# Patient Record
Sex: FEMALE | Race: WHITE | NOT HISPANIC OR LATINO | Employment: OTHER | URBAN - NONMETROPOLITAN AREA
[De-identification: names, ages, dates, MRNs, and addresses within clinical notes are randomized per-mention and may not be internally consistent; named-entity substitution may affect disease eponyms.]

---

## 2017-01-10 ENCOUNTER — OFFICE VISIT (OUTPATIENT)
Dept: INTERNAL MEDICINE | Facility: CLINIC | Age: 82
End: 2017-01-10

## 2017-01-10 VITALS
WEIGHT: 177.2 LBS | HEIGHT: 68 IN | SYSTOLIC BLOOD PRESSURE: 100 MMHG | OXYGEN SATURATION: 95 % | TEMPERATURE: 98.4 F | BODY MASS INDEX: 26.86 KG/M2 | DIASTOLIC BLOOD PRESSURE: 60 MMHG

## 2017-01-10 DIAGNOSIS — J41.8 MIXED SIMPLE AND MUCOPURULENT CHRONIC BRONCHITIS (HCC): Primary | ICD-10-CM

## 2017-01-10 PROCEDURE — 96372 THER/PROPH/DIAG INJ SC/IM: CPT | Performed by: INTERNAL MEDICINE

## 2017-01-10 PROCEDURE — 99213 OFFICE O/P EST LOW 20 MIN: CPT | Performed by: INTERNAL MEDICINE

## 2017-01-10 RX ORDER — METHYLPREDNISOLONE ACETATE 40 MG/ML
40 INJECTION, SUSPENSION INTRA-ARTICULAR; INTRALESIONAL; INTRAMUSCULAR; SOFT TISSUE ONCE
Status: COMPLETED | OUTPATIENT
Start: 2017-01-10 | End: 2017-01-10

## 2017-01-10 RX ORDER — AZITHROMYCIN 250 MG/1
TABLET, FILM COATED ORAL
Qty: 6 TABLET | Refills: 1 | Status: SHIPPED | OUTPATIENT
Start: 2017-01-10 | End: 2017-01-24

## 2017-01-10 RX ORDER — PREDNISONE 10 MG/1
TABLET ORAL
Qty: 15 TABLET | Refills: 0 | Status: SHIPPED | OUTPATIENT
Start: 2017-01-10 | End: 2017-01-24

## 2017-01-10 RX ADMIN — METHYLPREDNISOLONE ACETATE 40 MG: 40 INJECTION, SUSPENSION INTRA-ARTICULAR; INTRALESIONAL; INTRAMUSCULAR; SOFT TISSUE at 16:21

## 2017-01-10 NOTE — MR AVS SNAPSHOT
Val Arteaga   1/10/2017 3:30 PM   Office Visit    Dept Phone:  [phone]   Encounter #:  [id number]    Provider:  Roula Albert MD   Department:  [facility] INTERNAL MEDICINE                Your Full Care Plan              Today's Medication Changes          These changes are accurate as of: 1/10/17  4:31 PM.  If you have any questions, ask your nurse or doctor.               New Medication(s)Ordered:     predniSONE 10 MG tablet   Commonly known as:  DELTASONE   1 PO bid for 5 days, 1 PO daily for 5 days.   Started by:  Roula Albert MD            Where to Get Your Medications      These medications were sent to [place], [place] - [address] [place] [address] - [phone]  - [phone] [address] [address]     Phone:  [phone]     azithromycin 250 MG tablet    predniSONE 10 MG tablet                  Your Updated Medication List          This list is accurate as of: 1/10/17  4:31 PM.  Always use your most recent med list.                albuterol 108 (90 BASE) MCG/ACT inhaler   Commonly known as:  PROVENTIL HFA;VENTOLIN HFA   Inhale 2 puffs Every 4 (Four) Hours As Needed for wheezing.       aspirin 81 MG EC tablet       azithromycin 250 MG tablet   Commonly known as:  ZITHROMAX   Take 2 tablets the first day, then 1 tablet daily for 4 days.       calcium carbonate 600 MG tablet   Commonly known as:  OS-AJ       CENTRUM SILVER ADULT 50+ tablet       clopidogrel 75 MG tablet   Commonly known as:  PLAVIX       coenzyme Q10 100 MG capsule       fluorometholone 0.1 % ophthalmic suspension   Commonly known as:  FLAREX   Administer 1 drop to both eyes 4 (Four) Times a Day.       levothyroxine 50 MCG tablet   Commonly known as:  SYNTHROID   Take 1 tablet by mouth daily.       magnesium oxide 400 MG tablet   Commonly known as:  MAG-OX       nitroglycerin 0.2 MG/HR patch   Commonly known as:  NITRO-DUR   Place 1 patch on the  skin Daily.       predniSONE 10 MG tablet   Commonly known as:  DELTASONE   1 PO bid for 5 days, 1 PO daily for 5 days.       ranolazine 500 MG 12 hr tablet   Commonly known as:  RANEXA       sertraline 50 MG tablet   Commonly known as:  ZOLOFT       simvastatin 40 MG tablet   Commonly known as:  ZOCOR       telmisartan 80 MG tablet   Commonly known as:  MICARDIS   Take 1 tablet by mouth Daily.       Umeclidinium-Vilanterol 62.5-25 MCG/INH aerosol powder    Commonly known as:  ANORO ELLIPTA   Inhale 1 puff daily.               You Were Diagnosed With        Codes Comments    Mixed simple and mucopurulent chronic bronchitis    -  Primary ICD-10-CM: J41.8  ICD-9-CM: 491.1       Medications to be Given to You by a Medical Professional     Due       Frequency    (none) methylPREDNISolone acetate (DEPO-medrol) injection 40 mg  Once      Instructions     None    Patient Instructions History      Upcoming Appointments     Visit Type Date Time Department    OFFICE VISIT 1/10/2017  3:30 PM [facility] INTERNAL MED MAD    OFFICE VISIT 5/22/2017 10:00 AM [facility] INTERNAL MED MAD    OFFICE VISIT 5/30/2017 10:00 AM [facility] PULMONARY MAD    OFFICE VISIT 12/13/2017  1:00 PM [facility] OPHTHALMOLOGY MAD      MyChart Signup     Our records indicate that you have declined [facility] [employer] signup. If you would like to sign up for Pop[employer]ax[employer], please email [email or web address] or call [phone] to obtain an activation code.             Other Info from Your Visit           Your Appointments     May 22, 2017 10:00 AM CDT   Office Visit with Roula Albert MD   [facility] INTERNAL MEDICINE (--)    [address]  [address] [place] [address] [phone]           Arrive 15 minutes prior to appointment.            May 30, 2017 10:00 AM CDT   Office Visit with Bryan Jay MD   [facility] PULMONARY MEDICINE (--)    [address]  [address] [place] [id number]  "  [phone]           Arrive 15 minutes prior to appointment.            Dec 13, 2017  1:00 PM CST   Office Visit with Marco A Benson MD   [facility] OPHTHALMOLOGY (--)    [address] [place] [address] [address]  [address] [place] [id number] [phone]           Arrive 15 minutes prior to appointment.              Allergies     Ace Inhibitors        Reason for Visit     Shortness of Breath     Cough           Vital Signs     Blood Pressure Temperature Height Weight Oxygen Saturation Body Mass Index    100/60 98.4 °F (36.9 °C) 68\" (172.7 cm) 177 lb 3.2 oz (80.4 kg) 95% 26.94 kg/m2    Smoking Status                   Current Every Day Smoker           Problems and Diagnoses Noted     Chronic airway obstruction      Medications Administered     methylPREDNISolone acetate (DEPO-medrol) injection 40 mg                      "

## 2017-01-10 NOTE — PROGRESS NOTES
Subjective   Val Arteaga is a 84 y.o. female   Patient presents complaining of chest congestion, chest tightness,increased cough and wheezing for a week.Cough is productive of yellowish sputum.  She is using all her inhalers as prescribed.  Also complains of fatigue and tiredness.    She is still smoking couple of cigarettes a day.  Past Medical History   Diagnosis Date   • Acquired hypothyroidism    • Allergic rhinitis    • COPD (chronic obstructive pulmonary disease)    • Corneal epithelial dystrophy    • Coronary arteriosclerosis    • Depression    • Generalized atherosclerosis    • GERD (gastroesophageal reflux disease)    • Hemorrhoids    • History of bone density study 08/03/2012     Lumbar spine Osteopenia. Femoral Osteopenia   • History of mammogram 08/20/2004     Birads Category 2 Benign   • History of Papanicolaou smear of cervix 08/12/2004     NEGATIVE   • Hypercholesterolemia    • Hyperlipidemia    • Hypertension    • Nonexudative age-related macular degeneration    • Osteoarthritis of multiple joints    • Pseudophakia    • Punctate keratitis      - history Thygeson's keratopathy      • Tobacco dependence syndrome      Past Surgical History   Procedure Laterality Date   • Coronary angioplasty  07/21/1993     Angioplasty, coronary (2)    RCA    • Appendectomy     • Cardiac catheterization  06/16/2014     Kathe. patency in the circumflex artery site of previous angioplasty. Kathe. patency in RCA.Nonobstructive 20-30% stenosis in the LAD and diagonal branch. Preserved LV systolic function with Ef of 55%   • Endoscopy and colonoscopy  10/01/1998     Hemorhoids Rectal Outlet Bleeding   • Colonoscopy  01/01/2013     patient declined    • Dilatation and curettage       3   • Injection of medication  11/23/2015     Depo Medrol (Methylprednisone) (5)    • Esophagoscopy / egd  06/28/2004     Nonerosive gastritis of the body of the stomach. A biopsy was obtained & placed in h. Pylori test agar. Multiple biopsies were  obtained from the body of the stomach   • Breast surgery  03/19/1954     Excision, breast lesion (1)  Excision of fibroma. Tumor,very small right breast, from portion near sternum   • Cervical spine surgery  09/16/1974     Excision of cervical disc, C5-6, C6-7, anterior cervical fusion, C5-6 with iliac bone graft.cervical spondylosis,C5-6, C6-7   • Injection of medication  02/04/2016     Kenalog (3)         Social History   Substance Use Topics   • Smoking status: Current Every Day Smoker     Types: Cigarettes   • Smokeless tobacco: Not on file      Comment: 1-9 cigs/day   • Alcohol use Not on file     Family History   Problem Relation Age of Onset   • Coronary artery disease Other      Allergies   Allergen Reactions   • Ace Inhibitors      Current Outpatient Prescriptions on File Prior to Visit   Medication Sig Dispense Refill   • albuterol (PROVENTIL HFA;VENTOLIN HFA) 108 (90 BASE) MCG/ACT inhaler Inhale 2 puffs Every 4 (Four) Hours As Needed for wheezing. 6.7 g 11   • aspirin 81 MG EC tablet Take 81 mg by mouth Daily.     • calcium carbonate (OS-AJ) 600 MG tablet Take 600 mg by mouth Daily.     • clopidogrel (PLAVIX) 75 MG tablet Take 75 mg by mouth Daily.     • coenzyme Q10 100 MG capsule Take 100 mg by mouth Daily.     • fluorometholone (FLAREX) 0.1 % ophthalmic suspension Administer 1 drop to both eyes 4 (Four) Times a Day. 5 mL 6   • levothyroxine (SYNTHROID) 50 MCG tablet Take 1 tablet by mouth daily. 30 tablet 2   • magnesium oxide (MAG-OX) 400 MG tablet Take 400 mg by mouth 2 (Two) Times a Day.     • Multiple Vitamins-Minerals (CENTRUM SILVER ADULT 50+) tablet Take 1 tablet by mouth Daily.     • nitroglycerin (NITRO-DUR) 0.2 MG/HR patch Place 1 patch on the skin Daily. 30 patch 0   • ranolazine (RANEXA) 500 MG 12 hr tablet Take 500 mg by mouth 2 (Two) Times a Day.     • sertraline (ZOLOFT) 50 MG tablet Take 50 mg by mouth Daily.     • simvastatin (ZOCOR) 40 MG tablet Take 40 mg by mouth Every Night.     •  telmisartan (MICARDIS) 80 MG tablet Take 1 tablet by mouth Daily. 30 tablet 0   • Umeclidinium-Vilanterol (ANORO ELLIPTA) 62.5-25 MCG/INH aerosol powder  Inhale 1 puff daily. 1 each 4   • azithromycin (ZITHROMAX) 250 MG tablet Take 2 tablets the first day, then 1 tablet daily for 4 days. 6 tablet 1     No current facility-administered medications on file prior to visit.      Review of Systems   Constitutional: Positive for chills and fatigue. Negative for fever.   HENT: Negative for congestion, postnasal drip, rhinorrhea and sore throat.    Respiratory: Positive for cough, chest tightness, shortness of breath and wheezing.    Cardiovascular: Negative for chest pain, palpitations and leg swelling.   Gastrointestinal: Negative for abdominal pain, nausea and vomiting.   Endocrine: Negative for cold intolerance and heat intolerance.   Genitourinary: Negative for dysuria and flank pain.   Skin: Negative for color change, pallor and rash.   Neurological: Negative for dizziness, light-headedness and numbness.       Objective   Physical Exam   Constitutional: She is oriented to person, place, and time. She appears well-developed and well-nourished.   HENT:   Head: Normocephalic.   Nose: Mucosal edema present. No rhinorrhea. Right sinus exhibits no frontal sinus tenderness. Left sinus exhibits no frontal sinus tenderness.   Neck: No JVD present. No thyromegaly present.   Cardiovascular: Normal rate and regular rhythm.    No murmur heard.  Pulmonary/Chest: Effort normal. No respiratory distress. She has wheezes. She exhibits no tenderness.   Diminished breath sounds   Abdominal: Soft. Bowel sounds are normal.   Lymphadenopathy:     She has no cervical adenopathy.   Neurological: She is alert and oriented to person, place, and time.   Skin: Skin is warm and dry.   No cyanosis   Nursing note and vitals reviewed.          Patient Active Problem List   Diagnosis   • Essential hypertension   • Coronary artery disease involving  native coronary artery of native heart without angina pectoris   • Chronic obstructive pulmonary disease   • Acquired hypothyroidism   • Depressive disorder   • Thygeson's superficial punctate keratitis   • Pseudophakia               Assessment    Diagnosis Plan   1. Mixed simple and mucopurulent chronic bronchitis  methylPREDNISolone acetate (DEPO-medrol) injection 40 mg         Plan      Z-pack.  Depo-Medrol.  Prednisone taper.  Continue Anoro and Albuterol inhaler.  Advised to quit smoking.      New Medications Ordered This Visit   Medications   • azithromycin (ZITHROMAX) 250 MG tablet     Sig: Take 2 tablets the first day, then 1 tablet daily for 4 days.     Dispense:  6 tablet     Refill:  1   • predniSONE (DELTASONE) 10 MG tablet     Si PO bid for 5 days, 1 PO daily for 5 days.     Dispense:  15 tablet     Refill:  0   • methylPREDNISolone acetate (DEPO-medrol) injection 40 mg       Follow up if not improved or for routine care.

## 2017-01-24 ENCOUNTER — OFFICE VISIT (OUTPATIENT)
Dept: INTERNAL MEDICINE | Facility: CLINIC | Age: 82
End: 2017-01-24

## 2017-01-24 VITALS
SYSTOLIC BLOOD PRESSURE: 120 MMHG | HEIGHT: 68 IN | DIASTOLIC BLOOD PRESSURE: 60 MMHG | BODY MASS INDEX: 25.84 KG/M2 | WEIGHT: 170.5 LBS

## 2017-01-24 DIAGNOSIS — J41.8 MIXED SIMPLE AND MUCOPURULENT CHRONIC BRONCHITIS (HCC): Primary | ICD-10-CM

## 2017-01-24 DIAGNOSIS — F17.200 TOBACCO DEPENDENCE: ICD-10-CM

## 2017-01-24 DIAGNOSIS — I10 ESSENTIAL HYPERTENSION: ICD-10-CM

## 2017-01-24 DIAGNOSIS — I25.10 CORONARY ARTERY DISEASE INVOLVING NATIVE CORONARY ARTERY OF NATIVE HEART WITHOUT ANGINA PECTORIS: ICD-10-CM

## 2017-01-24 PROCEDURE — 99213 OFFICE O/P EST LOW 20 MIN: CPT | Performed by: INTERNAL MEDICINE

## 2017-01-24 RX ORDER — NICOTINE 21 MG/24HR
1 PATCH, TRANSDERMAL 24 HOURS TRANSDERMAL EVERY 24 HOURS
Qty: 30 PATCH | Refills: 0 | Status: SHIPPED | OUTPATIENT
Start: 2017-01-24 | End: 2017-02-06

## 2017-01-24 RX ORDER — BUDESONIDE AND FORMOTEROL FUMARATE DIHYDRATE 160; 4.5 UG/1; UG/1
2 AEROSOL RESPIRATORY (INHALATION)
Qty: 1 INHALER | Refills: 5 | Status: SHIPPED | OUTPATIENT
Start: 2017-01-24 | End: 2018-01-22 | Stop reason: SDUPTHER

## 2017-01-24 RX ORDER — CARVEDILOL 6.25 MG/1
6.25 TABLET ORAL 2 TIMES DAILY WITH MEALS
COMMUNITY
End: 2018-03-30 | Stop reason: SDUPTHER

## 2017-01-24 RX ORDER — CARVEDILOL 12.5 MG/1
12.5 TABLET ORAL DAILY
COMMUNITY
End: 2017-01-24 | Stop reason: DRUGHIGH

## 2017-01-24 NOTE — MR AVS SNAPSHOT
Val Arteaga   1/24/2017 10:30 AM   Office Visit    Dept Phone:  [phone]   Encounter #:  [id number]    Provider:  Roula Albert MD   Department:  [facility] INTERNAL MEDICINE                Your Full Care Plan              Today's Medication Changes          These changes are accurate as of: 1/24/17 12:08 PM.  If you have any questions, ask your nurse or doctor.               New Medication(s)Ordered:     budesonide-formoterol 160-4.5 MCG/ACT inhaler   Commonly known as:  SYMBICORT   Inhale 2 puffs 2 (Two) Times a Day.   Started by:  Roula Albert MD       nicotine 14 MG/24HR patch   Commonly known as:  NICODERM CQ   Place 1 patch on the skin Daily.   Started by:  Roula Albert MD         Stop taking medication(s)listed here:     azithromycin 250 MG tablet   Commonly known as:  ZITHROMAX   Stopped by:  Roula Albert MD           predniSONE 10 MG tablet   Commonly known as:  DELTASONE   Stopped by:  Roula Albert MD           Umeclidinium-Vilanterol 62.5-25 MCG/INH aerosol powder    Commonly known as:  ANORO ELLIPTA   Stopped by:  Roula Albert MD                Where to Get Your Medications      These medications were sent to [address] [phone] [place] [id number] [place] [address]     Phone:  [phone]     budesonide-formoterol 160-4.5 MCG/ACT inhaler    nicotine 14 MG/24HR patch                  Your Updated Medication List          This list is accurate as of: 1/24/17 12:08 PM.  Always use your most recent med list.                albuterol 108 (90 BASE) MCG/ACT inhaler   Commonly known as:  PROVENTIL HFA;VENTOLIN HFA   Inhale 2 puffs Every 4 (Four) Hours As Needed for wheezing.       aspirin 81 MG EC tablet       budesonide-formoterol 160-4.5 MCG/ACT inhaler   Commonly known as:  SYMBICORT   Inhale 2 puffs 2 (Two) Times a Day.       calcium carbonate 600 MG tablet      Commonly known as:  OS-AJ       carvedilol 12.5 MG tablet   Commonly known as:  COREG       CENTRUM SILVER ADULT 50+ tablet       clopidogrel 75 MG tablet   Commonly known as:  PLAVIX       coenzyme Q10 100 MG capsule       fluorometholone 0.1 % ophthalmic suspension   Commonly known as:  FLAREX   Administer 1 drop to both eyes 4 (Four) Times a Day.       levothyroxine 50 MCG tablet   Commonly known as:  SYNTHROID   Take 1 tablet by mouth daily.       magnesium oxide 400 MG tablet   Commonly known as:  MAG-OX       nicotine 14 MG/24HR patch   Commonly known as:  NICODERM CQ   Place 1 patch on the skin Daily.       nitroglycerin 0.2 MG/HR patch   Commonly known as:  NITRO-DUR   Place 1 patch on the skin Daily.       PRIMIDONE PO       ranolazine 500 MG 12 hr tablet   Commonly known as:  RANEXA       sertraline 50 MG tablet   Commonly known as:  ZOLOFT       simvastatin 40 MG tablet   Commonly known as:  ZOCOR       telmisartan 80 MG tablet   Commonly known as:  MICARDIS   Take 1 tablet by mouth Daily.               Instructions     None    Patient Instructions History      Upcoming Appointments     Visit Type Date Time Department    OFFICE VISIT 1/24/2017 10:30 AM [facility] INTERNAL MED MAD    OFFICE VISIT 2/24/2017 10:00 AM [facility] INTER[facility] MED [facility]AD    OFFICE VISIT 5/22/2017 10:00 AM [facility] INTER[facility] MED MAD    OFFICE VISIT 5/30/2017 10:00 AM [facility] PULMONARY MAD    OFFICE VISIT 12/13/2017  1:00 PM [facility] OPHTHALMOLOGY MAD      MyChart Signup     Our records indicate that you have declined [facility] signup. If you would like to sign up for [facility], please email [email or web address] or call [phone] to obtain an activation code.             Other Info from Your Visit           Your Appointments     Feb 24, 2017 10:00 AM CST   Office Visit with Roula Albert MD   [facility] INTERNAL MEDICINE (--)    [address]  [place] [address] [place] [address] [phone]            "Arrive 15 minutes prior to appointment.            May 22, 2017 10:00 AM CDT   Office Visit with Roula Albert MD   [facility] INTERNAL MEDICINE (--)    [address]  [address] [place] [id number] [phone]           Arrive 15 minutes prior to appointment.            May 30, 2017 10:00 AM CDT   Office Visit with Bryan Jay MD   [facility] PULMONARY MEDICINE (--)    [address]  [address] [place] [address] [phone]           Arrive 15 minutes prior to appointment.            Dec 13, 2017  1:00 PM CST   Office Visit with Marco A Benson MD   [facility] OPHTHALMOLOGY (--)    [address] [place] [address] [address]  [address] [place] [address] [phone]           Arrive 15 minutes prior to appointment.              Allergies     Ace Inhibitors        Reason for Visit     COPD [facility] follow up      Vital Signs     Blood Pressure Height Weight Body Mass Index Smoking Status       120/60 68\" (172.7 cm) 170 lb 8 oz (77.3 kg) 25.92 kg/m2 Current Every Day Smoker         "

## 2017-01-24 NOTE — PROGRESS NOTES
Subjective   Val Arteaga is a 84 y.o. female     Patient presents for recheck after recent hospitalization for COPD exacerbation. Patient was treated with IV Antibiotics and IV steroids with improvement in her symptoms. Chest X-Ray was done and did not show any evidence of infiltrates.  Patient was discharged home on 2 liters of Oxygen and has been using it at night.    She is doing better.Denies cough, purulent sputum or wheezing. She is dyspneic on exertion. Patient is on tapering dose of Medrol.  Patient is using Nebulizers 4 times a day.  O2 sat on RA in the office is 93% at rest.      Dose of Coreg was doubled, but since then her BP has been on a low side, and patient has been lightheaded at times. She denies any chest pain, palpitations or edema in lower extremities.    Past Medical History   Diagnosis Date   • Acquired hypothyroidism    • Allergic rhinitis    • COPD (chronic obstructive pulmonary disease)    • Corneal epithelial dystrophy    • Coronary arteriosclerosis    • Depression    • Generalized atherosclerosis    • GERD (gastroesophageal reflux disease)    • Hemorrhoids    • History of bone density study 08/03/2012     Lumbar spine Osteopenia. Femoral Osteopenia   • History of mammogram 08/20/2004     Birads Category 2 Benign   • History of Papanicolaou smear of cervix 08/12/2004     NEGATIVE   • Hypercholesterolemia    • Hyperlipidemia    • Hypertension    • Nonexudative age-related macular degeneration    • Osteoarthritis of multiple joints    • Pseudophakia    • Punctate keratitis      - history Thygeson's keratopathy      • Tobacco dependence syndrome      Past Surgical History   Procedure Laterality Date   • Coronary angioplasty  07/21/1993     Angioplasty, coronary (2)    RCA    • Appendectomy     • Cardiac catheterization  06/16/2014     Kathe. patency in the circumflex artery site of previous angioplasty. Kathe. patency in RCA.Nonobstructive 20-30% stenosis in the LAD and diagonal branch.  Preserved LV systolic function with Ef of 55%   • Endoscopy and colonoscopy  10/01/1998     Hemorhoids Rectal Outlet Bleeding   • Colonoscopy  01/01/2013     patient declined    • Dilatation and curettage       3   • Injection of medication  11/23/2015     Depo Medrol (Methylprednisone) (5)    • Esophagoscopy / egd  06/28/2004     Nonerosive gastritis of the body of the stomach. A biopsy was obtained & placed in h. Pylori test agar. Multiple biopsies were obtained from the body of the stomach   • Breast surgery  03/19/1954     Excision, breast lesion (1)  Excision of fibroma. Tumor,very small right breast, from portion near sternum   • Cervical spine surgery  09/16/1974     Excision of cervical disc, C5-6, C6-7, anterior cervical fusion, C5-6 with iliac bone graft.cervical spondylosis,C5-6, C6-7   • Injection of medication  02/04/2016     Kenalog (3)         Social History   Substance Use Topics   • Smoking status: Current Every Day Smoker     Types: Cigarettes   • Smokeless tobacco: Never Used      Comment: 1-9 cigs/day   • Alcohol use No     Family History   Problem Relation Age of Onset   • Coronary artery disease Other      Allergies   Allergen Reactions   • Ace Inhibitors      Current Outpatient Prescriptions on File Prior to Visit   Medication Sig Dispense Refill   • albuterol (PROVENTIL HFA;VENTOLIN HFA) 108 (90 BASE) MCG/ACT inhaler Inhale 2 puffs Every 4 (Four) Hours As Needed for wheezing. 6.7 g 11   • aspirin 81 MG EC tablet Take 81 mg by mouth Daily.     • calcium carbonate (OS-AJ) 600 MG tablet Take 600 mg by mouth Daily.     • clopidogrel (PLAVIX) 75 MG tablet Take 75 mg by mouth Daily.     • coenzyme Q10 100 MG capsule Take 100 mg by mouth Daily.     • fluorometholone (FLAREX) 0.1 % ophthalmic suspension Administer 1 drop to both eyes 4 (Four) Times a Day. 5 mL 6   • levothyroxine (SYNTHROID) 50 MCG tablet Take 1 tablet by mouth daily. 30 tablet 2   • magnesium oxide (MAG-OX) 400 MG tablet Take 400  mg by mouth 2 (Two) Times a Day.     • Multiple Vitamins-Minerals (CENTRUM SILVER ADULT 50+) tablet Take 1 tablet by mouth Daily.     • nitroglycerin (NITRO-DUR) 0.2 MG/HR patch Place 1 patch on the skin Daily. 30 patch 0   • ranolazine (RANEXA) 500 MG 12 hr tablet Take 500 mg by mouth 2 (Two) Times a Day.     • sertraline (ZOLOFT) 50 MG tablet Take 50 mg by mouth Daily.     • telmisartan (MICARDIS) 80 MG tablet Take 1 tablet by mouth Daily. 30 tablet 0   • simvastatin (ZOCOR) 40 MG tablet Take 40 mg by mouth Every Night.     • [DISCONTINUED] azithromycin (ZITHROMAX) 250 MG tablet Take 2 tablets the first day, then 1 tablet daily for 4 days. 6 tablet 1   • [DISCONTINUED] predniSONE (DELTASONE) 10 MG tablet 1 PO bid for 5 days, 1 PO daily for 5 days. 15 tablet 0   • [DISCONTINUED] Umeclidinium-Vilanterol (ANORO ELLIPTA) 62.5-25 MCG/INH aerosol powder  Inhale 1 puff daily. 1 each 4     No current facility-administered medications on file prior to visit.      Review of Systems   Constitutional: Positive for fatigue. Negative for chills and fever.   HENT: Negative for congestion, sinus pressure and sore throat.    Respiratory: Positive for shortness of breath. Negative for chest tightness and wheezing.    Cardiovascular: Negative for chest pain, palpitations and leg swelling.   Gastrointestinal: Negative for abdominal pain, constipation and diarrhea.   Musculoskeletal: Negative for back pain, joint swelling and myalgias.   Neurological: Positive for light-headedness.       Objective   Physical Exam   Constitutional: She appears well-developed and well-nourished.   HENT:   Head: Normocephalic and atraumatic.   Eyes: Conjunctivae and EOM are normal.   Neck: Neck supple. No JVD present. No thyromegaly present.   Cardiovascular: Normal rate and regular rhythm.    No murmur heard.  Pulmonary/Chest: Effort normal.   Diminished breath sounds   Abdominal: Soft. Bowel sounds are normal. She exhibits no mass.   Musculoskeletal:  Normal range of motion. She exhibits no edema.   Neurological: She is alert. She displays normal reflexes.   Skin: Skin is warm and dry. No pallor.   No cyanosis   Psychiatric: She has a normal mood and affect. Her behavior is normal.   Nursing note and vitals reviewed.      Results for orders placed or performed during the hospital encounter of 01/13/17   Comprehensive Metabolic Panel   Result Value Ref Range    Sodium 134 (L) 137 - 145 mmol/L    Potassium 4.4 3.5 - 5.1 mmol/L    Chloride 96 95 - 110 mmol/L    CO2 28 22 - 31 mmol/L    Anion Gap 10.0 5.0 - 15.0 mmol/L    Glucose 116 (H) 60 - 100 mg/dl    BUN 22 (H) 7 - 21 mg/dl    Creatinine 0.9 0.5 - 1.0 mg/dl    GFR MDRD Non African American 60 39 - 90 mL/min/1.73 sq.M    GFR MDRD African American 72 39 - 90 mL/min/1.73 sq.M    Calcium 8.8 8.4 - 10.2 mg/dl    Total Protein 7.0 6.3 - 8.6 gm/dl    Albumin 4.0 3.4 - 4.8 gm/dl    Total Bilirubin 0.5 0.2 - 1.3 mg/dl    Alkaline Phosphatase 79 38 - 126 U/L    AST (SGOT) 36 14 - 36 U/L    ALT (SGPT) 41 9 - 52 U/L   Magnesium   Result Value Ref Range    Magnesium 2.20 1.60 - 2.30 mg/dl   Lipase   Result Value Ref Range    Lipase 96 23 - 300 U/L   CK   Result Value Ref Range    Creatine Kinase 52 30 - 135 U/L   Protime-INR   Result Value Ref Range    Protime 12.4 11.1 - 15.3 seconds    INR 0.9 0.8 - 1.2   aPTT   Result Value Ref Range    PTT 26.9 20.0 - 40.3 seconds   proBNP   Result Value Ref Range    NT-proBNP 476 0 - 1800 pg/ml   Troponin   Result Value Ref Range    Troponin I <0.012 0.000 - 0.034 ng/ml   CK-MB   Result Value Ref Range    CKMB 1.3 0.0 - 5.0 ng/ml   TSH   Result Value Ref Range    TSH 2.46 0.46 - 4.68 uIU/ml   CK   Result Value Ref Range    Creatine Kinase 48 30 - 135 U/L   CK-MB   Result Value Ref Range    CKMB 1.2 0.0 - 5.0 ng/ml   Troponin   Result Value Ref Range    Troponin I <0.012 0.000 - 0.034 ng/ml   Blood Gas, Arterial   Result Value Ref Range    pH, Arterial 7.44 7.35 - 7.45 pH Units    pCO2,  Arterial 30.2 (L) 35.0 - 45.0 mm Hg    pO2, Arterial 85.9 80.0 - 105.0 mm Hg    HCO3, Arterial 20.1 (L) 22.0 - 26.0 mmol/L    CO2 Content 21.1 (L) 23.0 - 27.0 mmol/L    Base Excess -3.0 (L) -2.4 - 2.4 mmol/L    O2 Saturation, Arterial 95.7 94.0 - 100.0 %    O2 Content 16.1 15.0 - 23.0 volumes %    A-A. Gradiant 77.3 (H) 20.0 - 65.0 mm Hg   CK   Result Value Ref Range    Creatine Kinase 52 30 - 135 U/L   Basic Metabolic Panel   Result Value Ref Range    Sodium 135 (L) 137 - 145 mmol/L    Potassium 3.8 3.5 - 5.1 mmol/L    Chloride 99 95 - 110 mmol/L    CO2 22 22 - 31 mmol/L    Anion Gap 14.0 5.0 - 15.0 mmol/L    Glucose 192 (H) 60 - 100 mg/dl    BUN 22 (H) 7 - 21 mg/dl    Creatinine 0.9 0.5 - 1.0 mg/dl    GFR MDRD Non African American 60 39 - 90 mL/min/1.73 sq.M    GFR MDRD African American 72 39 - 90 mL/min/1.73 sq.M    Calcium 8.3 (L) 8.4 - 10.2 mg/dl   CK-MB   Result Value Ref Range    CKMB 1.2 0.0 - 5.0 ng/ml   Troponin   Result Value Ref Range    Troponin I <0.012 0.000 - 0.034 ng/ml   Prealbumin   Result Value Ref Range    Prealbumin 31.6 17.6 - 36.0 mg/dl   Comprehensive Metabolic Panel   Result Value Ref Range    Sodium 139 137 - 145 mmol/L    Potassium 4.2 3.5 - 5.1 mmol/L    Chloride 101 95 - 110 mmol/L    CO2 24 22 - 31 mmol/L    Anion Gap 14.0 5.0 - 15.0 mmol/L    Glucose 138 (H) 60 - 100 mg/dl    BUN 24 (H) 7 - 21 mg/dl    Creatinine 0.9 0.5 - 1.0 mg/dl    GFR MDRD Non African American 60 39 - 90 mL/min/1.73 sq.M    GFR MDRD African American 72 39 - 90 mL/min/1.73 sq.M    Calcium 8.9 8.4 - 10.2 mg/dl    Total Protein 6.4 6.3 - 8.6 gm/dl    Albumin 3.6 3.4 - 4.8 gm/dl    Total Bilirubin 0.6 0.2 - 1.3 mg/dl    Alkaline Phosphatase 62 38 - 126 U/L    AST (SGOT) 29 14 - 36 U/L    ALT (SGPT) 28 9 - 52 U/L   Urinalysis   Result Value Ref Range    Color, UA YELLOW STRAW,YELLOW,DK YELLOW,SEAN    Appearance CLOUDY (H) CLEAR    Specific Gravity, UA 1.013 1.003 - 1.030    pH, UA 6.0 5.0 - 9.0 pH Units     Leukocytes, UA NEGATIVE NEGATIVE    Nitrite, UA NEGATIVE NEGATIVE    Protein, UA NEGATIVE NEGATIVE    Glucose, Urine NEGATIVE NEGATIVE mg/dl    Ketones, UA NEGATIVE NEGATIVE    Urobilinogen, UA 0.2 0.2 EU/dl    Blood, UA NEGATIVE NEGATIVE   Comprehensive Metabolic Panel   Result Value Ref Range    Sodium 141 137 - 145 mmol/L    Potassium 3.8 3.5 - 5.1 mmol/L    Chloride 103 95 - 110 mmol/L    CO2 26 22 - 31 mmol/L    Anion Gap 12.0 5.0 - 15.0 mmol/L    Glucose 127 (H) 60 - 100 mg/dl    BUN 24 (H) 7 - 21 mg/dl    Creatinine 0.9 0.5 - 1.0 mg/dl    GFR MDRD Non African American 60 39 - 90 mL/min/1.73 sq.M    GFR MDRD African American 72 39 - 90 mL/min/1.73 sq.M    Calcium 8.8 8.4 - 10.2 mg/dl    Total Protein 6.3 6.3 - 8.6 gm/dl    Albumin 3.6 3.4 - 4.8 gm/dl    Total Bilirubin 0.5 0.2 - 1.3 mg/dl    Alkaline Phosphatase 60 38 - 126 U/L    AST (SGOT) 37 (H) 14 - 36 U/L    ALT (SGPT) 24 9 - 52 U/L   CBC & Differential   Result Value Ref Range    WBC 9.9 (H) 3.2 - 9.8 x1000/uL    RBC 3.85 3.77 - 5.16 paola/mm3    Hemoglobin 12.8 12.0 - 15.5 gm/dl    Hematocrit 37.4 35.0 - 45.0 %    MCV 97.1 80.0 - 98.0 fl    MCH 33.2 26.0 - 34.0 pg    MCHC 34.2 31.4 - 36.0 gm/dl    RDW 13.5 11.5 - 14.5 %    Platelets 298 150 - 450 x1000/mm3    MPV 9.8 8.0 - 12.0 fl    Neutrophil Rel % 83.9 (H) 37.0 - 80.0 %    Lymphocyte Rel % 12.5 10.0 - 50.0 %    Monocyte Rel % 3.1 0.0 - 12.0 %    Eosinophil Rel % 0.1 0.0 - 7.0 %    Basophil Rel % 0.1 0.0 - 2.0 %    Immature Granulocyte Rel % 0.30 0.00 - 0.50 %    Neutrophils Absolute 8.26 2.00 - 8.60 x1000/uL    Lymphocytes Absolute 1.23 0.60 - 4.20 x1000/uL    Monocytes Absolute 0.31 0.00 - 0.90 x1000/uL    Eosinophils Absolute 0.01 0.00 - 0.70 x1000/uL    Basophils Absolute 0.01 0.00 - 0.20 x1000/uL    Immature Granulocytes Absolute 0.030 (H) 0.005 - 0.022 x1000/uL    nRBC 0.0 0.0 - 0.2 %    nRBC 0.000 x1000/uL   CBC & Differential   Result Value Ref Range    WBC 8.0 3.2 - 9.8 x1000/uL    RBC 3.52  (L) 3.77 - 5.16 paola/mm3    Hemoglobin 11.8 (L) 12.0 - 15.5 gm/dl    Hematocrit 34.1 (L) 35.0 - 45.0 %    MCV 96.9 80.0 - 98.0 fl    MCH 33.5 26.0 - 34.0 pg    MCHC 34.6 31.4 - 36.0 gm/dl    RDW 13.6 11.5 - 14.5 %    Platelets 244 150 - 450 x1000/mm3    MPV 9.8 8.0 - 12.0 fl    Neutrophil Rel % 90.4 (H) 37.0 - 80.0 %    Lymphocyte Rel % 8.6 (L) 10.0 - 50.0 %    Monocyte Rel % 0.8 0.0 - 12.0 %    Eosinophil Rel % 0.0 0.0 - 7.0 %    Basophil Rel % 0.1 0.0 - 2.0 %    Immature Granulocyte Rel % 0.10 0.00 - 0.50 %    Neutrophils Absolute 7.21 2.00 - 8.60 x1000/uL    Lymphocytes Absolute 0.69 0.60 - 4.20 x1000/uL    Monocytes Absolute 0.06 0.00 - 0.90 x1000/uL    Eosinophils Absolute 0.00 0.00 - 0.70 x1000/uL    Basophils Absolute 0.01 0.00 - 0.20 x1000/uL    Immature Granulocytes Absolute 0.010 0.005 - 0.022 x1000/uL   CBC & Differential   Result Value Ref Range    WBC 16.2 (H) 3.2 - 9.8 x1000/uL    RBC 3.65 (L) 3.77 - 5.16 paola/mm3    Hemoglobin 12.1 12.0 - 15.5 gm/dl    Hematocrit 35.1 35.0 - 45.0 %    MCV 96.2 80.0 - 98.0 fl    MCH 33.2 26.0 - 34.0 pg    MCHC 34.5 31.4 - 36.0 gm/dl    RDW 14.3 11.5 - 14.5 %    Platelets 294 150 - 450 x1000/mm3    MPV 9.1 8.0 - 12.0 fl    Neutrophil Rel % 87.2 (H) 37.0 - 80.0 %    Lymphocyte Rel % 8.7 (L) 10.0 - 50.0 %    Monocyte Rel % 3.5 0.0 - 12.0 %    Eosinophil Rel % 0.0 0.0 - 7.0 %    Basophil Rel % 0.1 0.0 - 2.0 %    Immature Granulocyte Rel % 0.50 0.00 - 0.50 %    Neutrophils Absolute 14.12 (H) 2.00 - 8.60 x1000/uL    Lymphocytes Absolute 1.40 0.60 - 4.20 x1000/uL    Monocytes Absolute 0.56 0.00 - 0.90 x1000/uL    Eosinophils Absolute 0.00 0.00 - 0.70 x1000/uL    Basophils Absolute 0.01 0.00 - 0.20 x1000/uL    Immature Granulocytes Absolute 0.080 (H) 0.005 - 0.022 x1000/uL    nRBC 0.0 0.0 - 0.2 %    nRBC 0.000 x1000/uL   CBC & Differential   Result Value Ref Range    WBC 16.9 (H) 3.2 - 9.8 x1000/uL    RBC 3.75 (L) 3.77 - 5.16 paola/mm3    Hemoglobin 12.4 12.0 - 15.5 gm/dl     Hematocrit 36.9 35.0 - 45.0 %    MCV 98.4 (H) 80.0 - 98.0 fl    MCH 33.1 26.0 - 34.0 pg    MCHC 33.6 31.4 - 36.0 gm/dl    RDW 14.3 11.5 - 14.5 %    Platelets 276 150 - 450 x1000/mm3    MPV 10.1 8.0 - 12.0 fl    Neutrophil Rel % 85.6 (H) 37.0 - 80.0 %    Lymphocyte Rel % 9.1 (L) 10.0 - 50.0 %    Monocyte Rel % 4.6 0.0 - 12.0 %    Eosinophil Rel % 0.0 0.0 - 7.0 %    Basophil Rel % 0.1 0.0 - 2.0 %    Immature Granulocyte Rel % 0.60 (H) 0.00 - 0.50 %    Neutrophils Absolute 14.45 (H) 2.00 - 8.60 x1000/uL    Lymphocytes Absolute 1.54 0.60 - 4.20 x1000/uL    Monocytes Absolute 0.78 0.00 - 0.90 x1000/uL    Eosinophils Absolute 0.00 0.00 - 0.70 x1000/uL    Basophils Absolute 0.01 0.00 - 0.20 x1000/uL    Immature Granulocytes Absolute 0.100 (H) 0.005 - 0.022 x1000/uL   CBC & Differential   Result Value Ref Range    WBC 11.4 (H) 3.2 - 9.8 x1000/uL    RBC 3.56 (L) 3.77 - 5.16 paola/mm3    Hemoglobin 12.0 12.0 - 15.5 gm/dl    Hematocrit 34.9 (L) 35.0 - 45.0 %    MCV 98.0 80.0 - 98.0 fl    MCH 33.7 26.0 - 34.0 pg    MCHC 34.4 31.4 - 36.0 gm/dl    RDW 14.6 (H) 11.5 - 14.5 %    Platelets 252 150 - 450 x1000/mm3    MPV 9.4 8.0 - 12.0 fl    Neutrophil Rel % 85.3 (H) 37.0 - 80.0 %    Lymphocyte Rel % 11.1 10.0 - 50.0 %    Monocyte Rel % 3.0 0.0 - 12.0 %    Eosinophil Rel % 0.0 0.0 - 7.0 %    Basophil Rel % 0.1 0.0 - 2.0 %    Immature Granulocyte Rel % 0.50 0.00 - 0.50 %    Neutrophils Absolute 9.73 (H) 2.00 - 8.60 x1000/uL    Lymphocytes Absolute 1.27 0.60 - 4.20 x1000/uL    Monocytes Absolute 0.34 0.00 - 0.90 x1000/uL    Eosinophils Absolute 0.00 0.00 - 0.70 x1000/uL    Basophils Absolute 0.01 0.00 - 0.20 x1000/uL    Immature Granulocytes Absolute 0.060 (H) 0.005 - 0.022 x1000/uL    nRBC 0.0 0.0 - 0.2 %    nRBC 0.000 x1000/uL   CBC & Differential   Result Value Ref Range    WBC 9.7 3.2 - 9.8 x1000/uL    RBC 3.68 (L) 3.77 - 5.16 paola/mm3    Hemoglobin 12.2 12.0 - 15.5 gm/dl    Hematocrit 35.8 35.0 - 45.0 %    MCV 97.3 80.0 - 98.0 fl     MCH 33.2 26.0 - 34.0 pg    MCHC 34.1 31.4 - 36.0 gm/dl    RDW 14.4 11.5 - 14.5 %    Platelets 252 150 - 450 x1000/mm3    MPV 9.4 8.0 - 12.0 fl    Neutrophil Rel % 82.5 (H) 37.0 - 80.0 %    Lymphocyte Rel % 14.1 10.0 - 50.0 %    Monocyte Rel % 2.8 0.0 - 12.0 %    Eosinophil Rel % 0.0 0.0 - 7.0 %    Basophil Rel % 0.1 0.0 - 2.0 %    Immature Granulocyte Rel % 0.50 0.00 - 0.50 %    Neutrophils Absolute 7.97 2.00 - 8.60 x1000/uL    Lymphocytes Absolute 1.36 0.60 - 4.20 x1000/uL    Monocytes Absolute 0.27 0.00 - 0.90 x1000/uL    Eosinophils Absolute 0.00 0.00 - 0.70 x1000/uL    Basophils Absolute 0.01 0.00 - 0.20 x1000/uL    Immature Granulocytes Absolute 0.050 (H) 0.005 - 0.022 x1000/uL    nRBC 0.0 0.0 - 0.2 %    nRBC 0.000 x1000/uL   CBC & Differential   Result Value Ref Range    WBC 13.4 (H) 3.2 - 9.8 x1000/uL    RBC 3.92 3.77 - 5.16 paola/mm3    Hemoglobin 13.0 12.0 - 15.5 gm/dl    Hematocrit 37.8 35.0 - 45.0 %    MCV 96.4 80.0 - 98.0 fl    MCH 33.2 26.0 - 34.0 pg    MCHC 34.4 31.4 - 36.0 gm/dl    RDW 13.7 11.5 - 14.5 %    Platelets 254 150 - 450 x1000/mm3    MPV 9.8 8.0 - 12.0 fl    Neutrophil Rel % 85.0 (H) 37.0 - 80.0 %    Lymphocyte Rel % 11.5 10.0 - 50.0 %    Monocyte Rel % 2.7 0.0 - 12.0 %    Eosinophil Rel % 0.1 0.0 - 7.0 %    Basophil Rel % 0.1 0.0 - 2.0 %    Immature Granulocyte Rel % 0.60 (H) 0.00 - 0.50 %    Neutrophils Absolute 11.41 (H) 2.00 - 8.60 x1000/uL    Lymphocytes Absolute 1.54 0.60 - 4.20 x1000/uL    Monocytes Absolute 0.36 0.00 - 0.90 x1000/uL    Eosinophils Absolute 0.01 0.00 - 0.70 x1000/uL    Basophils Absolute 0.01 0.00 - 0.20 x1000/uL    Immature Granulocytes Absolute 0.080 (H) 0.005 - 0.022 x1000/uL         Patient Active Problem List   Diagnosis   • Essential hypertension   • Coronary artery disease involving native coronary artery of native heart without angina pectoris   • Chronic obstructive pulmonary disease   • Acquired hypothyroidism   • Depressive disorder   • Thygeson's  superficial punctate keratitis   • Pseudophakia               Assessment    Diagnosis Plan   1. Mixed simple and mucopurulent chronic bronchitis     2. Tobacco dependence     3. Essential hypertension     4. Coronary artery disease involving native coronary artery of native heart without angina pectoris           Plan      1. Symbicort 160 2 puffs bid.      Decrease Duoneb to bid and use it PRN for wheezing and dyspnea.      She will continue with Oxygen at 2 liters at night.      Continue PT/OT with Home Health and consider Pulmonary Rehab.  2. Patient is advised to quit smoking.      Nicoderm is prescribed to help her quit smoking.  3. She will decrease Coreg down to her usual dose of 6.25 mg bid.  4. Patient is clinically stable and will continue Nitroderm/Ranexa/Plavix and Zocor.      Follow up in 1 month

## 2017-02-06 ENCOUNTER — OFFICE VISIT (OUTPATIENT)
Dept: INTERNAL MEDICINE | Facility: CLINIC | Age: 82
End: 2017-02-06

## 2017-02-06 ENCOUNTER — APPOINTMENT (OUTPATIENT)
Dept: LAB | Facility: HOSPITAL | Age: 82
End: 2017-02-06

## 2017-02-06 VITALS
DIASTOLIC BLOOD PRESSURE: 60 MMHG | WEIGHT: 174.6 LBS | BODY MASS INDEX: 26.46 KG/M2 | SYSTOLIC BLOOD PRESSURE: 120 MMHG | HEIGHT: 68 IN

## 2017-02-06 DIAGNOSIS — J96.11 CHRONIC RESPIRATORY FAILURE WITH HYPOXIA (HCC): ICD-10-CM

## 2017-02-06 DIAGNOSIS — I25.10 CORONARY ARTERY DISEASE INVOLVING NATIVE CORONARY ARTERY OF NATIVE HEART WITHOUT ANGINA PECTORIS: ICD-10-CM

## 2017-02-06 DIAGNOSIS — I95.9 HYPOTENSION, UNSPECIFIED: Primary | ICD-10-CM

## 2017-02-06 DIAGNOSIS — I10 ESSENTIAL HYPERTENSION: ICD-10-CM

## 2017-02-06 LAB
ALBUMIN SERPL-MCNC: 3.7 G/DL (ref 3.4–4.8)
ALBUMIN/GLOB SERPL: 1.3 G/DL (ref 1.1–1.8)
ALP SERPL-CCNC: 80 U/L (ref 38–126)
ALT SERPL W P-5'-P-CCNC: 24 U/L (ref 9–52)
ANION GAP SERPL CALCULATED.3IONS-SCNC: 8 MMOL/L (ref 5–15)
AST SERPL-CCNC: 34 U/L (ref 14–36)
BILIRUB SERPL-MCNC: 0.7 MG/DL (ref 0.2–1.3)
BUN BLD-MCNC: 19 MG/DL (ref 7–21)
BUN/CREAT SERPL: 18.6 (ref 7–25)
CALCIUM SPEC-SCNC: 8.9 MG/DL (ref 8.4–10.2)
CHLORIDE SERPL-SCNC: 97 MMOL/L (ref 95–110)
CO2 SERPL-SCNC: 28 MMOL/L (ref 22–31)
CREAT BLD-MCNC: 1.02 MG/DL (ref 0.5–1)
DEPRECATED RDW RBC AUTO: 45.4 FL (ref 36.4–46.3)
ERYTHROCYTE [DISTWIDTH] IN BLOOD BY AUTOMATED COUNT: 12.9 % (ref 11.5–14.5)
GFR SERPL CREATININE-BSD FRML MDRD: 52 ML/MIN/1.73 (ref 60–90)
GLOBULIN UR ELPH-MCNC: 2.8 GM/DL (ref 2.3–3.5)
GLUCOSE BLD-MCNC: 95 MG/DL (ref 60–100)
HCT VFR BLD AUTO: 32.5 % (ref 35–45)
HGB BLD-MCNC: 11.3 G/DL (ref 12–15.5)
MCH RBC QN AUTO: 33.8 PG (ref 26.5–34)
MCHC RBC AUTO-ENTMCNC: 34.8 G/DL (ref 31.4–36)
MCV RBC AUTO: 97.3 FL (ref 80–98)
PLATELET # BLD AUTO: 335 10*3/MM3 (ref 150–450)
PMV BLD AUTO: 9.5 FL (ref 8–12)
POTASSIUM BLD-SCNC: 5.3 MMOL/L (ref 3.5–5.1)
PROT SERPL-MCNC: 6.5 G/DL (ref 6.3–8.6)
RBC # BLD AUTO: 3.34 10*6/MM3 (ref 3.77–5.16)
SODIUM BLD-SCNC: 133 MMOL/L (ref 137–145)
WBC NRBC COR # BLD: 8.17 10*3/MM3 (ref 3.2–9.8)

## 2017-02-06 PROCEDURE — 93005 ELECTROCARDIOGRAM TRACING: CPT | Performed by: INTERNAL MEDICINE

## 2017-02-06 PROCEDURE — 85027 COMPLETE CBC AUTOMATED: CPT | Performed by: INTERNAL MEDICINE

## 2017-02-06 PROCEDURE — 36415 COLL VENOUS BLD VENIPUNCTURE: CPT | Performed by: INTERNAL MEDICINE

## 2017-02-06 PROCEDURE — 80053 COMPREHEN METABOLIC PANEL: CPT | Performed by: INTERNAL MEDICINE

## 2017-02-06 PROCEDURE — 93010 ELECTROCARDIOGRAM REPORT: CPT | Performed by: INTERNAL MEDICINE

## 2017-02-06 PROCEDURE — 99213 OFFICE O/P EST LOW 20 MIN: CPT | Performed by: INTERNAL MEDICINE

## 2017-02-06 RX ORDER — MONTELUKAST SODIUM 10 MG/1
10 TABLET ORAL NIGHTLY
COMMUNITY
End: 2018-03-30 | Stop reason: SDUPTHER

## 2017-02-06 NOTE — PROGRESS NOTES
Subjective   Val Arteaga is a 84 y.o. female       Patient is seen as her BP has been running on the low side lately.  She had BP checked at home by Home Health with systolic running below 100, and patient was taken off Amlodipine and Micardis.  Her BP is still low at times and she has been feeling lightheaded.  Patient denies any chest pain or palpitations. She has some swelling in her legs and has been Lasix on PRN basis.    Her cough improved but still has some wheezing. She is short of breath on exertion.  Patient is using her Nebulizers couple times a day. Patient is also on Oxygen at 2 liters.      Past Medical History   Diagnosis Date   • Acquired hypothyroidism    • Allergic rhinitis    • COPD (chronic obstructive pulmonary disease)    • Corneal epithelial dystrophy    • Coronary arteriosclerosis    • Depression    • Generalized atherosclerosis    • GERD (gastroesophageal reflux disease)    • Hemorrhoids    • History of bone density study 08/03/2012     Lumbar spine Osteopenia. Femoral Osteopenia   • History of mammogram 08/20/2004     Birads Category 2 Benign   • History of Papanicolaou smear of cervix 08/12/2004     NEGATIVE   • Hypercholesterolemia    • Hyperlipidemia    • Hypertension    • Nonexudative age-related macular degeneration    • Osteoarthritis of multiple joints    • Pseudophakia    • Punctate keratitis      - history Thygeson's keratopathy      • Tobacco dependence syndrome      Past Surgical History   Procedure Laterality Date   • Coronary angioplasty  07/21/1993     Angioplasty, coronary (2)    RCA    • Appendectomy     • Cardiac catheterization  06/16/2014     Kathe. patency in the circumflex artery site of previous angioplasty. Kathe. patency in RCA.Nonobstructive 20-30% stenosis in the LAD and diagonal branch. Preserved LV systolic function with Ef of 55%   • Endoscopy and colonoscopy  10/01/1998     Hemorhoids Rectal Outlet Bleeding   • Colonoscopy  01/01/2013     patient declined    •  Dilatation and curettage       3   • Injection of medication  11/23/2015     Depo Medrol (Methylprednisone) (5)    • Esophagoscopy / egd  06/28/2004     Nonerosive gastritis of the body of the stomach. A biopsy was obtained & placed in h. Pylori test agar. Multiple biopsies were obtained from the body of the stomach   • Breast surgery  03/19/1954     Excision, breast lesion (1)  Excision of fibroma. Tumor,very small right breast, from portion near sternum   • Cervical spine surgery  09/16/1974     Excision of cervical disc, C5-6, C6-7, anterior cervical fusion, C5-6 with iliac bone graft.cervical spondylosis,C5-6, C6-7   • Injection of medication  02/04/2016     Kenalog (3)         Social History   Substance Use Topics   • Smoking status: Current Every Day Smoker     Types: Cigarettes   • Smokeless tobacco: Never Used      Comment: 1-9 cigs/day   • Alcohol use No     Family History   Problem Relation Age of Onset   • Coronary artery disease Other      Allergies   Allergen Reactions   • Ace Inhibitors      Current Outpatient Prescriptions on File Prior to Visit   Medication Sig Dispense Refill   • budesonide-formoterol (SYMBICORT) 160-4.5 MCG/ACT inhaler Inhale 2 puffs 2 (Two) Times a Day. 1 inhaler 5   • clopidogrel (PLAVIX) 75 MG tablet Take 75 mg by mouth Daily.     • coenzyme Q10 100 MG capsule Take 100 mg by mouth Daily.     • fluorometholone (FLAREX) 0.1 % ophthalmic suspension Administer 1 drop to both eyes 4 (Four) Times a Day. 5 mL 6   • levothyroxine (SYNTHROID) 50 MCG tablet Take 1 tablet by mouth daily. 30 tablet 2   • nitroglycerin (NITRO-DUR) 0.2 MG/HR patch Place 1 patch on the skin Daily. 30 patch 0   • PRIMIDONE PO Take 25 mg by mouth. Pt takes 1/2 tablet daily as needed     • ranolazine (RANEXA) 500 MG 12 hr tablet Take 500 mg by mouth 2 (Two) Times a Day.     • sertraline (ZOLOFT) 50 MG tablet Take 50 mg by mouth Daily.     • simvastatin (ZOCOR) 40 MG tablet Take 40 mg by mouth Every Night.     •  aspirin 81 MG EC tablet Take 81 mg by mouth Daily.     • carvedilol (COREG) 6.25 MG tablet Take 3.125 mg by mouth 2 (Two) Times a Day With Meals.     • [DISCONTINUED] albuterol (PROVENTIL HFA;VENTOLIN HFA) 108 (90 BASE) MCG/ACT inhaler Inhale 2 puffs Every 4 (Four) Hours As Needed for wheezing. 6.7 g 11   • [DISCONTINUED] calcium carbonate (OS-AJ) 600 MG tablet Take 600 mg by mouth Daily.     • [DISCONTINUED] magnesium oxide (MAG-OX) 400 MG tablet Take 400 mg by mouth 2 (Two) Times a Day.     • [DISCONTINUED] Multiple Vitamins-Minerals (CENTRUM SILVER ADULT 50+) tablet Take 1 tablet by mouth Daily.     • [DISCONTINUED] nicotine (NICODERM CQ) 14 MG/24HR patch Place 1 patch on the skin Daily. 30 patch 0   • [DISCONTINUED] telmisartan (MICARDIS) 80 MG tablet Take 1 tablet by mouth Daily. 30 tablet 0     No current facility-administered medications on file prior to visit.      Review of Systems   Constitutional: Negative for chills, fatigue and fever.   HENT: Negative for congestion, mouth sores and nosebleeds.    Eyes: Negative for photophobia and visual disturbance.   Respiratory: Positive for cough and shortness of breath. Negative for wheezing.    Cardiovascular: Negative for chest pain and palpitations.   Gastrointestinal: Negative for constipation and diarrhea.   Endocrine: Negative for cold intolerance, heat intolerance, polydipsia and polyuria.   Neurological: Positive for light-headedness. Negative for syncope.   Hematological: Negative for adenopathy. Does not bruise/bleed easily.   Psychiatric/Behavioral: Negative for sleep disturbance. The patient is not nervous/anxious.        Objective   Physical Exam   Constitutional: She is oriented to person, place, and time. She appears well-developed and well-nourished.   HENT:   Head: Normocephalic and atraumatic.   Nose: Nose normal.   Mouth/Throat: Oropharynx is clear and moist.   Eyes: Conjunctivae are normal.   Neck: No JVD present. No thyromegaly present.    Cardiovascular: Normal rate and regular rhythm.    No murmur heard.  Pulmonary/Chest: Effort normal. She has no wheezes. She has no rales.   Diminished breath sounds   Abdominal: Soft. She exhibits no mass.   Musculoskeletal: Normal range of motion.   Lymphadenopathy:     She has no cervical adenopathy.   Neurological: She is alert and oriented to person, place, and time.   Skin: Skin is warm and dry.   Psychiatric: She has a normal mood and affect. Her behavior is normal.           Patient Active Problem List   Diagnosis   • Essential hypertension   • Coronary artery disease involving native coronary artery of native heart without angina pectoris   • Chronic obstructive pulmonary disease   • Acquired hypothyroidism   • Depressive disorder   • Thygeson's superficial punctate keratitis   • Pseudophakia               Assessment    Diagnosis Plan   1. Hypotension, unspecified  ECG 12 Lead    CBC (No Diff)    Comprehensive Metabolic Panel   2. Essential hypertension     3. Coronary artery disease involving native coronary artery of native heart without angina pectoris     4. Chronic respiratory failure with hypoxia           Plan      ECG obtained. NSR. HR 60. Left axis deviation. Anteroseptal infarct, age undetermined. No change from baseline ECG.  Labs ordered.    Will decrease Coreg to 3.125 mg bid.  Advised to increase intake of fluids.  Advised to start wearing support hose.  Will continue all pulmonary medications and Oxygen.    Home Health will monitor her BP at home.      She will return in 2 weeks.

## 2017-02-24 ENCOUNTER — OFFICE VISIT (OUTPATIENT)
Dept: INTERNAL MEDICINE | Facility: CLINIC | Age: 82
End: 2017-02-24

## 2017-02-24 VITALS
WEIGHT: 174.8 LBS | HEIGHT: 68 IN | DIASTOLIC BLOOD PRESSURE: 70 MMHG | SYSTOLIC BLOOD PRESSURE: 130 MMHG | BODY MASS INDEX: 26.49 KG/M2

## 2017-02-24 DIAGNOSIS — J41.8 MIXED SIMPLE AND MUCOPURULENT CHRONIC BRONCHITIS (HCC): ICD-10-CM

## 2017-02-24 DIAGNOSIS — I10 ESSENTIAL HYPERTENSION: Primary | ICD-10-CM

## 2017-02-24 DIAGNOSIS — J96.11 CHRONIC HYPOXEMIC RESPIRATORY FAILURE (HCC): ICD-10-CM

## 2017-02-24 DIAGNOSIS — I50.32 CHRONIC DIASTOLIC CONGESTIVE HEART FAILURE (HCC): ICD-10-CM

## 2017-02-24 PROCEDURE — 99213 OFFICE O/P EST LOW 20 MIN: CPT | Performed by: INTERNAL MEDICINE

## 2017-02-24 RX ORDER — FUROSEMIDE 40 MG/1
40 TABLET ORAL DAILY PRN
Qty: 30 TABLET | Refills: 0 | Status: SHIPPED | OUTPATIENT
Start: 2017-02-24 | End: 2017-04-26 | Stop reason: SDUPTHER

## 2017-02-24 RX ORDER — FUROSEMIDE 40 MG/1
40 TABLET ORAL DAILY PRN
COMMUNITY
End: 2017-02-24 | Stop reason: SDUPTHER

## 2017-02-24 NOTE — PROGRESS NOTES
Subjective   Val Arteaga is a 84 y.o. female       Patient presents for recheck visit on HTN, CAD and COPD.    Dose of Coreg was decreased, and since then she has not had any low BP.She denies any chest pain or palpitations.  She remains on Ranexa and Nitrodur for CAD.  Her legs swell at times, and she is taking Lasix on PRN basis.    Her breathing improved. Denies worsening cough, purulent sputum or wheezing.  She is on Symbicort and uses nebs bid. On 2 liters of Oxygen at night.  She stopped smoking couple of weeks ago.    Past Medical History   Diagnosis Date   • Acquired hypothyroidism    • Allergic rhinitis    • COPD (chronic obstructive pulmonary disease)    • Corneal epithelial dystrophy    • Coronary arteriosclerosis    • Depression    • Generalized atherosclerosis    • GERD (gastroesophageal reflux disease)    • Hemorrhoids    • History of bone density study 08/03/2012     Lumbar spine Osteopenia. Femoral Osteopenia   • History of mammogram 08/20/2004     Birads Category 2 Benign   • History of Papanicolaou smear of cervix 08/12/2004     NEGATIVE   • Hypercholesterolemia    • Hyperlipidemia    • Hypertension    • Nonexudative age-related macular degeneration    • Osteoarthritis of multiple joints    • Pseudophakia    • Punctate keratitis      - history Thygeson's keratopathy      • Tobacco dependence syndrome      Past Surgical History   Procedure Laterality Date   • Coronary angioplasty  07/21/1993     Angioplasty, coronary (2)    RCA    • Appendectomy     • Cardiac catheterization  06/16/2014     Kathe. patency in the circumflex artery site of previous angioplasty. Kathe. patency in RCA.Nonobstructive 20-30% stenosis in the LAD and diagonal branch. Preserved LV systolic function with Ef of 55%   • Endoscopy and colonoscopy  10/01/1998     Hemorhoids Rectal Outlet Bleeding   • Colonoscopy  01/01/2013     patient declined    • Dilatation and curettage       3   • Injection of medication  11/23/2015     Depo  Medrol (Methylprednisone) (5)    • Esophagoscopy / egd  06/28/2004     Nonerosive gastritis of the body of the stomach. A biopsy was obtained & placed in h. Pylori test agar. Multiple biopsies were obtained from the body of the stomach   • Breast surgery  03/19/1954     Excision, breast lesion (1)  Excision of fibroma. Tumor,very small right breast, from portion near sternum   • Cervical spine surgery  09/16/1974     Excision of cervical disc, C5-6, C6-7, anterior cervical fusion, C5-6 with iliac bone graft.cervical spondylosis,C5-6, C6-7   • Injection of medication  02/04/2016     Kenalog (3)         Social History   Substance Use Topics   • Smoking status: Current Every Day Smoker     Types: Cigarettes   • Smokeless tobacco: Never Used      Comment: 1-9 cigs/day   • Alcohol use No     Family History   Problem Relation Age of Onset   • Coronary artery disease Other      Allergies   Allergen Reactions   • Ace Inhibitors      Current Outpatient Prescriptions on File Prior to Visit   Medication Sig Dispense Refill   • aspirin 81 MG EC tablet Take 81 mg by mouth Daily.     • budesonide-formoterol (SYMBICORT) 160-4.5 MCG/ACT inhaler Inhale 2 puffs 2 (Two) Times a Day. 1 inhaler 5   • carvedilol (COREG) 6.25 MG tablet Take 3.125 mg by mouth 2 (Two) Times a Day With Meals.     • clopidogrel (PLAVIX) 75 MG tablet Take 75 mg by mouth Daily.     • coenzyme Q10 100 MG capsule Take 100 mg by mouth Daily.     • fluorometholone (FLAREX) 0.1 % ophthalmic suspension Administer 1 drop to both eyes 4 (Four) Times a Day. 5 mL 6   • levothyroxine (SYNTHROID) 50 MCG tablet Take 1 tablet by mouth daily. 30 tablet 2   • montelukast (SINGULAIR) 10 MG tablet Take 10 mg by mouth Every Night.     • nitroglycerin (NITRO-DUR) 0.2 MG/HR patch Place 1 patch on the skin Daily. 30 patch 0   • PRIMIDONE PO Take 25 mg by mouth. Pt takes 1/2 tablet daily as needed     • ranolazine (RANEXA) 500 MG 12 hr tablet Take 500 mg by mouth 2 (Two) Times a Day.      • sertraline (ZOLOFT) 50 MG tablet Take 50 mg by mouth Daily.     • simvastatin (ZOCOR) 40 MG tablet Take 40 mg by mouth Every Night.       No current facility-administered medications on file prior to visit.      Review of Systems   Constitutional: Negative for chills and fever.   Eyes: Negative for photophobia and visual disturbance.   Respiratory: Positive for cough and shortness of breath. Negative for chest tightness.    Cardiovascular: Positive for leg swelling. Negative for chest pain and palpitations.   Gastrointestinal: Negative for abdominal pain, constipation and diarrhea.   Endocrine: Negative for cold intolerance, heat intolerance, polydipsia and polyuria.   Neurological: Negative for dizziness, light-headedness and headaches.       Objective   Physical Exam   Constitutional: She is oriented to person, place, and time.   HENT:   Head: Normocephalic and atraumatic.   Nose: Nose normal.   Mouth/Throat: Oropharynx is clear and moist.   Eyes: EOM are normal. Pupils are equal, round, and reactive to light.   Neck: Neck supple. No JVD present. No thyromegaly present.   Cardiovascular: Normal rate and regular rhythm.    No murmur heard.  Trace edema in lower extremities   Pulmonary/Chest: Effort normal and breath sounds normal. She has no wheezes.   Decreased breath sounds   Abdominal: Soft. Bowel sounds are normal.   Musculoskeletal: Normal range of motion.   Neurological: She is alert and oriented to person, place, and time. She has normal reflexes. No cranial nerve deficit.   Skin: Skin is warm and dry. No rash noted.   Psychiatric: She has a normal mood and affect. Her behavior is normal.     Office Visit on 02/06/2017   Component Date Value Ref Range Status   • WBC 02/06/2017 8.17  3.20 - 9.80 10*3/mm3 Final   • RBC 02/06/2017 3.34* 3.77 - 5.16 10*6/mm3 Final   • Hemoglobin 02/06/2017 11.3* 12.0 - 15.5 g/dL Final   • Hematocrit 02/06/2017 32.5* 35.0 - 45.0 % Final   • MCV 02/06/2017 97.3  80.0 - 98.0  fL Final   • MCH 02/06/2017 33.8  26.5 - 34.0 pg Final   • MCHC 02/06/2017 34.8  31.4 - 36.0 g/dL Final   • RDW 02/06/2017 12.9  11.5 - 14.5 % Final   • RDW-SD 02/06/2017 45.4  36.4 - 46.3 fl Final   • MPV 02/06/2017 9.5  8.0 - 12.0 fL Final   • Platelets 02/06/2017 335  150 - 450 10*3/mm3 Final   • Glucose 02/06/2017 95  60 - 100 mg/dL Final   • BUN 02/06/2017 19  7 - 21 mg/dL Final   • Creatinine 02/06/2017 1.02* 0.50 - 1.00 mg/dL Final   • Sodium 02/06/2017 133* 137 - 145 mmol/L Final   • Potassium 02/06/2017 5.3* 3.5 - 5.1 mmol/L Final   • Chloride 02/06/2017 97  95 - 110 mmol/L Final   • CO2 02/06/2017 28.0  22.0 - 31.0 mmol/L Final   • Calcium 02/06/2017 8.9  8.4 - 10.2 mg/dL Final   • Total Protein 02/06/2017 6.5  6.3 - 8.6 g/dL Final   • Albumin 02/06/2017 3.70  3.40 - 4.80 g/dL Final   • ALT (SGPT) 02/06/2017 24  9 - 52 U/L Final   • AST (SGOT) 02/06/2017 34  14 - 36 U/L Final   • Alkaline Phosphatase 02/06/2017 80  38 - 126 U/L Final   • Total Bilirubin 02/06/2017 0.7  0.2 - 1.3 mg/dL Final   • eGFR Non African Amer 02/06/2017 52* >60 mL/min/1.73 Final   • Globulin 02/06/2017 2.8  2.3 - 3.5 gm/dL Final   • A/G Ratio 02/06/2017 1.3  1.1 - 1.8 g/dL Final   • BUN/Creatinine Ratio 02/06/2017 18.6  7.0 - 25.0 Final   • Anion Gap 02/06/2017 8.0  5.0 - 15.0 mmol/L Final   Admission on 01/13/2017, Discharged on 01/18/2017   No results displayed because visit has over 200 results.              Patient Active Problem List   Diagnosis   • Essential hypertension   • Coronary artery disease involving native coronary artery of native heart without angina pectoris   • Chronic obstructive pulmonary disease   • Acquired hypothyroidism   • Depressive disorder   • Thygeson's superficial punctate keratitis   • Pseudophakia               Assessment    Diagnosis Plan   1. Essential hypertension     2. Chronic diastolic congestive heart failure     3. Chronic hypoxemic respiratory failure     4. Chronic bronchitis, unspecified  chronic bronchitis type           Plan     Patient will continue current regimen.   Coreg 3.125 mg bid for HTN.   Ranexa 500 mg bid, Nitroderm 0.2 mg/hr and Plavix 75 mg daily for CAD.     She will continue Nebs bid and Symbicort inhaler 160 2 puffs bid.  2 liters of Oxygen at night.    Counseled on physical activity.      Follow up in 3 month.

## 2017-03-09 RX ORDER — SIMVASTATIN 40 MG
40 TABLET ORAL NIGHTLY
Qty: 30 TABLET | Refills: 3 | Status: SHIPPED | OUTPATIENT
Start: 2017-03-09 | End: 2017-07-21 | Stop reason: SDUPTHER

## 2017-04-26 RX ORDER — FUROSEMIDE 40 MG/1
TABLET ORAL
Qty: 30 TABLET | Refills: 0 | Status: SHIPPED | OUTPATIENT
Start: 2017-04-26 | End: 2017-05-19 | Stop reason: SDUPTHER

## 2017-05-19 ENCOUNTER — OFFICE VISIT (OUTPATIENT)
Dept: INTERNAL MEDICINE | Facility: CLINIC | Age: 82
End: 2017-05-19

## 2017-05-19 VITALS
OXYGEN SATURATION: 94 % | DIASTOLIC BLOOD PRESSURE: 70 MMHG | HEIGHT: 68 IN | BODY MASS INDEX: 26.11 KG/M2 | WEIGHT: 172.3 LBS | SYSTOLIC BLOOD PRESSURE: 120 MMHG

## 2017-05-19 DIAGNOSIS — I10 ESSENTIAL HYPERTENSION: ICD-10-CM

## 2017-05-19 DIAGNOSIS — E03.9 ACQUIRED HYPOTHYROIDISM: ICD-10-CM

## 2017-05-19 DIAGNOSIS — J44.0 COPD WITH ACUTE BRONCHITIS (HCC): Primary | ICD-10-CM

## 2017-05-19 DIAGNOSIS — J20.9 COPD WITH ACUTE BRONCHITIS (HCC): Primary | ICD-10-CM

## 2017-05-19 DIAGNOSIS — I50.31 ACUTE DIASTOLIC HEART FAILURE (HCC): ICD-10-CM

## 2017-05-19 PROCEDURE — 99213 OFFICE O/P EST LOW 20 MIN: CPT | Performed by: INTERNAL MEDICINE

## 2017-05-19 PROCEDURE — 96372 THER/PROPH/DIAG INJ SC/IM: CPT | Performed by: INTERNAL MEDICINE

## 2017-05-19 RX ORDER — LEVOTHYROXINE SODIUM 0.05 MG/1
TABLET ORAL
Qty: 90 TABLET | Refills: 1 | Status: SHIPPED | OUTPATIENT
Start: 2017-05-19 | End: 2017-10-02 | Stop reason: SDUPTHER

## 2017-05-19 RX ORDER — IPRATROPIUM BROMIDE AND ALBUTEROL SULFATE 2.5; .5 MG/3ML; MG/3ML
3 SOLUTION RESPIRATORY (INHALATION)
Qty: 360 ML | Refills: 1 | Status: SHIPPED | OUTPATIENT
Start: 2017-05-19 | End: 2017-08-04 | Stop reason: SDUPTHER

## 2017-05-19 RX ORDER — AZITHROMYCIN 250 MG/1
TABLET, FILM COATED ORAL
Qty: 6 TABLET | Refills: 0 | Status: SHIPPED | OUTPATIENT
Start: 2017-05-19 | End: 2017-05-26

## 2017-05-19 RX ORDER — METHYLPREDNISOLONE ACETATE 40 MG/ML
40 INJECTION, SUSPENSION INTRA-ARTICULAR; INTRALESIONAL; INTRAMUSCULAR; SOFT TISSUE ONCE
Status: COMPLETED | OUTPATIENT
Start: 2017-05-19 | End: 2017-05-19

## 2017-05-19 RX ORDER — IPRATROPIUM BROMIDE AND ALBUTEROL SULFATE 2.5; .5 MG/3ML; MG/3ML
SOLUTION RESPIRATORY (INHALATION)
Refills: 0 | COMMUNITY
Start: 2017-03-15 | End: 2017-05-19 | Stop reason: SDUPTHER

## 2017-05-19 RX ORDER — FUROSEMIDE 40 MG/1
TABLET ORAL
Qty: 90 TABLET | Refills: 1 | Status: SHIPPED | OUTPATIENT
Start: 2017-05-19 | End: 2017-10-02 | Stop reason: SDUPTHER

## 2017-05-19 RX ADMIN — METHYLPREDNISOLONE ACETATE 40 MG: 40 INJECTION, SUSPENSION INTRA-ARTICULAR; INTRALESIONAL; INTRAMUSCULAR; SOFT TISSUE at 11:12

## 2017-05-26 ENCOUNTER — APPOINTMENT (OUTPATIENT)
Dept: LAB | Facility: HOSPITAL | Age: 82
End: 2017-05-26

## 2017-05-26 ENCOUNTER — TELEPHONE (OUTPATIENT)
Dept: INTERNAL MEDICINE | Facility: CLINIC | Age: 82
End: 2017-05-26

## 2017-05-26 ENCOUNTER — OFFICE VISIT (OUTPATIENT)
Dept: INTERNAL MEDICINE | Facility: CLINIC | Age: 82
End: 2017-05-26

## 2017-05-26 VITALS
WEIGHT: 170.5 LBS | BODY MASS INDEX: 25.84 KG/M2 | HEIGHT: 68 IN | DIASTOLIC BLOOD PRESSURE: 70 MMHG | SYSTOLIC BLOOD PRESSURE: 120 MMHG

## 2017-05-26 DIAGNOSIS — I10 ESSENTIAL HYPERTENSION: ICD-10-CM

## 2017-05-26 DIAGNOSIS — I50.33 ACUTE ON CHRONIC DIASTOLIC HEART FAILURE (HCC): ICD-10-CM

## 2017-05-26 DIAGNOSIS — J41.8 MIXED SIMPLE AND MUCOPURULENT CHRONIC BRONCHITIS (HCC): Primary | ICD-10-CM

## 2017-05-26 LAB
ANION GAP SERPL CALCULATED.3IONS-SCNC: 12 MMOL/L (ref 5–15)
BUN BLD-MCNC: 23 MG/DL (ref 7–21)
BUN/CREAT SERPL: 24 (ref 7–25)
CALCIUM SPEC-SCNC: 9 MG/DL (ref 8.4–10.2)
CHLORIDE SERPL-SCNC: 98 MMOL/L (ref 95–110)
CO2 SERPL-SCNC: 29 MMOL/L (ref 22–31)
CREAT BLD-MCNC: 0.96 MG/DL (ref 0.5–1)
GFR SERPL CREATININE-BSD FRML MDRD: 55 ML/MIN/1.73 (ref 39–90)
GLUCOSE BLD-MCNC: 83 MG/DL (ref 60–100)
POTASSIUM BLD-SCNC: 4.1 MMOL/L (ref 3.5–5.1)
SODIUM BLD-SCNC: 139 MMOL/L (ref 137–145)

## 2017-05-26 PROCEDURE — 36415 COLL VENOUS BLD VENIPUNCTURE: CPT | Performed by: INTERNAL MEDICINE

## 2017-05-26 PROCEDURE — 80048 BASIC METABOLIC PNL TOTAL CA: CPT | Performed by: INTERNAL MEDICINE

## 2017-05-26 PROCEDURE — 99213 OFFICE O/P EST LOW 20 MIN: CPT | Performed by: INTERNAL MEDICINE

## 2017-06-16 ENCOUNTER — OFFICE VISIT (OUTPATIENT)
Dept: PULMONOLOGY | Facility: CLINIC | Age: 82
End: 2017-06-16

## 2017-06-16 VITALS
OXYGEN SATURATION: 98 % | SYSTOLIC BLOOD PRESSURE: 120 MMHG | BODY MASS INDEX: 25.91 KG/M2 | WEIGHT: 171 LBS | HEIGHT: 68 IN | DIASTOLIC BLOOD PRESSURE: 64 MMHG | HEART RATE: 80 BPM

## 2017-06-16 DIAGNOSIS — J41.8 MIXED SIMPLE AND MUCOPURULENT CHRONIC BRONCHITIS (HCC): Primary | ICD-10-CM

## 2017-06-16 PROCEDURE — 99213 OFFICE O/P EST LOW 20 MIN: CPT | Performed by: INTERNAL MEDICINE

## 2017-06-16 RX ORDER — PREDNISONE 10 MG/1
TABLET ORAL
Qty: 21 TABLET | Refills: 0 | Status: SHIPPED | OUTPATIENT
Start: 2017-06-16 | End: 2017-07-17

## 2017-06-16 NOTE — PROGRESS NOTES
"This elderly lady has COPD.  She has quit smoking but remained short of breath despite medications.  She is not producing any purulent sputum    ROS    Constitutional-no night sweats weight loss headaches  GI no abdominal pain nausea or diarrhea  Neuro no seizure or neurologic deficits  Musculoskeletal no deformity or joint pain   no dysuria or hematuria  Skin no rash or other lesions  All other systems reviewed and were negative except for the above.      Physical Exam  /64  Pulse 80  Ht 68\" (172.7 cm)  Wt 171 lb (77.6 kg)  SpO2 98%  BMI 26 kg/m2  Vital signs as above  Pupils equally round and reactive to light and accommodation, neck no JVD or adenopathy.  Cardiovascular regular rhythm and rate no murmur or gallop.  Abdomen soft no organomegaly tenderness.  Extremities no clubbing cyanosis or edema.  No cervical adenopathy.  No skin rash.  Neurologic good strength bilaterally without deficits  Lungs reveal diminished breath sounds with a respiratory rate 20    Impression COPD with mild exacerbation plan continue present medications see me back in a month  "

## 2017-06-20 DIAGNOSIS — H16.103 SUPERFICIAL KERATITIS, BILATERAL: Primary | ICD-10-CM

## 2017-07-17 ENCOUNTER — OFFICE VISIT (OUTPATIENT)
Dept: PULMONOLOGY | Facility: CLINIC | Age: 82
End: 2017-07-17

## 2017-07-17 VITALS
BODY MASS INDEX: 26.22 KG/M2 | DIASTOLIC BLOOD PRESSURE: 72 MMHG | HEIGHT: 68 IN | OXYGEN SATURATION: 95 % | HEART RATE: 70 BPM | SYSTOLIC BLOOD PRESSURE: 118 MMHG | WEIGHT: 173 LBS

## 2017-07-17 DIAGNOSIS — R09.02 HYPOXEMIA: ICD-10-CM

## 2017-07-17 DIAGNOSIS — J41.8 MIXED SIMPLE AND MUCOPURULENT CHRONIC BRONCHITIS (HCC): Primary | ICD-10-CM

## 2017-07-17 PROCEDURE — 99212 OFFICE O/P EST SF 10 MIN: CPT | Performed by: INTERNAL MEDICINE

## 2017-07-17 NOTE — PROGRESS NOTES
"This lady has COPD.  Her breathing has improved since her recent exacerbation.  She uses oxygen occasionally and nebulizer treatments    Systems    Physical Exam  /72 (BP Location: Left arm, Patient Position: Sitting, Cuff Size: Adult)  Pulse 70  Ht 68\" (172.7 cm)  Wt 173 lb (78.5 kg)  SpO2 95%  BMI 26.3 kg/m2  Vital signs as above  Pupils equally round and reactive to light and accommodation, neck no JVD or adenopathy.  Cardiovascular regular rhythm and rate no murmur or gallop.  Abdomen soft no organomegaly tenderness.  Extremities no clubbing cyanosis or edema.  No cervical adenopathy.  No skin rash.  Neurologic good strength bilaterally without deficits  Lungs reveal diminished breath sounds and no wheeze    Impression COPD improved    Plan continue present medications, return in 3 months  "

## 2017-07-21 RX ORDER — SIMVASTATIN 40 MG
TABLET ORAL
Qty: 30 TABLET | Refills: 0 | Status: SHIPPED | OUTPATIENT
Start: 2017-07-21 | End: 2017-08-04 | Stop reason: SDUPTHER

## 2017-08-04 RX ORDER — SIMVASTATIN 40 MG
TABLET ORAL
Qty: 30 TABLET | Refills: 0 | Status: SHIPPED | OUTPATIENT
Start: 2017-08-04 | End: 2017-09-18 | Stop reason: SDUPTHER

## 2017-08-04 RX ORDER — IPRATROPIUM BROMIDE AND ALBUTEROL SULFATE 2.5; .5 MG/3ML; MG/3ML
SOLUTION RESPIRATORY (INHALATION)
Qty: 360 ML | Refills: 0 | Status: SHIPPED | OUTPATIENT
Start: 2017-08-04 | End: 2017-08-25 | Stop reason: SDUPTHER

## 2017-08-25 ENCOUNTER — OFFICE VISIT (OUTPATIENT)
Dept: INTERNAL MEDICINE | Facility: CLINIC | Age: 82
End: 2017-08-25

## 2017-08-25 VITALS
WEIGHT: 175.1 LBS | HEIGHT: 68 IN | SYSTOLIC BLOOD PRESSURE: 130 MMHG | BODY MASS INDEX: 26.54 KG/M2 | DIASTOLIC BLOOD PRESSURE: 72 MMHG

## 2017-08-25 DIAGNOSIS — I10 ESSENTIAL HYPERTENSION: ICD-10-CM

## 2017-08-25 DIAGNOSIS — J44.1 COPD WITH EXACERBATION (HCC): Primary | ICD-10-CM

## 2017-08-25 DIAGNOSIS — I50.32 CHRONIC DIASTOLIC CONGESTIVE HEART FAILURE (HCC): ICD-10-CM

## 2017-08-25 PROCEDURE — 99213 OFFICE O/P EST LOW 20 MIN: CPT | Performed by: INTERNAL MEDICINE

## 2017-08-25 RX ORDER — IPRATROPIUM BROMIDE AND ALBUTEROL SULFATE 2.5; .5 MG/3ML; MG/3ML
3 SOLUTION RESPIRATORY (INHALATION) 4 TIMES DAILY PRN
Qty: 120 VIAL | Refills: 1 | Status: SHIPPED | OUTPATIENT
Start: 2017-08-25 | End: 2017-11-15 | Stop reason: SDUPTHER

## 2017-08-25 RX ORDER — PREDNISONE 10 MG/1
TABLET ORAL
Qty: 15 TABLET | Refills: 0 | Status: SHIPPED | OUTPATIENT
Start: 2017-08-25 | End: 2017-09-05

## 2017-08-25 RX ORDER — AZITHROMYCIN 250 MG/1
TABLET, FILM COATED ORAL
Qty: 6 TABLET | Refills: 0 | Status: SHIPPED | OUTPATIENT
Start: 2017-08-25 | End: 2017-09-05

## 2017-08-26 NOTE — PROGRESS NOTES
Subjective   Val Arteaga is a 84 y.o. female     Patient is in complaining of increased cough, chest congestion and wheeze over the last couple of days.Cough is productive of yellowish sputum.  Has been using Oxygen and Nebs.  BP is controlled on Coreg on Lasix. Denies chest pain or palpitations. Denies edema in lower extremities.  On Nitrodur, Ranexa and Plavix for CAD and history of cardiac stenting.      Patient is   Past Medical History:   Diagnosis Date   • Acquired hypothyroidism    • Allergic rhinitis    • COPD (chronic obstructive pulmonary disease)    • Corneal epithelial dystrophy    • Coronary arteriosclerosis    • Depression    • Generalized atherosclerosis    • GERD (gastroesophageal reflux disease)    • Hemorrhoids    • History of bone density study 08/03/2012    Lumbar spine Osteopenia. Femoral Osteopenia   • History of mammogram 08/20/2004    Birads Category 2 Benign   • History of Papanicolaou smear of cervix 08/12/2004    NEGATIVE   • Hypercholesterolemia    • Hyperlipidemia    • Hypertension    • Nonexudative age-related macular degeneration    • Osteoarthritis of multiple joints    • Pseudophakia    • Punctate keratitis     - history Thygeson's keratopathy      • Tobacco dependence syndrome      Past Surgical History:   Procedure Laterality Date   • APPENDECTOMY     • BREAST SURGERY  03/19/1954    Excision, breast lesion (1)  Excision of fibroma. 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 patency in the circumflex artery site of previous angioplasty. Kathe. patency in RCA.Nonobstructive 20-30% stenosis in the LAD and diagonal branch. Preserved LV systolic function with Ef of 55%   • CERVICAL SPINE SURGERY  09/16/1974    Excision of cervical disc, C5-6, C6-7, anterior cervical fusion, C5-6 with iliac bone graft.cervical spondylosis,C5-6, C6-7   • COLONOSCOPY  01/01/2013    patient declined    • CORONARY ANGIOPLASTY  07/21/1993     Angioplasty, coronary (2)    RCA    • DILATATION AND CURETTAGE      3   • ENDOSCOPY AND COLONOSCOPY  10/01/1998    Hemorhoids Rectal Outlet Bleeding   • ESOPHAGOSCOPY / EGD  06/28/2004    Nonerosive gastritis of the body of the stomach. A biopsy was obtained & placed in h. Pylori test agar. Multiple biopsies were obtained from the body of the stomach   • INJECTION OF MEDICATION  11/23/2015    Depo Medrol (Methylprednisone) (5)    • INJECTION OF MEDICATION  02/04/2016    Kenalog (3)         Social History   Substance Use Topics   • Smoking status: Former Smoker     Packs/day: 0.50     Years: 50.00     Types: Cigarettes   • Smokeless tobacco: Never Used   • Alcohol use No     Family History   Problem Relation Age of Onset   • Coronary artery disease Other      Allergies   Allergen Reactions   • Ace Inhibitors      Current Outpatient Prescriptions on File Prior to Visit   Medication Sig Dispense Refill   • aspirin 81 MG EC tablet Take 81 mg by mouth Daily.     • budesonide-formoterol (SYMBICORT) 160-4.5 MCG/ACT inhaler Inhale 2 puffs 2 (Two) Times a Day. 1 inhaler 5   • carvedilol (COREG) 6.25 MG tablet Take 3.125 mg by mouth 2 (Two) Times a Day With Meals.     • clopidogrel (PLAVIX) 75 MG tablet Take 75 mg by mouth Daily.     • coenzyme Q10 100 MG capsule Take 100 mg by mouth Daily.     • fluorometholone (FLAREX) 0.1 % ophthalmic suspension Administer 1 drop to both eyes 4 (Four) Times a Day. 5 mL 3   • furosemide (LASIX) 40 MG tablet TAKE 1 TABLET BY MOUTH EVERY DAY AS NEEDED FOR SWELLING 90 tablet 1   • levothyroxine (SYNTHROID, LEVOTHROID) 50 MCG tablet TAKE 1 TABLET BY MOUTH EVERY DAY 90 tablet 1   • montelukast (SINGULAIR) 10 MG tablet Take 10 mg by mouth Every Night.     • nitroglycerin (NITRO-DUR) 0.2 MG/HR patch Place 1 patch on the skin Daily. 30 patch 0   • ranolazine (RANEXA) 500 MG 12 hr tablet Take 500 mg by mouth 2 (Two) Times a Day.     • sertraline (ZOLOFT) 50 MG tablet Take 1 tablet by mouth Daily. 30  tablet 3   • simvastatin (ZOCOR) 40 MG tablet TAKE 1 TABLET BY MOUTH EVERY NIGHT AT BEDTIME 30 tablet 0     No current facility-administered medications on file prior to visit.      Review of Systems   Constitutional: Positive for fatigue.   HENT: Negative for facial swelling, hearing loss and mouth sores.    Eyes: Negative for photophobia.   Respiratory: Positive for chest tightness, shortness of breath and wheezing.    Cardiovascular: Negative for chest pain, palpitations and leg swelling.   Gastrointestinal: Negative for abdominal pain, constipation and diarrhea.   Endocrine: Negative for cold intolerance, heat intolerance, polydipsia and polyuria.   Genitourinary: Negative for flank pain and frequency.   Musculoskeletal: Negative for arthralgias and back pain.   Skin: Negative for rash.   Neurological: Negative for dizziness, light-headedness and headaches.       Objective   Physical Exam   Constitutional: She is oriented to person, place, and time. She appears well-developed and well-nourished.   HENT:   Head: Normocephalic and atraumatic.   Nose: Nose normal.   Mouth/Throat: Oropharynx is clear and moist.   Eyes: Conjunctivae are normal.   Neck: Neck supple. No thyromegaly present.   Cardiovascular: Normal rate and regular rhythm.    No murmur heard.  Pulmonary/Chest: Effort normal. She has wheezes. She exhibits no tenderness.   Abdominal: Soft. Bowel sounds are normal. She exhibits no mass.   Neurological: She is alert and oriented to person, place, and time. She has normal reflexes.   Skin: Skin is warm and dry. No rash noted.   Psychiatric: She has a normal mood and affect. Her behavior is normal.   Nursing note and vitals reviewed.          Patient Active Problem List   Diagnosis   • Essential hypertension   • Coronary artery disease involving native coronary artery of native heart without angina pectoris   • Chronic obstructive pulmonary disease   • Acquired hypothyroidism   • Depressive disorder   •  Leaon's superficial punctate keratitis   • Pseudophakia               Assessment/Plan    Diagnosis Plan   1. COPD with exacerbation     2. Chronic diastolic congestive heart failure     3. Essential hypertension              Plan   1. Z-pack.      Prednisone taper.      Continue Symbicort.      Duonebs qid.      Oxygen PRN.  2. Patient without any evidence of volume overload.      She will continue current dose of Lasix 40 mg qod.      Advised on fluid restriction and low sodium diet.  3. Coreg is continued.      Nitrodur and Ranexa for CAD.      She was taken off Micardis as her BP was running low.      Follow up in 2 weeks.                      This document has been electronically signed by Roula Albert MD on August 27, 2017 5:18 PM

## 2017-09-05 ENCOUNTER — OFFICE VISIT (OUTPATIENT)
Dept: INTERNAL MEDICINE | Facility: CLINIC | Age: 82
End: 2017-09-05

## 2017-09-05 VITALS
WEIGHT: 174 LBS | BODY MASS INDEX: 26.37 KG/M2 | SYSTOLIC BLOOD PRESSURE: 140 MMHG | OXYGEN SATURATION: 95 % | HEIGHT: 68 IN | TEMPERATURE: 98 F | DIASTOLIC BLOOD PRESSURE: 90 MMHG

## 2017-09-05 DIAGNOSIS — J44.1 COPD WITH EXACERBATION (HCC): Primary | ICD-10-CM

## 2017-09-05 DIAGNOSIS — J20.9 ACUTE BRONCHITIS, UNSPECIFIED ORGANISM: ICD-10-CM

## 2017-09-05 PROCEDURE — 96372 THER/PROPH/DIAG INJ SC/IM: CPT | Performed by: INTERNAL MEDICINE

## 2017-09-05 PROCEDURE — 99213 OFFICE O/P EST LOW 20 MIN: CPT | Performed by: INTERNAL MEDICINE

## 2017-09-05 RX ORDER — PREDNISONE 10 MG/1
10 TABLET ORAL SEE ADMIN INSTRUCTIONS
Qty: 41 TABLET | Refills: 0 | Status: SHIPPED | OUTPATIENT
Start: 2017-09-05 | End: 2017-10-17

## 2017-09-05 RX ORDER — METHYLPREDNISOLONE ACETATE 40 MG/ML
40 INJECTION, SUSPENSION INTRA-ARTICULAR; INTRALESIONAL; INTRAMUSCULAR; SOFT TISSUE ONCE
Status: COMPLETED | OUTPATIENT
Start: 2017-09-05 | End: 2017-09-05

## 2017-09-05 RX ORDER — LEVOFLOXACIN 500 MG/1
500 TABLET, FILM COATED ORAL DAILY
Qty: 7 TABLET | Refills: 0 | Status: SHIPPED | OUTPATIENT
Start: 2017-09-05 | End: 2017-11-15

## 2017-09-05 RX ADMIN — METHYLPREDNISOLONE ACETATE 40 MG: 40 INJECTION, SUSPENSION INTRA-ARTICULAR; INTRALESIONAL; INTRAMUSCULAR; SOFT TISSUE at 15:36

## 2017-09-05 NOTE — PROGRESS NOTES
Subjective   Val Arteaga is a 84 y.o. female   Patient is in complaining of persistent cough, chest congestion and wheezing. She is having some shortness of breath and has been using her Oxygen. Denies any fever or chills but has been feeling fatigued.  She was treated with Z-pack and Prednisone with little improvement.  Denies any chest pain or palpitations. Edema is controlled on Lasix.  Past Medical History:   Diagnosis Date   • Acquired hypothyroidism    • Allergic rhinitis    • COPD (chronic obstructive pulmonary disease)    • Corneal epithelial dystrophy    • Coronary arteriosclerosis    • Depression    • Generalized atherosclerosis    • GERD (gastroesophageal reflux disease)    • Hemorrhoids    • History of bone density study 08/03/2012    Lumbar spine Osteopenia. Femoral Osteopenia   • History of mammogram 08/20/2004    Birads Category 2 Benign   • History of Papanicolaou smear of cervix 08/12/2004    NEGATIVE   • Hypercholesterolemia    • Hyperlipidemia    • Hypertension    • Nonexudative age-related macular degeneration    • Osteoarthritis of multiple joints    • Pseudophakia    • Punctate keratitis     - history Thygeson's keratopathy      • Tobacco dependence syndrome      Past Surgical History:   Procedure Laterality Date   • APPENDECTOMY     • BREAST SURGERY  03/19/1954    Excision, breast lesion (1)  Excision of fibroma. 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 patency in the circumflex artery site of previous angioplasty. Kathe. patency in RCA.Nonobstructive 20-30% stenosis in the LAD and diagonal branch. Preserved LV systolic function with Ef of 55%   • CERVICAL SPINE SURGERY  09/16/1974    Excision of cervical disc, C5-6, C6-7, anterior cervical fusion, C5-6 with iliac bone graft.cervical spondylosis,C5-6, C6-7   • COLONOSCOPY  01/01/2013    patient declined    • CORONARY ANGIOPLASTY  07/21/1993    Angioplasty, coronary (2)    RCA    •  DILATATION AND CURETTAGE      3   • ENDOSCOPY AND COLONOSCOPY  10/01/1998    Hemorhoids Rectal Outlet Bleeding   • ESOPHAGOSCOPY / EGD  06/28/2004    Nonerosive gastritis of the body of the stomach. A biopsy was obtained & placed in h. Pylori test agar. Multiple biopsies were obtained from the body of the stomach   • INJECTION OF MEDICATION  11/23/2015    Depo Medrol (Methylprednisone) (5)    • INJECTION OF MEDICATION  02/04/2016    Kenalog (3)         Social History   Substance Use Topics   • Smoking status: Former Smoker     Packs/day: 0.50     Years: 50.00     Types: Cigarettes   • Smokeless tobacco: Never Used   • Alcohol use No     Family History   Problem Relation Age of Onset   • Coronary artery disease Other      Allergies   Allergen Reactions   • Ace Inhibitors      Current Outpatient Prescriptions on File Prior to Visit   Medication Sig Dispense Refill   • aspirin 81 MG EC tablet Take 81 mg by mouth Daily.     • budesonide-formoterol (SYMBICORT) 160-4.5 MCG/ACT inhaler Inhale 2 puffs 2 (Two) Times a Day. 1 inhaler 5   • carvedilol (COREG) 6.25 MG tablet Take 3.125 mg by mouth 2 (Two) Times a Day With Meals.     • clopidogrel (PLAVIX) 75 MG tablet Take 75 mg by mouth Daily.     • coenzyme Q10 100 MG capsule Take 100 mg by mouth Daily.     • fluorometholone (FLAREX) 0.1 % ophthalmic suspension Administer 1 drop to both eyes 4 (Four) Times a Day. 5 mL 3   • furosemide (LASIX) 40 MG tablet TAKE 1 TABLET BY MOUTH EVERY DAY AS NEEDED FOR SWELLING 90 tablet 1   • ipratropium-albuterol (DUO-NEB) 0.5-2.5 mg/mL nebulizer Take 3 mL by nebulization 4 (Four) Times a Day As Needed for Wheezing. 120 vial 1   • levothyroxine (SYNTHROID, LEVOTHROID) 50 MCG tablet TAKE 1 TABLET BY MOUTH EVERY DAY 90 tablet 1   • montelukast (SINGULAIR) 10 MG tablet Take 10 mg by mouth Every Night.     • nitroglycerin (NITRO-DUR) 0.2 MG/HR patch Place 1 patch on the skin Daily. 30 patch 0   • ranolazine (RANEXA) 500 MG 12 hr tablet Take 500  mg by mouth 2 (Two) Times a Day.     • sertraline (ZOLOFT) 50 MG tablet Take 1 tablet by mouth Daily. 30 tablet 3   • simvastatin (ZOCOR) 40 MG tablet TAKE 1 TABLET BY MOUTH EVERY NIGHT AT BEDTIME 30 tablet 0   • [DISCONTINUED] azithromycin (ZITHROMAX Z-NINA) 250 MG tablet Take 2 tablets the first day, then 1 tablet daily for 4 days. 6 tablet 0   • [DISCONTINUED] predniSONE (DELTASONE) 10 MG tablet 1 PO bid for 5 days, 1 PO daily for 5 days 15 tablet 0     No current facility-administered medications on file prior to visit.      Review of Systems   Constitutional: Positive for fatigue. Negative for chills and fever.   Respiratory: Positive for cough, shortness of breath and wheezing.    Cardiovascular: Negative for chest pain, palpitations and leg swelling.   Gastrointestinal: Negative for abdominal pain, nausea and vomiting.   Skin: Negative for color change, pallor and rash.   Neurological: Negative for dizziness, light-headedness and headaches.       Objective   Physical Exam   Constitutional: She is oriented to person, place, and time. She appears well-developed and well-nourished.   HENT:   Nose: Nose normal.   Mouth/Throat: Oropharynx is clear and moist.   Eyes: Conjunctivae are normal.   Neck: No JVD present. No thyromegaly present.   Cardiovascular: Normal rate and regular rhythm.    Trace edema in lower extremities   Pulmonary/Chest: Effort normal. She has wheezes. She exhibits no tenderness.   Abdominal: Soft. Bowel sounds are normal.   Neurological: She is alert and oriented to person, place, and time. She has normal reflexes.   Skin: Skin is warm and dry. No rash noted.   Psychiatric: She has a normal mood and affect. Her behavior is normal. Thought content normal.   Nursing note and vitals reviewed.          Patient Active Problem List   Diagnosis   • Essential hypertension   • Coronary artery disease involving native coronary artery of native heart without angina pectoris   • Chronic obstructive  pulmonary disease   • Acquired hypothyroidism   • Depressive disorder   • Thygeson's superficial punctate keratitis   • Pseudophakia               Assessment   Diagnosis Plan   1. COPD with exacerbation  XR Chest PA & Lateral    methylPREDNISolone acetate (DEPO-medrol) injection 40 mg   2. Acute bronchitis, unspecified organism                Plan    Chest X-Ray obtained. No evidence of acute abnormalities.  Depo medrol 40 mg IM.  Levaquin 500 mg daily for 1 week.  Prednosone taper.  Duonebs qid.  Continue all cardiac medications.    Follow up in 1 week.          This document has been electronically signed by Roula Albert MD on September 5, 2017 4:20 PM

## 2017-09-12 ENCOUNTER — OFFICE VISIT (OUTPATIENT)
Dept: INTERNAL MEDICINE | Facility: CLINIC | Age: 82
End: 2017-09-12

## 2017-09-12 VITALS
SYSTOLIC BLOOD PRESSURE: 150 MMHG | HEIGHT: 68 IN | BODY MASS INDEX: 26.02 KG/M2 | DIASTOLIC BLOOD PRESSURE: 80 MMHG | WEIGHT: 171.7 LBS

## 2017-09-12 DIAGNOSIS — J96.11 CHRONIC HYPOXEMIC RESPIRATORY FAILURE (HCC): ICD-10-CM

## 2017-09-12 DIAGNOSIS — J44.9 CHRONIC OBSTRUCTIVE PULMONARY DISEASE, UNSPECIFIED COPD TYPE (HCC): Primary | ICD-10-CM

## 2017-09-12 DIAGNOSIS — Z23 IMMUNIZATION DUE: ICD-10-CM

## 2017-09-12 PROCEDURE — G0008 ADMIN INFLUENZA VIRUS VAC: HCPCS | Performed by: INTERNAL MEDICINE

## 2017-09-12 PROCEDURE — 99213 OFFICE O/P EST LOW 20 MIN: CPT | Performed by: INTERNAL MEDICINE

## 2017-09-12 PROCEDURE — 90662 IIV NO PRSV INCREASED AG IM: CPT | Performed by: INTERNAL MEDICINE

## 2017-09-12 NOTE — PROGRESS NOTES
Subjective   Val Arteaga is a 84 y.o. female     Patient is in for recheck. She is doing better since last visit. Her cough improved and she is less short of breath.  She is using her Nebulizers and Oxygen at night.  .  Patient denies any chest pain or orthopnea.      Past Medical History:   Diagnosis Date   • Acquired hypothyroidism    • Allergic rhinitis    • COPD (chronic obstructive pulmonary disease)    • Corneal epithelial dystrophy    • Coronary arteriosclerosis    • Depression    • Generalized atherosclerosis    • GERD (gastroesophageal reflux disease)    • Hemorrhoids    • History of bone density study 08/03/2012    Lumbar spine Osteopenia. Femoral Osteopenia   • History of mammogram 08/20/2004    Birads Category 2 Benign   • History of Papanicolaou smear of cervix 08/12/2004    NEGATIVE   • Hypercholesterolemia    • Hyperlipidemia    • Hypertension    • Nonexudative age-related macular degeneration    • Osteoarthritis of multiple joints    • Pseudophakia    • Punctate keratitis     - history Thygeson's keratopathy      • Tobacco dependence syndrome      Past Surgical History:   Procedure Laterality Date   • APPENDECTOMY     • BREAST SURGERY  03/19/1954    Excision, breast lesion (1)  Excision of fibroma. 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 patency in the circumflex artery site of previous angioplasty. Kathe. patency in RCA.Nonobstructive 20-30% stenosis in the LAD and diagonal branch. Preserved LV systolic function with Ef of 55%   • CERVICAL SPINE SURGERY  09/16/1974    Excision of cervical disc, C5-6, C6-7, anterior cervical fusion, C5-6 with iliac bone graft.cervical spondylosis,C5-6, C6-7   • COLONOSCOPY  01/01/2013    patient declined    • CORONARY ANGIOPLASTY  07/21/1993    Angioplasty, coronary (2)    RCA    • DILATATION AND CURETTAGE      3   • ENDOSCOPY AND COLONOSCOPY  10/01/1998    Hemorhoids Rectal Outlet Bleeding   •  ESOPHAGOSCOPY / EGD  06/28/2004    Nonerosive gastritis of the body of the stomach. A biopsy was obtained & placed in h. Pylori test agar. Multiple biopsies were obtained from the body of the stomach   • INJECTION OF MEDICATION  11/23/2015    Depo Medrol (Methylprednisone) (5)    • INJECTION OF MEDICATION  02/04/2016    Kenalog (3)         Social History   Substance Use Topics   • Smoking status: Former Smoker     Packs/day: 0.50     Years: 50.00     Types: Cigarettes   • Smokeless tobacco: Never Used   • Alcohol use No     Family History   Problem Relation Age of Onset   • Coronary artery disease Other      Allergies   Allergen Reactions   • Ace Inhibitors      Current Outpatient Prescriptions on File Prior to Visit   Medication Sig Dispense Refill   • aspirin 81 MG EC tablet Take 81 mg by mouth Daily.     • budesonide-formoterol (SYMBICORT) 160-4.5 MCG/ACT inhaler Inhale 2 puffs 2 (Two) Times a Day. 1 inhaler 5   • carvedilol (COREG) 6.25 MG tablet Take 3.125 mg by mouth 2 (Two) Times a Day With Meals.     • clopidogrel (PLAVIX) 75 MG tablet Take 75 mg by mouth Daily.     • coenzyme Q10 100 MG capsule Take 100 mg by mouth Daily.     • fluorometholone (FLAREX) 0.1 % ophthalmic suspension Administer 1 drop to both eyes 4 (Four) Times a Day. 5 mL 3   • furosemide (LASIX) 40 MG tablet TAKE 1 TABLET BY MOUTH EVERY DAY AS NEEDED FOR SWELLING 90 tablet 1   • ipratropium-albuterol (DUO-NEB) 0.5-2.5 mg/mL nebulizer Take 3 mL by nebulization 4 (Four) Times a Day As Needed for Wheezing. 120 vial 1   • levoFLOXacin (LEVAQUIN) 500 MG tablet Take 1 tablet by mouth Daily. 7 tablet 0   • levothyroxine (SYNTHROID, LEVOTHROID) 50 MCG tablet TAKE 1 TABLET BY MOUTH EVERY DAY 90 tablet 1   • montelukast (SINGULAIR) 10 MG tablet Take 10 mg by mouth Every Night.     • nitroglycerin (NITRO-DUR) 0.2 MG/HR patch Place 1 patch on the skin Daily. 30 patch 0   • predniSONE (DELTASONE) 10 MG tablet Take 1 tablet by mouth See Admin  Instructions. 3 bid for 3 days, 2 bid for 2 days, 3 qd for 3 days, 2 qd for 2d, 1 qd for 2 d 41 tablet 0   • ranolazine (RANEXA) 500 MG 12 hr tablet Take 500 mg by mouth 2 (Two) Times a Day.     • sertraline (ZOLOFT) 50 MG tablet Take 1 tablet by mouth Daily. 30 tablet 3   • simvastatin (ZOCOR) 40 MG tablet TAKE 1 TABLET BY MOUTH EVERY NIGHT AT BEDTIME 30 tablet 0     No current facility-administered medications on file prior to visit.      Review of Systems   Constitutional: Negative for activity change, chills and fever.   Respiratory: Positive for shortness of breath. Negative for chest tightness and wheezing.    Cardiovascular: Negative for chest pain, palpitations and leg swelling.   Gastrointestinal: Negative for abdominal pain, constipation and diarrhea.   Endocrine: Negative for cold intolerance, heat intolerance, polydipsia and polyuria.   Musculoskeletal: Negative for back pain.   Psychiatric/Behavioral: Negative for sleep disturbance. The patient is not nervous/anxious.        Objective   Physical Exam   Constitutional: She is oriented to person, place, and time.   HENT:   Head: Normocephalic and atraumatic.   Nose: Nose normal.   Neck: Neck supple. No JVD present. No thyromegaly present.   Cardiovascular: Normal rate and regular rhythm.    Pulmonary/Chest: Effort normal.   Decreased breath sounds   Abdominal: Soft. Bowel sounds are normal. She exhibits no mass.   Neurological: She is alert and oriented to person, place, and time. She has normal reflexes.   Skin: Skin is warm.   Psychiatric: She has a normal mood and affect. Her behavior is normal.           Patient Active Problem List   Diagnosis   • Essential hypertension   • Coronary artery disease involving native coronary artery of native heart without angina pectoris   • Chronic obstructive pulmonary disease   • Acquired hypothyroidism   • Depressive disorder   • Thygeson's superficial punctate keratitis   • Pseudophakia               Assessment    Diagnosis Plan   1. Chronic obstructive pulmonary disease, unspecified COPD type     2. Chronic hypoxemic respiratory failure  Ambulatory Referral to Pulmonary Rehab   3. Immunization due  Flu Vaccine High Dose PF 65YR+ (FLUZONE [id number])       Plan        Patient will continue current therapy.  Symbicort inhaler bid  Duonebs bid and PRN.  Will refer to Pulmonary Rehab.        Follow up in 3 months.          This document has been electronically signed by Roula Albert MD on September 12, 2017 7:13 PM

## 2017-09-14 DIAGNOSIS — J44.9 CHRONIC OBSTRUCTIVE PULMONARY DISEASE, UNSPECIFIED COPD TYPE (HCC): Primary | ICD-10-CM

## 2017-09-18 RX ORDER — SIMVASTATIN 40 MG
TABLET ORAL
Qty: 30 TABLET | Refills: 3 | Status: SHIPPED | OUTPATIENT
Start: 2017-09-18 | End: 2017-12-15 | Stop reason: SDUPTHER

## 2017-09-25 ENCOUNTER — HOSPITAL ENCOUNTER (OUTPATIENT)
Dept: PULMONOLOGY | Facility: HOSPITAL | Age: 82
Discharge: HOME | End: 2017-09-25
Attending: INTERNAL MEDICINE | Admitting: INTERNAL MEDICINE

## 2017-09-25 PROCEDURE — 94060 EVALUATION OF WHEEZING: CPT | Performed by: INTERNAL MEDICINE

## 2017-09-25 PROCEDURE — 94060 EVALUATION OF WHEEZING: CPT

## 2017-10-02 RX ORDER — LEVOTHYROXINE SODIUM 0.05 MG/1
TABLET ORAL
Qty: 90 TABLET | Refills: 1 | Status: SHIPPED | OUTPATIENT
Start: 2017-10-02 | End: 2018-03-30 | Stop reason: SDUPTHER

## 2017-10-02 RX ORDER — FUROSEMIDE 40 MG/1
TABLET ORAL
Qty: 90 TABLET | Refills: 1 | Status: SHIPPED | OUTPATIENT
Start: 2017-10-02 | End: 2018-01-09 | Stop reason: HOSPADM

## 2017-10-03 ENCOUNTER — HOSPITAL ENCOUNTER (OUTPATIENT)
Dept: PULMONOLOGY | Facility: HOSPITAL | Age: 82
Setting detail: THERAPIES SERIES
Discharge: HOME | End: 2017-10-03

## 2017-10-03 VITALS — OXYGEN SATURATION: 93 % | SYSTOLIC BLOOD PRESSURE: 150 MMHG | DIASTOLIC BLOOD PRESSURE: 70 MMHG | HEART RATE: 86 BPM

## 2017-10-03 DIAGNOSIS — J44.9 CHRONIC OBSTRUCTIVE PULMONARY DISEASE, UNSPECIFIED COPD TYPE (HCC): Primary | ICD-10-CM

## 2017-10-03 PROCEDURE — G0424 PULMONARY REHAB W EXER: HCPCS

## 2017-10-03 NOTE — PROGRESS NOTES
"Pulmonary Rehab Initial Assessment      Name: Val Arteaga  :1932 Allergies:Ace inhibitors   MRN: [id number] 84 y.o. Physician: Roula Albert MD   Primary Diagnosis: COPD Event Date: 17 Specialist: Pulmonary rehab   Secondary Diagnosis: HTN  Note Author: Leeanna Daniel RRT     Cardiovascular History: HTN, CAD     EXERCISE AT HOME  No            Ambulatory Status:AMB  Ambulatory Fall Risk Assessed on Initial Visit:Yes  Score: 40 6 Minute Walk Pre- Pulmonary Rehab:  Distance:756ft      RPE:13        RPD: 3              MPH:1.4  Max. HR: 93        SPO2:90      Resting BP: 150/70     Peak BP: 145/74  Recovery BP:140/78  Comments: Patient took very short pauses during walk      NUTRITION  Lipids:  No      Weight Management:                 Weight: 172lb  Height: 68\"                                   BMI: 26    Alcohol Use:No Diabetes:No           SOCIAL HISTORY  Social History     Social History   • Marital status:      Spouse name: N/A   • Number of children: N/A   • Years of education: N/A     Social History Main Topics   • Smoking status: Former Smoker     Packs/day: 0.50     Years: 50.00     Types: Cigarettes   • Smokeless tobacco: Never Used   • Alcohol use No   • Drug use: Yes     Special: Flunitrazepam   • Sexual activity: Not on file     Other Topics Concern   • Not on file     Social History Narrative    Learning Barriers: No  Family Support:Yes  Living Arrangement: Home alone Tobacco Adjunct:No  Do you live with a smoker: No     PSYCHOSOCIAL  Clinical Depression:No    Stress: Moderate     Assess presence or absence of depression using a valid screening tool: Yes  PHQ9  Score:  4        Are you being hurt, hit, or freightened by anyone at home or in your life? No    Are you being neglected by a caregiver? No Family attends [place]: No      COMORBIDITIES  Sleep Apnea: no If yes, Choose:     Cancer: No    Stroke: No   Pneumonia: No     Osteoporosis: No    GI Problems: No Frequent " colds/allergies: No       PAIN:  Are you having pain?No  If yes where is pain? NA If yes, pain scale:  0      PULMONARY:  Do you use a nebulizer?: Yes  If yes, Duo Neb BID    Do you use oxygen at home?: Yes  If yes, amount 2.5l   With rest: 2.5l  With activity: NA Do you have a daily cough?: No      Do you every notice yourself wheezing?:Yes  If yes, when: when getting sick Breath sounds:  Clear and equal bilaterally   OTHER:  Do you have physical limitations?: SOB when walking      Do you need assistance with ADLs?:No Do you climb stairs at home?:no      Have you ever attended a pulmonary rehab?: No MOLLY Dyspnea Scale: 0 - 10:  Resting 0  Exercise 3     Patient Goals: Increase strength and stamina     DISCHARGE PLANNING:  Do you have any home exercise equipment?: No    What are you plans for continuing exercise after completion of pulmonary rehab? unknown     EDUCATION:  A/P of the Pulmonary System and diseases  Breathing Techniques  Equipment safety       PRE-PROGRAM ASSESSMENT:  PFT Date:9/25/17    FEV1/FVC: 47% FEV1: 38%    FVC:57% DLCO: NA     Time of arrival: 0940    Time of departure: 1040           10/3/2017  9:17 AM  Leeanna Daniel RRT

## 2017-10-05 ENCOUNTER — HOSPITAL ENCOUNTER (OUTPATIENT)
Dept: PULMONOLOGY | Facility: HOSPITAL | Age: 82
Setting detail: THERAPIES SERIES
Discharge: HOME | End: 2017-10-05

## 2017-10-05 VITALS — OXYGEN SATURATION: 93 % | HEART RATE: 86 BPM | SYSTOLIC BLOOD PRESSURE: 151 MMHG | DIASTOLIC BLOOD PRESSURE: 73 MMHG

## 2017-10-05 DIAGNOSIS — J44.9 CHRONIC OBSTRUCTIVE PULMONARY DISEASE, UNSPECIFIED COPD TYPE (HCC): Primary | ICD-10-CM

## 2017-10-05 PROCEDURE — G0424 PULMONARY REHAB W EXER: HCPCS

## 2017-10-10 ENCOUNTER — HOSPITAL ENCOUNTER (OUTPATIENT)
Dept: PULMONOLOGY | Facility: HOSPITAL | Age: 82
Setting detail: THERAPIES SERIES
Discharge: HOME | End: 2017-10-10

## 2017-10-10 VITALS — DIASTOLIC BLOOD PRESSURE: 85 MMHG | HEART RATE: 82 BPM | OXYGEN SATURATION: 93 % | SYSTOLIC BLOOD PRESSURE: 182 MMHG

## 2017-10-10 DIAGNOSIS — J44.9 CHRONIC OBSTRUCTIVE PULMONARY DISEASE, UNSPECIFIED COPD TYPE (HCC): Primary | ICD-10-CM

## 2017-10-10 PROCEDURE — G0424 PULMONARY REHAB W EXER: HCPCS

## 2017-10-12 ENCOUNTER — HOSPITAL ENCOUNTER (OUTPATIENT)
Dept: PULMONOLOGY | Facility: HOSPITAL | Age: 82
Setting detail: THERAPIES SERIES
Discharge: HOME | End: 2017-10-12

## 2017-10-12 VITALS — DIASTOLIC BLOOD PRESSURE: 70 MMHG | SYSTOLIC BLOOD PRESSURE: 150 MMHG | OXYGEN SATURATION: 94 % | HEART RATE: 71 BPM

## 2017-10-12 DIAGNOSIS — J44.9 CHRONIC OBSTRUCTIVE PULMONARY DISEASE, UNSPECIFIED COPD TYPE (HCC): Primary | ICD-10-CM

## 2017-10-12 PROCEDURE — G0424 PULMONARY REHAB W EXER: HCPCS

## 2017-10-17 ENCOUNTER — OFFICE VISIT (OUTPATIENT)
Dept: PULMONOLOGY | Facility: CLINIC | Age: 82
End: 2017-10-17

## 2017-10-17 ENCOUNTER — HOSPITAL ENCOUNTER (OUTPATIENT)
Dept: PULMONOLOGY | Facility: HOSPITAL | Age: 82
Setting detail: THERAPIES SERIES
Discharge: HOME | End: 2017-10-17

## 2017-10-17 VITALS
OXYGEN SATURATION: 97 % | HEART RATE: 64 BPM | WEIGHT: 172 LBS | DIASTOLIC BLOOD PRESSURE: 80 MMHG | SYSTOLIC BLOOD PRESSURE: 127 MMHG | BODY MASS INDEX: 26.07 KG/M2 | HEIGHT: 68 IN

## 2017-10-17 VITALS — HEART RATE: 82 BPM | DIASTOLIC BLOOD PRESSURE: 62 MMHG | OXYGEN SATURATION: 96 % | SYSTOLIC BLOOD PRESSURE: 125 MMHG

## 2017-10-17 DIAGNOSIS — J43.1 PANLOBULAR EMPHYSEMA (HCC): Primary | ICD-10-CM

## 2017-10-17 DIAGNOSIS — J44.9 CHRONIC OBSTRUCTIVE PULMONARY DISEASE, UNSPECIFIED COPD TYPE (HCC): Primary | ICD-10-CM

## 2017-10-17 PROCEDURE — 99213 OFFICE O/P EST LOW 20 MIN: CPT | Performed by: INTERNAL MEDICINE

## 2017-10-17 PROCEDURE — G0424 PULMONARY REHAB W EXER: HCPCS

## 2017-10-17 RX ORDER — DOXYCYCLINE 100 MG/1
CAPSULE ORAL
Qty: 20 CAPSULE | Refills: 1 | Status: SHIPPED | OUTPATIENT
Start: 2017-10-17 | End: 2017-11-15

## 2017-10-17 NOTE — PROGRESS NOTES
"84-year-old lady has COPD and emphysema from prior cigarette smoking.  Her breathing has improved.  She has occasional exacerbations.  She continues to take breathing medications    ROS    Constitutional-no night sweats weight loss headaches  GI no abdominal pain nausea or diarrhea  Neuro no seizure or neurologic deficits  Musculoskeletal no deformity or joint pain   no dysuria or hematuria  Skin no rash or other lesions  All other systems reviewed and were negative except for the above.      Physical Exam  /80 (BP Location: Left arm, Patient Position: Sitting, Cuff Size: Adult)  Pulse 64  Ht 68\" (172.7 cm)  Wt 172 lb (78 kg)  SpO2 97%  BMI 26.15 kg/m2  Vital signs as above  Pupils equally round and reactive to light and accommodation, neck no JVD or adenopathy.  Cardiovascular regular rhythm and rate no murmur or gallop.  Abdomen soft no organomegaly tenderness.  Extremities no clubbing cyanosis or edema.  No cervical adenopathy.  No skin rash.  Neurologic good strength bilaterally without deficits  Elderly female in no distress lungs reveal diminished breath sounds    Impression COPD with recurring exacerbations    Plan continue present medications, doxycycline for infection, return in 4 months          This document has been electronically signed by Bryan Jay MD on October 17, 2017 10:27 AM      "

## 2017-10-19 ENCOUNTER — HOSPITAL ENCOUNTER (OUTPATIENT)
Dept: PULMONOLOGY | Facility: HOSPITAL | Age: 82
Setting detail: THERAPIES SERIES
Discharge: HOME | End: 2017-10-19

## 2017-10-19 VITALS — HEART RATE: 70 BPM | OXYGEN SATURATION: 98 % | SYSTOLIC BLOOD PRESSURE: 139 MMHG | DIASTOLIC BLOOD PRESSURE: 72 MMHG

## 2017-10-19 DIAGNOSIS — J44.9 CHRONIC OBSTRUCTIVE PULMONARY DISEASE, UNSPECIFIED COPD TYPE (HCC): Primary | ICD-10-CM

## 2017-10-19 PROCEDURE — G0424 PULMONARY REHAB W EXER: HCPCS

## 2017-10-24 ENCOUNTER — HOSPITAL ENCOUNTER (OUTPATIENT)
Dept: PULMONOLOGY | Facility: HOSPITAL | Age: 82
Setting detail: THERAPIES SERIES
Discharge: HOME | End: 2017-10-24

## 2017-10-24 VITALS — HEART RATE: 79 BPM | SYSTOLIC BLOOD PRESSURE: 147 MMHG | OXYGEN SATURATION: 94 % | DIASTOLIC BLOOD PRESSURE: 76 MMHG

## 2017-10-24 DIAGNOSIS — J44.9 CHRONIC OBSTRUCTIVE PULMONARY DISEASE, UNSPECIFIED COPD TYPE (HCC): Primary | ICD-10-CM

## 2017-10-24 PROCEDURE — G0424 PULMONARY REHAB W EXER: HCPCS

## 2017-10-26 ENCOUNTER — HOSPITAL ENCOUNTER (OUTPATIENT)
Dept: PULMONOLOGY | Facility: HOSPITAL | Age: 82
Setting detail: THERAPIES SERIES
Discharge: HOME | End: 2017-10-26

## 2017-10-26 VITALS — HEART RATE: 74 BPM | SYSTOLIC BLOOD PRESSURE: 140 MMHG | DIASTOLIC BLOOD PRESSURE: 69 MMHG | OXYGEN SATURATION: 92 %

## 2017-10-26 DIAGNOSIS — J44.9 CHRONIC OBSTRUCTIVE PULMONARY DISEASE, UNSPECIFIED COPD TYPE (HCC): Primary | ICD-10-CM

## 2017-10-26 PROCEDURE — G0424 PULMONARY REHAB W EXER: HCPCS

## 2017-10-31 ENCOUNTER — HOSPITAL ENCOUNTER (OUTPATIENT)
Dept: PULMONOLOGY | Facility: HOSPITAL | Age: 82
Setting detail: THERAPIES SERIES
Discharge: HOME | End: 2017-10-31

## 2017-10-31 VITALS — SYSTOLIC BLOOD PRESSURE: 133 MMHG | HEART RATE: 82 BPM | OXYGEN SATURATION: 94 % | DIASTOLIC BLOOD PRESSURE: 65 MMHG

## 2017-10-31 DIAGNOSIS — J44.9 CHRONIC OBSTRUCTIVE PULMONARY DISEASE, UNSPECIFIED COPD TYPE (HCC): Primary | ICD-10-CM

## 2017-10-31 PROCEDURE — G0424 PULMONARY REHAB W EXER: HCPCS

## 2017-11-02 ENCOUNTER — HOSPITAL ENCOUNTER (OUTPATIENT)
Dept: PULMONOLOGY | Facility: HOSPITAL | Age: 82
Setting detail: THERAPIES SERIES
Discharge: HOME | End: 2017-11-02

## 2017-11-02 VITALS — DIASTOLIC BLOOD PRESSURE: 76 MMHG | SYSTOLIC BLOOD PRESSURE: 163 MMHG | HEART RATE: 74 BPM | OXYGEN SATURATION: 93 %

## 2017-11-02 DIAGNOSIS — J44.9 CHRONIC OBSTRUCTIVE PULMONARY DISEASE, UNSPECIFIED COPD TYPE (HCC): Primary | ICD-10-CM

## 2017-11-02 PROCEDURE — G0424 PULMONARY REHAB W EXER: HCPCS

## 2017-11-07 ENCOUNTER — HOSPITAL ENCOUNTER (OUTPATIENT)
Dept: PULMONOLOGY | Facility: HOSPITAL | Age: 82
Setting detail: THERAPIES SERIES
Discharge: HOME | End: 2017-11-07

## 2017-11-07 VITALS — HEART RATE: 79 BPM | DIASTOLIC BLOOD PRESSURE: 87 MMHG | OXYGEN SATURATION: 97 % | SYSTOLIC BLOOD PRESSURE: 151 MMHG

## 2017-11-07 DIAGNOSIS — J44.9 CHRONIC OBSTRUCTIVE PULMONARY DISEASE, UNSPECIFIED COPD TYPE (HCC): Primary | ICD-10-CM

## 2017-11-07 PROCEDURE — G0424 PULMONARY REHAB W EXER: HCPCS

## 2017-11-09 ENCOUNTER — HOSPITAL ENCOUNTER (OUTPATIENT)
Dept: PULMONOLOGY | Facility: HOSPITAL | Age: 82
Setting detail: THERAPIES SERIES
Discharge: HOME | End: 2017-11-09

## 2017-11-09 VITALS — OXYGEN SATURATION: 95 % | SYSTOLIC BLOOD PRESSURE: 154 MMHG | HEART RATE: 72 BPM | DIASTOLIC BLOOD PRESSURE: 73 MMHG

## 2017-11-09 DIAGNOSIS — J44.9 CHRONIC OBSTRUCTIVE PULMONARY DISEASE, UNSPECIFIED COPD TYPE (HCC): Primary | ICD-10-CM

## 2017-11-09 PROCEDURE — G0424 PULMONARY REHAB W EXER: HCPCS

## 2017-11-14 ENCOUNTER — HOSPITAL ENCOUNTER (OUTPATIENT)
Dept: PULMONOLOGY | Facility: HOSPITAL | Age: 82
Setting detail: THERAPIES SERIES
Discharge: HOME | End: 2017-11-14

## 2017-11-14 VITALS — DIASTOLIC BLOOD PRESSURE: 84 MMHG | OXYGEN SATURATION: 96 % | HEART RATE: 79 BPM | SYSTOLIC BLOOD PRESSURE: 165 MMHG

## 2017-11-14 DIAGNOSIS — J44.9 CHRONIC OBSTRUCTIVE PULMONARY DISEASE, UNSPECIFIED COPD TYPE (HCC): Primary | ICD-10-CM

## 2017-11-14 PROCEDURE — G0424 PULMONARY REHAB W EXER: HCPCS

## 2017-11-15 ENCOUNTER — OFFICE VISIT (OUTPATIENT)
Dept: INTERNAL MEDICINE | Facility: CLINIC | Age: 82
End: 2017-11-15

## 2017-11-15 VITALS
SYSTOLIC BLOOD PRESSURE: 100 MMHG | HEIGHT: 67 IN | WEIGHT: 173.5 LBS | DIASTOLIC BLOOD PRESSURE: 60 MMHG | BODY MASS INDEX: 27.23 KG/M2 | OXYGEN SATURATION: 93 %

## 2017-11-15 DIAGNOSIS — I10 ESSENTIAL HYPERTENSION: ICD-10-CM

## 2017-11-15 DIAGNOSIS — J96.11 CHRONIC RESPIRATORY FAILURE WITH HYPOXIA (HCC): ICD-10-CM

## 2017-11-15 DIAGNOSIS — J44.1 COPD EXACERBATION (HCC): Primary | ICD-10-CM

## 2017-11-15 DIAGNOSIS — I25.10 CORONARY ARTERY DISEASE INVOLVING NATIVE CORONARY ARTERY OF NATIVE HEART WITHOUT ANGINA PECTORIS: ICD-10-CM

## 2017-11-15 PROCEDURE — 96372 THER/PROPH/DIAG INJ SC/IM: CPT | Performed by: INTERNAL MEDICINE

## 2017-11-15 PROCEDURE — 99213 OFFICE O/P EST LOW 20 MIN: CPT | Performed by: INTERNAL MEDICINE

## 2017-11-15 RX ORDER — METHYLPREDNISOLONE ACETATE 40 MG/ML
40 INJECTION, SUSPENSION INTRA-ARTICULAR; INTRALESIONAL; INTRAMUSCULAR; SOFT TISSUE ONCE
Status: COMPLETED | OUTPATIENT
Start: 2017-11-15 | End: 2017-11-15

## 2017-11-15 RX ORDER — IPRATROPIUM BROMIDE AND ALBUTEROL SULFATE 2.5; .5 MG/3ML; MG/3ML
3 SOLUTION RESPIRATORY (INHALATION) 4 TIMES DAILY PRN
Qty: 120 VIAL | Refills: 1 | Status: ON HOLD | OUTPATIENT
Start: 2017-11-15 | End: 2018-01-08 | Stop reason: SDUPTHER

## 2017-11-15 RX ORDER — AZITHROMYCIN 250 MG/1
TABLET, FILM COATED ORAL
Qty: 6 TABLET | Refills: 0 | Status: SHIPPED | OUTPATIENT
Start: 2017-11-15 | End: 2017-12-27 | Stop reason: SDUPTHER

## 2017-11-15 RX ADMIN — METHYLPREDNISOLONE ACETATE 40 MG: 40 INJECTION, SUSPENSION INTRA-ARTICULAR; INTRALESIONAL; INTRAMUSCULAR; SOFT TISSUE at 14:40

## 2017-11-15 NOTE — PROGRESS NOTES
Subjective   Val Arteaga is a 84 y.o. female       Patient is in compaiining of chest congestion and increased cough for about a week. Cough is productive of greenish.  Has bee more dyspneic and is feeling fatigued.  On Oxygen at night.  Has been using her Nebulizers couple times a day.      BP is controlled. Denies any chest pain or orthopnea.  Swelling in her legs improved on Lasix.  She is compliant with her medications and with low sodium diet.    Past Medical History:   Diagnosis Date   • Acquired hypothyroidism    • Allergic rhinitis    • COPD (chronic obstructive pulmonary disease)    • Corneal epithelial dystrophy    • Coronary arteriosclerosis    • Depression    • Generalized atherosclerosis    • GERD (gastroesophageal reflux disease)    • Hemorrhoids    • History of bone density study 08/03/2012    Lumbar spine Osteopenia. Femoral Osteopenia   • History of mammogram 08/20/2004    Birads Category 2 Benign   • History of Papanicolaou smear of cervix 08/12/2004    NEGATIVE   • Hypercholesterolemia    • Hyperlipidemia    • Hypertension    • Nonexudative age-related macular degeneration    • Osteoarthritis of multiple joints    • Pseudophakia    • Punctate keratitis     - history Thygeson's keratopathy      • Tobacco dependence syndrome      Past Surgical History:   Procedure Laterality Date   • APPENDECTOMY     • BREAST SURGERY  03/19/1954    Excision, breast lesion (1)  Excision of fibroma. 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 patency in the circumflex artery site of previous angioplasty. Kathe. patency in RCA.Nonobstructive 20-30% stenosis in the LAD and diagonal branch. Preserved LV systolic function with Ef of 55%   • CERVICAL SPINE SURGERY  09/16/1974    Excision of cervical disc, C5-6, C6-7, anterior cervical fusion, C5-6 with iliac bone graft.cervical spondylosis,C5-6, C6-7   • COLONOSCOPY  01/01/2013    patient declined    •  CORONARY ANGIOPLASTY  07/21/1993    Angioplasty, coronary (2)    RCA    • DILATATION AND CURETTAGE      3   • ENDOSCOPY AND COLONOSCOPY  10/01/1998    Hemorhoids Rectal Outlet Bleeding   • ESOPHAGOSCOPY / EGD  06/28/2004    Nonerosive gastritis of the body of the stomach. A biopsy was obtained & placed in h. Pylori test agar. Multiple biopsies were obtained from the body of the stomach   • INJECTION OF MEDICATION  11/23/2015    Depo Medrol (Methylprednisone) (5)    • INJECTION OF MEDICATION  02/04/2016    Kenalog (3)         Social History   Substance Use Topics   • Smoking status: Former Smoker     Packs/day: 0.50     Years: 50.00     Types: Cigarettes     Start date: 1950     Quit date: 1/12/2017   • Smokeless tobacco: Never Used   • Alcohol use No     Family History   Problem Relation Age of Onset   • Coronary artery disease Other      Allergies   Allergen Reactions   • Ace Inhibitors    • Lisinopril      Current Outpatient Prescriptions on File Prior to Visit   Medication Sig Dispense Refill   • aspirin 81 MG EC tablet Take 81 mg by mouth Daily.     • budesonide-formoterol (SYMBICORT) 160-4.5 MCG/ACT inhaler Inhale 2 puffs 2 (Two) Times a Day. 1 inhaler 5   • carvedilol (COREG) 6.25 MG tablet Take 3.125 mg by mouth 2 (Two) Times a Day With Meals.     • clopidogrel (PLAVIX) 75 MG tablet Take 75 mg by mouth Daily.     • coenzyme Q10 100 MG capsule Take 100 mg by mouth Daily.     • doxycycline (MONODOX) 100 MG capsule 1 dose by mouth twice a day 20 capsule 1   • fluorometholone (FLAREX) 0.1 % ophthalmic suspension Administer 1 drop to both eyes 4 (Four) Times a Day. 5 mL 3   • furosemide (LASIX) 40 MG tablet TAKE 1 TABLET BY MOUTH EVERY DAY AS NEEDED FOR SWELLING 90 tablet 1   • ipratropium-albuterol (DUO-NEB) 0.5-2.5 mg/mL nebulizer Take 3 mL by nebulization 4 (Four) Times a Day As Needed for Wheezing. 120 vial 1   • levothyroxine (SYNTHROID, LEVOTHROID) 50 MCG tablet TAKE 1 TABLET BY MOUTH EVERY DAY 90 tablet 1    • montelukast (SINGULAIR) 10 MG tablet Take 10 mg by mouth Every Night.     • nitroglycerin (NITRO-DUR) 0.2 MG/HR patch Place 1 patch on the skin Daily. 30 patch 0   • ranolazine (RANEXA) 500 MG 12 hr tablet Take 500 mg by mouth 2 (Two) Times a Day.     • sertraline (ZOLOFT) 50 MG tablet TAKE 1 TABLET BY MOUTH EVERY DAY 90 tablet 1   • simvastatin (ZOCOR) 40 MG tablet TAKE 1 TABLET BY MOUTH EVERY NIGHT AT BEDTIME 30 tablet 3   • [DISCONTINUED] levoFLOXacin (LEVAQUIN) 500 MG tablet Take 1 tablet by mouth Daily. 7 tablet 0     No current facility-administered medications on file prior to visit.      Review of Systems   Constitutional: Positive for fatigue.   HENT: Positive for congestion. Negative for sore throat.    Respiratory: Positive for cough, chest tightness, shortness of breath and wheezing.    Cardiovascular: Negative for chest pain, palpitations and leg swelling.   Gastrointestinal: Negative for abdominal pain and constipation.   Endocrine: Negative for cold intolerance, heat intolerance, polydipsia and polyuria.   Genitourinary: Negative for dysuria, frequency and hematuria.   Musculoskeletal: Negative for back pain and joint swelling.   Skin: Negative for color change and rash.   Neurological: Negative for dizziness, light-headedness and headaches.       Objective   Physical Exam   Constitutional: She appears well-developed and well-nourished.   HENT:   Nose: Mucosal edema present.   Mouth/Throat: Oropharynx is clear and moist.   Eyes: Conjunctivae are normal. Left eye exhibits no discharge. No scleral icterus.   Neck: Neck supple. No JVD present. No thyromegaly present.   Cardiovascular: Normal rate and regular rhythm.    No murmur heard.  Pulmonary/Chest: Effort normal. No respiratory distress. She has wheezes.   Abdominal: Soft. She exhibits no mass.   Skin: Skin is warm and dry.   Psychiatric: She has a normal mood and affect. Her behavior is normal.   Nursing note and vitals  reviewed.          Patient Active Problem List   Diagnosis   • Essential hypertension   • Coronary artery disease involving native coronary artery of native heart without angina pectoris   • Chronic obstructive pulmonary disease   • Acquired hypothyroidism   • Depressive disorder   • Thygeson's superficial punctate keratitis   • Pseudophakia         Assessment       Diagnosis Plan   1. COPD exacerbation     2. Chronic respiratory failure with hypoxia     3. Essential hypertension     4. Coronary artery disease involving native coronary artery of native heart without angina pectoris           Plan    Z-pack.  Depo Medrol 40 mg IM.  Symbicort inhaler 2 puffs bid.  Duonebs qid PRN.    Continue all cardiac medications.      Follow up if not improved or for routine care in 3 months.          This document has been electronically signed by Roula Albert MD on November 15, 2017 2:11 PM

## 2017-11-16 ENCOUNTER — APPOINTMENT (OUTPATIENT)
Dept: PULMONOLOGY | Facility: HOSPITAL | Age: 82
End: 2017-11-16

## 2017-11-28 ENCOUNTER — HOSPITAL ENCOUNTER (OUTPATIENT)
Dept: PULMONOLOGY | Facility: HOSPITAL | Age: 82
Setting detail: THERAPIES SERIES
Discharge: HOME | End: 2017-11-28

## 2017-11-28 VITALS — OXYGEN SATURATION: 95 % | DIASTOLIC BLOOD PRESSURE: 69 MMHG | SYSTOLIC BLOOD PRESSURE: 146 MMHG | HEART RATE: 67 BPM

## 2017-11-28 DIAGNOSIS — J44.9 CHRONIC OBSTRUCTIVE PULMONARY DISEASE, UNSPECIFIED COPD TYPE (HCC): Primary | ICD-10-CM

## 2017-11-28 PROCEDURE — G0424 PULMONARY REHAB W EXER: HCPCS

## 2017-11-30 ENCOUNTER — HOSPITAL ENCOUNTER (OUTPATIENT)
Dept: PULMONOLOGY | Facility: HOSPITAL | Age: 82
Setting detail: THERAPIES SERIES
Discharge: HOME | End: 2017-11-30

## 2017-11-30 VITALS — SYSTOLIC BLOOD PRESSURE: 159 MMHG | OXYGEN SATURATION: 95 % | HEART RATE: 69 BPM | DIASTOLIC BLOOD PRESSURE: 60 MMHG

## 2017-11-30 DIAGNOSIS — J44.9 CHRONIC OBSTRUCTIVE PULMONARY DISEASE, UNSPECIFIED COPD TYPE (HCC): Primary | ICD-10-CM

## 2017-11-30 PROCEDURE — G0424 PULMONARY REHAB W EXER: HCPCS

## 2017-12-05 ENCOUNTER — HOSPITAL ENCOUNTER (OUTPATIENT)
Dept: PULMONOLOGY | Facility: HOSPITAL | Age: 82
Setting detail: THERAPIES SERIES
Discharge: HOME | End: 2017-12-05

## 2017-12-05 VITALS — DIASTOLIC BLOOD PRESSURE: 61 MMHG | HEART RATE: 85 BPM | SYSTOLIC BLOOD PRESSURE: 137 MMHG | OXYGEN SATURATION: 94 %

## 2017-12-05 DIAGNOSIS — J44.9 CHRONIC OBSTRUCTIVE PULMONARY DISEASE, UNSPECIFIED COPD TYPE (HCC): Primary | ICD-10-CM

## 2017-12-05 PROCEDURE — G0424 PULMONARY REHAB W EXER: HCPCS

## 2017-12-07 ENCOUNTER — HOSPITAL ENCOUNTER (OUTPATIENT)
Dept: PULMONOLOGY | Facility: HOSPITAL | Age: 82
Setting detail: THERAPIES SERIES
Discharge: HOME | End: 2017-12-07

## 2017-12-07 VITALS — DIASTOLIC BLOOD PRESSURE: 70 MMHG | SYSTOLIC BLOOD PRESSURE: 142 MMHG | HEART RATE: 80 BPM | OXYGEN SATURATION: 92 %

## 2017-12-07 DIAGNOSIS — J44.9 CHRONIC OBSTRUCTIVE PULMONARY DISEASE, UNSPECIFIED COPD TYPE (HCC): Primary | ICD-10-CM

## 2017-12-07 PROCEDURE — G0424 PULMONARY REHAB W EXER: HCPCS

## 2017-12-12 ENCOUNTER — HOSPITAL ENCOUNTER (OUTPATIENT)
Dept: PULMONOLOGY | Facility: HOSPITAL | Age: 82
Setting detail: THERAPIES SERIES
Discharge: HOME | End: 2017-12-12

## 2017-12-12 VITALS — OXYGEN SATURATION: 95 % | SYSTOLIC BLOOD PRESSURE: 129 MMHG | DIASTOLIC BLOOD PRESSURE: 86 MMHG | HEART RATE: 87 BPM

## 2017-12-12 DIAGNOSIS — J44.9 CHRONIC OBSTRUCTIVE PULMONARY DISEASE, UNSPECIFIED COPD TYPE (HCC): Primary | ICD-10-CM

## 2017-12-12 PROCEDURE — G0424 PULMONARY REHAB W EXER: HCPCS

## 2017-12-14 ENCOUNTER — HOSPITAL ENCOUNTER (OUTPATIENT)
Dept: PULMONOLOGY | Facility: HOSPITAL | Age: 82
Setting detail: THERAPIES SERIES
Discharge: HOME | End: 2017-12-14

## 2017-12-14 VITALS — SYSTOLIC BLOOD PRESSURE: 120 MMHG | DIASTOLIC BLOOD PRESSURE: 60 MMHG | OXYGEN SATURATION: 94 % | HEART RATE: 83 BPM

## 2017-12-14 DIAGNOSIS — J44.9 CHRONIC OBSTRUCTIVE PULMONARY DISEASE, UNSPECIFIED COPD TYPE (HCC): Primary | ICD-10-CM

## 2017-12-14 PROCEDURE — G0424 PULMONARY REHAB W EXER: HCPCS

## 2017-12-19 RX ORDER — SIMVASTATIN 40 MG
TABLET ORAL
Qty: 30 TABLET | Refills: 3 | Status: SHIPPED | OUTPATIENT
Start: 2017-12-19 | End: 2018-02-05 | Stop reason: HOSPADM

## 2017-12-21 ENCOUNTER — APPOINTMENT (OUTPATIENT)
Dept: PULMONOLOGY | Facility: HOSPITAL | Age: 82
End: 2017-12-21

## 2017-12-26 ENCOUNTER — HOSPITAL ENCOUNTER (OUTPATIENT)
Dept: PULMONOLOGY | Facility: HOSPITAL | Age: 82
Setting detail: THERAPIES SERIES
Discharge: HOME | End: 2017-12-26

## 2017-12-26 VITALS — DIASTOLIC BLOOD PRESSURE: 89 MMHG | HEART RATE: 76 BPM | OXYGEN SATURATION: 98 % | SYSTOLIC BLOOD PRESSURE: 159 MMHG

## 2017-12-26 DIAGNOSIS — J44.9 CHRONIC OBSTRUCTIVE PULMONARY DISEASE, UNSPECIFIED COPD TYPE (HCC): Primary | ICD-10-CM

## 2017-12-26 PROCEDURE — G0424 PULMONARY REHAB W EXER: HCPCS

## 2017-12-27 RX ORDER — AZITHROMYCIN 250 MG/1
TABLET, FILM COATED ORAL
Qty: 6 TABLET | Refills: 0 | Status: SHIPPED | OUTPATIENT
Start: 2017-12-27 | End: 2018-01-02

## 2017-12-28 ENCOUNTER — HOSPITAL ENCOUNTER (OUTPATIENT)
Dept: PULMONOLOGY | Facility: HOSPITAL | Age: 82
Setting detail: THERAPIES SERIES
Discharge: HOME | End: 2017-12-28

## 2017-12-28 VITALS — HEART RATE: 83 BPM | OXYGEN SATURATION: 95 % | DIASTOLIC BLOOD PRESSURE: 79 MMHG | SYSTOLIC BLOOD PRESSURE: 168 MMHG

## 2017-12-28 DIAGNOSIS — I25.10 CORONARY ARTERY DISEASE INVOLVING NATIVE CORONARY ARTERY OF NATIVE HEART WITHOUT ANGINA PECTORIS: Primary | ICD-10-CM

## 2017-12-28 PROCEDURE — G0424 PULMONARY REHAB W EXER: HCPCS

## 2017-12-29 RX ORDER — METHYLPREDNISOLONE 4 MG/1
TABLET ORAL
Qty: 1 EACH | Refills: 0 | Status: SHIPPED | OUTPATIENT
Start: 2017-12-29 | End: 2018-01-09 | Stop reason: HOSPADM

## 2018-01-02 ENCOUNTER — OFFICE VISIT (OUTPATIENT)
Dept: INTERNAL MEDICINE | Facility: CLINIC | Age: 83
End: 2018-01-02

## 2018-01-02 ENCOUNTER — TELEPHONE (OUTPATIENT)
Dept: INTERNAL MEDICINE | Facility: CLINIC | Age: 83
End: 2018-01-02

## 2018-01-02 VITALS
WEIGHT: 170.9 LBS | SYSTOLIC BLOOD PRESSURE: 120 MMHG | TEMPERATURE: 99.2 F | BODY MASS INDEX: 25.9 KG/M2 | DIASTOLIC BLOOD PRESSURE: 70 MMHG | HEIGHT: 68 IN

## 2018-01-02 DIAGNOSIS — J20.9 ACUTE BRONCHITIS, UNSPECIFIED ORGANISM: Primary | ICD-10-CM

## 2018-01-02 DIAGNOSIS — J44.1 CHRONIC OBSTRUCTIVE PULMONARY DISEASE WITH ACUTE EXACERBATION (HCC): ICD-10-CM

## 2018-01-02 PROCEDURE — 96372 THER/PROPH/DIAG INJ SC/IM: CPT | Performed by: INTERNAL MEDICINE

## 2018-01-02 PROCEDURE — 99213 OFFICE O/P EST LOW 20 MIN: CPT | Performed by: INTERNAL MEDICINE

## 2018-01-02 RX ORDER — LEVOFLOXACIN 500 MG/1
500 TABLET, FILM COATED ORAL DAILY
Qty: 7 TABLET | Refills: 0 | Status: SHIPPED | OUTPATIENT
Start: 2018-01-02 | End: 2018-01-09 | Stop reason: HOSPADM

## 2018-01-02 RX ORDER — METHYLPREDNISOLONE ACETATE 40 MG/ML
40 INJECTION, SUSPENSION INTRA-ARTICULAR; INTRALESIONAL; INTRAMUSCULAR; SOFT TISSUE ONCE
Status: DISCONTINUED | OUTPATIENT
Start: 2018-01-02 | End: 2018-01-09 | Stop reason: HOSPADM

## 2018-01-02 RX ADMIN — METHYLPREDNISOLONE ACETATE 40 MG: 40 INJECTION, SUSPENSION INTRA-ARTICULAR; INTRALESIONAL; INTRAMUSCULAR; SOFT TISSUE at 13:07

## 2018-01-02 NOTE — PROGRESS NOTES
Subjective   Val Arteaga is a 85 y.o. female       Patient is in complaining of wheezing, shortness of breath and fatigue over the last week.  Denies sore throat or nasal congestion. Has nonproductive cough.  She was treated with PO Zitromax and Medrol but continues to be symptomatic.  [facility] where [facility] X-Ray was done and did not show any acute findings.    Past Medical History:   Diagnosis Date   • Acquired hypothyroidism    • Allergic rhinitis    • COPD (chronic obstructive pulmonary disease)    • Corneal epithelial dystrophy    • Coronary arteriosclerosis    • Depression    • Generalized atherosclerosis    • GERD (gastroesophageal reflux disease)    • Hemorrhoids    • History of bone density study 08/03/2012    Lumbar spine Osteopenia. Femoral Osteopenia   • History of mammogram 08/20/2004    Birads Category 2 Benign   • History of Papanicolaou smear of cervix 08/12/2004    NEGATIVE   • Hypercholesterolemia    • Hyperlipidemia    • Hypertension    • Nonexudative age-related macular degeneration    • Osteoarthritis of multiple joints    • Pseudophakia    • Punctate keratitis     - history Thygeson's keratopathy      • Tobacco dependence syndrome      Past Surgical History:   Procedure Laterality Date   • APPENDECTOMY     • BREAST SURGERY  03/19/1954    Excision, breast lesion (1)  Excision of fibroma. 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 patency in the circumflex artery site of previous angioplasty. Kathe. patency in RCA.Nonobstructive 20-30% stenosis in the LAD and diagonal branch. Preserved LV systolic function with Ef of 55%   • CERVICAL SPINE SURGERY  09/16/1974    Excision of cervical disc, C5-6, C6-7, anterior cervical fusion, C5-6 with iliac bone graft.cervical spondylosis,C5-6, C6-7   • COLONOSCOPY  01/01/2013    patient declined    • CORONARY ANGIOPLASTY  07/21/1993    Angioplasty, coronary (2)     RCA    • DILATATION AND CURETTAGE      3   • ENDOSCOPY AND COLONOSCOPY  10/01/1998    Hemorhoids Rectal Outlet Bleeding   • ESOPHAGOSCOPY / EGD  06/28/2004    Nonerosive gastritis of the body of the stomach. A biopsy was obtained & placed in h. Pylori test agar. Multiple biopsies were obtained from the body of the stomach   • INJECTION OF MEDICATION  11/23/2015    Depo Medrol (Methylprednisone) (5)    • INJECTION OF MEDICATION  02/04/2016    Kenalog (3)         Social History   Substance Use Topics   • Smoking status: Former Smoker     Packs/day: 0.50     Years: 50.00     Types: Cigarettes     Start date: 1950     Quit date: 1/12/2017   • Smokeless tobacco: Never Used   • Alcohol use No     Family History   Problem Relation Age of Onset   • Coronary artery disease Other      Allergies   Allergen Reactions   • Ace Inhibitors    • Lisinopril      Current Outpatient Prescriptions on File Prior to Visit   Medication Sig Dispense Refill   • aspirin 81 MG EC tablet Take 81 mg by mouth Daily.     • benzonatate (TESSALON) 100 MG capsule Take 1 capsule by mouth 3 (Three) Times a Day As Needed for Cough. 30 capsule 0   • budesonide-formoterol (SYMBICORT) 160-4.5 MCG/ACT inhaler Inhale 2 puffs 2 (Two) Times a Day. 1 inhaler 5   • carvedilol (COREG) 6.25 MG tablet Take 3.125 mg by mouth 2 (Two) Times a Day With Meals.     • clopidogrel (PLAVIX) 75 MG tablet Take 75 mg by mouth Daily.     • coenzyme Q10 100 MG capsule Take 100 mg by mouth Daily.     • fluorometholone (FLAREX) 0.1 % ophthalmic suspension Administer 1 drop to both eyes 4 (Four) Times a Day. 5 mL 3   • furosemide (LASIX) 40 MG tablet TAKE 1 TABLET BY MOUTH EVERY DAY AS NEEDED FOR SWELLING 90 tablet 1   • guaiFENesin (MUCINEX) 600 MG 12 hr tablet Take 1 tablet by mouth Every 12 (Twelve) Hours. 30 tablet 0   • ipratropium-albuterol (DUO-NEB) 0.5-2.5 mg/mL nebulizer Take 3 mL by nebulization 4 (Four) Times a Day As Needed for Wheezing. 120 vial 1   • levothyroxine  (SYNTHROID, LEVOTHROID) 50 MCG tablet TAKE 1 TABLET BY MOUTH EVERY DAY 90 tablet 1   • MethylPREDNISolone (MEDROL, NINA,) 4 MG tablet Take as directed on package instructions. 1 each 0   • montelukast (SINGULAIR) 10 MG tablet Take 10 mg by mouth Every Night.     • nitroglycerin (NITRO-DUR) 0.2 MG/HR patch Place 1 patch on the skin Daily. 30 patch 0   • ranolazine (RANEXA) 500 MG 12 hr tablet Take 500 mg by mouth 2 (Two) Times a Day.     • sertraline (ZOLOFT) 50 MG tablet TAKE 1 TABLET BY MOUTH EVERY DAY 90 tablet 1   • simvastatin (ZOCOR) 40 MG tablet TAKE 1 TABLET BY MOUTH EVERY NIGHT AT BEDTIME 30 tablet 3   • [DISCONTINUED] azithromycin (ZITHROMAX Z-NINA) 250 MG tablet Take 2 tablets the first day, then 1 tablet daily for 4 days. 6 tablet 0     No current facility-administered medications on file prior to visit.      Review of Systems   Constitutional: Positive for fatigue. Negative for chills and fever.   HENT: Negative for congestion, postnasal drip, rhinorrhea, sinus pressure and sore throat.    Respiratory: Positive for cough, chest tightness, shortness of breath and wheezing.    Cardiovascular: Negative for chest pain, palpitations and leg swelling.   Endocrine: Negative for cold intolerance, heat intolerance, polydipsia and polyuria.   Genitourinary: Negative for difficulty urinating, flank pain and frequency.   Musculoskeletal: Negative for arthralgias and back pain.   Neurological: Negative for dizziness, light-headedness and headaches.       Objective   Physical Exam   Constitutional: She is oriented to person, place, and time. She appears well-developed and well-nourished.   HENT:   Head: Normocephalic and atraumatic.   Nose: Nose normal.   Mouth/Throat: Oropharynx is clear and moist.   Eyes: Conjunctivae are normal. Pupils are equal, round, and reactive to light.   Neck: Neck supple. No JVD present. No thyromegaly present.   Cardiovascular: Normal rate and regular rhythm.    No murmur  heard.  Pulmonary/Chest: Effort normal. She has wheezes. She exhibits no tenderness.   Abdominal: Soft. Bowel sounds are normal. She exhibits no mass.   Musculoskeletal: Normal range of motion.   Neurological: She is alert and oriented to person, place, and time.   Skin: Skin is warm and dry. No rash noted.   Psychiatric: She has a normal mood and affect. Her behavior is normal.   Nursing note and vitals reviewed.          Patient Active Problem List   Diagnosis   • Essential hypertension   • Coronary artery disease involving native coronary artery of native heart without angina pectoris   • Chronic obstructive pulmonary disease   • Acquired hypothyroidism   • Depressive disorder   • Thygeson's superficial punctate keratitis   • Pseudophakia               Assessment/Plan   Val was seen today for shortness of breath and wheezing.    Diagnoses and all orders for this visit:    Acute bronchitis, unspecified organism  -     methylPREDNISolone acetate (DEPO-medrol) injection 40 mg; Inject 1 mL into the shoulder, thigh, or buttocks 1 (One) Time.    Chronic obstructive pulmonary disease with acute exacerbation    Other orders  -     levoFLOXacin (LEVAQUIN) 500 MG tablet; Take 1 tablet by mouth Daily.       Plan     Levaquin 500 mg daily for 1 week.  Depo Medrol 40 mg IM.  Mucinex 600 mg bid.  Duonebs qid.  Advised to increase intake of fluids.      Follow up if not improved or for routine care.        This document has been electronically signed by Roula Albert MD on January 2, 2018 12:45 PM

## 2018-01-03 ENCOUNTER — APPOINTMENT (OUTPATIENT)
Dept: GENERAL RADIOLOGY | Facility: HOSPITAL | Age: 83
End: 2018-01-03

## 2018-01-03 ENCOUNTER — HOSPITAL ENCOUNTER (INPATIENT)
Facility: HOSPITAL | Age: 83
LOS: 2 days | Discharge: HOME HEALTH | End: 2018-01-09
Attending: FAMILY MEDICINE | Admitting: INTERNAL MEDICINE

## 2018-01-03 DIAGNOSIS — R06.00 DYSPNEA, UNSPECIFIED TYPE: ICD-10-CM

## 2018-01-03 DIAGNOSIS — I25.10 CORONARY ARTERY DISEASE INVOLVING NATIVE CORONARY ARTERY OF NATIVE HEART WITHOUT ANGINA PECTORIS: ICD-10-CM

## 2018-01-03 DIAGNOSIS — R77.8 ELEVATED TROPONIN: ICD-10-CM

## 2018-01-03 DIAGNOSIS — R07.2 PRECORDIAL PAIN: Primary | ICD-10-CM

## 2018-01-03 DIAGNOSIS — Z74.09 IMPAIRED PHYSICAL MOBILITY: ICD-10-CM

## 2018-01-03 DIAGNOSIS — I21.4 NSTEMI (NON-ST ELEVATED MYOCARDIAL INFARCTION) (HCC): ICD-10-CM

## 2018-01-03 DIAGNOSIS — R06.02 SHORTNESS OF BREATH: ICD-10-CM

## 2018-01-03 DIAGNOSIS — Z74.09 IMPAIRED MOBILITY AND ADLS: ICD-10-CM

## 2018-01-03 DIAGNOSIS — I25.10 ATHEROSCLEROSIS OF NATIVE CORONARY ARTERY OF NATIVE HEART, ANGINA PRESENCE UNSPECIFIED: ICD-10-CM

## 2018-01-03 DIAGNOSIS — I49.5 SSS (SICK SINUS SYNDROME) (HCC): ICD-10-CM

## 2018-01-03 DIAGNOSIS — J43.9 PULMONARY EMPHYSEMA, UNSPECIFIED EMPHYSEMA TYPE (HCC): ICD-10-CM

## 2018-01-03 DIAGNOSIS — R07.9 CHEST PAIN, UNSPECIFIED TYPE: ICD-10-CM

## 2018-01-03 DIAGNOSIS — Z78.9 IMPAIRED MOBILITY AND ADLS: ICD-10-CM

## 2018-01-03 LAB
ALBUMIN SERPL-MCNC: 4 G/DL (ref 3.4–4.8)
ALBUMIN/GLOB SERPL: 1.3 G/DL (ref 1.1–1.8)
ALP SERPL-CCNC: 56 U/L (ref 38–126)
ALT SERPL W P-5'-P-CCNC: 37 U/L (ref 9–52)
ANION GAP SERPL CALCULATED.3IONS-SCNC: 14 MMOL/L (ref 5–15)
AST SERPL-CCNC: 37 U/L (ref 14–36)
BASOPHILS # BLD AUTO: 0.01 10*3/MM3 (ref 0–0.2)
BASOPHILS NFR BLD AUTO: 0.2 % (ref 0–2)
BILIRUB SERPL-MCNC: 0.4 MG/DL (ref 0.2–1.3)
BUN BLD-MCNC: 33 MG/DL (ref 7–21)
BUN/CREAT SERPL: 27.3 (ref 7–25)
CALCIUM SPEC-SCNC: 8.5 MG/DL (ref 8.4–10.2)
CHLORIDE SERPL-SCNC: 90 MMOL/L (ref 95–110)
CK MB SERPL-CCNC: 1.47 NG/ML (ref 0–5)
CK MB SERPL-CCNC: 1.6 NG/ML (ref 0–5)
CK SERPL-CCNC: 53 U/L (ref 30–135)
CK SERPL-CCNC: 58 U/L (ref 30–135)
CO2 SERPL-SCNC: 24 MMOL/L (ref 22–31)
CREAT BLD-MCNC: 1.21 MG/DL (ref 0.5–1)
DEPRECATED RDW RBC AUTO: 45.7 FL (ref 36.4–46.3)
EOSINOPHIL # BLD AUTO: 0 10*3/MM3 (ref 0–0.7)
EOSINOPHIL NFR BLD AUTO: 0 % (ref 0–7)
ERYTHROCYTE [DISTWIDTH] IN BLOOD BY AUTOMATED COUNT: 13.1 % (ref 11.5–14.5)
GFR SERPL CREATININE-BSD FRML MDRD: 42 ML/MIN/1.73 (ref 39–90)
GLOBULIN UR ELPH-MCNC: 3.1 GM/DL (ref 2.3–3.5)
GLUCOSE BLD-MCNC: 97 MG/DL (ref 60–100)
GLUCOSE BLDC GLUCOMTR-MCNC: 206 MG/DL (ref 70–130)
HBA1C MFR BLD: 5.6 % (ref 4–5.6)
HCT VFR BLD AUTO: 36.4 % (ref 35–45)
HGB BLD-MCNC: 12.3 G/DL (ref 12–15.5)
HOLD SPECIMEN: NORMAL
HOLD SPECIMEN: NORMAL
IMM GRANULOCYTES # BLD: 0.01 10*3/MM3 (ref 0–0.02)
IMM GRANULOCYTES NFR BLD: 0.2 % (ref 0–0.5)
INR PPP: 1.01 (ref 0.8–1.2)
LYMPHOCYTES # BLD AUTO: 0.81 10*3/MM3 (ref 0.6–4.2)
LYMPHOCYTES NFR BLD AUTO: 12.8 % (ref 10–50)
MAGNESIUM SERPL-MCNC: 2 MG/DL (ref 1.6–2.3)
MCH RBC QN AUTO: 32.4 PG (ref 26.5–34)
MCHC RBC AUTO-ENTMCNC: 33.8 G/DL (ref 31.4–36)
MCV RBC AUTO: 95.8 FL (ref 80–98)
MONOCYTES # BLD AUTO: 0.31 10*3/MM3 (ref 0–0.9)
MONOCYTES NFR BLD AUTO: 4.9 % (ref 0–12)
NEUTROPHILS # BLD AUTO: 5.17 10*3/MM3 (ref 2–8.6)
NEUTROPHILS NFR BLD AUTO: 81.9 % (ref 37–80)
NT-PROBNP SERPL-MCNC: 1930 PG/ML (ref 0–1800)
PLATELET # BLD AUTO: 193 10*3/MM3 (ref 150–450)
PMV BLD AUTO: 10.4 FL (ref 8–12)
POTASSIUM BLD-SCNC: 3.9 MMOL/L (ref 3.5–5.1)
PROT SERPL-MCNC: 7.1 G/DL (ref 6.3–8.6)
PROTHROMBIN TIME: 13.2 SECONDS (ref 11.1–15.3)
RBC # BLD AUTO: 3.8 10*6/MM3 (ref 3.77–5.16)
SODIUM BLD-SCNC: 128 MMOL/L (ref 137–145)
TROPONIN I SERPL-MCNC: 0.05 NG/ML
TROPONIN I SERPL-MCNC: 0.06 NG/ML
TSH SERPL DL<=0.05 MIU/L-ACNC: 3.95 MIU/ML (ref 0.46–4.68)
WBC NRBC COR # BLD: 6.31 10*3/MM3 (ref 3.2–9.8)
WHOLE BLOOD HOLD SPECIMEN: NORMAL
WHOLE BLOOD HOLD SPECIMEN: NORMAL

## 2018-01-03 PROCEDURE — 80053 COMPREHEN METABOLIC PANEL: CPT | Performed by: FAMILY MEDICINE

## 2018-01-03 PROCEDURE — 82553 CREATINE MB FRACTION: CPT | Performed by: INTERNAL MEDICINE

## 2018-01-03 PROCEDURE — 82553 CREATINE MB FRACTION: CPT | Performed by: FAMILY MEDICINE

## 2018-01-03 PROCEDURE — 93010 ELECTROCARDIOGRAM REPORT: CPT | Performed by: INTERNAL MEDICINE

## 2018-01-03 PROCEDURE — 82962 GLUCOSE BLOOD TEST: CPT

## 2018-01-03 PROCEDURE — 71046 X-RAY EXAM CHEST 2 VIEWS: CPT

## 2018-01-03 PROCEDURE — 83036 HEMOGLOBIN GLYCOSYLATED A1C: CPT | Performed by: INTERNAL MEDICINE

## 2018-01-03 PROCEDURE — 82550 ASSAY OF CK (CPK): CPT | Performed by: INTERNAL MEDICINE

## 2018-01-03 PROCEDURE — 83735 ASSAY OF MAGNESIUM: CPT | Performed by: FAMILY MEDICINE

## 2018-01-03 PROCEDURE — G0378 HOSPITAL OBSERVATION PER HR: HCPCS

## 2018-01-03 PROCEDURE — 83880 ASSAY OF NATRIURETIC PEPTIDE: CPT | Performed by: FAMILY MEDICINE

## 2018-01-03 PROCEDURE — 25010000002 AZITHROMYCIN PER 500 MG: Performed by: INTERNAL MEDICINE

## 2018-01-03 PROCEDURE — 85610 PROTHROMBIN TIME: CPT | Performed by: FAMILY MEDICINE

## 2018-01-03 PROCEDURE — 85025 COMPLETE CBC W/AUTO DIFF WBC: CPT | Performed by: FAMILY MEDICINE

## 2018-01-03 PROCEDURE — 25010000002 CEFTRIAXONE PER 250 MG: Performed by: INTERNAL MEDICINE

## 2018-01-03 PROCEDURE — 84443 ASSAY THYROID STIM HORMONE: CPT | Performed by: INTERNAL MEDICINE

## 2018-01-03 PROCEDURE — 94640 AIRWAY INHALATION TREATMENT: CPT

## 2018-01-03 PROCEDURE — 93005 ELECTROCARDIOGRAM TRACING: CPT | Performed by: FAMILY MEDICINE

## 2018-01-03 PROCEDURE — 94760 N-INVAS EAR/PLS OXIMETRY 1: CPT

## 2018-01-03 PROCEDURE — 82550 ASSAY OF CK (CPK): CPT | Performed by: FAMILY MEDICINE

## 2018-01-03 PROCEDURE — 84484 ASSAY OF TROPONIN QUANT: CPT | Performed by: FAMILY MEDICINE

## 2018-01-03 PROCEDURE — 94799 UNLISTED PULMONARY SVC/PX: CPT

## 2018-01-03 PROCEDURE — 99285 EMERGENCY DEPT VISIT HI MDM: CPT

## 2018-01-03 RX ORDER — SODIUM CHLORIDE 0.9 % (FLUSH) 0.9 %
1-10 SYRINGE (ML) INJECTION AS NEEDED
Status: DISCONTINUED | OUTPATIENT
Start: 2018-01-03 | End: 2018-01-09 | Stop reason: HOSPADM

## 2018-01-03 RX ORDER — RANOLAZINE 500 MG/1
500 TABLET, EXTENDED RELEASE ORAL EVERY 12 HOURS SCHEDULED
Status: DISCONTINUED | OUTPATIENT
Start: 2018-01-03 | End: 2018-01-04

## 2018-01-03 RX ORDER — MORPHINE SULFATE 1 MG/ML
1 INJECTION, SOLUTION EPIDURAL; INTRATHECAL; INTRAVENOUS EVERY 4 HOURS PRN
Status: DISCONTINUED | OUTPATIENT
Start: 2018-01-03 | End: 2018-01-09 | Stop reason: HOSPADM

## 2018-01-03 RX ORDER — ACETAMINOPHEN 325 MG/1
650 TABLET ORAL EVERY 4 HOURS PRN
Status: DISCONTINUED | OUTPATIENT
Start: 2018-01-03 | End: 2018-01-09 | Stop reason: HOSPADM

## 2018-01-03 RX ORDER — LEVOTHYROXINE SODIUM 0.05 MG/1
50 TABLET ORAL
Status: DISCONTINUED | OUTPATIENT
Start: 2018-01-04 | End: 2018-01-09 | Stop reason: HOSPADM

## 2018-01-03 RX ORDER — ASPIRIN 81 MG/1
81 TABLET ORAL DAILY
Status: DISCONTINUED | OUTPATIENT
Start: 2018-01-03 | End: 2018-01-09 | Stop reason: HOSPADM

## 2018-01-03 RX ORDER — CARVEDILOL 6.25 MG/1
6.25 TABLET ORAL 2 TIMES DAILY WITH MEALS
Status: DISCONTINUED | OUTPATIENT
Start: 2018-01-03 | End: 2018-01-09 | Stop reason: HOSPADM

## 2018-01-03 RX ORDER — ATORVASTATIN CALCIUM 20 MG/1
20 TABLET, FILM COATED ORAL NIGHTLY
Status: DISCONTINUED | OUTPATIENT
Start: 2018-01-03 | End: 2018-01-09 | Stop reason: HOSPADM

## 2018-01-03 RX ORDER — IPRATROPIUM BROMIDE AND ALBUTEROL SULFATE 2.5; .5 MG/3ML; MG/3ML
3 SOLUTION RESPIRATORY (INHALATION) 4 TIMES DAILY PRN
Status: DISCONTINUED | OUTPATIENT
Start: 2018-01-03 | End: 2018-01-09 | Stop reason: HOSPADM

## 2018-01-03 RX ORDER — FUROSEMIDE 40 MG/1
40 TABLET ORAL DAILY
Status: DISCONTINUED | OUTPATIENT
Start: 2018-01-03 | End: 2018-01-08

## 2018-01-03 RX ORDER — CLOPIDOGREL BISULFATE 75 MG/1
75 TABLET ORAL NIGHTLY
Status: DISCONTINUED | OUTPATIENT
Start: 2018-01-03 | End: 2018-01-09 | Stop reason: HOSPADM

## 2018-01-03 RX ORDER — SODIUM CHLORIDE 0.9 % (FLUSH) 0.9 %
10 SYRINGE (ML) INJECTION AS NEEDED
Status: DISCONTINUED | OUTPATIENT
Start: 2018-01-03 | End: 2018-01-09 | Stop reason: HOSPADM

## 2018-01-03 RX ORDER — ASPIRIN 325 MG
325 TABLET ORAL ONCE
Status: COMPLETED | OUTPATIENT
Start: 2018-01-03 | End: 2018-01-03

## 2018-01-03 RX ORDER — ALBUTEROL SULFATE 2.5 MG/3ML
SOLUTION RESPIRATORY (INHALATION)
Status: COMPLETED
Start: 2018-01-03 | End: 2018-01-03

## 2018-01-03 RX ORDER — HYDRALAZINE HYDROCHLORIDE 20 MG/ML
10 INJECTION INTRAMUSCULAR; INTRAVENOUS EVERY 6 HOURS PRN
Status: DISCONTINUED | OUTPATIENT
Start: 2018-01-03 | End: 2018-01-09 | Stop reason: HOSPADM

## 2018-01-03 RX ORDER — NITROGLYCERIN 40 MG/1
1 PATCH TRANSDERMAL DAILY
Status: DISCONTINUED | OUTPATIENT
Start: 2018-01-04 | End: 2018-01-09 | Stop reason: HOSPADM

## 2018-01-03 RX ORDER — ONDANSETRON 2 MG/ML
4 INJECTION INTRAMUSCULAR; INTRAVENOUS EVERY 6 HOURS PRN
Status: DISCONTINUED | OUTPATIENT
Start: 2018-01-03 | End: 2018-01-09 | Stop reason: HOSPADM

## 2018-01-03 RX ORDER — BUDESONIDE AND FORMOTEROL FUMARATE DIHYDRATE 160; 4.5 UG/1; UG/1
2 AEROSOL RESPIRATORY (INHALATION)
Status: DISCONTINUED | OUTPATIENT
Start: 2018-01-03 | End: 2018-01-09 | Stop reason: HOSPADM

## 2018-01-03 RX ADMIN — RANOLAZINE 500 MG: 500 TABLET, FILM COATED, EXTENDED RELEASE ORAL at 21:18

## 2018-01-03 RX ADMIN — CEFTRIAXONE SODIUM 1 G: 1 INJECTION, POWDER, FOR SOLUTION INTRAMUSCULAR; INTRAVENOUS at 21:18

## 2018-01-03 RX ADMIN — BUDESONIDE AND FORMOTEROL FUMARATE DIHYDRATE 2 PUFF: 160; 4.5 AEROSOL RESPIRATORY (INHALATION) at 22:49

## 2018-01-03 RX ADMIN — ATORVASTATIN CALCIUM 20 MG: 20 TABLET, FILM COATED ORAL at 21:18

## 2018-01-03 RX ADMIN — CLOPIDOGREL BISULFATE 75 MG: 75 TABLET ORAL at 21:18

## 2018-01-03 RX ADMIN — AZITHROMYCIN 500 MG: 500 INJECTION, POWDER, LYOPHILIZED, FOR SOLUTION INTRAVENOUS at 21:23

## 2018-01-03 RX ADMIN — ASPIRIN 325 MG: 325 TABLET, COATED ORAL at 15:44

## 2018-01-03 RX ADMIN — ALBUTEROL SULFATE: 2.5 SOLUTION RESPIRATORY (INHALATION) at 14:12

## 2018-01-03 RX ADMIN — CARVEDILOL 6.25 MG: 6.25 TABLET, FILM COATED ORAL at 21:17

## 2018-01-03 RX ADMIN — FUROSEMIDE 40 MG: 40 TABLET ORAL at 21:17

## 2018-01-03 RX ADMIN — Medication 10 ML: at 21:23

## 2018-01-03 RX ADMIN — IPRATROPIUM BROMIDE: 0.5 SOLUTION RESPIRATORY (INHALATION) at 14:12

## 2018-01-04 ENCOUNTER — APPOINTMENT (OUTPATIENT)
Dept: PULMONOLOGY | Facility: HOSPITAL | Age: 83
End: 2018-01-04

## 2018-01-04 ENCOUNTER — EPISODE CHANGES (OUTPATIENT)
Dept: CASE MANAGEMENT | Facility: OTHER | Age: 83
End: 2018-01-04

## 2018-01-04 ENCOUNTER — APPOINTMENT (OUTPATIENT)
Dept: GENERAL RADIOLOGY | Facility: HOSPITAL | Age: 83
End: 2018-01-04

## 2018-01-04 ENCOUNTER — APPOINTMENT (OUTPATIENT)
Dept: CARDIOLOGY | Facility: HOSPITAL | Age: 83
End: 2018-01-04
Attending: INTERNAL MEDICINE

## 2018-01-04 LAB
ALBUMIN SERPL-MCNC: 3.8 G/DL (ref 3.4–4.8)
ALBUMIN/GLOB SERPL: 1.2 G/DL (ref 1.1–1.8)
ALP SERPL-CCNC: 53 U/L (ref 38–126)
ALT SERPL W P-5'-P-CCNC: 37 U/L (ref 9–52)
ANION GAP SERPL CALCULATED.3IONS-SCNC: 13 MMOL/L (ref 5–15)
ARTICHOKE IGE QN: 67 MG/DL (ref 1–129)
AST SERPL-CCNC: 34 U/L (ref 14–36)
BASOPHILS # BLD AUTO: 0.01 10*3/MM3 (ref 0–0.2)
BASOPHILS NFR BLD AUTO: 0.2 % (ref 0–2)
BILIRUB SERPL-MCNC: 0.3 MG/DL (ref 0.2–1.3)
BUN BLD-MCNC: 44 MG/DL (ref 7–21)
BUN/CREAT SERPL: 32.4 (ref 7–25)
CALCIUM SPEC-SCNC: 8.1 MG/DL (ref 8.4–10.2)
CHLORIDE SERPL-SCNC: 95 MMOL/L (ref 95–110)
CHOLEST SERPL-MCNC: 171 MG/DL (ref 0–199)
CO2 SERPL-SCNC: 25 MMOL/L (ref 22–31)
CREAT BLD-MCNC: 1.36 MG/DL (ref 0.5–1)
DEPRECATED RDW RBC AUTO: 46.5 FL (ref 36.4–46.3)
EOSINOPHIL # BLD AUTO: 0 10*3/MM3 (ref 0–0.7)
EOSINOPHIL NFR BLD AUTO: 0 % (ref 0–7)
ERYTHROCYTE [DISTWIDTH] IN BLOOD BY AUTOMATED COUNT: 13.1 % (ref 11.5–14.5)
GFR SERPL CREATININE-BSD FRML MDRD: 37 ML/MIN/1.73 (ref 39–90)
GLOBULIN UR ELPH-MCNC: 3.2 GM/DL (ref 2.3–3.5)
GLUCOSE BLD-MCNC: 119 MG/DL (ref 60–100)
HCT VFR BLD AUTO: 36.8 % (ref 35–45)
HDLC SERPL-MCNC: 51 MG/DL (ref 60–200)
HGB BLD-MCNC: 12.3 G/DL (ref 12–15.5)
IMM GRANULOCYTES # BLD: 0.01 10*3/MM3 (ref 0–0.02)
IMM GRANULOCYTES NFR BLD: 0.2 % (ref 0–0.5)
LDLC/HDLC SERPL: 1.57 {RATIO} (ref 0–3.22)
LYMPHOCYTES # BLD AUTO: 0.96 10*3/MM3 (ref 0.6–4.2)
LYMPHOCYTES NFR BLD AUTO: 19 % (ref 10–50)
MAXIMAL PREDICTED HEART RATE: 135 BPM
MCH RBC QN AUTO: 32 PG (ref 26.5–34)
MCHC RBC AUTO-ENTMCNC: 33.4 G/DL (ref 31.4–36)
MCV RBC AUTO: 95.8 FL (ref 80–98)
MONOCYTES # BLD AUTO: 0.34 10*3/MM3 (ref 0–0.9)
MONOCYTES NFR BLD AUTO: 6.7 % (ref 0–12)
NEUTROPHILS # BLD AUTO: 3.74 10*3/MM3 (ref 2–8.6)
NEUTROPHILS NFR BLD AUTO: 73.9 % (ref 37–80)
PLATELET # BLD AUTO: 203 10*3/MM3 (ref 150–450)
PMV BLD AUTO: 9.9 FL (ref 8–12)
POTASSIUM BLD-SCNC: 4 MMOL/L (ref 3.5–5.1)
PROT SERPL-MCNC: 7 G/DL (ref 6.3–8.6)
RBC # BLD AUTO: 3.84 10*6/MM3 (ref 3.77–5.16)
SODIUM BLD-SCNC: 133 MMOL/L (ref 137–145)
STRESS TARGET HR: 115 BPM
TRIGL SERPL-MCNC: 199 MG/DL (ref 20–199)
TROPONIN I SERPL-MCNC: 0.04 NG/ML
TROPONIN I SERPL-MCNC: 0.05 NG/ML
WBC NRBC COR # BLD: 5.06 10*3/MM3 (ref 3.2–9.8)

## 2018-01-04 PROCEDURE — 84484 ASSAY OF TROPONIN QUANT: CPT | Performed by: INTERNAL MEDICINE

## 2018-01-04 PROCEDURE — 93005 ELECTROCARDIOGRAM TRACING: CPT | Performed by: INTERNAL MEDICINE

## 2018-01-04 PROCEDURE — 85025 COMPLETE CBC W/AUTO DIFF WBC: CPT | Performed by: INTERNAL MEDICINE

## 2018-01-04 PROCEDURE — 25010000002 CEFTRIAXONE PER 250 MG: Performed by: INTERNAL MEDICINE

## 2018-01-04 PROCEDURE — 71046 X-RAY EXAM CHEST 2 VIEWS: CPT

## 2018-01-04 PROCEDURE — G0378 HOSPITAL OBSERVATION PER HR: HCPCS

## 2018-01-04 PROCEDURE — 80053 COMPREHEN METABOLIC PANEL: CPT | Performed by: INTERNAL MEDICINE

## 2018-01-04 PROCEDURE — 93306 TTE W/DOPPLER COMPLETE: CPT

## 2018-01-04 PROCEDURE — 94760 N-INVAS EAR/PLS OXIMETRY 1: CPT

## 2018-01-04 PROCEDURE — 94799 UNLISTED PULMONARY SVC/PX: CPT

## 2018-01-04 PROCEDURE — 80061 LIPID PANEL: CPT | Performed by: INTERNAL MEDICINE

## 2018-01-04 PROCEDURE — 25010000002 AZITHROMYCIN PER 500 MG: Performed by: INTERNAL MEDICINE

## 2018-01-04 PROCEDURE — 93010 ELECTROCARDIOGRAM REPORT: CPT | Performed by: INTERNAL MEDICINE

## 2018-01-04 PROCEDURE — 94640 AIRWAY INHALATION TREATMENT: CPT

## 2018-01-04 PROCEDURE — 25010000002 ONDANSETRON PER 1 MG: Performed by: INTERNAL MEDICINE

## 2018-01-04 RX ORDER — RANOLAZINE 500 MG/1
1000 TABLET, EXTENDED RELEASE ORAL EVERY 12 HOURS SCHEDULED
Status: DISCONTINUED | OUTPATIENT
Start: 2018-01-04 | End: 2018-01-09 | Stop reason: HOSPADM

## 2018-01-04 RX ADMIN — BUDESONIDE AND FORMOTEROL FUMARATE DIHYDRATE 2 PUFF: 160; 4.5 AEROSOL RESPIRATORY (INHALATION) at 10:52

## 2018-01-04 RX ADMIN — RANOLAZINE 1000 MG: 500 TABLET, FILM COATED, EXTENDED RELEASE ORAL at 22:49

## 2018-01-04 RX ADMIN — LEVOTHYROXINE SODIUM 50 MCG: 50 TABLET ORAL at 06:10

## 2018-01-04 RX ADMIN — IPRATROPIUM BROMIDE AND ALBUTEROL SULFATE 3 ML: 2.5; .5 SOLUTION RESPIRATORY (INHALATION) at 10:43

## 2018-01-04 RX ADMIN — CLOPIDOGREL BISULFATE 75 MG: 75 TABLET ORAL at 20:15

## 2018-01-04 RX ADMIN — RANOLAZINE 500 MG: 500 TABLET, FILM COATED, EXTENDED RELEASE ORAL at 09:51

## 2018-01-04 RX ADMIN — CARVEDILOL 6.25 MG: 6.25 TABLET, FILM COATED ORAL at 09:51

## 2018-01-04 RX ADMIN — ASPIRIN 81 MG: 81 TABLET, COATED ORAL at 09:51

## 2018-01-04 RX ADMIN — ONDANSETRON 4 MG: 2 INJECTION INTRAMUSCULAR; INTRAVENOUS at 21:17

## 2018-01-04 RX ADMIN — NITROGLYCERIN 1 PATCH: 0.2 PATCH TRANSDERMAL at 09:51

## 2018-01-04 RX ADMIN — BUDESONIDE AND FORMOTEROL FUMARATE DIHYDRATE 2 PUFF: 160; 4.5 AEROSOL RESPIRATORY (INHALATION) at 20:31

## 2018-01-04 RX ADMIN — CEFTRIAXONE SODIUM 1 G: 1 INJECTION, POWDER, FOR SOLUTION INTRAMUSCULAR; INTRAVENOUS at 20:15

## 2018-01-04 RX ADMIN — FUROSEMIDE 40 MG: 40 TABLET ORAL at 09:51

## 2018-01-04 RX ADMIN — SERTRALINE HYDROCHLORIDE 50 MG: 50 TABLET ORAL at 09:51

## 2018-01-04 RX ADMIN — AZITHROMYCIN 500 MG: 500 INJECTION, POWDER, LYOPHILIZED, FOR SOLUTION INTRAVENOUS at 20:16

## 2018-01-04 RX ADMIN — CARVEDILOL 6.25 MG: 6.25 TABLET, FILM COATED ORAL at 17:59

## 2018-01-04 RX ADMIN — ATORVASTATIN CALCIUM 20 MG: 20 TABLET, FILM COATED ORAL at 20:15

## 2018-01-04 NOTE — PLAN OF CARE
Problem: Patient Care Overview (Adult)  Goal: Plan of Care Review  Outcome: Ongoing (interventions implemented as appropriate)   01/03/18 2053   Coping/Psychosocial Response Interventions   Plan Of Care Reviewed With patient   Patient Care Overview   Progress improving       Problem: Acute Coronary Syndrome (ACS) (Adult)  Goal: Signs and Symptoms of Listed Potential Problems Will be Absent or Manageable (Acute Coronary Syndrome)  Outcome: Ongoing (interventions implemented as appropriate)

## 2018-01-04 NOTE — PLAN OF CARE
Problem: Patient Care Overview (Adult)  Goal: Adult Individualization and Mutuality  Outcome: Ongoing (interventions implemented as appropriate)    Goal: Discharge Needs Assessment  Outcome: Ongoing (interventions implemented as appropriate)      Problem: Acute Coronary Syndrome (ACS) (Adult)  Goal: Signs and Symptoms of Listed Potential Problems Will be Absent or Manageable (Acute Coronary Syndrome)  Outcome: Ongoing (interventions implemented as appropriate)      Problem: Fall Risk (Adult)  Goal: Identify Related Risk Factors and Signs and Symptoms  Outcome: Ongoing (interventions implemented as appropriate)    Goal: Absence of Falls  Outcome: Ongoing (interventions implemented as appropriate)

## 2018-01-04 NOTE — H&P
[facility] Medicine Admission      Date of Admission: 1/3/2018      Primary Care Physician: Roula Albert MD      Chief Complaint:  Chest tightness, shortness of air, dyspnea upon exertion    HPI:  This 85-year-old  female reported to the emergency department with complaints of shortness of air.  Patient states that she felt like she could not breathe and over the last few months this is gotten much worse.  Patient also endorses chest tightness, dyspnea upon exertion, and orthopnea.  Denies fever and chills, denies dizziness or syncope.  States that when she gets up too fast she is sometimes lightheaded, but this is not a new finding.  Did have some diarrhea this morning, but no nausea or vomiting.  No appetite change, no unintended weight loss, no confusion.  No dysuria or hematuria, no hematochezia, melena, hematemesis, or any other urinary symptoms.  Patient normally sees Dr. Rogel and does have a history of pacemaker placement, coronary artery disease, and a possible history of congestive heart failure per her report.  Stress test done in 2016 demonstrated ejection fraction of 70% however.    Concurrent Medical History:  has a past medical history of Acquired hypothyroidism; Allergic rhinitis; COPD (chronic obstructive pulmonary disease); Corneal epithelial dystrophy; Coronary arteriosclerosis; Depression; Generalized atherosclerosis; GERD (gastroesophageal reflux disease); Hemorrhoids; History of bone density study (08/03/2012); History of mammogram (08/20/2004); History of Papanicolaou smear of cervix (08/12/2004); Hypercholesterolemia; Hyperlipidemia; Hypertension; Nonexudative age-related macular degeneration; Osteoarthritis of multiple joints; Pseudophakia; Punctate keratitis; and Tobacco dependence syndrome.    Past Surgical History:  has a past surgical history that includes Coronary angioplasty (07/21/1993); Appendectomy; Cardiac catheterization  (06/16/2014); endoscopy and colonoscopy (10/01/1998); Colonoscopy (01/01/2013); Dilation and curettage of uterus; Injection of Medication (11/23/2015); Esophagoscopy / EGD (06/28/2004); Breast surgery (03/19/1954); Cervical spine surgery (09/16/1974); and Injection of Medication (02/04/2016).    Family History: family history includes Coronary artery disease in her other.     Social History:  reports that she quit smoking about a year ago. Her smoking use included Cigarettes. She started smoking about 68 years ago. She has a 25.00 pack-year smoking history. She has never used smokeless tobacco. She reports that she does not drink alcohol or use illicit drugs.    Allergies:   Allergies   Allergen Reactions   • Ace Inhibitors    • Lisinopril        Medications:     Prior to Admission medications    Medication Sig Start Date End Date Taking? Authorizing Provider   aspirin 81 MG EC tablet Take 81 mg by mouth Daily.   Yes Historical Provider, MD   benzonatate (TESSALON) 100 MG capsule Take 1 capsule by mouth 3 (Three) Times a Day As Needed for Cough. 1/1/18  Yes CRESENCIO Lee   carvedilol (COREG) 6.25 MG tablet Take 6.25 mg by mouth 2 (Two) Times a Day With Meals.   Yes Historical Provider, MD   clopidogrel (PLAVIX) 75 MG tablet Take 75 mg by mouth Daily.   Yes Historical Provider, MD   coenzyme Q10 100 MG capsule Take 200 mg by mouth Daily.   Yes Historical Provider, MD   fluorometholone (FLAREX) 0.1 % ophthalmic suspension Administer 1 drop to both eyes 4 (Four) Times a Day.  Patient taking differently: Administer 1 drop to both eyes 2 (Two) Times a Day. 6/20/17  Yes Serafin Cotton MD   furosemide (LASIX) 40 MG tablet TAKE 1 TABLET BY MOUTH EVERY DAY AS NEEDED FOR SWELLING 10/2/17  Yes Roula Albert MD   guaiFENesin (MUCINEX) 600 MG 12 hr tablet Take 1 tablet by mouth Every 12 (Twelve) Hours. 1/1/18  Yes CRESENCIO Lee   ipratropium-albuterol (DUO-NEB) 0.5-2.5 mg/mL nebulizer Take 3 mL by  "nebulization 4 (Four) Times a Day As Needed for Wheezing. 11/15/17  Yes Roula Albert MD   levoFLOXacin (LEVAQUIN) 500 MG tablet Take 1 tablet by mouth Daily. 1/2/18  Yes Roula Albert MD   levothyroxine (SYNTHROID, LEVOTHROID) 50 MCG tablet TAKE 1 TABLET BY MOUTH EVERY DAY 10/2/17  Yes Roula Albert MD   MethylPREDNISolone (MEDROL, NINA,) 4 MG tablet Take as directed on package instructions. 12/29/17  Yes Roula Albert MD   nitroglycerin (NITRO-DUR) 0.2 MG/HR patch Place 1 patch on the skin Daily. 11/10/16  Yes Roula Albert MD   ranolazine (RANEXA) 500 MG 12 hr tablet Take 500 mg by mouth 2 (Two) Times a Day.   Yes Historical Provider, MD   sertraline (ZOLOFT) 50 MG tablet TAKE 1 TABLET BY MOUTH EVERY DAY 10/2/17  Yes Roula Albert MD   simvastatin (ZOCOR) 40 MG tablet TAKE 1 TABLET BY MOUTH EVERY NIGHT AT BEDTIME 12/19/17  Yes Roula Albert MD   budesonide-formoterol (SYMBICORT) 160-4.5 MCG/ACT inhaler Inhale 2 puffs 2 (Two) Times a Day. 1/24/17   Roula Albert MD   montelukast (SINGULAIR) 10 MG tablet Take 10 mg by mouth Every Night.    Historical Provider, MD         Review of Systems:  Review of Systems   Constitutional: Negative for appetite change, chills, diaphoresis and fever.   Respiratory: Positive for chest tightness.    Cardiovascular: Positive for chest pain. Negative for palpitations and leg swelling.        \"pressure\" and \"tightness\"   Gastrointestinal: Positive for diarrhea. Negative for abdominal pain, blood in stool, constipation, nausea and vomiting.   Genitourinary: Negative for decreased urine volume, difficulty urinating, dysuria and hematuria.   Neurological: Negative for dizziness, syncope, facial asymmetry, speech difficulty, weakness, numbness and headaches.   Psychiatric/Behavioral: Negative for confusion.      Otherwise complete ROS is negative except as mentioned above.    Physical Exam:   Temp:  [98.9 °F (37.2 °C)] 98.9 °F (37.2 °C)  Heart Rate:  [72-78] 78  Resp:  " [18-20] 18  BP: (120-132)/(73-78) 132/78  Physical Exam   Constitutional: She is oriented to person, place, and time. She appears well-developed and well-nourished. No distress.   HENT:   Head: Normocephalic and atraumatic.   Eyes: Conjunctivae and EOM are normal. Pupils are equal, round, and reactive to light.   Cardiovascular: Normal rate and regular rhythm.    Pulmonary/Chest:   Pursed lip breathing  Mild accessory muscle use when speaking  Decreased lung sounds at bases  Intermittent anterior wheeze   Abdominal: Soft. Bowel sounds are normal. She exhibits no distension. There is no tenderness.   Musculoskeletal: She exhibits no edema.   Neurological: She is alert and oriented to person, place, and time.   Skin: Skin is warm and dry. She is not diaphoretic.   Psychiatric: She has a normal mood and affect. Her behavior is normal.     Results Reviewed:  I have personally reviewed current lab, radiology, and data and agree with results.  Lab Results (last 24 hours)     Procedure Component Value Units Date/Time    CBC & Differential [[id number]] Collected:  01/03/18 1545    Specimen:  Blood Updated:  01/03/18 1555    Narrative:       The following orders were created for panel order CBC & Differential.  Procedure                               Abnormality         Status                     ---------                               -----------         ------                     CBC Auto Differential[[id number]]        Abnormal            Final result                 Please view results for these tests on the individual orders.    CBC Auto Differential [[id number]]  (Abnormal) Collected:  01/03/18 1545    Specimen:  Blood Updated:  01/03/18 1555     WBC 6.31 10*3/mm3      RBC 3.80 10*6/mm3      Hemoglobin 12.3 g/dL      Hematocrit 36.4 %      MCV 95.8 fL      MCH 32.4 pg      MCHC 33.8 g/dL      RDW 13.1 %      RDW-SD 45.7 fl      MPV 10.4 fL      Platelets 193 10*3/mm3      Neutrophil % 81.9 (H) %      Lymphocyte % 12.8 %       Monocyte % 4.9 %      Eosinophil % 0.0 %      Basophil % 0.2 %      Immature Grans % 0.2 %      Neutrophils, Absolute 5.17 10*3/mm3      Lymphocytes, Absolute 0.81 10*3/mm3      Monocytes, Absolute 0.31 10*3/mm3      Eosinophils, Absolute 0.00 10*3/mm3      Basophils, Absolute 0.01 10*3/mm3      Immature Grans, Absolute 0.01 10*3/mm3     Comprehensive Metabolic Panel [[id number]]  (Abnormal) Collected:  01/03/18 1545    Specimen:  Blood Updated:  01/03/18 1607     Glucose 97 mg/dL      BUN 33 (H) mg/dL      Creatinine 1.21 (H) mg/dL      Sodium 128 (L) mmol/L      Potassium 3.9 mmol/L      Chloride 90 (L) mmol/L      CO2 24.0 mmol/L      Calcium 8.5 mg/dL      Total Protein 7.1 g/dL      Albumin 4.00 g/dL      ALT (SGPT) 37 U/L      AST (SGOT) 37 (H) U/L      Alkaline Phosphatase 56 U/L      Total Bilirubin 0.4 mg/dL      eGFR Non African Amer 42 mL/min/1.73      Globulin 3.1 gm/dL      A/G Ratio 1.3 g/dL      BUN/Creatinine Ratio 27.3 (H)     Anion Gap 14.0 mmol/L     Narrative:       The MDRD GFR formula is only valid for adults with stable renal function between ages 18 and 70.    Troponin [[id number]]  (Abnormal) Collected:  01/03/18 1545    Specimen:  Blood Updated:  01/03/18 1620     Troponin I 0.055 (H) ng/mL     BNP [[id number]]  (Abnormal) Collected:  01/03/18 1545    Specimen:  Blood Updated:  01/03/18 1620     proBNP 1930.0 (H) pg/mL     Protime-INR [[id number]]  (Normal) Collected:  01/03/18 1546    Specimen:  Blood Updated:  01/03/18 1620     Protime 13.2 Seconds      INR 1.01    Narrative:       Therapeutic range for most indications is 2.0-3.0 INR,  or 2.5-3.5 for mechanical heart valves.    [place] Draw [[id number]] Collected:  01/03/18 1545    Specimen:  Blood Updated:  01/03/18 1646    Narrative:       The following orders were created for panel order [place] Draw.  Procedure                               Abnormality         Status                     ---------                                -----------         ------                     Light Blue Top[[id number]]                                   Final result               Green Top (Gel)[[id number]]                                  Final result               Lavender Top[[id number]]                                     Final result               Gold Top - SST[[id number]]                                   Final result                 Please view results for these tests on the individual orders.    Light Blue Top [[id number]] Collected:  01/03/18 1546    Specimen:  Blood Updated:  01/03/18 1646     Extra Tube hold for add-on      Auto resulted       Green Top (Gel) [[id number]] Collected:  01/03/18 1545    Specimen:  Blood Updated:  01/03/18 1646     Extra Tube Hold for add-ons.      Auto resulted.       Lavender Top [[id number]] Collected:  01/03/18 1545    Specimen:  Blood Updated:  01/03/18 1646     Extra Tube hold for add-on      Auto resulted       Gold Top - SST [[id number]] Collected:  01/03/18 1546    Specimen:  Blood Updated:  01/03/18 1646     Extra Tube Hold for add-ons.      Auto resulted.       CK [[id number]]  (Normal) Collected:  01/03/18 1722    Specimen:  Blood Updated:  01/03/18 1743     Creatine Kinase 58 U/L     Magnesium [[id number]]  (Normal) Collected:  01/03/18 1722    Specimen:  Blood Updated:  01/03/18 1743     Magnesium 2.0 mg/dL     Troponin [[id number]]  (Abnormal) Collected:  01/03/18 1722    Specimen:  Blood Updated:  01/03/18 1756     Troponin I 0.053 (H) ng/mL     CK-MB [[id number]]  (Normal) Collected:  01/03/18 1722    Specimen:  Blood Updated:  01/03/18 1756     CKMB 1.47 ng/mL         Imaging Results (last 24 hours)     Procedure Component Value Units Date/Time    XR Chest 2 View [[id number]] Collected:  01/03/18 1527     Updated:  01/03/18 1558    Narrative:         [facility] [place]    Patient Name: ADALBERTO NOLASCO    ORDERING: REMY TYLER     ATTENDING: REMY TYLER     REFERRING: REMY URENA  NAYELI    -----------------------    PROCEDURE: Two-view chest    COMPARISON: 1/1/2018    HISTORY: Chest pain protocol    FINDINGS: Frontal and lateral views of the chest are obtained.     Devices: Left-sided cardiac pacer stable in position.    Lungs/Pleura: Ill-defined markings in the left lung base,  probably due to subsegmental atelectasis. Lungs otherwise appear  clear.    Cardiomediastinal structures: Cardiac silhouette is normal in  size. Thoracic aorta contains atherosclerotic calcification.         Impression:       CONCLUSION:    Subsegmental atelectasis left lung base. Lungs otherwise appear  clear.    Electronically signed by:  González Forrest MD  1/3/2018 3:57 PM CST  Workstation: [id number]            Assessment:    Hospital Problem List     Precordial pain      Indeterminate troponin  Dyspnea  Dyspnea upon exertion   Atelectasis versus pneumonia, possible community acquired pneumonia   Acute kidney injury, mild         Plan:  Trend cardiac enzymes, echocardiogram, cardiology consult with patient's cardiologist, Dr. Rogel.  Repeat chest x-ray in the a.m., given patient's indeterminate finding of atelectasis versus pneumonia and increasing dyspnea, will start the patient on ceftriaxone and azithromycin.  Patient states she has possible heart failure, however there is no evidence of this in the chart.  Will continue Lasix at home dose of 40 mg daily, monitor renal function carefully given mild acute kidney injury.            This document has been electronically signed by DEBBIE Kang on January 3, 2018 6:30 PM

## 2018-01-04 NOTE — PROGRESS NOTES
[facility] Medicine Services  INPATIENT PROGRESS NOTE    Length of Stay: 0  Date of Admission: 1/3/2018  Primary Care Physician: Roula Albert MD    Subjective   Chief Complaint: chest pain, dyspnea   HPI:  This 85-year-old  female reported to the emergency department with complaints of shortness of air.  Patient states that she felt like she could not breathe and over the last few months this is gotten much worse.  Patient also endorses chest tightness, dyspnea upon exertion, and orthopnea.  Denies fever and chills, denies dizziness or syncope.  States that when she gets up too fast she is sometimes lightheaded, but this is not a new finding.    Patient normally sees Dr. Rogel and does have a history of pacemaker placement, coronary artery disease, and a possible history of congestive heart failure per her report.  She reports no complaints overnight.  She remains winded with exertion and plan is for lexiscan stress testing per cardiology.    Review of Systems   Constitutional: Negative for chills and fever.   Respiratory: Positive for cough, chest tightness and shortness of breath. Negative for wheezing.    Cardiovascular: Negative for chest pain, palpitations and leg swelling.   Gastrointestinal: Negative for abdominal pain, constipation, diarrhea, nausea and vomiting.   Musculoskeletal: Negative for back pain.   Skin: Negative for pallor.   Psychiatric/Behavioral: Negative for confusion.        All pertinent negatives and positives are as above. All other systems have been reviewed and are negative unless otherwise stated.     Objective    Temp:  [97.5 °F (36.4 °C)-98 °F (36.7 °C)] 97.5 °F (36.4 °C)  Heart Rate:  [67-86] 77  Resp:  [16-20] 20  BP: (114-139)/(55-78) 119/58    Physical Exam   Constitutional: She is oriented to person, place, and time. She appears well-developed and well-nourished.   HENT:   Head: Normocephalic and atraumatic.   Eyes:  Conjunctivae are normal.   Neck: Neck supple.   Cardiovascular: Normal rate, normal heart sounds and intact distal pulses.    Pulmonary/Chest: No accessory muscle usage. She has decreased breath sounds in the right lower field and the left lower field. She has wheezes.   Abdominal: Soft. Bowel sounds are normal.   Musculoskeletal: Normal range of motion. She exhibits no edema.   Neurological: She is alert and oriented to person, place, and time.   Skin: Skin is warm and dry.   Psychiatric: She has a normal mood and affect. Her behavior is normal.   Vitals reviewed.          Results Review:  I have reviewed the labs, radiology results, and diagnostic studies.    Laboratory Data:     Results from last 7 days  Lab Units 01/04/18  0538 01/03/18  1545   SODIUM mmol/L 133* 128*   POTASSIUM mmol/L 4.0 3.9   CHLORIDE mmol/L 95 90*   CO2 mmol/L 25.0 24.0   BUN mg/dL 44* 33*   CREATININE mg/dL 1.36* 1.21*   GLUCOSE mg/dL 119* 97   CALCIUM mg/dL 8.1* 8.5   BILIRUBIN mg/dL 0.3 0.4   ALK PHOS U/L 53 56   ALT (SGPT) U/L 37 37   AST (SGOT) U/L 34 37*   ANION GAP mmol/L 13.0 14.0     Estimated Creatinine Clearance: 31.4 mL/min (by C-G formula based on Cr of 1.36).    Results from last 7 days  Lab Units 01/03/18  1722   MAGNESIUM mg/dL 2.0           Results from last 7 days  Lab Units 01/04/18  0538 01/03/18  1545   WBC 10*3/mm3 5.06 6.31   HEMOGLOBIN g/dL 12.3 12.3   HEMATOCRIT % 36.8 36.4   PLATELETS 10*3/mm3 203 193       Results from last 7 days  Lab Units 01/03/18  1546   INR  1.01       Culture Data:   No results found for: BLOODCX  No results found for: URINECX  No results found for: RESPCX  No results found for: WOUNDCX  No results found for: STOOLCX  No components found for: BODYFLD    Radiology Data:   Imaging Results (last 24 hours)     Procedure Component Value Units Date/Time    XR Chest PA & Lateral [[id number]] Collected:  01/04/18 0729     Updated:  01/04/18 0743    Narrative:           PROCEDURE: Chest PA and  lateral    REASON FOR EXAM: Follow up dyspnea, possible pneumonia, R07.2  Precordial pain R06.02 Shortness of breath R74.8 Abnormal levels  of other serum enzymes    FINDINGS: Comparison study dated January 3, 2015.  Stable  pacemaker and leads. . Cardiac and pulmonary vasculature are  normal . Lungs are clear. Pleural spaces are normal . No acute  osseous abnormality.      Impression:       No acute cardiopulmonary abnormality.    Electronically signed by:  Renny Stovall MD  1/4/2018 7:42 AM CST  Workstation: [id number]          I have reviewed the patient current medications.     Assessment/Plan     1. Chest pain r/o ACS: troponin mildly elevated.  Cardiology following with plan for lexiscan stress testing.  2. Dyspnea on exertion: No abnormalities on chest x ray. Echo pending.  Rocephin, Azithromycin, o2, nebs for suspected respiratory infection. No clinical signs of CHF.   3. BENITO: monitor BMP  4. Sick sinus syndrome s/p pacemaker: pacemaker interrogation.  5. Hx COPD: pt wears home o2 at night only.  O2, nebs   6. HLD: continue statin        This document has been electronically signed by CRESENCIO Winters on January 4, 2018 4:44 PM

## 2018-01-04 NOTE — CONSULTS
Cardiology Consultation Note.        Patient Name: Val Arteaga  Age/Sex: 85 y.o. female  : 1932  MRN: [id number]    Date of consultation: 1/3/2018  Consulting Physician: Alpesh Rogel MD  Primary care Physician: Roula Albert MD  Requesting Physician:   Jazmin Batista MD    Reason for consultation:  Chest chest heaviness positive troponin history of atherosclerotic coronary artery disease with previous PTCA 6 sinus syndrome status post pacemaker implantation  Subjective:       Chief Complaint: Chest heaviness shortness of breath       History of Present Illness:  Val Arteaga is a 85 y.o. female     Body mass index is 27 kg/(m^2). with a past medical history significant for atherosclerotic coronary artery disease status post PTCA and stenting of the mid circumflex artery with deployment of a 3.5 mm x 18 mm Vision stent, with previous PTCA and stenting of the right coronary artery with the last coronary angiogram  done in , which had revealed sustained patency in the circumflex  artery, site of previous angioplasty and stenting with evidence of  luminal irregularity and sustained patency in the right coronary artery,  site of previous angioplasty and stenting, with 20% to 30% stenosis in the left anterior descending artery and the diagonal branch, with  preserved left ventricular systolic function with an ejection fraction  of 50% to 55%, with a [employer] pacemaker generator Advantio DR, serial number [id number], model number K063 implanted in 2013 for  sick sinus syndrome and sinus arrest, history of chronic obstructive  lung disease, tobacco abuse, positive tilt table testing, hypothyroidism, gastroesophageal reflux disease, H. pylori gastritis, intolerance to Lipitor.    Patient was hospitalized in 2016 and subsequently had undergone a Lexiscan Cardiolite stress tests.  Patient Lexiscan Cardiolite stress study did not show off any evidence of any stress-induced  ischemia and patient was recommended medical management.  Patient had recently taken a trip to [facility] to visit her grandson.  Patient has been doing a lot of traveling.  Patient has not been smoking at the present time.  Patient has had increasing episodes of shortness of breath.  Patient denies any fever or chill patient denies any productive cough.  Patient symptoms of shortness of breath is associated with chest heaviness and pressure.  Due to the patient's symptom complex with worsening shortness of breath patient presented to the emergency room.  Patient denies excessive intake of salt.  Due to the patient's symptoms of shortness of breath with indeterminate troponin patient was subsequently hospitalized.  Patient did not have an elevated white blood cell count.    Patient denies any lower extremity edema.  Patient 10 point review of system except for stated in the history of present illness is negative.      Past Medical History:   1.   Chest pain, shortness of breath indeterminate troponin.  2.   Atherosclerotic coronary artery disease.  3.   Last coronary angiogram done in 2014 which had revealed       a.   Sustained patency in the circumflex artery site of previous angioplasty and stenting with a patent 3.5 mm x 18 mm stent.       b.   Sustained patency in the right coronary artery.       c.   Nonobstructive disease of 30% in the left anterior descending artery and the diagonal branch with evidence of good CASANDRA-3  flow.  4.   Previous PTCA and stenting of the circumflex artery with deployment of a 3.5 mm x 18 mm stent done in 2008.  5.   Previous PTCA and stenting of the right coronary artery.  6.   Sick sinus syndrome, sinus arrest with a head-up tilt table testing.  7.   Status post [place] [employer] dual chamber pacemaker generator replacement done in January 2013 with the pacemaker generator Advantio, model number K063, serial [id number].  8.   Arterial hypertension.  9.   Hypertensive heart  disease.  10.  Mild left ventricular systolic dysfunction with an ejection fraction of 40% to 45%.  11.  Mild mitral, mild tricuspid, and mild aortic insufficiency.  12.  Hyperlipidemia.  13.  Hypothyroidism.  14.  H.pylori gastritis.  15.  Intolerant to statin.  16.  Chronic obstructive lung disease with tobacco abuse.  17.  Arthritis.  18.  Thygeson's keratopathy.  19.  Hospitalization in December 2015 in [place], [place] with  symptoms of palpitation.      PAST SURGICAL HISTORY:  1.   Cataract surgery.  2.   Esophagogastroduodenoscopy.  3.   Bone fusion in the neck.  4.   Appendectomy.  5.   Dilatation and curettage.  6.   Gastric biopsy.      SOCIAL HISTORY:  Significant for 1/2 pack per day tobacco abuse. Deniesy alcohol intake. Patient has been active and continues to travel.      FAMILY HISTORY:  Noncontributory.      CARDIAC RISK FACTORS:  1.   Postmenopausal.  2.   Tobacco abuse.  3.   Arterial hypertension.  4.   Hyperlipidemia.    Allergies:  Allergies   Allergen Reactions   • Ace Inhibitors    • Lisinopril        Medication::  Facility-Administered Medications Prior to Admission   Medication Dose Route Frequency Provider Last Rate Last Dose   • [COMPLETED] methylPREDNISolone acetate (DEPO-medrol) injection 40 mg  40 mg Intramuscular Once Roula Albert MD   40 mg at 01/02/18 1307     Prescriptions Prior to Admission   Medication Sig Dispense Refill Last Dose   • aspirin 81 MG EC tablet Take 81 mg by mouth Daily.   1/2/2018 at Unknown time   • benzonatate (TESSALON) 100 MG capsule Take 1 capsule by mouth 3 (Three) Times a Day As Needed for Cough. 30 capsule 0 1/2/2018 at Unknown time   • carvedilol (COREG) 6.25 MG tablet Take 6.25 mg by mouth 2 (Two) Times a Day With Meals.   1/3/2018 at Unknown time   • clopidogrel (PLAVIX) 75 MG tablet Take 75 mg by mouth Daily.   1/2/2018 at 2100   • coenzyme Q10 100 MG capsule Take 200 mg by mouth Daily.   1/2/2018 at Unknown time   • fluorometholone (FLAREX) 0.1 %  ophthalmic suspension Administer 1 drop to both eyes 4 (Four) Times a Day. (Patient taking differently: Administer 1 drop to both eyes 2 (Two) Times a Day.) 5 mL 3 1/2/2018 at Unknown time   • furosemide (LASIX) 40 MG tablet TAKE 1 TABLET BY MOUTH EVERY DAY AS NEEDED FOR SWELLING 90 tablet 1 1/2/2018 at Unknown time   • guaiFENesin (MUCINEX) 600 MG 12 hr tablet Take 1 tablet by mouth Every 12 (Twelve) Hours. 30 tablet 0 1/2/2018 at Unknown time   • ipratropium-albuterol (DUO-NEB) 0.5-2.5 mg/mL nebulizer Take 3 mL by nebulization 4 (Four) Times a Day As Needed for Wheezing. 120 vial 1 1/2/2018 at Unknown time   • levoFLOXacin (LEVAQUIN) 500 MG tablet Take 1 tablet by mouth Daily. 7 tablet 0 1/2/2018 at Unknown time   • levothyroxine (SYNTHROID, LEVOTHROID) 50 MCG tablet TAKE 1 TABLET BY MOUTH EVERY DAY 90 tablet 1 1/2/2018 at Unknown time   • MethylPREDNISolone (MEDROL, NINA,) 4 MG tablet Take as directed on package instructions. 1 each 0 Past Week at Unknown time   • nitroglycerin (NITRO-DUR) 0.2 MG/HR patch Place 1 patch on the skin Daily. 30 patch 0 1/2/2018 at Unknown time   • ranolazine (RANEXA) 500 MG 12 hr tablet Take 500 mg by mouth 2 (Two) Times a Day.   1/2/2018 at Unknown time   • sertraline (ZOLOFT) 50 MG tablet TAKE 1 TABLET BY MOUTH EVERY DAY 90 tablet 1 1/2/2018 at Unknown time   • simvastatin (ZOCOR) 40 MG tablet TAKE 1 TABLET BY MOUTH EVERY NIGHT AT BEDTIME 30 tablet 3 1/2/2018 at Unknown time   • budesonide-formoterol (SYMBICORT) 160-4.5 MCG/ACT inhaler Inhale 2 puffs 2 (Two) Times a Day. 1 inhaler 5 More than a month at Unknown time   • montelukast (SINGULAIR) 10 MG tablet Take 10 mg by mouth Every Night.   Taking           Review of Systems:       Constitutional:  Denies recent weight loss, weight gain, fever or chills, no change in exercise tolerance     HENT:  Denies any hearing loss, epistaxis, hoarseness, or difficulty speaking.     Eyes: Wears eyeglasses or contact lenses     Respiratory:   Denies dyspnea with exertion,no cough, wheezing, or hemoptysis.     Cardiovascular: Positive for shortness of breath and chest heaviness Negative for palpitations,  orthopnea, PND, peripheral edema, syncope, or claudication.     Gastrointestinal:  Denies change in bowel habits, dyspepsia, ulcer disease, hematochezia, or melena.  No nausea, no vomiting, no hematemesis, no diarrhea or constipation, no melena      Endocrine: Negative for cold intolerance, heat intolerance, polydipsia, polyphagia and polyuria. Denies any history of weight change, heat/cold intolerance, polydipsia, polyuria     Genitourinary: Negative for hematuria.      Musculoskeletal: Denies any history of arthritic symptoms or back problems .  No joint pain, joint stiffness, joint swelling, muscle pain, muscle weakness and neck pain    Skin:  Denies any change in hair or nails, rashes, or skin lesions.     Allergic/Immunologic: Negative.  Negative for environmental allergies, food allergies and immunocompromised state.     Neurological:  Denies any history of recurrent headaches, strokes, TIA, or seizure disorder.     Hematological: Denies excessive bleeding, easy bruising, fatigue, lymphadenopathy and petechiae or any bleeding disorders, or lymphadenopathy.     Psychiatric/Behavioral: Denies any history of depression, substance abuse, or change in cognitive function. Denies any psychomotor reaction or tangential thought.  No depression, homicidal ideations and suicidal ideations    Endocrine: No frequent urination and nocturia, temperature intolerance, weight gain, unintended and weight loss, unintended            Objective:     Objective:  Vitals:    01/03/18 1923   BP: 134/63   Pulse: 86   Resp: 18   Temp: 98 °F (36.7 °C)   SpO2: 91%     .    Body mass index is 27 kg/(m^2).           Physical Exam:   General Appearance:    Alert, oriented, cooperative, in no acute distress   Head:    Normocephalic, atraumatic, without obvious abnormality   Eyes:            TOI  Lids and lashes normal, conjunctivae and sclerae normal, no icterus, no pallor   Ears:    Ears appear intact with no abnormalities noted   Throat:   Mucous membranes pink and moist   Neck:   Supple, trachea midline, no carotid bruit, no organomegaly or JVD   Lungs:     Clear to auscultation and percussion, respirations regular, even and Unlabored. No wheezes, rales, rhonchi    Heart:    Regular rhythm and normal rate, normal S1 and S2, no            murmur, no gallop, no rub, no click   Abdomen:     Soft, non-tender, non-distended, no guarding, no rebound tenderness, Normal bowel sounds in all four quadrants, no masses, liver and spleen nonpalpable,    Genitalia:    Deferred   Extremities:   Moves all extremities well, no edema, no cyanosis, no              Redness, no clubbing   Pulses:   Pulses palpable and equal bilaterally   Skin:   Moist and warm. No bleeding, bruising or rash   Neurologic/Psychiatric:   Alert and oriented to person, place, and time.  Motor, power and tone in upper and lower extremities are grossly intact.  No focal neurological deficits. Normal cognitive function. No psychomotor reaction or tangential thought. No depression, homicidal ideations and suicidal ideations           Lab Review:       Results from last 7 days  Lab Units 01/03/18  1545   SODIUM mmol/L 128*   POTASSIUM mmol/L 3.9   CHLORIDE mmol/L 90*   CO2 mmol/L 24.0   BUN mg/dL 33*   CREATININE mg/dL 1.21*   CALCIUM mg/dL 8.5   BILIRUBIN mg/dL 0.4   ALK PHOS U/L 56   ALT (SGPT) U/L 37   AST (SGOT) U/L 37*   GLUCOSE mg/dL 97       Results from last 7 days  Lab Units 01/03/18  1722 01/03/18  1546 01/03/18  1545   CK TOTAL U/L 58 53  --    TROPONIN I ng/mL 0.053*  --  0.055*           Results from last 7 days  Lab Units 01/03/18  1545   WBC 10*3/mm3 6.31   HEMOGLOBIN g/dL 12.3   HEMATOCRIT % 36.4   PLATELETS 10*3/mm3 193       Results from last 7 days  Lab Units 01/03/18  1546   INR  1.01       Results from last 7  days  Lab Units 01/03/18  1722   MAGNESIUM mg/dL 2.0           Results from last 7 days  Lab Units 01/03/18  1546   TSH mIU/mL 3.950       EKG:   ECG/EMG Results (last 24 hours)     Procedure Component Value Units Date/Time    SCANNED EKG [[id number]] Resulted:  01/03/18      Updated:  01/03/18 1603    ECG 12 Lead [[id number]] Collected:  01/03/18 1546     Updated:  01/03/18 1759          Imaging:  Imaging Results (last 24 hours)     Procedure Component Value Units Date/Time    XR Chest 2 View [[id number]] Collected:  01/03/18 1527     Updated:  01/03/18 1558    Narrative:         [facility]    Patient Name: ADALBERTO NOLASCO    ORDERING: REMY TYLER     ATTENDING: REMY TYLER     REFERRING: REMY TYLER    -----------------------    PROCEDURE: Two-view chest    COMPARISON: 1/1/2018    HISTORY: Chest pain protocol    FINDINGS: Frontal and lateral views of the chest are obtained.     Devices: Left-sided cardiac pacer stable in position.    Lungs/Pleura: Ill-defined markings in the left lung base,  probably due to subsegmental atelectasis. Lungs otherwise appear  clear.    Cardiomediastinal structures: Cardiac silhouette is normal in  size. Thoracic aorta contains atherosclerotic calcification.         Impression:       CONCLUSION:    Subsegmental atelectasis left lung base. Lungs otherwise appear  clear.    Electronically signed by:  González Forrest MD  1/3/2018 3:57 PM CST  Workstation: [id number]          I personally viewed and interpreted the patient's EKG/Telemetry data.    Assessment:   1.  Chest pain with shortness of breath with indeterminate troponin with history of documented atherosclerotic coronary artery disease.          Plan:   1.   Chest pain, shortness of breath with history of documented atherosclerotic coronary artery disease with the last coronary angiogram done in 2014 which had revealed sustained patency in the left anterior descending artery, sustained patency  in the right  coronary artery site of previous angioplasty and stenting and  sustained patency in the circumflex artery with nonobstructive disease in the left anterior descending artery. Patient at the present time has been recommended to undergo a Lexiscan Cardiolite stress test to rule out any evidence of any stress induced ischemia. Risks, benefits, treatment options for the Lexiscan Cardiolite stress test were discussed with the patient and an informed consent was obtained from the patient for the Lexiscan Cardiolite stress test. Plan was to proceed with a Lexiscan Cardiolite stress test in the morning.     2.   Sick sinus syndrome with sinus arrest status post [place] [employer] pacemaker generator implantation model K063, serial number [id number]. Patient's pacemaker was reprogrammed to decrease the lower rate to  60 beats per minute to promote intrinsic conduction. Patient's pacemaker generator interrogation did not reveal any evidence of any atrial fibrillation in the past.  Patient would undergo a complete pacemaker interrogation..  3.   Arterial hypertension with hypertensive heart disease. Patient's blood pressure is currently 134/63 Patient's blood pressure at home has been normal. Patient will be continued on the present dose of the Micardis and the Coreg.  4.   Symptoms of shortness of breath with mild left ventricular systolic dysfunction with an ejection fraction of 45% with mitral tricuspid and aortic insufficiency. Clinically, patient is not in congestive heart failure. Patient has been recommended to continue with the present dose of the Lasix.  Patient would undergo a repeat transthoracic echocardiogram to evaluate the left ventricle systolic function.  5.   Chronic obstructive lung disease with tobacco abuse.  Patient is currently not smoking since January 2017.  6.   History of hypothyroidism. Patient is currently on thyroid  replacement and patient's last thyroid profile check had revealed a TSH  of 3.57.  7.   History of gastroesophageal reflux disease is noted. Patient is currently not on any proton pump inhibitor. Patient will be started on Nexium 40 mg once a day.  8.   Hyperlipidemia. Patient is currently tolerating the simvastatin 40 mg daily. Patient has been counseled on low fat, low cholesterol diet and to undergo a lipid profile check.  9.  Bronchitis.  Patient has been started on Rocephin.  Patient did not have an elevated white blood cell count.      Time: time spent in face-to-face evaluation of greater than 55 minutes and interacting and formulating examining and discussing the plan with the patient with 50% of greater time spent in face-to-face interaction.    Alpesh Rogel MD  01/03/18  9:57 PM    Dictated utilizing Dragon dictation.

## 2018-01-05 ENCOUNTER — APPOINTMENT (OUTPATIENT)
Dept: NUCLEAR MEDICINE | Facility: HOSPITAL | Age: 83
End: 2018-01-05

## 2018-01-05 ENCOUNTER — APPOINTMENT (OUTPATIENT)
Dept: CARDIOLOGY | Facility: HOSPITAL | Age: 83
End: 2018-01-05

## 2018-01-05 PROCEDURE — G0378 HOSPITAL OBSERVATION PER HR: HCPCS

## 2018-01-05 PROCEDURE — 25010000002 DOBUTAMINE 250 MG/20ML SOLUTION 20 ML VIAL: Performed by: INTERNAL MEDICINE

## 2018-01-05 PROCEDURE — A9500 TC99M SESTAMIBI: HCPCS | Performed by: INTERNAL MEDICINE

## 2018-01-05 PROCEDURE — 0 TECHNETIUM SESTAMIBI: Performed by: INTERNAL MEDICINE

## 2018-01-05 PROCEDURE — 94799 UNLISTED PULMONARY SVC/PX: CPT

## 2018-01-05 PROCEDURE — 25010000002 AZITHROMYCIN PER 500 MG: Performed by: INTERNAL MEDICINE

## 2018-01-05 PROCEDURE — 93017 CV STRESS TEST TRACING ONLY: CPT

## 2018-01-05 PROCEDURE — 78452 HT MUSCLE IMAGE SPECT MULT: CPT

## 2018-01-05 PROCEDURE — 25010000002 CEFTRIAXONE PER 250 MG: Performed by: INTERNAL MEDICINE

## 2018-01-05 PROCEDURE — 94760 N-INVAS EAR/PLS OXIMETRY 1: CPT

## 2018-01-05 RX ADMIN — AZITHROMYCIN 500 MG: 500 INJECTION, POWDER, LYOPHILIZED, FOR SOLUTION INTRAVENOUS at 21:06

## 2018-01-05 RX ADMIN — BUDESONIDE AND FORMOTEROL FUMARATE DIHYDRATE 2 PUFF: 160; 4.5 AEROSOL RESPIRATORY (INHALATION) at 21:19

## 2018-01-05 RX ADMIN — FUROSEMIDE 40 MG: 40 TABLET ORAL at 12:03

## 2018-01-05 RX ADMIN — SERTRALINE HYDROCHLORIDE 50 MG: 50 TABLET ORAL at 12:04

## 2018-01-05 RX ADMIN — ATORVASTATIN CALCIUM 20 MG: 20 TABLET, FILM COATED ORAL at 21:04

## 2018-01-05 RX ADMIN — TECHNETIUM TC 99M SESTAMIBI 1 DOSE: 1 INJECTION INTRAVENOUS at 09:04

## 2018-01-05 RX ADMIN — RANOLAZINE 1000 MG: 500 TABLET, FILM COATED, EXTENDED RELEASE ORAL at 21:03

## 2018-01-05 RX ADMIN — CEFTRIAXONE SODIUM 1 G: 1 INJECTION, POWDER, FOR SOLUTION INTRAMUSCULAR; INTRAVENOUS at 20:08

## 2018-01-05 RX ADMIN — NITROGLYCERIN 1 PATCH: 0.2 PATCH TRANSDERMAL at 12:03

## 2018-01-05 RX ADMIN — CARVEDILOL 6.25 MG: 6.25 TABLET, FILM COATED ORAL at 17:36

## 2018-01-05 RX ADMIN — CLOPIDOGREL BISULFATE 75 MG: 75 TABLET ORAL at 21:04

## 2018-01-05 RX ADMIN — ASPIRIN 81 MG: 81 TABLET, COATED ORAL at 12:02

## 2018-01-05 RX ADMIN — RANOLAZINE 1000 MG: 500 TABLET, FILM COATED, EXTENDED RELEASE ORAL at 12:02

## 2018-01-05 RX ADMIN — TECHNETIUM TC 99M SESTAMIBI 1 DOSE: 1 INJECTION INTRAVENOUS at 11:09

## 2018-01-05 RX ADMIN — IPRATROPIUM BROMIDE AND ALBUTEROL SULFATE 3 ML: 2.5; .5 SOLUTION RESPIRATORY (INHALATION) at 01:07

## 2018-01-05 RX ADMIN — IPRATROPIUM BROMIDE AND ALBUTEROL SULFATE 3 ML: 2.5; .5 SOLUTION RESPIRATORY (INHALATION) at 21:19

## 2018-01-05 RX ADMIN — IPRATROPIUM BROMIDE AND ALBUTEROL SULFATE 3 ML: 2.5; .5 SOLUTION RESPIRATORY (INHALATION) at 14:14

## 2018-01-05 RX ADMIN — DOBUTAMINE 10 MCG/KG/MIN: 12.5 INJECTION, SOLUTION, CONCENTRATE INTRAVENOUS at 11:15

## 2018-01-05 RX ADMIN — CARVEDILOL 6.25 MG: 6.25 TABLET, FILM COATED ORAL at 12:03

## 2018-01-05 NOTE — PROGRESS NOTES
[facility] Medicine Services  INPATIENT PROGRESS NOTE    Length of Stay: 0  Date of Admission: 1/3/2018  Primary Care Physician: Roula Albert MD    Subjective   Chief Complaint: chest pain, dyspnea   HPI:  85-year-old  female past history of hypothyroidism, COPD, coronary artery disease, gaseous after reflux disease, hyperlipidemia, hypertension who presented on 1/3/2018 with complaints chest pain and dyspnea.  The patient was noted to have elevated BNP levels as well as elevated troponin levels.  The patient is currently being treated for COPD as well as undergoing workup for suspected CHF and possible coronary artery disease.  The patient underwent Lexiscan stress testing today and results are currently pending.  The patient has no complaints other than fatigue and cough during today's visit.    Review of Systems   Constitutional: Negative for chills and fever.   Respiratory: Positive for cough, chest tightness and shortness of breath. Negative for wheezing.    Cardiovascular: Negative for chest pain, palpitations and leg swelling.   Gastrointestinal: Negative for abdominal pain, constipation, diarrhea, nausea and vomiting.   Musculoskeletal: Negative for back pain.   Skin: Negative for pallor.   Psychiatric/Behavioral: Negative for confusion.        All pertinent negatives and positives are as above. All other systems have been reviewed and are negative unless otherwise stated.     Objective    Temp:  [97.6 °F (36.4 °C)-98.8 °F (37.1 °C)] 97.6 °F (36.4 °C)  Heart Rate:  [62-82] 67  Resp:  [16-18] 16  BP: ()/(50-75) 133/60    Physical Exam   Constitutional: She is oriented to person, place, and time. She appears well-developed and well-nourished.   HENT:   Head: Normocephalic and atraumatic.   Eyes: Conjunctivae are normal.   Neck: Neck supple.   Cardiovascular: Normal rate, normal heart sounds and intact distal pulses.    Pulmonary/Chest: No accessory  muscle usage. She has decreased breath sounds in the right lower field and the left lower field. She has wheezes.   Abdominal: Soft. Bowel sounds are normal.   Musculoskeletal: Normal range of motion. She exhibits no edema.   Neurological: She is alert and oriented to person, place, and time.   Skin: Skin is warm and dry.   Psychiatric: She has a normal mood and affect. Her behavior is normal.   Vitals reviewed.          Results Review:  I have reviewed the labs, radiology results, and diagnostic studies.    Laboratory Data:     Results from last 7 days  Lab Units 01/04/18  0538 01/03/18  1545   SODIUM mmol/L 133* 128*   POTASSIUM mmol/L 4.0 3.9   CHLORIDE mmol/L 95 90*   CO2 mmol/L 25.0 24.0   BUN mg/dL 44* 33*   CREATININE mg/dL 1.36* 1.21*   GLUCOSE mg/dL 119* 97   CALCIUM mg/dL 8.1* 8.5   BILIRUBIN mg/dL 0.3 0.4   ALK PHOS U/L 53 56   ALT (SGPT) U/L 37 37   AST (SGOT) U/L 34 37*   ANION GAP mmol/L 13.0 14.0     Estimated Creatinine Clearance: 31.4 mL/min (by C-G formula based on Cr of 1.36).    Results from last 7 days  Lab Units 01/03/18  1722   MAGNESIUM mg/dL 2.0           Results from last 7 days  Lab Units 01/04/18  0538 01/03/18  1545   WBC 10*3/mm3 5.06 6.31   HEMOGLOBIN g/dL 12.3 12.3   HEMATOCRIT % 36.8 36.4   PLATELETS 10*3/mm3 203 193       Results from last 7 days  Lab Units 01/03/18  1546   INR  1.01       Culture Data:   No results found for: BLOODCX  No results found for: URINECX  No results found for: RESPCX  No results found for: WOUNDCX  No results found for: STOOLCX  No components found for: BODYFLD    Radiology Data:   Imaging Results (last 24 hours)     ** No results found for the last 24 hours. **          I have reviewed the patient current medications.     Assessment/Plan     1. Chest pain r/o ACS: troponin mildly elevated.  Cardiology following with plan for lexiscan stress testing.  2. Dyspnea on exertion: No abnormalities on chest x ray. Echo pending.  Rocephin, Azithromycin, o2, nebs  for suspected respiratory infection. No clinical signs of CHF.   3. BENITO: monitor BMP  4. Sick sinus syndrome s/p pacemaker: pacemaker interrogation.  5. Hx COPD: pt wears home o2 at night only.  O2, nebs   6. HLD: continue statin        This document has been electronically signed by CRESENCIO Winters on January 5, 2018 5:08 PM

## 2018-01-05 NOTE — PROGRESS NOTES
Cardiology Progress Note:     LOS: 0 days   Patient Care Team:  Roula Albert MD as PCP - General  Roula Albert MD as PCP - Claims Attributed      Subjective:     Chart reviewed , patient seen and examined.  Patient has continued to have symptoms of shortness of breath.  Patient transthoracic echocardiogram revealed left ventricular systolic dysfunction with an ejection fraction of 35-40% with mild mitral regurgitation and mild-to-moderate tricuspid regurgitation.  Patient resting echocardiogram done reveals left bundle branch configuration.  Patient last coronary angiogram done in 2014 had revealed sustained patency in the circumflex and the right coronary artery site of previous angioplasty and stenting.  Patient does have mild prerenal azotemia.  Patient had mild elevation in the troponin.  Patient at the present time has been recommended to undergo a Lexiscan Cardiolite stress tests and would increase the Ranexa to 1000 mg twice a day.          Objective:     Objective:  Vitals:    01/04/18 1542   BP:    Pulse: 69   Resp:    Temp:    SpO2:        Intake/Output Summary (Last 24 hours) at 01/04/18 1856  Last data filed at 01/04/18 1800   Gross per 24 hour   Intake              910 ml   Output              500 ml   Net              410 ml             Physical Exam:   General Appearance:    Alert, oriented, cooperative, in no acute distress   Head:    Normocephalic, atraumatic, without obvious abnormality   Eyes:           TOI  Lids and lashes normal, conjunctivae and sclerae normal, no icterus, no pallor   Ears:    Ears appear intact with no abnormalities noted   Throat:   Mucous membranes pink and moist   Neck:   Supple, trachea midline, no carotid bruit, no organomegaly or JVD   Lungs:     Clear to auscultation and percussion, respirations regular, even and Unlabored. No wheezes, rales, rhonchi    Heart:    Regular rhythm and normal rate, normal S1 and S2, no            murmur, no gallop, no rub, no click    Abdomen:     Soft, non-tender, non-distended, no guarding, no rebound tenderness, Normal bowel sounds in all four quadrant, no masses, liver and spleen nonpalpable,    Genitalia:    Deferred   Extremities:   Moves all extremities well, no edema, no cyanosis, no              Redness, no clubbing   Pulses:   Pulses palpable and equal bilaterally   Skin:   Moist and warm. No bleeding, bruising or rash   Neurologic/Psychiatric:   Alert and oriented to person, place, and time.  Motor, power and tone in upper and lower extremity is grossly intact.  No focal neurological deficits. Normal cognitive function. No psychomotor reaction or tangential thought. No depression, homicidal ideations and suicidal ideations            Results Review:    Lab Results (last 24 hours)     Procedure Component Value Units Date/Time    CK [[id number]]  (Normal) Collected:  01/03/18 1546    Specimen:  Blood Updated:  01/03/18 1944     Creatine Kinase 53 U/L     CK-MB [[id number]]  (Normal) Collected:  01/03/18 1546    Specimen:  Blood Updated:  01/03/18 1952     CKMB 1.60 ng/mL     POC Glucose Once [[id number]]  (Abnormal) Collected:  01/03/18 2009    Specimen:  Blood Updated:  01/03/18 2025     Glucose 206 (H) mg/dL       RN NotifiedMeter: [id number]: [id number] MARLEEN CHAN       TSH [[id number]]  (Normal) Collected:  01/03/18 1546    Specimen:  Blood Updated:  01/03/18 2037     TSH 3.950 mIU/mL     Hemoglobin A1c [[id number]]  (Normal) Collected:  01/03/18 1545    Specimen:  Blood Updated:  01/03/18 2104     Hemoglobin A1C 5.6 %     Troponin [[id number]]  (Abnormal) Collected:  01/04/18 0126    Specimen:  Blood Updated:  01/04/18 0231     Troponin I 0.041 (H) ng/mL     CBC & Differential [[id number]] Collected:  01/04/18 0538    Specimen:  Blood Updated:  01/04/18 0627    Narrative:       The following orders were created for panel order CBC & Differential.  Procedure                               Abnormality         Status                      ---------                               -----------         ------                     CBC Auto Differential[[id number]]        Abnormal            Final result                 Please view results for these tests on the individual orders.    CBC Auto Differential [[id number]]  (Abnormal) Collected:  01/04/18 0538    Specimen:  Blood Updated:  01/04/18 0627     WBC 5.06 10*3/mm3      RBC 3.84 10*6/mm3      Hemoglobin 12.3 g/dL      Hematocrit 36.8 %      MCV 95.8 fL      MCH 32.0 pg      MCHC 33.4 g/dL      RDW 13.1 %      RDW-SD 46.5 (H) fl      MPV 9.9 fL      Platelets 203 10*3/mm3      Neutrophil % 73.9 %      Lymphocyte % 19.0 %      Monocyte % 6.7 %      Eosinophil % 0.0 %      Basophil % 0.2 %      Immature Grans % 0.2 %      Neutrophils, Absolute 3.74 10*3/mm3      Lymphocytes, Absolute 0.96 10*3/mm3      Monocytes, Absolute 0.34 10*3/mm3      Eosinophils, Absolute 0.00 10*3/mm3      Basophils, Absolute 0.01 10*3/mm3      Immature Grans, Absolute 0.01 10*3/mm3     Comprehensive Metabolic Panel [[id number]]  (Abnormal) Collected:  01/04/18 0538    Specimen:  Blood Updated:  01/04/18 0641     Glucose 119 (H) mg/dL      BUN 44 (H) mg/dL      Creatinine 1.36 (H) mg/dL      Sodium 133 (L) mmol/L      Potassium 4.0 mmol/L      Chloride 95 mmol/L      CO2 25.0 mmol/L      Calcium 8.1 (L) mg/dL      Total Protein 7.0 g/dL      Albumin 3.80 g/dL      ALT (SGPT) 37 U/L      AST (SGOT) 34 U/L      Alkaline Phosphatase 53 U/L      Total Bilirubin 0.3 mg/dL      eGFR Non African Amer 37 (L) mL/min/1.73      Globulin 3.2 gm/dL      A/G Ratio 1.2 g/dL      BUN/Creatinine Ratio 32.4 (H)     Anion Gap 13.0 mmol/L     Narrative:       The MDRD GFR formula is only valid for adults with stable renal function between ages 18 and 70.    Lipid Panel [[id number]]  (Abnormal) Collected:  01/04/18 0538    Specimen:  Blood Updated:  01/04/18 0652     Total Cholesterol 171 mg/dL      Triglycerides 199 mg/dL      HDL Cholesterol 51  (L) mg/dL      LDL Cholesterol  67 mg/dL      LDL/HDL Ratio 1.57    Troponin [[id number]]  (Abnormal) Collected:  01/04/18 0538    Specimen:  Blood Updated:  01/04/18 0659     Troponin I 0.048 (H) ng/mL            Medication Review:   Current Facility-Administered Medications   Medication Dose Route Frequency Provider Last Rate Last Dose   • acetaminophen (TYLENOL) tablet 650 mg  650 mg Oral Q4H PRN Jazmin Batista MD       • aspirin EC tablet 81 mg  81 mg Oral Daily Jazmin Batista MD   81 mg at 01/04/18 0951   • atorvastatin (LIPITOR) tablet 20 mg  20 mg Oral Nightly Jazmin Batista MD   20 mg at 01/03/18 2118   • AZITHROMYCIN 500 MG/250 ML 0.9% NS IVPB (vial-mate)  500 mg Intravenous Q24H Jazmin Batista MD   Stopped at 01/03/18 2316   • budesonide-formoterol (SYMBICORT) 160-4.5 MCG/ACT inhaler 2 puff  2 puff Inhalation BID - RT Jazmin Batista MD   2 puff at 01/04/18 1052   • carvedilol (COREG) tablet 6.25 mg  6.25 mg Oral BID With Meals Jazmin Batista MD   6.25 mg at 01/04/18 1759   • cefTRIAXone (ROCEPHIN) in SWFI 1 gram/10ml IV PUSH syringe  1 g Intravenous Q24H Jazmin Batista MD   1 g at 01/03/18 2118   • clopidogrel (PLAVIX) tablet 75 mg  75 mg Oral Nightly Jazmin Batista MD   75 mg at 01/03/18 2118   • furosemide (LASIX) tablet 40 mg  40 mg Oral Daily Jazmin Batista MD   40 mg at 01/04/18 0951   • hydrALAZINE (APRESOLINE) injection 10 mg  10 mg Intravenous Q6H PRN Jazmin Batista MD       • ipratropium-albuterol (DUO-NEB) nebulizer solution 3 mL  3 mL Nebulization 4x Daily PRN Jazmin Batista MD   3 mL at 01/04/18 1043   • levothyroxine (SYNTHROID, LEVOTHROID) tablet 50 mcg  50 mcg Oral Q AM Jazmin Batista MD   50 mcg at 01/04/18 0610   • Morphine injection 1 mg  1 mg Intravenous Q4H PRN Jazmin Batista MD       • nitroglycerin (NITRODUR) 0.2 MG/HR patch 1 patch  1 patch Transdermal Daily Jazmin Batista MD   1 patch at 01/04/18 0951   • ondansetron (ZOFRAN) injection 4 mg  4 mg  Intravenous Q6H PRN Jazmin Batista MD       • ranolazine (RANEXA) 12 hr tablet 500 mg  500 mg Oral Q12H Jazmin Batista MD   500 mg at 01/04/18 0951   • sertraline (ZOLOFT) tablet 50 mg  50 mg Oral Daily Jazmin Batista MD   50 mg at 01/04/18 0951   • sodium chloride 0.9 % flush 1-10 mL  1-10 mL Intravenous PRN Jazmin Batista MD   10 mL at 01/03/18 2123   • sodium chloride 0.9 % flush 10 mL  10 mL Intravenous PRN Ruddy Johnson MD           Assessment and Plan:    Active Problems:    Precordial pain  1.  Shortness of breath atypical symptoms of chest pain.  Patient has indeterminate troponin suggestive for non-Q myocardial infarction.  Patient at the present time has been recommended to undergo a Lexiscan Cardiolite stress test.  Risk-benefit treatment option for the Lexiscan Cardiolite stress test were discussed with the patient and an informed consent was obtained from the patient for the Lexiscan Cardiolite stress test.  Plan was to proceed with a Lexiscan Cardiolite stress test in the morning.  Patient has also been recommended to increase the Ranexa to 1000 mg twice a day.  Patient has mild prerenal azotemia.  Patient last coronary angiogram done in 2014 had revealed sustained patency in the circumflex and the right coronary artery site of previous angioplasty and stenting.  Patient has not complained of having symptoms of severe substernal chest pain suggestive of angina.  2.  Sick sinus syndrome.  Patient has a [employer] dual-chamber pacemaker implantation.  Patient would undergo a pacemaker interrogation.  3.  Left ventricular systolic dysfunction with an ejection fraction of 35-40% with mild to moderate tricuspid regurgitation and mild mitral regurgitation.  Patient would be continued on the carvedilol and the hydralazine and nitrates and gentle diuresis  4.  Prerenal azotemia.  Patient is currently on Lasix.  Would follow the renal function.  5.  Chronic obstructive lung disease.   Patient is currently on antibiotics and inhalers.  6.  Hyperlipidemia.  Patient has been counseled on low-fat diet and to continue the present dose of the Lipitor.            Alpesh Rogel MD  01/04/18  6:56 PM      Time: Time spent on face-to-face interaction 25 minutes    Dictated utilizing Dragon dictation.

## 2018-01-05 NOTE — PLAN OF CARE
Problem: Patient Care Overview (Adult)  Goal: Plan of Care Review  Outcome: Ongoing (interventions implemented as appropriate)    Goal: Adult Individualization and Mutuality  Outcome: Ongoing (interventions implemented as appropriate)    Goal: Discharge Needs Assessment  Outcome: Ongoing (interventions implemented as appropriate)      Problem: Acute Coronary Syndrome (ACS) (Adult)  Goal: Signs and Symptoms of Listed Potential Problems Will be Absent or Manageable (Acute Coronary Syndrome)  Outcome: Ongoing (interventions implemented as appropriate)      Problem: Fall Risk (Adult)  Goal: Identify Related Risk Factors and Signs and Symptoms  Outcome: Outcome(s) achieved Date Met: 01/05/18    Goal: Absence of Falls  Outcome: Ongoing (interventions implemented as appropriate)

## 2018-01-06 LAB
ANION GAP SERPL CALCULATED.3IONS-SCNC: 9 MMOL/L (ref 5–15)
BUN BLD-MCNC: 36 MG/DL (ref 7–21)
BUN/CREAT SERPL: 34.6 (ref 7–25)
CALCIUM SPEC-SCNC: 8.2 MG/DL (ref 8.4–10.2)
CHLORIDE SERPL-SCNC: 95 MMOL/L (ref 95–110)
CO2 SERPL-SCNC: 29 MMOL/L (ref 22–31)
CREAT BLD-MCNC: 1.04 MG/DL (ref 0.5–1)
DEPRECATED RDW RBC AUTO: 46.6 FL (ref 36.4–46.3)
ERYTHROCYTE [DISTWIDTH] IN BLOOD BY AUTOMATED COUNT: 13.2 % (ref 11.5–14.5)
GFR SERPL CREATININE-BSD FRML MDRD: 50 ML/MIN/1.73 (ref 39–90)
GLUCOSE BLD-MCNC: 89 MG/DL (ref 60–100)
HCT VFR BLD AUTO: 33.6 % (ref 35–45)
HGB BLD-MCNC: 11.2 G/DL (ref 12–15.5)
MCH RBC QN AUTO: 32.5 PG (ref 26.5–34)
MCHC RBC AUTO-ENTMCNC: 33.3 G/DL (ref 31.4–36)
MCV RBC AUTO: 97.4 FL (ref 80–98)
PLATELET # BLD AUTO: 191 10*3/MM3 (ref 150–450)
PMV BLD AUTO: 9.7 FL (ref 8–12)
POTASSIUM BLD-SCNC: 3.9 MMOL/L (ref 3.5–5.1)
RBC # BLD AUTO: 3.45 10*6/MM3 (ref 3.77–5.16)
SODIUM BLD-SCNC: 133 MMOL/L (ref 137–145)
WBC NRBC COR # BLD: 7.35 10*3/MM3 (ref 3.2–9.8)

## 2018-01-06 PROCEDURE — 25010000002 CEFTRIAXONE PER 250 MG: Performed by: INTERNAL MEDICINE

## 2018-01-06 PROCEDURE — 25010000002 METHYLPREDNISOLONE PER 40 MG: Performed by: NURSE PRACTITIONER

## 2018-01-06 PROCEDURE — 80048 BASIC METABOLIC PNL TOTAL CA: CPT | Performed by: NURSE PRACTITIONER

## 2018-01-06 PROCEDURE — G0378 HOSPITAL OBSERVATION PER HR: HCPCS

## 2018-01-06 PROCEDURE — 85027 COMPLETE CBC AUTOMATED: CPT | Performed by: NURSE PRACTITIONER

## 2018-01-06 PROCEDURE — 94799 UNLISTED PULMONARY SVC/PX: CPT

## 2018-01-06 RX ORDER — METHYLPREDNISOLONE SODIUM SUCCINATE 40 MG/ML
40 INJECTION, POWDER, LYOPHILIZED, FOR SOLUTION INTRAMUSCULAR; INTRAVENOUS EVERY 12 HOURS
Status: DISCONTINUED | OUTPATIENT
Start: 2018-01-06 | End: 2018-01-09 | Stop reason: HOSPADM

## 2018-01-06 RX ADMIN — RANOLAZINE 1000 MG: 500 TABLET, FILM COATED, EXTENDED RELEASE ORAL at 20:47

## 2018-01-06 RX ADMIN — LEVOTHYROXINE SODIUM 50 MCG: 50 TABLET ORAL at 05:56

## 2018-01-06 RX ADMIN — IPRATROPIUM BROMIDE AND ALBUTEROL SULFATE 3 ML: 2.5; .5 SOLUTION RESPIRATORY (INHALATION) at 15:30

## 2018-01-06 RX ADMIN — CEFTRIAXONE SODIUM 1 G: 1 INJECTION, POWDER, FOR SOLUTION INTRAMUSCULAR; INTRAVENOUS at 20:41

## 2018-01-06 RX ADMIN — FUROSEMIDE 40 MG: 40 TABLET ORAL at 09:36

## 2018-01-06 RX ADMIN — CARVEDILOL 6.25 MG: 6.25 TABLET, FILM COATED ORAL at 09:36

## 2018-01-06 RX ADMIN — BUDESONIDE AND FORMOTEROL FUMARATE DIHYDRATE 2 PUFF: 160; 4.5 AEROSOL RESPIRATORY (INHALATION) at 11:12

## 2018-01-06 RX ADMIN — NITROGLYCERIN 1 PATCH: 0.2 PATCH TRANSDERMAL at 09:34

## 2018-01-06 RX ADMIN — BUDESONIDE AND FORMOTEROL FUMARATE DIHYDRATE 2 PUFF: 160; 4.5 AEROSOL RESPIRATORY (INHALATION) at 20:08

## 2018-01-06 RX ADMIN — CLOPIDOGREL BISULFATE 75 MG: 75 TABLET ORAL at 20:47

## 2018-01-06 RX ADMIN — IPRATROPIUM BROMIDE AND ALBUTEROL SULFATE 3 ML: 2.5; .5 SOLUTION RESPIRATORY (INHALATION) at 20:07

## 2018-01-06 RX ADMIN — ASPIRIN 81 MG: 81 TABLET, COATED ORAL at 09:37

## 2018-01-06 RX ADMIN — METHYLPREDNISOLONE SODIUM SUCCINATE 40 MG: 40 INJECTION, POWDER, FOR SOLUTION INTRAMUSCULAR; INTRAVENOUS at 16:57

## 2018-01-06 RX ADMIN — ATORVASTATIN CALCIUM 20 MG: 20 TABLET, FILM COATED ORAL at 20:47

## 2018-01-06 RX ADMIN — RANOLAZINE 1000 MG: 500 TABLET, FILM COATED, EXTENDED RELEASE ORAL at 09:36

## 2018-01-06 RX ADMIN — SERTRALINE HYDROCHLORIDE 50 MG: 50 TABLET ORAL at 09:36

## 2018-01-06 RX ADMIN — CARVEDILOL 6.25 MG: 6.25 TABLET, FILM COATED ORAL at 17:00

## 2018-01-06 NOTE — PROGRESS NOTES
Patient underwent dobutamine Cardiolite stress tests.  Patient had complain of having symptoms of wheezing and was unable to undergo the Lexiscan Cardiolite stress test.  Patient dobutamine monitoring period was without any symptoms of chest pain or any electrocardiographic changes suggestive of ischemia..  Patient nuclear SPECT image revealed small in size mildly severe area of ischemia located in the anterior wall and the septum which was intermediate risk study.  Of note patient course status prior to the nuclear stress test was to not resuscitate.  At the present time patient would be treated medically as the patient perfusion defect was small size and patient ejection fraction was 52%.  Patient would be continued on the Ranexa and would add Nitropatch 0.2 mg to the anterior chest wall from 7 AM to 7 PM.  If the patient fails medical management then would consider invasive intervention.

## 2018-01-06 NOTE — PROGRESS NOTES
[facility] Medicine Services  INPATIENT PROGRESS NOTE    Length of Stay: 0  Date of Admission: 1/3/2018  Primary Care Physician: Roula Albert MD    Subjective   Chief Complaint: chest pain, dyspnea   HPI:  85-year-old  female past history of hypothyroidism, COPD, coronary artery disease, gaseous after reflux disease, hyperlipidemia, hypertension who presented on 1/3/2018 with complaints chest pain and dyspnea.  The patient was noted to have elevated BNP levels as well as elevated troponin levels.  The patient is currently being treated for COPD as well as undergoing workup for suspected CHF and possible coronary artery disease.  The patient underwent Lexiscan stress testing today and results are currently pending.  The patient has no complaints other than fatigue and cough during today's visit.    Review of Systems   Constitutional: Negative for chills and fever.   Respiratory: Positive for cough, chest tightness and shortness of breath. Negative for wheezing.    Cardiovascular: Negative for chest pain, palpitations and leg swelling.   Gastrointestinal: Negative for abdominal pain, constipation, diarrhea, nausea and vomiting.   Musculoskeletal: Negative for back pain.   Skin: Negative for pallor.   Psychiatric/Behavioral: Negative for confusion.        All pertinent negatives and positives are as above. All other systems have been reviewed and are negative unless otherwise stated.     Objective    Temp:  [97 °F (36.1 °C)-98.6 °F (37 °C)] 97.5 °F (36.4 °C)  Heart Rate:  [51-85] 57  Resp:  [16-20] 20  BP: (116-157)/(53-70) 134/63    Physical Exam   Constitutional: She is oriented to person, place, and time. She appears well-developed and well-nourished.   HENT:   Head: Normocephalic and atraumatic.   Eyes: Conjunctivae are normal.   Neck: Neck supple.   Cardiovascular: Normal rate, normal heart sounds and intact distal pulses.    Pulmonary/Chest: No accessory  muscle usage. She has decreased breath sounds in the right lower field and the left lower field. She has wheezes.   Abdominal: Soft. Bowel sounds are normal.   Musculoskeletal: Normal range of motion. She exhibits no edema.   Neurological: She is alert and oriented to person, place, and time.   Skin: Skin is warm and dry.   Psychiatric: She has a normal mood and affect. Her behavior is normal.   Vitals reviewed.          Results Review:  I have reviewed the labs, radiology results, and diagnostic studies.    Laboratory Data:     Results from last 7 days  Lab Units 01/06/18  1018 01/04/18  0538 01/03/18  1545   SODIUM mmol/L 133* 133* 128*   POTASSIUM mmol/L 3.9 4.0 3.9   CHLORIDE mmol/L 95 95 90*   CO2 mmol/L 29.0 25.0 24.0   BUN mg/dL 36* 44* 33*   CREATININE mg/dL 1.04* 1.36* 1.21*   GLUCOSE mg/dL 89 119* 97   CALCIUM mg/dL 8.2* 8.1* 8.5   BILIRUBIN mg/dL  --  0.3 0.4   ALK PHOS U/L  --  53 56   ALT (SGPT) U/L  --  37 37   AST (SGOT) U/L  --  34 37*   ANION GAP mmol/L 9.0 13.0 14.0     Estimated Creatinine Clearance: 41.5 mL/min (by C-G formula based on Cr of 1.04).    Results from last 7 days  Lab Units 01/03/18  1722   MAGNESIUM mg/dL 2.0           Results from last 7 days  Lab Units 01/06/18  1018 01/04/18  0538 01/03/18  1545   WBC 10*3/mm3 7.35 5.06 6.31   HEMOGLOBIN g/dL 11.2* 12.3 12.3   HEMATOCRIT % 33.6* 36.8 36.4   PLATELETS 10*3/mm3 191 203 193       Results from last 7 days  Lab Units 01/03/18  1546   INR  1.01       Culture Data:   No results found for: BLOODCX  No results found for: URINECX  No results found for: RESPCX  No results found for: WOUNDCX  No results found for: STOOLCX  No components found for: BODYFLD    Radiology Data:   Imaging Results (last 24 hours)     ** No results found for the last 24 hours. **          I have reviewed the patient current medications.     Assessment/Plan     1. Atypical chest pain with troponin elevation.  Cardiology following.  Ischemia noted in the anterior  wall and the septum on stress testing.  Cardiology recommends medication management including Nitro patch and Ranexa.  Invasive intervention will be considered if patient fails medical management.   2. COPD exacerbation: o2, nebs, steroids.  3. BENITO: monitor BMP  4. Sick sinus syndrome s/p pacemaker: pacemaker interrogation.   5. HLD: continue statin  6. CAD: ASA, Plavix, statin, Nitro patch, Coreg, Ranexa  7. Hypothyroidism: synthroid         This document has been electronically signed by CRESENCIO Winters on January 6, 2018 3:55 PM

## 2018-01-06 NOTE — PLAN OF CARE
Problem: Patient Care Overview (Adult)  Goal: Plan of Care Review  Outcome: Ongoing (interventions implemented as appropriate)    Goal: Adult Individualization and Mutuality  Outcome: Ongoing (interventions implemented as appropriate)    Goal: Discharge Needs Assessment  Outcome: Ongoing (interventions implemented as appropriate)      Problem: Acute Coronary Syndrome (ACS) (Adult)  Goal: Signs and Symptoms of Listed Potential Problems Will be Absent or Manageable (Acute Coronary Syndrome)  Outcome: Ongoing (interventions implemented as appropriate)      Problem: Fall Risk (Adult)  Goal: Absence of Falls  Outcome: Ongoing (interventions implemented as appropriate)

## 2018-01-07 ENCOUNTER — APPOINTMENT (OUTPATIENT)
Dept: CT IMAGING | Facility: HOSPITAL | Age: 83
End: 2018-01-07

## 2018-01-07 LAB
ANION GAP SERPL CALCULATED.3IONS-SCNC: 12 MMOL/L (ref 5–15)
BUN BLD-MCNC: 24 MG/DL (ref 7–21)
BUN/CREAT SERPL: 30 (ref 7–25)
CALCIUM SPEC-SCNC: 8.2 MG/DL (ref 8.4–10.2)
CHLORIDE SERPL-SCNC: 94 MMOL/L (ref 95–110)
CO2 SERPL-SCNC: 28 MMOL/L (ref 22–31)
CREAT BLD-MCNC: 0.8 MG/DL (ref 0.5–1)
D-DIMER, QUANTITATIVE (MAD,POW, STR): 1222 NG/ML (FEU) (ref 0–470)
DEPRECATED RDW RBC AUTO: 44.8 FL (ref 36.4–46.3)
ERYTHROCYTE [DISTWIDTH] IN BLOOD BY AUTOMATED COUNT: 12.8 % (ref 11.5–14.5)
GFR SERPL CREATININE-BSD FRML MDRD: 68 ML/MIN/1.73 (ref 60–90)
GLUCOSE BLD-MCNC: 124 MG/DL (ref 60–100)
HCT VFR BLD AUTO: 34.7 % (ref 35–45)
HGB BLD-MCNC: 11.8 G/DL (ref 12–15.5)
MCH RBC QN AUTO: 32.4 PG (ref 26.5–34)
MCHC RBC AUTO-ENTMCNC: 34 G/DL (ref 31.4–36)
MCV RBC AUTO: 95.3 FL (ref 80–98)
PLATELET # BLD AUTO: 191 10*3/MM3 (ref 150–450)
PMV BLD AUTO: 9.4 FL (ref 8–12)
POTASSIUM BLD-SCNC: 4 MMOL/L (ref 3.5–5.1)
RBC # BLD AUTO: 3.64 10*6/MM3 (ref 3.77–5.16)
SODIUM BLD-SCNC: 134 MMOL/L (ref 137–145)
WBC NRBC COR # BLD: 3.03 10*3/MM3 (ref 3.2–9.8)

## 2018-01-07 PROCEDURE — 85027 COMPLETE CBC AUTOMATED: CPT | Performed by: NURSE PRACTITIONER

## 2018-01-07 PROCEDURE — 0 IOPAMIDOL PER 1 ML: Performed by: INTERNAL MEDICINE

## 2018-01-07 PROCEDURE — 71275 CT ANGIOGRAPHY CHEST: CPT

## 2018-01-07 PROCEDURE — 25010000002 METHYLPREDNISOLONE PER 40 MG: Performed by: NURSE PRACTITIONER

## 2018-01-07 PROCEDURE — 80048 BASIC METABOLIC PNL TOTAL CA: CPT | Performed by: NURSE PRACTITIONER

## 2018-01-07 PROCEDURE — 94799 UNLISTED PULMONARY SVC/PX: CPT

## 2018-01-07 PROCEDURE — 25010000002 CEFTRIAXONE PER 250 MG: Performed by: INTERNAL MEDICINE

## 2018-01-07 PROCEDURE — 94760 N-INVAS EAR/PLS OXIMETRY 1: CPT

## 2018-01-07 PROCEDURE — 85379 FIBRIN DEGRADATION QUANT: CPT | Performed by: NURSE PRACTITIONER

## 2018-01-07 RX ADMIN — CARVEDILOL 6.25 MG: 6.25 TABLET, FILM COATED ORAL at 08:47

## 2018-01-07 RX ADMIN — CARVEDILOL 6.25 MG: 6.25 TABLET, FILM COATED ORAL at 17:31

## 2018-01-07 RX ADMIN — CLOPIDOGREL BISULFATE 75 MG: 75 TABLET ORAL at 20:25

## 2018-01-07 RX ADMIN — NITROGLYCERIN 1 PATCH: 0.2 PATCH TRANSDERMAL at 08:47

## 2018-01-07 RX ADMIN — RANOLAZINE 1000 MG: 500 TABLET, FILM COATED, EXTENDED RELEASE ORAL at 08:47

## 2018-01-07 RX ADMIN — IPRATROPIUM BROMIDE AND ALBUTEROL SULFATE 3 ML: 2.5; .5 SOLUTION RESPIRATORY (INHALATION) at 20:34

## 2018-01-07 RX ADMIN — METHYLPREDNISOLONE SODIUM SUCCINATE 40 MG: 40 INJECTION, POWDER, FOR SOLUTION INTRAMUSCULAR; INTRAVENOUS at 17:31

## 2018-01-07 RX ADMIN — IOPAMIDOL 66 ML: 755 INJECTION, SOLUTION INTRAVENOUS at 16:45

## 2018-01-07 RX ADMIN — IPRATROPIUM BROMIDE AND ALBUTEROL SULFATE 3 ML: 2.5; .5 SOLUTION RESPIRATORY (INHALATION) at 08:50

## 2018-01-07 RX ADMIN — BUDESONIDE AND FORMOTEROL FUMARATE DIHYDRATE 2 PUFF: 160; 4.5 AEROSOL RESPIRATORY (INHALATION) at 20:34

## 2018-01-07 RX ADMIN — SERTRALINE HYDROCHLORIDE 50 MG: 50 TABLET ORAL at 08:47

## 2018-01-07 RX ADMIN — LEVOTHYROXINE SODIUM 50 MCG: 50 TABLET ORAL at 06:09

## 2018-01-07 RX ADMIN — ATORVASTATIN CALCIUM 20 MG: 20 TABLET, FILM COATED ORAL at 20:25

## 2018-01-07 RX ADMIN — METHYLPREDNISOLONE SODIUM SUCCINATE 40 MG: 40 INJECTION, POWDER, FOR SOLUTION INTRAMUSCULAR; INTRAVENOUS at 06:09

## 2018-01-07 RX ADMIN — RANOLAZINE 1000 MG: 500 TABLET, FILM COATED, EXTENDED RELEASE ORAL at 20:25

## 2018-01-07 RX ADMIN — BUDESONIDE AND FORMOTEROL FUMARATE DIHYDRATE 2 PUFF: 160; 4.5 AEROSOL RESPIRATORY (INHALATION) at 08:43

## 2018-01-07 RX ADMIN — FUROSEMIDE 40 MG: 40 TABLET ORAL at 08:47

## 2018-01-07 RX ADMIN — ASPIRIN 81 MG: 81 TABLET, COATED ORAL at 08:48

## 2018-01-07 RX ADMIN — Medication 10 ML: at 20:25

## 2018-01-07 RX ADMIN — CEFTRIAXONE SODIUM 1 G: 1 INJECTION, POWDER, FOR SOLUTION INTRAMUSCULAR; INTRAVENOUS at 20:25

## 2018-01-07 NOTE — PLAN OF CARE
Problem: Patient Care Overview (Adult)  Goal: Plan of Care Review  Outcome: Ongoing (interventions implemented as appropriate)   01/06/18 4039   Coping/Psychosocial Response Interventions   Plan Of Care Reviewed With patient   Patient Care Overview   Progress improving   Outcome Evaluation   Outcome Summary/Follow up Plan Patient felt good most of day. Wheezing noted around 1500 improved with breathing tx. iv steroids admin. no cp.       Problem: Acute Coronary Syndrome (ACS) (Adult)  Goal: Signs and Symptoms of Listed Potential Problems Will be Absent or Manageable (Acute Coronary Syndrome)  Outcome: Ongoing (interventions implemented as appropriate)      Problem: Fall Risk (Adult)  Goal: Absence of Falls  Outcome: Ongoing (interventions implemented as appropriate)      Problem: Respiratory Insufficiency (Adult)  Goal: Identify Related Risk Factors and Signs and Symptoms  Outcome: Ongoing (interventions implemented as appropriate)    Goal: Acid/Base Balance  Outcome: Ongoing (interventions implemented as appropriate)    Goal: Effective Ventilation  Outcome: Ongoing (interventions implemented as appropriate)

## 2018-01-07 NOTE — PROGRESS NOTES
[facility] Medicine Services  INPATIENT PROGRESS NOTE    Length of Stay: 0  Date of Admission: 1/3/2018  Primary Care Physician: Roula Albert MD    Subjective   Chief Complaint: chest pain, dyspnea   HPI:  85-year-old  female past history of hypothyroidism, COPD, coronary artery disease, gaseous after reflux disease, hyperlipidemia, hypertension who presented on 1/3/2018 with complaints chest pain and dyspnea.  The patient was noted to have elevated BNP levels as well as elevated troponin levels.  The patient is currently being treated for COPD as well as undergoing workup for suspected CHF and possible coronary artery disease.  The patient underwent Lexiscan stress testing and ischemia noted with decision to pursue medical management.  During today's visit, patient is still extremely fatigued and winded.      Review of Systems   Constitutional: Negative for chills and fever.   Respiratory: Positive for cough, chest tightness and shortness of breath. Negative for wheezing.    Cardiovascular: Negative for chest pain, palpitations and leg swelling.   Gastrointestinal: Negative for abdominal pain, constipation, diarrhea, nausea and vomiting.   Musculoskeletal: Negative for back pain.   Skin: Negative for pallor.   Psychiatric/Behavioral: Negative for confusion.        All pertinent negatives and positives are as above. All other systems have been reviewed and are negative unless otherwise stated.     Objective    Temp:  [97.1 °F (36.2 °C)-98.1 °F (36.7 °C)] 97.3 °F (36.3 °C)  Heart Rate:  [57-85] 60  Resp:  [16-20] 20  BP: (128-178)/(63-81) 128/63    Physical Exam   Constitutional: She is oriented to person, place, and time. She appears well-developed and well-nourished.   HENT:   Head: Normocephalic and atraumatic.   Eyes: Conjunctivae are normal.   Neck: Neck supple.   Cardiovascular: Normal rate, normal heart sounds and intact distal pulses.    Pulmonary/Chest:  Accessory muscle usage present. She has decreased breath sounds in the right lower field and the left lower field. She has no wheezes.   Abdominal: Soft. Bowel sounds are normal.   Musculoskeletal: Normal range of motion. She exhibits no edema.   Neurological: She is alert and oriented to person, place, and time.   Skin: Skin is warm and dry.   Psychiatric: She has a normal mood and affect. Her behavior is normal.   Vitals reviewed.          Results Review:  I have reviewed the labs, radiology results, and diagnostic studies.    Laboratory Data:     Results from last 7 days  Lab Units 01/07/18  0655 01/06/18  1018 01/04/18  0538 01/03/18  1545   SODIUM mmol/L 134* 133* 133* 128*   POTASSIUM mmol/L 4.0 3.9 4.0 3.9   CHLORIDE mmol/L 94* 95 95 90*   CO2 mmol/L 28.0 29.0 25.0 24.0   BUN mg/dL 24* 36* 44* 33*   CREATININE mg/dL 0.80 1.04* 1.36* 1.21*   GLUCOSE mg/dL 124* 89 119* 97   CALCIUM mg/dL 8.2* 8.2* 8.1* 8.5   BILIRUBIN mg/dL  --   --  0.3 0.4   ALK PHOS U/L  --   --  53 56   ALT (SGPT) U/L  --   --  37 37   AST (SGOT) U/L  --   --  34 37*   ANION GAP mmol/L 12.0 9.0 13.0 14.0     Estimated Creatinine Clearance: 53.5 mL/min (by C-G formula based on Cr of 0.8).    Results from last 7 days  Lab Units 01/03/18  1722   MAGNESIUM mg/dL 2.0           Results from last 7 days  Lab Units 01/07/18  0655 01/06/18  1018 01/04/18  0538 01/03/18  1545   WBC 10*3/mm3 3.03* 7.35 5.06 6.31   HEMOGLOBIN g/dL 11.8* 11.2* 12.3 12.3   HEMATOCRIT % 34.7* 33.6* 36.8 36.4   PLATELETS 10*3/mm3 191 191 203 193       Results from last 7 days  Lab Units 01/03/18  1546   INR  1.01       Culture Data:   No results found for: BLOODCX  No results found for: URINECX  No results found for: RESPCX  No results found for: WOUNDCX  No results found for: STOOLCX  No components found for: BODYFLD    Radiology Data:   Imaging Results (last 24 hours)     ** No results found for the last 24 hours. **          I have reviewed the patient current  medications.     Assessment/Plan     1. Atypical chest pain with troponin elevation.  Cardiology following.  Ischemia noted in the anterior wall and the septum on stress testing.  Cardiology recommends medication management including Nitro patch and Ranexa.  Invasive intervention will be considered if patient fails medical management.   2. Multifactorial dyspnea r/t COPD and CHF with reduced EF 35%: Lasix, Coreg.  Will consider ACE/ARB if renal function remains stable.  D dimer elevation noted and patient awaiting CTA to rule out pulmonary embolus.  3. BENITO: monitor BMP  4. Sick sinus syndrome s/p pacemaker: pacemaker interrogation.   5. HLD: continue statin  6. CAD: ASA, Plavix, statin, Nitro patch, Coreg, Ranexa  7. Hypothyroidism: synthroid           This document has been electronically signed by CRESENCIO Winters on January 7, 2018 2:57 PM

## 2018-01-08 LAB
ANION GAP SERPL CALCULATED.3IONS-SCNC: 11 MMOL/L (ref 5–15)
BUN BLD-MCNC: 28 MG/DL (ref 7–21)
BUN/CREAT SERPL: 34.1 (ref 7–25)
CALCIUM SPEC-SCNC: 8.4 MG/DL (ref 8.4–10.2)
CHLORIDE SERPL-SCNC: 93 MMOL/L (ref 95–110)
CO2 SERPL-SCNC: 31 MMOL/L (ref 22–31)
CREAT BLD-MCNC: 0.82 MG/DL (ref 0.5–1)
DEPRECATED RDW RBC AUTO: 44.3 FL (ref 36.4–46.3)
ERYTHROCYTE [DISTWIDTH] IN BLOOD BY AUTOMATED COUNT: 12.8 % (ref 11.5–14.5)
GFR SERPL CREATININE-BSD FRML MDRD: 66 ML/MIN/1.73 (ref 60–90)
GLUCOSE BLD-MCNC: 145 MG/DL (ref 60–100)
HCT VFR BLD AUTO: 34.2 % (ref 35–45)
HGB BLD-MCNC: 11.4 G/DL (ref 12–15.5)
MCH RBC QN AUTO: 31.8 PG (ref 26.5–34)
MCHC RBC AUTO-ENTMCNC: 33.3 G/DL (ref 31.4–36)
MCV RBC AUTO: 95.3 FL (ref 80–98)
PLATELET # BLD AUTO: 227 10*3/MM3 (ref 150–450)
PMV BLD AUTO: 9.7 FL (ref 8–12)
POTASSIUM BLD-SCNC: 3.8 MMOL/L (ref 3.5–5.1)
RBC # BLD AUTO: 3.59 10*6/MM3 (ref 3.77–5.16)
SODIUM BLD-SCNC: 135 MMOL/L (ref 137–145)
WBC NRBC COR # BLD: 9.38 10*3/MM3 (ref 3.2–9.8)

## 2018-01-08 PROCEDURE — 97535 SELF CARE MNGMENT TRAINING: CPT

## 2018-01-08 PROCEDURE — 25010000002 METHYLPREDNISOLONE PER 40 MG: Performed by: NURSE PRACTITIONER

## 2018-01-08 PROCEDURE — G8979 MOBILITY GOAL STATUS: HCPCS | Performed by: PHYSICAL THERAPIST

## 2018-01-08 PROCEDURE — G8987 SELF CARE CURRENT STATUS: HCPCS

## 2018-01-08 PROCEDURE — 94799 UNLISTED PULMONARY SVC/PX: CPT

## 2018-01-08 PROCEDURE — G8988 SELF CARE GOAL STATUS: HCPCS

## 2018-01-08 PROCEDURE — 97166 OT EVAL MOD COMPLEX 45 MIN: CPT

## 2018-01-08 PROCEDURE — 80048 BASIC METABOLIC PNL TOTAL CA: CPT | Performed by: NURSE PRACTITIONER

## 2018-01-08 PROCEDURE — 97530 THERAPEUTIC ACTIVITIES: CPT

## 2018-01-08 PROCEDURE — 94760 N-INVAS EAR/PLS OXIMETRY 1: CPT

## 2018-01-08 PROCEDURE — 97162 PT EVAL MOD COMPLEX 30 MIN: CPT | Performed by: PHYSICAL THERAPIST

## 2018-01-08 PROCEDURE — G8978 MOBILITY CURRENT STATUS: HCPCS | Performed by: PHYSICAL THERAPIST

## 2018-01-08 PROCEDURE — 85027 COMPLETE CBC AUTOMATED: CPT | Performed by: NURSE PRACTITIONER

## 2018-01-08 PROCEDURE — 97110 THERAPEUTIC EXERCISES: CPT

## 2018-01-08 RX ORDER — LOSARTAN POTASSIUM 25 MG/1
25 TABLET ORAL
Status: DISCONTINUED | OUTPATIENT
Start: 2018-01-08 | End: 2018-01-09 | Stop reason: HOSPADM

## 2018-01-08 RX ORDER — FUROSEMIDE 40 MG/1
40 TABLET ORAL
Status: DISCONTINUED | OUTPATIENT
Start: 2018-01-09 | End: 2018-01-09 | Stop reason: HOSPADM

## 2018-01-08 RX ORDER — IPRATROPIUM BROMIDE AND ALBUTEROL SULFATE 2.5; .5 MG/3ML; MG/3ML
SOLUTION RESPIRATORY (INHALATION)
Qty: 360 ML | Refills: 1 | Status: SHIPPED | OUTPATIENT
Start: 2018-01-08 | End: 2018-03-30 | Stop reason: SDUPTHER

## 2018-01-08 RX ADMIN — RANOLAZINE 1000 MG: 500 TABLET, FILM COATED, EXTENDED RELEASE ORAL at 20:07

## 2018-01-08 RX ADMIN — CARVEDILOL 6.25 MG: 6.25 TABLET, FILM COATED ORAL at 10:09

## 2018-01-08 RX ADMIN — LOSARTAN POTASSIUM 25 MG: 25 TABLET ORAL at 17:15

## 2018-01-08 RX ADMIN — METHYLPREDNISOLONE SODIUM SUCCINATE 40 MG: 40 INJECTION, POWDER, FOR SOLUTION INTRAMUSCULAR; INTRAVENOUS at 17:14

## 2018-01-08 RX ADMIN — NITROGLYCERIN 1 PATCH: 0.2 PATCH TRANSDERMAL at 10:05

## 2018-01-08 RX ADMIN — IPRATROPIUM BROMIDE AND ALBUTEROL SULFATE 3 ML: 2.5; .5 SOLUTION RESPIRATORY (INHALATION) at 07:52

## 2018-01-08 RX ADMIN — CARVEDILOL 6.25 MG: 6.25 TABLET, FILM COATED ORAL at 17:14

## 2018-01-08 RX ADMIN — CLOPIDOGREL BISULFATE 75 MG: 75 TABLET ORAL at 20:08

## 2018-01-08 RX ADMIN — BUDESONIDE AND FORMOTEROL FUMARATE DIHYDRATE 2 PUFF: 160; 4.5 AEROSOL RESPIRATORY (INHALATION) at 20:23

## 2018-01-08 RX ADMIN — FUROSEMIDE 40 MG: 40 TABLET ORAL at 10:05

## 2018-01-08 RX ADMIN — ATORVASTATIN CALCIUM 20 MG: 20 TABLET, FILM COATED ORAL at 20:08

## 2018-01-08 RX ADMIN — RANOLAZINE 1000 MG: 500 TABLET, FILM COATED, EXTENDED RELEASE ORAL at 10:04

## 2018-01-08 RX ADMIN — BUDESONIDE AND FORMOTEROL FUMARATE DIHYDRATE 2 PUFF: 160; 4.5 AEROSOL RESPIRATORY (INHALATION) at 07:59

## 2018-01-08 RX ADMIN — LEVOTHYROXINE SODIUM 50 MCG: 50 TABLET ORAL at 06:00

## 2018-01-08 RX ADMIN — IPRATROPIUM BROMIDE AND ALBUTEROL SULFATE 3 ML: 2.5; .5 SOLUTION RESPIRATORY (INHALATION) at 20:17

## 2018-01-08 RX ADMIN — ASPIRIN 81 MG: 81 TABLET, COATED ORAL at 10:04

## 2018-01-08 RX ADMIN — METHYLPREDNISOLONE SODIUM SUCCINATE 40 MG: 40 INJECTION, POWDER, FOR SOLUTION INTRAMUSCULAR; INTRAVENOUS at 06:00

## 2018-01-08 RX ADMIN — SERTRALINE HYDROCHLORIDE 50 MG: 50 TABLET ORAL at 10:05

## 2018-01-08 NOTE — CONSULTS
"Adult Nutrition  Assessment    Patient Name:  Val Arteaga  YOB: 1932  MRN: [id number]  Admit Date:  1/3/2018    Assessment Date:  1/8/2018    Comments:  Pt reports fair appetite.  Voiced no few preferences.  Indicates she has lost a few lb since admit but amount not given.  Intake 50% - 2x, 25% - 2x, 0% - 1x.  Blood glucose is usually elevated.  Meds include solu-medrol.  Will increase Ensure 1x/day to 3x/day.          Reason for Assessment       01/08/18 1410    Reason for Assessment    Reason For Assessment/Visit nurse/nurse practitioner consult                  Anthropometrics       01/08/18 1412    Anthropometrics    Height 168 cm (66.14\")    Weight 75.4 kg (166 lb 3.6 oz)    Ideal Body Weight (IBW)    Ideal Body Weight (IBW), Female 60.3    % Ideal Body Weight 125.3    Body Mass Index (BMI)    BMI (kg/m2) 26.77    BMI Grade 25 - 29.9 - overweight            Labs/Tests/Procedures/Meds       01/08/18 1413    Labs/Tests/Procedures/Meds    Labs/Tests Review Glucose;Hgb Hct;BUN;Na+    Medication Review Diuretic;Steroid;Reviewed, pertinent              Estimated/Assessed Needs       01/08/18 1414    Calculation Measurements    Weight Used For Calculations 75.4 kg (166 lb 3.6 oz)    Height Used for Calculations 1.68 m (5' 6.14\")    Estimated/Assessed Energy Needs    Energy Need Method [place]-St Jeor    Age 85    RMR ([place]-St. Jeor Equation) 1218    Activity Factors ([place] St Jeor)  Out of bed, ambulatory  1.3    Total estimated needs ([place] St. Jeor) 1583    Estimated/Assessed Protein Needs    Weight Used for Protein Calculation 75.4 kg (166 lb 3.6 oz)    Protein (gm/kg) 1.2    1.2 Gm Protein (gm) 90.48    Estimated/Assessed Fluid Needs    Fluid Need Method --   1885            Nutrition Prescription Ordered       01/08/18 1415    Nutrition Prescription PO    Current PO Diet Regular    Common Modifiers Cardiac            Evaluation of Received Nutrient/Fluid Intake       01/08/18 1415    " PO Evaluation    Number of Meals 5    % PO Intake 50% - 2x, 25% - 2x, 0% - 1x            Electronically signed by:  Katie Rivera RD  01/08/18 2:17 PM

## 2018-01-08 NOTE — PROGRESS NOTES
Discharge Planning Assessment  [facility]     Patient Name: Val Arteaga  MRN: [id number]  Today's Date: 1/8/2018    Admit Date: 1/3/2018          Discharge Needs Assessment       01/08/18 1057    Living Environment    Lives With alone    Living Arrangements house   Contact information on face sheet confirmed with patient.     Transportation Available car;family or friend will provide   If unable to drive, patient has support/assistance available.     Living Environment    Provides Primary Care For no one    Quality Of Family Relationships involved;supportive    Able to Return to Prior Living Arrangements yes    Discharge Needs Assessment    Concerns To Be Addressed other (see comments)   NISHANT discussed home health services: transition visit. Patient is agreeable pending there is a  need for services.     Anticipated Changes Related to Illness none    Equipment Currently Used at Home oxygen;other (see comments);nebulizer;walker, rolling   Patient is on 2.5 liters of oxygen @ home. She wears oxygen PRN during the day and continuously at night.  Patient voiced that she has portable tanks available for transport. DME from [facility].     Equipment Needed After Discharge none    Current Discharge Risk chronically ill   COPD            Discharge Plan       01/08/18 1100    Case Management/Social Work Plan    Plan home with home health transition visit v[facility] skilled services (questionable CHF dx)    Additional Comments LACE complete. Note left for MD with request for Home Health services: transition visit [facility] skilled services. Continue with medical management....Lauren Krueger W        Discharge Placement     No information found                Demographic Summary       01/08/18 1055    Referral Information    Admission Type inpatient    Arrived From home or self-care    Referral Source high risk screening   LACE score 11    Record Reviewed clinical discipline documentation;history and physical;medical record     Primary Care Physician Information    Name Dr Colungaia Roosevelt            Functional Status       01/08/18 1055    Functional Status Prior    Ambulation 0-->independent    Transferring 0-->independent    Toileting 0-->independent    Bathing 0-->independent    Dressing 0-->independent    Eating 0-->independent    Communication 0-->understands/communicates without difficulty    Swallowing 0-->swallows foods/liquids without difficulty    IADL    Medications independent   Pharmacy, [facility], and coverage verified with patient.     Meal Preparation independent    Housekeeping assistive person   Patient has a  that assist every 2 weeks.    Laundry independent    Shopping independent    Oral Care independent    Activity Tolerance    Current Activity Limitations none    Usual Activity Tolerance good    Cognitive/Perceptual/Developmental    Current Mental Status/Cognitive Functioning no deficits noted    Recent Changes in Mental Status/Cognitive Functioning no changes    Developmental Stage (Eriksson's Stages of Development) Stage 8 (65 years-death/Late Adulthood) Integrity vs. Despair            Psychosocial     None            Abuse/Neglect     None            Legal     None            Substance Abuse     None            Patient Forms     None          SHANNON Peterson

## 2018-01-08 NOTE — PLAN OF CARE
Problem: Patient Care Overview (Adult)  Goal: Plan of Care Review  Outcome: Ongoing (interventions implemented as appropriate)   01/08/18 0833   Coping/Psychosocial Response Interventions   Plan Of Care Reviewed With patient   Outcome Evaluation   Outcome Summary/Follow up Plan MANUEL chopra completed this date. Pt pleasant and engaged well but very fatigued and SOB with increased activity. Pt don and doff socks EOB with SBA and averaged SBA to CGA for sit to stand and mobility around room. pt required several rest breaks. Pt could benefit from further skilled OT to increase endurance, education, strength, and independence with ADL as pt lives alone. Pt will need further skilled therapy or pulmonary rehab at d/c recommend for now 24/7.        Problem: Inpatient Occupational Therapy  Goal: Patient Education Goal LTG- OT  Outcome: Ongoing (interventions implemented as appropriate)   01/08/18 0833   Patient Education OT LTG   Patient Education OT LTG, Date Established 01/08/18   Patient Education OT LTG, Time to Achieve by discharge   Patient Education OT LTG, Education Type HEP;home safety;energy conservation   Patient Education OT LTG, Education Understanding verbalizes understanding;demonstrates adequately;independent     Goal: ADL Goal LTG- OT  Outcome: Ongoing (interventions implemented as appropriate)   01/08/18 0833   ADL OT LTG   ADL OT LTG, Date Established 01/08/18   ADL OT LTG, Time to Achieve by discharge   ADL OT LTG, Activity Type ADL skills   ADL OT LTG, [place] Level modified independent     Goal: Activity Tolerance Goal LTG- OT  Outcome: Ongoing (interventions implemented as appropriate)   01/08/18 0833   Activity Tolerance OT LTG   Activity Tolerance Goal OT LTG, Date Established 01/08/18   Activity Tolerance Goal OT LTG, Time to Achieve by discharge   Activity Tolerance Goal OT LTG, Activity Level 20 min activity;O2 sat >/equal to 90%;with 1 rest break     Goal: Endurance Goal LTG- OT  Outcome:  Ongoing (interventions implemented as appropriate)   01/08/18 0833   Endurance OT LTG   Endurance Goal OT LTG, Date Established 01/08/18   Endurance Goal OT LTG, Time to Achieve by discharge   Endurance Goal OT LTG, Activity Level endurance 2 good  (30 minute activity)

## 2018-01-08 NOTE — PROGRESS NOTES
[facility] Medicine Services  INPATIENT PROGRESS NOTE    Length of Stay: 1  Date of Admission: 1/3/2018  Primary Care Physician: Roula Albert MD    Subjective   Chief Complaint: chest pain, dyspnea   HPI:  85-year-old  female past history of hypothyroidism, COPD, coronary artery disease, gaseous after reflux disease, hyperlipidemia, hypertension who presented on 1/3/2018 with complaints chest pain and dyspnea.  The patient was noted to have elevated BNP levels as well as elevated troponin levels.  The patient is currently being treated for COPD as well as undergoing workup for suspected CHF and possible coronary artery disease.  The patient underwent Lexiscan stress testing and ischemia noted with decision to pursue medical management.  During today's visit, patient is still extremely fatigued and winded.      Review of Systems   Constitutional: Negative for chills and fever.   Respiratory: Positive for shortness of breath. Negative for cough, chest tightness and wheezing.         Dyspnea with exertion   Cardiovascular: Negative for chest pain, palpitations and leg swelling.   Gastrointestinal: Negative for abdominal pain, constipation, diarrhea, nausea and vomiting.   Musculoskeletal: Negative for back pain.   Skin: Negative for pallor.   Psychiatric/Behavioral: Negative for confusion.        All pertinent negatives and positives are as above. All other systems have been reviewed and are negative unless otherwise stated.     Objective    Temp:  [97.9 °F (36.6 °C)-98.7 °F (37.1 °C)] 98.2 °F (36.8 °C)  Heart Rate:  [69-89] 85  Resp:  [18-22] 22  BP: (150-167)/(77-84) 167/77    Physical Exam   Constitutional: She is oriented to person, place, and time. She appears well-developed and well-nourished.   HENT:   Head: Normocephalic and atraumatic.   Eyes: Conjunctivae are normal.   Neck: Neck supple.   Cardiovascular: Normal rate, normal heart sounds and intact distal  pulses.    Pulmonary/Chest: No accessory muscle usage. She has no decreased breath sounds. She has wheezes.   Abdominal: Soft. Bowel sounds are normal.   Musculoskeletal: Normal range of motion. She exhibits no edema.   Neurological: She is alert and oriented to person, place, and time.   Skin: Skin is warm and dry.   Psychiatric: She has a normal mood and affect. Her behavior is normal.   Vitals reviewed.          Results Review:  I have reviewed the labs, radiology results, and diagnostic studies.    Laboratory Data:     Results from last 7 days  Lab Units 01/08/18  0541 01/07/18  0655 01/06/18  1018 01/04/18  0538 01/03/18  1545   SODIUM mmol/L 135* 134* 133* 133* 128*   POTASSIUM mmol/L 3.8 4.0 3.9 4.0 3.9   CHLORIDE mmol/L 93* 94* 95 95 90*   CO2 mmol/L 31.0 28.0 29.0 25.0 24.0   BUN mg/dL 28* 24* 36* 44* 33*   CREATININE mg/dL 0.82 0.80 1.04* 1.36* 1.21*   GLUCOSE mg/dL 145* 124* 89 119* 97   CALCIUM mg/dL 8.4 8.2* 8.2* 8.1* 8.5   BILIRUBIN mg/dL  --   --   --  0.3 0.4   ALK PHOS U/L  --   --   --  53 56   ALT (SGPT) U/L  --   --   --  37 37   AST (SGOT) U/L  --   --   --  34 37*   ANION GAP mmol/L 11.0 12.0 9.0 13.0 14.0     Estimated Creatinine Clearance: 52.2 mL/min (by C-G formula based on Cr of 0.82).    Results from last 7 days  Lab Units 01/03/18  1722   MAGNESIUM mg/dL 2.0           Results from last 7 days  Lab Units 01/08/18  0541 01/07/18  0655 01/06/18  1018 01/04/18  0538 01/03/18  1545   WBC 10*3/mm3 9.38 3.03* 7.35 5.06 6.31   HEMOGLOBIN g/dL 11.4* 11.8* 11.2* 12.3 12.3   HEMATOCRIT % 34.2* 34.7* 33.6* 36.8 36.4   PLATELETS 10*3/mm3 227 191 191 203 193       Results from last 7 days  Lab Units 01/03/18  1546   INR  1.01       Culture Data:   No results found for: BLOODCX  No results found for: URINECX  No results found for: RESPCX  No results found for: WOUNDCX  No results found for: STOOLCX  No components found for: BODYFLD    Radiology Data:   Imaging Results (last 24 hours)     Procedure  Component Value Units Date/Time    CT Angiogram Chest With Contrast [[id number]] Collected:  01/07/18 1555     Updated:  01/07/18 1633    Narrative:       [facility]    Patient Name: ADALBERTO NOLASCO    ORDERING: ANALI JORGE    ATTENDING: ADELA ALEJANDRE     REFERRING: ANALI JORGE    -----------------------    EXAM DESCRIPTION: CT ANGIOGRAM CHEST W CONTRAST    CLINICAL HISTORY:  rule out pulmonary embolus, R07.2 Precordial  pain R06.02 Shortness of breath R74.8 Abnormal levels of other  serum enzymes I49.5 Sick sinus syndrome I21.4 Non-ST elevation  (NSTEMI) myocardial infarction R06.00 Dyspnea, unspecified R07.9  Chest pain, unspecified I25.10 Atherosclerotic heart disease of  native coronary artery without angina pectoris       COMPARISON: 2/10/2016    TECHNIQUE:   Axial images were obtained and multiplanar reconstructions were  made.    This exam was performed according to our departmental dose  optimization program, which includes automated exposure control,  adjustment of the mA and/or KV according to patient size and/or  use of iterative reconstruction technique.  Dose Length Product 305.30  CONTRAST:   66 cc intravenous Isovue 370    FINDINGS:  Diagnostic quality: Adequate .  Pulmonary embolism: No evidence of pulmonary embolism.  Pulmonary arteries:  Normal in caliber.  Lung parenchyma: There is a background of pulmonary emphysema  redemonstrated. There is some dependent basilar atelectasis  greater on the right. No dense parenchymal consolidation,  effusion or pneumothorax. Intimal scarring or atelectasis within  the lingula. No mass or suspicious nodule.  Pleural effusion: None.  Central airways: Patent.  Adenopathy: No suspicious mediastinal, hilar, or axillary lymph  nodes based on size or morphologic criteria.  Heart and great vessels: No cardiac enlargement or pericardial  effusion. Coronary vascular calcification. There is again  findings suggesting right heart insufficiency  with prominent  amount of contrast extending into the azygos system and the IVC  and small amount within the hepatic veins.  Upper abdomen: Small hiatal hernia. No acute findings.    Bones: Degenerative changes throughout the thoracic spine. No  acute compression fracture..    Additional findings: None.      Impression:       No CT evidence of pulmonary embolism.    Pulmonary emphysema with basilar dependent atelectasis. No  consolidation or acute pleural finding.    Electronically signed by:  Marco A Fraire MD  1/7/2018 4:32 PM CST  Workstation: [id number]          I have reviewed the patient current medications.     Assessment/Plan     1. Atypical chest pain with troponin elevation.  Cardiology following.  Ischemia noted in the anterior wall and the septum on stress testing.  Cardiology recommends medication management including Nitro patch and Ranexa.  Invasive intervention will be considered if patient fails medical management.  No complaints of chest pain overnight.   2. Multifactorial dyspnea r/t COPD and CHF with reduced EF 35%: Lasix, Coreg.  Losartan added.  Daily weights and fluid restriction.  CTA negative for pulmonary embolus.   3. BENITO: resolved.  4. Sick sinus syndrome s/p pacemaker:  5. HLD: continue statin  6. CAD: ASA, Plavix, statin, Nitro patch, Coreg, Ranexa  7. Hypothyroidism: synthroid   8. Deconditioning: Pt/Ot    Plan for discharge in 1-2 days.  Assess need for home o2 and order home health PT/OT and respiratory assessments.         This document has been electronically signed by CRESENCIO Winters on January 8, 2018 2:21 PM

## 2018-01-08 NOTE — PLAN OF CARE
Problem: Patient Care Overview (Adult)  Goal: Plan of Care Review  Outcome: Ongoing (interventions implemented as appropriate)    Goal: Adult Individualization and Mutuality  Outcome: Ongoing (interventions implemented as appropriate)    Goal: Discharge Needs Assessment  Outcome: Ongoing (interventions implemented as appropriate)      Problem: Acute Coronary Syndrome (ACS) (Adult)  Goal: Signs and Symptoms of Listed Potential Problems Will be Absent or Manageable (Acute Coronary Syndrome)  Outcome: Ongoing (interventions implemented as appropriate)      Problem: Fall Risk (Adult)  Goal: Absence of Falls  Outcome: Ongoing (interventions implemented as appropriate)      Problem: Respiratory Insufficiency (Adult)  Goal: Identify Related Risk Factors and Signs and Symptoms  Outcome: Outcome(s) achieved Date Met: 01/08/18    Goal: Acid/Base Balance  Outcome: Ongoing (interventions implemented as appropriate)    Goal: Effective Ventilation  Outcome: Ongoing (interventions implemented as appropriate)

## 2018-01-08 NOTE — PLAN OF CARE
Problem: Patient Care Overview (Adult)  Goal: Plan of Care Review  Outcome: Ongoing (interventions implemented as appropriate)   01/08/18 0938   Coping/Psychosocial Response Interventions   Plan Of Care Reviewed With patient   Outcome Evaluation   Outcome Summary/Follow up Plan PT eval completed, patient able to come to sit EOB with HOB up and supervision, able t come to stand with suprevision, able to ambulate with supervision 200 feet and no device, could benefit from skilled PT to regain and return to hihgest level of function in transfers, gait and endurance     Goal: Discharge Needs Assessment  Outcome: Ongoing (interventions implemented as appropriate)   01/08/18 0938   Discharge Needs Assessment   Concerns To Be Addressed no discharge needs identified   Current Discharge Risk lives alone;physical impairment   Discharge Disposition still a patient   Current Health   Outpatient/Agency/Support Group Needs homecare agency (specify level of care)   Living Environment   Transportation Available car;family or friend will provide   Self-Care   Equipment Currently Used at Home oxygen  (PRN)       Problem: Inpatient Physical Therapy  Goal: Bed Mobility Goal STG- PT  Outcome: Ongoing (interventions implemented as appropriate)   01/08/18 0938   Bed Mobility PT STG   Bed Mobility PT STG, Date Established 01/08/18   Bed Mobility PT STG, Time to Achieve 2 days   Bed Mobility PT STG, Activity Type supine to sit/sit to supine   Bed Mobility PT STG, [place] Level independent   Bed Mobility PT STG, Additional Goal HOB down and no rails     Goal: Transfer Training Goal 1 STG- PT  Outcome: Ongoing (interventions implemented as appropriate)   01/08/18 0938   Transfer Training PT STG   Transfer Training PT STG, Date Established 01/08/18   Transfer Training PT STG, Time to Achieve 2 days   Transfer Training PT STG, Activity Type bed to chair /chair to bed;sit to stand/stand to sit   Transfer Training PT STG, [place] Level  independent     Goal: Gait Training Goal LTG- PT  Outcome: Ongoing (interventions implemented as appropriate)   01/08/18 0938   Gait Training PT LTG   Gait Training Goal PT LTG, Date Established 01/08/18   Gait Training Goal PT LTG, Time to Achieve by discharge   Gait Training Goal PT LTG, [place] Level independent   Gait Training Goal PT LTG, Assist Device (none)   Gait Training Goal PT LTG, Distance to Achieve 400 feet     Goal: Stair Training Goal LTG- PT  Outcome: Ongoing (interventions implemented as appropriate)   01/08/18 0938   Stair Training PT LTG   Stair Training Goal PT LTG, Date Established 01/08/18   Stair Training Goal PT LTG, Time to Achieve 2 days   Stair Training Goal PT LTG, Number of Steps 2   Stair Training Goal PT LTG, [place] Level independent   Stair Training Goal PT LTG, Assist Device 1 handrail

## 2018-01-08 NOTE — THERAPY TREATMENT NOTE
Inpatient Rehabilitation - Occupational Therapy Treatment Note  [facility]     Patient Name: Val Arteaga  : 1932  MRN: [id number]  Today's Date: 2018  Onset of Illness/Injury or Date of Surgery Date: 18  Date of Referral to OT: 18  Referring Physician: Kristina DUPONT      Admit Date: 1/3/2018    Visit Dx:     ICD-10-CM ICD-9-CM   1. Precordial pain R07.2 786.51   2. Shortness of breath R06.02 786.05   3. Elevated troponin R74.8 790.6   4. SSS (sick sinus syndrome) I49.5 427.81   5. NSTEMI (non-ST elevated myocardial infarction) I21.4 410.70   6. Dyspnea, unspecified type R06.00 786.09   7. Chest pain, unspecified type R07.9 786.50   8. Atherosclerosis of native coronary artery of native heart, angina presence unspecified I25.10 414.01   9. Impaired mobility and ADLs Z74.09 799.89   10. Impaired physical mobility Z74.09 781.99     Patient Active Problem List   Diagnosis   • Essential hypertension   • Coronary artery disease involving native coronary artery of native heart without angina pectoris   • Chronic obstructive pulmonary disease   • Acquired hypothyroidism   • Depressive disorder   • Thygeson's superficial punctate keratitis   • Pseudophakia   • Precordial pain             Adult Rehabilitation Note       18 1605          Rehab Assessment/Intervention    Discipline occupational therapy assistant  -LM      Document Type therapy note (daily note)  -LM      Subjective Information agree to therapy  -LM      Patient Effort, Rehab Treatment good  -LM      Recorded by [LM] Massiel Santana, NELSON/L      Vital Signs    Pretreatment Heart Rate (beats/min) 81  -LM      Intratreatment Heart Rate (beats/min) 89  -LM      Posttreatment Heart Rate (beats/min) 89  -LM      Pre SpO2 (%) 91  -LM      Intra SpO2 (%) 91  -LM      Post SpO2 (%) 91  -LM      Recorded by [LM] YULI MaloneA/L      Transfer Assessment/Treatment    Transfers, Sit-Stand [place] supervision  required  -LM      Transfers, Stand-Sit [place] supervision required  -LM      Recorded by [LM] JENNIFFER Malone      Upper Body Bathing Assessment/Training    UB Bathing Assess/Train, [place] Level --   mmock with sba  -LM      Recorded by [LM] OMAR Malone/L      Lower Body Bathing Assessment/Training    LB Bathing Assess/Train, [place] Level supervision required   mock with cca-sba  -LM      Recorded by [LM] JENNIFFER Malone      Upper Body Dressing Assessment/Training    UB Dressing Assess/Train, Assist Device --   mock with hansen  -LM      Recorded by [LM] JENNIFFER Malone      Lower Body Dressing Assessment/Training    LB Dressing Assess/Train, [place] --   perf socks with hansen and mocks pants with sba-cca  -LM      Recorded by [LM] JENNIFFER Malone      Balance Skills Training    Sitting-Level of Assistance Independent  -LM      Sitting # of Minutes --   15  -LM      Standing-Level of Assistance Close supervision;Contact guard  -LM      Standing Balance # of Minutes --   5 min with sba-cca with wt shifting act to increase balance/  -LM      Recorded by [LM] JENNIFFER Malone      Positioning and Restraints    Pre-Treatment Position in bed  -LM      Post Treatment Position bed   eob with family present  -LM      Recorded by [LM] JENNIFFER Malone        User Key  (r) = Recorded By, (t) = Taken By, (c) = Cosigned By    Initials Name Effective Dates    LM JENNIFFER Malone 10/17/16 -                 OT Goals       01/08/18 1640 01/08/18 0833       Patient Education OT LTG    Patient Education OT LTG, Date Established  01/08/18  -     Patient Education OT LTG, Time to Achieve  by discharge  -     Patient Education OT LTG, Education Type  HEP;home safety;energy conservation  -     Patient Education OT LTG, Education Understanding  verbalizes understanding;demonstrates adequately;independent  -     Patient Education OT  LTG, Date Goal Reviewed 01/08/18  -LM      Patient Education OT LTG Outcome goal ongoing  -LM      ADL OT LTG    ADL OT LTG, Date Established  01/08/18  -     ADL OT LTG, Time to Achieve  by discharge  -     ADL OT LTG, Activity Type  ADL skills  -     ADL OT LTG, [place] Level  modified independent  -     ADL OT LTG, Date Goal Reviewed 01/08/18  -LM      ADL OT LTG, Outcome goal ongoing  -LM      Activity Tolerance OT LTG    Activity Tolerance Goal OT LTG, Date Established  01/08/18  -     Activity Tolerance Goal OT LTG, Time to Achieve  by discharge  -     Activity Tolerance Goal OT LTG, Activity Level  20 min activity;O2 sat >/equal to 90%;with 1 rest break  -     Activity Tolerance Goal OT LTG, Date Goal Reviewed 01/08/18  -LM      Activity Tolerance Goal OT LTG, Outcome goal ongoing  -LM      Endurance OT LTG    Endurance Goal OT LTG, Date Established  01/08/18  -     Endurance Goal OT LTG, Time to Achieve  by discharge  -     Endurance Goal OT LTG, Activity Level  endurance 2 good   30 minute activity  -     Endurance Goal OT LTG, Date Goal Reviewed 01/08/18  -LM      Endurance Goal OT LTG, Outcome goal ongoing  -LM        User Key  (r) = Recorded By, (t) = Taken By, (c) = Cosigned By    Initials Name Provider Type     Marisol Burdick OTR/L Occupational Therapist     OMAR Malone/L Occupational Therapy Assistant          Occupational Therapy Education     Title: PT OT SLP Therapies (Active)     Topic: Occupational Therapy (Done)     Point: ADL training (Done)    Description: Instruct learner(s) on proper safety adaptation and remediation techniques during self care or transfers.   Instruct in proper use of assistive devices.    Learning Progress Summary    Learner Readiness Method Response Comment Documented by Status   Patient Acceptance E VU ed on home safety fall prevention and ae dme and long discussion on tub transfer bench and benefits  01/08/18 1636 Done     Acceptance E VU Educated on OT and POC. Educated on safety throughout with t/f, mobility, and ADL. Educated on deep breathing to keep O2 levels above 90. Educated to call for assist.  01/08/18 1318 Done               Point: Home exercise program (Done)    Description: Instruct learner(s) on appropriate technique for monitoring, assisting and/or progressing therapeutic exercises/activities.    Learning Progress Summary    Learner Readiness Method Response Comment Documented by Status   Patient Acceptance E  ed on home safety fall prevention and ae dme and long discussion on tub transfer bench and benefits  01/08/18 1639 Done               Point: Precautions (Done)    Description: Instruct learner(s) on prescribed precautions during self-care and functional transfers.    Learning Progress Summary    Learner Readiness Method Response Comment Documented by Status   Patient Acceptance E  ed on home safety fall prevention and ae dme and long discussion on tub transfer bench and benefits  01/08/18 1639 Done    Acceptance E VU Educated on OT and POC. Educated on safety throughout with t/f, mobility, and ADL. Educated on deep breathing to keep O2 levels above 90. Educated to call for assist.  01/08/18 1318 Done               Point: Body mechanics (Done)    Description: Instruct learner(s) on proper positioning and spine alignment during self-care, functional mobility activities and/or exercises.    Learning Progress Summary    Learner Readiness Method Response Comment Documented by Status   Patient Acceptance E  ed on home safety fall prevention and ae dme and long discussion on tub transfer bench and benefits  01/08/18 1639 Done    Acceptance E VU Educated on OT and POC. Educated on safety throughout with t/f, mobility, and ADL. Educated on deep breathing to keep O2 levels above 90. Educated to call for assist.  01/08/18 1318 Done                      User Key     Initials Effective Dates Name Provider  Type Discipline     10/17/16 -  Marisol Burdick OTR/L Occupational Therapist OT    LM 10/17/16 -  OMAR Malone/L Occupational Therapy Assistant OT                  OT Recommendation and Plan  Anticipated Discharge Disposition: home with 24/7 care, home with home health (pulmonary rehab)  Planned Therapy Interventions: activity intolerance, adaptive equipment training, ADL retraining, balance training, bed mobility training, energy conservation, fine motor coordination training, home exercise program, motor coordination training, strengthening, ROM (Range of Motion), transfer training  Therapy Frequency: other (see comments) (3-14x a week)  Plan of Care Review  Plan Of Care Reviewed With: patient  Progress: improving  Outcome Summary/Follow up Plan: improving toward all goals pt perf well with ot slightly fatigued pt addressed standing act and tasia wt shjifting for increased adls and  ed on home safety fall prevention and recommended pt purchase a tub trasnfer bench         Outcome Measures       01/08/18 1600 01/08/18 0938 01/08/18 0833    How much help from another person do you currently need...    Turning from your back to your side while in flat bed without using bedrails?  4  -CB     Moving from lying on back to sitting on the side of a flat bed without bedrails?  4  -CB     Moving to and from a bed to a chair (including a wheelchair)?  3  -CB     Standing up from a chair using your arms (e.g., wheelchair, bedside chair)?  3  -CB     Climbing 3-5 steps with a railing?  3  -CB     To walk in hospital room?  3  -CB     AM-PAC 6 Clicks Score  20  -CB     How much help from another is currently needed...    Putting on and taking off regular lower body clothing? 3  -LM  3  -BH    Bathing (including washing, rinsing, and drying) 3  -LM  3  -BH    Toileting (which includes using toilet bed pan or urinal) 3  -LM  3  -BH    Putting on and taking off regular upper body clothing 3  -LM  3  -BH    Taking care  of personal grooming (such as brushing teeth) 4  -LM  4  -BH    Eating meals 4  -LM  4  -BH    Score 20  -LM  20  -BH    Functional Assessment    Outcome Measure Options  AM-PAC 6 Clicks Basic Mobility (PT)  - AM-PAC 6 Clicks Daily Activity (OT)  -      User Key  (r) = Recorded By, (t) = Taken By, (c) = Cosigned By    Initials Name Provider Type     Leatha Tirado, PT Physical Therapist     Marisol Burdick, OTR/L Occupational Therapist     Massiel Santana NELSON/L Occupational Therapy Assistant           Time Calculation:         Time Calculation- OT       01/08/18 1631 01/08/18 1321       Time Calculation- OT    OT Start Time 1605  -LM 0833  -     OT Stop Time 1630  -LM 0913  -     OT Time Calculation (min) 25 min  - 40 min  -     Total Timed Code Minutes- OT 25 minute(s)  -LM 23 minute(s)  -     OT Received On 01/08/18  -LM 01/08/18  -     OT Goal Re-Cert Due Date  01/21/18  -       User Key  (r) = Recorded By, (t) = Taken By, (c) = Cosigned By    Initials Name Provider Type     Marisol Burdick, OTR/L Occupational Therapist    OMAR Ayers/L Occupational Therapy Assistant           Therapy Charges for Today     Code Description Service Date Service Provider Modifiers Qty    [id number]  OT THERAPEUTIC ACT EA 15 MIN 1/8/2018 OMAR Malone/L GO 1    [id number]  OT SELF CARE/MGMT/TRAIN EA 15 MIN 1/8/2018 OMAR Malone/L GO 1          OT G-codes  OT Professional Judgement Used?: Yes  OT Functional Scales Options: AM-PAC 6 Clicks Daily Activity (OT)  Score: 20  Functional Limitation: Self care  Self Care Current Status (): At least 20 percent but less than 40 percent impaired, limited or restricted  Self Care Goal Status (): At least 1 percent but less than 20 percent impaired, limited or restricted    OMAR Malone/HINA  1/8/2018

## 2018-01-08 NOTE — THERAPY EVALUATION
Acute Care - Occupational Therapy Initial Evaluation  [facility]     Patient Name: Val Arteaga  : 1932  MRN: [id number]  Today's Date: 2018  Onset of Illness/Injury or Date of Surgery Date: 18  Date of Referral to OT: 18  Referring Physician: Kristina DUPONT    Admit Date: 1/3/2018       ICD-10-CM ICD-9-CM   1. Precordial pain R07.2 786.51   2. Shortness of breath R06.02 786.05   3. Elevated troponin R74.8 790.6   4. SSS (sick sinus syndrome) I49.5 427.81   5. NSTEMI (non-ST elevated myocardial infarction) I21.4 410.70   6. Dyspnea, unspecified type R06.00 786.09   7. Chest pain, unspecified type R07.9 786.50   8. Atherosclerosis of native coronary artery of native heart, angina presence unspecified I25.10 414.01   9. Impaired mobility and ADLs Z74.09 799.89   10. Impaired physical mobility Z74.09 781.99     Patient Active Problem List   Diagnosis   • Essential hypertension   • Coronary artery disease involving native coronary artery of native heart without angina pectoris   • Chronic obstructive pulmonary disease   • Acquired hypothyroidism   • Depressive disorder   • Thygeson's superficial punctate keratitis   • Pseudophakia   • Precordial pain     Past Medical History:   Diagnosis Date   • Acquired hypothyroidism    • Allergic rhinitis    • COPD (chronic obstructive pulmonary disease)    • Corneal epithelial dystrophy    • Coronary arteriosclerosis    • Depression    • Generalized atherosclerosis    • GERD (gastroesophageal reflux disease)    • Hemorrhoids    • History of bone density study 2012    Lumbar spine Osteopenia. Femoral Osteopenia   • History of mammogram 2004    Birads Category 2 Benign   • History of Papanicolaou smear of cervix 2004    NEGATIVE   • Hypercholesterolemia    • Hyperlipidemia    • Hypertension    • Nonexudative age-related macular degeneration    • Osteoarthritis of multiple joints    • Pseudophakia    • Punctate keratitis     -  "history Thygeson's keratopathy      • Tobacco dependence syndrome      Past Surgical History:   Procedure Laterality Date   • APPENDECTOMY     • BREAST SURGERY  03/19/1954    Excision, breast lesion (1)  Excision of fibroma. 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 patency in the circumflex artery site of previous angioplasty. Kathe. patency in RCA.Nonobstructive 20-30% stenosis in the LAD and diagonal branch. Preserved LV systolic function with Ef of 55%   • CERVICAL SPINE SURGERY  09/16/1974    Excision of cervical disc, C5-6, C6-7, anterior cervical fusion, C5-6 with iliac bone graft.cervical spondylosis,C5-6, C6-7   • COLONOSCOPY  01/01/2013    patient declined    • CORONARY ANGIOPLASTY  07/21/1993    Angioplasty, coronary (2)    RCA    • DILATATION AND CURETTAGE      3   • ENDOSCOPY AND COLONOSCOPY  10/01/1998    Hemorhoids Rectal Outlet Bleeding   • ESOPHAGOSCOPY / EGD  06/28/2004    Nonerosive gastritis of the body of the stomach. A biopsy was obtained & placed in h. Pylori test agar. Multiple biopsies were obtained from the body of the stomach   • INJECTION OF MEDICATION  11/23/2015    Depo Medrol (Methylprednisone) (5)    • INJECTION OF MEDICATION  02/04/2016    Kenalog (3)            OT ASSESSMENT FLOWSHEET (last 72 hours)      OT Evaluation       01/08/18 1057 01/08/18 1055 01/08/18 0938 01/08/18 0833       Rehab Evaluation    Document Type   evaluation  -CB evaluation  -     Subjective Information   no complaints;agree to therapy  -CB agree to therapy;complains of;fatigue   \"no pain just uncomfortable\"  -     Patient Effort, Rehab Treatment   good  -CB good  -     Symptoms Noted During/After Treatment   shortness of breath  -CB fatigue;shortness of breath  -     Symptoms Noted Comment    RN notified about O2 dropping and repositioning pt O2 line for better placement.   -     General Information    Patient Profile Review   yes  -CB " yes  -     Onset of Illness/Injury or Date of Surgery Date   01/03/18  -CB 01/03/18  -     Referring Physician   Kristina DUPONT  - Kristina DUPONT  -     General Observations   --   sitting EOB with head on bedside table  - Pt sitting EOB, on O2 3L; tele,   -     Pertinent History Of Current Problem   SOB, increased troponin tightness in chart  - SOB increased troponin tightness   -     Precautions/Limitations   fall precautions;oxygen therapy device and L/min  -CB fall precautions;oxygen therapy device and L/min  -     Prior Level of Function   independent:;gait;ADL's  - independent:;all household mobility;transfer;ADL's;driving   hired help with IADL  -     Equipment Currently Used at Home oxygen;other (see comments);nebulizer;walker, rolling   Patient is on 2.5 liters of oxygen @ home. She wears oxygen PRN during the day and continuously at night.  Patient voiced that she has portable tanks available for transport. DME from [facility].   -  oxygen   PRN  - oxygen;grab bar;raised toilet   shower corner seat; BSC and  does not use  -     Plans/Goals Discussed With   patient  -CB patient;agreed upon  -     Risks Reviewed   patient:;dizziness;increased discomfort  - patient:;LOB;dizziness;increased discomfort;change in vital signs  -     Benefits Reviewed   patient:  - patient:;improve function;increase independence;increase balance;increase strength;decrease pain  -     Living Environment    Lives With alone  -  alone  - alone  -     Living Arrangements house   Contact information on face sheet confirmed with patient.   -  house  - house  -     Home Accessibility   stairs to enter home  - grab bars present (bathtub);stairs to enter home;bed and bath on same level   walk in shower  -     Number of Stairs to Enter Home   2  - 2  -     Stair Railings at Home   outside, present on right side  - outside, present on left side  -     Transportation  Available car;family or friend will provide   If unable to drive, patient has support/assistance available.   -  car;family or friend will provide  - car;family or friend will provide  -     Clinical Impression    Date of Referral to OT    01/07/18  -     OT Diagnosis    impaired mobility and ADL  -     Prognosis    good  -     Functional Level At Time Of Evaluation    pt fatigued quickly with tasks and her O2 dropped quickly, pt instructed in deep breathing to assist with tasks. Pt SBA to don and doff socks and SBA To CGA for t/f  -     Impairments Found (describe specific impairments)    aerobic capacity/endurance;gait, locomotion, and balance;joint integrity and mobility;motor function;muscle performance;ROM;posture;ventilation and respiration/gas exchange   ADL, functional t/f and mobility and ax tolerance  -     Patient/Family Goals Statement    to go home  -     Criteria for Skilled Therapeutic Interventions Met    yes;treatment indicated  -     Rehab Potential    good, to achieve stated therapy goals  -     Therapy Frequency    other (see comments)   3-14x a week  -     Predicted Duration of Therapy Intervention (days/wks)    until d/c or all goals met  -     Anticipated Discharge Disposition    home with /7 care;home with home health   pulmonary rehab  -     Functional Level Prior    Ambulation  0-->independent  -AH  0-->independent  -     Transferring  0-->independent  -  0-->independent  -     Toileting  0-->independent  -AH  0-->independent  -     Bathing  0-->independent  -AH  0-->independent  -     Dressing  0-->independent  -AH  0-->independent  -     Eating  0-->independent  -AH  0-->independent  -     Communication  0-->understands/communicates without difficulty  -AH  0-->understands/communicates without difficulty  -     Swallowing  0-->swallows foods/liquids without difficulty  -AH  0-->swallows foods/liquids without difficulty  -     Vital Signs     "Pre Systolic BP Rehab   145  -  -BH     Pre Treatment Diastolic BP   65  -CB 60  -BH     Post Systolic BP Rehab   164  -  -BH     Post Treatment Diastolic BP   77  -CB 64  -BH     Pretreatment Heart Rate (beats/min)   87  -CB 90  -BH     Posttreatment Heart Rate (beats/min)   91  -CB 93  -BH     Pre SpO2 (%)   96  -CB 88   91  -BH     O2 Delivery Pre Treatment   supplemental O2  -CB supplemental O2  -BH     Intra SpO2 (%)    85   94 after deep breathing  -BH     O2 Delivery Intra Treatment    supplemental O2  -BH     Post SpO2 (%)   94  -CB 93  -BH     O2 Delivery Post Treatment   supplemental O2  -CB supplemental O2  -BH     Pre Patient Position   Sitting  -CB Sitting  -BH     Intra Patient Position   Standing  -CB Sitting  -BH     Post Patient Position   Sitting  -CB Sitting  -BH     Pain Assessment    Pain Assessment   No/denies pain  - 0-10  -     Pain Score    0   \"no pain just discomfort\"  -BH     Post Pain Score    0  -BH     Vision Assessment/Intervention    Visual Impairment   WFL with corrective lenses  - WFL with corrective lenses  -     Cognitive Assessment/Intervention    Current Cognitive/Communication Assessment   functional  - functional  -     Orientation Status   oriented x 4  - oriented x 4  -     Follows Commands/Answers Questions   100% of the time  - 100% of the time  -     Personal Safety   WNL/WFL  -CB WNL/WFL  -     Personal Safety Interventions   fall prevention program maintained;gait belt;nonskid shoes/slippers when out of bed  - fall prevention program maintained;gait belt;muscle strengthening facilitated;nonskid shoes/slippers when out of bed;supervised activity  -     ROM (Range of Motion)    General ROM   no range of motion deficits identified   in lower extremitites  - upper extremity range of motion deficits identified  -     General ROM Detail    BUE shoulder limited shoulder range approx 70 degrees, pt reports difficulty with shoulders and " dressing; pt elbow/wrist/digits WFL fair+ digit to thumb and digit to nose accuracy  -     MMT (Manual Muscle Testing)    General MMT Assessment   lower extremity strength deficits identified  - upper extremity strength deficits identified;lower extremity strength deficits identified   please see PT for   -     General MMT Assessment Detail   grossly 4/5 BLE hip and knee and ankle  - BUE  grossly 4-/5; BUE grossly 4-/5 in limited shoulder range  -     Mobility Assessment/Training    Extremity Weight-Bearing Status   left lower extremity;right lower extremity  -      Left Lower Extremity Weight-Bearing   weight-bearing as tolerated  -      Right Lower Extremity Weight-Bearing   weight-bearing as tolerated  -      Bed Mobility, Assessment/Treatment    Bed Mobility, Comment    defer pt was sitting EOB and wanted to remain EOB.   -     Transfer Assessment/Treatment    Transfers, Sit-Stand Swift   stand by assist  [facility] contact guard assist;stand by assist  -     Transfers, Stand-Sit Swift   stand by assist  - contact guard assist;stand by assist  [facility]     Transfers, Sit-Stand-Sit, Assist Device   --   none  - --   [facility]  -     Toilet Transfer, Swift    contact guard assist;verbal cues required;supervision required  -     Functional Mobility    Functional Mobility- Ind. Level    contact guard assist  -     Functional Mobility- Device    --   none  -     Functional Mobility-Distance (Feet)    --   approx 10 feet in room  -     Functional Mobility- Comment    assist with safety with O2 line, fatigued quickly  -     Upper Body Bathing Assessment/Training     Bathing Assess/Train, Comment    pt declined a bath for now stating she was tired and unsure but did say OT can check back later today or tomorrow for a bath  -     Lower Body Dressing Assessment/Training     Dressing Assess/Train, Clothing Type    doffing:;donning:;slipper socks  -     LB Dressing  Assess/Train, Position    edge of bed;sitting  -     LB Dressing Assess/Train, Mower    set up required;supervision required;verbal cues required  -     LB Dressing Assess/Train, Comment    O2 dropped vc for deep breathing  -     Toileting Assessment/Training    Toileting Assess/Train, Comment    pt vc for gathering robe to keep out of toilet. occasional assist to keep out of toilet for clothing mangagement  to sit and stand off toilet  -     Motor Skills/Interventions    Additional Documentation    Balance Skills Training (Group);Fine Motor Coordination Training (Group)  -     Balance Skills Training    Sitting-Level of Assistance    Distant supervision;Close supervision  -     Standing-Level of Assistance    Contact guard;Close supervision  -     Therapy Exercises    Bilateral Upper Extremity    AROM:;20 reps;sitting;hand pumps;shoulder extension/flexion;elbow flexion/extension;pronation/supination  -     Fine Motor Coordination Training    Opposition    Right:;Left:;intact  -     Sensory Assessment/Intervention    Light Touch   LLE;RLE  - LUE;RUE  -     LUE Light Touch    WNL  -     RUE Light Touch    WNL  -     LLE Light Touch   WNL  -      RLE Light Touch   WN  -      General Therapy Interventions    Planned Therapy Interventions    activity intolerance;adaptive equipment training;ADL retraining;balance training;bed mobility training;energy conservation;fine motor coordination training;home exercise program;motor coordination training;strengthening;ROM (Range of Motion);transfer training  [facility]     Positioning and Restraints    Pre-Treatment Position   in bed  - in bed  -     Post Treatment Position   bed  -CB bed  -     In Bed   sitting EOB;call light within reach;encouraged to call for assist;side rails up x2  - sitting EOB;call light within reach;encouraged to call for assist  -       User Key  (r) = Recorded By, (t) = Taken By, (c) = Cosigned By    Initials Name  Effective Dates    CB Leatha Tirado, PT 04/06/17 -      Marisol Burdick, OTR/L 10/17/16 -      CARLOS PetersonW 10/17/16 -            Occupational Therapy Education     Title: PT OT SLP Therapies (Active)     Topic: Occupational Therapy (Active)     Point: ADL training (Done)    Description: Instruct learner(s) on proper safety adaptation and remediation techniques during self care or transfers.   Instruct in proper use of assistive devices.    Learning Progress Summary    Learner Readiness Method Response Comment Documented by Status   Patient Acceptance E VU Educated on OT and POC. Educated on safety throughout with t/f, mobility, and ADL. Educated on deep breathing to keep O2 levels above 90. Educated to call for assist.  01/08/18 1318 Done               Point: Precautions (Done)    Description: Instruct learner(s) on prescribed precautions during self-care and functional transfers.    Learning Progress Summary    Learner Readiness Method Response Comment Documented by Status   Patient Acceptance E VU Educated on OT and POC. Educated on safety throughout with t/f, mobility, and ADL. Educated on deep breathing to keep O2 levels above 90. Educated to call for assist.  01/08/18 1318 Done               Point: Body mechanics (Done)    Description: Instruct learner(s) on proper positioning and spine alignment during self-care, functional mobility activities and/or exercises.    Learning Progress Summary    Learner Readiness Method Response Comment Documented by Status   Patient Acceptance E VU Educated on OT and POC. Educated on safety throughout with t/f, mobility, and ADL. Educated on deep breathing to keep O2 levels above 90. Educated to call for assist.  01/08/18 1318 Done                      User Key     Initials Effective Dates Name Provider Type Discipline     10/17/16 -  Marisol Burdick, OTR/L Occupational Therapist OT                  OT Recommendation and Plan  Anticipated Discharge  Disposition: home with 24/7 care, home with home health (pulmonary rehab)  Planned Therapy Interventions: activity intolerance, adaptive equipment training, ADL retraining, balance training, bed mobility training, energy conservation, fine motor coordination training, home exercise program, motor coordination training, strengthening, ROM (Range of Motion), transfer training  Therapy Frequency: other (see comments) (3-14x a week)  Plan of Care Review  Plan Of Care Reviewed With: patient  Outcome Summary/Follow up Plan: OT nav completed this date. Pt pleasant and engaged well but very fatigued and SOB with increased activity. Pt  don and doff socks EOB with SBA and averaged SBA to CGA for sit to stand and mobility around room. pt required several rest breaks. Pt could benefit from further skilled OT to increase endurance, education, strength, and independence with ADL as pt lives alone. Pt will need further skilled therapy or pulmonary rehab at d/c recommend for now 24/7.           OT Goals       01/08/18 0833          Patient Education OT LTG    Patient Education OT LTG, Date Established 01/08/18  -      Patient Education OT LTG, Time to Achieve by discharge  -      Patient Education OT LTG, Education Type HEP;home safety;energy conservation  -      Patient Education OT LTG, Education Understanding verbalizes understanding;demonstrates adequately;independent  -      ADL OT LTG    ADL OT LTG, Date Established 01/08/18  -      ADL OT LTG, Time to Achieve by discharge  -      ADL OT LTG, Activity Type ADL skills  -      ADL OT LTG, [place] Level modified independent  -      Activity Tolerance OT LTG    Activity Tolerance Goal OT LTG, Date Established 01/08/18  -      Activity Tolerance Goal OT LTG, Time to Achieve by discharge  -      Activity Tolerance Goal OT LTG, Activity Level 20 min activity;O2 sat >/equal to 90%;with 1 rest break  -      Endurance OT LTG    Endurance Goal OT LTG, Date  Established 01/08/18  -      Endurance Goal OT LTG, Time to Achieve by discharge  -      Endurance Goal OT LTG, Activity Level endurance 2 good   30 minute activity  -        User Key  (r) = Recorded By, (t) = Taken By, (c) = Cosigned By    Initials Name Provider Type     Marisol Burdick OTR/L Occupational Therapist                Outcome Measures       01/08/18 0938 01/08/18 0833       How much help from another person do you currently need...    Turning from your back to your side while in flat bed without using bedrails? 4  -CB      Moving from lying on back to sitting on the side of a flat bed without bedrails? 4  -CB      Moving to and from a bed to a chair (including a wheelchair)? 3  -CB      Standing up from a chair using your arms (e.g., wheelchair, bedside chair)? 3  -CB      Climbing 3-5 steps with a railing? 3  -CB      To walk in hospital room? 3  -CB      AM-PAC 6 Clicks Score 20  -      How much help from another is currently needed...    Putting on and taking off regular lower body clothing?  3  -BH     Bathing (including washing, rinsing, and drying)  3  -BH     Toileting (which includes using toilet bed pan or urinal)  3  -BH     Putting on and taking off regular upper body clothing  3  -BH     Taking care of personal grooming (such as brushing teeth)  4  -BH     Eating meals  4  -     Score  20  -     Functional Assessment    Outcome Measure Options AM-PAC 6 Clicks Basic Mobility (PT)  -CB AM-PAC 6 Clicks Daily Activity (OT)  -       User Key  (r) = Recorded By, (t) = Taken By, (c) = Cosigned By    Initials Name Provider Type     Leatha Tirado, PT Physical Therapist     Marisol Burdick OTR/L Occupational Therapist          Time Calculation:   OT Start Time: 0833  OT Stop Time: 0913  OT Time Calculation (min): 40 min    Therapy Charges for Today     Code Description Service Date Service Provider Modifiers Qty    30652144877  OT SELFCARE CURRENT 1/8/2018 Marisol Burdick OTR/L  GO, CJ 1    16675148838 HC OT SELFCARE PROJECTED 1/8/2018 Marisol GHANSHYAM Burdick OTR/L GO, CI 1    94182195943 HC OT SELF CARE/MGMT/TRAIN EA 15 MIN 1/8/2018 Marisol Burdick OTR/L GO 1    66391140766 HC OT THER PROC EA 15 MIN 1/8/2018 Marisol Burdick OTR/L GO 1    91380778178 HC OT EVAL MOD COMPLEXITY 1 1/8/2018 Marisol Burdick OTR/L GO 1          OT G-codes  OT Professional Judgement Used?: Yes  OT Functional Scales Options: AM-PAC 6 Clicks Daily Activity (OT)  Score: 20  Functional Limitation: Self care  Self Care Current Status (): At least 20 percent but less than 40 percent impaired, limited or restricted  Self Care Goal Status (): At least 1 percent but less than 20 percent impaired, limited or restricted    AYAD Vazquez/L  1/8/2018

## 2018-01-08 NOTE — THERAPY EVALUATION
Acute Care - Physical Therapy Initial Evaluation  [facility]     Patient Name: Val Arteaga  : 1932  MRN: [id number]  Today's Date: 2018   Onset of Illness/Injury or Date of Surgery Date: 18  Date of Referral to PT: 18  Referring Physician: Kristina DUPONT      Admit Date: 1/3/2018     Visit Dx:    ICD-10-CM ICD-9-CM   1. Precordial pain R07.2 786.51   2. Shortness of breath R06.02 786.05   3. Elevated troponin R74.8 790.6   4. SSS (sick sinus syndrome) I49.5 427.81   5. NSTEMI (non-ST elevated myocardial infarction) I21.4 410.70   6. Dyspnea, unspecified type R06.00 786.09   7. Chest pain, unspecified type R07.9 786.50   8. Atherosclerosis of native coronary artery of native heart, angina presence unspecified I25.10 414.01   9. Impaired mobility and ADLs Z74.09 799.89   10. Impaired physical mobility Z74.09 781.99     Patient Active Problem List   Diagnosis   • Essential hypertension   • Coronary artery disease involving native coronary artery of native heart without angina pectoris   • Chronic obstructive pulmonary disease   • Acquired hypothyroidism   • Depressive disorder   • Thygeson's superficial punctate keratitis   • Pseudophakia   • Precordial pain     Past Medical History:   Diagnosis Date   • Acquired hypothyroidism    • Allergic rhinitis    • COPD (chronic obstructive pulmonary disease)    • Corneal epithelial dystrophy    • Coronary arteriosclerosis    • Depression    • Generalized atherosclerosis    • GERD (gastroesophageal reflux disease)    • Hemorrhoids    • History of bone density study 2012    Lumbar spine Osteopenia. Femoral Osteopenia   • History of mammogram 2004    Birads Category 2 Benign   • History of Papanicolaou smear of cervix 2004    NEGATIVE   • Hypercholesterolemia    • Hyperlipidemia    • Hypertension    • Nonexudative age-related macular degeneration    • Osteoarthritis of multiple joints    • Pseudophakia    • Punctate keratitis      - history Thygeson's keratopathy      • Tobacco dependence syndrome      Past Surgical History:   Procedure Laterality Date   • APPENDECTOMY     • BREAST SURGERY  03/19/1954    Excision, breast lesion (1)  Excision of fibroma. 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 patency in the circumflex artery site of previous angioplasty. Kathe. patency in RCA.Nonobstructive 20-30% stenosis in the LAD and diagonal branch. Preserved LV systolic function with Ef of 55%   • CERVICAL SPINE SURGERY  09/16/1974    Excision of cervical disc, C5-6, C6-7, anterior cervical fusion, C5-6 with iliac bone graft.cervical spondylosis,C5-6, C6-7   • COLONOSCOPY  01/01/2013    patient declined    • CORONARY ANGIOPLASTY  07/21/1993    Angioplasty, coronary (2)    RCA    • DILATATION AND CURETTAGE      3   • ENDOSCOPY AND COLONOSCOPY  10/01/1998    Hemorhoids Rectal Outlet Bleeding   • ESOPHAGOSCOPY / EGD  06/28/2004    Nonerosive gastritis of the body of the stomach. A biopsy was obtained & placed in h. Pylori test agar. Multiple biopsies were obtained from the body of the stomach   • INJECTION OF MEDICATION  11/23/2015    Depo Medrol (Methylprednisone) (5)    • INJECTION OF MEDICATION  02/04/2016    Kenalog (3)            PT ASSESSMENT (last 72 hours)      PT Evaluation       01/08/18 1057 01/08/18 0938    Rehab Evaluation    Document Type  evaluation  -CB    Subjective Information  no complaints;agree to therapy  -CB    Patient Effort, Rehab Treatment  good  -CB    Symptoms Noted During/After Treatment  shortness of breath  -CB    General Information    Patient Profile Review  yes  -CB    Onset of Illness/Injury or Date of Surgery Date  01/03/18  -CB    Referring Physician  Kristina DUPONT  -CB    General Observations  --   sitting EOB with head on bedside table  -CB    Pertinent History Of Current Problem  SOB, increased troponin tightness in chart  -CB     Precautions/Limitations  fall precautions;oxygen therapy device and L/min  -CB    Prior Level of Function  independent:;gait;ADL's  -CB    Equipment Currently Used at Home oxygen;other (see comments);nebulizer;walker, rolling   Patient is on 2.5 liters of oxygen @ home. She wears oxygen PRN during the day and continuously at night.  Patient voiced that she has portable tanks available for transport. [facility]   - oxygen   PRN  -CB    Plans/Goals Discussed With  patient  -CB    Risks Reviewed  patient:;dizziness;increased discomfort  -CB    Benefits Reviewed  patient:  -CB    Living Environment    Lives With alone  - alone  -CB    Living Arrangements house   Contact information on face sheet confirmed with patient.   - house  -CB    Home Accessibility  stairs to enter home  -CB    Number of Stairs to Enter Home  2  -CB    Stair Railings at Home  outside, present on right side  -CB    Transportation Available car;family or friend will provide   If unable to drive, patient has support/assistance available.   - car;family or friend will provide  -CB    Clinical Impression    Date of Referral to PT  01/07/18  -CB    PT Diagnosis  impaired physical mobility due to chest pian , SSS, NSTEMI  -CB    Criteria for Skilled Therapeutic Interventions Met  treatment indicated;yes  -CB    Pathology/Pathophysiology Noted (Describe Specifically for Each System)  musculoskeletal;cardiovascular  -CB    Impairments Found (describe specific impairments)  aerobic capacity/endurance;gait, locomotion, and balance  -CB    Functional Limitations in Following Categories (Describe Specific Limitations)  self-care;home management  -CB    Rehab Potential  good, to achieve stated therapy goals  -CB    Predicted Duration of Therapy Intervention (days/wks)  until goals met or discharged  -CB    Vital Signs    Pre Systolic BP Rehab  145  -CB    Pre Treatment Diastolic BP  65  -CB    Post Systolic BP Rehab  164  -CB    Post Treatment  Diastolic BP  77  -CB    Pretreatment Heart Rate (beats/min)  87  -CB    Posttreatment Heart Rate (beats/min)  91  -CB    Pre SpO2 (%)  96  -CB    O2 Delivery Pre Treatment  supplemental O2  -CB    Post SpO2 (%)  94  -CB    O2 Delivery Post Treatment  supplemental O2  -CB    Pre Patient Position  Sitting  -CB    Intra Patient Position  Standing  -CB    Post Patient Position  Sitting  -CB    Pain Assessment    Pain Assessment  No/denies pain  -CB    Vision Assessment/Intervention    Visual Impairment  WFL with corrective lenses  -CB    Cognitive Assessment/Intervention    Current Cognitive/Communication Assessment  functional  -CB    Orientation Status  oriented x 4  -CB    Follows Commands/Answers Questions  100% of the time  -CB    Personal Safety  WNL/WFL  -CB    Personal Safety Interventions  fall prevention program maintained;gait belt;nonskid shoes/slippers when out of bed  -CB    ROM (Range of Motion)    General ROM  no range of motion deficits identified   in lower extremitites  -CB    MMT (Manual Muscle Testing)    General MMT Assessment  lower extremity strength deficits identified  -CB    General MMT Assessment Detail  grossly 4/5 BLE hip and knee and ankle  -CB    Mobility Assessment/Training    Extremity Weight-Bearing Status  left lower extremity;right lower extremity  -CB    Left Lower Extremity Weight-Bearing  weight-bearing as tolerated  -CB    Right Lower Extremity Weight-Bearing  weight-bearing as tolerated  -CB    Transfer Assessment/Treatment    Transfers, Sit-Stand [place]  stand by assist  -CB    Transfers, Stand-Sit [place]  stand by assist  -CB    Transfers, Sit-Stand-Sit, Assist Device  --   none  -CB    Gait Assessment/Treatment    Gait, [place] Level  stand by assist  -CB    Gait, Assistive Device  --   none  -CB    Gait, Distance (Feet)  200  -CB    Gait, Safety Issues  --   little unsteady  -CB    Sensory Assessment/Intervention    Light Touch  LLE;RLE  -CB    LLE Light  "Touch  WNL  -CB    RLE Light Touch  WNL  -CB    Positioning and Restraints    Pre-Treatment Position  in bed  -CB    Post Treatment Position  bed  -CB    In Bed  sitting EOB;call light within reach;encouraged to call for assist;side rails up x2  -CB      01/08/18 0833       Rehab Evaluation    Document Type evaluation  -     Subjective Information agree to therapy;complains of;fatigue   \"no pain just uncomfortable\"  -     Patient Effort, Rehab Treatment good  -     Symptoms Noted During/After Treatment fatigue;shortness of breath  -     Symptoms Noted Comment RN notified about O2 dropping and repositioning pt O2 line for better placement.   -     General Information    Patient Profile Review yes  -     Onset of Illness/Injury or Date of Surgery Date 01/03/18  -     Referring Physician Kristina DUPONT  -     General Observations Pt sitting EOB, on O2 3L; tele,   -     Pertinent History Of Current Problem SOB increased troponin tightness   -     Precautions/Limitations fall precautions;oxygen therapy device and L/min  -     Prior Level of Function independent:;all household mobility;transfer;ADL's;driving   hired help with IADL  -     Equipment Currently Used at Home oxygen;grab bar;raised toilet   shower corner seat; BSC and RW does not use  [facility]     Plans/Goals Discussed With patient;agreed upon  -     Risks Reviewed patient:;LOB;dizziness;increased discomfort;change in vital signs  -     Benefits Reviewed patient:;improve function;increase independence;increase balance;increase strength;decrease pain  -     Living Environment    Lives With alone  -     Living Arrangements house  -     Home Accessibility grab bars present (bathtub);stairs to enter home;bed and bath on same level   walk in shower  -     Number of Stairs to Enter Home 2  -     Stair Railings at Home outside, present on left side  -     Transportation Available car;family or friend will provide  -     Vital " "Signs    Pre Systolic BP Rehab 120  -     Pre Treatment Diastolic BP 60  -     Post Systolic BP Rehab 118  -     Post Treatment Diastolic BP 64  -BH     Pretreatment Heart Rate (beats/min) 90  -BH     Posttreatment Heart Rate (beats/min) 93  -     Pre SpO2 (%) 88   91  -BH     O2 Delivery Pre Treatment supplemental O2  -     Intra SpO2 (%) 85   94 after deep breathing  -     O2 Delivery Intra Treatment supplemental O2  -     Post SpO2 (%) 93  -BH     O2 Delivery Post Treatment supplemental O2  -     Pre Patient Position Sitting  -     Intra Patient Position Sitting  -     Post Patient Position Sitting  -     Pain Assessment    Pain Assessment 0-10  -     Pain Score 0   \"no pain just discomfort\"  -     Post Pain Score 0  -     Vision Assessment/Intervention    Visual Impairment WFL with corrective lenses  -     Cognitive Assessment/Intervention    Current Cognitive/Communication Assessment functional  -     Orientation Status oriented x 4  -     Follows Commands/Answers Questions 100% of the time  -     Personal Safety WNL/WFL  -     Personal Safety Interventions fall prevention program maintained;gait belt;muscle strengthening facilitated;nonskid shoes/slippers when out of bed;supervised activity  -     ROM (Range of Motion)    General ROM upper extremity range of motion deficits identified  -     General ROM Detail BUE shoulder limited shoulder range approx 70 degrees, pt reports difficulty with shoulders and dressing; pt elbow/wrist/digits WFL fair+ digit to thumb and digit to nose accuracy  -     MMT (Manual Muscle Testing)    General MMT Assessment upper extremity strength deficits identified;lower extremity strength deficits identified   please see PT for   -     General MMT Assessment Detail BUE  grossly 4-/5; BUE grossly 4-/5 in limited shoulder range  -     Bed Mobility, Assessment/Treatment    Bed Mobility, Comment defer pt was sitting EOB and wanted to " remain EOB.   -     Transfer Assessment/Treatment    Transfers, Sit-Stand [place] contact guard assist;stand by assist  -     Transfers, Stand-Sit [place] contact guard assist;stand by assist  -     Transfers, Sit-Stand-Sit, Assist Device --   none  -     Toilet Transfer, [place] contact guard assist;verbal cues required;supervision required  -     Motor Skills/Interventions    Additional Documentation Balance Skills Training (Group);Fine Motor Coordination Training (Group)  -     Balance Skills Training    Sitting-Level of Assistance Distant supervision;Close supervision  -     Standing-Level of Assistance Contact guard;Close supervision  -     Therapy Exercises    Bilateral Upper Extremity AROM:;20 reps;sitting;hand pumps;shoulder extension/flexion;elbow flexion/extension;pronation/supination  -     Fine Motor Coordination Training    Opposition Right:;Left:;intact  -     Sensory Assessment/Intervention    Light Touch LUE;RUE  -     LUE Light Touch WNL  -     RUE Light Touch WNL  -     Positioning and Restraints    Pre-Treatment Position in bed  -     Post Treatment Position bed  -     In Bed sitting EOB;call light within reach;encouraged to call for assist  -       User Key  (r) = Recorded By, (t) = Taken By, (c) = Cosigned By    Initials Name Provider Type    BERNARDO Tirado, PT Physical Therapist     Marisol Burdick, OTR/L Occupational Therapist     Deisy Krueger, CARLOSW           Physical Therapy Education     Title: PT OT SLP Therapies (Active)     Topic: Physical Therapy (Active)     Point: Mobility training (Active)    Learning Progress Summary    Learner Readiness Method Response Comment Documented by Status   Patient Acceptance D NR   01/08/18 7417 Active               Point: Precautions (Active)    Learning Progress Summary    Learner Readiness Method Response Comment Documented by Status   Patient Acceptance D NR   01/08/18 1317 Active                       User Key     Initials Effective Dates Name Provider Type Discipline    CB 04/06/17 -  Leatha Tirado, PT Physical Therapist PT                PT Recommendation and Plan  Anticipated Equipment Needs At Discharge:  (none)  Anticipated Discharge Disposition: home with home health  Demonstrates Need for Referral to Another Service: home health care  Planned Therapy Interventions: gait training, transfer training, strengthening, home exercise program, patient/family education, stair training  PT Frequency: other (see comments) (5-14)  Plan of Care Review  Plan Of Care Reviewed With: patient  Outcome Summary/Follow up Plan: PT eval completed, patient able to come to sit EOB with HOB up and supervision, able t come to stand with suprevision, able to ambulate with supervision 200 feet and no device, could benefit from skilled PT to regain and return to hihgest  level of function in transfers, gait and endurance          IP PT Goals       01/08/18 0938          Bed Mobility PT STG    Bed Mobility PT STG, Date Established 01/08/18  -CB      Bed Mobility PT STG, Time to Achieve 2 days  -CB      Bed Mobility PT STG, Activity Type supine to sit/sit to supine  -CB      Bed Mobility PT STG, [place] Level independent  -CB      Bed Mobility PT STG, Additional Goal HOB down and no rails  -CB      Transfer Training PT STG    Transfer Training PT STG, Date Established 01/08/18  -CB      Transfer Training PT STG, Time to Achieve 2 days  -CB      Transfer Training PT STG, Activity Type bed to chair /chair to bed;sit to stand/stand to sit  -CB      Transfer Training PT STG, [place] Level independent  -CB      Gait Training PT LTG    Gait Training Goal PT LTG, Date Established 01/08/18  -CB      Gait Training Goal PT LTG, Time to Achieve by discharge  -CB      Gait Training Goal PT LTG, [place] Level independent  -CB      Gait Training Goal PT LTG, Assist Device --   none  -CB      Gait Training Goal PT  LTG, Distance to Achieve 400 feet  -CB      Stair Training PT LTG    Stair Training Goal PT LTG, Date Established 01/08/18  -CB      Stair Training Goal PT LTG, Time to Achieve 2 days  -CB      Stair Training Goal PT LTG, Number of Steps 2  -CB      Stair Training Goal PT LTG, [place] Level independent  -CB      Stair Training Goal PT LTG, Assist Device 1 handrail  -CB        User Key  (r) = Recorded By, (t) = Taken By, (c) = Cosigned By    Initials Name Provider Type    BERNARDO Tirado, PT Physical Therapist                Outcome Measures       01/08/18 0938 01/08/18 0833       How much help from another person do you currently need...    Turning from your back to your side while in flat bed without using bedrails? 4  -CB      Moving from lying on back to sitting on the side of a flat bed without bedrails? 4  -CB      Moving to and from a bed to a chair (including a wheelchair)? 3  -CB      Standing up from a chair using your arms (e.g., wheelchair, bedside chair)? 3  -CB      Climbing 3-5 steps with a railing? 3  -CB      To walk in hospital room? 3  -CB      AM-PAC 6 Clicks Score 20  -CB      How much help from another is currently needed...    Putting on and taking off regular lower body clothing?  3  -BH     Bathing (including washing, rinsing, and drying)  3  -BH     Toileting (which includes using toilet bed pan or urinal)  3  -BH     Putting on and taking off regular upper body clothing  3  -BH     Taking care of personal grooming (such as brushing teeth)  4  -BH     Eating meals  4  -BH     Score  20  -BH     Functional Assessment    Outcome Measure Options AM-PAC 6 Clicks Basic Mobility (PT)  -CB AM-PAC 6 Clicks Daily Activity (OT)  -BH       User Key  (r) = Recorded By, (t) = Taken By, (c) = Cosigned By    Initials Name Provider Type    BERNARDO Tirado, PT Physical Therapist     Marisol Burdick OTR/L Occupational Therapist           Time Calculation:         PT Charges       01/08/18 2082           Time Calculation    Start Time 0938  -CB      Stop Time 1000  -CB      Time Calculation (min) 22 min  -CB      PT Received On 01/08/18  -CB      PT Goal Re-Cert Due Date 01/21/18  -CB        User Key  (r) = Recorded By, (t) = Taken By, (c) = Cosigned By    Initials Name Provider Type    CB Leatha Tirado, PT Physical Therapist          Therapy Charges for Today     Code Description Service Date Service Provider Modifiers Qty    31432525859 HC PT MOBILITY CURRENT 1/8/2018 Leatha Tirado, PT GP, CJ 1    93411491399 HC PT MOBILITY PROJECTED 1/8/2018 Leatha Tirado, PT GP, CI 1    85508540825 HC PT EVAL MOD COMPLEXITY 1 1/8/2018 Leatha Tirado, PT GP 1          PT G-Codes  PT Professional Judgement Used?: Yes  Outcome Measure Options: AM-PAC 6 Clicks Basic Mobility (PT)  Score: 20  Functional Limitation: Mobility: Walking and moving around  Mobility: Walking and Moving Around Current Status (): At least 20 percent but less than 40 percent impaired, limited or restricted  Mobility: Walking and Moving Around Goal Status (): At least 1 percent but less than 20 percent impaired, limited or restricted      Leatha Tirado, PT  1/8/2018

## 2018-01-08 NOTE — PLAN OF CARE
Problem: Patient Care Overview (Adult)  Goal: Plan of Care Review  Outcome: Ongoing (interventions implemented as appropriate)   01/08/18 1640   Coping/Psychosocial Response Interventions   Plan Of Care Reviewed With patient   Patient Care Overview   Progress improving   Outcome Evaluation   Outcome Summary/Follow up Plan improving toward all goals pt perf well with ot slightly fatigued pt addressed standing act and tasia wt shjifting for increased adls and ed on home safety fall prevention and recommended pt purchase a tub trasnfer bench        Problem: Inpatient Occupational Therapy  Goal: Patient Education Goal LTG- OT  Outcome: Ongoing (interventions implemented as appropriate)   01/08/18 0833 01/08/18 1640   Patient Education OT LTG   Patient Education OT LTG, Date Established 01/08/18 --    Patient Education OT LTG, Time to Achieve by discharge --    Patient Education OT LTG, Education Type HEP;home safety;energy conservation --    Patient Education OT LTG, Education Understanding verbalizes understanding;demonstrates adequately;independent --    Patient Education OT LTG, Date Goal Reviewed --  01/08/18   Patient Education OT LTG Outcome --  goal ongoing     Goal: ADL Goal LTG- OT  Outcome: Ongoing (interventions implemented as appropriate)   01/08/18 0833 01/08/18 1640   ADL OT LTG   ADL OT LTG, Date Established 01/08/18 --    ADL OT LTG, Time to Achieve by discharge --    ADL OT LTG, Activity Type ADL skills --    ADL OT LTG, [place] Level modified independent --    ADL OT LTG, Date Goal Reviewed --  01/08/18   ADL OT LTG, Outcome --  goal ongoing     Goal: Activity Tolerance Goal LTG- OT  Outcome: Ongoing (interventions implemented as appropriate)   01/08/18 0833 01/08/18 1640   Activity Tolerance OT LTG   Activity Tolerance Goal OT LTG, Date Established 01/08/18 --    Activity Tolerance Goal OT LTG, Time to Achieve by discharge --    Activity Tolerance Goal OT LTG, Activity Level 20 min activity;O2 sat  >/equal to 90%;with 1 rest break --    Activity Tolerance Goal OT LTG, Date Goal Reviewed --  01/08/18   Activity Tolerance Goal OT LTG, Outcome --  goal ongoing     Goal: Endurance Goal LTG- OT  Outcome: Ongoing (interventions implemented as appropriate)   01/08/18 0833 01/08/18 1640   Endurance OT LTG   Endurance Goal OT LTG, Date Established 01/08/18 --    Endurance Goal OT LTG, Time to Achieve by discharge --    Endurance Goal OT LTG, Activity Level endurance 2 good  (30 minute activity) --    Endurance Goal OT LTG, Date Goal Reviewed --  01/08/18   Endurance Goal OT LTG, Outcome --  goal ongoing

## 2018-01-08 NOTE — PLAN OF CARE
Problem: Patient Care Overview (Adult)  Goal: Plan of Care Review  Outcome: Ongoing (interventions implemented as appropriate)   01/07/18 1913   Coping/Psychosocial Response Interventions   Plan Of Care Reviewed With patient   Patient Care Overview   Progress no change   Outcome Evaluation   Outcome Summary/Follow up Plan cta negative for PE, some wheezing that improves with prn nebs. Patient positive about starting PT/OT. She is worried about losing independance.        Problem: Acute Coronary Syndrome (ACS) (Adult)  Goal: Signs and Symptoms of Listed Potential Problems Will be Absent or Manageable (Acute Coronary Syndrome)  Outcome: Ongoing (interventions implemented as appropriate)      Problem: Fall Risk (Adult)  Goal: Absence of Falls  Outcome: Ongoing (interventions implemented as appropriate)      Problem: Respiratory Insufficiency (Adult)  Goal: Identify Related Risk Factors and Signs and Symptoms  Outcome: Ongoing (interventions implemented as appropriate)    Goal: Acid/Base Balance  Outcome: Ongoing (interventions implemented as appropriate)    Goal: Effective Ventilation  Outcome: Ongoing (interventions implemented as appropriate)

## 2018-01-08 NOTE — PLAN OF CARE
Problem: Patient Care Overview (Adult)  Goal: Plan of Care Review  Outcome: Ongoing (interventions implemented as appropriate)  Encourage intake of meals and supplement.   01/08/18 1423   Coping/Psychosocial Response Interventions   Plan Of Care Reviewed With patient   Patient Care Overview   Progress no change   Outcome Evaluation   Outcome Summary/Follow up Plan initial assessment

## 2018-01-09 ENCOUNTER — APPOINTMENT (OUTPATIENT)
Dept: PULMONOLOGY | Facility: HOSPITAL | Age: 83
End: 2018-01-09

## 2018-01-09 VITALS
HEART RATE: 82 BPM | WEIGHT: 169.8 LBS | OXYGEN SATURATION: 93 % | HEIGHT: 66 IN | RESPIRATION RATE: 20 BRPM | TEMPERATURE: 98.4 F | DIASTOLIC BLOOD PRESSURE: 62 MMHG | SYSTOLIC BLOOD PRESSURE: 134 MMHG | BODY MASS INDEX: 27.29 KG/M2

## 2018-01-09 LAB
ANION GAP SERPL CALCULATED.3IONS-SCNC: 9 MMOL/L (ref 5–15)
BUN BLD-MCNC: 29 MG/DL (ref 7–21)
BUN/CREAT SERPL: 34.5 (ref 7–25)
CALCIUM SPEC-SCNC: 8.3 MG/DL (ref 8.4–10.2)
CHLORIDE SERPL-SCNC: 92 MMOL/L (ref 95–110)
CO2 SERPL-SCNC: 33 MMOL/L (ref 22–31)
CREAT BLD-MCNC: 0.84 MG/DL (ref 0.5–1)
DEPRECATED RDW RBC AUTO: 45.4 FL (ref 36.4–46.3)
ERYTHROCYTE [DISTWIDTH] IN BLOOD BY AUTOMATED COUNT: 13 % (ref 11.5–14.5)
GFR SERPL CREATININE-BSD FRML MDRD: 64 ML/MIN/1.73 (ref 60–90)
GLUCOSE BLD-MCNC: 132 MG/DL (ref 60–100)
HCT VFR BLD AUTO: 35.5 % (ref 35–45)
HGB BLD-MCNC: 12 G/DL (ref 12–15.5)
MCH RBC QN AUTO: 32.3 PG (ref 26.5–34)
MCHC RBC AUTO-ENTMCNC: 33.8 G/DL (ref 31.4–36)
MCV RBC AUTO: 95.7 FL (ref 80–98)
PLATELET # BLD AUTO: 245 10*3/MM3 (ref 150–450)
PMV BLD AUTO: 9.6 FL (ref 8–12)
POTASSIUM BLD-SCNC: 3.7 MMOL/L (ref 3.5–5.1)
RBC # BLD AUTO: 3.71 10*6/MM3 (ref 3.77–5.16)
SODIUM BLD-SCNC: 134 MMOL/L (ref 137–145)
WBC NRBC COR # BLD: 9.53 10*3/MM3 (ref 3.2–9.8)

## 2018-01-09 PROCEDURE — 80048 BASIC METABOLIC PNL TOTAL CA: CPT | Performed by: NURSE PRACTITIONER

## 2018-01-09 PROCEDURE — 94799 UNLISTED PULMONARY SVC/PX: CPT

## 2018-01-09 PROCEDURE — 97535 SELF CARE MNGMENT TRAINING: CPT

## 2018-01-09 PROCEDURE — 85027 COMPLETE CBC AUTOMATED: CPT | Performed by: NURSE PRACTITIONER

## 2018-01-09 PROCEDURE — 97116 GAIT TRAINING THERAPY: CPT

## 2018-01-09 PROCEDURE — 25010000002 METHYLPREDNISOLONE PER 40 MG: Performed by: NURSE PRACTITIONER

## 2018-01-09 PROCEDURE — 97110 THERAPEUTIC EXERCISES: CPT

## 2018-01-09 PROCEDURE — 94760 N-INVAS EAR/PLS OXIMETRY 1: CPT

## 2018-01-09 RX ORDER — RANOLAZINE 1000 MG/1
1000 TABLET, EXTENDED RELEASE ORAL EVERY 12 HOURS SCHEDULED
Qty: 60 TABLET | Refills: 0 | Status: SHIPPED | OUTPATIENT
Start: 2018-01-09 | End: 2018-04-30 | Stop reason: SDUPTHER

## 2018-01-09 RX ORDER — PREDNISONE 10 MG/1
TABLET ORAL
Qty: 20 TABLET | Refills: 0 | Status: SHIPPED | OUTPATIENT
Start: 2018-01-09 | End: 2018-01-22

## 2018-01-09 RX ORDER — LOSARTAN POTASSIUM 25 MG/1
25 TABLET ORAL DAILY
Qty: 30 TABLET | Refills: 0 | Status: SHIPPED | OUTPATIENT
Start: 2018-01-09 | End: 2018-02-05 | Stop reason: HOSPADM

## 2018-01-09 RX ORDER — FUROSEMIDE 40 MG/1
40 TABLET ORAL
Qty: 60 TABLET | Refills: 0 | Status: SHIPPED | OUTPATIENT
Start: 2018-01-09 | End: 2018-02-05 | Stop reason: HOSPADM

## 2018-01-09 RX ADMIN — LOSARTAN POTASSIUM 25 MG: 25 TABLET ORAL at 09:40

## 2018-01-09 RX ADMIN — RANOLAZINE 1000 MG: 500 TABLET, FILM COATED, EXTENDED RELEASE ORAL at 09:39

## 2018-01-09 RX ADMIN — METHYLPREDNISOLONE SODIUM SUCCINATE 40 MG: 40 INJECTION, POWDER, FOR SOLUTION INTRAMUSCULAR; INTRAVENOUS at 05:38

## 2018-01-09 RX ADMIN — SERTRALINE HYDROCHLORIDE 50 MG: 50 TABLET ORAL at 09:39

## 2018-01-09 RX ADMIN — ASPIRIN 81 MG: 81 TABLET, COATED ORAL at 09:37

## 2018-01-09 RX ADMIN — NITROGLYCERIN 1 PATCH: 0.2 PATCH TRANSDERMAL at 09:39

## 2018-01-09 RX ADMIN — FUROSEMIDE 40 MG: 40 TABLET ORAL at 09:39

## 2018-01-09 RX ADMIN — IPRATROPIUM BROMIDE AND ALBUTEROL SULFATE 3 ML: 2.5; .5 SOLUTION RESPIRATORY (INHALATION) at 09:08

## 2018-01-09 RX ADMIN — LEVOTHYROXINE SODIUM 50 MCG: 50 TABLET ORAL at 05:38

## 2018-01-09 RX ADMIN — BUDESONIDE AND FORMOTEROL FUMARATE DIHYDRATE 2 PUFF: 160; 4.5 AEROSOL RESPIRATORY (INHALATION) at 09:19

## 2018-01-09 RX ADMIN — CARVEDILOL 6.25 MG: 6.25 TABLET, FILM COATED ORAL at 09:38

## 2018-01-09 NOTE — THERAPY DISCHARGE NOTE
Acute Care - Physical Therapy Discharge Summary  [facility]       Patient Name: Val Arteaga  : 1932  MRN: [id number]    Today's Date: 2018  Onset of Illness/Injury or Date of Surgery Date: 18    Date of Referral to PT: 18  Referring Physician: Kristina DUPONT      Admit Date: 1/3/2018      PT Recommendation and Plan    Visit Dx:    ICD-10-CM ICD-9-CM   1. Precordial pain R07.2 786.51   2. Shortness of breath R06.02 786.05   3. Elevated troponin R74.8 790.6   4. SSS (sick sinus syndrome) I49.5 427.81   5. NSTEMI (non-ST elevated myocardial infarction) I21.4 410.70   6. Dyspnea, unspecified type R06.00 786.09   7. Chest pain, unspecified type R07.9 786.50   8. Atherosclerosis of native coronary artery of native heart, angina presence unspecified I25.10 414.01   9. Impaired mobility and ADLs Z74.09 799.89   10. Impaired physical mobility Z74.09 781.99   11. Coronary artery disease involving native coronary artery of native heart without angina pectoris I25.10 414.01   12. Pulmonary emphysema, unspecified emphysema type J43.9 492.8             Outcome Measures       18 0945 18 0830 18 1600    How much help from another person do you currently need...    Turning from your back to your side while in flat bed without using bedrails?  4  -JW     Moving from lying on back to sitting on the side of a flat bed without bedrails?  4  -JW     Moving to and from a bed to a chair (including a wheelchair)?  3  -JW     Standing up from a chair using your arms (e.g., wheelchair, bedside chair)?  3  -JW     Climbing 3-5 steps with a railing?  3  -JW     To walk in hospital room?  3  -JW     AM-PAC 6 Clicks Score  20  -JW     How much help from another is currently needed...    Putting on and taking off regular lower body clothing? 3  -KD  3  -LM    Bathing (including washing, rinsing, and drying) 3  -KD  3  -LM    Toileting (which includes using toilet bed pan or urinal) 3  -KD  3   -LM    Putting on and taking off regular upper body clothing 4  -KD  3  -LM    Taking care of personal grooming (such as brushing teeth) 4  -KD  4  -LM    Eating meals 4  -KD  4  -LM    Score 21  -KD  20  -LM    Functional Assessment    Outcome Measure Options  AM-PAC 6 Clicks Basic Mobility (PT)  -JW       01/08/18 0938 01/08/18 0833       How much help from another person do you currently need...    Turning from your back to your side while in flat bed without using bedrails? 4  -CB      Moving from lying on back to sitting on the side of a flat bed without bedrails? 4  -CB      Moving to and from a bed to a chair (including a wheelchair)? 3  -CB      Standing up from a chair using your arms (e.g., wheelchair, bedside chair)? 3  -CB      Climbing 3-5 steps with a railing? 3  -CB      To walk in hospital room? 3  -CB      AM-PAC 6 Clicks Score 20  -CB      How much help from another is currently needed...    Putting on and taking off regular lower body clothing?  3  -BH     Bathing (including washing, rinsing, and drying)  3  -BH     Toileting (which includes using toilet bed pan or urinal)  3  -BH     Putting on and taking off regular upper body clothing  3  -BH     Taking care of personal grooming (such as brushing teeth)  4  -BH     Eating meals  4  -BH     Score  20  -     Functional Assessment    Outcome Measure Options AM-PAC 6 Clicks Basic Mobility (PT)  -CB AM-PAC 6 Clicks Daily Activity (OT)  -       User Key  (r) = Recorded By, (t) = Taken By, (c) = Cosigned By    Initials Name Provider Type     Neelima Merritt, PTA Physical Therapy Assistant    CB Leatha Tirado, PT Physical Therapist     Marisol Burdick, MANUELR/L Occupational Therapist    KD OMAR Yang/L Occupational Therapy Assistant    OMAR Ayers/L Occupational Therapy Assistant                PT Charges       01/09/18 0830          Time Calculation    Start Time 0830  -      Stop Time 0855  -      Time  Calculation (min) 25 min  -      PT Received On 01/09/18  -      Time Calculation- PT    Total Timed Code Minutes- PT 25 minute(s)  -        User Key  (r) = Recorded By, (t) = Taken By, (c) = Cosigned By    Initials Name Provider Type    PURNIMA Merritt, PTA Physical Therapy Assistant                  IP PT Goals       01/09/18 1524 01/09/18 0830 01/08/18 0938    Bed Mobility PT STG    Bed Mobility PT STG, Date Established   01/08/18  -CB    Bed Mobility PT STG, Time to Achieve   2 days  -CB    Bed Mobility PT STG, Activity Type   supine to sit/sit to supine  -CB    Bed Mobility PT STG, [place] Level   independent  -CB    Bed Mobility PT STG, Additional Goal   HOB down and no rails  -CB    Bed Mobility PT STG, Date Goal Reviewed 01/09/18  -CZ 01/09/18  -     Bed Mobility PT STG, Outcome goal not met  -CZ goal ongoing  -     Bed Mobility PT STG, Reason Goal Not Met discharged from facility  -CZ      Transfer Training PT STG    Transfer Training PT STG, Date Established   01/08/18  -CB    Transfer Training PT STG, Time to Achieve   2 days  -CB    Transfer Training PT STG, Activity Type   bed to chair /chair to bed;sit to stand/stand to sit  -CB    Transfer Training PT STG, [place] Level   independent  -CB    Transfer Training PT STG, Date Goal Reviewed 01/09/18  -CZ 01/09/18  -     Transfer Training PT STG, Outcome goal not met  -CZ goal ongoing  -     Transfer Training PT STG, Reason Goal Not Met discharged from facility  -CZ      Gait Training PT LTG    Gait Training Goal PT LTG, Date Established   01/08/18  -CB    Gait Training Goal PT LTG, Time to Achieve   by discharge  -CB    Gait Training Goal PT LTG, [place] Level   independent  -CB    Gait Training Goal PT LTG, Assist Device   --   none  -CB    Gait Training Goal PT LTG, Distance to Achieve   400 feet  -CB    Gait Training Goal PT LTG, Date Goal Reviewed 01/09/18  -CZ 01/09/18  -     Gait Training Goal PT LTG, Outcome  goal not met  - goal ongoing  -     Gait Training Goal PT LTG, Reason Goal Not Met discharged from facility  -      Stair Training PT LTG    Stair Training Goal PT LTG, Date Established   01/08/18  -    Stair Training Goal PT LTG, Time to Achieve   2 days  -    Stair Training Goal PT LTG, Number of Steps   2  -CB    Stair Training Goal PT LTG, [place] Level   independent  -    Stair Training Goal PT LTG, Assist Device   1 handrail  -    Stair Training Goal PT LTG, Date Goal Reviewed 01/09/18  -CZ 01/09/18  -     Stair Training Goal PT LTG, Outcome goal not met  - goal ongoing  -     Stair Training Goal PT LTG, Reason Goal Not Met discharged from facility  -        User Key  (r) = Recorded By, (t) = Taken By, (c) = Cosigned By    Initials Name Provider Type    PURNIMA Merritt, PTA Physical Therapy Assistant    CB Leatha Tirado, PT Physical Therapist    CZ José Antonio Estrada, PT Physical Therapist              PT Discharge Summary  Anticipated Discharge Disposition: home with home health  Reason for Discharge: Per MD order, Discharge from facility  Outcomes Achieved: Unable to make functional progress toward goals at this time  Discharge Destination: Home with home health      José Antonio Estrada, PT   1/9/2018

## 2018-01-09 NOTE — PROGRESS NOTES
Cardiology Progress Note:     LOS: 1 day   Patient Care Team:  Roula Albert MD as PCP - General  Roula Albert MD as PCP - Claims Attributed      Subjective:     Chart reviewed , patient seen and examined.  Patient denies any symptoms of chest pain.  Patient does complain of having symptoms of shortness of breath.  Patient is currently on Lasix and Solu-Medrol.          Objective:     Objective:  Vitals:    01/08/18 1624   BP: 158/74   Pulse: 89   Resp: 20   Temp: 98.4 °F (36.9 °C)   SpO2: 92%       Intake/Output Summary (Last 24 hours) at 01/08/18 2018  Last data filed at 01/08/18 1900   Gross per 24 hour   Intake             1200 ml   Output              800 ml   Net              400 ml             Physical Exam:   General Appearance:    Alert, oriented, cooperative, in no acute distress   Head:    Normocephalic, atraumatic, without obvious abnormality   Eyes:           TOI  Lids and lashes normal, conjunctivae and sclerae normal, no icterus, no pallor   Ears:    Ears appear intact with no abnormalities noted   Throat:   Mucous membranes pink and moist   Neck:   Supple, trachea midline, no carotid bruit, no organomegaly or JVD   Lungs:     Clear to auscultation and percussion, respirations regular, even and Unlabored. No wheezes, rales, rhonchi    Heart:    Regular rhythm and normal rate, normal S1 and S2, no            murmur, no gallop, no rub, no click   Abdomen:     Soft, non-tender, non-distended, no guarding, no rebound tenderness, Normal bowel sounds in all four quadrant, no masses, liver and spleen nonpalpable,    Genitalia:    Deferred   Extremities:   Moves all extremities well, no edema, no cyanosis, no              Redness, no clubbing   Pulses:   Pulses palpable and equal bilaterally   Skin:   Moist and warm. No bleeding, bruising or rash   Neurologic/Psychiatric:   Alert and oriented to person, place, and time.  Motor, power and tone in upper and lower extremity is grossly intact.  No focal  neurological deficits. Normal cognitive function. No psychomotor reaction or tangential thought. No depression, homicidal ideations and suicidal ideations            Results Review:    Lab Results (last 24 hours)     Procedure Component Value Units Date/Time    Basic Metabolic Panel [[id number]]  (Abnormal) Collected:  01/08/18 0541    Specimen:  Blood Updated:  01/08/18 0609     Glucose 145 (H) mg/dL      BUN 28 (H) mg/dL      Creatinine 0.82 mg/dL      Sodium 135 (L) mmol/L      Potassium 3.8 mmol/L      Chloride 93 (L) mmol/L      CO2 31.0 mmol/L      Calcium 8.4 mg/dL      eGFR Non African Amer 66 mL/min/1.73      BUN/Creatinine Ratio 34.1 (H)     Anion Gap 11.0 mmol/L     Narrative:       The MDRD GFR formula is only valid for adults with stable renal function between ages 18 and 70.    CBC (No Diff) [[id number]]  (Abnormal) Collected:  01/08/18 0541    Specimen:  Blood Updated:  01/08/18 0647     WBC 9.38 10*3/mm3      RBC 3.59 (L) 10*6/mm3      Hemoglobin 11.4 (L) g/dL      Hematocrit 34.2 (L) %      MCV 95.3 fL      MCH 31.8 pg      MCHC 33.3 g/dL      RDW 12.8 %      RDW-SD 44.3 fl      MPV 9.7 fL      Platelets 227 10*3/mm3            Medication Review:   Current Facility-Administered Medications   Medication Dose Route Frequency Provider Last Rate Last Dose   • acetaminophen (TYLENOL) tablet 650 mg  650 mg Oral Q4H PRN Jazmin Batista MD       • aspirin EC tablet 81 mg  81 mg Oral Daily Jazmin Batista MD   81 mg at 01/08/18 1004   • atorvastatin (LIPITOR) tablet 20 mg  20 mg Oral Nightly Jazmin Batista MD   20 mg at 01/08/18 2008   • budesonide-formoterol (SYMBICORT) 160-4.5 MCG/ACT inhaler 2 puff  2 puff Inhalation BID - RT Jazmin Batista MD   2 puff at 01/08/18 0759   • carvedilol (COREG) tablet 6.25 mg  6.25 mg Oral BID With Meals Jazmin Batista MD   6.25 mg at 01/08/18 1714   • clopidogrel (PLAVIX) tablet 75 mg  75 mg Oral Nightly Jazmin Batista MD   75 mg at 01/08/18 2008   • furosemide  (LASIX) tablet 40 mg  40 mg Oral Daily Jazmin Batista MD   40 mg at 01/08/18 1005   • hydrALAZINE (APRESOLINE) injection 10 mg  10 mg Intravenous Q6H PRN Jazmin Batista MD       • ipratropium-albuterol (DUO-NEB) nebulizer solution 3 mL  3 mL Nebulization 4x Daily PRN Jazmin Batista MD   3 mL at 01/08/18 2017   • levothyroxine (SYNTHROID, LEVOTHROID) tablet 50 mcg  50 mcg Oral Q AM Jazmin Batista MD   50 mcg at 01/08/18 0600   • losartan (COZAAR) tablet 25 mg  25 mg Oral Q24H Kristina Rojas APRN   25 mg at 01/08/18 1715   • methylPREDNISolone sodium succinate (SOLU-Medrol) injection 40 mg  40 mg Intravenous Q12H Kristina Rojas APRN   40 mg at 01/08/18 1714   • Morphine injection 1 mg  1 mg Intravenous Q4H PRN Jazmin Batista MD       • nitroglycerin (NITRODUR) 0.2 MG/HR patch 1 patch  1 patch Transdermal Daily Jazmin Batista MD   1 patch at 01/08/18 1005   • ondansetron (ZOFRAN) injection 4 mg  4 mg Intravenous Q6H PRN Jazmin Batista MD   4 mg at 01/04/18 2117   • ranolazine (RANEXA) 12 hr tablet 1,000 mg  1,000 mg Oral Q12H Alpesh Rogel MD   1,000 mg at 01/08/18 2007   • regadenoson (LEXISCAN) injection 0.4 mg  0.4 mg Intravenous Once Alpesh Rogel MD       • sertraline (ZOLOFT) tablet 50 mg  50 mg Oral Daily Jazmin Batista MD   50 mg at 01/08/18 1005   • sodium chloride 0.9 % flush 1-10 mL  1-10 mL Intravenous PRN Jazmin Batista MD   10 mL at 01/07/18 2025   • sodium chloride 0.9 % flush 10 mL  10 mL Intravenous PRN Ruddy Johnson MD           Assessment and Plan:    Active Problems:    Precordial pain  1.  Left ventricular systolic dysfunction with an ejection fraction of 35-40% with mild to moderate tricuspid regurgitation.  Patient would be continued on the present dose of the Lasix along with the carvedilol and the losartan.  Clinically patient has bilateral wheezing.  Patient is also on Solu-Medrol and antibiotics  2.  History of atherosclerotic coronary artery disease with  small-sized, mildly severe area of ischemia located in the anterior wall and septal wall.  Patient has been recommended medical management at the present time with Ranexa and Nitro-Dur patch.  Patient ejection fraction on the SPECT image was normal.  If the patient has symptoms of chest pain or fails medical management and patient would be subjected to further invasive evaluation.  Patient also has history of renal insufficiency.  3.  Chronic obstructive lung disease exacerbation.  Patient is on antibiotics and steroids and inhalers.  4.  CODE STATUS is DO NOT RESUSCITATE            Alpesh Rogel MD  01/08/18  8:18 PM      Time: More than 50% of time spent in counseling and coordination of care:  Total face-to-face/floor time 30 min.  Time spent in counseling 10 min. Counseling included the following topics: cad

## 2018-01-09 NOTE — PLAN OF CARE
Problem: Patient Care Overview (Adult)  Goal: Plan of Care Review  Outcome: Ongoing (interventions implemented as appropriate)   01/09/18 0830   Coping/Psychosocial Response Interventions   Plan Of Care Reviewed With patient   Patient Care Overview   Progress improving   Outcome Evaluation   Outcome Summary/Follow up Plan pt t/fers sit/stand w/ SBA, ambulates ~80' x 2 w/ no AD and CGA/SBA, pt SOA w/ ambulation this tx and required mult short standing rest breaks for PLB, pt's O2 sats dropped to 87-88% on 2L nc, pt performed 2 x 10 sitting B LE ther ex, pt would benefit from skilled PT to increase functional Ind, if d/c home today pt would need HHPT       Problem: Inpatient Physical Therapy  Goal: Bed Mobility Goal STG- PT  Outcome: Ongoing (interventions implemented as appropriate)   01/08/18 0938 01/09/18 0830   Bed Mobility PT STG   Bed Mobility PT STG, Date Established 01/08/18 --    Bed Mobility PT STG, Time to Achieve 2 days --    Bed Mobility PT STG, Activity Type supine to sit/sit to supine --    Bed Mobility PT STG, [place] Level independent --    Bed Mobility PT STG, Additional Goal HOB down and no rails --    Bed Mobility PT STG, Date Goal Reviewed --  01/09/18   Bed Mobility PT STG, Outcome --  goal ongoing     Goal: Transfer Training Goal 1 STG- PT  Outcome: Ongoing (interventions implemented as appropriate)   01/08/18 0938 01/09/18 0830   Transfer Training PT STG   Transfer Training PT STG, Date Established 01/08/18 --    Transfer Training PT STG, Time to Achieve 2 days --    Transfer Training PT STG, Activity Type bed to chair /chair to bed;sit to stand/stand to sit --    Transfer Training PT STG, [place] Level independent --    Transfer Training PT STG, Date Goal Reviewed --  01/09/18   Transfer Training PT STG, Outcome --  goal ongoing     Goal: Gait Training Goal LTG- PT  Outcome: Ongoing (interventions implemented as appropriate)   01/08/18 0938 01/09/18 0830   Gait Training PT LTG   Gait  Training Goal PT LTG, Date Established 01/08/18 --    Gait Training Goal PT LTG, Time to Achieve by discharge --    Gait Training Goal PT LTG, [place] Level independent --    Gait Training Goal PT LTG, Assist Device (none) --    Gait Training Goal PT LTG, Distance to Achieve 400 feet --    Gait Training Goal PT LTG, Date Goal Reviewed --  01/09/18   Gait Training Goal PT LTG, Outcome --  goal ongoing     Goal: Stair Training Goal LTG- PT  Outcome: Ongoing (interventions implemented as appropriate)   01/09/18 0830   Stair Training PT LTG   Stair Training Goal PT LTG, Date Goal Reviewed 01/09/18   Stair Training Goal PT LTG, Outcome goal ongoing

## 2018-01-09 NOTE — THERAPY TREATMENT NOTE
Acute Care - Occupational Therapy Treatment Note  [facility]     Patient Name: Val Arteaga  : 1932  MRN: [id number]  Today's Date: 2018  Onset of Illness/Injury or Date of Surgery Date: 18  Date of Referral to OT: 18  Referring Physician: Kristina DUPONT      Admit Date: 1/3/2018    Visit Dx:     ICD-10-CM ICD-9-CM   1. Precordial pain R07.2 786.51   2. Shortness of breath R06.02 786.05   3. Elevated troponin R74.8 790.6   4. SSS (sick sinus syndrome) I49.5 427.81   5. NSTEMI (non-ST elevated myocardial infarction) I21.4 410.70   6. Dyspnea, unspecified type R06.00 786.09   7. Chest pain, unspecified type R07.9 786.50   8. Atherosclerosis of native coronary artery of native heart, angina presence unspecified I25.10 414.01   9. Impaired mobility and ADLs Z74.09 799.89   10. Impaired physical mobility Z74.09 781.99   11. Coronary artery disease involving native coronary artery of native heart without angina pectoris I25.10 414.01   12. Pulmonary emphysema, unspecified emphysema type J43.9 492.8     Patient Active Problem List   Diagnosis   • Essential hypertension   • Coronary artery disease involving native coronary artery of native heart without angina pectoris   • Chronic obstructive pulmonary disease   • Acquired hypothyroidism   • Depressive disorder   • Thygeson's superficial punctate keratitis   • Pseudophakia   • Precordial pain             Adult Rehabilitation Note       18 0945 18 0830 18 1605    Rehab Assessment/Intervention    Discipline occupational therapy assistant  -KD physical therapy assistant  -JW occupational therapy assistant  -LM    Document Type therapy note (daily note)  -KD therapy note (daily note)  -JW therapy note (daily note)  -LM    Subjective Information agree to therapy  -KD agree to therapy  -JW agree to therapy  -LM    Patient Effort, Rehab Treatment good  -KD good  -JW good  -LM    Symptoms Noted During/After Treatment  shortness  of breath  -JW     Precautions/Limitations fall precautions;oxygen therapy device and L/min  -KD fall precautions;oxygen therapy device and L/min  -JW     Recorded by [KD] OMAR Yang/HINA [JW] Neelima Merritt, JEANNETTE [LM] YULI MaloneA/L    Vital Signs    Pre Systolic BP Rehab 134  -  -JW     Pre Treatment Diastolic BP 62  -KD 91  -JW     Post Treatment Diastolic BP  148  -JW     Pretreatment Heart Rate (beats/min) 82  -KD 64  -JW 81  -LM    Intratreatment Heart Rate (beats/min)  90  -JW 89  -LM    Posttreatment Heart Rate (beats/min) 90  -KD  89  -LM    Pretreatment Resp Rate (breaths/min)  90  -JW     Pre SpO2 (%) 93  -KD 94   pt 87-88 initially, but O2 nc not on correctly  -JW 91  -LM    O2 Delivery Pre Treatment supplemental O2  -KD supplemental O2  -JW     Intra SpO2 (%)  88  -JW 91  -LM    O2 Delivery Intra Treatment  supplemental O2  -JW     Post SpO2 (%) 94  -KD 94  -JW 91  -LM    O2 Delivery Post Treatment supplemental O2  -KD supplemental O2  -JW     Pre Patient Position Sitting  -KD Sitting  -JW     Intra Patient Position Sitting  -KD      Post Patient Position Sitting  -KD Sitting  -JW     Recorded by [KD] OMAR Yang/HINA [JW] Neelima Merritt PTA [LM] YULI MaloneA/L    Pain Assessment    Pain Assessment No/denies pain  -KD No/denies pain  -JW     Pain Score 0  -KD      Post Pain Score 0  -KD      Recorded by [KD] OMAR Yang/HINA [JW] Neelima Merritt, JEANNETTE     Vision Assessment/Intervention    Visual Impairment WFL with corrective lenses  -KD      Recorded by [KD] YULI YangA/L      Cognitive Assessment/Intervention    Current Cognitive/Communication Assessment functional  -KD functional  -JW     Orientation Status oriented x 4  -KD oriented x 4  -JW     Follows Commands/Answers Questions 100% of the time  -% of the time  -JW     Personal Safety Interventions fall prevention program maintained  -KD fall prevention program  maintained;gait belt;nonskid shoes/slippers when out of bed  -JW     Recorded by [KD] OMAR Yang/HINA [JW] Neelima Merritt PTA     Bed Mobility, Assessment/Treatment    Bed Mobility, Comment  sitting EOB at beginning of tx  -JW     Recorded by  [JW] Neelima Merritt PTA     Transfer Assessment/Treatment    Transfers, Sit-Stand [place]  supervision required  -JW supervision required  -LM    Transfers, Stand-Sit [place]  supervision required  -JW supervision required  -LM    Transfers, Sit-Stand-Sit, Assist Device  --   none  -JW     Transfer, Comment Pt declined ADL 2' dc'ing this date and will do ADL at home.  -KD      Recorded by [KD] OMAR Yang/HINA [JW] Neelima Merritt PTA [LM] OMAR Malone/L    Gait Assessment/Treatment    Gait, [place] Level  contact guard assist;stand by assist  -JW     Gait, Assistive Device  --   none  -JW     Gait, Distance (Feet)  80   x 2  -JW     Gait, Comment  pt w/ mult. standing rest breaks as pt got SOA w/ ambulation  -JW     Recorded by  [JW] Neelima Merritt PTA     Upper Body Bathing Assessment/Training    UB Bathing Assess/Train, [place] Level   --   mmock with sba  -LM    Recorded by   [LM] OMAR Malone/L    Lower Body Bathing Assessment/Training    LB Bathing Assess/Train, [place] Level   supervision required   mock with cca-sba  -LM    Recorded by   [LM] JENNIFFER Malone    Upper Body Dressing Assessment/Training    UB Dressing Assess/Train, Assist Device   --   mock with hansen  -LM    Recorded by   [LM] OMAR Malone/L    Lower Body Dressing Assessment/Training    LB Dressing Assess/Train, [place]   --   perf socks with hansen and mocks pants with sba-cca  -LM    Recorded by   [LM] JENNIFFER Malone    Balance Skills Training    Sitting-Level of Assistance   Independent  -LM    Sitting # of Minutes   --   15  -LM    Standing-Level of Assistance   Close  supervision;Contact guard  -LM    Standing Balance # of Minutes   --   5 min with sba-cca with wt shifting act to increase balance/  -LM    Recorded by   [LM] OMAR Malone/L    Therapy Exercises    Bilateral Lower Extremities  AROM:;10 reps;sitting;ankle pumps/circles;hip flexion;LAQ   add pillow squeeze, 2 sets 10 all ther ex  -JW     Bilateral Upper Extremity AAROM:;20 reps;sitting;elbow flexion/extension;hand pumps;pronation/supination;shoulder abduction/adduction;shoulder extension/flexion;shoulder ER/IR  -KD      BUE Resistance manual resistance- minimal   pt was educated, demonstrated and literature given on HEP  -KD      Recorded by [KD] OMAR Yang/HINA [] Neelima Merritt PTA     Positioning and Restraints    Pre-Treatment Position in bed  -KD in bed  -JW in bed  -LM    Post Treatment Position bed  -KD chair  -JW bed   eob with family present  -LM    In Bed sitting;call light within reach;encouraged to call for assist;exit alarm on;with family/caregiver  -KD      In Chair  sitting;call light within reach;encouraged to call for assist  -JW     Recorded by [KD] OMAR Yang/HINA [JW] Neelima Merritt, JEANNETTE [] JENNIFFER Malone      User Key  (r) = Recorded By, (t) = Taken By, (c) = Cosigned By    Initials Name Effective Dates     Neelima Merritt PTA 08/11/15 -     KD OMAR Yang/HINA 10/17/16 -     LM OMAR Malone/HINA 10/17/16 -                 OT Goals       01/09/18 1240 01/08/18 1640 01/08/18 0833    Patient Education OT LTG    Patient Education OT LTG, Date Established   01/08/18  -    Patient Education OT LTG, Time to Achieve   by discharge  -    Patient Education OT LTG, Education Type   HEP;home safety;energy conservation  -    Patient Education OT LTG, Education Understanding   verbalizes understanding;demonstrates adequately;independent  -    Patient Education OT LTG, Date Goal Reviewed 01/09/18  -KD 01/08/18  -     Patient  Education OT LTG Outcome goal met  -KD goal ongoing  -LM     ADL OT LTG    ADL OT LTG, Date Established   01/08/18  -    ADL OT LTG, Time to Achieve   by discharge  -    ADL OT LTG, Activity Type   ADL skills  -    ADL OT LTG, [place] Level   modified independent  -    ADL OT LTG, Date Goal Reviewed 01/09/18  -KD 01/08/18  -LM     ADL OT LTG, Outcome goal not met  -KD goal ongoing  -LM     Activity Tolerance OT LTG    Activity Tolerance Goal OT LTG, Date Established   01/08/18  -    Activity Tolerance Goal OT LTG, Time to Achieve   by discharge  -    Activity Tolerance Goal OT LTG, Activity Level   20 min activity;O2 sat >/equal to 90%;with 1 rest break  -    Activity Tolerance Goal OT LTG, Date Goal Reviewed 01/09/18  -KD 01/08/18  -LM     Activity Tolerance Goal OT LTG, Outcome goal not met  -KD goal ongoing  -LM     Endurance OT LTG    Endurance Goal OT LTG, Date Established   01/08/18  -    Endurance Goal OT LTG, Time to Achieve   by discharge  -    Endurance Goal OT LTG, Activity Level   endurance 2 good   30 minute activity  -    Endurance Goal OT LTG, Date Goal Reviewed 01/09/18  -KD 01/08/18  -LM     Endurance Goal OT LTG, Outcome goal not met  -KD goal ongoing  -LM       User Key  (r) = Recorded By, (t) = Taken By, (c) = Cosigned By    Initials Name Provider Type     Marisol Burdick, OTR/L Occupational Therapist     Myesha Sepulveda NELSON/L Occupational Therapy Assistant     Massiel Santana NELSON/L Occupational Therapy Assistant          Occupational Therapy Education     Title: PT OT SLP Therapies (Done)     Topic: Occupational Therapy (Done)     Point: ADL training (Done)    Description: Instruct learner(s) on proper safety adaptation and remediation techniques during self care or transfers.   Instruct in proper use of assistive devices.    Learning Progress Summary    Learner Readiness Method Response Comment Documented by Status   Patient Acceptance EBRAYAN MT 01/09/18  1207 Done    Acceptance E VU ed on home safety fall prevention and ae dme and long discussion on tub transfer bench and benefits  01/08/18 1639 Done    Acceptance E VU Educated on OT and POC. Educated on safety throughout with t/f, mobility, and ADL. Educated on deep breathing to keep O2 levels above 90. Educated to call for assist.  01/08/18 1318 Done   Family Acceptance E,[facility] 01/09/18 1207 Done               Point: Home exercise program (Done)    Description: Instruct learner(s) on appropriate technique for monitoring, assisting and/or progressing therapeutic exercises/activities.    Learning Progress Summary    Learner Readiness Method Response Comment Documented by Status   Patient Acceptance E,TB VU  MT 01/09/18 1207 Done    Acceptance E VU ed on home safety fall prevention and ae dme and long discussion on tub transfer bench and benefits  01/08/18 1639 Done   Family Acceptance E,TB [facility] 01/09/18 1207 Done               Point: Precautions (Done)    Description: Instruct learner(s) on prescribed precautions during self-care and functional transfers.    Learning Progress Summary    Learner Readiness Method Response Comment Documented by Status   Patient Acceptance E,TB VU  MT 01/09/18 1207 Done    Acceptance E VU ed on home safety fall prevention and ae dme and long discussion on tub transfer bench and benefits  01/08/18 1639 Done    Acceptance E VU Educated on OT and POC. Educated on safety throughout with t/f, mobility, and ADL. Educated on deep breathing to keep O2 levels above 90. Educated to call for assist.  01/08/18 1318 Done   Family Acceptance E,[facility] 01/09/18 1207 Done               Point: Body mechanics (Done)    Description: Instruct learner(s) on proper positioning and spine alignment during self-care, functional mobility activities and/or exercises.    Learning Progress Summary    Learner Readiness Method Response Comment Documented by Status   Patient Acceptance E,TB [facility]  01/09/18 1207 Done    Acceptance E VU ed on home safety fall prevention and ae dme and long discussion on tub transfer bench and benefits  01/08/18 1639 Done    Acceptance E VU Educated on OT and POC. Educated on safety throughout with t/f, mobility, and ADL. Educated on deep breathing to keep O2 levels above 90. Educated to call for assist.  01/08/18 1318 Done   Family Acceptance E,TB VU  MT 01/09/18 1207 Done                      User Key     Initials Effective Dates Name Provider Type Discipline     10/17/16 -  Marisol Burdick, OTR/L Occupational Therapist OT    MT 10/17/16 -  Myra Jay, RN Registered Nurse Nurse     10/17/16 -  Massiel Santana, NELSON/L Occupational Therapy Assistant OT                  OT Recommendation and Plan  Anticipated Discharge Disposition: home with 24/7 care, home with home health (pulmonary rehab)  Planned Therapy Interventions: activity intolerance, adaptive equipment training, ADL retraining, balance training, bed mobility training, energy conservation, fine motor coordination training, home exercise program, motor coordination training, strengthening, ROM (Range of Motion), transfer training  Therapy Frequency: other (see comments) (3-14x a week)           Outcome Measures       01/09/18 0945 01/09/18 0830 01/08/18 1600    How much help from another person do you currently need...    Turning from your back to your side while in flat bed without using bedrails?  4  -JW     Moving from lying on back to sitting on the side of a flat bed without bedrails?  4  -JW     Moving to and from a bed to a chair (including a wheelchair)?  3  -JW     Standing up from a chair using your arms (e.g., wheelchair, bedside chair)?  3  -JW     Climbing 3-5 steps with a railing?  3  -JW     To walk in hospital room?  3  -JW     AM-PAC 6 Clicks Score  20  -JW     How much help from another is currently needed...    Putting on and taking off regular lower body clothing? 3  -KD  3  -LM     Bathing (including washing, rinsing, and drying) 3  -KD  3  -LM    Toileting (which includes using toilet bed pan or urinal) 3  -KD  3  -LM    Putting on and taking off regular upper body clothing 4  -KD  3  -LM    Taking care of personal grooming (such as brushing teeth) 4  -KD  4  -LM    Eating meals 4  -KD  4  -LM    Score 21  -KD  20  -LM    Functional Assessment    Outcome Measure Options  AM-PAC 6 Clicks Basic Mobility (PT)  -JW       01/08/18 0938 01/08/18 0833       How much help from another person do you currently need...    Turning from your back to your side while in flat bed without using bedrails? 4  -CB      Moving from lying on back to sitting on the side of a flat bed without bedrails? 4  -CB      Moving to and from a bed to a chair (including a wheelchair)? 3  -CB      Standing up from a chair using your arms (e.g., wheelchair, bedside chair)? 3  -CB      Climbing 3-5 steps with a railing? 3  -CB      To walk in hospital room? 3  -CB      AM-PAC 6 Clicks Score 20  -CB      How much help from another is currently needed...    Putting on and taking off regular lower body clothing?  3  -BH     Bathing (including washing, rinsing, and drying)  3  -BH     Toileting (which includes using toilet bed pan or urinal)  3  -BH     Putting on and taking off regular upper body clothing  3  -BH     Taking care of personal grooming (such as brushing teeth)  4  -BH     Eating meals  4  -BH     Score  20  -BH     Functional Assessment    Outcome Measure Options AM-PAC 6 Clicks Basic Mobility (PT)  -CB AM-PAC 6 Clicks Daily Activity (OT)  -       User Key  (r) = Recorded By, (t) = Taken By, (c) = Cosigned By    Initials Name Provider Type    PURNIMA Merritt, PTA Physical Therapy Assistant    BERNARDO Tirado, PT Physical Therapist    BH Marisol Burdick, OTR/L Occupational Therapist    ANNALISA Sepulveda, NELSON/L Occupational Therapy Assistant    EDUARDO Santana NELSON/L Occupational Therapy Assistant            Time Calculation:         Time Calculation- OT       01/09/18 1242          Time Calculation- OT    OT Start Time 0945  -KD      OT Stop Time 1008  -KD      OT Time Calculation (min) 23 min  -KD      Total Timed Code Minutes- OT 23 minute(s)  -KD      OT Received On 01/09/18  -KD        User Key  (r) = Recorded By, (t) = Taken By, (c) = Cosigned By    Initials Name Provider Type     OMAR Yang/L Occupational Therapy Assistant           Therapy Charges for Today     Code Description Service Date Service Provider Modifiers Qty    66388370074 HC OT THER PROC EA 15 MIN 1/9/2018 OMAR Yang/L GO 1    29506991569 HC OT SELF CARE/MGMT/TRAIN EA 15 MIN 1/9/2018 OMAR Yang/L GO 1          OT G-codes  OT Professional Judgement Used?: Yes  OT Functional Scales Options: AM-PAC 6 Clicks Daily Activity (OT)  Score: 20  Functional Limitation: Self care  Self Care Current Status (): At least 20 percent but less than 40 percent impaired, limited or restricted  Self Care Goal Status (): At least 1 percent but less than 20 percent impaired, limited or restricted    JENNIFFER Yang  1/9/2018

## 2018-01-09 NOTE — THERAPY DISCHARGE NOTE
Acute Care - Occupational Therapy Discharge Summary  [facility]     Patient Name: Val Arteaga  : 1932  MRN: [id number]    Today's Date: 2018  Onset of Illness/Injury or Date of Surgery Date: 18    Date of Referral to OT: 18  Referring Physician: Kristina DUPONT      Admit Date: 1/3/2018        OT Recommendation and Plan    Visit Dx:    ICD-10-CM ICD-9-CM   1. Precordial pain R07.2 786.51   2. Shortness of breath R06.02 786.05   3. Elevated troponin R74.8 790.6   4. SSS (sick sinus syndrome) I49.5 427.81   5. NSTEMI (non-ST elevated myocardial infarction) I21.4 410.70   6. Dyspnea, unspecified type R06.00 786.09   7. Chest pain, unspecified type R07.9 786.50   8. Atherosclerosis of native coronary artery of native heart, angina presence unspecified I25.10 414.01   9. Impaired mobility and ADLs Z74.09 799.89   10. Impaired physical mobility Z74.09 781.99   11. Coronary artery disease involving native coronary artery of native heart without angina pectoris I25.10 414.01   12. Pulmonary emphysema, unspecified emphysema type J43.9 492.8               Time Calculation- OT       18 1242          Time Calculation- OT    OT Start Time 0945  -KD      OT Stop Time 1008  -KD      OT Time Calculation (min) 23 min  -KD      Total Timed Code Minutes- OT 23 minute(s)  -KD      OT Received On 18  -KD        User Key  (r) = Recorded By, (t) = Taken By, (c) = Cosigned By    Initials Name Provider Type     OMAR Yang/L Occupational Therapy Assistant                  OT Goals       18 1510 18 1240 18 1640    Patient Education OT LTG    Patient Education OT LTG, Date Goal Reviewed  18  -KD 18  -    Patient Education OT LTG Outcome  goal met  -KD goal ongoing  -    Patient Education OT LTG, Reason Goal Not Met discharged from facility  -      ADL OT LTG    ADL OT LTG, Date Goal Reviewed  18  -KD 18  -LM    ADL OT LTG, Outcome  goal  not met  -KD goal ongoing  -LM    ADL OT LTG, Reason Goal Not Met discharged from facility  -      Activity Tolerance OT LTG    Activity Tolerance Goal OT LTG, Date Goal Reviewed  01/09/18  -KD 01/08/18  -LM    Activity Tolerance Goal OT LTG, Outcome  goal not met  -KD goal ongoing  -LM    Activity Tolerance Goal OT LTG, Reason Goal Not Met discharged from facility  -      Endurance OT LTG    Endurance Goal OT LTG, Date Goal Reviewed  01/09/18  -KD 01/08/18  -LM    Endurance Goal OT LTG, Outcome  goal not met  -KD goal ongoing  -LM    Endurance Goal OT LTG, Reason Goal Not Met discharged from facility  -        01/08/18 0833          Patient Education OT LTG    Patient Education OT LTG, Date Established 01/08/18  -      Patient Education OT LTG, Time to Achieve by discharge  -      Patient Education OT LTG, Education Type HEP;home safety;energy conservation  -      Patient Education OT LTG, Education Understanding verbalizes understanding;demonstrates adequately;independent  -      ADL OT LTG    ADL OT LTG, Date Established 01/08/18  -      ADL OT LTG, Time to Achieve by discharge  -      ADL OT LTG, Activity Type ADL skills  -      ADL OT LTG, [place] Level modified independent  -      Activity Tolerance OT LTG    Activity Tolerance Goal OT LTG, Date Established 01/08/18  -      Activity Tolerance Goal OT LTG, Time to Achieve by discharge  -      Activity Tolerance Goal OT LTG, Activity Level 20 min activity;O2 sat >/equal to 90%;with 1 rest break  -      Endurance OT LTG    Endurance Goal OT LTG, Date Established 01/08/18  -      Endurance Goal OT LTG, Time to Achieve by discharge  -      Endurance Goal OT LTG, Activity Level endurance 2 good   30 minute activity  -        User Key  (r) = Recorded By, (t) = Taken By, (c) = Cosigned By    Initials Name Provider Type     AYAD Vazquez/L Occupational Therapist    OMAR Helms/L Occupational Therapy Assistant     JENNIFFER Ayers Occupational Therapy Assistant                Outcome Measures       01/09/18 0945 01/09/18 0830 01/08/18 1600    How much help from another person do you currently need...    Turning from your back to your side while in flat bed without using bedrails?  4  -JW     Moving from lying on back to sitting on the side of a flat bed without bedrails?  4  -JW     Moving to and from a bed to a chair (including a wheelchair)?  3  -JW     Standing up from a chair using your arms (e.g., wheelchair, bedside chair)?  3  -JW     Climbing 3-5 steps with a railing?  3  -JW     To walk in hospital room?  3  -JW     AM-PAC 6 Clicks Score  20  -JW     How much help from another is currently needed...    Putting on and taking off regular lower body clothing? 3  -KD  3  -LM    Bathing (including washing, rinsing, and drying) 3  -KD  3  -LM    Toileting (which includes using toilet bed pan or urinal) 3  -KD  3  -LM    Putting on and taking off regular upper body clothing 4  -KD  3  -LM    Taking care of personal grooming (such as brushing teeth) 4  -KD  4  -LM    Eating meals 4  -KD  4  -LM    Score 21  -KD  20  -LM    Functional Assessment    Outcome Measure Options  AM-PAC 6 Clicks Basic Mobility (PT)  -JW       01/08/18 0938 01/08/18 0833       How much help from another person do you currently need...    Turning from your back to your side while in flat bed without using bedrails? 4  -CB      Moving from lying on back to sitting on the side of a flat bed without bedrails? 4  -CB      Moving to and from a bed to a chair (including a wheelchair)? 3  -CB      Standing up from a chair using your arms (e.g., wheelchair, bedside chair)? 3  -CB      Climbing 3-5 steps with a railing? 3  -CB      To walk in hospital room? 3  -CB      AM-PAC 6 Clicks Score 20  -CB      How much help from another is currently needed...    Putting on and taking off regular lower body clothing?  3  -BH     Bathing (including  washing, rinsing, and drying)  3  -BH     Toileting (which includes using toilet bed pan or urinal)  3  -BH     Putting on and taking off regular upper body clothing  3  -BH     Taking care of personal grooming (such as brushing teeth)  4  -BH     Eating meals  4  -     Score  20  -     Functional Assessment    Outcome Measure Options AM-PAC 6 Clicks Basic Mobility (PT)  -CB AM-PAC 6 Clicks Daily Activity (OT)  -       User Key  (r) = Recorded By, (t) = Taken By, (c) = Cosigned By    Initials Name Provider Type    JW Neelima Merritt, PTA Physical Therapy Assistant    CB Leatha Tirado, PT Physical Therapist     Marisol Burdick OTR/L Occupational Therapist    YULI HelmsA/L Occupational Therapy Assistant    EDUARDO Santana NELSON/L Occupational Therapy Assistant          Therapy Charges for Today     Code Description Service Date Service Provider Modifiers Qty    02134563854 HC OT SELFCARE CURRENT 1/8/2018 AYAD Vazquez/L GO, CJ 1    86685159732 HC OT SELFCARE PROJECTED 1/8/2018 Marisol Burdick OTR/L GO, CI 1    01228907890 HC OT SELF CARE/MGMT/TRAIN EA 15 MIN 1/8/2018 Marisol Burdick OTR/L GO 1    69532653437 HC OT THER PROC EA 15 MIN 1/8/2018 Marisol Burdick OTR/L GO 1    63981996980 HC OT EVAL MOD COMPLEXITY 1 1/8/2018 AYAD Vazquez/L GO 1          OT Discharge Summary  Anticipated Discharge Disposition: home with 24/7 care, home with home health  Reason for Discharge: Discharge from facility, Per MD order  Outcomes Achieved: Refer to plan of care for updates on goals achieved, Patient able to partially acheive established goals  Discharge Destination: Home with home health      AYAD Vazquez/HINA  1/9/2018

## 2018-01-09 NOTE — PLAN OF CARE
Problem: Inpatient Physical Therapy  Goal: Bed Mobility Goal STG- PT  Outcome: Unable to achieve outcome(s) by discharge Date Met: 01/09/18 01/08/18 0938 01/09/18 1524   Bed Mobility PT STG   Bed Mobility PT STG, Date Established 01/08/18 --    Bed Mobility PT STG, Time to Achieve 2 days --    Bed Mobility PT STG, Activity Type supine to sit/sit to supine --    Bed Mobility PT STG, [place] Level independent --    Bed Mobility PT STG, Additional Goal HOB down and no rails --    Bed Mobility PT STG, Date Goal Reviewed --  01/09/18   Bed Mobility PT STG, Outcome --  goal not met   Bed Mobility PT STG, Reason Goal Not Met --  discharged from facility     Goal: Transfer Training Goal 1 STG- PT  Outcome: Unable to achieve outcome(s) by discharge Date Met: 01/09/18 01/08/18 0938 01/09/18 1524   Transfer Training PT STG   Transfer Training PT STG, Date Established 01/08/18 --    Transfer Training PT STG, Time to Achieve 2 days --    Transfer Training PT STG, Activity Type bed to chair /chair to bed;sit to stand/stand to sit --    Transfer Training PT STG, [place] Level independent --    Transfer Training PT STG, Date Goal Reviewed --  01/09/18   Transfer Training PT STG, Outcome --  goal not met   Transfer Training PT STG, Reason Goal Not Met --  discharged from facility     Goal: Gait Training Goal LTG- PT  Outcome: Unable to achieve outcome(s) by discharge Date Met: 01/09/18 01/08/18 0938 01/09/18 1524   Gait Training PT LTG   Gait Training Goal PT LTG, Date Established 01/08/18 --    Gait Training Goal PT LTG, Time to Achieve by discharge --    Gait Training Goal PT LTG, [place] Level independent --    Gait Training Goal PT LTG, Assist Device (none) --    Gait Training Goal PT LTG, Distance to Achieve 400 feet --    Gait Training Goal PT LTG, Date Goal Reviewed --  01/09/18   Gait Training Goal PT LTG, Outcome --  goal not met   Gait Training Goal PT LTG, Reason Goal Not Met --  discharged from  facility     Goal: Stair Training Goal LTG- PT  Outcome: Unable to achieve outcome(s) by discharge Date Met: 01/09/18 01/08/18 0938 01/09/18 1524   Stair Training PT LTG   Stair Training Goal PT LTG, Date Established 01/08/18 --    Stair Training Goal PT LTG, Time to Achieve 2 days --    Stair Training Goal PT LTG, Number of Steps 2 --    Stair Training Goal PT LTG, [place] Level independent --    Stair Training Goal PT LTG, Assist Device 1 handrail --    Stair Training Goal PT LTG, Date Goal Reviewed --  01/09/18   Stair Training Goal PT LTG, Outcome --  goal not met   Stair Training Goal PT LTG, Reason Goal Not Met --  discharged from facility

## 2018-01-09 NOTE — THERAPY TREATMENT NOTE
Acute Care - Physical Therapy Treatment Note  [facility]     Patient Name: Val Arteaga  : 1932  MRN: [id number]  Today's Date: 2018  Onset of Illness/Injury or Date of Surgery Date: 18  Date of Referral to PT: 18  Referring Physician: Kristina DUPONT    Admit Date: 1/3/2018    Visit Dx:    ICD-10-CM ICD-9-CM   1. Precordial pain R07.2 786.51   2. Shortness of breath R06.02 786.05   3. Elevated troponin R74.8 790.6   4. SSS (sick sinus syndrome) I49.5 427.81   5. NSTEMI (non-ST elevated myocardial infarction) I21.4 410.70   6. Dyspnea, unspecified type R06.00 786.09   7. Chest pain, unspecified type R07.9 786.50   8. Atherosclerosis of native coronary artery of native heart, angina presence unspecified I25.10 414.01   9. Impaired mobility and ADLs Z74.09 799.89   10. Impaired physical mobility Z74.09 781.99     Patient Active Problem List   Diagnosis   • Essential hypertension   • Coronary artery disease involving native coronary artery of native heart without angina pectoris   • Chronic obstructive pulmonary disease   • Acquired hypothyroidism   • Depressive disorder   • Thygeson's superficial punctate keratitis   • Pseudophakia   • Precordial pain               Adult Rehabilitation Note       18 0830 18 1605       Rehab Assessment/Intervention    Discipline physical therapy assistant  -JW occupational therapy assistant  -LM     Document Type therapy note (daily note)  -JW therapy note (daily note)  -LM     Subjective Information agree to therapy  -JW agree to therapy  -LM     Patient Effort, Rehab Treatment good  -JW good  -LM     Symptoms Noted During/After Treatment shortness of breath  -JW      Precautions/Limitations fall precautions;oxygen therapy device and L/min  -JW      Recorded by [JW] Neelima Merritt PTA [LM] OMAR Malone/L     Vital Signs    Pre Systolic BP Rehab 138  -JW      Pre Treatment Diastolic BP 91  -JW      Post Treatment  Diastolic   -JW      Pretreatment Heart Rate (beats/min) 64  -JW 81  -LM     Intratreatment Heart Rate (beats/min) 90  -JW 89  -LM     Posttreatment Heart Rate (beats/min)  89  -LM     Pretreatment Resp Rate (breaths/min) 90  -JW      Pre SpO2 (%) 94   pt 87-88 initially, but O2 nc not on correctly  -JW 91  -LM     O2 Delivery Pre Treatment supplemental O2  -JW      Intra SpO2 (%) 88  -JW 91  -LM     O2 Delivery Intra Treatment supplemental O2  -JW      Post SpO2 (%) 94  -JW 91  -LM     O2 Delivery Post Treatment supplemental O2  -JW      Pre Patient Position Sitting  -JW      Post Patient Position Sitting  -JW      Recorded by [JW] Neelima Merritt PTA [LM] YULI MaloneA/L     Pain Assessment    Pain Assessment No/denies pain  -JW      Recorded by [JW] Neelima Merritt PTA      Cognitive Assessment/Intervention    Current Cognitive/Communication Assessment functional  -      Orientation Status oriented x 4  -JW      Follows Commands/Answers Questions 100% of the time  -JW      Personal Safety Interventions fall prevention program maintained;gait belt;nonskid shoes/slippers when out of bed  -JW      Recorded by [JW] Neelima Merritt PTA      Bed Mobility, Assessment/Treatment    Bed Mobility, Comment sitting EOB at beginning of tx  -JW      Recorded by [JW] Neelima Merritt PTA      Transfer Assessment/Treatment    Transfers, Sit-Stand [place] supervision required  - supervision required  -LM     Transfers, Stand-Sit [place] supervision required  - supervision required  -LM     Transfers, Sit-Stand-Sit, Assist Device --   none  -JW      Recorded by [JW] Neelima Merritt PTA [LM] Massiel Santana, NELSON/L     Gait Assessment/Treatment    Gait, [place] Level contact guard assist;stand by assist  -      Gait, Assistive Device --   none  -      Gait, Distance (Feet) 80   x 2  -JW      Gait, Comment pt w/ mult. standing rest breaks as pt got SOA w/  ambulation  -JW      Recorded by [JW] Neelima Merritt PTA      Upper Body Bathing Assessment/Training    UB Bathing Assess/Train, [place] Level  --   mmock with sba  -LM     Recorded by  [LM] JENNIFFER Malone     Lower Body Bathing Assessment/Training    LB Bathing Assess/Train, [place] Level  supervision required   mock with cca-sba  -LM     Recorded by  [LM] JENNIFFER Malone     Upper Body Dressing Assessment/Training    UB Dressing Assess/Train, Assist Device  --   mock with hansen  -LM     Recorded by  [LM] JENNIFFER Malone     Lower Body Dressing Assessment/Training    LB Dressing Assess/Train, [place]  --   perf socks with hansen and mocks pants with sba-cca  -LM     Recorded by  [LM] JENNIFFER Malone     Balance Skills Training    Sitting-Level of Assistance  Independent  -LM     Sitting # of Minutes  --   15  -LM     Standing-Level of Assistance  Close supervision;Contact guard  -LM     Standing Balance # of Minutes  --   5 min with sba-cca with wt shifting act to increase balance/  -LM     Recorded by  [LM] JENNIFFER Malone     Therapy Exercises    Bilateral Lower Extremities AROM:;10 reps;sitting;ankle pumps/circles;hip flexion;LAQ   add pillow squeeze, 2 sets 10 all ther ex  -JW      Recorded by [JW] Neelima Merritt PTA      Positioning and Restraints    Pre-Treatment Position in bed  -JW in bed  -LM     Post Treatment Position chair  -JW bed   eob with family present  -LM     In Chair sitting;call light within reach;encouraged to call for assist  -JW      Recorded by [JW] Neelima Merritt PTA [LM] JENNIFFER Malone       User Key  (r) = Recorded By, (t) = Taken By, (c) = Cosigned By    Initials Name Effective Dates    PURNIMA Merritt PTA 08/11/15 -     LM JENNIFFER Malone 10/17/16 -                 IP PT Goals       01/09/18 0830 01/08/18 0938       Bed Mobility PT STG    Bed Mobility PT STG, Date Established   01/08/18  -CB     Bed Mobility PT STG, Time to Achieve  2 days  -CB     Bed Mobility PT STG, Activity Type  supine to sit/sit to supine  -CB     Bed Mobility PT STG, [place] Level  independent  -CB     Bed Mobility PT STG, Additional Goal  HOB down and no rails  -CB     Bed Mobility PT STG, Date Goal Reviewed 01/09/18  -      Bed Mobility PT STG, Outcome goal ongoing  -      Transfer Training PT STG    Transfer Training PT STG, Date Established  01/08/18  -CB     Transfer Training PT STG, Time to Achieve  2 days  -CB     Transfer Training PT STG, Activity Type  bed to chair /chair to bed;sit to stand/stand to sit  -CB     Transfer Training PT STG, [place] Level  independent  -CB     Transfer Training PT STG, Date Goal Reviewed 01/09/18  -      Transfer Training PT STG, Outcome goal ongoing  [facility]      Gait Training PT LTG    Gait Training Goal PT LTG, Date Established  01/08/18  -CB     Gait Training Goal PT LTG, Time to Achieve  by discharge  -CB     Gait Training Goal PT LTG, [place] Level  independent  -CB     Gait Training Goal PT LTG, Assist Device  --   none  -CB     Gait Training Goal PT LTG, Distance to Achieve  400 feet  -CB     Gait Training Goal PT LTG, Date Goal Reviewed 01/09/18  -      Gait Training Goal PT LTG, Outcome goal ongoing  -      Stair Training PT LTG    Stair Training Goal PT LTG, Date Established  01/08/18  -CB     Stair Training Goal PT LTG, Time to Achieve  2 days  -CB     Stair Training Goal PT LTG, Number of Steps  2  -CB     Stair Training Goal PT LTG, [place] Level  independent  -CB     Stair Training Goal PT LTG, Assist Device  1 handrail  -CB     Stair Training Goal PT LTG, Date Goal Reviewed 01/09/18  -      Stair Training Goal PT LTG, Outcome goal ongoing  [facility]        User Key  (r) = Recorded By, (t) = Taken By, (c) = Cosigned By    Initials Name Provider Type    PURNIMA Merritt, PTA Physical Therapy Assistant    CB Leatha Tirado, PT  Physical Therapist          Physical Therapy Education     Title: PT OT SLP Therapies (Active)     Topic: Physical Therapy (Active)     Point: Mobility training (Active)    Learning Progress Summary    Learner Readiness Method Response Comment Documented by Status   Patient Acceptance D NR   01/08/18 1317 Active               Point: Precautions (Active)    Learning Progress Summary    Learner Readiness Method Response Comment Documented by Status   Patient Acceptance D NR   01/08/18 1317 Active                      User Key     Initials Effective Dates Name Provider Type Discipline     04/06/17 -  Leatha Tirado, PT Physical Therapist PT                    PT Recommendation and Plan  Anticipated Equipment Needs At Discharge:  (none)  Anticipated Discharge Disposition: home with home health  Demonstrates Need for Referral to Another Service: home health care  Planned Therapy Interventions: gait training, transfer training, strengthening, home exercise program, patient/family education, stair training  PT Frequency: other (see comments) (5-14)  Plan of Care Review  Plan Of Care Reviewed With: patient  Progress: improving  Outcome Summary/Follow up Plan: pt t/fers sit/stand w/ SBA, ambulates ~80' x 2 w/ no AD and CGA/SBA, pt SOA w/ ambulation this tx and required mult short standing rest breaks for PLB, pt's O2 sats dropped to 87-88% on 2L nc, pt performed 2 x 10 sitting B LE ther ex, pt would benefit from skilled PT to increase functional Ind, if d/c home today pt would need HHPT          Outcome Measures       01/09/18 0830 01/08/18 1600 01/08/18 0938    How much help from another person do you currently need...    Turning from your back to your side while in flat bed without using bedrails? 4  -JW  4  -CB    Moving from lying on back to sitting on the side of a flat bed without bedrails? 4  -JW  4  -CB    Moving to and from a bed to a chair (including a wheelchair)? 3  -JW  3  -CB    Standing up from a chair  using your arms (e.g., wheelchair, bedside chair)? 3  -JW  3  -CB    Climbing 3-5 steps with a railing? 3  -JW  3  -CB    To walk in hospital room? 3  -JW  3  -CB    AM-PAC 6 Clicks Score 20  -JW  20  -CB    How much help from another is currently needed...    Putting on and taking off regular lower body clothing?  3  -LM     Bathing (including washing, rinsing, and drying)  3  -LM     Toileting (which includes using toilet bed pan or urinal)  3  -LM     Putting on and taking off regular upper body clothing  3  -LM     Taking care of personal grooming (such as brushing teeth)  4  -LM     Eating meals  4  -LM     Score  20  -LM     Functional Assessment    Outcome Measure Options AM-PAC 6 Clicks Basic Mobility (PT)  -JW  AM-[facility] 6 Clicks Basic Mobility (PT)  -      01/08/18 0833          How much help from another is currently needed...    Putting on and taking off regular lower body clothing? 3  -BH      Bathing (including washing, rinsing, and drying) 3  -BH      Toileting (which includes using toilet bed pan or urinal) 3  -BH      Putting on and taking off regular upper body clothing 3  -BH      Taking care of personal grooming (such as brushing teeth) 4  -BH      Eating meals 4  -      Score 20  -      Functional Assessment    Outcome Measure Options [facility] 6 Clicks Daily Activity (OT)  -        User Key  (r) = Recorded By, (t) = Taken By, (c) = Cosigned By    Initials Name Provider Type     Neelima Merritt, PTA Physical Therapy Assistant    CB Leatha Tirado, PT Physical Therapist     Marisol Burdick, OTR/L Occupational Therapist    EDUARDO Santana, NELSON/L Occupational Therapy Assistant           Time Calculation:         PT Charges       01/09/18 0830          Time Calculation    Start Time 0830  -      Stop Time 0855  -      Time Calculation (min) 25 min  -      PT Received On 01/09/18  -      Time Calculation- PT    Total Timed Code Minutes- PT 25 minute(s)  -        User Key   (r) = Recorded By, (t) = Taken By, (c) = Cosigned By    Initials Name Provider Type    JW Neelima Merritt PTA Physical Therapy Assistant          Therapy Charges for Today     Code Description Service Date Service Provider Modifiers Qty    09744141117 HC GAIT TRAINING EA 15 MIN 1/9/2018 Neelima Merritt PTA GP 1    89189184233 HC PT THER PROC EA 15 MIN 1/9/2018 Neelima Merritt PTA GP 1          PT G-Codes  PT Professional Judgement Used?: Yes  Outcome Measure Options: AM-PAC 6 Clicks Basic Mobility (PT)  Score: 20  Functional Limitation: Mobility: Walking and moving around  Mobility: Walking and Moving Around Current Status (): At least 20 percent but less than 40 percent impaired, limited or restricted  Mobility: Walking and Moving Around Goal Status (): At least 1 percent but less than 20 percent impaired, limited or restricted    Neelima Merritt PTA  1/9/2018

## 2018-01-09 NOTE — PLAN OF CARE
Problem: Inpatient Occupational Therapy  Goal: Patient Education Goal LTG- OT  Outcome: Ongoing (interventions implemented as appropriate)   01/08/18 0833 01/09/18 1240   Patient Education OT LTG   Patient Education OT LTG, Date Established 01/08/18 --    Patient Education OT LTG, Time to Achieve by discharge --    Patient Education OT LTG, Education Type HEP;home safety;energy conservation --    Patient Education OT LTG, Education Understanding verbalizes understanding;demonstrates adequately;independent --    Patient Education OT LTG, Date Goal Reviewed --  01/09/18   Patient Education OT LTG    01/08/18 0833 01/09/18 1240   Patient Education OT LTG   Patient Education OT LTG, Date Established 01/08/18 --    Patient Education OT LTG, Time to Achieve by discharge --    Patient Education OT LTG, Education Type HEP;home safety;energy conservation --    Patient Education OT LTG, Education Understanding verbalizes understanding;demonstrates adequately;independent --    Patient Education OT LTG, Date Goal Reviewed --  01/09/18   Patient Education OT LTG Outcome --  goal met   Outcome --  goal not met     Goal: ADL Goal LTG- OT  Outcome: Ongoing (interventions implemented as appropriate)   01/08/18 0833 01/09/18 1240   ADL OT LTG   ADL OT LTG, Date Established 01/08/18 --    ADL OT LTG, Time to Achieve by discharge --    ADL OT LTG, Activity Type ADL skills --    ADL OT LTG, [place] Level modified independent --    ADL OT LTG, Date Goal Reviewed --  01/09/18   ADL OT LTG, Outcome --  goal not met     Goal: Activity Tolerance Goal LTG- OT  Outcome: Ongoing (interventions implemented as appropriate)   01/08/18 0833 01/09/18 1240   Activity Tolerance OT LTG   Activity Tolerance Goal OT LTG, Date Established 01/08/18 --    Activity Tolerance Goal OT LTG, Time to Achieve by discharge --    Activity Tolerance Goal OT LTG, Activity Level 20 min activity;O2 sat >/equal to 90%;with 1 rest break --    Activity Tolerance Goal  OT LTG, Date Goal Reviewed --  01/09/18   Activity Tolerance Goal OT LTG, Outcome --  goal not met     Goal: Endurance Goal LTG- OT  Outcome: Ongoing (interventions implemented as appropriate)   01/08/18 0833 01/09/18 1240   Endurance OT LTG   Endurance Goal OT LTG, Date Established 01/08/18 --    Endurance Goal OT LTG, Time to Achieve by discharge --    Endurance Goal OT LTG, Activity Level endurance 2 good  (30 minute activity) --    Endurance Goal OT LTG, Date Goal Reviewed --  01/09/18   Endurance Goal OT LTG, Outcome --  goal not met

## 2018-01-09 NOTE — PLAN OF CARE
Problem: Inpatient Occupational Therapy  Goal: Patient Education Goal LTG- OT  Outcome: Outcome(s) achieved Date Met: 01/09/18 01/08/18 0833 01/09/18 1240 01/09/18 1510   Patient Education OT LTG   Patient Education OT LTG, Date Established 01/08/18 --  --    Patient Education OT LTG, Time to Achieve by discharge --  --    Patient Education OT LTG, Education Type HEP;home safety;energy conservation --  --    Patient Education OT LTG, Education Understanding verbalizes understanding;demonstrates adequately;independent --  --    Patient Education OT LTG, Date Goal Reviewed --  01/09/18 --    Patient Education OT LTG Outcome --  goal met --    Patient Education OT LTG, Reason Goal Not Met --  --  discharged from facility     Goal: ADL Goal LTG- OT  Outcome: Unable to achieve outcome(s) by discharge Date Met: 01/09/18 01/08/18 0833 01/09/18 1240 01/09/18 1510   ADL OT LTG   ADL OT LTG, Date Established 01/08/18 --  --    ADL OT LTG, Time to Achieve by discharge --  --    ADL OT LTG, Activity Type ADL skills --  --    ADL OT LTG, [place] Level modified independent --  --    ADL OT LTG, Date Goal Reviewed --  01/09/18 --    ADL OT LTG, Outcome --  goal not met --    ADL OT LTG, Reason Goal Not Met --  --  discharged from facility     Goal: Activity Tolerance Goal LTG- OT  Outcome: Unable to achieve outcome(s) by discharge Date Met: 01/09/18 01/08/18 0833 01/09/18 1240 01/09/18 1510   Activity Tolerance OT LTG   Activity Tolerance Goal OT LTG, Date Established 01/08/18 --  --    Activity Tolerance Goal OT LTG, Time to Achieve by discharge --  --    Activity Tolerance Goal OT LTG, Activity Level 20 min activity;O2 sat >/equal to 90%;with 1 rest break --  --    Activity Tolerance Goal OT LTG, Date Goal Reviewed --  01/09/18 --    Activity Tolerance Goal OT LTG, Outcome --  goal not met --    Activity Tolerance Goal OT LTG, Reason Goal Not Met --  --  discharged from facility     Goal: Endurance Goal LTG-  OT  Outcome: Unable to achieve outcome(s) by discharge Date Met: 01/09/18 01/08/18 0833 01/09/18 1240 01/09/18 1510   Endurance OT LTG   Endurance Goal OT LTG, Date Established 01/08/18 --  --    Endurance Goal OT LTG, Time to Achieve by discharge --  --    Endurance Goal OT LTG, Activity Level endurance 2 good  (30 minute activity) --  --    Endurance Goal OT LTG, Date Goal Reviewed --  01/09/18 --    Endurance Goal OT LTG, Outcome --  goal not met --    Endurance Goal OT LTG, Reason Goal Not Met --  --  discharged from facility

## 2018-01-09 NOTE — PLAN OF CARE
Problem: Patient Care Overview (Adult)  Goal: Adult Individualization and Mutuality  Outcome: Ongoing (interventions implemented as appropriate)   01/09/18 0454   Individualization   Patient Specific Preferences prefers a small light be left on at night.   Patient Specific Goals To breathe better.   Patient Specific Interventions HOB elevated for comfort   Mutuality/Individual Preferences   What Anxieties, Fears or Concerns Do You Have About Your Health or Care? SOA when walking   What Questions Do You Have About Your Health or Care? no new questions     Goal: Discharge Needs Assessment  Outcome: Ongoing (interventions implemented as appropriate)   01/08/18 0938 01/08/18 1057 01/09/18 0454   Discharge Needs Assessment   Concerns To Be Addressed --  --  no discharge needs identified   Readmission Within The Last 30 Days --  --  no previous admission in last 30 days   Equipment Needed After Discharge --  none --    Current Discharge Risk --  chronically ill  (COPD) --    Discharge Disposition still a patient --  --    Current Health   Outpatient/Agency/Support Group Needs homecare agency (specify level of care) --  --    Anticipated Changes Related to Illness --  none --    Living Environment   Transportation Available --  car;family or friend will provide  (If unable to drive, patient has support/assistance available. ) --    Self-Care   Equipment Currently Used at Home --  oxygen;other (see comments);nebulizer;walker, rolling  (Patient is on 2.5 liters of oxygen @ home. She wears oxygen PRN during the day and continuously at night. Patient voiced that she has portable tanks available for transport. DME from [facility]. ) --        Problem: Acute Coronary Syndrome (ACS) (Adult)  Goal: Signs and Symptoms of Listed Potential Problems Will be Absent or Manageable (Acute Coronary Syndrome)  Outcome: Ongoing (interventions implemented as appropriate)   01/09/18 0454   Acute Coronary Syndrome (ACS)   Problems Assessed  (Acute Coronary Syndrome (ACS)) all   Problems Present (Acute Coronary Syndrome (ACS)) none       Problem: Fall Risk (Adult)  Goal: Absence of Falls  Outcome: Ongoing (interventions implemented as appropriate)   01/09/18 0454   Fall Risk (Adult)   Absence of Falls achieves outcome       Problem: Respiratory Insufficiency (Adult)  Goal: Acid/Base Balance  Outcome: Ongoing (interventions implemented as appropriate)   01/09/18 0454   Respiratory Insufficiency (Adult)   Acid/Base Balance making progress toward outcome     Goal: Effective Ventilation  Outcome: Ongoing (interventions implemented as appropriate)   01/09/18 0454   Respiratory Insufficiency (Adult)   Effective Ventilation making progress toward outcome       Problem: Cardiac: Heart Failure (Adult)  Goal: Signs and Symptoms of Listed Potential Problems Will be Absent or Manageable (Cardiac: Heart Failure)  Outcome: Ongoing (interventions implemented as appropriate)   01/09/18 0454   Cardiac: Heart Failure   Problems Assessed (Heart Failure) all   Problems Present (Heart Failure) none       Problem: COPD, Chronic Bronchitis/Emphysema (Adult)  Goal: Signs and Symptoms of Listed Potential Problems Will be Absent or Manageable (COPD, Chronic Bronchitis/Emphysema)  Outcome: Ongoing (interventions implemented as appropriate)   01/09/18 0454   COPD, Chronic Bronchitis/Emphysema   Problems Assessed (COPD, Chronic Bronchitis/Emphysema) all   Problems Present (COPD, Chronic Bronchitis/Emphysema) dyspnea

## 2018-01-09 NOTE — DISCHARGE SUMMARY
[facility] Medicine Services  DISCHARGE SUMMARY       Date of Admission: 1/3/2018  Date of Discharge:  1/9/2018  Primary Care Physician: Roula Albert MD    Presenting Problem/History of Present Illness:  Shortness of breath [R06.02]  Precordial pain [R07.2]  Elevated troponin [R74.8]  Precordial pain [R07.2]     Final Discharge Diagnoses:  Hospital Problem List     Precordial pain          Consults:   Consults     Date and Time Order Name Status Description    1/3/2018 1929 Inpatient Consult to Cardiology      1/3/2018 1757 Hospitalist (on-call MD unless specified)            Procedures Performed:                 Pertinent Test Results:   Lab Results (last 24 hours)     Procedure Component Value Units Date/Time    CBC (No Diff) [[id number]]  (Abnormal) Collected:  01/09/18 0655    Specimen:  Blood Updated:  01/09/18 0707     WBC 9.53 10*3/mm3      RBC 3.71 (L) 10*6/mm3      Hemoglobin 12.0 g/dL      Hematocrit 35.5 %      MCV 95.7 fL      MCH 32.3 pg      MCHC 33.8 g/dL      RDW 13.0 %      RDW-SD 45.4 fl      MPV 9.6 fL      Platelets 245 10*3/mm3     Basic Metabolic Panel [[id number]]  (Abnormal) Collected:  01/09/18 0655    Specimen:  Blood Updated:  01/09/18 0732     Glucose 132 (H) mg/dL      BUN 29 (H) mg/dL      Creatinine 0.84 mg/dL      Sodium 134 (L) mmol/L      Potassium 3.7 mmol/L      Chloride 92 (L) mmol/L      CO2 33.0 (H) mmol/L      Calcium 8.3 (L) mg/dL      eGFR Non African Amer 64 mL/min/1.73      BUN/Creatinine Ratio 34.5 (H)     Anion Gap 9.0 mmol/L     Narrative:       The MDRD GFR formula is only valid for adults with stable renal function between ages 18 and 70.        Imaging Results (all)     Procedure Component Value Units Date/Time    XR Chest 2 View [[id number]] Collected:  01/03/18 1527     Updated:  01/03/18 5428    Narrative:         [facility]    Patient Name: ADALBERTO NOLASCO    ORDERING: REMY TYLER      ATTENDING: REMY TYLER     REFERRING: REMY TYLER    -----------------------    PROCEDURE: Two-view chest    COMPARISON: 1/1/2018    HISTORY: Chest pain protocol    FINDINGS: Frontal and lateral views of the chest are obtained.     Devices: Left-sided cardiac pacer stable in position.    Lungs/Pleura: Ill-defined markings in the left lung base,  probably due to subsegmental atelectasis. Lungs otherwise appear  clear.    Cardiomediastinal structures: Cardiac silhouette is normal in  size. Thoracic aorta contains atherosclerotic calcification.         Impression:       CONCLUSION:    Subsegmental atelectasis left lung base. Lungs otherwise appear  clear.    Electronically signed by:  González Forrest MD  1/3/2018 3:57 PM CST  Workstation: [id number]    XR Chest PA & Lateral [[id number]] Collected:  01/04/18 0729     Updated:  01/04/18 0743    Narrative:           PROCEDURE: Chest PA and lateral    REASON FOR EXAM: Follow up dyspnea, possible pneumonia, R07.2  Precordial pain R06.02 Shortness of breath R74.8 Abnormal levels  of other serum enzymes    FINDINGS: Comparison study dated January 3, 2015.  Stable  pacemaker and leads. . Cardiac and pulmonary vasculature are  normal . Lungs are clear. Pleural spaces are normal . No acute  osseous abnormality.      Impression:       No acute cardiopulmonary abnormality.    Electronically signed by:  Renny Stovall MD  1/4/2018 7:42 AM CST  Workstation: [id number]    CT Angiogram Chest With Contrast [[id number]] Collected:  01/07/18 1555     Updated:  01/07/18 1633    Narrative:       [facility]    Patient Name: ADALBERTO NOLASCO    ORDERING: ANALI JORGE    ATTENDING: ADELA ALEJANDRE     REFERRING: ANALI JORGE    -----------------------    EXAM DESCRIPTION: CT ANGIOGRAM CHEST W CONTRAST    CLINICAL HISTORY:  rule out pulmonary embolus, R07.2 Precordial  pain R06.02 Shortness of breath R74.8 Abnormal levels of other  serum enzymes I49.5 Sick sinus  syndrome I21.4 Non-ST elevation  (NSTEMI) myocardial infarction R06.00 Dyspnea, unspecified R07.9  Chest pain, unspecified I25.10 Atherosclerotic heart disease of  native coronary artery without angina pectoris       COMPARISON: 2/10/2016    TECHNIQUE:   Axial images were obtained and multiplanar reconstructions were  made.    This exam was performed according to our departmental dose  optimization program, which includes automated exposure control,  adjustment of the mA and/or KV according to patient size and/or  use of iterative reconstruction technique.  Dose Length Product 305.30  CONTRAST:   66 cc intravenous Isovue 370    FINDINGS:  Diagnostic quality: Adequate .  Pulmonary embolism: No evidence of pulmonary embolism.  Pulmonary arteries:  Normal in caliber.  Lung parenchyma: There is a background of pulmonary emphysema  redemonstrated. There is some dependent basilar atelectasis  greater on the right. No dense parenchymal consolidation,  effusion or pneumothorax. Intimal scarring or atelectasis within  the lingula. No mass or suspicious nodule.  Pleural effusion: None.  Central airways: Patent.  Adenopathy: No suspicious mediastinal, hilar, or axillary lymph  nodes based on size or morphologic criteria.  Heart and great vessels: No cardiac enlargement or pericardial  effusion. Coronary vascular calcification. There is again  findings suggesting right heart insufficiency with prominent  amount of contrast extending into the azygos system and the IVC  and small amount within the hepatic veins.  Upper abdomen: Small hiatal hernia. No acute findings.    Bones: Degenerative changes throughout the thoracic spine. No  acute compression fracture..    Additional findings: None.      Impression:       No CT evidence of pulmonary embolism.    Pulmonary emphysema with basilar dependent atelectasis. No  consolidation or acute pleural finding.    Electronically signed by:  Marco A Fraire MD  1/7/2018 4:32 PM  CST  Workstation: [id number]            Chief Complaint on Day of Discharge: none    Hospital Course:  85-year-old  female with past history of hypothyroidism, severe COPD, CHF with reduced ejection fraction, coronary artery disease, gastroesophageal reflux disease, hyperlipidemia, hypertension who presented on 1/3/2018 with complaints of chest pain and dyspnea.  During her hospital stay, the patient was treated for her multifactorial dyspnea including treatment of her COPD exacerbation, CHF exacerbation, and evaluation of her chest pain.  The patient underwent IV diuresis, oxygen therapy, nebulizer treatments, and IV steroid therapy.  Patient also underwent nuclear stress testing during the hospital stay as well that revealed ischemia in the anterior wall and septum on stress testing.  Cardiologist, Dr. Rogel, opted for medication management including Nitropatch and Ranexa for management of the patient's chest pain.  The patient has had no further chest pain with adjustments to her medications.  The patient's dyspnea is greatly improved as well, however she will require continuous oxygen therapy at this time until her condition improves.  The patient already had home oxygen set up prior to admission, but she reports that she was only wearing her oxygen at night prior to this admission.  The patient will be discharged home today in stable condition with instructions to follow-up with her primary care provider within one week of discharge, follow up with Dr. Crum in one month, and to keep her scheduled February appointment with Dr. Bryan Jay.  The patient has been given prescriptions for her new medications including Ranexa and losartan.  The patient was also given prescription for tapering prednisone prescription in regards to treatment of her COPD.  Patient is been thoroughly counseled on management of her congestive heart failure including daily weights, 1500 cc daily fluid restriction, and  "reporting any greater than 2 pound weight gain in a 24-hour period to her primary care provider.  Arrangements have been made for the patient to be followed by home health for further physical, occupational therapy, and respiratory assessments.    Condition on Discharge:  Stable    Physical Exam on Discharge:  /64 (BP Location: Left arm, Patient Position: Sitting)  Pulse 94  Temp 97.1 °F (36.2 °C) (Temporal Artery )   Resp 20  Ht 168 cm (66.14\")  Wt 77 kg (169 lb 12.8 oz)  SpO2 92%  BMI 27.29 kg/m2  Physical Exam   Constitutional: She is oriented to person, place, and time. She appears well-developed and well-nourished.   HENT:   Head: Normocephalic and atraumatic.   Eyes: Conjunctivae are normal.   Neck: Neck supple.   Cardiovascular: Normal rate, normal heart sounds and intact distal pulses.    Pulmonary/Chest: Effort normal and breath sounds normal. She has no wheezes.   Abdominal: Soft. Bowel sounds are normal. She exhibits no distension. There is no tenderness.   Musculoskeletal: Normal range of motion. She exhibits no edema.   Neurological: She is alert and oriented to person, place, and time.   Skin: Skin is warm and dry.   Psychiatric: She has a normal mood and affect. Her behavior is normal.   Vitals reviewed.        Discharge Disposition:  Home-Health Care [facility]    Discharge Medications:   Chandler Val Dao   Home Medication Instructions ONEIL:[id number]    Printed on:01/09/18 1036   Medication Information                      aspirin 81 MG EC tablet  Take 81 mg by mouth Daily.             benzonatate (TESSALON) 100 MG capsule  Take 1 capsule by mouth 3 (Three) Times a Day As Needed for Cough.             budesonide-formoterol (SYMBICORT) 160-4.5 MCG/ACT inhaler  Inhale 2 puffs 2 (Two) Times a Day.             carvedilol (COREG) 6.25 MG tablet  Take 6.25 mg by mouth 2 (Two) Times a Day With Meals.             clopidogrel (PLAVIX) 75 MG tablet  Take 75 mg by mouth Daily.             coenzyme Q10 " 100 MG capsule  Take 200 mg by mouth Daily.             fluorometholone (FLAREX) 0.1 % ophthalmic suspension  Administer 1 drop to both eyes 4 (Four) Times a Day.             furosemide (LASIX) 40 MG tablet  Take 1 tablet by mouth 2 (Two) Times a Day.             guaiFENesin (MUCINEX) 600 MG 12 hr tablet  Take 1 tablet by mouth Every 12 (Twelve) Hours.             ipratropium-albuterol (DUO-NEB) 0.5-2.5 mg/mL nebulizer  USE 1 BY NEBULIZER 4 (FOUR) TIMES A DAY AS NEEDED FOR WHEEZING.             levothyroxine (SYNTHROID, LEVOTHROID) 50 MCG tablet  TAKE 1 TABLET BY MOUTH EVERY DAY             losartan (COZAAR) 25 MG tablet  Take 1 tablet by mouth Daily.             montelukast (SINGULAIR) 10 MG tablet  Take 10 mg by mouth Every Night.             nitroglycerin (NITRO-DUR) 0.2 MG/HR patch  Place 1 patch on the skin Daily.             predniSONE (DELTASONE) 10 MG tablet  40 mg daily x 2 days, then 30 mg x 2 days, then 20 mg x 2 days, then 10 mg x 2 days.             ranolazine (RANEXA) 1000 MG 12 hr tablet  Take 1 tablet by mouth Every 12 (Twelve) Hours.             sertraline (ZOLOFT) 50 MG tablet  TAKE 1 TABLET BY MOUTH EVERY DAY             simvastatin (ZOCOR) 40 MG tablet  TAKE 1 TABLET BY MOUTH EVERY NIGHT AT BEDTIME                 Discharge Diet:   Diet Instructions     Diet: Consistent Carbohydrate, Cardiac; Thin       Discharge Diet:   Consistent Carbohydrate  Cardiac      Fluid Consistency:  Thin   Fluid Restriction per day:  1500 mL Fluid                 Activity at Discharge:   Activity Instructions     Activity as Tolerated                     Discharge Care Plan/Instructions: Follow-up with primary care provider within one week of discharge.  Follow-up with Dr. Rogel in one month.  Keep previously scheduled appointment with pulmonologist Dr. Bryan Jay.    Follow-up Appointments:   Future Appointments  Date Time Provider Department Center   1/9/2018 1:00 PM [facility]  [facility] PULM [facility]    1/11/2018 1:00 PM [facility]ULM REH[facility] G[facility]  [facility] PULM [facility]   1/16/2018 1:00 PM [facility] PULM REH[facility] GYM  [facility] PULM [facility]   1/18/2018 1:00 PM [facility] PULM REH[facility] G[facility]  [facility] PULM [facility]   1/23/2018 1:00 PM  M[facility] PULM REHAB GYM  [facility] PULM [facility]AD   1/25/2018 1:00 PM  MAD PULM REHAB GYM  MAD PULM MAD   1/30/2018 1:00 PM  [facility] PULM REHAB GYM  MAD PULM [facility]AD   2/1/2018 1:00 PM  MAD PULM REHAB GYM  MAD PULM MAD   2/6/2018 1:00 PM  MAD PULM REHAB GYM  MAD PULM MAD   2/15/2018 10:30 AM MD [facility]IMA [facility] IM MAD None   2/16/2018 10:10 AM MD [facility] [place] PUL[facility] None       Test Results Pending at Discharge:           This document has been electronically signed by CRESENCIO Winters on January 9, 2018 10:39 AM

## 2018-01-11 ENCOUNTER — APPOINTMENT (OUTPATIENT)
Dept: PULMONOLOGY | Facility: HOSPITAL | Age: 83
End: 2018-01-11

## 2018-01-15 RX ORDER — POLYETHYLENE GLYCOL 3350 17 G/17G
17 POWDER, FOR SOLUTION ORAL DAILY
Qty: 100 PACKET | Refills: 1 | Status: SHIPPED | OUTPATIENT
Start: 2018-01-15 | End: 2018-06-19 | Stop reason: SDUPTHER

## 2018-01-16 ENCOUNTER — APPOINTMENT (OUTPATIENT)
Dept: PULMONOLOGY | Facility: HOSPITAL | Age: 83
End: 2018-01-16

## 2018-01-18 ENCOUNTER — APPOINTMENT (OUTPATIENT)
Dept: PULMONOLOGY | Facility: HOSPITAL | Age: 83
End: 2018-01-18

## 2018-01-19 LAB
BH CV STRESS BP STAGE 1: NORMAL
BH CV STRESS BP STAGE 2: NORMAL
BH CV STRESS BP STAGE 3: NORMAL
BH CV STRESS DOSE DOBUTAMINE STAGE 1: 10
BH CV STRESS DOSE DOBUTAMINE STAGE 2: 20
BH CV STRESS DOSE DOBUTAMINE STAGE 3: 30
BH CV STRESS DURATION MIN STAGE 1: 3
BH CV STRESS DURATION MIN STAGE 2: 3
BH CV STRESS DURATION MIN STAGE 3: 3
BH CV STRESS DURATION SEC STAGE 1: 0
BH CV STRESS DURATION SEC STAGE 2: 0
BH CV STRESS DURATION SEC STAGE 3: 0
BH CV STRESS HR STAGE 1: 72
BH CV STRESS HR STAGE 2: 100
BH CV STRESS HR STAGE 3: 113
BH CV STRESS PROTOCOL 1: NORMAL
BH CV STRESS RECOVERY BP: NORMAL MMHG
BH CV STRESS RECOVERY HR: 90 BPM
BH CV STRESS STAGE 1: 1
BH CV STRESS STAGE 2: 2
BH CV STRESS STAGE 3: 3
LV EF NUC BP: 52 %
MAXIMAL PREDICTED HEART RATE: 135 BPM
PERCENT MAX PREDICTED HR: 89.63 %
STRESS BASELINE BP: NORMAL MMHG
STRESS BASELINE HR: 76 BPM
STRESS PERCENT HR: 105 %
STRESS POST PEAK BP: NORMAL MMHG
STRESS POST PEAK HR: 121 BPM
STRESS TARGET HR: 115 BPM

## 2018-01-22 ENCOUNTER — HOSPITAL ENCOUNTER (INPATIENT)
Facility: HOSPITAL | Age: 83
LOS: 4 days | End: 2018-01-26
Attending: EMERGENCY MEDICINE | Admitting: FAMILY MEDICINE

## 2018-01-22 ENCOUNTER — OFFICE VISIT (OUTPATIENT)
Dept: INTERNAL MEDICINE | Facility: CLINIC | Age: 83
End: 2018-01-22

## 2018-01-22 ENCOUNTER — APPOINTMENT (OUTPATIENT)
Dept: GENERAL RADIOLOGY | Facility: HOSPITAL | Age: 83
End: 2018-01-22

## 2018-01-22 VITALS
SYSTOLIC BLOOD PRESSURE: 120 MMHG | DIASTOLIC BLOOD PRESSURE: 60 MMHG | HEIGHT: 66 IN | BODY MASS INDEX: 26.81 KG/M2 | WEIGHT: 166.8 LBS

## 2018-01-22 DIAGNOSIS — I50.22 CHRONIC SYSTOLIC CONGESTIVE HEART FAILURE (HCC): Primary | ICD-10-CM

## 2018-01-22 DIAGNOSIS — J43.9 PULMONARY EMPHYSEMA, UNSPECIFIED EMPHYSEMA TYPE (HCC): ICD-10-CM

## 2018-01-22 DIAGNOSIS — I25.10 CORONARY ARTERY DISEASE INVOLVING NATIVE CORONARY ARTERY OF NATIVE HEART WITHOUT ANGINA PECTORIS: ICD-10-CM

## 2018-01-22 DIAGNOSIS — S72.001A HIP FRACTURE, RIGHT, CLOSED, INITIAL ENCOUNTER (HCC): ICD-10-CM

## 2018-01-22 DIAGNOSIS — R53.81 PHYSICAL DECONDITIONING: ICD-10-CM

## 2018-01-22 DIAGNOSIS — Z74.09 IMPAIRED MOBILITY AND ADLS: ICD-10-CM

## 2018-01-22 DIAGNOSIS — E03.9 ACQUIRED HYPOTHYROIDISM: ICD-10-CM

## 2018-01-22 DIAGNOSIS — I10 ESSENTIAL HYPERTENSION: ICD-10-CM

## 2018-01-22 DIAGNOSIS — F32.A DEPRESSIVE DISORDER: ICD-10-CM

## 2018-01-22 DIAGNOSIS — Z78.9 IMPAIRED MOBILITY AND ADLS: ICD-10-CM

## 2018-01-22 DIAGNOSIS — S72.041A: Primary | ICD-10-CM

## 2018-01-22 DIAGNOSIS — Z74.09 IMPAIRED PHYSICAL MOBILITY: ICD-10-CM

## 2018-01-22 LAB
ABO GROUP BLD: NORMAL
ALBUMIN SERPL-MCNC: 3.5 G/DL (ref 3.4–4.8)
ALBUMIN/GLOB SERPL: 1.2 G/DL (ref 1.1–1.8)
ALP SERPL-CCNC: 75 U/L (ref 38–126)
ALT SERPL W P-5'-P-CCNC: 37 U/L (ref 9–52)
ANION GAP SERPL CALCULATED.3IONS-SCNC: 8 MMOL/L (ref 5–15)
AST SERPL-CCNC: 68 U/L (ref 14–36)
BASOPHILS # BLD AUTO: 0.01 10*3/MM3 (ref 0–0.2)
BASOPHILS NFR BLD AUTO: 0.1 % (ref 0–2)
BILIRUB SERPL-MCNC: 0.5 MG/DL (ref 0.2–1.3)
BLD GP AB SCN SERPL QL: NEGATIVE
BUN BLD-MCNC: 17 MG/DL (ref 7–21)
BUN/CREAT SERPL: 21.5 (ref 7–25)
CALCIUM SPEC-SCNC: 8.6 MG/DL (ref 8.4–10.2)
CHLORIDE SERPL-SCNC: 93 MMOL/L (ref 95–110)
CO2 SERPL-SCNC: 25 MMOL/L (ref 22–31)
CREAT BLD-MCNC: 0.79 MG/DL (ref 0.5–1)
DEPRECATED RDW RBC AUTO: 47.2 FL (ref 36.4–46.3)
EOSINOPHIL # BLD AUTO: 0.2 10*3/MM3 (ref 0–0.7)
EOSINOPHIL NFR BLD AUTO: 1.6 % (ref 0–7)
ERYTHROCYTE [DISTWIDTH] IN BLOOD BY AUTOMATED COUNT: 13.3 % (ref 11.5–14.5)
GFR SERPL CREATININE-BSD FRML MDRD: 69 ML/MIN/1.73 (ref 39–90)
GLOBULIN UR ELPH-MCNC: 3 GM/DL (ref 2.3–3.5)
GLUCOSE BLD-MCNC: 103 MG/DL (ref 60–100)
HCT VFR BLD AUTO: 32 % (ref 35–45)
HGB BLD-MCNC: 11 G/DL (ref 12–15.5)
IMM GRANULOCYTES # BLD: 0.05 10*3/MM3 (ref 0–0.02)
IMM GRANULOCYTES NFR BLD: 0.4 % (ref 0–0.5)
INR PPP: 0.93 (ref 0.8–1.2)
LYMPHOCYTES # BLD AUTO: 1.2 10*3/MM3 (ref 0.6–4.2)
LYMPHOCYTES NFR BLD AUTO: 9.9 % (ref 10–50)
Lab: NORMAL
MCH RBC QN AUTO: 33.4 PG (ref 26.5–34)
MCHC RBC AUTO-ENTMCNC: 34.4 G/DL (ref 31.4–36)
MCV RBC AUTO: 97.3 FL (ref 80–98)
MONOCYTES # BLD AUTO: 0.67 10*3/MM3 (ref 0–0.9)
MONOCYTES NFR BLD AUTO: 5.5 % (ref 0–12)
NEUTROPHILS # BLD AUTO: 10.05 10*3/MM3 (ref 2–8.6)
NEUTROPHILS NFR BLD AUTO: 82.5 % (ref 37–80)
PLATELET # BLD AUTO: 243 10*3/MM3 (ref 150–450)
PMV BLD AUTO: 8.8 FL (ref 8–12)
POTASSIUM BLD-SCNC: 3.9 MMOL/L (ref 3.5–5.1)
PROT SERPL-MCNC: 6.5 G/DL (ref 6.3–8.6)
PROTHROMBIN TIME: 12.4 SECONDS (ref 11.1–15.3)
RBC # BLD AUTO: 3.29 10*6/MM3 (ref 3.77–5.16)
RH BLD: POSITIVE
SODIUM BLD-SCNC: 126 MMOL/L (ref 137–145)
WBC NRBC COR # BLD: 12.18 10*3/MM3 (ref 3.2–9.8)

## 2018-01-22 PROCEDURE — 94799 UNLISTED PULMONARY SVC/PX: CPT

## 2018-01-22 PROCEDURE — 93010 ELECTROCARDIOGRAM REPORT: CPT | Performed by: INTERNAL MEDICINE

## 2018-01-22 PROCEDURE — 99213 OFFICE O/P EST LOW 20 MIN: CPT | Performed by: INTERNAL MEDICINE

## 2018-01-22 PROCEDURE — 80053 COMPREHEN METABOLIC PANEL: CPT | Performed by: PHYSICIAN ASSISTANT

## 2018-01-22 PROCEDURE — 99285 EMERGENCY DEPT VISIT HI MDM: CPT

## 2018-01-22 PROCEDURE — 25010000002 ONDANSETRON PER 1 MG: Performed by: PHYSICIAN ASSISTANT

## 2018-01-22 PROCEDURE — 86850 RBC ANTIBODY SCREEN: CPT | Performed by: PHYSICIAN ASSISTANT

## 2018-01-22 PROCEDURE — 25010000002 MORPHINE PER 10 MG: Performed by: EMERGENCY MEDICINE

## 2018-01-22 PROCEDURE — 85025 COMPLETE CBC W/AUTO DIFF WBC: CPT | Performed by: EMERGENCY MEDICINE

## 2018-01-22 PROCEDURE — 93005 ELECTROCARDIOGRAM TRACING: CPT | Performed by: PHYSICIAN ASSISTANT

## 2018-01-22 PROCEDURE — 85610 PROTHROMBIN TIME: CPT | Performed by: EMERGENCY MEDICINE

## 2018-01-22 PROCEDURE — G0378 HOSPITAL OBSERVATION PER HR: HCPCS

## 2018-01-22 PROCEDURE — 71045 X-RAY EXAM CHEST 1 VIEW: CPT

## 2018-01-22 PROCEDURE — 86901 BLOOD TYPING SEROLOGIC RH(D): CPT | Performed by: PHYSICIAN ASSISTANT

## 2018-01-22 PROCEDURE — 94760 N-INVAS EAR/PLS OXIMETRY 1: CPT

## 2018-01-22 PROCEDURE — 25010000002 MORPHINE PER 10 MG: Performed by: FAMILY MEDICINE

## 2018-01-22 PROCEDURE — 86923 COMPATIBILITY TEST ELECTRIC: CPT

## 2018-01-22 PROCEDURE — 86900 BLOOD TYPING SEROLOGIC ABO: CPT | Performed by: PHYSICIAN ASSISTANT

## 2018-01-22 PROCEDURE — 25010000002 ENOXAPARIN PER 10 MG: Performed by: PHYSICIAN ASSISTANT

## 2018-01-22 PROCEDURE — 94640 AIRWAY INHALATION TREATMENT: CPT

## 2018-01-22 PROCEDURE — 73502 X-RAY EXAM HIP UNI 2-3 VIEWS: CPT

## 2018-01-22 RX ORDER — FUROSEMIDE 40 MG/1
40 TABLET ORAL
Status: DISCONTINUED | OUTPATIENT
Start: 2018-01-22 | End: 2018-01-25

## 2018-01-22 RX ORDER — BUDESONIDE AND FORMOTEROL FUMARATE DIHYDRATE 160; 4.5 UG/1; UG/1
2 AEROSOL RESPIRATORY (INHALATION)
Status: DISCONTINUED | OUTPATIENT
Start: 2018-01-22 | End: 2018-01-26 | Stop reason: HOSPADM

## 2018-01-22 RX ORDER — NALOXONE HCL 0.4 MG/ML
0.4 VIAL (ML) INJECTION
Status: DISCONTINUED | OUTPATIENT
Start: 2018-01-22 | End: 2018-01-26 | Stop reason: HOSPADM

## 2018-01-22 RX ORDER — BENZONATATE 100 MG/1
100 CAPSULE ORAL 3 TIMES DAILY PRN
COMMUNITY
End: 2018-01-22

## 2018-01-22 RX ORDER — IPRATROPIUM BROMIDE AND ALBUTEROL SULFATE 2.5; .5 MG/3ML; MG/3ML
3 SOLUTION RESPIRATORY (INHALATION) EVERY 4 HOURS PRN
Status: DISCONTINUED | OUTPATIENT
Start: 2018-01-22 | End: 2018-01-25

## 2018-01-22 RX ORDER — ONDANSETRON 2 MG/ML
4 INJECTION INTRAMUSCULAR; INTRAVENOUS ONCE
Status: COMPLETED | OUTPATIENT
Start: 2018-01-22 | End: 2018-01-22

## 2018-01-22 RX ORDER — DOCUSATE SODIUM 100 MG/1
100 CAPSULE, LIQUID FILLED ORAL 2 TIMES DAILY
Status: DISCONTINUED | OUTPATIENT
Start: 2018-01-22 | End: 2018-01-26 | Stop reason: HOSPADM

## 2018-01-22 RX ORDER — ACETAMINOPHEN 325 MG/1
650 TABLET ORAL EVERY 4 HOURS PRN
Status: DISCONTINUED | OUTPATIENT
Start: 2018-01-22 | End: 2018-01-26 | Stop reason: HOSPADM

## 2018-01-22 RX ORDER — RANOLAZINE 500 MG/1
1000 TABLET, EXTENDED RELEASE ORAL EVERY 12 HOURS SCHEDULED
Status: DISCONTINUED | OUTPATIENT
Start: 2018-01-22 | End: 2018-01-26 | Stop reason: HOSPADM

## 2018-01-22 RX ORDER — ATORVASTATIN CALCIUM 20 MG/1
20 TABLET, FILM COATED ORAL DAILY
Status: DISCONTINUED | OUTPATIENT
Start: 2018-01-23 | End: 2018-01-26 | Stop reason: HOSPADM

## 2018-01-22 RX ORDER — CARVEDILOL 6.25 MG/1
6.25 TABLET ORAL 2 TIMES DAILY WITH MEALS
Status: DISCONTINUED | OUTPATIENT
Start: 2018-01-22 | End: 2018-01-26 | Stop reason: HOSPADM

## 2018-01-22 RX ORDER — NITROGLYCERIN 40 MG/1
1 PATCH TRANSDERMAL DAILY
Status: DISCONTINUED | OUTPATIENT
Start: 2018-01-23 | End: 2018-01-26 | Stop reason: HOSPADM

## 2018-01-22 RX ORDER — SODIUM CHLORIDE 0.9 % (FLUSH) 0.9 %
1-10 SYRINGE (ML) INJECTION AS NEEDED
Status: DISCONTINUED | OUTPATIENT
Start: 2018-01-22 | End: 2018-01-26 | Stop reason: HOSPADM

## 2018-01-22 RX ORDER — MONTELUKAST SODIUM 10 MG/1
10 TABLET ORAL NIGHTLY
Status: DISCONTINUED | OUTPATIENT
Start: 2018-01-22 | End: 2018-01-26 | Stop reason: HOSPADM

## 2018-01-22 RX ORDER — ONDANSETRON 2 MG/ML
4 INJECTION INTRAMUSCULAR; INTRAVENOUS EVERY 6 HOURS PRN
Status: DISCONTINUED | OUTPATIENT
Start: 2018-01-22 | End: 2018-01-26 | Stop reason: HOSPADM

## 2018-01-22 RX ORDER — MORPHINE SULFATE 2 MG/ML
1 INJECTION, SOLUTION INTRAMUSCULAR; INTRAVENOUS EVERY 4 HOURS PRN
Status: DISCONTINUED | OUTPATIENT
Start: 2018-01-22 | End: 2018-01-26 | Stop reason: HOSPADM

## 2018-01-22 RX ORDER — BUDESONIDE AND FORMOTEROL FUMARATE DIHYDRATE 160; 4.5 UG/1; UG/1
2 AEROSOL RESPIRATORY (INHALATION)
Qty: 1 INHALER | Refills: 11 | Status: SHIPPED | OUTPATIENT
Start: 2018-01-22 | End: 2018-03-30 | Stop reason: SDUPTHER

## 2018-01-22 RX ORDER — FAMOTIDINE 40 MG/1
40 TABLET, FILM COATED ORAL DAILY
Status: DISCONTINUED | OUTPATIENT
Start: 2018-01-23 | End: 2018-01-26 | Stop reason: HOSPADM

## 2018-01-22 RX ORDER — LEVOTHYROXINE SODIUM 0.05 MG/1
50 TABLET ORAL
Status: DISCONTINUED | OUTPATIENT
Start: 2018-01-23 | End: 2018-01-26 | Stop reason: HOSPADM

## 2018-01-22 RX ADMIN — BUDESONIDE AND FORMOTEROL FUMARATE DIHYDRATE 2 PUFF: 160; 4.5 AEROSOL RESPIRATORY (INHALATION) at 23:14

## 2018-01-22 RX ADMIN — ONDANSETRON 4 MG: 2 INJECTION INTRAMUSCULAR; INTRAVENOUS at 19:47

## 2018-01-22 RX ADMIN — MORPHINE SULFATE 1 MG: 2 INJECTION, SOLUTION INTRAMUSCULAR; INTRAVENOUS at 22:28

## 2018-01-22 RX ADMIN — MORPHINE SULFATE 4 MG: 4 INJECTION, SOLUTION INTRAMUSCULAR; INTRAVENOUS at 19:47

## 2018-01-22 RX ADMIN — FAMOTIDINE 40 MG: 40 TABLET ORAL at 23:40

## 2018-01-22 RX ADMIN — MONTELUKAST 10 MG: 10 TABLET, FILM COATED ORAL at 22:59

## 2018-01-22 RX ADMIN — DOCUSATE SODIUM 100 MG: 100 CAPSULE, LIQUID FILLED ORAL at 22:59

## 2018-01-22 RX ADMIN — IPRATROPIUM BROMIDE AND ALBUTEROL SULFATE 3 ML: 2.5; .5 SOLUTION RESPIRATORY (INHALATION) at 23:18

## 2018-01-22 RX ADMIN — ENOXAPARIN SODIUM 80 MG: 80 INJECTION SUBCUTANEOUS at 20:11

## 2018-01-23 ENCOUNTER — ANESTHESIA (OUTPATIENT)
Dept: PERIOP | Facility: HOSPITAL | Age: 83
End: 2018-01-23

## 2018-01-23 ENCOUNTER — APPOINTMENT (OUTPATIENT)
Dept: GENERAL RADIOLOGY | Facility: HOSPITAL | Age: 83
End: 2018-01-23

## 2018-01-23 ENCOUNTER — ANESTHESIA EVENT (OUTPATIENT)
Dept: PERIOP | Facility: HOSPITAL | Age: 83
End: 2018-01-23

## 2018-01-23 ENCOUNTER — APPOINTMENT (OUTPATIENT)
Dept: PULMONOLOGY | Facility: HOSPITAL | Age: 83
End: 2018-01-23

## 2018-01-23 ENCOUNTER — EPISODE CHANGES (OUTPATIENT)
Dept: CASE MANAGEMENT | Facility: OTHER | Age: 83
End: 2018-01-23

## 2018-01-23 PROBLEM — J43.9 PULMONARY EMPHYSEMA (HCC): Status: ACTIVE | Noted: 2018-01-22

## 2018-01-23 LAB
ANION GAP SERPL CALCULATED.3IONS-SCNC: 11 MMOL/L (ref 5–15)
BACTERIA UR QL AUTO: ABNORMAL /HPF
BASOPHILS # BLD AUTO: 0.03 10*3/MM3 (ref 0–0.2)
BASOPHILS NFR BLD AUTO: 0.3 % (ref 0–2)
BILIRUB UR QL STRIP: ABNORMAL
BUN BLD-MCNC: 17 MG/DL (ref 7–21)
BUN/CREAT SERPL: 18.7 (ref 7–25)
CALCIUM SPEC-SCNC: 8.4 MG/DL (ref 8.4–10.2)
CHLORIDE SERPL-SCNC: 94 MMOL/L (ref 95–110)
CLARITY UR: ABNORMAL
CO2 SERPL-SCNC: 27 MMOL/L (ref 22–31)
COLOR UR: ABNORMAL
CREAT BLD-MCNC: 0.91 MG/DL (ref 0.5–1)
DEPRECATED RDW RBC AUTO: 45.6 FL (ref 36.4–46.3)
EOSINOPHIL # BLD AUTO: 0.32 10*3/MM3 (ref 0–0.7)
EOSINOPHIL NFR BLD AUTO: 3.3 % (ref 0–7)
ERYTHROCYTE [DISTWIDTH] IN BLOOD BY AUTOMATED COUNT: 13.1 % (ref 11.5–14.5)
GFR SERPL CREATININE-BSD FRML MDRD: 59 ML/MIN/1.73 (ref 60–90)
GLUCOSE BLD-MCNC: 93 MG/DL (ref 60–100)
GLUCOSE UR STRIP-MCNC: NEGATIVE MG/DL
HCT VFR BLD AUTO: 29.8 % (ref 35–45)
HCT VFR BLD AUTO: 32 % (ref 35–45)
HGB BLD-MCNC: 10.2 G/DL (ref 12–15.5)
HGB BLD-MCNC: 10.8 G/DL (ref 12–15.5)
HGB UR QL STRIP.AUTO: ABNORMAL
HOLD SPECIMEN: NORMAL
HYALINE CASTS UR QL AUTO: ABNORMAL /LPF
IMM GRANULOCYTES # BLD: 0.03 10*3/MM3 (ref 0–0.02)
IMM GRANULOCYTES NFR BLD: 0.3 % (ref 0–0.5)
INR PPP: 0.96 (ref 0.8–1.2)
KETONES UR QL STRIP: ABNORMAL
LEUKOCYTE ESTERASE UR QL STRIP.AUTO: ABNORMAL
LYMPHOCYTES # BLD AUTO: 1.85 10*3/MM3 (ref 0.6–4.2)
LYMPHOCYTES NFR BLD AUTO: 19.3 % (ref 10–50)
MCH RBC QN AUTO: 32.3 PG (ref 26.5–34)
MCHC RBC AUTO-ENTMCNC: 33.8 G/DL (ref 31.4–36)
MCV RBC AUTO: 95.8 FL (ref 80–98)
MONOCYTES # BLD AUTO: 0.58 10*3/MM3 (ref 0–0.9)
MONOCYTES NFR BLD AUTO: 6 % (ref 0–12)
NEUTROPHILS # BLD AUTO: 6.8 10*3/MM3 (ref 2–8.6)
NEUTROPHILS NFR BLD AUTO: 70.8 % (ref 37–80)
NITRITE UR QL STRIP: NEGATIVE
PH UR STRIP.AUTO: <=5 [PH] (ref 5–9)
PLATELET # BLD AUTO: 228 10*3/MM3 (ref 150–450)
PMV BLD AUTO: 9.5 FL (ref 8–12)
POTASSIUM BLD-SCNC: 3.9 MMOL/L (ref 3.5–5.1)
PROT UR QL STRIP: NEGATIVE
PROTHROMBIN TIME: 12.7 SECONDS (ref 11.1–15.3)
RBC # BLD AUTO: 3.34 10*6/MM3 (ref 3.77–5.16)
RBC # UR: ABNORMAL /HPF
REF LAB TEST METHOD: ABNORMAL
SODIUM BLD-SCNC: 132 MMOL/L (ref 137–145)
SP GR UR STRIP: 1.02 (ref 1–1.03)
SQUAMOUS #/AREA URNS HPF: ABNORMAL /HPF
UROBILINOGEN UR QL STRIP: ABNORMAL
WBC NRBC COR # BLD: 9.61 10*3/MM3 (ref 3.2–9.8)
WBC UR QL AUTO: ABNORMAL /HPF
WHOLE BLOOD HOLD SPECIMEN: NORMAL

## 2018-01-23 PROCEDURE — C1776 JOINT DEVICE (IMPLANTABLE): HCPCS | Performed by: ORTHOPAEDIC SURGERY

## 2018-01-23 PROCEDURE — 85610 PROTHROMBIN TIME: CPT | Performed by: ORTHOPAEDIC SURGERY

## 2018-01-23 PROCEDURE — 87086 URINE CULTURE/COLONY COUNT: CPT | Performed by: FAMILY MEDICINE

## 2018-01-23 PROCEDURE — 88311 DECALCIFY TISSUE: CPT | Performed by: ORTHOPAEDIC SURGERY

## 2018-01-23 PROCEDURE — 99223 1ST HOSP IP/OBS HIGH 75: CPT | Performed by: ORTHOPAEDIC SURGERY

## 2018-01-23 PROCEDURE — 88305 TISSUE EXAM BY PATHOLOGIST: CPT | Performed by: PATHOLOGY

## 2018-01-23 PROCEDURE — 94799 UNLISTED PULMONARY SVC/PX: CPT

## 2018-01-23 PROCEDURE — 25010000002 PROPOFOL 10 MG/ML EMULSION: Performed by: NURSE ANESTHETIST, CERTIFIED REGISTERED

## 2018-01-23 PROCEDURE — 94760 N-INVAS EAR/PLS OXIMETRY 1: CPT

## 2018-01-23 PROCEDURE — 27236 TREAT THIGH FRACTURE: CPT | Performed by: ORTHOPAEDIC SURGERY

## 2018-01-23 PROCEDURE — 25010000002 MORPHINE PER 10 MG: Performed by: FAMILY MEDICINE

## 2018-01-23 PROCEDURE — 88311 DECALCIFY TISSUE: CPT | Performed by: PATHOLOGY

## 2018-01-23 PROCEDURE — 80048 BASIC METABOLIC PNL TOTAL CA: CPT | Performed by: FAMILY MEDICINE

## 2018-01-23 PROCEDURE — 25010000003 CEFAZOLIN PER 500 MG: Performed by: ORTHOPAEDIC SURGERY

## 2018-01-23 PROCEDURE — 85018 HEMOGLOBIN: CPT | Performed by: ORTHOPAEDIC SURGERY

## 2018-01-23 PROCEDURE — 85014 HEMATOCRIT: CPT | Performed by: ORTHOPAEDIC SURGERY

## 2018-01-23 PROCEDURE — 88305 TISSUE EXAM BY PATHOLOGIST: CPT | Performed by: ORTHOPAEDIC SURGERY

## 2018-01-23 PROCEDURE — 25010000002 DEXAMETHASONE PER 1 MG: Performed by: NURSE ANESTHETIST, CERTIFIED REGISTERED

## 2018-01-23 PROCEDURE — 85025 COMPLETE CBC W/AUTO DIFF WBC: CPT | Performed by: FAMILY MEDICINE

## 2018-01-23 PROCEDURE — 0SRR0JZ REPLACEMENT OF RIGHT HIP JOINT, FEMORAL SURFACE WITH SYNTHETIC SUBSTITUTE, OPEN APPROACH: ICD-10-PCS | Performed by: ORTHOPAEDIC SURGERY

## 2018-01-23 PROCEDURE — 25010000002 ONDANSETRON PER 1 MG: Performed by: NURSE ANESTHETIST, CERTIFIED REGISTERED

## 2018-01-23 PROCEDURE — 81001 URINALYSIS AUTO W/SCOPE: CPT | Performed by: FAMILY MEDICINE

## 2018-01-23 PROCEDURE — 25010000002 FENTANYL CITRATE (PF) 100 MCG/2ML SOLUTION: Performed by: NURSE ANESTHETIST, CERTIFIED REGISTERED

## 2018-01-23 PROCEDURE — 25010000002 PHENYLEPHRINE PER 1 ML: Performed by: NURSE ANESTHETIST, CERTIFIED REGISTERED

## 2018-01-23 PROCEDURE — 76000 FLUOROSCOPY <1 HR PHYS/QHP: CPT

## 2018-01-23 PROCEDURE — 25010000002 HYDROMORPHONE PER 4 MG: Performed by: NURSE ANESTHETIST, CERTIFIED REGISTERED

## 2018-01-23 PROCEDURE — 73564 X-RAY EXAM KNEE 4 OR MORE: CPT

## 2018-01-23 DEVICE — TOTL HIP STEM DEPUY UPCHRG: Type: IMPLANTABLE DEVICE | Status: FUNCTIONAL

## 2018-01-23 DEVICE — PRT HIP BIPOL PRIM DEPUY 9525109/9525107: Type: IMPLANTABLE DEVICE | Site: HIP | Status: FUNCTIONAL

## 2018-01-23 DEVICE — SELF CENTERING BI-POLAR HEAD 28MM ID 49MM OD
Type: IMPLANTABLE DEVICE | Site: HIP | Status: FUNCTIONAL
Brand: SELF CENTERING

## 2018-01-23 DEVICE — ARTICUL/EZE FEMORAL HEAD DIAMETER 28MM +1.5 12/14 TAPER
Type: IMPLANTABLE DEVICE | Site: HIP | Status: FUNCTIONAL
Brand: ARTICUL/EZE

## 2018-01-23 DEVICE — CORAIL HIP SYSTEM CEMENTLESS FEMORAL STEM 12/14 AMT 135 DEGREES KA SIZE 14 HA COATED STANDARD COLLAR
Type: IMPLANTABLE DEVICE | Site: HIP | Status: FUNCTIONAL
Brand: CORAIL

## 2018-01-23 RX ORDER — EPHEDRINE SULFATE 50 MG/ML
5 INJECTION, SOLUTION INTRAVENOUS ONCE AS NEEDED
Status: DISCONTINUED | OUTPATIENT
Start: 2018-01-23 | End: 2018-01-23 | Stop reason: HOSPADM

## 2018-01-23 RX ORDER — FENTANYL CITRATE 50 UG/ML
INJECTION, SOLUTION INTRAMUSCULAR; INTRAVENOUS AS NEEDED
Status: DISCONTINUED | OUTPATIENT
Start: 2018-01-23 | End: 2018-01-23 | Stop reason: SURG

## 2018-01-23 RX ORDER — DEXAMETHASONE SODIUM PHOSPHATE 4 MG/ML
INJECTION, SOLUTION INTRA-ARTICULAR; INTRALESIONAL; INTRAMUSCULAR; INTRAVENOUS; SOFT TISSUE AS NEEDED
Status: DISCONTINUED | OUTPATIENT
Start: 2018-01-23 | End: 2018-01-23 | Stop reason: SURG

## 2018-01-23 RX ORDER — DIPHENHYDRAMINE HYDROCHLORIDE 50 MG/ML
12.5 INJECTION INTRAMUSCULAR; INTRAVENOUS
Status: DISCONTINUED | OUTPATIENT
Start: 2018-01-23 | End: 2018-01-23 | Stop reason: HOSPADM

## 2018-01-23 RX ORDER — BACITRACIN 50000 [IU]/1
INJECTION, POWDER, FOR SOLUTION INTRAMUSCULAR AS NEEDED
Status: DISCONTINUED | OUTPATIENT
Start: 2018-01-23 | End: 2018-01-26 | Stop reason: HOSPADM

## 2018-01-23 RX ORDER — FLUMAZENIL 0.1 MG/ML
0.2 INJECTION INTRAVENOUS AS NEEDED
Status: DISCONTINUED | OUTPATIENT
Start: 2018-01-23 | End: 2018-01-23 | Stop reason: HOSPADM

## 2018-01-23 RX ORDER — SODIUM CHLORIDE, SODIUM GLUCONATE, SODIUM ACETATE, POTASSIUM CHLORIDE, AND MAGNESIUM CHLORIDE 526; 502; 368; 37; 30 MG/100ML; MG/100ML; MG/100ML; MG/100ML; MG/100ML
1000 INJECTION, SOLUTION INTRAVENOUS CONTINUOUS PRN
Status: DISCONTINUED | OUTPATIENT
Start: 2018-01-23 | End: 2018-01-23 | Stop reason: HOSPADM

## 2018-01-23 RX ORDER — EPHEDRINE SULFATE 50 MG/ML
INJECTION, SOLUTION INTRAVENOUS AS NEEDED
Status: DISCONTINUED | OUTPATIENT
Start: 2018-01-23 | End: 2018-01-23 | Stop reason: SURG

## 2018-01-23 RX ORDER — ALBUTEROL SULFATE 2.5 MG/3ML
2.5 SOLUTION RESPIRATORY (INHALATION) ONCE
Status: COMPLETED | OUTPATIENT
Start: 2018-01-23 | End: 2018-01-23

## 2018-01-23 RX ORDER — WARFARIN SODIUM 2 MG/1
2 TABLET ORAL
Status: COMPLETED | OUTPATIENT
Start: 2018-01-23 | End: 2018-01-23

## 2018-01-23 RX ORDER — LABETALOL HYDROCHLORIDE 5 MG/ML
5 INJECTION, SOLUTION INTRAVENOUS
Status: DISCONTINUED | OUTPATIENT
Start: 2018-01-23 | End: 2018-01-23 | Stop reason: HOSPADM

## 2018-01-23 RX ORDER — HYDROCODONE BITARTRATE AND ACETAMINOPHEN 5; 325 MG/1; MG/1
1 TABLET ORAL EVERY 6 HOURS PRN
Status: DISCONTINUED | OUTPATIENT
Start: 2018-01-23 | End: 2018-01-26 | Stop reason: HOSPADM

## 2018-01-23 RX ORDER — ONDANSETRON 2 MG/ML
4 INJECTION INTRAMUSCULAR; INTRAVENOUS ONCE AS NEEDED
Status: DISCONTINUED | OUTPATIENT
Start: 2018-01-23 | End: 2018-01-23 | Stop reason: HOSPADM

## 2018-01-23 RX ORDER — ONDANSETRON 2 MG/ML
INJECTION INTRAMUSCULAR; INTRAVENOUS AS NEEDED
Status: DISCONTINUED | OUTPATIENT
Start: 2018-01-23 | End: 2018-01-23 | Stop reason: SURG

## 2018-01-23 RX ORDER — ACETAMINOPHEN 650 MG/1
650 SUPPOSITORY RECTAL ONCE AS NEEDED
Status: DISCONTINUED | OUTPATIENT
Start: 2018-01-23 | End: 2018-01-23 | Stop reason: HOSPADM

## 2018-01-23 RX ORDER — BACTERIOSTATIC SODIUM CHLORIDE 0.9 %
VIAL (ML) INJECTION AS NEEDED
Status: DISCONTINUED | OUTPATIENT
Start: 2018-01-23 | End: 2018-01-26 | Stop reason: HOSPADM

## 2018-01-23 RX ORDER — EPHEDRINE SULFATE 50 MG/ML
INJECTION, SOLUTION INTRAVENOUS AS NEEDED
Status: DISCONTINUED | OUTPATIENT
Start: 2018-01-23 | End: 2018-01-23

## 2018-01-23 RX ORDER — ROCURONIUM BROMIDE 10 MG/ML
INJECTION, SOLUTION INTRAVENOUS AS NEEDED
Status: DISCONTINUED | OUTPATIENT
Start: 2018-01-23 | End: 2018-01-23 | Stop reason: SURG

## 2018-01-23 RX ORDER — ACETAMINOPHEN 325 MG/1
650 TABLET ORAL ONCE AS NEEDED
Status: DISCONTINUED | OUTPATIENT
Start: 2018-01-23 | End: 2018-01-23 | Stop reason: HOSPADM

## 2018-01-23 RX ORDER — PROPOFOL 10 MG/ML
VIAL (ML) INTRAVENOUS AS NEEDED
Status: DISCONTINUED | OUTPATIENT
Start: 2018-01-23 | End: 2018-01-23 | Stop reason: SURG

## 2018-01-23 RX ORDER — NALOXONE HCL 0.4 MG/ML
0.2 VIAL (ML) INJECTION AS NEEDED
Status: DISCONTINUED | OUTPATIENT
Start: 2018-01-23 | End: 2018-01-23 | Stop reason: HOSPADM

## 2018-01-23 RX ORDER — LIDOCAINE HYDROCHLORIDE 20 MG/ML
INJECTION, SOLUTION INFILTRATION; PERINEURAL AS NEEDED
Status: DISCONTINUED | OUTPATIENT
Start: 2018-01-23 | End: 2018-01-23 | Stop reason: SURG

## 2018-01-23 RX ADMIN — TRANEXAMIC ACID 782 MG: 100 INJECTION, SOLUTION INTRAVENOUS at 16:57

## 2018-01-23 RX ADMIN — RANOLAZINE 1000 MG: 500 TABLET, FILM COATED, EXTENDED RELEASE ORAL at 21:46

## 2018-01-23 RX ADMIN — CARVEDILOL 6.25 MG: 6.25 TABLET, FILM COATED ORAL at 21:46

## 2018-01-23 RX ADMIN — CARVEDILOL 6.25 MG: 6.25 TABLET, FILM COATED ORAL at 09:05

## 2018-01-23 RX ADMIN — FENTANYL CITRATE 50 MCG: 50 INJECTION, SOLUTION INTRAMUSCULAR; INTRAVENOUS at 18:00

## 2018-01-23 RX ADMIN — EPHEDRINE SULFATE 5 MG: 50 INJECTION INTRAMUSCULAR; INTRAVENOUS; SUBCUTANEOUS at 17:26

## 2018-01-23 RX ADMIN — EPHEDRINE SULFATE 15 MG: 50 INJECTION INTRAMUSCULAR; INTRAVENOUS; SUBCUTANEOUS at 17:40

## 2018-01-23 RX ADMIN — ROCURONIUM BROMIDE 10 MG: 10 INJECTION INTRAVENOUS at 18:10

## 2018-01-23 RX ADMIN — DEXAMETHASONE SODIUM PHOSPHATE 4 MG: 4 INJECTION, SOLUTION INTRAMUSCULAR; INTRAVENOUS at 17:35

## 2018-01-23 RX ADMIN — FENTANYL CITRATE 50 MCG: 50 INJECTION, SOLUTION INTRAMUSCULAR; INTRAVENOUS at 17:24

## 2018-01-23 RX ADMIN — PROPOFOL 100 MG: 10 INJECTION, EMULSION INTRAVENOUS at 17:24

## 2018-01-23 RX ADMIN — MORPHINE SULFATE 1 MG: 2 INJECTION, SOLUTION INTRAMUSCULAR; INTRAVENOUS at 10:24

## 2018-01-23 RX ADMIN — ONDANSETRON 4 MG: 2 INJECTION INTRAMUSCULAR; INTRAVENOUS at 17:57

## 2018-01-23 RX ADMIN — TRANEXAMIC ACID 782 MG: 100 INJECTION, SOLUTION INTRAVENOUS at 18:45

## 2018-01-23 RX ADMIN — PHENYLEPHRINE HYDROCHLORIDE 100 MCG: 10 INJECTION INTRAVENOUS at 17:42

## 2018-01-23 RX ADMIN — LIDOCAINE HYDROCHLORIDE 50 MG: 20 INJECTION, SOLUTION INFILTRATION; PERINEURAL at 17:24

## 2018-01-23 RX ADMIN — FUROSEMIDE 40 MG: 40 TABLET ORAL at 21:46

## 2018-01-23 RX ADMIN — EPHEDRINE SULFATE 10 MG: 50 INJECTION INTRAMUSCULAR; INTRAVENOUS; SUBCUTANEOUS at 18:37

## 2018-01-23 RX ADMIN — SODIUM CHLORIDE, SODIUM GLUCONATE, SODIUM ACETATE, POTASSIUM CHLORIDE, AND MAGNESIUM CHLORIDE 1000 ML: 526; 502; 368; 37; 30 INJECTION, SOLUTION INTRAVENOUS at 16:57

## 2018-01-23 RX ADMIN — SODIUM CHLORIDE, SODIUM GLUCONATE, SODIUM ACETATE, POTASSIUM CHLORIDE, AND MAGNESIUM CHLORIDE: 526; 502; 368; 37; 30 INJECTION, SOLUTION INTRAVENOUS at 18:58

## 2018-01-23 RX ADMIN — MONTELUKAST 10 MG: 10 TABLET, FILM COATED ORAL at 21:46

## 2018-01-23 RX ADMIN — PHENYLEPHRINE HYDROCHLORIDE 100 MCG: 10 INJECTION INTRAVENOUS at 17:26

## 2018-01-23 RX ADMIN — WARFARIN SODIUM 2 MG: 2 TABLET ORAL at 22:05

## 2018-01-23 RX ADMIN — ALBUTEROL SULFATE 2.5 MG: 2.5 SOLUTION RESPIRATORY (INHALATION) at 16:57

## 2018-01-23 RX ADMIN — PHENYLEPHRINE HYDROCHLORIDE 100 MCG: 10 INJECTION INTRAVENOUS at 18:41

## 2018-01-23 RX ADMIN — EPHEDRINE SULFATE 10 MG: 50 INJECTION INTRAMUSCULAR; INTRAVENOUS; SUBCUTANEOUS at 18:41

## 2018-01-23 RX ADMIN — HYDROMORPHONE HYDROCHLORIDE 0.5 MG: 1 INJECTION, SOLUTION INTRAMUSCULAR; INTRAVENOUS; SUBCUTANEOUS at 20:15

## 2018-01-23 RX ADMIN — MORPHINE SULFATE 1 MG: 2 INJECTION, SOLUTION INTRAMUSCULAR; INTRAVENOUS at 06:24

## 2018-01-23 RX ADMIN — ROCURONIUM BROMIDE 20 MG: 10 INJECTION INTRAVENOUS at 17:35

## 2018-01-23 RX ADMIN — HYDROCODONE BITARTRATE AND ACETAMINOPHEN 1 TABLET: 5; 325 TABLET ORAL at 21:46

## 2018-01-23 RX ADMIN — SODIUM CHLORIDE 2 G: 9 INJECTION INTRAMUSCULAR; INTRAVENOUS; SUBCUTANEOUS at 17:35

## 2018-01-23 RX ADMIN — BUDESONIDE AND FORMOTEROL FUMARATE DIHYDRATE 2 PUFF: 160; 4.5 AEROSOL RESPIRATORY (INHALATION) at 10:19

## 2018-01-23 RX ADMIN — NITROGLYCERIN 1 PATCH: 0.2 PATCH TRANSDERMAL at 09:05

## 2018-01-23 NOTE — CONSULTS
"Adult Nutrition  Assessment    Patient Name:  Val Arteaga  YOB: 1932  MRN: [id number]  Admit Date:  1/22/2018    Assessment Date:  1/23/2018    Comments:  Pt currently NPO awaiting surgery for a hip fx.  She reports a recent wt loss of 7-8# due to a recent hospitalization.  Pt reports eating well pta.  RD will add Ensure to optimize protein for healing when po is started.  Rd will monitor.           Reason for Assessment       01/23/18 1344    Reason for Assessment    Reason For Assessment/Visit identified at risk by screening criteria    Identified At Risk By Screening Criteria diagnosis;MST SCORE 2+    Ortho Fracture                Nutrition/Diet History       01/23/18 1344    Nutrition/Diet History    Typical Food/Fluid Intake Pt reports that she has lost a \"little wt\"  only 7-8#.  She was recently sick and in the hospital for a mild MI.  She has been eating well and her family brings food in.  She also drinks Ensure at home              Labs/Tests/Procedures/Meds       01/23/18 1346    Labs/Tests/Procedures/Meds    Labs/Tests Review Reviewed;Na+;Alb    Medication Review Reviewed, pertinent;Diuretic            Physical Findings       01/23/18 1346    Physical Findings/Assessment    Additional Documentation Physical Appearance (Group)            Estimated/Assessed Needs       01/23/18 1346    Calculation Measurements    Weight Used For Calculations 78 kg (172 lb)    Height Used for Calculations 1.727 m (5' 8\")    Estimated/Assessed Energy Needs    Energy Need Method Wake-St Jeor    Age 85    RMR (Wake-St. Jeor Equation) 1273.69    Activity Factors (Wake St Jeor)  Out of bed, ambulatory  1.3    Total estimated needs (Wake St. Jeor) ~1550--1600    Estimated Kcal Range  ~1550--1600    Estimated/Assessed Protein Needs    Weight Used for Protein Calculation 78 kg (172 lb)    Protein (gm/kg) 1.0    1.0 Gm Protein (gm) 78.02    Estimated Protein Range 62--78    Estimated/Assessed Fluid " Needs    Fluid Need Method RDA method    RDA Method (mL)  1550            Nutrition Prescription Ordered       01/23/18 1348    Nutrition Prescription PO    Current PO Diet NPO              Electronically signed by:  Norma Resendiz RD  01/23/18 1:56 PM

## 2018-01-23 NOTE — PLAN OF CARE
Problem: Patient Care Overview (Adult)  Goal: Plan of Care Review  Outcome: Ongoing (interventions implemented as appropriate)   01/23/18 0344   Coping/Psychosocial Response Interventions   Plan Of Care Reviewed With patient   Patient Care Overview   Progress progress toward functional goals as expected   Outcome Evaluation   Outcome Summary/Follow up Plan New admit, hypotensive, NPO at midnight     Goal: Adult Individualization and Mutuality  Outcome: Ongoing (interventions implemented as appropriate)      Problem: Fall Risk (Adult)  Goal: Identify Related Risk Factors and Signs and Symptoms  Outcome: Ongoing (interventions implemented as appropriate)    Goal: Absence of Falls  Outcome: Ongoing (interventions implemented as appropriate)    Goal: Identify Related Risk Factors and Signs and Symptoms  Outcome: Ongoing (interventions implemented as appropriate)    Goal: Absence of Falls  Outcome: Ongoing (interventions implemented as appropriate)      Problem: Fractured Hip (Adult)  Goal: Signs and Symptoms of Listed Potential Problems Will be Absent or Manageable (Fractured Hip)  Outcome: Ongoing (interventions implemented as appropriate)

## 2018-01-23 NOTE — PROGRESS NOTES
[facility] Medicine Services  INPATIENT PROGRESS NOTE    Length of Stay: 1  Date of Admission: 1/22/2018  Primary Care Physician: Roula Albert MD    Subjective   Chief Complaint: No complaints    HPI:  This is an 85-year-old lady that presented to [facility] emergency Department with complaints of right hip pain after a mechanical fall.  A subcapital fracture with minimal displacement was noted on x-ray.  Dr. Mcdowell with orthopedics has been consulted and the patient will undergo a polar endoprosthesis of the right hip.      Review of Systems   Constitutional: Positive for activity change and appetite change.   Musculoskeletal:        Right hip pain with movement   All other systems reviewed and are negative.     All pertinent negatives and positives are as above. All other systems have been reviewed and are negative unless otherwise stated.     Objective    Temp:  [96.5 °F (35.8 °C)-98.5 °F (36.9 °C)] 96.5 °F (35.8 °C)  Heart Rate:  [] 81  Resp:  [18] 18  BP: ()/(54-80) 133/54    Physical Exam   Constitutional: She is oriented to person, place, and time. She appears well-developed and well-nourished.   HENT:   Head: Normocephalic and atraumatic.   Eyes: EOM are normal. Pupils are equal, round, and reactive to light.   Neck: Normal range of motion. Neck supple.   Cardiovascular: Normal rate and regular rhythm.    Pulmonary/Chest: Effort normal and breath sounds normal.   Abdominal: Soft. Bowel sounds are normal.   Musculoskeletal: Normal range of motion.   Neurological: She is alert and oriented to person, place, and time.   Skin: Skin is warm and dry.   Psychiatric: She has a normal mood and affect.     Results Review:  I have reviewed the labs, radiology results, and diagnostic studies.    Laboratory Data:     Results from last 7 days  Lab Units 01/23/18  0600 01/22/18 1952   SODIUM mmol/L 132* 126*   POTASSIUM mmol/L 3.9 3.9   CHLORIDE mmol/L 94* 93*    CO2 mmol/L 27.0 25.0   BUN mg/dL 17 17   CREATININE mg/dL 0.91 0.79   GLUCOSE mg/dL 93 103*   CALCIUM mg/dL 8.4 8.6   BILIRUBIN mg/dL  --  0.5   ALK PHOS U/L  --  75   ALT (SGPT) U/L  --  37   AST (SGOT) U/L  --  68*   ANION GAP mmol/L 11.0 8.0     Estimated Creatinine Clearance: 49.7 mL/min (by C-G formula based on Cr of 0.91).            Results from last 7 days  Lab Units 01/23/18  0600 01/22/18 1952   WBC 10*3/mm3 9.61 12.18*   HEMOGLOBIN g/dL 10.8* 11.0*   HEMATOCRIT % 32.0* 32.0*   PLATELETS 10*3/mm3 228 243       Results from last 7 days  Lab Units 01/22/18 1952   INR  0.93       Culture Data:   No results found for: BLOODCX  Urine Culture   Date Value Ref Range Status   01/23/2018 Culture in progress  Preliminary     No results found for: RESPCX  No results found for: WOUNDCX  No results found for: STOOLCX  No components found for: BODYFLD    Radiology Data:   Imaging Results (last 24 hours)     Procedure Component Value Units Date/Time    XR Hip With or Without Pelvis 2 - 3 View Right [[id number]] Collected:  01/1934     Updated:  01/22/18 1946    Narrative:         EXAM:  Radiograph(s), Osseous         REGION:   Pelvis and right hip      VIEWS:   3             INDICATION:    Fall, right hip pain     COMPARISON:    none              FINDINGS:           Subcapital fracture with minimal displacement.  .        Impression:       CONCLUSION:           1. Subcapital fracture with minimal displacement.                     Electronically signed by:  JENNIFFER Torres MD  1/22/2018 7:45  PM CST Workstation: [id number]    XR Chest 1 View [[id number]] Collected:  01/1934     Updated:  01/22/18 1951    Narrative:       Preoperative study for right hip surgery.     Chest AP view.    COMPARISON: January 4, 2018.    One image was performed. The cardiac silhouette is within normal  limits. The lung fields are clear. No pleural effusion is  identified. No acute bony abnormality is seen. There is  osteopenia.  The pacemaker is unchanged.      Impression:       CONCLUSION: No acute cardiopulmonary pathology is identified.    Electronically signed by:  Dileep Zavaleta MD  1/22/2018 7:50  PM CST Workstation: [id number]    XR Knee 4+ View Right [[id number]] Collected:  01/23/18 0029     Updated:  01/23/18 0104    Narrative:       Exam: Right knee four views    INDICATION: Status post fall    FINDINGS: Four views. Limited exam due to positioning. There is  moderate narrowing medial joint compartment of the knee. No acute  fracture or dislocation. No lytic or destructive lesion.      Impression:       No obvious acute fracture.    Electronically signed by:  Dany De Dios MD  1/23/2018 1:03 AM  CST Workstation: [id number]          I have reviewed the patient current medications.     Assessment/Plan     Hospital Problem List     * (Principal)Displaced fracture of base of neck of right femur, initial encounter for closed fracture    Essential hypertension    Coronary artery disease involving native coronary artery of native heart without angina pectoris    Chronic obstructive pulmonary disease    Acquired hypothyroidism    Hip fracture, right, closed, initial encounter    Pulmonary emphysema    Overview Signed 1/23/2018  6:20 AM by Kelvin Mcdowell MD     Added automatically from request for surgery [id number]               Plan:      1.  Subcapital fracture with minimal displacement, right hip:  Bipolar endoprosthesis of the right hip to schedule with Dr. Mcdowell today.  ARU consulted.    2.  Congestive heart failure/grade I diastolic dysfunction/ EF 36-40%:  Continue Coreg, Lasix and Ranexa.  Pacemaker noted.  Continuous cardiac monitoring.    3.  Hyponatremia:  Most likely due to hypervolemia.  Fluid and sodium restrictions.  Will monitor daily BMP.      ARU was consulted for evaluation for rehabilitation.       Discharge Planning: I expect patient to be discharged to home in 1-2 days.      This document has been  electronically signed by CREESNCIO Mckinney on January 23, 2018 3:09 PM

## 2018-01-23 NOTE — ANESTHESIA PROCEDURE NOTES
Airway  Airway not difficult    General Information and Staff    Patient location during procedure: OR  CRNA: LORRAINE ESCALANTE    Indications and Patient Condition  Indications for airway management: airway protection    Preoxygenated: yes  Mask difficulty assessment: 2 - vent by mask + OA or adjuvant +/- NMBA    Final Airway Details  Final airway type: endotracheal airway      Successful airway: ETT  Cuffed: yes   Successful intubation technique: direct laryngoscopy  Facilitating devices/methods: intubating stylet  Endotracheal tube insertion site: oral  Blade: Gordo  Blade size: #3  ETT size: 7.0 mm  Cormack-Lehane Classification: grade I - full view of glottis  Placement verified by: chest auscultation, capnometry and palpation of cuff   Measured from: lips  ETT to lips (cm): 21  Number of attempts at approach: 2    Additional Comments  Atraumatic intubation with Burch, on second attempt by JUANY Ortiz

## 2018-01-23 NOTE — PLAN OF CARE
Problem: Patient Care Overview (Adult)  Goal: Plan of Care Review  Outcome: Ongoing (interventions implemented as appropriate)   01/23/18 5625   Coping/Psychosocial Response Interventions   Plan Of Care Reviewed With caregiver;patient;family   Patient Care Overview   Progress no change   Outcome Evaluation   Outcome Summary/Follow up Plan New Assessment. Pt is going for surgery today for a hip fx. Will monitor and add supplements when po begins       Problem: Fractured Hip (Adult)  Goal: Signs and Symptoms of Listed Potential Problems Will be Absent or Manageable (Fractured Hip)  Outcome: Ongoing (interventions implemented as appropriate)

## 2018-01-23 NOTE — ED NOTES
Pt O2 sats decreased to 87% on 2.5L nc, pt awake and talking, O2 increased to 4L /nc      Barbi Jalloh RN  01/22/18 2015

## 2018-01-23 NOTE — PLAN OF CARE
Problem: Patient Care Overview (Adult)  Goal: Plan of Care Review  Outcome: Ongoing (interventions implemented as appropriate)   01/23/18 1336   Coping/Psychosocial Response Interventions   Plan Of Care Reviewed With patient   Patient Care Overview   Progress progress toward functional goals as expected   Outcome Evaluation   Outcome Summary/Follow up Plan pt vss, complaining of pain, surgery planned for this afternoon     Goal: Adult Individualization and Mutuality  Outcome: Ongoing (interventions implemented as appropriate)    Goal: Discharge Needs Assessment  Outcome: Ongoing (interventions implemented as appropriate)      Problem: Fall Risk (Adult)  Goal: Identify Related Risk Factors and Signs and Symptoms  Outcome: Ongoing (interventions implemented as appropriate)    Goal: Absence of Falls  Outcome: Ongoing (interventions implemented as appropriate)    Goal: Identify Related Risk Factors and Signs and Symptoms  Outcome: Ongoing (interventions implemented as appropriate)    Goal: Absence of Falls  Outcome: Ongoing (interventions implemented as appropriate)      Problem: Fractured Hip (Adult)  Goal: Signs and Symptoms of Listed Potential Problems Will be Absent or Manageable (Fractured Hip)  Outcome: Ongoing (interventions implemented as appropriate)

## 2018-01-23 NOTE — CONSULTS
Orthopedics (on-call MD unless specified)  Consult performed by: ISHAAN BRADY  Consult ordered by: JOSH ALVARENGA  Reason for consult: right hip fracture        Patient Name:  Val Arteaga  Admit Date:  1/22/2018  Consult Date:  1/23/2018      Patient Care Team:  Roula Albert MD as PCP - General  Roula Albert MD as PCP - Claims Attributed    Chief complaint: right hip pain    Subjective .     History of present illness:  Patient is an 85 year old white female who lives at home.  She is on home O2.  She denies syncopal episode or loss of consciousness but thinks she caught her foot on the oxygen cord trying to get up and go to the bathroom.  Injury occurred yesterday and she had severe pain, was unable to stand and unable to bear weight.  She denies numbness or tingling.  No other bony complaint.    Review of Systems:  The following systems were reviewed and negative;  respiratory, cardiovascular, gastrointestinal and genitourinary  All other systems reviewed as negative    History  Past Medical History:   Diagnosis Date   • Acquired hypothyroidism    • Allergic rhinitis    • CHF (congestive heart failure)    • COPD (chronic obstructive pulmonary disease)    • Corneal epithelial dystrophy    • Coronary arteriosclerosis    • Depression    • Generalized atherosclerosis    • GERD (gastroesophageal reflux disease)    • Hemorrhoids    • History of bone density study 08/03/2012    Lumbar spine Osteopenia. Femoral Osteopenia   • History of mammogram 08/20/2004    Birads Category 2 Benign   • History of Papanicolaou smear of cervix 08/12/2004    NEGATIVE   • Hypercholesterolemia    • Hyperlipidemia    • Hypertension    • Nonexudative age-related macular degeneration    • Osteoarthritis of multiple joints    • Pseudophakia    • Punctate keratitis     - history Thygeson's keratopathy      • Tobacco dependence syndrome    ,   Past Surgical History:   Procedure Laterality Date   • APPENDECTOMY     • BREAST  SURGERY  03/19/1954    Excision, breast lesion (1)  Excision of fibroma. 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 patency in the circumflex artery site of previous angioplasty. Kathe. patency in RCA.Nonobstructive 20-30% stenosis in the LAD and diagonal branch. Preserved LV systolic function with Ef of 55%   • CERVICAL SPINE SURGERY  09/16/1974    Excision of cervical disc, C5-6, C6-7, anterior cervical fusion, C5-6 with iliac bone graft.cervical spondylosis,C5-6, C6-7   • COLONOSCOPY  01/01/2013    patient declined    • CORONARY ANGIOPLASTY  07/21/1993    Angioplasty, coronary (2)    RCA    • DILATATION AND CURETTAGE      3   • ENDOSCOPY AND COLONOSCOPY  10/01/1998    Hemorhoids Rectal Outlet Bleeding   • ESOPHAGOSCOPY / EGD  06/28/2004    Nonerosive gastritis of the body of the stomach. A biopsy was obtained & placed in h. Pylori test agar. Multiple biopsies were obtained from the body of the stomach   • INJECTION OF MEDICATION  11/23/2015    Depo Medrol (Methylprednisone) (5)    • INJECTION OF MEDICATION  02/04/2016    Kenalog (3)     • PACEMAKER REPLACEMENT  2006   ,   Family History   Problem Relation Age of Onset   • Coronary artery disease Other    ,   Social History   Substance Use Topics   • Smoking status: Former Smoker     Packs/day: 0.50     Years: 50.00     Types: Cigarettes     Start date: 1950     Quit date: 1/12/2017   • Smokeless tobacco: Never Used   • Alcohol use No   ,   Prescriptions Prior to Admission   Medication Sig Dispense Refill Last Dose   • aspirin 81 MG EC tablet Take 81 mg by mouth Daily.   1/22/2018 at Unknown time   • budesonide-formoterol (SYMBICORT) 160-4.5 MCG/ACT inhaler Inhale 2 puffs 2 (Two) Times a Day. 1 inhaler 11 1/22/2018 at 0900   • carvedilol (COREG) 6.25 MG tablet Take 6.25 mg by mouth 2 (Two) Times a Day With Meals.   1/22/2018 at 0900   • clopidogrel (PLAVIX) 75 MG tablet Take 75 mg by mouth Daily.    1/22/2018 at 0900   • coenzyme Q10 100 MG capsule Take 200 mg by mouth Every Night.   1/21/2018 at Unknown time   • furosemide (LASIX) 40 MG tablet Take 1 tablet by mouth 2 (Two) Times a Day. (Patient taking differently: Take 40 mg by mouth Daily.) 60 tablet 0 1/22/2018 at Unknown time   • ipratropium-albuterol (DUO-NEB) 0.5-2.5 mg/mL nebulizer USE 1 BY NEBULIZER 4 (FOUR) TIMES A DAY AS NEEDED FOR WHEEZING. 360 mL 1 1/22/2018 at Unknown time   • levothyroxine (SYNTHROID, LEVOTHROID) 50 MCG tablet TAKE 1 TABLET BY MOUTH EVERY DAY 90 tablet 1 1/22/2018 at 1200   • losartan (COZAAR) 25 MG tablet Take 1 tablet by mouth Daily. 30 tablet 0 1/22/2018 at 0900   • montelukast (SINGULAIR) 10 MG tablet Take 10 mg by mouth Every Night.   1/21/2018 at Unknown time   • nitroglycerin (NITRO-DUR) 0.2 MG/HR patch Place 1 patch on the skin Daily. 30 patch 0 1/22/2018 at 0900   • polyethylene glycol (MIRALAX) packet Take 17 g by mouth Daily. 100 packet 1 1/22/2018 at Unknown time   • ranolazine (RANEXA) 1000 MG 12 hr tablet Take 1 tablet by mouth Every 12 (Twelve) Hours. 60 tablet 0 1/22/2018 at 0900   • sertraline (ZOLOFT) 50 MG tablet TAKE 1 TABLET BY MOUTH EVERY DAY (Patient taking differently: TAKE 1 TABLET BY MOUTH EVERY NIGHT) 90 tablet 1 1/21/2018 at Unknown time   • simvastatin (ZOCOR) 40 MG tablet TAKE 1 TABLET BY MOUTH EVERY NIGHT AT BEDTIME 30 tablet 3 1/21/2018 at Unknown time   • fluorometholone (FLAREX) 0.1 % ophthalmic suspension Administer 1 drop to both eyes 4 (Four) Times a Day. (Patient taking differently: Administer 1 drop to both eyes 4 (Four) Times a Day As Needed.) 5 mL 3 Taking   • guaiFENesin (MUCINEX) 600 MG 12 hr tablet Take 1 tablet by mouth Every 12 (Twelve) Hours. (Patient taking differently: Take 600 mg by mouth Every 12 (Twelve) Hours As Needed for Cough or Congestion.) 30 tablet 0 Taking    and Allergies:  Ace inhibitors and Lisinopril    Objective     Vital Signs   Temp:  [97.4 °F (36.3 °C)-98.5 °F  (36.9 °C)] 98.5 °F (36.9 °C)  Heart Rate:  [] 85  Resp:  [18] 18  BP: ()/(55-80) 133/61          Physical Exam:    General:  Awake, Alert, and oriented and in no apparent distress    Eyes:  PERRLA, no lid lesions    Mouth:  Oral mucosa moist, no gum lesions    Neck:  Trachea midline, no thyroidomegaly    Heart:  RR, no murmur, criselda, or rub    Lungs:  Good respiratory effort, clear to ausculation bilaterally    Abdomen:  Soft, nontender, nondistended.  No hepatosplenomegaly.    Skin:  Good skin turgor, no skin lesions    Extremities:  Right Lower extremity:   Good distal pulses and sensation.  Tender with any attempted motion.  Leg is shortened and externally rotated.  Good muscle tone and strength.  Left Lower extremity:   Good distal pulses and sensation.  Nontender on exam.  Good muscle tone and strength.  Stable joint exam.    Results Review:    Imaging Results (last 24 hours)     Procedure Component Value Units Date/Time    XR Hip With or Without Pelvis 2 - 3 View Right [[id number]] Collected:  01/1934     Updated:  01/22/18 1946    Narrative:         EXAM:  Radiograph(s), Osseous         REGION:   Pelvis and right hip      VIEWS:   3             INDICATION:    Fall, right hip pain     COMPARISON:    none              FINDINGS:           Subcapital fracture with minimal displacement.  .        Impression:       CONCLUSION:           1. Subcapital fracture with minimal displacement.                     Electronically signed by:  JENNIFFER Torres MD  1/22/2018 7:45  PM CST Workstation: [id number]    XR Chest 1 View [[id number]] Collected:  01/1934     Updated:  01/22/18 1951    Narrative:       Preoperative study for right hip surgery.     Chest AP view.    COMPARISON: January 4, 2018.    One image was performed. The cardiac silhouette is within normal  limits. The lung fields are clear. No pleural effusion is  identified. No acute bony abnormality is seen. There is  osteopenia. The  pacemaker is unchanged.      Impression:       CONCLUSION: No acute cardiopulmonary pathology is identified.    Electronically signed by:  Dileep Zavaleta MD  1/22/2018 7:50  PM CST Workstation: [id number]    XR Knee 4+ View Right [[id number]] Collected:  01/23/18 0029     Updated:  01/23/18 0104    Narrative:       Exam: Right knee four views    INDICATION: Status post fall    FINDINGS: Four views. Limited exam due to positioning. There is  moderate narrowing medial joint compartment of the knee. No acute  fracture or dislocation. No lytic or destructive lesion.      Impression:       No obvious acute fracture.    Electronically signed by:  Dany De Dios MD  1/23/2018 1:03 AM  CST Workstation: Inside          Lab Results (last 24 hours)     Procedure Component Value Units Date/Time    CBC & Differential [[id number]] Collected:  01/22/18 1952    Specimen:  Blood Updated:  01/22/18 2008    Narrative:       The following orders were created for panel order CBC & Differential.  Procedure                               Abnormality         Status                     ---------                               -----------         ------                     CBC Auto Differential[[id number]]        Abnormal            Final result                 Please view results for these tests on the individual orders.    CBC Auto Differential [[id number]]  (Abnormal) Collected:  01/22/18 1952    Specimen:  Blood Updated:  01/22/18 2008     WBC 12.18 (H) 10*3/mm3      RBC 3.29 (L) 10*6/mm3      Hemoglobin 11.0 (L) g/dL      Hematocrit 32.0 (L) %      MCV 97.3 fL      MCH 33.4 pg      MCHC 34.4 g/dL      RDW 13.3 %      RDW-SD 47.2 (H) fl      MPV 8.8 fL      Platelets 243 10*3/mm3      Neutrophil % 82.5 (H) %      Lymphocyte % 9.9 (L) %      Monocyte % 5.5 %      Eosinophil % 1.6 %      Basophil % 0.1 %      Immature Grans % 0.4 %      Neutrophils, Absolute 10.05 (H) 10*3/mm3      Lymphocytes, Absolute 1.20 10*3/mm3       Monocytes, Absolute 0.67 10*3/mm3      Eosinophils, Absolute 0.20 10*3/mm3      Basophils, Absolute 0.01 10*3/mm3      Immature Grans, Absolute 0.05 (H) 10*3/mm3     Protime-INR [[id number]]  (Normal) Collected:  01/22/18 1952    Specimen:  Blood Updated:  01/22/18 2028     Protime 12.4 Seconds      INR 0.93    Narrative:       Therapeutic range for most indications is 2.0-3.0 INR,  or 2.5-3.5 for mechanical heart valves.    Comprehensive Metabolic Panel [[id number]]  (Abnormal) Collected:  01/22/18 1952    Specimen:  Blood Updated:  01/22/18 2110     Glucose 103 (H) mg/dL      BUN 17 mg/dL      Creatinine 0.79 mg/dL      Sodium 126 (L) mmol/L      Potassium 3.9 mmol/L      Chloride 93 (L) mmol/L      CO2 25.0 mmol/L      Calcium 8.6 mg/dL      Total Protein 6.5 g/dL      Albumin 3.50 g/dL      ALT (SGPT) 37 U/L      AST (SGOT) 68 (H) U/L      Alkaline Phosphatase 75 U/L      Total Bilirubin 0.5 mg/dL      eGFR Non African Amer 69 mL/min/1.73      Globulin 3.0 gm/dL      A/G Ratio 1.2 g/dL      BUN/Creatinine Ratio 21.5     Anion Gap 8.0 mmol/L     Narrative:       The MDRD GFR formula is only valid for adults with stable renal function between ages 18 and 70.    Urinalysis - Urine, Clean Catch [[id number]]  (Abnormal) Collected:  01/23/18 0132    Specimen:  Urine from Urine, Clean Catch Updated:  01/23/18 0246     Color, UA Dark Yellow     Appearance, UA Cloudy (A)     pH, UA <=5.0     Specific Gravity, UA 1.020     Glucose, UA Negative     Ketones, UA Trace (A)     Bilirubin, UA Moderate (2+) (A)     Blood, UA Moderate (2+) (A)     Protein, UA Negative     Leuk Esterase, UA Trace (A)     Nitrite, UA Negative     Urobilinogen, UA 0.2 E.U./dL    Urinalysis With Microscopic - Urine, Clean Catch [[id number]] Collected:  01/23/18 0132    Specimen:  Urine from Urine, Clean Catch Updated:  01/23/18 0300    Narrative:       The following orders were created for panel order Urinalysis With Microscopic - Urine, Clean  Catch.  Procedure                               Abnormality         Status                     ---------                               -----------         ------                     Urinalysis - Urine, Lisa...[[id number]]  Abnormal            Final result               Urinalysis, Microscopic ...[[id number]]  Abnormal            Final result                 Please view results for these tests on the individual orders.    Urinalysis, Microscopic Only - Urine, Clean Catch [[id number]]  (Abnormal) Collected:  01/23/18 0132    Specimen:  Urine from Urine, Clean Catch Updated:  01/23/18 0300     RBC, UA 0-2 (A) /HPF      WBC, UA 0-2 /HPF      Bacteria, UA None Seen /HPF      Squamous Epithelial Cells, UA 3-5 (A) /HPF      Hyaline Casts, UA None Seen /LPF      Methodology Manual Light Microscopy    Urine Culture - Urine, Urine, Clean Catch [[id number]]  (Normal) Collected:  01/23/18 0132    Specimen:  Urine from Urine, Clean Catch Updated:  01/23/18 0613     Urine Culture Culture in progress    CBC & Differential [[id number]] Collected:  01/23/18 0600    Specimen:  Blood Updated:  01/23/18 0710    Narrative:       The following orders were created for panel order CBC & Differential.  Procedure                               Abnormality         Status                     ---------                               -----------         ------                     CBC Auto Differential[[id number]]        Abnormal            Final result                 Please view results for these tests on the individual orders.    CBC Auto Differential [[id number]]  (Abnormal) Collected:  01/23/18 0600    Specimen:  Blood Updated:  01/23/18 0710     WBC 9.61 10*3/mm3      RBC 3.34 (L) 10*6/mm3      Hemoglobin 10.8 (L) g/dL      Hematocrit 32.0 (L) %      MCV 95.8 fL      MCH 32.3 pg      MCHC 33.8 g/dL      RDW 13.1 %      RDW-SD 45.6 fl      MPV 9.5 fL      Platelets 228 10*3/mm3      Neutrophil % 70.8 %      Lymphocyte % 19.3 %      Monocyte % 6.0 %       Eosinophil % 3.3 %      Basophil % 0.3 %      Immature Grans % 0.3 %      Neutrophils, Absolute 6.80 10*3/mm3      Lymphocytes, Absolute 1.85 10*3/mm3      Monocytes, Absolute 0.58 10*3/mm3      Eosinophils, Absolute 0.32 10*3/mm3      Basophils, Absolute 0.03 10*3/mm3      Immature Grans, Absolute 0.03 (H) 10*3/mm3     Basic Metabolic Panel [[id number]]  (Abnormal) Collected:  01/23/18 0600    Specimen:  Blood Updated:  01/23/18 0729     Glucose 93 mg/dL      BUN 17 mg/dL      Creatinine 0.91 mg/dL      Sodium 132 (L) mmol/L      Potassium 3.9 mmol/L      Chloride 94 (L) mmol/L      CO2 27.0 mmol/L      Calcium 8.4 mg/dL      eGFR Non African Amer 59 (L) mL/min/1.73      BUN/Creatinine Ratio 18.7     Anion Gap 11.0 mmol/L     Narrative:       The MDRD GFR formula is only valid for adults with stable renal function between ages 18 and 70.           I reviewed the patient's new clinical results.  I reviewed the patient's new imaging results and agree with the interpretation.      Assessment/Plan     Principal Problem:    Displaced fracture of base of neck of right femur, initial encounter for closed fracture  Active Problems:    Essential hypertension    Coronary artery disease involving native coronary artery of native heart without angina pectoris    Chronic obstructive pulmonary disease    Acquired hypothyroidism    Hip fracture, right, closed, initial encounter    Pulmonary emphysema      The patient voiced understanding of the risks, benefits, and alternative forms of treatment that were discussed and the patient consents to proceed with surgery.  All risks, benefits and alternatives were discussed.  Risks including to but not exclusive to anesthetic complications, including death, MI, CVA, infection, bleeding DVT, fracture, residual pain and need for future surgery.  This discussion was held with the patient by Kelvin Mcdowell MD and all questions were answered.    Long discussion with patient and her niece  in the presence of the patient's .  We did discuss that she was at high risk for surgery due to her hypertension, COPD and use of home O2, coronary artery disease, as well as her age.  However, her overall risk is lower with surgery than without.      Plan bipolar endoprosthesis right hip.  Anticipate ARU to be a good option with return to home as the goal.    I discussed the patients findings and my recommendations with patient, nursing staff and consulting provider    Kelvin Mcdowell MD  01/23/18  7:32 AM

## 2018-01-23 NOTE — ED PROVIDER NOTES
Subjective   HPI Comments: Patient presents to emergency department for trip and fall on carpet in house.  Denies syncope, head/neck trauma.  States she felt immediate right hip pain and is unable to bear any weight.  She had a recent hospital stay 1/3/18-1/9/18 for precordial pain.          Patient is a 85 y.o. female presenting with lower extremity pain.   History provided by:  Patient   used: No    Lower Extremity Issue   Location:  Hip  Time since incident:  3 hours  Injury: yes    Mechanism of injury: fall    Fall:     Fall occurred:  Walking    Impact surface:  Carpet    Point of impact: right side.  Hip location:  R hip  Pain details:     Quality:  Sharp    Severity:  Severe    Onset quality:  Sudden    Progression:  Worsening  Chronicity:  New  Prior injury to area:  No  Associated symptoms: no fever and no neck pain        Review of Systems   Constitutional: Negative for chills and fever.   Respiratory: Negative for shortness of breath and wheezing.    Cardiovascular: Negative for chest pain, palpitations and leg swelling.   Gastrointestinal: Negative for constipation, diarrhea, nausea and vomiting.   Genitourinary: Negative for dysuria, flank pain, frequency, hematuria, vaginal bleeding and vaginal discharge.   Musculoskeletal: Positive for arthralgias (right hip). Negative for neck pain and neck stiffness.   Skin: Negative for color change.   Allergic/Immunologic: Negative for immunocompromised state.   Neurological: Negative for dizziness, syncope, speech difficulty, weakness, light-headedness, numbness and headaches.   Hematological: Does not bruise/bleed easily.   Psychiatric/Behavioral: Negative for confusion.       Past Medical History:   Diagnosis Date   • Acquired hypothyroidism    • Allergic rhinitis    • COPD (chronic obstructive pulmonary disease)    • Corneal epithelial dystrophy    • Coronary arteriosclerosis    • Depression    • Generalized atherosclerosis    • GERD  (gastroesophageal reflux disease)    • Hemorrhoids    • History of bone density study 08/03/2012    Lumbar spine Osteopenia. Femoral Osteopenia   • History of mammogram 08/20/2004    Birads Category 2 Benign   • History of Papanicolaou smear of cervix 08/12/2004    NEGATIVE   • Hypercholesterolemia    • Hyperlipidemia    • Hypertension    • Nonexudative age-related macular degeneration    • Osteoarthritis of multiple joints    • Pseudophakia    • Punctate keratitis     - history Thygeson's keratopathy      • Tobacco dependence syndrome        Allergies   Allergen Reactions   • Ace Inhibitors    • Lisinopril Cough       Past Surgical History:   Procedure Laterality Date   • APPENDECTOMY     • BREAST SURGERY  03/19/1954    Excision, breast lesion (1)  Excision of fibroma. 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 patency in the circumflex artery site of previous angioplasty. Kathe. patency in RCA.Nonobstructive 20-30% stenosis in the LAD and diagonal branch. Preserved LV systolic function with Ef of 55%   • CERVICAL SPINE SURGERY  09/16/1974    Excision of cervical disc, C5-6, C6-7, anterior cervical fusion, C5-6 with iliac bone graft.cervical spondylosis,C5-6, C6-7   • COLONOSCOPY  01/01/2013    patient declined    • CORONARY ANGIOPLASTY  07/21/1993    Angioplasty, coronary (2)    RCA    • DILATATION AND CURETTAGE      3   • ENDOSCOPY AND COLONOSCOPY  10/01/1998    Hemorhoids Rectal Outlet Bleeding   • ESOPHAGOSCOPY / EGD  06/28/2004    Nonerosive gastritis of the body of the stomach. A biopsy was obtained & placed in h. Pylori test agar. Multiple biopsies were obtained from the body of the stomach   • INJECTION OF MEDICATION  11/23/2015    Depo Medrol (Methylprednisone) (5)    • INJECTION OF MEDICATION  02/04/2016    Kenalog (3)     • PACEMAKER REPLACEMENT  2006       Family History   Problem Relation Age of Onset   • Coronary artery disease Other   "      Social History     Social History   • Marital status:      Spouse name: N/A   • Number of children: N/A   • Years of education: N/A     Social History Main Topics   • Smoking status: Former Smoker     Packs/day: 0.50     Years: 50.00     Types: Cigarettes     Start date: 1950     Quit date: 1/12/2017   • Smokeless tobacco: Never Used   • Alcohol use No   • Drug use: No   • Sexual activity: Defer     Other Topics Concern   • None     Social History Narrative   • None           Objective     BP (!) 189/65  Pulse 96  Temp 98 °F (36.7 °C) (Temporal Artery )   Resp 18  Ht 172.7 cm (68\")  Wt 78.2 kg (172 lb 4.8 oz)  SpO2 93%  BMI 26.2 kg/m2    Physical Exam   Constitutional: She is oriented to person, place, and time. She appears well-developed and well-nourished.   HENT:   Head: Normocephalic and atraumatic.   Eyes: EOM are normal. Pupils are equal, round, and reactive to light.   Cardiovascular: Normal rate, regular rhythm, normal heart sounds and intact distal pulses.    Pulmonary/Chest: Effort normal and breath sounds normal. No respiratory distress. She has no wheezes.   Abdominal: Soft. Bowel sounds are normal. She exhibits no distension and no mass. There is no tenderness. There is no guarding.   Musculoskeletal:        Right hip: She exhibits decreased range of motion, decreased strength and tenderness. She exhibits no bony tenderness, no swelling, no crepitus and no deformity.   Extreme pain elicited with right hip IR/ER.     Neurological: She is alert and oriented to person, place, and time.   Skin: Skin is warm and dry.   Psychiatric: She has a normal mood and affect. Her behavior is normal. Thought content normal.   Nursing note and vitals reviewed.      ECG 12 Lead    Date/Time: 1/22/2018 7:47 PM  Performed by: ROSY JUAREZ  Authorized by: ROSY JUAREZ   Interpreted by physician  Comparison: compared with previous ECG from 1/4/2018  Similar to previous ECG  Rhythm: sinus " rhythm  Rate: normal  BPM: 97  Conduction: left bundle branch block  ST Segments: ST segments normal  Clinical impression: abnormal ECG               ED Course  ED Course   Comment By Time   Closed subcapital fracture of right hip.  Paged Dr Mcdowell.  Recommended lovenox and NPO after midnight. Satnam Helton PA-C 01/22 1955   Paged hospitalist. Satnam Helton PA-C 01/22 2001   Discussed with Dr Martinez who agreed to admit patient. Satnam Helton PA-C 01/22 2006      Xr Chest 2 View    Result Date: 1/3/2018  Narrative: [facility] [place] Patient Name: ADALBERTO NOLASCO ORDERING: REMY TYLER ATTENDING: REMY TYLER REFERRING: REMY TYLER ----------------------- PROCEDURE: Two-view chest COMPARISON: 1/1/2018 HISTORY: Chest pain protocol FINDINGS: Frontal and lateral views of the chest are obtained. Devices: Left-sided cardiac pacer stable in position. Lungs/Pleura: Ill-defined markings in the left lung base, probably due to subsegmental atelectasis. Lungs otherwise appear clear. Cardiomediastinal structures: Cardiac silhouette is normal in size. Thoracic aorta contains atherosclerotic calcification.     Impression: CONCLUSION:  Subsegmental atelectasis left lung base. Lungs otherwise appear clear. Electronically signed by:  González Forrest MD  1/3/2018 3:57 PM CST Workstation: [id number]    Xr Chest 2 View    Result Date: 1/1/2018  Narrative: PROCEDURE: Chest PA and lateral REASON FOR EXAM: wheezing FINDINGS: Comparison study dated September 5, 2017.  Stable pacemaker and leads. . Cardiac and pulmonary vasculature are normal . Lungs are clear. Pleural spaces are normal . No acute osseous abnormality.     Impression: No acute cardiopulmonary abnormality. Electronically signed by:  Renny Stovall MD  1/1/2018 10:42 AM CST Workstation: [id number]    Xr Chest 1 View    Result Date: 1/22/2018  Narrative: Preoperative study for right hip surgery.  Chest AP view. COMPARISON: January 4,  2018. One image was performed. The cardiac silhouette is within normal limits. The lung fields are clear. No pleural effusion is identified. No acute bony abnormality is seen. There is osteopenia. The pacemaker is unchanged.     Impression: CONCLUSION: No acute cardiopulmonary pathology is identified. Electronically signed by:  Dileep Zavaleta MD  1/22/2018 7:50 PM CST Workstation: [id number]    Ct Angiogram Chest With Contrast    Result Date: 1/7/2018  Narrative: [facility] Patient Name: ADALBERTO NOLASCO ORDERING: ANALI JORGE ATTENDING: ADELA ALEJANDRE REFERRING: ANALI JORGE ----------------------- EXAM DESCRIPTION: CT ANGIOGRAM CHEST W CONTRAST CLINICAL HISTORY:  rule out pulmonary embolus, R07.2 Precordial pain R06.02 Shortness of breath R74.8 Abnormal levels of other serum enzymes I49.5 Sick sinus syndrome I21.4 Non-ST elevation (NSTEMI) myocardial infarction R06.00 Dyspnea, unspecified R07.9 Chest pain, unspecified I25.10 Atherosclerotic heart disease of native coronary artery without angina pectoris   COMPARISON: 2/10/2016 TECHNIQUE: Axial images were obtained and multiplanar reconstructions were made.  This exam was performed according to our departmental dose optimization program, which includes automated exposure control, adjustment of the mA and/or KV according to patient size and/or use of iterative reconstruction technique. Dose Length Product 305.30 CONTRAST:   66 cc intravenous Isovue 370 FINDINGS: Diagnostic quality: Adequate . Pulmonary embolism: No evidence of pulmonary embolism. Pulmonary arteries:  Normal in caliber. Lung parenchyma: There is a background of pulmonary emphysema redemonstrated. There is some dependent basilar atelectasis greater on the right. No dense parenchymal consolidation, effusion or pneumothorax. Intimal scarring or atelectasis within the lingula. No mass or suspicious nodule. Pleural effusion: None. Central airways: Patent. Adenopathy: No  suspicious mediastinal, hilar, or axillary lymph nodes based on size or morphologic criteria. Heart and great vessels: No cardiac enlargement or pericardial effusion. Coronary vascular calcification. There is again findings suggesting right heart insufficiency with prominent amount of contrast extending into the azygos system and the IVC and small amount within the hepatic veins. Upper abdomen: Small hiatal hernia. No acute findings.  Bones: Degenerative changes throughout the thoracic spine. No acute compression fracture..  Additional findings: None.     Impression: No CT evidence of pulmonary embolism. Pulmonary emphysema with basilar dependent atelectasis. No consolidation or acute pleural finding. Electronically signed by:  Marco A Fraire MD  1/7/2018 4:32 PM CST Workstation: [id number]    Xr Chest Pa & Lateral    Result Date: 1/4/2018  Narrative: PROCEDURE: Chest PA and lateral REASON FOR EXAM: Follow up dyspnea, possible pneumonia, R07.2 Precordial pain R06.02 Shortness of breath R74.8 Abnormal levels of other serum enzymes FINDINGS: Comparison study dated January 3, 2015.  Stable pacemaker and leads. . Cardiac and pulmonary vasculature are normal . Lungs are clear. Pleural spaces are normal . No acute osseous abnormality.     Impression: No acute cardiopulmonary abnormality. Electronically signed by:  Renny Stovall MD  1/4/2018 7:42 AM CST Workstation: [id number]    Xr Hip With Or Without Pelvis 2 - 3 View Right    Result Date: 1/22/2018  Narrative: EXAM:  Radiograph(s), Osseous       REGION:   Pelvis and right hip  VIEWS:   3         INDICATION:    Fall, right hip pain   COMPARISON:    none          FINDINGS:       Subcapital fracture with minimal displacement. .        Impression: CONCLUSION:       1. Subcapital fracture with minimal displacement.             Electronically signed by:  JENNIFFER Torres MD  1/22/2018 7:45 PM CST Workstation: [id number]                [facility]    Final diagnoses:   Hip fracture, right,  closed, initial encounter            Satnam Helton PA-C  01/22/18 2013

## 2018-01-23 NOTE — H&P
[facility] Medicine Admission      Date of Admission: 1/22/2018      Primary Care Physician: Roula Albert MD      Chief Complaint   Patient presents with   • Hip Pain   • Fall       HPI:  Patient is an 85 years old female with PMH of COPD and CHF, had a mechanical fall at home and landed on the carpet on her R hip. Patient complains of R hip pain and knee pain .  Patient denies fever or chills, headache , dizziness, visual changes, change in appetite , chest pain or palpitations, difficulty in breathing or cough, abdominal pain , N/V/D , blood in the stool or urine or urinary symptoms, weakness numbness or tingling in the extremities.    Past Medical History:   Past Medical History:   Diagnosis Date   • Acquired hypothyroidism    • Allergic rhinitis    • COPD (chronic obstructive pulmonary disease)    • Corneal epithelial dystrophy    • Coronary arteriosclerosis    • Depression    • Generalized atherosclerosis    • GERD (gastroesophageal reflux disease)    • Hemorrhoids    • History of bone density study 08/03/2012    Lumbar spine Osteopenia. Femoral Osteopenia   • History of mammogram 08/20/2004    Birads Category 2 Benign   • History of Papanicolaou smear of cervix 08/12/2004    NEGATIVE   • Hypercholesterolemia    • Hyperlipidemia    • Hypertension    • Nonexudative age-related macular degeneration    • Osteoarthritis of multiple joints    • Pseudophakia    • Punctate keratitis     - history Thygeson's keratopathy      • Tobacco dependence syndrome        Past Surgical History:   Past Surgical History:   Procedure Laterality Date   • APPENDECTOMY     • BREAST SURGERY  03/19/1954    Excision, breast lesion (1)  Excision of fibroma. 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 patency in the circumflex artery site of previous angioplasty. Kathe. patency in RCA.Nonobstructive 20-30% stenosis in the LAD and  diagonal branch. Preserved LV systolic function with Ef of 55%   • CERVICAL SPINE SURGERY  09/16/1974    Excision of cervical disc, C5-6, C6-7, anterior cervical fusion, C5-6 with iliac bone graft.cervical spondylosis,C5-6, C6-7   • COLONOSCOPY  01/01/2013    patient declined    • CORONARY ANGIOPLASTY  07/21/1993    Angioplasty, coronary (2)    RCA    • DILATATION AND CURETTAGE      3   • ENDOSCOPY AND COLONOSCOPY  10/01/1998    Hemorhoids Rectal Outlet Bleeding   • ESOPHAGOSCOPY / EGD  06/28/2004    Nonerosive gastritis of the body of the stomach. A biopsy was obtained & placed in h. Pylori test agar. Multiple biopsies were obtained from the body of the stomach   • INJECTION OF MEDICATION  11/23/2015    Depo Medrol (Methylprednisone) (5)    • INJECTION OF MEDICATION  02/04/2016    Kenalog (3)     • PACEMAKER REPLACEMENT  2006       Family History:   Family History   Problem Relation Age of Onset   • Coronary artery disease Other        Social History:   Social History     Social History   • Marital status:      Spouse name: N/A   • Number of children: N/A   • Years of education: N/A     Social History Main Topics   • Smoking status: Former Smoker     Packs/day: 0.50     Years: 50.00     Types: Cigarettes     Start date: 1950     Quit date: 1/12/2017   • Smokeless tobacco: Never Used   • Alcohol use No   • Drug use: No   • Sexual activity: Defer     Other Topics Concern   • None     Social History Narrative   • None       Allergies:   Allergies   Allergen Reactions   • Ace Inhibitors    • Lisinopril Cough       Medications:   Prior to Admission medications    Medication Sig Start Date End Date Taking? Authorizing Provider   aspirin 81 MG EC tablet Take 81 mg by mouth Daily.    Historical Provider, MD   benzonatate (TESSALON) 100 MG capsule Take 100 mg by mouth 3 (Three) Times a Day As Needed for Cough.    Historical Provider, MD   budesonide-formoterol (SYMBICORT) 160-4.5 MCG/ACT inhaler Inhale 2 puffs 2  (Two) Times a Day. 1/22/18   Roula Albert MD   carvedilol (COREG) 6.25 MG tablet Take 6.25 mg by mouth 2 (Two) Times a Day With Meals.    Historical Provider, MD   clopidogrel (PLAVIX) 75 MG tablet Take 75 mg by mouth Daily.    Historical Provider, MD   coenzyme Q10 100 MG capsule Take 200 mg by mouth Daily.    Historical Provider, MD   fluorometholone (FLAREX) 0.1 % ophthalmic suspension Administer 1 drop to both eyes 4 (Four) Times a Day.  Patient taking differently: Administer 1 drop to both eyes 2 (Two) Times a Day. 6/20/17   Serafin Cotton MD   furosemide (LASIX) 40 MG tablet Take 1 tablet by mouth 2 (Two) Times a Day. 1/9/18   CRESENCIO Winters   guaiFENesin (MUCINEX) 600 MG 12 hr tablet Take 1 tablet by mouth Every 12 (Twelve) Hours. 1/1/18   CRESENCIO Lee   ipratropium-albuterol (DUO-NEB) 0.5-2.5 mg/mL nebulizer USE 1 BY NEBULIZER 4 (FOUR) TIMES A DAY AS NEEDED FOR WHEEZING. 1/8/18   Roula Albert MD   levothyroxine (SYNTHROID, LEVOTHROID) 50 MCG tablet TAKE 1 TABLET BY MOUTH EVERY DAY 10/2/17   Roula Albert MD   losartan (COZAAR) 25 MG tablet Take 1 tablet by mouth Daily. 1/9/18   CRESENCIO Winters   montelukast (SINGULAIR) 10 MG tablet Take 10 mg by mouth Every Night.    Historical Provider, MD   nitroglycerin (NITRO-DUR) 0.2 MG/HR patch Place 1 patch on the skin Daily. 11/10/16   Roula Albert MD   polyethylene glycol (MIRALAX) packet Take 17 g by mouth Daily. 1/15/18   Roula Albert MD   ranolazine (RANEXA) 1000 MG 12 hr tablet Take 1 tablet by mouth Every 12 (Twelve) Hours. 1/9/18   CRESENCIO Winters   sertraline (ZOLOFT) 50 MG tablet TAKE 1 TABLET BY MOUTH EVERY DAY 10/2/17   Roula Albert MD   simvastatin (ZOCOR) 40 MG tablet TAKE 1 TABLET BY MOUTH EVERY NIGHT AT BEDTIME 12/19/17   Roula Albert MD   benzonatate (TESSALON) 100 MG capsule Take 1 capsule by mouth 3 (Three) Times a Day As Needed for Cough. 1/1/18 1/22/18  CRESENCIO Lee    budesonide-formoterol (SYMBICORT) 160-4.5 MCG/ACT inhaler Inhale 2 puffs 2 (Two) Times a Day. 1/24/17 1/22/18  Roula Albert MD   predniSONE (DELTASONE) 10 MG tablet 40 mg daily x 2 days, then 30 mg x 2 days, then 20 mg x 2 days, then 10 mg x 2 days. 1/9/18 1/22/18  CRESENCIO Winters       Review of Systems:    -Otherwise complete ROS is negative except as mentioned above.    Physical Exam:    Physical Exam     Temp:  [98 °F (36.7 °C)] 98 °F (36.7 °C)  Heart Rate:  [93-96] 96  Resp:  [18] 18  BP: (120-189)/(60-80) 189/65    -General:Patient is oriented to person, place, and time. Patient appears well-developed and well-nourished. No distress.   -HEENT: Head: Normocephalic and atraumatic. Right Ear: External ear normal. Left Ear: External ear normal. Nose: Nose normal. Mouth/Throat: Oropharynx is clear and moist.   Eyes: Conjunctivae and EOM are normal. Pupils are equal, round, and reactive to light. Right eye exhibits no discharge. Left eye exhibits no discharge.   Neck: Normal range of motion. Neck supple. No JVD present. No tracheal deviation present. No thyromegaly present.   -CVS: Normal rate, regular rhythm, normal heart sounds and intact distal pulses.  Exam reveals no gallop and no friction rub.    No murmur heard.  -Pulmonary: Effort normal and breath sounds normal. No stridor. No respiratory distress. He has no wheezes. No rales or tenderness.   -Abdominal: Soft, Bowel sounds are normal. No distension and no mass. There is no tenderness. There is no rebound and no guarding. No hernia.   -Musc: No Cyanosis clubbing or edema.  -Lymph: No cervical adenopathy.   -Neuro: patient is alert and oriented to person, place, and time.Patient has normal reflexes. No cranial nerve deficit. Patient exhibits normal muscle tone , strength and sensation bilaterally. Coordination normal.   -Skin: Skin is warm. No rash noted. Patient is not diaphoretic. No erythema. No pallor.   -Psych: Patient  has a normal mood and  affect. behavior is normal. Judgment and thought content normal. Denies suicidal ideations or plans.    Nursing note and vitals reviewed.    Results Reviewed:  I have personally reviewed current lab, radiology, and data and agree with results.  Lab Results (last 24 hours)     Procedure Component Value Units Date/Time    Protime-INR [[id number]] Collected:  01/22/18 1952    Specimen:  Blood Updated:  01/22/18 2005    Comprehensive Metabolic Panel [[id number]] Collected:  01/22/18 1952    Specimen:  Blood Updated:  01/22/18 2005    CBC & Differential [[id number]] Collected:  01/22/18 1952    Specimen:  Blood Updated:  01/22/18 2008    Narrative:       The following orders were created for panel order CBC & Differential.  Procedure                               Abnormality         Status                     ---------                               -----------         ------                     CBC Auto Differential[[id number]]        Abnormal            Final result                 Please view results for these tests on the individual orders.    CBC Auto Differential [[id number]]  (Abnormal) Collected:  01/22/18 1952    Specimen:  Blood Updated:  01/22/18 2008     WBC 12.18 (H) 10*3/mm3      RBC 3.29 (L) 10*6/mm3      Hemoglobin 11.0 (L) g/dL      Hematocrit 32.0 (L) %      MCV 97.3 fL      MCH 33.4 pg      MCHC 34.4 g/dL      RDW 13.3 %      RDW-SD 47.2 (H) fl      MPV 8.8 fL      Platelets 243 10*3/mm3      Neutrophil % 82.5 (H) %      Lymphocyte % 9.9 (L) %      Monocyte % 5.5 %      Eosinophil % 1.6 %      Basophil % 0.1 %      Immature Grans % 0.4 %      Neutrophils, Absolute 10.05 (H) 10*3/mm3      Lymphocytes, Absolute 1.20 10*3/mm3      Monocytes, Absolute 0.67 10*3/mm3      Eosinophils, Absolute 0.20 10*3/mm3      Basophils, Absolute 0.01 10*3/mm3      Immature Grans, Absolute 0.05 (H) 10*3/mm3         Imaging Results (last 24 hours)     Procedure Component Value Units Date/Time    XR Hip With or Without Pelvis 2 -  3 View Right [[id number]] Collected:  01/1934     Updated:  01/22/18 1946    Narrative:         EXAM:  Radiograph(s), Osseous         REGION:   Pelvis and right hip      VIEWS:   3             INDICATION:    Fall, right hip pain     COMPARISON:    none              FINDINGS:           Subcapital fracture with minimal displacement.  .        Impression:       CONCLUSION:           1. Subcapital fracture with minimal displacement.                     Electronically signed by:  JENNIFFER Torres MD  1/22/2018 7:45  PM CST Workstation: [id number]    XR Chest 1 View [[id number]] Collected:  01/1934     Updated:  01/22/18 1951    Narrative:       Preoperative study for right hip surgery.     Chest AP view.    COMPARISON: January 4, 2018.    One image was performed. The cardiac silhouette is within normal  limits. The lung fields are clear. No pleural effusion is  identified. No acute bony abnormality is seen. There is  osteopenia. The pacemaker is unchanged.      Impression:       CONCLUSION: No acute cardiopulmonary pathology is identified.    Electronically signed by:  Dileep Zavaleta MD  1/22/2018 7:50  PM CST Workstation: [id number]          Assessment/Plan         Hospital Problem List     * (Principal)Displaced fracture of base of neck of right femur, initial encounter for closed fracture    Essential hypertension    Coronary artery disease involving native coronary artery of native heart without angina pectoris    Chronic obstructive pulmonary disease    Acquired hypothyroidism    Hip fracture, right, closed, initial encounter          Assessment / Plan  --Subcapital fracture with minimal displacement  Admit, pain control, NPO after midnight, hold ASA and plavix, Dr Mcdowell consulted , possible OR in AM     --Hx of CAD  asymptamic   contiune home meds. Ranexa, coreg, lipitor   Will hold ASA and plavix as above    --COPD  Not in acute exacerbation  contiune home symbicort, cingular, nebs  PRN    --leukcytosis  WBC 12.18  Likely reactional   Will obtain UA and continue to montior     --hyponatremia  Na 126    --CHF  Not in acute exacerbation  EF 35-%-40%  Follows up with Dr Rogel   Continue home meds: lasix and carvidelol     --hypothyrodisim  Continue synthroid     --GI/DVT prophylaxis    I discussed the patients findings and my recommendations with the patient, and the patient verbalized understanding of the plan, risks and benefits of the plan.    This document has been electronically signed by Sanju Martinez MD on January 22, 2018 8:20 PM

## 2018-01-23 NOTE — ANESTHESIA PREPROCEDURE EVALUATION
Anesthesia Evaluation     Patient summary reviewed and Nursing notes reviewed   NPO Solid Status: > 8 hours  NPO Liquid Status: > 6 hours     Airway   Mallampati: II  TM distance: <3 FB  Neck ROM: full  possible difficult intubation  Dental    (+) poor dentation    Comment: Upper partial(has had the partial since 1953). Overall poor repair.    Pulmonary - normal exam   (+) a smoker (Quit smoking one year ago.) Former, COPD severe, decreased breath sounds,     ROS comment: Uses inhaler on a regular basis.  PE comment: Albuterol treatment given pre-op.  Cardiovascular - normal exam    ECG reviewed  PT is on anticoagulation therapy  Patient on routine beta blocker and Beta blocker given within 24 hours of surgery  Rhythm: regular  Rate: normal    (+) pacemaker pacemaker interrogated >year ago, hypertension, CAD, cardiac stents more than 12 months ago CHF, hyperlipidemia  (-) carotid bruits    ROS comment: EF 36-40%. EKG:NSR with LBBB.    Neuro/Psych  (+) psychiatric history Depression,     GI/Hepatic/Renal/Endo    (+)  GERD, hypothyroidism,     ROS Comment: HGB 10.8 HCT 32.0    Musculoskeletal     Abdominal    Substance History - negative use     OB/GYN negative ob/gyn ROS         Other   (+) arthritis                                             Anesthesia Plan    ASA 4 - emergent     general     intravenous induction   Anesthetic plan and risks discussed with patient and child.

## 2018-01-24 PROBLEM — I50.22 CHRONIC SYSTOLIC CONGESTIVE HEART FAILURE (HCC): Status: ACTIVE | Noted: 2018-01-24

## 2018-01-24 LAB
ANION GAP SERPL CALCULATED.3IONS-SCNC: 12 MMOL/L (ref 5–15)
BACTERIA SPEC AEROBE CULT: NORMAL
BASOPHILS # BLD AUTO: 0.01 10*3/MM3 (ref 0–0.2)
BASOPHILS NFR BLD AUTO: 0.1 % (ref 0–2)
BUN BLD-MCNC: 20 MG/DL (ref 7–21)
BUN/CREAT SERPL: 18.9 (ref 7–25)
CALCIUM SPEC-SCNC: 7.5 MG/DL (ref 8.4–10.2)
CHLORIDE SERPL-SCNC: 93 MMOL/L (ref 95–110)
CO2 SERPL-SCNC: 24 MMOL/L (ref 22–31)
CREAT BLD-MCNC: 1.06 MG/DL (ref 0.5–1)
DEPRECATED RDW RBC AUTO: 44.9 FL (ref 36.4–46.3)
EOSINOPHIL # BLD AUTO: 0.01 10*3/MM3 (ref 0–0.7)
EOSINOPHIL NFR BLD AUTO: 0.1 % (ref 0–7)
ERYTHROCYTE [DISTWIDTH] IN BLOOD BY AUTOMATED COUNT: 13 % (ref 11.5–14.5)
GFR SERPL CREATININE-BSD FRML MDRD: 49 ML/MIN/1.73 (ref 60–90)
GLUCOSE BLD-MCNC: 128 MG/DL (ref 60–100)
HCT VFR BLD AUTO: 26.5 % (ref 35–45)
HGB BLD-MCNC: 9.3 G/DL (ref 12–15.5)
IMM GRANULOCYTES # BLD: 0.03 10*3/MM3 (ref 0–0.02)
IMM GRANULOCYTES NFR BLD: 0.3 % (ref 0–0.5)
INR PPP: 1.03 (ref 0.8–1.2)
LYMPHOCYTES # BLD AUTO: 0.77 10*3/MM3 (ref 0.6–4.2)
LYMPHOCYTES NFR BLD AUTO: 7.1 % (ref 10–50)
MCH RBC QN AUTO: 33.5 PG (ref 26.5–34)
MCHC RBC AUTO-ENTMCNC: 35.1 G/DL (ref 31.4–36)
MCV RBC AUTO: 95.3 FL (ref 80–98)
MONOCYTES # BLD AUTO: 0.57 10*3/MM3 (ref 0–0.9)
MONOCYTES NFR BLD AUTO: 5.3 % (ref 0–12)
NEUTROPHILS # BLD AUTO: 9.46 10*3/MM3 (ref 2–8.6)
NEUTROPHILS NFR BLD AUTO: 87.1 % (ref 37–80)
PLATELET # BLD AUTO: 215 10*3/MM3 (ref 150–450)
PMV BLD AUTO: 9.4 FL (ref 8–12)
POTASSIUM BLD-SCNC: 4.4 MMOL/L (ref 3.5–5.1)
PROTHROMBIN TIME: 13.4 SECONDS (ref 11.1–15.3)
RBC # BLD AUTO: 2.78 10*6/MM3 (ref 3.77–5.16)
SODIUM BLD-SCNC: 129 MMOL/L (ref 137–145)
WBC NRBC COR # BLD: 10.85 10*3/MM3 (ref 3.2–9.8)

## 2018-01-24 PROCEDURE — 97110 THERAPEUTIC EXERCISES: CPT

## 2018-01-24 PROCEDURE — 97535 SELF CARE MNGMENT TRAINING: CPT

## 2018-01-24 PROCEDURE — 85025 COMPLETE CBC W/AUTO DIFF WBC: CPT | Performed by: FAMILY MEDICINE

## 2018-01-24 PROCEDURE — G8978 MOBILITY CURRENT STATUS: HCPCS | Performed by: PHYSICAL THERAPIST

## 2018-01-24 PROCEDURE — 97530 THERAPEUTIC ACTIVITIES: CPT

## 2018-01-24 PROCEDURE — G8979 MOBILITY GOAL STATUS: HCPCS | Performed by: PHYSICAL THERAPIST

## 2018-01-24 PROCEDURE — G8987 SELF CARE CURRENT STATUS: HCPCS

## 2018-01-24 PROCEDURE — 94799 UNLISTED PULMONARY SVC/PX: CPT

## 2018-01-24 PROCEDURE — 80048 BASIC METABOLIC PNL TOTAL CA: CPT | Performed by: FAMILY MEDICINE

## 2018-01-24 PROCEDURE — 97530 THERAPEUTIC ACTIVITIES: CPT | Performed by: PHYSICAL THERAPIST

## 2018-01-24 PROCEDURE — 97162 PT EVAL MOD COMPLEX 30 MIN: CPT | Performed by: PHYSICAL THERAPIST

## 2018-01-24 PROCEDURE — G8988 SELF CARE GOAL STATUS: HCPCS

## 2018-01-24 PROCEDURE — 97167 OT EVAL HIGH COMPLEX 60 MIN: CPT

## 2018-01-24 PROCEDURE — 99024 POSTOP FOLLOW-UP VISIT: CPT | Performed by: ORTHOPAEDIC SURGERY

## 2018-01-24 PROCEDURE — 85610 PROTHROMBIN TIME: CPT | Performed by: ORTHOPAEDIC SURGERY

## 2018-01-24 RX ORDER — WARFARIN SODIUM 2.5 MG/1
2.5 TABLET ORAL
Status: DISCONTINUED | OUTPATIENT
Start: 2018-01-24 | End: 2018-01-26 | Stop reason: HOSPADM

## 2018-01-24 RX ADMIN — BUDESONIDE AND FORMOTEROL FUMARATE DIHYDRATE 2 PUFF: 160; 4.5 AEROSOL RESPIRATORY (INHALATION) at 19:26

## 2018-01-24 RX ADMIN — FAMOTIDINE 40 MG: 40 TABLET ORAL at 10:11

## 2018-01-24 RX ADMIN — HYDROCODONE BITARTRATE AND ACETAMINOPHEN 1 TABLET: 5; 325 TABLET ORAL at 06:12

## 2018-01-24 RX ADMIN — WARFARIN SODIUM 2.5 MG: 2.5 TABLET ORAL at 17:49

## 2018-01-24 RX ADMIN — ATORVASTATIN CALCIUM 20 MG: 20 TABLET, FILM COATED ORAL at 10:11

## 2018-01-24 RX ADMIN — LEVOTHYROXINE SODIUM 50 MCG: 50 TABLET ORAL at 06:12

## 2018-01-24 RX ADMIN — FUROSEMIDE 40 MG: 40 TABLET ORAL at 17:49

## 2018-01-24 RX ADMIN — IPRATROPIUM BROMIDE AND ALBUTEROL SULFATE 3 ML: 2.5; .5 SOLUTION RESPIRATORY (INHALATION) at 19:26

## 2018-01-24 RX ADMIN — HYDROCODONE BITARTRATE AND ACETAMINOPHEN 1 TABLET: 5; 325 TABLET ORAL at 15:25

## 2018-01-24 RX ADMIN — SERTRALINE HYDROCHLORIDE 50 MG: 50 TABLET ORAL at 10:12

## 2018-01-24 RX ADMIN — RANOLAZINE 1000 MG: 500 TABLET, FILM COATED, EXTENDED RELEASE ORAL at 10:11

## 2018-01-24 RX ADMIN — NITROGLYCERIN 1 PATCH: 0.2 PATCH TRANSDERMAL at 10:12

## 2018-01-24 RX ADMIN — MONTELUKAST 10 MG: 10 TABLET, FILM COATED ORAL at 20:40

## 2018-01-24 RX ADMIN — CARVEDILOL 6.25 MG: 6.25 TABLET, FILM COATED ORAL at 17:49

## 2018-01-24 RX ADMIN — CARVEDILOL 6.25 MG: 6.25 TABLET, FILM COATED ORAL at 10:11

## 2018-01-24 RX ADMIN — DOCUSATE SODIUM 100 MG: 100 CAPSULE, LIQUID FILLED ORAL at 10:11

## 2018-01-24 RX ADMIN — FUROSEMIDE 40 MG: 40 TABLET ORAL at 10:11

## 2018-01-24 RX ADMIN — DOCUSATE SODIUM 100 MG: 100 CAPSULE, LIQUID FILLED ORAL at 20:40

## 2018-01-24 RX ADMIN — IPRATROPIUM BROMIDE AND ALBUTEROL SULFATE 3 ML: 2.5; .5 SOLUTION RESPIRATORY (INHALATION) at 23:17

## 2018-01-24 RX ADMIN — RANOLAZINE 1000 MG: 500 TABLET, FILM COATED, EXTENDED RELEASE ORAL at 20:40

## 2018-01-24 NOTE — PLAN OF CARE
Problem: Patient Care Overview (Adult)  Goal: Plan of Care Review  Outcome: Ongoing (interventions implemented as appropriate)   01/23/18 2027   Coping/Psychosocial Response Interventions   Plan Of Care Reviewed With patient   Patient Care Overview   Progress no change   Outcome Evaluation   Outcome Summary/Follow up Plan vss meets pacu dc criteria        Problem: Perioperative Period (Adult)  Goal: Signs and Symptoms of Listed Potential Problems Will be Absent or Manageable (Perioperative Period)  Outcome: Ongoing (interventions implemented as appropriate)

## 2018-01-24 NOTE — PLAN OF CARE
Problem: Patient Care Overview (Adult)  Goal: Plan of Care Review  Outcome: Ongoing (interventions implemented as appropriate)   01/24/18 0315   Coping/Psychosocial Response Interventions   Plan Of Care Reviewed With patient   Patient Care Overview   Progress progress toward functional goals as expected   Outcome Evaluation   Outcome Summary/Follow up Plan Hypotensive, pain controlled, on 4 L nasal cannula, matute continued     Goal: Adult Individualization and Mutuality  Outcome: Ongoing (interventions implemented as appropriate)      Problem: Fall Risk (Adult)  Goal: Identify Related Risk Factors and Signs and Symptoms  Outcome: Ongoing (interventions implemented as appropriate)    Goal: Absence of Falls  Outcome: Ongoing (interventions implemented as appropriate)    Goal: Identify Related Risk Factors and Signs and Symptoms  Outcome: Ongoing (interventions implemented as appropriate)    Goal: Absence of Falls  Outcome: Ongoing (interventions implemented as appropriate)      Problem: Fractured Hip (Adult)  Goal: Signs and Symptoms of Listed Potential Problems Will be Absent or Manageable (Fractured Hip)  Outcome: Ongoing (interventions implemented as appropriate)      Problem: Perioperative Period (Adult)  Goal: Signs and Symptoms of Listed Potential Problems Will be Absent or Manageable (Perioperative Period)  Outcome: Outcome(s) achieved Date Met: 01/24/18

## 2018-01-24 NOTE — PLAN OF CARE
Problem: Patient Care Overview (Adult)  Goal: Plan of Care Review  Outcome: Ongoing (interventions implemented as appropriate)   01/24/18 0755   Coping/Psychosocial Response Interventions   Plan Of Care Reviewed With patient;family   Outcome Evaluation   Outcome Summary/Follow up Plan OT eval this date. Pt pleasant and motivated to engage in therapy and rehab to get home. Pt required min-mod assist for sup to sit, got dizzy sitting EOB got a little better, min-mod A to stand dizziness increased attempted to get blood pressure, side stepped a few steps towards HOB with min-mod assist and then assisted to supine due to symptoms, BP at dropped and pt dizzy and pain in upper chest area. After pt supine pt symptoms where gone. Pt educated on AE for ADL but could use furrther education and practice. Pt could benefit from skilled OT to increase independence with ADL to reach maximum level of potential. Recommend d/c to further rehab inpatient rehab or SNF before d/c home as pt lives alone.        Problem: Inpatient Occupational Therapy  Goal: Bed Mobility Goal LTG- OT  Outcome: Ongoing (interventions implemented as appropriate)   01/24/18 0755   Bed Mobility OT LTG   Bed Mobility OT LTG, Date Established 01/24/18   Bed Mobility OT LTG, Time to Achieve by discharge   Bed Mobility OT LTG, Activity Type all bed mobility   Bed Mobility OT LTG, [place] Level supervision required  (to engage in ADL)     Goal: Transfer Training Goal 1 LTG- OT  Outcome: Ongoing (interventions implemented as appropriate)   01/24/18 0755   Transfer Training OT LTG   Transfer Training OT LTG, Date Established 01/24/18   Transfer Training OT LTG, Time to Achieve by discharge   Transfer Training OT LTG, Activity Type toilet   Transfer Training OT LTG, [place] Level contact guard assist  (AE/AD as needed)     Goal: ADL Goal LTG- OT  Outcome: Ongoing (interventions implemented as appropriate)   01/24/18 0755   ADL OT LTG   ADL OT LTG, Date  Established 01/24/18   ADL OT LTG, Activity Type ADL skills   ADL OT LTG, Additional Goal set up grooming/self feeding, min A UB bath/dressing/ mod A LB bath/dressing/toileting - AE/AD as needed     Goal: Functional Mobility Goal LTG- OT  Outcome: Ongoing (interventions implemented as appropriate)   01/24/18 0755   Functional Mobility OT LTG   Functional Mobility Goal OT LTG, Date Established 01/24/18   Functional Mobility Goal OT LTG, Time to Achieve by discharge   Functional Mobility Goal OT LTG, [place] Level contact guard   Functional Mobility Goal OT LTG, Assist Device (AE/AD as needed)   Functional Mobility Goal OT LTG, Distance to Achieve to the bathroom  (to the toilet)

## 2018-01-24 NOTE — PROGRESS NOTES
"Anticoagulation by Pharmacy - Warfarin day 2 of 42    Val Arteaga is a 85 y.o.female  [Ht: 172.7 cm (68\"); Wt: 78.2 kg (172 lb 4.8 oz)] on Warfarin 2.5 mg PO  for indication of DVT prophylaxis for fracture of right femur.    Goal INR: 1.7-2.5  Today's INR:   Lab Results   Component Value Date    INR 1.03 01/24/2018         Lab Results   Component Value Date    INR 1.03 01/24/2018    INR 0.96 01/23/2018    INR 0.93 01/22/2018    PROTIME 13.4 01/24/2018    PROTIME 12.7 01/23/2018    PROTIME 12.4 01/22/2018     Lab Results   Component Value Date    HGB 9.3 (L) 01/24/2018    HGB 10.2 (L) 01/23/2018    HGB 10.8 (L) 01/23/2018     Lab Results   Component Value Date    HCT 26.5 (L) 01/24/2018    HCT 29.8 (L) 01/23/2018    HCT 32.0 (L) 01/23/2018     Lab Results   Component Value Date     01/24/2018     01/23/2018     01/22/2018     Assessment/Plan:  Reviewed above labs. INR is 1.03.  INR is below goal, but expected because warfarin was started last night.Reviewed medication list. Concurrent medications include levothyroxine and sertraline. Pt did receive dose of warfarin 2 mg last night.  Will increase current dose to 2.5 mg daily.  Rx will continue to follow and adjust dose accordingly.  Today is day 2 of therapy.  .    Marleni Hyman [facility]  01/24/18 12:27 PM     "

## 2018-01-24 NOTE — NURSING NOTE
ARU consult received. Chart reviewed. Patient fell at home and sustained right hip fracture 1/22/18. Patient underwent surgical repair 1/23/18. Awaiting PT/OT evaluation notes. Will continue to follow patient for ARU.

## 2018-01-24 NOTE — THERAPY EVALUATION
Acute Care - Physical Therapy Initial Evaluation  [facility]     Patient Name: Val Arteaga  : 1932  MRN: [id number]  Today's Date: 2018   Onset of Illness/Injury or Date of Surgery Date: 18  Date of Referral to PT: 18  Referring Physician: Dr. Mcdowell      Admit Date: 2018     Visit Dx:    ICD-10-CM ICD-9-CM   1. Displaced fracture of base of neck of right femur, initial encounter for closed fracture S72.041A 820.03   2. Essential hypertension I10 401.9   3. Coronary artery disease involving native coronary artery of native heart without angina pectoris I25.10 414.01   4. Pulmonary emphysema, unspecified emphysema type J43.9 492.8   5. Acquired hypothyroidism E03.9 244.9   6. Depressive disorder F32.9 311   7. Hip fracture, right, closed, initial encounter S72.001A 820.8   8. Impaired mobility and ADLs Z74.09 799.89   9. Impaired physical mobility Z74.09 781.99     Patient Active Problem List   Diagnosis   • Essential hypertension   • Coronary artery disease involving native coronary artery of native heart without angina pectoris   • Chronic obstructive pulmonary disease   • Acquired hypothyroidism   • Depressive disorder   • Thygeson's superficial punctate keratitis   • Pseudophakia   • Precordial pain   • Displaced fracture of base of neck of right femur, initial encounter for closed fracture   • Hip fracture, right, closed, initial encounter   • Pulmonary emphysema     Past Medical History:   Diagnosis Date   • Acquired hypothyroidism    • Allergic rhinitis    • CHF (congestive heart failure)    • COPD (chronic obstructive pulmonary disease)    • Corneal epithelial dystrophy    • Coronary arteriosclerosis    • Depression    • Generalized atherosclerosis    • GERD (gastroesophageal reflux disease)    • Hemorrhoids    • History of bone density study 2012    Lumbar spine Osteopenia. Femoral Osteopenia   • History of mammogram 2004    Birads Category 2 Benign   •  History of Papanicolaou smear of cervix 08/12/2004    NEGATIVE   • Hypercholesterolemia    • Hyperlipidemia    • Hypertension    • Nonexudative age-related macular degeneration    • Osteoarthritis of multiple joints    • Pseudophakia    • Punctate keratitis     - history Thygeson's keratopathy      • Tobacco dependence syndrome      Past Surgical History:   Procedure Laterality Date   • APPENDECTOMY     • BREAST SURGERY  03/19/1954    Excision, breast lesion (1)  Excision of fibroma. 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 patency in the circumflex artery site of previous angioplasty. Kathe. patency in RCA.Nonobstructive 20-30% stenosis in the LAD and diagonal branch. Preserved LV systolic function with Ef of 55%   • CERVICAL SPINE SURGERY  09/16/1974    Excision of cervical disc, C5-6, C6-7, anterior cervical fusion, C5-6 with iliac bone graft.cervical spondylosis,C5-6, C6-7   • COLONOSCOPY  01/01/2013    patient declined    • CORONARY ANGIOPLASTY  07/21/1993    Angioplasty, coronary (2)    RCA    • DILATATION AND CURETTAGE      3   • ENDOSCOPY AND COLONOSCOPY  10/01/1998    Hemorhoids Rectal Outlet Bleeding   • ESOPHAGOSCOPY / EGD  06/28/2004    Nonerosive gastritis of the body of the stomach. A biopsy was obtained & placed in h. Pylori test agar. Multiple biopsies were obtained from the body of the stomach   • INJECTION OF MEDICATION  11/23/2015    Depo Medrol (Methylprednisone) (5)    • INJECTION OF MEDICATION  02/04/2016    Kenalog (3)     • PACEMAKER REPLACEMENT  2006          PT ASSESSMENT (last 72 hours)      PT Evaluation       01/24/18 1008 01/24/18 1403    Rehab Evaluation    Document Type evaluation  -CB (r) HL (t) CB (c) evaluation  -    Subjective Information agree to therapy;complains of;pain   pt had trigger point in right thigh, pain decreased w pressu  -CB (r) HL (t) CB (c) agree to therapy  -BH    Patient Effort, Rehab Treatment good   -CB (r) HL (t) CB (c) good  -    Symptoms Noted During/After Treatment dizziness;fatigue;significant change in vital signs   O2 dropped to 85% after standing, RN notified  -CB (r) HL (t) CB (c) fatigue;dizziness;increased pain;significant change in vital signs  -    Symptoms Noted Comment  RN notified of pt pain with movement of level 8 in chest upper area and shoulders and BP decrease and increase with change in positoining. RN notified OT that tele called and pt HR went up to 150 but then came back down.   -    General Information    Patient Profile Review yes  -CB (r) HL (t) CB (c) yes  -    Onset of Illness/Injury or Date of Surgery Date 01/22/18  -CB (r) HL (t) CB (c) 01/22/18  -    Referring Physician Dr. Mcdowell  -CB (r) HL (t) CB (c) Dr. Mcdowell  -    General Observations pt in bed with 3L O2, telemetry, cath, SCD, drain   -CB (r) HL (t) CB (c) Pt sitting up in bed on O2 3L, wound vac, tele, bed alarm, catheter, pillow between legs  -    Pertinent History Of Current Problem pt admitted s/p fall and had right hip bipolar anterior approach  -CB (r) HL (t) CB (c) Pt reported she fell at home on way to bathroom. Pt came to ER on 1/22/18 with a displaced fracture of base of neck of righ femur, pt had right hip biopolar anterior approach on 1/23/18.   -    Precautions/Limitations fall precautions;oxygen therapy device and L/min  -CB (r) HL (t) CB (c) fall precautions;oxygen therapy device and L/min;anterior hip precautions- right;other (see comments)   vital signs; WBAT  -    Prior Level of Function independent:;gait;ADL's   pt stated that she recently hired someone to help clean.  -CB (r) HL (t) CB (c) independent:;all household mobility;transfer;ADL's;home management   family assist with IADL, hired help cleaning;   -    Equipment Currently Used at Home cane, straight;oxygen;rollator;grab bar;shower chair   used O2 at night and PRN  -CB (r) HL (t) CB (c) cane, straight;rollator;oxygen;grab bar    2.5 O2 at home/night; seat in shower in corner;  shower he  -    Plans/Goals Discussed With patient and family  -CB (r) HL (t) CB (c) patient and family;agreed upon  -    Risks Reviewed patient and family:;increased discomfort  -CB (r) HL (t) CB (c) patient and family:;LOB;nausea/vomiting;dizziness;increased discomfort;change in vital signs  -    Benefits Reviewed patient and family:;decrease pain;increase strength;increase independence  -CB (r) HL (t) CB (c) patient and family:;improve function;increase independence;increase strength;increase balance  -    Living Environment    Lives With alone  -CB (r) HL (t) CB (c) alone   help at night  -    Living Arrangements mobile home  -CB (r) HL (t) CB (c) mobile home   double wide  -    Home Accessibility stairs to enter home;grab bars present (bathtub);grab bars present (toilet)   walk in shower. Nephew stated he was going to build a ramp  -CB (r) HL (t) CB (c) stairs to enter home;grab bars present (toilet);grab bars present (bathtub);bed and bath on same level   walk in shower  -    Number of Stairs to Enter Home 2  -CB (r) HL (t) CB (c) 3   1 step onto porch couple into living room  -    Stair Railings at Home outside, present on right side  -CB (r) HL (t) CB (c) --   unsure of handrails  -    Transportation Available car;family or friend will provide  -CB (r) HL (t) CB (c) car;family or friend will provide  -    Living Environment Comment  prior to recent hosptil visit indepenent with ADL and IADL except hired help cleaning and family would bring meals since recent d/c from hospital 2 weeks ago had sitters with her and assisted with ADL and IADL as needed, pt mostly did ADL on her own.   -    Clinical Impression    Date of Referral to PT 01/23/18  -CB (r) HL (t) CB (c)     PT Diagnosis impaired physical mobility d/t right hip fracture  -CB (r) HL (t) CB (c)     Prognosis good  -CB (r) HL (t) CB (c)     Criteria for Skilled Therapeutic  Interventions Met yes;treatment indicated  -CB (r) HL (t) CB (c)     Pathology/Pathophysiology Noted (Describe Specifically for Each System) musculoskeletal  -CB (r) HL (t) CB (c)     Impairments Found (describe specific impairments) aerobic capacity/endurance;gait, locomotion, and balance;muscle performance;ROM  -CB (r) HL (t) CB (c)     Functional Limitations in Following Categories (Describe Specific Limitations) self-care  -CB (r) HL (t) CB (c)     Rehab Potential good, to achieve stated therapy goals  -CB (r) HL (t) CB (c)     Predicted Duration of Therapy Intervention (days/wks) until d/c or goals met  -CB (r) HL (t) CB (c)     Vital Signs    Pre Systolic BP Rehab 130  -CB (r) HL (t) CB (c) 97  -BH    Pre Treatment Diastolic BP 70  -CB (r) HL (t) CB (c) 52  -BH    Intra Systolic BP Rehab 118   sitting EOB  -CB (r) HL (t) CB (c) 116   92/54 after standing  -BH    Intra Treatment Diastolic BP 56  -CB (r) HL (t) CB (c) 69  -BH    Post Systolic BP Rehab 128   After standing 2x  -CB (r) HL (t) CB (c) 150  -BH    Post Treatment Diastolic BP 78  -CB (r) HL (t) CB (c) 65  -BH    Pretreatment Heart Rate (beats/min) 88  -CB (r) HL (t) CB (c) 85  -BH    Intratreatment Heart Rate (beats/min) 91  -CB (r) HL (t) CB (c) 101   RN reports tele called and satated   -BH    Posttreatment Heart Rate (beats/min) 94  -CB (r) HL (t) CB (c) 84  -BH    Pre SpO2 (%) 95  -CB (r) HL (t) CB (c) 96  -BH    O2 Delivery Pre Treatment supplemental O2   3L  -CB (r) HL (t) CB (c) supplemental O2  -BH    Intra SpO2 (%) 90  -CB (r) HL (t) CB (c) 97  -BH    O2 Delivery Intra Treatment supplemental O2   2L  -CB (r) HL (t) CB (c) supplemental O2  -BH    Post SpO2 (%) 85  -CB (r) HL (t) CB (c) 98  -BH    O2 Delivery Post Treatment supplemental O2   2L. RN notified  -CB (r) HL (t) CB (c) supplemental O2  -BH    Pre Patient Position Supine  -CB (r) HL (t) CB (c) Supine  -BH    Intra Patient Position Standing  -CB (r) HL (t) CB (c) Sitting  -BH     Post Patient Position Sitting  -CB (r) HL (t) CB (c) Supine  -    Pain Assessment    Pain Assessment 0-10  -CB (r) HL (t) CB (c) 0-10  -    Pain Score 8  -CB (r) HL (t) CB (c) 0   pt reported level 8 pain with mobility at times, supine none  -    Post Pain Score 0  -CB (r) HL (t) CB (c) 0  -    Pain Location Leg  -CB (r) HL (t) CB (c)     Pain Orientation Right  -CB (r) HL (t) CB (c)     Pain Descriptors Burning   pt had trigger point, pain relieved with pressure.  -CB (r) HL (t) CB (c)     Pain Intervention(s) Ambulation/increased activity   pressure applied to trigger point  -CB (r) HL (t) CB (c)     Vision Assessment/Intervention    Visual Impairment WFL with corrective lenses  -CB (r) HL (t) CB (c) WFL with corrective lenses  -    Cognitive Assessment/Intervention    Current Cognitive/Communication Assessment functional  -CB (r) HL (t) CB (c) functional  -    Orientation Status oriented to;person;place;time  -CB (r) HL (t) CB (c) oriented x 4  -    Follows Commands/Answers Questions 100% of the time  -CB (r) HL (t) CB (c) 100% of the time;75% of the time;able to follow single-step instructions;needs cueing  -    Personal Safety good awareness, safety precautions  -CB (r) HL (t) CB (c) mild impairment  -    Personal Safety Interventions fall prevention program maintained;gait belt;nonskid shoes/slippers when out of bed  -CB (r) HL (t) CB (c) fall prevention program maintained;gait belt;muscle strengthening facilitated;nonskid shoes/slippers when out of bed;supervised activity  -    ROM (Range of Motion)    General ROM lower extremity range of motion deficits identified  -CB (r) HL (t) CB (c) no range of motion deficits identified  -    General ROM Detail limited knee flexion and hip flexion on right    limited by pain  - BUE WFL, fair+ digit to nose/digit to thumb opposition  -    MMT (Manual Muscle Testing)    General MMT Assessment lower extremity strength deficits identified  -CB (r)  HL (t) CB (c) upper extremity strength deficits identified;lower extremity strength deficits identified  -    General MMT Assessment Detail  BUE  grossly 3+ to 4-/5; BUE grossly 4-/5  -    Right Hip    Hip Flexion Gross Movement (2/5) poor  -CB (r) HL (t) CB (c)     Hip ABduction Gross Movement (2+/5) poor plus  -CB     Hip ADduction Gross Movement (2+/5) poor plus  -CB     Lower Extremity    Lower Ext Manual Muscle Testing right hip strength deficit;right knee strength deficit  -CB (r) HL (t) CB (c)     Right Knee    Knee Flexion Gross Movement (2/5) poor  -CB (r) HL (t) CB (c)     Bed Mobility, Assessment/Treatment    Bed Mobility, Assistive Device head of bed elevated;bed rails  -CB (r) HL (t) CB (c)     Bed Mob, Supine to Sit, [place] moderate assist (50% patient effort);2 person assist required  -CB (r) HL (t) CB (c) moderate assist (50% patient effort);minimum assist (75% patient effort)  -    Bed Mob, Sit to Supine, [place] moderate assist (50% patient effort)  -CB (r) HL (t) CB (c) dependent (less than 25% patient effort);2 person assist required   due to pt medical symptoms  -    Bed Mobility, Safety Issues  decreased use of legs for bridging/pushing  -    Bed Mobility, Impairments  strength decreased;impaired balance;coordination impaired;pain  -    Bed Mobility, Comment pt needed some help lifting her legs back into bed.  -CB (r) HL (t) CB (c) Pt attempted well getting to EOB from supine with min assist to walk legs towards the EOB then min-mod assist to get trunk up, then min-mod assist to get scooted out towards the EOB with feet on the floor protecting hip throughout. Pt educated on proper way to safely get out of bed. Pt was assisted by OT and CNA to get back into bed 2* dizzy and light headed and not feeling welling otherwise pt would have been able to assist, once supine and feeling better pt assisted with better alignment in the bed average max assist.   -    Transfer  Assessment/Treatment    Transfers, Bed-Chair [place]  unable to perform  -    Transfers, Sit-Stand [place] minimum assist (75% patient effort);moderate assist (50% patient effort);2 person assist required  -CB (r) HL (t) CB (c) minimum assist (75% patient effort);moderate assist (50% patient effort)  -    Transfers, Stand-Sit [place] moderate assist (50% patient effort)  -CB (r) HL (t) CB (c) moderate assist (50% patient effort)  -    Transfers, Sit-Stand-Sit, Assist Device rolling walker  -CB (r) HL (t) CB (c) rolling walker  -    Transfer, Comment pt required some cueing to remind her to reach back when sitting down and to push up from the bed.  -CB (r) HL (t) CB (c) pt required vc and assist for hand placement on bed to push up, pt was dizzy standing worse than sitting and OT was attempting to get pt to explain her symptoms and look straight forward stood for approx 1-2 minutes with symptoms not getting better, assisted pt with min-mod assist to take a few side steps to then sit on bed and be assisted to get supine, once supine pt reported feeling better CNA in room assisting and RN notified.   -    Gait Assessment/Treatment    Gait, [place] Level minimum assist (75% patient effort);moderate assist (50% patient effort);2 person assist required  -CB (r) HL (t) CB (c)     Gait, Assistive Device rolling walker  -CB (r) HL (t) CB (c)     Gait, Distance (Feet) 2  -CB (r) HL (t) CB (c)     Gait, Comment pt took a couple steps to the head of the bed. pt c/o dizziness and her O2 dropped to 85% after standing.  -CB (r) HL (t) CB (c)     Motor Skills/Interventions    Additional Documentation  Fine Motor Coordination Training (Group);Balance Skills Training (Group)  -    Balance Skills Training    Sitting-Level of Assistance  Contact guard;Close supervision   varied, vital signs closely monitored  -    Standing-Level of Assistance  Minimum assistance;Moderate assistance   varied vital signs  monitored throughout  -    Fine Motor Coordination Training    Opposition  Right:;Left:;intact  -    Sensory Assessment/Intervention    Light Touch LUE;RUE;LLE;RLE  -CB (r) HL (t) CB (c) LUE;RUE  -BH    LUE Light Touch WNL  -CB (r) HL (t) CB (c) WNL  -BH    RUE Light Touch WNL  -CB (r) HL (t) CB (c) WNL  -BH    LLE Light Touch WNL  -CB (r) HL (t) CB (c)     RLE Light Touch WNL  -CB (r) HL (t) CB (c)     Positioning and Restraints    Pre-Treatment Position in bed  -CB (r) HL (t) CB (c) in bed  -BH    Post Treatment Position bed  -CB (r) HL (t) CB (c) bed  -BH    In Bed supine;exit alarm on;encouraged to call for assist;call light within reach;with family/caregiver  -CB (r) HL (t) CB (c) notified nsg;supine;fowlers;call light within reach;encouraged to call for assist;exit alarm on;with family/caregiver  -      01/23/18 1010 01/22/18 2247    General Information    Equipment Currently Used at Home cane, straight;rollator;oxygen   Patient is on 2.5L of oxygen at home. B/P Cuff  -EW (r) AA (t) EW (c)     Living Environment    Lives With alone  -EW (r) AA (t) EW (c) alone  -KM    Living Arrangements mobile home  -EW (r) AA (t) EW (c) mobile home  -KM    Home Accessibility  stairs to enter home  -KM    Number of Stairs to Enter Home 3  -EW (r) AA (t) EW (c) 3  -KM    Stair Railings at Home none  -EW (r) AA (t) EW (c) none  -KM    Type of Financial/Environmental Concern none  -EW (r) AA (t) EW (c) none  -KM    Transportation Available car;family or friend will provide  -EW (r) AA (t) EW (c) car  -KM    Living Environment Comment  Patient lives alone  -KM      01/22/18 2240       General Information    Equipment Currently Used at Home oxygen;walker, rolling;nebulizer  -KM       User Key  (r) = Recorded By, (t) = Taken By, (c) = Cosigned By    Initials Name Provider Type    CB Leatha Tirado, PT Physical Therapist    BH Marisol Burdick, OTR/L Occupational Therapist    KM Kimberly Bird, RN Registered Nurse    EW  Serina Puente, [facility]     DAVID Galeano      Harper Ang, PT Student PT Student          Physical Therapy Education     Title: PT OT SLP Therapies (Active)     Topic: Physical Therapy (Active)     Point: Mobility training (Done)    Learning Progress Summary    Learner Readiness Method Response Comment Documented by Status   Patient Acceptance E [facility] 01/24/18 1129 Done   Family Acceptance E [facility] 01/24/18 1129 Done               Point: Precautions (Done)    Learning Progress Summary    Learner Readiness Method Response Comment Documented by Status   Patient Acceptance E [facility] 01/24/18 1129 Done   Family Acceptance E [facility] 01/24/18 1129 Done                      User Key     Initials Effective Dates Name Provider Type Discipline     12/05/17 -  Harper Ang, PT Student PT Student PT                PT Recommendation and Plan  Anticipated Equipment Needs At Discharge:  (nephew stated he would build a ramp for her.)  Anticipated Discharge Disposition: inpatient rehabilitation facility  Planned Therapy Interventions: balance training, bed mobility training, gait training, home exercise program, patient/family education, strengthening, stair training  PT Frequency: per priority policy, other (see comments) (5-14 visits)             IP PT Goals       01/24/18 1008          Bed Mobility PT LTG    Bed Mobility PT LTG, Time to Achieve by discharge  -CB (r) HL (t) CB (c)      Bed Mobility PT LTG, Activity Type all bed mobility  -CB (r) HL (t) CB (c)      Bed Mobility PT LTG, [place] Level minimum assist (75% patient effort)  -CB (r) HL (t) CB (c)      Bed Mobility PT LTG, Additional Goal HOB down, no handrails  -CB (r) HL (t) CB (c)      Gait Training PT LTG    Gait Training Goal PT LTG, Time to Achieve by discharge  -CB (r) HL (t) CB (c)      Gait Training Goal PT LTG, [place] Level contact guard assist  -CB (r) HL (t) CB (c)      Gait Training Goal PT LTG, Assist Device walker,  rolling  -CB (r) HL (t) CB (c)      Gait Training Goal PT LTG, Distance to Achieve 50 feet  -CB (r) HL (t) CB (c)      Strength Goal PT LTG    Strength Goal PT LTG, Time to Achieve by discharge  -CB (r) HL (t) CB (c)      Strength Goal PT LTG, Measure to Achieve (P)  pt will perform 20 reps hip flexion, hip ABD/ADD, and knee flexion/extension on right LE. AAROM or AROM  -HL      Strength Goal PT LTG, Functional Goal (P)  Getting legs up into bed   -        User Key  (r) = Recorded By, (t) = Taken By, (c) = Cosigned By    Initials Name Provider Type    CB Leatha Tirado, PT Physical Therapist    HL Harper Ang, PT Student PT Student                Outcome Measures       01/24/18 1008 01/24/18 0755       How much help from another person do you currently need...    Turning from your back to your side while in flat bed without using bedrails? 3  -CB (r) HL (t) CB (c)      Moving from lying on back to sitting on the side of a flat bed without bedrails? 2  -CB      Moving to and from a bed to a chair (including a wheelchair)? 2  -CB (r) HL (t) CB (c)      Standing up from a chair using your arms (e.g., wheelchair, bedside chair)? 2  -CB (r) HL (t) CB (c)      Climbing 3-5 steps with a railing? 2  -CB (r) HL (t) CB (c)      To walk in hospital room? 2  -CB (r) HL (t) CB (c)      AM-PAC 6 Clicks Score 13  -CB      How much help from another is currently needed...    Putting on and taking off regular lower body clothing?  1  -BH     Bathing (including washing, rinsing, and drying)  2  -BH     Toileting (which includes using toilet bed pan or urinal)  1  -BH     Putting on and taking off regular upper body clothing  3  -BH     Taking care of personal grooming (such as brushing teeth)  3  -BH     Eating meals  3  -BH     Score  13  -BH     Functional Assessment    Outcome Measure Options AM-PAC 6 Clicks Basic Mobility (PT)  -CB (r) HL (t) CB (c) AM-PAC 6 Clicks Daily Activity (OT)  -       User Key  (r) = Recorded By, (t)  = Taken By, (c) = Cosigned By    Initials Name Provider Type    CB Leatha Tirado, PT Physical Therapist     Marisol Burdick, OTR/L Occupational Therapist    HL Harper Ang, PT Student PT Student           Time Calculation:         PT Charges       01/24/18 1146          Time Calculation    Start Time 1008  -CB      Stop Time 1054  -CB      Time Calculation (min) 46 min  -CB      PT Received On 01/24/18  -CB      PT Goal Re-Cert Due Date 02/06/18  -CB      Time Calculation- PT    Total Timed Code Minutes- PT 31 minute(s)  -CB        User Key  (r) = Recorded By, (t) = Taken By, (c) = Cosigned By    Initials Name Provider Type    CB Leatha Tirado, PT Physical Therapist          Therapy Charges for Today     Code Description Service Date Service Provider Modifiers Qty    75999638515 HC PT MOBILITY CURRENT 1/24/2018 Leatha Tirado, PT GP, CK 1    34574817705 HC PT MOBILITY PROJECTED 1/24/2018 Leatha Tirado, PT GP, CI 1    77150266778 HC PT EVAL MOD COMPLEXITY 1 1/24/2018 Leatha Tirado, PT GP 1    25977167853 HC PT THERAPEUTIC ACT EA 15 MIN 1/24/2018 Leatha Tirado, PT GP 2          PT G-Codes  PT Professional Judgement Used?: Yes  Outcome Measure Options: AM-PAC 6 Clicks Basic Mobility (PT)  Score: 13  Functional Limitation: Mobility: Walking and moving around  Mobility: Walking and Moving Around Current Status (): At least 40 percent but less than 60 percent impaired, limited or restricted  Mobility: Walking and Moving Around Goal Status (): At least 1 percent but less than 20 percent impaired, limited or restricted      Leatha Tirado, PT  1/24/2018

## 2018-01-24 NOTE — CONSULTS
Patient presents as positive and engaged with process of recovery.  States her goal is to return to her home.  Seems to have good family support.  Requested prayer support which was provided.

## 2018-01-24 NOTE — NURSING NOTE
Patient visited for ARU consult. PT/OT evaluations have been completed. Patient reports she lives home alone, but has good family support and family members who come in daily to check on her. Patient was independent at home prior. Patient has hired help for cleaning. Patient reports having 2 steps to enter her home. Nephew present states he will build a ramp for her. Patient on O2 at 2L at time of visit. Patient reports she wears O2 at home. Patient is currently requiring assistance with ADLs, mobility, and gait due to right hip fracture. Patient is requiring the skills of PT/OT for strengthening and safety prior to returning home alone. Per PT note, patient's O2 sat dropped and patient reported dizziness with standing today. Patient also had symptoms of dizziness/lightheadedness while working with OT. Patient is pleasant and seems motivated to gain her strength back so she can return home at discharge. Patient is agreeable to participate with therapy 3 hours per day on ARU. Patient seems like a good candidate for ARU once medically stable, patient is 1 day post op. Medicare days have been checked by case management. Will follow up on patient tomorrow regarding medical stability and therapy participation/toleration. Notified  of visit.

## 2018-01-24 NOTE — THERAPY TREATMENT NOTE
Acute Care - Physical Therapy Treatment Note  [facility]     Patient Name: Val Arteaga  : 1932  MRN: [id number]  Today's Date: 2018  Onset of Illness/Injury or Date of Surgery Date: 18  Date of Referral to PT: 18  Referring Physician: Dr. Mcdowell    Admit Date: 2018    Visit Dx:    ICD-10-CM ICD-9-CM   1. Displaced fracture of base of neck of right femur, initial encounter for closed fracture S72.041A 820.03   2. Essential hypertension I10 401.9   3. Coronary artery disease involving native coronary artery of native heart without angina pectoris I25.10 414.01   4. Pulmonary emphysema, unspecified emphysema type J43.9 492.8   5. Acquired hypothyroidism E03.9 244.9   6. Depressive disorder F32.9 311   7. Hip fracture, right, closed, initial encounter S72.001A 820.8   8. Impaired mobility and ADLs Z74.09 799.89   9. Impaired physical mobility Z74.09 781.99     Patient Active Problem List   Diagnosis   • Essential hypertension   • Coronary artery disease involving native coronary artery of native heart without angina pectoris   • Chronic obstructive pulmonary disease   • Acquired hypothyroidism   • Depressive disorder   • Thygeson's superficial punctate keratitis   • Pseudophakia   • Precordial pain   • Displaced fracture of base of neck of right femur, initial encounter for closed fracture   • Hip fracture, right, closed, initial encounter   • Pulmonary emphysema               Adult Rehabilitation Note       18 1424 18 1329       Rehab Assessment/Intervention    Discipline physical therapy assistant  -RONAN occupational therapist  -     Document Type therapy note (daily note)  -RONAN therapy note (daily note)  -     Subjective Information agree to therapy  -RONAN agree to therapy  -     Patient Effort, Rehab Treatment good  -RONAN good  -     Symptoms Noted During/After Treatment fatigue;shortness of breath;increased pain;significant change in vital signs   pt w/ drop in BP  w/ position changes.   -RONAN fatigue;shortness of breath;increased pain;significant change in vital signs   O2 dropped to 85 RN notified, came up with deep breathing  -     Precautions/Limitations fall precautions;oxygen therapy device and L/min;anterior hip precautions- right  -RONAN fall precautions;oxygen therapy device and L/min;anterior hip precautions- right  -BH     Patient Response to Treatment  pt is motivated and wanted to sit EOB to complete BUE exercises. Pt fatigued with tasks and reports some dizziness sitting but was uncertain, when check vital signs where stable but when O2 dropped to 86 but victoriano with deep breathing with O2 on. RN was notified. Pt c/o of chest area tightness after movement activity and RN notified and pt request for pain meds.   -     Recorded by [RONAN] Melquiades King PTA [] Marisol Burdick, OTR/L     Vital Signs    Pre Systolic BP Rehab 114   supine  -RONAN 110  -BH     Pre Treatment Diastolic BP 69  -RONAN 60  -BH     Intra Systolic BP Rehab --   EOB: 103/52 standin/48 supine: 102/69. nsg notified.   -RONAN      Post Systolic BP Rehab 127   supine.   -RONAN 120  -BH     Post Treatment Diastolic BP 77  -RONAN 58  -BH     Pretreatment Heart Rate (beats/min) 83  -RONAN 81  -BH     Intratreatment Heart Rate (beats/min) 87  -RONAN      Posttreatment Heart Rate (beats/min) 92  -RONAN 91  -BH     Pre SpO2 (%) 95  -RONAN 93  -BH     O2 Delivery Pre Treatment supplemental O2  -RONAN supplemental O2  -BH     Intra SpO2 (%) 93  -RONAN 86   victoriano to 91 with deep breathing  -BH     O2 Delivery Intra Treatment supplemental O2  -RONAN supplemental O2  -BH     Post SpO2 (%) 92  -RONAN 94  -BH     O2 Delivery Post Treatment supplemental O2  -RONAN supplemental O2  -BH     Pre Patient Position Supine  -RONAN Supine  -BH     Intra Patient Position  Sitting  -BH     Post Patient Position Supine  -RONAN Supine  -BH     Recorded by [RONAN] Melquiades King PTA [] Marisol Burdick OTR/L     Pain Assessment    Pain Assessment 0-10  -RONAN      Pain  Score 0  - 0  -     Post Pain Score 8  - 4  -     Pain Location Leg  -      Pain Orientation Right  -      Pain Intervention(s) Repositioned   pt defers pain meds.   - Repositioned;Ambulation/increased activity;Rest   RN notified  -     Recorded by [RONAN] Melquiades King PTA [] Marisol Burdick, OTR/L     Vision Assessment/Intervention    Visual Impairment WFL with corrective lenses  -      Recorded by [RONAN] Melquiades King PTA      Cognitive Assessment/Intervention    Current Cognitive/Communication Assessment functional  - functional  -     Orientation Status oriented x 4  - oriented x 4  -     Follows Commands/Answers Questions 100% of the time  - 100% of the time;75% of the time;able to follow single-step instructions  -     Personal Safety  mild impairment  -     Personal Safety Interventions gait belt;muscle strengthening facilitated;nonskid shoes/slippers when out of bed;supervised activity  - fall prevention program maintained;gait belt;muscle strengthening facilitated;nonskid shoes/slippers when out of bed;supervised activity  -     Recorded by [RONAN] Melquiades King PTA [] Marisol Burdick, OTR/L     Bed Mobility, Assessment/Treatment    Bed Mobility, Assistive Device bed rails;head of bed elevated  - head of bed elevated;bed rails  -     Bed Mobility, Scoot/Bridge, [place] supervision required   w/ extended time. before and after ther ex.   -      Bed Mob, Supine to Sit, [place] minimum assist (75% patient effort)  - moderate assist (50% patient effort)  -     Bed Mob, Sit to Supine, [place] minimum assist (75% patient effort);moderate assist (50% patient effort)  - moderate assist (50% patient effort);maximum assist (25% patient effort);2 person assist required  -     Bed Mobility, Comment  HOB down for sit to sup, pt fatigued and in creased pain. Pt requried assist with legs and hip precuations to get into bed safely. pt was able to push  through legs and use arms to assit with scooting in the bed and to sit EOB and scoot towards the HOB with min assist. pt fatigued and rqeuried assist/support for the RLE. Pt sat EOB with close supervision for approx 20 minutes.   -BH     Recorded by [RONAN] Melquiades King PTA [BH] AYAD Vazquez/L     Transfer Assessment/Treatment    Transfers, Sit-Stand [place] minimum assist (75% patient effort);moderate assist (50% patient effort)  -RONAN      Transfers, Stand-Sit [place] minimum assist (75% patient effort)  -RONAN      Transfers, Sit-Stand-Sit, Assist Device rolling walker  -RONAN      Transfer, Comment brop in BP upon standing.   -RONAN defer  -BH     Recorded by [RONAN] Melquiades King PTA [] AYAD Vazquez/L     Gait Assessment/Treatment    Gait, [place] Level not appropriate to assess  -RONAN      Recorded by [RONAN] Melquiades King PTA      Stairs Assessment/Treatment    Stairs, [place] Level not tested  -RONAN      Recorded by [RONAN] Melquiades King PTA      Upper Body Bathing Assessment/Training    UB Bathing Assess/Train, Comment  pt declined sponge bath today but agreed to attempt tomorrow.   -BH     Recorded by  [BH] AYAD Vazquez/L     Balance Skills Training    Sitting-Level of Assistance  Close supervision  -BH     Recorded by  [] AYAD Vazquez/L     Therapy Exercises    Bilateral Lower Extremities AROM:;AAROM:;10 reps;20 reps;supine;ankle pumps/circles;quad sets;glut sets;hip abduction/adduction;heel slides;SAQ   2 sets all.20 reps L LE, 10 reps R LE. AAROM HS ab/ad R LE.   -RONAN      Bilateral Upper Extremity  AROM:;15 reps;sitting;elbow flexion/extension;hand pumps;pronation/supination;shoulder extension/flexion   wrist flexion/extension  -     BUE Resistance  --   no resistance, fatigued with movements needed rest breaks.   -BH     Recorded by [RONAN] Melquiades King PTA [] AYAD Vazquez/L     Positioning and Restraints    Pre-Treatment Position in bed  -RONAN in bed   -     Post Treatment Position bed  - bed  -BH     In Bed supine;call light within reach;encouraged to call for assist;with family/caregiver   all needs met.   -RONAN notified nsg;supine;fowlers;call light within reach;encouraged to call for assist;exit alarm on;with family/caregiver  -     Recorded by [RONAN] Melquiades King, PTA [] Marisol Burdick, OTR/L       User Key  (r) = Recorded By, (t) = Taken By, (c) = Cosigned By    Initials Name Effective Dates     Marisol Burdick, OTR/L 10/17/16 -     RONAN Melquiades King, PTA 10/17/16 -                 IP PT Goals       01/24/18 1424 01/24/18 1008       Bed Mobility PT LTG    Bed Mobility PT LTG, Time to Achieve  by discharge  -CB (r) HL (t) CB (c)     Bed Mobility PT LTG, Activity Type  all bed mobility  -CB (r) HL (t) CB (c)     Bed Mobility PT LTG, [place] Level  minimum assist (75% patient effort)  -CB (r) HL (t) CB (c)     Bed Mobility PT LTG, Additional Goal  HOB down, no handrails  -CB (r) HL (t) CB (c)     Bed Mobility PT LTG, Date Goal Reviewed 01/24/18  -      Bed Mobility PT LTG, Outcome goal ongoing  [facility]      Gait Training PT LTG    Gait Training Goal PT LTG, Time to Achieve  by discharge  -CB (r) HL (t) CB (c)     Gait Training Goal PT LTG, [place] Level  contact guard assist  -CB (r) HL (t) CB (c)     Gait Training Goal PT LTG, Assist Device  walker, rolling  -CB (r) HL (t) CB (c)     Gait Training Goal PT LTG, Distance to Achieve  50 feet  -CB (r) HL (t) CB (c)     Gait Training Goal PT LTG, Date Goal Reviewed 01/24/18  -      Gait Training Goal PT LTG, Outcome goal ongoing  [facility]      Strength Goal PT LTG    Strength Goal PT LTG, Time to Achieve  by discharge  -CB (r) HL (t) CB (c)     Strength Goal PT LTG, Measure to Achieve  (P)  pt will perform 20 reps hip flexion, hip ABD/ADD, and knee flexion/extension on right LE. AAROM or AROM  -HL     Strength Goal PT LTG, Functional Goal  (P)  Getting legs up into bed   -HL     Strength Goal PT LTG,  Date Goal Reviewed 01/24/18  -      Strength Goal PT LTG, Outcome goal ongoing  -        User Key  (r) = Recorded By, (t) = Taken By, (c) = Cosigned By    Initials Name Provider Type    CB Leatha Tirado, PT Physical Therapist    RONAN King PTA Physical Therapy Assistant    HERBIE Ang, PT Student PT Student          Physical Therapy Education     Title: PT OT SLP Therapies (Active)     Topic: Physical Therapy (Active)     Point: Mobility training (Active)    Learning Progress Summary    Learner Readiness Method Response Comment Documented by Status   Patient Acceptance E NR pt edu on drop in BP with position changes and risks involved. RONAN 01/24/18 1539 Active    Acceptance E VU   01/24/18 1129 Done   Family Acceptance E [facility] 01/24/18 1129 Done               Point: Precautions (Done)    Learning Progress Summary    Learner Readiness Method Response Comment Documented by Status   Patient Acceptance E VU  HL 01/24/18 1129 Done   Family Acceptance E VU   01/24/18 1129 Done                      User Key     Initials Effective Dates Name Provider Type Discipline     10/17/16 -  Melquiades King PTA Physical Therapy Assistant PT     12/05/17 -  Harper Ang, PT Student PT Student PT                    PT Recommendation and Plan  Anticipated Equipment Needs At Discharge:  (nephew stated he would build a ramp for her.)  Anticipated Discharge Disposition: inpatient rehabilitation facility  Planned Therapy Interventions: balance training, bed mobility training, gait training, home exercise program, patient/family education, strengthening, stair training  PT Frequency: per priority policy, other (see comments) (5-14 visits)  Plan of Care Review  Plan Of Care Reviewed With: patient  Progress: progress toward functional goals as expected  Outcome Summary/Follow up Plan: pt w/ drop in BP w/ position changes as low as 76/46 upon standing. pt required min A for sup-sit and min-mod A for sit-sup. pt stood min  A w/ RW. pt participated in 2 sets of ther ex. no new goals met at this time. pt is cooperative and would continue to benefit from PT services. Recommend DC to ARU when BP stable.           Outcome Measures       01/24/18 1424 01/24/18 1008 01/24/18 0755    How much help from another person do you currently need...    Turning from your back to your side while in flat bed without using bedrails? 3  -RONAN 3  -CB (r) HL (t) CB (c)     Moving from lying on back to sitting on the side of a flat bed without bedrails? 3  -RONAN 2  -CB     Moving to and from a bed to a chair (including a wheelchair)? 2  -RONAN 2  -CB (r) HL (t) CB (c)     Standing up from a chair using your arms (e.g., wheelchair, bedside chair)? 2  -RONAN 2  -CB (r) HL (t) CB (c)     Climbing 3-5 steps with a railing? 2  -RONAN 2  -CB (r) HL (t) CB (c)     To walk in hospital room? 2  -RONAN 2  -CB (r) HL (t) CB (c)     AM-PAC 6 Clicks Score 14  -RONAN 13  -CB     How much help from another is currently needed...    Putting on and taking off regular lower body clothing?   1  -BH    Bathing (including washing, rinsing, and drying)   2  -BH    Toileting (which includes using toilet bed pan or urinal)   1  -BH    Putting on and taking off regular upper body clothing   3  -BH    Taking care of personal grooming (such as brushing teeth)   3  -BH    Eating meals   3  -    Score   13  -    Functional Assessment    Outcome Measure Options AM-PAC 6 Clicks Basic Mobility (PT)  -RONAN AM-PAC 6 Clicks Basic Mobility (PT)  -CB (r) HL (t) CB (c) AM-PAC 6 Clicks Daily Activity (OT)  -      User Key  (r) = Recorded By, (t) = Taken By, (c) = Cosigned By    Initials Name Provider Type    CB Leatha Tirado, PT Physical Therapist     Marisol Burdick, OTR/L Occupational Therapist    RONAN Melquiades King, PTA Physical Therapy Assistant    HL Harper Ang, PT Student PT Student           Time Calculation:         PT Charges       01/24/18 1543 01/24/18 1146       Time Calculation    Start Time  1424  -RONAN 1008  -CB     Stop Time 1520  -RONNA 1054  -CB     Time Calculation (min) 56 min  -RONAN 46 min  -CB     PT Received On  01/24/18  -CB     PT Goal Re-Cert Due Date  02/06/18  -CB     Time Calculation- PT    Total Timed Code Minutes- PT 56 minute(s)  -RONAN 31 minute(s)  -CB       User Key  (r) = Recorded By, (t) = Taken By, (c) = Cosigned By    Initials Name Provider Type    CB Leatha Tirado, PT Physical Therapist    RONAN King PTA Physical Therapy Assistant          Therapy Charges for Today     Code Description Service Date Service Provider Modifiers Qty    50852027295 HC PT THER PROC EA 15 MIN 1/24/2018 Melquiades King PTA GP 2    82916472431 HC PT THERAPEUTIC ACT EA 15 MIN 1/24/2018 Melquiades King PTA GP 2          PT G-Codes  PT Professional Judgement Used?: Yes  Outcome Measure Options: AM-PAC 6 Clicks Basic Mobility (PT)  Score: 13  Functional Limitation: Mobility: Walking and moving around  Mobility: Walking and Moving Around Current Status (): At least 40 percent but less than 60 percent impaired, limited or restricted  Mobility: Walking and Moving Around Goal Status (): At least 1 percent but less than 20 percent impaired, limited or restricted    Melquiades King PTA  1/24/2018

## 2018-01-24 NOTE — ANESTHESIA POSTPROCEDURE EVALUATION
Patient: Val Arteaga    Procedure Summary     Date Anesthesia Start Anesthesia Stop Room / Location    01/23/18 1704 1925  [facility] OR 12 / [facility]       Procedure Diagnosis Surgeon Provider    HIP BIPOLAR ANTERIOR APPROACH - right (Right Hip) Acquired hypothyroidism; Depressive disorder; Essential hypertension; Coronary artery disease involving native coronary artery of native heart without angina pectoris; Pulmonary emphysema, unspecified emphysema type; Displaced fracture of base of neck of right femur, initial encounter for closed fracture  (Acquired hypothyroidism [E03.9]; Depressive disorder [F32.9]; Essential hypertension [I10]; Coronary artery disease involving native coronary artery of native heart without angina pectoris [I25.10]; Pulmonary emphysema, unspecified emphysema type [J43.9]; Displaced fracture of base of neck of right femur, initial encounter for closed fracture [S72.041A]) MD Kofi Mariscal MD          Anesthesia Type: general  Last vitals  BP   162/67 (01/23/18 1622)   Temp   97.7 °F (36.5 °C) (01/23/18 1622)   Pulse   86 (01/23/18 1622)   Resp   20 (01/23/18 1622)     SpO2   94 % (01/23/18 1622)     Post Anesthesia Care and Evaluation    Patient location during evaluation: PACU  Patient participation: complete - patient participated  Level of consciousness: awake and alert  Pain score: 0  Pain management: adequate  Airway patency: patent  Anesthetic complications: No anesthetic complications  PONV Status: none  Cardiovascular status: acceptable  Respiratory status: acceptable  Hydration status: acceptable

## 2018-01-24 NOTE — PLAN OF CARE
Problem: Patient Care Overview (Adult)  Goal: Plan of Care Review  Outcome: Ongoing (interventions implemented as appropriate)   01/24/18 1424   Coping/Psychosocial Response Interventions   Plan Of Care Reviewed With patient   Patient Care Overview   Progress progress toward functional goals as expected   Outcome Evaluation   Outcome Summary/Follow up Plan pt w/ drop in BP w/ position changes as low as 76/46 upon standing. pt required min A for sup-sit and min-mod A for sit-sup. pt stood min A w/ RW. pt participated in 2 sets of ther ex. no new goals met at this time. pt is cooperative and would continue to benefit from PT services. Recommend DC to ARU when BP stable.      Goal: Discharge Needs Assessment  Outcome: Ongoing (interventions implemented as appropriate)   01/23/18 1010 01/24/18 1008   Current Health   Outpatient/Agency/Support Group Needs --  inpatient rehabilitation facility (specify)   Discharge Needs Assessment   Concerns To Be Addressed no discharge needs identified;denies needs/concerns at this time --    Readmission Within The Last 30 Days current reason for admission unrelated to previous admission --    Equipment Needed After Discharge --  none;other (see comments)  (Nephew stated he was going to build a ramp for pt.)   Discharge Facility/Level Of Care Needs --  acute rehab   Current Discharge Risk chronically ill --    Discharge Disposition --  still a patient   Living Environment   Transportation Available --  car;family or friend will provide   Self-Care   Equipment Currently Used at Home --  cane, straight;oxygen;rollator;grab bar;shower chair  (used O2 at night and PRN)       Problem: Inpatient Physical Therapy  Goal: Bed Mobility Goal LTG- PT  Outcome: Ongoing (interventions implemented as appropriate)   01/24/18 1008 01/24/18 1424   Bed Mobility PT LTG   Bed Mobility PT LTG, Time to Achieve by discharge --    Bed Mobility PT LTG, Activity Type all bed mobility --    Bed Mobility PT LTG,  [place] Level minimum assist (75% patient effort) --    Bed Mobility PT LTG, Additional Goal HOB down, no handrails --    Bed Mobility PT LTG, Date Goal Reviewed --  01/24/18   Bed Mobility PT LTG, Outcome --  goal ongoing     Goal: Gait Training Goal LTG- PT  Outcome: Ongoing (interventions implemented as appropriate)   01/24/18 1008 01/24/18 1424   Gait Training PT LTG   Gait Training Goal PT LTG, Time to Achieve by discharge --    Gait Training Goal PT LTG, [place] Level contact guard assist --    Gait Training Goal PT LTG, Assist Device walker, rolling --    Gait Training Goal PT LTG, Distance to Achieve 50 feet --    Gait Training Goal PT LTG, Date Goal Reviewed --  01/24/18   Gait Training Goal PT LTG, Outcome --  goal ongoing     Goal: Strength Goal LTG- PT  Outcome: Ongoing (interventions implemented as appropriate)   01/24/18 1008 01/24/18 1424   Strength Goal PT LTG   Strength Goal PT LTG, Time to Achieve by discharge --    Strength Goal PT LTG, Measure to Achieve pt will perform 20 reps hip flexion, hip ABD/ADD, and knee flexion/extension on right LE. AAROM or AROM --    Strength Goal PT LTG, Functional Goal Getting legs up into bed  --    Strength Goal PT LTG, Date Goal Reviewed --  01/24/18   Strength Goal PT LTG, Outcome --  goal ongoing

## 2018-01-24 NOTE — OP NOTE
HIP BIPOLAR ANTERIOR APPROACH  Procedure Note    Name:    Val Arteaga  YOB: 1932  Date of surgery:   1/22/2018 - 1/23/2018    Pre-op Diagnosis:   Acquired hypothyroidism [E03.9]  Depressive disorder [F32.9]  Essential hypertension [I10]  Coronary artery disease involving native coronary artery of native heart without angina pectoris [I25.10]  Pulmonary emphysema, unspecified emphysema type [J43.9]  Displaced fracture of base of neck of right femur, initial encounter for closed fracture [S72.041A]    Post-op Diagnosis:    Post-Op Diagnosis Codes:     * Acquired hypothyroidism [E03.9]     * Depressive disorder [F32.9]     * Essential hypertension [I10]     * Coronary artery disease involving native coronary artery of native heart without angina pectoris [I25.10]     * Pulmonary emphysema, unspecified emphysema type [J43.9]     * Displaced fracture of base of neck of right femur, initial encounter for closed fracture [S72.041A]    Procedure:  Procedure(s):  HIP BIPOLAR ANTERIOR APPROACH - right    Surgeon:  Surgeon(s):  Kelvin Mcdowell MD    ASSISTANT:  Holli Moreno CSA    Anesthesia: General    Staff:   Circulator: Lore Marquis RN  Scrub Person: Ramsey Fernandez  Assistant: Garima Tinajero MA; Holli Moreno CSA    Estimated Blood Loss: 300cc    Specimens:                  ID Type Source Tests Collected by Time Destination   A : RIGHT FEMORAL HEAD Tissue Hip, Right TISSUE EXAM Kelvin Mcdowell MD 1/23/2018 1849          Drains:   Drain/Device Site 01/23/18 1841 Right hip collapsible closed device (Active)       Urethral Catheter 01/22/18 2000 (Active)   Daily Indications Required activity restriction from trauma, surgery, (e.g. unstable spine, fracture, hemodynamics) 1/23/2018  9:05 AM   Securement secured to upper leg with adhesive device 1/23/2018  4:22 PM   Tolerance no signs/symptoms of discomfort 1/22/2018 10:00 PM   Urine Output (mL) 200 1/23/2018  1:46 PM            Complications: None    IMPLANTS:     Implant Name Type Inv. Item Serial No.  Lot No. LRB No. Used   STEM FEM CORAIL CMTLS W/COL AMT SZ14 - [id number] - [id number] Implant STEM FEM CORAIL CMTLS W/COL AMT SZ14 [id number] DEPUY [id number] Right 1   HD FEM BIPOL S/CTR [id number] - [id number] - [id number] Implant HD FEM BIPOL S/CTR [id number] [id number] Right 1   HD FEM ULTAMA/ART/MOD COCR 12/14 28MM PLS1.5 - [id number] - [id number] Implant HD FEM ULTAMA/ART/MOD COCR 12/14 28MM PLS1.5 [id number] DEPUY [id number] Right 1         PROCEDURE:  The patient was taken to the operating room and placed in the supine position. A sequential compression device was carefully placed on the non-operative leg. Preoperative antibiotics were administered. Surgical time out was performed. After adequate induction of anesthesia, the feet were padded and placed in the [place] table boots. The patient was then transferred onto the [place] table and positioned appropriately. The Right hip was then prepped and draped in the usual sterile fashion.   An incision was then made starting 2 cm lateral to the ASIS heading distal and lateral at approximately 30 degrees. The subcutaneous fat was then divided down to the fascia overlying the tensor fascia dickson (TFL) muscle. This was sharply divided staying a few cm lateral to the interval between the sartorius and the TFL muscle. This interval was then bluntly dissected. The circumflex vessels were then identified, cauterized, and divided. There was excellent hemostasis. Cobra retractors were then placed around the femoral neck capsule. The anterior capsule was then resected. The retractors were then placed intracapsular and the the femoral neck was identified. The arthritic change was noted to be moderate in the femoral head and along the rim of the acetabulum. The femoral neck cut was made and the femoral head was then engaged with the  "corkscrew and removed without difficulty. The head was found to be misshapen and devoid of cartilage over most of the joint surface. There were significant osteophytes noted around the periphery of the head.   The acetabulum was then exposed with \"number 7\" retractors. The acetabulum was then addressed with C-arm imaging and the acetabular reamers. We reamed out the medial osteophyte and then up to a solid 'rim' fit. A trial cup was then impacted into position with good fixation. The trial was removed and the hip copiously irrigated. The final acetabular implant was then placed and impacted into position with C-arm guidance.  A single acetabular screw was then placed with excellent purchase to supplement cup stability. The final liner was then placed and then the wound was irrigated once again.    Attention was turned to the femur. The femoral elevator hook was placed. The leg was then moved to the external rotation, extension, and adduction position. Retractors were placed around the proximal femur and then the posterior capsule and conjoined tendon was the Released. The box osteotome was then used to creat the starting hole. The femur was then prepared using the chili-pepper broach and then we progressively broached up the final broach which fit nicely with excellent rotational and axial stability. The trial neck and head were then placed and the hip was then reduced and c-arm images were taken which confirmed appropriate fit and position of the implants. There were no complicating factors noted, and flouro images were taken which confirmed proper restoration of leg length and offset. The trial components were removed, the hip was copiously irrigated and the final implants were then seated. The hip was then reduced and found to be stable. C-arm images again confirmed appropriate anatomy restoration without complicating factors noted.    The hip was then copiously irrigated.  There was excellent hemostasis. A small " hemovac drain was then placed within the joint and then the wound was irrigated once again. . We closed the hip in multiple layers in standard fashion. Sterile dressing were applied. At the end of the case, the sponge and needle counts were reported as being correct. There were no known complications. The patient was then transported to the recovery room.          01/23/18 at 6:57 PM by Kelvin Mcdowell MD

## 2018-01-24 NOTE — THERAPY EVALUATION
Acute Care - Occupational Therapy Initial Evaluation  [facility]     Patient Name: Val Arteaga  : 1932  MRN: [id number]  Today's Date: 2018  Onset of Illness/Injury or Date of Surgery Date: 18  Date of Referral to OT: 18  Referring Physician: Dr. Mcdowell    Admit Date: 2018       ICD-10-CM ICD-9-CM   1. Displaced fracture of base of neck of right femur, initial encounter for closed fracture S72.041A 820.03   2. Essential hypertension I10 401.9   3. Coronary artery disease involving native coronary artery of native heart without angina pectoris I25.10 414.01   4. Pulmonary emphysema, unspecified emphysema type J43.9 492.8   5. Acquired hypothyroidism E03.9 244.9   6. Depressive disorder F32.9 311   7. Hip fracture, right, closed, initial encounter S72.001A 820.8   8. Impaired mobility and ADLs Z74.09 799.89     Patient Active Problem List   Diagnosis   • Essential hypertension   • Coronary artery disease involving native coronary artery of native heart without angina pectoris   • Chronic obstructive pulmonary disease   • Acquired hypothyroidism   • Depressive disorder   • Thygeson's superficial punctate keratitis   • Pseudophakia   • Precordial pain   • Displaced fracture of base of neck of right femur, initial encounter for closed fracture   • Hip fracture, right, closed, initial encounter   • Pulmonary emphysema     Past Medical History:   Diagnosis Date   • Acquired hypothyroidism    • Allergic rhinitis    • CHF (congestive heart failure)    • COPD (chronic obstructive pulmonary disease)    • Corneal epithelial dystrophy    • Coronary arteriosclerosis    • Depression    • Generalized atherosclerosis    • GERD (gastroesophageal reflux disease)    • Hemorrhoids    • History of bone density study 2012    Lumbar spine Osteopenia. Femoral Osteopenia   • History of mammogram 2004    Birads Category 2 Benign   • History of Papanicolaou smear of cervix 2004     NEGATIVE   • Hypercholesterolemia    • Hyperlipidemia    • Hypertension    • Nonexudative age-related macular degeneration    • Osteoarthritis of multiple joints    • Pseudophakia    • Punctate keratitis     - history Thygeson's keratopathy      • Tobacco dependence syndrome      Past Surgical History:   Procedure Laterality Date   • APPENDECTOMY     • BREAST SURGERY  03/19/1954    Excision, breast lesion (1)  Excision of fibroma. 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 patency in the circumflex artery site of previous angioplasty. Kathe. patency in RCA.Nonobstructive 20-30% stenosis in the LAD and diagonal branch. Preserved LV systolic function with Ef of 55%   • CERVICAL SPINE SURGERY  09/16/1974    Excision of cervical disc, C5-6, C6-7, anterior cervical fusion, C5-6 with iliac bone graft.cervical spondylosis,C5-6, C6-7   • COLONOSCOPY  01/01/2013    patient declined    • CORONARY ANGIOPLASTY  07/21/1993    Angioplasty, coronary (2)    RCA    • DILATATION AND CURETTAGE      3   • ENDOSCOPY AND COLONOSCOPY  10/01/1998    Hemorhoids Rectal Outlet Bleeding   • ESOPHAGOSCOPY / EGD  06/28/2004    Nonerosive gastritis of the body of the stomach. A biopsy was obtained & placed in h. Pylori test agar. Multiple biopsies were obtained from the body of the stomach   • INJECTION OF MEDICATION  11/23/2015    Depo Medrol (Methylprednisone) (5)    • INJECTION OF MEDICATION  02/04/2016    Kenalog (3)     • PACEMAKER REPLACEMENT  2006          OT ASSESSMENT FLOWSHEET (last 72 hours)      OT Evaluation       01/24/18 0755 01/23/18 1010 01/23/18 1009 01/22/18 2247 01/22/18 2240    Rehab Evaluation    Document Type evaluation  -        Subjective Information agree to therapy  -        Patient Effort, Rehab Treatment good  -        Symptoms Noted During/After Treatment fatigue;dizziness;increased pain;significant change in vital signs  -        Symptoms Noted  Comment RN notified of pt pain with movement of level 8 in chest upper area and shoulders and BP decrease and increase with change in positoining. RN notified OT that tele called and pt HR went up to 150 but then came back down.   -        General Information    Patient Profile Review yes  -        Onset of Illness/Injury or Date of Surgery Date 01/22/18  -        Referring Physician Dr. Mcdowell  -        General Observations Pt sitting up in bed on O2 3L, wound vac, tele, bed alarm, catheter, pillow between legs  -        Pertinent History Of Current Problem Pt reported she fell at home on way to bathroom. Pt came to ER on 1/22/18 with a displaced fracture of base of neck of righ femur, pt had right hip biopolar anterior approach on 1/23/18.   -        Precautions/Limitations fall precautions;oxygen therapy device and L/min;anterior hip precautions- right;other (see comments)   vital signs; WBAT  -        Prior Level of Function independent:;all household mobility;transfer;ADL's;home management   family assist with IADL, hired help cleaning;   -        Equipment Currently Used at Home cane, straight;rollator;oxygen;grab bar   2.5 O2 at home/night; seat in shower in corner;  shower he  - cane, straight;rollator;oxygen   Patient is on 2.5L of oxygen at home. B/P Cuff  -EW (r) AA (t) EW (c)   oxygen;walker, rolling;nebulizer  [facility]    Plans/Goals Discussed With patient and family;agreed upon  -        Risks Reviewed patient and family:;LOB;nausea/vomiting;dizziness;increased discomfort;change in vital signs  [facility]        Benefits Reviewed patient and family:;improve function;increase independence;increase strength;increase balance  -        Living Environment    Lives With alone   help at night  [facility] alone  -EW (r) AA (t) EW (c)  alone  -KM     Living Arrangements mobile home   double wide  - mobile home  -EW (r) AA (t) EW (c)  mobile home  -KM     Home Accessibility stairs to enter home;grab bars  present (toilet);grab bars present (bathtub);bed and bath on same level   walk in shower  -   stairs to enter home  -KM     Number of Stairs to Enter Home 3   1 step onto porch couple into living room  [facility] 3  -EW (r) AA (t) EW (c)  3  -KM     Stair Railings at Home --   unsure of handrails  - none  -EW (r) AA (t) EW (c)  none  -KM     Type of Financial/Environmental Concern  none  -EW (r) AA (t) EW (c)  none  -KM     Transportation Available car;family or friend will provide  - car;family or friend will provide  -EW (r) AA (t) EW (c)  car  -KM     Living Environment Comment prior to recent hosptil visit indepenent with ADL and IADL except hired help cleaning and family would bring meals since recent d/c from hospital 2 weeks ago had sitters with her and assisted with ADL and IADL as needed, pt mostly did ADL on her own.   -   Patient lives alone  -     Clinical Impression    Date of Referral to OT 01/23/18  -        OT Diagnosis impaired mobility and ADL  -        Prognosis fair  -        Functional Level At Time Of Evaluation Pt got dizzy with change of positioning did better with sititng verse standing with being dizzy and lightheaded. Pt attempted all tasks well.   -        Impairments Found (describe specific impairments) aerobic capacity/endurance;gait, locomotion, and balance;integumentary integrity;joint integrity and mobility;motor function;muscle performance;posture;ventilation and respiration/gas exchange   ADL, functional t/f and mobility, ax tolerance  -        Patient/Family Goals Statement pt wants to go to rehab then home  -        Criteria for Skilled Therapeutic Interventions Met yes;treatment indicated  -        Rehab Potential good, to achieve stated therapy goals  -        Therapy Frequency other (see comments)   3-14x a week  -        Predicted Duration of Therapy Intervention (days/wks) until d/c or all goals met  -        Anticipated Equipment Needs At Discharge  bedside commode;bathing equipment;dressing equipment;shower chair;front wheeled walker  -BH        Anticipated Discharge Disposition skilled nursing facility;inpatient rehabilitation facility  -        Functional Level Prior    Ambulation 1-->assistive equipment  -BH  1-->assistive equipment  -EW (r) AA (t) EW (c)  2-->assistive person  -KM    Transferring 1-->assistive equipment  -BH  1-->assistive equipment  -EW (r) AA (t) EW (c)  2-->assistive person  -KM    Toileting 1-->assistive equipment  -BH  1-->assistive equipment  -EW (r) AA (t) EW (c)  2-->assistive person  -KM    Bathing 3-->assistive equipment and person  -BH  0-->independent  -EW (r) AA (t) EW (c)  2-->assistive person  -KM    Dressing 0-->independent  -BH  0-->independent  -EW (r) AA (t) EW (c)  2-->assistive person  -KM    Eating 0-->independent  -BH  0-->independent  -EW (r) AA (t) EW (c)  0-->independent  -KM    Communication 0-->understands/communicates without difficulty  -BH  0-->understands/communicates without difficulty  -EW (r) AA (t) EW (c)  0-->understands/communicates without difficulty  -KM    Swallowing 2-->difficulty swallowing liquids  -BH    0-->swallows foods/liquids without difficulty  -KM    Prior Functional Level Comment     Family member lived with patient and helped her with her needs  -KM    Vital Signs    Pre Systolic BP Rehab 97  -BH        Pre Treatment Diastolic BP 52  -BH        Intra Systolic BP Rehab 116   92/54 after standing  -BH        Intra Treatment Diastolic BP 69  -BH        Post Systolic BP Rehab 150  -BH        Post Treatment Diastolic BP 65  -BH        Pretreatment Heart Rate (beats/min) 85  -BH        Intratreatment Heart Rate (beats/min) 101   RN reports tele called and satated   -BH        Posttreatment Heart Rate (beats/min) 84  -BH        Pre SpO2 (%) 96  -BH        O2 Delivery Pre Treatment supplemental O2  -BH        Intra SpO2 (%) 97  -BH        O2 Delivery Intra Treatment supplemental O2  -BH         Post SpO2 (%) 98  -        O2 Delivery Post Treatment supplemental O2  -        Pre Patient Position Supine  -        Intra Patient Position Sitting  -        Post Patient Position Supine  -        Pain Assessment    Pain Assessment 0-10  -        Pain Score 0   pt reported level 8 pain with mobility at times, supine none  -        Post Pain Score 0  -        Vision Assessment/Intervention    Visual Impairment WFL with corrective lenses  -        Cognitive Assessment/Intervention    Current Cognitive/Communication Assessment functional  -        Orientation Status oriented x 4  -        Follows Commands/Answers Questions 100% of the time;75% of the time;able to follow single-step instructions;needs cueing  -        Personal Safety mild impairment  -        Personal Safety Interventions fall prevention program maintained;gait belt;muscle strengthening facilitated;nonskid shoes/slippers when out of bed;supervised activity  -        ROM (Range of Motion)    General ROM no range of motion deficits identified  -        General ROM Detail BUE WFL, fair+ digit to nose/digit to thumb opposition  -        MMT (Manual Muscle Testing)    General MMT Assessment upper extremity strength deficits identified;lower extremity strength deficits identified  -        General MMT Assessment Detail BUE  grossly 3+ to 4-/5; BUE grossly 4-/5  -        Bed Mobility, Assessment/Treatment    Bed Mob, Supine to Sit, [place] moderate assist (50% patient effort);minimum assist (75% patient effort)  -        Bed Mob, Sit to Supine, [place] dependent (less than 25% patient effort);2 person assist required   due to pt medical symptoms  -        Bed Mobility, Safety Issues decreased use of legs for bridging/pushing  -        Bed Mobility, Impairments strength decreased;impaired balance;coordination impaired;pain  -        Bed Mobility, Comment Pt attempted well getting to EOB from  supine with min assist to walk legs towards the EOB then min-mod assist to get trunk up, then min-mod assist to get scooted out towards the EOB with feet on the floor protecting hip throughout. Pt educated on proper way to safely get out of bed. Pt was assisted by OT and CNA to get back into bed 2* dizzy and light headed and not feeling welling otherwise pt would have been able to assist, once supine and feeling better pt assisted with better alignment in the bed average max assist.   -        Transfer Assessment/Treatment    Transfers, Bed-Chair [place] unable to perform  -        Transfers, Sit-Stand [place] minimum assist (75% patient effort);moderate assist (50% patient effort)  -        Transfers, Stand-Sit [place] moderate assist (50% patient effort)  -        Transfers, Sit-Stand-Sit, Assist Device rolling walker  -        Transfer, Comment pt required vc and assist for hand placement on bed to push up, pt was dizzy standing worse than sitting and OT was attempting to get pt to explain her symptoms and look straight forward stood for approx 1-2 minutes with symptoms not getting better, assisted pt with min-mod assist to take a few side steps to then sit on bed and be assisted to get supine, once supine pt reported feeling better CNA in room assisting and RN notified.   -        Functional Mobility    Functional Mobility- Comment please see t/f comment  -        Lower Body Dressing Assessment/Training    LB Dressing Assess/Train, Clothing Type doffing:;donning:;slipper socks  -        LB Dressing Assess/Train, Assist Device reacher;sock-aid  -        LB Dressing Assess/Train, Position sitting;edge of bed  -        LB Dressing Assess/Train, [place] minimum assist (75% patient effort)  -        LB Dressing Assess/Train, Impairments decreased flexibility;ROM decreased;impaired balance;coordination impaired;motor control impaired;pain  -        LB Dressing Assess/Train,  Comment Pt was educated on how to use a reacher, dressing stick and sock aid, pt once dizziness got better EOB with demo and min assist used reacher and sock aid on non operative side with min difficulty.   -        Motor Skills/Interventions    Additional Documentation Fine Motor Coordination Training (Group);Balance Skills Training (Group)  -        Balance Skills Training    Sitting-Level of Assistance Contact guard;Close supervision   varied, vital signs closely monitored  [facility]        Standing-Level of Assistance Minimum assistance;Moderate assistance   varied vital signs monitored throughout  -        Fine Motor Coordination Training    Opposition Right:;Left:;intact  -        Sensory Assessment/Intervention    Light Touch LUE;RUE  -        LUE Light Touch WNL  -        RUE Light Touch WNL  [facility]        General Therapy Interventions    Planned Therapy Interventions activity intolerance;adaptive equipment training;ADL retraining;balance training;bed mobility training;energy conservation;fine motor coordination training;home exercise program;motor coordination training;ROM (Range of Motion);strengthening;transfer training  [facility]        Positioning and Restraints    Pre-Treatment Position in bed  -        Post Treatment Position bed  -        In Bed notified nsg;supine;fowlers;call light within reach;encouraged to call for assist;exit alarm on;with family/caregiver  -          User Key  (r) = Recorded By, (t) = Taken By, (c) = Cosigned By    Initials Name Effective Dates     AYAD Vazquez/L 10/17/16 -     BERNADETTE Bird RN 10/17/16 -     EW SHANNON Trinh 10/17/16 -     DAVID Galeano 08/25/17 -            Occupational Therapy Education     Title: PT OT SLP Therapies (Active)     Topic: Occupational Therapy (Active)     Point: ADL training (Done)    Description: Instruct learner(s) on proper safety adaptation and remediation techniques during self care or transfers.   Instruct in  proper use of assistive devices.    Learning Progress Summary    Learner Readiness Method Response Comment Documented by Status   Patient Acceptance E VU Educated pt on OT and POC. answered pt and pt's family questions and concerns. Educated to call for assist not to get up on her own. Educated on AE for ADL. Educated on safety and hip preucations thorughout t/f, mobility, ADL.  01/24/18 0957 Done   Family Acceptance E VU Educated pt on OT and POC. answered pt and pt's family questions and concerns. Educated to call for assist not to get up on her own. Educated on AE for ADL. Educated on safety and hip preucations thorughout t/f, mobility, ADL.  01/24/18 0957 Done               Point: Precautions (Done)    Description: Instruct learner(s) on prescribed precautions during self-care and functional transfers.    Learning Progress Summary    Learner Readiness Method Response Comment Documented by Status   Patient Acceptance E VU Educated pt on OT and POC. answered pt and pt's family questions and concerns. Educated to call for assist not to get up on her own. Educated on AE for ADL. Educated on safety and hip preucations thorughout t/f, mobility, ADL.  01/24/18 0957 Done   Family Acceptance E VU Educated pt on OT and POC. answered pt and pt's family questions and concerns. Educated to call for assist not to get up on her own. Educated on AE for ADL. Educated on safety and hip preucations thorughout t/f, mobility, ADL.  01/24/18 0957 Done               Point: Body mechanics (Done)    Description: Instruct learner(s) on proper positioning and spine alignment during self-care, functional mobility activities and/or exercises.    Learning Progress Summary    Learner Readiness Method Response Comment Documented by Status   Patient Acceptance E VU Educated pt on OT and POC. answered pt and pt's family questions and concerns. Educated to call for assist not to get up on her own. Educated on AE for ADL. Educated on  safety and hip preucations thorughout t/f, mobility, ADL.  01/24/18 0957 Done   Family Acceptance E VU Educated pt on OT and POC. answered pt and pt's family questions and concerns. Educated to call for assist not to get up on her own. Educated on AE for ADL. Educated on safety and hip preucations thorughout t/f, mobility, ADL.  01/24/18 0957 Done                      User Key     Initials Effective Dates Name Provider Type Discipline     10/17/16 -  Marisol Burdick, OTR/L Occupational Therapist OT                  OT Recommendation and Plan  Anticipated Equipment Needs At Discharge: bedside commode, bathing equipment, dressing equipment, shower chair, front wheeled walker  Anticipated Discharge Disposition: skilled nursing facility, inpatient rehabilitation facility  Planned Therapy Interventions: activity intolerance, adaptive equipment training, ADL retraining, balance training, bed mobility training, energy conservation, fine motor coordination training, home exercise program, motor coordination training, ROM (Range of Motion), strengthening, transfer training  Therapy Frequency: other (see comments) (3-14x a week)  Plan of Care Review  Plan Of Care Reviewed With: patient, family  Outcome Summary/Follow up Plan: OT nav this date. Pt pleasant and motivated to engage in therapy and rehab to get home. Pt required min-mod assist for sup to sit, got dizzy sitting EOB got a little better, min-mod A to stand dizziness increased attempted to get blood pressure, side stepped a few steps towards HOB with min-mod assist and then assisted to supine due to symptoms, BP at dropped and pt dizzy and pain in upper chest area. After pt supine pt symptoms where gone. Pt educated on AE for ADL but could use furrther education and practice. Pt could benefit from skilled OT to increase independence with ADL to reach maximum level of potential. Recommend d/c to further rehab inpatient rehab or SNF before d/c home as pt lives  alone.           OT Goals       01/24/18 0755          Bed Mobility OT LTG    Bed Mobility OT LTG, Date Established 01/24/18  -      Bed Mobility OT LTG, Time to Achieve by discharge  -      Bed Mobility OT LTG, Activity Type all bed mobility  -      Bed Mobility OT LTG, [place] Level supervision required   to engage in ADL  -      Transfer Training OT LTG    Transfer Training OT LTG, Date Established 01/24/18  -      Transfer Training OT LTG, Time to Achieve by discharge  -      Transfer Training OT LTG, Activity Type toilet  -      Transfer Training OT LTG, [place] Level contact guard assist   AE/AD as needed  -      ADL OT LTG    ADL OT LTG, Date Established 01/24/18  -      ADL OT LTG, Activity Type ADL skills  -      ADL OT LTG, Additional Goal set up grooming/self feeding, min A UB bath/dressing/ mod A LB bath/dressing/toileting - AE/AD as needed  -      Functional Mobility OT LTG    Functional Mobility Goal OT LTG, Date Established 01/24/18  -      Functional Mobility Goal OT LTG, Time to Achieve by discharge  -      Functional Mobility Goal OT LTG, [place] Level contact guard  -      Functional Mobility Goal OT LTG, Assist Device --   AE/AD as needed  -      Functional Mobility Goal OT LTG, Distance to Achieve to the bathroom   to the toilet  -        User Key  (r) = Recorded By, (t) = Taken By, (c) = Cosigned By    Initials Name Provider Type     Marisol Burdick, OTR/L Occupational Therapist                Outcome Measures       01/24/18 0755          How much help from another is currently needed...    Putting on and taking off regular lower body clothing? 1  -      Bathing (including washing, rinsing, and drying) 2  -      Toileting (which includes using toilet bed pan or urinal) 1  -      Putting on and taking off regular upper body clothing 3  -      Taking care of personal grooming (such as brushing teeth) 3  -      Eating meals 3  -BH       Score 13  -      Functional Assessment    Outcome Measure Options AM-PAC 6 Clicks Daily Activity (OT)  -        User Key  (r) = Recorded By, (t) = Taken By, (c) = Cosigned By    Initials Name Provider Type     Marisol Burdick OTR/L Occupational Therapist          Time Calculation:   OT Start Time: 0755  OT Stop Time: 0910  OT Time Calculation (min): 75 min    Therapy Charges for Today     Code Description Service Date Service Provider Modifiers Qty    75430702811 HC OT SELFCARE CURRENT 1/24/2018 Marisol Burdick OTR/L GO, CL 1    39287558805 HC OT SELFCARE PROJECTED 1/24/2018 Marisol Burdick OTR/L GO, CK 1    37011189280 HC OT SELF CARE/MGMT/TRAIN EA 15 MIN 1/24/2018 Marisol Burdick OTR/L GO 1    [id number]  OT THERAPEUTIC ACT EA 15 MIN 1/24/2018 Marisol Burdick OTR/L GO 2    [id number]  OT EVAL HIGH COMPLEXITY 2 1/24/2018 Marisol Burdick OTR/L GO 1          OT G-codes  OT Professional Judgement Used?: Yes  OT Functional Scales Options: AM-PAC 6 Clicks Daily Activity (OT)  Score: 13  Functional Limitation: Self care  Self Care Current Status (): At least 60 percent but less than 80 percent impaired, limited or restricted  Self Care Goal Status (): At least 40 percent but less than 60 percent impaired, limited or restricted    AYAD Vazquez/L  1/24/2018

## 2018-01-24 NOTE — PLAN OF CARE
Problem: Patient Care Overview (Adult)  Goal: Plan of Care Review  Outcome: Ongoing (interventions implemented as appropriate)   01/24/18 1329   Coping/Psychosocial Response Interventions   Plan Of Care Reviewed With patient   Patient Care Overview   Progress progress towards functional goals is fair   Outcome Evaluation   Outcome Summary/Follow up Plan OT treatment this date. Pt averaged need for mod assist with bed mobility assist of two for sit to sup. Pt O2 dropped during sitting with education to deep breath and it victoriano, she had pain reported in chest area after movementa nd RN notfified. Pt motivated to engage and participate and could benefit from further skilled OT. Recommend d/c to rehab for further therapy before d/c home. Pt declined to engage in sponge bath this date but agreed to do one tomorrow.        Problem: Inpatient Occupational Therapy  Goal: Bed Mobility Goal LTG- OT  Outcome: Ongoing (interventions implemented as appropriate)   01/24/18 0755 01/24/18 1329   Bed Mobility OT LTG   Bed Mobility OT LTG, Date Established 01/24/18 --    Bed Mobility OT LTG, Time to Achieve by discharge --    Bed Mobility OT LTG, Activity Type all bed mobility --    Bed Mobility OT LTG, [place] Level supervision required  (to engage in ADL) --    Bed Mobility OT LTG, Date Goal Reviewed --  01/24/18   Bed Mobility OT LTG, Outcome --  goal ongoing     Goal: Transfer Training Goal 1 LTG- OT  Outcome: Ongoing (interventions implemented as appropriate)   01/24/18 0755 01/24/18 1329   Transfer Training OT LTG   Transfer Training OT LTG, Date Established 01/24/18 --    Transfer Training OT LTG, Time to Achieve by discharge --    Transfer Training OT LTG, Activity Type toilet --    Transfer Training OT LTG, [place] Level contact guard assist  (AE/AD as needed) --    Transfer Training OT LTG, Date Goal Reviewed --  01/24/18     Goal: ADL Goal LTG- OT  Outcome: Ongoing (interventions implemented as appropriate)    01/24/18 0755 01/24/18 1329   ADL OT LTG   ADL OT LTG, Date Established 01/24/18 --    ADL OT LTG, Activity Type ADL skills --    ADL OT LTG, Additional Goal set up grooming/self feeding, min A UB bath/dressing/ mod A LB bath/dressing/toileting - AE/AD as needed --    ADL OT LTG, Date Goal Reviewed --  01/24/18     Goal: Functional Mobility Goal LTG- OT  Outcome: Ongoing (interventions implemented as appropriate)   01/24/18 0755 01/24/18 1329   Functional Mobility OT LTG   Functional Mobility Goal OT LTG, Date Established 01/24/18 --    Functional Mobility Goal OT LTG, Time to Achieve by discharge --    Functional Mobility Goal OT LTG, [place] Level contact guard --    Functional Mobility Goal OT LTG, Assist Device (AE/AD as needed) --    Functional Mobility Goal OT LTG, Distance to Achieve to the bathroom  (to the toilet) --    Functional Mobility Goal OT LTG, Date Goal Reviewed --  01/24/18

## 2018-01-24 NOTE — PROGRESS NOTES
Orthopedic Total Hip Progress Note      Patient: Val Arteaga  Date of Admission: 1/22/2018  YOB: 1932  Medical Record Number: [id number]    LOS: 2    Status Post: Procedure(s) (LRB):  HIP BIPOLAR ANTERIOR APPROACH - right (Right)    Systemic or Specific Complaints: Pain Control  Complications: None  Pain Relief: some relief    Physical Exam:  85 y.o.  female  alert and oriented  Vitals:    01/24/18 0016 01/24/18 0018 01/24/18 0037 01/24/18 0331   BP: 95/57   104/62   BP Location: Left arm   Right arm   Patient Position: Lying   Lying   Pulse: 90 89 89 87   Resp: 16   18   Temp: 97.7 °F (36.5 °C)   97.9 °F (36.6 °C)   TempSrc:    Oral   SpO2: 92% 92%  96%   Weight:       Height:         Currently requiring 3l Oxygen to maintain sats    Abd: Soft, NT, with BS +  Ext: NV intact. ROM appropriate. Calf is soft and nontender. Negative Homans Sn  Skin: Dressing clean dry and intact w/out signs or  symptoms of infection.    Activity: Mobilizing Per P.T.   Weight Bearing: As Tolerated        Xr Chest 2 View    Result Date: 1/3/2018  Narrative: [facility] Patient Name: VAL ARTEAGA ORDERING: REMY TYLER ATTENDING: REMY TYLER REFERRING: REMY TYLER ----------------------- PROCEDURE: Two-view chest COMPARISON: 1/1/2018 HISTORY: Chest pain protocol FINDINGS: Frontal and lateral views of the chest are obtained. Devices: Left-sided cardiac pacer stable in position. Lungs/Pleura: Ill-defined markings in the left lung base, probably due to subsegmental atelectasis. Lungs otherwise appear clear. Cardiomediastinal structures: Cardiac silhouette is normal in size. Thoracic aorta contains atherosclerotic calcification.     Impression: CONCLUSION:  Subsegmental atelectasis left lung base. Lungs otherwise appear clear. Electronically signed by:  González Forrest MD  1/3/2018 3:57 PM CST Workstation: [id number]    Xr Chest 2 View    Result Date: 1/1/2018  Narrative: PROCEDURE:  Chest PA and lateral REASON FOR EXAM: wheezing FINDINGS: Comparison study dated September 5, 2017.  Stable pacemaker and leads. . Cardiac and pulmonary vasculature are normal . Lungs are clear. Pleural spaces are normal . No acute osseous abnormality.     Impression: No acute cardiopulmonary abnormality. Electronically signed by:  Renny Stovall MD  1/1/2018 10:42 AM CST Workstation: [id number]    Xr Knee 4+ View Right    Result Date: 1/23/2018  Narrative: Exam: Right knee four views INDICATION: Status post fall FINDINGS: Four views. Limited exam due to positioning. There is moderate narrowing medial joint compartment of the knee. No acute fracture or dislocation. No lytic or destructive lesion.     Impression: No obvious acute fracture. Electronically signed by:  Dany De Dios MD  1/23/2018 1:03 AM CST Workstation: [id number]    Xr Chest 1 View    Result Date: 1/22/2018  Narrative: Preoperative study for right hip surgery.  Chest AP view. COMPARISON: January 4, 2018. One image was performed. The cardiac silhouette is within normal limits. The lung fields are clear. No pleural effusion is identified. No acute bony abnormality is seen. There is osteopenia. The pacemaker is unchanged.     Impression: CONCLUSION: No acute cardiopulmonary pathology is identified. Electronically signed by:  Dileep Zavaleta MD  1/22/2018 7:50 PM CST Workstation: "[id number]    Ct Angiogram Chest With Contrast    Result Date: 1/7/2018  Narrative: [facility] Patient Name: ADALBERTO NOLASCO ORDERING: ANALI JORGE ATTENDING: ADELA ALEJANDRE REFERRING: ANALI JORGE ----------------------- EXAM DESCRIPTION: CT ANGIOGRAM CHEST W CONTRAST CLINICAL HISTORY:  rule out pulmonary embolus, R07.2 Precordial pain R06.02 Shortness of breath R74.8 Abnormal levels of other serum enzymes I49.5 Sick sinus syndrome I21.4 Non-ST elevation (NSTEMI) myocardial infarction R06.00 Dyspnea, unspecified R07.9 Chest pain, unspecified I25.10  Atherosclerotic heart disease of native coronary artery without angina pectoris   COMPARISON: 2/10/2016 TECHNIQUE: Axial images were obtained and multiplanar reconstructions were made.  This exam was performed according to our departmental dose optimization program, which includes automated exposure control, adjustment of the mA and/or KV according to patient size and/or use of iterative reconstruction technique. Dose Length Product 305.30 CONTRAST:   66 cc intravenous Isovue 370 FINDINGS: Diagnostic quality: Adequate . Pulmonary embolism: No evidence of pulmonary embolism. Pulmonary arteries:  Normal in caliber. Lung parenchyma: There is a background of pulmonary emphysema redemonstrated. There is some dependent basilar atelectasis greater on the right. No dense parenchymal consolidation, effusion or pneumothorax. Intimal scarring or atelectasis within the lingula. No mass or suspicious nodule. Pleural effusion: None. Central airways: Patent. Adenopathy: No suspicious mediastinal, hilar, or axillary lymph nodes based on size or morphologic criteria. Heart and great vessels: No cardiac enlargement or pericardial effusion. Coronary vascular calcification. There is again findings suggesting right heart insufficiency with prominent amount of contrast extending into the azygos system and the IVC and small amount within the hepatic veins. Upper abdomen: Small hiatal hernia. No acute findings.  Bones: Degenerative changes throughout the thoracic spine. No acute compression fracture..  Additional findings: None.     Impression: No CT evidence of pulmonary embolism. Pulmonary emphysema with basilar dependent atelectasis. No consolidation or acute pleural finding. Electronically signed by:  Marco A Fraire MD  1/7/2018 4:32 PM CST Workstation: [id number]    Xr Chest Pa & Lateral    Result Date: 1/4/2018  Narrative: PROCEDURE: Chest PA and lateral REASON FOR EXAM: Follow up dyspnea, possible pneumonia, R07.2 Precordial pain  R06.02 Shortness of breath R74.8 Abnormal levels of other serum enzymes FINDINGS: Comparison study dated January 3, 2015.  Stable pacemaker and leads. . Cardiac and pulmonary vasculature are normal . Lungs are clear. Pleural spaces are normal . No acute osseous abnormality.     Impression: No acute cardiopulmonary abnormality. Electronically signed by:  Renny Stovall MD  1/4/2018 7:42 AM CST Workstation: [id number]    Xr Hip With Or Without Pelvis 2 - 3 View Right    Result Date: 1/22/2018  Narrative: EXAM:  Radiograph(s), Osseous       REGION:   Pelvis and right hip  VIEWS:   3         INDICATION:    Fall, right hip pain   COMPARISON:    none          FINDINGS:       Subcapital fracture with minimal displacement. .        Impression: CONCLUSION:       1. Subcapital fracture with minimal displacement.             Electronically signed by:  JENNIFFER Torres MD  1/22/2018 7:45 PM CST Workstation: [id number]      Medications    atorvastatin 20 mg Oral Daily   budesonide-formoterol 2 puff Inhalation BID - RT   carvedilol 6.25 mg Oral BID With Meals   docusate sodium 100 mg Oral BID   famotidine 40 mg Oral Daily   furosemide 40 mg Oral BID   levothyroxine 50 mcg Oral Q AM   montelukast 10 mg Oral Nightly   nitroglycerin 1 patch Transdermal Daily   ranolazine 1,000 mg Oral Q12H   sertraline 50 mg Oral Daily     •  acetaminophen  •  bacitracin  •  HYDROcodone-acetaminophen  •  ipratropium-albuterol  •  Morphine **AND** naloxone  •  ondansetron  •  Pharmacy to dose warfarin  •  sodium chloride  •  sodium chloride    Assessment:  Doing well following total hip replacement POD 1 Day Post-Op    Patient Active Problem List   Diagnosis   • Essential hypertension   • Coronary artery disease involving native coronary artery of native heart without angina pectoris   • Chronic obstructive pulmonary disease   • Acquired hypothyroidism   • Depressive disorder   • Thygeson's superficial punctate keratitis   • Pseudophakia   • Precordial pain    • Displaced fracture of base of neck of right femur, initial encounter for closed fracture   • Hip fracture, right, closed, initial encounter   • Pulmonary emphysema         Plan:   Anterior hip precautions  Continue efforts to mobilize  Continue Pain Control Measures  Continue incisional Care  DVT prophylaxis    Discharge Plan:discharge assessment over the next 1-2 days.  ARU likely with oxygen requirement and history of COPD as well as coronary artery disease.    Kelvin Mcdowell MD    Date: 1/24/2018   Time: 7:25 AM

## 2018-01-24 NOTE — THERAPY TREATMENT NOTE
Acute Care - Occupational Therapy Treatment Note  [facility]     Patient Name: Val Arteaga  : 1932  MRN: [id number]  Today's Date: 2018  Onset of Illness/Injury or Date of Surgery Date: 18  Date of Referral to OT: 18  Referring Physician: Dr. Mcdowell      Admit Date: 2018    Visit Dx:     ICD-10-CM ICD-9-CM   1. Displaced fracture of base of neck of right femur, initial encounter for closed fracture S72.041A 820.03   2. Essential hypertension I10 401.9   3. Coronary artery disease involving native coronary artery of native heart without angina pectoris I25.10 414.01   4. Pulmonary emphysema, unspecified emphysema type J43.9 492.8   5. Acquired hypothyroidism E03.9 244.9   6. Depressive disorder F32.9 311   7. Hip fracture, right, closed, initial encounter S72.001A 820.8   8. Impaired mobility and ADLs Z74.09 799.89   9. Impaired physical mobility Z74.09 781.99     Patient Active Problem List   Diagnosis   • Essential hypertension   • Coronary artery disease involving native coronary artery of native heart without angina pectoris   • Chronic obstructive pulmonary disease   • Acquired hypothyroidism   • Depressive disorder   • Thygeson's superficial punctate keratitis   • Pseudophakia   • Precordial pain   • Displaced fracture of base of neck of right femur, initial encounter for closed fracture   • Hip fracture, right, closed, initial encounter   • Pulmonary emphysema             Adult Rehabilitation Note       18 1329          Rehab Assessment/Intervention    Discipline occupational therapist  -      Document Type therapy note (daily note)  -      Subjective Information agree to therapy  -      Patient Effort, Rehab Treatment good  -      Symptoms Noted During/After Treatment fatigue;shortness of breath;increased pain;significant change in vital signs   O2 dropped to 85 RN notified, came up with deep breathing  -      Precautions/Limitations fall  precautions;oxygen therapy device and L/min;anterior hip precautions- right  -      Patient Response to Treatment pt is motivated and wanted to sit EOB to complete BUE exercises. Pt fatigued with tasks and reports some dizziness sitting but was uncertain, when check vital signs where stable but when O2 dropped to 86 but victoriano with deep breathing with O2 on. RN was notified. Pt c/o of chest area tightness after movement activity and RN notified and pt request for pain meds.   -      Recorded by [] AYAD Vazquez/L      Vital Signs    Pre Systolic BP Rehab 110  -      Pre Treatment Diastolic BP 60  -      Post Systolic BP Rehab 120  -BH      Post Treatment Diastolic BP 58  -BH      Pretreatment Heart Rate (beats/min) 81  -BH      Posttreatment Heart Rate (beats/min) 91  -BH      Pre SpO2 (%) 93  -BH      O2 Delivery Pre Treatment supplemental O2  -      Intra SpO2 (%) 86   victoriano to 91 with deep breathing  -      O2 Delivery Intra Treatment supplemental O2  -      Post SpO2 (%) 94  -      O2 Delivery Post Treatment supplemental O2  -      Pre Patient Position Supine  -      Intra Patient Position Sitting  -      Post Patient Position Supine  -      Recorded by [] AYAD Vazquez/L      Pain Assessment    Pain Score 0  -      Post Pain Score 4  -      Pain Intervention(s) Repositioned;Ambulation/increased activity;Rest   RN notified  -      Recorded by [] AYAD Vazquez/L      Cognitive Assessment/Intervention    Current Cognitive/Communication Assessment functional  -      Orientation Status oriented x 4  -      Follows Commands/Answers Questions 100% of the time;75% of the time;able to follow single-step instructions  -      Personal Safety mild impairment  -      Personal Safety Interventions fall prevention program maintained;gait belt;muscle strengthening facilitated;nonskid shoes/slippers when out of bed;supervised activity  -      Recorded by [] Marisol MORRISSEY  Heavenly, OTR/L      Bed Mobility, Assessment/Treatment    Bed Mobility, Assistive Device head of bed elevated;bed rails  -      Bed Mob, Supine to Sit, [place] moderate assist (50% patient effort)  -      Bed Mob, Sit to Supine, [place] moderate assist (50% patient effort);maximum assist (25% patient effort);2 person assist required  -      Bed Mobility, Comment HOB down for sit to sup, pt fatigued and in creased pain. Pt requried assist with legs and hip precuations to get into bed safely. pt was able to push through legs and use arms to assit with scooting in the bed and to sit EOB and scoot towards the HOB with min assist. pt fatigued and rqeuried assist/support for the RLE. Pt sat EOB with close supervision for approx 20 minutes.   -      Recorded by [] SUJATHA Vazquez      Transfer Assessment/Treatment    Transfer, Comment defer  -      Recorded by [] SUJATHA Vazquez      Upper Body Bathing Assessment/Training     Bathing Assess/Train, Comment pt declined sponge bath today but agreed to attempt tomorrow.   -      Recorded by [] SUJATHA Vazquez      Balance Skills Training    Sitting-Level of Assistance Close supervision  -      Recorded by [] SUJATHA Vazquez      Therapy Exercises    Bilateral Upper Extremity AROM:;15 reps;sitting;elbow flexion/extension;hand pumps;pronation/supination;shoulder extension/flexion   wrist flexion/extension  -      BUE Resistance --   no resistance, fatigued with movements needed rest breaks.   -      Recorded by [] SUJATHA Vazquez      Positioning and Restraints    Pre-Treatment Position in bed  -      Post Treatment Position bed  -      In Bed notified nsg;supine;fowlers;call light within reach;encouraged to call for assist;exit alarm on;with family/caregiver  -      Recorded by [] SUJATHA Vazquez        User Key  (r) = Recorded By, (t) = Taken By, (c) = Cosigned By    Initials Name Effective  Dates     AYAD Vazquez/L 10/17/16 -                 OT Goals       01/24/18 1329 01/24/18 0755       Bed Mobility OT LTG    Bed Mobility OT LTG, Date Established  01/24/18  -     Bed Mobility OT LTG, Time to Achieve  by discharge  -     Bed Mobility OT LTG, Activity Type  all bed mobility  -     Bed Mobility OT LTG, [place] Level  supervision required   to engage in ADL  -     Bed Mobility OT LTG, Date Goal Reviewed 01/24/18  -      Bed Mobility OT LTG, Outcome goal ongoing  -      Transfer Training OT LTG    Transfer Training OT LTG, Date Established  01/24/18  -     Transfer Training OT LTG, Time to Achieve  by discharge  -     Transfer Training OT LTG, Activity Type  toilet  -     Transfer Training OT LTG, [place] Level  contact guard assist   AE/AD as needed  -     Transfer Training OT LTG, Date Goal Reviewed 01/24/18  -      ADL OT LTG    ADL OT LTG, Date Established  01/24/18  -     ADL OT LTG, Activity Type  ADL skills  -     ADL OT LTG, Additional Goal  set up grooming/self feeding, min A UB bath/dressing/ mod A LB bath/dressing/toileting - AE/AD as needed  -     ADL OT LTG, Date Goal Reviewed 01/24/18  -      Functional Mobility OT LTG    Functional Mobility Goal OT LTG, Date Established  01/24/18  -     Functional Mobility Goal OT LTG, Time to Achieve  by discharge  -     Functional Mobility Goal OT LTG, [place] Level  contact guard  -     Functional Mobility Goal OT LTG, Assist Device  --   AE/AD as needed  -     Functional Mobility Goal OT LTG, Distance to Achieve  to the bathroom   to the toilet  -     Functional Mobility Goal OT LTG, Date Goal Reviewed 01/24/18  -        User Key  (r) = Recorded By, (t) = Taken By, (c) = Cosigned By    Initials Name Provider Type     Marisol Burdick OTR/L Occupational Therapist          Occupational Therapy Education     Title: PT OT SLP Therapies (Active)     Topic: Occupational Therapy (Active)      Point: ADL training (Done)    Description: Instruct learner(s) on proper safety adaptation and remediation techniques during self care or transfers.   Instruct in proper use of assistive devices.    Learning Progress Summary    Learner Readiness Method Response Comment Documented by Status   Patient Acceptance E VU Educated pt on OT and POC. answered pt and pt's family questions and concerns. Educated to call for assist not to get up on her own. Educated on AE for ADL. Educated on safety and hip preucations thorughout t/f, mobility, ADL.  01/24/18 0957 Done   Family Acceptance E VU Educated pt on OT and POC. answered pt and pt's family questions and concerns. Educated to call for assist not to get up on her own. Educated on AE for ADL. Educated on safety and hip preucations thorughout t/f, mobility, ADL.  01/24/18 0957 Done               Point: Precautions (Done)    Description: Instruct learner(s) on prescribed precautions during self-care and functional transfers.    Learning Progress Summary    Learner Readiness Method Response Comment Documented by Status   Patient Acceptance E VU,NR Educated about OT, Educated on safety with bed mobility, and hip precuations. Educated pt to call for assist.  01/24/18 1448 Done    Acceptance E VU Educated pt on OT and POC. answered pt and pt's family questions and concerns. Educated to call for assist not to get up on her own. Educated on AE for ADL. Educated on safety and hip preucations thorughout t/f, mobility, ADL.  01/24/18 0957 Done   Family Acceptance E VU Educated pt on OT and POC. answered pt and pt's family questions and concerns. Educated to call for assist not to get up on her own. Educated on AE for ADL. Educated on safety and hip preucations thorughout t/f, mobility, ADL.  01/24/18 0957 Done               Point: Body mechanics (Done)    Description: Instruct learner(s) on proper positioning and spine alignment during self-care, functional mobility  activities and/or exercises.    Learning Progress Summary    Learner Readiness Method Response Comment Documented by Status   Patient Acceptance E VU,NR Educated about OT, Educated on safety with bed mobility, and hip precuations. Educated pt to call for assist.  01/24/18 1448 Done    Acceptance E VU Educated pt on OT and POC. answered pt and pt's family questions and concerns. Educated to call for assist not to get up on her own. Educated on AE for ADL. Educated on safety and hip preucations thorughout t/f, mobility, ADL.  01/24/18 0957 Done   Family Acceptance E VU Educated pt on OT and POC. answered pt and pt's family questions and concerns. Educated to call for assist not to get up on her own. Educated on AE for ADL. Educated on safety and hip preucations thorughout t/f, mobility, ADL.  01/24/18 0957 Done                      User Key     Initials Effective Dates Name Provider Type Discipline     10/17/16 -  Marisol Burdick, OTR/L Occupational Therapist OT                  OT Recommendation and Plan  Anticipated Equipment Needs At Discharge: bedside commode, bathing equipment, dressing equipment, shower chair, front wheeled walker  Anticipated Discharge Disposition: skilled nursing facility, inpatient rehabilitation facility  Planned Therapy Interventions: activity intolerance, adaptive equipment training, ADL retraining, balance training, bed mobility training, energy conservation, fine motor coordination training, home exercise program, motor coordination training, ROM (Range of Motion), strengthening, transfer training  Therapy Frequency: other (see comments) (3-14x a week)  Plan of Care Review  Plan Of Care Reviewed With: patient  Progress: progress towards functional goals is fair  Outcome Summary/Follow up Plan: OT treatment this date. Pt averaged need for mod assist with bed mobility assist of two for sit to sup. Pt O2 dropped during sitting with education to deep breath and it victoriano, she had pain  reported in chest area after movementa nd RN notfified. Pt motivated to engage and participate and could benefit from further skilled OT. Recommend d/c to rehab for further therapy before d/c home.         Outcome Measures       01/24/18 1008 01/24/18 0755       How much help from another person do you currently need...    Turning from your back to your side while in flat bed without using bedrails? 3  -CB (r) HL (t) CB (c)      Moving from lying on back to sitting on the side of a flat bed without bedrails? 2  -CB      Moving to and from a bed to a chair (including a wheelchair)? 2  -CB (r) HL (t) CB (c)      Standing up from a chair using your arms (e.g., wheelchair, bedside chair)? 2  -CB (r) HL (t) CB (c)      Climbing 3-5 steps with a railing? 2  -CB (r) HL (t) CB (c)      To walk in hospital room? 2  -CB (r) HL (t) CB (c)      AM-PAC 6 Clicks Score 13  -CB      How much help from another is currently needed...    Putting on and taking off regular lower body clothing?  1  -BH     Bathing (including washing, rinsing, and drying)  2  -BH     Toileting (which includes using toilet bed pan or urinal)  1  -BH     Putting on and taking off regular upper body clothing  3  -BH     Taking care of personal grooming (such as brushing teeth)  3  -BH     Eating meals  3  -     Score  13  -     Functional Assessment    Outcome Measure Options AM-PAC 6 Clicks Basic Mobility (PT)  -CB (r) HL (t) CB (c) AM-PAC 6 Clicks Daily Activity (OT)  -       User Key  (r) = Recorded By, (t) = Taken By, (c) = Cosigned By    Initials Name Provider Type    CB Leatha Tirado, PT Physical Therapist     Marisol Burdick, OTR/L Occupational Therapist     Harper Ang, PT Student PT Student           Time Calculation:         Time Calculation- OT       01/24/18 1452 01/24/18 1004       Time Calculation- OT    OT Start Time 1329  - 0755  -     OT Stop Time 1413  - 0910  -     OT Time Calculation (min) 44 min  - 75 min  -      Total Timed Code Minutes- OT 44 minute(s)  - 45 minute(s)  -     OT Received On 01/24/18  - 01/24/18  -     OT Goal Re-Cert Due Date  02/06/18  -       User Key  (r) = Recorded By, (t) = Taken By, (c) = Cosigned By    Initials Name Provider Type     Marisol Burdick OTR/L Occupational Therapist           Therapy Charges for Today     Code Description Service Date Service Provider Modifiers Qty    76039325903 HC OT SELFCARE CURRENT 1/24/2018 Marisol Burdick OTR/L GO, CL 1    00205704770 HC OT SELFCARE PROJECTED 1/24/2018 Marisol Burdick OTR/L GO, CK 1    66674000216 HC OT SELF CARE/MGMT/TRAIN EA 15 MIN 1/24/2018 Marisol Burdick OTR/L GO 1    75442660910 HC OT THERAPEUTIC ACT EA 15 MIN 1/24/2018 Marisol Burdick OTR/L GO 2    25323137892 HC OT EVAL HIGH COMPLEXITY 2 1/24/2018 Marisol Burdick OTR/L GO 1    58658160949 HC OT THERAPEUTIC ACT EA 15 MIN 1/24/2018 Marisol Burdick OTR/L GO 1    38663579856 HC OT THER PROC EA 15 MIN 1/24/2018 Marisol Burdick OTR/L GO 2          OT G-codes  OT Professional Judgement Used?: Yes  OT Functional Scales Options: AM-PAC 6 Clicks Daily Activity (OT)  Score: 13  Functional Limitation: Self care  Self Care Current Status (): At least 60 percent but less than 80 percent impaired, limited or restricted  Self Care Goal Status (): At least 40 percent but less than 60 percent impaired, limited or restricted    Marisol Burdick OTR/L  1/24/2018

## 2018-01-24 NOTE — PLAN OF CARE
Problem: Patient Care Overview (Adult)  Goal: Plan of Care Review  Outcome: Ongoing (interventions implemented as appropriate)   01/24/18 1008   Coping/Psychosocial Response Interventions   Plan Of Care Reviewed With patient   Outcome Evaluation   Outcome Summary/Follow up Plan pt is a 86 y/o female s/p fall at home and right bipolar anterior hip approach. She required mod assist with bed mobility and min/mod x2 assist with sit to stand and gait with RW. Pt stated that she felt very dizzy after standing up twice. Her O2 dropped to 85% after standing and RN notified. Pt would benefit from acute rehab once d/c from hospital. PT will continue to see this pt to work on gaining functional mobility and endurance for ADLs.     Goal: Discharge Needs Assessment  Outcome: Ongoing (interventions implemented as appropriate)   01/24/18 1008   Current Health   Outpatient/Agency/Support Group Needs inpatient rehabilitation facility (specify)   Discharge Needs Assessment   Equipment Needed After Discharge none;other (see comments)  (Nephew stated he was going to build a ramp for pt.)   Discharge Facility/Level Of Care Needs acute rehab   Discharge Disposition still a patient   Living Environment   Transportation Available car;family or friend will provide   Self-Care   Equipment Currently Used at Home cane, straight;oxygen;rollator;grab bar;shower chair  (used O2 at night and PRN)       Problem: Inpatient Physical Therapy  Goal: Bed Mobility Goal LTG- PT  Outcome: Ongoing (interventions implemented as appropriate)   01/24/18 1008   Bed Mobility PT LTG   Bed Mobility PT LTG, Time to Achieve by discharge   Bed Mobility PT LTG, Activity Type all bed mobility   Bed Mobility PT LTG, [place] Level minimum assist (75% patient effort)   Bed Mobility PT LTG, Additional Goal HOB down, no handrails     Goal: Gait Training Goal LTG- PT  Outcome: Ongoing (interventions implemented as appropriate)   01/24/18 1008   Gait Training PT LTG    Gait Training Goal PT LTG, Time to Achieve by discharge   Gait Training Goal PT LTG, [place] Level contact guard assist   Gait Training Goal PT LTG, Assist Device walker, rolling   Gait Training Goal PT LTG, Distance to Achieve 50 feet     Goal: Strength Goal LTG- PT  Outcome: Ongoing (interventions implemented as appropriate)   01/24/18 1008   Strength Goal PT LTG   Strength Goal PT LTG, Time to Achieve by discharge   Strength Goal PT LTG, Measure to Achieve pt will perform 20 reps hip flexion, hip ABD/ADD, and knee flexion/extension on right LE. AAROM or AROM   Strength Goal PT LTG, Functional Goal Pt will be able to get legs up into bed

## 2018-01-24 NOTE — ANESTHESIA POSTPROCEDURE EVALUATION
Patient: Val Arteaga    Procedure Summary     Date Anesthesia Start Anesthesia Stop Room / Location    01/23/18 1704 1925  [facility] OR 12 / [facility]       Procedure Diagnosis Surgeon Provider    HIP BIPOLAR ANTERIOR APPROACH - right (Right Hip) Acquired hypothyroidism; Depressive disorder; Essential hypertension; Coronary artery disease involving native coronary artery of native heart without angina pectoris; Pulmonary emphysema, unspecified emphysema type; Displaced fracture of base of neck of right femur, initial encounter for closed fracture  (Acquired hypothyroidism [E03.9]; Depressive disorder [F32.9]; Essential hypertension [I10]; Coronary artery disease involving native coronary artery of native heart without angina pectoris [I25.10]; Pulmonary emphysema, unspecified emphysema type [J43.9]; Displaced fracture of base of neck of right femur, initial encounter for closed fracture [S72.041A]) MD Kofi Mariscal MD          Anesthesia Type: general  Last vitals  BP   162/67 (01/23/18 1622)   Temp   97.7 °F (36.5 °C) (01/23/18 1622)   Pulse   86 (01/23/18 1622)   Resp   20 (01/23/18 1622)     SpO2   94 % (01/23/18 1622)     Post Anesthesia Care and Evaluation    Patient location during evaluation: bedside  Patient participation: complete - patient cannot participate  Level of consciousness: awake  Pain score: 0  Pain management: adequate  Airway patency: patent  Anesthetic complications: No anesthetic complications  PONV Status: none  Cardiovascular status: acceptable  Respiratory status: acceptable  Hydration status: acceptable

## 2018-01-24 NOTE — PROGRESS NOTES
[facility] Medicine Services  INPATIENT PROGRESS NOTE    Length of Stay: 2  Date of Admission: 1/22/2018  Primary Care Physician: Roula Albert MD    Subjective   Chief Complaint: No complaints    HPI:      1/24/2018:  Patient states she feels much better today as far as pain.  She underwent a bipolar endoprosthesis of the right hip last night by Dr. Mcdowell.    This is an 85-year-old lady that presented to [facility] emergency Department with complaints of right hip pain after a mechanical fall.  A subcapital fracture with minimal displacement was noted on x-ray.  Dr. Mcdowell with orthopedics has been consulted and the patient will undergo a polar endoprosthesis of the right hip.      Review of Systems   Constitutional: Positive for activity change and appetite change.   Musculoskeletal:        Right hip pain with movement   All other systems reviewed and are negative.     All pertinent negatives and positives are as above. All other systems have been reviewed and are negative unless otherwise stated.     Objective    Temp:  [96.9 °F (36.1 °C)-97.9 °F (36.6 °C)] 97.3 °F (36.3 °C)  Heart Rate:  [82-99] 82  Resp:  [16-22] 18  BP: ()/(56-70) 113/56    Physical Exam   Constitutional: She is oriented to person, place, and time. She appears well-developed and well-nourished.   HENT:   Head: Normocephalic and atraumatic.   Eyes: EOM are normal. Pupils are equal, round, and reactive to light.   Neck: Normal range of motion. Neck supple.   Cardiovascular: Normal rate and regular rhythm.    Pulmonary/Chest: Effort normal and breath sounds normal.   Abdominal: Soft. Bowel sounds are normal.   Musculoskeletal: Normal range of motion.   Neurological: She is alert and oriented to person, place, and time.   Skin: Skin is warm and dry.   Psychiatric: She has a normal mood and affect.     Results Review:  I have reviewed the labs, radiology results, and diagnostic  studies.    Laboratory Data:     Results from last 7 days  Lab Units 01/24/18  0635 01/23/18  0600 01/22/18 1952   SODIUM mmol/L 129* 132* 126*   POTASSIUM mmol/L 4.4 3.9 3.9   CHLORIDE mmol/L 93* 94* 93*   CO2 mmol/L 24.0 27.0 25.0   BUN mg/dL 20 17 17   CREATININE mg/dL 1.06* 0.91 0.79   GLUCOSE mg/dL 128* 93 103*   CALCIUM mg/dL 7.5* 8.4 8.6   BILIRUBIN mg/dL  --   --  0.5   ALK PHOS U/L  --   --  75   ALT (SGPT) U/L  --   --  37   AST (SGOT) U/L  --   --  68*   ANION GAP mmol/L 12.0 11.0 8.0     Estimated Creatinine Clearance: 42.6 mL/min (by C-G formula based on Cr of 1.06).            Results from last 7 days  Lab Units 01/24/18  0635 01/23/18  1939 01/23/18  0600 01/22/18 1952   WBC 10*3/mm3 10.85*  --  9.61 12.18*   HEMOGLOBIN g/dL 9.3* 10.2* 10.8* 11.0*   HEMATOCRIT % 26.5* 29.8* 32.0* 32.0*   PLATELETS 10*3/mm3 215  --  228 243       Results from last 7 days  Lab Units 01/24/18  0635 01/23/18  1943 01/22/18  1952   INR  1.03 0.96 0.93       Culture Data:   No results found for: BLOODCX  Urine Culture   Date Value Ref Range Status   01/23/2018 No growth at 24 hours  Final     No results found for: RESPCX  No results found for: WOUNDCX  No results found for: STOOLCX  No components found for: BODYFLD    Radiology Data:   Imaging Results (last 24 hours)     Procedure Component Value Units Date/Time    FL C Arm During Surgery [[id number]] Resulted:  01/24/18 0818     Updated:  01/24/18 0818          I have reviewed the patient current medications.     Assessment/Plan     Hospital Problem List     * (Principal)Displaced fracture of base of neck of right femur, initial encounter for closed fracture    Essential hypertension    Coronary artery disease involving native coronary artery of native heart without angina pectoris    Chronic obstructive pulmonary disease    Acquired hypothyroidism    Hip fracture, right, closed, initial encounter    Pulmonary emphysema    Overview Signed 1/23/2018  6:20 AM by Kelvin  Valdemar Mcdowell MD     Added automatically from request for surgery [id number]               Plan:      1.  Subcapital fracture with minimal displacement, right hip:  Bipolar endoprosthesis of the right hip 1/23/2018.  [facility]U consulted.    2.  Congestive heart failure/grade I diastolic dysfunction/ EF 36-40%:  Continue Coreg, Lasix and Ranexa.  Pacemaker noted.  Continuous cardiac monitoring.    3.  Hyponatremia, improved:  Most likely due to hypervolemia.  Fluid and sodium restrictions.  Will monitor daily BMP.      ARU was consulted for evaluation for rehabilitation.       Discharge Planning: I expect patient to be discharged to home in 1-2 days.      This document has been electronically signed by CRESENCIO Mckinney on January 24, 2018 2:57 PM

## 2018-01-25 ENCOUNTER — APPOINTMENT (OUTPATIENT)
Dept: PULMONOLOGY | Facility: HOSPITAL | Age: 83
End: 2018-01-25

## 2018-01-25 LAB
ANION GAP SERPL CALCULATED.3IONS-SCNC: 9 MMOL/L (ref 5–15)
BASOPHILS # BLD AUTO: 0.01 10*3/MM3 (ref 0–0.2)
BASOPHILS NFR BLD AUTO: 0.1 % (ref 0–2)
BUN BLD-MCNC: 27 MG/DL (ref 7–21)
BUN/CREAT SERPL: 16.6 (ref 7–25)
CALCIUM SPEC-SCNC: 7.8 MG/DL (ref 8.4–10.2)
CHLORIDE SERPL-SCNC: 88 MMOL/L (ref 95–110)
CO2 SERPL-SCNC: 29 MMOL/L (ref 22–31)
CREAT BLD-MCNC: 1.63 MG/DL (ref 0.5–1)
DEPRECATED RDW RBC AUTO: 45.5 FL (ref 36.4–46.3)
EOSINOPHIL # BLD AUTO: 0.19 10*3/MM3 (ref 0–0.7)
EOSINOPHIL NFR BLD AUTO: 1.7 % (ref 0–7)
ERYTHROCYTE [DISTWIDTH] IN BLOOD BY AUTOMATED COUNT: 13.2 % (ref 11.5–14.5)
GFR SERPL CREATININE-BSD FRML MDRD: 30 ML/MIN/1.73 (ref 60–90)
GLUCOSE BLD-MCNC: 107 MG/DL (ref 60–100)
HCT VFR BLD AUTO: 23.5 % (ref 35–45)
HGB BLD-MCNC: 8 G/DL (ref 12–15.5)
IMM GRANULOCYTES # BLD: 0.05 10*3/MM3 (ref 0–0.02)
IMM GRANULOCYTES NFR BLD: 0.4 % (ref 0–0.5)
INR PPP: 1.19 (ref 0.8–1.2)
LAB AP CASE REPORT: NORMAL
LYMPHOCYTES # BLD AUTO: 1.33 10*3/MM3 (ref 0.6–4.2)
LYMPHOCYTES NFR BLD AUTO: 11.7 % (ref 10–50)
Lab: NORMAL
MCH RBC QN AUTO: 32.4 PG (ref 26.5–34)
MCHC RBC AUTO-ENTMCNC: 34 G/DL (ref 31.4–36)
MCV RBC AUTO: 95.1 FL (ref 80–98)
MONOCYTES # BLD AUTO: 0.89 10*3/MM3 (ref 0–0.9)
MONOCYTES NFR BLD AUTO: 7.8 % (ref 0–12)
NEUTROPHILS # BLD AUTO: 8.87 10*3/MM3 (ref 2–8.6)
NEUTROPHILS NFR BLD AUTO: 78.3 % (ref 37–80)
PATH REPORT.FINAL DX SPEC: NORMAL
PATH REPORT.GROSS SPEC: NORMAL
PLATELET # BLD AUTO: 203 10*3/MM3 (ref 150–450)
PMV BLD AUTO: 9.6 FL (ref 8–12)
POTASSIUM BLD-SCNC: 4.1 MMOL/L (ref 3.5–5.1)
PROTHROMBIN TIME: 15.1 SECONDS (ref 11.1–15.3)
RBC # BLD AUTO: 2.47 10*6/MM3 (ref 3.77–5.16)
SODIUM BLD-SCNC: 126 MMOL/L (ref 137–145)
WBC NRBC COR # BLD: 11.34 10*3/MM3 (ref 3.2–9.8)

## 2018-01-25 PROCEDURE — 94760 N-INVAS EAR/PLS OXIMETRY 1: CPT

## 2018-01-25 PROCEDURE — 86900 BLOOD TYPING SEROLOGIC ABO: CPT

## 2018-01-25 PROCEDURE — 85025 COMPLETE CBC W/AUTO DIFF WBC: CPT | Performed by: FAMILY MEDICINE

## 2018-01-25 PROCEDURE — 94799 UNLISTED PULMONARY SVC/PX: CPT

## 2018-01-25 PROCEDURE — 36430 TRANSFUSION BLD/BLD COMPNT: CPT

## 2018-01-25 PROCEDURE — 85610 PROTHROMBIN TIME: CPT | Performed by: ORTHOPAEDIC SURGERY

## 2018-01-25 PROCEDURE — 99024 POSTOP FOLLOW-UP VISIT: CPT | Performed by: ORTHOPAEDIC SURGERY

## 2018-01-25 PROCEDURE — P9016 RBC LEUKOCYTES REDUCED: HCPCS

## 2018-01-25 PROCEDURE — 80048 BASIC METABOLIC PNL TOTAL CA: CPT | Performed by: FAMILY MEDICINE

## 2018-01-25 PROCEDURE — 86901 BLOOD TYPING SEROLOGIC RH(D): CPT

## 2018-01-25 RX ORDER — SODIUM CHLORIDE 9 MG/ML
50 INJECTION, SOLUTION INTRAVENOUS CONTINUOUS
Status: DISCONTINUED | OUTPATIENT
Start: 2018-01-25 | End: 2018-01-26 | Stop reason: HOSPADM

## 2018-01-25 RX ORDER — FUROSEMIDE 40 MG/1
40 TABLET ORAL DAILY
Status: DISCONTINUED | OUTPATIENT
Start: 2018-01-26 | End: 2018-01-26 | Stop reason: HOSPADM

## 2018-01-25 RX ORDER — SODIUM CHLORIDE 9 MG/ML
50 INJECTION, SOLUTION INTRAVENOUS CONTINUOUS
Status: DISCONTINUED | OUTPATIENT
Start: 2018-01-25 | End: 2018-01-25

## 2018-01-25 RX ORDER — IPRATROPIUM BROMIDE AND ALBUTEROL SULFATE 2.5; .5 MG/3ML; MG/3ML
3 SOLUTION RESPIRATORY (INHALATION)
Status: DISCONTINUED | OUTPATIENT
Start: 2018-01-25 | End: 2018-01-26 | Stop reason: HOSPADM

## 2018-01-25 RX ADMIN — MONTELUKAST 10 MG: 10 TABLET, FILM COATED ORAL at 20:50

## 2018-01-25 RX ADMIN — SERTRALINE HYDROCHLORIDE 50 MG: 50 TABLET ORAL at 08:46

## 2018-01-25 RX ADMIN — IPRATROPIUM BROMIDE AND ALBUTEROL SULFATE 3 ML: 2.5; .5 SOLUTION RESPIRATORY (INHALATION) at 08:19

## 2018-01-25 RX ADMIN — RANOLAZINE 1000 MG: 500 TABLET, FILM COATED, EXTENDED RELEASE ORAL at 08:46

## 2018-01-25 RX ADMIN — IPRATROPIUM BROMIDE AND ALBUTEROL SULFATE 3 ML: 2.5; .5 SOLUTION RESPIRATORY (INHALATION) at 14:51

## 2018-01-25 RX ADMIN — ATORVASTATIN CALCIUM 20 MG: 20 TABLET, FILM COATED ORAL at 08:46

## 2018-01-25 RX ADMIN — FAMOTIDINE 40 MG: 40 TABLET ORAL at 08:46

## 2018-01-25 RX ADMIN — RANOLAZINE 1000 MG: 500 TABLET, FILM COATED, EXTENDED RELEASE ORAL at 20:50

## 2018-01-25 RX ADMIN — IPRATROPIUM BROMIDE AND ALBUTEROL SULFATE 3 ML: 2.5; .5 SOLUTION RESPIRATORY (INHALATION) at 19:59

## 2018-01-25 RX ADMIN — CARVEDILOL 6.25 MG: 6.25 TABLET, FILM COATED ORAL at 18:19

## 2018-01-25 RX ADMIN — BUDESONIDE AND FORMOTEROL FUMARATE DIHYDRATE 2 PUFF: 160; 4.5 AEROSOL RESPIRATORY (INHALATION) at 08:21

## 2018-01-25 RX ADMIN — DOCUSATE SODIUM 100 MG: 100 CAPSULE, LIQUID FILLED ORAL at 08:46

## 2018-01-25 RX ADMIN — IPRATROPIUM BROMIDE AND ALBUTEROL SULFATE 3 ML: 2.5; .5 SOLUTION RESPIRATORY (INHALATION) at 03:05

## 2018-01-25 RX ADMIN — WARFARIN SODIUM 2.5 MG: 2.5 TABLET ORAL at 18:19

## 2018-01-25 RX ADMIN — DOCUSATE SODIUM 100 MG: 100 CAPSULE, LIQUID FILLED ORAL at 20:50

## 2018-01-25 RX ADMIN — LEVOTHYROXINE SODIUM 50 MCG: 50 TABLET ORAL at 05:45

## 2018-01-25 NOTE — PLAN OF CARE
Problem: Patient Care Overview (Adult)  Goal: Plan of Care Review  Outcome: Ongoing (interventions implemented as appropriate)   01/25/18 1528   Coping/Psychosocial Response Interventions   Plan Of Care Reviewed With caregiver;patient;family   Patient Care Overview   Progress improving   Outcome Evaluation   Outcome Summary/Follow up Plan PO diet started but decreased appetite with pt becoming hypotensive this morning. Will continue to send supplements within the fluid restrictions.        Problem: Fractured Hip (Adult)  Goal: Signs and Symptoms of Listed Potential Problems Will be Absent or Manageable (Fractured Hip)  Outcome: Ongoing (interventions implemented as appropriate)

## 2018-01-25 NOTE — PROGRESS NOTES
[facility] Medicine Services  INPATIENT PROGRESS NOTE    Length of Stay: 3  Date of Admission: 1/22/2018  Primary Care Physician: Roula Albert MD    Subjective   Chief Complaint: No complaints    HPI:      1/25/2017:   The patient is more tired today.  Hemoglobin is down to 8 today.  Blood pressure has been low.  He is well controlled we'll give the patient to units of packed red blood cells.    1/24/2018:  Patient states she feels much better today as far as pain.  She underwent a bipolar endoprosthesis of the right hip last night by Dr. Mcdowell.    This is an 85-year-old lady that presented to [facility] emergency Department with complaints of right hip pain after a mechanical fall.  A subcapital fracture with minimal displacement was noted on x-ray.  Dr. Mcdowell with orthopedics has been consulted and the patient will undergo a polar endoprosthesis of the right hip.      Review of Systems   Constitutional: Positive for activity change and appetite change.   Musculoskeletal:        Right hip pain with movement   All other systems reviewed and are negative.     All pertinent negatives and positives are as above. All other systems have been reviewed and are negative unless otherwise stated.     Objective    Temp:  [96.6 °F (35.9 °C)-97.7 °F (36.5 °C)] 96.6 °F (35.9 °C)  Heart Rate:  [76-91] 76  Resp:  [16-20] 16  BP: (103-124)/(45-59) 103/45    Physical Exam   Constitutional: She is oriented to person, place, and time. She appears well-developed and well-nourished.   HENT:   Head: Normocephalic and atraumatic.   Eyes: EOM are normal. Pupils are equal, round, and reactive to light.   Neck: Normal range of motion. Neck supple.   Cardiovascular: Normal rate and regular rhythm.    Pulmonary/Chest: Effort normal and breath sounds normal.   Abdominal: Soft. Bowel sounds are normal.   Musculoskeletal: Normal range of motion.   Neurological: She is alert and oriented to person,  place, and time.   Skin: Skin is warm and dry.   Psychiatric: She has a normal mood and affect.     Results Review:  I have reviewed the labs, radiology results, and diagnostic studies.    Laboratory Data:     Results from last 7 days  Lab Units 01/25/18 0601 01/24/18 0635 01/23/18 0600 01/22/18 1952   SODIUM mmol/L 126* 129* 132* 126*   POTASSIUM mmol/L 4.1 4.4 3.9 3.9   CHLORIDE mmol/L 88* 93* 94* 93*   CO2 mmol/L 29.0 24.0 27.0 25.0   BUN mg/dL 27* 20 17 17   CREATININE mg/dL 1.63* 1.06* 0.91 0.79   GLUCOSE mg/dL 107* 128* 93 103*   CALCIUM mg/dL 7.8* 7.5* 8.4 8.6   BILIRUBIN mg/dL  --   --   --  0.5   ALK PHOS U/L  --   --   --  75   ALT (SGPT) U/L  --   --   --  37   AST (SGOT) U/L  --   --   --  68*   ANION GAP mmol/L 9.0 12.0 11.0 8.0     Estimated Creatinine Clearance: 27.7 mL/min (by C-G formula based on Cr of 1.63).            Results from last 7 days  Lab Units 01/25/18 0601 01/24/18 0635 01/23/18 1939 01/23/18 0600 01/22/18 1952   WBC 10*3/mm3 11.34* 10.85*  --  9.61 12.18*   HEMOGLOBIN g/dL 8.0* 9.3* 10.2* 10.8* 11.0*   HEMATOCRIT % 23.5* 26.5* 29.8* 32.0* 32.0*   PLATELETS 10*3/mm3 203 215  --  228 243       Results from last 7 days  Lab Units 01/25/18  0601 01/24/18 0635 01/23/18 1943 01/22/18 1952   INR  1.19 1.03 0.96 0.93       Culture Data:   No results found for: BLOODCX  Urine Culture   Date Value Ref Range Status   01/23/2018 No growth at 24 hours  Final     No results found for: RESPCX  No results found for: WOUNDCX  No results found for: STOOLCX  No components found for: BODYFLD    Radiology Data:   Imaging Results (last 24 hours)     ** No results found for the last 24 hours. **          I have reviewed the patient current medications.     Assessment/Plan     Hospital Problem List     * (Principal)Displaced fracture of base of neck of right femur, initial encounter for closed fracture    Essential hypertension    Coronary artery disease involving native coronary artery of native  heart without angina pectoris    Chronic obstructive pulmonary disease    Acquired hypothyroidism    Hip fracture, right, closed, initial encounter    Pulmonary emphysema    Overview Signed 1/23/2018  6:20 AM by Kelvin Mcdowell MD     Added automatically from request for surgery [id number]               Plan:      1.  Subcapital fracture with minimal displacement, right hip:  Bipolar endoprosthesis of the right hip 1/23/2018.  ARU consulted.    2.  Congestive heart failure/grade I diastolic dysfunction/ EF 36-40%:  Continue Coreg, Lasix and Ranexa.  Pacemaker noted.  Continuous cardiac monitoring.    3.  Hyponatremia:  Most likely due to hypervolemia.  Fluid and sodium restrictions.  Will monitor daily BMP.    4.  Anemia: We will give the patient 2 units of packed red blood cells.  Risk and benefits are given and patient wishes to proceed.  5.  Hypotension:  Gentle hydration today.    ARU was consulted for evaluation for rehabilitation.       Discharge Planning: I expect patient to be discharged to home in 1-2 days.      This document has been electronically signed by CRESENCIO Mckinney on January 25, 2018 10:25 AM

## 2018-01-25 NOTE — SIGNIFICANT NOTE
01/25/18 0940   Rehab Treatment   Discipline physical therapy assistant   Treatment Not Performed patient/family declined treatment  (Pt lethargic - BP lower and RN held BP meds. Pt going to be getting blood - RN advised to hold until after blood. )   Recommendation   PT - Next Appointment 01/26/18

## 2018-01-25 NOTE — SIGNIFICANT NOTE
01/25/18 0843   Rehab Treatment   Discipline occupational therapy assistant   Treatment Not Performed unable to treat, medical status change  (Pt is lethargic. BP is low this am. Pt is going to be getting blood. RN advised to hold tx.)

## 2018-01-25 NOTE — PROGRESS NOTES
Orthopedic Total Hip Progress Note      Patient: Val Arteaga  Date of Admission: 1/22/2018  YOB: 1932  Medical Record Number: [id number]    LOS: 3    Status Post: Procedure(s) (LRB):  HIP BIPOLAR ANTERIOR APPROACH - right (Right)    Systemic or Specific Complaints: No Complaints  Complications: None  Pain Relief: some relief    Physical Exam:  85 y.o.  female  alert and oriented  Vitals:    01/25/18 0012 01/25/18 0035 01/25/18 0305 01/25/18 0439   BP: 118/54   105/55   BP Location: Left arm   Right arm   Patient Position: Lying   Lying   Pulse: 87 87 90 91   Resp: 18  16 16   Temp: 97.3 °F (36.3 °C)   97 °F (36.1 °C)   TempSrc: Oral   Oral   SpO2: 96%  99% 95%   Weight:       Height:           Abd: Soft, NT, with BS +  Ext: NV intact. ROM appropriate. Calf is soft and nontender. Negative Homans Sn  Skin: Dressing clean dry and intact w/out signs or  symptoms of infection.    Activity: Mobilizing Per P.T.   Weight Bearing: As Tolerated    Data Review  Admission on 01/22/2018   Component Date Value Ref Range Status   • Protime 01/22/2018 12.4  11.1 - 15.3 Seconds Final   • INR 01/22/2018 0.93  0.80 - 1.20 Final   • WBC 01/22/2018 12.18* 3.20 - 9.80 10*3/mm3 Final   • RBC 01/22/2018 3.29* 3.77 - 5.16 10*6/mm3 Final   • Hemoglobin 01/22/2018 11.0* 12.0 - 15.5 g/dL Final   • Hematocrit 01/22/2018 32.0* 35.0 - 45.0 % Final   • MCV 01/22/2018 97.3  80.0 - 98.0 fL Final   • MCH 01/22/2018 33.4  26.5 - 34.0 pg Final   • MCHC 01/22/2018 34.4  31.4 - 36.0 g/dL Final   • RDW 01/22/2018 13.3  11.5 - 14.5 % Final   • RDW-SD 01/22/2018 47.2* 36.4 - 46.3 fl Final   • MPV 01/22/2018 8.8  8.0 - 12.0 fL Final   • Platelets 01/22/2018 243  150 - 450 10*3/mm3 Final   • Neutrophil % 01/22/2018 82.5* 37.0 - 80.0 % Final   • Lymphocyte % 01/22/2018 9.9* 10.0 - 50.0 % Final   • Monocyte % 01/22/2018 5.5  0.0 - 12.0 % Final   • Eosinophil % 01/22/2018 1.6  0.0 - 7.0 % Final   • Basophil % 01/22/2018 0.1  0.0 - 2.0 % Final    • Immature Grans % 01/22/2018 0.4  0.0 - 0.5 % Final   • Neutrophils, Absolute 01/22/2018 10.05* 2.00 - 8.60 10*3/mm3 Final   • Lymphocytes, Absolute 01/22/2018 1.20  0.60 - 4.20 10*3/mm3 Final   • Monocytes, Absolute 01/22/2018 0.67  0.00 - 0.90 10*3/mm3 Final   • Eosinophils, Absolute 01/22/2018 0.20  0.00 - 0.70 10*3/mm3 Final   • Basophils, Absolute 01/22/2018 0.01  0.00 - 0.20 10*3/mm3 Final   • Immature Grans, Absolute 01/22/2018 0.05* 0.00 - 0.02 10*3/mm3 Final   • ABO Type 01/22/2018 O   Final   • RH type 01/22/2018 Positive   Final   • Antibody Screen 01/22/2018 Negative   Final   • Glucose 01/22/2018 103* 60 - 100 mg/dL Final   • BUN 01/22/2018 17  7 - 21 mg/dL Final   • Creatinine 01/22/2018 0.79  0.50 - 1.00 mg/dL Final   • Sodium 01/22/2018 126* 137 - 145 mmol/L Final   • Potassium 01/22/2018 3.9  3.5 - 5.1 mmol/L Final   • Chloride 01/22/2018 93* 95 - 110 mmol/L Final   • CO2 01/22/2018 25.0  22.0 - 31.0 mmol/L Final   • Calcium 01/22/2018 8.6  8.4 - 10.2 mg/dL Final   • Total Protein 01/22/2018 6.5  6.3 - 8.6 g/dL Final   • Albumin 01/22/2018 3.50  3.40 - 4.80 g/dL Final   • ALT (SGPT) 01/22/2018 37  9 - 52 U/L Final   • AST (SGOT) 01/22/2018 68* 14 - 36 U/L Final   • Alkaline Phosphatase 01/22/2018 75  38 - 126 U/L Final   • Total Bilirubin 01/22/2018 0.5  0.2 - 1.3 mg/dL Final   • eGFR Non African Amer 01/22/2018 69  39 - 90 mL/min/1.73 Final   • Globulin 01/22/2018 3.0  2.3 - 3.5 gm/dL Final   • A/G Ratio 01/22/2018 1.2  1.1 - 1.8 g/dL Final   • BUN/Creatinine Ratio 01/22/2018 21.5  7.0 - 25.0 Final   • Anion Gap 01/22/2018 8.0  5.0 - 15.0 mmol/L Final   • Previous History 01/22/2018 No record on File   Final   • Urine Culture 01/23/2018 No growth at 24 hours   Final   • Color, UA 01/23/2018 Dark Yellow  Yellow, Straw, Dark Yellow, [place] Final   • Appearance, UA 01/23/2018 Cloudy* Clear Final   • pH, UA 01/23/2018 <=5.0  5.0 - 9.0 Final   • Specific Gravity, UA 01/23/2018 1.020  1.003 - 1.030  Final   • Glucose, UA 01/23/2018 Negative  Negative Final   • Ketones, UA 01/23/2018 Trace* Negative Final   • Bilirubin, UA 01/23/2018 Moderate (2+)* Negative Final   • Blood, UA 01/23/2018 Moderate (2+)* Negative Final   • Protein, UA 01/23/2018 Negative  Negative Final   • Leuk Esterase, UA 01/23/2018 Trace* Negative Final   • Nitrite, UA 01/23/2018 Negative  Negative Final   • Urobilinogen, UA 01/23/2018 0.2 E.U./dL  0.2 - 1.0 E.U./dL Final   • RBC, UA 01/23/2018 0-2* None Seen /HPF Final   • WBC, UA 01/23/2018 0-2  None Seen, 0-2, 3-5 /HPF Final   • Bacteria, UA 01/23/2018 None Seen  None Seen /HPF Final   • Squamous Epithelial Cells, UA 01/23/2018 3-5* None Seen, 0-2 /HPF Final   • Hyaline Casts, UA 01/23/2018 None Seen  None Seen /LPF Final   • Methodology 01/23/2018 Manual Light Microscopy   Final   • Glucose 01/23/2018 93  60 - 100 mg/dL Final   • BUN 01/23/2018 17  7 - 21 mg/dL Final   • Creatinine 01/23/2018 0.91  0.50 - 1.00 mg/dL Final   • Sodium 01/23/2018 132* 137 - 145 mmol/L Final   • Potassium 01/23/2018 3.9  3.5 - 5.1 mmol/L Final   • Chloride 01/23/2018 94* 95 - 110 mmol/L Final   • CO2 01/23/2018 27.0  22.0 - 31.0 mmol/L Final   • Calcium 01/23/2018 8.4  8.4 - 10.2 mg/dL Final   • eGFR Non African Amer 01/23/2018 59* >60 mL/min/1.73 Final   • BUN/Creatinine Ratio 01/23/2018 18.7  7.0 - 25.0 Final   • Anion Gap 01/23/2018 11.0  5.0 - 15.0 mmol/L Final   • WBC 01/23/2018 9.61  3.20 - 9.80 10*3/mm3 Final   • RBC 01/23/2018 3.34* 3.77 - 5.16 10*6/mm3 Final   • Hemoglobin 01/23/2018 10.8* 12.0 - 15.5 g/dL Final   • Hematocrit 01/23/2018 32.0* 35.0 - 45.0 % Final   • MCV 01/23/2018 95.8  80.0 - 98.0 fL Final   • MCH 01/23/2018 32.3  26.5 - 34.0 pg Final   • MCHC 01/23/2018 33.8  31.4 - 36.0 g/dL Final   • RDW 01/23/2018 13.1  11.5 - 14.5 % Final   • RDW-SD 01/23/2018 45.6  36.4 - 46.3 fl Final   • MPV 01/23/2018 9.5  8.0 - 12.0 fL Final   • Platelets 01/23/2018 228  150 - 450 10*3/mm3 Final   •  Neutrophil % 01/23/2018 70.8  37.0 - 80.0 % Final   • Lymphocyte % 01/23/2018 19.3  10.0 - 50.0 % Final   • Monocyte % 01/23/2018 6.0  0.0 - 12.0 % Final   • Eosinophil % 01/23/2018 3.3  0.0 - 7.0 % Final   • Basophil % 01/23/2018 0.3  0.0 - 2.0 % Final   • Immature Grans % 01/23/2018 0.3  0.0 - 0.5 % Final   • Neutrophils, Absolute 01/23/2018 6.80  2.00 - 8.60 10*3/mm3 Final   • Lymphocytes, Absolute 01/23/2018 1.85  0.60 - 4.20 10*3/mm3 Final   • Monocytes, Absolute 01/23/2018 0.58  0.00 - 0.90 10*3/mm3 Final   • Eosinophils, Absolute 01/23/2018 0.32  0.00 - 0.70 10*3/mm3 Final   • Basophils, Absolute 01/23/2018 0.03  0.00 - 0.20 10*3/mm3 Final   • Immature Grans, Absolute 01/23/2018 0.03* 0.00 - 0.02 10*3/mm3 Final   • Hemoglobin 01/23/2018 10.2* 12.0 - 15.5 g/dL Final   • Hematocrit 01/23/2018 29.8* 35.0 - 45.0 % Final   • Extra Tube 01/23/2018 hold for add-on   Final    Auto resulted   • Extra Tube 01/23/2018 Hold for add-ons.   Final    Auto resulted.   • Protime 01/23/2018 12.7  11.1 - 15.3 Seconds Final   • INR 01/23/2018 0.96  0.80 - 1.20 Final   • Glucose 01/24/2018 128* 60 - 100 mg/dL Final   • BUN 01/24/2018 20  7 - 21 mg/dL Final   • Creatinine 01/24/2018 1.06* 0.50 - 1.00 mg/dL Final   • Sodium 01/24/2018 129* 137 - 145 mmol/L Final   • Potassium 01/24/2018 4.4  3.5 - 5.1 mmol/L Final   • Chloride 01/24/2018 93* 95 - 110 mmol/L Final   • CO2 01/24/2018 24.0  22.0 - 31.0 mmol/L Final   • Calcium 01/24/2018 7.5* 8.4 - 10.2 mg/dL Final   • eGFR Non African Amer 01/24/2018 49* >60 mL/min/1.73 Final   • BUN/Creatinine Ratio 01/24/2018 18.9  7.0 - 25.0 Final   • Anion Gap 01/24/2018 12.0  5.0 - 15.0 mmol/L Final   • Protime 01/24/2018 13.4  11.1 - 15.3 Seconds Final   • INR 01/24/2018 1.03  0.80 - 1.20 Final   • WBC 01/24/2018 10.85* 3.20 - 9.80 10*3/mm3 Final   • RBC 01/24/2018 2.78* 3.77 - 5.16 10*6/mm3 Final   • Hemoglobin 01/24/2018 9.3* 12.0 - 15.5 g/dL Final   • Hematocrit 01/24/2018 26.5* 35.0 - 45.0  % Final   • MCV 01/24/2018 95.3  80.0 - 98.0 fL Final   • MCH 01/24/2018 33.5  26.5 - 34.0 pg Final   • MCHC 01/24/2018 35.1  31.4 - 36.0 g/dL Final   • RDW 01/24/2018 13.0  11.5 - 14.5 % Final   • RDW-SD 01/24/2018 44.9  36.4 - 46.3 fl Final   • MPV 01/24/2018 9.4  8.0 - 12.0 fL Final   • Platelets 01/24/2018 215  150 - 450 10*3/mm3 Final   • Neutrophil % 01/24/2018 87.1* 37.0 - 80.0 % Final   • Lymphocyte % 01/24/2018 7.1* 10.0 - 50.0 % Final   • Monocyte % 01/24/2018 5.3  0.0 - 12.0 % Final   • Eosinophil % 01/24/2018 0.1  0.0 - 7.0 % Final   • Basophil % 01/24/2018 0.1  0.0 - 2.0 % Final   • Immature Grans % 01/24/2018 0.3  0.0 - 0.5 % Final   • Neutrophils, Absolute 01/24/2018 9.46* 2.00 - 8.60 10*3/mm3 Final   • Lymphocytes, Absolute 01/24/2018 0.77  0.60 - 4.20 10*3/mm3 Final   • Monocytes, Absolute 01/24/2018 0.57  0.00 - 0.90 10*3/mm3 Final   • Eosinophils, Absolute 01/24/2018 0.01  0.00 - 0.70 10*3/mm3 Final   • Basophils, Absolute 01/24/2018 0.01  0.00 - 0.20 10*3/mm3 Final   • Immature Grans, Absolute 01/24/2018 0.03* 0.00 - 0.02 10*3/mm3 Final   • Glucose 01/25/2018 107* 60 - 100 mg/dL Final   • BUN 01/25/2018 27* 7 - 21 mg/dL Final   • Creatinine 01/25/2018 1.63* 0.50 - 1.00 mg/dL Final   • Sodium 01/25/2018 126* 137 - 145 mmol/L Final   • Potassium 01/25/2018 4.1  3.5 - 5.1 mmol/L Final   • Chloride 01/25/2018 88* 95 - 110 mmol/L Final   • CO2 01/25/2018 29.0  22.0 - 31.0 mmol/L Final   • Calcium 01/25/2018 7.8* 8.4 - 10.2 mg/dL Final   • eGFR Non African Amer 01/25/2018 30* >60 mL/min/1.73 Final   • BUN/Creatinine Ratio 01/25/2018 16.6  7.0 - 25.0 Final   • Anion Gap 01/25/2018 9.0  5.0 - 15.0 mmol/L Final   • Protime 01/25/2018 15.1  11.1 - 15.3 Seconds Final   • INR 01/25/2018 1.19  0.80 - 1.20 Final   • WBC 01/25/2018 11.34* 3.20 - 9.80 10*3/mm3 Final   • RBC 01/25/2018 2.47* 3.77 - 5.16 10*6/mm3 Final   • Hemoglobin 01/25/2018 8.0* 12.0 - 15.5 g/dL Final   • Hematocrit 01/25/2018 23.5* 35.0 -  45.0 % Final   • MCV 01/25/2018 95.1  80.0 - 98.0 fL Final   • MCH 01/25/2018 32.4  26.5 - 34.0 pg Final   • MCHC 01/25/2018 34.0  31.4 - 36.0 g/dL Final   • RDW 01/25/2018 13.2  11.5 - 14.5 % Final   • RDW-SD 01/25/2018 45.5  36.4 - 46.3 fl Final   • MPV 01/25/2018 9.6  8.0 - 12.0 fL Final   • Platelets 01/25/2018 203  150 - 450 10*3/mm3 Final   • Neutrophil % 01/25/2018 78.3  37.0 - 80.0 % Final   • Lymphocyte % 01/25/2018 11.7  10.0 - 50.0 % Final   • Monocyte % 01/25/2018 7.8  0.0 - 12.0 % Final   • Eosinophil % 01/25/2018 1.7  0.0 - 7.0 % Final   • Basophil % 01/25/2018 0.1  0.0 - 2.0 % Final   • Immature Grans % 01/25/2018 0.4  0.0 - 0.5 % Final   • Neutrophils, Absolute 01/25/2018 8.87* 2.00 - 8.60 10*3/mm3 Final   • Lymphocytes, Absolute 01/25/2018 1.33  0.60 - 4.20 10*3/mm3 Final   • Monocytes, Absolute 01/25/2018 0.89  0.00 - 0.90 10*3/mm3 Final   • Eosinophils, Absolute 01/25/2018 0.19  0.00 - 0.70 10*3/mm3 Final   • Basophils, Absolute 01/25/2018 0.01  0.00 - 0.20 10*3/mm3 Final   • Immature Grans, Absolute 01/25/2018 0.05* 0.00 - 0.02 10*3/mm3 Final       Xr Chest 2 View    Result Date: 1/3/2018  Narrative: [facility] Patient Name: ADALBERTO NOLASCO ORDERING: REMY TYLER ATTENDING: REMY TYLER REFERRING: REMY TYLER ----------------------- PROCEDURE: Two-view chest COMPARISON: 1/1/2018 HISTORY: Chest pain protocol FINDINGS: Frontal and lateral views of the chest are obtained. Devices: Left-sided cardiac pacer stable in position. Lungs/Pleura: Ill-defined markings in the left lung base, probably due to subsegmental atelectasis. Lungs otherwise appear clear. Cardiomediastinal structures: Cardiac silhouette is normal in size. Thoracic aorta contains atherosclerotic calcification.     Impression: CONCLUSION:  Subsegmental atelectasis left lung base. Lungs otherwise appear clear. Electronically signed by:  González Forrest MD  1/3/2018 3:57 PM CST Workstation:  [id number]    Xr Chest 2 View    Result Date: 1/1/2018  Narrative: PROCEDURE: Chest PA and lateral REASON FOR EXAM: wheezing FINDINGS: Comparison study dated September 5, 2017.  Stable pacemaker and leads. . Cardiac and pulmonary vasculature are normal . Lungs are clear. Pleural spaces are normal . No acute osseous abnormality.     Impression: No acute cardiopulmonary abnormality. Electronically signed by:  Renny Stovall MD  1/1/2018 10:42 AM CST Workstation: [id number]    Xr Knee 4+ View Right    Result Date: 1/23/2018  Narrative: Exam: Right knee four views INDICATION: Status post fall FINDINGS: Four views. Limited exam due to positioning. There is moderate narrowing medial joint compartment of the knee. No acute fracture or dislocation. No lytic or destructive lesion.     Impression: No obvious acute fracture. Electronically signed by:  Dany De Dios MD  1/23/2018 1:03 AM CST Workstation: [id number]    Xr Chest 1 View    Result Date: 1/22/2018  Narrative: Preoperative study for right hip surgery.  Chest AP view. COMPARISON: January 4, 2018. One image was performed. The cardiac silhouette is within normal limits. The lung fields are clear. No pleural effusion is identified. No acute bony abnormality is seen. There is osteopenia. The pacemaker is unchanged.     Impression: CONCLUSION: No acute cardiopulmonary pathology is identified. Electronically signed by:  Dileep Zavaleta MD  1/22/2018 7:50 PM CST Workstation: [id number]    Ct Angiogram Chest With Contrast    Result Date: 1/7/2018  Narrative: [facility] Patient Name: ADALBERTO NOLASCO ORDERING: ANALI JORGE ATTENDING: ADELA ALEJANDRE REFERRING: ANALI JORGE ----------------------- EXAM DESCRIPTION: CT ANGIOGRAM CHEST W CONTRAST CLINICAL HISTORY:  rule out pulmonary embolus, R07.2 Precordial pain R06.02 Shortness of breath R74.8 Abnormal levels of other serum enzymes I49.5 Sick sinus syndrome I21.4 Non-ST elevation (NSTEMI) myocardial  infarction R06.00 Dyspnea, unspecified R07.9 Chest pain, unspecified I25.10 Atherosclerotic heart disease of native coronary artery without angina pectoris   COMPARISON: 2/10/2016 TECHNIQUE: Axial images were obtained and multiplanar reconstructions were made.  This exam was performed according to our departmental dose optimization program, which includes automated exposure control, adjustment of the mA and/or KV according to patient size and/or use of iterative reconstruction technique. Dose Length Product 305.30 CONTRAST:   66 cc intravenous Isovue 370 FINDINGS: Diagnostic quality: Adequate . Pulmonary embolism: No evidence of pulmonary embolism. Pulmonary arteries:  Normal in caliber. Lung parenchyma: There is a background of pulmonary emphysema redemonstrated. There is some dependent basilar atelectasis greater on the right. No dense parenchymal consolidation, effusion or pneumothorax. Intimal scarring or atelectasis within the lingula. No mass or suspicious nodule. Pleural effusion: None. Central airways: Patent. Adenopathy: No suspicious mediastinal, hilar, or axillary lymph nodes based on size or morphologic criteria. Heart and great vessels: No cardiac enlargement or pericardial effusion. Coronary vascular calcification. There is again findings suggesting right heart insufficiency with prominent amount of contrast extending into the azygos system and the IVC and small amount within the hepatic veins. Upper abdomen: Small hiatal hernia. No acute findings.  Bones: Degenerative changes throughout the thoracic spine. No acute compression fracture..  Additional findings: None.     Impression: No CT evidence of pulmonary embolism. Pulmonary emphysema with basilar dependent atelectasis. No consolidation or acute pleural finding. Electronically signed by:  Marco A Fraire MD  1/7/2018 4:32 PM CST Workstation: [id number]    Xr Chest Pa & Lateral    Result Date: 1/4/2018  Narrative: PROCEDURE: Chest PA and lateral REASON  FOR EXAM: Follow up dyspnea, possible pneumonia, R07.2 Precordial pain R06.02 Shortness of breath R74.8 Abnormal levels of other serum enzymes FINDINGS: Comparison study dated January 3, 2015.  Stable pacemaker and leads. . Cardiac and pulmonary vasculature are normal . Lungs are clear. Pleural spaces are normal . No acute osseous abnormality.     Impression: No acute cardiopulmonary abnormality. Electronically signed by:  Renny Stovall MD  1/4/2018 7:42 AM CST Workstation: [id number]    Xr Hip With Or Without Pelvis 2 - 3 View Right    Result Date: 1/22/2018  Narrative: EXAM:  Radiograph(s), Osseous       REGION:   Pelvis and right hip  VIEWS:   3         INDICATION:    Fall, right hip pain   COMPARISON:    none          FINDINGS:       Subcapital fracture with minimal displacement. .        Impression: CONCLUSION:       1. Subcapital fracture with minimal displacement.             Electronically signed by:  JENNIFFER Torres MD  1/22/2018 7:45 PM CST Workstation: [id number]      Medications    atorvastatin 20 mg Oral Daily   budesonide-formoterol 2 puff Inhalation BID - RT   carvedilol 6.25 mg Oral BID With Meals   docusate sodium 100 mg Oral BID   famotidine 40 mg Oral Daily   furosemide 40 mg Oral BID   levothyroxine 50 mcg Oral Q AM   montelukast 10 mg Oral Nightly   nitroglycerin 1 patch Transdermal Daily   ranolazine 1,000 mg Oral Q12H   sertraline 50 mg Oral Daily   warfarin 2.5 mg Oral Daily     •  acetaminophen  •  bacitracin  •  HYDROcodone-acetaminophen  •  ipratropium-albuterol  •  Morphine **AND** naloxone  •  ondansetron  •  Pharmacy to dose warfarin  •  sodium chloride  •  sodium chloride    Assessment:  Doing well following total hip replacement POD 2 Days Post-Op    Patient Active Problem List   Diagnosis   • Essential hypertension   • Coronary artery disease involving native coronary artery of native heart without angina pectoris   • Chronic obstructive pulmonary disease   • Acquired hypothyroidism   •  Depressive disorder   • Thygeson's superficial punctate keratitis   • Pseudophakia   • Precordial pain   • Displaced fracture of base of neck of right femur, initial encounter for closed fracture   • Hip fracture, right, closed, initial encounter   • Pulmonary emphysema   • Chronic systolic congestive heart failure       Results from last 7 days  Lab Units 01/25/18  0601 01/24/18  0635 01/23/18  1939   HEMOGLOBIN g/dL 8.0* 9.3* 10.2*       Plan:   Anterior hip precautions  Continue efforts to mobilize  Continue Pain Control Measures  Continue incisional Care  DVT prophylaxis    Discharge Plan:Rehab anticipated in the next couple days.  Orthostatic hypotension yesterday.  HGB 8.0 with history of COPD requiring O2.  Recommend 2 units PRBC to improve pressures and oxygen carrying capacity.  Hopefully ARU tomorrow.    Kelvin Mcdowell MD    Date: 1/25/2018   Time: 7:34 AM

## 2018-01-25 NOTE — PROGRESS NOTES
Subjective   Val Arteaga is a 85 y.o. female who presents after hospitalization for chest pain, CHF and COPD exacerbation.  Patient was treated with IV steroids. IV Antibiotics, nebulizers and IV Lasix.  She also underwent nuclear stress test which showed ischemia in anterior wall and septum.Her cardiologist recommended medical therapy with Nitropatch and Ranexa.  Patient is doing better. She denies any recurrent episodes of chest pain.   Her breathing has improved and she denies cough or purulent sputum.      Past Medical History:   Diagnosis Date   • Acquired hypothyroidism    • Allergic rhinitis    • CHF (congestive heart failure)    • COPD (chronic obstructive pulmonary disease)    • Corneal epithelial dystrophy    • Coronary arteriosclerosis    • Depression    • Generalized atherosclerosis    • GERD (gastroesophageal reflux disease)    • Hemorrhoids    • History of bone density study 08/03/2012    Lumbar spine Osteopenia. Femoral Osteopenia   • History of mammogram 08/20/2004    Birads Category 2 Benign   • History of Papanicolaou smear of cervix 08/12/2004    NEGATIVE   • Hypercholesterolemia    • Hyperlipidemia    • Hypertension    • Nonexudative age-related macular degeneration    • Osteoarthritis of multiple joints    • Pseudophakia    • Punctate keratitis     - history Thygeson's keratopathy      • Tobacco dependence syndrome      Past Surgical History:   Procedure Laterality Date   • APPENDECTOMY     • BREAST SURGERY  03/19/1954    Excision, breast lesion (1)  Excision of fibroma. 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 patency in the circumflex artery site of previous angioplasty. Kathe. patency in RCA.Nonobstructive 20-30% stenosis in the LAD and diagonal branch. Preserved LV systolic function with Ef of 55%   • CERVICAL SPINE SURGERY  09/16/1974    Excision of cervical disc, C5-6, C6-7, anterior cervical fusion, C5-6 with iliac bone  graft.cervical spondylosis,C5-6, C6-7   • COLONOSCOPY  01/01/2013    patient declined    • CORONARY ANGIOPLASTY  07/21/1993    Angioplasty, coronary (2)    RCA    • DILATATION AND CURETTAGE      3   • ENDOSCOPY AND COLONOSCOPY  10/01/1998    Hemorhoids Rectal Outlet Bleeding   • ESOPHAGOSCOPY / EGD  06/28/2004    Nonerosive gastritis of the body of the stomach. A biopsy was obtained & placed in h. Pylori test agar. Multiple biopsies were obtained from the body of the stomach   • INJECTION OF MEDICATION  11/23/2015    Depo Medrol (Methylprednisone) (5)    • INJECTION OF MEDICATION  02/04/2016    Kenalog (3)     • PACEMAKER REPLACEMENT  2006       Social History   Substance Use Topics   • Smoking status: Former Smoker     Packs/day: 0.50     Years: 50.00     Types: Cigarettes     Start date: 1950     Quit date: 1/12/2017   • Smokeless tobacco: Never Used   • Alcohol use No     Family History   Problem Relation Age of Onset   • Coronary artery disease Other      Allergies   Allergen Reactions   • Ace Inhibitors    • Lisinopril Cough     No current facility-administered medications on file prior to visit.      Current Outpatient Prescriptions on File Prior to Visit   Medication Sig Dispense Refill   • aspirin 81 MG EC tablet Take 81 mg by mouth Daily.     • carvedilol (COREG) 6.25 MG tablet Take 6.25 mg by mouth 2 (Two) Times a Day With Meals.     • clopidogrel (PLAVIX) 75 MG tablet Take 75 mg by mouth Daily.     • coenzyme Q10 100 MG capsule Take 200 mg by mouth Every Night.     • fluorometholone (FLAREX) 0.1 % ophthalmic suspension Administer 1 drop to both eyes 4 (Four) Times a Day. (Patient taking differently: Administer 1 drop to both eyes 4 (Four) Times a Day As Needed.) 5 mL 3   • furosemide (LASIX) 40 MG tablet Take 1 tablet by mouth 2 (Two) Times a Day. (Patient taking differently: Take 40 mg by mouth Daily.) 60 tablet 0   • guaiFENesin (MUCINEX) 600 MG 12 hr tablet Take 1 tablet by mouth Every 12 (Twelve)  Hours. (Patient taking differently: Take 600 mg by mouth Every 12 (Twelve) Hours As Needed for Cough or Congestion.) 30 tablet 0   • ipratropium-albuterol (DUO-NEB) 0.5-2.5 mg/mL nebulizer USE 1 BY NEBULIZER 4 (FOUR) TIMES A DAY AS NEEDED FOR WHEEZING. 360 mL 1   • levothyroxine (SYNTHROID, LEVOTHROID) 50 MCG tablet TAKE 1 TABLET BY MOUTH EVERY DAY 90 tablet 1   • losartan (COZAAR) 25 MG tablet Take 1 tablet by mouth Daily. 30 tablet 0   • montelukast (SINGULAIR) 10 MG tablet Take 10 mg by mouth Every Night.     • nitroglycerin (NITRO-DUR) 0.2 MG/HR patch Place 1 patch on the skin Daily. 30 patch 0   • polyethylene glycol (MIRALAX) packet Take 17 g by mouth Daily. 100 packet 1   • ranolazine (RANEXA) 1000 MG 12 hr tablet Take 1 tablet by mouth Every 12 (Twelve) Hours. 60 tablet 0   • sertraline (ZOLOFT) 50 MG tablet TAKE 1 TABLET BY MOUTH EVERY DAY (Patient taking differently: TAKE 1 TABLET BY MOUTH EVERY NIGHT) 90 tablet 1   • simvastatin (ZOCOR) 40 MG tablet TAKE 1 TABLET BY MOUTH EVERY NIGHT AT BEDTIME 30 tablet 3     Review of Systems   Constitutional: Positive for fatigue.   Respiratory: Positive for shortness of breath. Negative for cough, chest tightness and wheezing.    Cardiovascular: Negative for chest pain, palpitations and leg swelling.   Gastrointestinal: Negative for abdominal pain, constipation and diarrhea.   Endocrine: Negative for polydipsia, polyphagia and polyuria.   Genitourinary: Negative for flank pain and frequency.   Skin: Negative for pallor and rash.   Neurological: Negative for dizziness, light-headedness and headaches.   Psychiatric/Behavioral: Negative for sleep disturbance. The patient is not nervous/anxious.        Objective   Physical Exam   Constitutional: She is oriented to person, place, and time.   HENT:   Nose: Nose normal.   Mouth/Throat: Oropharynx is clear and moist.   Neck: Normal range of motion. Neck supple. No JVD present. No thyromegaly present.   Cardiovascular: Normal  rate and regular rhythm.    No murmur heard.  Pulmonary/Chest: Effort normal.   Decreased breath sounds at bases  Occasional wheezing   Abdominal: Soft. Bowel sounds are normal. She exhibits no mass.   Lymphadenopathy:     She has no cervical adenopathy.   Neurological: She is alert and oriented to person, place, and time.   Psychiatric: She has a normal mood and affect. Her behavior is normal.           Patient Active Problem List   Diagnosis   • Essential hypertension   • Coronary artery disease involving native coronary artery of native heart without angina pectoris   • Chronic obstructive pulmonary disease   • Acquired hypothyroidism   • Depressive disorder   • Thygeson's superficial punctate keratitis   • Pseudophakia   • Precordial pain   • Displaced fracture of base of neck of right femur, initial encounter for closed fracture   • Hip fracture, right, closed, initial encounter   • Pulmonary emphysema   • Chronic systolic congestive heart failure     Results for orders placed or performed during the hospital encounter of 01/03/18   Troponin   Result Value Ref Range    Troponin I 0.055 (H) <=0.034 ng/mL   Troponin   Result Value Ref Range    Troponin I 0.053 (H) <=0.034 ng/mL   Comprehensive Metabolic Panel   Result Value Ref Range    Glucose 97 60 - 100 mg/dL    BUN 33 (H) 7 - 21 mg/dL    Creatinine 1.21 (H) 0.50 - 1.00 mg/dL    Sodium 128 (L) 137 - 145 mmol/L    Potassium 3.9 3.5 - 5.1 mmol/L    Chloride 90 (L) 95 - 110 mmol/L    CO2 24.0 22.0 - 31.0 mmol/L    Calcium 8.5 8.4 - 10.2 mg/dL    Total Protein 7.1 6.3 - 8.6 g/dL    Albumin 4.00 3.40 - 4.80 g/dL    ALT (SGPT) 37 9 - 52 U/L    AST (SGOT) 37 (H) 14 - 36 U/L    Alkaline Phosphatase 56 38 - 126 U/L    Total Bilirubin 0.4 0.2 - 1.3 mg/dL    eGFR Non African Amer 42 39 - 90 mL/min/1.73    Globulin 3.1 2.3 - 3.5 gm/dL    A/G Ratio 1.3 1.1 - 1.8 g/dL    BUN/Creatinine Ratio 27.3 (H) 7.0 - 25.0    Anion Gap 14.0 5.0 - 15.0 mmol/L   BNP   Result Value Ref  Range    proBNP 1930.0 (H) 0.0 - 1800.0 pg/mL   Protime-INR   Result Value Ref Range    Protime 13.2 11.1 - 15.3 Seconds    INR 1.01 0.80 - 1.20   CBC Auto Differential   Result Value Ref Range    WBC 6.31 3.20 - 9.80 10*3/mm3    RBC 3.80 3.77 - 5.16 10*6/mm3    Hemoglobin 12.3 12.0 - 15.5 g/dL    Hematocrit 36.4 35.0 - 45.0 %    MCV 95.8 80.0 - 98.0 fL    MCH 32.4 26.5 - 34.0 pg    MCHC 33.8 31.4 - 36.0 g/dL    RDW 13.1 11.5 - 14.5 %    RDW-SD 45.7 36.4 - 46.3 fl    MPV 10.4 8.0 - 12.0 fL    Platelets 193 150 - 450 10*3/mm3    Neutrophil % 81.9 (H) 37.0 - 80.0 %    Lymphocyte % 12.8 10.0 - 50.0 %    Monocyte % 4.9 0.0 - 12.0 %    Eosinophil % 0.0 0.0 - 7.0 %    Basophil % 0.2 0.0 - 2.0 %    Immature Grans % 0.2 0.0 - 0.5 %    Neutrophils, Absolute 5.17 2.00 - 8.60 10*3/mm3    Lymphocytes, Absolute 0.81 0.60 - 4.20 10*3/mm3    Monocytes, Absolute 0.31 0.00 - 0.90 10*3/mm3    Eosinophils, Absolute 0.00 0.00 - 0.70 10*3/mm3    Basophils, Absolute 0.01 0.00 - 0.20 10*3/mm3    Immature Grans, Absolute 0.01 0.00 - 0.02 10*3/mm3   CK   Result Value Ref Range    Creatine Kinase 58 30 - 135 U/L   CK-MB   Result Value Ref Range    CKMB 1.47 0.00 - 5.00 ng/mL   Magnesium   Result Value Ref Range    Magnesium 2.0 1.6 - 2.3 mg/dL   Troponin   Result Value Ref Range    Troponin I 0.041 (H) <=0.034 ng/mL   CK   Result Value Ref Range    Creatine Kinase 53 30 - 135 U/L   CK-MB   Result Value Ref Range    CKMB 1.60 0.00 - 5.00 ng/mL   TSH   Result Value Ref Range    TSH 3.950 0.460 - 4.680 mIU/mL   Hemoglobin A1c   Result Value Ref Range    Hemoglobin A1C 5.6 4 - 5.6 %   Comprehensive Metabolic Panel   Result Value Ref Range    Glucose 119 (H) 60 - 100 mg/dL    BUN 44 (H) 7 - 21 mg/dL    Creatinine 1.36 (H) 0.50 - 1.00 mg/dL    Sodium 133 (L) 137 - 145 mmol/L    Potassium 4.0 3.5 - 5.1 mmol/L    Chloride 95 95 - 110 mmol/L    CO2 25.0 22.0 - 31.0 mmol/L    Calcium 8.1 (L) 8.4 - 10.2 mg/dL    Total Protein 7.0 6.3 - 8.6 g/dL     Albumin 3.80 3.40 - 4.80 g/dL    ALT (SGPT) 37 9 - 52 U/L    AST (SGOT) 34 14 - 36 U/L    Alkaline Phosphatase 53 38 - 126 U/L    Total Bilirubin 0.3 0.2 - 1.3 mg/dL    eGFR Non  Amer 37 (L) 39 - 90 mL/min/1.73    Globulin 3.2 2.3 - 3.5 gm/dL    A/G Ratio 1.2 1.1 - 1.8 g/dL    BUN/Creatinine Ratio 32.4 (H) 7.0 - 25.0    Anion Gap 13.0 5.0 - 15.0 mmol/L   Troponin   Result Value Ref Range    Troponin I 0.048 (H) <=0.034 ng/mL   Lipid Panel   Result Value Ref Range    Total Cholesterol 171 0 - 199 mg/dL    Triglycerides 199 20 - 199 mg/dL    HDL Cholesterol 51 (L) 60 - 200 mg/dL    LDL Cholesterol  67 1 - 129 mg/dL    LDL/HDL Ratio 1.57 0.00 - 3.22   CBC Auto Differential   Result Value Ref Range    WBC 5.06 3.20 - 9.80 10*3/mm3    RBC 3.84 3.77 - 5.16 10*6/mm3    Hemoglobin 12.3 12.0 - 15.5 g/dL    Hematocrit 36.8 35.0 - 45.0 %    MCV 95.8 80.0 - 98.0 fL    MCH 32.0 26.5 - 34.0 pg    MCHC 33.4 31.4 - 36.0 g/dL    RDW 13.1 11.5 - 14.5 %    RDW-SD 46.5 (H) 36.4 - 46.3 fl    MPV 9.9 8.0 - 12.0 fL    Platelets 203 150 - 450 10*3/mm3    Neutrophil % 73.9 37.0 - 80.0 %    Lymphocyte % 19.0 10.0 - 50.0 %    Monocyte % 6.7 0.0 - 12.0 %    Eosinophil % 0.0 0.0 - 7.0 %    Basophil % 0.2 0.0 - 2.0 %    Immature Grans % 0.2 0.0 - 0.5 %    Neutrophils, Absolute 3.74 2.00 - 8.60 10*3/mm3    Lymphocytes, Absolute 0.96 0.60 - 4.20 10*3/mm3    Monocytes, Absolute 0.34 0.00 - 0.90 10*3/mm3    Eosinophils, Absolute 0.00 0.00 - 0.70 10*3/mm3    Basophils, Absolute 0.01 0.00 - 0.20 10*3/mm3    Immature Grans, Absolute 0.01 0.00 - 0.02 10*3/mm3   CBC (No Diff)   Result Value Ref Range    WBC 7.35 3.20 - 9.80 10*3/mm3    RBC 3.45 (L) 3.77 - 5.16 10*6/mm3    Hemoglobin 11.2 (L) 12.0 - 15.5 g/dL    Hematocrit 33.6 (L) 35.0 - 45.0 %    MCV 97.4 80.0 - 98.0 fL    MCH 32.5 26.5 - 34.0 pg    MCHC 33.3 31.4 - 36.0 g/dL    RDW 13.2 11.5 - 14.5 %    RDW-SD 46.6 (H) 36.4 - 46.3 fl    MPV 9.7 8.0 - 12.0 fL    Platelets 191 150 - 450 10*3/mm3    Basic Metabolic Panel   Result Value Ref Range    Glucose 89 60 - 100 mg/dL    BUN 36 (H) 7 - 21 mg/dL    Creatinine 1.04 (H) 0.50 - 1.00 mg/dL    Sodium 133 (L) 137 - 145 mmol/L    Potassium 3.9 3.5 - 5.1 mmol/L    Chloride 95 95 - 110 mmol/L    CO2 29.0 22.0 - 31.0 mmol/L    Calcium 8.2 (L) 8.4 - 10.2 mg/dL    eGFR Non African Amer 50 39 - 90 mL/min/1.73    BUN/Creatinine Ratio 34.6 (H) 7.0 - 25.0    Anion Gap 9.0 5.0 - 15.0 mmol/L   CBC (No Diff)   Result Value Ref Range    WBC 3.03 (L) 3.20 - 9.80 10*3/mm3    RBC 3.64 (L) 3.77 - 5.16 10*6/mm3    Hemoglobin 11.8 (L) 12.0 - 15.5 g/dL    Hematocrit 34.7 (L) 35.0 - 45.0 %    MCV 95.3 80.0 - 98.0 fL    MCH 32.4 26.5 - 34.0 pg    MCHC 34.0 31.4 - 36.0 g/dL    RDW 12.8 11.5 - 14.5 %    RDW-SD 44.8 36.4 - 46.3 fl    MPV 9.4 8.0 - 12.0 fL    Platelets 191 150 - 450 10*3/mm3   Basic Metabolic Panel   Result Value Ref Range    Glucose 124 (H) 60 - 100 mg/dL    BUN 24 (H) 7 - 21 mg/dL    Creatinine 0.80 0.50 - 1.00 mg/dL    Sodium 134 (L) 137 - 145 mmol/L    Potassium 4.0 3.5 - 5.1 mmol/L    Chloride 94 (L) 95 - 110 mmol/L    CO2 28.0 22.0 - 31.0 mmol/L    Calcium 8.2 (L) 8.4 - 10.2 mg/dL    eGFR Non African Amer 68 >60 mL/min/1.73    BUN/Creatinine Ratio 30.0 (H) 7.0 - 25.0    Anion Gap 12.0 5.0 - 15.0 mmol/L   D-dimer, Quantitative   Result Value Ref Range    D-Dimer, Quantitative 1222 (H) 0 - 470 ng/mL (FEU)   CBC (No Diff)   Result Value Ref Range    WBC 9.38 3.20 - 9.80 10*3/mm3    RBC 3.59 (L) 3.77 - 5.16 10*6/mm3    Hemoglobin 11.4 (L) 12.0 - 15.5 g/dL    Hematocrit 34.2 (L) 35.0 - 45.0 %    MCV 95.3 80.0 - 98.0 fL    MCH 31.8 26.5 - 34.0 pg    MCHC 33.3 31.4 - 36.0 g/dL    RDW 12.8 11.5 - 14.5 %    RDW-SD 44.3 36.4 - 46.3 fl    MPV 9.7 8.0 - 12.0 fL    Platelets 227 150 - 450 10*3/mm3   Basic Metabolic Panel   Result Value Ref Range    Glucose 145 (H) 60 - 100 mg/dL    BUN 28 (H) 7 - 21 mg/dL    Creatinine 0.82 0.50 - 1.00 mg/dL    Sodium 135 (L) 137 - 145 mmol/L     Potassium 3.8 3.5 - 5.1 mmol/L    Chloride 93 (L) 95 - 110 mmol/L    CO2 31.0 22.0 - 31.0 mmol/L    Calcium 8.4 8.4 - 10.2 mg/dL    eGFR Non African Amer 66 >60 mL/min/1.73    BUN/Creatinine Ratio 34.1 (H) 7.0 - 25.0    Anion Gap 11.0 5.0 - 15.0 mmol/L   CBC (No Diff)   Result Value Ref Range    WBC 9.53 3.20 - 9.80 10*3/mm3    RBC 3.71 (L) 3.77 - 5.16 10*6/mm3    Hemoglobin 12.0 12.0 - 15.5 g/dL    Hematocrit 35.5 35.0 - 45.0 %    MCV 95.7 80.0 - 98.0 fL    MCH 32.3 26.5 - 34.0 pg    MCHC 33.8 31.4 - 36.0 g/dL    RDW 13.0 11.5 - 14.5 %    RDW-SD 45.4 36.4 - 46.3 fl    MPV 9.6 8.0 - 12.0 fL    Platelets 245 150 - 450 10*3/mm3   Basic Metabolic Panel   Result Value Ref Range    Glucose 132 (H) 60 - 100 mg/dL    BUN 29 (H) 7 - 21 mg/dL    Creatinine 0.84 0.50 - 1.00 mg/dL    Sodium 134 (L) 137 - 145 mmol/L    Potassium 3.7 3.5 - 5.1 mmol/L    Chloride 92 (L) 95 - 110 mmol/L    CO2 33.0 (H) 22.0 - 31.0 mmol/L    Calcium 8.3 (L) 8.4 - 10.2 mg/dL    eGFR Non African Amer 64 >60 mL/min/1.73    BUN/Creatinine Ratio 34.5 (H) 7.0 - 25.0    Anion Gap 9.0 5.0 - 15.0 mmol/L   Stress Test With Myocardial Perfusion One Day   Result Value Ref Range    BH CV STRESS PROTOCOL 1 Pharmacologic     Stage 1 1     HR Stage 1 72     BP Stage 1 112/58     Duration Min Stage 1 3     Duration Sec Stage 1 0     Stress Dose Dobutamine Stage 1 10     Stage 2 2     Duration Min Stage 2 3     Duration Sec Stage 2 0     Stress Dose Dobutamine Stage 2 20     Target HR (85%) 115 bpm    Max. Pred. HR (100%) 135 bpm    HR Stage 2 100     BP Stage 2 134/68     Stage 3 3     HR Stage 3 113     BP Stage 3 126/59     Duration Min Stage 3 3     Duration Sec Stage 3 0     Stress Dose Dobutamine Stage 3 30     Baseline HR 76 bpm    Baseline /58 mmHg    Peak  bpm    Percent Max Pred HR 89.63 %    Percent Target  %    Peak /68 mmHg    Recovery HR 90 bpm    Recovery /68 mmHg    Nuc Stress EF 52 %   POC Glucose Once   Result Value  Ref Range    Glucose 206 (H) 70 - 130 mg/dL   Adult Transthoracic Echo Complete W/ Cont if Necessary Per Protocol   Result Value Ref Range    Target HR (85%) 115 bpm    Max. Pred. HR (100%) 135 bpm   Light Blue Top   Result Value Ref Range    Extra Tube hold for add-on    Green Top (Gel)   Result Value Ref Range    Extra Tube Hold for add-ons.    Lavender Top   Result Value Ref Range    Extra Tube hold for add-on    Gold Top - SST   Result Value Ref Range    Extra Tube Hold for add-ons.                Assessment/Plan   Val was seen today for chest pain.    Diagnoses and all orders for this visit:    Chronic systolic congestive heart failure    Coronary artery disease involving native coronary artery of native heart without angina pectoris    Pulmonary emphysema, unspecified emphysema type    Acquired hypothyroidism    Physical deconditioning    Other orders  -     budesonide-formoterol (SYMBICORT) 160-4.5 MCG/ACT inhaler; Inhale 2 puffs 2 (Two) Times a Day.             Plan     1. Patient is clinically stable without evidence of volume overdose.      She will decrease Lasix to 40 mg daily.      Losartan and Coreg are continued.      Low sodium diet and fluid restriction.  2. Ranexa, Nitroderm, ASA and Lipitor are continued.  3. Symbicort 160 2 puffs bid.      Duonebs qid PRN.      Oxygen at 2 liters.  4. Patient is clinically and chemically euthyroid and will continue current dose of Levothyroxine.  5. Home health PT/OT.    Follow up in 1 month.                This document has been electronically signed by Roula Albert MD on January 24, 2018 8:58 PM

## 2018-01-25 NOTE — PLAN OF CARE
Problem: Patient Care Overview (Adult)  Goal: Plan of Care Review  Outcome: Ongoing (interventions implemented as appropriate)   01/25/18 0244   Coping/Psychosocial Response Interventions   Plan Of Care Reviewed With patient   Patient Care Overview   Progress no change   Outcome Evaluation   Outcome Summary/Follow up Plan Patient VSS, BP better this shift, working on pain control, no changes     Goal: Adult Individualization and Mutuality  Outcome: Ongoing (interventions implemented as appropriate)    Goal: Discharge Needs Assessment  Outcome: Ongoing (interventions implemented as appropriate)      Problem: Fall Risk (Adult)  Goal: Identify Related Risk Factors and Signs and Symptoms  Outcome: Ongoing (interventions implemented as appropriate)    Goal: Absence of Falls  Outcome: Ongoing (interventions implemented as appropriate)    Goal: Identify Related Risk Factors and Signs and Symptoms  Outcome: Ongoing (interventions implemented as appropriate)    Goal: Absence of Falls  Outcome: Ongoing (interventions implemented as appropriate)      Problem: Fractured Hip (Adult)  Goal: Signs and Symptoms of Listed Potential Problems Will be Absent or Manageable (Fractured Hip)  Outcome: Ongoing (interventions implemented as appropriate)

## 2018-01-25 NOTE — CONSULTS
Adult Nutrition  Assessment    Patient Name:  Val Arteaga  YOB: 1932  MRN: [id number]  Admit Date:  1/22/2018    Assessment Date:  1/25/2018    Comments:  Pt s/p surgery for hip fx.  Decreased appetite and intake with pt not feeling well.  Pt became hypotensive this am.  Intake averaging ~33%.  Hyponatremia with Na+ 126, pt on fluid restrictions.  Will send. Ensure within fluid restrictions and monitor.          Reason for Assessment       01/25/18 1522    Reason for Assessment    Reason For Assessment/Visit follow up protocol                Nutrition/Diet History       01/25/18 1522    Nutrition/Diet History    Typical Food/Fluid Intake Pt resting.  Per family she actually ate at breakfast for her since she typically doesn't eat much breakfast.  After breakfast she became hypotensive and has felt bad since.  She is to get some blood              Labs/Tests/Procedures/Meds       01/25/18 1523    Labs/Tests/Procedures/Meds    Labs/Tests Review Reviewed;Na+;Glucose;BUN;Creat    Medication Review Reviewed, pertinent;Diuretic                Nutrition Prescription Ordered       01/25/18 1524    Nutrition Prescription PO    Current PO Diet Regular    Fluid Consistency Thin    Supplement Ensure Enlive    Supplement Frequency 3 times a day    Common Modifiers Low Sodium;Fluid Restriction    Fluid Restriction mL per Tray 250 mL    Fluid Restriction mL per Day Other (comment)   2000cc    Low Sodium Details 2,000 mg Sodium            Evaluation of Received Nutrient/Fluid Intake       01/25/18 1525    PO Evaluation    Number of Days PO Intake Evaluated 1 day    Number of Meals 3    % PO Intake 50/25/25            Electronically signed by:  Norma Resendiz RD  01/25/18 3:29 PM

## 2018-01-25 NOTE — PROGRESS NOTES
"Anticoagulation by Pharmacy - Warfarin Day 3 of 42    Val Arteaga is a 85 y.o.female  [Ht: 172.7 cm (68\"); Wt: 78.2 kg (172 lb 4.8 oz)] on Warfarin 2.5 mg PO  for indication of VTE prophylaxis s/p ortho procedure.    Goal INR: 1.7-2.5  Today's INR:   Lab Results   Component Value Date    INR 1.19 01/25/2018         Lab Results   Component Value Date    INR 1.19 01/25/2018    INR 1.03 01/24/2018    INR 0.96 01/23/2018    PROTIME 15.1 01/25/2018    PROTIME 13.4 01/24/2018    PROTIME 12.7 01/23/2018     Lab Results   Component Value Date    HGB 8.0 (L) 01/25/2018    HGB 9.3 (L) 01/24/2018    HGB 10.2 (L) 01/23/2018     Lab Results   Component Value Date    HCT 23.5 (L) 01/25/2018    HCT 26.5 (L) 01/24/2018    HCT 29.8 (L) 01/23/2018     Assessment/Plan:  INR 1.19 and trending up. Continue 2.5mg daily.    Sandoval Ocampo, formerly [facility]  01/25/18 4:47 PM     "

## 2018-01-26 ENCOUNTER — HOSPITAL ENCOUNTER (INPATIENT)
Facility: HOSPITAL | Age: 83
LOS: 10 days | Discharge: NURSING, REHAB OR LONG-TERM CARE | End: 2018-02-05
Attending: FAMILY MEDICINE | Admitting: FAMILY MEDICINE

## 2018-01-26 VITALS
TEMPERATURE: 99.1 F | HEART RATE: 89 BPM | RESPIRATION RATE: 18 BRPM | HEIGHT: 68 IN | SYSTOLIC BLOOD PRESSURE: 130 MMHG | WEIGHT: 172.3 LBS | BODY MASS INDEX: 26.11 KG/M2 | DIASTOLIC BLOOD PRESSURE: 60 MMHG | OXYGEN SATURATION: 92 %

## 2018-01-26 DIAGNOSIS — Z78.9 IMPAIRED MOBILITY AND ACTIVITIES OF DAILY LIVING: ICD-10-CM

## 2018-01-26 DIAGNOSIS — R48.9 SYMBOLIC DYSFUNCTION: ICD-10-CM

## 2018-01-26 DIAGNOSIS — Z74.09 IMPAIRED FUNCTIONAL MOBILITY, BALANCE, GAIT, AND ENDURANCE: ICD-10-CM

## 2018-01-26 DIAGNOSIS — Z74.09 IMPAIRED MOBILITY AND ACTIVITIES OF DAILY LIVING: ICD-10-CM

## 2018-01-26 DIAGNOSIS — S72.041D CLOSED DISPLACED BASICERVICAL FRACTURE OF RIGHT FEMUR WITH ROUTINE HEALING, SUBSEQUENT ENCOUNTER: Primary | ICD-10-CM

## 2018-01-26 PROBLEM — S72.001D: Status: ACTIVE | Noted: 2018-01-26

## 2018-01-26 PROBLEM — D62 ACUTE BLOOD LOSS ANEMIA: Status: ACTIVE | Noted: 2018-01-26

## 2018-01-26 PROBLEM — R33.9 URINARY RETENTION: Status: ACTIVE | Noted: 2018-01-26

## 2018-01-26 PROBLEM — E87.1 HYPONATREMIA: Status: ACTIVE | Noted: 2018-01-26

## 2018-01-26 PROBLEM — N17.9 AKI (ACUTE KIDNEY INJURY) (HCC): Status: ACTIVE | Noted: 2018-01-26

## 2018-01-26 LAB
ANION GAP SERPL CALCULATED.3IONS-SCNC: 7 MMOL/L (ref 5–15)
BASOPHILS # BLD AUTO: 0.01 10*3/MM3 (ref 0–0.2)
BASOPHILS NFR BLD AUTO: 0.1 % (ref 0–2)
BUN BLD-MCNC: 26 MG/DL (ref 7–21)
BUN/CREAT SERPL: 25.7 (ref 7–25)
CALCIUM SPEC-SCNC: 8.3 MG/DL (ref 8.4–10.2)
CHLORIDE SERPL-SCNC: 91 MMOL/L (ref 95–110)
CO2 SERPL-SCNC: 29 MMOL/L (ref 22–31)
CREAT BLD-MCNC: 1.01 MG/DL (ref 0.5–1)
DEPRECATED RDW RBC AUTO: 51.9 FL (ref 36.4–46.3)
EOSINOPHIL # BLD AUTO: 0.18 10*3/MM3 (ref 0–0.7)
EOSINOPHIL NFR BLD AUTO: 1.8 % (ref 0–7)
ERYTHROCYTE [DISTWIDTH] IN BLOOD BY AUTOMATED COUNT: 15.6 % (ref 11.5–14.5)
GFR SERPL CREATININE-BSD FRML MDRD: 52 ML/MIN/1.73 (ref 39–90)
GLUCOSE BLD-MCNC: 100 MG/DL (ref 60–100)
HCT VFR BLD AUTO: 27.2 % (ref 35–45)
HGB BLD-MCNC: 9.6 G/DL (ref 12–15.5)
HOLD SPECIMEN: NORMAL
IMM GRANULOCYTES # BLD: 0.05 10*3/MM3 (ref 0–0.02)
IMM GRANULOCYTES NFR BLD: 0.5 % (ref 0–0.5)
INR PPP: 1.25 (ref 0.8–1.2)
LYMPHOCYTES # BLD AUTO: 0.97 10*3/MM3 (ref 0.6–4.2)
LYMPHOCYTES NFR BLD AUTO: 9.8 % (ref 10–50)
MCH RBC QN AUTO: 32.1 PG (ref 26.5–34)
MCHC RBC AUTO-ENTMCNC: 35.3 G/DL (ref 31.4–36)
MCV RBC AUTO: 91 FL (ref 80–98)
MONOCYTES # BLD AUTO: 0.8 10*3/MM3 (ref 0–0.9)
MONOCYTES NFR BLD AUTO: 8 % (ref 0–12)
NEUTROPHILS # BLD AUTO: 7.93 10*3/MM3 (ref 2–8.6)
NEUTROPHILS NFR BLD AUTO: 79.8 % (ref 37–80)
PLATELET # BLD AUTO: 171 10*3/MM3 (ref 150–450)
PMV BLD AUTO: 9.3 FL (ref 8–12)
POTASSIUM BLD-SCNC: 4.5 MMOL/L (ref 3.5–5.1)
PROTHROMBIN TIME: 15.7 SECONDS (ref 11.1–15.3)
RBC # BLD AUTO: 2.99 10*6/MM3 (ref 3.77–5.16)
SODIUM BLD-SCNC: 127 MMOL/L (ref 137–145)
WBC NRBC COR # BLD: 9.94 10*3/MM3 (ref 3.2–9.8)

## 2018-01-26 PROCEDURE — 97530 THERAPEUTIC ACTIVITIES: CPT

## 2018-01-26 PROCEDURE — 85025 COMPLETE CBC W/AUTO DIFF WBC: CPT | Performed by: FAMILY MEDICINE

## 2018-01-26 PROCEDURE — 97535 SELF CARE MNGMENT TRAINING: CPT

## 2018-01-26 PROCEDURE — 97166 OT EVAL MOD COMPLEX 45 MIN: CPT

## 2018-01-26 PROCEDURE — 94640 AIRWAY INHALATION TREATMENT: CPT

## 2018-01-26 PROCEDURE — 94799 UNLISTED PULMONARY SVC/PX: CPT

## 2018-01-26 PROCEDURE — 99222 1ST HOSP IP/OBS MODERATE 55: CPT | Performed by: FAMILY MEDICINE

## 2018-01-26 PROCEDURE — G8988 SELF CARE GOAL STATUS: HCPCS

## 2018-01-26 PROCEDURE — 97116 GAIT TRAINING THERAPY: CPT

## 2018-01-26 PROCEDURE — 80048 BASIC METABOLIC PNL TOTAL CA: CPT | Performed by: FAMILY MEDICINE

## 2018-01-26 PROCEDURE — 85610 PROTHROMBIN TIME: CPT | Performed by: ORTHOPAEDIC SURGERY

## 2018-01-26 PROCEDURE — G8987 SELF CARE CURRENT STATUS: HCPCS

## 2018-01-26 PROCEDURE — 99024 POSTOP FOLLOW-UP VISIT: CPT | Performed by: ORTHOPAEDIC SURGERY

## 2018-01-26 PROCEDURE — 94760 N-INVAS EAR/PLS OXIMETRY 1: CPT

## 2018-01-26 RX ORDER — ACETAMINOPHEN 325 MG/1
650 TABLET ORAL EVERY 4 HOURS PRN
Status: CANCELLED | OUTPATIENT
Start: 2018-01-26

## 2018-01-26 RX ORDER — ATORVASTATIN CALCIUM 20 MG/1
20 TABLET, FILM COATED ORAL DAILY
Status: DISCONTINUED | OUTPATIENT
Start: 2018-01-27 | End: 2018-02-05 | Stop reason: HOSPADM

## 2018-01-26 RX ORDER — MONTELUKAST SODIUM 10 MG/1
10 TABLET ORAL NIGHTLY
Status: DISCONTINUED | OUTPATIENT
Start: 2018-01-26 | End: 2018-02-05 | Stop reason: HOSPADM

## 2018-01-26 RX ORDER — WARFARIN SODIUM 2.5 MG/1
2.5 TABLET ORAL
Status: DISCONTINUED | OUTPATIENT
Start: 2018-01-26 | End: 2018-01-27 | Stop reason: DRUGHIGH

## 2018-01-26 RX ORDER — WARFARIN SODIUM 2.5 MG/1
2.5 TABLET ORAL
Status: DISCONTINUED | OUTPATIENT
Start: 2018-01-26 | End: 2018-01-26 | Stop reason: SDUPTHER

## 2018-01-26 RX ORDER — RANOLAZINE 500 MG/1
1000 TABLET, EXTENDED RELEASE ORAL EVERY 12 HOURS SCHEDULED
Status: CANCELLED | OUTPATIENT
Start: 2018-01-26

## 2018-01-26 RX ORDER — CARVEDILOL 6.25 MG/1
6.25 TABLET ORAL 2 TIMES DAILY WITH MEALS
Status: DISCONTINUED | OUTPATIENT
Start: 2018-01-26 | End: 2018-02-05 | Stop reason: HOSPADM

## 2018-01-26 RX ORDER — BUDESONIDE AND FORMOTEROL FUMARATE DIHYDRATE 160; 4.5 UG/1; UG/1
2 AEROSOL RESPIRATORY (INHALATION)
Status: CANCELLED | OUTPATIENT
Start: 2018-01-26

## 2018-01-26 RX ORDER — FUROSEMIDE 20 MG/1
20 TABLET ORAL DAILY
Status: CANCELLED | OUTPATIENT
Start: 2018-01-27

## 2018-01-26 RX ORDER — FAMOTIDINE 40 MG/1
40 TABLET, FILM COATED ORAL DAILY
Status: CANCELLED | OUTPATIENT
Start: 2018-01-27

## 2018-01-26 RX ORDER — IPRATROPIUM BROMIDE AND ALBUTEROL SULFATE 2.5; .5 MG/3ML; MG/3ML
3 SOLUTION RESPIRATORY (INHALATION)
Status: CANCELLED | OUTPATIENT
Start: 2018-01-26

## 2018-01-26 RX ORDER — LEVOTHYROXINE SODIUM 0.05 MG/1
50 TABLET ORAL
Status: CANCELLED | OUTPATIENT
Start: 2018-01-27

## 2018-01-26 RX ORDER — CARVEDILOL 6.25 MG/1
6.25 TABLET ORAL 2 TIMES DAILY WITH MEALS
Status: CANCELLED | OUTPATIENT
Start: 2018-01-26

## 2018-01-26 RX ORDER — LEVOTHYROXINE SODIUM 0.05 MG/1
50 TABLET ORAL
Status: DISCONTINUED | OUTPATIENT
Start: 2018-01-27 | End: 2018-02-05 | Stop reason: HOSPADM

## 2018-01-26 RX ORDER — WARFARIN SODIUM 2.5 MG/1
2.5 TABLET ORAL
Status: CANCELLED | OUTPATIENT
Start: 2018-01-26

## 2018-01-26 RX ORDER — CALCIUM CARBONATE 200(500)MG
2 TABLET,CHEWABLE ORAL 2 TIMES DAILY PRN
Status: CANCELLED | OUTPATIENT
Start: 2018-01-26

## 2018-01-26 RX ORDER — HYDROCODONE BITARTRATE AND ACETAMINOPHEN 5; 325 MG/1; MG/1
1 TABLET ORAL EVERY 6 HOURS PRN
Status: CANCELLED | OUTPATIENT
Start: 2018-01-26 | End: 2018-02-02

## 2018-01-26 RX ORDER — BUDESONIDE AND FORMOTEROL FUMARATE DIHYDRATE 160; 4.5 UG/1; UG/1
2 AEROSOL RESPIRATORY (INHALATION)
Status: DISCONTINUED | OUTPATIENT
Start: 2018-01-26 | End: 2018-02-05 | Stop reason: HOSPADM

## 2018-01-26 RX ORDER — ATORVASTATIN CALCIUM 20 MG/1
20 TABLET, FILM COATED ORAL DAILY
Status: CANCELLED | OUTPATIENT
Start: 2018-01-27

## 2018-01-26 RX ORDER — IPRATROPIUM BROMIDE AND ALBUTEROL SULFATE 2.5; .5 MG/3ML; MG/3ML
3 SOLUTION RESPIRATORY (INHALATION) EVERY 6 HOURS PRN
Status: CANCELLED | OUTPATIENT
Start: 2018-01-26

## 2018-01-26 RX ORDER — IPRATROPIUM BROMIDE AND ALBUTEROL SULFATE 2.5; .5 MG/3ML; MG/3ML
3 SOLUTION RESPIRATORY (INHALATION) EVERY 6 HOURS PRN
Status: DISCONTINUED | OUTPATIENT
Start: 2018-01-26 | End: 2018-01-26 | Stop reason: HOSPADM

## 2018-01-26 RX ORDER — ONDANSETRON 2 MG/ML
4 INJECTION INTRAMUSCULAR; INTRAVENOUS EVERY 6 HOURS PRN
Status: CANCELLED | OUTPATIENT
Start: 2018-01-26

## 2018-01-26 RX ORDER — CALCIUM CARBONATE 200(500)MG
2 TABLET,CHEWABLE ORAL 2 TIMES DAILY PRN
Status: DISCONTINUED | OUTPATIENT
Start: 2018-01-26 | End: 2018-02-05 | Stop reason: HOSPADM

## 2018-01-26 RX ORDER — RANOLAZINE 500 MG/1
1000 TABLET, EXTENDED RELEASE ORAL EVERY 12 HOURS SCHEDULED
Status: DISCONTINUED | OUTPATIENT
Start: 2018-01-26 | End: 2018-02-05 | Stop reason: HOSPADM

## 2018-01-26 RX ORDER — HYDROCODONE BITARTRATE AND ACETAMINOPHEN 5; 325 MG/1; MG/1
1 TABLET ORAL EVERY 6 HOURS PRN
Status: DISCONTINUED | OUTPATIENT
Start: 2018-01-26 | End: 2018-02-05 | Stop reason: HOSPADM

## 2018-01-26 RX ORDER — BISACODYL 10 MG
10 SUPPOSITORY, RECTAL RECTAL DAILY PRN
Status: DISCONTINUED | OUTPATIENT
Start: 2018-01-26 | End: 2018-02-05 | Stop reason: HOSPADM

## 2018-01-26 RX ORDER — BISACODYL 10 MG
10 SUPPOSITORY, RECTAL RECTAL DAILY PRN
Status: CANCELLED | OUTPATIENT
Start: 2018-01-26

## 2018-01-26 RX ORDER — MONTELUKAST SODIUM 10 MG/1
10 TABLET ORAL NIGHTLY
Status: CANCELLED | OUTPATIENT
Start: 2018-01-26

## 2018-01-26 RX ORDER — SODIUM CHLORIDE 0.9 % (FLUSH) 0.9 %
1-10 SYRINGE (ML) INJECTION AS NEEDED
Status: CANCELLED | OUTPATIENT
Start: 2018-01-26

## 2018-01-26 RX ORDER — ACETAMINOPHEN 325 MG/1
650 TABLET ORAL EVERY 4 HOURS PRN
Status: DISCONTINUED | OUTPATIENT
Start: 2018-01-26 | End: 2018-02-05 | Stop reason: HOSPADM

## 2018-01-26 RX ORDER — IPRATROPIUM BROMIDE AND ALBUTEROL SULFATE 2.5; .5 MG/3ML; MG/3ML
3 SOLUTION RESPIRATORY (INHALATION) EVERY 6 HOURS PRN
Status: DISCONTINUED | OUTPATIENT
Start: 2018-01-26 | End: 2018-01-30

## 2018-01-26 RX ORDER — DOCUSATE SODIUM 100 MG/1
100 CAPSULE, LIQUID FILLED ORAL 2 TIMES DAILY
Status: CANCELLED | OUTPATIENT
Start: 2018-01-26

## 2018-01-26 RX ORDER — NITROGLYCERIN 40 MG/1
1 PATCH TRANSDERMAL DAILY
Status: DISCONTINUED | OUTPATIENT
Start: 2018-01-27 | End: 2018-02-05 | Stop reason: HOSPADM

## 2018-01-26 RX ORDER — NITROGLYCERIN 40 MG/1
1 PATCH TRANSDERMAL DAILY
Status: CANCELLED | OUTPATIENT
Start: 2018-01-27

## 2018-01-26 RX ORDER — MORPHINE SULFATE 2 MG/ML
1 INJECTION, SOLUTION INTRAMUSCULAR; INTRAVENOUS EVERY 4 HOURS PRN
Status: CANCELLED | OUTPATIENT
Start: 2018-01-26 | End: 2018-02-01

## 2018-01-26 RX ORDER — FUROSEMIDE 20 MG/1
20 TABLET ORAL DAILY
Status: DISCONTINUED | OUTPATIENT
Start: 2018-01-27 | End: 2018-01-29

## 2018-01-26 RX ORDER — NALOXONE HCL 0.4 MG/ML
0.4 VIAL (ML) INJECTION
Status: CANCELLED | OUTPATIENT
Start: 2018-01-26

## 2018-01-26 RX ORDER — FAMOTIDINE 40 MG/1
40 TABLET, FILM COATED ORAL DAILY
Status: DISCONTINUED | OUTPATIENT
Start: 2018-01-27 | End: 2018-02-05 | Stop reason: HOSPADM

## 2018-01-26 RX ADMIN — FAMOTIDINE 40 MG: 40 TABLET ORAL at 08:35

## 2018-01-26 RX ADMIN — FUROSEMIDE 40 MG: 40 TABLET ORAL at 08:35

## 2018-01-26 RX ADMIN — HYDROCODONE BITARTRATE AND ACETAMINOPHEN 1 TABLET: 5; 325 TABLET ORAL at 22:29

## 2018-01-26 RX ADMIN — BUDESONIDE AND FORMOTEROL FUMARATE DIHYDRATE 2 PUFF: 160; 4.5 AEROSOL RESPIRATORY (INHALATION) at 19:02

## 2018-01-26 RX ADMIN — SERTRALINE HYDROCHLORIDE 50 MG: 50 TABLET ORAL at 08:35

## 2018-01-26 RX ADMIN — LEVOTHYROXINE SODIUM 50 MCG: 50 TABLET ORAL at 06:13

## 2018-01-26 RX ADMIN — IPRATROPIUM BROMIDE AND ALBUTEROL SULFATE 3 ML: 2.5; .5 SOLUTION RESPIRATORY (INHALATION) at 06:56

## 2018-01-26 RX ADMIN — BUDESONIDE AND FORMOTEROL FUMARATE DIHYDRATE 2 PUFF: 160; 4.5 AEROSOL RESPIRATORY (INHALATION) at 06:56

## 2018-01-26 RX ADMIN — ATORVASTATIN CALCIUM 20 MG: 20 TABLET, FILM COATED ORAL at 08:35

## 2018-01-26 RX ADMIN — RANOLAZINE 1000 MG: 500 TABLET, FILM COATED, EXTENDED RELEASE ORAL at 08:35

## 2018-01-26 RX ADMIN — CARVEDILOL 6.25 MG: 6.25 TABLET, FILM COATED ORAL at 17:05

## 2018-01-26 RX ADMIN — IPRATROPIUM BROMIDE AND ALBUTEROL SULFATE 3 ML: 2.5; .5 SOLUTION RESPIRATORY (INHALATION) at 18:55

## 2018-01-26 RX ADMIN — MONTELUKAST SODIUM 10 MG: 10 TABLET, FILM COATED ORAL at 20:03

## 2018-01-26 RX ADMIN — WARFARIN SODIUM 2.5 MG: 2.5 TABLET ORAL at 17:05

## 2018-01-26 RX ADMIN — DOCUSATE SODIUM 100 MG: 100 CAPSULE, LIQUID FILLED ORAL at 08:35

## 2018-01-26 RX ADMIN — RANOLAZINE 1000 MG: 500 TABLET, FILM COATED, EXTENDED RELEASE ORAL at 20:03

## 2018-01-26 NOTE — NURSING NOTE
Revisited patient for ARU consult this AM. Patient resting in bed at time of visit. Patient received 2 units PRBCs yesterday. Discussed ARU with patient again and patient is still agreeable to ARU for continued therapy. Patient has been discussed with MD over rehab floor and . Plan to admit patient to ARU today.

## 2018-01-26 NOTE — THERAPY TREATMENT NOTE
Acute Care - Physical Therapy Treatment Note  [facility]     Patient Name: Val Arteaga  : 1932  MRN: [id number]  Today's Date: 2018  Onset of Illness/Injury or Date of Surgery Date: 18  Date of Referral to PT: 18  Referring Physician: Dr. Mcdowell    Admit Date: 2018    Visit Dx:    ICD-10-CM ICD-9-CM   1. Displaced fracture of base of neck of right femur, initial encounter for closed fracture S72.041A 820.03   2. Essential hypertension I10 401.9   3. Coronary artery disease involving native coronary artery of native heart without angina pectoris I25.10 414.01   4. Pulmonary emphysema, unspecified emphysema type J43.9 492.8   5. Acquired hypothyroidism E03.9 244.9   6. Depressive disorder F32.9 311   7. Hip fracture, right, closed, initial encounter S72.001A 820.8   8. Impaired mobility and ADLs Z74.09 799.89   9. Impaired physical mobility Z74.09 781.99     Patient Active Problem List   Diagnosis   • Essential hypertension   • Coronary artery disease involving native coronary artery of native heart without angina pectoris   • Chronic obstructive pulmonary disease   • Acquired hypothyroidism   • Depressive disorder   • Thygeson's superficial punctate keratitis   • Pseudophakia   • Precordial pain   • Displaced fracture of base of neck of right femur, initial encounter for closed fracture   • Hip fracture, right, closed, initial encounter   • Pulmonary emphysema   • Chronic systolic congestive heart failure   • Hyponatremia   • BENITO (acute kidney injury)   • Acute blood loss anemia               Adult Rehabilitation Note       18 1058 18 0955 18 1424    Rehab Assessment/Intervention    Discipline physical therapy assistant  -RONAN occupational therapy assistant  -CS physical therapy assistant  -RONAN    Document Type therapy note (daily note)  -RONAN therapy note (daily note)  -CS therapy note (daily note)  -RONAN    Subjective Information agree to therapy  -RONAN agree to  therapy  -CS agree to therapy  -RONAN    Patient Effort, Rehab Treatment good  -RONAN good  -CS good  -RONAN    Symptoms Noted During/After Treatment increased pain;shortness of breath  -RONAN increased pain;shortness of breath  -CS fatigue;shortness of breath;increased pain;significant change in vital signs   pt w/ drop in BP w/ position changes.   -RONAN    Precautions/Limitations fall precautions;oxygen therapy device and L/min;anterior hip precautions- right  -RONAN  fall precautions;oxygen therapy device and L/min;anterior hip precautions- right  -RONAN    Recorded by [RONAN] Melquiades King PTA [CS] YULI LauA/L [RONAN] Melquiades King PTA    Vital Signs    Pre Systolic BP Rehab 129  -RONAN 110  -   supine  -RONAN    Pre Treatment Diastolic BP 56  -RONAN 49  -CS 69  -RONAN    Intra Systolic BP Rehab --   EOB: 126/58 after t/f: 107/51 after gait:127/79  -RONAN  --   EOB: 103/52 standin/48 supine: 102/69. nsg notified.   -RONAN    Post Systolic BP Rehab 127  -RONAN 119  -   supine.   -RONAN    Post Treatment Diastolic BP 79  -RONAN 65  -CS 77  -RONAN    Pretreatment Heart Rate (beats/min) 89  -RONAN 81  -CS 83  -RONAN    Intratreatment Heart Rate (beats/min) 91  -RONAN  87  -RONAN    Posttreatment Heart Rate (beats/min) 95  -RONAN 87  -CS 92  -RONAN    Pre SpO2 (%) 92  -RONAN 92  -CS 95  -RONAN    O2 Delivery Pre Treatment supplemental O2  -RONAN supplemental O2  -CS supplemental O2  -RONAN    Intra SpO2 (%) 95  -RONAN  93  -RONAN    O2 Delivery Intra Treatment supplemental O2  -RONAN  supplemental O2  -RONAN    Post SpO2 (%) 85   post gait. improved to 96%  -RONAN 95  -CS 92  -RONAN    O2 Delivery Post Treatment supplemental O2  -RONAN supplemental O2  -CS supplemental O2  -RONAN    Pre Patient Position Supine  -RONAN Supine  -CS Supine  -RONAN    Intra Patient Position  Sitting  -CS     Post Patient Position Sitting  -RONAN Supine  -CS Supine  -RONAN    Recorded by [RONAN] Melquiades King PTA [CS] YULI LauA/L [RONAN] Melquiades King PTA    Pain Assessment    Pain Assessment No/denies pain   -RONAN 0-10  -CS 0-10  -RONAN    Pain Score  0  -CS 0  -RONAN    Post Pain Score  0  -CS 8  -RONAN    Pain Location   Leg  -RONAN    Pain Orientation   Right  -RONAN    Pain Intervention(s)   Repositioned   pt defers pain meds.   -RONAN    Recorded by [RONAN] Melquiades King PTA [CS] OMAR Lau/HINA [RONAN] Melquiades King PTA    Vision Assessment/Intervention    Visual Impairment WFL with corrective lenses  -RONAN  WFL with corrective lenses  -RONAN    Recorded by [RONAN] Melquiades King PTA  [RONAN] Melquiades King PTA    Cognitive Assessment/Intervention    Current Cognitive/Communication Assessment functional  -RONAN functional  -CS functional  -RONAN    Orientation Status oriented x 4  -RONAN oriented x 4  -CS oriented x 4  -RONAN    Follows Commands/Answers Questions 100% of the time  -RONAN 100% of the time  -% of the time  -RONAN    Personal Safety Interventions gait belt;muscle strengthening facilitated;nonskid shoes/slippers when out of bed;supervised activity  -RONAN gait belt;nonskid shoes/slippers when out of bed  -CS gait belt;muscle strengthening facilitated;nonskid shoes/slippers when out of bed;supervised activity  -RONAN    Recorded by [RONAN] Melquiades King PTA [CS] OMAR Lau/HINA [RONAN] Melquiades King PTA    Bed Mobility, Assessment/Treatment    Bed Mobility, Assistive Device bed rails;head of bed elevated  -RONAN bed rails;head of bed elevated  - bed rails;head of bed elevated  -RONAN    Bed Mobility, Scoot/Bridge, [place]   supervision required   w/ extended time. before and after ther ex.   -    Bed Mob, Supine to Sit, [place] minimum assist (75% patient effort)  - moderate assist (50% patient effort);2 person assist required  - minimum assist (75% patient effort)  -    Bed Mob, Sit to Supine, [place]  minimum assist (75% patient effort)  - minimum assist (75% patient effort);moderate assist (50% patient effort)  -RONAN    Recorded by [RONAN] Melquiades King PTA [CS] OMAR Lau/HINA [RONAN] Melquiades MORRISSEY  JEANNETTE King    Transfer Assessment/Treatment    Transfers, Bed-Chair [place] minimum assist (75% patient effort)  -RONAN      Transfers, Bed-Chair-Bed, Assist Device rolling walker  -RONAN      Transfers, Sit-Stand [place] minimum assist (75% patient effort)  -RONAN minimum assist (75% patient effort)  -CS minimum assist (75% patient effort);moderate assist (50% patient effort)  -RONAN    Transfers, Stand-Sit [place] minimum assist (75% patient effort)  -RONAN minimum assist (75% patient effort)  -CS minimum assist (75% patient effort)  -RONAN    Transfers, Sit-Stand-Sit, Assist Device rolling walker  -RONAN rolling walker  -CS rolling walker  -RONAN    Toilet Transfer, [place]  minimum assist (75% patient effort)  -CS     Toilet Transfer, Assistive Device  bedside commode without drop arms;rolling walker  -CS     Transfer, Comment retrieved pt walker more appropriate for her height  -RONAN  brop in BP upon standing.   -RONAN    Recorded by [RONAN] Melquiades King PTA [CS] OMAR Lau/HINA [RONAN] Melquiades King PTA    Gait Assessment/Treatment    Gait, [place] Level contact guard assist;minimum assist (75% patient effort)  -  not appropriate to assess  -RONAN    Gait, Assistive Device rolling walker  -RONAN      Gait, Distance (Feet) 40   +3 ft  -RONAN      Gait, Gait Pattern Analysis swing-to gait  -RONAN      Gait, Gait Deviations antalgic;kari decreased;step length decreased;decreased heel strike  -RONAN      Gait, Safety Issues supplemental O2  -RONAN      Gait, Impairments pain  -RONAN      Gait, Comment vitals monitored throughout tx and BP remained stable  -RONAN      Recorded by [RONAN] Melquiades King PTA  [RONAN] Melquiades King PTA    Stairs Assessment/Treatment    Stairs, [place] Level   not tested  -RONAN    Recorded by   [RONAN] Melquiades King PTA    Upper Body Bathing Assessment/Training    UB Bathing Assess/Train Assistive Device  bath mitt  -     UB Bathing Assess/Train, Position  sitting;edge of bed  -CS     UB  Bathing Assess/Train, [place] Level  minimum assist (75% patient effort)  -CS     UB Bathing Assess/Train, Impairments  ROM decreased;strength decreased;decreased flexibility  -CS     Recorded by  [CS] JENNIFFER Lau     Lower Body Bathing Assessment/Training    LB Bathing Assess/Train Assistive Device  bath mitt  -CS     LB Bathing Assess/Train, Position  sitting;standing  -CS     LB Bathing Assess/Train, [place] Level  minimum assist (75% patient effort)  -CS     LB Bathing Assess/Train, Impairments  ROM decreased;strength decreased;decreased flexibility;impaired balance  -CS     Recorded by  [CS] JENNIFFER Lau     Upper Body Dressing Assessment/Training    UB Dressing Assess/Train, Clothing Type  doffing:;donning:;hospital gown  -CS     UB Dressing Assess/Train, Position  sitting  -CS     UB Dressing Assess/Train, [place]  minimum assist (75% patient effort)  -CS     Recorded by  [CS] JENNIFFER Lau     Toileting Assessment/Training    Toileting Assess/Train, Assistive Device  bedside commode  -CS     Toileting Assess/Train, Position  sitting  -CS     Toileting Assess/Train, Indepen Level  set up required  -CS     Recorded by  [CS] JENNIFFER Lau     Grooming Assessment/Training    Grooming Assess/Train, Position  sitting;edge of bed  -CS     Grooming Assess/Train, Indepen Level  set up required  -CS     Grooming Assess/Train, Comment  wash face, oral care, comb hair, apply deodorant  -CS     Recorded by  [CS] JENNIFFER Lau     Therapy Exercises    Bilateral Lower Extremities --   no ther ex due to lunch arriving.  -RONAN  AROM:;AAROM:;10 reps;20 reps;supine;ankle pumps/circles;quad sets;glut sets;hip abduction/adduction;heel slides;SAQ   2 sets all.20 reps L LE, 10 reps R LE. AAROM HS ab/ad R LE.   -RONAN    Recorded by [RONAN] Melquiades King PTA  [RONAN] Melquiades King PTA    Positioning and Restraints    Pre-Treatment Position in bed  -RONAN in bed  -CS  in bed  -RONAN    Post Treatment Position chair  -RONAN bed  -CS bed  -RONAN    In Bed  supine;call light within reach;exit alarm on;with family/caregiver  -CS supine;call light within reach;encouraged to call for assist;with family/caregiver   all needs met.   -RONAN    In Chair reclined;call light within reach;encouraged to call for assist;with family/caregiver   all needs met. eating lunch  -RONAN      Recorded by [RONAN] Melquiades King PTA [] OMAR Lau/HINA [RONAN] Melquiades King PTA      01/24/18 1329          Rehab Assessment/Intervention    Discipline occupational therapist  -      Document Type therapy note (daily note)  -      Subjective Information agree to therapy  -      Patient Effort, Rehab Treatment good  -      Symptoms Noted During/After Treatment fatigue;shortness of breath;increased pain;significant change in vital signs   O2 dropped to 85 RN notified, came up with deep breathing  -      Precautions/Limitations fall precautions;oxygen therapy device and L/min;anterior hip precautions- right  -      Patient Response to Treatment pt is motivated and wanted to sit EOB to complete BUE exercises. Pt fatigued with tasks and reports some dizziness sitting but was uncertain, when check vital signs where stable but when O2 dropped to 86 but victoriano with deep breathing with O2 on. RN was notified. Pt c/o of chest area tightness after movement activity and RN notified and pt request for pain meds.   -      Recorded by [] Marisol Burdick OTR/L      Vital Signs    Pre Systolic BP Rehab 110  -BH      Pre Treatment Diastolic BP 60  -BH      Post Systolic BP Rehab 120  -BH      Post Treatment Diastolic BP 58  -BH      Pretreatment Heart Rate (beats/min) 81  -BH      Posttreatment Heart Rate (beats/min) 91  -BH      Pre SpO2 (%) 93  -BH      O2 Delivery Pre Treatment supplemental O2  -      Intra SpO2 (%) 86   victoriano to 91 with deep breathing  -      O2 Delivery Intra Treatment supplemental O2  -BH       Post SpO2 (%) 94  -      O2 Delivery Post Treatment supplemental O2  -      Pre Patient Position Supine  -      Intra Patient Position Sitting  -      Post Patient Position Supine  -      Recorded by [] SUJATHA Vazquez      Pain Assessment    Pain Score 0  -      Post Pain Score 4  -      Pain Intervention(s) Repositioned;Ambulation/increased activity;Rest   RN notified  -      Recorded by [] AYAD Vazquez/L      Cognitive Assessment/Intervention    Current Cognitive/Communication Assessment functional  -      Orientation Status oriented x 4  -      Follows Commands/Answers Questions 100% of the time;75% of the time;able to follow single-step instructions  -      Personal Safety mild impairment  -      Personal Safety Interventions fall prevention program maintained;gait belt;muscle strengthening facilitated;nonskid shoes/slippers when out of bed;supervised activity  -      Recorded by [] SUJATHA Vazquez      Bed Mobility, Assessment/Treatment    Bed Mobility, Assistive Device head of bed elevated;bed rails  -      Bed Mob, Supine to Sit, [place] moderate assist (50% patient effort)  -      Bed Mob, Sit to Supine, [place] moderate assist (50% patient effort);maximum assist (25% patient effort);2 person assist required  -      Bed Mobility, Comment HOB down for sit to sup, pt fatigued and in creased pain. Pt requried assist with legs and hip precuations to get into bed safely. pt was able to push through legs and use arms to assit with scooting in the bed and to sit EOB and scoot towards the HOB with min assist. pt fatigued and rqeuried assist/support for the RLE. Pt sat EOB with close supervision for approx 20 minutes.   -      Recorded by [] AYAD Vazquez/L      Transfer Assessment/Treatment    Transfer, Comment defer  -      Recorded by [] AYAD Vazquez/L      Upper Body Bathing Assessment/Training    UB Bathing Assess/Train,  Comment pt declined sponge bath today but agreed to attempt tomorrow.   -      Recorded by [] AYAD Vazquez/L      Balance Skills Training    Sitting-Level of Assistance Close supervision  -      Recorded by [] AYAD Vazquez/L      Therapy Exercises    Bilateral Upper Extremity AROM:;15 reps;sitting;elbow flexion/extension;hand pumps;pronation/supination;shoulder extension/flexion   wrist flexion/extension  -      BUE Resistance --   no resistance, fatigued with movements needed rest breaks.   -      Recorded by [] AYAD Vazquez/L      Positioning and Restraints    Pre-Treatment Position in bed  -      Post Treatment Position bed  -BH      In Bed notified nsg;supine;fowlers;call light within reach;encouraged to call for assist;exit alarm on;with family/caregiver  -      Recorded by [] AYAD Vazquez/L        User Key  (r) = Recorded By, (t) = Taken By, (c) = Cosigned By    Initials Name Effective Dates     AYAD Vazquez/L 10/17/16 -     RONAN King, PTA 10/17/16 -     CS Karen Michel, NELSON/L 10/17/16 -                 IP PT Goals       01/26/18 1058 01/24/18 1424 01/24/18 1008    Bed Mobility PT LTG    Bed Mobility PT LTG, Time to Achieve   by discharge  -CB (r) HL (t) CB (c)    Bed Mobility PT LTG, Activity Type   all bed mobility  -CB (r) HL (t) CB (c)    Bed Mobility PT LTG, [place] Level   minimum assist (75% patient effort)  -CB (r) HL (t) CB (c)    Bed Mobility PT LTG, Additional Goal   HOB down, no handrails  -CB (r) HL (t) CB (c)    Bed Mobility PT LTG, Date Goal Reviewed 01/26/18  -RONAN 01/24/18  -     Bed Mobility PT LTG, Outcome goal ongoing  - goal ongoing  -     Gait Training PT LTG    Gait Training Goal PT LTG, Time to Achieve   by discharge  -CB (r) HL (t) CB (c)    Gait Training Goal PT LTG, [place] Level   contact guard assist  -CB (r) HL (t) CB (c)    Gait Training Goal PT LTG, Assist Device   walker, rolling  -CB (r) HL  (t) CB (c)    Gait Training Goal PT LTG, Distance to Achieve   50 feet  -CB (r) HL (t) CB (c)    Gait Training Goal PT LTG, Date Goal Reviewed 01/26/18  -RONAN 01/24/18  -     Gait Training Goal PT LTG, Outcome goal ongoing  -RONAN goal ongoing  -RONAN     Strength Goal PT LTG    Strength Goal PT LTG, Time to Achieve   by discharge  -CB (r) HL (t) CB (c)    Strength Goal PT LTG, Measure to Achieve   (P)  pt will perform 20 reps hip flexion, hip ABD/ADD, and knee flexion/extension on right LE. AAROM or AROM  -HL    Strength Goal PT LTG, Functional Goal   (P)  Getting legs up into bed   -HL    Strength Goal PT LTG, Date Goal Reviewed 01/26/18  -RONAN 01/24/18  -     Strength Goal PT LTG, Outcome goal ongoing  - goal ongoing  -       User Key  (r) = Recorded By, (t) = Taken By, (c) = Cosigned By    Initials Name Provider Type    CB Leatha Tirado, PT Physical Therapist    RONAN King PTA Physical Therapy Assistant    HERIBE Ang, PT Student PT Student          Physical Therapy Education     Title: PT OT SLP Therapies (Active)     Topic: Physical Therapy (Active)     Point: Mobility training (Active)    Learning Progress Summary    Learner Readiness Method Response Comment Documented by Status   Patient Acceptance E NR pt edu on drop in BP with position changes and risks involved.  01/24/18 1539 Active    Acceptance E VU  HL 01/24/18 1129 Done   Family Acceptance E VU  HL 01/24/18 1129 Done               Point: Precautions (Done)    Learning Progress Summary    Learner Readiness Method Response Comment Documented by Status   Patient Acceptance E VU  HL 01/24/18 1129 Done   Family Acceptance E VU  HL 01/24/18 1129 Done                      User Key     Initials Effective Dates Name Provider Type Discipline    RONAN 10/17/16 -  Melquiades King PTA Physical Therapy Assistant PT     12/05/17 -  Harper Ang, PT Student PT Student PT                    PT Recommendation and Plan  Anticipated Equipment Needs At  Discharge:  (nephew stated he would build a ramp for her.)  Anticipated Discharge Disposition: inpatient rehabilitation facility  Planned Therapy Interventions: balance training, bed mobility training, gait training, home exercise program, patient/family education, strengthening, stair training  PT Frequency: per priority policy, other (see comments) (5-14 visits)  Plan of Care Review  Plan Of Care Reviewed With: patient  Progress: progress toward functional goals as expected  Outcome Summary/Follow up Plan: pt responded well to therapy. pt resuired min A for sup-sit and min A for sit-stands and t/fs. pt w/ increased gait to 40 ft CGA w/ RW. pts BP remained stable throughout tx. O2 dropped to 85 % w/ gait but quickly recovered. no new goals met at this time. pt would continue to benefit from PT services. Recommend ARU upon DC.           Outcome Measures       01/26/18 1100 01/26/18 1058 01/24/18 1424    How much help from another person do you currently need...    Turning from your back to your side while in flat bed without using bedrails?  3  -RONAN 3  -RONAN    Moving from lying on back to sitting on the side of a flat bed without bedrails?  3  -RONAN 3  -RONAN    Moving to and from a bed to a chair (including a wheelchair)?  3  -RONAN 2  -RONAN    Standing up from a chair using your arms (e.g., wheelchair, bedside chair)?  3  -RONAN 2  -RONAN    Climbing 3-5 steps with a railing?  2  -RONAN 2  -RONAN    To walk in hospital room?  3  -RONAN 2  -RONAN    AM-PAC 6 Clicks Score  17  -RONAN 14  -RONAN    How much help from another is currently needed...    Putting on and taking off regular lower body clothing? 1  -CS      Bathing (including washing, rinsing, and drying) 2  -CS      Toileting (which includes using toilet bed pan or urinal) 1  -CS      Putting on and taking off regular upper body clothing 3  -CS      Taking care of personal grooming (such as brushing teeth) 3  -CS      Eating meals 3  -CS      Score 13  -CS      Functional Assessment     Outcome Measure Options AM-[facility] 6 Clicks Daily Activity (OT)  -CS AM-[facility] 6 Clicks Basic Mobility (PT)  -[facility] 6 Clicks Basic Mobility (PT)  -RONAN      01/24/18 1008 01/24/18 0755       How much help from another person do you currently need...    Turning from your back to your side while in flat bed without using bedrails? 3  -CB (r) HL (t) CB (c)      Moving from lying on back to sitting on the side of a flat bed without bedrails? 2  -CB      Moving to and from a bed to a chair (including a wheelchair)? 2  -CB (r) HL (t) CB (c)      Standing up from a chair using your arms (e.g., wheelchair, bedside chair)? 2  -CB (r) HL (t) CB (c)      Climbing 3-5 steps with a railing? 2  -CB (r) HL (t) CB (c)      To walk in hospital room? 2  -CB (r) HL (t) CB (c)      AM-PAC 6 Clicks Score 13  -CB      How much help from another is currently needed...    Putting on and taking off regular lower body clothing?  1  -BH     Bathing (including washing, rinsing, and drying)  2  -BH     Toileting (which includes using toilet bed pan or urinal)  1  -BH     Putting on and taking off regular upper body clothing  3  -BH     Taking care of personal grooming (such as brushing teeth)  3  -BH     Eating meals  3  -     Score  13  -     Functional Assessment    Outcome Measure Options AM-[facility] 6 Clicks Basic Mobility (PT)  -CB (r) HL (t) CB (c) -[facility] 6 Clicks Daily Activity (OT)  -       User Key  (r) = Recorded By, (t) = Taken By, (c) = Cosigned By    Initials Name Provider Type    CB Leatha Tirado, PT Physical Therapist    BH Marisol Burdick, OTR/L Occupational Therapist    RONAN King, JEANNETTE Physical Therapy Assistant    RIA Michel, NELSON/L Occupational Therapy Assistant    HL Harper Ang, PT Student PT Student           Time Calculation:         PT Charges       01/26/18 1254          Time Calculation    Start Time 1058  -RONAN      Stop Time 1136  -RONAN      Time Calculation (min) 38 min  -RONAN      Time Calculation- PT     Total Timed Code Minutes- PT 38 minute(s)  -RONAN        User Key  (r) = Recorded By, (t) = Taken By, (c) = Cosigned By    Initials Name Provider Type    RONAN King PTA Physical Therapy Assistant          Therapy Charges for Today     Code Description Service Date Service Provider Modifiers Qty    87692236798 HC GAIT TRAINING EA 15 MIN 1/26/2018 Melquiades King PTA GP 1    44968397127 HC PT THERAPEUTIC ACT EA 15 MIN 1/26/2018 Melquiades King PTA GP 2          PT G-Codes  PT Professional Judgement Used?: Yes  Outcome Measure Options: AM-PAC 6 Clicks Daily Activity (OT)  Score: 13  Functional Limitation: Mobility: Walking and moving around  Mobility: Walking and Moving Around Current Status (): At least 40 percent but less than 60 percent impaired, limited or restricted  Mobility: Walking and Moving Around Goal Status (): At least 1 percent but less than 20 percent impaired, limited or restricted    Melquiades King PTA  1/26/2018

## 2018-01-26 NOTE — PLAN OF CARE
Problem: Patient Care Overview (Adult)  Goal: Plan of Care Review  Outcome: Ongoing (interventions implemented as appropriate)   01/26/18 0351   Coping/Psychosocial Response Interventions   Plan Of Care Reviewed With patient;reynaldo   Patient Care Overview   Progress no change   Outcome Evaluation   Outcome Summary/Follow up Plan Patient BP better this shift after 2 units of PRBSs and fluids, up to chair, still bladder scanning to check urine and one in and out catheter      Goal: Adult Individualization and Mutuality  Outcome: Ongoing (interventions implemented as appropriate)    Goal: Discharge Needs Assessment  Outcome: Ongoing (interventions implemented as appropriate)      Problem: Fall Risk (Adult)  Goal: Identify Related Risk Factors and Signs and Symptoms  Outcome: Ongoing (interventions implemented as appropriate)    Goal: Absence of Falls  Outcome: Ongoing (interventions implemented as appropriate)    Goal: Identify Related Risk Factors and Signs and Symptoms  Outcome: Ongoing (interventions implemented as appropriate)    Goal: Absence of Falls  Outcome: Ongoing (interventions implemented as appropriate)      Problem: Fractured Hip (Adult)  Goal: Signs and Symptoms of Listed Potential Problems Will be Absent or Manageable (Fractured Hip)  Outcome: Ongoing (interventions implemented as appropriate)

## 2018-01-26 NOTE — PLAN OF CARE
Problem: Patient Care Overview (Adult)  Goal: Plan of Care Review  Outcome: Ongoing (interventions implemented as appropriate)   01/26/18 1058   Coping/Psychosocial Response Interventions   Plan Of Care Reviewed With patient   Patient Care Overview   Progress progress toward functional goals as expected   Outcome Evaluation   Outcome Summary/Follow up Plan pt responded well to therapy. pt required min A for sup-sit and min A for sit-stands and t/fs. pt w/ increased gait to 40 ft CGA w/ RW. pts BP remained stable throughout tx. O2 dropped to 85 % w/ gait but quickly recovered. no new goals met at this time. pt would continue to benefit from PT services. Recommend ARU upon DC.      Goal: Discharge Needs Assessment  Outcome: Ongoing (interventions implemented as appropriate)   01/23/18 1010 01/24/18 1008   Current Health   Outpatient/Agency/Support Group Needs --  inpatient rehabilitation facility (specify)   Discharge Needs Assessment   Concerns To Be Addressed no discharge needs identified;denies needs/concerns at this time --    Readmission Within The Last 30 Days current reason for admission unrelated to previous admission --    Equipment Needed After Discharge --  none;other (see comments)  (Nephew stated he was going to build a ramp for pt.)   Discharge Facility/Level Of Care Needs --  acute rehab   Current Discharge Risk chronically ill --    Discharge Disposition --  still a patient   Living Environment   Transportation Available --  car;family or friend will provide   Self-Care   Equipment Currently Used at Home --  cane, straight;oxygen;rollator;grab bar;shower chair  (used O2 at night and PRN)       Problem: Inpatient Physical Therapy  Goal: Bed Mobility Goal LTG- PT  Outcome: Ongoing (interventions implemented as appropriate)   01/24/18 1008 01/26/18 1058   Bed Mobility PT LTG   Bed Mobility PT LTG, Time to Achieve by discharge --    Bed Mobility PT LTG, Activity Type all bed mobility --    Bed Mobility PT  LTG, [place] Level minimum assist (75% patient effort) --    Bed Mobility PT LTG, Additional Goal HOB down, no handrails --    Bed Mobility PT LTG, Date Goal Reviewed --  01/26/18   Bed Mobility PT LTG, Outcome --  goal ongoing     Goal: Gait Training Goal LTG- PT  Outcome: Ongoing (interventions implemented as appropriate)   01/24/18 1008 01/26/18 1058   Gait Training PT LTG   Gait Training Goal PT LTG, Time to Achieve by discharge --    Gait Training Goal PT LTG, [place] Level contact guard assist --    Gait Training Goal PT LTG, Assist Device walker, rolling --    Gait Training Goal PT LTG, Distance to Achieve 50 feet --    Gait Training Goal PT LTG, Date Goal Reviewed --  01/26/18   Gait Training Goal PT LTG, Outcome --  goal ongoing     Goal: Strength Goal LTG- PT  Outcome: Ongoing (interventions implemented as appropriate)   01/24/18 1008 01/26/18 1058   Strength Goal PT LTG   Strength Goal PT LTG, Time to Achieve by discharge --    Strength Goal PT LTG, Measure to Achieve pt will perform 20 reps hip flexion, hip ABD/ADD, and knee flexion/extension on right LE. AAROM or AROM --    Strength Goal PT LTG, Functional Goal Getting legs up into bed  --    Strength Goal PT LTG, Date Goal Reviewed --  01/26/18   Strength Goal PT LTG, Outcome --  goal ongoing

## 2018-01-26 NOTE — DISCHARGE SUMMARY
Date of Admission: 2018  Date of Discharge:  2018    Discharge Diagnosis:    Refer to my assessment and plan below.    Presenting Problem/History of Present Illness  Fall with right hip pain and right knee pain.      Hospital Course    This 85-year-old female with multiple medical problems admitted with fall associated with right humeral neck fracture, closed.  Patient was seen in consultation by orthopedic and taken to surgery.  Patient was started on physical and occupational therapy postoperatively.  Patient also did have chronic obstructive pulmonary disease for which nebulizer treatment was started.  Patient also has history of CHF for which she has been on Lasix.  Her BUN and creatinine were going up so I cut down Lasix to 20 mg daily on discharge.  Also she was given IV fluid secondary to low blood pressure but I discontinued secondary to increase risk of CHF.  Patient will need extensive rehabilitation due to functional decline, generalized weakness and right hip surgery.  Patient was accepted on rehabilitation floor so we are discharging patient to go to rehabilitation floor where patient will be followed by a physician there.  Orthopedic could continue to see patient.  Repeat labs including CBC, basic metabolic panel and pro time INR ordered.    Procedures Performed:  Right hip surgery      Consults:    Consults     Date and Time Order Name Status Description    2018 Orthopedics (on-call MD unless specified) Completed     2018 Hospitalist (on-call MD unless specified)            Pertinent Test Results:    Refer to chart.  Refer to my assessment and plan below.    Condition on Discharge:   Stable and improved  Vital Signs past 24hrs  BP range: BP: ()/(46-60) 130/60  Pulse range: Heart Rate:  [72-89] 89  Resp rate range: Resp:  [16-18] 18  Temp range: Temp (24hrs), Av.1 °F (36.7 °C), Min:96.3 °F (35.7 °C), Max:99.1 °F (37.3 °C)    O2 Device: nasal cannulaFlow (L/min):   [2.5] 2.5  Oxygen range:SpO2:  [92 %-97 %] 92 %   78.2 kg (172 lb 4.8 oz); Body mass index is 26.2 kg/(m^2).    Intake/Output Summary (Last 24 hours) at 01/26/18 1211  Last data filed at 01/26/18 0556   Gross per 24 hour   Intake              956 ml   Output             1150 ml   Net             -194 ml       Physical Exam  Length of Stay: 4  Date of Admission: 1/22/2018  Primary Care Physician: Roula Albert MD     Subjective         HPI: Patient is still sore in right knee and right hip.  Still some shortness of breath but better.  No chest pain.  Appetite fair.  No nausea or vomiting.  No other new complaints.  Chart reviewed.  Multiple family members are present at bedside.           Review of Systems   Constitutional: Positive for activity change and appetite change.   Musculoskeletal:        Right hip pain with movement   All other systems reviewed and are negative.     All pertinent negatives and positives are as above. All other systems have been reviewed and are negative unless otherwise stated.      Objective    Temp:  [96.3 °F (35.7 °C)-99.1 °F (37.3 °C)] 99.1 °F (37.3 °C)  Heart Rate:  [72-89] 89  Resp:  [16-18] 18  BP: ()/(46-60) 130/60     Physical Exam   Constitutional: She is oriented to person, place, and time. She appears well-developed and well-nourished.   HENT:   Head: Normocephalic and atraumatic.   Eyes: Conjunctivae and EOM are normal. No scleral icterus.   Neck: Normal range of motion. Neck supple. No JVD present.   Cardiovascular: Normal rate, regular rhythm, normal heart sounds and intact distal pulses.    Pulmonary/Chest: Effort normal. She has decreased breath sounds. She has no wheezes. She has rales.   Abdominal: Soft. She exhibits no distension. There is no tenderness.   Musculoskeletal: Normal range of motion. She exhibits no edema.   Bruising of her right knee noted.   Neurological: She is alert and oriented to person, place, and time. No cranial nerve deficit.   Skin: Skin  is warm and dry. No rash noted.   Psychiatric: She has a normal mood and affect.      Results Review:  I have reviewed the labs, radiology results, and diagnostic studies.     Laboratory Data:      Results from last 7 days  Lab Units 01/26/18  0703 01/25/18  0601 01/24/18 0635 01/22/18 1952   SODIUM mmol/L 127* 126* 129*  < > 126*   POTASSIUM mmol/L 4.5 4.1 4.4  < > 3.9   CHLORIDE mmol/L 91* 88* 93*  < > 93*   CO2 mmol/L 29.0 29.0 24.0  < > 25.0   BUN mg/dL 26* 27* 20  < > 17   CREATININE mg/dL 1.01* 1.63* 1.06*  < > 0.79   GLUCOSE mg/dL 100 107* 128*  < > 103*   CALCIUM mg/dL 8.3* 7.8* 7.5*  < > 8.6   BILIRUBIN mg/dL  --   --   --   --  0.5   ALK PHOS U/L  --   --   --   --  75   ALT (SGPT) U/L  --   --   --   --  37   AST (SGOT) U/L  --   --   --   --  68*   ANION GAP mmol/L 7.0 9.0 12.0  < > 8.0   < > = values in this interval not displayed.  Estimated Creatinine Clearance: 44.7 mL/min (by C-G formula based on Cr of 1.01).             Results from last 7 days  Lab Units 01/26/18  0703 01/25/18  0601 01/24/18 0635 01/23/18 1939 01/23/18 0600 01/22/18 1952   WBC 10*3/mm3 9.94* 11.34* 10.85*  --  9.61 12.18*   HEMOGLOBIN g/dL 9.6* 8.0* 9.3* 10.2* 10.8* 11.0*   HEMATOCRIT % 27.2* 23.5* 26.5* 29.8* 32.0* 32.0*   PLATELETS 10*3/mm3 171 203 215  --  228 243         Results from last 7 days  Lab Units 01/26/18  0703 01/25/18  0601 01/24/18 0635 01/23/18 1943 01/22/18 1952   INR   1.25* 1.19 1.03 0.96 0.93         Culture Data:   No results found for: BLOODCX  No results found for: URINECX  No results found for: RESPCX  No results found for: WOUNDCX  No results found for: STOOLCX  No components found for: BODYFLD     Radiology Data:       Imaging Results (last 24 hours)      ** No results found for the last 24 hours. **             I have reviewed the patient current medications.      Assessment/Plan          Hospital Problem List      * (Principal)Displaced fracture of base of neck of right femur, initial  encounter for closed fracture     Essential hypertension     Coronary artery disease involving native coronary artery of native heart without angina pectoris     Chronic obstructive pulmonary disease     Acquired hypothyroidism     Hip fracture, right, closed, initial encounter     Pulmonary emphysema     Overview Signed 1/23/2018  6:20 AM by Kelvin Mcdowell MD       Added automatically from request for surgery [id number]         Chronic systolic congestive heart failure             Plan:       1.  Subcapital fracture with minimal displacement, right hip:  Bipolar endoprosthesis of the right hip 1/23/2018.  ARU consulted.    2.  Congestive heart failure/grade I diastolic dysfunction/ EF 36-40%:  Continue Coreg, Lasix and Ranexa.   Decrease dose of Lasix to 20 mg daily secondary to her BUN and creatinine is going up   Pacemaker noted.  Continuous cardiac monitoring.    3.  Hyponatremia:  Most likely due to hypervolemia.  Fluid and sodium restrictions.  Will monitor daily BMP.    4.  Anemia:   patient was given 2 units of packed red blood cells.  Risk and benefits are given and patient wishes to proceed.  5.  Hypotension:  Gentle hydration today.  Much better today.  Discontinue IV fluid     ARU was consulted for evaluation for rehabilitation.         Discharge Planning: I expect patient to be discharged to rehabilitation today                           Discharge Disposition  Rehab Facility or Unit ([facility])    Discharge Medications     Your medication list      ASK your doctor about these medications       Instructions Last Dose Given Next Dose Due    aspirin 81 MG EC tablet              budesonide-formoterol 160-4.5 MCG/ACT inhaler   Commonly known as:  SYMBICORT        Inhale 2 puffs 2 (Two) Times a Day.         carvedilol 6.25 MG tablet   Commonly known as:  COREG              clopidogrel 75 MG tablet   Commonly known as:  PLAVIX              coenzyme Q10 100 MG capsule               fluorometholone 0.1 % ophthalmic suspension   Commonly known as:  FLAREX        Administer 1 drop to both eyes 4 (Four) Times a Day.         furosemide 40 MG tablet   Commonly known as:  LASIX        Take 1 tablet by mouth 2 (Two) Times a Day.         guaiFENesin 600 MG 12 hr tablet   Commonly known as:  MUCINEX        Take 1 tablet by mouth Every 12 (Twelve) Hours.         ipratropium-albuterol 0.5-2.5 mg/mL nebulizer   Commonly known as:  DUO-NEB        USE 1 BY NEBULIZER 4 (FOUR) TIMES A DAY AS NEEDED FOR WHEEZING.         levothyroxine 50 MCG tablet   Commonly known as:  SYNTHROID, LEVOTHROID        TAKE 1 TABLET BY MOUTH EVERY DAY         losartan 25 MG tablet   Commonly known as:  COZAAR        Take 1 tablet by mouth Daily.         montelukast 10 MG tablet   Commonly known as:  SINGULAIR              nitroglycerin 0.2 MG/HR patch   Commonly known as:  NITRO-DUR        Place 1 patch on the skin Daily.         polyethylene glycol packet   Commonly known as:  MIRALAX        Take 17 g by mouth Daily.         ranolazine 1000 MG 12 hr tablet   Commonly known as:  RANEXA        Take 1 tablet by mouth Every 12 (Twelve) Hours.         sertraline 50 MG tablet   Commonly known as:  ZOLOFT        TAKE 1 TABLET BY MOUTH EVERY DAY         simvastatin 40 MG tablet   Commonly known as:  ZOCOR        TAKE 1 TABLET BY MOUTH EVERY NIGHT AT BEDTIME               Discharge Diet:  Diet Order   Procedures   • Diet Regular; Low Sodium, Daily Fluid Restriction; Other (See comments); 2,000 mg Na       Activity at Discharge:    As tolerated.  Physical and occupational therapy ordered.    Follow-up Appointments  Follow-up Information     Follow up with [facility] .    Specialty:  Physical Medicine and Rehabilitation    Contact information:    [address] [place] [id number] [phone]        Follow up with Roula Albert MD .    Specialty:  Internal Medicine    Contact information:     [address] [place] [address]  [phone]              Discharge Instructions  Refer to orders.      Test Results Pending at Discharge   none   Discussed with case management.  Discussed with patient and multiple family members at bedside.  Carolynn Hamm MD  01/26/18  12:11 PM    Time: I spent > 30mins in the discharge planning of this patient.    EMR Dragon/Transcription disclaimer:   Much of this encounter note is an electronic transcription/translation of spoken language to printed text. The electronic translation of spoken language may permit erroneous, or at times, nonsensical words or phrases to be inadvertently transcribed; Although I have reviewed the note for such errors, some may still exist.

## 2018-01-26 NOTE — PLAN OF CARE
Problem: Inpatient Physical Therapy  Goal: Bed Mobility Goal LTG- PT  Outcome: Unable to achieve outcome(s) by discharge Date Met: 01/26/18 01/24/18 1008 01/26/18 1539   Bed Mobility PT LTG   Bed Mobility PT LTG, Time to Achieve by discharge --    Bed Mobility PT LTG, Activity Type all bed mobility --    Bed Mobility PT LTG, [place] Level minimum assist (75% patient effort) --    Bed Mobility PT LTG, Additional Goal HOB down, no handrails --    Bed Mobility PT LTG, Date Goal Reviewed --  01/26/18   Bed Mobility PT LTG, Outcome --  goal not met   Bed Mobility PT LTG, Reason Goal Not Met --  discharged from facility     Goal: Gait Training Goal LTG- PT  Outcome: Unable to achieve outcome(s) by discharge Date Met: 01/26/18 01/24/18 1008 01/26/18 1539   Gait Training PT LTG   Gait Training Goal PT LTG, Time to Achieve by discharge --    Gait Training Goal PT LTG, [place] Level contact guard assist --    Gait Training Goal PT LTG, Assist Device walker, rolling --    Gait Training Goal PT LTG, Distance to Achieve 50 feet --    Gait Training Goal PT LTG, Date Goal Reviewed --  01/26/18   Gait Training Goal PT LTG, Outcome --  goal not met   Gait Training Goal PT LTG, Reason Goal Not Met --  discharged from facility     Goal: Strength Goal LTG- PT  Outcome: Unable to achieve outcome(s) by discharge Date Met: 01/26/18 01/24/18 1008 01/26/18 1539   Strength Goal PT LTG   Strength Goal PT LTG, Time to Achieve by discharge --    Strength Goal PT LTG, Measure to Achieve pt will perform 20 reps hip flexion, hip ABD/ADD, and knee flexion/extension on right LE. AAROM or AROM --    Strength Goal PT LTG, Functional Goal Getting legs up into bed  --    Strength Goal PT LTG, Date Goal Reviewed --  01/26/18   Strength Goal PT LTG, Outcome --  goal not met   Strength Goal PT LTG, Reason Goal Not Met --  discharged from facility

## 2018-01-26 NOTE — THERAPY DISCHARGE NOTE
Acute Care - Physical Therapy Discharge Summary  [facility]       Patient Name: Val Arteaga  : 1932  MRN: [id number]    Today's Date: 2018  Onset of Illness/Injury or Date of Surgery Date: 18    Date of Referral to PT: 18  Referring Physician: Dr. Mcdowell      Admit Date: 2018      PT Recommendation and Plan    Visit Dx:    ICD-10-CM ICD-9-CM   1. Displaced fracture of base of neck of right femur, initial encounter for closed fracture S72.041A 820.03   2. Essential hypertension I10 401.9   3. Coronary artery disease involving native coronary artery of native heart without angina pectoris I25.10 414.01   4. Pulmonary emphysema, unspecified emphysema type J43.9 492.8   5. Acquired hypothyroidism E03.9 244.9   6. Depressive disorder F32.9 311   7. Hip fracture, right, closed, initial encounter S72.001A 820.8   8. Impaired mobility and ADLs Z74.09 799.89   9. Impaired physical mobility Z74.09 781.99             Outcome Measures       18 1100 18 1058 18 1424    How much help from another person do you currently need...    Turning from your back to your side while in flat bed without using bedrails?  3  -RONAN 3  -RONAN    Moving from lying on back to sitting on the side of a flat bed without bedrails?  3  -RONAN 3  -RONAN    Moving to and from a bed to a chair (including a wheelchair)?  3  -RONAN 2  -RONAN    Standing up from a chair using your arms (e.g., wheelchair, bedside chair)?  3  -RONAN 2  -RONAN    Climbing 3-5 steps with a railing?  2  -RONAN 2  -RONAN    To walk in hospital room?  3  -RONAN 2  -RONAN    AM-PAC 6 Clicks Score  17  -RONAN 14  -RONAN    How much help from another is currently needed...    Putting on and taking off regular lower body clothing? 1  -CS      Bathing (including washing, rinsing, and drying) 2  -CS      Toileting (which includes using toilet bed pan or urinal) 1  -CS      Putting on and taking off regular upper body clothing 3  -CS      Taking care of personal grooming  (such as brushing teeth) 3  -CS      Eating meals 3  -CS      Score 13  -      Functional Assessment    Outcome Measure Options AM-PAC 6 Clicks Daily Activity (OT)  -CS AM-[facility] 6 Clicks Basic Mobility (PT)  -RONAN AM-PAC 6 Clicks Basic Mobility (PT)  -RONAN      01/24/18 1008 01/24/18 0755       How much help from another person do you currently need...    Turning from your back to your side while in flat bed without using bedrails? 3  -CB (r) HL (t) CB (c)      Moving from lying on back to sitting on the side of a flat bed without bedrails? 2  -CB      Moving to and from a bed to a chair (including a wheelchair)? 2  -CB (r) HL (t) CB (c)      Standing up from a chair using your arms (e.g., wheelchair, bedside chair)? 2  -CB (r) HL (t) CB (c)      Climbing 3-5 steps with a railing? 2  -CB (r) HL (t) CB (c)      To walk in hospital room? 2  -CB (r) HL (t) CB (c)      AM-PAC 6 Clicks Score 13  -CB      How much help from another is currently needed...    Putting on and taking off regular lower body clothing?  1  -BH     Bathing (including washing, rinsing, and drying)  2  -BH     Toileting (which includes using toilet bed pan or urinal)  1  -BH     Putting on and taking off regular upper body clothing  3  -BH     Taking care of personal grooming (such as brushing teeth)  3  -BH     Eating meals  3  -     Score  13  -     Functional Assessment    Outcome Measure Options AM-[facility] 6 Clicks Basic Mobility (PT)  -CB (r) HL (t) CB (c) AM-[facility] 6 Clicks Daily Activity (OT)  -       User Key  (r) = Recorded By, (t) = Taken By, (c) = Cosigned By    Initials Name Provider Type    CB Leatha Tirado, PT Physical Therapist    BH Marisol Burdick, OTR/L Occupational Therapist    RONAN King, JEANNETTE Physical Therapy Assistant    RIA Michel, NELSON/L Occupational Therapy Assistant    HL Harper Ang, PT Student PT Student                PT Charges       01/26/18 1254          Time Calculation    Start Time 1058  -RONAN      Stop  Time 1136  -      Time Calculation (min) 38 min  -      Time Calculation- PT    Total Timed Code Minutes- PT 38 minute(s)  -        User Key  (r) = Recorded By, (t) = Taken By, (c) = Cosigned By    Initials Name Provider Type    RONAN King, PTA Physical Therapy Assistant                  IP PT Goals       01/26/18 1539 01/26/18 1058 01/24/18 1424    Bed Mobility PT LTG    Bed Mobility PT LTG, Date Goal Reviewed 01/26/18  -CB 01/26/18  -RONAN 01/24/18  -    Bed Mobility PT LTG, Outcome goal not met  -CB goal ongoing  -RONAN goal ongoing  -RONAN    Bed Mobility PT LTG, Reason Goal Not Met discharged from facility  -CB      Gait Training PT LTG    Gait Training Goal PT LTG, Date Goal Reviewed 01/26/18  -CB 01/26/18  -RONAN 01/24/18  -    Gait Training Goal PT LTG, Outcome goal not met  -CB goal ongoing  - goal ongoing  -    Gait Training Goal PT LTG, Reason Goal Not Met discharged from facility  -CB      Strength Goal PT LTG    Strength Goal PT LTG, Date Goal Reviewed 01/26/18  -CB 01/26/18  -RONAN 01/24/18  -    Strength Goal PT LTG, Outcome goal not met  -CB goal ongoing  -RONAN goal ongoing  -    Strength Goal PT LTG, Reason Goal Not Met discharged from facility  -CB        01/24/18 1008          Bed Mobility PT LTG    Bed Mobility PT LTG, Time to Achieve by discharge  -CB (r) HL (t) CB (c)      Bed Mobility PT LTG, Activity Type all bed mobility  -CB (r) HL (t) CB (c)      Bed Mobility PT LTG, [place] Level minimum assist (75% patient effort)  -CB (r) HL (t) CB (c)      Bed Mobility PT LTG, Additional Goal HOB down, no handrails  -CB (r) HL (t) CB (c)      Gait Training PT LTG    Gait Training Goal PT LTG, Time to Achieve by discharge  -CB (r) HL (t) CB (c)      Gait Training Goal PT LTG, [place] Level contact guard assist  -CB (r) HL (t) CB (c)      Gait Training Goal PT LTG, Assist Device walker, rolling  -CB (r) HL (t) CB (c)      Gait Training Goal PT LTG, Distance to Achieve 50 feet  -CB (r)  HL (t) CB (c)      Strength Goal PT LTG    Strength Goal PT LTG, Time to Achieve by discharge  -CB (r) HL (t) CB (c)      Strength Goal PT LTG, Measure to Achieve (P)  pt will perform 20 reps hip flexion, hip ABD/ADD, and knee flexion/extension on right LE. AAROM or AROM  -HL      Strength Goal PT LTG, Functional Goal (P)  Getting legs up into bed   -HL        User Key  (r) = Recorded By, (t) = Taken By, (c) = Cosigned By    Initials Name Provider Type    CB Leatha Tirado, PT Physical Therapist    RONAN King, PTA Physical Therapy Assistant    HL Harper Ang, PT Student PT Student              PT Discharge Summary  Anticipated Discharge Disposition: inpatient rehabilitation facility  Reason for Discharge: Discharge from facility, Per MD order  Outcomes Achieved: Patient able to partially acheive established goals  Discharge Destination: Inpatient rehabilitation facility      Leatha Tirado, PT   1/26/2018

## 2018-01-26 NOTE — PLAN OF CARE
Problem: Patient Care Overview (Adult)  Goal: Plan of Care Review  Outcome: Ongoing (interventions implemented as appropriate)   01/26/18 1144   Coping/Psychosocial Response Interventions   Plan Of Care Reviewed With patient   Patient Care Overview   Progress improving   Outcome Evaluation   Outcome Summary/Follow up Plan Pt tolerated tx wel this date. Pt completed an ADL. Pt was min A with toilet t/f. No goals met this tx. Continue POC.       Problem: Inpatient Occupational Therapy  Goal: Bed Mobility Goal LTG- OT  Outcome: Ongoing (interventions implemented as appropriate)   01/24/18 0755 01/24/18 1329 01/26/18 1144   Bed Mobility OT LTG   Bed Mobility OT LTG, Date Established 01/24/18 --  --    Bed Mobility OT LTG, Time to Achieve by discharge --  --    Bed Mobility OT LTG, Activity Type all bed mobility --  --    Bed Mobility OT LTG, [place] Level supervision required  (to engage in ADL) --  --    Bed Mobility OT LTG, Date Goal Reviewed --  --  01/26/18   Bed Mobility OT LTG, Outcome --  goal ongoing --      Goal: Transfer Training Goal 1 LTG- OT  Outcome: Ongoing (interventions implemented as appropriate)   01/24/18 0755 01/26/18 1144   Transfer Training OT LTG   Transfer Training OT LTG, Date Established 01/24/18 --    Transfer Training OT LTG, Time to Achieve by discharge --    Transfer Training OT LTG, Activity Type toilet --    Transfer Training OT LTG, [place] Level contact guard assist  (AE/AD as needed) --    Transfer Training OT LTG, Date Goal Reviewed --  01/26/18   Transfer Training OT LTG, Outcome --  goal not met     Goal: ADL Goal LTG- OT  Outcome: Ongoing (interventions implemented as appropriate)   01/24/18 0755 01/26/18 1144   ADL OT LTG   ADL OT LTG, Date Established 01/24/18 --    ADL OT LTG, Activity Type ADL skills --    ADL OT LTG, Additional Goal set up grooming/self feeding, min A UB bath/dressing/ mod A LB bath/dressing/toileting - AE/AD as needed --    ADL OT LTG, Date Goal  Reviewed --  01/26/18   ADL OT LTG, Outcome --  goal not met     Goal: Functional Mobility Goal LTG- OT  Outcome: Ongoing (interventions implemented as appropriate)   01/24/18 0755 01/26/18 1144   Functional Mobility OT LTG   Functional Mobility Goal OT LTG, Date Established 01/24/18 --    Functional Mobility Goal OT LTG, Time to Achieve by discharge --    Functional Mobility Goal OT LTG, [place] Level contact guard --    Functional Mobility Goal OT LTG, Assist Device (AE/AD as needed) --    Functional Mobility Goal OT LTG, Distance to Achieve to the bathroom  (to the toilet) --    Functional Mobility Goal OT LTG, Date Goal Reviewed --  01/26/18   Functional Mobility Goal OT LTG, Outcome --  goal not met

## 2018-01-26 NOTE — PROGRESS NOTES
"Anticoagulation by Pharmacy - Warfarin    Val Arteaga is a 85 y.o.female  [Ht: 172.7 cm (68\"); Wt: 78.2 kg (172 lb 4.8 oz)] on Warfarin 2.5 mg PO  for indication of VTE prophylaxis s/p ortho procedure.    Goal INR: 1.7-2.5  Today's INR:   Lab Results   Component Value Date    INR 1.25 (H) 01/26/2018         Lab Results   Component Value Date    INR 1.25 (H) 01/26/2018    INR 1.19 01/25/2018    INR 1.03 01/24/2018    PROTIME 15.7 (H) 01/26/2018    PROTIME 15.1 01/25/2018    PROTIME 13.4 01/24/2018     Lab Results   Component Value Date    HGB 9.6 (L) 01/26/2018    HGB 8.0 (L) 01/25/2018    HGB 9.3 (L) 01/24/2018     Lab Results   Component Value Date    HCT 27.2 (L) 01/26/2018    HCT 23.5 (L) 01/25/2018    HCT 26.5 (L) 01/24/2018     Lab Results   Component Value Date     01/26/2018     01/25/2018     01/24/2018     Assessment/Plan:  INR 1.25 and continues to trend towards goal. Continue 2.5mg daily.    Sandoval Ocampo [facility]  01/26/18 9:52 AM     "

## 2018-01-26 NOTE — PLAN OF CARE
Problem: Patient Care Overview (Adult)  Goal: Plan of Care Review  Outcome: Ongoing (interventions implemented as appropriate)   01/26/18 1036   Coping/Psychosocial Response Interventions   Plan Of Care Reviewed With patient   Patient Care Overview   Progress no change   Outcome Evaluation   Outcome Summary/Follow up Plan no acute changes     Goal: Adult Individualization and Mutuality  Outcome: Ongoing (interventions implemented as appropriate)    Goal: Discharge Needs Assessment  Outcome: Ongoing (interventions implemented as appropriate)      Problem: Fall Risk (Adult)  Goal: Identify Related Risk Factors and Signs and Symptoms  Outcome: Outcome(s) achieved Date Met: 01/26/18    Goal: Absence of Falls  Outcome: Ongoing (interventions implemented as appropriate)    Goal: Identify Related Risk Factors and Signs and Symptoms  Outcome: Outcome(s) achieved Date Met: 01/26/18    Goal: Absence of Falls  Outcome: Ongoing (interventions implemented as appropriate)      Problem: Fractured Hip (Adult)  Goal: Signs and Symptoms of Listed Potential Problems Will be Absent or Manageable (Fractured Hip)  Outcome: Ongoing (interventions implemented as appropriate)

## 2018-01-26 NOTE — H&P
[address] [address]  T - [id number]     H/P NOTE         SUBJECTIVE:   Patient Care Team:  Roula Albert MD as PCP - General  Roula Albert MD as PCP - Claims Attributed    Chief Complaint:     Chief Complaint   Patient presents with   • Hip Pain   • Fall       Subjective     Patient is 85 y.o. female presents with pain in the right hip area after fall at home. Found to have subcapital fracture of right hip. Underwent ORIF with endoprosthesis by Dr Mcdowell on 1/23/18. Post op did require In and Out catheterization because of urinary retention.  Also BENITO with elevation of creatinine  Up to 1.6.  Received 2 units PRBC yesterday.  IV fluids yesterday, hyponatremia at admission and since as well. Renal function is improved today.    Has hx of heart failure with preserved ejection fracture.  Continued to receive lasix 40 mg BID.  Will need close supervision of physician for fluid status , electrolyte management, and medication management post op.     ROS/HISTORY/ CURRENT MEDICATIONS/OBJECTIVE/VS/PE:       History:     Past Medical History:   Diagnosis Date   • Acquired hypothyroidism    • Allergic rhinitis    • CHF (congestive heart failure)    • COPD (chronic obstructive pulmonary disease)    • Corneal epithelial dystrophy    • Coronary arteriosclerosis    • Depression    • Generalized atherosclerosis    • GERD (gastroesophageal reflux disease)    • Hemorrhoids    • History of bone density study 08/03/2012    Lumbar spine Osteopenia. Femoral Osteopenia   • History of mammogram 08/20/2004    Birads Category 2 Benign   • History of Papanicolaou smear of cervix 08/12/2004    NEGATIVE   • Hypercholesterolemia    • Hyperlipidemia    • Hypertension    • Nonexudative age-related macular degeneration    • Osteoarthritis of multiple joints    • Pseudophakia    • Punctate keratitis     - history Thygeson's keratopathy      • Tobacco dependence syndrome      Past Surgical  History:   Procedure Laterality Date   • APPENDECTOMY     • BREAST SURGERY  03/19/1954    Excision, breast lesion (1)  Excision of fibroma. 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 patency in the circumflex artery site of previous angioplasty. Kathe. patency in RCA.Nonobstructive 20-30% stenosis in the LAD and diagonal branch. Preserved LV systolic function with Ef of 55%   • CERVICAL SPINE SURGERY  09/16/1974    Excision of cervical disc, C5-6, C6-7, anterior cervical fusion, C5-6 with iliac bone graft.cervical spondylosis,C5-6, C6-7   • COLONOSCOPY  01/01/2013    patient declined    • CORONARY ANGIOPLASTY  07/21/1993    Angioplasty, coronary (2)    RCA    • DILATATION AND CURETTAGE      3   • ENDOSCOPY AND COLONOSCOPY  10/01/1998    Hemorhoids Rectal Outlet Bleeding   • ESOPHAGOSCOPY / EGD  06/28/2004    Nonerosive gastritis of the body of the stomach. A biopsy was obtained & placed in h. Pylori test agar. Multiple biopsies were obtained from the body of the stomach   • INJECTION OF MEDICATION  11/23/2015    Depo Medrol (Methylprednisone) (5)    • INJECTION OF MEDICATION  02/04/2016    Kenalog (3)     • PACEMAKER REPLACEMENT  2006     Family History   Problem Relation Age of Onset   • Coronary artery disease Other      Social History   Substance Use Topics   • Smoking status: Former Smoker     Packs/day: 0.50     Years: 50.00     Types: Cigarettes     Start date: 1950     Quit date: 1/12/2017   • Smokeless tobacco: Never Used   • Alcohol use No     Prescriptions Prior to Admission   Medication Sig Dispense Refill Last Dose   • aspirin 81 MG EC tablet Take 81 mg by mouth Daily.   Past Week at Unknown time   • budesonide-formoterol (SYMBICORT) 160-4.5 MCG/ACT inhaler Inhale 2 puffs 2 (Two) Times a Day. 1 inhaler 11 1/26/2018 at 0900   • carvedilol (COREG) 6.25 MG tablet Take 6.25 mg by mouth 2 (Two) Times a Day With Meals.   Past Week at 0900   •  clopidogrel (PLAVIX) 75 MG tablet Take 75 mg by mouth Daily.   Past Week at 0900   • coenzyme Q10 100 MG capsule Take 200 mg by mouth Every Night.   Past Week at Unknown time   • fluorometholone (FLAREX) 0.1 % ophthalmic suspension Administer 1 drop to both eyes 4 (Four) Times a Day. (Patient taking differently: Administer 1 drop to both eyes 4 (Four) Times a Day As Needed.) 5 mL 3 Past Week at Unknown time   • furosemide (LASIX) 40 MG tablet Take 1 tablet by mouth 2 (Two) Times a Day. (Patient taking differently: Take 40 mg by mouth Daily.) 60 tablet 0 1/26/2018 at Unknown time   • guaiFENesin (MUCINEX) 600 MG 12 hr tablet Take 1 tablet by mouth Every 12 (Twelve) Hours. (Patient taking differently: Take 600 mg by mouth Every 12 (Twelve) Hours As Needed for Cough or Congestion.) 30 tablet 0 Past Week at Unknown time   • ipratropium-albuterol (DUO-NEB) 0.5-2.5 mg/mL nebulizer USE 1 BY NEBULIZER 4 (FOUR) TIMES A DAY AS NEEDED FOR WHEEZING. 360 mL 1 1/26/2018 at Unknown time   • levothyroxine (SYNTHROID, LEVOTHROID) 50 MCG tablet TAKE 1 TABLET BY MOUTH EVERY DAY 90 tablet 1 1/22/2018 at 1200   • losartan (COZAAR) 25 MG tablet Take 1 tablet by mouth Daily. 30 tablet 0 Past Week at 0900   • montelukast (SINGULAIR) 10 MG tablet Take 10 mg by mouth Every Night.   1/26/2018 at Unknown time   • nitroglycerin (NITRO-DUR) 0.2 MG/HR patch Place 1 patch on the skin Daily. 30 patch 0 1/25/2018 at 0900   • polyethylene glycol (MIRALAX) packet Take 17 g by mouth Daily. 100 packet 1 Past Week at Unknown time   • ranolazine (RANEXA) 1000 MG 12 hr tablet Take 1 tablet by mouth Every 12 (Twelve) Hours. 60 tablet 0 1/26/2018 at 0900   • sertraline (ZOLOFT) 50 MG tablet TAKE 1 TABLET BY MOUTH EVERY DAY (Patient taking differently: TAKE 1 TABLET BY MOUTH EVERY NIGHT) 90 tablet 1 1/25/2018 at Unknown time   • simvastatin (ZOCOR) 40 MG tablet TAKE 1 TABLET BY MOUTH EVERY NIGHT AT BEDTIME 30 tablet 3 Past Week at Unknown time     Allergies:   Ace inhibitors and Lisinopril    Current Medications:     Current Facility-Administered Medications:   •  acetaminophen (TYLENOL) tablet 650 mg, 650 mg, Oral, Q4H PRN, Sanju Martinez MD  •  atorvastatin (LIPITOR) tablet 20 mg, 20 mg, Oral, Daily, Sanju Martinez MD, 20 mg at 01/26/18 0835  •  bacitracin injection, , , PRN, Kelvin Mcdowell MD, 50,000 Units at 01/23/18 1750  •  budesonide-formoterol (SYMBICORT) 160-4.5 MCG/ACT inhaler 2 puff, 2 puff, Inhalation, BID - RT, Sanju Martinez MD, 2 puff at 01/26/18 0656  •  carvedilol (COREG) tablet 6.25 mg, 6.25 mg, Oral, BID With Meals, Sanju Martinez MD, 6.25 mg at 01/25/18 1819  •  docusate sodium (COLACE) capsule 100 mg, 100 mg, Oral, BID, Sanju Martinez MD, 100 mg at 01/26/18 0835  •  famotidine (PEPCID) tablet 40 mg, 40 mg, Oral, Daily, Sanju Martinez MD, 40 mg at 01/26/18 0835  •  furosemide (LASIX) tablet 40 mg, 40 mg, Oral, Daily, Jocelin G Levill, APRN, 40 mg at 01/26/18 0835  •  HYDROcodone-acetaminophen (NORCO) 5-325 MG per tablet 1 tablet, 1 tablet, Oral, Q6H PRN, Kelvin Mcdowell MD, 1 tablet at 01/24/18 1525  •  ipratropium-albuterol (DUO-NEB) nebulizer solution 3 mL, 3 mL, Nebulization, 4x Daily - RT, Jocelin G Levill, APRN, 3 mL at 01/26/18 0656  •  ipratropium-albuterol (DUO-NEB) nebulizer solution 3 mL, 3 mL, Nebulization, Q6H PRN, Sanju Martinez MD  •  levothyroxine (SYNTHROID, LEVOTHROID) tablet 50 mcg, 50 mcg, Oral, Q AM, Sanju Martinez MD, 50 mcg at 01/26/18 0613  •  montelukast (SINGULAIR) tablet 10 mg, 10 mg, Oral, Nightly, Sanju Martinez MD, 10 mg at 01/25/18 2050  •  morphine injection 1 mg, 1 mg, Intravenous, Q4H PRN, 1 mg at 01/23/18 1024 **AND** naloxone (NARCAN) injection 0.4 mg, 0.4 mg, Intravenous, Q5 Min PRN, Sanju Martinez MD  •  nitroglycerin (NITRODUR) 0.2 MG/HR patch 1 patch, 1 patch, Transdermal, Daily, Sanju Martinez MD, 1 patch at 01/24/18 1012  •  ondansetron (ZOFRAN) injection 4 mg, 4 mg, Intravenous, Q6H PRN, Sanju Martinez MD  •   Pharmacy to dose warfarin, , Does not apply, Continuous PRN, Kelvin Mcdowell MD  •  phenol (CHLORASEPTIC) 1.4 % liquid 2 spray, 2 spray, Mouth/Throat, Q2H PRN, Jocelin G Levill, APRN  •  ranolazine (RANEXA) 12 hr tablet 1,000 mg, 1,000 mg, Oral, Q12H, Sanju Martinez MD, 1,000 mg at 01/26/18 0835  •  sertraline (ZOLOFT) tablet 50 mg, 50 mg, Oral, Daily, Sanju Martinez MD, 50 mg at 01/26/18 0835  •  sodium chloride (BACTERIOSTATIC) injection, , , PRN, Kelvin Mcdowell MD, 10 mL at 01/23/18 1751  •  sodium chloride 0.9 % flush 1-10 mL, 1-10 mL, Intravenous, PRN, Sanju Martinez MD  •  sodium chloride 0.9 % infusion, 50 mL/hr, Intravenous, Continuous, Jocelin Velascoill, APRN, Last Rate: 50 mL/hr at 01/25/18 2154, 50 mL/hr at 01/25/18 2154  •  warfarin (COUMADIN) tablet 2.5 mg, 2.5 mg, Oral, Daily, Kelvin Mcdowell MD, 2.5 mg at 01/25/18 1819      REVIEW OF SYSTEMS:  Review of Systems   Constitutional: Negative.    HENT: Negative.    Eyes: Negative.    Respiratory: Positive for wheezing. Negative for apnea, cough, choking, chest tightness and stridor.         COPD and has shortness of breath with activity  On supplemental oxygen at home   Cardiovascular: Negative.    Gastrointestinal: Positive for constipation.        No bowel movement since admission   Endocrine: Negative.    Genitourinary: Positive for decreased urine volume and difficulty urinating.        Has had in and out cath x2 since surgery   Musculoskeletal: Positive for arthralgias, gait problem and joint swelling.        Pain in the right hip since fall.  Now pain with activity only   Skin: Negative.    Allergic/Immunologic: Negative.    Neurological: Negative for dizziness, tremors, seizures, syncope, facial asymmetry, speech difficulty, weakness, light-headedness, numbness and headaches.   Hematological: Negative.    Psychiatric/Behavioral: Negative.        Objective     Physical Exam:   Temp:  [96.3 °F (35.7 °C)-99.1 °F (37.3 °C)] 99.1 °F (37.3  °C)  Heart Rate:  [72-89] 89  Resp:  [16-18] 18  BP: ()/(50-60) 130/60    Physical Exam:    Physical Exam   Constitutional: She is oriented to person, place, and time. She appears well-developed and well-nourished. No distress.   HENT:   Head: Normocephalic and atraumatic.   Eyes: Conjunctivae and EOM are normal. Pupils are equal, round, and reactive to light. Right eye exhibits no discharge. Left eye exhibits no discharge.   Neck: Normal range of motion. Neck supple. No JVD present. No tracheal deviation present. No thyromegaly present.   Cardiovascular: Normal rate, regular rhythm and normal heart sounds.  Exam reveals no gallop and no friction rub.    No murmur heard.  Pulmonary/Chest: Effort normal and breath sounds normal. No stridor. No respiratory distress. She has no wheezes. She has no rales. She exhibits no tenderness.   Abdominal: Soft. Bowel sounds are normal. She exhibits no distension and no mass. There is no tenderness. There is no guarding. No hernia.   Musculoskeletal: She exhibits deformity. She exhibits no edema or tenderness.   Incision right thigh healing  Drain site distally no drainage   Lymphadenopathy:     She has no cervical adenopathy.   Neurological: She is alert and oriented to person, place, and time. She displays normal reflexes. No cranial nerve deficit. She exhibits normal muscle tone. Coordination normal.   Skin: Skin is warm and dry. No rash noted. She is not diaphoretic.   Psychiatric: She has a normal mood and affect. Her behavior is normal. Judgment and thought content normal.   Nursing note and vitals reviewed.           Results Review:      Lab Results (last 24 hours)     Procedure Component Value Units Date/Time    CBC & Differential [[id number]] Collected:  01/26/18 0703    Specimen:  Blood Updated:  01/26/18 0712    Narrative:       The following orders were created for panel order CBC & Differential.  Procedure                               Abnormality         Status                      ---------                               -----------         ------                     CBC Auto Differential[[id number]]        Abnormal            Final result                 Please view results for these tests on the individual orders.    CBC Auto Differential [[id number]]  (Abnormal) Collected:  01/26/18 0703    Specimen:  Blood Updated:  01/26/18 0712     WBC 9.94 (H) 10*3/mm3      RBC 2.99 (L) 10*6/mm3      Hemoglobin 9.6 (L) g/dL      Hematocrit 27.2 (L) %      MCV 91.0 fL      MCH 32.1 pg      MCHC 35.3 g/dL      RDW 15.6 (H) %      RDW-SD 51.9 (H) fl      MPV 9.3 fL      Platelets 171 10*3/mm3      Neutrophil % 79.8 %      Lymphocyte % 9.8 (L) %      Monocyte % 8.0 %      Eosinophil % 1.8 %      Basophil % 0.1 %      Immature Grans % 0.5 %      Neutrophils, Absolute 7.93 10*3/mm3      Lymphocytes, Absolute 0.97 10*3/mm3      Monocytes, Absolute 0.80 10*3/mm3      Eosinophils, Absolute 0.18 10*3/mm3      Basophils, Absolute 0.01 10*3/mm3      Immature Grans, Absolute 0.05 (H) 10*3/mm3     Basic Metabolic Panel [[id number]]  (Abnormal) Collected:  01/26/18 0703    Specimen:  Blood Updated:  01/26/18 0724     Glucose 100 mg/dL      BUN 26 (H) mg/dL      Creatinine 1.01 (H) mg/dL      Sodium 127 (L) mmol/L      Potassium 4.5 mmol/L      Chloride 91 (L) mmol/L      CO2 29.0 mmol/L      Calcium 8.3 (L) mg/dL      eGFR Non African Amer 52 mL/min/1.73      BUN/Creatinine Ratio 25.7 (H)     Anion Gap 7.0 mmol/L     Narrative:       The MDRD GFR formula is only valid for adults with stable renal function between ages 18 and 70.    Protime-INR [[id number]]  (Abnormal) Collected:  01/26/18 0703    Specimen:  Blood Updated:  01/26/18 0741     Protime 15.7 (H) Seconds      INR 1.25 (H)    Narrative:       Therapeutic range for most indications is 2.0-3.0 INR,  or 2.5-3.5 for mechanical heart valves.    Extra Tubes [[id number]] Collected:  01/26/18 0703    Specimen:  Blood from Blood, Venous Line Updated:   01/26/18 0816    Narrative:       The following orders were created for panel order Extra Tubes.  Procedure                               Abnormality         Status                     ---------                               -----------         ------                     [facility][[id number]]                                   Final result                 Please view results for these tests on the individual orders.    [facility] [[id number]] Collected:  01/26/18 0703    Specimen:  Blood Updated:  01/26/18 0816     Extra Tube Hold for add-ons.      Auto resulted.                             Imaging Results (last 24 hours)     ** No results found for the last 24 hours. **      xray images pre-op and post op reviewed.    Old records and prior admissions reviewed as well.      I reviewed the patient's  clinical results.  I reviewed the patient's  imaging results and agree with the interpretation.   I reviewed the therapy notes   ASSESSMENT/PLAN:   Assessment/Plan   Principal Problem:    Displaced fracture of base of neck of right femur, initial encounter for closed fracture  Active Problems:    Essential hypertension    Coronary artery disease involving native coronary artery of native heart without angina pectoris    Chronic obstructive pulmonary disease    Chronic systolic congestive heart failure    Acquired hypothyroidism    Depressive disorder    Hip fracture, right, closed, initial encounter    Pulmonary emphysema    Hyponatremia    BENITO (acute kidney injury)    Acute blood loss anemia      She will be admitted to the acute rehabilitation unit for intensive rehabilitation.  She'll need close medical supervision to monitor fluid status as well as electrolytes and renal function.  Fortunately she has urinated prior to coming up but will need to monitor for recurrence of urinary retention.  Also of note she has not had a bowel movement since she was admitted to the hospital 4 days ago.  She will be  anticoagulated for DVT prophylaxis as well.  Recently was admitted for chest pain with a negative workup and that will be monitored as well during her stay      It is expected that she'll participate 3 hours a day make measurable improvement and be able to return home at discharge  This was discussed with patient and family prior to admission    I discussed the patients findings and my recommendations with patient and family.      Shawn Maldonado MD  01/26/18  12:41 PM

## 2018-01-26 NOTE — THERAPY TREATMENT NOTE
Acute Care - Occupational Therapy Treatment Note  [facility]     Patient Name: Val Arteaga  : 1932  MRN: [id number]  Today's Date: 2018  Onset of Illness/Injury or Date of Surgery Date: 18  Date of Referral to OT: 18  Referring Physician: Dr. Mcdowell      Admit Date: 2018    Visit Dx:     ICD-10-CM ICD-9-CM   1. Displaced fracture of base of neck of right femur, initial encounter for closed fracture S72.041A 820.03   2. Essential hypertension I10 401.9   3. Coronary artery disease involving native coronary artery of native heart without angina pectoris I25.10 414.01   4. Pulmonary emphysema, unspecified emphysema type J43.9 492.8   5. Acquired hypothyroidism E03.9 244.9   6. Depressive disorder F32.9 311   7. Hip fracture, right, closed, initial encounter S72.001A 820.8   8. Impaired mobility and ADLs Z74.09 799.89   9. Impaired physical mobility Z74.09 781.99     Patient Active Problem List   Diagnosis   • Essential hypertension   • Coronary artery disease involving native coronary artery of native heart without angina pectoris   • Chronic obstructive pulmonary disease   • Acquired hypothyroidism   • Depressive disorder   • Thygeson's superficial punctate keratitis   • Pseudophakia   • Precordial pain   • Displaced fracture of base of neck of right femur, initial encounter for closed fracture   • Hip fracture, right, closed, initial encounter   • Pulmonary emphysema   • Chronic systolic congestive heart failure             Adult Rehabilitation Note       18 0955 18 1424 18 1329    Rehab Assessment/Intervention    Discipline occupational therapy assistant  -CS physical therapy assistant  -RONAN occupational therapist  -    Document Type therapy note (daily note)  -CS therapy note (daily note)  -RONAN therapy note (daily note)  -    Subjective Information agree to therapy  -CS agree to therapy  -RONAN agree to therapy  -    Patient Effort, Rehab Treatment good  -CS  good  -RONAN good  -BH    Symptoms Noted During/After Treatment increased pain;shortness of breath  -CS fatigue;shortness of breath;increased pain;significant change in vital signs   pt w/ drop in BP w/ position changes.   -RONAN fatigue;shortness of breath;increased pain;significant change in vital signs   O2 dropped to 85 RN notified, came up with deep breathing  -BH    Precautions/Limitations  fall precautions;oxygen therapy device and L/min;anterior hip precautions- right  -RONAN fall precautions;oxygen therapy device and L/min;anterior hip precautions- right  -BH    Patient Response to Treatment   pt is motivated and wanted to sit EOB to complete BUE exercises. Pt fatigued with tasks and reports some dizziness sitting but was uncertain, when check vital signs where stable but when O2 dropped to 86 but victoriano with deep breathing with O2 on. RN was notified. Pt c/o of chest area tightness after movement activity and RN notified and pt request for pain meds.   -BH    Recorded by [CS] OMAR Lau/HINA [RONAN] Melquiades King PTA [BH] Marisol Burdick OTR/L    Vital Signs    Pre Systolic BP Rehab 110  -   supine  -RONAN 110  -BH    Pre Treatment Diastolic BP 49  -CS 69  -RONAN 60  -BH    Intra Systolic BP Rehab  --   EOB: 103/52 standin/48 supine: 102/69. nsg notified.   -RONAN     Post Systolic BP Rehab 119  -   supine.   -RONAN 120  -BH    Post Treatment Diastolic BP 65  -CS 77  -RONAN 58  -BH    Pretreatment Heart Rate (beats/min) 81  -CS 83  -RONAN 81  -BH    Intratreatment Heart Rate (beats/min)  87  -RONAN     Posttreatment Heart Rate (beats/min) 87  -CS 92  -RONAN 91  -BH    Pre SpO2 (%) 92  -CS 95  -RONAN 93  -BH    O2 Delivery Pre Treatment supplemental O2  -CS supplemental O2  -RNOAN supplemental O2  -BH    Intra SpO2 (%)  93  -RONAN 86   victoriano to 91 with deep breathing  -BH    O2 Delivery Intra Treatment  supplemental O2  -RONAN supplemental O2  -BH    Post SpO2 (%) 95  -CS 92  -RONAN 94  -BH    O2 Delivery Post Treatment  supplemental O2  -CS supplemental O2  -RONAN supplemental O2  -    Pre Patient Position Supine  -CS Supine  -RONAN Supine  -BH    Intra Patient Position Sitting  -CS  Sitting  -BH    Post Patient Position Supine  -CS Supine  -RONAN Supine  -BH    Recorded by [CS] OMAR Lau/HINA [RONAN] Melquiades King PTA [] Marisol Burdick, OTR/L    Pain Assessment    Pain Assessment 0-10  -CS 0-10  -RONAN     Pain Score 0  -CS 0  -RONAN 0  -BH    Post Pain Score 0  -CS 8  -RONAN 4  -BH    Pain Location  Leg  -RONAN     Pain Orientation  Right  -RONAN     Pain Intervention(s)  Repositioned   pt defers pain meds.   -RONAN Repositioned;Ambulation/increased activity;Rest   RN notified  -    Recorded by [CS] OMAR Lau/HINA [RONAN] Melquiades King PTA [] Marisol Burdick, OTR/L    Vision Assessment/Intervention    Visual Impairment  WFL with corrective lenses  -     Recorded by  [RONAN] Melquiades King PTA     Cognitive Assessment/Intervention    Current Cognitive/Communication Assessment functional  - functional  - functional  -    Orientation Status oriented x 4  - oriented x 4  -RONAN oriented x 4  -    Follows Commands/Answers Questions 100% of the time  -% of the time  -RONAN 100% of the time;75% of the time;able to follow single-step instructions  -    Personal Safety   mild impairment  -    Personal Safety Interventions gait belt;nonskid shoes/slippers when out of bed  - gait belt;muscle strengthening facilitated;nonskid shoes/slippers when out of bed;supervised activity  - fall prevention program maintained;gait belt;muscle strengthening facilitated;nonskid shoes/slippers when out of bed;supervised activity  -    Recorded by [CS] OMAR Lau/HINA [RONAN] Melquiades King PTA [] Marisol Burdick, OTR/L    Bed Mobility, Assessment/Treatment    Bed Mobility, Assistive Device bed rails;head of bed elevated  - bed rails;head of bed elevated  - head of bed elevated;bed rails  -    Bed Mobility, Scoot/Bridge,  Cando  supervision required   w/ extended time. before and after ther ex.   -     Bed Mob, Supine to Sit, [place] moderate assist (50% patient effort);2 person assist required  - minimum assist (75% patient effort)  - moderate assist (50% patient effort)  -    Bed Mob, Sit to Supine, [place] minimum assist (75% patient effort)  - minimum assist (75% patient effort);moderate assist (50% patient effort)  - moderate assist (50% patient effort);maximum assist (25% patient effort);2 person assist required  -    Bed Mobility, Comment   HOB down for sit to sup, pt fatigued and in creased pain. Pt requried assist with legs and hip precuations to get into bed safely. pt was able to push through legs and use arms to assit with scooting in the bed and to sit EOB and scoot towards the HOB with min assist. pt fatigued and rqeuried assist/support for the RLE. Pt sat EOB with close supervision for approx 20 minutes.   -    Recorded by [] OMAR Lau/HINA [RONAN] Melquiades King PTA [] Marisol Burdick, OTR/L    Transfer Assessment/Treatment    Transfers, Sit-Stand [place] minimum assist (75% patient effort)  - minimum assist (75% patient effort);moderate assist (50% patient effort)  -     Transfers, Stand-Sit [place] minimum assist (75% patient effort)  - minimum assist (75% patient effort)  -RONAN     Transfers, Sit-Stand-Sit, Assist Device rolling walker  - rolling walker  -     Toilet Transfer, [place] minimum assist (75% patient effort)  -      Toilet Transfer, Assistive Device bedside commode without drop arms;rolling walker  -      Transfer, Comment  brop in BP upon standing.   -RONAN defer  -    Recorded by [CS] OMAR Lau/HINA [RONAN] Melquiades King PTA [] Marisol Burdick, OTR/L    Gait Assessment/Treatment    Gait, [place] Level  not appropriate to assess  -RONAN     Recorded by  [RONAN] Melquiades King PTA     Stairs Assessment/Treatment     Stairs, [place] Level  not tested  -RONAN     Recorded by  [RONAN] Melquiades King, PTA     Upper Body Bathing Assessment/Training    UB Bathing Assess/Train Assistive Device bath mitt  -CS      UB Bathing Assess/Train, Position sitting;edge of bed  -CS      UB Bathing Assess/Train, [place] Level minimum assist (75% patient effort)  -CS      UB Bathing Assess/Train, Impairments ROM decreased;strength decreased;decreased flexibility  -CS      UB Bathing Assess/Train, Comment   pt declined sponge bath today but agreed to attempt tomorrow.   -BH    Recorded by [CS] JENNIFFER Lau  [BH] Marisol Burdick, OTR/L    Lower Body Bathing Assessment/Training    LB Bathing Assess/Train Assistive Device bath mitt  -CS      LB Bathing Assess/Train, Position sitting;standing  -CS      LB Bathing Assess/Train, [place] Level minimum assist (75% patient effort)  -CS      LB Bathing Assess/Train, Impairments ROM decreased;strength decreased;decreased flexibility;impaired balance  -CS      Recorded by [CS] OMAR Lau/L      Upper Body Dressing Assessment/Training    UB Dressing Assess/Train, Clothing Type doffing:;donning:;hospital gown  -CS      UB Dressing Assess/Train, Position sitting  -CS      UB Dressing Assess/Train, [place] minimum assist (75% patient effort)  -CS      Recorded by [CS] JENNIFFER Lau      Toileting Assessment/Training    Toileting Assess/Train, Assistive Device bedside commode  -CS      Toileting Assess/Train, Position sitting  -CS      Toileting Assess/Train, Indepen Level set up required  -CS      Recorded by [CS] JENNIFFER Lau      Grooming Assessment/Training    Grooming Assess/Train, Position sitting;edge of bed  -CS      Grooming Assess/Train, Indepen Level set up required  -CS      Grooming Assess/Train, Comment wash face, oral care, comb hair, apply deodorant  -CS      Recorded by [CS] JENNIFFER Lau      Balance Skills Training     Sitting-Level of Assistance   Close supervision  -    Recorded by   [] AYAD Vazquez/L    Therapy Exercises    Bilateral Lower Extremities  AROM:;AAROM:;10 reps;20 reps;supine;ankle pumps/circles;quad sets;glut sets;hip abduction/adduction;heel slides;SAQ   2 sets all.20 reps L LE, 10 reps R LE. AAROM HS ab/ad R LE.   -     Bilateral Upper Extremity   AROM:;15 reps;sitting;elbow flexion/extension;hand pumps;pronation/supination;shoulder extension/flexion   wrist flexion/extension  -    BUE Resistance   --   no resistance, fatigued with movements needed rest breaks.   -    Recorded by  [] Melquiades King PTA [] AYAD Vazquez/L    Positioning and Restraints    Pre-Treatment Position in bed  - in bed  - in bed  -    Post Treatment Position bed  - bed  - bed  -    In Bed supine;call light within reach;exit alarm on;with family/caregiver  - supine;call light within reach;encouraged to call for assist;with family/caregiver   all needs met.   - notified nsg;supine;fowlers;call light within reach;encouraged to call for assist;exit alarm on;with family/caregiver  -    Recorded by [] JENNIFFER Lau [] Melquiades King PTA [] AYAD Vazquez/L      User Key  (r) = Recorded By, (t) = Taken By, (c) = Cosigned By    Initials Name Effective Dates     SUJATHA Vazquez 10/17/16 -     RONAN King PTA 10/17/16 -      JENNIFFER Lau 10/17/16 -                 OT Goals       01/26/18 1144 01/24/18 1329 01/24/18 0755    Bed Mobility OT LTG    Bed Mobility OT LTG, Date Established   01/24/18  -    Bed Mobility OT LTG, Time to Achieve   by discharge  -    Bed Mobility OT LTG, Activity Type   all bed mobility  -    Bed Mobility OT LTG, [place] Level   supervision required   to engage in ADL  -    Bed Mobility OT LTG, Date Goal Reviewed 01/26/18  - 01/24/18  -     Bed Mobility OT LTG, Outcome  goal ongoing  -     Transfer Training  OT LTG    Transfer Training OT LTG, Date Established   01/24/18  -    Transfer Training OT LTG, Time to Achieve   by discharge  -    Transfer Training OT LTG, Activity Type   toilet  -    Transfer Training OT LTG, [place] Level   contact guard assist   AE/AD as needed  -    Transfer Training OT LTG, Date Goal Reviewed 01/26/18  -CS 01/24/18  -     Transfer Training OT LTG, Outcome goal not met  -CS      ADL OT LTG    ADL OT LTG, Date Established   01/24/18  -    ADL OT LTG, Activity Type   ADL skills  -    ADL OT LTG, Additional Goal   set up grooming/self feeding, min A UB bath/dressing/ mod A LB bath/dressing/toileting - AE/AD as needed  -    ADL OT LTG, Date Goal Reviewed 01/26/18  -CS 01/24/18  -     ADL OT LTG, Outcome goal not met  -      Functional Mobility OT LTG    Functional Mobility Goal OT LTG, Date Established   01/24/18  -    Functional Mobility Goal OT LTG, Time to Achieve   by discharge  -    Functional Mobility Goal OT LTG, [place] Level   contact guard  -    Functional Mobility Goal OT LTG, Assist Device   --   AE/AD as needed  -    Functional Mobility Goal OT LTG, Distance to Achieve   to the bathroom   to the toilet  -    Functional Mobility Goal OT LTG, Date Goal Reviewed 01/26/18  -CS 01/24/18  -     Functional Mobility Goal OT LTG, Outcome goal not met  -        User Key  (r) = Recorded By, (t) = Taken By, (c) = Cosigned By    Initials Name Provider Type     AYAD Vazquez/L Occupational Therapist    OMAR Fallon/HINA Occupational Therapy Assistant          Occupational Therapy Education     Title: PT OT SLP Therapies (Active)     Topic: Occupational Therapy (Active)     Point: ADL training (Active)    Description: Instruct learner(s) on proper safety adaptation and remediation techniques during self care or transfers.   Instruct in proper use of assistive devices.    Learning Progress Summary    Learner Readiness Method Response  Comment Documented by Status   Patient Acceptance E,TB,D NR Pt was educated on safety awareness with ADLs and transfers.  01/26/18 1142 Active    Acceptance E VU Educated pt on OT and POC. answered pt and pt's family questions and concerns. Educated to call for assist not to get up on her own. Educated on AE for ADL. Educated on safety and hip preucations thorughout t/f, mobility, ADL.  01/24/18 0957 Done   Family Acceptance E VU Educated pt on OT and POC. answered pt and pt's family questions and concerns. Educated to call for assist not to get up on her own. Educated on AE for ADL. Educated on safety and hip preucations thorughout t/f, mobility, ADL.  01/24/18 0957 Done               Point: Home exercise program (Active)    Description: Instruct learner(s) on appropriate technique for monitoring, assisting and/or progressing therapeutic exercises/activities.    Learning Progress Summary    Learner Readiness Method Response Comment Documented by Status   Patient Acceptance E,TB,D NR Pt was educated on safety awareness with ADLs and transfers.  01/26/18 1142 Active               Point: Precautions (Active)    Description: Instruct learner(s) on prescribed precautions during self-care and functional transfers.    Learning Progress Summary    Learner Readiness Method Response Comment Documented by Status   Patient Acceptance E,TB,D NR Pt was educated on safety awareness with ADLs and transfers.  01/26/18 1142 Active    Acceptance E VU,NR Educated about OT, Educated on safety with bed mobility, and hip precuations. Educated pt to call for assist.  01/24/18 1448 Done    Acceptance E VU Educated pt on OT and POC. answered pt and pt's family questions and concerns. Educated to call for assist not to get up on her own. Educated on AE for ADL. Educated on safety and hip preucations thorughout t/f, mobility, ADL.  01/24/18 0957 Done   Family Acceptance E VU Educated pt on OT and POC. answered pt and pt's family  questions and concerns. Educated to call for assist not to get up on her own. Educated on AE for ADL. Educated on safety and hip preucations thorughout t/f, mobility, ADL.  01/24/18 0957 Done               Point: Body mechanics (Active)    Description: Instruct learner(s) on proper positioning and spine alignment during self-care, functional mobility activities and/or exercises.    Learning Progress Summary    Learner Readiness Method Response Comment Documented by Status   Patient Acceptance E,TB,D NR Pt was educated on safety awareness with ADLs and transfers.  01/26/18 1142 Active    Acceptance E VU,NR Educated about OT, Educated on safety with bed mobility, and hip precuations. Educated pt to call for assist.  01/24/18 1448 Done    Acceptance E VU Educated pt on OT and POC. answered pt and pt's family questions and concerns. Educated to call for assist not to get up on her own. Educated on AE for ADL. Educated on safety and hip preucations thorughout t/f, mobility, ADL.  01/24/18 0957 Done   Family Acceptance E VU Educated pt on OT and POC. answered pt and pt's family questions and concerns. Educated to call for assist not to get up on her own. Educated on AE for ADL. Educated on safety and hip preucations thorughout t/f, mobility, ADL.  01/24/18 0957 Done                      User Key     Initials Effective Dates Name Provider Type Discipline     10/17/16 -  AYAD Vazquez/L Occupational Therapist OT     10/17/16 -  OMAR Lau/HINA Occupational Therapy Assistant OT                  OT Recommendation and Plan  Anticipated Equipment Needs At Discharge: bedside commode, bathing equipment, dressing equipment, shower chair, front wheeled walker  Anticipated Discharge Disposition: skilled nursing facility, inpatient rehabilitation facility  Planned Therapy Interventions: activity intolerance, adaptive equipment training, ADL retraining, balance training, bed mobility training, energy  conservation, fine motor coordination training, home exercise program, motor coordination training, ROM (Range of Motion), strengthening, transfer training  Therapy Frequency: other (see comments) (3-14x a week)  Plan of Care Review  Plan Of Care Reviewed With: patient  Progress: improving  Outcome Summary/Follow up Plan: Pt tolerated tx wel this date. Pt completed an ADL. Pt was min A with toilet t/f. No goals met this tx. Continue POC.        Outcome Measures       01/26/18 1100 01/24/18 1424 01/24/18 1008    How much help from another person do you currently need...    Turning from your back to your side while in flat bed without using bedrails?  3  -RONAN 3  -CB (r) HL (t) CB (c)    Moving from lying on back to sitting on the side of a flat bed without bedrails?  3  -RONAN 2  -CB    Moving to and from a bed to a chair (including a wheelchair)?  2  -RONAN 2  -CB (r) HL (t) CB (c)    Standing up from a chair using your arms (e.g., wheelchair, bedside chair)?  2  -RONAN 2  -CB (r) HL (t) CB (c)    Climbing 3-5 steps with a railing?  2  -RONAN 2  -CB (r) HL (t) CB (c)    To walk in hospital room?  2  -RONAN 2  -CB (r) HL (t) CB (c)    AM-PAC 6 Clicks Score  14  -RONAN 13  -CB    How much help from another is currently needed...    Putting on and taking off regular lower body clothing? 1  -CS      Bathing (including washing, rinsing, and drying) 2  -CS      Toileting (which includes using toilet bed pan or urinal) 1  -CS      Putting on and taking off regular upper body clothing 3  -CS      Taking care of personal grooming (such as brushing teeth) 3  -CS      Eating meals 3  -CS      Score 13  -CS      Functional Assessment    Outcome Measure Options AM-PAC 6 Clicks Daily Activity (OT)  -CS AM-PAC 6 Clicks Basic Mobility (PT)  -RONAN AM-PAC 6 Clicks Basic Mobility (PT)  -CB (r) HL (t) CB (c)      01/24/18 1665          How much help from another is currently needed...    Putting on and taking off regular lower body clothing? 1  -BH       Bathing (including washing, rinsing, and drying) 2  -BH      Toileting (which includes using toilet bed pan or urinal) 1  -BH      Putting on and taking off regular upper body clothing 3  -BH      Taking care of personal grooming (such as brushing teeth) 3  -      Eating meals 3  -      Score 13  -      Functional Assessment    Outcome Measure Options AM-PAC 6 Clicks Daily Activity (OT)  -        User Key  (r) = Recorded By, (t) = Taken By, (c) = Cosigned By    Initials Name Provider Type    CB Leatha Tirado, PT Physical Therapist     Marisol Burdick, OTR/L Occupational Therapist    RONAN King, PTA Physical Therapy Assistant    CS Karen Michel, NELSON/L Occupational Therapy Assistant    HL Harper Ang, PT Student PT Student           Time Calculation:         Time Calculation- OT       01/26/18 1147          Time Calculation- OT    OT Start Time 0955  -CS      OT Stop Time 1050  -CS      OT Time Calculation (min) 55 min  -CS      Total Timed Code Minutes- OT 55 minute(s)  -CS      OT Received On 01/26/18  -        User Key  (r) = Recorded By, (t) = Taken By, (c) = Cosigned By    Initials Name Provider Type     Karen Michel NELSON/HINA Occupational Therapy Assistant           Therapy Charges for Today     Code Description Service Date Service Provider Modifiers Qty    [id number]  OT SELF CARE/MGMT/TRAIN EA 15 MIN 1/26/2018 OMAR Lau/L GO 4          OT G-codes  OT Professional Judgement Used?: Yes  OT Functional Scales Options: AM-PAC 6 Clicks Daily Activity (OT)  Score: 13  Functional Limitation: Self care  Self Care Current Status (): At least 60 percent but less than 80 percent impaired, limited or restricted  Self Care Goal Status (): At least 40 percent but less than 60 percent impaired, limited or restricted    OMAR Lau/HINA  1/26/2018

## 2018-01-27 LAB
ABO + RH BLD: NORMAL
ABO + RH BLD: NORMAL
ALBUMIN SERPL-MCNC: 2.9 G/DL (ref 3.4–4.8)
ALBUMIN/GLOB SERPL: 1 G/DL (ref 1.1–1.8)
ALP SERPL-CCNC: 60 U/L (ref 38–126)
ALT SERPL W P-5'-P-CCNC: 31 U/L (ref 9–52)
ANION GAP SERPL CALCULATED.3IONS-SCNC: 6 MMOL/L (ref 5–15)
AST SERPL-CCNC: 40 U/L (ref 14–36)
BASOPHILS # BLD AUTO: 0.02 10*3/MM3 (ref 0–0.2)
BASOPHILS NFR BLD AUTO: 0.3 % (ref 0–2)
BH BB BLOOD EXPIRATION DATE: NORMAL
BH BB BLOOD EXPIRATION DATE: NORMAL
BH BB BLOOD TYPE BARCODE: 5100
BH BB BLOOD TYPE BARCODE: 5100
BH BB DISPENSE STATUS: NORMAL
BH BB DISPENSE STATUS: NORMAL
BH BB PRODUCT CODE: NORMAL
BH BB PRODUCT CODE: NORMAL
BH BB UNIT NUMBER: NORMAL
BH BB UNIT NUMBER: NORMAL
BILIRUB SERPL-MCNC: 0.4 MG/DL (ref 0.2–1.3)
BUN BLD-MCNC: 25 MG/DL (ref 7–21)
BUN/CREAT SERPL: 26.6 (ref 7–25)
CALCIUM SPEC-SCNC: 8.2 MG/DL (ref 8.4–10.2)
CHLORIDE SERPL-SCNC: 91 MMOL/L (ref 95–110)
CO2 SERPL-SCNC: 32 MMOL/L (ref 22–31)
CREAT BLD-MCNC: 0.94 MG/DL (ref 0.5–1)
DEPRECATED RDW RBC AUTO: 52.2 FL (ref 36.4–46.3)
EOSINOPHIL # BLD AUTO: 0.32 10*3/MM3 (ref 0–0.7)
EOSINOPHIL NFR BLD AUTO: 4 % (ref 0–7)
ERYTHROCYTE [DISTWIDTH] IN BLOOD BY AUTOMATED COUNT: 15.5 % (ref 11.5–14.5)
GFR SERPL CREATININE-BSD FRML MDRD: 57 ML/MIN/1.73 (ref 39–90)
GLOBULIN UR ELPH-MCNC: 2.8 GM/DL (ref 2.3–3.5)
GLUCOSE BLD-MCNC: 88 MG/DL (ref 60–100)
HCT VFR BLD AUTO: 25.3 % (ref 35–45)
HGB BLD-MCNC: 8.7 G/DL (ref 12–15.5)
IMM GRANULOCYTES # BLD: 0.03 10*3/MM3 (ref 0–0.02)
IMM GRANULOCYTES NFR BLD: 0.4 % (ref 0–0.5)
INR PPP: 1.37 (ref 0.8–1.2)
IRON 24H UR-MRATE: <10 MCG/DL (ref 37–170)
IRON SATN MFR SERPL: ABNORMAL % (ref 15–50)
LYMPHOCYTES # BLD AUTO: 1.13 10*3/MM3 (ref 0.6–4.2)
LYMPHOCYTES NFR BLD AUTO: 14.2 % (ref 10–50)
MAGNESIUM SERPL-MCNC: 2.2 MG/DL (ref 1.6–2.3)
MCH RBC QN AUTO: 31.9 PG (ref 26.5–34)
MCHC RBC AUTO-ENTMCNC: 34.4 G/DL (ref 31.4–36)
MCV RBC AUTO: 92.7 FL (ref 80–98)
MONOCYTES # BLD AUTO: 0.63 10*3/MM3 (ref 0–0.9)
MONOCYTES NFR BLD AUTO: 7.9 % (ref 0–12)
NEUTROPHILS # BLD AUTO: 5.84 10*3/MM3 (ref 2–8.6)
NEUTROPHILS NFR BLD AUTO: 73.2 % (ref 37–80)
PLATELET # BLD AUTO: 214 10*3/MM3 (ref 150–450)
PMV BLD AUTO: 9.5 FL (ref 8–12)
POTASSIUM BLD-SCNC: 4.1 MMOL/L (ref 3.5–5.1)
PROT SERPL-MCNC: 5.7 G/DL (ref 6.3–8.6)
PROTHROMBIN TIME: 16.9 SECONDS (ref 11.1–15.3)
RBC # BLD AUTO: 2.73 10*6/MM3 (ref 3.77–5.16)
SODIUM BLD-SCNC: 129 MMOL/L (ref 137–145)
TIBC SERPL-MCNC: 187 MCG/DL (ref 265–497)
TSH SERPL DL<=0.05 MIU/L-ACNC: 0.78 MIU/ML (ref 0.46–4.68)
UNIT  ABO: NORMAL
UNIT  ABO: NORMAL
UNIT  RH: NORMAL
UNIT  RH: NORMAL
WBC NRBC COR # BLD: 7.97 10*3/MM3 (ref 3.2–9.8)

## 2018-01-27 PROCEDURE — 85610 PROTHROMBIN TIME: CPT | Performed by: FAMILY MEDICINE

## 2018-01-27 PROCEDURE — 94799 UNLISTED PULMONARY SVC/PX: CPT

## 2018-01-27 PROCEDURE — 97530 THERAPEUTIC ACTIVITIES: CPT

## 2018-01-27 PROCEDURE — 97116 GAIT TRAINING THERAPY: CPT

## 2018-01-27 PROCEDURE — 84443 ASSAY THYROID STIM HORMONE: CPT | Performed by: FAMILY MEDICINE

## 2018-01-27 PROCEDURE — 83540 ASSAY OF IRON: CPT | Performed by: FAMILY MEDICINE

## 2018-01-27 PROCEDURE — 83550 IRON BINDING TEST: CPT | Performed by: FAMILY MEDICINE

## 2018-01-27 PROCEDURE — 97162 PT EVAL MOD COMPLEX 30 MIN: CPT

## 2018-01-27 PROCEDURE — 83735 ASSAY OF MAGNESIUM: CPT | Performed by: FAMILY MEDICINE

## 2018-01-27 PROCEDURE — 97110 THERAPEUTIC EXERCISES: CPT

## 2018-01-27 PROCEDURE — 80053 COMPREHEN METABOLIC PANEL: CPT | Performed by: FAMILY MEDICINE

## 2018-01-27 PROCEDURE — 97535 SELF CARE MNGMENT TRAINING: CPT

## 2018-01-27 PROCEDURE — 92523 SPEECH SOUND LANG COMPREHEN: CPT | Performed by: SPEECH-LANGUAGE PATHOLOGIST

## 2018-01-27 PROCEDURE — 85025 COMPLETE CBC W/AUTO DIFF WBC: CPT | Performed by: FAMILY MEDICINE

## 2018-01-27 PROCEDURE — 83036 HEMOGLOBIN GLYCOSYLATED A1C: CPT | Performed by: FAMILY MEDICINE

## 2018-01-27 PROCEDURE — 51702 INSERT TEMP BLADDER CATH: CPT

## 2018-01-27 PROCEDURE — 94760 N-INVAS EAR/PLS OXIMETRY 1: CPT

## 2018-01-27 RX ORDER — WARFARIN SODIUM 3 MG/1
3 TABLET ORAL
Status: DISCONTINUED | OUTPATIENT
Start: 2018-01-27 | End: 2018-02-05 | Stop reason: HOSPADM

## 2018-01-27 RX ADMIN — SERTRALINE HYDROCHLORIDE 50 MG: 50 TABLET ORAL at 08:09

## 2018-01-27 RX ADMIN — NITROGLYCERIN 1 PATCH: 0.2 PATCH TRANSDERMAL at 08:09

## 2018-01-27 RX ADMIN — HYDROCODONE BITARTRATE AND ACETAMINOPHEN 1 TABLET: 5; 325 TABLET ORAL at 20:19

## 2018-01-27 RX ADMIN — IPRATROPIUM BROMIDE AND ALBUTEROL SULFATE 3 ML: 2.5; .5 SOLUTION RESPIRATORY (INHALATION) at 00:49

## 2018-01-27 RX ADMIN — FUROSEMIDE 20 MG: 20 TABLET ORAL at 08:09

## 2018-01-27 RX ADMIN — FAMOTIDINE 40 MG: 40 TABLET ORAL at 08:09

## 2018-01-27 RX ADMIN — WARFARIN SODIUM 2.5 MG: 2.5 TABLET ORAL at 17:04

## 2018-01-27 RX ADMIN — RANOLAZINE 1000 MG: 500 TABLET, FILM COATED, EXTENDED RELEASE ORAL at 08:09

## 2018-01-27 RX ADMIN — MONTELUKAST SODIUM 10 MG: 10 TABLET, FILM COATED ORAL at 20:19

## 2018-01-27 RX ADMIN — IPRATROPIUM BROMIDE AND ALBUTEROL SULFATE 3 ML: 2.5; .5 SOLUTION RESPIRATORY (INHALATION) at 06:35

## 2018-01-27 RX ADMIN — WARFARIN SODIUM 3 MG: 3 TABLET ORAL at 18:44

## 2018-01-27 RX ADMIN — RANOLAZINE 1000 MG: 500 TABLET, FILM COATED, EXTENDED RELEASE ORAL at 20:18

## 2018-01-27 RX ADMIN — CARVEDILOL 6.25 MG: 6.25 TABLET, FILM COATED ORAL at 17:04

## 2018-01-27 RX ADMIN — IPRATROPIUM BROMIDE AND ALBUTEROL SULFATE 3 ML: 2.5; .5 SOLUTION RESPIRATORY (INHALATION) at 20:55

## 2018-01-27 RX ADMIN — HYDROCODONE BITARTRATE AND ACETAMINOPHEN 1 TABLET: 5; 325 TABLET ORAL at 17:04

## 2018-01-27 RX ADMIN — HYDROCODONE BITARTRATE AND ACETAMINOPHEN 1 TABLET: 5; 325 TABLET ORAL at 06:18

## 2018-01-27 RX ADMIN — IPRATROPIUM BROMIDE AND ALBUTEROL SULFATE 3 ML: 2.5; .5 SOLUTION RESPIRATORY (INHALATION) at 12:13

## 2018-01-27 RX ADMIN — LEVOTHYROXINE SODIUM 50 MCG: 50 TABLET ORAL at 06:11

## 2018-01-27 RX ADMIN — BUDESONIDE AND FORMOTEROL FUMARATE DIHYDRATE 2 PUFF: 160; 4.5 AEROSOL RESPIRATORY (INHALATION) at 20:56

## 2018-01-27 RX ADMIN — ATORVASTATIN CALCIUM 20 MG: 20 TABLET, FILM COATED ORAL at 08:09

## 2018-01-27 RX ADMIN — BUDESONIDE AND FORMOTEROL FUMARATE DIHYDRATE 2 PUFF: 160; 4.5 AEROSOL RESPIRATORY (INHALATION) at 00:49

## 2018-01-27 RX ADMIN — CARVEDILOL 6.25 MG: 6.25 TABLET, FILM COATED ORAL at 08:09

## 2018-01-27 NOTE — THERAPY DISCHARGE NOTE
Acute Care - Occupational Therapy Initial Eval/Discharge  [facility]     Patient Name: Val Arteaga  : 1932  MRN: [id number]  Today's Date: 2018  Onset of Illness/Injury or Date of Surgery Date: 18  Date of Referral to OT: 18  Referring Physician: Dr. Mcdowell      Admit Date: 2018       ICD-10-CM ICD-9-CM   1. Displaced fracture of base of neck of right femur, initial encounter for closed fracture S72.041A 820.03   2. Essential hypertension I10 401.9   3. Coronary artery disease involving native coronary artery of native heart without angina pectoris I25.10 414.01   4. Pulmonary emphysema, unspecified emphysema type J43.9 492.8   5. Acquired hypothyroidism E03.9 244.9   6. Depressive disorder F32.9 311   7. Hip fracture, right, closed, initial encounter S72.001A 820.8   8. Impaired mobility and ADLs Z74.09 799.89   9. Impaired physical mobility Z74.09 781.99     Patient Active Problem List   Diagnosis   • Essential hypertension   • Coronary artery disease involving native coronary artery of native heart without angina pectoris   • Chronic obstructive pulmonary disease   • Acquired hypothyroidism   • Depressive disorder   • Thygeson's superficial punctate keratitis   • Pseudophakia   • Precordial pain   • Displaced fracture of base of neck of right femur, initial encounter for closed fracture   • Hip fracture, right, closed, initial encounter   • Pulmonary emphysema   • Chronic systolic congestive heart failure   • Hyponatremia   • BENITO (acute kidney injury)   • Acute blood loss anemia   • Urinary retention   • Fracture, hip, right, closed, with routine healing, subsequent encounter     Past Medical History:   Diagnosis Date   • Acquired hypothyroidism    • Allergic rhinitis    • CHF (congestive heart failure)    • COPD (chronic obstructive pulmonary disease)    • Corneal epithelial dystrophy    • Coronary arteriosclerosis    • Depression    • Generalized atherosclerosis    • GERD  (gastroesophageal reflux disease)    • Hemorrhoids    • History of bone density study 08/03/2012    Lumbar spine Osteopenia. Femoral Osteopenia   • History of mammogram 08/20/2004    Birads Category 2 Benign   • History of Papanicolaou smear of cervix 08/12/2004    NEGATIVE   • Hypercholesterolemia    • Hyperlipidemia    • Hypertension    • Nonexudative age-related macular degeneration    • Osteoarthritis of multiple joints    • Pseudophakia    • Punctate keratitis     - history Thygeson's keratopathy      • Tobacco dependence syndrome      Past Surgical History:   Procedure Laterality Date   • APPENDECTOMY     • BREAST SURGERY  03/19/1954    Excision, breast lesion (1)  Excision of fibroma. 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 patency in the circumflex artery site of previous angioplasty. Kathe. patency in RCA.Nonobstructive 20-30% stenosis in the LAD and diagonal branch. Preserved LV systolic function with Ef of 55%   • CERVICAL SPINE SURGERY  09/16/1974    Excision of cervical disc, C5-6, C6-7, anterior cervical fusion, C5-6 with iliac bone graft.cervical spondylosis,C5-6, C6-7   • COLONOSCOPY  01/01/2013    patient declined    • CORONARY ANGIOPLASTY  07/21/1993    Angioplasty, coronary (2)    RCA    • DILATATION AND CURETTAGE      3   • ENDOSCOPY AND COLONOSCOPY  10/01/1998    Hemorhoids Rectal Outlet Bleeding   • ESOPHAGOSCOPY / EGD  06/28/2004    Nonerosive gastritis of the body of the stomach. A biopsy was obtained & placed in h. Pylori test agar. Multiple biopsies were obtained from the body of the stomach   • HIP BIPOLAR REPLACEMENT Right 1/23/2018    Procedure: HIP BIPOLAR ANTERIOR APPROACH - right;  Surgeon: Kelvin Mcdowell MD;  Location: [facility];  Service:    • INJECTION OF MEDICATION  11/23/2015    Depo Medrol (Methylprednisone) (5)    • INJECTION OF MEDICATION  02/04/2016    Kenalog (3)     • PACEMAKER REPLACEMENT  2006           OT ASSESSMENT FLOWSHEET (last 72 hours)      OT Evaluation       01/27/18 0607 01/26/18 1931 01/26/18 1505 01/26/18 1420 01/26/18 1417    Rehab Evaluation    Document Type   evaluation   see MAR  -MR      Subjective Information   agree to therapy;complains of;fatigue  -MR      Patient Effort, Rehab Treatment   good  -MR      Symptoms Noted During/After Treatment   increased pain;shortness of breath;dizziness  -MR      Symptoms Noted Comment   Pt reported dizziness while sitting EOB, recovered in a few minutes  -MR      General Information    Patient Profile Review   yes  -MR      Onset of Illness/Injury or Date of Surgery Date   01/22/18  -MR      Referring Physician   Dr. Maldonado  -MR      General Observations   pt fowlers, 2.5 L NC, son present  -MR      Pertinent History Of Current Problem   Pt had a fall on 01/22/2018 resulting in right hip fracture, she had a right hip bipolar anterior approach  -MR      Precautions/Limitations   fall precautions;oxygen therapy device and L/min   RLE WBAT  -MR      Prior Level of Function   independent:;all household mobility;community mobility;transfer;ADL's;home management;cooking;driving;shopping;using stairs   pt has a   -MR      Equipment Currently Used at Home   rollator;oxygen;cane, straight;shower chair   rollator 100% since beginning of year  -MR      Plans/Goals Discussed With   patient and family;agreed upon  -MR      Risks Reviewed   patient and family:;LOB;nausea/vomiting;dizziness  -MR      Benefits Reviewed   patient and family:;improve function;increase independence;increase strength;increase balance;decrease pain;decrease risk of DVT;improve skin integrity;increase knowledge  -MR      Living Environment    Lives With   alone  -  alone  -    Living Arrangements   mobile home  -  mobile home  -    Home Accessibility   stairs to enter home;bed and bath on same level;grab bars present (bathtub);grab bars present (toilet);stairs (1 railing  present)   walk-in shower  -MR  stairs to enter home;grab bars present (toilet);grab bars present (bathtub)  -    Number of Stairs to Enter Home   2  -MR  2  -MK    Stair Railings at Home   outside, present on left side  -MR  outside, present on right side  -MK    Type of Financial/Environmental Concern     none  -MK    Transportation Available   car  -MR  car  -MK    Living Environment Comment   Pt had a fall on 01/22/2018 d/t LOB, pt had mild MI early in month  -MR      Clinical Impression    Date of Referral to OT   01/26/18  -MR      OT Diagnosis   impaired mobility and ADL's  -MR      Prognosis   good  -MR      Functional Level At Time Of Evaluation   impaired mobility and ADL's  -MR      Impairments Found (describe specific impairments)   aerobic capacity/endurance;ergonomics and body mechanics;gait, locomotion, and balance;integumentary integrity;joint integrity and mobility;motor function;muscle performance;posture;ROM  -MR      Patient/Family Goals Statement   Return home  -MR      Criteria for Skilled Therapeutic Interventions Met   yes;treatment indicated  -MR      Rehab Potential   good, to achieve stated therapy goals  -MR      Therapy Frequency   --   3-14 x/wk  -MR      Predicted Duration of Therapy Intervention (days/wks)   until d/cor all goals met  -MR      Anticipated Discharge Disposition inpatient rehabilitation facility  -  home with home health;home with 24/7 care  -MR      Functional Level Prior    Ambulation   1-->assistive equipment  -MR      Transferring   1-->assistive equipment  -MR      Toileting   1-->assistive equipment  -MR      Bathing   1-->assistive equipment  -MR      Dressing   0-->independent  -MR      Eating   0-->independent  -MR      Communication   0-->understands/communicates without difficulty  -MR      Swallowing   0-->swallows foods/liquids without difficulty  -MR      Vital Signs    Pre Systolic BP Rehab   116  -MR      Pre Treatment Diastolic BP   56  -MR       Post Systolic BP Rehab   134  -MR      Post Treatment Diastolic BP   61  -MR      Pretreatment Heart Rate (beats/min)   92  -MR      Posttreatment Heart Rate (beats/min)   100  -MR      Pre SpO2 (%)   95  -MR      O2 Delivery Pre Treatment   supplemental O2  -MR      Post SpO2 (%)   96  -MR      O2 Delivery Post Treatment   supplemental O2  -MR      Pre Patient Position   Supine  -MR      Intra Patient Position   Sitting  -MR      Post Patient Position   Supine  -MR      Pain Assessment    Pain Assessment   0-10  -MR      Pain Score   0  -MR      Post Pain Score   7  -MR      Pain Type   Surgical pain  -MR      Pain Location   Hip  -MR      Pain Orientation   Right  -MR      Pain Intervention(s)   Medication (See MAR);Repositioned;Ambulation/increased activity;Distraction  -MR      Vision Assessment/Intervention    Visual Impairment   WFL with corrective lenses  -MR      Cognitive Assessment/Intervention    Current Cognitive/Communication Assessment   functional  -MR      Orientation Status   oriented x 4  -MR      Follows Commands/Answers Questions   100% of the time;able to follow multi-step instructions  -MR      Personal Safety   WNL/WFL  -MR      Personal Safety Interventions   fall prevention program maintained;gait belt;muscle strengthening facilitated;nonskid shoes/slippers when out of bed;supervised activity  -MR      ROM (Range of Motion)    General ROM   upper extremity range of motion deficits identified  -MR      General ROM Detail   limited shoulder flexion ~ 90*, remainder of BUE WFL  -MR      MMT (Manual Muscle Testing)    General MMT Assessment   upper extremity strength deficits identified  -MR      General MMT Assessment Detail   BUE grossly 3+/5, fair  strength  -MR      Bed Mobility, Assessment/Treatment    Bed Mobility, Assistive Device   bed rails;head of bed elevated  -MR      Bed Mobility, Scoot/Bridge, [place]   maximum assist (25% patient effort);2 person assist required   after  functional mobility, pt fatigued  -MR      Bed Mob, Supine to Sit, [place]   minimum assist (75% patient effort);moderate assist (50% patient effort)  -MR      Bed Mob, Sit to Supine, [place]   moderate assist (50% patient effort)  -MR      Bed Mobility, Safety Issues   decreased use of legs for bridging/pushing  -MR      Bed Mobility, Impairments   strength decreased;impaired balance;coordination impaired;pain  -MR      Bed Mobility, Comment   Pt required min/mod A for managing BLE, pt required VC for technique and hand placement.   -MR      Transfer Assessment/Treatment    Transfers, Sit-Stand [place]   minimum assist (75% patient effort)  -MR      Transfers, Stand-Sit [place]   minimum assist (75% patient effort)  -MR      Transfers, Sit-Stand-Sit, Assist Device   rolling walker  -MR      Toilet Transfer, [place]   moderate assist (50% patient effort)  -MR      Toilet Transfer, Assistive Device   elevated toilet seat  -MR      Transfer, Maintain Weight Bearing Status   able to maintain weight bearing status  -MR      Transfer, Safety Issues   step length decreased;steps too close front assistive device  -MR      Transfer, Comment   Provided pt with more appropriate walker for her height  -MR      Functional Mobility    Functional Mobility- Ind. Level   contact guard assist;verbal cues required  -MR      Functional Mobility- Device   rolling walker  -MR      Functional Mobility-Distance (Feet)   --   bed <> bathroom  -MR      Functional Mobility-Maintain WBing   able to maintain weight bearing status  -MR      Functional Mobility- Safety Issues   other (see comments);step length decreased  -MR      Functional Mobility- Comment   Pt required VC for safe use with r/w  -MR      Toileting Assessment/Training    Toileting Assess/Train, Assistive Device   raised toilet seat  -MR      Toileting Assess/Train, Position   sitting;standing  -MR      Toileting Assess/Train, Indepen Level   moderate  assist (50% patient effort);minimum assist (75% patient effort)  -MR      Toileting Assess/Train, Impairments   ROM decreased;strength decreased;impaired balance;coordination impaired;motor control impaired;postural control impaired;pain  -MR      Toileting Assess/Train, Comment   Pt required mod A to t/f from sitting to standing. Provided pt with BSC over toilet for next toileting task.   -MR      Grooming Assessment/Training    Grooming Assess/Train, Position   standing;sink side  -MR      Grooming Assess/Train, Indepen Level   contact guard assist  -MR      Grooming Assess/Train, Impairments   strength decreased;impaired balance;coordination impaired;motor control impaired;postural control impaired;pain  -MR      Grooming Assess/Train, Comment   Pt stood for 2 min sink side to wash hands  -MR      Motor Skills/Interventions    Additional Documentation   Balance Skills Training (Group);Fine Motor Coordination Training (Group);Gross Motor Coordination Training (Group)  -MR      Balance Skills Training    Sitting-Level of Assistance   Distant supervision  -MR      Sitting-Balance Support   Feet supported;Right upper extremity supported;Left upper extremity supported  -MR      Standing-Level of Assistance   Contact guard;Minimum assistance  -MR      Static Standing Balance Support   assistive device  -MR      Gait Balance-Level of Assistance   Contact guard;Minimum assistance  -MR      Gait Balance Support   assistive device  -MR      Gross Motor Coordination Training    Gross Motor Skill, Impairments Detail   B digit to nose intact  -MR      Fine Motor Coordination Training    Opposition   Right:;Left:;intact  -MR      Sensory Assessment/Intervention    Light Touch  LUE;RUE;LLE;RLE  -PC LUE;RUE  -MR LUE;RUE;LLE;RLE  -MK     LUE Light Touch  WNL  -PC WNL  -MR WNL  -MK     RUE Light Touch  WNL  -PC WNL  -MR WNL  -MK     LLE Light Touch  WNL  -PC  WNL  -     RLE Light Touch  WNL  -PC  WNL  -     Proprioception    LUE;RUE  -MR      LUE Proprioception   WNL  -MR      RUE Proprioception   WNL  -MR      General Therapy Interventions    Planned Therapy Interventions   activity intolerance;adaptive equipment training;ADL retraining;IADL retraining;balance training;bed mobility training;energy conservation;home exercise program;joint mobilization;motor coordination training;ROM (Range of Motion);strengthening;stretching;transfer training  -MR      Positioning and Restraints    Pre-Treatment Position   in bed  -MR      Post Treatment Position   bed  -MR      In Bed   notified nsg;fowlers;call light within reach;encouraged to call for assist;exit alarm on;side rails up x2  -MR        01/26/18 1410 01/26/18 1058 01/26/18 1031 01/26/18 0955 01/24/18 1424    Rehab Evaluation    Document Type  therapy note (daily note)  -RONAN  therapy note (daily note)  -CS therapy note (daily note)  -RONAN    Subjective Information  agree to therapy  -RONAN  agree to therapy  -CS agree to therapy  -RONAN    Patient Effort, Rehab Treatment  good  -RONAN  good  -CS good  -RONAN    Symptoms Noted During/After Treatment  increased pain;shortness of breath  -RONAN  increased pain;shortness of breath  -CS fatigue;shortness of breath;increased pain;significant change in vital signs   pt w/ drop in BP w/ position changes.   -RONAN    General Information    Precautions/Limitations  fall precautions;oxygen therapy device and L/min;anterior hip precautions- right  -ORNAN   fall precautions;oxygen therapy device and L/min;anterior hip precautions- right  -RONAN    Equipment Currently Used at Home cane, straight;oxygen;grab bar;rollator;shower chair  -        Functional Level Prior    Ambulation 1-->assistive equipment  -        Transferring 1-->assistive equipment  -        Toileting 1-->assistive equipment  -        Bathing 3-->assistive equipment and person  -        Dressing 0-->independent  -        Eating 0-->independent  -        Communication 0-->understands/communicates  without difficulty  -MK        Swallowing 2-->difficulty swallowing liquids  -MK        Prior Functional Level Comment Family member lives with Pt and aids with her needs  -MK        Vital Signs    Pre Systolic BP Rehab  129  -RONNA  110  -   supine  -RONAN    Pre Treatment Diastolic BP  56  -RONAN  49  -CS 69  -RONAN    Intra Systolic BP Rehab  --   EOB: 126/58 after t/f: 107/51 after gait:127/79  -RONAN   --   EOB: 103/52 standin/48 supine: 102/69. nsg notified.   -RONAN    Post Systolic BP Rehab  127  -RONAN  119  -   supine.   -RONAN    Post Treatment Diastolic BP  79  -RONAN  65  -CS 77  -RONAN    Pretreatment Heart Rate (beats/min)  89  -RONAN  81  -CS 83  -RONAN    Intratreatment Heart Rate (beats/min)  91  -RONAN   87  -RONAN    Posttreatment Heart Rate (beats/min)  95  -RONAN  87  -CS 92  -RONAN    Pre SpO2 (%)  92  -RONAN  92  -CS 95  -RONAN    O2 Delivery Pre Treatment  supplemental O2  -RONAN  supplemental O2  -CS supplemental O2  -RONAN    Intra SpO2 (%)  95  -RONAN   93  -RONAN    O2 Delivery Intra Treatment  supplemental O2  -RONAN   supplemental O2  -RONAN    Post SpO2 (%)  85   post gait. improved to 96%  -RONAN  95  -CS 92  -RONAN    O2 Delivery Post Treatment  supplemental O2  -RONAN  supplemental O2  -CS supplemental O2  -RONAN    Pre Patient Position  Supine  -RONAN  Supine  -CS Supine  -RONAN    Intra Patient Position    Sitting  -CS     Post Patient Position  Sitting  -RONAN  Supine  -CS Supine  -RONAN    Pain Assessment    Pain Assessment  No/denies pain  -RONAN  0-10  -CS 0-10  -RONAN    Pain Score    0  -CS 0  -RONAN    Post Pain Score    0  -CS 8  -RONAN    Pain Location     Leg  -RONAN    Pain Orientation     Right  -RONAN    Pain Intervention(s)     Repositioned   pt defers pain meds.   -RONAN    Vision Assessment/Intervention    Visual Impairment  WFL with corrective lenses  -RONAN   WFL with corrective lenses  -RONAN    Cognitive Assessment/Intervention    Current Cognitive/Communication Assessment  functional  -RONAN  functional  -CS functional  -RONAN    Orientation Status  oriented x 4  -RONAN   oriented x 4  -CS oriented x 4  -RONAN    Follows Commands/Answers Questions  100% of the time  -RONAN  100% of the time  -% of the time  -RONAN    Personal Safety Interventions  gait belt;muscle strengthening facilitated;nonskid shoes/slippers when out of bed;supervised activity  -RONAN  gait belt;nonskid shoes/slippers when out of bed  -CS gait belt;muscle strengthening facilitated;nonskid shoes/slippers when out of bed;supervised activity  -    Bed Mobility, Assessment/Treatment    Bed Mobility, Assistive Device  bed rails;head of bed elevated  -RONAN  bed rails;head of bed elevated  -CS bed rails;head of bed elevated  -RONAN    Bed Mobility, Scoot/Bridge, [place]     supervision required   w/ extended time. before and after ther ex.   -    Bed Mob, Supine to Sit, [place]  minimum assist (75% patient effort)  -  moderate assist (50% patient effort);2 person assist required  - minimum assist (75% patient effort)  -    Bed Mob, Sit to Supine, [place]    minimum assist (75% patient effort)  - minimum assist (75% patient effort);moderate assist (50% patient effort)  -    Transfer Assessment/Treatment    Transfers, Bed-Chair [place]  minimum assist (75% patient effort)  -       Transfers, Bed-Chair-Bed, Assist Device  rolling walker  -       Transfers, Sit-Stand [place]  minimum assist (75% patient effort)  -  minimum assist (75% patient effort)  - minimum assist (75% patient effort);moderate assist (50% patient effort)  -    Transfers, Stand-Sit [place]  minimum assist (75% patient effort)  -  minimum assist (75% patient effort)  - minimum assist (75% patient effort)  -    Transfers, Sit-Stand-Sit, Assist Device  rolling walker  -RONAN  rolling walker  - rolling walker  -    Toilet Transfer, [place]    minimum assist (75% patient effort)  -     Toilet Transfer, Assistive Device    bedside commode without drop arms;rolling walker  -     Transfer, Comment   retrieved pt walker more appropriate for her height  -   brop in BP upon standing.   -    Stairs Assessment/Treatment    Stairs, [place] Level     not tested  -    Upper Body Bathing Assessment/Training    UB Bathing Assess/Train Assistive Device    bath mitt  -CS     UB Bathing Assess/Train, Position    sitting;edge of bed  -CS     UB Bathing Assess/Train, [place] Level    minimum assist (75% patient effort)  -CS     UB Bathing Assess/Train, Impairments    ROM decreased;strength decreased;decreased flexibility  -CS     Lower Body Bathing Assessment/Training    LB Bathing Assess/Train Assistive Device    bath mitt  -CS     LB Bathing Assess/Train, Position    sitting;standing  -CS     LB Bathing Assess/Train, [place] Level    minimum assist (75% patient effort)  -CS     LB Bathing Assess/Train, Impairments    ROM decreased;strength decreased;decreased flexibility;impaired balance  -     Upper Body Dressing Assessment/Training    UB Dressing Assess/Train, Clothing Type    doffing:;donning:;hospital gown  -     UB Dressing Assess/Train, Position    sitting  -CS     UB Dressing Assess/Train, [place]    minimum assist (75% patient effort)  -CS     Toileting Assessment/Training    Toileting Assess/Train, Assistive Device    bedside commode  -CS     Toileting Assess/Train, Position    sitting  -CS     Toileting Assess/Train, Indepen Level    set up required  -CS     Grooming Assessment/Training    Grooming Assess/Train, Position    sitting;edge of bed  -CS     Grooming Assess/Train, Indepen Level    set up required  -CS     Grooming Assess/Train, Comment    wash face, oral care, comb hair, apply deodorant  -     Therapy Exercises    Bilateral Lower Extremities  --   no ther ex due to lunch arriving.  -   AROM:;AAROM:;10 reps;20 reps;supine;ankle pumps/circles;quad sets;glut sets;hip abduction/adduction;heel slides;SAQ   2 sets all.20 reps L LE, 10 reps R LE. AAROM HS ab/ad R LE.   -     Sensory Assessment/Intervention    Light Touch   LUE;RUE;LLE;RLE  -CM      LUE Light Touch   WNL  -CM      RUE Light Touch   WNL  -CM      LLE Light Touch   WNL  -CM      RLE Light Touch   WNL  -CM      Positioning and Restraints    Pre-Treatment Position  in bed  -RONAN  in bed  -CS in bed  -RONAN    Post Treatment Position  chair  -RONAN  bed  -CS bed  -RONAN    In Bed    supine;call light within reach;exit alarm on;with family/caregiver  -CS supine;call light within reach;encouraged to call for assist;with family/caregiver   all needs met.   -RONAN    In Chair  reclined;call light within reach;encouraged to call for assist;with family/caregiver   all needs met. eating lunch  -         01/24/18 1329 01/24/18 1008 01/24/18 0755          Rehab Evaluation    Document Type therapy note (daily note)  - evaluation  -CB (r) HL (t) CB (c) evaluation  -      Subjective Information agree to therapy  - agree to therapy;complains of;pain   pt had trigger point in right thigh, pain decreased w pressu  -CB (r) HL (t) CB (c) agree to therapy  -      Patient Effort, Rehab Treatment good  - good  -CB (r) HL (t) CB (c) good  -      Symptoms Noted During/After Treatment fatigue;shortness of breath;increased pain;significant change in vital signs   O2 dropped to 85 RN notified, came up with deep breathing  - dizziness;fatigue;significant change in vital signs   O2 dropped to 85% after standing, RN notified  -CB (r) HL (t) CB (c) fatigue;dizziness;increased pain;significant change in vital signs  -      Symptoms Noted Comment   RN notified of pt pain with movement of level 8 in chest upper area and shoulders and BP decrease and increase with change in positoining. RN notified OT that tele called and pt HR went up to 150 but then came back down.   -      General Information    Patient Profile Review  yes  -CB (r) HL (t) CB (c) yes  -      Onset of Illness/Injury or Date of Surgery Date  01/22/18  -CB (r) HL (t) CB (c) 01/22/18   -      Referring Physician  Dr. Mcdowell  -CB (r) HL (t) CB (c) Dr. Mcdowell  -      General Observations  pt in bed with 3L O2, telemetry, cath, SCD, drain   -CB (r) HL (t) CB (c) Pt sitting up in bed on O2 3L, wound vac, tele, bed alarm, catheter, pillow between legs  -      Pertinent History Of Current Problem  pt admitted s/p fall and had right hip bipolar anterior approach  -CB (r) HL (t) CB (c) Pt reported she fell at home on way to bathroom. Pt came to ER on 1/22/18 with a displaced fracture of base of neck of righ femur, pt had right hip biopolar anterior approach on 1/23/18.   -      Precautions/Limitations fall precautions;oxygen therapy device and L/min;anterior hip precautions- right  - fall precautions;oxygen therapy device and L/min  -CB (r) HL (t) CB (c) fall precautions;oxygen therapy device and L/min;anterior hip precautions- right;other (see comments)   vital signs; WBAT  -      Prior Level of Function  independent:;gait;ADL's   pt stated that she recently hired someone to help clean.  -CB (r) HL (t) CB (c) independent:;all household mobility;transfer;ADL's;home management   family assist with IADL, hired help cleaning;   -      Equipment Currently Used at Home  cane, straight;oxygen;rollator;grab bar;shower chair   used O2 at night and PRN  -CB (r) HL (t) CB (c) cane, straight;rollator;oxygen;grab bar   2.5 O2 at home/night; seat in shower in corner;  shower he  -      Plans/Goals Discussed With  patient and family  -CB (r) HL (t) CB (c) patient and family;agreed upon  -      Risks Reviewed  patient and family:;increased discomfort  -CB (r) HL (t) CB (c) patient and family:;LOB;nausea/vomiting;dizziness;increased discomfort;change in vital signs  -      Benefits Reviewed  patient and family:;decrease pain;increase strength;increase independence  -CB (r) HL (t) CB (c) patient and family:;improve function;increase independence;increase strength;increase balance  -      Living  Environment    Lives With  alone  -CB (r) HL (t) CB (c) alone   help at night  -      Living Arrangements  mobile home  -CB (r) HL (t) CB (c) mobile home   double wide  -      Home Accessibility  stairs to enter home;grab bars present (bathtub);grab bars present (toilet)   walk in shower. Nephew stated he was going to build a ramp  -CB (r) HL (t) CB (c) stairs to enter home;grab bars present (toilet);grab bars present (bathtub);bed and bath on same level   walk in shower  -      Number of Stairs to Enter Home  2  -CB (r) HL (t) CB (c) 3   1 step onto porch couple into living room  -      Stair Railings at Home  outside, present on right side  -CB (r) HL (t) CB (c) --   unsure of handrails  -      Transportation Available  car;family or friend will provide  -CB (r) HL (t) CB (c) car;family or friend will provide  -      Living Environment Comment   prior to recent hosptil visit indepenent with ADL and IADL except hired help cleaning and family would bring meals since recent d/c from hospital 2 weeks ago had sitters with her and assisted with ADL and IADL as needed, pt mostly did ADL on her own.   -      Clinical Impression    Date of Referral to OT   01/23/18  -      OT Diagnosis   impaired mobility and ADL  -      Prognosis   fair  -      Functional Level At Time Of Evaluation   Pt got dizzy with change of positioning did better with sititng verse standing with being dizzy and lightheaded. Pt attempted all tasks well.   -      Impairments Found (describe specific impairments)   aerobic capacity/endurance;gait, locomotion, and balance;integumentary integrity;joint integrity and mobility;motor function;muscle performance;posture;ventilation and respiration/gas exchange   ADL, functional t/f and mobility, ax tolerance  -      Patient/Family Goals Statement   pt wants to go to rehab then home  -      Criteria for Skilled Therapeutic Interventions Met   yes;treatment indicated  -      Rehab  Potential   good, to achieve stated therapy goals  -      Therapy Frequency   other (see comments)   3-14x a week  -      Predicted Duration of Therapy Intervention (days/wks)   until d/c or all goals met  -      Anticipated Equipment Needs At Discharge   bedside commode;bathing equipment;dressing equipment;shower chair;front wheeled walker  -      Anticipated Discharge Disposition   skilled nursing facility;inpatient rehabilitation facility  -      Functional Level Prior    Ambulation   1-->assistive equipment  -      Transferring   1-->assistive equipment  -      Toileting   1-->assistive equipment  -      Bathing   3-->assistive equipment and person  -      Dressing   0-->independent  -BH      Eating   0-->independent  -BH      Communication   0-->understands/communicates without difficulty  -      Swallowing   2-->difficulty swallowing liquids  -      Vital Signs    Pre Systolic BP Rehab 110  -  -CB (r) HL (t) CB (c) 97  -BH      Pre Treatment Diastolic BP 60  -BH 70  -CB (r) HL (t) CB (c) 52  -BH      Intra Systolic BP Rehab  118   sitting EOB  -CB (r) HL (t) CB (c) 116   92/54 after standing  -BH      Intra Treatment Diastolic BP  56  -CB (r) HL (t) CB (c) 69  -BH      Post Systolic BP Rehab 120  -   After standing 2x  -CB (r) HL (t) CB (c) 150  -BH      Post Treatment Diastolic BP 58  -BH 78  -CB (r) HL (t) CB (c) 65  -BH      Pretreatment Heart Rate (beats/min) 81  -BH 88  -CB (r) HL (t) CB (c) 85  -BH      Intratreatment Heart Rate (beats/min)  91  -CB (r) HL (t) CB (c) 101   RN reports tele called and satated   -BH      Posttreatment Heart Rate (beats/min) 91  -BH 94  -CB (r) HL (t) CB (c) 84  -BH      Pre SpO2 (%) 93  -BH 95  -CB (r) HL (t) CB (c) 96  -BH      O2 Delivery Pre Treatment supplemental O2  -BH supplemental O2   3L  -CB (r) HL (t) CB (c) supplemental O2  -BH      Intra SpO2 (%) 86   victoriano to 91 with deep breathing  -BH 90  -CB (r) HL (t) CB (c) 97  -BH       O2 Delivery Intra Treatment supplemental O2  -BH supplemental O2   2L  -CB (r) HL (t) CB (c) supplemental O2  -BH      Post SpO2 (%) 94  -BH 85  -CB (r) HL (t) CB (c) 98  -BH      O2 Delivery Post Treatment supplemental O2  -BH supplemental O2   2L. RN notified  -CB (r) HL (t) CB (c) supplemental O2  -BH      Pre Patient Position Supine  -BH Supine  -CB (r) HL (t) CB (c) Supine  -BH      Intra Patient Position Sitting  -BH Standing  -CB (r) HL (t) CB (c) Sitting  -BH      Post Patient Position Supine  -BH Sitting  -CB (r) HL (t) CB (c) Supine  -BH      Pain Assessment    Pain Assessment  0-10  -CB (r) HL (t) CB (c) 0-10  -BH      Pain Score 0  -BH 8  -CB (r) HL (t) CB (c) 0   pt reported level 8 pain with mobility at times, supine none  -BH      Post Pain Score 4  -BH 0  -CB (r) HL (t) CB (c) 0  -BH      Pain Location  Leg  -CB (r) HL (t) CB (c)       Pain Orientation  Right  -CB (r) HL (t) CB (c)       Pain Descriptors  Burning   pt had trigger point, pain relieved with pressure.  -CB (r) HL (t) CB (c)       Pain Intervention(s) Repositioned;Ambulation/increased activity;Rest   RN notified  - Ambulation/increased activity   pressure applied to trigger point  -CB (r) HL (t) CB (c)       Vision Assessment/Intervention    Visual Impairment  WFL with corrective lenses  -CB (r) HL (t) CB (c) WFL with corrective lenses  -      Cognitive Assessment/Intervention    Current Cognitive/Communication Assessment functional  - functional  -CB (r) HL (t) CB (c) functional  -      Orientation Status oriented x 4  - oriented to;person;place;time  -CB (r) HL (t) CB (c) oriented x 4  -      Follows Commands/Answers Questions 100% of the time;75% of the time;able to follow single-step instructions  - 100% of the time  -CB (r) HL (t) CB (c) 100% of the time;75% of the time;able to follow single-step instructions;needs cueing  -      Personal Safety mild impairment  - good awareness, safety precautions  -CB (r) HL  (t) CB (c) mild impairment  -      Personal Safety Interventions fall prevention program maintained;gait belt;muscle strengthening facilitated;nonskid shoes/slippers when out of bed;supervised activity  - fall prevention program maintained;gait belt;nonskid shoes/slippers when out of bed  -CB (r) HL (t) CB (c) fall prevention program maintained;gait belt;muscle strengthening facilitated;nonskid shoes/slippers when out of bed;supervised activity  -      ROM (Range of Motion)    General ROM  lower extremity range of motion deficits identified  -CB (r) HL (t) CB (c) no range of motion deficits identified  -      General ROM Detail  limited knee flexion and hip flexion on right    limited by pain  - BUE WFL, fair+ digit to nose/digit to thumb opposition  -      MMT (Manual Muscle Testing)    General MMT Assessment  lower extremity strength deficits identified  -CB (r) HL (t) CB (c) upper extremity strength deficits identified;lower extremity strength deficits identified  -      General MMT Assessment Detail   BUE  grossly 3+ to 4-/5; BUE grossly 4-/5  -      Right Hip    Hip Flexion Gross Movement  (2/5) poor  -CB (r) HL (t) CB (c)       Hip ABduction Gross Movement  (2+/5) poor plus  -       Hip ADduction Gross Movement  (2+/5) poor plus  -       Bed Mobility, Assessment/Treatment    Bed Mobility, Assistive Device head of bed elevated;bed rails  - head of bed elevated;bed rails  -CB (r) HL (t) CB (c)       Bed Mob, Supine to Sit, [place] moderate assist (50% patient effort)  - moderate assist (50% patient effort);2 person assist required  -CB (r) HL (t) CB (c) moderate assist (50% patient effort);minimum assist (75% patient effort)  -      Bed Mob, Sit to Supine, [place] moderate assist (50% patient effort);maximum assist (25% patient effort);2 person assist required  - moderate assist (50% patient effort)  -CB (r) HL (t) CB (c) dependent (less than 25% patient effort);2 person  assist required   due to pt medical symptoms  -      Bed Mobility, Safety Issues   decreased use of legs for bridging/pushing  -      Bed Mobility, Impairments   strength decreased;impaired balance;coordination impaired;pain  -      Bed Mobility, Comment HOB down for sit to sup, pt fatigued and in creased pain. Pt requried assist with legs and hip precuations to get into bed safely. pt was able to push through legs and use arms to assit with scooting in the bed and to sit EOB and scoot towards the HOB with min assist. pt fatigued and rqeuried assist/support for the RLE. Pt sat EOB with close supervision for approx 20 minutes.   - pt needed some help lifting her legs back into bed.  -CB (r) HL (t) CB (c) Pt attempted well getting to EOB from supine with min assist to walk legs towards the EOB then min-mod assist to get trunk up, then min-mod assist to get scooted out towards the EOB with feet on the floor protecting hip throughout. Pt educated on proper way to safely get out of bed. Pt was assisted by OT and CNA to get back into bed 2* dizzy and light headed and not feeling welling otherwise pt would have been able to assist, once supine and feeling better pt assisted with better alignment in the bed average max assist.   -      Transfer Assessment/Treatment    Transfers, Bed-Chair [place]   unable to perform  -      Transfers, Sit-Stand [place]  minimum assist (75% patient effort);moderate assist (50% patient effort);2 person assist required  -CB (r) HL (t) CB (c) minimum assist (75% patient effort);moderate assist (50% patient effort)  -      Transfers, Stand-Sit [place]  moderate assist (50% patient effort)  -CB (r) HL (t) CB (c) moderate assist (50% patient effort)  -      Transfers, Sit-Stand-Sit, Assist Device  rolling walker  -CB (r) HL (t) CB (c) rolling walker  -      Transfer, Comment defer  - pt required some cueing to remind her to reach back when sitting down and to  push up from the bed.  -CB (r) HL (t) CB (c) pt required vc and assist for hand placement on bed to push up, pt was dizzy standing worse than sitting and OT was attempting to get pt to explain her symptoms and look straight forward stood for approx 1-2 minutes with symptoms not getting better, assisted pt with min-mod assist to take a few side steps to then sit on bed and be assisted to get supine, once supine pt reported feeling better CNA in room assisting and RN notified.   -      Functional Mobility    Functional Mobility- Comment   please see t/f comment  -      Upper Body Bathing Assessment/Training    UB Bathing Assess/Train, Comment pt declined sponge bath today but agreed to attempt tomorrow.   -        Lower Body Dressing Assessment/Training    LB Dressing Assess/Train, Clothing Type   doffing:;donning:;slipper socks  -      LB Dressing Assess/Train, Assist Device   reacher;sock-aid  -      LB Dressing Assess/Train, Position   sitting;edge of bed  -      LB Dressing Assess/Train, [place]   minimum assist (75% patient effort)  -      LB Dressing Assess/Train, Impairments   decreased flexibility;ROM decreased;impaired balance;coordination impaired;motor control impaired;pain  -      LB Dressing Assess/Train, Comment   Pt was educated on how to use a reacher, dressing stick and sock aid, pt once dizziness got better EOB with demo and min assist used reacher and sock aid on non operative side with min difficulty.   -      Motor Skills/Interventions    Additional Documentation   Fine Motor Coordination Training (Group);Balance Skills Training (Group)  -      Balance Skills Training    Sitting-Level of Assistance Close supervision  -  Contact guard;Close supervision   varied, vital signs closely monitored  -      Standing-Level of Assistance   Minimum assistance;Moderate assistance   varied vital signs monitored throughout  -      Therapy Exercises    Bilateral Upper Extremity  AROM:;15 reps;sitting;elbow flexion/extension;hand pumps;pronation/supination;shoulder extension/flexion   wrist flexion/extension  -        BUE Resistance --   no resistance, fatigued with movements needed rest breaks.   -        Fine Motor Coordination Training    Opposition   Right:;Left:;intact  -      Sensory Assessment/Intervention    Light Touch  LUE;RUE;LLE;RLE  -CB (r) HL (t) CB (c) LUE;RUE  -BH      LUE Light Touch  WNL  -CB (r) HL (t) CB (c) WNL  -BH      RUE Light Touch  WNL  -CB (r) HL (t) CB (c) WNL  -BH      LLE Light Touch  WNL  -CB (r) HL (t) CB (c)       RLE Light Touch  WNL  -CB (r) HL (t) CB (c)       General Therapy Interventions    Planned Therapy Interventions   activity intolerance;adaptive equipment training;ADL retraining;balance training;bed mobility training;energy conservation;fine motor coordination training;home exercise program;motor coordination training;ROM (Range of Motion);strengthening;transfer training  [facility]      Positioning and Restraints    Pre-Treatment Position in bed  - in bed  -CB (r) HL (t) CB (c) in bed  -      Post Treatment Position bed  - bed  -CB (r) HL (t) CB (c) bed  -BH      In Bed notified nsg;supine;fowlers;call light within reach;encouraged to call for assist;exit alarm on;with family/caregiver  - supine;exit alarm on;encouraged to call for assist;call light within reach;with family/caregiver  -CB (r) HL (t) CB (c) notified nsg;supine;fowlers;call light within reach;encouraged to call for assist;exit alarm on;with family/caregiver  -      Lower Extremity    Lower Ext Manual Muscle Testing  right hip strength deficit;right knee strength deficit  -CB (r) HL (t) CB (c)       Right Knee    Knee Flexion Gross Movement  (2/5) poor  -CB (r) HL (t) CB (c)         User Key  (r) = Recorded By, (t) = Taken By, (c) = Cosigned By    Initials Name Effective Dates    CB Leatha Tirado, PT 04/06/17 -     BH Marisol Burdick, OTR/L 10/17/16 -     BRENDA Cain,  RN 10/17/16 -     PC Princess YOLANDA Adair, TAINA 10/17/16 -     MK David Bender, RN 10/17/16 -     RONAN Melquiades King, PTA 10/17/16 -     CS Karen Michel, NELSON/L 10/17/16 -     NN Rocío Desai, OTR/L 11/08/17 -     MR Itzel Fletcher, OT 11/08/17 -     HL Harper Ang, PT Student 12/05/17 -           Occupational Therapy Education     Title: PT OT SLP Therapies (Resolved)     Topic: Occupational Therapy (Resolved)     Point: ADL training (Resolved)    Description: Instruct learner(s) on proper safety adaptation and remediation techniques during self care or transfers.   Instruct in proper use of assistive devices.    Learning Progress Summary    Learner Readiness Method Response Comment Documented by Status   Patient Acceptance E,TB,D NR Pt was educated on safety awareness with ADLs and transfers.  01/26/18 1142 Active    Acceptance E VU Educated pt on OT and POC. answered pt and pt's family questions and concerns. Educated to call for assist not to get up on her own. Educated on AE for ADL. Educated on safety and hip preucations thorughout t/f, mobility, ADL.  01/24/18 0957 Done   Family Acceptance E VU Educated pt on OT and POC. answered pt and pt's family questions and concerns. Educated to call for assist not to get up on her own. Educated on AE for ADL. Educated on safety and hip preucations thorughout t/f, mobility, ADL.  01/24/18 0957 Done               Point: Home exercise program (Resolved)    Description: Instruct learner(s) on appropriate technique for monitoring, assisting and/or progressing therapeutic exercises/activities.    Learning Progress Summary    Learner Readiness Method Response Comment Documented by Status   Patient Acceptance E,TB,D NR Pt was educated on safety awareness with ADLs and transfers.  01/26/18 1142 Active               Point: Precautions (Resolved)    Description: Instruct learner(s) on prescribed precautions during self-care and functional transfers.    Learning  Progress Summary    Learner Readiness Method Response Comment Documented by Status   Patient Acceptance E,TB,D NR Pt was educated on safety awareness with ADLs and transfers.  01/26/18 1142 Active    Acceptance E VU,NR Educated about OT, Educated on safety with bed mobility, and hip precuations. Educated pt to call for assist.  01/24/18 1448 Done    Acceptance E VU Educated pt on OT and POC. answered pt and pt's family questions and concerns. Educated to call for assist not to get up on her own. Educated on AE for ADL. Educated on safety and hip preucations thorughout t/f, mobility, ADL.  01/24/18 0957 Done   Family Acceptance E VU Educated pt on OT and POC. answered pt and pt's family questions and concerns. Educated to call for assist not to get up on her own. Educated on AE for ADL. Educated on safety and hip preucations thorughout t/f, mobility, ADL.  01/24/18 0957 Done               Point: Body mechanics (Resolved)    Description: Instruct learner(s) on proper positioning and spine alignment during self-care, functional mobility activities and/or exercises.    Learning Progress Summary    Learner Readiness Method Response Comment Documented by Status   Patient Acceptance E,TB,D NR Pt was educated on safety awareness with ADLs and transfers.  01/26/18 1142 Active    Acceptance E VU,NR Educated about OT, Educated on safety with bed mobility, and hip precuations. Educated pt to call for assist.  01/24/18 1448 Done    Acceptance E VU Educated pt on OT and POC. answered pt and pt's family questions and concerns. Educated to call for assist not to get up on her own. Educated on AE for ADL. Educated on safety and hip preucations thorughout t/f, mobility, ADL.  01/24/18 0957 Done   Family Acceptance E VU Educated pt on OT and POC. answered pt and pt's family questions and concerns. Educated to call for assist not to get up on her own. Educated on AE for ADL. Educated on safety and hip preucations  jose e t/f, mobility, ADL.  01/24/18 0957 Done                      User Key     Initials Effective Dates Name Provider Type Discipline     10/17/16 -  Marisol Burdick, AYAD/L Occupational Therapist OT     10/17/16 -  OMAR Lau/HINA Occupational Therapy Assistant OT                OT Recommendation and Plan  Anticipated Equipment Needs At Discharge: bedside commode, bathing equipment, dressing equipment, shower chair, front wheeled walker  Anticipated Discharge Disposition: inpatient rehabilitation facility  Planned Therapy Interventions: activity intolerance, adaptive equipment training, ADL retraining, balance training, bed mobility training, energy conservation, fine motor coordination training, home exercise program, motor coordination training, ROM (Range of Motion), strengthening, transfer training  Therapy Frequency: other (see comments) (3-14x a week)              OT Goals       01/27/18 0606 01/26/18 1630 01/26/18 1144    Bed Mobility OT STG    Bed Mobility OT STG, Date Established  01/26/18  -MR     Bed Mobility OT STG, Time to Achieve  by discharge  -MR     Bed Mobility OT STG, Activity Type  all bed mobility  -MR     Bed Mobility OT STG, [place] Level  conditional independence  -MR     Bed Mobility OT STG, Additional Goal  with VSS  -MR     Bed Mobility OT LTG    Bed Mobility OT LTG, Date Goal Reviewed 01/27/18  -  01/26/18  -    Bed Mobility OT LTG, Outcome goal not met  -NN      Bed Mobility OT LTG, Reason Goal Not Met discharged from facility  -NN      Transfer Training OT LTG    Transfer Training OT LTG, Date Established  01/26/18  -MR     Transfer Training OT LTG, Time to Achieve  by discharge  -MR     Transfer Training OT LTG, Activity Type  all transfers  -MR     Transfer Training OT LTG, [place] Level  supervision required  -MR     Transfer Training OT LTG, Assist Device  walker, rolling  -MR     Transfer Training OT LTG, Additional Goal  with VSS  -MR     Transfer  Training OT LTG, Date Goal Reviewed 01/27/18  -NN  01/26/18  -CS    Transfer Training OT LTG, Outcome goal not met  -NN  goal not met  -CS    Transfer Training OT LTG, Reason Goal Not Met discharged from facility  -      Patient Education OT LTG    Patient Education OT LTG, Date Established  01/26/18  -     Patient Education OT LTG, Time to Achieve  by discharge  -MR     Patient Education OT LTG, Education Type  written program;HEP;precautions per surgeon;positioning;posture/body mechanics;home safety;adaptive equipment mgmt;skin care/inspection;energy conservation;work simplification;adaptive breathing  -MR     Patient Education OT LTG, Education Understanding  independent;demonstrates adequately;verbalizes understanding  -MR     ADL OT LTG    ADL OT LTG, Date Established  01/26/18  -     ADL OT LTG, Time to Achieve  by discharge  -     ADL OT LTG, Activity Type  ADL skills  -     ADL OT LTG, Additional Goal  UB bathing SBA with AD, LB bathing with min A with AD, UB dressing with cond I, LB dressing with Robb with AD, grooming with set-up, toileting with CGA   -     ADL OT LTG, Date Goal Reviewed 01/27/18  -NN  01/26/18  -CS    ADL OT LTG, Outcome goal not met  -NN  goal not met  -CS    ADL OT LTG, Reason Goal Not Met discharged from facility  -      Functional Mobility OT LTG    Functional Mobility Goal OT LTG, Date Goal Reviewed 01/27/18  -NN  01/26/18  -CS    Functional Mobility Goal OT LTG, Outcome goal not met  -NN  goal not met  -    Functional Mobility Goal OT LTG, Reason Goal Not Met discharged from facility  -        01/24/18 1329 01/24/18 0755       Bed Mobility OT LTG    Bed Mobility OT LTG, Date Established  01/24/18  -     Bed Mobility OT LTG, Time to Achieve  by discharge  -     Bed Mobility OT LTG, Activity Type  all bed mobility  -     Bed Mobility OT LTG, [place] Level  supervision required   to engage in ADL  -     Bed Mobility OT LTG, Date Goal Reviewed 01/24/18   -      Bed Mobility OT LTG, Outcome goal ongoing  -      Transfer Training OT LTG    Transfer Training OT LTG, Date Established  01/24/18  -     Transfer Training OT LTG, Time to Achieve  by discharge  -     Transfer Training OT LTG, Activity Type  toilet  -     Transfer Training OT LTG, [place] Level  contact guard assist   AE/AD as needed  -     Transfer Training OT LTG, Date Goal Reviewed 01/24/18  -      ADL OT LTG    ADL OT LTG, Date Established  01/24/18  -     ADL OT LTG, Activity Type  ADL skills  -     ADL OT LTG, Additional Goal  set up grooming/self feeding, min A UB bath/dressing/ mod A LB bath/dressing/toileting - AE/AD as needed  -     ADL OT LTG, Date Goal Reviewed 01/24/18  -      Functional Mobility OT LTG    Functional Mobility Goal OT LTG, Date Established  01/24/18  -     Functional Mobility Goal OT LTG, Time to Achieve  by discharge  -     Functional Mobility Goal OT LTG, [place] Level  contact guard  -     Functional Mobility Goal OT LTG, Assist Device  --   AE/AD as needed  -     Functional Mobility Goal OT LTG, Distance to Achieve  to the bathroom   to the toilet  -     Functional Mobility Goal OT LTG, Date Goal Reviewed 01/24/18  -        User Key  (r) = Recorded By, (t) = Taken By, (c) = Cosigned By    Initials Name Provider Type     Marisol Burdick, OTR/L Occupational Therapist    OMAR Fallon/L Occupational Therapy Assistant    RAFAEL Desai, OTR/L Occupational Therapist    MR Itzel Fletcher, OT Occupational Therapist                Outcome Measures       01/26/18 1505 01/26/18 1100 01/26/18 1058    How much help from another person do you currently need...    Turning from your back to your side while in flat bed without using bedrails?   3  -RONAN    Moving from lying on back to sitting on the side of a flat bed without bedrails?   3  -RONAN    Moving to and from a bed to a chair (including a wheelchair)?   3  -RONAN    Standing  up from a chair using your arms (e.g., wheelchair, bedside chair)?   3  -RONAN    Climbing 3-5 steps with a railing?   2  -RONAN    To walk in hospital room?   3  -RONAN    AM-PAC 6 Clicks Score   17  -RONAN    How much help from another is currently needed...    Putting on and taking off regular lower body clothing? 1  -MR 1  -CS     Bathing (including washing, rinsing, and drying) 2  -MR 2  -CS     Toileting (which includes using toilet bed pan or urinal) 2  -MR 1  -CS     Putting on and taking off regular upper body clothing 3  -MR 3  -CS     Taking care of personal grooming (such as brushing teeth) 3  -MR 3  -CS     Eating meals 4  -MR 3  -CS     Score 15  -MR 13  -CS     Functional Assessment    Outcome Measure Options AM-PAC 6 Clicks Daily Activity (OT)  -MR AM-PAC 6 Clicks Daily Activity (OT)  -CS AM-PAC 6 Clicks Basic Mobility (PT)  -RONAN      01/24/18 1424 01/24/18 1008 01/24/18 0755    How much help from another person do you currently need...    Turning from your back to your side while in flat bed without using bedrails? 3  -RONAN 3  -CB (r) HL (t) CB (c)     Moving from lying on back to sitting on the side of a flat bed without bedrails? 3  -RONAN 2  -CB     Moving to and from a bed to a chair (including a wheelchair)? 2  -RONAN 2  -CB (r) HL (t) CB (c)     Standing up from a chair using your arms (e.g., wheelchair, bedside chair)? 2  -RONAN 2  -CB (r) HL (t) CB (c)     Climbing 3-5 steps with a railing? 2  -RONAN 2  -CB (r) HL (t) CB (c)     To walk in hospital room? 2  -RONAN 2  -CB (r) HL (t) CB (c)     AM-PAC 6 Clicks Score 14  -RONAN 13  -CB     How much help from another is currently needed...    Putting on and taking off regular lower body clothing?   1  -BH    Bathing (including washing, rinsing, and drying)   2  -BH    Toileting (which includes using toilet bed pan or urinal)   1  -BH    Putting on and taking off regular upper body clothing   3  -BH    Taking care of personal grooming (such as brushing teeth)   3  -BH    Eating  meals   3  -    Score   13  -    Functional Assessment    Outcome Measure Options AM-PAC 6 Clicks Basic Mobility (PT)  -RONAN AM-PAC 6 Clicks Basic Mobility (PT)  -CB (r) HL (t) CB (c) AM-PAC 6 Clicks Daily Activity (OT)  -      User Key  (r) = Recorded By, (t) = Taken By, (c) = Cosigned By    Initials Name Provider Type    CB Leatha Tirado, PT Physical Therapist     Marisol Burdick, OTR/L Occupational Therapist    RONAN King, PTA Physical Therapy Assistant    CS Karen Michel, NELSON/L Occupational Therapy Assistant    MR Itzel Fletcher, OT Occupational Therapist    HL Harper Ang, PT Student PT Student          Time Calculation:           OT G-codes  OT Professional Judgement Used?: Yes  OT Functional Scales Options: AM-PAC 6 Clicks Daily Activity (OT)  Score: 13  Functional Limitation: Self care  Self Care Current Status (): At least 60 percent but less than 80 percent impaired, limited or restricted  Self Care Goal Status (): At least 40 percent but less than 60 percent impaired, limited or restricted    OT Discharge Summary  Anticipated Discharge Disposition: inpatient rehabilitation facility  Reason for Discharge: Discharge from facility  Outcomes Achieved: Refer to plan of care for updates on goals achieved  Discharge Destination: Inpatient rehabilitation facility    Rocío Desai OTR/L  1/27/2018

## 2018-01-27 NOTE — PLAN OF CARE
Problem: Inpatient Occupational Therapy  Goal: Bed Mobility Goal LTG- OT  Outcome: Unable to achieve outcome(s) by discharge Date Met: 01/27/18 01/24/18 0755 01/27/18 0606   Bed Mobility OT LTG   Bed Mobility OT LTG, Date Established 01/24/18 --    Bed Mobility OT LTG, Time to Achieve by discharge --    Bed Mobility OT LTG, Activity Type all bed mobility --    Bed Mobility OT LTG, [place] Level supervision required  (to engage in ADL) --    Bed Mobility OT LTG, Date Goal Reviewed --  01/27/18   Bed Mobility OT LTG, Outcome --  goal not met   Bed Mobility OT LTG, Reason Goal Not Met --  discharged from facility     Goal: Transfer Training Goal 1 LTG- OT  Outcome: Unable to achieve outcome(s) by discharge Date Met: 01/27/18 01/24/18 0755 01/27/18 0606   Transfer Training OT LTG   Transfer Training OT LTG, Date Established 01/24/18 --    Transfer Training OT LTG, Time to Achieve by discharge --    Transfer Training OT LTG, Activity Type toilet --    Transfer Training OT LTG, [place] Level contact guard assist  (AE/AD as needed) --    Transfer Training OT LTG, Date Goal Reviewed --  01/27/18   Transfer Training OT LTG, Outcome --  goal not met   Transfer Training OT LTG, Reason Goal Not Met --  discharged from facility     Goal: ADL Goal LTG- OT  Outcome: Unable to achieve outcome(s) by discharge Date Met: 01/27/18 01/24/18 0755 01/27/18 0606   ADL OT LTG   ADL OT LTG, Date Established 01/24/18 --    ADL OT LTG, Activity Type ADL skills --    ADL OT LTG, Additional Goal set up grooming/self feeding, min A UB bath/dressing/ mod A LB bath/dressing/toileting - AE/AD as needed --    ADL OT LTG, Date Goal Reviewed --  01/27/18   ADL OT LTG, Outcome --  goal not met   ADL OT LTG, Reason Goal Not Met --  discharged from facility     Goal: Functional Mobility Goal LTG- OT  Outcome: Unable to achieve outcome(s) by discharge Date Met: 01/27/18 01/24/18 0755 01/27/18 0606   Functional Mobility OT LTG   Functional  Mobility Goal OT LTG, Date Established 01/24/18 --    Functional Mobility Goal OT LTG, Time to Achieve by discharge --    Functional Mobility Goal OT LTG, [place] Level contact guard --    Functional Mobility Goal OT LTG, Assist Device (AE/AD as needed) --    Functional Mobility Goal OT LTG, Distance to Achieve to the bathroom  (to the toilet) --    Functional Mobility Goal OT LTG, Date Goal Reviewed --  01/27/18   Functional Mobility Goal OT LTG, Outcome --  goal not met   Functional Mobility Goal OT LTG, Reason Goal Not Met --  discharged from facility

## 2018-01-28 LAB
HBA1C MFR BLD: 5.8 % (ref 4–5.6)
INR PPP: 1.49 (ref 0.8–1.2)
PROTHROMBIN TIME: 18 SECONDS (ref 11.1–15.3)

## 2018-01-28 PROCEDURE — 51798 US URINE CAPACITY MEASURE: CPT

## 2018-01-28 PROCEDURE — 94799 UNLISTED PULMONARY SVC/PX: CPT

## 2018-01-28 PROCEDURE — 85610 PROTHROMBIN TIME: CPT | Performed by: FAMILY MEDICINE

## 2018-01-28 PROCEDURE — 94760 N-INVAS EAR/PLS OXIMETRY 1: CPT

## 2018-01-28 PROCEDURE — 51702 INSERT TEMP BLADDER CATH: CPT

## 2018-01-28 RX ADMIN — RANOLAZINE 1000 MG: 500 TABLET, FILM COATED, EXTENDED RELEASE ORAL at 08:06

## 2018-01-28 RX ADMIN — CARVEDILOL 6.25 MG: 6.25 TABLET, FILM COATED ORAL at 08:06

## 2018-01-28 RX ADMIN — HYDROCODONE BITARTRATE AND ACETAMINOPHEN 1 TABLET: 5; 325 TABLET ORAL at 20:24

## 2018-01-28 RX ADMIN — ATORVASTATIN CALCIUM 20 MG: 20 TABLET, FILM COATED ORAL at 08:06

## 2018-01-28 RX ADMIN — IPRATROPIUM BROMIDE AND ALBUTEROL SULFATE 3 ML: 2.5; .5 SOLUTION RESPIRATORY (INHALATION) at 04:15

## 2018-01-28 RX ADMIN — IPRATROPIUM BROMIDE AND ALBUTEROL SULFATE 3 ML: 2.5; .5 SOLUTION RESPIRATORY (INHALATION) at 10:23

## 2018-01-28 RX ADMIN — LEVOTHYROXINE SODIUM 50 MCG: 50 TABLET ORAL at 06:23

## 2018-01-28 RX ADMIN — BUDESONIDE AND FORMOTEROL FUMARATE DIHYDRATE 2 PUFF: 160; 4.5 AEROSOL RESPIRATORY (INHALATION) at 10:31

## 2018-01-28 RX ADMIN — SERTRALINE HYDROCHLORIDE 50 MG: 50 TABLET ORAL at 08:06

## 2018-01-28 RX ADMIN — BUDESONIDE AND FORMOTEROL FUMARATE DIHYDRATE 2 PUFF: 160; 4.5 AEROSOL RESPIRATORY (INHALATION) at 21:04

## 2018-01-28 RX ADMIN — NITROGLYCERIN 1 PATCH: 0.2 PATCH TRANSDERMAL at 08:06

## 2018-01-28 RX ADMIN — FUROSEMIDE 20 MG: 20 TABLET ORAL at 08:06

## 2018-01-28 RX ADMIN — WARFARIN SODIUM 3 MG: 3 TABLET ORAL at 17:03

## 2018-01-28 RX ADMIN — FAMOTIDINE 40 MG: 40 TABLET ORAL at 08:06

## 2018-01-28 RX ADMIN — RANOLAZINE 1000 MG: 500 TABLET, FILM COATED, EXTENDED RELEASE ORAL at 20:20

## 2018-01-28 RX ADMIN — IPRATROPIUM BROMIDE AND ALBUTEROL SULFATE 3 ML: 2.5; .5 SOLUTION RESPIRATORY (INHALATION) at 21:04

## 2018-01-28 RX ADMIN — CARVEDILOL 6.25 MG: 6.25 TABLET, FILM COATED ORAL at 17:03

## 2018-01-28 RX ADMIN — MONTELUKAST SODIUM 10 MG: 10 TABLET, FILM COATED ORAL at 20:22

## 2018-01-29 LAB
ANION GAP SERPL CALCULATED.3IONS-SCNC: 10 MMOL/L (ref 5–15)
BASOPHILS # BLD AUTO: 0.02 10*3/MM3 (ref 0–0.2)
BASOPHILS NFR BLD AUTO: 0.3 % (ref 0–2)
BUN BLD-MCNC: 21 MG/DL (ref 7–21)
BUN/CREAT SERPL: 25.3 (ref 7–25)
CALCIUM SPEC-SCNC: 8 MG/DL (ref 8.4–10.2)
CHLORIDE SERPL-SCNC: 89 MMOL/L (ref 95–110)
CO2 SERPL-SCNC: 32 MMOL/L (ref 22–31)
CREAT BLD-MCNC: 0.83 MG/DL (ref 0.5–1)
DEPRECATED RDW RBC AUTO: 50.1 FL (ref 36.4–46.3)
EOSINOPHIL # BLD AUTO: 0.45 10*3/MM3 (ref 0–0.7)
EOSINOPHIL NFR BLD AUTO: 6.3 % (ref 0–7)
ERYTHROCYTE [DISTWIDTH] IN BLOOD BY AUTOMATED COUNT: 14.6 % (ref 11.5–14.5)
GFR SERPL CREATININE-BSD FRML MDRD: 65 ML/MIN/1.73 (ref 60–90)
GLUCOSE BLD-MCNC: 83 MG/DL (ref 60–100)
HCT VFR BLD AUTO: 26.7 % (ref 35–45)
HGB BLD-MCNC: 8.9 G/DL (ref 12–15.5)
IMM GRANULOCYTES # BLD: 0.06 10*3/MM3 (ref 0–0.02)
IMM GRANULOCYTES NFR BLD: 0.8 % (ref 0–0.5)
INR PPP: 1.65 (ref 0.8–1.2)
LYMPHOCYTES # BLD AUTO: 1.4 10*3/MM3 (ref 0.6–4.2)
LYMPHOCYTES NFR BLD AUTO: 19.5 % (ref 10–50)
MCH RBC QN AUTO: 31.7 PG (ref 26.5–34)
MCHC RBC AUTO-ENTMCNC: 33.3 G/DL (ref 31.4–36)
MCV RBC AUTO: 95 FL (ref 80–98)
MONOCYTES # BLD AUTO: 0.73 10*3/MM3 (ref 0–0.9)
MONOCYTES NFR BLD AUTO: 10.2 % (ref 0–12)
NEUTROPHILS # BLD AUTO: 4.51 10*3/MM3 (ref 2–8.6)
NEUTROPHILS NFR BLD AUTO: 62.9 % (ref 37–80)
PLATELET # BLD AUTO: 256 10*3/MM3 (ref 150–450)
PMV BLD AUTO: 9.4 FL (ref 8–12)
POTASSIUM BLD-SCNC: 4.6 MMOL/L (ref 3.5–5.1)
PROTHROMBIN TIME: 19.6 SECONDS (ref 11.1–15.3)
RBC # BLD AUTO: 2.81 10*6/MM3 (ref 3.77–5.16)
SODIUM BLD-SCNC: 131 MMOL/L (ref 137–145)
WBC NRBC COR # BLD: 7.17 10*3/MM3 (ref 3.2–9.8)

## 2018-01-29 PROCEDURE — 97116 GAIT TRAINING THERAPY: CPT

## 2018-01-29 PROCEDURE — 94799 UNLISTED PULMONARY SVC/PX: CPT

## 2018-01-29 PROCEDURE — 51702 INSERT TEMP BLADDER CATH: CPT

## 2018-01-29 PROCEDURE — 80048 BASIC METABOLIC PNL TOTAL CA: CPT | Performed by: NURSE PRACTITIONER

## 2018-01-29 PROCEDURE — 97110 THERAPEUTIC EXERCISES: CPT

## 2018-01-29 PROCEDURE — 51798 US URINE CAPACITY MEASURE: CPT

## 2018-01-29 PROCEDURE — 85610 PROTHROMBIN TIME: CPT | Performed by: FAMILY MEDICINE

## 2018-01-29 PROCEDURE — 99232 SBSQ HOSP IP/OBS MODERATE 35: CPT | Performed by: FAMILY MEDICINE

## 2018-01-29 PROCEDURE — 97535 SELF CARE MNGMENT TRAINING: CPT

## 2018-01-29 PROCEDURE — 97530 THERAPEUTIC ACTIVITIES: CPT

## 2018-01-29 PROCEDURE — 94760 N-INVAS EAR/PLS OXIMETRY 1: CPT

## 2018-01-29 PROCEDURE — 85025 COMPLETE CBC W/AUTO DIFF WBC: CPT | Performed by: NURSE PRACTITIONER

## 2018-01-29 PROCEDURE — 92507 TX SP LANG VOICE COMM INDIV: CPT | Performed by: SPEECH-LANGUAGE PATHOLOGIST

## 2018-01-29 RX ORDER — FERROUS SULFATE TAB EC 324 MG (65 MG FE EQUIVALENT) 324 (65 FE) MG
324 TABLET DELAYED RESPONSE ORAL 2 TIMES DAILY WITH MEALS
Status: DISCONTINUED | OUTPATIENT
Start: 2018-01-29 | End: 2018-02-05 | Stop reason: HOSPADM

## 2018-01-29 RX ORDER — FUROSEMIDE 40 MG/1
40 TABLET ORAL DAILY
Status: DISCONTINUED | OUTPATIENT
Start: 2018-01-30 | End: 2018-02-01

## 2018-01-29 RX ADMIN — IPRATROPIUM BROMIDE AND ALBUTEROL SULFATE 3 ML: 2.5; .5 SOLUTION RESPIRATORY (INHALATION) at 18:24

## 2018-01-29 RX ADMIN — FERROUS SULFATE TAB EC 324 MG (65 MG FE EQUIVALENT) 324 MG: 324 (65 FE) TABLET DELAYED RESPONSE at 10:11

## 2018-01-29 RX ADMIN — BUDESONIDE AND FORMOTEROL FUMARATE DIHYDRATE 2 PUFF: 160; 4.5 AEROSOL RESPIRATORY (INHALATION) at 18:31

## 2018-01-29 RX ADMIN — FAMOTIDINE 40 MG: 40 TABLET ORAL at 08:00

## 2018-01-29 RX ADMIN — ACETAMINOPHEN 650 MG: 325 TABLET ORAL at 10:11

## 2018-01-29 RX ADMIN — Medication 1 TABLET: at 10:11

## 2018-01-29 RX ADMIN — WARFARIN SODIUM 3 MG: 3 TABLET ORAL at 17:11

## 2018-01-29 RX ADMIN — FERROUS SULFATE TAB EC 324 MG (65 MG FE EQUIVALENT) 324 MG: 324 (65 FE) TABLET DELAYED RESPONSE at 17:11

## 2018-01-29 RX ADMIN — SERTRALINE HYDROCHLORIDE 50 MG: 50 TABLET ORAL at 08:01

## 2018-01-29 RX ADMIN — ATORVASTATIN CALCIUM 20 MG: 20 TABLET, FILM COATED ORAL at 08:01

## 2018-01-29 RX ADMIN — RANOLAZINE 1000 MG: 500 TABLET, FILM COATED, EXTENDED RELEASE ORAL at 08:00

## 2018-01-29 RX ADMIN — BUDESONIDE AND FORMOTEROL FUMARATE DIHYDRATE 2 PUFF: 160; 4.5 AEROSOL RESPIRATORY (INHALATION) at 10:30

## 2018-01-29 RX ADMIN — NITROGLYCERIN 1 PATCH: 0.2 PATCH TRANSDERMAL at 08:01

## 2018-01-29 RX ADMIN — CARVEDILOL 6.25 MG: 6.25 TABLET, FILM COATED ORAL at 17:11

## 2018-01-29 RX ADMIN — IPRATROPIUM BROMIDE AND ALBUTEROL SULFATE 3 ML: 2.5; .5 SOLUTION RESPIRATORY (INHALATION) at 10:20

## 2018-01-29 RX ADMIN — CARVEDILOL 6.25 MG: 6.25 TABLET, FILM COATED ORAL at 08:00

## 2018-01-29 RX ADMIN — HYDROCODONE BITARTRATE AND ACETAMINOPHEN 1 TABLET: 5; 325 TABLET ORAL at 06:11

## 2018-01-29 RX ADMIN — FUROSEMIDE 20 MG: 20 TABLET ORAL at 08:01

## 2018-01-29 RX ADMIN — LEVOTHYROXINE SODIUM 50 MCG: 50 TABLET ORAL at 06:11

## 2018-01-29 RX ADMIN — RANOLAZINE 1000 MG: 500 TABLET, FILM COATED, EXTENDED RELEASE ORAL at 20:00

## 2018-01-29 RX ADMIN — MONTELUKAST SODIUM 10 MG: 10 TABLET, FILM COATED ORAL at 20:00

## 2018-01-30 ENCOUNTER — APPOINTMENT (OUTPATIENT)
Dept: PULMONOLOGY | Facility: HOSPITAL | Age: 83
End: 2018-01-30

## 2018-01-30 PROBLEM — S72.041A CLOSED DISPLACED BASICERVICAL FRACTURE OF RIGHT FEMUR (HCC): Status: ACTIVE | Noted: 2018-01-26

## 2018-01-30 LAB
INR PPP: 1.71 (ref 0.8–1.2)
PROTHROMBIN TIME: 20.1 SECONDS (ref 11.1–15.3)

## 2018-01-30 PROCEDURE — 94760 N-INVAS EAR/PLS OXIMETRY 1: CPT

## 2018-01-30 PROCEDURE — 97110 THERAPEUTIC EXERCISES: CPT

## 2018-01-30 PROCEDURE — 51701 INSERT BLADDER CATHETER: CPT

## 2018-01-30 PROCEDURE — 94799 UNLISTED PULMONARY SVC/PX: CPT

## 2018-01-30 PROCEDURE — 51102 DRAIN BL W/CATH INSERTION: CPT

## 2018-01-30 PROCEDURE — 97535 SELF CARE MNGMENT TRAINING: CPT

## 2018-01-30 PROCEDURE — 97116 GAIT TRAINING THERAPY: CPT

## 2018-01-30 PROCEDURE — 51798 US URINE CAPACITY MEASURE: CPT

## 2018-01-30 PROCEDURE — 85610 PROTHROMBIN TIME: CPT | Performed by: FAMILY MEDICINE

## 2018-01-30 PROCEDURE — 0T9B30Z DRAINAGE OF BLADDER WITH DRAINAGE DEVICE, PERCUTANEOUS APPROACH: ICD-10-PCS | Performed by: FAMILY MEDICINE

## 2018-01-30 PROCEDURE — 97530 THERAPEUTIC ACTIVITIES: CPT

## 2018-01-30 PROCEDURE — 99232 SBSQ HOSP IP/OBS MODERATE 35: CPT | Performed by: FAMILY MEDICINE

## 2018-01-30 PROCEDURE — 92507 TX SP LANG VOICE COMM INDIV: CPT | Performed by: SPEECH-LANGUAGE PATHOLOGIST

## 2018-01-30 RX ORDER — IPRATROPIUM BROMIDE AND ALBUTEROL SULFATE 2.5; .5 MG/3ML; MG/3ML
3 SOLUTION RESPIRATORY (INHALATION)
Status: DISCONTINUED | OUTPATIENT
Start: 2018-01-30 | End: 2018-02-05 | Stop reason: HOSPADM

## 2018-01-30 RX ADMIN — IPRATROPIUM BROMIDE AND ALBUTEROL SULFATE 3 ML: 2.5; .5 SOLUTION RESPIRATORY (INHALATION) at 20:04

## 2018-01-30 RX ADMIN — MONTELUKAST SODIUM 10 MG: 10 TABLET, FILM COATED ORAL at 20:25

## 2018-01-30 RX ADMIN — NITROGLYCERIN 1 PATCH: 0.2 PATCH TRANSDERMAL at 08:20

## 2018-01-30 RX ADMIN — ATORVASTATIN CALCIUM 20 MG: 20 TABLET, FILM COATED ORAL at 08:20

## 2018-01-30 RX ADMIN — CARVEDILOL 6.25 MG: 6.25 TABLET, FILM COATED ORAL at 17:09

## 2018-01-30 RX ADMIN — HYDROCODONE BITARTRATE AND ACETAMINOPHEN 1 TABLET: 5; 325 TABLET ORAL at 15:26

## 2018-01-30 RX ADMIN — FERROUS SULFATE TAB EC 324 MG (65 MG FE EQUIVALENT) 324 MG: 324 (65 FE) TABLET DELAYED RESPONSE at 08:20

## 2018-01-30 RX ADMIN — FUROSEMIDE 40 MG: 40 TABLET ORAL at 08:20

## 2018-01-30 RX ADMIN — BUDESONIDE AND FORMOTEROL FUMARATE DIHYDRATE 2 PUFF: 160; 4.5 AEROSOL RESPIRATORY (INHALATION) at 06:15

## 2018-01-30 RX ADMIN — HYDROCODONE BITARTRATE AND ACETAMINOPHEN 1 TABLET: 5; 325 TABLET ORAL at 07:08

## 2018-01-30 RX ADMIN — WARFARIN SODIUM 3 MG: 3 TABLET ORAL at 17:09

## 2018-01-30 RX ADMIN — IPRATROPIUM BROMIDE AND ALBUTEROL SULFATE 3 ML: 2.5; .5 SOLUTION RESPIRATORY (INHALATION) at 06:09

## 2018-01-30 RX ADMIN — BUDESONIDE AND FORMOTEROL FUMARATE DIHYDRATE 2 PUFF: 160; 4.5 AEROSOL RESPIRATORY (INHALATION) at 20:12

## 2018-01-30 RX ADMIN — FERROUS SULFATE TAB EC 324 MG (65 MG FE EQUIVALENT) 324 MG: 324 (65 FE) TABLET DELAYED RESPONSE at 17:09

## 2018-01-30 RX ADMIN — IPRATROPIUM BROMIDE AND ALBUTEROL SULFATE 3 ML: 2.5; .5 SOLUTION RESPIRATORY (INHALATION) at 15:36

## 2018-01-30 RX ADMIN — LEVOTHYROXINE SODIUM 50 MCG: 50 TABLET ORAL at 06:07

## 2018-01-30 RX ADMIN — FAMOTIDINE 40 MG: 40 TABLET ORAL at 08:20

## 2018-01-30 RX ADMIN — SERTRALINE HYDROCHLORIDE 50 MG: 50 TABLET ORAL at 08:20

## 2018-01-30 RX ADMIN — RANOLAZINE 1000 MG: 500 TABLET, FILM COATED, EXTENDED RELEASE ORAL at 08:20

## 2018-01-30 RX ADMIN — CARVEDILOL 6.25 MG: 6.25 TABLET, FILM COATED ORAL at 08:19

## 2018-01-30 RX ADMIN — RANOLAZINE 1000 MG: 500 TABLET, FILM COATED, EXTENDED RELEASE ORAL at 20:25

## 2018-01-30 RX ADMIN — Medication 1 TABLET: at 08:19

## 2018-01-31 LAB
ALBUMIN SERPL-MCNC: 2.9 G/DL (ref 3.4–4.8)
ANION GAP SERPL CALCULATED.3IONS-SCNC: 8 MMOL/L (ref 5–15)
BASOPHILS # BLD AUTO: 0.04 10*3/MM3 (ref 0–0.2)
BASOPHILS NFR BLD AUTO: 0.5 % (ref 0–2)
BUN BLD-MCNC: 26 MG/DL (ref 7–21)
BUN/CREAT SERPL: 28.6 (ref 7–25)
CALCIUM SPEC-SCNC: 8.5 MG/DL (ref 8.4–10.2)
CHLORIDE SERPL-SCNC: 92 MMOL/L (ref 95–110)
CO2 SERPL-SCNC: 32 MMOL/L (ref 22–31)
CREAT BLD-MCNC: 0.91 MG/DL (ref 0.5–1)
DEPRECATED RDW RBC AUTO: 49.4 FL (ref 36.4–46.3)
EOSINOPHIL # BLD AUTO: 0.48 10*3/MM3 (ref 0–0.7)
EOSINOPHIL NFR BLD AUTO: 6.3 % (ref 0–7)
ERYTHROCYTE [DISTWIDTH] IN BLOOD BY AUTOMATED COUNT: 14.4 % (ref 11.5–14.5)
GFR SERPL CREATININE-BSD FRML MDRD: 59 ML/MIN/1.73 (ref 60–90)
GLUCOSE BLD-MCNC: 85 MG/DL (ref 60–100)
HCT VFR BLD AUTO: 26.9 % (ref 35–45)
HGB BLD-MCNC: 9 G/DL (ref 12–15.5)
IMM GRANULOCYTES # BLD: 0.15 10*3/MM3 (ref 0–0.02)
IMM GRANULOCYTES NFR BLD: 2 % (ref 0–0.5)
INR PPP: 1.81 (ref 0.8–1.2)
LYMPHOCYTES # BLD AUTO: 1.45 10*3/MM3 (ref 0.6–4.2)
LYMPHOCYTES NFR BLD AUTO: 19.1 % (ref 10–50)
MAGNESIUM SERPL-MCNC: 2.1 MG/DL (ref 1.6–2.3)
MCH RBC QN AUTO: 31.8 PG (ref 26.5–34)
MCHC RBC AUTO-ENTMCNC: 33.5 G/DL (ref 31.4–36)
MCV RBC AUTO: 95.1 FL (ref 80–98)
MONOCYTES # BLD AUTO: 0.88 10*3/MM3 (ref 0–0.9)
MONOCYTES NFR BLD AUTO: 11.6 % (ref 0–12)
NEUTROPHILS # BLD AUTO: 4.61 10*3/MM3 (ref 2–8.6)
NEUTROPHILS NFR BLD AUTO: 60.5 % (ref 37–80)
PHOSPHATE SERPL-MCNC: 3.7 MG/DL (ref 2.4–4.4)
PLATELET # BLD AUTO: 307 10*3/MM3 (ref 150–450)
PMV BLD AUTO: 9.1 FL (ref 8–12)
POTASSIUM BLD-SCNC: 4.3 MMOL/L (ref 3.5–5.1)
PROTHROMBIN TIME: 21.1 SECONDS (ref 11.1–15.3)
RBC # BLD AUTO: 2.83 10*6/MM3 (ref 3.77–5.16)
SODIUM BLD-SCNC: 132 MMOL/L (ref 137–145)
WBC NRBC COR # BLD: 7.61 10*3/MM3 (ref 3.2–9.8)

## 2018-01-31 PROCEDURE — 97530 THERAPEUTIC ACTIVITIES: CPT

## 2018-01-31 PROCEDURE — 94799 UNLISTED PULMONARY SVC/PX: CPT

## 2018-01-31 PROCEDURE — 80069 RENAL FUNCTION PANEL: CPT | Performed by: FAMILY MEDICINE

## 2018-01-31 PROCEDURE — 94760 N-INVAS EAR/PLS OXIMETRY 1: CPT

## 2018-01-31 PROCEDURE — 97110 THERAPEUTIC EXERCISES: CPT

## 2018-01-31 PROCEDURE — 85610 PROTHROMBIN TIME: CPT | Performed by: FAMILY MEDICINE

## 2018-01-31 PROCEDURE — 97535 SELF CARE MNGMENT TRAINING: CPT

## 2018-01-31 PROCEDURE — 85025 COMPLETE CBC W/AUTO DIFF WBC: CPT | Performed by: FAMILY MEDICINE

## 2018-01-31 PROCEDURE — 92507 TX SP LANG VOICE COMM INDIV: CPT | Performed by: SPEECH-LANGUAGE PATHOLOGIST

## 2018-01-31 PROCEDURE — 99232 SBSQ HOSP IP/OBS MODERATE 35: CPT | Performed by: FAMILY MEDICINE

## 2018-01-31 PROCEDURE — 97116 GAIT TRAINING THERAPY: CPT

## 2018-01-31 PROCEDURE — 83735 ASSAY OF MAGNESIUM: CPT | Performed by: FAMILY MEDICINE

## 2018-01-31 RX ADMIN — IPRATROPIUM BROMIDE AND ALBUTEROL SULFATE 3 ML: 2.5; .5 SOLUTION RESPIRATORY (INHALATION) at 16:11

## 2018-01-31 RX ADMIN — ATORVASTATIN CALCIUM 20 MG: 20 TABLET, FILM COATED ORAL at 09:37

## 2018-01-31 RX ADMIN — Medication 1 TABLET: at 09:37

## 2018-01-31 RX ADMIN — NITROGLYCERIN 1 PATCH: 0.2 PATCH TRANSDERMAL at 09:36

## 2018-01-31 RX ADMIN — RANOLAZINE 1000 MG: 500 TABLET, FILM COATED, EXTENDED RELEASE ORAL at 20:28

## 2018-01-31 RX ADMIN — FUROSEMIDE 40 MG: 40 TABLET ORAL at 09:38

## 2018-01-31 RX ADMIN — FERROUS SULFATE TAB EC 324 MG (65 MG FE EQUIVALENT) 324 MG: 324 (65 FE) TABLET DELAYED RESPONSE at 17:07

## 2018-01-31 RX ADMIN — MONTELUKAST SODIUM 10 MG: 10 TABLET, FILM COATED ORAL at 20:28

## 2018-01-31 RX ADMIN — IPRATROPIUM BROMIDE AND ALBUTEROL SULFATE 3 ML: 2.5; .5 SOLUTION RESPIRATORY (INHALATION) at 10:42

## 2018-01-31 RX ADMIN — FERROUS SULFATE TAB EC 324 MG (65 MG FE EQUIVALENT) 324 MG: 324 (65 FE) TABLET DELAYED RESPONSE at 09:37

## 2018-01-31 RX ADMIN — IPRATROPIUM BROMIDE AND ALBUTEROL SULFATE 3 ML: 2.5; .5 SOLUTION RESPIRATORY (INHALATION) at 05:05

## 2018-01-31 RX ADMIN — SERTRALINE HYDROCHLORIDE 50 MG: 50 TABLET ORAL at 09:37

## 2018-01-31 RX ADMIN — LEVOTHYROXINE SODIUM 50 MCG: 50 TABLET ORAL at 06:19

## 2018-01-31 RX ADMIN — FAMOTIDINE 40 MG: 40 TABLET ORAL at 09:37

## 2018-01-31 RX ADMIN — CARVEDILOL 6.25 MG: 6.25 TABLET, FILM COATED ORAL at 09:37

## 2018-01-31 RX ADMIN — WARFARIN SODIUM 3 MG: 3 TABLET ORAL at 17:07

## 2018-01-31 RX ADMIN — CARVEDILOL 6.25 MG: 6.25 TABLET, FILM COATED ORAL at 17:07

## 2018-01-31 RX ADMIN — IPRATROPIUM BROMIDE AND ALBUTEROL SULFATE 3 ML: 2.5; .5 SOLUTION RESPIRATORY (INHALATION) at 20:31

## 2018-01-31 RX ADMIN — BUDESONIDE AND FORMOTEROL FUMARATE DIHYDRATE 2 PUFF: 160; 4.5 AEROSOL RESPIRATORY (INHALATION) at 20:31

## 2018-01-31 RX ADMIN — RANOLAZINE 1000 MG: 500 TABLET, FILM COATED, EXTENDED RELEASE ORAL at 09:37

## 2018-01-31 RX ADMIN — HYDROCODONE BITARTRATE AND ACETAMINOPHEN 1 TABLET: 5; 325 TABLET ORAL at 14:34

## 2018-01-31 RX ADMIN — BUDESONIDE AND FORMOTEROL FUMARATE DIHYDRATE 2 PUFF: 160; 4.5 AEROSOL RESPIRATORY (INHALATION) at 05:13

## 2018-02-01 ENCOUNTER — APPOINTMENT (OUTPATIENT)
Dept: PULMONOLOGY | Facility: HOSPITAL | Age: 83
End: 2018-02-01

## 2018-02-01 LAB
INR PPP: 2.03 (ref 0.8–1.2)
PROTHROMBIN TIME: 23.1 SECONDS (ref 11.1–15.3)

## 2018-02-01 PROCEDURE — 97110 THERAPEUTIC EXERCISES: CPT

## 2018-02-01 PROCEDURE — 85610 PROTHROMBIN TIME: CPT | Performed by: FAMILY MEDICINE

## 2018-02-01 PROCEDURE — 99232 SBSQ HOSP IP/OBS MODERATE 35: CPT | Performed by: FAMILY MEDICINE

## 2018-02-01 PROCEDURE — 94760 N-INVAS EAR/PLS OXIMETRY 1: CPT

## 2018-02-01 PROCEDURE — 97116 GAIT TRAINING THERAPY: CPT

## 2018-02-01 PROCEDURE — 97530 THERAPEUTIC ACTIVITIES: CPT

## 2018-02-01 PROCEDURE — 97535 SELF CARE MNGMENT TRAINING: CPT

## 2018-02-01 PROCEDURE — 92507 TX SP LANG VOICE COMM INDIV: CPT | Performed by: SPEECH-LANGUAGE PATHOLOGIST

## 2018-02-01 PROCEDURE — 94799 UNLISTED PULMONARY SVC/PX: CPT

## 2018-02-01 RX ORDER — FUROSEMIDE 20 MG/1
20 TABLET ORAL DAILY
Status: DISCONTINUED | OUTPATIENT
Start: 2018-02-02 | End: 2018-02-05 | Stop reason: HOSPADM

## 2018-02-01 RX ORDER — ESTRADIOL 0.1 MG/G
1 CREAM VAGINAL NIGHTLY
Status: DISCONTINUED | OUTPATIENT
Start: 2018-02-01 | End: 2018-02-05 | Stop reason: HOSPADM

## 2018-02-01 RX ORDER — AMOXICILLIN 250 MG
2 CAPSULE ORAL 2 TIMES DAILY
Status: DISCONTINUED | OUTPATIENT
Start: 2018-02-01 | End: 2018-02-05 | Stop reason: HOSPADM

## 2018-02-01 RX ADMIN — MONTELUKAST SODIUM 10 MG: 10 TABLET, FILM COATED ORAL at 20:13

## 2018-02-01 RX ADMIN — SENNOSIDES AND DOCUSATE SODIUM 2 TABLET: 8.6; 5 TABLET ORAL at 20:12

## 2018-02-01 RX ADMIN — RANOLAZINE 1000 MG: 500 TABLET, FILM COATED, EXTENDED RELEASE ORAL at 20:12

## 2018-02-01 RX ADMIN — HYDROCODONE BITARTRATE AND ACETAMINOPHEN 1 TABLET: 5; 325 TABLET ORAL at 21:39

## 2018-02-01 RX ADMIN — ESTRADIOL 1 G: 0.1 CREAM VAGINAL at 17:05

## 2018-02-01 RX ADMIN — IPRATROPIUM BROMIDE AND ALBUTEROL SULFATE 3 ML: 2.5; .5 SOLUTION RESPIRATORY (INHALATION) at 04:58

## 2018-02-01 RX ADMIN — LEVOTHYROXINE SODIUM 50 MCG: 50 TABLET ORAL at 06:14

## 2018-02-01 RX ADMIN — CARVEDILOL 6.25 MG: 6.25 TABLET, FILM COATED ORAL at 08:07

## 2018-02-01 RX ADMIN — FERROUS SULFATE TAB EC 324 MG (65 MG FE EQUIVALENT) 324 MG: 324 (65 FE) TABLET DELAYED RESPONSE at 17:05

## 2018-02-01 RX ADMIN — HYDROCODONE BITARTRATE AND ACETAMINOPHEN 1 TABLET: 5; 325 TABLET ORAL at 08:07

## 2018-02-01 RX ADMIN — FAMOTIDINE 40 MG: 40 TABLET ORAL at 08:07

## 2018-02-01 RX ADMIN — IPRATROPIUM BROMIDE AND ALBUTEROL SULFATE 3 ML: 2.5; .5 SOLUTION RESPIRATORY (INHALATION) at 20:40

## 2018-02-01 RX ADMIN — SENNOSIDES AND DOCUSATE SODIUM 2 TABLET: 8.6; 5 TABLET ORAL at 09:00

## 2018-02-01 RX ADMIN — RANOLAZINE 1000 MG: 500 TABLET, FILM COATED, EXTENDED RELEASE ORAL at 08:07

## 2018-02-01 RX ADMIN — Medication 1 TABLET: at 08:07

## 2018-02-01 RX ADMIN — BUDESONIDE AND FORMOTEROL FUMARATE DIHYDRATE 2 PUFF: 160; 4.5 AEROSOL RESPIRATORY (INHALATION) at 04:58

## 2018-02-01 RX ADMIN — BUDESONIDE AND FORMOTEROL FUMARATE DIHYDRATE 2 PUFF: 160; 4.5 AEROSOL RESPIRATORY (INHALATION) at 20:47

## 2018-02-01 RX ADMIN — CARVEDILOL 6.25 MG: 6.25 TABLET, FILM COATED ORAL at 17:05

## 2018-02-01 RX ADMIN — ATORVASTATIN CALCIUM 20 MG: 20 TABLET, FILM COATED ORAL at 08:07

## 2018-02-01 RX ADMIN — IPRATROPIUM BROMIDE AND ALBUTEROL SULFATE 3 ML: 2.5; .5 SOLUTION RESPIRATORY (INHALATION) at 11:33

## 2018-02-01 RX ADMIN — FERROUS SULFATE TAB EC 324 MG (65 MG FE EQUIVALENT) 324 MG: 324 (65 FE) TABLET DELAYED RESPONSE at 08:07

## 2018-02-01 RX ADMIN — NITROGLYCERIN 1 PATCH: 0.2 PATCH TRANSDERMAL at 08:07

## 2018-02-01 RX ADMIN — WARFARIN SODIUM 3 MG: 3 TABLET ORAL at 17:05

## 2018-02-01 RX ADMIN — SERTRALINE HYDROCHLORIDE 50 MG: 50 TABLET ORAL at 08:07

## 2018-02-01 RX ADMIN — FUROSEMIDE 40 MG: 40 TABLET ORAL at 08:07

## 2018-02-01 RX ADMIN — HYDROCODONE BITARTRATE AND ACETAMINOPHEN 1 TABLET: 5; 325 TABLET ORAL at 15:46

## 2018-02-01 NOTE — NURSING NOTE
Spoke with donya yu daughter [place] law [place] to clarify.They would like pt to go to [facility] for more therapy.Family has concerns with caring for herself because she will be home alone.Family is understandable and agreeable to sending the referral out today.

## 2018-02-01 NOTE — THERAPY TREATMENT NOTE
Inpatient Rehabilitation - Occupational Therapy Treatment Note  [facility]     Patient Name: Val Arteaga  : 1932  MRN: [id number]  Today's Date: 2018  Onset of Illness/Injury or Date of Surgery Date: 18  Date of Referral to OT: 18  Referring Physician: Dr. Maldonado      Admit Date: 2018    Visit Dx:     ICD-10-CM ICD-9-CM   1. Symbolic dysfunction R48.9 784.60   2. Impaired mobility and activities of daily living Z74.09 799.89   3. Impaired functional mobility, balance, gait, and endurance Z74.09 V49.89     Patient Active Problem List   Diagnosis   • Essential hypertension   • Coronary artery disease involving native coronary artery of native heart without angina pectoris   • Chronic obstructive pulmonary disease   • Acquired hypothyroidism   • Depressive disorder   • Thygeson's superficial punctate keratitis   • Pseudophakia   • Precordial pain   • Displaced fracture of base of neck of right femur, initial encounter for closed fracture   • Hip fracture, right, closed, initial encounter   • Pulmonary emphysema   • Chronic systolic congestive heart failure   • Hyponatremia   • BENITO (acute kidney injury)   • Acute blood loss anemia   • Urinary retention   • Closed displaced basicervical fracture of right femur             Adult Rehabilitation Note       18 0912 18 0805 18 1415    Rehab Assessment/Intervention    Discipline occupational therapist  - physical therapy assistant  -RW physical therapy assistant  -RW    Document Type therapy note (daily note)  - therapy note (daily note)  -RW therapy note (daily note)  -RW    Subjective Information agree to therapy  - agree to therapy  -RW agree to therapy  -RW    Patient Effort, Rehab Treatment good  - good  -RW good  -RW    Symptoms Noted During/After Treatment fatigue  -BH      Precautions/Limitations fall precautions;anterior hip precautions- right   WBAT  -BH      Recorded by [] Marisol Burdick OTR/HINA  [RW] Xiang Jose PTA [RW] Xiang Jose PTA    Vital Signs    Pre Systolic BP Rehab 112  -BH      Pre Treatment Diastolic BP 56  -BH      Post Systolic BP Rehab 107  -BH      Post Treatment Diastolic BP 54  -BH      Pretreatment Heart Rate (beats/min) 81  -BH      Intratreatment Heart Rate (beats/min) 83  -BH 79  -RW     Posttreatment Heart Rate (beats/min) 77  -BH      Pre SpO2 (%) 96  -BH      O2 Delivery Pre Treatment supplemental O2  -BH      Intra SpO2 (%) 98  -BH 96  -RW     O2 Delivery Intra Treatment supplemental O2  -BH supplemental O2  -RW     Post SpO2 (%) 95  -BH      O2 Delivery Post Treatment supplemental O2  -BH      Pre Patient Position Sitting  -BH      Intra Patient Position Sitting  -BH      Post Patient Position Sitting  -BH      Recorded by [] Marisol Burdick OTR/L [RW] Xiang Jose PTA     Pain Assessment    Pain Assessment  No/denies pain  -RW     Pain Score 0  -BH  6  -RW    Post Pain Score 0  -BH  6  -RW    Pain Location   Hip  -RW    Pain Orientation   Right  -RW    Recorded by [] Marisol Burdick OTR/L [RW] Xiang Jose PTA [RW] Xiang Jose PTA    Cognitive Assessment/Intervention    Current Cognitive/Communication Assessment functional  -BH functional  -RW functional  -RW    Orientation Status oriented x 4  -BH oriented x 4  -RW oriented x 4  -RW    Follows Commands/Answers Questions 100% of the time  -% of the time  -% of the time  -RW    Personal Safety WNL/WFL  -      Personal Safety Interventions gait belt;nonskid shoes/slippers when out of bed;supervised activity;muscle strengthening facilitated  -BH gait belt;nonskid shoes/slippers when out of bed  -RW gait belt;nonskid shoes/slippers when out of bed  -RW    Recorded by [] Marisol Burdick OTR/L [RW] Xiang Jose PTA [RW] Xiang Jose PTA    Mobility Assessment/Training    Right Lower Extremity Weight-Bearing  weight-bearing as tolerated  -RW weight-bearing as tolerated  -RW    Recorded by   [RW] Xiang Jose PTA [RW] Xiang Jose PTA    Bed Mobility, Assessment/Treatment    Bed Mobility, Assistive Device   bed rails;head of bed elevated  -RW    Bed Mob, Supine to Sit, [place]   contact guard assist  -RW    Bed Mobility, Comment defer pt up on w/c in room and wanted to stay.   -BH      Recorded by [BH] Marisol Burdick, OTR/L  [RW] Xiang Jose PTA    Transfer Assessment/Treatment    Transfers, Bed-Chair [place]  stand by assist;contact guard assist  -RW     Transfers, Bed-Chair-Bed, Assist Device  rolling walker  -RW     Transfers, Sit-Stand [place] supervision required;contact guard assist  - supervision required  -RW supervision required  -RW    Transfers, Stand-Sit [place] supervision required;contact guard assist  - supervision required  -RW supervision required  -RW    Transfers, Sit-Stand-Sit, Assist Device rolling walker  -BH rolling walker  -RW rolling walker  -RW    Transfer, Comment completed several sit to stand for ADL with vc for safety and formation.   -BH      Recorded by [] Marisol Burdick, OTR/L [RW] Xiang Jose PTA [RW] Xiang Jose PTA    Gait Assessment/Treatment    Gait, [place] Level  contact guard assist;stand by assist  -RW contact guard assist;stand by assist  -RW    Gait, Assistive Device  rolling walker  -RW rolling walker  -RW    Gait, Distance (Feet)  20  -RW 48  -RW    Recorded by  [RW] Xiang Jose PTA [RW] Xiang Jose PTA    Stairs Assessment/Treatment    Number of Stairs  4 and then curb  -RW     Stairs, Handrail Location  left side (ascending)  -RW     Stairs, [place] Level  contact guard assist;verbal cues required  -RW     Stairs, Assistive Device  walker   on curb  -RW     Recorded by  [RW] Xiang Jose PTA     Functional Mobility    Functional Mobility- Ind. Level  contact guard assist;supervision required  -RW contact guard assist;supervision required  -RW    Functional Mobility- Device  rolling walker   "-RW rolling walker  -RW    Recorded by  [RW] Xiang Jose PTA [RW] Xiang Jose PTA    Wheelchair Training/Management    Wheelchair, Distance Propelled   100 mod ind  -RW    Recorded by   [RW] Xiang Jose PTA    Upper Body Bathing Assessment/Training    UB Bathing Assess/Train Assistive Device long-handled sponge   wash cloth and water bath  -      UB Bathing Assess/Train, Position supported sitting   w/c  -      UB Bathing Assess/Train, [place] Level set up required  -      UB Bathing Assess/Train, Comment pt provided long handle sponge declined to use stated she reached it \"good enough\" and was done, SBA  distant with set up to complete  -      Recorded by [] AYAD Vazquez/L      Lower Body Bathing Assessment/Training    LB Bathing Assess/Train Assistive Device long-handled sponge  -      LB Bathing Assess/Train, Position supported sitting;standing   w/c  -      LB Bathing Assess/Train, [place] Level contact guard assist;verbal cues required;supervision required;stand by assist;set up required  -      LB Bathing Assess/Train, Comment Pt close SBA to CGA for standing for LB pericre and upper leg washing, used LH sponge to wash legs unable to dry without assist, otherwise pt set up assist.   -      Recorded by [] AYAD Vazquez/L      Upper Body Dressing Assessment/Training    UB Dressing Assess/Train, Clothing Type doffing:;donning:;button up  -      UB Dressing Assess/Train, Position sitting  -      UB Dressing Assess/Train, [place] set up required;supervision required;verbal cues required;minimum assist (75% patient effort)  -      UB Dressing Assess/Train, Comment pt required min-mod assist to doff jacket to get over both shoulders in the back then she could take the rest of the way off, she was able to doff a button up shirt. Pt able to don a button up shirt set up assist.   -      Recorded by [] AYAD Vazquez/L      Lower Body Dressing " Assessment/Training    LB Dressing Assess/Train, Clothing Type doffing:;donning:;pants   underwear, yessi hose  -      LB Dressing Assess/Train, Position sitting;standing  -      LB Dressing Assess/Train, [place] minimum assist (75% patient effort);verbal cues required;supervision required;set up required;contact guard assist;maximum assist (25% patient effort)  -      LB Dressing Assess/Train, Comment max to dependent for don and doff yessi hose, total assist with cath into pants/underwear. pt required min assist to don shoes and total assist to tie shoes. Pt used reacher and long handle shoe horn to engage in LB dressing tasks. Pt CGA to SBA to stand to pull up and down pants;underwear. Pt took extended time and effort.   -      Recorded by [] AYAD Vazquez/L      Toileting Assessment/Training    Toileting Assess/Train, Comment OT educated on AE use and protecting hip during sponge bath and dressing tasks. Pt struggled with using them accurately.   -      Recorded by [] AYAD Vazquez/L      Grooming Assessment/Training    Grooming Assess/Train, Comment Pt set up assist to apply deodorant.   -      Recorded by [] AYAD Vazquez/L      Balance Skills Training    Sitting-Level of Assistance Distant supervision  -      Standing-Level of Assistance Contact guard;Close supervision  -      Recorded by [] AYAD Vazquez/L      Therapy Exercises    Bilateral Lower Extremities  AROM:;20 reps;ankle pumps/circles;hip abduction/adduction;sitting;knee flexion;LAQ;glut sets  - AROM:;20 reps;ankle pumps/circles;hip abduction/adduction;glut sets;sitting;knee flexion;LAQ  -RW    BLE Resistance  theraband  - theraband  -    Bilateral Upper Extremity AROM:;20 reps;sitting;elbow flexion/extension;hand pumps;shoulder extension/flexion   wrist flexion/extension  -      Recorded by [] AYAD Vazquez/L [RW] Xiang Jose, PTA [RW] Xiang Jose, PTA    Gross Motor  Coordination Training    Gross Motor Skill, Impairments Detail Pt engaged in 10 minutes of BUE arm bike fair+ toleration completed 10 minutes no rest breaks.   -BH      Recorded by [] AYAD Vazquez/L      Positioning and Restraints    Pre-Treatment Position other (comment)   in w/c  -BH sitting in chair/recliner  -RW in bed  -RW    Post Treatment Position wheelchair  -BH wheelchair  -RW wheelchair  -RW    In Wheelchair sitting;call light within reach;encouraged to call for assist   SLP going in room  -BH      Recorded by [BH] Marisol Burdick OTR/L [RW] Xiang Jose, PTA [RW] Xiang Jose, PTA      01/31/18 1302 01/31/18 1108 01/31/18 0935    Rehab Assessment/Intervention    Discipline occupational therapy assistant  -RC speech language pathologist  -CK occupational therapy assistant  -RC    Document Type therapy note (daily note)  -RC therapy note (daily note)  -CK therapy note (daily note)  -RC    Subjective Information agree to therapy  -RC agree to therapy  -CK agree to therapy  -RC    Patient Effort, Rehab Treatment good  -RC good  -CK good  -RC    Symptoms Noted During/After Treatment fatigue  -RC      Precautions/Limitations fall precautions  -RC      Recorded by [RC] OMAR Shah/HINA [CK] Karen Ch MS CCC-SLP [RC] OMAR Shah/L    Vital Signs    Post Systolic BP Rehab   134  -RC    Post Treatment Diastolic BP   63  -RC    Pretreatment Heart Rate (beats/min)   72  -RC    Intratreatment Heart Rate (beats/min)   74  -RC    Posttreatment Heart Rate (beats/min)   72  -RC    Intra SpO2 (%)   94  -RC    O2 Delivery Intra Treatment   supplemental O2  -RC    Recorded by   [RC] OMAR Shah/HINA    Pain Assessment    Pain Assessment No/denies pain  -RC No/denies pain  -CK     Recorded by [RC] OMAR Shah/HINA [CK] Karen Ch MS CCC-SLP     Cognitive Assessment/Intervention    Current Cognitive/Communication Assessment functional  -RC  functional  -RC     Orientation Status oriented x 4  -RC  oriented x 4  -RC    Recorded by [RC] OMAR Shah/HINA  [RC] YULI ShahA/HINA    Communication Treatment Objective and Progress    Cognitive Linguistic Treatment Objectives  Improve memory skills;Improve functional problem solving;Improve executive function skills  -CK     Recorded by  [CK] Karen Ch MS CCC-SLP     Improve memory skills    Improve memory skills through:  recalling unrelated word lists with an imposed delay;listen to a paragraph and answer questions;90%  -CK     Status: Improve memory skills  Progressing as expected  -CK     Memory Skills Progress  80%  -CK     Recorded by  [CK] Karen Ch MS CCC-SLP     Improve functional problem solving    Improve functional problem solving through:  complete functional math task;90%  -CK     Status: Improve functional problem solving  Other (comment)   Deferred this date  -CK     Functional Problem Solving Progress  continue to address  -CK     Recorded by  [CK] Karen Ch MS CCC-SLP     Improve executive function skills    Improve executive function skills:  exhibit cognitive flexibility;organization/planning activity;90%  -CK     Status: Improve executive function skills  Progressing as expected  -CK     Executive Function Skills Progress  100%;60%  -CK     Comments: Improve executive function skills  Improved time w/cog flex task  -CK     Recorded by  [CK] Karen Ch MS CCC-SLP     Bed Mobility, Assessment/Treatment    Bed Mob, Supine to Sit, [place] supervision required  -RC  supervision required  -RC    Bed Mob, Sit to Supine, [place] minimum assist (75% patient effort)  -RC  minimum assist (75% patient effort)  -RC    Recorded by [RC] OMAR Shah/HINA  [RC] OMAR Shah/L    Transfer Assessment/Treatment    Transfers, Bed-Chair [place]   contact guard assist  -RC    Transfers, Chair-Bed [place]   contact guard assist  -RC    Transfers,  Bed-Chair-Bed, Assist Device   rolling walker  -RC    Transfers, Sit-Stand [place] supervision required  -RC  supervision required;stand by assist  -RC    Transfers, Stand-Sit [place] supervision required  -RC  supervision required  -RC    Transfers, Sit-Stand-Sit, Assist Device rolling walker  -RC  rolling walker  -RC    Recorded by [RC] OMAR Shah/HINA  [RC] Lianne Hernandez NELSON/HINA    Functional Mobility    Functional Mobility- Ind. Level contact guard assist  -RC  contact guard assist  -RC    Functional Mobility- Device rolling walker  -RC  rolling walker  -RC    Functional Mobility-Distance (Feet) 10  -RC  10  -RC    Recorded by [RC] OMAR Shah/HINA  [RC] OMAR Shah/L    Wheelchair Training/Management    Wheelchair Propulsion Training   forward propulsion  -RC    Wheelchair, Distance Propelled   150  -RC    Recorded by   [RC] YULI ShahA/L    Upper Body Bathing Assessment/Training    UB Bathing Assess/Train Assistive Device --   sponge bath  -RC      UB Bathing Assess/Train, Position edge of bed;sitting  -RC      UB Bathing Assess/Train, [place] Level set up required  -RC      Recorded by [RC] YULI ShahA/L      Lower Body Bathing Assessment/Training    LB Bathing Assess/Train Assistive Device --   sponge  -RC      LB Bathing Assess/Train, Position edge of bed;sitting  -RC      LB Bathing Assess/Train, [place] Level stand by assist  -RC      Recorded by [RC] Lianne Hernandez NELSON/L      Upper Body Dressing Assessment/Training    UB Dressing Assess/Train, Clothing Type doffing:;donning:;button up  -RC      UB Dressing Assess/Train, Position sitting;edge of bed  -RC      UB Dressing Assess/Train, [place] set up required  -RC      Recorded by [RC] Lianne Hernandez NELSON/L      Lower Body Dressing Assessment/Training    LB Dressing Assess/Train, Clothing Type doffing:;donning:;pants  -RC      LB Dressing Assess/Train, Assist Device reacher  -RC       LB Dressing Assess/Train, Position sitting;standing  -RC      LB Dressing Assess/Train, [place] verbal cues required;minimum assist (75% patient effort)  -RC      LB Dressing Assess/Train, Comment --   @ w/ cath into pants  -RC      Recorded by [RC] JENNIFFER Shah      Grooming Assessment/Training    Grooming Assess/Train, Position standing;sink side  -RC  sitting  -RC    Grooming Assess/Train, [place] Level contact guard assist  -RC  set up required  -RC    Recorded by [RC] JENNIFFER Shah  [RC] JENNIFFER Shah    Balance Skills Training    Standing-Level of Assistance Contact guard  -RC      Static Standing Balance Support assistive device  -RC      Standing Balance # of Minutes 7  -RC      Recorded by [RC] JENNIFFER Shah      Therapy Exercises    Bilateral Upper Extremity   --   ue bike 10 min, pulley ex 8 min  -RC    Recorded by   [RC] JENNIFFER Shah    Positioning and Restraints    Pre-Treatment Position in bed  -RC in bed  -CK in bed  -RC    Post Treatment Position bed  -RC bed  -CK bed  -RC    In Bed call light within reach;encouraged to call for assist  -RC supine;call light within reach;encouraged to call for assist;side rails up x2  -CK encouraged to call for assist;call light within reach  -RC    Recorded by [RC] JENNIFFER Shah [CK] Karen Ch MS CCC-SLP [RC] JENNIFFER Shah      01/31/18 0815 01/30/18 1500 01/30/18 1400    Rehab Assessment/Intervention    Discipline physical therapy assistant  -RW physical therapy assistant  -RW occupational therapy assistant  -RC    Document Type therapy note (daily note)  -RW therapy note (daily note)  -RW therapy note (daily note)  -RC    Subjective Information  agree to therapy  -RW agree to therapy  -RC    Patient Effort, Rehab Treatment  good  -RW good  -RC    Symptoms Noted During/After Treatment   fatigue  -RC    Recorded by [RW] Xiang Jose PTA [RW] Xiang Jose PTA [RC] Lianne BOX  David, NELSON/L    Vital Signs    Pre Systolic BP Rehab 149  -RW      Pre Treatment Diastolic BP 71  -RW      Post Systolic BP Rehab 129  -RW      Post Treatment Diastolic BP 56  -RW      Pretreatment Heart Rate (beats/min) 85  -RW 76  -RW     Posttreatment Heart Rate (beats/min) 80  -RW      Pre SpO2 (%) 98  -RW 95  -RW     O2 Delivery Pre Treatment supplemental O2  -RW supplemental O2  -RW     Recorded by [RW] Xiang Jose PTA [RW] Xiang Jose PTA     Pain Assessment    Pain Assessment No/denies pain  -RW  --   did not give # c/o discomfort in catheter area  -RC    Pain Score  6  -RW     Post Pain Score  6  -RW     Pain Location  Generalized  -RW     Response to Interventions   --   REPORTED TO NURSING  -RC    Recorded by [RW] Xiang Jose PTA [RW] Xiang Jose PTA [RC] Lianne Hernandez, NELSON/L    Cognitive Assessment/Intervention    Current Cognitive/Communication Assessment functional  -RW functional  -RW functional  -RC    Orientation Status oriented x 4  -RW oriented x 4  -RW oriented x 4  -RC    Follows Commands/Answers Questions 100% of the time  -% of the time  -RW     Personal Safety Interventions gait belt;nonskid shoes/slippers when out of bed  -RW gait belt;nonskid shoes/slippers when out of bed  -RW     Recorded by [RW] Xiang Jose PTA [RW] Xiang Jose PTA [RC] Lianne Hernandez, NELSON/L    Mobility Assessment/Training    Right Lower Extremity Weight-Bearing weight-bearing as tolerated  -RW weight-bearing as tolerated  -RW     Recorded by [RW] Xiang Jose PTA [RW] Xiang Jose PTA     Bed Mobility, Assessment/Treatment    Bed Mobility, Assistive Device --  -RW bed rails;head of bed elevated  -RW     Bed Mob, Sit to Supine, [place] --  -RW supervision required  -RW     Recorded by [RW] Xiang Jose PTA [RW] Xiang Jose PTA     Transfer Assessment/Treatment    Transfers, Sit-Stand [place] supervision required  -RW supervision required  -RW     Transfers,  Stand-Sit [place] supervision required  -RW supervision required  -RW     Transfers, Sit-Stand-Sit, Assist Device rolling walker  -RW rolling walker  -RW     Recorded by [RW] Xiang Jose PTA [RW] Xiang Jose PTA     Gait Assessment/Treatment    Gait, [place] Level contact guard assist;stand by assist  -RW contact guard assist;stand by assist  -RW     Gait, Assistive Device rolling walker  -RW rolling walker  -RW     Gait, Distance (Feet) 70    x 2  -RW 12   around bed into bed  -RW     Recorded by [RW] Xiang Jose PTA [RW] Xiang Jose PTA     Functional Mobility    Functional Mobility- Ind. Level contact guard assist;supervision required  -RW contact guard assist;supervision required  -RW     Functional Mobility- Device rolling walker  -RW rolling walker  -RW     Functional Mobility-Distance (Feet)  12  -RW     Recorded by [RW] Xiang Jose PTA [RW] Xiang Jose PTA     Lower Body Dressing Assessment/Training    LB Dressing Assess/Train, Clothing Type   doffing:;donning:;socks  -RC    LB Dressing Assess/Train, Assist Device   long-handled shoe horn;reacher;sock-aid;dressing stick  -RC    LB Dressing Assess/Train, Position   sitting  -RC    LB Dressing Assess/Train, [place]   verbal cues required  -RC    LB Dressing Assess/Train, Comment   --   practiced using AE for lb dressing multi x  -RC    Recorded by   [RC] OMAR Shah/L    Therapy Exercises    Bilateral Lower Extremities AROM:;20 reps;ankle pumps/circles;hip abduction/adduction;glut sets;sitting;knee flexion;LAQ;hip extension   2 sets  -RW AROM:;20 reps;ankle pumps/circles;hip abduction/adduction;supine;AAROM:;glut sets;heel slides;quad sets  -RW     Recorded by [RW] Xiang Jose PTA [RW] Xiang Jose PTA     Positioning and Restraints    Pre-Treatment Position sitting in chair/recliner  -RW sitting in chair/recliner  -RW sitting in chair/recliner  -RC    Post Treatment Position wheelchair  -RW bed  -RW  wheelchair  -RC    In Bed  call light within reach;legs elevated  -RW     In Wheelchair   call light within reach;encouraged to call for assist  -RC    Recorded by [RW] Xiang Jose PTA [RW] Xiang Jose PTA [RC] YULI ShahA/HINA      01/30/18 1055 01/30/18 0940 01/30/18 0846    Rehab Assessment/Intervention    Discipline physical therapy assistant  -RW occupational therapy assistant  -RC speech language pathologist  -CK    Document Type therapy note (daily note)  -RW therapy note (daily note)  -RC therapy note (daily note)  -CK    Subjective Information agree to therapy  -RW agree to therapy  -RC agree to therapy  -CK    Patient Effort, Rehab Treatment good  -RW good  -RC good  -CK    Symptoms Noted During/After Treatment fatigue  -RW      Recorded by [RW] Xiang Jose PTA [RC] YULI ShahA/HINA [CK] Karen Ch, MS CCC-SLP    Pain Assessment    Pain Assessment --   no pain reported  -RW  No/denies pain  -CK    Post Pain Score  3  -RC     Pain Type  Acute pain  -RC     Pain Location  Generalized  -RC     Pain Intervention(s)  Medication (See MAR);Repositioned  -RC     Recorded by [RW] Xiang Jose PTA [RC] YULI ShahA/HINA [CK] Karen Ch, MS CCC-SLP    Cognitive Assessment/Intervention    Current Cognitive/Communication Assessment functional  -RW functional  -RC     Orientation Status oriented x 4  -RW oriented x 4  -RC     Follows Commands/Answers Questions 100% of the time  -RW      Personal Safety Interventions gait belt;nonskid shoes/slippers when out of bed  -RW      Recorded by [RW] Xiang Jose PTA [RC] Lianne Hernandez NELSON/L     Communication Treatment Objective and Progress    Cognitive Linguistic Treatment Objectives   Improve memory skills;Improve functional problem solving;Improve executive function skills  -CK    Recorded by   [CK] Karen Ch, MS CCC-SLP    Improve memory skills    Improve memory skills through:   recalling unrelated word lists  with an imposed delay;listen to a paragraph and answer questions;90%  -CK    Status: Improve memory skills   Progressing as expected  -CK    Memory Skills Progress   70%  -CK    Recorded by   [CK] Karen Ch MS CCC-SLP    Improve functional problem solving    Improve functional problem solving through:   complete functional math task;90%  -CK    Status: Improve functional problem solving   Progressing as expected  -CK    Functional Problem Solving Progress   80%  -CK    Recorded by   [CK] Karen Ch MS CCC-SLP    Improve executive function skills    Improve executive function skills:   exhibit cognitive flexibility;organization/planning activity;90%  -CK    Status: Improve executive function skills   Progressing as expected  -CK    Executive Function Skills Progress   100%  -CK    Recorded by   [CK] Karen Ch MS CCC-SLP    Mobility Assessment/Training    Right Lower Extremity Weight-Bearing weight-bearing as tolerated  -RW      Recorded by [RW] Xiang Jose PTA      Transfer Assessment/Treatment    Transfers, Sit-Stand [place] supervision required  -RW      Transfers, Stand-Sit [place] supervision required  -RW      Transfers, Sit-Stand-Sit, Assist Device rolling walker  -RW      Transfer, Comment sit to stand x 5  -RW      Recorded by [RW] Xiang Jose PTA      Gait Assessment/Treatment    Gait, [place] Level contact guard assist;stand by assist  -RW      Gait, Assistive Device rolling walker  -RW      Gait, Distance (Feet) 30  -RW      Recorded by [RW] Xiang Jose PTA      Functional Mobility    Functional Mobility- Ind. Level contact guard assist;supervision required  -RW contact guard assist  -RC     Functional Mobility- Device rolling walker  -RW rolling walker  -RC     Functional Mobility-Distance (Feet) 30  -RW 15  -RC     Recorded by [RW] Xiang Jose PTA [RC] OMAR Shah/L     Upper Body Bathing Assessment/Training    UB Bathing Assess/Train,  Position  edge of bed  -RC     UB Bathing Assess/Train, [place] Level  set up required  -RC     Recorded by  [RC] OMAR Shah/L     Lower Body Bathing Assessment/Training    LB Bathing Assess/Train, Position  sitting;edge of bed  -RC     LB Bathing Assess/Train, [place] Level  minimum assist (75% patient effort)  -RC     LB Bathing Assess/Train, Comment  --   partial lb bath,per pt request  -RC     Recorded by  [RC] OMAR Shah/L     Upper Body Dressing Assessment/Training    UB Dressing Assess/Train, Clothing Type  doffing:;donning:;button up  -RC     UB Dressing Assess/Train, Position  sitting  -RC     UB Dressing Assess/Train, [place]  set up required  -RC     Recorded by  [RC] OMAR Shah/L     Lower Body Dressing Assessment/Training    LB Dressing Assess/Train, Clothing Type  doffing:;donning:;shorts;pants  -RC     LB Dressing Assess/Train, Position  sitting;standing  -RC     LB Dressing Assess/Train, [place]  --   sba and @ w/ catheter  -RC     Recorded by  [RC] OMAR Shah/L     Grooming Assessment/Training    Grooming Assess/Train, Position  sitting  -RC     Grooming Assess/Train, Indepen Level  independent  -RC     Recorded by  [RC] OMAR Shah/L     Therapy Exercises    Bilateral Lower Extremities AROM:;20 reps;ankle pumps/circles;hip abduction/adduction;knee flexion   2 sets   -RW      BLE Resistance theraband  -RW      Bilateral Upper Extremity  AROM:;15 reps;sitting;elbow flexion/extension;shoulder abduction/adduction;shoulder extension/flexion;shoulder protraction/retraction;shoulder rolls/shrugs   3 sets  -RC     BUE Resistance  manual resistance- minimal  -RC     Recorded by [RW] Xiang Jose, PTA [RC] OMAR Shah/L     Positioning and Restraints    Pre-Treatment Position sitting in chair/recliner  -RW in bed  -RC in bed  -CK    Post Treatment Position wheelchair  -RW wheelchair  -RC bed  -CK    In Bed   supine;call  light within reach;encouraged to call for assist;side rails up x2  -CK    In Wheelchair call light within reach  -RW encouraged to call for assist  -RC     Recorded by [RW] Xiang Jose PTA [RC] JENNIFFER Shah [CK] Karen Ch, MS CCC-SLP      01/29/18 1532 01/29/18 1406 01/29/18 1315    Rehab Assessment/Intervention    Discipline speech language pathologist  -CK physical therapy assistant  -RW occupational therapy assistant  -LM    Document Type therapy note (daily note)  -CK therapy note (daily note)  -RW therapy note (daily note)  -LM    Subjective Information agree to therapy  -CK agree to therapy  -RW agree to therapy  -LM    Patient Effort, Rehab Treatment good  -CK good  -RW good  -LM    Recorded by [CK] Karen Ch, MS CCC-SLP [RW] Xiang Jose PTA [LM] YULI MaloneA/L    Pain Assessment    Pain Assessment No/denies pain  -CK No/denies pain  -RW No/denies pain  -LM    Pain Score   0  -LM    Post Pain Score   0  -LM    Recorded by [CK] Karen Ch, MS CCC-SLP [RW] Xiang Jose PTA [LM] YULI MaloneA/L    Cognitive Assessment/Intervention    Current Cognitive/Communication Assessment  functional  -RW functional  -LM    Orientation Status  oriented x 4  -RW oriented x 4  -LM    Follows Commands/Answers Questions  100% of the time  -% of the time  -LM    Personal Safety Interventions  gait belt;nonskid shoes/slippers when out of bed  -RW safety round/check completed  -LM    Recorded by  [RW] Xiang Jose PTA [LM] YULI MaloneA/L    Communication Treatment Objective and Progress    Cognitive Linguistic Treatment Objectives   Improve awareness of basic personal information  -LM    Recorded by   [LM] OMAR Malone/L    Improve memory skills    Improve memory skills through: recalling unrelated word lists with an imposed delay;listen to a paragraph and answer questions;90%  -CK      Status: Improve memory skills Progressing as  expected  -CK      Memory Skills Progress 70%  -CK      Recorded by [CK] Karen Ch MS CCC-SLP      Improve functional problem solving    Improve functional problem solving through: complete functional math task;90%  -CK      Status: Improve functional problem solving Progressing as expected  -CK      Functional Problem Solving Progress 70%  -CK      Recorded by [CK] Karen Ch MS CCC-SLP      Improve executive function skills    Improve executive function skills: exhibit cognitive flexibility;organization/planning activity;90%  -CK      Status: Improve executive function skills Other (comment)   Not directly addressed this date.  -CK      Executive Function Skills Progress continue to address  -CK      Recorded by [CK] Karen Ch MS CCC-SLP      Mobility Assessment/Training    Right Lower Extremity Weight-Bearing  weight-bearing as tolerated  -RW     Recorded by  [RW] Xiang Jose, PTA     Bed Mobility, Assessment/Treatment    Bed Mob, Supine to Sit, [place]   contact guard assist  -LM    Bed Mob, Sit to Supine, [place]   contact guard assist;minimum assist (75% patient effort)  -LM    Recorded by   [LM] OMAR Malone/L    Transfer Assessment/Treatment    Transfers, Sit-Stand [place]   contact guard assist  -LM    Transfers, Stand-Sit [place]   contact guard assist  -LM    Transfers, Sit-Stand-Sit, Assist Device   rolling walker  -LM    Toilet Transfer, [place]   supervision required;contact guard assist  -LM    Toilet Transfer, Assistive Device   elevated toilet seat  -LM    Recorded by   [LM] OMAR Malone/L    Toileting Assessment/Training    Toileting Assess/Train, Indepen Level   contact guard assist  -LM    Recorded by   [LM] OMAR Malone/L    Therapy Exercises    Bilateral Lower Extremities  AROM:;20 reps;ankle pumps/circles;hip abduction/adduction;glut sets;supine;heel slides;quad sets;SAQ   2 sets   -RW AROM:;20 reps  -LM     BLE Resistance   manual resistance- minimal  -LM    Recorded by  [RW] Xiang Jose, PTA [LM] Massiel Santana, NELSON/L    Positioning and Restraints    Pre-Treatment Position in bed  -CK in bed  -RW sitting in chair/recliner  -LM    Post Treatment Position bed  -CK bed  -RW bed  -LM    In Bed sitting;call light within reach;encouraged to call for assist;exit alarm on;side rails up x2  -CK call light within reach;legs elevated  -RW     Recorded by [CK] Karen Ch, MS CCC-SLP [RW] Xiang Jose, PTA [LM] Massiel Santana, NELSON/L      User Key  (r) = Recorded By, (t) = Taken By, (c) = Cosigned By    Initials Name Effective Dates     Marisol Burdick, OTR/L 10/17/16 -     CK Karen Ch, MS CCC-SLP 10/17/16 -     RW Xiang Jose, JEANNETTE 10/17/16 -     RC Lianne Hernandez, NELSON/L 10/17/16 -     LM Massiel Santana, NELSON/L 10/17/16 -                 OT Goals       02/01/18 0912 01/31/18 1445 01/30/18 1524    Bed Mobility OT STG    Bed Mobility OT STG, Date Goal Reviewed 02/01/18  - 01/31/18  - 01/30/18  -    Bed Mobility OT STG, Outcome goal ongoing  -      Transfer Training OT LTG    Transfer Training OT LTG, Date Goal Reviewed 02/01/18  - 01/31/18  - 01/30/18  -    Transfer Training OT LTG, Outcome goal ongoing  -      Patient Education OT LTG    Patient Education OT LTG, Date Goal Reviewed 02/01/18  - 01/31/18  - 01/30/18  -    Patient Education OT LTG Outcome goal ongoing  -      ADL OT LTG    ADL OT LTG, Date Goal Reviewed 02/01/18  - 01/31/18  - 01/30/18  -    ADL OT LTG, Outcome goal ongoing  -  goal ongoing  -      01/29/18 1428 01/27/18 1441 01/27/18 0606    Bed Mobility OT STG    Bed Mobility OT STG, Date Goal Reviewed 01/29/18  -LM 01/27/18  -RC     Bed Mobility OT STG, Outcome goal ongoing  -LM goal ongoing  -RC     Bed Mobility OT LTG    Bed Mobility OT LTG, Date Goal Reviewed   01/27/18  -NN    Bed Mobility OT LTG, Outcome   goal not met  -NN    Bed Mobility  OT LTG, Reason Goal Not Met   discharged from facility  -NN    Transfer Training OT LTG    Transfer Training OT LTG, Date Goal Reviewed 01/29/18  -LM 01/27/18  -RC 01/27/18  -NN    Transfer Training OT LTG, Outcome goal ongoing  -LM goal ongoing  -RC goal not met  -    Transfer Training OT LTG, Reason Goal Not Met   discharged from facility  -    Patient Education OT LTG    Patient Education OT LTG, Date Goal Reviewed 01/29/18  -LM 01/27/18  -RC     Patient Education OT LTG Outcome goal ongoing  -LM goal ongoing  -RC     ADL OT LTG    ADL OT LTG, Date Goal Reviewed 01/29/18  -LM 01/27/18  -RC 01/27/18  -NN    ADL OT LTG, Outcome goal ongoing  -LM goal ongoing  -RC goal not met  -    ADL OT LTG, Reason Goal Not Met   discharged from facility  -    Functional Mobility OT LTG    Functional Mobility Goal OT LTG, Date Goal Reviewed   01/27/18  -    Functional Mobility Goal OT LTG, Outcome   goal not met  -    Functional Mobility Goal OT LTG, Reason Goal Not Met   discharged from facility  -      01/26/18 1630 01/26/18 1144 01/24/18 1329    Bed Mobility OT STG    Bed Mobility OT STG, Date Established 01/26/18  -MR      Bed Mobility OT STG, Time to Achieve by discharge  -MR      Bed Mobility OT STG, Activity Type all bed mobility  -MR      Bed Mobility OT STG, [place] Level conditional independence  -MR      Bed Mobility OT STG, Additional Goal with VSS  -MR      Bed Mobility OT LTG    Bed Mobility OT LTG, Date Goal Reviewed  01/26/18  -CS 01/24/18  -    Bed Mobility OT LTG, Outcome   goal ongoing  -BH    Transfer Training OT LTG    Transfer Training OT LTG, Date Established 01/26/18  -MR      Transfer Training OT LTG, Time to Achieve by discharge  -MR      Transfer Training OT LTG, Activity Type all transfers  -MR      Transfer Training OT LTG, [place] Level supervision required  -MR      Transfer Training OT LTG, Assist Device walker, rolling  -MR      Transfer Training OT LTG, Additional Goal  with VSS  -      Transfer Training OT LTG, Date Goal Reviewed  01/26/18  -CS 01/24/18  -    Transfer Training OT LTG, Outcome  goal not met  -     Patient Education OT LTG    Patient Education OT LTG, Date Established 01/26/18  -      Patient Education OT LTG, Time to Achieve by discharge  -      Patient Education OT LTG, Education Type written program;HEP;precautions per surgeon;positioning;posture/body mechanics;home safety;adaptive equipment mgmt;skin care/inspection;energy conservation;work simplification;adaptive breathing  -      Patient Education OT LTG, Education Understanding independent;demonstrates adequately;verbalizes understanding  -      ADL OT LTG    ADL OT LTG, Date Established 01/26/18  -      ADL OT LTG, Time to Achieve by discharge  -      ADL OT LTG, Activity Type ADL skills  -      ADL OT LTG, Additional Goal UB bathing SBA with AD, LB bathing with min A with AD, UB dressing with cond I, LB dressing with [place] with AD, grooming with set-up, toileting with CGA   -      ADL OT LTG, Date Goal Reviewed  01/26/18  -CS 01/24/18  -    ADL OT LTG, Outcome  goal not met  -     Functional Mobility OT LTG    Functional Mobility Goal OT LTG, Date Goal Reviewed  01/26/18  - 01/24/18  -    Functional Mobility Goal OT LTG, Outcome  goal not met  -       01/24/18 0755          Bed Mobility OT LTG    Bed Mobility OT LTG, Date Established 01/24/18  -      Bed Mobility OT LTG, Time to Achieve by discharge  -      Bed Mobility OT LTG, Activity Type all bed mobility  -      Bed Mobility OT LTG, [place] Level supervision required   to engage in ADL  -      Transfer Training OT LTG    Transfer Training OT LTG, Date Established 01/24/18  -      Transfer Training OT LTG, Time to Achieve by discharge  -      Transfer Training OT LTG, Activity Type toilet  -      Transfer Training OT LTG, [place] Level contact guard assist   AE/AD as needed  -      ADL OT LTG     ADL OT LTG, Date Established 01/24/18  -      ADL OT LTG, Activity Type ADL skills  -      ADL OT LTG, Additional Goal set up grooming/self feeding, min A UB bath/dressing/ mod A LB bath/dressing/toileting - AE/AD as needed  -      Functional Mobility OT LTG    Functional Mobility Goal OT LTG, Date Established 01/24/18  -      Functional Mobility Goal OT LTG, Time to Achieve by discharge  -      Functional Mobility Goal OT LTG, [place] Level contact guard  -      Functional Mobility Goal OT LTG, Assist Device --   AE/AD as needed  -      Functional Mobility Goal OT LTG, Distance to Achieve to the bathroom   to the toilet  -        User Key  (r) = Recorded By, (t) = Taken By, (c) = Cosigned By    Initials Name Provider Type     Marisol Burdick, OTR/L Occupational Therapist    RC Lianne Hernandez, NELSON/L Occupational Therapy Assistant    EDUARDO Santana, NELSON/L Occupational Therapy Assistant    RIA Michel, NELSON/L Occupational Therapy Assistant    NN Rocío Desai, OTR/L Occupational Therapist    MR Itzel E Chance, OT Occupational Therapist          Occupational Therapy Education     Title: PT OT SLP Therapies (Active)     Topic: Occupational Therapy (Done)     Point: ADL training (Done)    Description: Instruct learner(s) on proper safety adaptation and remediation techniques during self care or transfers.   Instruct in proper use of assistive devices.    Learning Progress Summary    Learner Readiness Method Response Comment Documented by Status   Patient Acceptance E VU,NR Educated about OT and POC. Educated on safety throughout with t/f and ADL. Educated on AE for LB dressing/bathing. Educated to call for assist and not get up on her own.  02/01/18 1250 Done    Acceptance E NR   01/31/18 1445 Active    Acceptance E NR   01/30/18 1523 Active    Acceptance E VU   01/29/18 1435 Done    Acceptance E NR   01/27/18 1441 Active    Acceptance E VU Pt educated on role of OT  and POC. Pt educated on benefit of activity, safety with t/f, AD/AE to increase independence with ADL's, and not to get up alone to call for assistance.  01/26/18 1629 Done               Point: Home exercise program (Done)    Description: Instruct learner(s) on appropriate technique for monitoring, assisting and/or progressing therapeutic exercises/activities.    Learning Progress Summary    Learner Readiness Method Response Comment Documented by Status   Patient Acceptance E VU   01/29/18 1435 Done    Acceptance E NR   01/27/18 1441 Active    Acceptance E VU Pt educated on role of OT and POC. Pt educated on benefit of activity, safety with t/f, AD/AE to increase independence with ADL's, and not to get up alone to call for assistance.  01/26/18 1629 Done               Point: Precautions (Done)    Description: Instruct learner(s) on prescribed precautions during self-care and functional transfers.    Learning Progress Summary    Learner Readiness Method Response Comment Documented by Status   Patient Acceptance E VU,NR Educated about OT and POC. Educated on safety throughout with t/f and ADL. Educated on AE for LB dressing/bathing. Educated to call for assist and not get up on her own.  02/01/18 1250 Done    Acceptance E NR   01/31/18 1445 Active    Acceptance E VU   01/29/18 1435 Done    Acceptance E NR   01/27/18 1441 Active    Acceptance E VU Pt educated on role of OT and POC. Pt educated on benefit of activity, safety with t/f, AD/AE to increase independence with ADL's, and not to get up alone to call for assistance.  01/26/18 1629 Done               Point: Body mechanics (Done)    Description: Instruct learner(s) on proper positioning and spine alignment during self-care, functional mobility activities and/or exercises.    Learning Progress Summary    Learner Readiness Method Response Comment Documented by Status   Patient Acceptance E VU,NR Educated about OT and POC. Educated on safety  throughout with t/f and ADL. Educated on AE for LB dressing/bathing. Educated to call for assist and not get up on her own.  02/01/18 1250 Done    Acceptance E NR   01/31/18 1445 Active    Acceptance E VU   01/29/18 1435 Done    Acceptance E NR   01/27/18 1441 Active    Acceptance E VU Pt educated on role of OT and POC. Pt educated on benefit of activity, safety with t/f, AD/AE to increase independence with ADL's, and not to get up alone to call for assistance. MR 01/26/18 1629 Done                      User Key     Initials Effective Dates Name Provider Type Discipline     10/17/16 -  AYAD Vazquez/L Occupational Therapist OT     10/17/16 -  OMAR Shah/L Occupational Therapy Assistant OT     10/17/16 -  OMAR Malone/L Occupational Therapy Assistant OT    MR 11/08/17 -  Itzel Fletcher, OT Occupational Therapist OT                  OT Recommendation and Plan  Anticipated Discharge Disposition: home with home health, home with 24/7 care  Planned Therapy Interventions: activity intolerance, adaptive equipment training, ADL retraining, IADL retraining, balance training, bed mobility training, energy conservation, home exercise program, joint mobilization, motor coordination training, ROM (Range of Motion), strengthening, stretching, transfer training  Therapy Frequency:  (3-14 x/wk)  Plan of Care Review  Plan Of Care Reviewed With: patient  Progress: improving  Outcome Summary/Follow up Plan: OT treatment completed this date. Pt fatigued with sponge bath with washclothes and water average of min A requried. Pt struggled with LB dressing max assist with yessi hose, averaged mod assist with shoes dependent to tie them. Pt CGA to SBA to stand and complete ADL tasks. Pt tolerated BUE ex well this date. Pt could continue to benefit from skilled OT to reach maximum level of independence with ADL. Pt will need 24/7 assist at home with HH therapy or a SNF for further therapy.          Outcome Measures       02/01/18 0912 01/31/18 1302 01/30/18 1400    How much help from another is currently needed...    Putting on and taking off regular lower body clothing? 3  - 3  -RC 3  -RC    Bathing (including washing, rinsing, and drying) 3  - 3  -RC 3  -RC    Toileting (which includes using toilet bed pan or urinal) 2  - 2  -RC 2  -RC    Putting on and taking off regular upper body clothing 3  - 3  -RC 3  -RC    Taking care of personal grooming (such as brushing teeth) 3  - 3  -RC 3  -RC    Eating meals 4  - 4  -RC 4  -RC    Score 18  - 18  -RC 18  -RC    Functional Assessment    Outcome Measure Options AM-PAC 6 Clicks Daily Activity (OT)  -        01/30/18 1100          How much help from another person do you currently need...    Turning from your back to your side while in flat bed without using bedrails? 4  -RW      Moving from lying on back to sitting on the side of a flat bed without bedrails? 3  -RW      Moving to and from a bed to a chair (including a wheelchair)? 3  -RW      Standing up from a chair using your arms (e.g., wheelchair, bedside chair)? 3  -RW      Climbing 3-5 steps with a railing? 2  -RW      To walk in hospital room? 3  -RW      AM-PAC 6 Clicks Score 18  -RW        User Key  (r) = Recorded By, (t) = Taken By, (c) = Cosigned By    Initials Name Provider Type     Marisol Burdick OTR/L Occupational Therapist    YULISA Jose PTA Physical Therapy Assistant    OMAR Cazares/L Occupational Therapy Assistant           Time Calculation:         Time Calculation- OT       02/01/18 1253          Time Calculation- OT    OT Start Time 0912  -      OT Stop Time 1045  -      OT Time Calculation (min) 93 min  -      Total Timed Code Minutes- OT 93 minute(s)  -      OT Received On 02/01/18  -        User Key  (r) = Recorded By, (t) = Taken By, (c) = Cosigned By    Initials Name Provider Type     AYAD Vazquez/L Occupational Therapist            Therapy Charges for Today     Code Description Service Date Service Provider Modifiers Qty    [id number]  OT SELF CARE/MGMT/TRAIN EA 15 MIN 2/1/2018 Marisol Burdick OTR/L GO 4    96956209648 HC OT THER PROC EA 15 MIN 2/1/2018 Marisol Burdick OTR/L GO 2          OT G-codes  OT Professional Judgement Used?: Yes  OT Functional Scales Options: AM-PAC 6 Clicks Daily Activity (OT)  Score: 15  Functional Limitation: Self care  Self Care Current Status (): At least 60 percent but less than 80 percent impaired, limited or restricted  Self Care Goal Status (): At least 40 percent but less than 60 percent impaired, limited or restricted    Marisol Burdick OTR/HINA  2/1/2018

## 2018-02-01 NOTE — PROGRESS NOTES
Nutrition Services    Patient Name:  Val Arteaga  YOB: 1932  MRN: [id number]  Admit Date:  1/26/2018    Pt has a better appetite today. Eating well at noon. Will continue with ensure with bid(breakfast and supper)meals.    Electronically signed by:  Sosa Verdin RD  02/01/18 4:06 PM

## 2018-02-01 NOTE — DISCHARGE PLACEMENT REQUEST
"Val Arteaga (85 y.o. Female)     Date of Birth Social Security Number Address Home Phone MRN    12/02/1932  [id number] [address] [facility] [address] [phone] [id number]    Christianity Marital Status          [place]        Admission Date Admission Type Admitting Provider Attending Provider Department, Room/Bed    1/26/18 Elective Shawn Maldonado MD [place], Kenneth R, MD [facility] ACUTE REHAB, 530/1    Discharge Date Discharge Disposition Discharge Destination                      Attending Provider: Shawn Maldonado MD     Allergies:  Ace Inhibitors, Lisinopril    Isolation:  None   Infection:  None   Code Status:  Conditional    Ht:  172.7 cm (67.99\")   Wt:  77.7 kg (171 lb 3.2 oz)    Admission Cmt:  None   Principal Problem:  Closed displaced basicervical fracture of right femur [S72.041A] More...                 Active Insurance as of 1/26/2018     Primary Coverage     Payor Plan Insurance Group Employer/Plan Group    MEDICARE MEDICARE A & B      Payor Plan Address Payor Plan Phone Number Effective From Effective To    [address] [phone] 12/1/1997     [place] [address]       Subscriber Name Subscriber Birth Date Member ID       VAL ARTEAGA 12/2/1932 [id number]           Secondary Coverage     Payor Plan Insurance Group Employer/Plan Group    [facility] [id number]     Payor Plan Address Payor Plan Phone Number Effective From Effective To    [address] [address] [phone] 10/22/2015     [place] [address]       Subscriber Name Subscriber Birth Date Member ID       VLA ARTEAGA 12/2/1932 [id number]                 Emergency Contacts      (Rel.) Home Phone Work Phone Mobile Phone    Kelly Madison (Neice) (Relative) [phone]    Clair Odonnell (Neice) (Relative) [phone]    Mitchel Amado (Relative) -- -- [phone]            Emergency Contact Information     Name Relation " Home Work Mobile    Kelly Madison (Neice) Relative [phone]    Clair Odonnell (Neice) Relative [phone]    Mitchel Amado Relative   [phone]          Insurance Information                MEDICARE/MEDICARE A & B Phone: [phone]    Subscriber: Val Arteaga Subscriber#: [id number]    Group#:  Precert#:         [facility] Phone: [phone]    Subscriber: Val Arteaga Subscriber#: [id number]    Group#: [id number] Precert#:

## 2018-02-01 NOTE — PLAN OF CARE
Problem: Patient Care Overview (Adult)  Goal: Plan of Care Review  Outcome: Ongoing (interventions implemented as appropriate)   02/01/18 0912   Coping/Psychosocial Response Interventions   Plan Of Care Reviewed With patient   Outcome Evaluation   Outcome Summary/Follow up Plan OT treatment completed this date. Pt fatigued with sponge bath with washclothes and water average of min A requried. Pt struggled with LB dressing max assist with yessi hose, averaged mod assist with shoes dependent to tie them. Pt CGA to SBA to stand and complete ADL tasks. Pt tolerated BUE ex well this date. Pt could continue to benefit from skilled OT to reach maximum level of independence with ADL. Pt will need 24/7 assist at home with HH therapy or a SNF for further therapy.    Patient Care Overview   Progress improving       Problem: Inpatient Occupational Therapy  Goal: Bed Mobility Goal STG- OT  Outcome: Ongoing (interventions implemented as appropriate)   01/26/18 1630 02/01/18 0912   Bed Mobility OT STG   Bed Mobility OT STG, Date Established 01/26/18 --    Bed Mobility OT STG, Time to Achieve by discharge --    Bed Mobility OT STG, Activity Type all bed mobility --    Bed Mobility OT STG, [place] Level conditional independence --    Bed Mobility OT STG, Additional Goal with VSS --    Bed Mobility OT STG, Date Goal Reviewed --  02/01/18   Bed Mobility OT STG, Outcome --  goal ongoing     Goal: Transfer Training Goal 1 LTG- OT  Outcome: Ongoing (interventions implemented as appropriate)   01/26/18 1630 02/01/18 0912   Transfer Training OT LTG   Transfer Training OT LTG, Date Established 01/26/18 --    Transfer Training OT LTG, Time to Achieve by discharge --    Transfer Training OT LTG, Activity Type all transfers --    Transfer Training OT LTG, [place] Level supervision required --    Transfer Training OT LTG, Assist Device walker, rolling --    Transfer Training OT LTG, Additional Goal with VSS --    Transfer Training OT  LTG, Date Goal Reviewed --  02/01/18   Transfer Training OT LTG, Outcome --  goal ongoing     Goal: Patient Education Goal LTG- OT  Outcome: Ongoing (interventions implemented as appropriate)   01/26/18 1630 02/01/18 0912   Patient Education OT LTG   Patient Education OT LTG, Date Established 01/26/18 --    Patient Education OT LTG, Time to Achieve by discharge --    Patient Education OT LTG, Education Type written program;HEP;precautions per surgeon;positioning;posture/body mechanics;home safety;adaptive equipment mgmt;skin care/inspection;energy conservation;work simplification;adaptive breathing --    Patient Education OT LTG, Education Understanding independent;demonstrates adequately;verbalizes understanding --    Patient Education OT LTG, Date Goal Reviewed --  02/01/18   Patient Education OT LTG Outcome --  goal ongoing     Goal: ADL Goal LTG- OT  Outcome: Ongoing (interventions implemented as appropriate)   01/26/18 1630 02/01/18 0912   ADL OT LTG   ADL OT LTG, Date Established 01/26/18 --    ADL OT LTG, Time to Achieve by discharge --    ADL OT LTG, Activity Type ADL skills --    ADL OT LTG, Additional Goal UB bathing SBA with AD, LB bathing with min A with AD, UB dressing with cond I, LB dressing with Robb with AD, grooming with set-up, toileting with CGA  --    ADL OT LTG, Date Goal Reviewed --  02/01/18   ADL OT LTG, Outcome --  goal ongoing

## 2018-02-01 NOTE — PLAN OF CARE
Problem: Patient Care Overview (Adult)  Goal: Plan of Care Review  Outcome: Ongoing (interventions implemented as appropriate)   02/01/18 1605   Coping/Psychosocial Response Interventions   Plan Of Care Reviewed With patient   Outcome Evaluation   Outcome Summary/Follow up Plan pt feeling bad this pm and bp was very low untill pt returned to bed and legs elevated elevating bp back into higer range. overall doing well with mobility pt has decided that she wants to go to snf at [facility] to better prepare for home alone.       Problem: Inpatient Physical Therapy  Goal: Bed Mobility Goal LTG- PT  Outcome: Ongoing (interventions implemented as appropriate)   01/27/18 0929 02/01/18 1605   Bed Mobility PT LTG   Bed Mobility PT LTG, Date Established 01/27/18 --    Bed Mobility PT LTG, Time to Achieve by discharge --    Bed Mobility PT LTG, Activity Type roll left/roll right;supine to sit/sit to supine --    Bed Mobility PT LTG, [place] Level independent --    Bed Mobility PT LTG, Additional Goal HOB down --    Bed Mobility PT LTG, Date Goal Reviewed --  02/01/18   Bed Mobility PT LTG, Outcome --  goal ongoing     Goal: Transfer Training Goal 1 LTG- PT  Outcome: Ongoing (interventions implemented as appropriate)   01/27/18 0929 01/31/18 1527 02/01/18 1605   Transfer Training PT LTG   Transfer Training PT LTG, Date Established 01/27/18 --  --    Transfer Training PT LTG, Time to Achieve by discharge --  --    Transfer Training PT LTG, Activity Type bed to chair /chair to bed;sit to stand/stand to sit;toilet --  --    Transfer Training PT LTG, [place] Level supervision required --  --    Transfer Training PT LTG, Assist Device walker, rolling --  --    Transfer Training PT LTG, Date Goal Reviewed --  --  02/01/18   Transfer Training PT LTG, Outcome --  goal ongoing --      Goal: Gait Training Goal STG- PT  Outcome: Ongoing (interventions implemented as appropriate)   01/27/18 0929 02/01/18 1605   Gait Training PT STG    Gait Training Goal PT STG, Date Established 01/27/18 --    Gait Training Goal PT STG, Time to Achieve by discharge --    Gait Training Goal PT STG, [place] Level supervision required --    Gait Training Goal PT STG, Assist Device walker, rolling --    Gait Training Goal PT STG, Distance to Achieve 150 ft --    Gait Training Goal PT STG, Date Goal Reviewed --  02/01/18   Gait Training Goal PT STG, Outcome --  goal ongoing     Goal: Stair Training Goal LTG- PT  Outcome: Ongoing (interventions implemented as appropriate)   01/27/18 0929 02/01/18 1605   Stair Training PT LTG   Stair Training Goal PT LTG, Date Established 01/27/18 --    Stair Training Goal PT LTG, Time to Achieve by discharge --    Stair Training Goal PT LTG, Number of Steps 2 --    Stair Training Goal PT LTG, [place] Level contact guard assist --    Stair Training Goal PT LTG, Assist Device 1 handrail --    Stair Training Goal PT LTG, Date Goal Reviewed --  02/01/18   Stair Training Goal PT LTG, Outcome --  goal ongoing

## 2018-02-01 NOTE — THERAPY TREATMENT NOTE
Inpatient Rehabilitation - Physical Therapy Treatment Note  [facility]     Patient Name: Val Arteaga  : 1932  MRN: [id number]  Today's Date: 2018  Onset of Illness/Injury or Date of Surgery Date: 18  Date of Referral to PT: 18  Referring Physician: Dr. Maldonado    Admit Date: 2018    Visit Dx:    ICD-10-CM ICD-9-CM   1. Symbolic dysfunction R48.9 784.60   2. Impaired mobility and activities of daily living Z74.09 799.89   3. Impaired functional mobility, balance, gait, and endurance Z74.09 V49.89     Patient Active Problem List   Diagnosis   • Essential hypertension   • Coronary artery disease involving native coronary artery of native heart without angina pectoris   • Chronic obstructive pulmonary disease   • Acquired hypothyroidism   • Depressive disorder   • Thygeson's superficial punctate keratitis   • Pseudophakia   • Precordial pain   • Displaced fracture of base of neck of right femur, initial encounter for closed fracture   • Hip fracture, right, closed, initial encounter   • Pulmonary emphysema   • Chronic systolic congestive heart failure   • Hyponatremia   • BENITO (acute kidney injury)   • Acute blood loss anemia   • Urinary retention   • Closed displaced basicervical fracture of right femur               Adult Rehabilitation Note       18 1335 18 1050 18 0912    Rehab Assessment/Intervention    Discipline physical therapy assistant  -RW speech language pathologist  -EK occupational therapist  -    Document Type therapy note (daily note)  -RW therapy note (daily note)  -EK therapy note (daily note)  [facility]    Subjective Information complains of;dizziness;agree to therapy  -RW no complaints;agree to therapy  -EK agree to therapy  -    Patient Effort, Rehab Treatment good  -RW good  -EK good  -    Symptoms Noted During/After Treatment significant change in vital signs  -RW none  -EK fatigue  -    Precautions/Limitations   fall precautions;anterior  hip precautions- right   WBAT  -BH    Recorded by [RW] Xiang Jose PTA [EK] Kizzy Ch CCC-SLP [] Marisol Burdick OTR/L    Vital Signs    Pre Systolic BP Rehab 71   seated in wc with dynamap  -RW  112  -BH    Pre Treatment Diastolic BP 41  -RW  56  -BH    Intra Systolic BP Rehab 172   supine with dynamap  -RW      Intra Treatment Diastolic BP 74  -RW      Post Systolic BP Rehab 162  -RW  107  -BH    Post Treatment Diastolic BP 74   nurse taking phil  -RW  54  -BH    Pretreatment Heart Rate (beats/min)   81  -BH    Intratreatment Heart Rate (beats/min)   83  -BH    Posttreatment Heart Rate (beats/min)   77  -BH    Pre SpO2 (%)   96  -BH    O2 Delivery Pre Treatment   supplemental O2  -BH    Intra SpO2 (%)   98  -BH    O2 Delivery Intra Treatment   supplemental O2  -BH    Post SpO2 (%)   95  -BH    O2 Delivery Post Treatment   supplemental O2  -BH    Pre Patient Position   Sitting  -BH    Intra Patient Position   Sitting  -BH    Post Patient Position   Sitting  -BH    Recorded by [RW] Xiang Jose PTA  [] Marisol Burdick OTR/L    Pain Assessment    Pain Assessment No/denies pain  -RW No/denies pain  -EK     Pain Score  0  -EK 0  -BH    Post Pain Score  0  -EK 0  -BH    Recorded by [RW] Xiang Jose PTA [EK] Kizzy Ch, CCC-SLP [] Marisol Burdick OTR/L    Cognitive Assessment/Intervention    Current Cognitive/Communication Assessment functional  -RW impaired  -EK functional  -BH    Orientation Status oriented x 4  -RW oriented x 4  -EK oriented x 4  -BH    Follows Commands/Answers Questions 100% of the time  -% of the time  -% of the time  -BH    Personal Safety  decreased awareness, need for safety  -EK WNL/WFL  -BH    Personal Safety Interventions gait belt;nonskid shoes/slippers when out of bed  -RW  gait belt;nonskid shoes/slippers when out of bed;supervised activity;muscle strengthening facilitated  -BH    Recorded by [RW] Xiang Jose PTA [EK]  Kizzy Ch CCC-SLP [BH] Marisol Burdick, OTR/L    Improve awareness of basic personal information    Improve awareness of basic personal information through:  answering open-ended questions regarding personal/biographical information  -EK     Status: Improve awareness of basic personal information  Progressing as expected  -EK     Awareness of Basic Personal Information Progress  90%  -EK     Recorded by  [EK] Kizzy Ch [facility]     Improve memory skills    Improve memory skills through:  recalling unrelated word lists with an imposed delay;listen to a paragraph and answer questions;90%  -EK     Status: Improve memory skills  Progressing as expected  -EK     Memory Skills Progress  60%  -EK     Recorded by  [EK] Kizzy Ch CCC-SLP     Improve functional problem solving    Improve functional problem solving through:  complete functional math task;90%  -EK     Recorded by  [EK] Kizzy Ch CCC-MICK     Improve executive function skills    Improve executive function skills:  exhibit cognitive flexibility;organization/planning activity;90%  -EK     Status: Improve executive function skills  Progressing as expected  -EK     Executive Function Skills Progress  70%  -EK     Recorded by  [EK] Kizzy Ch [facility]-SLP     Mobility Assessment/Training    Right Lower Extremity Weight-Bearing weight-bearing as tolerated  -RW      Recorded by [RW] Xiang Jose PTA      Bed Mobility, Assessment/Treatment    Bed Mob, Sit to Supine, Rose supervision required;contact guard assist  -RW      Bed Mobility, Comment   defer pt up on w/c in room and wanted to stay.   -    Recorded by [RW] Xiang Jose PTA  [] Marisol Burdick, OTR/L    Transfer Assessment/Treatment    Transfers, Chair-Bed Rose contact guard assist  -RW      Transfers, Bed-Chair-Bed, Assist Device rolling walker  -RW      Transfers, Sit-Stand Rose supervision  "required;contact guard assist  -  supervision required;contact guard assist  -    Transfers, Stand-Sit [place] supervision required;contact guard assist  -RW  supervision required;contact guard assist  -    Transfers, Sit-Stand-Sit, Assist Device rolling walker  -  rolling walker  -    Transfer, Comment   completed several sit to stand for ADL with vc for safety and formation.   -    Recorded by [RW] Xiang Jose PTA  [] Marisol Burdick, OTR/L    Gait Assessment/Treatment    Gait, [place] Level contact guard assist;stand by assist  -      Gait, Assistive Device rolling walker  -      Gait, Distance (Feet) 12  -      Gait, Comment around bed  -RW      Recorded by [RW] Xiang Jose PTA      Functional Mobility    Functional Mobility- Ind. Level contact guard assist;supervision required  -      Functional Mobility- Device rolling walker  -      Recorded by [RW] Xiang Jose PTA      Upper Body Bathing Assessment/Training    UB Bathing Assess/Train Assistive Device   long-handled sponge   wash cloth and water bath  -    UB Bathing Assess/Train, Position   supported sitting   w/c  -    UB Bathing Assess/Train, [place] Level   set up required  -    UB Bathing Assess/Train, Comment   pt provided long handle sponge declined to use stated she reached it \"good enough\" and was done, SBA  distant with set up to complete  -    Recorded by   [] Marisol Burdick, OTR/L    Lower Body Bathing Assessment/Training    LB Bathing Assess/Train Assistive Device   long-handled sponge  -    LB Bathing Assess/Train, Position   supported sitting;standing   w/c  -    LB Bathing Assess/Train, [place] Level   contact guard assist;verbal cues required;supervision required;stand by assist;set up required  -    LB Bathing Assess/Train, Comment   Pt close SBA to CGA for standing for LB pericre and upper leg washing, used LH sponge to wash legs unable to dry without assist, otherwise " pt set up assist.   -    Recorded by   [] AYAD Vazquez/L    Upper Body Dressing Assessment/Training    UB Dressing Assess/Train, Clothing Type   doffing:;donning:;button up  -    UB Dressing Assess/Train, Position   sitting  -    UB Dressing Assess/Train, [place]   set up required;supervision required;verbal cues required;minimum assist (75% patient effort)  -    UB Dressing Assess/Train, Comment   pt required min-mod assist to doff jacket to get over both shoulders in the back then she could take the rest of the way off, she was able to doff a button up shirt. Pt able to don a button up shirt set up assist.   -    Recorded by   [] AYAD Vazquez/L    Lower Body Dressing Assessment/Training    LB Dressing Assess/Train, Clothing Type   doffing:;donning:;pants   underwear, yessi hose  -    LB Dressing Assess/Train, Position   sitting;standing  -    LB Dressing Assess/Train, [place]   minimum assist (75% patient effort);verbal cues required;supervision required;set up required;contact guard assist;maximum assist (25% patient effort)  -    LB Dressing Assess/Train, Comment   max to dependent for don and doff yessi hose, total assist with cath into pants/underwear. pt required min assist to don shoes and total assist to tie shoes. Pt used reacher and long handle shoe horn to engage in LB dressing tasks. Pt CGA to SBA to stand to pull up and down pants;underwear. Pt took extended time and effort.   -    Recorded by   [] AYAD Vazquez/L    Toileting Assessment/Training    Toileting Assess/Train, Comment   OT educated on AE use and protecting hip during sponge bath and dressing tasks. Pt struggled with using them accurately.   -    Recorded by   [] AYAD Vazquez/L    Grooming Assessment/Training    Grooming Assess/Train, Comment   Pt set up assist to apply deodorant.   -    Recorded by   [] AYAD Vazquez/L    Balance Skills Training    Sitting-Level of  Assistance   Distant supervision  -    Standing-Level of Assistance   Contact guard;Close supervision  -BH    Recorded by   [] AYAD Vazquez/L    Therapy Exercises    Bilateral Lower Extremities AROM:;20 reps;hip abduction/adduction;supine;glut sets;heel slides;SAQ;quad sets  -      Bilateral Upper Extremity   AROM:;20 reps;sitting;elbow flexion/extension;hand pumps;shoulder extension/flexion   wrist flexion/extension  -    Recorded by [RW] Xiang Jose PTA  [] AYAD Vazquez/L    Gross Motor Coordination Training    Gross Motor Skill, Impairments Detail   Pt engaged in 10 minutes of [place] arm bike fair+ toleration completed 10 minutes no rest breaks.   -    Recorded by   [] AYAD Vazquez/L    Positioning and Restraints    Pre-Treatment Position sitting in chair/recliner  -RW sitting in chair/recliner  -EK other (comment)   in w/c  -BH    Post Treatment Position bed  -RW chair  -EK wheelchair  -BH    In Bed call light within reach;legs elevated  -RW call light within reach;encouraged to call for assist;exit alarm on  -EK     In Wheelchair   sitting;call light within reach;encouraged to call for assist   SLP going in room  -    Recorded by [RW] Xiang Jose PTA [EK] Kizzy Ch CCC-SLP [] AYAD Vazquez/HINA      02/01/18 0805 01/31/18 1415 01/31/18 1302    Rehab Assessment/Intervention    Discipline physical therapy assistant  -RW physical therapy assistant  -RW occupational therapy assistant  -RC    Document Type therapy note (daily note)  -RW therapy note (daily note)  -RW therapy note (daily note)  -RC    Subjective Information agree to therapy  -RW agree to therapy  -RW agree to therapy  -RC    Patient Effort, Rehab Treatment good  -RW good  -RW good  -RC    Symptoms Noted During/After Treatment   fatigue  -RC    Precautions/Limitations   fall precautions  -RC    Recorded by [RW] Xiang Jose PTA [RW] Xiang Jose PTA [RC] OMAR Shah/HINA     Vital Signs    Intratreatment Heart Rate (beats/min) 79  -RW      Intra SpO2 (%) 96  -RW      O2 Delivery Intra Treatment supplemental O2  -RW      Recorded by [RW] Xiang Jose PTA      Pain Assessment    Pain Assessment No/denies pain  -RW  No/denies pain  -RC    Pain Score  6  -RW     Post Pain Score  6  -RW     Pain Location  Hip  -RW     Pain Orientation  Right  -RW     Recorded by [RW] Xiang Jose PTA [RW] Xiang Jose PTA [RC] Lianne Hernandez, NELSON/L    Cognitive Assessment/Intervention    Current Cognitive/Communication Assessment functional  -RW functional  -RW functional  -RC    Orientation Status oriented x 4  -RW oriented x 4  -RW oriented x 4  -RC    Follows Commands/Answers Questions 100% of the time  -% of the time  -RW     Personal Safety Interventions gait belt;nonskid shoes/slippers when out of bed  -RW gait belt;nonskid shoes/slippers when out of bed  -RW     Recorded by [RW] Xiang Jose PTA [RW] Xiang Jose PTA [RC] Lianne Hernandez, NELSON/L    Mobility Assessment/Training    Right Lower Extremity Weight-Bearing weight-bearing as tolerated  -RW weight-bearing as tolerated  -RW     Recorded by [RW] Xiang Jose PTA [RW] Xiang Jose PTA     Bed Mobility, Assessment/Treatment    Bed Mobility, Assistive Device  bed rails;head of bed elevated  -RW     Bed Mob, Supine to Sit, [place]  contact guard assist  -RW supervision required  -RC    Bed Mob, Sit to Supine, [place]   minimum assist (75% patient effort)  -RC    Recorded by  [RW] Xiang Jose PTA [RC] Lianne Hernandez, NELSON/L    Transfer Assessment/Treatment    Transfers, Bed-Chair [place] stand by assist;contact guard assist  -RW      Transfers, Bed-Chair-Bed, Assist Device rolling walker  -RW      Transfers, Sit-Stand [place] supervision required  -RW supervision required  -RW supervision required  -RC    Transfers, Stand-Sit [place] supervision required  -RW supervision required  -RW  supervision required  -RC    Transfers, Sit-Stand-Sit, Assist Device rolling walker  -RW rolling walker  -RW rolling walker  -RC    Recorded by [RW] Xiang Jose PTA [RW] Xiang Jose PTA [RC] YULI ShahA/L    Gait Assessment/Treatment    Gait, [place] Level contact guard assist;stand by assist  -RW contact guard assist;stand by assist  -RW     Gait, Assistive Device rolling walker  -RW rolling walker  -RW     Gait, Distance (Feet) 20  -RW 48  -RW     Recorded by [RW] Xiang Jose PTA [RW] Xiang Jose PTA     Stairs Assessment/Treatment    Number of Stairs 4 and then curb  -RW      Stairs, Handrail Location left side (ascending)  -RW      Stairs, [place] Level contact guard assist;verbal cues required  -RW      Stairs, Assistive Device walker   on curb  -RW      Recorded by [RW] Xiang Jose PTA      Functional Mobility    Functional Mobility- Ind. Level contact guard assist;supervision required  -RW contact guard assist;supervision required  -RW contact guard assist  -RC    Functional Mobility- Device rolling walker  -RW rolling walker  -RW rolling walker  -RC    Functional Mobility-Distance (Feet)   10  -RC    Recorded by [RW] Xiang Jose PTA [RW] Xiang Jose PTA [RC] YULI ShahA/L    Wheelchair Training/Management    Wheelchair, Distance Propelled  100 mod ind  -RW     Recorded by  [RW] Xiang Jose PTA     Upper Body Bathing Assessment/Training    UB Bathing Assess/Train Assistive Device   --   sponge bath  -RC    UB Bathing Assess/Train, Position   edge of bed;sitting  -RC    UB Bathing Assess/Train, [place] Level   set up required  -RC    Recorded by   [RC] Lianne Hernandez NELSON/L    Lower Body Bathing Assessment/Training    LB Bathing Assess/Train Assistive Device   --   sponge  -RC    LB Bathing Assess/Train, Position   edge of bed;sitting  -RC    LB Bathing Assess/Train, [place] Level   stand by assist  -RC    Recorded by   [RC] Lianne BOX  OMAR Hernandez/L    Upper Body Dressing Assessment/Training    UB Dressing Assess/Train, Clothing Type   doffing:;donning:;button up  -RC    UB Dressing Assess/Train, Position   sitting;edge of bed  -RC    UB Dressing Assess/Train, [place]   set up required  -RC    Recorded by   [RC] OMAR Shah/L    Lower Body Dressing Assessment/Training    LB Dressing Assess/Train, Clothing Type   doffing:;donning:;pants  -RC    LB Dressing Assess/Train, Assist Device   reacher  -RC    LB Dressing Assess/Train, Position   sitting;standing  -RC    LB Dressing Assess/Train, [place]   verbal cues required;minimum assist (75% patient effort)  -RC    LB Dressing Assess/Train, Comment   --   @ w/ cath into pants  -RC    Recorded by   [RC] OMAR Shah/L    Grooming Assessment/Training    Grooming Assess/Train, Position   standing;sink side  -RC    Grooming Assess/Train, [place]ep[place] Level   contact guard assist  -RC    Recorded by   [RC] OMAR Shah/L    Balance Skills Training    Standing-Level of Assistance   Contact guard  -RC    Static Standing Balance Support   assistive device  -RC    Standing Balance # of Minutes   7  -RC    Recorded by   [RC] OMAR Shah/L    Therapy Exercises    Bilateral Lower Extremities AROM:;20 reps;ankle pumps/circles;hip abduction/adduction;sitting;knee flexion;LAQ;glut sets  -RW AROM:;20 reps;ankle pumps/circles;hip abduction/adduction;glut sets;sitting;knee flexion;LAQ  -RW     BLE Resistance theraband  -RW theraband  -RW     Recorded by [RW] Xiang Jose PTA [RW] Xiang Jose PTA     Positioning and Restraints    Pre-Treatment Position sitting in chair/recliner  -RW in bed  -RW in bed  -RC    Post Treatment Position wheelchair  -RW wheelchair  -RW bed  -RC    In Bed   call light within reach;encouraged to call for assist  -RC    Recorded by [RW] Xiang Jose PTA [RW] Xiang Jose PTA [RC] OMAR Shah/HINA      01/31/18 1108 01/31/18 0935  01/31/18 0815    Rehab Assessment/Intervention    Discipline speech language pathologist  -CK occupational therapy assistant  -RC physical therapy assistant  -RW    Document Type therapy note (daily note)  -CK therapy note (daily note)  -RC therapy note (daily note)  -RW    Subjective Information agree to therapy  -CK agree to therapy  -RC     Patient Effort, Rehab Treatment good  -CK good  -RC     Recorded by [CK] Karen Ch MS CCC-SLP [RC] OMAR hSah/L [RW] Xiang Jose PTA    Vital Signs    Pre Systolic BP Rehab   149  -RW    Pre Treatment Diastolic BP   71  -RW    Post Systolic BP Rehab  134  -  -RW    Post Treatment Diastolic BP  63  -RC 56  -RW    Pretreatment Heart Rate (beats/min)  72  -RC 85  -RW    Intratreatment Heart Rate (beats/min)  74  -RC     Posttreatment Heart Rate (beats/min)  72  -RC 80  -RW    Pre SpO2 (%)   98  -RW    O2 Delivery Pre Treatment   supplemental O2  -RW    Intra SpO2 (%)  94  -RC     O2 Delivery Intra Treatment  supplemental O2  -RC     Recorded by  [RC] OMAR Shah/L [RW] Xiang Jose PTA    Pain Assessment    Pain Assessment No/denies pain  -CK  No/denies pain  -RW    Recorded by [CK] Karen Ch MS CCC-SLP  [RW] Xiang Jose PTA    Cognitive Assessment/Intervention    Current Cognitive/Communication Assessment  functional  -RC functional  -RW    Orientation Status  oriented x 4  -RC oriented x 4  -RW    Follows Commands/Answers Questions   100% of the time  -RW    Personal Safety Interventions   gait belt;nonskid shoes/slippers when out of bed  -RW    Recorded by  [RC] OMAR Shah/L [RW] Xiang Jose PTA    Communication Treatment Objective and Progress    Cognitive Linguistic Treatment Objectives Improve memory skills;Improve functional problem solving;Improve executive function skills  -CK      Recorded by [CK] Karen Ch MS CCC-SLP      Improve memory skills    Improve memory skills through: recalling  unrelated word lists with an imposed delay;listen to a paragraph and answer questions;90%  -CK      Status: Improve memory skills Progressing as expected  -CK      Memory Skills Progress 80%  -CK      Recorded by [CK] Karen Ch MS CCC-SLP      Improve functional problem solving    Improve functional problem solving through: complete functional math task;90%  -CK      Status: Improve functional problem solving Other (comment)   Deferred this date  -CK      Functional Problem Solving Progress continue to address  -CK      Recorded by [CK] Karen Ch MS CCC-SLP      Improve executive function skills    Improve executive function skills: exhibit cognitive flexibility;organization/planning activity;90%  -CK      Status: Improve executive function skills Progressing as expected  -CK      Executive Function Skills Progress 100%;60%  -CK      Comments: Improve executive function skills Improved time w/cog flex task  -CK      Recorded by [CK] Karen Ch MS CCC-SLP      Mobility Assessment/Training    Right Lower Extremity Weight-Bearing   weight-bearing as tolerated  -RW    Recorded by   [RW] Xiang Jose PTA    Bed Mobility, Assessment/Treatment    Bed Mobility, Assistive Device   --  -RW    Bed Mob, Supine to Sit, [place]  supervision required  -RC     Bed Mob, Sit to Supine, [place]  minimum assist (75% patient effort)  -RC --  -RW    Recorded by  [RC] OMAR Shah/L [RW] Xiang Jose PTA    Transfer Assessment/Treatment    Transfers, Bed-Chair [place]  contact guard assist  -RC     Transfers, Chair-Bed [place]  contact guard assist  -RC     Transfers, Bed-Chair-Bed, Assist Device  rolling walker  -RC     Transfers, Sit-Stand [place]  supervision required;stand by assist  -RC supervision required  -RW    Transfers, Stand-Sit [place]  supervision required  -RC supervision required  -RW    Transfers, Sit-Stand-Sit, Assist Device  rolling walker  -RC  rolling walker  -RW    Recorded by  [RC] OMAR Shah/L [RW] Xiang Jose PTA    Gait Assessment/Treatment    Gait, [place] Level   contact guard assist;stand by assist  -RW    Gait, Assistive Device   rolling walker  -RW    Gait, Distance (Feet)   70    x 2  -RW    Recorded by   [RW] Xiang Jose PTA    Functional Mobility    Functional Mobility- Ind. Level  contact guard assist  -RC contact guard assist;supervision required  -RW    Functional Mobility- Device  rolling walker  -RC rolling walker  -RW    Functional Mobility-Distance (Feet)  10  -RC     Recorded by  [RC] OMAR Shah/L [RW] Xiang Jose PTA    Wheelchair Training/Management    Wheelchair Propulsion Training  forward propulsion  -RC     Wheelchair, Distance Propelled  150  -RC     Recorded by  [RC] OMAR Shah/L     Grooming Assessment/Training    Grooming Assess/Train, Position  sitting  -RC     Grooming Assess/Train, [place]ep[place] Level  set up required  -RC     Recorded by  [RC] OMAR Shah/L     Therapy Exercises    Bilateral Lower Extremities   AROM:;20 reps;ankle pumps/circles;hip abduction/adduction;glut sets;sitting;knee flexion;LAQ;hip extension   2 sets  -RW    Bilateral Upper Extremity  --   ue bike 10 min, pulley ex 8 min  -RC     Recorded by  [RC] OMAR Shah/L [RW] Xiang Jose PTA    Positioning and Restraints    Pre-Treatment Position in bed  -CK in bed  -RC sitting in chair/recliner  -RW    Post Treatment Position bed  -CK bed  -RC wheelchair  -RW    In Bed supine;call light within reach;encouraged to call for assist;side rails up x2  -CK encouraged to call for assist;call light within reach  -RC     Recorded by [CK] Karen Ch MS CCC-SLP [RC] OMAR Shah/L [RW] Xiang Jose PTA      01/30/18 1500 01/30/18 1400 01/30/18 1055    Rehab Assessment/Intervention    Discipline physical therapy assistant  -RW occupational therapy assistant  -RC physical therapy  assistant  -RW    Document Type therapy note (daily note)  -RW therapy note (daily note)  -RC therapy note (daily note)  -RW    Subjective Information agree to therapy  -RW agree to therapy  -RC agree to therapy  -RW    Patient Effort, Rehab Treatment good  -RW good  -RC good  -RW    Symptoms Noted During/After Treatment  fatigue  -RC fatigue  -RW    Recorded by [RW] Xiang Jose PTA [RC] Lianne Hernandez, NELSON/L [RW] Xiang Jose PTA    Vital Signs    Pretreatment Heart Rate (beats/min) 76  -RW      Pre SpO2 (%) 95  -RW      O2 Delivery Pre Treatment supplemental O2  -RW      Recorded by [RW] Xiang Jose PTA      Pain Assessment    Pain Assessment  --   did not give # c/o discomfort in catheter area  -RC --   no pain reported  -RW    Pain Score 6  -RW      Post Pain Score 6  -RW      Pain Location Generalized  -RW      Response to Interventions  --   REPORTED TO NURSING  -RC     Recorded by [RW] Xiang Jose PTA [RC] Lianne Hernandez, NELSON/L [RW] Xiang Jose PTA    Cognitive Assessment/Intervention    Current Cognitive/Communication Assessment functional  -RW functional  -RC functional  -RW    Orientation Status oriented x 4  -RW oriented x 4  -RC oriented x 4  -RW    Follows Commands/Answers Questions 100% of the time  -RW  100% of the time  -RW    Personal Safety Interventions gait belt;nonskid shoes/slippers when out of bed  -RW  gait belt;nonskid shoes/slippers when out of bed  -RW    Recorded by [RW] Xiang Jose PTA [RC] Lianne Hernandez, NELSON/L [RW] Xiang Jose PTA    Mobility Assessment/Training    Right Lower Extremity Weight-Bearing weight-bearing as tolerated  -RW  weight-bearing as tolerated  -RW    Recorded by [RW] Xiang Jose PTA  [RW] Xiang Jose PTA    Bed Mobility, Assessment/Treatment    Bed Mobility, Assistive Device bed rails;head of bed elevated  -RW      Bed Mob, Sit to Supine, [place] supervision required  -RW      Recorded by [RW] Xiang Jose PTA       Transfer Assessment/Treatment    Transfers, Sit-Stand [place] supervision required  -RW  supervision required  -RW    Transfers, Stand-Sit [place] supervision required  -RW  supervision required  -RW    Transfers, Sit-Stand-Sit, Assist Device rolling walker  -RW  rolling walker  -RW    Transfer, Comment   sit to stand x 5  -RW    Recorded by [RW] Xiang Jose PTA  [RW] Xiang Jose PTA    Gait Assessment/Treatment    Gait, [place] Level contact guard assist;stand by assist  -RW  contact guard assist;stand by assist  -RW    Gait, Assistive Device rolling walker  -RW  rolling walker  -RW    Gait, Distance (Feet) 12   around bed into bed  -RW  30  -RW    Recorded by [RW] Xiang Jose PTA  [RW] Xiang Jose PTA    Functional Mobility    Functional Mobility- Ind. Level contact guard assist;supervision required  -RW  contact guard assist;supervision required  -RW    Functional Mobility- Device rolling walker  -RW  rolling walker  -RW    Functional Mobility-Distance (Feet) 12  -RW  30  -RW    Recorded by [RW] Xiang Jose PTA  [RW] Xiang Jose PTA    Lower Body Dressing Assessment/Training    LB Dressing Assess/Train, Clothing Type  doffing:;donning:;socks  -RC     LB Dressing Assess/Train, Assist Device  long-handled shoe horn;reacher;sock-aid;dressing stick  -RC     LB Dressing Assess/Train, Position  sitting  -RC     LB Dressing Assess/Train, [place]  verbal cues required  -RC     LB Dressing Assess/Train, Comment  --   practiced using AE for lb dressing multi x  -RC     Recorded by  [RC] OMAR Shah/L     Therapy Exercises    Bilateral Lower Extremities AROM:;20 reps;ankle pumps/circles;hip abduction/adduction;supine;AAROM:;glut sets;heel slides;quad sets  -RW  AROM:;20 reps;ankle pumps/circles;hip abduction/adduction;knee flexion   2 sets   -RW    BLE Resistance   theraband  -RW    Recorded by [RW] Xiang Jose PTA  [RW] Xiang Jose PTA    Positioning and  Restraints    Pre-Treatment Position sitting in chair/recliner  -RW sitting in chair/recliner  -RC sitting in chair/recliner  -RW    Post Treatment Position bed  -RW wheelchair  -RC wheelchair  -RW    In Bed call light within reach;legs elevated  -RW      In Wheelchair  call light within reach;encouraged to call for assist  -RC call light within reach  -RW    Recorded by [RW] Xiang Jose, PTA [RC] OMAR Shah/L [RW] Xiang Jose, PTA      01/30/18 0940 01/30/18 0846       Rehab Assessment/Intervention    Discipline occupational therapy assistant  -RC speech language pathologist  -CK     Document Type therapy note (daily note)  -RC therapy note (daily note)  -CK     Subjective Information agree to therapy  -RC agree to therapy  -CK     Patient Effort, Rehab Treatment good  -RC good  -CK     Recorded by [RC] OMAR Shah/HINA [CK] Karen Ch MS CCC-SLP     Pain Assessment    Pain Assessment  No/denies pain  -CK     Post Pain Score 3  -RC      Pain Type Acute pain  -RC      Pain Location Generalized  -RC      Pain Intervention(s) Medication (See MAR);Repositioned  -RC      Recorded by [RC] OMAR Shah/HINA [CK] Karen Ch MS CCC-SLP     Cognitive Assessment/Intervention    Current Cognitive/Communication Assessment functional  -RC      Orientation Status oriented x 4  -RC      Recorded by [RC] YULI ShahA/HINA      Communication Treatment Objective and Progress    Cognitive Linguistic Treatment Objectives  Improve memory skills;Improve functional problem solving;Improve executive function skills  -CK     Recorded by  [CK] Karen Ch MS CCC-SLP     Improve memory skills    Improve memory skills through:  recalling unrelated word lists with an imposed delay;listen to a paragraph and answer questions;90%  -CK     Status: Improve memory skills  Progressing as expected  -CK     Memory Skills Progress  70%  -CK     Recorded by  [CK] Karen Ch MS CCC-SLP      Improve functional problem solving    Improve functional problem solving through:  complete functional math task;90%  -CK     Status: Improve functional problem solving  Progressing as expected  -CK     Functional Problem Solving Progress  80%  -CK     Recorded by  [CK] Karen Ch MS CCC-SLP     Improve executive function skills    Improve executive function skills:  exhibit cognitive flexibility;organization/planning activity;90%  -CK     Status: Improve executive function skills  Progressing as expected  -CK     Executive Function Skills Progress  100%  -CK     Recorded by  [CK] Karen Ch MS CCC-SLP     Functional Mobility    Functional Mobility- Ind. Level contact guard assist  -RC      Functional Mobility- Device rolling walker  -RC      Functional Mobility-Distance (Feet) 15  -RC      Recorded by [RC] YULI ShahA/L      Upper Body Bathing Assessment/Training    UB Bathing Assess/Train, Position edge of bed  -RC      UB Bathing Assess/Train, [place] Level set up required  -RC      Recorded by [RC] YULI ShahA/L      Lower Body Bathing Assessment/Training    LB Bathing Assess/Train, Position sitting;edge of bed  -RC      LB Bathing Assess/Train, [place] Level minimum assist (75% patient effort)  -RC      LB Bathing Assess/Train, Comment --   partial lb bath,per pt request  -RC      Recorded by [RC] YULI ShahA/L      Upper Body Dressing Assessment/Training    UB Dressing Assess/Train, Clothing Type doffing:;donning:;button up  -RC      UB Dressing Assess/Train, Position sitting  -RC      UB Dressing Assess/Train, [place] set up required  -RC      Recorded by [RC] YULI ShahA/L      Lower Body Dressing Assessment/Training    LB Dressing Assess/Train, Clothing Type doffing:;donning:;shorts;pants  -RC      LB Dressing Assess/Train, Position sitting;standing  -RC      LB Dressing Assess/Train, [place] --   sba and @ w/ catheter  -RC       Recorded by [RC] OMAR Shah/L      Grooming Assessment/Training    Grooming Assess/Train, Position sitting  -RC      Grooming Assess/Train, [place] Level independent  -RC      Recorded by [RC] OMAR Shah/L      Therapy Exercises    Bilateral Upper Extremity AROM:;15 reps;sitting;elbow flexion/extension;shoulder abduction/adduction;shoulder extension/flexion;shoulder protraction/retraction;shoulder rolls/shrugs   3 sets  -RC      BUE Resistance manual resistance- minimal  -RC      Recorded by [RC] OMAR Shah/L      Positioning and Restraints    Pre-Treatment Position in bed  -RC in bed  -CK     Post Treatment Position wheelchair  -RC bed  -CK     In Bed  supine;call light within reach;encouraged to call for assist;side rails up x2  -CK     In Wheelchair encouraged to call for assist  -RC      Recorded by [RC] OMAR Shah/HINA [CK] Karen Ch, MS CCC-SLP       User Key  (r) = Recorded By, (t) = Taken By, (c) = Cosigned By    Initials Name Effective Dates    EK Kizzy Ch, CCC-SLP 04/06/17 -      Marisol Burdick, OTR/L 10/17/16 -     CK Karen Ch, MS CCC-SLP 10/17/16 -     RW Xiang Jose, PTA 10/17/16 -     RC Lianne Hernandez NELSON/HINA 10/17/16 -                 IP PT Goals       02/01/18 1605 01/31/18 1527 01/30/18 1607    Bed Mobility PT LTG    Bed Mobility PT LTG, Date Goal Reviewed 02/01/18  -RW 01/31/18  -RW 01/30/18  -RW    Bed Mobility PT LTG, Outcome goal ongoing  -RW goal ongoing  -RW goal ongoing  -RW    Transfer Training PT LTG    Transfer Training PT  LTG, Date Goal Reviewed 02/01/18  -RW 01/31/18  -RW 01/30/18  -RW    Transfer Training PT LTG, Outcome  goal ongoing  -RW goal ongoing  -RW    Gait Training PT STG    Gait Training Goal PT STG, Date Goal Reviewed 02/01/18  -RW 01/31/18  -RW 01/30/18  -RW    Gait Training Goal PT STG, Outcome goal ongoing  -RW goal ongoing  -RW goal ongoing  -RW    Stair Training PT LTG    Stair Training  Goal PT LTG, Date Goal Reviewed 02/01/18  -RW 01/31/18  -RW 01/30/18  -RW    Stair Training Goal PT LTG, Outcome goal ongoing  -RW goal ongoing  -RW goal ongoing  -RW      01/29/18 1520 01/27/18 1422 01/27/18 0929    Bed Mobility PT LTG    Bed Mobility PT LTG, Date Established   01/27/18  -MN    Bed Mobility PT LTG, Time to Achieve   by discharge  -MN    Bed Mobility PT LTG, Activity Type   roll left/roll right;supine to sit/sit to supine  -MN    Bed Mobility PT LTG, [place] Level   independent  -MN    Bed Mobility PT LTG, Additional Goal   HOB down  -MN    Bed Mobility PT LTG, Date Goal Reviewed 01/29/18  -RW 01/27/18  -CA     Bed Mobility PT LTG, Outcome goal ongoing  -RW goal ongoing  -CA     Transfer Training PT LTG    Transfer Training PT LTG, Date Established   01/27/18  -MN    Transfer Training PT LTG, Time to Achieve   by discharge  -MN    Transfer Training PT LTG, Activity Type   bed to chair /chair to bed;sit to stand/stand to sit;toilet  -MN    Transfer Training PT LTG, [place] Level   supervision required  -MN    Transfer Training PT LTG, Assist Device   walker, rolling  -MN    Transfer Training PT  LTG, Date Goal Reviewed 01/29/18  -RW 01/27/18  -CA     Transfer Training PT LTG, Outcome goal ongoing  -RW goal ongoing  -CA     Gait Training PT STG    Gait Training Goal PT STG, Date Established   01/27/18  -MN    Gait Training Goal PT STG, Time to Achieve   by discharge  -MN    Gait Training Goal PT STG, [place] Level   supervision required  -MN    Gait Training Goal PT STG, Assist Device   walker, rolling  -MN    Gait Training Goal PT STG, Distance to Achieve   150 ft  -MN    Gait Training Goal PT STG, Date Goal Reviewed 01/29/18  -RW 01/27/18  -CA     Gait Training Goal PT STG, Outcome goal ongoing  -RW goal ongoing  -CA     Stair Training PT LTG    Stair Training Goal PT LTG, Date Established   01/27/18  -MN    Stair Training Goal PT LTG, Time to Achieve   by discharge  -MN    Stair  Training Goal PT LTG, Number of Steps   2  -MN    Stair Training Goal PT LTG, [place] Level   contact guard assist  -MN    Stair Training Goal PT LTG, Assist Device   1 handrail  -MN    Stair Training Goal PT LTG, Date Goal Reviewed 01/29/18  -RW 01/27/18  -CA     Stair Training Goal PT LTG, Outcome goal ongoing  -RW goal ongoing  -CA       01/26/18 1539 01/26/18 1058 01/24/18 1424    Bed Mobility PT LTG    Bed Mobility PT LTG, Date Goal Reviewed 01/26/18  -CB 01/26/18  -RONAN 01/24/18  -RONAN    Bed Mobility PT LTG, Outcome goal not met  -CB goal ongoing  -RONAN goal ongoing  -RONAN    Bed Mobility PT LTG, Reason Goal Not Met discharged from facility  -CB      Gait Training PT LTG    Gait Training Goal PT LTG, Date Goal Reviewed 01/26/18  -CB 01/26/18  -RONAN 01/24/18  -RONAN    Gait Training Goal PT LTG, Outcome goal not met  -CB goal ongoing  -RONAN goal ongoing  -RONAN    Gait Training Goal PT LTG, Reason Goal Not Met discharged from facility  -CB      Strength Goal PT LTG    Strength Goal PT LTG, Date Goal Reviewed 01/26/18  -CB 01/26/18  -RONAN 01/24/18  -RONAN    Strength Goal PT LTG, Outcome goal not met  -CB goal ongoing  -RONAN goal ongoing  -RONAN    Strength Goal PT LTG, Reason Goal Not Met discharged from facility  -CB        01/24/18 1008          Bed Mobility PT LTG    Bed Mobility PT LTG, Time to Achieve by discharge  -CB (r) HL (t) CB (c)      Bed Mobility PT LTG, Activity Type all bed mobility  -CB (r) HL (t) CB (c)      Bed Mobility PT LTG, [place] Level minimum assist (75% patient effort)  -CB (r) HL (t) CB (c)      Bed Mobility PT LTG, Additional Goal HOB down, no handrails  -CB (r) HL (t) CB (c)      Gait Training PT LTG    Gait Training Goal PT LTG, Time to Achieve by discharge  -CB (r) HL (t) CB (c)      Gait Training Goal PT LTG, [place] Level contact guard assist  -CB (r) HL (t) CB (c)      Gait Training Goal PT LTG, Assist Device walker, rolling  -CB (r) HL (t) CB (c)      Gait Training Goal PT LTG, Distance  to Achieve 50 feet  -CB (r) HL (t) CB (c)      Strength Goal PT LTG    Strength Goal PT LTG, Time to Achieve by discharge  -CB (r) HL (t) CB (c)      Strength Goal PT LTG, Measure to Achieve (P)  pt will perform 20 reps hip flexion, hip ABD/ADD, and knee flexion/extension on right LE. AAROM or AROM  -HL      Strength Goal PT LTG, Functional Goal (P)  Getting legs up into bed   -HL        User Key  (r) = Recorded By, (t) = Taken By, (c) = Cosigned By    Initials Name Provider Type    CB Leatha Tirado, PT Physical Therapist    MN Hallie Negrete, PT Physical Therapist    CA Mendez Zaldivar, PTA Physical Therapy Assistant    RNOAN King, PTA Physical Therapy Assistant    RW Xiang Jose, PTA Physical Therapy Assistant    HL Harper Ang, PT Student PT Student          Physical Therapy Education     Title: PT OT SLP Therapies (Active)     Topic: Physical Therapy (Active)     Point: Mobility training (Done)    Learning Progress Summary    Learner Readiness Method Response Comment Documented by Status   Patient Acceptance E VU,NR role of PT. scheduling. safety with w/c. no OOb without assist MN 01/27/18 0929 Done               Point: Precautions (Done)    Learning Progress Summary    Learner Readiness Method Response Comment Documented by Status   Patient Acceptance E VU,NR role of PT. scheduling. safety with w/c. no OOb without assist MN 01/27/18 0929 Done                      User Key     Initials Effective Dates Name Provider Type Discipline    MN 10/17/16 -  Hallie Negrete, PT Physical Therapist PT                    PT Recommendation and Plan  Anticipated Equipment Needs At Discharge: front wheeled walker  Anticipated Discharge Disposition: home with 24/7 care, home with home health, skilled nursing facility  Planned Therapy Interventions: balance training, bed mobility training, gait training, home exercise program, patient/family education, stair training, strengthening, stretching, transfer training,  wheelchair management/propulsion training  PT Frequency: other (see comments) (5-14x/week)  Plan of Care Review  Plan Of Care Reviewed With: patient  Outcome Summary/Follow up Plan: pt feeling bad this pm and bp was very low untill pt returned to bed and legs elevated elevating bp back into  higer range. overall doing well with mobility pt has decided that she wants to go to snf at [facility] to better prepare for home alone.          Outcome Measures       02/01/18 0912 01/31/18 1302 01/30/18 1400    How much help from another is currently needed...    Putting on and taking off regular lower body clothing? 3  - 3  -RC 3  -RC    Bathing (including washing, rinsing, and drying) 3  - 3  -RC 3  -RC    Toileting (which includes using toilet bed pan or urinal) 2  - 2  -RC 2  -RC    Putting on and taking off regular upper body clothing 3  - 3  -RC 3  -RC    Taking care of personal grooming (such as brushing teeth) 3  - 3  -RC 3  -RC    Eating meals 4  - 4  -RC 4  -RC    Score 18  - 18  -RC 18  -RC    Functional Assessment    Outcome Measure Options AM-PAC 6 Clicks Daily Activity (OT)  -        01/30/18 1100          How much help from another person do you currently need...    Turning from your back to your side while in flat bed without using bedrails? 4  -RW      Moving from lying on back to sitting on the side of a flat bed without bedrails? 3  -RW      Moving to and from a bed to a chair (including a wheelchair)? 3  -RW      Standing up from a chair using your arms (e.g., wheelchair, bedside chair)? 3  -RW      Climbing 3-5 steps with a railing? 2  -RW      To walk in hospital room? 3  -RW      AM-PAC 6 Clicks Score 18  -RW        User Key  (r) = Recorded By, (t) = Taken By, (c) = Cosigned By    Initials Name Provider Type     Marisol Burdick, OTR/L Occupational Therapist    YULISA Jose, JEANNETTE Physical Therapy Assistant    YULI CazaresA/L Occupational Therapy Assistant           Time  Calculation:         PT Charges       02/01/18 1608 02/01/18 0951       Time Calculation    Start Time 1335  -RW 0805  -RW     Stop Time 1420  -RW 0854  -RW     Time Calculation (min) 45 min  -RW 49 min  -RW     Time Calculation- PT    Total Timed Code Minutes- PT 45 minute(s)  -RW 49 minute(s)  -RW       User Key  (r) = Recorded By, (t) = Taken By, (c) = Cosigned By    Initials Name Provider Type    YULISA Jose PTA Physical Therapy Assistant          Therapy Charges for Today     Code Description Service Date Service Provider Modifiers Qty    69417648699 HC GAIT TRAINING EA 15 MIN 1/31/2018 Xiang Jsoe, PTA GP 1    03610295303 HC PT THER PROC EA 15 MIN 1/31/2018 Xiang Jose, PTA GP 2    19469957649 HC PT THERAPEUTIC ACT EA 15 MIN 1/31/2018 Xiang Jose, PTA GP 1    97410367466 HC GAIT TRAINING EA 15 MIN 1/31/2018 Xiang Jose, PTA GP 1    45483011629 HC PT THER PROC EA 15 MIN 1/31/2018 Xiang Jose, PTA GP 1    16017377800 HC PT THERAPEUTIC ACT EA 15 MIN 1/31/2018 Xiang Jose, PTA GP 1    69428180784 HC GAIT TRAINING EA 15 MIN 2/1/2018 Xiang Jose, PTA GP 1    61406238959 HC PT THER PROC EA 15 MIN 2/1/2018 Xiang Jose, PTA GP 1    04652909285 HC PT THERAPEUTIC ACT EA 15 MIN 2/1/2018 Xiang Jose, PTA GP 1    75115311435 HC PT THER PROC EA 15 MIN 2/1/2018 Xiang Jose, PTA GP 2    10678378867 HC PT THERAPEUTIC ACT EA 15 MIN 2/1/2018 Xiang Jose, PTA GP 1          PT G-Codes  Outcome Measure Options: AM-PAC 6 Clicks Daily Activity (OT)    Xiang Jose PTA  2/1/2018

## 2018-02-01 NOTE — PROGRESS NOTES
"   LOS: 6 days   Patient Care Team:  Roula Albert MD as PCP - General  Roula Albert MD as PCP - Claims Attributed    Subjective     Subjective:  Symptoms:  Stable.  (No White complaints. Pain when urethral lesion is painted with betadine. ).    Diet:  No nausea or vomiting.    Pain:  She complains of pain that is mild.        History taken from: patient chart    Objective     Vital Signs  Temp:  [96.8 °F (36 °C)] 96.8 °F (36 °C)  Heart Rate:  [71-79] 74  Resp:  [18-20] 20  BP: (135-147)/(61-67) 136/61    Objective:  General Appearance:  In no acute distress.    Vital signs: (most recent): Blood pressure 136/61, pulse 74, temperature 96.8 °F (36 °C), temperature source Tympanic, resp. rate 20, height 172.7 cm (67.99\"), weight 77.7 kg (171 lb 3.2 oz), SpO2 94 %.  Vital signs are normal.  No fever.    Output: Producing urine (White urine clear and yellow).    HEENT: Normal HEENT exam.    Lungs:  Normal respiratory rate and normal effort.  She is not in respiratory distress.  Breath sounds clear to auscultation.    Heart: Normal rate.  Regular rhythm.  S1 normal and S2 normal.    Chest: Symmetric chest wall expansion.   Abdomen: Abdomen is non-distended.  Bowel sounds are normal.   There is no abdominal tenderness.   (No change in size of periurethral lesion.).   Extremities: There is no dependent edema.    Pulses: Distal pulses are intact.    Neurological: Patient is alert.  GCS score is 15.    Pupils:  Pupils are equal, round, and reactive to light.    Skin:  Warm.  No ecchymosis or cyanosis.             Results Review:    Lab Results (last 24 hours)     Procedure Component Value Units Date/Time    Protime-INR [[id number]]  (Abnormal) Collected:  02/01/18 0501    Specimen:  Blood Updated:  02/01/18 0647     Protime 23.1 (H) Seconds      INR 2.03 (H)    Narrative:       Therapeutic range for most indications is 2.0-3.0 INR,  or 2.5-3.5 for mechanical heart valves.         Imaging Results (last 24 hours)     ** No " results found for the last 24 hours. **           I reviewed the patient's new clinical results.  I reviewed the patient's new imaging results and agree with the interpretation.  I reviewed the patient's other test results and agree with the interpretation      Assessment/Plan     Principal Problem:    Closed displaced basicervical fracture of right femur  Active Problems:    Essential hypertension    Coronary artery disease involving native coronary artery of native heart without angina pectoris    Acquired hypothyroidism    Depressive disorder    Pulmonary emphysema    Chronic systolic congestive heart failure    Hyponatremia    BENITO (acute kidney injury)    Urinary retention      Assessment:  (Urinary retention due to distal urethral lesion with associated pain, White anchored 1/29/18.).     Plan:   (Will try Estrace cream now to decrease size of lesion. Continue White. ).       CRESENCIO Edwards  02/01/18  3:36 PM

## 2018-02-01 NOTE — PROGRESS NOTES
Nutrition Services    Patient Name:  Val Arteaga  YOB: 1932  MRN: [id number]  Admit Date:  1/26/2018    Pt is refusing magic cup. Would like ensure with breakfast and supper only. Appetite is not too good.drank ensure at noon today.    Electronically signed by:  Sosa Verdin RD  02/01/18 4:04 PM

## 2018-02-01 NOTE — PLAN OF CARE
Problem: Patient Care Overview (Adult)  Goal: Plan of Care Review  Outcome: Ongoing (interventions implemented as appropriate)   02/01/18 1428   Coping/Psychosocial Response Interventions   Plan Of Care Reviewed With spouse   Outcome Evaluation   Outcome Summary/Follow up Plan Pt making good progress at this time. Pt will need 24/7 care at home for safety.    Patient Care Overview   Progress improving       Problem: Inpatient SLP  Goal: Cognitive-linguistic-Patient will improve Cognitive-linguistic skills to allow optimal participation in care  Outcome: Ongoing (interventions implemented as appropriate)   01/27/18 1347 01/31/18 1222 02/01/18 1428   Cognitive Linguistic- Optimal Participation in Care   Cognitive Linguistic Optimal Participation in Care- SLP, Date Established 01/27/18 --  --    Cognitive Linguistic Optimal Participation in Care- SLP, Time to Achieve by discharge --  --    Cognitive Linguistic Optimal Participation in Care- SLP, Activity Level Patient will improve memory skills for increased safety in environment;Patient will improve functional problem solving skills for increased safety in environment;Patient will improve Executive functioning skills for increased safety in environment --  --    Cognitive Linguistic Optimal Participation in Care- SLP, Date Goal Reviewed --  --  02/01/18   Cognitive Linguistic Optimal Participation in Care- SLP, Outcome --  goal ongoing --

## 2018-02-01 NOTE — SIGNIFICANT NOTE
Pt during therapy had a blood pressure reading of 77/44 with pt stating she isnt feeling well.pt rebounded quickly lying in bed and was 164/72 manually and feeling much better

## 2018-02-01 NOTE — THERAPY TREATMENT NOTE
Inpatient Rehabilitation - Speech Language Pathology Treatment Note  [facility]     Patient Name: Val Arteaga  : 1932  MRN: [id number]  Today's Date: 2018  Referring Physician: AZUL Maldonado      Admit Date: 2018     Goals:  1.  Pt will complete cognitive flexibility tasks w/90% acc:  Pt was given a deductive puzzle which required moderate cues for 60% accuracy.   2.  Pt will complete organization/planning activities w/90% acc: Task completed with 70% accuracy and minimal cues.   3.  Pt will complete functional math tasks w/90% acc:  Not directly addressed this date.  4.  Pt will recall unrelated word lists with an imposed delay w/90% acc:  3/3 with min cues  5.  Pt will listen to a paragraph and answer questions w/90% acc:  deferred this date    Visit Dx:      ICD-10-CM ICD-9-CM   1. Symbolic dysfunction R48.9 784.60   2. Impaired mobility and activities of daily living Z74.09 799.89   3. Impaired functional mobility, balance, gait, and endurance Z74.09 V49.89     Patient Active Problem List   Diagnosis   • Essential hypertension   • Coronary artery disease involving native coronary artery of native heart without angina pectoris   • Chronic obstructive pulmonary disease   • Acquired hypothyroidism   • Depressive disorder   • Thygeson's superficial punctate keratitis   • Pseudophakia   • Precordial pain   • Displaced fracture of base of neck of right femur, initial encounter for closed fracture   • Hip fracture, right, closed, initial encounter   • Pulmonary emphysema   • Chronic systolic congestive heart failure   • Hyponatremia   • BENITO (acute kidney injury)   • Acute blood loss anemia   • Urinary retention   • Closed displaced basicervical fracture of right femur              Adult Rehabilitation Note       18 1050 18 0912 18 0805    Rehab Assessment/Intervention    Discipline speech language pathologist  -EK occupational therapist  - physical therapy assistant  -RW     Document Type therapy note (daily note)  -EK therapy note (daily note)  -BH therapy note (daily note)  -RW    Subjective Information no complaints;agree to therapy  -EK agree to therapy  -BH agree to therapy  -RW    Patient Effort, Rehab Treatment good  -EK good  -BH good  -RW    Symptoms Noted During/After Treatment none  -EK fatigue  -BH     Precautions/Limitations  fall precautions;anterior hip precautions- right   WBAT  -BH     Recorded by [EK] Kizzy Ch CCC-SLP [BH] Marisol Burdick OTR/L [RW] Xiang Jose PTA    Vital Signs    Pre Systolic BP Rehab  112  -BH     Pre Treatment Diastolic BP  56  -BH     Post Systolic BP Rehab  107  -BH     Post Treatment Diastolic BP  54  -BH     Pretreatment Heart Rate (beats/min)  81  -BH     Intratreatment Heart Rate (beats/min)  83  -BH 79  -RW    Posttreatment Heart Rate (beats/min)  77  -BH     Pre SpO2 (%)  96  -BH     O2 Delivery Pre Treatment  supplemental O2  -BH     Intra SpO2 (%)  98  -BH 96  -RW    O2 Delivery Intra Treatment  supplemental O2  -BH supplemental O2  -RW    Post SpO2 (%)  95  -BH     O2 Delivery Post Treatment  supplemental O2  -BH     Pre Patient Position  Sitting  -BH     Intra Patient Position  Sitting  -BH     Post Patient Position  Sitting  -BH     Recorded by  [BH] Marisol Burdick OTR/L [RW] Xiang Jose PTA    Pain Assessment    Pain Assessment No/denies pain  -EK  No/denies pain  -RW    Pain Score 0  -EK 0  -BH     Post Pain Score 0  -EK 0  -BH     Recorded by [EK] Kizzy Ch CCC-SLP [BH] Marisol Brudick OTR/L [RW] Xiang Jose PTA    Cognitive Assessment/Intervention    Current Cognitive/Communication Assessment impaired  -EK functional  -BH functional  -RW    Orientation Status oriented x 4  -EK oriented x 4  -BH oriented x 4  -RW    Follows Commands/Answers Questions 100% of the time  -% of the time  -% of the time  -RW    Personal Safety decreased awareness, need for safety  -EK  WNL/WFL  -     Personal Safety Interventions  gait belt;nonskid shoes/slippers when out of bed;supervised activity;muscle strengthening facilitated  - gait belt;nonskid shoes/slippers when out of bed  -RW    Recorded by [EK] Kizzy Ch CCC-SLP [] Marisol Burdick, OTR/L [RW] Xiang Jose, PTA    Improve awareness of basic personal information    Improve awareness of basic personal information through: answering open-ended questions regarding personal/biographical information  -EK      Status: Improve awareness of basic personal information Progressing as expected  -EK      Awareness of Basic Personal Information Progress 90%  -EK      Recorded by [EK] MARZENA Ramirez      Improve memory skills    Improve memory skills through: recalling unrelated word lists with an imposed delay;listen to a paragraph and answer questions;90%  -EK      Status: Improve memory skills Progressing as expected  -EK      Memory Skills Progress 60%  -EK      Recorded by [EK] MARZENA Ramirez      Improve functional problem solving    Improve functional problem solving through: complete functional math task;90%  -EK      Recorded by [EK] Kizzy Ch CCCMARC      Improve executive function skills    Improve executive function skills: exhibit cognitive flexibility;organization/planning activity;90%  -EK      Status: Improve executive function skills Progressing as expected  -EK      Executive Function Skills Progress 70%  -EK      Recorded by [EK] Kizzy Ch [facility]-SLP      Mobility Assessment/Training    Right Lower Extremity Weight-Bearing   weight-bearing as tolerated  -RW    Recorded by   [RW] Xiang Jose PTA    Bed Mobility, Assessment/Treatment    Bed Mobility, Comment  defer pt up on w/c in room and wanted to stay.   -     Recorded by  [BH] Marisol Burdick, OTR/L     Transfer Assessment/Treatment    Transfers, Bed-Chair [place]    "stand by assist;contact guard assist  -    Transfers, Bed-Chair-Bed, Assist Device   rolling walker  -RW    Transfers, Sit-Stand [place]  supervision required;contact guard assist  - supervision required  -RW    Transfers, Stand-Sit [place]  supervision required;contact guard assist  - supervision required  -RW    Transfers, Sit-Stand-Sit, Assist Device  rolling walker  - rolling walker  -RW    Transfer, Comment  completed several sit to stand for ADL with vc for safety and formation.   -     Recorded by  [] Marisol Burdick, OTR/L [RW] Xiang Jose, PTA    Gait Assessment/Treatment    Gait, [place] Level   contact guard assist;stand by assist  -    Gait, Assistive Device   rolling walker  -RW    Gait, Distance (Feet)   20  -RW    Recorded by   [RW] Xiang Jose PTA    Stairs Assessment/Treatment    Number of Stairs   4 and then curb  -RW    Stairs, Handrail Location   left side (ascending)  -RW    Stairs, [place] Level   contact guard assist;verbal cues required  -RW    Stairs, Assistive Device   walker   on curb  -RW    Recorded by   [RW] Xiang Jose PTA    Functional Mobility    Functional Mobility- Ind. Level   contact guard assist;supervision required  -    Functional Mobility- Device   rolling walker  -RW    Recorded by   [RW] Xiang Jose PTA    Upper Body Bathing Assessment/Training    UB Bathing Assess/Train Assistive Device  long-handled sponge   wash cloth and water bath  -     UB Bathing Assess/Train, Position  supported sitting   w/c  -     UB Bathing Assess/Train, [place] Level  set up required  -     UB Bathing Assess/Train, Comment  pt provided long handle sponge declined to use stated she reached it \"good enough\" and was done, SBA  distant with set up to complete  -     Recorded by  [] Marisol Burdick, OTR/L     Lower Body Bathing Assessment/Training    LB Bathing Assess/Train Assistive Device  long-handled sponge  -     LB Bathing " Assess/Train, Position  supported sitting;standing   w/c  -     LB Bathing Assess/Train, [place] Level  contact guard assist;verbal cues required;supervision required;stand by assist;set up required  -     LB Bathing Assess/Train, Comment  Pt close SBA to CGA for standing for LB pericre and upper leg washing, used LH sponge to wash legs unable to dry without assist, otherwise pt set up assist.   -     Recorded by  [] AYAD Vazquez/L     Upper Body Dressing Assessment/Training    UB Dressing Assess/Train, Clothing Type  doffing:;donning:;button up  -     UB Dressing Assess/Train, Position  sitting  -     UB Dressing Assess/Train, [place]  set up required;supervision required;verbal cues required;minimum assist (75% patient effort)  -     UB Dressing Assess/Train, Comment  pt required min-mod assist to doff jacket to get over both shoulders in the back then she could take the rest of the way off, she was able to doff a button up shirt. Pt able to don a button up shirt set up assist.   -     Recorded by  [] AYAD Vazquez/L     Lower Body Dressing Assessment/Training    LB Dressing Assess/Train, Clothing Type  doffing:;donning:;pants   underwear, yessi hose  -     LB Dressing Assess/Train, Position  sitting;standing  -     LB Dressing Assess/Train, [place]  minimum assist (75% patient effort);verbal cues required;supervision required;set up required;contact guard assist;maximum assist (25% patient effort)  -     LB Dressing Assess/Train, Comment  max to dependent for don and doff yessi hose, total assist with cath into pants/underwear. pt required min assist to don shoes and total assist to tie shoes. Pt used reacher and long handle shoe horn to engage in LB dressing tasks. Pt CGA to SBA to stand to pull up and down pants;underwear. Pt took extended time and effort.   -     Recorded by  [] AYAD Vazquez/L     Toileting Assessment/Training    Toileting  Assess/Train, Comment  OT educated on AE use and protecting hip during sponge bath and dressing tasks. Pt struggled with using them accurately.   -     Recorded by  [] AYAD Vazquez/L     Grooming Assessment/Training    Grooming Assess/Train, Comment  Pt set up assist to apply deodorant.   -     Recorded by  [BH] AYAD Vazquez/L     Balance Skills Training    Sitting-Level of Assistance  Distant supervision  -     Standing-Level of Assistance  Contact guard;Close supervision  -     Recorded by  [BH] AYAD Vazquez/L     Therapy Exercises    Bilateral Lower Extremities   AROM:;20 reps;ankle pumps/circles;hip abduction/adduction;sitting;knee flexion;LAQ;glut sets  -    BLE Resistance   theraband  -    Bilateral Upper Extremity  AROM:;20 reps;sitting;elbow flexion/extension;hand pumps;shoulder extension/flexion   wrist flexion/extension  -     Recorded by  [BH] AYAD Vazquez/L [RW] Xiang Jose, PTA    Gross Motor Coordination Training    Gross Motor Skill, Impairments Detail  Pt engaged in 10 minutes of BUE arm bike fair+ toleration completed 10 minutes no rest breaks.   -     Recorded by  [] AYAD Vazquez/L     Positioning and Restraints    Pre-Treatment Position sitting in chair/recliner  -EK other (comment)   in w/c  - sitting in chair/recliner  -RW    Post Treatment Position chair  -EK wheelchair  - wheelchair  -RW    In Bed call light within reach;encouraged to call for assist;exit alarm on  -EK      In Wheelchair  sitting;call light within reach;encouraged to call for assist   SLP going in room  -     Recorded by [EK] Kizzy Ch CCC-SLP [] AYAD Vazquez/L [RW] Xiang Jose, PTA      01/31/18 1415 01/31/18 1302 01/31/18 1108    Rehab Assessment/Intervention    Discipline physical therapy assistant  -RW occupational therapy assistant  -RC speech language pathologist  -CK    Document Type therapy note (daily note)  -RW therapy note  (daily note)  -RC therapy note (daily note)  -CK    Subjective Information agree to therapy  -RW agree to therapy  -RC agree to therapy  -CK    Patient Effort, Rehab Treatment good  -RW good  -RC good  -CK    Symptoms Noted During/After Treatment  fatigue  -RC     Precautions/Limitations  fall precautions  -RC     Recorded by [RW] Xiang Jose PTA [RC] YULI ShahA/L [CK] Karen Ch MS CCC-SLP    Pain Assessment    Pain Assessment  No/denies pain  -RC No/denies pain  -CK    Pain Score 6  -RW      Post Pain Score 6  -RW      Pain Location Hip  -RW      Pain Orientation Right  -RW      Recorded by [RW] Xiang Jose PTA [RC] YULI ShahA/HINA [CK] Karen Ch MS CCC-SLP    Cognitive Assessment/Intervention    Current Cognitive/Communication Assessment functional  -RW functional  -RC     Orientation Status oriented x 4  -RW oriented x 4  -RC     Follows Commands/Answers Questions 100% of the time  -RW      Personal Safety Interventions gait belt;nonskid shoes/slippers when out of bed  -RW      Recorded by [RW] Xiang Jose PTA [RC] Lianne Hernandez, NELSON/L     Communication Treatment Objective and Progress    Cognitive Linguistic Treatment Objectives   Improve memory skills;Improve functional problem solving;Improve executive function skills  -CK    Recorded by   [CK] Karen Ch MS CCC-SLP    Improve memory skills    Improve memory skills through:   recalling unrelated word lists with an imposed delay;listen to a paragraph and answer questions;90%  -CK    Status: Improve memory skills   Progressing as expected  -CK    Memory Skills Progress   80%  -CK    Recorded by   [CK] Karen Ch MS CCC-SLP    Improve functional problem solving    Improve functional problem solving through:   complete functional math task;90%  -CK    Status: Improve functional problem solving   Other (comment)   Deferred this date  -CK    Functional Problem Solving Progress   continue to address   -CK    Recorded by   [CK] Karen Ch MS CCC-SLP    Improve executive function skills    Improve executive function skills:   exhibit cognitive flexibility;organization/planning activity;90%  -CK    Status: Improve executive function skills   Progressing as expected  -CK    Executive Function Skills Progress   100%;60%  -CK    Comments: Improve executive function skills   Improved time w/cog flex task  -CK    Recorded by   [CK] Karen Ch MS CCC-SLP    Mobility Assessment/Training    Right Lower Extremity Weight-Bearing weight-bearing as tolerated  -RW      Recorded by [RW] Xiagn Jose PTA      Bed Mobility, Assessment/Treatment    Bed Mobility, Assistive Device bed rails;head of bed elevated  -RW      Bed Mob, Supine to Sit, [place] contact guard assist  -RW supervision required  -RC     Bed Mob, Sit to Supine, [place]  minimum assist (75% patient effort)  -RC     Recorded by [RW] Xiang Jose PTA [RC] Lianne Hernandez NELSON/L     Transfer Assessment/Treatment    Transfers, Sit-Stand [place] supervision required  -RW supervision required  -RC     Transfers, Stand-Sit [place] supervision required  -RW supervision required  -RC     Transfers, Sit-Stand-Sit, Assist Device rolling walker  -RW rolling walker  -RC     Recorded by [RW] Xiang Jose PTA [RC] Lianne Hernandez, NELSON/L     Gait Assessment/Treatment    Gait, [place] Level contact guard assist;stand by assist  -RW      Gait, Assistive Device rolling walker  -RW      Gait, Distance (Feet) 48  -RW      Recorded by [RW] Xiang Jose PTA      Functional Mobility    Functional Mobility- Ind. Level contact guard assist;supervision required  -RW contact guard assist  -RC     Functional Mobility- Device rolling walker  -RW rolling walker  -RC     Functional Mobility-Distance (Feet)  10  -RC     Recorded by [RW] Xiang Jose PTA [RC] Lianne Hernandez, NELSON/L     Wheelchair Training/Management    Wheelchair, Distance  Propelled 100 mod ind  -RW      Recorded by [RW] Xiang Jose, PTA      Upper Body Bathing Assessment/Training    UB Bathing Assess/Train Assistive Device  --   sponge bath  -RC     UB Bathing Assess/Train, Position  edge of bed;sitting  -RC     UB Bathing Assess/Train, [place] Level  set up required  -RC     Recorded by  [RC] YULI ShahA/L     Lower Body Bathing Assessment/Training    LB Bathing Assess/Train Assistive Device  --   sponge  -RC     LB Bathing Assess/Train, Position  edge of bed;sitting  -RC     LB Bathing Assess/Train, [place] Level  stand by assist  -RC     Recorded by  [RC] Lianne Hernandez NELSON/L     Upper Body Dressing Assessment/Training    UB Dressing Assess/Train, Clothing Type  doffing:;donning:;button up  -RC     UB Dressing Assess/Train, Position  sitting;edge of bed  -RC     UB Dressing Assess/Train, [place]  set up required  -RC     Recorded by  [RC] YULI ShahA/L     Lower Body Dressing Assessment/Training    LB Dressing Assess/Train, Clothing Type  doffing:;donning:;pants  -RC     LB Dressing Assess/Train, Assist Device  reacher  -RC     LB Dressing Assess/Train, Position  sitting;standing  -RC     LB Dressing Assess/Train, [place]  verbal cues required;minimum assist (75% patient effort)  -RC     LB Dressing Assess/Train, Comment  --   @ w/ cath into pants  -RC     Recorded by  [RC] YULI ShahA/L     Grooming Assessment/Training    Grooming Assess/Train, Position  standing;sink side  -RC     Grooming Assess/Train, [place] Level  contact guard assist  -RC     Recorded by  [RC] YULI ShahA/L     Balance Skills Training    Standing-Level of Assistance  Contact guard  -RC     Static Standing Balance Support  assistive device  -RC     Standing Balance # of Minutes  7  -RC     Recorded by  [RC] YULI ShahA/L     Therapy Exercises    Bilateral Lower Extremities AROM:;20 reps;ankle pumps/circles;hip  abduction/adduction;glut sets;sitting;knee flexion;LAQ  -RW      BLE Resistance theraband  -RW      Recorded by [RW] Xiang Jose PTA      Positioning and Restraints    Pre-Treatment Position in bed  -RW in bed  -RC in bed  -CK    Post Treatment Position wheelchair  -RW bed  -RC bed  -CK    In Bed  call light within reach;encouraged to call for assist  -RC supine;call light within reach;encouraged to call for assist;side rails up x2  -CK    Recorded by [RW] Xiang Jose PTA [RC] OMAR Shah/HINA [CK] Karen Ch, MS CCC-SLP      01/31/18 0935 01/31/18 0815 01/30/18 1500    Rehab Assessment/Intervention    Discipline occupational therapy assistant  -RC physical therapy assistant  -RW physical therapy assistant  -RW    Document Type therapy note (daily note)  -RC therapy note (daily note)  -RW therapy note (daily note)  -RW    Subjective Information agree to therapy  -RC  agree to therapy  -RW    Patient Effort, Rehab Treatment good  -RC  good  -RW    Recorded by [RC] OMAR Shah/L [RW] Xiang Jose PTA [RW] Xiang Jose PTA    Vital Signs    Pre Systolic BP Rehab  149  -RW     Pre Treatment Diastolic BP  71  -RW     Post Systolic BP Rehab 134  -  -RW     Post Treatment Diastolic BP 63  -RC 56  -RW     Pretreatment Heart Rate (beats/min) 72  -RC 85  -RW 76  -RW    Intratreatment Heart Rate (beats/min) 74  -RC      Posttreatment Heart Rate (beats/min) 72  -RC 80  -RW     Pre SpO2 (%)  98  -RW 95  -RW    O2 Delivery Pre Treatment  supplemental O2  -RW supplemental O2  -RW    Intra SpO2 (%) 94  -RC      O2 Delivery Intra Treatment supplemental O2  -RC      Recorded by [RC] OMAR Shah/L [RW] Xiang Jose PTA [RW] Xiang Jose PTA    Pain Assessment    Pain Assessment  No/denies pain  -RW     Pain Score   6  -RW    Post Pain Score   6  -RW    Pain Location   Generalized  -RW    Recorded by  [RW] Xiang Jose PTA [RW] Xiang Jose PTA    Cognitive  Assessment/Intervention    Current Cognitive/Communication Assessment functional  -RC functional  -RW functional  -RW    Orientation Status oriented x 4  -RC oriented x 4  -RW oriented x 4  -RW    Follows Commands/Answers Questions  100% of the time  -% of the time  -RW    Personal Safety Interventions  gait belt;nonskid shoes/slippers when out of bed  -RW gait belt;nonskid shoes/slippers when out of bed  -RW    Recorded by [RC] Lianne Hernandez, NELSON/L [RW] Xiang Jose PTA [RW] Xiang Jose PTA    Mobility Assessment/Training    Right Lower Extremity Weight-Bearing  weight-bearing as tolerated  -RW weight-bearing as tolerated  -RW    Recorded by  [RW] Xiang Jose PTA [RW] Xiang Jose PTA    Bed Mobility, Assessment/Treatment    Bed Mobility, Assistive Device  --  -RW bed rails;head of bed elevated  -RW    Bed Mob, Supine to Sit, [place] supervision required  -RC      Bed Mob, Sit to Supine, [place] minimum assist (75% patient effort)  -RC --  -RW supervision required  -RW    Recorded by [RC] YULI ShahA/L [RW] Xiang Jose, PTA [RW] Xiang Jose, PTA    Transfer Assessment/Treatment    Transfers, Bed-Chair [place] contact guard assist  -RC      Transfers, Chair-Bed [place] contact guard assist  -RC      Transfers, Bed-Chair-Bed, Assist Device rolling walker  -RC      Transfers, Sit-Stand [place] supervision required;stand by assist  -RC supervision required  -RW supervision required  -RW    Transfers, Stand-Sit [place] supervision required  -RC supervision required  -RW supervision required  -RW    Transfers, Sit-Stand-Sit, Assist Device rolling walker  -RC rolling walker  -RW rolling walker  -RW    Recorded by [RC] YULI ShahA/L [RW] Xiang Jose, PTA [RW] Xiang Jose, PTA    Gait Assessment/Treatment    Gait, [place] Level  contact guard assist;stand by assist  -RW contact guard assist;stand by assist  -RW    Gait, Assistive  Device  rolling walker  -RW rolling walker  -RW    Gait, Distance (Feet)  70    x 2  -RW 12   around bed into bed  -RW    Recorded by  [RW] Xiang Jose PTA [RW] Xiang Jose PTA    Functional Mobility    Functional Mobility- Ind. Level contact guard assist  -RC contact guard assist;supervision required  -RW contact guard assist;supervision required  -RW    Functional Mobility- Device rolling walker  -RC rolling walker  -RW rolling walker  -RW    Functional Mobility-Distance (Feet) 10  -RC  12  -RW    Recorded by [RC] OMAR Shah/L [RW] Xiang Jose PTA [RW] Xiang Jose PTA    Wheelchair Training/Management    Wheelchair Propulsion Training forward propulsion  -RC      Wheelchair, Distance Propelled 150  -RC      Recorded by [RC] OMAR Shah/L      Grooming Assessment/Training    Grooming Assess/Train, Position sitting  -RC      Grooming Assess/Train, [place]ep[place] Level set up required  -RC      Recorded by [RC] JENNIFFER Shah      Therapy Exercises    Bilateral Lower Extremities  AROM:;20 reps;ankle pumps/circles;hip abduction/adduction;glut sets;sitting;knee flexion;LAQ;hip extension   2 sets  -RW AROM:;20 reps;ankle pumps/circles;hip abduction/adduction;supine;AAROM:;glut sets;heel slides;quad sets  -RW    Bilateral Upper Extremity --   ue bike 10 min, pulley ex 8 min  -RC      Recorded by [RC] OMAR Shah/L [RW] Xiang Jose PTA [RW] Xiang Jose PTA    Positioning and Restraints    Pre-Treatment Position in bed  -RC sitting in chair/recliner  -RW sitting in chair/recliner  -RW    Post Treatment Position bed  -RC wheelchair  -RW bed  -RW    In Bed encouraged to call for assist;call light within reach  -RC  call light within reach;legs elevated  -RW    Recorded by [RC] OMAR Shha/L [RW] Xiang Jose PTA [RW] Xiang Jose PTA      01/30/18 1400 01/30/18 1055 01/30/18 0940    Rehab Assessment/Intervention    Discipline occupational therapy  assistant  -RC physical therapy assistant  -RW occupational therapy assistant  -RC    Document Type therapy note (daily note)  -RC therapy note (daily note)  -RW therapy note (daily note)  -RC    Subjective Information agree to therapy  -RC agree to therapy  -RW agree to therapy  -RC    Patient Effort, Rehab Treatment good  -RC good  -RW good  -RC    Symptoms Noted During/After Treatment fatigue  -RC fatigue  -RW     Recorded by [RC] YULI ShahA/L [RW] Xiang Jose PTA [RC] Lianne Hernandez, NELSON/HINA    Pain Assessment    Pain Assessment --   did not give # c/o discomfort in catheter area  -RC --   no pain reported  -RW     Post Pain Score   3  -RC    Pain Type   Acute pain  -RC    Pain Location   Generalized  -RC    Pain Intervention(s)   Medication (See MAR);Repositioned  -RC    Response to Interventions --   REPORTED TO NURSING  -RC      Recorded by [RC] YULI ShahA/L [RW] Xiang Jose PTA [RC] Lianne Hernandez NELSON/L    Cognitive Assessment/Intervention    Current Cognitive/Communication Assessment functional  -RC functional  -RW functional  -RC    Orientation Status oriented x 4  -RC oriented x 4  -RW oriented x 4  -RC    Follows Commands/Answers Questions  100% of the time  -RW     Personal Safety Interventions  gait belt;nonskid shoes/slippers when out of bed  -RW     Recorded by [RC] YULI ShahA/L [RW] Xiang Jose PTA [RC] Lianne Hernandez, NELSON/L    Mobility Assessment/Training    Right Lower Extremity Weight-Bearing  weight-bearing as tolerated  -RW     Recorded by  [RW] Xiang Jose PTA     Transfer Assessment/Treatment    Transfers, Sit-Stand [place]  supervision required  -RW     Transfers, Stand-Sit [place]  supervision required  -RW     Transfers, Sit-Stand-Sit, Assist Device  rolling walker  -RW     Transfer, Comment  sit to stand x 5  -RW     Recorded by  [RW] Xiang Jose PTA     Gait Assessment/Treatment    Gait, [place] Level  contact guard  assist;stand by assist  -RW     Gait, Assistive Device  rolling walker  -RW     Gait, Distance (Feet)  30  -RW     Recorded by  [RW] Xiang Jose PTA     Functional Mobility    Functional Mobility- Ind. Level  contact guard assist;supervision required  -RW contact guard assist  -RC    Functional Mobility- Device  rolling walker  -RW rolling walker  -RC    Functional Mobility-Distance (Feet)  30  -RW 15  -RC    Recorded by  [RW] Xiang Jose PTA [RC] JENNIFFER Shah    Upper Body Bathing Assessment/Training    UB Bathing Assess/Train, Position   edge of bed  -RC    UB Bathing Assess/Train, [place] Level   set up required  -RC    Recorded by   [RC] OMAR Shah/L    Lower Body Bathing Assessment/Training    LB Bathing Assess/Train, Position   sitting;edge of bed  -RC    LB Bathing Assess/Train, [place] Level   minimum assist (75% patient effort)  -RC    LB Bathing Assess/Train, Comment   --   partial lb bath,per pt request  -RC    Recorded by   [RC] JENNIFFER Shah    Upper Body Dressing Assessment/Training    UB Dressing Assess/Train, Clothing Type   doffing:;donning:;button up  -RC    UB Dressing Assess/Train, Position   sitting  -RC    UB Dressing Assess/Train, Grand   set up required  -RC    Recorded by   [RC] JENNIFFER Shah    Lower Body Dressing Assessment/Training    LB Dressing Assess/Train, Clothing Type doffing:;donning:;socks  -RC  doffing:;donning:;shorts;pants  -RC    LB Dressing Assess/Train, Assist Device long-handled shoe horn;reacher;sock-aid;dressing stick  -RC      LB Dressing Assess/Train, Position sitting  -RC  sitting;standing  -RC    LB Dressing Assess/Train, Grand verbal cues required  -RC  --   sba and @ w/ catheter  -RC    LB Dressing Assess/Train, Comment --   practiced using AE for lb dressing multi x  -RC      Recorded by [RC] JENNIFFER Shah  [RC] JENNIFFER Shah    Grooming Assessment/Training    Grooming  Assess/Train, Position   sitting  -RC    Grooming Assess/Train, Indepen Level   independent  -RC    Recorded by   [RC] YULI ShahA/L    Therapy Exercises    Bilateral Lower Extremities  AROM:;20 reps;ankle pumps/circles;hip abduction/adduction;knee flexion   2 sets   -RW     BLE Resistance  theraband  -RW     Bilateral Upper Extremity   AROM:;15 reps;sitting;elbow flexion/extension;shoulder abduction/adduction;shoulder extension/flexion;shoulder protraction/retraction;shoulder rolls/shrugs   3 sets  -RC    BUE Resistance   manual resistance- minimal  -RC    Recorded by  [RW] Xiang Jose PTA [RC] YULI ShahA/L    Positioning and Restraints    Pre-Treatment Position sitting in chair/recliner  -RC sitting in chair/recliner  -RW in bed  -RC    Post Treatment Position wheelchair  -RC wheelchair  -RW wheelchair  -RC    In Wheelchair call light within reach;encouraged to call for assist  -RC call light within reach  -RW encouraged to call for assist  -RC    Recorded by [RC] YULI ShahA/L [RW] Xiang Jose PTA [RC] YULI ShahA/HINA      01/30/18 0846 01/29/18 1532       Rehab Assessment/Intervention    Discipline speech language pathologist  -CK speech language pathologist  -CK     Document Type therapy note (daily note)  -CK therapy note (daily note)  -CK     Subjective Information agree to therapy  -CK agree to therapy  -CK     Patient Effort, Rehab Treatment good  -CK good  -CK     Recorded by [CK] Karne Ch MS CCC-SLP [CK] Karen Ch MS CCC-SLP     Pain Assessment    Pain Assessment No/denies pain  -CK No/denies pain  -CK     Recorded by [CK] Karen Ch MS CCC-SLP [CK] Karen Ch MS CCC-SLP     Communication Treatment Objective and Progress    Cognitive Linguistic Treatment Objectives Improve memory skills;Improve functional problem solving;Improve executive function skills  -CK      Recorded by [CK] Karen Ch MS CCC-SLP      Improve  memory skills    Improve memory skills through: recalling unrelated word lists with an imposed delay;listen to a paragraph and answer questions;90%  -CK recalling unrelated word lists with an imposed delay;listen to a paragraph and answer questions;90%  -CK     Status: Improve memory skills Progressing as expected  -CK Progressing as expected  -CK     Memory Skills Progress 70%  -CK 70%  -CK     Recorded by [CK] Karen Ch MS CCC-MICK [CK] Karen Ch MS CCC-MICK     Improve functional problem solving    Improve functional problem solving through: complete functional math task;90%  -CK complete functional math task;90%  -CK     Status: Improve functional problem solving Progressing as expected  -CK Progressing as expected  -CK     Functional Problem Solving Progress 80%  -CK 70%  -CK     Recorded by [CK] MS MARZENA Galarza [CK] MS MARZENA Galarza     Improve executive function skills    Improve executive function skills: exhibit cognitive flexibility;organization/planning activity;90%  -CK exhibit cognitive flexibility;organization/planning activity;90%  -CK     Status: Improve executive function skills Progressing as expected  -CK Other (comment)   Not directly addressed this date.  -CK     Executive Function Skills Progress 100%  -CK continue to address  -CK     Recorded by [CK] MS MARZENA Galarza [CK] MS MARZENA Galarza     Positioning and Restraints    Pre-Treatment Position in bed  -CK in bed  -CK     Post Treatment Position bed  -CK bed  -CK     In Bed supine;call light within reach;encouraged to call for assist;side rails up x2  -CK sitting;call light within reach;encouraged to call for assist;exit alarm on;side rails up x2  -CK     Recorded by [CK] Karen Ch MS CCC-MICK [CK] MS MARZENA Galarza       User Key  (r) = Recorded By, (t) = Taken By, (c) = Cosigned By    Initials Name Effective Dates    EVITA Ch,  CCC-SLP 04/06/17 -      Marisol Burdick, OTR/L 10/17/16 -     CK Karen Ch, MS CCC-SLP 10/17/16 -     RW Xiang Jose, PTA 10/17/16 -      Lianne Hernandez, NELSON/L 10/17/16 -               IP SLP Goals       02/01/18 1428 01/31/18 1222 01/30/18 1419    Cognitive Linguistic- Optimal Participation in Care    Cognitive Linguistic Optimal Participation in Care- SLP, Date Goal Reviewed 02/01/18  -EK 01/31/18  -CK 01/30/18  -CK    Cognitive Linguistic Optimal Participation in Care- SLP, Outcome  goal ongoing  -CK goal ongoing  -CK      01/29/18 1744 01/27/18 1347       Cognitive Linguistic- Optimal Participation in Care    Cognitive Linguistic Optimal Participation in Care- SLP, Date Established  01/27/18  -CK     Cognitive Linguistic Optimal Participation in Care- SLP, Time to Achieve  by discharge  -CK     Cognitive Linguistic Optimal Participation in Care- SLP, Activity Level  Patient will improve memory skills for increased safety in environment;Patient will improve functional problem solving skills for increased safety in environment;Patient will improve Executive functioning skills for increased safety in environment  -CK     Cognitive Linguistic Optimal Participation in Care- SLP, Date Goal Reviewed 01/29/18  -CK 01/27/18  -CK     Cognitive Linguistic Optimal Participation in Care- SLP, Outcome goal ongoing  -CK        User Key  (r) = Recorded By, (t) = Taken By, (c) = Cosigned By    Initials Name Provider Type    EVITA Ch, CCC-SLP Speech and Language Pathologist    LETTY Ch, MS CCC-SLP Speech and Language Pathologist          EDUCATION  The patient has been educated in the following areas:   Cognitive Impairment.    SLP Recommendation and Plan           Plan of Care Review  Plan Of Care Reviewed With: spouse  Progress: improving  Outcome Summary/Follow up Plan: Pt making good progress at this time. Pt will need 24/7 care at home for safety.           Time Calculation:          Time Calculation- SLP       02/01/18 1432          Time Calculation- SLP    SLP Start Time 1050  -EK      SLP Stop Time 1130  -EK      SLP Time Calculation (min) 40 min  -EK      SLP Received On 02/01/18  -EK        User Key  (r) = Recorded By, (t) = Taken By, (c) = Cosigned By    Initials Name Provider Type    EK Kizzy Ch CCC-SLP Speech and Language Pathologist          Therapy Charges for Today     Code Description Service Date Service Provider Modifiers Qty    [id number] [facility] TREATMENT SPEECH 3 2/1/2018 MARZENA Ramirez GN 1               MARZENA Ramirez  2/1/2018

## 2018-02-01 NOTE — PROGRESS NOTES
"Anticoagulation by Pharmacy - Warfarin day 10 of 42    Val Arteaga is a 85 y.o.female  [Ht: 172.7 cm (67.99\"); Wt: 77.7 kg (171 lb 3.2 oz)] on Warfarin 3 mg PO  for indication of VTE prophylaxis s/p ortho procedure.    Goal INR: 1.7-2.5  Today's INR:   Lab Results   Component Value Date    INR 2.03 (H) 02/01/2018         Lab Results   Component Value Date    INR 2.03 (H) 02/01/2018    INR 1.81 (H) 01/31/2018    INR 1.71 (H) 01/30/2018    PROTIME 23.1 (H) 02/01/2018    PROTIME 21.1 (H) 01/31/2018    PROTIME 20.1 (H) 01/30/2018     Lab Results   Component Value Date    HGB 9.0 (L) 01/31/2018    HGB 8.9 (L) 01/29/2018    HGB 8.7 (L) 01/27/2018     Lab Results   Component Value Date    HCT 26.9 (L) 01/31/2018    HCT 26.7 (L) 01/29/2018    HCT 25.3 (L) 01/27/2018     Lab Results   Component Value Date     01/31/2018     01/29/2018     01/27/2018     Assessment/Plan:  Reviewed above labs. INR is 2.03.  INR is at goal. No changes in medication list. Concurrent medications include levothyroxine and sertraline. Pt did receive dose of warfarin last night.  Will continue current dose of  3 mg. Rx will continue to follow and adjust dose accordingly.  Today is day 10 of therapy.  .    Marleni Hyman, [facility]  02/01/18 2:36 PM     "

## 2018-02-02 LAB
ALBUMIN SERPL-MCNC: 3 G/DL (ref 3.4–4.8)
ANION GAP SERPL CALCULATED.3IONS-SCNC: 5 MMOL/L (ref 5–15)
BASOPHILS # BLD AUTO: 0.02 10*3/MM3 (ref 0–0.2)
BASOPHILS NFR BLD AUTO: 0.2 % (ref 0–2)
BUN BLD-MCNC: 32 MG/DL (ref 7–21)
BUN/CREAT SERPL: 30.8 (ref 7–25)
CALCIUM SPEC-SCNC: 8.9 MG/DL (ref 8.4–10.2)
CHLORIDE SERPL-SCNC: 97 MMOL/L (ref 95–110)
CO2 SERPL-SCNC: 35 MMOL/L (ref 22–31)
CREAT BLD-MCNC: 1.04 MG/DL (ref 0.5–1)
DEPRECATED RDW RBC AUTO: 52.2 FL (ref 36.4–46.3)
EOSINOPHIL # BLD AUTO: 0.46 10*3/MM3 (ref 0–0.7)
EOSINOPHIL NFR BLD AUTO: 5.3 % (ref 0–7)
ERYTHROCYTE [DISTWIDTH] IN BLOOD BY AUTOMATED COUNT: 14.8 % (ref 11.5–14.5)
GFR SERPL CREATININE-BSD FRML MDRD: 50 ML/MIN/1.73 (ref 60–90)
GLUCOSE BLD-MCNC: 92 MG/DL (ref 60–100)
HCT VFR BLD AUTO: 26.7 % (ref 35–45)
HGB BLD-MCNC: 8.9 G/DL (ref 12–15.5)
IMM GRANULOCYTES # BLD: 0.15 10*3/MM3 (ref 0–0.02)
IMM GRANULOCYTES NFR BLD: 1.7 % (ref 0–0.5)
INR PPP: 2.03 (ref 0.8–1.2)
LYMPHOCYTES # BLD AUTO: 1.93 10*3/MM3 (ref 0.6–4.2)
LYMPHOCYTES NFR BLD AUTO: 22.4 % (ref 10–50)
MCH RBC QN AUTO: 32.2 PG (ref 26.5–34)
MCHC RBC AUTO-ENTMCNC: 33.3 G/DL (ref 31.4–36)
MCV RBC AUTO: 96.7 FL (ref 80–98)
MONOCYTES # BLD AUTO: 0.79 10*3/MM3 (ref 0–0.9)
MONOCYTES NFR BLD AUTO: 9.2 % (ref 0–12)
NEUTROPHILS # BLD AUTO: 5.26 10*3/MM3 (ref 2–8.6)
NEUTROPHILS NFR BLD AUTO: 61.2 % (ref 37–80)
PHOSPHATE SERPL-MCNC: 4.3 MG/DL (ref 2.4–4.4)
PLATELET # BLD AUTO: 390 10*3/MM3 (ref 150–450)
PMV BLD AUTO: 9.2 FL (ref 8–12)
POTASSIUM BLD-SCNC: 5.2 MMOL/L (ref 3.5–5.1)
PROTHROMBIN TIME: 23.1 SECONDS (ref 11.1–15.3)
RBC # BLD AUTO: 2.76 10*6/MM3 (ref 3.77–5.16)
SODIUM BLD-SCNC: 137 MMOL/L (ref 137–145)
WBC NRBC COR # BLD: 8.61 10*3/MM3 (ref 3.2–9.8)

## 2018-02-02 PROCEDURE — 94799 UNLISTED PULMONARY SVC/PX: CPT

## 2018-02-02 PROCEDURE — 92507 TX SP LANG VOICE COMM INDIV: CPT | Performed by: SPEECH-LANGUAGE PATHOLOGIST

## 2018-02-02 PROCEDURE — 85025 COMPLETE CBC W/AUTO DIFF WBC: CPT | Performed by: FAMILY MEDICINE

## 2018-02-02 PROCEDURE — 97530 THERAPEUTIC ACTIVITIES: CPT

## 2018-02-02 PROCEDURE — 97542 WHEELCHAIR MNGMENT TRAINING: CPT

## 2018-02-02 PROCEDURE — 94760 N-INVAS EAR/PLS OXIMETRY 1: CPT

## 2018-02-02 PROCEDURE — 97535 SELF CARE MNGMENT TRAINING: CPT

## 2018-02-02 PROCEDURE — 99232 SBSQ HOSP IP/OBS MODERATE 35: CPT | Performed by: FAMILY MEDICINE

## 2018-02-02 PROCEDURE — 97116 GAIT TRAINING THERAPY: CPT

## 2018-02-02 PROCEDURE — 80069 RENAL FUNCTION PANEL: CPT | Performed by: FAMILY MEDICINE

## 2018-02-02 PROCEDURE — 97110 THERAPEUTIC EXERCISES: CPT

## 2018-02-02 PROCEDURE — 85610 PROTHROMBIN TIME: CPT | Performed by: FAMILY MEDICINE

## 2018-02-02 RX ADMIN — CARVEDILOL 6.25 MG: 6.25 TABLET, FILM COATED ORAL at 08:21

## 2018-02-02 RX ADMIN — FERROUS SULFATE TAB EC 324 MG (65 MG FE EQUIVALENT) 324 MG: 324 (65 FE) TABLET DELAYED RESPONSE at 08:21

## 2018-02-02 RX ADMIN — HYDROCODONE BITARTRATE AND ACETAMINOPHEN 1 TABLET: 5; 325 TABLET ORAL at 10:45

## 2018-02-02 RX ADMIN — LEVOTHYROXINE SODIUM 50 MCG: 50 TABLET ORAL at 05:58

## 2018-02-02 RX ADMIN — IPRATROPIUM BROMIDE AND ALBUTEROL SULFATE 3 ML: 2.5; .5 SOLUTION RESPIRATORY (INHALATION) at 18:39

## 2018-02-02 RX ADMIN — SERTRALINE HYDROCHLORIDE 50 MG: 50 TABLET ORAL at 08:21

## 2018-02-02 RX ADMIN — WARFARIN SODIUM 3 MG: 3 TABLET ORAL at 17:19

## 2018-02-02 RX ADMIN — SENNOSIDES AND DOCUSATE SODIUM 2 TABLET: 8.6; 5 TABLET ORAL at 20:43

## 2018-02-02 RX ADMIN — BUDESONIDE AND FORMOTEROL FUMARATE DIHYDRATE 2 PUFF: 160; 4.5 AEROSOL RESPIRATORY (INHALATION) at 18:39

## 2018-02-02 RX ADMIN — Medication 1 TABLET: at 08:21

## 2018-02-02 RX ADMIN — IPRATROPIUM BROMIDE AND ALBUTEROL SULFATE 3 ML: 2.5; .5 SOLUTION RESPIRATORY (INHALATION) at 05:05

## 2018-02-02 RX ADMIN — FUROSEMIDE 20 MG: 20 TABLET ORAL at 08:21

## 2018-02-02 RX ADMIN — MONTELUKAST SODIUM 10 MG: 10 TABLET, FILM COATED ORAL at 20:06

## 2018-02-02 RX ADMIN — IPRATROPIUM BROMIDE AND ALBUTEROL SULFATE 3 ML: 2.5; .5 SOLUTION RESPIRATORY (INHALATION) at 08:36

## 2018-02-02 RX ADMIN — CARVEDILOL 6.25 MG: 6.25 TABLET, FILM COATED ORAL at 17:19

## 2018-02-02 RX ADMIN — FAMOTIDINE 40 MG: 40 TABLET ORAL at 08:21

## 2018-02-02 RX ADMIN — ACETAMINOPHEN 650 MG: 325 TABLET ORAL at 20:45

## 2018-02-02 RX ADMIN — ESTRADIOL 1 G: 0.1 CREAM VAGINAL at 20:07

## 2018-02-02 RX ADMIN — NITROGLYCERIN 1 PATCH: 0.2 PATCH TRANSDERMAL at 08:20

## 2018-02-02 RX ADMIN — RANOLAZINE 1000 MG: 500 TABLET, FILM COATED, EXTENDED RELEASE ORAL at 08:21

## 2018-02-02 RX ADMIN — FERROUS SULFATE TAB EC 324 MG (65 MG FE EQUIVALENT) 324 MG: 324 (65 FE) TABLET DELAYED RESPONSE at 17:19

## 2018-02-02 RX ADMIN — RANOLAZINE 1000 MG: 500 TABLET, FILM COATED, EXTENDED RELEASE ORAL at 20:06

## 2018-02-02 RX ADMIN — ATORVASTATIN CALCIUM 20 MG: 20 TABLET, FILM COATED ORAL at 08:21

## 2018-02-02 RX ADMIN — BUDESONIDE AND FORMOTEROL FUMARATE DIHYDRATE 2 PUFF: 160; 4.5 AEROSOL RESPIRATORY (INHALATION) at 05:05

## 2018-02-02 NOTE — PROGRESS NOTES
Patient Name: Val Arteaga  MRN: [id number]  Today's Date: 2/2/2018    Admit Date: 1/26/2018        Demographic Summary       02/02/18 1619    Referral Information    Admission Type --   Acute Rehab Unit    [facility]     Record Reviewed history and physical;medical record            Functional Status       02/02/18 1620    Functional Status Current    Ambulation 3-->assistive equipment and person    Transferring 3-->assistive equipment and person    Toileting 3-->assistive equipment and person    Bathing 2-->assistive person    Dressing 2-->assistive person    Eating 0-->independent    Communication 0-->understands/communicates without difficulty    Current Functional Level Comment above values obtained from most recent RN assessment     Change in Functional Status Since Onset of Current Illness/Injury other (see comments)   loss of independence; independent prior to injury     Cognitive/Perceptual/Developmental    Current Mental Status/Cognitive Functioning no deficits noted    Recent Changes in Mental Status/Cognitive Functioning no changes    Developmental Stage (Eriksson's Stages of Development) Stage 8 (65 years-death/Late Adulthood) Integrity vs. Despair    Cognitive/Perceptual/Developmental Comments bright affect and mood congruent.  Pt. expressive of emotions and engages openly wiht evidence of trust and rapport. Positive use of humor     Employment/Financial    Source Of Income social security;pension/long term            Psychosocial       02/02/18 1623    Values/Beliefs    (F) Traci: Importance of Culture, Spirituality, [employer] in Life yes    Emotional/Psychological    Affect no deficits noted    Mood congruent to situation    Verbal Skills no deficits noted    Current Interpersonal Conduct/Behavior cooperative;appropriate to situation;other (see comments)   emotions consistent with grief related to loss of independent functioning    Mental Health  Conditions/Symptoms none    Thought Process Alterations no deficits noted    Previous Mental Health Treatment none    Mental Health Treatment none    Emotional/Psychological Comments Pt identifies adaptive coping skills and strengths.   Anxiety congruent with situation related to fall and adjustments;  adapting to plan as she cannot return to home alone.  Insight and judgment good.     Coping/Stress    Major Change/Loss/Stressor loss of independence;other (see comments)   unable to return to her home; possible relocation out of state    Patient Personal Strengths expressive of emotions;humor;resilient    Sources Of Support adult child(wesley);other family members;other (see comments)   son lives in [place]; nieces in this area; brother age 90     Reaction To Health Status adjusting;grief;overwhelmed    Understanding Of Condition And Treatment adequate understanding of medical condition;adequate understanding of treatment    Coping/Stress Comments evidences adequate understanding of need for support.  Reluctant but willing    Suicide Risk    Suicidal Ideation no    Homicide Risk    Homicidal Ideation no            Abuse/Neglect       02/02/18 1628    Home Safety    Feels Safe Living In Home other (see comments)   aware of inability to stay at own home unsupervised    Abuse Screen (yes response referral indicated)    Feels Unsafe at Home or Work/School no       Discharge Needs Assessment       02/02/18 1629    Living Environment    Lives With alone    Living Arrangements mobile home    Living Environment Comment Pt unable to return to home alone;  considering rehab at [facility] with potential to transition to [place] with son until recovery complete to live independently.  Independent and return to home is patient's ultimate goal.     Living Environment    Provides Primary Care For no one    Quality Of Family Relationships supportive;other (see comments)   has local support from nieces/nephew/ brother     Able to Return to Prior Living  Arrangements other (see comments)   supervision and support required until able to ambulate independently    Discharge Needs Assessment    Discharge Planning Comments pt states agreement to Rehab at SNF for short duration;  would like to return to own home; is receptive to staying with son in [place] until independence obtained.       DAMIENW met with patient in her room on Acute Rehab Unit.  Pt. Presents in bed and evidences a bright affect with mood congruent. Pt. Engages openly with trust and rapport easily established. Undersigned introduced self and explained role, support and scope of assessment.  Pt. Was encouraged to share her experiences, thoughts and feelings and express any concerns or barriers to discharge.  Pt. Shared events of her fall that led to fracture of her hip. Pt. Endorsed feelings of frustration and sadness related to her adjustment and limitations in independent functioning. Pt. Shares that she is a ,  of 50 + years  10 years prior.  Prior to fall she lives alone and functioned independently, driving and maintaining home and personal care.  Pt. Has one adult son who lives in [place] and states that he encourages her to come to stay with him.  She has one adult grandson with small great grand children, living in another state. Pt. Has a niece that lives in this are and she is supportive, caring and assisting her with care and support.  Pt. States her present plan is to transfer to SNF for 1-2 weeks for more therapy with hope to return to her own home as soon as able to function independently. Pt. Is receptive to staying with her son for a period of time.  Pt identified strengths and shared history of adaptive coping in times of stress. Pt. Describes emotions consistent with role and life transition adjustments that are consistent with her situation.  Pt. Responded receptive and appreciative of SW encounter.      Talya Olvera LCSW

## 2018-02-02 NOTE — PLAN OF CARE
Problem: Patient Care Overview (Adult)  Goal: Plan of Care Review  Outcome: Ongoing (interventions implemented as appropriate)  Pt resting in bed at this time, need for pain med's one time this shift, VS stable, respirations are even and unlabored, possible d/c in a.m or Monday, Will continue to monitor and call light in reach.   02/02/18 0421   Coping/Psychosocial Response Interventions   Plan Of Care Reviewed With patient   Patient Care Overview   Progress improving        Problem: Fractured Hip (Adult)  Goal: Signs and Symptoms of Listed Potential Problems Will be Absent or Manageable (Fractured Hip)  Outcome: Ongoing (interventions implemented as appropriate)   02/02/18 0421   Fractured Hip   Problems Assessed (Fractured Hip) all   Problems Present (Fractured Hip) pain;functional deficit/self-care deficit       Problem: Fall Risk (Adult)  Goal: Absence of Falls  Outcome: Outcome(s) achieved Date Met: 02/02/18 02/02/18 0421   Fall Risk (Adult)   Absence of Falls achieves outcome       Problem: Functional Deficit (Adult,Obstetrics,Pediatric)  Goal: Signs and Symptoms of Listed Potential Problems Will be Absent or Manageable (Functional Deficit)  Outcome: Ongoing (interventions implemented as appropriate)   02/02/18 0421   Functional Deficit   Problems Assessed (Functional Deficit) all   Problems Present (Functional Deficit) pain;functional activity tolerance impairment;mobility impairment

## 2018-02-02 NOTE — PLAN OF CARE
Problem: Patient Care Overview (Adult)  Goal: Plan of Care Review  Outcome: Ongoing (interventions implemented as appropriate)   02/02/18 1300   Coping/Psychosocial Response Interventions   Plan Of Care Reviewed With patient   Outcome Evaluation   Outcome Summary/Follow up Plan Pt. with fatigues quickly with gt. today. BP stable throughout treatments, but episode of dizziness after gt attempt this p.m., but vss during performance. Pt. would cont. to benefit from d/c to SNF at this time prior to d/c home for further strengthening & rehab to progress toward independence.   Patient Care Overview   Progress progress toward functional goals is gradual     Goal: Discharge Needs Assessment  Outcome: Ongoing (interventions implemented as appropriate)   01/27/18 0820 02/02/18 1052   Discharge Needs Assessment   Concerns To Be Addressed --  no discharge needs identified   Living Environment   Transportation Available car --    Self-Care   Equipment Currently Used at Home oxygen;shower chair  (has rollator, SC, ) --        Problem: Inpatient Physical Therapy  Goal: Bed Mobility Goal LTG- PT  Outcome: Ongoing (interventions implemented as appropriate)   01/27/18 0929 02/02/18 1300   Bed Mobility PT LTG   Bed Mobility PT LTG, Date Established 01/27/18 --    Bed Mobility PT LTG, Time to Achieve by discharge --    Bed Mobility PT LTG, Activity Type roll left/roll right;supine to sit/sit to supine --    Bed Mobility PT LTG, [place] Level independent --    Bed Mobility PT LTG, Additional Goal HOB down --    Bed Mobility PT LTG, Date Goal Reviewed --  02/02/18   Bed Mobility PT LTG, Outcome --  goal ongoing     Goal: Transfer Training Goal 1 LTG- PT  Outcome: Ongoing (interventions implemented as appropriate)   01/27/18 0929 02/02/18 1300   Transfer Training PT LTG   Transfer Training PT LTG, Date Established 01/27/18 --    Transfer Training PT LTG, Time to Achieve by discharge --    Transfer Training PT LTG, Activity Type bed  to chair /chair to bed;sit to stand/stand to sit;toilet --    Transfer Training PT LTG, [place] Level supervision required --    Transfer Training PT LTG, Assist Device walker, rolling --    Transfer Training PT LTG, Date Goal Reviewed --  02/02/18   Transfer Training PT LTG, Outcome --  goal ongoing     Goal: Gait Training Goal STG- PT  Outcome: Ongoing (interventions implemented as appropriate)   01/27/18 0929 02/02/18 1300   Gait Training PT STG   Gait Training Goal PT STG, Date Established 01/27/18 --    Gait Training Goal PT STG, Time to Achieve by discharge --    Gait Training Goal PT STG, [place] Level supervision required --    Gait Training Goal PT STG, Assist Device walker, rolling --    Gait Training Goal PT STG, Distance to Achieve 150 ft --    Gait Training Goal PT STG, Date Goal Reviewed --  02/02/18   Gait Training Goal PT STG, Outcome --  goal ongoing     Goal: Stair Training Goal LTG- PT  Outcome: Ongoing (interventions implemented as appropriate)   01/27/18 0929 02/02/18 1300   Stair Training PT LTG   Stair Training Goal PT LTG, Date Established 01/27/18 --    Stair Training Goal PT LTG, Time to Achieve by discharge --    Stair Training Goal PT LTG, Number of Steps 2 --    Stair Training Goal PT LTG, [place] Level contact guard assist --    Stair Training Goal PT LTG, Assist Device 1 handrail --    Stair Training Goal PT LTG, Date Goal Reviewed --  02/02/18   Stair Training Goal PT LTG, Outcome --  goal ongoing

## 2018-02-02 NOTE — PLAN OF CARE
Problem: Patient Care Overview (Adult)  Goal: Plan of Care Review  Outcome: Ongoing (interventions implemented as appropriate)   02/02/18 1052   Coping/Psychosocial Response Interventions   Plan Of Care Reviewed With patient   Patient Care Overview   Progress progress toward functional goals as expected     Goal: Adult Individualization and Mutuality  Outcome: Ongoing (interventions implemented as appropriate)    Goal: Discharge Needs Assessment  Outcome: Ongoing (interventions implemented as appropriate)      Problem: Fractured Hip (Adult)  Goal: Signs and Symptoms of Listed Potential Problems Will be Absent or Manageable (Fractured Hip)  Outcome: Ongoing (interventions implemented as appropriate)      Problem: Functional Deficit (Adult,Obstetrics,Pediatric)  Goal: Signs and Symptoms of Listed Potential Problems Will be Absent or Manageable (Functional Deficit)  Outcome: Ongoing (interventions implemented as appropriate)

## 2018-02-02 NOTE — THERAPY TREATMENT NOTE
Acute Care - Speech Language Pathology Treatment Note  [facility]     Patient Name: Val Arteaga  : 1932  MRN: [id number]  Today's Date: 2018  Referring Physician: AZUL Maldonado      Admit Date: 2018  Goals:  1.  Pt will complete cognitive flexibility tasks w/90% acc:  Pt with 70% accuracy this date and minimal cues.   2.  Pt will complete organization/planning activities w/90% acc: Task completed with 70% accuracy on visual recall and visual sequencing.   3.  Pt will complete functional math tasks w/90% acc: deferred  4.  Pt will recall unrelated word lists with an imposed delay w/90% acc:  80% accuracy  5.  Pt will listen to a paragraph and answer questions w/90% acc:  80% accuracy with recall of details of pictured scene with sentence length paragraph.   Visit Dx:      ICD-10-CM ICD-9-CM   1. Symbolic dysfunction R48.9 784.60   2. Impaired mobility and activities of daily living Z74.09 799.89   3. Impaired functional mobility, balance, gait, and endurance Z74.09 V49.89     Patient Active Problem List   Diagnosis   • Essential hypertension   • Coronary artery disease involving native coronary artery of native heart without angina pectoris   • Chronic obstructive pulmonary disease   • Acquired hypothyroidism   • Depressive disorder   • Thygeson's superficial punctate keratitis   • Pseudophakia   • Precordial pain   • Displaced fracture of base of neck of right femur, initial encounter for closed fracture   • Hip fracture, right, closed, initial encounter   • Pulmonary emphysema   • Chronic systolic congestive heart failure   • Hyponatremia   • BENITO (acute kidney injury)   • Acute blood loss anemia   • Urinary retention   • Closed displaced basicervical fracture of right femur              Adult Rehabilitation Note       18 1300 18 1037 18 [id number]    Rehab Assessment/Intervention    Discipline physical therapy assistant  -KIERSTEN physical therapy assistant  -KIERSTEN speech language  pathologist  -EK    Document Type therapy note (daily note)  -JA therapy note (daily note)  -JA therapy note (daily note)  -EK    Subjective Information agree to therapy  -JA agree to therapy  -JA no complaints;agree to therapy  -EK    Patient Effort, Rehab Treatment good  -JA good  -JA good  -EK    Precautions/Limitations fall precautions;anterior hip precautions- right  -JA fall precautions;anterior hip precautions- right  -JA fall precautions  -EK    Recorded by [JA] Juan Alberto Fagan PTA [JA] Juan Alberto Fagan PTA [EK] Kizzy Ch CCC-SLP    Vital Signs    Pre Systolic BP Rehab 123  -  -JA     Pre Treatment Diastolic BP 78  -JA 57  -JA     Intra Systolic BP Rehab 141  -JA      Intra Treatment Diastolic BP 66  -JA      Post Systolic BP Rehab 130  -  -JA     Post Treatment Diastolic BP 60  -JA 63  -JA     Pretreatment Heart Rate (beats/min)  71  -JA     Pre SpO2 (%) 93  -JA 99  -JA     O2 Delivery Pre Treatment supplemental O2  -JA supplemental O2  -JA     Intra SpO2 (%) 92  -JA      O2 Delivery Intra Treatment supplemental O2  -JA      Pre Patient Position Sitting  -JA Sitting  -JA     Intra Patient Position Standing  -JA Standing  -JA     Post Patient Position Supine  -JA Sitting  -JA     Recorded by [JA] Juan Alberto Fagan PTA [JA] Juan Alberto Fagan PTA     Pain Assessment    Pain Assessment No/denies pain  -JA No/denies pain  -JA No/denies pain  -EK    Pain Score 0  -JA 0  -JA     Post Pain Score 0  -JA 0  -JA     Pain Intervention(s) Medication (See MAR);Ambulation/increased activity;Repositioned  -JA Medication (See MAR);Repositioned;Ambulation/increased activity  -JA     Recorded by [JA] Juan Alberto Fagan PTA [JA] Juan Alberto Fagan PTA [EK] Kizzy Ch, CCC-SLP    Cognitive Assessment/Intervention    Current Cognitive/Communication Assessment   functional  -EK    Orientation Status   oriented x 4  -EK    Recorded by   [EK] Kizzy Ch,  CCC-SLP    Improve awareness of basic personal information    Improve awareness of basic personal information through:   answering open-ended questions regarding personal/biographical information  -EK    Status: Improve awareness of basic personal information   Progressing as expected  -EK    Awareness of Basic Personal Information Progress   90%  -EK    Recorded by   [EK] Kizzy Ch [facility]-SLP    Improve memory skills    Improve memory skills through:   recalling unrelated word lists with an imposed delay;listen to a paragraph and answer questions;90%  -EK    Status: Improve memory skills   Progressing as expected  -EK    Recorded by   [EK] Kizzy Ch CCC-SLP    Improve functional problem solving    Improve functional problem solving through:   complete functional math task;90%  -EK    Status: Improve functional problem solving   Progressing as expected  -EK    Recorded by   [EK] Kizzy hC CCCMARC    Improve executive function skills    Improve executive function skills:   exhibit cognitive flexibility;organization/planning activity;90%  -EK    Status: Improve executive function skills   Progressing as expected  -EK    Recorded by   [EK] Kizzy Ch [facility]-SLP    Mobility Assessment/Training    Extremity Weight-Bearing Status right lower extremity  -JA right lower extremity  -JA     Right Lower Extremity Weight-Bearing weight-bearing as tolerated  -JA weight-bearing as tolerated  -JA     Recorded by [JA] Juan Alberto Fagan PTA [JA] Juan Alberto Fagan PTA     Bed Mobility, Assessment/Treatment    Bed Mobility, Assistive Device bed rails  -JA      Bed Mob, Sit to Supine, [place] minimum assist (75% patient effort)  -JA      Recorded by [JA] Juan Alberto Fagan PTA      Transfer Assessment/Treatment    Transfers, Chair-Bed [place] contact guard assist  -KIERSTEN      Transfers, Bed-Chair-Bed, Assist Device rolling walker  -JA      Transfers,  "Sit-Stand [place] contact guard assist  -JA contact guard assist  -JA     Transfers, Stand-Sit [place] contact guard assist  -JA contact guard assist  -JA     Transfers, Sit-Stand-Sit, Assist Device rolling walker  -JA rolling walker  -JA     Transfer, Maintain Weight Bearing Status able to maintain weight bearing status  -JA able to maintain weight bearing status  -JA     Transfer, Safety Issues step length decreased;sequencing ability decreased  -JA step length decreased  -JA     Recorded by [KIERSTEN] Juan Alberto Fagan PTA [KIERSTEN] Juan Alberto Fagan PTA     Gait Assessment/Treatment    Gait, [place] Level contact guard assist  -JA contact guard assist  -JA     Gait, Assistive Device rolling walker  -JA rolling walker  -JA     Gait, Distance (Feet) 60  -JA 60   x2  -JA     Gait, Gait Deviations antalgic  -JA antalgic  -JA     Gait, Safety Issues step length decreased;sequencing ability decreased;supplemental O2  -JA step length decreased;supplemental O2  -JA     Gait, Comment Gt. attempt ended due to pt. with c/o's dizziness and vision \"getting dark\" & pt. with increase SOA after performance. Pt. with seated rest break, with minimal recovery noted & nsg. made aware. Pt. transferred back to room & transferred to supine this p.m.   -JA      Recorded by [KIERSTEN] Juan Alberto Fagan PTA [KIERSTEN] Juan Alberto Fagan PTA     Wheelchair Training/Management    Wheelchair, Distance Propelled  134 conditional independent   -JA     Recorded by  [KIERSTEN] Juan Alberto Fagan PTA     Therapy Exercises    Right Lower Extremity AAROM:;20 reps;supine;heel slides;hip abduction/adduction  -JA      Left Lower Extremity AROM:;20 reps;supine;heel slides;hip abduction/adduction  -JA      Bilateral Lower Extremities AROM:;20 reps;supine;glut sets;quad sets  -JA AROM:;20 reps;sitting;ankle pumps/circles;hip flexion;LAQ;hip abduction/adduction;knee flexion  -KIERSTEN     BLE Resistance  theraband   lvl 1 ham pulls  -JA     Recorded by [KIERSTEN] Juan Alberto" JENNIFFER Fagan PTA [JA] Juan Alberto Fagan PTA     Positioning and Restraints    Pre-Treatment Position sitting in chair/recliner  -JA sitting in chair/recliner  -JA sitting in chair/recliner  -EK    Post Treatment Position bed  -JA wheelchair  -JA chair  -EK    In Bed supine;call light within reach;encouraged to call for assist  -JA  with PT  -EK    In Wheelchair  sitting;call light within reach;encouraged to call for assist  -JA     Recorded by [JA] Juan Alberto Fagan PTA [JA] Juan Alberto Fagan PTA [EK] Kizzy Ch CCC-SLP      02/01/18 1335 02/01/18 1050 02/01/18 0912    Rehab Assessment/Intervention    Discipline physical therapy assistant  -RW speech language pathologist  -EK occupational therapist  -    Document Type therapy note (daily note)  -RW therapy note (daily note)  -EK therapy note (daily note)  -    Subjective Information complains of;dizziness;agree to therapy  -RW no complaints;agree to therapy  -EK agree to therapy  -BH    Patient Effort, Rehab Treatment good  -RW good  -EK good  -BH    Symptoms Noted During/After Treatment significant change in vital signs  -RW none  -EK fatigue  -BH    Precautions/Limitations   fall precautions;anterior hip precautions- right   WBAT  -BH    Recorded by [RW] Xiang Jose PTA [EK] Kizzy Ch, CCC-SLP [BH] Marisol Burdick, OTR/L    Vital Signs    Pre Systolic BP Rehab 71   seated in wc with dynamap  -RW  112  -BH    Pre Treatment Diastolic BP 41  -RW  56  -BH    Intra Systolic BP Rehab 172   supine with dynamap  -RW      Intra Treatment Diastolic BP 74  -RW      Post Systolic BP Rehab 162  -RW  107  -BH    Post Treatment Diastolic BP 74   nurse taking phil  -RW  54  -BH    Pretreatment Heart Rate (beats/min)   81  -BH    Intratreatment Heart Rate (beats/min)   83  -BH    Posttreatment Heart Rate (beats/min)   77  -BH    Pre SpO2 (%)   96  -BH    O2 Delivery Pre Treatment   supplemental O2  -BH    Intra SpO2 (%)   98  -BH     O2 Delivery Intra Treatment   supplemental O2  -BH    Post SpO2 (%)   95  -BH    O2 Delivery Post Treatment   supplemental O2  -BH    Pre Patient Position   Sitting  -BH    Intra Patient Position   Sitting  -BH    Post Patient Position   Sitting  -BH    Recorded by [RW] Xiang Jose PTA  [BH] Marisol Burdick OTR/L    Pain Assessment    Pain Assessment No/denies pain  -RW No/denies pain  -EK     Pain Score  0  -EK 0  -BH    Post Pain Score  0  -EK 0  -BH    Recorded by [RW] Xiang Jose PTA [EK] Kizzy Ch CCC-SLP [] Marisol Burdick, OTR/L    Cognitive Assessment/Intervention    Current Cognitive/Communication Assessment functional  -RW impaired  -EK functional  -BH    Orientation Status oriented x 4  -RW oriented x 4  -EK oriented x 4  -BH    Follows Commands/Answers Questions 100% of the time  -% of the time  -% of the time  -BH    Personal Safety  decreased awareness, need for safety  -EK WNL/WFL  -BH    Personal Safety Interventions gait belt;nonskid shoes/slippers when out of bed  -RW  gait belt;nonskid shoes/slippers when out of bed;supervised activity;muscle strengthening facilitated  -BH    Recorded by [RW] Xiang Jose PTA [EK] Kizzy Ch CCC-MICK [] Marisol Burdick, OTR/L    Improve awareness of basic personal information    Improve awareness of basic personal information through:  answering open-ended questions regarding personal/biographical information  -EK     Status: Improve awareness of basic personal information  Progressing as expected  -EK     Awareness of Basic Personal Information Progress  90%  -EK     Recorded by  [EK] Kizzy Ch CCC-SLP     Improve memory skills    Improve memory skills through:  recalling unrelated word lists with an imposed delay;listen to a paragraph and answer questions;90%  -EK     Status: Improve memory skills  Progressing as expected  -EK     Memory Skills Progress  60%  -EK     Recorded by   [EK] Kizzy Ch CCC-SLP     Improve functional problem solving    Improve functional problem solving through:  complete functional math task;90%  -EK     Recorded by  [EK] Kizzy Ch CCC-SLP     Improve executive function skills    Improve executive function skills:  exhibit cognitive flexibility;organization/planning activity;90%  -EK     Status: Improve executive function skills  Progressing as expected  -EK     Executive Function Skills Progress  70%  -EK     Recorded by  [EK] Kizzy Ch CCC-SLP     Mobility Assessment/Training    Right Lower Extremity Weight-Bearing weight-bearing as tolerated  -RW      Recorded by [RW] Xiang Jose PTA      Bed Mobility, Assessment/Treatment    Bed Mob, Sit to Supine, [place] supervision required;contact guard assist  -RW      Bed Mobility, Comment   defer pt up on w/c in room and wanted to stay.   -BH    Recorded by [RW] Xiang Jose PTA  [BH] Marisol Burdick, OTR/L    Transfer Assessment/Treatment    Transfers, Chair-Bed [place] contact guard assist  -RW      Transfers, Bed-Chair-Bed, Assist Device rolling walker  -RW      Transfers, Sit-Stand [place] supervision required;contact guard assist  -RW  supervision required;contact guard assist  -BH    Transfers, Stand-Sit [place] supervision required;contact guard assist  -RW  supervision required;contact guard assist  -BH    Transfers, Sit-Stand-Sit, Assist Device rolling walker  -RW  rolling walker  -BH    Transfer, Comment   completed several sit to stand for ADL with vc for safety and formation.   -BH    Recorded by [RW] Xiang Jose PTA  [BH] Marisol Burdick, OTR/L    Gait Assessment/Treatment    Gait, [place] Level contact guard assist;stand by assist  -RW      Gait, Assistive Device rolling walker  -RW      Gait, Distance (Feet) 12  -RW      Gait, Comment around bed  -RW      Recorded by [RW] Xiang Jose PTA      Functional Mobility  "   Functional Mobility- Ind. Level contact guard assist;supervision required  -      Functional Mobility- Device rolling walker  -      Recorded by [] Xiang Jose, PTA      Upper Body Bathing Assessment/Training    UB Bathing Assess/Train Assistive Device   long-handled sponge   wash cloth and water bath  -    UB Bathing Assess/Train, Position   supported sitting   w/c  -    UB Bathing Assess/Train, [place] Level   set up required  -    UB Bathing Assess/Train, Comment   pt provided long handle sponge declined to use stated she reached it \"good enough\" and was done, SBA  distant with set up to complete  -    Recorded by   [] Marisol Burdick OTR/L    Lower Body Bathing Assessment/Training    LB Bathing Assess/Train Assistive Device   long-handled sponge  -    LB Bathing Assess/Train, Position   supported sitting;standing   w/c  -    LB Bathing Assess/Train, [place] Level   contact guard assist;verbal cues required;supervision required;stand by assist;set up required  -    LB Bathing Assess/Train, Comment   Pt close SBA to [facility] for standing for LB pericre and upper leg washing, used LH sponge to wash legs unable to dry without assist, otherwise pt set up assist.   -    Recorded by   [] AYAD Vazquez/L    Upper Body Dressing Assessment/Training    UB Dressing Assess/Train, Clothing Type   doffing:;donning:;button up  -    UB Dressing Assess/Train, Position   sitting  -    UB Dressing Assess/Train, [place]   set up required;supervision required;verbal cues required;minimum assist (75% patient effort)  -    UB Dressing Assess/Train, Comment   pt required min-mod assist to doff jacket to get over both shoulders in the back then she could take the rest of the way off, she was able to doff a button up shirt. Pt able to don a button up shirt set up assist.   -    Recorded by   [] AYAD Vazquez/L    Lower Body Dressing Assessment/Training    LB Dressing " Assess/Train, Clothing Type   doffing:;donning:;pants   underwear, yessi hose  -    LB Dressing Assess/Train, Position   sitting;standing  -    LB Dressing Assess/Train, [place]   minimum assist (75% patient effort);verbal cues required;supervision required;set up required;contact guard assist;maximum assist (25% patient effort)  -    LB Dressing Assess/Train, Comment   max to dependent for don and doff yessi hose, total assist with cath into pants/underwear. pt required min assist to don shoes and total assist to tie shoes. Pt used reacher and long handle shoe horn to engage in LB dressing tasks. Pt CGA to SBA to stand to pull up and down pants;underwear. Pt took extended time and effort.   -    Recorded by   [] AYAD Vazquez/L    Toileting Assessment/Training    Toileting Assess/Train, Comment   OT educated on AE use and protecting hip during sponge bath and dressing tasks. Pt struggled with using them accurately.   -    Recorded by   [] AYAD Vazquez/L    Grooming Assessment/Training    Grooming Assess/Train, Comment   Pt set up assist to apply deodorant.   -    Recorded by   [] AYAD Vazquez/L    Balance Skills Training    Sitting-Level of Assistance   Distant supervision  -    Standing-Level of Assistance   Contact guard;Close supervision  -    Recorded by   [] AYAD Vazquez/L    Therapy Exercises    Bilateral Lower Extremities AROM:;20 reps;hip abduction/adduction;supine;glut sets;heel slides;SAQ;quad sets  -      Bilateral Upper Extremity   AROM:;20 reps;sitting;elbow flexion/extension;hand pumps;shoulder extension/flexion   wrist flexion/extension  -    Recorded by [] Xiang Jose, PTA  [] AYAD Vazquez/L    Gross Motor Coordination Training    Gross Motor Skill, Impairments Detail   Pt engaged in 10 minutes of BUE arm bike fair+ toleration completed 10 minutes no rest breaks.   -    Recorded by   [] AYAD Vazquez/L    Positioning and  Restraints    Pre-Treatment Position sitting in chair/recliner  -RW sitting in chair/recliner  -EK other (comment)   in w/c  -BH    Post Treatment Position bed  -RW chair  -EK wheelchair  -BH    In Bed call light within reach;legs elevated  -RW call light within reach;encouraged to call for assist;exit alarm on  -EK     In Wheelchair   sitting;call light within reach;encouraged to call for assist   SLP going in room  -BH    Recorded by [RW] Xiang Jose PTA [EK] Kizzy Ch, CCC-SLP [] Marisol Burdick, OTR/L      02/01/18 0805 01/31/18 1415 01/31/18 1302    Rehab Assessment/Intervention    Discipline physical therapy assistant  -RW physical therapy assistant  -RW occupational therapy assistant  -RC    Document Type therapy note (daily note)  -RW therapy note (daily note)  -RW therapy note (daily note)  -RC    Subjective Information agree to therapy  -RW agree to therapy  -RW agree to therapy  -RC    Patient Effort, Rehab Treatment good  -RW good  -RW good  -RC    Symptoms Noted During/After Treatment   fatigue  -RC    Precautions/Limitations   fall precautions  -RC    Recorded by [RW] Xiang Jose PTA [RW] Xiang Jose PTA [RC] Lianne Hernandez, NELSON/L    Vital Signs    Intratreatment Heart Rate (beats/min) 79  -RW      Intra SpO2 (%) 96  -RW      O2 Delivery Intra Treatment supplemental O2  -RW      Recorded by [RW] Xiang Jose PTA      Pain Assessment    Pain Assessment No/denies pain  -RW  No/denies pain  -RC    Pain Score  6  -RW     Post Pain Score  6  -RW     Pain Location  Hip  -RW     Pain Orientation  Right  -RW     Recorded by [RW] Xiang Jose PTA [RW] Xiang Jose PTA [RC] Lianne Hernandez, NELSON/L    Cognitive Assessment/Intervention    Current Cognitive/Communication Assessment functional  -RW functional  -RW functional  -RC    Orientation Status oriented x 4  -RW oriented x 4  -RW oriented x 4  -RC    Follows Commands/Answers Questions 100% of the time  -% of the  time  -RW     Personal Safety Interventions gait belt;nonskid shoes/slippers when out of bed  -RW gait belt;nonskid shoes/slippers when out of bed  -RW     Recorded by [RW] Xiang Jose PTA [RW] Xiang Jose PTA [RC] Lianne Hernandez, NELSON/L    Mobility Assessment/Training    Right Lower Extremity Weight-Bearing weight-bearing as tolerated  -RW weight-bearing as tolerated  -RW     Recorded by [RW] Xiang Jose PTA [RW] Xiang Jose PTA     Bed Mobility, Assessment/Treatment    Bed Mobility, Assistive Device  bed rails;head of bed elevated  -RW     Bed Mob, Supine to Sit, [place]  contact guard assist  -RW supervision required  -RC    Bed Mob, Sit to Supine, [place]   minimum assist (75% patient effort)  -RC    Recorded by  [RW] Xiang Jose PTA [RC] Lianne Hernandez, NELSON/L    Transfer Assessment/Treatment    Transfers, Bed-Chair [place] stand by assist;contact guard assist  -RW      Transfers, Bed-Chair-Bed, Assist Device rolling walker  -RW      Transfers, Sit-Stand [place] supervision required  -RW supervision required  -RW supervision required  -RC    Transfers, Stand-Sit [place] supervision required  -RW supervision required  -RW supervision required  -RC    Transfers, Sit-Stand-Sit, Assist Device rolling walker  -RW rolling walker  -RW rolling walker  -RC    Recorded by [RW] Xiang Jose PTA [RW] Xiang Jose PTA [RC] Lianne Hernandez, NELSON/L    Gait Assessment/Treatment    Gait, [place] Level contact guard assist;stand by assist  -RW contact guard assist;stand by assist  -RW     Gait, Assistive Device rolling walker  -RW rolling walker  -RW     Gait, Distance (Feet) 20  -RW 48  -RW     Recorded by [RW] Xiang Jose PTA [RW] Xiang Jose PTA     Stairs Assessment/Treatment    Number of Stairs 4 and then curb  -RW      Stairs, Handrail Location left side (ascending)  -RW      Stairs, [place] Level contact guard assist;verbal cues required  -RW       Stairs, Assistive Device walker   on curb  -RW      Recorded by [RW] Xiang Jose PTA      Functional Mobility    Functional Mobility- Ind. Level contact guard assist;supervision required  -RW contact guard assist;supervision required  -RW contact guard assist  -RC    Functional Mobility- Device rolling walker  -RW rolling walker  -RW rolling walker  -RC    Functional Mobility-Distance (Feet)   10  -RC    Recorded by [RW] Xiang Jose PTA [RW] Xiang Jose PTA [RC] Lianne BOX David, NELSON/L    Wheelchair Training/Management    Wheelchair, Distance Propelled  100 mod ind  -RW     Recorded by  [RW] Xiang Jose PTA     Upper Body Bathing Assessment/Training    UB Bathing Assess/Train Assistive Device   --   sponge bath  -RC    UB Bathing Assess/Train, Position   edge of bed;sitting  -RC    UB Bathing Assess/Train, [place] Level   set up required  -RC    Recorded by   [RC] Lianne Hernandez, NELSON/L    Lower Body Bathing Assessment/Training    LB Bathing Assess/Train Assistive Device   --   sponge  -RC    LB Bathing Assess/Train, Position   edge of bed;sitting  -RC    LB Bathing Assess/Train, [place] Level   stand by assist  -RC    Recorded by   [RC] Lianne Hernandez, NELSON/L    Upper Body Dressing Assessment/Training    UB Dressing Assess/Train, Clothing Type   doffing:;donning:;button up  -RC    UB Dressing Assess/Train, Position   sitting;edge of bed  -RC    UB Dressing Assess/Train, [place]   set up required  -RC    Recorded by   [RC] Lianne Hernandez, NELSON/L    Lower Body Dressing Assessment/Training    LB Dressing Assess/Train, Clothing Type   doffing:;donning:;pants  -RC    LB Dressing Assess/Train, Assist Device   reacher  -RC    LB Dressing Assess/Train, Position   sitting;standing  -RC    LB Dressing Assess/Train, [place]   verbal cues required;minimum assist (75% patient effort)  -RC    LB Dressing Assess/Train, Comment   --   @ w/ cath into pants  -RC    Recorded by   [RC] Lianne BOX  OMAR Hernandez/L    Grooming Assessment/Training    Grooming Assess/Train, Position   standing;sink side  -RC    Grooming Assess/Train, [place] Level   contact guard assist  -RC    Recorded by   [RC] OMAR Shah/L    Balance Skills Training    Standing-Level of Assistance   Contact guard  -RC    Static Standing Balance Support   assistive device  -RC    Standing Balance # of Minutes   7  -RC    Recorded by   [RC] OMAR Shah/L    Therapy Exercises    Bilateral Lower Extremities AROM:;20 reps;ankle pumps/circles;hip abduction/adduction;sitting;knee flexion;LAQ;glut sets  -RW AROM:;20 reps;ankle pumps/circles;hip abduction/adduction;glut sets;sitting;knee flexion;LAQ  -RW     BLE Resistance theraband  -RW theraband  -RW     Recorded by [RW] Xiang Jose PTA [RW] Xiang Jose PTA     Positioning and Restraints    Pre-Treatment Position sitting in chair/recliner  -RW in bed  -RW in bed  -RC    Post Treatment Position wheelchair  -RW wheelchair  -RW bed  -RC    In Bed   call light within reach;encouraged to call for assist  -RC    Recorded by [RW] Xiang Jose PTA [RW] Xiang Jose PTA [RC] YULI ShahA/HINA      01/31/18 1108 01/31/18 0935 01/31/18 0815    Rehab Assessment/Intervention    Discipline speech language pathologist  -CK occupational therapy assistant  -RC physical therapy assistant  -RW    Document Type therapy note (daily note)  -CK therapy note (daily note)  -RC therapy note (daily note)  -RW    Subjective Information agree to therapy  -CK agree to therapy  -RC     Patient Effort, Rehab Treatment good  -CK good  -RC     Recorded by [CK] Karen Ch, MS CCC-SLP [RC] YULI ShahA/L [RW] Xiang Jose, PTA    Vital Signs    Pre Systolic BP Rehab   149  -RW    Pre Treatment Diastolic BP   71  -RW    Post Systolic BP Rehab  134  -  -RW    Post Treatment Diastolic BP  63  -RC 56  -RW    Pretreatment Heart Rate (beats/min)  72  -RC 85  -RW     Intratreatment Heart Rate (beats/min)  74  -RC     Posttreatment Heart Rate (beats/min)  72  -RC 80  -RW    Pre SpO2 (%)   98  -RW    O2 Delivery Pre Treatment   supplemental O2  -RW    Intra SpO2 (%)  94  -RC     O2 Delivery Intra Treatment  supplemental O2  -RC     Recorded by  [RC] YULI ShahA/L [RW] Xiang Jose PTA    Pain Assessment    Pain Assessment No/denies pain  -CK  No/denies pain  -RW    Recorded by [CK] Karen Ch MS CCC-SLP  [RW] Xiang Jose PTA    Cognitive Assessment/Intervention    Current Cognitive/Communication Assessment  functional  -RC functional  -RW    Orientation Status  oriented x 4  -RC oriented x 4  -RW    Follows Commands/Answers Questions   100% of the time  -RW    Personal Safety Interventions   gait belt;nonskid shoes/slippers when out of bed  -RW    Recorded by  [RC] YULI ShahA/L [RW] Xiang Jose PTA    Communication Treatment Objective and Progress    Cognitive Linguistic Treatment Objectives Improve memory skills;Improve functional problem solving;Improve executive function skills  -CK      Recorded by [CK] Karen Ch MS CCC-SLP      Improve memory skills    Improve memory skills through: recalling unrelated word lists with an imposed delay;listen to a paragraph and answer questions;90%  -CK      Status: Improve memory skills Progressing as expected  -CK      Memory Skills Progress 80%  -CK      Recorded by [CK] Karen Ch MS CCC-SLP      Improve functional problem solving    Improve functional problem solving through: complete functional math task;90%  -CK      Status: Improve functional problem solving Other (comment)   Deferred this date  -CK      Functional Problem Solving Progress continue to address  -CK      Recorded by [CK] Karen Ch MS CCC-SLP      Improve executive function skills    Improve executive function skills: exhibit cognitive flexibility;organization/planning activity;90%  -CK      Status: Improve  executive function skills Progressing as expected  -CK      Executive Function Skills Progress 100%;60%  -CK      Comments: Improve executive function skills Improved time w/cog flex task  -CK      Recorded by [CK] Karen Ch MS CCC-SLP      Mobility Assessment/Training    Right Lower Extremity Weight-Bearing   weight-bearing as tolerated  -RW    Recorded by   [RW] Xiang Jose PTA    Bed Mobility, Assessment/Treatment    Bed Mobility, Assistive Device   --  -RW    Bed Mob, Supine to Sit, [place]  supervision required  -RC     Bed Mob, Sit to Supine, [place]  minimum assist (75% patient effort)  -RC --  -RW    Recorded by  [RC] OMAR Shah/L [RW] Xiang Jose PTA    Transfer Assessment/Treatment    Transfers, Bed-Chair [place]  contact guard assist  -RC     Transfers, Chair-Bed [place]  contact guard assist  -RC     Transfers, Bed-Chair-Bed, Assist Device  rolling walker  -RC     Transfers, Sit-Stand [place]  supervision required;stand by assist  -RC supervision required  -RW    Transfers, Stand-Sit [place]  supervision required  -RC supervision required  -RW    Transfers, Sit-Stand-Sit, Assist Device  rolling walker  -RC rolling walker  -RW    Recorded by  [RC] OMAR Shah/L [RW] Xiang Jose PTA    Gait Assessment/Treatment    Gait, [place] Level   contact guard assist;stand by assist  -RW    Gait, Assistive Device   rolling walker  -RW    Gait, Distance (Feet)   70    x 2  -RW    Recorded by   [RW] Xiang Jose PTA    Functional Mobility    Functional Mobility- Ind. Level  contact guard assist  -RC contact guard assist;supervision required  -RW    Functional Mobility- Device  rolling walker  -RC rolling walker  -RW    Functional Mobility-Distance (Feet)  10  -RC     Recorded by  [RC] OMAR Shah/L [RW] Xiang Jose PTA    Wheelchair Training/Management    Wheelchair Propulsion Training  forward propulsion  -RC     Wheelchair,  Distance Propelled  150  -RC     Recorded by  [RC] OMAR Shah/L     Grooming Assessment/Training    Grooming Assess/Train, Position  sitting  -RC     Grooming Assess/Train, Indepen Level  set up required  -RC     Recorded by  [RC] JENNIFFER Shah     Therapy Exercises    Bilateral Lower Extremities   AROM:;20 reps;ankle pumps/circles;hip abduction/adduction;glut sets;sitting;knee flexion;LAQ;hip extension   2 sets  -RW    Bilateral Upper Extremity  --   ue bike 10 min, pulley ex 8 min  -RC     Recorded by  [RC] JENNIFFER Shah [RW] Xiang Jose PTA    Positioning and Restraints    Pre-Treatment Position in bed  -CK in bed  -RC sitting in chair/recliner  -RW    Post Treatment Position bed  -CK bed  -RC wheelchair  -RW    In Bed supine;call light within reach;encouraged to call for assist;side rails up x2  -CK encouraged to call for assist;call light within reach  -RC     Recorded by [CK] Karen Ch MS CCC-SLP [RC] JENNIFFER Shah [RW] Xiang Jose PTA      01/30/18 1500          Rehab Assessment/Intervention    Discipline physical therapy assistant  -RW      Document Type therapy note (daily note)  -RW      Subjective Information agree to therapy  -RW      Patient Effort, Rehab Treatment good  -RW      Recorded by [RW] Xiang Jose PTA      Vital Signs    Pretreatment Heart Rate (beats/min) 76  -RW      Pre SpO2 (%) 95  -RW      O2 Delivery Pre Treatment supplemental O2  -RW      Recorded by [RW] Xiang Jose PTA      Pain Assessment    Pain Score 6  -RW      Post Pain Score 6  -RW      Pain Location Generalized  -RW      Recorded by [RW] Xiang Jose PTA      Cognitive Assessment/Intervention    Current Cognitive/Communication Assessment functional  -RW      Orientation Status oriented x 4  -RW      Follows Commands/Answers Questions 100% of the time  -RW      Personal Safety Interventions gait belt;nonskid shoes/slippers when out of bed  -RW      Recorded by  [RW] Xiang Jose PTA      Mobility Assessment/Training    Right Lower Extremity Weight-Bearing weight-bearing as tolerated  -RW      Recorded by [RW] Xiang Jose PTA      Bed Mobility, Assessment/Treatment    Bed Mobility, Assistive Device bed rails;head of bed elevated  -RW      Bed Mob, Sit to Supine, [place] supervision required  -RW      Recorded by [RW] Xiang Jose PTA      Transfer Assessment/Treatment    Transfers, Sit-Stand [place] supervision required  -RW      Transfers, Stand-Sit [place] supervision required  -RW      Transfers, Sit-Stand-Sit, Assist Device rolling walker  -RW      Recorded by [RW] Xiang Jose PTA      Gait Assessment/Treatment    Gait, [place] Level contact guard assist;stand by assist  -RW      Gait, Assistive Device rolling walker  -RW      Gait, Distance (Feet) 12   around bed into bed  -RW      Recorded by [RW] Xiang Jose PTA      Functional Mobility    Functional Mobility- Ind. Level contact guard assist;supervision required  -RW      Functional Mobility- Device rolling walker  -RW      Functional Mobility-Distance (Feet) 12  -RW      Recorded by [RW] Xiang Jose PTA      Therapy Exercises    Bilateral Lower Extremities AROM:;20 reps;ankle pumps/circles;hip abduction/adduction;supine;AAROM:;glut sets;heel slides;quad sets  -RW      Recorded by [RW] Xiang Jose PTA      Positioning and Restraints    Pre-Treatment Position sitting in chair/recliner  -RW      Post Treatment Position bed  -RW      In Bed call light within reach;legs elevated  -RW      Recorded by [RW] Xiang Jose PTA        User Key  (r) = Recorded By, (t) = Taken By, (c) = Cosigned By    Initials Name Effective Dates    EVITA Ch, [facility]-SLP 04/06/17 -     TYRON Burdick, OTR/L 10/17/16 -     CK Karen Ch, MS CCC-SLP 10/17/16 -     KIERSTEN Fagan, PTA 10/17/16 -     YULISA Jose, PTA 10/17/16 -     GOVIND Hernandez, NELSON/L  10/17/16 -               IP SLP Goals       02/02/18 1448 02/01/18 1428 01/31/18 1222    Cognitive Linguistic- Optimal Participation in Care    Cognitive Linguistic Optimal Participation in Care- SLP, Date Goal Reviewed 02/02/18  -EK 02/01/18  -EK 01/31/18  -CK    Cognitive Linguistic Optimal Participation in Care- SLP, Outcome   goal ongoing  -CK      01/30/18 1419 01/29/18 1744 01/27/18 1347    Cognitive Linguistic- Optimal Participation in Care    Cognitive Linguistic Optimal Participation in Care- SLP, Date Established   01/27/18  -CK    Cognitive Linguistic Optimal Participation in Care- SLP, Time to Achieve   by discharge  -CK    Cognitive Linguistic Optimal Participation in Care- SLP, Activity Level   Patient will improve memory skills for increased safety in environment;Patient will improve functional problem solving skills for increased safety in environment;Patient will improve Executive functioning skills for increased safety in environment  -CK    Cognitive Linguistic Optimal Participation in Care- SLP, Date Goal Reviewed 01/30/18  -CK 01/29/18  -CK 01/27/18  -CK    Cognitive Linguistic Optimal Participation in Care- SLP, Outcome goal ongoing  -CK goal ongoing  -CK       User Key  (r) = Recorded By, (t) = Taken By, (c) = Cosigned By    Initials Name Provider Type    EVITA Ch, CCC-SLP Speech and Language Pathologist    LETTY Ch, MS CCC-SLP Speech and Language Pathologist          EDUCATION  The patient has been educated in the following areas:   Cognitive Impairment.    SLP Recommendation and Plan           Plan of Care Review  Plan Of Care Reviewed With: patient  Progress: improving  Outcome Summary/Follow up Plan: Pt making good progress at this time on cognitive linguistic communication. Pt aware of need for SNF at discharge for additional therapy.           Time Calculation:         Time Calculation- SLP       02/02/18 1453          Time Calculation- SLP    SLP Start  Time 0952  -EK      SLP Stop Time 1033  -EK      SLP Time Calculation (min) 41 min  -EK      SLP Received On 02/02/18  -EK        User Key  (r) = Recorded By, (t) = Taken By, (c) = Cosigned By    Initials Name Provider Type    EVITA Ch CCC-SLP Speech and Language Pathologist          Therapy Charges for Today     Code Description Service Date Service Provider Modifiers Qty    16701596813 HC ST TREATMENT SPEECH 3 2/1/2018 MARZENA Ramirez GN 1    [id number] HC ST TREATMENT SPEECH 3 2/2/2018 MARZENA Ramirez  1               MARZENA Ramirez  2/2/2018

## 2018-02-02 NOTE — PLAN OF CARE
Problem: Patient Care Overview (Adult)  Goal: Plan of Care Review  Outcome: Ongoing (interventions implemented as appropriate)   02/02/18 1448   Coping/Psychosocial Response Interventions   Plan Of Care Reviewed With patient   Outcome Evaluation   Outcome Summary/Follow up Plan Pt making good progress at this time on cognitive linguistic communication. Pt aware of need for SNF at discharge for additional therapy.    Patient Care Overview   Progress improving       Problem: Inpatient SLP  Goal: Cognitive-linguistic-Patient will improve Cognitive-linguistic skills to allow optimal participation in care  Outcome: Ongoing (interventions implemented as appropriate)   01/27/18 1347 01/31/18 1222 02/02/18 1448   Cognitive Linguistic- Optimal Participation in Care   Cognitive Linguistic Optimal Participation in Care- SLP, Date Established 01/27/18 --  --    Cognitive Linguistic Optimal Participation in Care- SLP, Time to Achieve by discharge --  --    Cognitive Linguistic Optimal Participation in Care- SLP, Activity Level Patient will improve memory skills for increased safety in environment;Patient will improve functional problem solving skills for increased safety in environment;Patient will improve Executive functioning skills for increased safety in environment --  --    Cognitive Linguistic Optimal Participation in Care- SLP, Date Goal Reviewed --  --  02/02/18   Cognitive Linguistic Optimal Participation in Care- SLP, Outcome --  goal ongoing --

## 2018-02-02 NOTE — PROGRESS NOTES
"   LOS: 7 days   Patient Care Team:  Roula Albert MD as PCP - General  Roula Albert MD as PCP - Claims Attributed    Subjective     Subjective:  Symptoms:  Stable.    Diet:  Adequate intake.  She reports  vomiting.  No nausea.    Activity level: Activity impairment: improving.    Pain:  She reports no pain.        History taken from: patient chart    Objective     Vital Signs  Temp:  [96.3 °F (35.7 °C)-98.8 °F (37.1 °C)] 98.4 °F (36.9 °C)  Heart Rate:  [67-76] 75  Resp:  [16-20] 16  BP: (132-139)/(62-64) 132/63    Objective:  General Appearance:  In no acute distress.    Vital signs: (most recent): Blood pressure 132/63, pulse 75, temperature 98.4 °F (36.9 °C), temperature source Oral, resp. rate 16, height 172.7 cm (67.99\"), weight 76 kg (167 lb 7 oz), SpO2 93 %.  Vital signs are normal.  No fever.    Output: Producing urine (White).    HEENT: Normal HEENT exam.    Lungs:  Normal respiratory rate and normal effort.  She is not in respiratory distress.  Breath sounds clear to auscultation.    Heart: Normal rate.  Regular rhythm.  S1 normal and S2 normal.  No murmur.   Chest: Symmetric chest wall expansion.   Abdomen: Abdomen is soft and non-distended.  Bowel sounds are normal.   There is no abdominal tenderness.     Extremities: There is no dependent edema.    Pulses: Distal pulses are intact.    Neurological: Patient is alert and oriented to person, place and time.  GCS score is 15.    Pupils:  Pupils are equal, round, and reactive to light.    Skin:  Warm, dry and pale.  No ecchymosis or cyanosis.             Results Review:    Lab Results (last 24 hours)     Procedure Component Value Units Date/Time    CBC & Differential [[id number]] Collected:  02/02/18 0457    Specimen:  Blood Updated:  02/02/18 0530    Narrative:       The following orders were created for panel order CBC & Differential.  Procedure                               Abnormality         Status                     ---------                       "         -----------         ------                     CBC Auto Differential[[id number]]        Abnormal            Final result                 Please view results for these tests on the individual orders.    CBC Auto Differential [[id number]]  (Abnormal) Collected:  02/02/18 0457    Specimen:  Blood Updated:  02/02/18 0550     WBC 8.61 10*3/mm3      RBC 2.76 (L) 10*6/mm3      Hemoglobin 8.9 (L) g/dL      Hematocrit 26.7 (L) %      MCV 96.7 fL      MCH 32.2 pg      MCHC 33.3 g/dL      RDW 14.8 (H) %      RDW-SD 52.2 (H) fl      MPV 9.2 fL      Platelets 390 10*3/mm3      Neutrophil % 61.2 %      Lymphocyte % 22.4 %      Monocyte % 9.2 %      Eosinophil % 5.3 %      Basophil % 0.2 %      Immature Grans % 1.7 (H) %      Neutrophils, Absolute 5.26 10*3/mm3      Lymphocytes, Absolute 1.93 10*3/mm3      Monocytes, Absolute 0.79 10*3/mm3      Eosinophils, Absolute 0.46 10*3/mm3      Basophils, Absolute 0.02 10*3/mm3      Immature Grans, Absolute 0.15 (H) 10*3/mm3     Protime-INR [[id number]]  (Abnormal) Collected:  02/02/18 0457    Specimen:  Blood Updated:  02/02/18 0552     Protime 23.1 (H) Seconds      INR 2.03 (H)    Narrative:       Therapeutic range for most indications is 2.0-3.0 INR,  or 2.5-3.5 for mechanical heart valves.    Renal Function Panel [[id number]]  (Abnormal) Collected:  02/02/18 0457    Specimen:  Blood Updated:  02/02/18 0608     Glucose 92 mg/dL      BUN 32 (H) mg/dL      Creatinine 1.04 (H) mg/dL      Sodium 137 mmol/L      Potassium 5.2 (H) mmol/L      Chloride 97 mmol/L      CO2 35.0 (H) mmol/L      Calcium 8.9 mg/dL      Albumin 3.00 (L) g/dL      Phosphorus 4.3 mg/dL      Anion Gap 5.0 mmol/L      BUN/Creatinine Ratio 30.8 (H)     eGFR Non African Amer 50 (L) mL/min/1.73     Narrative:       The MDRD GFR formula is only valid for adults with stable renal function between ages 18 and 70.         Imaging Results (last 24 hours)     ** No results found for the last 24 hours. **           I reviewed  the patient's new clinical results.  I reviewed the patient's new imaging results and agree with the interpretation.  I reviewed the patient's other test results and agree with the interpretation      Assessment/Plan     Principal Problem:    Closed displaced basicervical fracture of right femur  Active Problems:    Essential hypertension    Coronary artery disease involving native coronary artery of native heart without angina pectoris    Acquired hypothyroidism    Depressive disorder    Pulmonary emphysema    Chronic systolic congestive heart failure    Hyponatremia    BENITO (acute kidney injury)    Urinary retention      Assessment:  (Urinary retention due to distal urethral lesion with associated pain, White anchored 1/29/18. Estrace cream initiated last night.     Estimated Creatinine Clearance: 47.4 mL/min (by C-G formula based on Cr of 1.04).    Intake/Output Summary (Last 24 hours) at 02/02/18 1632  Last data filed at 02/02/18 1333          Gross per 24 hour  Intake:              840 ml  Output:              950 ml  Net   :             -110 ml).     Plan:   (Plan for White removal tomorrow morning.   Continue Estrace cream. ).       CRESENCIO Edwards  02/02/18  4:29 PM

## 2018-02-02 NOTE — THERAPY TREATMENT NOTE
Inpatient Rehabilitation - Physical Therapy Treatment Note  [facility]     Patient Name: Val Arteaga  : 1932  MRN: [id number]  Today's Date: 2018  Onset of Illness/Injury or Date of Surgery Date: 18  Date of Referral to PT: 18  Referring Physician: Dr. Maldonado    Admit Date: 2018    Visit Dx:    ICD-10-CM ICD-9-CM   1. Symbolic dysfunction R48.9 784.60   2. Impaired mobility and activities of daily living Z74.09 799.89   3. Impaired functional mobility, balance, gait, and endurance Z74.09 V49.89     Patient Active Problem List   Diagnosis   • Essential hypertension   • Coronary artery disease involving native coronary artery of native heart without angina pectoris   • Chronic obstructive pulmonary disease   • Acquired hypothyroidism   • Depressive disorder   • Thygeson's superficial punctate keratitis   • Pseudophakia   • Precordial pain   • Displaced fracture of base of neck of right femur, initial encounter for closed fracture   • Hip fracture, right, closed, initial encounter   • Pulmonary emphysema   • Chronic systolic congestive heart failure   • Hyponatremia   • BENITO (acute kidney injury)   • Acute blood loss anemia   • Urinary retention   • Closed displaced basicervical fracture of right femur               Adult Rehabilitation Note       18 1300 18 1037 18 [id number]    Rehab Assessment/Intervention    Discipline physical therapy assistant  -JA physical therapy assistant  -JA speech language pathologist  -EK    Document Type therapy note (daily note)  -JA therapy note (daily note)  -JA therapy note (daily note)  -EK    Subjective Information agree to therapy  -JA agree to therapy  -JA no complaints;agree to therapy  -EK    Patient Effort, Rehab Treatment good  -JA good  -JA good  -EK    Precautions/Limitations fall precautions;anterior hip precautions- right  -JA fall precautions;anterior hip precautions- right  -JA     Recorded by [JA] Juan Alberto Fagan,  PTA [JA] Juan Alberto Fagan PTA [EK] Kizzy Ch CCC-SLP    Vital Signs    Pre Systolic BP Rehab 123  -  -JA     Pre Treatment Diastolic BP 78  -JA 57  -JA     Intra Systolic BP Rehab 141  -JA      Intra Treatment Diastolic BP 66  -JA      Post Systolic BP Rehab 130  -  -JA     Post Treatment Diastolic BP 60  -JA 63  -JA     Pretreatment Heart Rate (beats/min)  71  -JA     Pre SpO2 (%) 93  -JA 99  -JA     O2 Delivery Pre Treatment supplemental O2  -JA supplemental O2  -JA     Intra SpO2 (%) 92  -JA      O2 Delivery Intra Treatment supplemental O2  -JA      Pre Patient Position Sitting  -JA Sitting  -JA     Intra Patient Position Standing  -JA Standing  -JA     Post Patient Position Supine  -JA Sitting  -JA     Recorded by [JA] Juan Alberto Fagan PTA [JA] Juan Alberto Fagan PTA     Pain Assessment    Pain Assessment No/denies pain  -JA No/denies pain  -JA No/denies pain  -EK    Pain Score 0  -JA 0  -JA     Post Pain Score 0  -JA 0  -JA     Pain Intervention(s) Medication (See MAR);Ambulation/increased activity;Repositioned  -JA Medication (See MAR);Repositioned;Ambulation/increased activity  -JA     Recorded by [JA] Juan Alberto Fagan PTA [JA] Juan Alberto Fagan PTA [EK] Kizzy Ch, [facility]SLP    Mobility Assessment/Training    Extremity Weight-Bearing Status right lower extremity  -JA right lower extremity  -JA     Right Lower Extremity Weight-Bearing weight-bearing as tolerated  -JA weight-bearing as tolerated  -JA     Recorded by [JA] Juan Alberto Fagan PTA [JA] Juan Alberto Fagan PTA     Bed Mobility, Assessment/Treatment    Bed Mobility, Assistive Device bed rails  -JA      Bed Mob, Sit to Supine, [place] minimum assist (75% patient effort)  -JA      Recorded by [JA] Juan Alberto Fagan PTA      Transfer Assessment/Treatment    Transfers, Chair-Bed [place] contact guard assist  -JA      Transfers, Bed-Chair-Bed, Assist Device rolling walker  -JA       "Transfers, Sit-Stand [place] contact guard assist  -JA contact guard assist  -JA     Transfers, Stand-Sit [place] contact guard assist  -JA contact guard assist  -JA     Transfers, Sit-Stand-Sit, Assist Device rolling walker  -JA rolling walker  -JA     Transfer, Maintain Weight Bearing Status able to maintain weight bearing status  -JA able to maintain weight bearing status  -JA     Transfer, Safety Issues step length decreased;sequencing ability decreased  -JA step length decreased  -JA     Recorded by [KIERSTEN] Juan Alberto Fagan PTA [JA] Juan Alberto Fagan PTA     Gait Assessment/Treatment    Gait, [place] Level contact guard assist  -JA contact guard assist  -JA     Gait, Assistive Device rolling walker  -JA rolling walker  -JA     Gait, Distance (Feet) 60  -JA 60   x2  -JA     Gait, Gait Deviations antalgic  -JA antalgic  -JA     Gait, Safety Issues step length decreased;sequencing ability decreased;supplemental O2  -JA step length decreased;supplemental O2  -JA     Gait, Comment Gt. attempt ended due to pt. with c/o's dizziness and vision \"getting dark\" & pt. with increase SOA after performance. Pt. with seated rest break, with minimal recovery noted & nsg. made aware. Pt. transferred back to room & transferred to supine this p.m.   -JA      Recorded by [KIERSTEN] Juan Alberto Fagan PTA [KIERSTEN] Juan Alberto Fagan PTA     Wheelchair Training/Management    Wheelchair, Distance Propelled  134 conditional independent   -JA     Recorded by  [KIERSTEN] Juan Alberto Fagan PTA     Therapy Exercises    Right Lower Extremity AAROM:;20 reps;supine;heel slides;hip abduction/adduction  -JA      Left Lower Extremity AROM:;20 reps;supine;heel slides;hip abduction/adduction  -JA      Bilateral Lower Extremities AROM:;20 reps;supine;glut sets;quad sets  -JA AROM:;20 reps;sitting;ankle pumps/circles;hip flexion;LAQ;hip abduction/adduction;knee flexion  -JA     BLE Resistance  theraband   lvl 1 ham pulls  -JA     Recorded by " [JA] Juan Alberto Fagan PTA [JA] Juan Alberto Fagan PTA     Positioning and Restraints    Pre-Treatment Position sitting in chair/recliner  -JA sitting in chair/recliner  -JA sitting in chair/recliner  -EK    Post Treatment Position bed  -JA wheelchair  -JA chair  -EK    In Bed supine;call light within reach;encouraged to call for assist  -JA  with PT  -EK    In Wheelchair  sitting;call light within reach;encouraged to call for assist  -JA     Recorded by [JA] Juan Alberto Fagan PTA [JA] Juan Alberto Fagan PTA [EK] Kizzy Ch, CCC-SLP      02/01/18 1335 02/01/18 1050 02/01/18 0912    Rehab Assessment/Intervention    Discipline physical therapy assistant  -RW speech language pathologist  -EK occupational therapist  -    Document Type therapy note (daily note)  -RW therapy note (daily note)  -EK therapy note (daily note)  -    Subjective Information complains of;dizziness;agree to therapy  -RW no complaints;agree to therapy  -EK agree to therapy  -    Patient Effort, Rehab Treatment good  -RW good  -EK good  -BH    Symptoms Noted During/After Treatment significant change in vital signs  -RW none  -EK fatigue  -BH    Precautions/Limitations   fall precautions;anterior hip precautions- right   WBAT  -BH    Recorded by [RW] Xiang Jose PTA [EK] Kizzy Ch, CCC-SLP [BH] Marisol Burdick, OTR/L    Vital Signs    Pre Systolic BP Rehab 71   seated in wc with dynamap  -RW  112  -BH    Pre Treatment Diastolic BP 41  -RW  56  -BH    Intra Systolic BP Rehab 172   supine with dynamap  -RW      Intra Treatment Diastolic BP 74  -RW      Post Systolic BP Rehab 162  -RW  107  -BH    Post Treatment Diastolic BP 74   nurse taking phil  -RW  54  -BH    Pretreatment Heart Rate (beats/min)   81  -BH    Intratreatment Heart Rate (beats/min)   83  -BH    Posttreatment Heart Rate (beats/min)   77  -BH    Pre SpO2 (%)   96  -BH    O2 Delivery Pre Treatment   supplemental O2  -BH    Intra SpO2  (%)   98  -BH    O2 Delivery Intra Treatment   supplemental O2  -BH    Post SpO2 (%)   95  -BH    O2 Delivery Post Treatment   supplemental O2  -BH    Pre Patient Position   Sitting  -BH    Intra Patient Position   Sitting  -BH    Post Patient Position   Sitting  -BH    Recorded by [RW] Xiang Jose PTA  [BH] Marisol Burdick, OTR/L    Pain Assessment    Pain Assessment No/denies pain  -RW No/denies pain  -EK     Pain Score  0  -EK 0  -BH    Post Pain Score  0  -EK 0  -BH    Recorded by [RW] Xiang Jose PTA [EK] Kizzy Ch CCC-SLP [] Marisol Burdick, OTR/L    Cognitive Assessment/Intervention    Current Cognitive/Communication Assessment functional  -RW impaired  -EK functional  -BH    Orientation Status oriented x 4  -RW oriented x 4  -EK oriented x 4  -BH    Follows Commands/Answers Questions 100% of the time  -% of the time  -% of the time  -BH    Personal Safety  decreased awareness, need for safety  -EK WNL/WFL  -BH    Personal Safety Interventions gait belt;nonskid shoes/slippers when out of bed  -RW  gait belt;nonskid shoes/slippers when out of bed;supervised activity;muscle strengthening facilitated  -BH    Recorded by [RW] Xiang Jose PTA [EK] Kizzy Ch CCC-MICK [] Marisol Burdick, OTR/L    Improve awareness of basic personal information    Improve awareness of basic personal information through:  answering open-ended questions regarding personal/biographical information  -EK     Status: Improve awareness of basic personal information  Progressing as expected  -EK     Awareness of Basic Personal Information Progress  90%  -EK     Recorded by  [EK] Kizzy Ch CCC-SLP     Improve memory skills    Improve memory skills through:  recalling unrelated word lists with an imposed delay;listen to a paragraph and answer questions;90%  -EK     Status: Improve memory skills  Progressing as expected  -EK     Memory Skills Progress  60%  -EK      Recorded by  [EK] Kizzy Ch CCC-SLP     Improve functional problem solving    Improve functional problem solving through:  complete functional math task;90%  -EK     Recorded by  [EK] Kizzy Ch CCC-SLP     Improve executive function skills    Improve executive function skills:  exhibit cognitive flexibility;organization/planning activity;90%  -EK     Status: Improve executive function skills  Progressing as expected  -EK     Executive Function Skills Progress  70%  -EK     Recorded by  [EK] Kizzy Ch CCC-SLP     Mobility Assessment/Training    Right Lower Extremity Weight-Bearing weight-bearing as tolerated  -RW      Recorded by [RW] Xiang Jose PTA      Bed Mobility, Assessment/Treatment    Bed Mob, Sit to Supine, [place] supervision required;contact guard assist  -RW      Bed Mobility, Comment   defer pt up on w/c in room and wanted to stay.   -BH    Recorded by [RW] Xiang Jose PTA  [BH] Marisol Burdick, OTR/L    Transfer Assessment/Treatment    Transfers, Chair-Bed [place] contact guard assist  -RW      Transfers, Bed-Chair-Bed, Assist Device rolling walker  -RW      Transfers, Sit-Stand [place] supervision required;contact guard assist  -RW  supervision required;contact guard assist  -BH    Transfers, Stand-Sit [place] supervision required;contact guard assist  -RW  supervision required;contact guard assist  -BH    Transfers, Sit-Stand-Sit, Assist Device rolling walker  -RW  rolling walker  -BH    Transfer, Comment   completed several sit to stand for ADL with vc for safety and formation.   -BH    Recorded by [RW] Xiang Jose PTA  [BH] Marisol Burdick, OTR/L    Gait Assessment/Treatment    Gait, [place] Level contact guard assist;stand by assist  -RW      Gait, Assistive Device rolling walker  -RW      Gait, Distance (Feet) 12  -RW      Gait, Comment around bed  -RW      Recorded by [RW] Xiang Jose PTA       "Functional Mobility    Functional Mobility- Ind. Level contact guard assist;supervision required  -RW      Functional Mobility- Device rolling walker  -RW      Recorded by [RW] Xiang Jose, PTA      Upper Body Bathing Assessment/Training    UB Bathing Assess/Train Assistive Device   long-handled sponge   wash cloth and water bath  -    UB Bathing Assess/Train, Position   supported sitting   w/c  -    UB Bathing Assess/Train, [place] Level   set up required  -    UB Bathing Assess/Train, Comment   pt provided long handle sponge declined to use stated she reached it \"good enough\" and was done, SBA  distant with set up to complete  -    Recorded by   [] Marisol Burdick OTR/L    Lower Body Bathing Assessment/Training    LB Bathing Assess/Train Assistive Device   long-handled sponge  -    LB Bathing Assess/Train, Position   supported sitting;standing   w/c  -    LB Bathing Assess/Train, [place] Level   contact guard assist;verbal cues required;supervision required;stand by assist;set up required  -    LB Bathing Assess/Train, Comment   Pt close SBA to [facility] for standing for LB pericre and upper leg washing, used LH sponge to wash legs unable to dry without assist, otherwise pt set up assist.   -    Recorded by   [] AYAD Vazquez/L    Upper Body Dressing Assessment/Training     Dressing Assess/Train, Clothing Type   doffing:;donning:;button up  -    UB Dressing Assess/Train, Position   sitting  -    UB Dressing Assess/Train, [place]   set up required;supervision required;verbal cues required;minimum assist (75% patient effort)  -    UB Dressing Assess/Train, Comment   pt required min-mod assist to doff jacket to get over both shoulders in the back then she could take the rest of the way off, she was able to doff a button up shirt. Pt able to don a button up shirt set up assist.   -    Recorded by   [] AYAD Vazquez/L    Lower Body Dressing Assessment/Training    " LB Dressing Assess/Train, Clothing Type   doffing:;donning:;pants   underwear, yessi hose  -    LB Dressing Assess/Train, Position   sitting;standing  -    LB Dressing Assess/Train, [place]   minimum assist (75% patient effort);verbal cues required;supervision required;set up required;contact guard assist;maximum assist (25% patient effort)  -    LB Dressing Assess/Train, Comment   max to dependent for don and doff yessi hose, total assist with cath into pants/underwear. pt required min assist to don shoes and total assist to tie shoes. Pt used reacher and long handle shoe horn to engage in LB dressing tasks. Pt CGA to SBA to stand to pull up and down pants;underwear. Pt took extended time and effort.   -    Recorded by   [] AYAD Vazquez/L    Toileting Assessment/Training    Toileting Assess/Train, Comment   OT educated on AE use and protecting hip during sponge bath and dressing tasks. Pt struggled with using them accurately.   -    Recorded by   [] AYAD Vazquez/L    Grooming Assessment/Training    Grooming Assess/Train, Comment   Pt set up assist to apply deodorant.   -    Recorded by   [] AYAD Vazquez/L    Balance Skills Training    Sitting-Level of Assistance   Distant supervision  -    Standing-Level of Assistance   Contact guard;Close supervision  -    Recorded by   [] AYAD Vazquez/L    Therapy Exercises    Bilateral Lower Extremities AROM:;20 reps;hip abduction/adduction;supine;glut sets;heel slides;SAQ;quad sets  -      Bilateral Upper Extremity   AROM:;20 reps;sitting;elbow flexion/extension;hand pumps;shoulder extension/flexion   wrist flexion/extension  -    Recorded by [] Xiang Jose, PTA  [] AYAD Vazquez/L    Gross Motor Coordination Training    Gross Motor Skill, Impairments Detail   Pt engaged in 10 minutes of BUE arm bike fair+ toleration completed 10 minutes no rest breaks.   -    Recorded by   [] AYAD Vazquez/HINA     Positioning and Restraints    Pre-Treatment Position sitting in chair/recliner  -RW sitting in chair/recliner  -EK other (comment)   in w/c  -BH    Post Treatment Position bed  -RW chair  -EK wheelchair  -BH    In Bed call light within reach;legs elevated  -RW call light within reach;encouraged to call for assist;exit alarm on  -EK     In Wheelchair   sitting;call light within reach;encouraged to call for assist   SLP going in room  -BH    Recorded by [RW] Xiang Jose PTA [EK] Kizzy Ch, CCC-SLP [] Marisol Burdick, OTR/L      02/01/18 0805 01/31/18 1415 01/31/18 1302    Rehab Assessment/Intervention    Discipline physical therapy assistant  -RW physical therapy assistant  -RW occupational therapy assistant  -RC    Document Type therapy note (daily note)  -RW therapy note (daily note)  -RW therapy note (daily note)  -RC    Subjective Information agree to therapy  -RW agree to therapy  -RW agree to therapy  -RC    Patient Effort, Rehab Treatment good  -RW good  -RW good  -RC    Symptoms Noted During/After Treatment   fatigue  -RC    Precautions/Limitations   fall precautions  -RC    Recorded by [RW] Xiang Jose PTA [RW] Xiang Jose PTA [RC] Lianne Hernandez, NELSON/L    Vital Signs    Intratreatment Heart Rate (beats/min) 79  -RW      Intra SpO2 (%) 96  -RW      O2 Delivery Intra Treatment supplemental O2  -RW      Recorded by [RW] Xiang Jose PTA      Pain Assessment    Pain Assessment No/denies pain  -RW  No/denies pain  -RC    Pain Score  6  -RW     Post Pain Score  6  -RW     Pain Location  Hip  -RW     Pain Orientation  Right  -RW     Recorded by [RW] Xiang Jose PTA [RW] Xiang Jose PTA [RC] Lianne Hernandez, NELSON/L    Cognitive Assessment/Intervention    Current Cognitive/Communication Assessment functional  -RW functional  -RW functional  -RC    Orientation Status oriented x 4  -RW oriented x 4  -RW oriented x 4  -RC    Follows Commands/Answers Questions 100% of the time   -% of the time  -RW     Personal Safety Interventions gait belt;nonskid shoes/slippers when out of bed  -RW gait belt;nonskid shoes/slippers when out of bed  -RW     Recorded by [RW] Xiang Jose PTA [RW] Xiang Jose PTA [RC] Lianne Hernandez, NELSON/L    Mobility Assessment/Training    Right Lower Extremity Weight-Bearing weight-bearing as tolerated  -RW weight-bearing as tolerated  -RW     Recorded by [RW] Xiang Jose PTA [RW] Xiang Jose PTA     Bed Mobility, Assessment/Treatment    Bed Mobility, Assistive Device  bed rails;head of bed elevated  -RW     Bed Mob, Supine to Sit, [place]  contact guard assist  -RW supervision required  -RC    Bed Mob, Sit to Supine, [place]   minimum assist (75% patient effort)  -RC    Recorded by  [RW] Xiang Jose PTA [RC] Lianne Hernandez, NELSON/L    Transfer Assessment/Treatment    Transfers, Bed-Chair [place] stand by assist;contact guard assist  -RW      Transfers, Bed-Chair-Bed, Assist Device rolling walker  -RW      Transfers, Sit-Stand [place] supervision required  -RW supervision required  -RW supervision required  -RC    Transfers, Stand-Sit [place] supervision required  -RW supervision required  -RW supervision required  -RC    Transfers, Sit-Stand-Sit, Assist Device rolling walker  -RW rolling walker  -RW rolling walker  -RC    Recorded by [RW] Xiang Jose PTA [RW] Xiang Jose PTA [RC] Lianne Hernandez, NELSON/L    Gait Assessment/Treatment    Gait, [place] Level contact guard assist;stand by assist  -RW contact guard assist;stand by assist  -RW     Gait, Assistive Device rolling walker  -RW rolling walker  -RW     Gait, Distance (Feet) 20  -RW 48  -RW     Recorded by [RW] Xiang Jose PTA [RW] Xiang Jose PTA     Stairs Assessment/Treatment    Number of Stairs 4 and then curb  -RW      Stairs, Handrail Location left side (ascending)  -RW      Stairs, [place] Level contact guard assist;verbal cues  required  -RW      Stairs, Assistive Device walker   on curb  -RW      Recorded by [RW] Xiang Jose PTA      Functional Mobility    Functional Mobility- Ind. Level contact guard assist;supervision required  -RW contact guard assist;supervision required  -RW contact guard assist  -RC    Functional Mobility- Device rolling walker  -RW rolling walker  -RW rolling walker  -RC    Functional Mobility-Distance (Feet)   10  -RC    Recorded by [RW] Xiang Jose PTA [RW] Xiang Jose PTA [RC] Lianne Hernandez, NELSON/L    Wheelchair Training/Management    Wheelchair, Distance Propelled  100 mod ind  -RW     Recorded by  [RW] Xiang Jose PTA     Upper Body Bathing Assessment/Training    UB Bathing Assess/Train Assistive Device   --   sponge bath  -RC    UB Bathing Assess/Train, Position   edge of bed;sitting  -RC    UB Bathing Assess/Train, [place] Level   set up required  -RC    Recorded by   [RC] Lianne Hernandez, NELSON/L    Lower Body Bathing Assessment/Training    LB Bathing Assess/Train Assistive Device   --   sponge  -RC    LB Bathing Assess/Train, Position   edge of bed;sitting  -RC    LB Bathing Assess/Train, [place] Level   stand by assist  -RC    Recorded by   [RC] Lianne Hernandez, NELSON/L    Upper Body Dressing Assessment/Training    UB Dressing Assess/Train, Clothing Type   doffing:;donning:;button up  -RC    UB Dressing Assess/Train, Position   sitting;edge of bed  -RC    UB Dressing Assess/Train, [place]   set up required  -RC    Recorded by   [RC] Lianne Hernandez, NELSON/L    Lower Body Dressing Assessment/Training    LB Dressing Assess/Train, Clothing Type   doffing:;donning:;pants  -RC    LB Dressing Assess/Train, Assist Device   reacher  -RC    LB Dressing Assess/Train, Position   sitting;standing  -RC    LB Dressing Assess/Train, [place]   verbal cues required;minimum assist (75% patient effort)  -RC    LB Dressing Assess/Train, Comment   --   @ w/ cath into pants  -RC    Recorded  by   [RC] OMAR Shah/L    Grooming Assessment/Training    Grooming Assess/Train, Position   standing;sink side  -RC    Grooming Assess/Train, [place] Level   contact guard assist  -RC    Recorded by   [RC] YULI ShahA/L    Balance Skills Training    Standing-Level of Assistance   Contact guard  -RC    Static Standing Balance Support   assistive device  -RC    Standing Balance # of Minutes   7  -RC    Recorded by   [RC] OMAR Shah/L    Therapy Exercises    Bilateral Lower Extremities AROM:;20 reps;ankle pumps/circles;hip abduction/adduction;sitting;knee flexion;LAQ;glut sets  -RW AROM:;20 reps;ankle pumps/circles;hip abduction/adduction;glut sets;sitting;knee flexion;LAQ  -RW     BLE Resistance theraband  -RW theraband  -RW     Recorded by [RW] Xiang Jose PTA [RW] Xiang Jose PTA     Positioning and Restraints    Pre-Treatment Position sitting in chair/recliner  -RW in bed  -RW in bed  -RC    Post Treatment Position wheelchair  -RW wheelchair  -RW bed  -RC    In Bed   call light within reach;encouraged to call for assist  -RC    Recorded by [RW] Xiang Jose PTA [RW] Xiang Jose PTA [RC] OMAR Shah/HINA      01/31/18 1108 01/31/18 0935 01/31/18 0815    Rehab Assessment/Intervention    Discipline speech language pathologist  -CK occupational therapy assistant  -RC physical therapy assistant  -RW    Document Type therapy note (daily note)  -CK therapy note (daily note)  -RC therapy note (daily note)  -RW    Subjective Information agree to therapy  -CK agree to therapy  -RC     Patient Effort, Rehab Treatment good  -CK good  -RC     Recorded by [CK] Karen Ch, MS CCC-SLP [RC] YULI ShahA/L [RW] Xiang Jose, PTA    Vital Signs    Pre Systolic BP Rehab   149  -RW    Pre Treatment Diastolic BP   71  -RW    Post Systolic BP Rehab  134  -  -RW    Post Treatment Diastolic BP  63  -RC 56  -RW    Pretreatment Heart Rate (beats/min)  72  -RC 85   -RW    Intratreatment Heart Rate (beats/min)  74  -RC     Posttreatment Heart Rate (beats/min)  72  -RC 80  -RW    Pre SpO2 (%)   98  -RW    O2 Delivery Pre Treatment   supplemental O2  -RW    Intra SpO2 (%)  94  -RC     O2 Delivery Intra Treatment  supplemental O2  -RC     Recorded by  [RC] YULI ShahA/L [RW] Xiang Jose PTA    Pain Assessment    Pain Assessment No/denies pain  -CK  No/denies pain  -RW    Recorded by [CK] Karen Ch MS CCC-SLP  [RW] Xiang Jose PTA    Cognitive Assessment/Intervention    Current Cognitive/Communication Assessment  functional  -RC functional  -RW    Orientation Status  oriented x 4  -RC oriented x 4  -RW    Follows Commands/Answers Questions   100% of the time  -RW    Personal Safety Interventions   gait belt;nonskid shoes/slippers when out of bed  -RW    Recorded by  [RC] YULI ShahA/L [RW] Xiang Jose PTA    Communication Treatment Objective and Progress    Cognitive Linguistic Treatment Objectives Improve memory skills;Improve functional problem solving;Improve executive function skills  -CK      Recorded by [CK] Karen Ch MS CCC-SLP      Improve memory skills    Improve memory skills through: recalling unrelated word lists with an imposed delay;listen to a paragraph and answer questions;90%  -CK      Status: Improve memory skills Progressing as expected  -CK      Memory Skills Progress 80%  -CK      Recorded by [CK] Karen Ch MS CCC-SLP      Improve functional problem solving    Improve functional problem solving through: complete functional math task;90%  -CK      Status: Improve functional problem solving Other (comment)   Deferred this date  -CK      Functional Problem Solving Progress continue to address  -CK      Recorded by [CK] Karen Ch MS CCC-SLP      Improve executive function skills    Improve executive function skills: exhibit cognitive flexibility;organization/planning activity;90%  -CK      Status:  Improve executive function skills Progressing as expected  -CK      Executive Function Skills Progress 100%;60%  -CK      Comments: Improve executive function skills Improved time w/cog flex task  -CK      Recorded by [CK] Karen Ch MS CCC-SLP      Mobility Assessment/Training    Right Lower Extremity Weight-Bearing   weight-bearing as tolerated  -RW    Recorded by   [RW] Xiang Jose PTA    Bed Mobility, Assessment/Treatment    Bed Mobility, Assistive Device   --  -RW    Bed Mob, Supine to Sit, [place]  supervision required  -RC     Bed Mob, Sit to Supine, [place]  minimum assist (75% patient effort)  -RC --  -RW    Recorded by  [RC] OMAR Shah/L [RW] Xiang Jose PTA    Transfer Assessment/Treatment    Transfers, Bed-Chair [place]  contact guard assist  -RC     Transfers, Chair-Bed [place]  contact guard assist  -RC     Transfers, Bed-Chair-Bed, Assist Device  rolling walker  -RC     Transfers, Sit-Stand [place]  supervision required;stand by assist  -RC supervision required  -RW    Transfers, Stand-Sit [place]  supervision required  -RC supervision required  -RW    Transfers, Sit-Stand-Sit, Assist Device  rolling walker  -RC rolling walker  -RW    Recorded by  [RC] OMAR Shah/L [RW] Xiang Jose PTA    Gait Assessment/Treatment    Gait, [place] Level   contact guard assist;stand by assist  -RW    Gait, Assistive Device   rolling walker  -RW    Gait, Distance (Feet)   70    x 2  -RW    Recorded by   [RW] Xiang Jose PTA    Functional Mobility    Functional Mobility- Ind. Level  contact guard assist  -RC contact guard assist;supervision required  -RW    Functional Mobility- Device  rolling walker  -RC rolling walker  -RW    Functional Mobility-Distance (Feet)  10  -RC     Recorded by  [RC] OMAR Shah/L [RW] Xiang Jose PTA    Wheelchair Training/Management    Wheelchair Propulsion Training  forward propulsion  -RC      Wheelchair, Distance Propelled  150  -RC     Recorded by  [RC] OMAR Shah/L     Grooming Assessment/Training    Grooming Assess/Train, Position  sitting  -RC     Grooming Assess/Train, Indepen Level  set up required  -RC     Recorded by  [RC] JENNIFFER Shah     Therapy Exercises    Bilateral Lower Extremities   AROM:;20 reps;ankle pumps/circles;hip abduction/adduction;glut sets;sitting;knee flexion;LAQ;hip extension   2 sets  -RW    Bilateral Upper Extremity  --   ue bike 10 min, pulley ex 8 min  -RC     Recorded by  [RC] JENNIFFER Shah [RW] Xiang Jose PTA    Positioning and Restraints    Pre-Treatment Position in bed  -CK in bed  -RC sitting in chair/recliner  -RW    Post Treatment Position bed  -CK bed  -RC wheelchair  -RW    In Bed supine;call light within reach;encouraged to call for assist;side rails up x2  -CK encouraged to call for assist;call light within reach  -RC     Recorded by [CK] Karen Ch MS CCC-SLP [RC] JENNIFFER Shah [RW] Xiang Jose PTA      01/30/18 1500          Rehab Assessment/Intervention    Discipline physical therapy assistant  -RW      Document Type therapy note (daily note)  -RW      Subjective Information agree to therapy  -RW      Patient Effort, Rehab Treatment good  -RW      Recorded by [RW] Xiang Jose PTA      Vital Signs    Pretreatment Heart Rate (beats/min) 76  -RW      Pre SpO2 (%) 95  -RW      O2 Delivery Pre Treatment supplemental O2  -RW      Recorded by [RW] Xiang Jose PTA      Pain Assessment    Pain Score 6  -RW      Post Pain Score 6  -RW      Pain Location Generalized  -RW      Recorded by [RW] Xiang Jose PTA      Cognitive Assessment/Intervention    Current Cognitive/Communication Assessment functional  -RW      Orientation Status oriented x 4  -RW      Follows Commands/Answers Questions 100% of the time  -RW      Personal Safety Interventions gait belt;nonskid shoes/slippers when out of bed  -RW       Recorded by [RW] Xiang Jose PTA      Mobility Assessment/Training    Right Lower Extremity Weight-Bearing weight-bearing as tolerated  -RW      Recorded by [RW] Xiang Jose PTA      Bed Mobility, Assessment/Treatment    Bed Mobility, Assistive Device bed rails;head of bed elevated  -RW      Bed Mob, Sit to Supine, [place] supervision required  -RW      Recorded by [RW] Xiang Jose PTA      Transfer Assessment/Treatment    Transfers, Sit-Stand [place] supervision required  -RW      Transfers, Stand-Sit [place] supervision required  -RW      Transfers, Sit-Stand-Sit, Assist Device rolling walker  -RW      Recorded by [RW] Xiang Jose PTA      Gait Assessment/Treatment    Gait, [place] Level contact guard assist;stand by assist  -RW      Gait, Assistive Device rolling walker  -RW      Gait, Distance (Feet) 12   around bed into bed  -RW      Recorded by [RW] Xiang Jose PTA      Functional Mobility    Functional Mobility- Ind. Level contact guard assist;supervision required  -RW      Functional Mobility- Device rolling walker  -RW      Functional Mobility-Distance (Feet) 12  -RW      Recorded by [RW] Xiang Jose PTA      Therapy Exercises    Bilateral Lower Extremities AROM:;20 reps;ankle pumps/circles;hip abduction/adduction;supine;AAROM:;glut sets;heel slides;quad sets  -RW      Recorded by [RW] Xiang Jose PTA      Positioning and Restraints    Pre-Treatment Position sitting in chair/recliner  -RW      Post Treatment Position bed  -RW      In Bed call light within reach;legs elevated  -RW      Recorded by [RW] Xiang Jose PTA        User Key  (r) = Recorded By, (t) = Taken By, (c) = Cosigned By    Initials Name Effective Dates    EVITA Ch, [facility]-SLP 04/06/17 -     TYRON Burdick, OTR/L 10/17/16 -     CK Karen Ch, MS CCC-SLP 10/17/16 -     KIERSTEN Fagan, PTA 10/17/16 -     YULISA Jose, PTA 10/17/16 -     GOVIND BOX  David, NELSON/L 10/17/16 -                 IP PT Goals       02/02/18 1300 02/01/18 1605 01/31/18 1527    Bed Mobility PT LTG    Bed Mobility PT LTG, Date Goal Reviewed 02/02/18  -JA 02/01/18  -RW 01/31/18  -RW    Bed Mobility PT LTG, Outcome goal ongoing  -JA goal ongoing  -RW goal ongoing  -RW    Transfer Training PT LTG    Transfer Training PT  LTG, Date Goal Reviewed 02/02/18  -JA 02/01/18  -RW 01/31/18  -RW    Transfer Training PT LTG, Outcome goal ongoing  -JA  goal ongoing  -RW    Gait Training PT STG    Gait Training Goal PT STG, Date Goal Reviewed 02/02/18  -JA 02/01/18  -RW 01/31/18  -RW    Gait Training Goal PT STG, Outcome goal ongoing  -JA goal ongoing  -RW goal ongoing  -RW    Stair Training PT LTG    Stair Training Goal PT LTG, Date Goal Reviewed 02/02/18  -JA 02/01/18  -RW 01/31/18  -RW    Stair Training Goal PT LTG, Outcome goal ongoing  -JA goal ongoing  -RW goal ongoing  -RW      01/30/18 1607 01/29/18 1520 01/27/18 1422    Bed Mobility PT LTG    Bed Mobility PT LTG, Date Goal Reviewed 01/30/18  -RW 01/29/18  -RW 01/27/18  -CA    Bed Mobility PT LTG, Outcome goal ongoing  -RW goal ongoing  -RW goal ongoing  -CA    Transfer Training PT LTG    Transfer Training PT  LTG, Date Goal Reviewed 01/30/18  -RW 01/29/18  -RW 01/27/18  -CA    Transfer Training PT LTG, Outcome goal ongoing  -RW goal ongoing  -RW goal ongoing  -CA    Gait Training PT STG    Gait Training Goal PT STG, Date Goal Reviewed 01/30/18  -RW 01/29/18  -RW 01/27/18  -CA    Gait Training Goal PT STG, Outcome goal ongoing  -RW goal ongoing  -RW goal ongoing  -CA    Stair Training PT LTG    Stair Training Goal PT LTG, Date Goal Reviewed 01/30/18  -RW 01/29/18  -RW 01/27/18  -CA    Stair Training Goal PT LTG, Outcome goal ongoing  -RW goal ongoing  -RW goal ongoing  -CA      01/27/18 0929 01/26/18 1539 01/26/18 1058    Bed Mobility PT LTG    Bed Mobility PT LTG, Date Established 01/27/18  -MN      Bed Mobility PT LTG, Time to Achieve by  discharge  -MN      Bed Mobility PT LTG, Activity Type roll left/roll right;supine to sit/sit to supine  -MN      Bed Mobility PT LTG, [place] Level independent  -MN      Bed Mobility PT LTG, Additional Goal HOB down  -MN      Bed Mobility PT LTG, Date Goal Reviewed  01/26/18  -CB 01/26/18  -    Bed Mobility PT LTG, Outcome  goal not met  -CB goal ongoing  -RONAN    Bed Mobility PT LTG, Reason Goal Not Met  discharged from facility  -CB     Transfer Training PT LTG    Transfer Training PT LTG, Date Established 01/27/18  -MN      Transfer Training PT LTG, Time to Achieve by discharge  -MN      Transfer Training PT LTG, Activity Type bed to chair /chair to bed;sit to stand/stand to sit;toilet  -MN      Transfer Training PT LTG, [place] Level supervision required  -MN      Transfer Training PT LTG, Assist Device walker, rolling  -MN      Gait Training PT STG    Gait Training Goal PT STG, Date Established 01/27/18  -MN      Gait Training Goal PT STG, Time to Achieve by discharge  -MN      Gait Training Goal PT STG, [place] Level supervision required  -MN      Gait Training Goal PT STG, Assist Device walker, rolling  -MN      Gait Training Goal PT STG, Distance to Achieve 150 ft  -MN      Gait Training PT LTG    Gait Training Goal PT LTG, Date Goal Reviewed  01/26/18  -CB 01/26/18  -    Gait Training Goal PT LTG, Outcome  goal not met  -CB goal ongoing  -RONAN    Gait Training Goal PT LTG, Reason Goal Not Met  discharged from facility  -CB     Stair Training PT LTG    Stair Training Goal PT LTG, Date Established 01/27/18  -MN      Stair Training Goal PT LTG, Time to Achieve by discharge  -MN      Stair Training Goal PT LTG, Number of Steps 2  -MN      Stair Training Goal PT LTG, [place] Level contact guard assist  -MN      Stair Training Goal PT LTG, Assist Device 1 handrail  -MN      Strength Goal PT LTG    Strength Goal PT LTG, Date Goal Reviewed  01/26/18  -CB 01/26/18  -    Strength Goal PT LTG,  Outcome  goal not met  -CB goal ongoing  -    Strength Goal PT LTG, Reason Goal Not Met  discharged from facility  -CB       01/24/18 1424 01/24/18 1008       Bed Mobility PT LTG    Bed Mobility PT LTG, Time to Achieve  by discharge  -CB (r) HL (t) CB (c)     Bed Mobility PT LTG, Activity Type  all bed mobility  -CB (r) HL (t) CB (c)     Bed Mobility PT LTG, [place] Level  minimum assist (75% patient effort)  -CB (r) HL (t) CB (c)     Bed Mobility PT LTG, Additional Goal  HOB down, no handrails  -CB (r) HL (t) CB (c)     Bed Mobility PT LTG, Date Goal Reviewed 01/24/18  -      Bed Mobility PT LTG, Outcome goal [facility]      Gait Training PT LTG    Gait Training Goal PT LTG, Time to Achieve  by discharge  -CB (r) HL (t) CB (c)     Gait Training Goal PT LTG, [place] Level  contact guard assist  -CB (r) HL (t) CB (c)     Gait Training Goal PT LTG, Assist Device  walker, rolling  -CB (r) HL (t) CB (c)     Gait Training Goal PT LTG, Distance to Achieve  50 feet  -CB (r) HL (t) CB (c)     Gait Training Goal PT LTG, Date Goal Reviewed 01/24/18  -      Gait Training Goal PT LTG, Outcome goal ongoing  [facility]      Strength Goal PT LTG    Strength Goal PT LTG, Time to Achieve  by discharge  -CB (r) HL (t) CB (c)     Strength Goal PT LTG, Measure to Achieve  (P)  pt will perform 20 reps hip flexion, hip ABD/ADD, and knee flexion/extension on right LE. AAROM or AROM  -HL     Strength Goal PT LTG, Functional Goal  (P)  Getting legs up into bed   -HL     Strength Goal PT LTG, Date Goal Reviewed 01/24/18  -      Strength Goal PT LTG, Outcome goal [facility]        User Key  (r) = Recorded By, (t) = Taken By, (c) = Cosigned By    Initials Name Provider Type    CB Leatha Tirado, PT Physical Therapist    MARIA DOLORES Negrete, PT Physical Therapist    KIERSTEN Fagan, PTA Physical Therapy Assistant    CLAUDIA Zaldivar, PTA Physical Therapy Assistant    RONAN King, PTA Physical Therapy Assistant    RW  Xiang Jose, JEANNETTE Physical Therapy Assistant     Harper Ang, PT Student PT Student          Physical Therapy Education     Title: PT OT SLP Therapies (Active)     Topic: Physical Therapy (Active)     Point: Mobility training (Done)    Learning Progress Summary    Learner Readiness Method Response Comment Documented by Status   Patient Acceptance E VU,NR role of PT. scheduling. safety with w/c. no OOb without assist MN 01/27/18 0929 Done               Point: Precautions (Done)    Learning Progress Summary    Learner Readiness Method Response Comment Documented by Status   Patient Acceptance E VU,NR role of PT. scheduling. safety with w/c. no OOb without assist MN 01/27/18 0929 Done                      User Key     Initials Effective Dates Name Provider Type Discipline    MN 10/17/16 -  Hallie Negrete, PT Physical Therapist PT                    PT Recommendation and Plan  Anticipated Equipment Needs At Discharge: front wheeled walker  Anticipated Discharge Disposition: home with 24/7 care, home with home health, skilled nursing facility  Planned Therapy Interventions: balance training, bed mobility training, gait training, home exercise program, patient/family education, stair training, strengthening, stretching, transfer training, wheelchair management/propulsion training  PT Frequency: other (see comments) (5-14x/week)  Plan of Care Review  Plan Of Care Reviewed With: patient  Progress: progress toward functional goals is gradual  Outcome Summary/Follow up Plan: Pt. with fatigues quickly with gt. today. BP stable throughout treatments, but episode of dizziness after gt attempt this p.m., but vss during performance. Pt. would cont. to benefit from d/c to SNF at this time prior to d/c home for further strengthening & rehab to progress toward independence.          Outcome Measures       02/02/18 1037 02/01/18 0912 01/31/18 1302    How much help from another person do you currently need...    Turning from your  back to your side while in flat bed without using bedrails? 4  -JA      Moving from lying on back to sitting on the side of a flat bed without bedrails? 3  -JA      Moving to and from a bed to a chair (including a wheelchair)? 3  -JA      Standing up from a chair using your arms (e.g., wheelchair, bedside chair)? 3  -JA      Climbing 3-5 steps with a railing? 2  -JA      To walk in hospital room? 3  -      AM-PAC 6 Clicks Score 18  -      How much help from another is currently needed...    Putting on and taking off regular lower body clothing?  3  - 3  -RC    Bathing (including washing, rinsing, and drying)  3  - 3  -RC    Toileting (which includes using toilet bed pan or urinal)  2  - 2  -RC    Putting on and taking off regular upper body clothing  3  - 3  -RC    Taking care of personal grooming (such as brushing teeth)  3  - 3  -RC    Eating meals  4  - 4  -    Score  18  - 18  -RC    Functional Assessment    Outcome Measure Options AM-PAC 6 Clicks Basic Mobility (PT)  -[facility] 6 Clicks Daily Activity (OT)  -       User Key  (r) = Recorded By, (t) = Taken By, (c) = Cosigned By    Initials Name Provider Type     Marisol uBrdick, MANUELR/L Occupational Therapist    KIERSTEN Fagan PTA Physical Therapy Assistant    GOVIND Hernandez NELSON/L Occupational Therapy Assistant           Time Calculation:         PT Charges       02/02/18 1402 02/02/18 1139       Time Calculation    Start Time 1300  - 1037  -     Stop Time 1340  - 1130  -     Time Calculation (min) 40 min  - 53 min  -     Time Calculation- PT    Total Timed Code Minutes- PT 40 minute(s)  -JA 53 minute(s)  -       User Key  (r) = Recorded By, (t) = Taken By, (c) = Cosigned By    Initials Name Provider Type    KIERSTEN Fagan PTA Physical Therapy Assistant          Therapy Charges for Today     Code Description Service Date Service Provider Modifiers Qty    83993106987 HC GAIT TRAINING EA 15 MIN 2/2/2018  Juan Alberto Fagan, PTA GP 1    95688493677 HC PT THER PROC EA 15 MIN 2/2/2018 Juan Alberto Fagan, PTA GP 2    25665701106 HC PT WHEELCHAIR MGMT EA 15 MIN 2/2/2018 Juan Alberto Fagan, PTA GP 1    [id number] HC GAIT TRAINING EA 15 MIN 2/2/2018 Juan Alberto Fagan, PTA GP 1    25564331275 HC PT THER PROC EA 15 MIN 2/2/2018 Juan Alberto Fagan, PTA GP 1    [id number] HC PT THERAPEUTIC ACT EA 15 MIN 2/2/2018 Juan Alberto Fagan, PTA GP 1          PT G-Codes  Outcome Measure Options: AM-PAC 6 Clicks Basic Mobility (PT)    Juan Alberto Fagan PTA  2/2/2018

## 2018-02-02 NOTE — THERAPY TREATMENT NOTE
Inpatient Rehabilitation - Occupational Therapy Treatment Note  [facility]     Patient Name: Val Arteaga  : 1932  MRN: [id number]  Today's Date: 2018  Onset of Illness/Injury or Date of Surgery Date: 18  Date of Referral to OT: 18  Referring Physician: Dr. Maldonado      Admit Date: 2018    Visit Dx:     ICD-10-CM ICD-9-CM   1. Symbolic dysfunction R48.9 784.60   2. Impaired mobility and activities of daily living Z74.09 799.89   3. Impaired functional mobility, balance, gait, and endurance Z74.09 V49.89     Patient Active Problem List   Diagnosis   • Essential hypertension   • Coronary artery disease involving native coronary artery of native heart without angina pectoris   • Chronic obstructive pulmonary disease   • Acquired hypothyroidism   • Depressive disorder   • Thygeson's superficial punctate keratitis   • Pseudophakia   • Precordial pain   • Displaced fracture of base of neck of right femur, initial encounter for closed fracture   • Hip fracture, right, closed, initial encounter   • Pulmonary emphysema   • Chronic systolic congestive heart failure   • Hyponatremia   • BENITO (acute kidney injury)   • Acute blood loss anemia   • Urinary retention   • Closed displaced basicervical fracture of right femur             Adult Rehabilitation Note       18 1300 18 1037 18 [id number]    Rehab Assessment/Intervention    Discipline physical therapy assistant  -JA physical therapy assistant  -JA speech language pathologist  -EK    Document Type therapy note (daily note)  -JA therapy note (daily note)  -JA therapy note (daily note)  -EK    Subjective Information agree to therapy  -JA agree to therapy  -JA no complaints;agree to therapy  -EK    Patient Effort, Rehab Treatment good  -JA good  -JA good  -EK    Precautions/Limitations fall precautions;anterior hip precautions- right  -JA fall precautions;anterior hip precautions- right  -JA fall precautions  -EK    Recorded by  [JA] Juan Alberto Fagan PTA [JA] Juan Alberto Fagan PTA [EK] Kizzy Ch CCC-SLP    Vital Signs    Pre Systolic BP Rehab 123  -  -JA     Pre Treatment Diastolic BP 78  -JA 57  -JA     Intra Systolic BP Rehab 141  -JA      Intra Treatment Diastolic BP 66  -JA      Post Systolic BP Rehab 130  -  -JA     Post Treatment Diastolic BP 60  -JA 63  -JA     Pretreatment Heart Rate (beats/min)  71  -JA     Pre SpO2 (%) 93  -JA 99  -JA     O2 Delivery Pre Treatment supplemental O2  -JA supplemental O2  -JA     Intra SpO2 (%) 92  -JA      O2 Delivery Intra Treatment supplemental O2  -JA      Pre Patient Position Sitting  -JA Sitting  -JA     Intra Patient Position Standing  -JA Standing  -JA     Post Patient Position Supine  -JA Sitting  -JA     Recorded by [JA] Juan Alberto Fagan PTA [JA] Juan Alberto Fagan PTA     Pain Assessment    Pain Assessment No/denies pain  -JA No/denies pain  -JA No/denies pain  -EK    Pain Score 0  -JA 0  -JA     Post Pain Score 0  -JA 0  -JA     Pain Intervention(s) Medication (See MAR);Ambulation/increased activity;Repositioned  -JA Medication (See MAR);Repositioned;Ambulation/increased activity  -JA     Recorded by [JA] Juan Alberto Fagan PTA [JA] Juan Alberto Fagan PTA [EK] MARZENA Ramirez    Cognitive Assessment/Intervention    Current Cognitive/Communication Assessment   functional  -EK    Orientation Status   oriented x 4  -EK    Recorded by   [EK] MARZENA Ramirez    Improve awareness of basic personal information    Improve awareness of basic personal information through:   answering open-ended questions regarding personal/biographical information  -EK    Status: Improve awareness of basic personal information   Progressing as expected  -EK    Awareness of Basic Personal Information Progress   90%  -EK    Recorded by   [EK] MARZENA Ramirez    Improve memory skills    Improve memory skills  through:   recalling unrelated word lists with an imposed delay;listen to a paragraph and answer questions;90%  -EK    Status: Improve memory skills   Progressing as expected  -EK    Recorded by   [EK] MARZENA Ramirez    Improve functional problem solving    Improve functional problem solving through:   complete functional math task;90%  -EK    Status: Improve functional problem solving   Progressing as expected  -EK    Recorded by   [EK] MARZENA Ramirez    Improve executive function skills    Improve executive function skills:   exhibit cognitive flexibility;organization/planning activity;90%  -EK    Status: Improve executive function skills   Progressing as expected  -EK    Recorded by   [EK] MARZENA Ramirez    Mobility Assessment/Training    Extremity Weight-Bearing Status right lower extremity  -JA right lower extremity  -JA     Right Lower Extremity Weight-Bearing weight-bearing as tolerated  -JA weight-bearing as tolerated  -JA     Recorded by [JA] Juan Alberto Fagan PTA [JA] Juan Alberto Fagan PTA     Bed Mobility, Assessment/Treatment    Bed Mobility, Assistive Device bed rails  -JA      Bed Mob, Sit to Supine, [place] minimum assist (75% patient effort)  -JA      Recorded by [JA] Juan Alberto Fagan PTA      Transfer Assessment/Treatment    Transfers, Chair-Bed [place] contact guard assist  -KIERSTEN      Transfers, Bed-Chair-Bed, Assist Device rolling walker  -KIERSTEN      Transfers, Sit-Stand [place] contact guard assist  -JA contact guard assist  -JA     Transfers, Stand-Sit [place] contact guard assist  -JA contact guard assist  -JA     Transfers, Sit-Stand-Sit, Assist Device rolling walker  -JA rolling walker  -JA     Transfer, Maintain Weight Bearing Status able to maintain weight bearing status  -JA able to maintain weight bearing status  -JA     Transfer, Safety Issues step length decreased;sequencing ability decreased  -KIERSTEN  "step length decreased  -JA     Recorded by [KIERSTEN] Juan Alberto Fagan PTA [KIERSTEN] Juan Alberto Fagan PTA     Gait Assessment/Treatment    Gait, [place] contact guard assist  -JA contact guard assist  -JA     Gait, Assistive Device rolling walker  -JA rolling walker  -JA     Gait, Distance (Feet) 60  -JA 60   x2  -JA     Gait, Gait Deviations antalgic  -KIERSTEN antalgic  -JA     Gait, Safety Issues step length decreased;sequencing ability decreased;supplemental O2  -JA step length decreased;supplemental O2  -JA     Gait, Comment Gt. attempt ended due to pt. with c/o's dizziness and vision \"getting dark\" & pt. with increase SOA after performance. Pt. with seated rest break, with minimal recovery noted & nsg. made aware. Pt. transferred back to room & transferred to supine this p.m.   -JA      Recorded by [KIERSTEN] Juan Alberto Fagan PTA [KIERSTEN] Juan Alberto Fagan PTA     Wheelchair Training/Management    Wheelchair, Distance Propelled  134 conditional independent   -JA     Recorded by  [KIERSTEN] Juan Alberto Fagan PTA     Therapy Exercises    Right Lower Extremity AAROM:;20 reps;supine;heel slides;hip abduction/adduction  -JA      Left Lower Extremity AROM:;20 reps;supine;heel slides;hip abduction/adduction  -JA      Bilateral Lower Extremities AROM:;20 reps;supine;glut sets;quad sets  -JA AROM:;20 reps;sitting;ankle pumps/circles;hip flexion;LAQ;hip abduction/adduction;knee flexion  -KIERSTEN     BLE Resistance  theraband   lvl 1 ham pulls  -JA     Recorded by [KIERSTEN] Juan Alberto Fagan PTA [JA] Juan Alberto Fagan PTA     Positioning and Restraints    Pre-Treatment Position sitting in chair/recliner  -JA sitting in chair/recliner  -JA sitting in chair/recliner  -EK    Post Treatment Position bed  -JA wheelchair  -JA chair  -EK    In Bed supine;call light within reach;encouraged to call for assist  -JA  with PT  -EK    In Wheelchair  sitting;call light within reach;encouraged to call for assist  -JA     Recorded by [KIERSTEN] Juan Alberto ABAD" JEANNETTE Fagan [JA] Juan Alberto Fagan PTA [EK] Kizzy Ch CCC-SLP      02/02/18 0845 02/01/18 1335 02/01/18 1050    Rehab Assessment/Intervention    Discipline occupational therapy assistant  -LM physical therapy assistant  -RW speech language pathologist  -EK    Document Type therapy note (daily note)  -LM therapy note (daily note)  -RW therapy note (daily note)  -EK    Subjective Information agree to therapy;fatigue  -LM complains of;dizziness;agree to therapy  -RW no complaints;agree to therapy  -EK    Patient Effort, Rehab Treatment good  -LM good  -RW good  -EK    Symptoms Noted During/After Treatment  significant change in vital signs  -RW none  -EK    Recorded by [LM] OMAR Malone/L [RW] Xiang Jose PTA [EK] Kizzy Ch, CCC-SLP    Vital Signs    Pre Systolic BP Rehab  71   seated in wc with dynamap  -RW     Pre Treatment Diastolic BP  41  -RW     Intra Systolic BP Rehab  172   supine with dynamap  -RW     Intra Treatment Diastolic BP  74  -RW     Post Systolic BP Rehab  162  -RW     Post Treatment Diastolic BP  74   nurse taking phil  -RW     Recorded by  [RW] Xiang Jose PTA     Pain Assessment    Pain Assessment No/denies pain  -LM No/denies pain  -RW No/denies pain  -EK    Pain Score   0  -EK    Post Pain Score   0  -EK    Recorded by [LM] OMAR Malone/L [RW] Xiang Jose PTA [EK] Kizzy Ch, CCC-SLP    Cognitive Assessment/Intervention    Current Cognitive/Communication Assessment  functional  -RW impaired  -EK    Orientation Status  oriented x 4  -RW oriented x 4  -EK    Follows Commands/Answers Questions  100% of the time  -% of the time  -EK    Personal Safety   decreased awareness, need for safety  -EK    Personal Safety Interventions  gait belt;nonskid shoes/slippers when out of bed  -RW     Recorded by  [RW] Xiang Jose PTA [EK] Kizzy Ch, CCC-SLP    Improve awareness of basic personal  information    Improve awareness of basic personal information through:   answering open-ended questions regarding personal/biographical information  -EK    Status: Improve awareness of basic personal information   Progressing as expected  -EK    Awareness of Basic Personal Information Progress   90%  -EK    Recorded by   [EK] Kizzy Ch [facility]-SLP    Improve memory skills    Improve memory skills through:   recalling unrelated word lists with an imposed delay;listen to a paragraph and answer questions;90%  -EK    Status: Improve memory skills   Progressing as expected  -EK    Memory Skills Progress   60%  -EK    Recorded by   [EK] Kizzy Ch CCC-SLP    Improve functional problem solving    Improve functional problem solving through:   complete functional math task;90%  -EK    Recorded by   [EK] Kizzy Ch CCC-SLP    Improve executive function skills    Improve executive function skills:   exhibit cognitive flexibility;organization/planning activity;90%  -EK    Status: Improve executive function skills   Progressing as expected  -EK    Executive Function Skills Progress   70%  -EK    Recorded by   [EK] Kizzy Ch [facility]-SLP    Mobility Assessment/Training    Right Lower Extremity Weight-Bearing  weight-bearing as tolerated  -RW     Recorded by  [RW] Xiang Jose PTA     Bed Mobility, Assessment/Treatment    Bed Mob, Supine to Sit, [place] contact guard assist  -LM      Bed Mob, Sit to Supine, [place]  supervision required;contact guard assist  -RW     Recorded by [LM] OMAR Malone/L [RW] Xiang Jose PTA     Transfer Assessment/Treatment    Transfers, Bed-Chair [place] stand by assist  -LM      Transfers, Chair-Bed [place]  contact guard assist  -RW     Transfers, Bed-Chair-Bed, Assist Device rolling walker  -LM rolling walker  -RW     Transfers, Sit-Stand [place]  supervision required;contact guard assist   -RW     Transfers, Stand-Sit [place]  supervision required;contact guard assist  -RW     Transfers, Sit-Stand-Sit, Assist Device  rolling walker  -RW     Toilet Transfer, [place] supervision required  -LM      Toilet Transfer, Assistive Device rolling walker;elevated toilet seat  -LM      Recorded by [LM] JENNIFFER Malone [RW] Xiang Jose, PTA     Gait Assessment/Treatment    Gait, [place] Level  contact guard assist;stand by assist  -RW     Gait, Assistive Device  rolling walker  -RW     Gait, Distance (Feet)  12  -RW     Gait, Comment  around bed  -RW     Recorded by  [RW] Xiang Jose PTA     Functional Mobility    Functional Mobility- Ind. Level contact guard assist  -LM contact guard assist;supervision required  -RW     Functional Mobility- Device rolling walker  -LM rolling walker  -RW     Functional Mobility-Distance (Feet) --   x 3 approx 10 ft each   -LM      Recorded by [LM] OMAR Malone/L [RW] Xiang Jose PTA     Wheelchair Training/Management    Wheelchair Propulsion Training forward propulsion  -LM      Wheelchair, Distance Propelled --   150  -LM      Recorded by [LM] OMAR Malone/L      Upper Body Dressing Assessment/Training    UB Dressing Assess/Train, Clothing Type doffing:;donning:  -LM      UB Dressing Assess/Train, Position sitting  -LM      UB Dressing Assess/Train, [place] minimum assist (75% patient effort)  -LM      Recorded by [LM] OMAR Malone/L      Balance Skills Training    Sitting-Level of Assistance Independent  -LM      Recorded by [LM] OMAR Malone/L      Therapy Exercises    Bilateral Lower Extremities  AROM:;20 reps;hip abduction/adduction;supine;glut sets;heel slides;SAQ;quad sets  -RW     Bilateral Upper Extremity AROM:;20 reps   level 1 tband all planes tolerated increase strength end  -LM      BUE Resistance manual resistance- minimal   UEE bike x 11 min   -LM      Recorded by [LM] Massiel MORRISSEY  OMAR Santana/HINA [RW] Xiang Jose PTA     Positioning and Restraints    Pre-Treatment Position sitting in chair/recliner  -LM sitting in chair/recliner  -RW sitting in chair/recliner  -EK    Post Treatment Position wheelchair  -LM bed  -RW chair  -EK    In Bed --   with SLP at exit  -LM call light within reach;legs elevated  -RW call light within reach;encouraged to call for assist;exit alarm on  -EK    Recorded by [LM] OMAR Malone/L [RW] Xiang Jose PTA [EK] Kizzy Ch CCC-SLP      02/01/18 0912 02/01/18 0805 01/31/18 1415    Rehab Assessment/Intervention    Discipline occupational therapist  - physical therapy assistant  -RW physical therapy assistant  -RW    Document Type therapy note (daily note)  - therapy note (daily note)  -RW therapy note (daily note)  -RW    Subjective Information agree to therapy  - agree to therapy  -RW agree to therapy  -RW    Patient Effort, Rehab Treatment good  -BH good  -RW good  -RW    Symptoms Noted During/After Treatment fatigue  -BH      Precautions/Limitations fall precautions;anterior hip precautions- right   WBAT  -BH      Recorded by [] Marisol Burdick OTR/HINA [RW] Xiang Jose PTA [RW] Xiang Jose PTA    Vital Signs    Pre Systolic BP Rehab 112  -BH      Pre Treatment Diastolic BP 56  -BH      Post Systolic BP Rehab 107  -BH      Post Treatment Diastolic BP 54  -BH      Pretreatment Heart Rate (beats/min) 81  -BH      Intratreatment Heart Rate (beats/min) 83  -BH 79  -RW     Posttreatment Heart Rate (beats/min) 77  -BH      Pre SpO2 (%) 96  -BH      O2 Delivery Pre Treatment supplemental O2  -BH      Intra SpO2 (%) 98  -BH 96  -RW     O2 Delivery Intra Treatment supplemental O2  -BH supplemental O2  -RW     Post SpO2 (%) 95  -BH      O2 Delivery Post Treatment supplemental O2  -BH      Pre Patient Position Sitting  -BH      Intra Patient Position Sitting  -BH      Post Patient Position Sitting  -BH      Recorded by [] Marisol  GHANSHYAM Burdick OTR/L [RW] Xiang Jose PTA     Pain Assessment    Pain Assessment  No/denies pain  -RW     Pain Score 0  -  6  -RW    Post Pain Score 0  -  6  -RW    Pain Location   Hip  -RW    Pain Orientation   Right  -RW    Recorded by [] Marisol Burdick OTR/L [RW] Xiang Jose PTA [RW] Xiang Jose PTA    Cognitive Assessment/Intervention    Current Cognitive/Communication Assessment functional  -BH functional  -RW functional  -RW    Orientation Status oriented x 4  -BH oriented x 4  -RW oriented x 4  -RW    Follows Commands/Answers Questions 100% of the time  -% of the time  -% of the time  -RW    Personal Safety WNL/WFL  -      Personal Safety Interventions gait belt;nonskid shoes/slippers when out of bed;supervised activity;muscle strengthening facilitated  - gait belt;nonskid shoes/slippers when out of bed  -RW gait belt;nonskid shoes/slippers when out of bed  -RW    Recorded by [] Marisol Burdick, OTR/L [RW] Xiang Jose PTA [RW] Xiang Jose PTA    Mobility Assessment/Training    Right Lower Extremity Weight-Bearing  weight-bearing as tolerated  -RW weight-bearing as tolerated  -RW    Recorded by  [] Xiang Jose PTA [RW] Xiang Jose PTA    Bed Mobility, Assessment/Treatment    Bed Mobility, Assistive Device   bed rails;head of bed elevated  -RW    Bed Mob, Supine to Sit, [place]   contact guard assist  -    Bed Mobility, Comment defer pt up on w/c in room and wanted to stay.   -      Recorded by [] Marisol Burdick, OTR/L  [RW] Xiang Jose PTA    Transfer Assessment/Treatment    Transfers, Bed-Chair [place]  stand by assist;contact guard assist  -     Transfers, Bed-Chair-Bed, Assist Device  rolling walker  -RW     Transfers, Sit-Stand [place] supervision required;contact guard assist  - supervision required  - supervision required  -RW    Transfers, Stand-Sit [place] supervision required;contact guard assist  - supervision  "required  -RW supervision required  -RW    Transfers, Sit-Stand-Sit, Assist Device rolling walker  -BH rolling walker  -RW rolling walker  -RW    Transfer, Comment completed several sit to stand for ADL with vc for safety and formation.   -BH      Recorded by [] Marisol Burdick, OTR/L [RW] Xiang Jose PTA [RW] Xiang Jose PTA    Gait Assessment/Treatment    Gait, [place] Level  contact guard assist;stand by assist  -RW contact guard assist;stand by assist  -RW    Gait, Assistive Device  rolling walker  -RW rolling walker  -RW    Gait, Distance (Feet)  20  -RW 48  -RW    Recorded by  [RW] Xiang Jose PTA [RW] Xiang Jose PTA    Stairs Assessment/Treatment    Number of Stairs  4 and then curb  -RW     Stairs, Handrail Location  left side (ascending)  -RW     Stairs, [place] Level  contact guard assist;verbal cues required  -RW     Stairs, Assistive Device  walker   on curb  -RW     Recorded by  [RW] Xiang Jose PTA     Functional Mobility    Functional Mobility- Ind. Level  contact guard assist;supervision required  -RW contact guard assist;supervision required  -RW    Functional Mobility- Device  rolling walker  -RW rolling walker  -RW    Recorded by  [RW] Xiang Jose PTA [RW] Xiang Jose PTA    Wheelchair Training/Management    Wheelchair, Distance Propelled   100 mod ind  -RW    Recorded by   [RW] Xiang Jose PTA    Upper Body Bathing Assessment/Training    UB Bathing Assess/Train Assistive Device long-handled sponge   wash cloth and water bath  -      UB Bathing Assess/Train, Position supported sitting   w/c  -      UB Bathing Assess/Train, [place] Level set up required  -      UB Bathing Assess/Train, Comment pt provided long handle sponge declined to use stated she reached it \"good enough\" and was done, SBA  distant with set up to complete  -      Recorded by [] Marisol Burdick, OTR/L      Lower Body Bathing Assessment/Training    LB Bathing Assess/Train " Assistive Device long-handled sponge  -      LB Bathing Assess/Train, Position supported sitting;standing   w/c  -      LB Bathing Assess/Train, [place] Level contact guard assist;verbal cues required;supervision required;stand by assist;set up required  -      LB Bathing Assess/Train, Comment Pt close SBA to CGA for standing for LB pericre and upper leg washing, used LH sponge to wash legs unable to dry without assist, otherwise pt set up assist.   -      Recorded by [] AYAD Vazquez/L      Upper Body Dressing Assessment/Training    UB Dressing Assess/Train, Clothing Type doffing:;donning:;button up  -      UB Dressing Assess/Train, Position sitting  -      UB Dressing Assess/Train, [place] set up required;supervision required;verbal cues required;minimum assist (75% patient effort)  -      UB Dressing Assess/Train, Comment pt required min-mod assist to doff jacket to get over both shoulders in the back then she could take the rest of the way off, she was able to doff a button up shirt. Pt able to don a button up shirt set up assist.   -      Recorded by [] Marisol Burdick OTR/L      Lower Body Dressing Assessment/Training    LB Dressing Assess/Train, Clothing Type doffing:;donning:;pants   underwear, yessi hose  -      LB Dressing Assess/Train, Position sitting;standing  -      LB Dressing Assess/Train, [place] minimum assist (75% patient effort);verbal cues required;supervision required;set up required;contact guard assist;maximum assist (25% patient effort)  -      LB Dressing Assess/Train, Comment max to dependent for don and doff yessi hose, total assist with cath into pants/underwear. pt required min assist to don shoes and total assist to tie shoes. Pt used reacher and long handle shoe horn to engage in LB dressing tasks. Pt CGA to SBA to stand to pull up and down pants;underwear. Pt took extended time and effort.   -      Recorded by [] Marisol Burdick OTR/HINA       Toileting Assessment/Training    Toileting Assess/Train, Comment OT educated on AE use and protecting hip during sponge bath and dressing tasks. Pt struggled with using them accurately.   -      Recorded by [] AYAD Vazquez/L      Grooming Assessment/Training    Grooming Assess/Train, Comment Pt set up assist to apply deodorant.   -      Recorded by [] AYAD Vazquez/L      Balance Skills Training    Sitting-Level of Assistance Distant supervision  -      Standing-Level of Assistance Contact guard;Close supervision  -      Recorded by [BH] AYAD Vazquez/L      Therapy Exercises    Bilateral Lower Extremities  AROM:;20 reps;ankle pumps/circles;hip abduction/adduction;sitting;knee flexion;LAQ;glut sets  -RW AROM:;20 reps;ankle pumps/circles;hip abduction/adduction;glut sets;sitting;knee flexion;LAQ  -RW    BLE Resistance  theraband  - theraband  -RW    Bilateral Upper Extremity AROM:;20 reps;sitting;elbow flexion/extension;hand pumps;shoulder extension/flexion   wrist flexion/extension  -      Recorded by [] AYAD Vazquez/L [RW] Xiang Jose PTA [RW] Xiang Jose, PTA    Gross Motor Coordination Training    Gross Motor Skill, Impairments Detail Pt engaged in 10 minutes of BUE arm bike fair+ toleration completed 10 minutes no rest breaks.   -      Recorded by [] AYAD Vazquez/L      Positioning and Restraints    Pre-Treatment Position other (comment)   in w/c  - sitting in chair/recliner  -RW in bed  -RW    Post Treatment Position wheelchair  - wheelchair  -RW wheelchair  -RW    In Wheelchair sitting;call light within reach;encouraged to call for assist   SLP going in room  -      Recorded by [] AYAD Vazquez/L [RW] Xiang Jose PTA [RW] Xiang Jose, JEANNETTE      01/31/18 1302 01/31/18 1108 01/31/18 0935    Rehab Assessment/Intervention    Discipline occupational therapy assistant  -RC speech language pathologist  -CK occupational therapy  assistant  -RC    Document Type therapy note (daily note)  -RC therapy note (daily note)  -CK therapy note (daily note)  -RC    Subjective Information agree to therapy  -RC agree to therapy  -CK agree to therapy  -RC    Patient Effort, Rehab Treatment good  -RC good  -CK good  -RC    Symptoms Noted During/After Treatment fatigue  -RC      Precautions/Limitations fall precautions  -RC      Recorded by [RC] OMAR Shah/HINA [CK] Karen Ch MS CCC-SLP [RC] YULI ShahA/L    Vital Signs    Post Systolic BP Rehab   134  -RC    Post Treatment Diastolic BP   63  -RC    Pretreatment Heart Rate (beats/min)   72  -RC    Intratreatment Heart Rate (beats/min)   74  -RC    Posttreatment Heart Rate (beats/min)   72  -RC    Intra SpO2 (%)   94  -RC    O2 Delivery Intra Treatment   supplemental O2  -RC    Recorded by   [RC] YULI ShahA/HINA    Pain Assessment    Pain Assessment No/denies pain  -RC No/denies pain  -CK     Recorded by [RC] OMAR Shah/HINA [CK] Karen Ch MS CCC-SLP     Cognitive Assessment/Intervention    Current Cognitive/Communication Assessment functional  -RC  functional  -RC    Orientation Status oriented x 4  -RC  oriented x 4  -RC    Recorded by [RC] OMAR Shah/HINA  [RC] YULI ShahA/HINA    Communication Treatment Objective and Progress    Cognitive Linguistic Treatment Objectives  Improve memory skills;Improve functional problem solving;Improve executive function skills  -CK     Recorded by  [CK] Karen Ch MS CCC-SLP     Improve memory skills    Improve memory skills through:  recalling unrelated word lists with an imposed delay;listen to a paragraph and answer questions;90%  -CK     Status: Improve memory skills  Progressing as expected  -CK     Memory Skills Progress  80%  -CK     Recorded by  [CK] Karen Ch MS CCC-SLP     Improve functional problem solving    Improve functional problem solving through:  complete functional  math task;90%  -CK     Status: Improve functional problem solving  Other (comment)   Deferred this date  -CK     Functional Problem Solving Progress  continue to address  -CK     Recorded by  [CK] Karen Ch MS CCC-SLP     Improve executive function skills    Improve executive function skills:  exhibit cognitive flexibility;organization/planning activity;90%  -CK     Status: Improve executive function skills  Progressing as expected  -CK     Executive Function Skills Progress  100%;60%  -CK     Comments: Improve executive function skills  Improved time w/cog flex task  -CK     Recorded by  [CK] Karen Ch MS CCC-SLP     Bed Mobility, Assessment/Treatment    Bed Mob, Supine to Sit, [place] supervision required  -RC  supervision required  -RC    Bed Mob, Sit to Supine, [place] minimum assist (75% patient effort)  -RC  minimum assist (75% patient effort)  -RC    Recorded by [RC] OMAR Shah/HINA  [RC] OMAR Shah/L    Transfer Assessment/Treatment    Transfers, Bed-Chair Rains   contact guard assist  -RC    Transfers, Chair-Bed Rains   contact guard assist  -RC    Transfers, Bed-Chair-Bed, Assist Device   rolling walker  -RC    Transfers, Sit-Stand [place] supervision required  -RC  supervision required;stand by assist  -RC    Transfers, Stand-Sit [place] supervision required  -RC  supervision required  -RC    Transfers, Sit-Stand-Sit, Assist Device rolling walker  -RC  rolling walker  -RC    Recorded by [RC] OMAR Shah/HINA  [RC] OMAR Shah/HINA    Functional Mobility    Functional Mobility- Ind. Level contact guard assist  -RC  contact guard assist  -RC    Functional Mobility- Device rolling walker  -RC  rolling walker  -RC    Functional Mobility-Distance (Feet) 10  -RC  10  -RC    Recorded by [RC] OMAR Shah/HNIA  [RC] OMAR Shah/L    Wheelchair Training/Management    Wheelchair Propulsion Training   forward  propulsion  -RC    Wheelchair, Distance Propelled   150  -RC    Recorded by   [RC] YULI ShahA/L    Upper Body Bathing Assessment/Training    UB Bathing Assess/Train Assistive Device --   sponge bath  -RC      UB Bathing Assess/Train, Position edge of bed;sitting  -RC      UB Bathing Assess/Train, Combs Level set up required  -RC      Recorded by [RC] YULI ShahA/L      Lower Body Bathing Assessment/Training    LB Bathing Assess/Train Assistive Device --   sponge  -RC      LB Bathing Assess/Train, Position edge of bed;sitting  -RC      LB Bathing Assess/Train, Combs Level stand by assist  -RC      Recorded by [RC] OMAR Shah/L      Upper Body Dressing Assessment/Training    UB Dressing Assess/Train, Clothing Type doffing:;donning:;button up  -RC      UB Dressing Assess/Train, Position sitting;edge of bed  -RC      UB Dressing Assess/Train, Combs set up required  -RC      Recorded by [RC] OMAR Shah/L      Lower Body Dressing Assessment/Training    LB Dressing Assess/Train, Clothing Type doffing:;donning:;pants  -RC      LB Dressing Assess/Train, Assist Device reacher  -RC      LB Dressing Assess/Train, Position sitting;standing  -RC      LB Dressing Assess/Train, Combs verbal cues required;minimum assist (75% patient effort)  -RC      LB Dressing Assess/Train, Comment --   @ w/ cath into pants  -RC      Recorded by [RC] YULI ShahA/L      Grooming Assessment/Training    Grooming Assess/Train, Position standing;sink side  -RC  sitting  -RC    Grooming Assess/Train, Indepen Level contact guard assist  -RC  set up required  -RC    Recorded by [RC] OMAR Shah/HINA  [RC] YULI ShahA/L    Balance Skills Training    Standing-Level of Assistance Contact guard  -RC      Static Standing Balance Support assistive device  -RC      Standing Balance # of Minutes 7  -RC      Recorded by [RC] OMAR Shah/HINA      Therapy  Exercises    Bilateral Upper Extremity   --   ue bike 10 min, pulley ex 8 min  -RC    Recorded by   [RC] OMAR Shah/L    Positioning and Restraints    Pre-Treatment Position in bed  -RC in bed  -CK in bed  -RC    Post Treatment Position bed  -RC bed  -CK bed  -RC    In Bed call light within reach;encouraged to call for assist  -RC supine;call light within reach;encouraged to call for assist;side rails up x2  -CK encouraged to call for assist;call light within reach  -RC    Recorded by [RC] OMAR Shah/HINA [CK] Karen Ch, MS CCC-SLP [RC] OMAR Shah/HINA      01/31/18 0815          Rehab Assessment/Intervention    Discipline physical therapy assistant  -RW      Document Type therapy note (daily note)  -RW      Recorded by [RW] Xiang Jose PTA      Vital Signs    Pre Systolic BP Rehab 149  -RW      Pre Treatment Diastolic BP 71  -RW      Post Systolic BP Rehab 129  -RW      Post Treatment Diastolic BP 56  -RW      Pretreatment Heart Rate (beats/min) 85  -RW      Posttreatment Heart Rate (beats/min) 80  -RW      Pre SpO2 (%) 98  -RW      O2 Delivery Pre Treatment supplemental O2  -RW      Recorded by [RW] Xiang Jose PTA      Pain Assessment    Pain Assessment No/denies pain  -RW      Recorded by [YULISA] Xiang Jose PTA      Cognitive Assessment/Intervention    Current Cognitive/Communication Assessment functional  -RW      Orientation Status oriented x 4  -RW      Follows Commands/Answers Questions 100% of the time  -RW      Personal Safety Interventions gait belt;nonskid shoes/slippers when out of bed  -RW      Recorded by [RW] Xiang Jose PTA      Mobility Assessment/Training    Right Lower Extremity Weight-Bearing weight-bearing as tolerated  -RW      Recorded by [RW] Xiang Jose PTA      Bed Mobility, Assessment/Treatment    Bed Mobility, Assistive Device --  -RW      Bed Mob, Sit to Supine, [place] --  -RW      Recorded by [RW] Xiang Jose PTA       Transfer Assessment/Treatment    Transfers, Sit-Stand [place] supervision required  -RW      Transfers, Stand-Sit [place] supervision required  -RW      Transfers, Sit-Stand-Sit, Assist Device rolling walker  -RW      Recorded by [RW] Xiang Jose PTA      Gait Assessment/Treatment    Gait, [place] Level contact guard assist;stand by assist  -RW      Gait, Assistive Device rolling walker  -RW      Gait, Distance (Feet) 70    x 2  -RW      Recorded by [RW] Xiang Jose PTA      Functional Mobility    Functional Mobility- Ind. Level contact guard assist;supervision required  -RW      Functional Mobility- Device rolling walker  -RW      Recorded by [RW] Xiang Jose PTA      Therapy Exercises    Bilateral Lower Extremities AROM:;20 reps;ankle pumps/circles;hip abduction/adduction;glut sets;sitting;knee flexion;LAQ;hip extension   2 sets  -RW      Recorded by [RW] Xiang Jose PTA      Positioning and Restraints    Pre-Treatment Position sitting in chair/recliner  -RW      Post Treatment Position wheelchair  -RW      Recorded by [RW] Xiang Jose PTA        User Key  (r) = Recorded By, (t) = Taken By, (c) = Cosigned By    Initials Name Effective Dates    EK Kizzy hC, [facility]-SLP 04/06/17 -      Marisol Burdick, OTR/L 10/17/16 -     CK Karen Ch, MS CCC-SLP 10/17/16 -     JA Juan Alberto Fagan, PTA 10/17/16 -     RW Xiang Jose, PTA 10/17/16 -     RC Lianne Hernandez, NELSON/L 10/17/16 -     LM Massiel Santana, NELSON/L 10/17/16 -                 OT Goals       02/02/18 1519 02/01/18 0912 01/31/18 1445    Bed Mobility OT STG    Bed Mobility OT STG, Date Goal Reviewed 02/02/18  -LM 02/01/18  -BH 01/31/18  -RC    Bed Mobility OT STG, Outcome goal ongoing  - goal ongoing  -     Transfer Training OT LTG    Transfer Training OT LTG, Date Goal Reviewed 02/02/18  -LM 02/01/18  -BH 01/31/18  -RC    Transfer Training OT LTG, Outcome goal ongoing  - goal ongoing  -      Patient Education OT LTG    Patient Education OT LTG, Date Goal Reviewed 02/02/18  -LM 02/01/18  -BH 01/31/18  -RC    Patient Education OT LTG Outcome goal ongoing  -LM goal ongoing  -BH     ADL OT LTG    ADL OT LTG, Date Goal Reviewed 02/02/18  -LM 02/01/18  -BH 01/31/18  -RC    ADL OT LTG, Outcome goal ongoing  -LM goal ongoing  -BH       01/30/18 1524 01/29/18 1428 01/27/18 1441    Bed Mobility OT STG    Bed Mobility OT STG, Date Goal Reviewed 01/30/18  -RC 01/29/18  -LM 01/27/18  -RC    Bed Mobility OT STG, Outcome  goal ongoing  -LM goal ongoing  -RC    Transfer Training OT LTG    Transfer Training OT LTG, Date Goal Reviewed 01/30/18  -RC 01/29/18  -LM 01/27/18  -RC    Transfer Training OT LTG, Outcome  goal ongoing  -LM goal ongoing  -RC    Patient Education OT LTG    Patient Education OT LTG, Date Goal Reviewed 01/30/18  -RC 01/29/18  -LM 01/27/18  -RC    Patient Education OT LTG Outcome  goal ongoing  -LM goal ongoing  -RC    ADL OT LTG    ADL OT LTG, Date Goal Reviewed 01/30/18  -RC 01/29/18  -LM 01/27/18  -RC    ADL OT LTG, Outcome goal ongoing  -RC goal ongoing  -LM goal ongoing  -RC      01/27/18 0606 01/26/18 1630 01/26/18 1144    Bed Mobility OT STG    Bed Mobility OT STG, Date Established  01/26/18  -MR     Bed Mobility OT STG, Time to Achieve  by discharge  -MR     Bed Mobility OT STG, Activity Type  all bed mobility  -MR     Bed Mobility OT STG, [place] Level  conditional independence  -MR     Bed Mobility OT STG, Additional Goal  with VSS  -MR     Bed Mobility OT LTG    Bed Mobility OT LTG, Date Goal Reviewed 01/27/18  -NN  01/26/18  -CS    Bed Mobility OT LTG, Outcome goal not met  -NN      Bed Mobility OT LTG, Reason Goal Not Met discharged from facility  -NN      Transfer Training OT LTG    Transfer Training OT LTG, Date Established  01/26/18  -MR     Transfer Training OT LTG, Time to Achieve  by discharge  -MR     Transfer Training OT LTG, Activity Type  all transfers  -MR     Transfer  Training OT LTG, [place] Level  supervision required  -MR     Transfer Training OT LTG, Assist Device  walker, rolling  -MR     Transfer Training OT LTG, Additional Goal  with VSS  -MR     Transfer Training OT LTG, Date Goal Reviewed 01/27/18  -NN  01/26/18  -    Transfer Training OT LTG, Outcome goal not met  -NN  goal not met  -    Transfer Training OT LTG, Reason Goal Not Met discharged from facility  -      Patient Education OT LTG    Patient Education OT LTG, Date Established  01/26/18  -MR     Patient Education OT LTG, Time to Achieve  by discharge  -MR     Patient Education OT LTG, Education Type  written program;HEP;precautions per surgeon;positioning;posture/body mechanics;home safety;adaptive equipment mgmt;skin care/inspection;energy conservation;work simplification;adaptive breathing  -MR     Patient Education OT LTG, Education Understanding  independent;demonstrates adequately;verbalizes understanding  -MR     ADL OT LTG    ADL OT LTG, Date Established  01/26/18  -MR     ADL OT LTG, Time to Achieve  by discharge  -MR     ADL OT LTG, Activity Type  ADL skills  -MR     ADL OT LTG, Additional Goal  UB bathing SBA with AD, LB bathing with min A with AD, UB dressing with cond I, LB dressing with [place] with AD, grooming with set-up, toileting with CGA   -MR     ADL OT LTG, Date Goal Reviewed 01/27/18  -NN  01/26/18  -CS    ADL OT LTG, Outcome goal not met  -NN  goal not met  -    ADL OT LTG, Reason Goal Not Met discharged from facility  -      Functional Mobility OT LTG    Functional Mobility Goal OT LTG, Date Goal Reviewed 01/27/18  -NN  01/26/18  -    Functional Mobility Goal OT LTG, Outcome goal not met  -  goal not met  -    Functional Mobility Goal OT LTG, Reason Goal Not Met discharged from facility  -        01/24/18 1329 01/24/18 0755       Bed Mobility OT LTG    Bed Mobility OT LTG, Date Established  01/24/18  -     Bed Mobility OT LTG, Time to Achieve  by discharge  -      Bed Mobility OT LTG, Activity Type  all bed mobility  -     Bed Mobility OT LTG, [place] Level  supervision required   to engage in ADL  -     Bed Mobility OT LTG, Date Goal Reviewed 01/24/18  -      Bed Mobility OT LTG, Outcome goal ongoing  -      Transfer Training OT LTG    Transfer Training OT LTG, Date Established  01/24/18  -     Transfer Training OT LTG, Time to Achieve  by discharge  -     Transfer Training OT LTG, Activity Type  toilet  -     Transfer Training OT LTG, [place] Level  contact guard assist   AE/AD as needed  -     Transfer Training OT LTG, Date Goal Reviewed 01/24/18  -      ADL OT LTG    ADL OT LTG, Date Established  01/24/18  -     ADL OT LTG, Activity Type  ADL skills  -     ADL OT LTG, Additional Goal  set up grooming/self feeding, min A UB bath/dressing/ mod A LB bath/dressing/toileting - AE/AD as needed  -     ADL OT LTG, Date Goal Reviewed 01/24/18  -      Functional Mobility OT LTG    Functional Mobility Goal OT LTG, Date Established  01/24/18  -     Functional Mobility Goal OT LTG, Time to Achieve  by discharge  -     Functional Mobility Goal OT LTG, [place] Level  contact guard  -     Functional Mobility Goal OT LTG, Assist Device  --   AE/AD as needed  -     Functional Mobility Goal OT LTG, Distance to Achieve  to the bathroom   to the toilet  -     Functional Mobility Goal OT LTG, Date Goal Reviewed 01/24/18  -        User Key  (r) = Recorded By, (t) = Taken By, (c) = Cosigned By    Initials Name Provider Type     Marisol Burdick, OTR/L Occupational Therapist    GOVIND Hernandez, NELSON/L Occupational Therapy Assistant    EDUARDO Santana, NELSON/L Occupational Therapy Assistant    RIA Michel, NELSON/L Occupational Therapy Assistant    NN Rocío Desai, OTR/L Occupational Therapist    MR Itzel Fletcher, OT Occupational Therapist          Occupational Therapy Education     Title: PT OT SLP Therapies (Active)      Topic: Occupational Therapy (Done)     Point: ADL training (Done)    Description: Instruct learner(s) on proper safety adaptation and remediation techniques during self care or transfers.   Instruct in proper use of assistive devices.    Learning Progress Summary    Learner Readiness Method Response Comment Documented by Status   Patient Acceptance E VU  LM 02/02/18 1524 Done    Acceptance E VU,NR Educated about OT and POC. Educated on safety throughout with t/f and ADL. Educated on AE for LB dressing/bathing. Educated to call for assist and not get up on her own.  02/01/18 1250 Done    Acceptance E NR   01/31/18 1445 Active    Acceptance E NR   01/30/18 1523 Active    Acceptance E VU   01/29/18 1435 Done    Acceptance E NR   01/27/18 1441 Active    Acceptance E VU Pt educated on role of OT and POC. Pt educated on benefit of activity, safety with t/f, AD/AE to increase independence with ADL's, and not to get up alone to call for assistance. MR 01/26/18 1629 Done               Point: Home exercise program (Done)    Description: Instruct learner(s) on appropriate technique for monitoring, assisting and/or progressing therapeutic exercises/activities.    Learning Progress Summary    Learner Readiness Method Response Comment Documented by Status   Patient Acceptance E VU  LM 02/02/18 1524 Done    Acceptance E VU   01/29/18 1435 Done    Acceptance E NR   01/27/18 1441 Active    Acceptance E VU Pt educated on role of OT and POC. Pt educated on benefit of activity, safety with t/f, AD/AE to increase independence with ADL's, and not to get up alone to call for assistance. MR 01/26/18 1629 Done               Point: Precautions (Done)    Description: Instruct learner(s) on prescribed precautions during self-care and functional transfers.    Learning Progress Summary    Learner Readiness Method Response Comment Documented by Status   Patient Acceptance E VU  LM 02/02/18 1524 Done    Acceptance E KYLEENR Educated  about OT and POC. Educated on safety throughout with t/f and ADL. Educated on AE for LB dressing/bathing. Educated to call for assist and not get up on her own.  02/01/18 1250 Done    Acceptance E NR   01/31/18 1445 Active    Acceptance E VU   01/29/18 1435 Done    Acceptance E NR   01/27/18 1441 Active    Acceptance E VU Pt educated on role of OT and POC. Pt educated on benefit of activity, safety with t/f, AD/AE to increase independence with ADL's, and not to get up alone to call for assistance. MR 01/26/18 1629 Done               Point: Body mechanics (Done)    Description: Instruct learner(s) on proper positioning and spine alignment during self-care, functional mobility activities and/or exercises.    Learning Progress Summary    Learner Readiness Method Response Comment Documented by Status   Patient Acceptance E VU   02/02/18 1524 Done    Acceptance E VU,NR Educated about OT and POC. Educated on safety throughout with t/f and ADL. Educated on AE for LB dressing/bathing. Educated to call for assist and not get up on her own.  02/01/18 1250 Done    Acceptance E NR   01/31/18 1445 Active    Acceptance E VU   01/29/18 1435 Done    Acceptance E NR   01/27/18 1441 Active    Acceptance E VU Pt educated on role of OT and POC. Pt educated on benefit of activity, safety with t/f, AD/AE to increase independence with ADL's, and not to get up alone to call for assistance. MR 01/26/18 1629 Done                      User Key     Initials Effective Dates Name Provider Type Discipline     10/17/16 -  AYAD Vazquez/L Occupational Therapist OT     10/17/16 -  OMAR Shah/L Occupational Therapy Assistant OT     10/17/16 -  OMAR Malone/L Occupational Therapy Assistant OT     11/08/17 -  Itzel Fletcher OT Occupational Therapist OT                  OT Recommendation and Plan  Anticipated Discharge Disposition: home with home health, home with 24/7 care  Planned Therapy  Interventions: activity intolerance, adaptive equipment training, ADL retraining, IADL retraining, balance training, bed mobility training, energy conservation, home exercise program, joint mobilization, motor coordination training, ROM (Range of Motion), strengthening, stretching, transfer training  Therapy Frequency:  (3-14 x/wk)  Plan of Care Review  Plan Of Care Reviewed With: patient  Progress: improving  Outcome Summary/Follow up Plan: pt aware of dc to snf for cont OT pT  when discharged  pt demo good perf with tf and adls requiring sba-- min a f        Outcome Measures       02/02/18 1037 02/01/18 0912 01/31/18 1302    How much help from another person do you currently need...    Turning from your back to your side while in flat bed without using bedrails? 4  -JA      Moving from lying on back to sitting on the side of a flat bed without bedrails? 3  -JA      Moving to and from a bed to a chair (including a wheelchair)? 3  -JA      Standing up from a chair using your arms (e.g., wheelchair, bedside chair)? 3  -JA      Climbing 3-5 steps with a railing? 2  -JA      To walk in hospital room? 3  -      AM-PAC 6 Clicks Score 18  -JA      How much help from another is currently needed...    Putting on and taking off regular lower body clothing?  3  - 3  -RC    Bathing (including washing, rinsing, and drying)  3  - 3  -RC    Toileting (which includes using toilet bed pan or urinal)  2  - 2  -    Putting on and taking off regular upper body clothing  3  - 3  -    Taking care of personal grooming (such as brushing teeth)  3  - 3  -RC    Eating meals  4  - 4  -RC    Score  18  - 18  -RC    Functional Assessment    Outcome Measure Options AM-PAC 6 Clicks Basic Mobility (PT)  - AM-PAC 6 Clicks Daily Activity (OT)  -       User Key  (r) = Recorded By, (t) = Taken By, (c) = Cosigned By    Initials Name Provider Type    TYRON Burdick, OTR/L Occupational Therapist    KIERSTEN Fagan, PTA  Physical Therapy Assistant    OMAR Cazares/L Occupational Therapy Assistant           Time Calculation:         Time Calculation- OT       02/02/18 1503          Time Calculation- OT    OT Start Time 0845  -LM      OT Stop Time 0952  -LM      OT Time Calculation (min) 67 min  -LM      Total Timed Code Minutes- OT 67 minute(s)  -LM      OT Received On 02/02/18  -LM        User Key  (r) = Recorded By, (t) = Taken By, (c) = Cosigned By    Initials Name Provider Type     JENNIFFER Malone Occupational Therapy Assistant           Therapy Charges for Today     Code Description Service Date Service Provider Modifiers Qty    96810794021 HC OT SELF CARE/MGMT/TRAIN EA 15 MIN 2/2/2018 JENNIFFER Malone GO 1    23863046529 HC OT THERAPEUTIC ACT EA 15 MIN 2/2/2018 OMAR Malone/L GO 1    58629984265 HC OT THER PROC EA 15 MIN 2/2/2018 OMAR Malone/L GO 2          OT G-codes  OT Professional Judgement Used?: Yes  OT Functional Scales Options: AM-PAC 6 Clicks Daily Activity (OT)  Score: 15  Functional Limitation: Self care  Self Care Current Status (): At least 60 percent but less than 80 percent impaired, limited or restricted  Self Care Goal Status (): At least 40 percent but less than 60 percent impaired, limited or restricted    JENNIFFER Malone  2/2/2018

## 2018-02-02 NOTE — PROGRESS NOTES
LOS: 7 days   Patient Care Team:  Roula Albert MD as PCP - General  Roula Albert MD as PCP - Claims Attributed    Chief Complaint:   Closed displaced basicervical fracture of right femur          Interval History:     SUBJECTIVE:  She is in good spirits today.  Her pain is well-controlled.  She slept fairly well last night.  History taken from: patient chart family RN    Objective     Vital Signs  Temp:  [96.3 °F (35.7 °C)-98.8 °F (37.1 °C)] 98.4 °F (36.9 °C)  Heart Rate:  [67-76] 75  Resp:  [16-20] 16  BP: (132-139)/(62-64) 132/63  Last 3 weights    01/31/18  0623 02/01/18  0512 02/02/18  0500   Weight: 77.3 kg (170 lb 6.4 oz) 77.7 kg (171 lb 3.2 oz) 76 kg (167 lb 7 oz)         Physical Exam:     General Appearance:    Alert, cooperative, in no acute distress   Head:    Normocephalic, without obvious abnormality, atraumatic   Eyes:            Lids and lashes normal, conjunctivae and sclerae normal, no   icterus, no pallor, corneas clear, PERRLA   Throat:   No oral lesions, no thrush, oral mucosa moist   Neck:   No adenopathy, supple, trachea midline, no thyromegaly, no   carotid bruit, no JVD       Lungs:     Clear to auscultation,respirations regular, even and                  Unlabored Clear lungs good bilateral air movement     Heart:    Regular rhythm and normal rate, normal S1 and S2, no            murmur, no gallop, no rub, no click No ectopy    Chest Wall:    No abnormalities observed   Abdomen:     Normal bowel sounds, no masses, no organomegaly, soft        non-tender, non-distended, no guarding, no rebound                Tenderness    Extremities:   Moves all extremities well, Trace edema right leg , right thigh little larger than the left consistent with her surgery, no cyanosis, no             Redness        Skin:   No bleeding, bruising or rash   Lymph nodes:   No palpable adenopathy   Neurologic:   Cranial nerves 2 - 12 grossly intact, sensation intact, DTR       present and equal  bilaterally          RESULTS REVIEW:     Lab Results (last 24 hours)     Procedure Component Value Units Date/Time    CBC & Differential [[id number]] Collected:  02/02/18 0457    Specimen:  Blood Updated:  02/02/18 0550    Narrative:       The following orders were created for panel order CBC & Differential.  Procedure                               Abnormality         Status                     ---------                               -----------         ------                     CBC Auto Differential[[id number]]        Abnormal            Final result                 Please view results for these tests on the individual orders.    CBC Auto Differential [[id number]]  (Abnormal) Collected:  02/02/18 0457    Specimen:  Blood Updated:  02/02/18 0550     WBC 8.61 10*3/mm3      RBC 2.76 (L) 10*6/mm3      Hemoglobin 8.9 (L) g/dL      Hematocrit 26.7 (L) %      MCV 96.7 fL      MCH 32.2 pg      MCHC 33.3 g/dL      RDW 14.8 (H) %      RDW-SD 52.2 (H) fl      MPV 9.2 fL      Platelets 390 10*3/mm3      Neutrophil % 61.2 %      Lymphocyte % 22.4 %      Monocyte % 9.2 %      Eosinophil % 5.3 %      Basophil % 0.2 %      Immature Grans % 1.7 (H) %      Neutrophils, Absolute 5.26 10*3/mm3      Lymphocytes, Absolute 1.93 10*3/mm3      Monocytes, Absolute 0.79 10*3/mm3      Eosinophils, Absolute 0.46 10*3/mm3      Basophils, Absolute 0.02 10*3/mm3      Immature Grans, Absolute 0.15 (H) 10*3/mm3     Protime-INR [[id number]]  (Abnormal) Collected:  02/02/18 0457    Specimen:  Blood Updated:  02/02/18 0552     Protime 23.1 (H) Seconds      INR 2.03 (H)    Narrative:       Therapeutic range for most indications is 2.0-3.0 INR,  or 2.5-3.5 for mechanical heart valves.    Renal Function Panel [[id number]]  (Abnormal) Collected:  02/02/18 0457    Specimen:  Blood Updated:  02/02/18 0608     Glucose 92 mg/dL      BUN 32 (H) mg/dL      Creatinine 1.04 (H) mg/dL      Sodium 137 mmol/L      Potassium 5.2 (H) mmol/L      Chloride 97 mmol/L       CO2 35.0 (H) mmol/L      Calcium 8.9 mg/dL      Albumin 3.00 (L) g/dL      Phosphorus 4.3 mg/dL      Anion Gap 5.0 mmol/L      BUN/Creatinine Ratio 30.8 (H)     eGFR Non African Amer 50 (L) mL/min/1.73     Narrative:       The MDRD GFR formula is only valid for adults with stable renal function between ages 18 and 70.        Imaging Results (last 72 hours)     ** No results found for the last 72 hours. **          Assessment/Plan     Principal Problem:    Closed displaced basicervical fracture of right femur  Active Problems:    Essential hypertension    Coronary artery disease involving native coronary artery of native heart without angina pectoris    Pulmonary emphysema    Chronic systolic congestive heart failure    Hyponatremia    BENITO (acute kidney injury)    Urinary retention    Acquired hypothyroidism    Depressive disorder          Noted that her weight is down 3 pounds potassium is up and will be rechecked tomorrow.  She is on 2000 cc fluid restrictions because of her heart failure.  She is now on Lasix 40 mg a day and that will be monitored, it may need to be decreased again.  She was on 40 mg twice a day at home and I have her on 20 mg a day until her weight started going up daily.  She is adequately anticoagulated    She has White catheter in for urinary retention.  Probably will be removed this weekend.  She is participating with therapy but it is felt that she will need help at home but is not available and she is to go to [facility] on Monday.  Hopefully she will be able to have her White removed by then  We'll recheck lab work to monitor electrolytes and weight for discharge    Shawn Maldonado MD  02/02/18  10:34 AM

## 2018-02-02 NOTE — PROGRESS NOTES
"Anticoagulation by Pharmacy - Warfarin Day 11 of 42    Val Arteaga is a 85 y.o.female  [Ht: 172.7 cm (67.99\"); Wt: 76 kg (167 lb 7 oz)] on Warfarin 3 mg PO  for indication of VTE prophylaxis s/p ortho procedure.    Goal INR: 1.7-2.5  Today's INR:   Lab Results   Component Value Date    INR 2.03 (H) 02/02/2018         Lab Results   Component Value Date    INR 2.03 (H) 02/02/2018    INR 2.03 (H) 02/01/2018    INR 1.81 (H) 01/31/2018    PROTIME 23.1 (H) 02/02/2018    PROTIME 23.1 (H) 02/01/2018    PROTIME 21.1 (H) 01/31/2018     Lab Results   Component Value Date    HGB 8.9 (L) 02/02/2018    HGB 9.0 (L) 01/31/2018    HGB 8.9 (L) 01/29/2018     Lab Results   Component Value Date    HCT 26.7 (L) 02/02/2018    HCT 26.9 (L) 01/31/2018    HCT 26.7 (L) 01/29/2018     Assessment/Plan:  Reviewed above labs. INR, 2.03 again today, is therapeutic.  Concurrent medications include, levothyroxine, sertraline.  Will continue 3 mg warfarin today due to therapeutic INR.  Rx will continue to follow and dose adjust accordingly.       Giovanni Santana, [facility]  02/02/18 3:06 PM     "

## 2018-02-03 LAB
ALBUMIN SERPL-MCNC: 2.7 G/DL (ref 3.4–4.8)
ANION GAP SERPL CALCULATED.3IONS-SCNC: 6 MMOL/L (ref 5–15)
BUN BLD-MCNC: 26 MG/DL (ref 7–21)
BUN/CREAT SERPL: 33.8 (ref 7–25)
CALCIUM SPEC-SCNC: 8.5 MG/DL (ref 8.4–10.2)
CHLORIDE SERPL-SCNC: 96 MMOL/L (ref 95–110)
CO2 SERPL-SCNC: 33 MMOL/L (ref 22–31)
CREAT BLD-MCNC: 0.77 MG/DL (ref 0.5–1)
DEPRECATED RDW RBC AUTO: 50.6 FL (ref 36.4–46.3)
ERYTHROCYTE [DISTWIDTH] IN BLOOD BY AUTOMATED COUNT: 14.6 % (ref 11.5–14.5)
GFR SERPL CREATININE-BSD FRML MDRD: 71 ML/MIN/1.73
GLUCOSE BLD-MCNC: 88 MG/DL (ref 60–100)
HCT VFR BLD AUTO: 28.4 % (ref 35–45)
HGB BLD-MCNC: 9.5 G/DL (ref 12–15.5)
INR PPP: 2.16 (ref 0.8–1.2)
MCH RBC QN AUTO: 32 PG (ref 26.5–34)
MCHC RBC AUTO-ENTMCNC: 33.5 G/DL (ref 31.4–36)
MCV RBC AUTO: 95.6 FL (ref 80–98)
PHOSPHATE SERPL-MCNC: 3.2 MG/DL (ref 2.4–4.4)
PLATELET # BLD AUTO: 416 10*3/MM3 (ref 150–450)
PMV BLD AUTO: 9 FL (ref 8–12)
POTASSIUM BLD-SCNC: 4.5 MMOL/L (ref 3.5–5.1)
PROTHROMBIN TIME: 24.3 SECONDS (ref 11.1–15.3)
RBC # BLD AUTO: 2.97 10*6/MM3 (ref 3.77–5.16)
SODIUM BLD-SCNC: 135 MMOL/L (ref 137–145)
WBC NRBC COR # BLD: 10.79 10*3/MM3 (ref 3.2–9.8)

## 2018-02-03 PROCEDURE — 85027 COMPLETE CBC AUTOMATED: CPT | Performed by: FAMILY MEDICINE

## 2018-02-03 PROCEDURE — 80069 RENAL FUNCTION PANEL: CPT | Performed by: FAMILY MEDICINE

## 2018-02-03 PROCEDURE — 94799 UNLISTED PULMONARY SVC/PX: CPT

## 2018-02-03 PROCEDURE — 94760 N-INVAS EAR/PLS OXIMETRY 1: CPT

## 2018-02-03 PROCEDURE — 85610 PROTHROMBIN TIME: CPT | Performed by: FAMILY MEDICINE

## 2018-02-03 RX ADMIN — IPRATROPIUM BROMIDE AND ALBUTEROL SULFATE 3 ML: 2.5; .5 SOLUTION RESPIRATORY (INHALATION) at 08:26

## 2018-02-03 RX ADMIN — NITROGLYCERIN 1 PATCH: 0.2 PATCH TRANSDERMAL at 08:15

## 2018-02-03 RX ADMIN — BUDESONIDE AND FORMOTEROL FUMARATE DIHYDRATE 2 PUFF: 160; 4.5 AEROSOL RESPIRATORY (INHALATION) at 05:05

## 2018-02-03 RX ADMIN — RANOLAZINE 1000 MG: 500 TABLET, FILM COATED, EXTENDED RELEASE ORAL at 22:06

## 2018-02-03 RX ADMIN — MONTELUKAST SODIUM 10 MG: 10 TABLET, FILM COATED ORAL at 22:05

## 2018-02-03 RX ADMIN — ESTRADIOL 1 G: 0.1 CREAM VAGINAL at 22:06

## 2018-02-03 RX ADMIN — SERTRALINE HYDROCHLORIDE 50 MG: 50 TABLET ORAL at 08:15

## 2018-02-03 RX ADMIN — WARFARIN SODIUM 3 MG: 3 TABLET ORAL at 17:04

## 2018-02-03 RX ADMIN — FERROUS SULFATE TAB EC 324 MG (65 MG FE EQUIVALENT) 324 MG: 324 (65 FE) TABLET DELAYED RESPONSE at 17:04

## 2018-02-03 RX ADMIN — IPRATROPIUM BROMIDE AND ALBUTEROL SULFATE 3 ML: 2.5; .5 SOLUTION RESPIRATORY (INHALATION) at 20:21

## 2018-02-03 RX ADMIN — ATORVASTATIN CALCIUM 20 MG: 20 TABLET, FILM COATED ORAL at 08:15

## 2018-02-03 RX ADMIN — RANOLAZINE 1000 MG: 500 TABLET, FILM COATED, EXTENDED RELEASE ORAL at 08:15

## 2018-02-03 RX ADMIN — CARVEDILOL 6.25 MG: 6.25 TABLET, FILM COATED ORAL at 08:15

## 2018-02-03 RX ADMIN — IPRATROPIUM BROMIDE AND ALBUTEROL SULFATE 3 ML: 2.5; .5 SOLUTION RESPIRATORY (INHALATION) at 05:05

## 2018-02-03 RX ADMIN — BUDESONIDE AND FORMOTEROL FUMARATE DIHYDRATE 2 PUFF: 160; 4.5 AEROSOL RESPIRATORY (INHALATION) at 20:26

## 2018-02-03 RX ADMIN — CARVEDILOL 6.25 MG: 6.25 TABLET, FILM COATED ORAL at 17:04

## 2018-02-03 RX ADMIN — FUROSEMIDE 20 MG: 20 TABLET ORAL at 08:15

## 2018-02-03 RX ADMIN — SENNOSIDES AND DOCUSATE SODIUM 2 TABLET: 8.6; 5 TABLET ORAL at 22:05

## 2018-02-03 RX ADMIN — LEVOTHYROXINE SODIUM 50 MCG: 50 TABLET ORAL at 05:45

## 2018-02-03 RX ADMIN — IPRATROPIUM BROMIDE AND ALBUTEROL SULFATE 3 ML: 2.5; .5 SOLUTION RESPIRATORY (INHALATION) at 15:33

## 2018-02-03 RX ADMIN — FAMOTIDINE 40 MG: 40 TABLET ORAL at 08:15

## 2018-02-03 RX ADMIN — Medication 1 TABLET: at 08:15

## 2018-02-03 RX ADMIN — FERROUS SULFATE TAB EC 324 MG (65 MG FE EQUIVALENT) 324 MG: 324 (65 FE) TABLET DELAYED RESPONSE at 08:15

## 2018-02-03 NOTE — PLAN OF CARE
Problem: Patient Care Overview (Adult)  Goal: Plan of Care Review  Outcome: Ongoing (interventions implemented as appropriate)   02/03/18 0315 02/03/18 1227   Coping/Psychosocial Response Interventions   Plan Of Care Reviewed With --  patient   Patient Care Overview   Progress improving --      Goal: Adult Individualization and Mutuality  Outcome: Ongoing (interventions implemented as appropriate)    Goal: Discharge Needs Assessment  Outcome: Ongoing (interventions implemented as appropriate)      Problem: Fractured Hip (Adult)  Goal: Signs and Symptoms of Listed Potential Problems Will be Absent or Manageable (Fractured Hip)  Outcome: Ongoing (interventions implemented as appropriate)      Problem: Functional Deficit (Adult,Obstetrics,Pediatric)  Goal: Signs and Symptoms of Listed Potential Problems Will be Absent or Manageable (Functional Deficit)  Outcome: Ongoing (interventions implemented as appropriate)

## 2018-02-03 NOTE — PROGRESS NOTES
"   LOS: 8 days   Patient Care Team:  Roula Albert MD as PCP - General  Roula Albert MD as PCP - Claims Attributed    Subjective     Subjective:  Symptoms:  Stable.  (White in place. Ready for voiding trial. ).    Diet:  No vomiting.        History taken from: patient chart RN    Objective     Vital Signs  Temp:  [97.6 °F (36.4 °C)] 97.6 °F (36.4 °C)  Heart Rate:  [72-75] 75  Resp:  [16-18] 16  BP: (130-144)/(60-66) 130/60    Objective:  General Appearance:  In no acute distress.    Vital signs: (most recent): Blood pressure 130/60, pulse 75, temperature 97.6 °F (36.4 °C), temperature source Tympanic, resp. rate 16, height 172.7 cm (67.99\"), weight 75.8 kg (167 lb), SpO2 98 %.  Vital signs are normal.  No fever.    Output: Producing urine.    HEENT: Normal HEENT exam.    Lungs:  Normal effort.    Chest: Symmetric chest wall expansion.   Abdomen: Abdomen is soft and non-distended.    Extremities: There is no dependent edema.    Neurological: Patient is alert and oriented to person, place and time.  GCS score is 15.    Pupils:  Pupils are equal, round, and reactive to light.    Skin:  Warm, dry and pale.              Results Review:    Lab Results (last 24 hours)     Procedure Component Value Units Date/Time    CBC (No Diff) [[id number]]  (Abnormal) Collected:  02/03/18 0550    Specimen:  Blood Updated:  02/03/18 0612     WBC 10.79 (H) 10*3/mm3      RBC 2.97 (L) 10*6/mm3      Hemoglobin 9.5 (L) g/dL      Hematocrit 28.4 (L) %      MCV 95.6 fL      MCH 32.0 pg      MCHC 33.5 g/dL      RDW 14.6 (H) %      RDW-SD 50.6 (H) fl      MPV 9.0 fL      Platelets 416 10*3/mm3     Renal Function Panel [[id number]]  (Abnormal) Collected:  02/03/18 0550    Specimen:  Blood Updated:  02/03/18 0628     Glucose 88 mg/dL      BUN 26 (H) mg/dL      Creatinine 0.77 mg/dL      Sodium 135 (L) mmol/L      Potassium 4.5 mmol/L      Chloride 96 mmol/L      CO2 33.0 (H) mmol/L      Calcium 8.5 mg/dL      Albumin 2.70 (L) g/dL      " Phosphorus 3.2 mg/dL      Anion Gap 6.0 mmol/L      BUN/Creatinine Ratio 33.8 (H)     eGFR Non African Amer 71 mL/min/1.73     Narrative:       The MDRD GFR formula is only valid for adults with stable renal function between ages 18 and 70.    Protime-INR [[id number]]  (Abnormal) Collected:  02/03/18 0550    Specimen:  Blood Updated:  02/03/18 0737     Protime 24.3 (H) Seconds      INR 2.16 (H)    Narrative:       Therapeutic range for most indications is 2.0-3.0 INR,  or 2.5-3.5 for mechanical heart valves.         Imaging Results (last 24 hours)     ** No results found for the last 24 hours. **           I reviewed the patient's new clinical results.  I reviewed the patient's new imaging results and agree with the interpretation.  I reviewed the patient's other test results and agree with the interpretation      Assessment/Plan     Principal Problem:    Closed displaced basicervical fracture of right femur  Active Problems:    Essential hypertension    Coronary artery disease involving native coronary artery of native heart without angina pectoris    Acquired hypothyroidism    Depressive disorder    Pulmonary emphysema    Chronic systolic congestive heart failure    Hyponatremia    BENITO (acute kidney injury)    Urinary retention      Assessment:  (Urinary retention due to distal urethral lesion with associated pain, White anchored 1/29/18, Estrace being administered.     Estimated Creatinine Clearance: 61.5 mL/min (by C-G formula based on Cr of 0.77).    Intake/Output Summary (Last 24 hours) at 02/03/18 1354  Last data filed at 02/03/18 0516          Gross per 24 hour  Intake:              120 ml  Output:              650 ml  Net   :             -530 ml).     Plan:   (Remove White, monitor urine output, PVR bladder scans. Continue Estrace cream. ).       Courtney Saab, CRESENCIO  02/03/18  1:53 PM

## 2018-02-03 NOTE — PLAN OF CARE
Problem: Patient Care Overview (Adult)  Goal: Plan of Care Review  Outcome: Ongoing (interventions implemented as appropriate)   02/03/18 8620   Coping/Psychosocial Response Interventions   Plan Of Care Reviewed With patient   Outcome Evaluation   Outcome Summary/Follow up Plan Patient working with towards goals. Needs reasurance with discharge on Monday 2/5 to nursing home for more therapy. Encouragement provided which seemed to help with her mood. Refused lortab for pain saying it made her feel funny so tylenol was take with good results.   Patient Care Overview   Progress improving       Problem: Fractured Hip (Adult)  Goal: Signs and Symptoms of Listed Potential Problems Will be Absent or Manageable (Fractured Hip)  Outcome: Ongoing (interventions implemented as appropriate)      Problem: Functional Deficit (Adult,Obstetrics,Pediatric)  Goal: Signs and Symptoms of Listed Potential Problems Will be Absent or Manageable (Functional Deficit)  Outcome: Ongoing (interventions implemented as appropriate)

## 2018-02-03 NOTE — PROGRESS NOTES
[facility] Medicine Services  INPATIENT PROGRESS NOTE    Length of Stay: 8  Date of Admission: 1/26/2018  Primary Care Physician: Roula Albert MD    Subjective   Chief Complaint: hip pain s/p fall   HPI:  85 year old  female admitted to the ARU for further rehab after suffering a closed displaced basicervical fracture of the right femur.  She has no complaints during today's visit other than her chronic back pain.     Review of Systems   Constitutional: Negative for fatigue.   Respiratory: Negative for shortness of breath and wheezing.    Cardiovascular: Negative for chest pain and palpitations.   Gastrointestinal: Negative for abdominal pain.   Musculoskeletal: Positive for back pain.   Neurological: Negative for dizziness and weakness.   Psychiatric/Behavioral: Negative for confusion.        All pertinent negatives and positives are as above. All other systems have been reviewed and are negative unless otherwise stated.     Objective    Temp:  [97.6 °F (36.4 °C)] 97.6 °F (36.4 °C)  Heart Rate:  [72-75] 75  Resp:  [16-18] 16  BP: (130-144)/(60-66) 130/60    Physical Exam   Constitutional: She is oriented to person, place, and time. She appears well-developed and well-nourished.   HENT:   Head: Normocephalic.   Eyes: Conjunctivae are normal.   Neck: Neck supple.   Cardiovascular: Normal rate, normal heart sounds and intact distal pulses.    Pulmonary/Chest: Effort normal and breath sounds normal.   Abdominal: Soft. Bowel sounds are normal.   Musculoskeletal: Normal range of motion. She exhibits no edema.   Neurological: She is alert and oriented to person, place, and time.   Skin: Skin is warm and dry.   Psychiatric: She has a normal mood and affect. Her behavior is normal.   Vitals reviewed.          Results Review:  I have reviewed the labs, radiology results, and diagnostic studies.    Laboratory Data:     Results from last 7 days  Lab Units 02/03/18  1404  02/02/18  0457 01/31/18  0516   SODIUM mmol/L 135* 137 132*   POTASSIUM mmol/L 4.5 5.2* 4.3   CHLORIDE mmol/L 96 97 92*   CO2 mmol/L 33.0* 35.0* 32.0*   BUN mg/dL 26* 32* 26*   CREATININE mg/dL 0.77 1.04* 0.91   GLUCOSE mg/dL 88 92 85   CALCIUM mg/dL 8.5 8.9 8.5   ANION GAP mmol/L 6.0 5.0 8.0     Estimated Creatinine Clearance: 61.5 mL/min (by C-G formula based on Cr of 0.77).    Results from last 7 days  Lab Units 02/03/18  0550 02/02/18  0457 01/31/18  0516   MAGNESIUM mg/dL  --   --  2.1   PHOSPHORUS mg/dL 3.2 4.3 3.7           Results from last 7 days  Lab Units 02/03/18  0550 02/02/18  0457 01/31/18  0516 01/29/18  0506   WBC 10*3/mm3 10.79* 8.61 7.61 7.17   HEMOGLOBIN g/dL 9.5* 8.9* 9.0* 8.9*   HEMATOCRIT % 28.4* 26.7* 26.9* 26.7*   PLATELETS 10*3/mm3 416 390 307 256       Results from last 7 days  Lab Units 02/03/18  0550 02/02/18  0457 02/01/18  0501 01/31/18  0516 01/30/18  0530   INR  2.16* 2.03* 2.03* 1.81* 1.71*       Culture Data:   No results found for: BLOODCX  No results found for: URINECX  No results found for: RESPCX  No results found for: WOUNDCX  No results found for: STOOLCX  No components found for: BODYFLD    Radiology Data:   Imaging Results (last 24 hours)     ** No results found for the last 24 hours. **          I have reviewed the patient current medications.     Assessment/Plan     Hospital Problem List     * (Principal)Closed displaced basicervical fracture of right femur    Overview Signed 1/30/2018 11:13 AM by Shawn Maldonado MD     Bipolar prosthesis anterior approach per Dr Mcdowell on 1/23/18         Essential hypertension    Coronary artery disease involving native coronary artery of native heart without angina pectoris    Acquired hypothyroidism    Depressive disorder    Pulmonary emphysema    Overview Signed 1/23/2018  6:20 AM by Kelvin Mcdowell MD     Added automatically from request for surgery [id number]         Chronic systolic congestive heart failure    Hyponatremia     BENITO (acute kidney injury)    Urinary retention          -White removed today per urology.  Attempt to void   -Monitor CBC, BMP.  Potassium normal.  -Labs and vital signs stable.  -Continue Pt/Ot  -INR therapeutic          This document has been electronically signed by CRESENCIO Winters on February 3, 2018 1:31 PM

## 2018-02-03 NOTE — PROGRESS NOTES
"Anticoagulation by Pharmacy - Warfarin    Val Arteaga is a 85 y.o.female  [Ht: 172.7 cm (67.99\"); Wt: 75.8 kg (167 lb)] on Warfarin 3 mg PO  for indication of VTE prophylaxis s/p ortho.    Goal INR: 1.7-2.5  Today's INR:   Lab Results   Component Value Date    INR 2.16 (H) 02/03/2018         Lab Results   Component Value Date    INR 2.16 (H) 02/03/2018    INR 2.03 (H) 02/02/2018    INR 2.03 (H) 02/01/2018    PROTIME 24.3 (H) 02/03/2018    PROTIME 23.1 (H) 02/02/2018    PROTIME 23.1 (H) 02/01/2018     Lab Results   Component Value Date    HGB 9.5 (L) 02/03/2018    HGB 8.9 (L) 02/02/2018    HGB 9.0 (L) 01/31/2018     Lab Results   Component Value Date    HCT 28.4 (L) 02/03/2018    HCT 26.7 (L) 02/02/2018    HCT 26.9 (L) 01/31/2018     Lab Results   Component Value Date     02/03/2018     02/02/2018     01/31/2018     Assessment/Plan:  INR 2.16 and therapeutic. Continue 3mg daily. Concurrent meds: levothyroxine, sertraline    Sandoval Ocampo RPH  02/03/18 1:08 PM     "

## 2018-02-04 LAB
INR PPP: 2.01 (ref 0.8–1.2)
PROTHROMBIN TIME: 22.9 SECONDS (ref 11.1–15.3)

## 2018-02-04 PROCEDURE — 94760 N-INVAS EAR/PLS OXIMETRY 1: CPT

## 2018-02-04 PROCEDURE — 85610 PROTHROMBIN TIME: CPT | Performed by: FAMILY MEDICINE

## 2018-02-04 PROCEDURE — 94799 UNLISTED PULMONARY SVC/PX: CPT

## 2018-02-04 RX ORDER — ONDANSETRON 4 MG/1
4 TABLET, ORALLY DISINTEGRATING ORAL EVERY 6 HOURS PRN
Status: DISCONTINUED | OUTPATIENT
Start: 2018-02-04 | End: 2018-02-05 | Stop reason: HOSPADM

## 2018-02-04 RX ADMIN — SERTRALINE HYDROCHLORIDE 50 MG: 50 TABLET ORAL at 08:12

## 2018-02-04 RX ADMIN — MONTELUKAST SODIUM 10 MG: 10 TABLET, FILM COATED ORAL at 20:14

## 2018-02-04 RX ADMIN — NITROGLYCERIN 1 PATCH: 0.2 PATCH TRANSDERMAL at 08:11

## 2018-02-04 RX ADMIN — FERROUS SULFATE TAB EC 324 MG (65 MG FE EQUIVALENT) 324 MG: 324 (65 FE) TABLET DELAYED RESPONSE at 08:12

## 2018-02-04 RX ADMIN — LEVOTHYROXINE SODIUM 50 MCG: 50 TABLET ORAL at 06:10

## 2018-02-04 RX ADMIN — BUDESONIDE AND FORMOTEROL FUMARATE DIHYDRATE 2 PUFF: 160; 4.5 AEROSOL RESPIRATORY (INHALATION) at 06:29

## 2018-02-04 RX ADMIN — ESTRADIOL 1 G: 0.1 CREAM VAGINAL at 20:13

## 2018-02-04 RX ADMIN — FAMOTIDINE 40 MG: 40 TABLET ORAL at 08:12

## 2018-02-04 RX ADMIN — Medication 1 TABLET: at 08:11

## 2018-02-04 RX ADMIN — RANOLAZINE 1000 MG: 500 TABLET, FILM COATED, EXTENDED RELEASE ORAL at 20:14

## 2018-02-04 RX ADMIN — RANOLAZINE 1000 MG: 500 TABLET, FILM COATED, EXTENDED RELEASE ORAL at 08:11

## 2018-02-04 RX ADMIN — WARFARIN SODIUM 3 MG: 3 TABLET ORAL at 17:07

## 2018-02-04 RX ADMIN — CARVEDILOL 6.25 MG: 6.25 TABLET, FILM COATED ORAL at 08:12

## 2018-02-04 RX ADMIN — ATORVASTATIN CALCIUM 20 MG: 20 TABLET, FILM COATED ORAL at 08:12

## 2018-02-04 RX ADMIN — FERROUS SULFATE TAB EC 324 MG (65 MG FE EQUIVALENT) 324 MG: 324 (65 FE) TABLET DELAYED RESPONSE at 17:07

## 2018-02-04 RX ADMIN — IPRATROPIUM BROMIDE AND ALBUTEROL SULFATE 3 ML: 2.5; .5 SOLUTION RESPIRATORY (INHALATION) at 06:28

## 2018-02-04 RX ADMIN — ACETAMINOPHEN 650 MG: 325 TABLET ORAL at 03:13

## 2018-02-04 RX ADMIN — SENNOSIDES AND DOCUSATE SODIUM 2 TABLET: 8.6; 5 TABLET ORAL at 20:13

## 2018-02-04 RX ADMIN — CARVEDILOL 6.25 MG: 6.25 TABLET, FILM COATED ORAL at 17:07

## 2018-02-04 RX ADMIN — FUROSEMIDE 20 MG: 20 TABLET ORAL at 08:11

## 2018-02-04 RX ADMIN — ONDANSETRON 4 MG: 4 TABLET, ORALLY DISINTEGRATING ORAL at 14:18

## 2018-02-04 RX ADMIN — ACETAMINOPHEN 650 MG: 325 TABLET ORAL at 21:55

## 2018-02-04 RX ADMIN — IPRATROPIUM BROMIDE AND ALBUTEROL SULFATE 3 ML: 2.5; .5 SOLUTION RESPIRATORY (INHALATION) at 11:40

## 2018-02-04 RX ADMIN — IPRATROPIUM BROMIDE AND ALBUTEROL SULFATE 3 ML: 2.5; .5 SOLUTION RESPIRATORY (INHALATION) at 16:45

## 2018-02-04 NOTE — PROGRESS NOTES
"Anticoagulation by Pharmacy - Warfarin Day 13 of 42    Val Arteaga is a 85 y.o.female  [Ht: 172.7 cm (67.99\"); Wt: 75.8 kg (167 lb)] on Warfarin 3 mg PO  for indication of VTE prophylaxis s/p ortho procedure.    Goal INR: 1.7-2.5  Today's INR:   Lab Results   Component Value Date    INR 2.01 (H) 02/04/2018         Lab Results   Component Value Date    INR 2.01 (H) 02/04/2018    INR 2.16 (H) 02/03/2018    INR 2.03 (H) 02/02/2018    PROTIME 22.9 (H) 02/04/2018    PROTIME 24.3 (H) 02/03/2018    PROTIME 23.1 (H) 02/02/2018     Lab Results   Component Value Date    HGB 9.5 (L) 02/03/2018    HGB 8.9 (L) 02/02/2018    HGB 9.0 (L) 01/31/2018     Lab Results   Component Value Date    HCT 28.4 (L) 02/03/2018    HCT 26.7 (L) 02/02/2018    HCT 26.9 (L) 01/31/2018     Lab Results   Component Value Date     02/03/2018     02/02/2018     01/31/2018     Assessment/Plan:  INR 2.01, down slightly from yesterday but remains in range of 1.7-2.5. Will continue 3mg daily.    Sandoval Ocampo, [facility]  02/04/18 11:53 AM     "

## 2018-02-04 NOTE — PROGRESS NOTES
[facility] Medicine Services  INPATIENT PROGRESS NOTE    Length of Stay: 9  Date of Admission: 1/26/2018  Primary Care Physician: Roula Albert MD    Subjective   Chief Complaint: hip pain s/p fall   HPI:  85 year old  female admitted to the ARU for further rehab after suffering a closed displaced basicervical fracture of the right femur.  She requests nausea medication during today's visit as she reports intermittent nausea.     Review of Systems   Constitutional: Negative for fatigue.   Respiratory: Negative for shortness of breath and wheezing.    Cardiovascular: Negative for chest pain and palpitations.   Gastrointestinal: Negative for abdominal pain.   Musculoskeletal: Positive for back pain.   Neurological: Negative for dizziness and weakness.   Psychiatric/Behavioral: Negative for confusion.        All pertinent negatives and positives are as above. All other systems have been reviewed and are negative unless otherwise stated.     Objective    Temp:  [97.2 °F (36.2 °C)-97.7 °F (36.5 °C)] 97.2 °F (36.2 °C)  Heart Rate:  [74-80] 80  Resp:  [18-20] 18  BP: (129-141)/(58-65) 141/65    Physical Exam   Constitutional: She is oriented to person, place, and time. She appears well-developed and well-nourished.   HENT:   Head: Normocephalic.   Eyes: Conjunctivae are normal.   Neck: Neck supple.   Cardiovascular: Normal rate, normal heart sounds and intact distal pulses.    Pulmonary/Chest: Effort normal and breath sounds normal.   Abdominal: Soft. Bowel sounds are normal.   Musculoskeletal: Normal range of motion. She exhibits no edema.   Neurological: She is alert and oriented to person, place, and time.   Skin: Skin is warm and dry.   Psychiatric: She has a normal mood and affect. Her behavior is normal.   Vitals reviewed.          Results Review:  I have reviewed the labs, radiology results, and diagnostic studies.    Laboratory Data:     Results from last 7  days  Lab Units 02/03/18  0550 02/02/18  0457 01/31/18  0516   SODIUM mmol/L 135* 137 132*   POTASSIUM mmol/L 4.5 5.2* 4.3   CHLORIDE mmol/L 96 97 92*   CO2 mmol/L 33.0* 35.0* 32.0*   BUN mg/dL 26* 32* 26*   CREATININE mg/dL 0.77 1.04* 0.91   GLUCOSE mg/dL 88 92 85   CALCIUM mg/dL 8.5 8.9 8.5   ANION GAP mmol/L 6.0 5.0 8.0     Estimated Creatinine Clearance: 61.5 mL/min (by C-G formula based on Cr of 0.77).    Results from last 7 days  Lab Units 02/03/18  0550 02/02/18  0457 01/31/18  0516   MAGNESIUM mg/dL  --   --  2.1   PHOSPHORUS mg/dL 3.2 4.3 3.7           Results from last 7 days  Lab Units 02/03/18  0550 02/02/18  0457 01/31/18  0516 01/29/18  0506   WBC 10*3/mm3 10.79* 8.61 7.61 7.17   HEMOGLOBIN g/dL 9.5* 8.9* 9.0* 8.9*   HEMATOCRIT % 28.4* 26.7* 26.9* 26.7*   PLATELETS 10*3/mm3 416 390 307 256       Results from last 7 days  Lab Units 02/04/18  0519 02/03/18  0550 02/02/18  0457 02/01/18  0501 01/31/18  0516   INR  2.01* 2.16* 2.03* 2.03* 1.81*       Culture Data:   No results found for: BLOODCX  No results found for: URINECX  No results found for: RESPCX  No results found for: WOUNDCX  No results found for: STOOLCX  No components found for: BODYFLD    Radiology Data:   Imaging Results (last 24 hours)     ** No results found for the last 24 hours. **          I have reviewed the patient current medications.     Assessment/Plan     Hospital Problem List     * (Principal)Closed displaced basicervical fracture of right femur    Overview Signed 1/30/2018 11:13 AM by Shawn Maldonado MD     Bipolar prosthesis anterior approach per Dr Mcdowell on 1/23/18         Essential hypertension    Coronary artery disease involving native coronary artery of native heart without angina pectoris    Acquired hypothyroidism    Depressive disorder    Pulmonary emphysema    Overview Signed 1/23/2018  6:20 AM by Kelvin Mcdowell MD     Added automatically from request for surgery [id number]         Chronic systolic congestive  heart failure    Hyponatremia    BENITO (acute kidney injury)    Urinary retention          -White removed yesterday per urology.  Patient unable to completely empty bladder related to urethral lesion.  Intermittent cath used.  Plan for BID intermittent cath at nursing home and outpatient one week follow up with urology.  -Monitor CBC, BMP.   -Labs and vital signs stable.  -Continue Pt/Ot  -INR therapeutic.  Pharmacy dosing          This document has been electronically signed by CRESENCIO Winters on February 4, 2018 12:36 PM

## 2018-02-04 NOTE — PROGRESS NOTES
"   LOS: 9 days   Patient Care Team:  Roula Albert MD as PCP - General  Roula Albert MD as PCP - Claims Attributed    Subjective     Subjective:  Symptoms:  Stable.  (Retaining urine, no dysuria, no fever. ).    Diet:  No nausea or vomiting.    Pain:  She reports no pain.        History taken from: patient chart family RN    Objective     Vital Signs  Temp:  [97.2 °F (36.2 °C)-97.7 °F (36.5 °C)] 97.2 °F (36.2 °C)  Heart Rate:  [74-80] 80  Resp:  [18-20] 18  BP: (129-141)/(58-65) 141/65    Objective:  General Appearance:  In no acute distress.    Vital signs: (most recent): Blood pressure 141/65, pulse 80, temperature 97.2 °F (36.2 °C), temperature source Tympanic, resp. rate 18, height 172.7 cm (67.99\"), weight 75.8 kg (167 lb), SpO2 97 %.  No fever.    Output: Producing urine (retention).    HEENT: Normal HEENT exam.    Lungs:  Normal respiratory rate and normal effort.  Breath sounds clear to auscultation.    Heart: Normal rate.  Regular rhythm.  S1 normal and S2 normal.  No murmur.   Chest: Symmetric chest wall expansion.   Abdomen: Abdomen is soft and non-distended.  Bowel sounds are normal.   There is no abdominal tenderness.     Extremities: Decreased range of motion.  There is no dependent edema.    Pulses: Distal pulses are intact.    Neurological: Patient is alert and oriented to person, place and time.  GCS score is 15.    Pupils:  Pupils are equal, round, and reactive to light.    Skin:  Warm, dry and pale.  No rash, ecchymosis or cyanosis.             Results Review:    Lab Results (last 24 hours)     Procedure Component Value Units Date/Time    Protime-INR [[id number]]  (Abnormal) Collected:  02/04/18 0519    Specimen:  Blood Updated:  02/04/18 0628     Protime 22.9 (H) Seconds      INR 2.01 (H)    Narrative:       Therapeutic range for most indications is 2.0-3.0 INR,  or 2.5-3.5 for mechanical heart valves.         Imaging Results (last 24 hours)     ** No results found for the last 24 hours. ** "           I reviewed the patient's new clinical results.  I reviewed the patient's new imaging results and agree with the interpretation.  I reviewed the patient's other test results and agree with the interpretation      Assessment/Plan     Principal Problem:    Closed displaced basicervical fracture of right femur  Active Problems:    Essential hypertension    Coronary artery disease involving native coronary artery of native heart without angina pectoris    Acquired hypothyroidism    Depressive disorder    Pulmonary emphysema    Chronic systolic congestive heart failure    Hyponatremia    BENITO (acute kidney injury)    Urinary retention      Assessment:  (Urinary retention due to distal urethral lesion no change in size, Estrace being administered.     White anchored 1/29/18, removed 2/3/18 with Ms. Arteaga having voided twice with retention of urine present and requiring intermittent catheterization. ).     Plan:   (Plan for intermittent cath BID at nursing home, follow up with Dr. Chandler next week for cystoscopy and excision of urethral lesion. ).       CRESENCIO Edwards  02/04/18  11:59 AM

## 2018-02-04 NOTE — PLAN OF CARE
Problem: Patient Care Overview (Adult)  Goal: Plan of Care Review  Outcome: Ongoing (interventions implemented as appropriate)   02/04/18 0439   Coping/Psychosocial Response Interventions   Plan Of Care Reviewed With patient   Outcome Evaluation   Outcome Summary/Follow up Plan Catheter was removed yesterday, has voided 2 time on her own. Complained of headache, provided tylenol with good results. Discharge date set for 2/5 to [facility] for continued rehad. Patient is still a little apprehensive   Patient Care Overview   Progress improving       Problem: Fractured Hip (Adult)  Goal: Signs and Symptoms of Listed Potential Problems Will be Absent or Manageable (Fractured Hip)  Outcome: Ongoing (interventions implemented as appropriate)      Problem: Fall Risk (Adult)  Goal: Absence of Falls  Outcome: Ongoing (interventions implemented as appropriate)      Problem: Functional Deficit (Adult,Obstetrics,Pediatric)  Goal: Signs and Symptoms of Listed Potential Problems Will be Absent or Manageable (Functional Deficit)  Outcome: Ongoing (interventions implemented as appropriate)

## 2018-02-04 NOTE — PLAN OF CARE
Problem: Patient Care Overview (Adult)  Goal: Plan of Care Review  Outcome: Ongoing (interventions implemented as appropriate)   02/04/18 0435 02/04/18 1237   Coping/Psychosocial Response Interventions   Plan Of Care Reviewed With --  patient   Patient Care Overview   Progress improving --      Goal: Adult Individualization and Mutuality  Outcome: Ongoing (interventions implemented as appropriate)    Goal: Discharge Needs Assessment  Outcome: Ongoing (interventions implemented as appropriate)      Problem: Fractured Hip (Adult)  Goal: Signs and Symptoms of Listed Potential Problems Will be Absent or Manageable (Fractured Hip)  Outcome: Ongoing (interventions implemented as appropriate)      Problem: Fall Risk (Adult)  Goal: Absence of Falls  Outcome: Ongoing (interventions implemented as appropriate)      Problem: Functional Deficit (Adult,Obstetrics,Pediatric)  Goal: Signs and Symptoms of Listed Potential Problems Will be Absent or Manageable (Functional Deficit)  Outcome: Ongoing (interventions implemented as appropriate)

## 2018-02-05 ENCOUNTER — ANTICOAGULATION VISIT (OUTPATIENT)
Dept: PHARMACY | Facility: HOSPITAL | Age: 83
End: 2018-02-05

## 2018-02-05 VITALS
HEART RATE: 83 BPM | HEIGHT: 68 IN | BODY MASS INDEX: 26.46 KG/M2 | WEIGHT: 174.6 LBS | DIASTOLIC BLOOD PRESSURE: 64 MMHG | RESPIRATION RATE: 20 BRPM | SYSTOLIC BLOOD PRESSURE: 144 MMHG | TEMPERATURE: 98.4 F | OXYGEN SATURATION: 95 %

## 2018-02-05 PROBLEM — N36.2 URETHRAL CARUNCLE: Status: ACTIVE | Noted: 2018-02-05

## 2018-02-05 LAB
INR PPP: 2.1 (ref 0.8–1.2)
PROTHROMBIN TIME: 23.7 SECONDS (ref 11.1–15.3)

## 2018-02-05 PROCEDURE — 94799 UNLISTED PULMONARY SVC/PX: CPT

## 2018-02-05 PROCEDURE — 97116 GAIT TRAINING THERAPY: CPT

## 2018-02-05 PROCEDURE — 97535 SELF CARE MNGMENT TRAINING: CPT

## 2018-02-05 PROCEDURE — 97110 THERAPEUTIC EXERCISES: CPT

## 2018-02-05 PROCEDURE — 99238 HOSP IP/OBS DSCHRG MGMT 30/<: CPT | Performed by: FAMILY MEDICINE

## 2018-02-05 PROCEDURE — 85610 PROTHROMBIN TIME: CPT | Performed by: FAMILY MEDICINE

## 2018-02-05 PROCEDURE — 92507 TX SP LANG VOICE COMM INDIV: CPT | Performed by: SPEECH-LANGUAGE PATHOLOGIST

## 2018-02-05 PROCEDURE — 94760 N-INVAS EAR/PLS OXIMETRY 1: CPT

## 2018-02-05 PROCEDURE — 97530 THERAPEUTIC ACTIVITIES: CPT

## 2018-02-05 RX ORDER — ACETAMINOPHEN 325 MG/1
650 TABLET ORAL EVERY 4 HOURS PRN
Qty: 1 TABLET | Refills: 1
Start: 2018-02-05

## 2018-02-05 RX ORDER — ATORVASTATIN CALCIUM 20 MG/1
20 TABLET, FILM COATED ORAL DAILY
Qty: 1 TABLET | Refills: 1
Start: 2018-02-06 | End: 2018-03-16 | Stop reason: SDUPTHER

## 2018-02-05 RX ORDER — ESTRADIOL 0.1 MG/G
CREAM VAGINAL
Qty: 45 G | Refills: 1
Start: 2018-02-05 | End: 2018-03-30 | Stop reason: SDUPTHER

## 2018-02-05 RX ORDER — WARFARIN SODIUM 3 MG/1
TABLET ORAL
Qty: 1 TABLET | Refills: 1
Start: 2018-02-05 | End: 2018-02-14 | Stop reason: HOSPADM

## 2018-02-05 RX ORDER — FAMOTIDINE 40 MG/1
40 TABLET, FILM COATED ORAL DAILY
Qty: 1 TABLET | Refills: 1
Start: 2018-02-06 | End: 2018-03-30 | Stop reason: SDUPTHER

## 2018-02-05 RX ORDER — HYDROCODONE BITARTRATE AND ACETAMINOPHEN 5; 325 MG/1; MG/1
1 TABLET ORAL EVERY 6 HOURS PRN
Qty: 50 TABLET | Refills: 0 | Status: ON HOLD | OUTPATIENT
Start: 2018-02-05 | End: 2018-02-14

## 2018-02-05 RX ORDER — FUROSEMIDE 20 MG/1
20 TABLET ORAL DAILY
Qty: 1 TABLET | Refills: 1
Start: 2018-02-06 | End: 2018-03-16

## 2018-02-05 RX ORDER — FERROUS SULFATE TAB EC 324 MG (65 MG FE EQUIVALENT) 324 (65 FE) MG
324 TABLET DELAYED RESPONSE ORAL
Qty: 1 TABLET | Refills: 1
Start: 2018-02-05 | End: 2019-06-27

## 2018-02-05 RX ADMIN — LEVOTHYROXINE SODIUM 50 MCG: 50 TABLET ORAL at 05:48

## 2018-02-05 RX ADMIN — ATORVASTATIN CALCIUM 20 MG: 20 TABLET, FILM COATED ORAL at 08:03

## 2018-02-05 RX ADMIN — RANOLAZINE 1000 MG: 500 TABLET, FILM COATED, EXTENDED RELEASE ORAL at 08:03

## 2018-02-05 RX ADMIN — Medication 1 TABLET: at 08:03

## 2018-02-05 RX ADMIN — IPRATROPIUM BROMIDE AND ALBUTEROL SULFATE 3 ML: 2.5; .5 SOLUTION RESPIRATORY (INHALATION) at 05:10

## 2018-02-05 RX ADMIN — IPRATROPIUM BROMIDE AND ALBUTEROL SULFATE 3 ML: 2.5; .5 SOLUTION RESPIRATORY (INHALATION) at 11:13

## 2018-02-05 RX ADMIN — CARVEDILOL 6.25 MG: 6.25 TABLET, FILM COATED ORAL at 08:03

## 2018-02-05 RX ADMIN — FUROSEMIDE 20 MG: 20 TABLET ORAL at 08:03

## 2018-02-05 RX ADMIN — BUDESONIDE AND FORMOTEROL FUMARATE DIHYDRATE 2 PUFF: 160; 4.5 AEROSOL RESPIRATORY (INHALATION) at 05:10

## 2018-02-05 RX ADMIN — FERROUS SULFATE TAB EC 324 MG (65 MG FE EQUIVALENT) 324 MG: 324 (65 FE) TABLET DELAYED RESPONSE at 08:02

## 2018-02-05 RX ADMIN — NITROGLYCERIN 1 PATCH: 0.2 PATCH TRANSDERMAL at 08:03

## 2018-02-05 RX ADMIN — SERTRALINE HYDROCHLORIDE 50 MG: 50 TABLET ORAL at 08:04

## 2018-02-05 RX ADMIN — FAMOTIDINE 40 MG: 40 TABLET ORAL at 08:03

## 2018-02-05 RX ADMIN — SENNOSIDES AND DOCUSATE SODIUM 2 TABLET: 8.6; 5 TABLET ORAL at 08:03

## 2018-02-05 NOTE — DISCHARGE INSTR - APPOINTMENTS
Feb 12, 2018  12:45pm  Follow-up visit with Kelvin Chandler MD  [facility]

## 2018-02-05 NOTE — PLAN OF CARE
Problem: Patient Care Overview (Adult)  Goal: Plan of Care Review  Outcome: Ongoing (interventions implemented as appropriate)   02/05/18 1125   Coping/Psychosocial Response Interventions   Plan Of Care Reviewed With patient   Outcome Evaluation   Outcome Summary/Follow up Plan Pt participated well in all therapy tasks this date. Pt is scheduled to d/c to snf this date. SLP discussed d/c recommendations for continued cognitive therapy to work towards returning home safely.   Patient Care Overview   Progress improving       Problem: Inpatient SLP  Goal: Cognitive-linguistic-Patient will improve Cognitive-linguistic skills to allow optimal participation in care  Outcome: Unable to achieve outcome(s) by discharge Date Met: 02/05/18 01/27/18 1347 02/05/18 1125   Cognitive Linguistic- Optimal Participation in Care   Cognitive Linguistic Optimal Participation in Care- SLP, Date Established 01/27/18 --    Cognitive Linguistic Optimal Participation in Care- SLP, Time to Achieve by discharge --    Cognitive Linguistic Optimal Participation in Care- SLP, Activity Level Patient will improve memory skills for increased safety in environment;Patient will improve functional problem solving skills for increased safety in environment;Patient will improve Executive functioning skills for increased safety in environment --    Cognitive Linguistic Optimal Participation in Care- SLP, Date Goal Reviewed --  02/05/18   Cognitive Linguistic Optimal Participation in Care- SLP, Outcome --  goal not met   Cognitive Linguistic Optimal Participation in Care- SLP, Reason Goal Not Met --  discharged from facility

## 2018-02-05 NOTE — DISCHARGE SUMMARY
[address] [address]  T - [phone]     Rehabilitation Discharge Summary       BRIEF OVERVIEW   PATIENT NAME: Val Arteaga                      PHYSICIAN: Shawn Maldonado MD  : [id number]  MRN: [id number]    ADMISSION/DISCHARGE INFO   Admitting Provider: Shawn Maldonado MD  Discharge Provider: Shawn Maldonado MD  ADMISSION DATE: 2018   DISCHARGE DATE:  2018    ADMISSION DIAGNOSES: Fracture, hip, right, closed, with routine healing, subsequent encounter [S72.001D]  DISCHARGE DIAGNOSES: Principal Problem:    Closed displaced basicervical fracture of right femur  Active Problems:    Essential hypertension    Coronary artery disease involving native coronary artery of native heart without angina pectoris    Pulmonary emphysema    Chronic systolic congestive heart failure    Hyponatremia    BENITO (acute kidney injury)    Urinary retention    Acquired hypothyroidism    Depressive disorder    Urethral caruncle      Primary Care Physician at Discharge: Roula Albert -[phone]   She will be followed by Dr. 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   • Pseudophakia Z96.1   • Precordial pain R07.2   • Displaced fracture of base of neck of right femur, initial encounter for closed fracture S72.041A   • Hip fracture, right, closed, initial encounter S72.001A   • Pulmonary emphysema J43.9   • Chronic systolic congestive heart failure I50.22   • Hyponatremia E87.1   • BENITO (acute kidney injury) N17.9   • Acute blood loss anemia D62   • Urinary  retention R33.9   • Closed displaced basicervical fracture of right femur S72.041A   • Urethral caruncle N36.2       Discharge Disposition  [facility]       Code Status at Discharge: Conditional code       CONSULTS   Consult Orders (all)     Start     Ordered    02/01/18 1031  Inpatient Consult to Case Management   Once     Provider:  (Not yet assigned)    02/01/18 1035    01/28/18 1156  Inpatient Consult to Urology  Once     Specialty:  Urology  Provider:  Kelvin Chandler MD    01/28/18 1155    01/26/18 1413  Inpatient Consult to Case Management   Once     Provider:  (Not yet assigned)    01/26/18 1413    01/26/18 1404  Inpatient Consult to Orthopedic Surgery  Once     Specialty:  Orthopedic Surgery  Provider:  Kelvin Mcdowell MD    01/26/18 1403    01/26/18 1353  Inpatient consult to Nutrition  Once     Provider:  (Not yet assigned)    01/26/18 1352          DETAILS OF HOSPITAL STAY    Functional Status:                ADMIT             Discharge   Eating                                          6                        5   Grooming                                    5                        6  Bathing                                         0                        4  Dressing upper body                    0                        5  Dressing lower body                     0                        4  Toileting                                        5                        5  Bladder Management                   4                        1  Bowel Management                     6                        6  Transfers:bed chair wheelchair    4                        4  Transfers: toilet                            3                        4  Transfers: tub/shower                  0                        1  Locomotion: walking                    1                        2  Locomotion: Wheelchair              2                        6  Locomotion:  stairs                       0                         2  Comprehension                           6                         6  Expression                                   6                         6  Social interaction                         5                        6  Problem solving                           5                        5  Memory                                        5                        5                                          Rehabilitation Course  she was admitted to the acute rehabilitation unit after hip fracture and surgery.  She participated well with therapy making good progress.  During her stay her medical problems were followed.  Her O2 sats were stable on oxygen supplementation which she was on prior to admission.  A followed her cardiac status and electrolytes.  IV furosemide was adjusted to 20 mg a day.  BUN and creatinine were fairly stable.  Hemoglobin was decreased as postop and was continued on iron and vitamins.    Urology did follow her during her stay on acute rehabilitation.  She was found to have a urethral caruncle and White catheter was placed because of urinary retention.  Prior to her discharge the  urinary catheter was removed and she has been voiding.  Treatment for the caruncle continue and she'll have follow-up with urology within the next week probable removal of the caruncle.    She was anticoagulated with Coumadin because of her hip fracture and risk for DVTs    She continued to make progress with therapy.  3 days prior to admission she and her family felt that it would be in her best interest not to go home by herself.  They requested short-term nursing home placement.  Patient was agreeable to this and this was her request as well.  There requested  [facility] skilled nursing facility  and arrangements were made.    When I had initially spoken with the patient before her admission her plan was to go to her home and family was going to stay with her.   During her stay on acute rehabilitation family decided that was not an option.      Heart Rate: 88  Resp: 18  BP: 144/64  Temp: 98.4 °F (36.9 °C)   Weight: 79.2 kg (174 lb 9.7 oz)    Pertinent Test Results:    Lab Results (last 72 hours)     Procedure Component Value Units Date/Time    CBC (No Diff) [[id number]]  (Abnormal) Collected:  02/03/18 0550    Specimen:  Blood Updated:  02/03/18 0612     WBC 10.79 (H) 10*3/mm3      RBC 2.97 (L) 10*6/mm3      Hemoglobin 9.5 (L) g/dL      Hematocrit 28.4 (L) %      MCV 95.6 fL      MCH 32.0 pg      MCHC 33.5 g/dL      RDW 14.6 (H) %      RDW-SD 50.6 (H) fl      MPV 9.0 fL      Platelets 416 10*3/mm3     Renal Function Panel [[id number]]  (Abnormal) Collected:  02/03/18 0550    Specimen:  Blood Updated:  02/03/18 0628     Glucose 88 mg/dL      BUN 26 (H) mg/dL      Creatinine 0.77 mg/dL      Sodium 135 (L) mmol/L      Potassium 4.5 mmol/L      Chloride 96 mmol/L      CO2 33.0 (H) mmol/L      Calcium 8.5 mg/dL      Albumin 2.70 (L) g/dL      Phosphorus 3.2 mg/dL      Anion Gap 6.0 mmol/L      BUN/Creatinine Ratio 33.8 (H)     eGFR Non African Amer 71 mL/min/1.73     Narrative:       The MDRD GFR formula is only valid for adults with stable renal function between ages 18 and 70.    Protime-INR [[id number]]  (Abnormal) Collected:  02/03/18 0550    Specimen:  Blood Updated:  02/03/18 0737     Protime 24.3 (H) Seconds      INR 2.16 (H)    Narrative:       Therapeutic range for most indications is 2.0-3.0 INR,  or 2.5-3.5 for mechanical heart valves.    Protime-INR [[id number]]  (Abnormal) Collected:  02/04/18 0519    Specimen:  Blood Updated:  02/04/18 0628     Protime 22.9 (H) Seconds      INR 2.01 (H)    Narrative:       Therapeutic range for most indications is 2.0-3.0 INR,  or 2.5-3.5 for mechanical heart valves.    Protime-INR [[id number]]  (Abnormal) Collected:  02/05/18 0456    Specimen:  Blood Updated:  02/05/18 0620     Protime 23.7 (H) Seconds      INR 2.10 (H)    Narrative:        Therapeutic range for most indications is 2.0-3.0 INR,  or 2.5-3.5 for mechanical heart valves.        Imaging Results (all)     None          Presenting Problem/History of Present Illness   Val Arteaga is a 85 y.o. female who fell at home and sustained a subcapital fracture of the right hip at the base of the femoral head.  She underwent open reduction internal fixation by Dr. Mitchel Mcdowell on January 23.  Bipolar prosthesis by anterior approach was placed.  Postoperatively she was monitored closely because of her history of heart failure and COPD.  She was on oxygen supplementation at the time of admission to the hospital.   During her stay she had urinary retention postoperatively.  She had in and out catheterization and subsequently did require a White catheter.  Urology did follow her during her stay in the acute hospitalization and in the rehabilitation stay   She did participate with therapy and we were asked to admit her to the acute rehabilitation unit for intensive therapy and close medical follow-up      Physical Exam at Discharge      She is resting comfortably she is on oxygen supplementation.  Pain is well-controlled  Lungs are clear and there is no rales rhonchi or wheezes  Heart rate is regular without murmurs gallops or rubs  Abdomen soft nontender good bowel sounds.  Rebound guarding or rigidity.  1+ edema right lower extremity trace on the left lower extremity.  Anterior approach right femur healing well with subcuticular stitches present and Steri-Strips              DISPOSITION   She will be going to [facility] skilled nursing facility for short-term stay     DISCHARGE MEDICATIONS        Your medication list      START taking these medications       Instructions Last Dose Given Next Dose Due    acetaminophen 325 MG tablet   Commonly known as:  TYLENOL        Take 2 tablets by mouth Every 4 (Four) Hours As Needed for Mild Pain .         atorvastatin 20 MG tablet   Commonly known as:   LIPITOR   Start taking on:  2/6/2018   Replaces:  simvastatin 40 MG tablet        Take 1 tablet by mouth Daily.         estradiol 0.1 MG/GM vaginal cream   Commonly known as:  ESTRACE        One applicator q hs         famotidine 40 MG tablet   Commonly known as:  PEPCID   Start taking on:  2/6/2018        Take 1 tablet by mouth Daily.         ferrous sulfate 324 (65 Fe) MG tablet delayed-release EC tablet        Take 1 tablet by mouth Daily With Breakfast.         folic acid-vit B6-vit B12 2.5-25-1 MG tablet tablet   Commonly known as:  FOLBEE   Start taking on:  2/6/2018        Take 1 tablet by mouth Daily.         HYDROcodone-acetaminophen 5-325 MG per tablet   Commonly known as:  NORCO        Take 1 tablet by mouth Every 6 (Six) Hours As Needed for Moderate Pain  for up to 7 days.         PHARMACY TO DOSE WARFARIN        Continuous As Needed (thru 3/6/18).         warfarin 3 MG tablet   Commonly known as:  COUMADIN        One daily thru 3/6/18           CHANGE how you take these medications       Instructions Last Dose Given Next Dose Due    fluorometholone 0.1 % ophthalmic suspension   Commonly known as:  FLAREX   What changed:    - when to take this  - reasons to take this        Administer 1 drop to both eyes 4 (Four) Times a Day.         furosemide 20 MG tablet   Commonly known as:  LASIX   Start taking on:  2/6/2018   What changed:    - medication strength  - how much to take  - when to take this        Take 1 tablet by mouth Daily.         sertraline 50 MG tablet   Commonly known as:  ZOLOFT   What changed:  See the new instructions.        TAKE 1 TABLET BY MOUTH EVERY DAY           CONTINUE taking these medications       Instructions Last Dose Given Next Dose Due    aspirin 81 MG EC tablet              budesonide-formoterol 160-4.5 MCG/ACT inhaler   Commonly known as:  SYMBICORT        Inhale 2 puffs 2 (Two) Times a Day.         carvedilol 6.25 MG tablet   Commonly known as:  COREG               ipratropium-albuterol 0.5-2.5 mg/mL nebulizer   Commonly known as:  DUO-NEB        USE 1 BY NEBULIZER 4 (FOUR) TIMES A DAY AS NEEDED FOR WHEEZING.         levothyroxine 50 MCG tablet   Commonly known as:  SYNTHROID, LEVOTHROID        TAKE 1 TABLET BY MOUTH EVERY DAY         montelukast 10 MG tablet   Commonly known as:  SINGULAIR              nitroglycerin 0.2 MG/HR patch   Commonly known as:  NITRO-DUR        Place 1 patch on the skin Daily.         polyethylene glycol packet   Commonly known as:  MIRALAX        Take 17 g by mouth Daily.         ranolazine 1000 MG 12 hr tablet   Commonly known as:  RANEXA        Take 1 tablet by mouth Every 12 (Twelve) Hours.           STOP taking these medications          clopidogrel 75 MG tablet   Commonly known as:  PLAVIX           coenzyme Q10 100 MG capsule           guaiFENesin 600 MG 12 hr tablet   Commonly known as:  MUCINEX           losartan 25 MG tablet   Commonly known as:  COZAAR           simvastatin 40 MG tablet   Commonly known as:  ZOCOR   Replaced by:  atorvastatin 20 MG tablet                Where to Get Your Medications      You can get these medications from any pharmacy     Bring a paper prescription for each of these medications    • HYDROcodone-acetaminophen 5-325 MG per tablet         Information about where to get these medications is not yet available     ! Ask your nurse or doctor about these medications    • acetaminophen 325 MG tablet   • atorvastatin 20 MG tablet   • estradiol 0.1 MG/GM vaginal cream   • famotidine 40 MG tablet   • ferrous sulfate 324 (65 Fe) MG tablet delayed-release EC tablet   • folic acid-vit B6-vit B12 2.5-25-1 MG tablet tablet   • furosemide 20 MG tablet   • PHARMACY TO DOSE WARFARIN   • warfarin 3 MG tablet             INSTRUCTIONS   Activity: Weightbearing as tolerated    Diet: Cardiac diet 2000 cc fluid restriction    Special Instructions:   No equipment needed at discharge she will be on oxygen as she was at  admission    FOLLOW UP   Additional Instructions for the Follow-ups that You Need to Schedule     Ambulatory Referral to Physical Therapy Evaluate and treat    As directed    Specialty modality needed?:  Evaluate and treat           Discharge Follow-up with Specified Provider: dr loving; 1 Week    As directed    To:  dr loving    Follow Up:  1 Week    Follow Up Details:  follow up urethral caruncle           Discharge Follow-up with Specified Provider: pharmacy    As directed    To:  pharmacy    Follow Up Details:  twice weekly INR until coumadin stopped March 6           Referral to Occupational Therapy    As directed              Follow-up Information     Follow up with [facility] .    Specialties:  Skilled Nursing Facility, Intermediate Care Facility    Contact information:    [address] [facility] [address] [phone]        Follow up with Roula Albert MD .    Specialty:  Internal Medicine    Contact information:    [address]   [place] [address] [phone]          Follow up with Kelvin Mcdowell MD Follow up in 3 week(s).    Specialty:  Orthopedic Surgery    Contact information:    [address]  [facility] [address] [phone]          Future Appointments  Date Time Provider Department Center   2/15/2018 10:30 AM MD RIMA [facility] None   2/16/2018 10:10 AM MD RIMA [place] [facility] None   2/22/2018 9:00 AM MD RIMA [facility] None                  Shawn Maldonado MD  02/05/18  9:38 AM

## 2018-02-05 NOTE — PLAN OF CARE
Problem: Patient Care Overview (Adult)  Goal: Plan of Care Review  Outcome: Unable to achieve outcome(s) by discharge Date Met: 02/05/18 02/05/18 1400   Coping/Psychosocial Response Interventions   Plan Of Care Reviewed With patient   Outcome Evaluation   Outcome Summary/Follow up Plan pt made good progress with OT although difficulty with memory at times and retaining infor on sfaety and ed with adls to increase perf. Pt cont MCCABE -min for most all tasks and tf for incresaed safety pt to dc to snf this date for cont OT PT   Patient Care Overview   Progress progress towards functional goals is fair       Problem: Inpatient Occupational Therapy  Goal: Bed Mobility Goal STG- OT  Outcome: Outcome(s) achieved Date Met: 02/05/18 01/26/18 1630 02/05/18 1400   Bed Mobility OT STG   Bed Mobility OT STG, Date Established 01/26/18 --    Bed Mobility OT STG, Time to Achieve by discharge --    Bed Mobility OT STG, Activity Type all bed mobility --    Bed Mobility OT STG, [place] Level conditional independence --    Bed Mobility OT STG, Additional Goal with VSS --    Bed Mobility OT STG, Date Goal Reviewed --  02/05/18   Bed Mobility OT STG, Outcome --  goal met     Goal: Transfer Training Goal 1 LTG- OT  Outcome: Unable to achieve outcome(s) by discharge Date Met: 02/05/18 01/26/18 1630 02/05/18 1400   Transfer Training OT LTG   Transfer Training OT LTG, Date Established 01/26/18 --    Transfer Training OT LTG, Time to Achieve by discharge --    Transfer Training OT LTG, Activity Type all transfers --    Transfer Training OT LTG, [place] Level supervision required --    Transfer Training OT LTG, Assist Device walker, rolling --    Transfer Training OT LTG, Additional Goal with VSS --    Transfer Training OT LTG, Date Goal Reviewed --  02/05/18   Transfer Training OT LTG, Outcome --  goal partially met     Goal: Patient Education Goal LTG- OT  Outcome: Unable to achieve outcome(s) by discharge Date Met: 02/05/18    01/26/18 1630 02/05/18 1400   Patient Education OT LTG   Patient Education OT LTG, Date Established 01/26/18 --    Patient Education OT LTG, Time to Achieve by discharge --    Patient Education OT LTG, Education Type written program;HEP;precautions per surgeon;positioning;posture/body mechanics;home safety;adaptive equipment mgmt;skin care/inspection;energy conservation;work simplification;adaptive breathing --    Patient Education OT LTG, Education Understanding independent;demonstrates adequately;verbalizes understanding --    Patient Education OT LTG, Date Goal Reviewed --  02/05/18   Patient Education OT LTG Outcome --  goal not met  (unable to fully meet 2* to memory)     Goal: ADL Goal LTG- OT  Outcome: Unable to achieve outcome(s) by discharge Date Met: 02/05/18 01/26/18 1630 02/05/18 1400   ADL OT LTG   ADL OT LTG, Date Established 01/26/18 --    ADL OT LTG, Time to Achieve by discharge --    ADL OT LTG, Activity Type ADL skills --    ADL OT LTG, Additional Goal UB bathing SBA with AD, LB bathing with min A with AD, UB dressing with cond I, LB dressing with Robb with AD, grooming with set-up, toileting with CGA  --    ADL OT LTG, Date Goal Reviewed --  02/05/18   ADL OT LTG, Outcome --  goal partially met

## 2018-02-05 NOTE — PLAN OF CARE
Problem: Patient Care Overview (Adult)  Goal: Plan of Care Review  Outcome: Ongoing (interventions implemented as appropriate)   02/05/18 0906   Coping/Psychosocial Response Interventions   Plan Of Care Reviewed With patient   Outcome Evaluation   Outcome Summary/Follow up Plan pt has improved functional abilities since last visit. sba with gt and transfers and cga for steps pt met 3/4 goals and is to dc to snf today.       Problem: Inpatient Physical Therapy  Goal: Bed Mobility Goal LTG- PT  Outcome: Ongoing (interventions implemented as appropriate)   01/27/18 0929 02/05/18 0906   Bed Mobility PT LTG   Bed Mobility PT LTG, Date Established 01/27/18 --    Bed Mobility PT LTG, Time to Achieve by discharge --    Bed Mobility PT LTG, Activity Type roll left/roll right;supine to sit/sit to supine --    Bed Mobility PT LTG, [place] Level independent --    Bed Mobility PT LTG, Additional Goal HOB down --    Bed Mobility PT LTG, Date Goal Reviewed --  02/05/18   Bed Mobility PT LTG, Outcome --  goal ongoing     Goal: Transfer Training Goal 1 LTG- PT  Outcome: Outcome(s) achieved Date Met: 02/05/18 01/27/18 0929 02/05/18 0906   Transfer Training PT LTG   Transfer Training PT LTG, Date Established 01/27/18 --    Transfer Training PT LTG, Time to Achieve by discharge --    Transfer Training PT LTG, Activity Type bed to chair /chair to bed;sit to stand/stand to sit;toilet --    Transfer Training PT LTG, [place] Level supervision required --    Transfer Training PT LTG, Assist Device walker, rolling --    Transfer Training PT LTG, Date Goal Reviewed --  02/05/18   Transfer Training PT LTG, Outcome --  goal met     Goal: Gait Training Goal STG- PT  Outcome: Outcome(s) achieved Date Met: 02/05/18 01/27/18 0929 02/05/18 0906   Gait Training PT STG   Gait Training Goal PT STG, Date Established 01/27/18 --    Gait Training Goal PT STG, Time to Achieve by discharge --    Gait Training Goal PT STG, [place] Level  supervision required --    Gait Training Goal PT STG, Assist Device walker, rolling --    Gait Training Goal PT STG, Distance to Achieve 150 ft --    Gait Training Goal PT STG, Date Goal Reviewed --  02/05/18   Gait Training Goal PT STG, Outcome --  goal met     Goal: Stair Training Goal LTG- PT  Outcome: Outcome(s) achieved Date Met: 02/05/18 01/27/18 0929 02/05/18 0906   Stair Training PT LTG   Stair Training Goal PT LTG, Date Established 01/27/18 --    Stair Training Goal PT LTG, Time to Achieve by discharge --    Stair Training Goal PT LTG, Number of Steps 2 --    Stair Training Goal PT LTG, [place] Level contact guard assist --    Stair Training Goal PT LTG, Assist Device 1 handrail --    Stair Training Goal PT LTG, Date Goal Reviewed --  02/05/18   Stair Training Goal PT LTG, Outcome --  goal met

## 2018-02-05 NOTE — PROGRESS NOTES
Completed warfarin discharge counseling to Ms. Arteaga.  Ms. Arteaga will be going to [facility].  She states the plan is for her to be there for 2 weeks.  If she is discharged before 3/5/18 we will need to call prescription to [facility].  Ms. Arteaga will need to go back on plavix when she completes her 6 weeks of warfarin.  She  Understands all instructions.  She states that her niece (Kelly Madison) takes care of her medicine, she may be the best person to contact once Ms. Arteaga leaves [facility].    Shawn Betancourt III, [facility]  02/05/18  9:44 AM

## 2018-02-05 NOTE — THERAPY DISCHARGE NOTE
Inpatient Rehabilitation - Physical Therapy Treatment Note  [facility]     Patient Name: Val Arteaga  : 1932  MRN: [id number]  Today's Date: 2018  Onset of Illness/Injury or Date of Surgery Date: 18  Date of Referral to PT: 18  Referring Physician: Dr. Maldonado    Admit Date: 2018    Visit Dx:    ICD-10-CM ICD-9-CM   1. Closed displaced basicervical fracture of right femur with routine healing, subsequent encounter S72.041D V54.13   2. Impaired mobility and activities of daily living Z74.09 799.89   3. Impaired functional mobility, balance, gait, and endurance Z74.09 V49.89   4. Symbolic dysfunction R48.9 784.60     Patient Active Problem List   Diagnosis   • Essential hypertension   • Coronary artery disease involving native coronary artery of native heart without angina pectoris   • Chronic obstructive pulmonary disease   • Acquired hypothyroidism   • Depressive disorder   • Thygeson's superficial punctate keratitis   • Pseudophakia   • Precordial pain   • Displaced fracture of base of neck of right femur, initial encounter for closed fracture   • Hip fracture, right, closed, initial encounter   • Pulmonary emphysema   • Chronic systolic congestive heart failure   • Hyponatremia   • BENITO (acute kidney injury)   • Acute blood loss anemia   • Urinary retention   • Closed displaced basicervical fracture of right femur   • Urethral caruncle               Adult Rehabilitation Note       18 0815 18 1300 18 1037    Rehab Assessment/Intervention    Discipline physical therapy assistant  -RW physical therapy assistant  -JA physical therapy assistant  -JA    Document Type therapy note (daily note)  -RW therapy note (daily note)  -JA therapy note (daily note)  -JA    Subjective Information agree to therapy  -RW agree to therapy  -JA agree to therapy  -JA    Patient Effort, Rehab Treatment good  -RW good  -JA good  -JA    Precautions/Limitations fall precautions;anterior  hip precautions- right  -RW fall precautions;anterior hip precautions- right  -JA fall precautions;anterior hip precautions- right  -JA    Recorded by [RW] Xiang Jose PTA [JA] Juan Alberto Fagan PTA [JA] Juan Alberto Fagan PTA    Vital Signs    Pre Systolic BP Rehab 144  -  -  -JA    Pre Treatment Diastolic BP 64  -RW 78  -JA 57  -JA    Intra Systolic BP Rehab  141  -JA     Intra Treatment Diastolic BP  66  -JA     Post Systolic BP Rehab 105  -  -  -JA    Post Treatment Diastolic BP 51  -RW 60  -JA 63  -JA    Pretreatment Heart Rate (beats/min)   71  -JA    Posttreatment Heart Rate (beats/min) 88  -RW      Pre SpO2 (%)  93  -JA 99  -JA    O2 Delivery Pre Treatment  supplemental O2  -JA supplemental O2  -JA    Intra SpO2 (%)  92  -JA     O2 Delivery Intra Treatment  supplemental O2  -JA     Post SpO2 (%) 94  -RW      O2 Delivery Post Treatment supplemental O2  -RW      Pre Patient Position  Sitting  -JA Sitting  -JA    Intra Patient Position  Standing  -JA Standing  -JA    Post Patient Position  Supine  -JA Sitting  -JA    Recorded by [RW] Xiang Jose PTA [JA] Juan Alberto Fagan PTA [JA] Juan Alberto Fagan PTA    Pain Assessment    Pain Assessment  No/denies pain  -JA No/denies pain  -JA    Pain Score --   feels better  -RW 0  -JA 0  -JA    Post Pain Score  0  -JA 0  -JA    Pain Intervention(s)  Medication (See MAR);Ambulation/increased activity;Repositioned  -JA Medication (See MAR);Repositioned;Ambulation/increased activity  -JA    Recorded by [RW] Xiang Jose PTA [JA] Juan Alberto Fagan PTA [JA] Juan Alberto Fagan PTA    Cognitive Assessment/Intervention    Current Cognitive/Communication Assessment functional  -RW      Orientation Status oriented x 4  -RW      Follows Commands/Answers Questions 100% of the time  -RW      Personal Safety Interventions gait belt;nonskid shoes/slippers when out of bed  -RW      Recorded by [RW] Xiang Jose PTA      Mobility  Assessment/Training    Extremity Weight-Bearing Status  right lower extremity  -JA right lower extremity  -JA    Right Lower Extremity Weight-Bearing weight-bearing as tolerated  -RW weight-bearing as tolerated  -JA weight-bearing as tolerated  -JA    Recorded by [RW] Xiang Jose, PTA [JA] Juan Alberto Fagan, JEANNETTE [JA] Juan Alberto Fagan, JEANNETTE    Bed Mobility, Assessment/Treatment    Bed Mobility, Assistive Device --  -RW bed rails  -JA     Bed Mob, Sit to Supine, [place] --  -RW minimum assist (75% patient effort)  -JA     Recorded by [RW] Xiang Jose, PTA [JA] Juan Alberto Fagan, JEANNETTE     Transfer Assessment/Treatment    Transfers, Bed-Chair [place] supervision required  -RW      Transfers, Chair-Bed [place] supervision required  -RW contact guard assist  -JA     Transfers, Bed-Chair-Bed, Assist Device rolling walker  -RW rolling walker  -JA     Transfers, Sit-Stand [place] stand by assist  -RW contact guard assist  -JA contact guard assist  -JA    Transfers, Stand-Sit [place] stand by assist  -RW contact guard assist  -JA contact guard assist  -JA    Transfers, Sit-Stand-Sit, Assist Device rolling walker  -RW rolling walker  -JA rolling walker  -JA    Transfer, Maintain Weight Bearing Status  able to maintain weight bearing status  -JA able to maintain weight bearing status  -JA    Transfer, Safety Issues --  -RW step length decreased;sequencing ability decreased  -JA step length decreased  -JA    Recorded by [RW] Xiang Jose, PTA [JA] Juan Alberto Fagan, PTA [JA] Juan Alberto Fagan, PTA    Gait Assessment/Treatment    Gait, [place] Level stand by assist  -RW contact guard assist  -JA contact guard assist  -JA    Gait, Assistive Device rolling walker  -RW rolling walker  -JA rolling walker  -JA    Gait, Distance (Feet) 150  -RW 60  -JA 60   x2  -JA    Gait, Gait Pattern Analysis swing-through gait  -RW      Gait, Gait Deviations  antalgic  -JA antalgic  -JA    Gait, Safety  "Issues  step length decreased;sequencing ability decreased;supplemental O2  -JA step length decreased;supplemental O2  -JA    Gait, Comment  Gt. attempt ended due to pt. with c/o's dizziness and vision \"getting dark\" & pt. with increase SOA after performance. Pt. with seated rest break, with minimal recovery noted & nsg. made aware. Pt. transferred back to room & transferred to supine this p.m.   -JA     Recorded by [RW] Xiang Jose PTA [JA] Juan Alberto Fagan PTA [JA] Juan Alberto Fagan PTA    Stairs Assessment/Treatment    Number of Stairs 4  -RW      Stairs, Handrail Location left side (ascending)  -RW      Stairs, [place] Level contact guard assist  -RW      Stairs, Technique Used step to step (ascending);step to step (descending)  -RW      Recorded by [RW] Xiang Jose PTA      Wheelchair Training/Management    Wheelchair, Distance Propelled   134 conditional independent   -JA    Recorded by   [JA] Juan Alberto Fagan PTA    Therapy Exercises    Right Lower Extremity --  -RW AAROM:;20 reps;supine;heel slides;hip abduction/adduction  -JA     Left Lower Extremity --  -RW AROM:;20 reps;supine;heel slides;hip abduction/adduction  -JA     Bilateral Lower Extremities AROM:;20 reps;sitting;ankle pumps/circles;glut sets;clam shell;knee flexion;LAQ  -RW AROM:;20 reps;supine;glut sets;quad sets  -JA AROM:;20 reps;sitting;ankle pumps/circles;hip flexion;LAQ;hip abduction/adduction;knee flexion  -    BLE Resistance theraband  -RW  theraband   lvl 1 ham pulls  -JA    Recorded by [RW] Xiang Jose PTA [JA] Juan Alberto Fagan PTA [JA] Juan Alberto Fagan PTA    Positioning and Restraints    Pre-Treatment Position in bed  -RW sitting in chair/recliner  -JA sitting in chair/recliner  -JA    Post Treatment Position wheelchair  -RW bed  -JA wheelchair  -JA    In Bed  supine;call light within reach;encouraged to call for assist  -JA     In Wheelchair with SLP  -RW  sitting;call light within reach;encouraged to " call for assist  -JA    Recorded by [RW] Xiang Jose, JEANNETTE [JA] Juan Alberto Fagan, JEANNETTE [JA] Juan Alberto Fagan, JEANNETTE      02/02/18 0952          Rehab Assessment/Intervention    Discipline speech language pathologist  -EK      Document Type therapy note (daily note)  -EK      Subjective Information no complaints;agree to therapy  -EK      Patient Effort, Rehab Treatment good  -EK      Precautions/Limitations fall precautions  -EK      Recorded by [EK] MARZENA Ramirez      Pain Assessment    Pain Assessment No/denies pain  -EK      Recorded by [EK] MARZENA Ramirez      Cognitive Assessment/Intervention    Current Cognitive/Communication Assessment functional  -EK      Orientation Status oriented x 4  -EK      Recorded by [EK] MARZENA Ramirez      Improve awareness of basic personal information    Improve awareness of basic personal information through: answering open-ended questions regarding personal/biographical information  -EK      Status: Improve awareness of basic personal information Progressing as expected  -EK      Awareness of Basic Personal Information Progress 90%  -EK      Recorded by [EK] MARZENA Ramirez      Improve memory skills    Improve memory skills through: recalling unrelated word lists with an imposed delay;listen to a paragraph and answer questions;90%  -EK      Status: Improve memory skills Progressing as expected  -EK      Recorded by [EK] MARZENA Ramirez      Improve functional problem solving    Improve functional problem solving through: complete functional math task;90%  -EK      Status: Improve functional problem solving Progressing as expected  -EK      Recorded by [EK] MARZENA Ramirez      Improve executive function skills    Improve executive function skills: exhibit cognitive flexibility;organization/planning activity;90%  -EK      Status: Improve executive  function skills Progressing as expected  -EK      Recorded by [EK] Kizzy Ch CCC-SLP      Positioning and Restraints    Pre-Treatment Position sitting in chair/recliner  -EK      Post Treatment Position chair  -EK      In Bed with PT  -EK      Recorded by [EK] Kizzy Ch CCC-SLP        User Key  (r) = Recorded By, (t) = Taken By, (c) = Cosigned By    Initials Name Effective Dates    EK Kizzy Ch, FRIDA-SLP 04/06/17 -     JA Juan Alberto Fagan, PTA 10/17/16 -     RW Xiang Jose, PTA 10/17/16 -                 IP PT Goals       02/05/18 0906 02/02/18 1300 02/01/18 1605    Bed Mobility PT LTG    Bed Mobility PT LTG, Date Goal Reviewed 02/05/18  -RW 02/02/18  -JA 02/01/18  -RW    Bed Mobility PT LTG, Outcome goal ongoing  -RW goal ongoing  -JA goal ongoing  -RW    Transfer Training PT LTG    Transfer Training PT  LTG, Date Goal Reviewed 02/05/18  -RW 02/02/18  -JA 02/01/18  -RW    Transfer Training PT LTG, Outcome goal met  -RW goal ongoing  -JA     Gait Training PT STG    Gait Training Goal PT STG, Date Goal Reviewed 02/05/18  -RW 02/02/18  -JA 02/01/18  -RW    Gait Training Goal PT STG, Outcome goal met  -RW goal ongoing  -JA goal ongoing  -RW    Stair Training PT LTG    Stair Training Goal PT LTG, Date Goal Reviewed 02/05/18  -RW 02/02/18  -JA 02/01/18  -RW    Stair Training Goal PT LTG, Outcome goal met  -RW goal ongoing  -JA goal ongoing  -RW      01/31/18 1527 01/30/18 1607 01/29/18 1520    Bed Mobility PT LTG    Bed Mobility PT LTG, Date Goal Reviewed 01/31/18  -RW 01/30/18  -RW 01/29/18  -RW    Bed Mobility PT LTG, Outcome goal ongoing  -RW goal ongoing  -RW goal ongoing  -RW    Transfer Training PT LTG    Transfer Training PT  LTG, Date Goal Reviewed 01/31/18  -RW 01/30/18  -RW 01/29/18  -RW    Transfer Training PT LTG, Outcome goal ongoing  -RW goal ongoing  -RW goal ongoing  -RW    Gait Training PT STG    Gait Training Goal PT STG, Date Goal Reviewed  01/31/18  -RW 01/30/18  -RW 01/29/18  -RW    Gait Training Goal PT STG, Outcome goal ongoing  -RW goal ongoing  -RW goal ongoing  -RW    Stair Training PT LTG    Stair Training Goal PT LTG, Date Goal Reviewed 01/31/18  -RW 01/30/18  -RW 01/29/18  -RW    Stair Training Goal PT LTG, Outcome goal ongoing  -RW goal ongoing  -RW goal ongoing  -RW      01/27/18 1422 01/27/18 0929 01/26/18 1539    Bed Mobility PT LTG    Bed Mobility PT LTG, Date Established  01/27/18  -MN     Bed Mobility PT LTG, Time to Achieve  by discharge  -MN     Bed Mobility PT LTG, Activity Type  roll left/roll right;supine to sit/sit to supine  -MN     Bed Mobility PT LTG, [place] Level  independent  -MN     Bed Mobility PT LTG, Additional Goal  HOB down  -MN     Bed Mobility PT LTG, Date Goal Reviewed 01/27/18  -CA  01/26/18  -CB    Bed Mobility PT LTG, Outcome goal ongoing  -CA  goal not met  -CB    Bed Mobility PT LTG, Reason Goal Not Met   discharged from facility  -CB    Transfer Training PT LTG    Transfer Training PT LTG, Date Established  01/27/18  -MN     Transfer Training PT LTG, Time to Achieve  by discharge  -MN     Transfer Training PT LTG, Activity Type  bed to chair /chair to bed;sit to stand/stand to sit;toilet  -MN     Transfer Training PT LTG, [place] Level  supervision required  -MN     Transfer Training PT LTG, Assist Device  walker, rolling  -MN     Transfer Training PT  LTG, Date Goal Reviewed 01/27/18  -CA      Transfer Training PT LTG, Outcome goal ongoing  -CA      Gait Training PT STG    Gait Training Goal PT STG, Date Established  01/27/18  -MN     Gait Training Goal PT STG, Time to Achieve  by discharge  -MN     Gait Training Goal PT STG, [place] Level  supervision required  -MN     Gait Training Goal PT STG, Assist Device  walker, rolling  -MN     Gait Training Goal PT STG, Distance to Achieve  150 ft  -MN     Gait Training Goal PT STG, Date Goal Reviewed 01/27/18  -CA      Gait Training Goal PT STG,  Outcome goal ongoing  -CA      Gait Training PT LTG    Gait Training Goal PT LTG, Date Goal Reviewed   01/26/18  -CB    Gait Training Goal PT LTG, Outcome   goal not met  -CB    Gait Training Goal PT LTG, Reason Goal Not Met   discharged from facility  -CB    Stair Training PT LTG    Stair Training Goal PT LTG, Date Established  01/27/18  -MN     Stair Training Goal PT LTG, Time to Achieve  by discharge  -MN     Stair Training Goal PT LTG, Number of Steps  2  -MN     Stair Training Goal PT LTG, [place] Level  contact guard assist  -MN     Stair Training Goal PT LTG, Assist Device  1 handrail  -MN     Stair Training Goal PT LTG, Date Goal Reviewed 01/27/18  -CA      Stair Training Goal PT LTG, Outcome goal ongoing  -CA      Strength Goal PT LTG    Strength Goal PT LTG, Date Goal Reviewed   01/26/18  -CB    Strength Goal PT LTG, Outcome   goal not met  -CB    Strength Goal PT LTG, Reason Goal Not Met   discharged from facility  -CB      01/26/18 1058 01/24/18 1424 01/24/18 1008    Bed Mobility PT LTG    Bed Mobility PT LTG, Time to Achieve   by discharge  -CB (r) HL (t) CB (c)    Bed Mobility PT LTG, Activity Type   all bed mobility  -CB (r) HL (t) CB (c)    Bed Mobility PT LTG, [place] Level   minimum assist (75% patient effort)  -CB (r) HL (t) CB (c)    Bed Mobility PT LTG, Additional Goal   HOB down, no handrails  -CB (r) HL (t) CB (c)    Bed Mobility PT LTG, Date Goal Reviewed 01/26/18  -RONAN 01/24/18  -RONAN     Bed Mobility PT LTG, Outcome goal ongoing  - goal ongoing  -RONAN     Gait Training PT LTG    Gait Training Goal PT LTG, Time to Achieve   by discharge  -CB (r) HL (t) CB (c)    Gait Training Goal PT LTG, [place] Level   contact guard assist  -CB (r) HL (t) CB (c)    Gait Training Goal PT LTG, Assist Device   walker, rolling  -CB (r) HL (t) CB (c)    Gait Training Goal PT LTG, Distance to Achieve   50 feet  -CB (r) HL (t) CB (c)    Gait Training Goal PT LTG, Date Goal Reviewed 01/26/18  -RONAN  01/24/18  -RONAN     Gait Training Goal PT LTG, Outcome goal ongoing  -RONAN goal ongoing  -RONAN     Strength Goal PT LTG    Strength Goal PT LTG, Time to Achieve   by discharge  -CB (r) HL (t) CB (c)    Strength Goal PT LTG, Measure to Achieve   (P)  pt will perform 20 reps hip flexion, hip ABD/ADD, and knee flexion/extension on right LE. AAROM or AROM  -HL    Strength Goal PT LTG, Functional Goal   (P)  Getting legs up into bed   -HL    Strength Goal PT LTG, Date Goal Reviewed 01/26/18  -RONAN 01/24/18  -RONAN     Strength Goal PT LTG, Outcome goal ongoing  -RONAN goal ongoing  -RONAN       User Key  (r) = Recorded By, (t) = Taken By, (c) = Cosigned By    Initials Name Provider Type    CB Leatha Tirado, PT Physical Therapist    MARIA DOLORES Negrete, PT Physical Therapist    KIERSTEN Fagan, PTA Physical Therapy Assistant    CLAUDIA Zaldivar, PTA Physical Therapy Assistant    RONAN King, PTA Physical Therapy Assistant    YULISA Jose, PTA Physical Therapy Assistant    HL Harper Ang, PT Student PT Student          Physical Therapy Education     Title: PT OT SLP Therapies (Active)     Topic: Physical Therapy (Active)     Point: Mobility training (Done)    Learning Progress Summary    Learner Readiness Method Response Comment Documented by Status   Patient Acceptance E VU,NR role of PT. scheduling. safety with w/c. no OOb without assist MN 01/27/18 0929 Done               Point: Precautions (Done)    Learning Progress Summary    Learner Readiness Method Response Comment Documented by Status   Patient Acceptance E VU,NR role of PT. scheduling. safety with w/c. no OOb without assist MN 01/27/18 0929 Done                      User Key     Initials Effective Dates Name Provider Type Discipline    MN 10/17/16 -  Hallie Negrete, PT Physical Therapist PT                    PT Recommendation and Plan  Anticipated Equipment Needs At Discharge: front wheeled walker  Anticipated Discharge Disposition: skilled nursing  facility  Planned Therapy Interventions: balance training, bed mobility training, gait training, home exercise program, patient/family education, stair training, strengthening, stretching, transfer training, wheelchair management/propulsion training  PT Frequency: other (see comments) (5-14x/week)  Plan of Care Review  Plan Of Care Reviewed With: patient  Outcome Summary/Follow up Plan: pt has improved functional abilities since last visit. sba with gt and transfers and cga for steps pt met 3/4 goals and is to dc to snf today.          Outcome Measures       02/05/18 0900 02/02/18 1037       How much help from another person do you currently need...    Turning from your back to your side while in flat bed without using bedrails? 4  -RW 4  -JA     Moving from lying on back to sitting on the side of a flat bed without bedrails? 3  -RW 3  -JA     Moving to and from a bed to a chair (including a wheelchair)? 3  -RW 3  -JA     Standing up from a chair using your arms (e.g., wheelchair, bedside chair)? 3  -RW 3  -JA     Climbing 3-5 steps with a railing? 3  -RW 2  -JA     To walk in hospital room? 3  -RW 3  -JA     AM-PAC 6 Clicks Score 19  -RW 18  -JA     Functional Assessment    Outcome Measure Options  AM-PAC 6 Clicks Basic Mobility (PT)  -       User Key  (r) = Recorded By, (t) = Taken By, (c) = Cosigned By    Initials Name Provider Type    KIERSTEN Fagan PTA Physical Therapy Assistant    YULISA Jose PTA Physical Therapy Assistant           Time Calculation:         PT Charges       02/05/18 0910          Time Calculation    Start Time 0815  -RW      Stop Time 0855  -RW      Time Calculation (min) 40 min  -RW      Time Calculation- PT    Total Timed Code Minutes- PT 40 minute(s)  -RW        User Key  (r) = Recorded By, (t) = Taken By, (c) = Cosigned By    Initials Name Provider Type    YULISA Jose PTA Physical Therapy Assistant          Therapy Charges for Today     Code Description Service  Date Service Provider Modifiers Qty    70850653466 HC GAIT TRAINING EA 15 MIN 2/5/2018 Xiang Jose, PTA GP 1    45094862243 HC PT THER PROC EA 15 MIN 2/5/2018 Xiang Jose, PTA GP 1    [id number] HC PT THERAPEUTIC ACT EA 15 MIN 2/5/2018 Xiang Jose, PTA GP 1          PT G-Codes  Outcome Measure Options: AM-PAC 6 Clicks Basic Mobility (PT)    Xiang Jose PTA  2/5/2018

## 2018-02-05 NOTE — THERAPY DISCHARGE NOTE
[facility] - Speech Language Pathology /Discharge  [facility]     Patient Name: Val Arteaga  : 1932  MRN: [id number]  Today's Date: 2018  Referring Physician: AZUL Maldonado      Admit Date: 2018     Goals:  1.  Pt will complete cognitive flexibility tasks w/90% acc:  Pt completed mental flex task of formulating sentences from random target letters w/80% acc.   2.  Pt will complete organization/planning activities w/90% acc: Pt completed pill organizational task in which she only sorted pills for one day vs the entire week. Pt was given weekly pill organizer and 5 medications (filled w/beads) to sort.  Pt sequenced 6 steps from written paragraph w/100% acc.  3.  Pt will complete functional math tasks w/90% acc: Pt was 100% acc w/filling out and balancing a check registry.  Pt was 80% acc w/functional math questions from a recipe.  4.  Pt will recall unrelated word lists with an imposed delay w/90% acc:  Pt completed spaced retrieval therapy this date.  Pt's initial time delay was 1 min.  Pt was able to recall 4/4 words up to a 2 min delay.  All delays included distractions (conversation and therapy tasks). Pt was only able to recall 1/4 at a 4 min delay.  5.  Pt will listen to a paragraph and answer questions w/90% acc: Pt was 65% acc w/3 sentence paragraph recall of non-fiction information (Radha Araya).    Karen Ch, MS CCC-SLP 2018 11:35 AM    Visit Dx:     ICD-10-CM ICD-9-CM   1. Closed displaced basicervical fracture of right femur with routine healing, subsequent encounter S72.041D V54.13   2. Impaired mobility and activities of daily living Z74.09 799.89   3. Impaired functional mobility, balance, gait, and endurance Z74.09 V49.89   4. Symbolic dysfunction R48.9 784.60     Patient Active Problem List   Diagnosis   • Essential hypertension   • Coronary artery disease involving native coronary artery of native heart without angina pectoris   • Chronic obstructive pulmonary disease    • Acquired hypothyroidism   • Depressive disorder   • Thygeson's superficial punctate keratitis   • Pseudophakia   • Precordial pain   • Displaced fracture of base of neck of right femur, initial encounter for closed fracture   • Hip fracture, right, closed, initial encounter   • Pulmonary emphysema   • Chronic systolic congestive heart failure   • Hyponatremia   • BENITO (acute kidney injury)   • Acute blood loss anemia   • Urinary retention   • Closed displaced basicervical fracture of right femur   • Urethral caruncle              Adult Rehabilitation Note       02/05/18 0857 02/05/18 0815 02/02/18 1300    Rehab Assessment/Intervention    Discipline speech language pathologist  -CK physical therapy assistant  -RW physical therapy assistant  -JA    Document Type therapy note (daily note);discharge summary  -CK therapy note (daily note)  -RW therapy note (daily note)  -JA    Subjective Information agree to therapy  -CK agree to therapy  -RW agree to therapy  -JA    Patient Effort, Rehab Treatment good  -CK good  -RW good  -JA    Precautions/Limitations  fall precautions;anterior hip precautions- right  -RW fall precautions;anterior hip precautions- right  -JA    Recorded by [CK] Karen Ch MS CCC-SLP [RW] Xiang Jose PTA [JA] Juan Alberto Fagan PTA    Vital Signs    Pre Systolic BP Rehab  144  -  -JA    Pre Treatment Diastolic BP  64  -RW 78  -JA    Intra Systolic BP Rehab   141  -JA    Intra Treatment Diastolic BP   66  -JA    Post Systolic BP Rehab  105  -  -JA    Post Treatment Diastolic BP  51  -RW 60  -JA    Posttreatment Heart Rate (beats/min)  88  -RW     Pre SpO2 (%)   93  -JA    O2 Delivery Pre Treatment   supplemental O2  -JA    Intra SpO2 (%)   92  -JA    O2 Delivery Intra Treatment   supplemental O2  -JA    Post SpO2 (%)  94  -RW     O2 Delivery Post Treatment  supplemental O2  -RW     Pre Patient Position   Sitting  -JA    Intra Patient Position   Standing  -JA    Post Patient  Position   Supine  -JA    Recorded by  [RW] Xiang Jose PTA [JA] Juan Alberto Fagan PTA    Pain Assessment    Pain Assessment No/denies pain  -CK  No/denies pain  -JA    Pain Score  --   feels better  -RW 0  -JA    Post Pain Score   0  -JA    Pain Intervention(s)   Medication (See MAR);Ambulation/increased activity;Repositioned  -JA    Recorded by [CK] Karen Ch MS CCC-SLP [RW] Xiang Jose PTA [JA] Juan Alberto Fagan PTA    Cognitive Assessment/Intervention    Current Cognitive/Communication Assessment  functional  -RW     Orientation Status  oriented x 4  -RW     Follows Commands/Answers Questions  100% of the time  -RW     Personal Safety Interventions  gait belt;nonskid shoes/slippers when out of bed  -RW     Recorded by  [RW] Xiang Jose PTA     Communication Treatment Objective and Progress    Cognitive Linguistic Treatment Objectives Improve memory skills;Improve functional problem solving;Improve executive function skills  -CK      Recorded by [CK] Karen Ch MS CCC-SLP      Improve memory skills    Improve memory skills through: recalling unrelated word lists with an imposed delay;listen to a paragraph and answer questions;90%  -CK      Status: Improve memory skills Progressing as expected  -CK      Memory Skills Progress 60%  -CK      Recorded by [CK] Karen Ch MS CCC-SLP      Improve functional problem solving    Improve functional problem solving through: complete functional math task;90%  -CK      Status: Improve functional problem solving Progressing as expected  -CK      Functional Problem Solving Progress 90%  -CK      Recorded by [CK] Karen Ch MS CCC-SLP      Improve executive function skills    Improve executive function skills: exhibit cognitive flexibility;organization/planning activity;90%  -CK      Status: Improve executive function skills Progressing as expected  -CK      Executive Function Skills Progress 70%  -CK      Recorded by [CK] Karen SANTAMARIA  "MS Dima CCC-SLP      Mobility Assessment/Training    Extremity Weight-Bearing Status   right lower extremity  -JA    Right Lower Extremity Weight-Bearing  weight-bearing as tolerated  -RW weight-bearing as tolerated  -JA    Recorded by  [RW] Xiang Jose PTA [JA] Juan Alberto Fagan PTA    Bed Mobility, Assessment/Treatment    Bed Mobility, Assistive Device  --  -RW bed rails  -JA    Bed Mob, Sit to Supine, Sharkey  --  -RW minimum assist (75% patient effort)  -JA    Recorded by  [RW] Xiang Jose PTA [JA] Juan Alberto Fagan PTA    Transfer Assessment/Treatment    Transfers, Bed-Chair Sharkey  supervision required  -RW     Transfers, Chair-Bed Sharkey  supervision required  -RW contact guard assist  -JA    Transfers, Bed-Chair-Bed, Assist Device  rolling walker  -RW rolling walker  -JA    Transfers, Sit-Stand Sharkey  stand by assist  -RW contact guard assist  -JA    Transfers, Stand-Sit Sharkey  stand by assist  -RW contact guard assist  -JA    Transfers, Sit-Stand-Sit, Assist Device  rolling walker  -RW rolling walker  -JA    Transfer, Maintain Weight Bearing Status   able to maintain weight bearing status  -JA    Transfer, Safety Issues  --  -RW step length decreased;sequencing ability decreased  -JA    Recorded by  [RW] Xiang Jose PTA [JA] Juan Alberto Fagan PTA    Gait Assessment/Treatment    Gait, Sharkey Level  stand by assist  -RW contact guard assist  -JA    Gait, Assistive Device  rolling walker  -RW rolling walker  -JA    Gait, Distance (Feet)  150  -RW 60  -JA    Gait, Gait Pattern Analysis  swing-through gait  -RW     Gait, Gait Deviations   antalgic  -JA    Gait, Safety Issues   step length decreased;sequencing ability decreased;supplemental O2  -JA    Gait, Comment   Gt. attempt ended due to pt. with c/o's dizziness and vision \"getting dark\" & pt. with increase SOA after performance. Pt. with seated rest break, with minimal recovery noted & nsg. made " aware. Pt. transferred back to room & transferred to supine this p.m.   -JA    Recorded by  [RW] Xiang Jose PTA [JA] Juan Alberto Fagan PTA    Stairs Assessment/Treatment    Number of Stairs  4  -RW     Stairs, Handrail Location  left side (ascending)  -RW     Stairs, Hooker Level  contact guard assist  -RW     Stairs, Technique Used  step to step (ascending);step to step (descending)  -RW     Recorded by  [RW] Xiang Jose PTA     Therapy Exercises    Right Lower Extremity  --  -RW AAROM:;20 reps;supine;heel slides;hip abduction/adduction  -JA    Left Lower Extremity  --  -RW AROM:;20 reps;supine;heel slides;hip abduction/adduction  -JA    Bilateral Lower Extremities  AROM:;20 reps;sitting;ankle pumps/circles;glut sets;clam shell;knee flexion;LAQ  -RW AROM:;20 reps;supine;glut sets;quad sets  -JA    BLE Resistance  theraband  -RW     Recorded by  [RW] Xiang Jose PTA [JA] Juan Alberto Fagan PTA    Positioning and Restraints    Pre-Treatment Position sitting in chair/recliner  -CK in bed  -RW sitting in chair/recliner  -JA    Post Treatment Position wheelchair  -CK wheelchair  -RW bed  -JA    In Bed   supine;call light within reach;encouraged to call for assist  -JA    In Wheelchair with nsg  -CK with SLP  -RW     Recorded by [CK] Karen Ch MS CCC-SLP [RW] Xiang Jose PTA [JA] Juan Alberto Fagan PTA      User Key  (r) = Recorded By, (t) = Taken By, (c) = Cosigned By    Initials Name Effective Dates    CK Karen Ch, MS CCC-SLP 10/17/16 -     KIERSTEN Fagan PTA 10/17/16 -     RW Xiang Jose PTA 10/17/16 -               IP SLP Goals       02/05/18 1125 02/02/18 1448 02/01/18 1428    Cognitive Linguistic- Optimal Participation in Care    Cognitive Linguistic Optimal Participation in Care- SLP, Date Goal Reviewed 02/05/18  -CK 02/02/18  -EK 02/01/18  -EK    Cognitive Linguistic Optimal Participation in Care- SLP, Outcome goal not met  -CK      Cognitive Linguistic  Optimal Participation in Care- SLP, Reason Goal Not Met discharged from facility  -CK        01/31/18 1222 01/30/18 1419 01/29/18 1744    Cognitive Linguistic- Optimal Participation in Care    Cognitive Linguistic Optimal Participation in Care- SLP, Date Goal Reviewed 01/31/18  -CK 01/30/18  -CK 01/29/18  -CK    Cognitive Linguistic Optimal Participation in Care- SLP, Outcome goal ongoing  -CK goal ongoing  -CK goal ongoing  -CK      01/27/18 1347          Cognitive Linguistic- Optimal Participation in Care    Cognitive Linguistic Optimal Participation in Care- SLP, Date Established 01/27/18  -CK      Cognitive Linguistic Optimal Participation in Care- SLP, Time to Achieve by discharge  -CK      Cognitive Linguistic Optimal Participation in Care- SLP, Activity Level Patient will improve memory skills for increased safety in environment;Patient will improve functional problem solving skills for increased safety in environment;Patient will improve Executive functioning skills for increased safety in environment  -CK      Cognitive Linguistic Optimal Participation in Care- SLP, Date Goal Reviewed 01/27/18  -CK        User Key  (r) = Recorded By, (t) = Taken By, (c) = Cosigned By    Initials Name Provider Type    EVITA Ch, CCC-SLP Speech and Language Pathologist    LETTY Ch, MS CCC-SLP Speech and Language Pathologist          EDUCATION  The patient has been educated in the following areas:   Cognitive Impairment.    SLP Recommendation and Plan  SLP Diagnosis: symbolic dysfunction  Prognosis: good  Rehab Potential: good, to achieve stated therapy goals  Criteria for Skilled Therapeutic Interventions Met: skilled criteria for cognitive linguistic intervention met  Anticipated Discharge Disposition: home with assist     Therapy Frequency: 3-5 times/wk  Predicted Duration of Therapy Intervention (days/wks): until discharge  Expected Duration of Therapy Session (min): 15-30 minutes, 30-45  minutes, 45-60 minutes, 60-75 minutes    Plan of Care Review  Plan Of Care Reviewed With: patient  Progress: improving  Outcome Summary/Follow up Plan: Pt participated well in all therapy tasks this date.  Pt is scheduled to d/c to snf this date.  SLP discussed d/c recommendations for continued cognitive therapy to work towards returning home safely.            Time Calculation:         Time Calculation- SLP       02/05/18 1129          Time Calculation- SLP    SLP Start Time 0857  -CK      SLP Stop Time 1018  -CK      SLP Time Calculation (min) 81 min  -CK      Total Timed Code Minutes- SLP 81 minute(s)  -CK      SLP Received On 02/05/18  -LETTY        User Key  (r) = Recorded By, (t) = Taken By, (c) = Cosigned By    Initials Name Provider Type     Karen Ch MS CCC-SLP Speech and Language Pathologist          Therapy Charges for Today     Code Description Service Date Service Provider Modifiers Qty    50417995693 HC ST TREATMENT SPEECH 5 2/5/2018 MS TED GalarzaSLP GN 1               SLP Discharge Summary  Anticipated Discharge Disposition: home with assist  Reason for Discharge: Discharge from facility  Outcomes Achieved: Refer to plan of care for updates on goals achieved  Discharge Destination: SNF    MS TED GalarzaSLP  2/5/2018

## 2018-02-05 NOTE — PROGRESS NOTES
"   LOS: 10 days   Patient Care Team:  Roula Albert MD as PCP - General  Roula Albert MD as PCP - Claims Attributed    Subjective     Subjective:  Symptoms:  Improved.  (Has not required intermittent catheterization since yesterday on 0800 hour and no symptoms of UTI. ).    Diet:  No nausea or vomiting.    Activity level: Activity impairment: improving.    Pain:  She reports pain is improving.        History taken from: patient chart    Objective     Vital Signs  Temp:  [98.2 °F (36.8 °C)-98.6 °F (37 °C)] 98.4 °F (36.9 °C)  Heart Rate:  [72-88] 88  Resp:  [16-18] 18  BP: (106-147)/(59-68) 144/64    Objective:  General Appearance:  In no acute distress.    Vital signs: (most recent): Blood pressure 144/64, pulse 88, temperature 98.4 °F (36.9 °C), temperature source Oral, resp. rate 18, height 172.7 cm (67.99\"), weight 79.2 kg (174 lb 9.7 oz), SpO2 97 %.  Vital signs are normal.  No fever.    Output: Producing urine.    HEENT: Normal HEENT exam.    Lungs:  Normal respiratory rate and normal effort.  Breath sounds clear to auscultation.    Heart: Normal rate.  Regular rhythm.  S1 normal and S2 normal.    Chest: Symmetric chest wall expansion.   Abdomen: Abdomen is soft.  Bowel sounds are normal.     Extremities: Decreased range of motion.    Pulses: Distal pulses are intact.    Neurological: Patient is alert and oriented to person, place and time.  GCS score is 15.    Pupils:  Pupils are equal, round, and reactive to light.    Skin:  Warm.  No ecchymosis or cyanosis.             Results Review:    Lab Results (last 24 hours)     Procedure Component Value Units Date/Time    Protime-INR [[id number]]  (Abnormal) Collected:  02/05/18 0456    Specimen:  Blood Updated:  02/05/18 0620     Protime 23.7 (H) Seconds      INR 2.10 (H)    Narrative:       Therapeutic range for most indications is 2.0-3.0 INR,  or 2.5-3.5 for mechanical heart valves.         Imaging Results (last 24 hours)     ** No results found for the last " 24 hours. **           I reviewed the patient's new clinical results.  I reviewed the patient's new imaging results and agree with the interpretation.  I reviewed the patient's other test results and agree with the interpretation      Assessment/Plan     Principal Problem:    Closed displaced basicervical fracture of right femur  Active Problems:    Essential hypertension    Coronary artery disease involving native coronary artery of native heart without angina pectoris    Acquired hypothyroidism    Depressive disorder    Pulmonary emphysema    Chronic systolic congestive heart failure    Hyponatremia    BENITO (acute kidney injury)    Urinary retention      Assessment:  (Urinary retention due to distal urethral lesion no change in size, Estrace being administered.      White anchored 1/29/18, removed 2/3/18 with Ms. Arteaga only required intermittent cath x 1.  ).     Plan:   (Continue Estrace cream.  Follow up with Dr. Chandler this week in office. ).       CRESENCIO Edwards  02/05/18  8:24 AM

## 2018-02-05 NOTE — PROGRESS NOTES
"Nutrition Services    Patient Name:  Val Arteaga  YOB: 1932  MRN: [id number]  Admit Date:  1/26/2018    Pt's family is packing her belongings for her to be discharged to [facility] for more therapy.pt said her appetite has not been good and she hopes she can eat well in the nursing home.\" you have all been very nice\" encouraged pt to eat some of all three meals offered. Pt is d/c.    Electronically signed by:  Sosa Verdin RD  02/05/18 3:03 PM   "

## 2018-02-05 NOTE — THERAPY TREATMENT NOTE
Inpatient Rehabilitation - Occupational Therapy Treatment Note  [facility]     Patient Name: Val Arteaga  : 1932  MRN: [id number]  Today's Date: 2018  Onset of Illness/Injury or Date of Surgery Date: 18  Date of Referral to OT: 18  Referring Physician: Dr. Maldonado      Admit Date: 2018    Visit Dx:     ICD-10-CM ICD-9-CM   1. Closed displaced basicervical fracture of right femur with routine healing, subsequent encounter S72.041D V54.13   2. Impaired mobility and activities of daily living Z74.09 799.89   3. Impaired functional mobility, balance, gait, and endurance Z74.09 V49.89   4. Symbolic dysfunction R48.9 784.60     Patient Active Problem List   Diagnosis   • Essential hypertension   • Coronary artery disease involving native coronary artery of native heart without angina pectoris   • Chronic obstructive pulmonary disease   • Acquired hypothyroidism   • Depressive disorder   • Thygeson's superficial punctate keratitis   • Pseudophakia   • Precordial pain   • Displaced fracture of base of neck of right femur, initial encounter for closed fracture   • Hip fracture, right, closed, initial encounter   • Pulmonary emphysema   • Chronic systolic congestive heart failure   • Hyponatremia   • BENITO (acute kidney injury)   • Acute blood loss anemia   • Urinary retention   • Closed displaced basicervical fracture of right femur   • Urethral caruncle             Adult Rehabilitation Note       18 1120 18 0857 18 0815    Rehab Assessment/Intervention    Discipline occupational therapy assistant  -LM speech language pathologist  -CK physical therapy assistant  -RW    Document Type therapy note (daily note)  -LM therapy note (daily note);discharge summary  -CK therapy note (daily note)  -RW    Subjective Information agree to therapy  -LM agree to therapy  -CK agree to therapy  -RW    Patient Effort, Rehab Treatment good  -LM good  -CK good  -RW     Precautions/Limitations   fall precautions;anterior hip precautions- right  -RW    Recorded by [LM] Massiel Santana NELSON/L [CK] Karen Ch MS CCC-SLP [RW] Xiang Jose PTA    Vital Signs    Pre Systolic BP Rehab   144  -RW    Pre Treatment Diastolic BP   64  -RW    Post Systolic BP Rehab   105  -RW    Post Treatment Diastolic BP   51  -RW    Posttreatment Heart Rate (beats/min)   88  -RW    Post SpO2 (%)   94  -RW    O2 Delivery Post Treatment   supplemental O2  -RW    Recorded by   [RW] Xiang Jose PTA    Pain Assessment    Pain Assessment No/denies pain  -LM No/denies pain  -CK     Pain Score   --   feels better  -RW    Recorded by [LM] YULI MaloneA/L [CK] Karen Ch MS CCC-SLP [RW] Xiang Jose PTA    Cognitive Assessment/Intervention    Current Cognitive/Communication Assessment   functional  -RW    Orientation Status   oriented x 4  -RW    Follows Commands/Answers Questions   100% of the time  -RW    Personal Safety Interventions   gait belt;nonskid shoes/slippers when out of bed  -RW    Recorded by   [RW] Xiang Jose PTA    Communication Treatment Objective and Progress    Cognitive Linguistic Treatment Objectives  Improve memory skills;Improve functional problem solving;Improve executive function skills  -CK     Recorded by  [CK] Karen Ch MS CCC-SLP     Improve memory skills    Improve memory skills through:  recalling unrelated word lists with an imposed delay;listen to a paragraph and answer questions;90%  -CK     Status: Improve memory skills  Progressing as expected  -CK     Memory Skills Progress  60%  -CK     Recorded by  [CK] Karen Ch MS CCC-SLP     Improve functional problem solving    Improve functional problem solving through:  complete functional math task;90%  -CK     Status: Improve functional problem solving  Progressing as expected  -CK     Functional Problem Solving Progress  90%  -CK     Recorded by  [CK] Karen Ch MS  CCC-SLP     Improve executive function skills    Improve executive function skills:  exhibit cognitive flexibility;organization/planning activity;90%  -CK     Status: Improve executive function skills  Progressing as expected  -CK     Executive Function Skills Progress  70%  -CK     Recorded by  [CK] Karen Ch, MS CCC-SLP     Mobility Assessment/Training    Right Lower Extremity Weight-Bearing   weight-bearing as tolerated  -RW    Recorded by   [RW] Xiang Jose PTA    Bed Mobility, Assessment/Treatment    Bed Mobility, Assistive Device   --  -RW    Bed Mob, Sit to Supine, [place]   --  -RW    Recorded by   [RW] Xiang Jsoe PTA    Transfer Assessment/Treatment    Transfers, Bed-Chair [place]   supervision required  -RW    Transfers, Chair-Bed [place]   supervision required  -RW    Transfers, Bed-Chair-Bed, Assist Device   rolling walker  -RW    Transfers, Sit-Stand [place] supervision required  -LM  stand by assist  -RW    Transfers, Stand-Sit [place] supervision required  -LM  stand by assist  -RW    Transfers, Sit-Stand-Sit, Assist Device rolling walker  -LM  rolling walker  -RW    Transfer, Safety Issues   --  -RW    Recorded by [LM] OMAR Malone/L  [RW] Xiang Jose PTA    Gait Assessment/Treatment    Gait, [place] Level   stand by assist  -RW    Gait, Assistive Device   rolling walker  -RW    Gait, Distance (Feet)   150  -RW    Gait, Gait Pattern Analysis   swing-through gait  -RW    Recorded by   [RW] Xiang Jose PTA    Stairs Assessment/Treatment    Number of Stairs   4  -RW    Stairs, Handrail Location   left side (ascending)  -RW    Stairs, [place] Level   contact guard assist  -RW    Stairs, Technique Used   step to step (ascending);step to step (descending)  -RW    Recorded by   [RW] Xiang oJse PTA    Upper Body Bathing Assessment/Training    UB Bathing Assess/Train, [place] Level set up required  -LM      Recorded by [LM]  OMAR Malone/L      Lower Body Bathing Assessment/Training    LB Bathing Assess/Train, [place] Level contact guard assist  -LM      Recorded by [LM] OMAR Malone/L      Upper Body Dressing Assessment/Training    UB Dressing Assess/Train, Clothing Type doffing:;donning:  -LM      UB Dressing Assess/Train, [place] set up required  -LM      UB Dressing Assess/Train, Comment --   pt required incresaed time to doff and don arms 2* reaching   -LM      Recorded by [LM] OMAR Malone/L      Lower Body Dressing Assessment/Training    LB Dressing Assess/Train, Clothing Type doffing:;donning:  -LM      LB Dressing Assess/Train, [place] minimum assist (75% patient effort)  -LM      Recorded by [LM] OMAR Malone/L      Grooming Assessment/Training    Grooming Assess/Train, [place] Level set up required  -LM      Recorded by [LM] OMAR Malone/L      Therapy Exercises    Right Lower Extremity   --  -RW    Left Lower Extremity   --  -RW    Bilateral Lower Extremities   AROM:;20 reps;sitting;ankle pumps/circles;glut sets;clam shell;knee flexion;LAQ  -RW    BLE Resistance   theraband  -RW    Recorded by   [RW] Xiang Jose PTA    Positioning and Restraints    Pre-Treatment Position sitting in chair/recliner  -LM sitting in chair/recliner  -CK in bed  -RW    Post Treatment Position wheelchair  -LM wheelchair  -CK wheelchair  -RW    In Wheelchair  with nsg  -CK with SLP  -RW    Recorded by [LM] EJNNIFFER Malone [CK] Karen Ch MS CCC-SLP [RW] Xiang Jose PTA      User Key  (r) = Recorded By, (t) = Taken By, (c) = Cosigned By    Initials Name Effective Dates    CK Karen Ch MS CCC-SLP 10/17/16 -     RW Xiang Jose PTA 10/17/16 -     LM JENNIFFER Malone 10/17/16 -                 OT Goals       02/05/18 1400 02/02/18 1519 02/01/18 0912    Bed Mobility OT STG    Bed Mobility OT STG, Date Goal Reviewed 02/05/18  -LM 02/02/18   -LM 02/01/18  -    Bed Mobility OT STG, Outcome goal met  -LM goal ongoing  -LM goal ongoing  -    Transfer Training OT LTG    Transfer Training OT LTG, Date Goal Reviewed 02/05/18  -LM 02/02/18  -LM 02/01/18  -    Transfer Training OT LTG, Outcome goal partially met  -LM goal ongoing  -LM goal ongoing  -    Patient Education OT LTG    Patient Education OT LTG, Date Goal Reviewed 02/05/18  -LM 02/02/18  -LM 02/01/18  -    Patient Education OT LTG Outcome goal not met   unable to fully meet 2* to memory  -LM goal ongoing  -LM goal ongoing  -    ADL OT LTG    ADL OT LTG, Date Goal Reviewed 02/05/18  -LM 02/02/18  -LM 02/01/18  -    ADL OT LTG, Outcome goal partially met  -LM goal ongoing  -LM goal ongoing  -BH      01/31/18 1445 01/30/18 1524 01/29/18 1428    Bed Mobility OT STG    Bed Mobility OT STG, Date Goal Reviewed 01/31/18  -RC 01/30/18  -RC 01/29/18  -LM    Bed Mobility OT STG, Outcome   goal ongoing  -LM    Transfer Training OT LTG    Transfer Training OT LTG, Date Goal Reviewed 01/31/18  -RC 01/30/18  -RC 01/29/18  -LM    Transfer Training OT LTG, Outcome   goal ongoing  -    Patient Education OT LTG    Patient Education OT LTG, Date Goal Reviewed 01/31/18  -RC 01/30/18  -RC 01/29/18  -LM    Patient Education OT LTG Outcome   goal ongoing  -LM    ADL OT LTG    ADL OT LTG, Date Goal Reviewed 01/31/18  -RC 01/30/18  -RC 01/29/18  -LM    ADL OT LTG, Outcome  goal ongoing  -RC goal ongoing  -LM      01/27/18 1441 01/27/18 0606 01/26/18 1630    Bed Mobility OT STG    Bed Mobility OT STG, Date Established   01/26/18  -MR    Bed Mobility OT STG, Time to Achieve   by discharge  -MR    Bed Mobility OT STG, Activity Type   all bed mobility  -MR    Bed Mobility OT STG, [place] Level   conditional independence  -MR    Bed Mobility OT STG, Additional Goal   with VSS  -MR    Bed Mobility OT STG, Date Goal Reviewed 01/27/18  -      Bed Mobility OT STG, Outcome goal ongoing  -RC      Bed Mobility OT  LTG    Bed Mobility OT LTG, Date Goal Reviewed  01/27/18  -     Bed Mobility OT LTG, Outcome  goal not met  -     Bed Mobility OT LTG, Reason Goal Not Met  discharged from facility  -     Transfer Training OT LTG    Transfer Training OT LTG, Date Established   01/26/18  -MR    Transfer Training OT LTG, Time to Achieve   by discharge  -MR    Transfer Training OT LTG, Activity Type   all transfers  -MR    Transfer Training OT LTG, [place] Level   supervision required  -MR    Transfer Training OT LTG, Assist Device   walker, rolling  -MR    Transfer Training OT LTG, Additional Goal   with VSS  -MR    Transfer Training OT LTG, Date Goal Reviewed 01/27/18  -RC 01/27/18  -     Transfer Training OT LTG, Outcome goal ongoing  -RC goal not met  -     Transfer Training OT LTG, Reason Goal Not Met  discharged from facility  -     Patient Education OT LTG    Patient Education OT LTG, Date Established   01/26/18  -    Patient Education OT LTG, Time to Achieve   by discharge  -MR    Patient Education OT LTG, Education Type   written program;HEP;precautions per surgeon;positioning;posture/body mechanics;home safety;adaptive equipment mgmt;skin care/inspection;energy conservation;work simplification;adaptive breathing  -MR    Patient Education OT LTG, Education Understanding   independent;demonstrates adequately;verbalizes understanding  -    Patient Education OT LTG, Date Goal Reviewed 01/27/18  -      Patient Education OT LTG Outcome goal ongoing  -RC      ADL OT LTG    ADL OT LTG, Date Established   01/26/18  -    ADL OT LTG, Time to Achieve   by discharge  -MR    ADL OT LTG, Activity Type   ADL skills  -MR    ADL OT LTG, Additional Goal   UB bathing SBA with AD, LB bathing with min A with AD, UB dressing with cond I, LB dressing with Robb with AD, grooming with set-up, toileting with CGA   -    ADL OT LTG, Date Goal Reviewed 01/27/18  -RC 01/27/18  -NN     ADL OT LTG, Outcome goal ongoing  -RC goal  not met  -     ADL OT LTG, Reason Goal Not Met  discharged from facility  -     Functional Mobility OT LTG    Functional Mobility Goal OT LTG, Date Goal Reviewed  01/27/18  -     Functional Mobility Goal OT LTG, Outcome  goal not met  -     Functional Mobility Goal OT LTG, Reason Goal Not Met  discharged from facility  -       01/26/18 1144 01/24/18 1329 01/24/18 0755    Bed Mobility OT LTG    Bed Mobility OT LTG, Date Established   01/24/18  -    Bed Mobility OT LTG, Time to Achieve   by discharge  -    Bed Mobility OT LTG, Activity Type   all bed mobility  -    Bed Mobility OT LTG, [place] Level   supervision required   to engage in ADL  -    Bed Mobility OT LTG, Date Goal Reviewed 01/26/18  -CS 01/24/18  -     Bed Mobility OT LTG, Outcome  goal ongoing  -     Transfer Training OT LTG    Transfer Training OT LTG, Date Established   01/24/18  -    Transfer Training OT LTG, Time to Achieve   by discharge  -    Transfer Training OT LTG, Activity Type   toilet  -    Transfer Training OT LTG, [place] Level   contact guard assist   AE/AD as needed  -    Transfer Training OT LTG, Date Goal Reviewed 01/26/18  -CS 01/24/18  -     Transfer Training OT LTG, Outcome goal not met  -      ADL OT LTG    ADL OT LTG, Date Established   01/24/18  -    ADL OT LTG, Activity Type   ADL skills  -    ADL OT LTG, Additional Goal   set up grooming/self feeding, min A UB bath/dressing/ mod A LB bath/dressing/toileting - AE/AD as needed  -    ADL OT LTG, Date Goal Reviewed 01/26/18  -CS 01/24/18  -     ADL OT LTG, Outcome goal not met  -      Functional Mobility OT LTG    Functional Mobility Goal OT LTG, Date Established   01/24/18  -    Functional Mobility Goal OT LTG, Time to Achieve   by discharge  -    Functional Mobility Goal OT LTG, [place] Level   contact guard  -    Functional Mobility Goal OT LTG, Assist Device   --   AE/AD as needed  -    Functional Mobility Goal  OT LTG, Distance to Achieve   to the bathroom   to the toilet  -    Functional Mobility Goal OT LTG, Date Goal Reviewed 01/26/18  -CS 01/24/18  -     Functional Mobility Goal OT LTG, Outcome goal not met  -        User Key  (r) = Recorded By, (t) = Taken By, (c) = Cosigned By    Initials Name Provider Type     Marisol Burdick, OTR/L Occupational Therapist     Lianne Hernandez, NELSON/L Occupational Therapy Assistant     Massiel Santana, NELSON/L Occupational Therapy Assistant     Karen Michel, NELSON/L Occupational Therapy Assistant    NN Rocío Desai, OTR/L Occupational Therapist    MR Itzel HENSON Chance, OT Occupational Therapist          Occupational Therapy Education     Title: PT OT SLP Therapies (Active)     Topic: Occupational Therapy (Done)     Point: ADL training (Done)    Description: Instruct learner(s) on proper safety adaptation and remediation techniques during self care or transfers.   Instruct in proper use of assistive devices.    Learning Progress Summary    Learner Readiness Method Response Comment Documented by Status   Patient Acceptance E VU,NR ed on sitting and perf adls secondary to safety precautions  02/05/18 1359 Done    Acceptance E VU   02/02/18 1524 Done    Acceptance E VU,NR Educated about OT and POC. Educated on safety throughout with t/f and ADL. Educated on AE for LB dressing/bathing. Educated to call for assist and not get up on her own.  02/01/18 1250 Done    Acceptance E NR   01/31/18 1445 Active    Acceptance E NR   01/30/18 1523 Active    Acceptance E VU   01/29/18 1435 Done    Acceptance E NR   01/27/18 1441 Active    Acceptance E VU Pt educated on role of OT and POC. Pt educated on benefit of activity, safety with t/f, AD/AE to increase independence with ADL's, and not to get up alone to call for assistance. MR 01/26/18 1629 Done   Family Acceptance E VU,NR ed on sitting and perf adls secondary to safety precautions  02/05/18 1359 Done                Point: Home exercise program (Done)    Description: Instruct learner(s) on appropriate technique for monitoring, assisting and/or progressing therapeutic exercises/activities.    Learning Progress Summary    Learner Readiness Method Response Comment Documented by Status   Patient Acceptance E VU,NR ed on sitting and perf adls secondary to safety precautions  02/05/18 1359 Done    Acceptance E VU   02/02/18 1524 Done    Acceptance E VU   01/29/18 1435 Done    Acceptance E NR   01/27/18 1441 Active    Acceptance E VU Pt educated on role of OT and POC. Pt educated on benefit of activity, safety with t/f, AD/AE to increase independence with ADL's, and not to get up alone to call for assistance. MR 01/26/18 1629 Done   Family Acceptance E VU,NR ed on sitting and perf adls secondary to safety precautions  02/05/18 1359 Done               Point: Precautions (Done)    Description: Instruct learner(s) on prescribed precautions during self-care and functional transfers.    Learning Progress Summary    Learner Readiness Method Response Comment Documented by Status   Patient Acceptance E VU,NR ed on sitting and perf adls secondary to safety precautions  02/05/18 1359 Done    Acceptance E VU   02/02/18 1524 Done    Acceptance E VU,NR Educated about OT and POC. Educated on safety throughout with t/f and ADL. Educated on AE for LB dressing/bathing. Educated to call for assist and not get up on her own.  02/01/18 1250 Done    Acceptance E NR   01/31/18 1445 Active    Acceptance E VU   01/29/18 1435 Done    Acceptance E NR   01/27/18 1441 Active    Acceptance E VU Pt educated on role of OT and POC. Pt educated on benefit of activity, safety with t/f, AD/AE to increase independence with ADL's, and not to get up alone to call for assistance. MR 01/26/18 1629 Done   Family Acceptance E VU,NR ed on sitting and perf adls secondary to safety precautions  02/05/18 1359 Done               Point: Body mechanics  (Done)    Description: Instruct learner(s) on proper positioning and spine alignment during self-care, functional mobility activities and/or exercises.    Learning Progress Summary    Learner Readiness Method Response Comment Documented by Status   Patient Acceptance E VU,NR ed on sitting and perf adls secondary to safety precautions  02/05/18 1359 Done    Acceptance E VU   02/02/18 1524 Done    Acceptance E VU,NR Educated about OT and POC. Educated on safety throughout with t/f and ADL. Educated on AE for LB dressing/bathing. Educated to call for assist and not get up on her own.  02/01/18 1250 Done    Acceptance E NR   01/31/18 1445 Active    Acceptance E VU   01/29/18 1435 Done    Acceptance E NR   01/27/18 1441 Active    Acceptance E VU Pt educated on role of OT and POC. Pt educated on benefit of activity, safety with t/f, AD/AE to increase independence with ADL's, and not to get up alone to call for assistance. MR 01/26/18 1629 Done   Family Acceptance E VU,NR ed on sitting and perf adls secondary to safety precautions  02/05/18 1359 Done                      User Key     Initials Effective Dates Name Provider Type Discipline     10/17/16 -  AYAD Vazquez/L Occupational Therapist OT     10/17/16 -  OMAR Shah/L Occupational Therapy Assistant OT     10/17/16 -  OMAR Malone/L Occupational Therapy Assistant OT     11/08/17 -  Itzel Fletcher OT Occupational Therapist OT                  OT Recommendation and Plan  Anticipated Discharge Disposition: home with assist, home with home health, home with /7 care  Planned Therapy Interventions: activity intolerance, adaptive equipment training, ADL retraining, IADL retraining, balance training, bed mobility training, energy conservation, home exercise program, joint mobilization, motor coordination training, ROM (Range of Motion), strengthening, stretching, transfer training  Therapy Frequency:  (3-14 x/wk)  Plan of  Care Review  Plan Of Care Reviewed With: patient  Progress: progress towards functional goals is fair  Outcome Summary/Follow up Plan: pt made good progress with OT although difficulty with memory at times and retaining infor on sfaety and ed with adls to increase perf.  Pt cont MCCABE -min for most all tasks and tf for incresaed safety   pt to dc to snf this date for cont OT PT        Outcome Measures       02/05/18 1400 02/05/18 0900       How much help from another person do you currently need...    Turning from your back to your side while in flat bed without using bedrails?  4  -RW     Moving from lying on back to sitting on the side of a flat bed without bedrails?  3  -RW     Moving to and from a bed to a chair (including a wheelchair)?  3  -RW     Standing up from a chair using your arms (e.g., wheelchair, bedside chair)?  3  -RW     Climbing 3-5 steps with a railing?  3  -RW     To walk in hospital room?  3  -RW     AM-PAC 6 Clicks Score  19  -RW     How much help from another is currently needed...    Putting on and taking off regular lower body clothing? 3  -LM      Bathing (including washing, rinsing, and drying) 3  -LM      Toileting (which includes using toilet bed pan or urinal) 3  -LM      Putting on and taking off regular upper body clothing 3  -LM      Taking care of personal grooming (such as brushing teeth) 3  -LM      Eating meals 4  -LM      Score 19  -LM        User Key  (r) = Recorded By, (t) = Taken By, (c) = Cosigned By    Initials Name Provider Type    YULISA Jose, JEANNETTE Physical Therapy Assistant    OMAR Ayers/L Occupational Therapy Assistant           Time Calculation:         Time Calculation- OT       02/05/18 1347          Time Calculation- OT    OT Start Time 1120  -LM      OT Stop Time 1200  -LM      OT Time Calculation (min) 40 min  -LM      Total Timed Code Minutes- OT 40 minute(s)  -LM      OT Received On 02/05/18  -LM        User Key  (r) = Recorded By, (t) =  Taken By, (c) = Cosigned By    Initials Name Provider Type     OMAR Malone/L Occupational Therapy Assistant           Therapy Charges for Today     Code Description Service Date Service Provider Modifiers Qty    [id number] HC OT SELF CARE/MGMT/TRAIN EA 15 MIN 2/5/2018 OMAR Malone/L GO 3          OT G-codes  OT Professional Judgement Used?: Yes  OT Functional Scales Options: AM-PAC 6 Clicks Daily Activity (OT)  Score: 15  Functional Limitation: Self care  Self Care Current Status (): At least 60 percent but less than 80 percent impaired, limited or restricted  Self Care Goal Status (): At least 40 percent but less than 60 percent impaired, limited or restricted    JENNIFFER Malone  2/5/2018

## 2018-02-05 NOTE — PLAN OF CARE
Problem: Patient Care Overview (Adult)  Goal: Plan of Care Review  Outcome: Ongoing (interventions implemented as appropriate)  Patient   02/05/18 0414   Coping/Psychosocial Response Interventions   Plan Of Care Reviewed With patient   Patient Care Overview   Progress improving   t rested well this shift, voided times 2 on her own with little or not post residual, Patient is very nervous about going to [place] for rehab. Will continue to monitor.    Problem: Fractured Hip (Adult)  Goal: Signs and Symptoms of Listed Potential Problems Will be Absent or Manageable (Fractured Hip)   02/05/18 0414   Fractured Hip   Problems Assessed (Fractured Hip) all   Problems Present (Fractured Hip) pain;functional deficit/self-care deficit       Problem: Fall Risk (Adult)  Goal: Absence of Falls  Outcome: Outcome(s) achieved Date Met: 02/05/18 02/05/18 0414   Fall Risk (Adult)   Absence of Falls achieves outcome       Problem: Functional Deficit (Adult,Obstetrics,Pediatric)  Goal: Signs and Symptoms of Listed Potential Problems Will be Absent or Manageable (Functional Deficit)  Outcome: Ongoing (interventions implemented as appropriate)   02/05/18 0414   Functional Deficit   Problems Assessed (Functional Deficit) all   Problems Present (Functional Deficit) pain;functional activity tolerance impairment;mobility impairment

## 2018-02-06 ENCOUNTER — APPOINTMENT (OUTPATIENT)
Dept: PULMONOLOGY | Facility: HOSPITAL | Age: 83
End: 2018-02-06

## 2018-02-06 ENCOUNTER — EPISODE CHANGES (OUTPATIENT)
Dept: CASE MANAGEMENT | Facility: OTHER | Age: 83
End: 2018-02-06

## 2018-02-12 ENCOUNTER — HOSPITAL ENCOUNTER (INPATIENT)
Facility: HOSPITAL | Age: 83
LOS: 2 days | Discharge: NURSING, REHAB OR LONG-TERM CARE | End: 2018-02-14
Attending: EMERGENCY MEDICINE | Admitting: HOSPITALIST

## 2018-02-12 ENCOUNTER — ANTICOAGULATION VISIT (OUTPATIENT)
Dept: PHARMACY | Facility: HOSPITAL | Age: 83
End: 2018-02-12

## 2018-02-12 ENCOUNTER — APPOINTMENT (OUTPATIENT)
Dept: GENERAL RADIOLOGY | Facility: HOSPITAL | Age: 83
End: 2018-02-12

## 2018-02-12 DIAGNOSIS — K92.2 GASTROINTESTINAL HEMORRHAGE, UNSPECIFIED GASTROINTESTINAL HEMORRHAGE TYPE: ICD-10-CM

## 2018-02-12 DIAGNOSIS — D64.9 SYMPTOMATIC ANEMIA: Primary | ICD-10-CM

## 2018-02-12 DIAGNOSIS — Z74.09 IMPAIRED FUNCTIONAL MOBILITY, BALANCE, GAIT, AND ENDURANCE: ICD-10-CM

## 2018-02-12 DIAGNOSIS — Z74.09 IMPAIRED MOBILITY AND ACTIVITIES OF DAILY LIVING: ICD-10-CM

## 2018-02-12 DIAGNOSIS — D64.9 ANEMIA, UNSPECIFIED TYPE: ICD-10-CM

## 2018-02-12 DIAGNOSIS — R06.02 SHORTNESS OF BREATH: ICD-10-CM

## 2018-02-12 DIAGNOSIS — Z78.9 IMPAIRED MOBILITY AND ACTIVITIES OF DAILY LIVING: ICD-10-CM

## 2018-02-12 LAB
ABO GROUP BLD: NORMAL
ALBUMIN SERPL-MCNC: 2.9 G/DL (ref 3.4–4.8)
ALBUMIN/GLOB SERPL: 1 G/DL (ref 1.1–1.8)
ALP SERPL-CCNC: 65 U/L (ref 38–126)
ALT SERPL W P-5'-P-CCNC: 37 U/L (ref 9–52)
ANION GAP SERPL CALCULATED.3IONS-SCNC: 11 MMOL/L (ref 5–15)
AST SERPL-CCNC: 24 U/L (ref 14–36)
BASOPHILS # BLD MANUAL: 0.27 10*3/MM3 (ref 0–0.2)
BASOPHILS NFR BLD AUTO: 2 % (ref 0–2)
BILIRUB SERPL-MCNC: 0.3 MG/DL (ref 0.2–1.3)
BLD GP AB SCN SERPL QL: NEGATIVE
BUN BLD-MCNC: 47 MG/DL (ref 7–21)
BUN/CREAT SERPL: 58.8 (ref 7–25)
CALCIUM SPEC-SCNC: 8.2 MG/DL (ref 8.4–10.2)
CHLORIDE SERPL-SCNC: 97 MMOL/L (ref 95–110)
CO2 SERPL-SCNC: 24 MMOL/L (ref 22–31)
CREAT BLD-MCNC: 0.8 MG/DL (ref 0.5–1)
DEPRECATED RDW RBC AUTO: 49 FL (ref 36.4–46.3)
ERYTHROCYTE [DISTWIDTH] IN BLOOD BY AUTOMATED COUNT: 14.6 % (ref 11.5–14.5)
GFR SERPL CREATININE-BSD FRML MDRD: 68 ML/MIN/1.73 (ref 39–90)
GLOBULIN UR ELPH-MCNC: 2.9 GM/DL (ref 2.3–3.5)
GLUCOSE BLD-MCNC: 99 MG/DL (ref 60–100)
HCT VFR BLD AUTO: 19.7 % (ref 35–45)
HGB BLD-MCNC: 6.8 G/DL (ref 12–15.5)
HOLD SPECIMEN: NORMAL
HOLD SPECIMEN: NORMAL
INR PPP: 2.53 (ref 0.8–1.2)
LYMPHOCYTES # BLD MANUAL: 3.62 10*3/MM3 (ref 0.6–4.2)
LYMPHOCYTES NFR BLD MANUAL: 27 % (ref 10–50)
LYMPHOCYTES NFR BLD MANUAL: 5 % (ref 0–12)
Lab: NORMAL
MCH RBC QN AUTO: 32.1 PG (ref 26.5–34)
MCHC RBC AUTO-ENTMCNC: 34.5 G/DL (ref 31.4–36)
MCV RBC AUTO: 92.9 FL (ref 80–98)
METAMYELOCYTES NFR BLD MANUAL: 1 % (ref 0–0)
MONOCYTES # BLD AUTO: 0.67 10*3/MM3 (ref 0–0.9)
NEUTROPHILS # BLD AUTO: 8.57 10*3/MM3 (ref 2–8.6)
NEUTROPHILS NFR BLD MANUAL: 64 % (ref 37–80)
NT-PROBNP SERPL-MCNC: 569 PG/ML (ref 0–1800)
PLATELET # BLD AUTO: 424 10*3/MM3 (ref 150–450)
PMV BLD AUTO: 8.8 FL (ref 8–12)
POLYCHROMASIA BLD QL SMEAR: ABNORMAL
POTASSIUM BLD-SCNC: 4.1 MMOL/L (ref 3.5–5.1)
PROT SERPL-MCNC: 5.8 G/DL (ref 6.3–8.6)
PROTHROMBIN TIME: 27.5 SECONDS (ref 11.1–15.3)
RBC # BLD AUTO: 2.12 10*6/MM3 (ref 3.77–5.16)
RH BLD: POSITIVE
SMALL PLATELETS BLD QL SMEAR: ADEQUATE
SODIUM BLD-SCNC: 132 MMOL/L (ref 137–145)
TROPONIN I SERPL-MCNC: <0.012 NG/ML
TROPONIN I SERPL-MCNC: <0.012 NG/ML
VARIANT LYMPHS NFR BLD MANUAL: 1 % (ref 0–5)
WBC MORPH BLD: NORMAL
WBC NRBC COR # BLD: 13.39 10*3/MM3 (ref 3.2–9.8)
WHOLE BLOOD HOLD SPECIMEN: NORMAL
WHOLE BLOOD HOLD SPECIMEN: NORMAL

## 2018-02-12 PROCEDURE — 99285 EMERGENCY DEPT VISIT HI MDM: CPT

## 2018-02-12 PROCEDURE — 36430 TRANSFUSION BLD/BLD COMPNT: CPT

## 2018-02-12 PROCEDURE — 84484 ASSAY OF TROPONIN QUANT: CPT | Performed by: EMERGENCY MEDICINE

## 2018-02-12 PROCEDURE — 83880 ASSAY OF NATRIURETIC PEPTIDE: CPT | Performed by: EMERGENCY MEDICINE

## 2018-02-12 PROCEDURE — 94760 N-INVAS EAR/PLS OXIMETRY 1: CPT

## 2018-02-12 PROCEDURE — 86850 RBC ANTIBODY SCREEN: CPT | Performed by: EMERGENCY MEDICINE

## 2018-02-12 PROCEDURE — P9016 RBC LEUKOCYTES REDUCED: HCPCS

## 2018-02-12 PROCEDURE — 86900 BLOOD TYPING SEROLOGIC ABO: CPT | Performed by: EMERGENCY MEDICINE

## 2018-02-12 PROCEDURE — 86923 COMPATIBILITY TEST ELECTRIC: CPT

## 2018-02-12 PROCEDURE — 85025 COMPLETE CBC W/AUTO DIFF WBC: CPT | Performed by: EMERGENCY MEDICINE

## 2018-02-12 PROCEDURE — 51798 US URINE CAPACITY MEASURE: CPT

## 2018-02-12 PROCEDURE — 94642 AEROSOL INHALATION TREATMENT: CPT

## 2018-02-12 PROCEDURE — 86900 BLOOD TYPING SEROLOGIC ABO: CPT

## 2018-02-12 PROCEDURE — 86901 BLOOD TYPING SEROLOGIC RH(D): CPT | Performed by: EMERGENCY MEDICINE

## 2018-02-12 PROCEDURE — 94640 AIRWAY INHALATION TREATMENT: CPT

## 2018-02-12 PROCEDURE — 93005 ELECTROCARDIOGRAM TRACING: CPT | Performed by: EMERGENCY MEDICINE

## 2018-02-12 PROCEDURE — 85007 BL SMEAR W/DIFF WBC COUNT: CPT | Performed by: EMERGENCY MEDICINE

## 2018-02-12 PROCEDURE — 80053 COMPREHEN METABOLIC PANEL: CPT | Performed by: EMERGENCY MEDICINE

## 2018-02-12 PROCEDURE — 93010 ELECTROCARDIOGRAM REPORT: CPT | Performed by: INTERNAL MEDICINE

## 2018-02-12 PROCEDURE — 71045 X-RAY EXAM CHEST 1 VIEW: CPT

## 2018-02-12 PROCEDURE — 85610 PROTHROMBIN TIME: CPT | Performed by: EMERGENCY MEDICINE

## 2018-02-12 RX ORDER — ACETAMINOPHEN 325 MG/1
650 TABLET ORAL EVERY 4 HOURS PRN
Status: DISCONTINUED | OUTPATIENT
Start: 2018-02-12 | End: 2018-02-14 | Stop reason: HOSPADM

## 2018-02-12 RX ORDER — RANOLAZINE 500 MG/1
1000 TABLET, EXTENDED RELEASE ORAL EVERY 12 HOURS SCHEDULED
Status: DISCONTINUED | OUTPATIENT
Start: 2018-02-12 | End: 2018-02-14 | Stop reason: HOSPADM

## 2018-02-12 RX ORDER — NITROGLYCERIN 40 MG/1
1 PATCH TRANSDERMAL DAILY
Status: DISCONTINUED | OUTPATIENT
Start: 2018-02-12 | End: 2018-02-14 | Stop reason: HOSPADM

## 2018-02-12 RX ORDER — MONTELUKAST SODIUM 10 MG/1
10 TABLET ORAL NIGHTLY
Status: DISCONTINUED | OUTPATIENT
Start: 2018-02-12 | End: 2018-02-14 | Stop reason: HOSPADM

## 2018-02-12 RX ORDER — CARVEDILOL 6.25 MG/1
6.25 TABLET ORAL 2 TIMES DAILY WITH MEALS
Status: DISCONTINUED | OUTPATIENT
Start: 2018-02-12 | End: 2018-02-14 | Stop reason: HOSPADM

## 2018-02-12 RX ORDER — ATORVASTATIN CALCIUM 20 MG/1
20 TABLET, FILM COATED ORAL DAILY
Status: DISCONTINUED | OUTPATIENT
Start: 2018-02-12 | End: 2018-02-14 | Stop reason: HOSPADM

## 2018-02-12 RX ORDER — FUROSEMIDE 20 MG/1
20 TABLET ORAL DAILY
Status: DISCONTINUED | OUTPATIENT
Start: 2018-02-13 | End: 2018-02-14 | Stop reason: HOSPADM

## 2018-02-12 RX ORDER — SODIUM CHLORIDE 0.9 % (FLUSH) 0.9 %
1-10 SYRINGE (ML) INJECTION AS NEEDED
Status: DISCONTINUED | OUTPATIENT
Start: 2018-02-12 | End: 2018-02-14 | Stop reason: HOSPADM

## 2018-02-12 RX ORDER — FAMOTIDINE 40 MG/1
40 TABLET, FILM COATED ORAL DAILY
Status: DISCONTINUED | OUTPATIENT
Start: 2018-02-12 | End: 2018-02-13

## 2018-02-12 RX ORDER — HYDROCODONE BITARTRATE AND ACETAMINOPHEN 5; 325 MG/1; MG/1
1 TABLET ORAL EVERY 6 HOURS PRN
Status: DISCONTINUED | OUTPATIENT
Start: 2018-02-12 | End: 2018-02-14 | Stop reason: HOSPADM

## 2018-02-12 RX ORDER — LEVOTHYROXINE SODIUM 0.05 MG/1
50 TABLET ORAL
Status: DISCONTINUED | OUTPATIENT
Start: 2018-02-13 | End: 2018-02-14 | Stop reason: HOSPADM

## 2018-02-12 RX ORDER — FLUOROMETHOLONE 0.1 %
1 SUSPENSION, DROPS(FINAL DOSAGE FORM)(ML) OPHTHALMIC (EYE) 4 TIMES DAILY
Status: DISCONTINUED | OUTPATIENT
Start: 2018-02-12 | End: 2018-02-14 | Stop reason: HOSPADM

## 2018-02-12 RX ORDER — IPRATROPIUM BROMIDE AND ALBUTEROL SULFATE 2.5; .5 MG/3ML; MG/3ML
3 SOLUTION RESPIRATORY (INHALATION) EVERY 6 HOURS PRN
Status: DISCONTINUED | OUTPATIENT
Start: 2018-02-12 | End: 2018-02-14 | Stop reason: HOSPADM

## 2018-02-12 RX ORDER — BUDESONIDE AND FORMOTEROL FUMARATE DIHYDRATE 160; 4.5 UG/1; UG/1
2 AEROSOL RESPIRATORY (INHALATION)
Status: DISCONTINUED | OUTPATIENT
Start: 2018-02-12 | End: 2018-02-14 | Stop reason: HOSPADM

## 2018-02-12 RX ORDER — SODIUM CHLORIDE 0.9 % (FLUSH) 0.9 %
10 SYRINGE (ML) INJECTION AS NEEDED
Status: DISCONTINUED | OUTPATIENT
Start: 2018-02-12 | End: 2018-02-14 | Stop reason: HOSPADM

## 2018-02-12 RX ORDER — FERROUS SULFATE TAB EC 324 MG (65 MG FE EQUIVALENT) 324 (65 FE) MG
324 TABLET DELAYED RESPONSE ORAL
Status: DISCONTINUED | OUTPATIENT
Start: 2018-02-13 | End: 2018-02-14 | Stop reason: HOSPADM

## 2018-02-12 RX ADMIN — CARVEDILOL 6.25 MG: 6.25 TABLET, FILM COATED ORAL at 20:47

## 2018-02-12 RX ADMIN — SERTRALINE HYDROCHLORIDE 50 MG: 50 TABLET ORAL at 20:48

## 2018-02-12 RX ADMIN — ATORVASTATIN CALCIUM 20 MG: 20 TABLET, FILM COATED ORAL at 20:47

## 2018-02-12 RX ADMIN — NITROGLYCERIN 1 PATCH: 0.2 PATCH TRANSDERMAL at 20:46

## 2018-02-12 RX ADMIN — BUDESONIDE AND FORMOTEROL FUMARATE DIHYDRATE 2 PUFF: 160; 4.5 AEROSOL RESPIRATORY (INHALATION) at 22:15

## 2018-02-12 RX ADMIN — FAMOTIDINE 40 MG: 40 TABLET ORAL at 20:46

## 2018-02-12 RX ADMIN — Medication 10 ML: at 20:49

## 2018-02-12 RX ADMIN — IPRATROPIUM BROMIDE AND ALBUTEROL SULFATE 3 ML: 2.5; .5 SOLUTION RESPIRATORY (INHALATION) at 22:15

## 2018-02-12 RX ADMIN — FLUOROMETHOLONE 1 DROP: 1 SOLUTION/ DROPS OPHTHALMIC at 20:48

## 2018-02-12 RX ADMIN — Medication 1 TABLET: at 20:46

## 2018-02-12 RX ADMIN — MONTELUKAST SODIUM 10 MG: 10 TABLET, FILM COATED ORAL at 20:48

## 2018-02-12 RX ADMIN — RANOLAZINE 1000 MG: 500 TABLET, FILM COATED, EXTENDED RELEASE ORAL at 20:48

## 2018-02-12 NOTE — ED PROVIDER NOTES
Subjective   History of Present Illness  85-year-old female presents to the emergency department because of shortness of breath very poor feeling overall and nausea.  Supposedly she is feeling this way since last night.  She was recently in the hospital secondary to a right-sided hip fracture that required surgery.  She reportedly was here for a few weeks and then transferred to [facility] for further care.  Family noted that she was normal with a visitor yesterday during the day and reportedly his symptoms started last night.  She does have a previous history of COPD but states that these symptoms are different than her COPD symptoms.   Review of Systems   Constitutional: Positive for fatigue. Negative for chills and fever.   HENT: Negative for rhinorrhea, sinus pressure and sneezing.    Eyes: Negative for pain and redness.   Respiratory: Positive for shortness of breath. Negative for cough.    Cardiovascular: Negative for leg swelling.   Gastrointestinal: Negative for abdominal pain, diarrhea, nausea and vomiting.   Genitourinary: Negative for dysuria, flank pain, hematuria and urgency.   Musculoskeletal: Negative for arthralgias, back pain and joint swelling.   Skin: Negative for rash.   Neurological: Positive for weakness. Negative for dizziness, syncope, numbness and headaches.   Hematological: Negative.    Psychiatric/Behavioral: Negative for self-injury and suicidal ideas.       Past Medical History:   Diagnosis Date   • Acquired hypothyroidism    • Allergic rhinitis    • CHF (congestive heart failure)    • COPD (chronic obstructive pulmonary disease)    • Corneal epithelial dystrophy    • Coronary arteriosclerosis    • Depression    • Generalized atherosclerosis    • GERD (gastroesophageal reflux disease)    • Hemorrhoids    • History of bone density study 08/03/2012    Lumbar spine Osteopenia. Femoral Osteopenia   • History of mammogram 08/20/2004    Birads Category 2 Benign   • History of  Papanicolaou smear of cervix 08/12/2004    NEGATIVE   • Hypercholesterolemia    • Hyperlipidemia    • Hypertension    • Myocardial infarction    • Nonexudative age-related macular degeneration    • Osteoarthritis of multiple joints    • Pseudophakia    • Punctate keratitis     - history Thygeson's keratopathy      • Tobacco dependence syndrome        Allergies   Allergen Reactions   • Ace Inhibitors    • Lisinopril Cough       Past Surgical History:   Procedure Laterality Date   • APPENDECTOMY     • BREAST SURGERY  03/19/1954    Excision, breast lesion (1)  Excision of fibroma. 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 patency in the circumflex artery site of previous angioplasty. Kathe. patency in RCA.Nonobstructive 20-30% stenosis in the LAD and diagonal branch. Preserved LV systolic function with Ef of 55%   • CERVICAL SPINE SURGERY  09/16/1974    Excision of cervical disc, C5-6, C6-7, anterior cervical fusion, C5-6 with iliac bone graft.cervical spondylosis,C5-6, C6-7   • COLONOSCOPY  01/01/2013    patient declined    • CORONARY ANGIOPLASTY  07/21/1993    Angioplasty, coronary (2)    RCA    • DILATATION AND CURETTAGE      3   • ENDOSCOPY AND COLONOSCOPY  10/01/1998    Hemorhoids Rectal Outlet Bleeding   • ESOPHAGOSCOPY / EGD  06/28/2004    Nonerosive gastritis of the body of the stomach. A biopsy was obtained & placed in h. Pylori test agar. Multiple biopsies were obtained from the body of the stomach   • HIP BIPOLAR REPLACEMENT Right 1/23/2018    Procedure: HIP BIPOLAR ANTERIOR APPROACH - right;  Surgeon: Kelvin Mcdowell MD;  Location: [facility];  Service:    • INJECTION OF MEDICATION  11/23/2015    Depo Medrol (Methylprednisone) (5)    • INJECTION OF MEDICATION  02/04/2016    Kenalog (3)     • PACEMAKER REPLACEMENT  2006       Family History   Problem Relation Age of Onset   • Coronary artery disease Other        Social History     Social  History   • Marital status:      Spouse name: N/A   • Number of children: N/A   • Years of education: N/A     Social History Main Topics   • Smoking status: Former Smoker     Packs/day: 0.50     Years: 50.00     Types: Cigarettes     Start date: 1950     Quit date: 1/12/2017   • Smokeless tobacco: Never Used   • Alcohol use No   • Drug use: No   • Sexual activity: Defer     Other Topics Concern   • None     Social History Narrative   • None           Objective   Physical Exam   Constitutional: She is oriented to person, place, and time. She appears well-developed and well-nourished.   HENT:   Head: Normocephalic and atraumatic.   Eyes: EOM are normal. Pupils are equal, round, and reactive to light.   Neck: Normal range of motion. Neck supple.   Cardiovascular: Normal rate, regular rhythm and normal heart sounds.    Pulmonary/Chest: Effort normal.   Patient has some faint expiratory wheezing   Abdominal: Soft. Bowel sounds are normal. She exhibits no distension. There is no tenderness. There is no rebound and no guarding.   Genitourinary:   Genitourinary Comments: Brown stool.  Guaiac positive   Musculoskeletal: Normal range of motion.   Neurological: She is alert and oriented to person, place, and time.   Skin: Skin is warm and dry.   Psychiatric: She has a normal mood and affect.   Nursing note and vitals reviewed.      Critical Care  Performed by: MARLON CEE  Authorized by: MARLON CEE     Critical care provider statement:     Critical care time (minutes):  35    Critical care time was exclusive of:  Separately billable procedures and treating other patients    Critical care was necessary to treat or prevent imminent or life-threatening deterioration of the following conditions:  Circulatory failure (Symptomatic anemia)    Critical care was time spent personally by me on the following activities:  Blood draw for specimens, ordering and performing treatments and interventions,  ordering and review of laboratory studies, development of treatment plan with patient or surrogate, ordering and review of radiographic studies, discussions with consultants, pulse oximetry, re-evaluation of patient's condition, review of old charts, examination of patient, evaluation of patient's response to treatment, interpretation of cardiac output measurements and obtaining history from patient or surrogate             ED Course  ED Course                  MDM  Number of Diagnoses or Management Options  Gastrointestinal hemorrhage, unspecified gastrointestinal hemorrhage type:   Shortness of breath:   Symptomatic anemia:   Diagnosis management comments: Symptomatic anemia with a hemoglobin of 6.8.  Discussed risks of transfusion with the patient and her family.  We'll transfuse the patient 2 units.  She is also guaiac positive.  We will admit to the hospitalist for transfusion as well as GI consultation.  It's possible this could be a combination of etiologies including GI blood loss as well as postoperatively.  She did receive 2 units in the perioperative period for loss during surgery.  I discussed all this with the patient and her family and they're agreeable to the plan.      Final diagnoses:   Symptomatic anemia   Gastrointestinal hemorrhage, unspecified gastrointestinal hemorrhage type   Shortness of breath            Ehsan Franco MD  02/12/18 5130

## 2018-02-12 NOTE — PROGRESS NOTES
Patient was admitted to [facility] ER today from [facility]    Symptomatic anemia [D64.9]    Gastrointestinal hemorrhage, unspecified gastrointestinal hemorrhage type [K92.2]    Shortness of breath [R06.02]      INR 2.53  Hg 6.8    Will let clinical pharmacist know for tomorrow to follow.  I put follow up date for next INR for this Friday even though they are in-patient and put hold warfarin for this week on calendar, this will close the encounter until Friday.  I will however drag her to the warfarin scoring list to remind us to follow.    Giovanni Santana, [facility]  3:27 PM

## 2018-02-13 LAB
ANION GAP SERPL CALCULATED.3IONS-SCNC: 8 MMOL/L (ref 5–15)
BASOPHILS # BLD AUTO: 0.06 10*3/MM3 (ref 0–0.2)
BASOPHILS NFR BLD AUTO: 0.5 % (ref 0–2)
BUN BLD-MCNC: 37 MG/DL (ref 7–21)
BUN/CREAT SERPL: 44.6 (ref 7–25)
CALCIUM SPEC-SCNC: 8.1 MG/DL (ref 8.4–10.2)
CHLORIDE SERPL-SCNC: 102 MMOL/L (ref 95–110)
CO2 SERPL-SCNC: 26 MMOL/L (ref 22–31)
CREAT BLD-MCNC: 0.83 MG/DL (ref 0.5–1)
DEPRECATED RDW RBC AUTO: 56.1 FL (ref 36.4–46.3)
EOSINOPHIL # BLD AUTO: 0.34 10*3/MM3 (ref 0–0.7)
EOSINOPHIL NFR BLD AUTO: 3.1 % (ref 0–7)
ERYTHROCYTE [DISTWIDTH] IN BLOOD BY AUTOMATED COUNT: 17.5 % (ref 11.5–14.5)
GFR SERPL CREATININE-BSD FRML MDRD: 65 ML/MIN/1.73 (ref 39–90)
GLUCOSE BLD-MCNC: 87 MG/DL (ref 60–100)
HCT VFR BLD AUTO: 23.4 % (ref 35–45)
HCT VFR BLD AUTO: 25.1 % (ref 35–45)
HGB BLD-MCNC: 8.1 G/DL (ref 12–15.5)
HGB BLD-MCNC: 8.3 G/DL (ref 12–15.5)
IMM GRANULOCYTES # BLD: 0.12 10*3/MM3 (ref 0–0.02)
IMM GRANULOCYTES NFR BLD: 1.1 % (ref 0–0.5)
LYMPHOCYTES # BLD AUTO: 2.07 10*3/MM3 (ref 0.6–4.2)
LYMPHOCYTES NFR BLD AUTO: 18.8 % (ref 10–50)
MCH RBC QN AUTO: 30.8 PG (ref 26.5–34)
MCHC RBC AUTO-ENTMCNC: 34.6 G/DL (ref 31.4–36)
MCV RBC AUTO: 89 FL (ref 80–98)
MONOCYTES # BLD AUTO: 0.83 10*3/MM3 (ref 0–0.9)
MONOCYTES NFR BLD AUTO: 7.6 % (ref 0–12)
NEUTROPHILS # BLD AUTO: 7.57 10*3/MM3 (ref 2–8.6)
NEUTROPHILS NFR BLD AUTO: 68.9 % (ref 37–80)
PLATELET # BLD AUTO: 367 10*3/MM3 (ref 150–450)
PMV BLD AUTO: 9 FL (ref 8–12)
POTASSIUM BLD-SCNC: 3.8 MMOL/L (ref 3.5–5.1)
RBC # BLD AUTO: 2.63 10*6/MM3 (ref 3.77–5.16)
SODIUM BLD-SCNC: 136 MMOL/L (ref 137–145)
WBC NRBC COR # BLD: 10.99 10*3/MM3 (ref 3.2–9.8)

## 2018-02-13 PROCEDURE — 94760 N-INVAS EAR/PLS OXIMETRY 1: CPT

## 2018-02-13 PROCEDURE — G8978 MOBILITY CURRENT STATUS: HCPCS

## 2018-02-13 PROCEDURE — G8988 SELF CARE GOAL STATUS: HCPCS

## 2018-02-13 PROCEDURE — 94799 UNLISTED PULMONARY SVC/PX: CPT

## 2018-02-13 PROCEDURE — 97110 THERAPEUTIC EXERCISES: CPT

## 2018-02-13 PROCEDURE — 99232 SBSQ HOSP IP/OBS MODERATE 35: CPT | Performed by: INTERNAL MEDICINE

## 2018-02-13 PROCEDURE — 97161 PT EVAL LOW COMPLEX 20 MIN: CPT

## 2018-02-13 PROCEDURE — 85018 HEMOGLOBIN: CPT | Performed by: NURSE PRACTITIONER

## 2018-02-13 PROCEDURE — 97166 OT EVAL MOD COMPLEX 45 MIN: CPT

## 2018-02-13 PROCEDURE — 99024 POSTOP FOLLOW-UP VISIT: CPT | Performed by: ORTHOPAEDIC SURGERY

## 2018-02-13 PROCEDURE — 85025 COMPLETE CBC W/AUTO DIFF WBC: CPT | Performed by: HOSPITALIST

## 2018-02-13 PROCEDURE — 85014 HEMATOCRIT: CPT | Performed by: NURSE PRACTITIONER

## 2018-02-13 PROCEDURE — G8987 SELF CARE CURRENT STATUS: HCPCS

## 2018-02-13 PROCEDURE — 80048 BASIC METABOLIC PNL TOTAL CA: CPT | Performed by: HOSPITALIST

## 2018-02-13 PROCEDURE — G8979 MOBILITY GOAL STATUS: HCPCS

## 2018-02-13 RX ORDER — PANTOPRAZOLE SODIUM 40 MG/1
40 TABLET, DELAYED RELEASE ORAL
Status: DISCONTINUED | OUTPATIENT
Start: 2018-02-14 | End: 2018-02-14 | Stop reason: HOSPADM

## 2018-02-13 RX ADMIN — MONTELUKAST SODIUM 10 MG: 10 TABLET, FILM COATED ORAL at 21:28

## 2018-02-13 RX ADMIN — BUDESONIDE AND FORMOTEROL FUMARATE DIHYDRATE 2 PUFF: 160; 4.5 AEROSOL RESPIRATORY (INHALATION) at 18:39

## 2018-02-13 RX ADMIN — LEVOTHYROXINE SODIUM 50 MCG: 50 TABLET ORAL at 06:00

## 2018-02-13 RX ADMIN — RANOLAZINE 1000 MG: 500 TABLET, FILM COATED, EXTENDED RELEASE ORAL at 11:21

## 2018-02-13 RX ADMIN — FERROUS SULFATE TAB EC 324 MG (65 MG FE EQUIVALENT) 324 MG: 324 (65 FE) TABLET DELAYED RESPONSE at 11:21

## 2018-02-13 RX ADMIN — IPRATROPIUM BROMIDE AND ALBUTEROL SULFATE 3 ML: 2.5; .5 SOLUTION RESPIRATORY (INHALATION) at 11:24

## 2018-02-13 RX ADMIN — IPRATROPIUM BROMIDE AND ALBUTEROL SULFATE 3 ML: 2.5; .5 SOLUTION RESPIRATORY (INHALATION) at 18:39

## 2018-02-13 RX ADMIN — FUROSEMIDE 20 MG: 20 TABLET ORAL at 11:21

## 2018-02-13 RX ADMIN — ATORVASTATIN CALCIUM 20 MG: 20 TABLET, FILM COATED ORAL at 11:21

## 2018-02-13 RX ADMIN — CARVEDILOL 6.25 MG: 6.25 TABLET, FILM COATED ORAL at 11:22

## 2018-02-13 RX ADMIN — Medication 1 TABLET: at 11:21

## 2018-02-13 RX ADMIN — RANOLAZINE 1000 MG: 500 TABLET, FILM COATED, EXTENDED RELEASE ORAL at 21:28

## 2018-02-13 RX ADMIN — NITROGLYCERIN 1 PATCH: 0.2 PATCH TRANSDERMAL at 09:00

## 2018-02-13 RX ADMIN — FAMOTIDINE 40 MG: 40 TABLET ORAL at 11:21

## 2018-02-13 RX ADMIN — SERTRALINE HYDROCHLORIDE 50 MG: 50 TABLET ORAL at 21:28

## 2018-02-13 RX ADMIN — POLYETHYLENE GLYCOL 3350 17 G: 17 POWDER, FOR SOLUTION ORAL at 11:20

## 2018-02-13 RX ADMIN — BUDESONIDE AND FORMOTEROL FUMARATE DIHYDRATE 2 PUFF: 160; 4.5 AEROSOL RESPIRATORY (INHALATION) at 08:33

## 2018-02-13 RX ADMIN — CARVEDILOL 6.25 MG: 6.25 TABLET, FILM COATED ORAL at 17:24

## 2018-02-13 RX ADMIN — FLUOROMETHOLONE 1 DROP: 1 SOLUTION/ DROPS OPHTHALMIC at 11:23

## 2018-02-13 NOTE — PLAN OF CARE
Problem: Patient Care Overview (Adult)  Goal: Plan of Care Review  Outcome: Ongoing (interventions implemented as appropriate)   02/13/18 1053   Coping/Psychosocial Response Interventions   Plan Of Care Reviewed With patient;family   Outcome Evaluation   Outcome Summary/Follow up Plan PT evaluation completed today. Pt with recent right hip MASHA which she was receiving therapy at [place]. She will benefit from cont skilled PT while hospitalized to help maintain strength, balance and endurance until d/c back to SNF to continue rehab       Problem: Inpatient Physical Therapy  Goal: Bed Mobility Goal STG- PT  Outcome: Ongoing (interventions implemented as appropriate)   02/13/18 1053   Bed Mobility PT STG   Bed Mobility PT STG, Date Established 02/13/18   Bed Mobility PT STG, Time to Achieve 4 days   Bed Mobility PT STG, Activity Type roll left/roll right;supine to sit/sit to supine   Bed Mobility PT STG, Noble Level independent     Goal: Transfer Training Goal 1 STG- PT  Outcome: Ongoing (interventions implemented as appropriate)   02/13/18 1053   Transfer Training PT STG   Transfer Training PT STG, Date Established 02/13/18   Transfer Training PT STG, Time to Achieve 4 days   Transfer Training PT STG, Activity Type bed to chair /chair to bed;sit to stand/stand to sit;toilet   Transfer Training PT STG, Noble Level supervision required   Transfer Training PT STG, Assist Device walker, rolling     Goal: Gait Training Goal STG- PT  Outcome: Ongoing (interventions implemented as appropriate)   02/13/18 1053   Gait Training PT STG   Gait Training Goal PT STG, Date Established 02/13/18   Gait Training Goal PT STG, Time to Achieve 4 days   Gait Training Goal PT STG, Noble Level supervision required   Gait Training Goal PT STG, Assist Device walker, rolling   Gait Training Goal PT STG, Distance to Achieve 300 ft

## 2018-02-13 NOTE — PROGRESS NOTES
Discharge Planning Assessment  [facility]     Patient Name: Val Arteaga  MRN: [id number]  Today's Date: 2/13/2018    Admit Date: 2/12/2018          Discharge Needs Assessment       02/13/18 1557    Living Environment    Lives With alone    Living Arrangements mobile home   double wide    Transportation Available car;family or friend will provide    Living Environment    Provides Primary Care For no one    Quality Of Family Relationships supportive    Able to Return to Prior Living Arrangements yes    Living Arrangement Comments lives in a double wide,  confirmed address, however pt is currently in a rehab to home unit  at [facility].     Discharge Needs Assessment    Concerns To Be Addressed denies needs/concerns at this time    Equipment Currently Used at Home oxygen    Discharge Facility/Level Of Care Needs rehabilitation facility    Current Discharge Risk dependent with mobility/activities of daily living    Discharge Disposition skilled nursing facility    Discharge Planning Comments pt is at [facility] for rehab to home.  she plans to return there upon dc.   has rx coverage and uses [facility].  has o2 from [facility]              Discharge Plan     None        Discharge Placement     No information found        Expected Discharge Date and Time     Expected Discharge Date Expected Discharge Time    Feb 15, 2018               Demographic Summary       02/13/18 1556    Referral Information    Admission Type inpatient    Arrived From rehab facility    Referral Source high risk screening    Reason For Consult discharge planning    Record Reviewed patient profile    Referral Information Comments confirmed face sheet.    Primary Care Physician Information    Name ursula            Functional Status     None            Psychosocial     None            Abuse/Neglect     None            Legal     None            Substance Abuse     None            Patient Forms     None          Marilynn Downs

## 2018-02-13 NOTE — CONSULTS
SUBJECTIVE:   2/13/2018    Name: Val Arteaga  DOD: 12/2/1932    REASON FOR CONSULT: Anemia and GI blood loss    Chief Complaint:     Chief Complaint   Patient presents with   • Chest Pain   • Shortness of Breath       Subjective     Patient is 85 y.o. female presents with Complaint of fatigue.  Patient has been passing black stool.  Patient was found to be anemic with a hemoglobin of 6.8.  Patient received 2 units packed RBCs.  Patient had a right hip fracture repair 3 weeks ago and was discharged on Coumadin patient is also Plavix and aspirin.  Patient has never had any peptic ulcer disease or abdominal pain.  Patient never been on a proton pump inhibitor.     ROS/HISTORY/ CURRENT MEDICATIONS/OBJECTIVE/VS/PE:   Review of Systems:   Review of Systems   Constitutional: Negative for activity change, appetite change, chills, diaphoresis, fatigue, fever and unexpected weight change.   HENT: Negative for sore throat and trouble swallowing.    Respiratory: Negative for shortness of breath.    Gastrointestinal: Positive for blood in stool. Negative for abdominal distention, abdominal pain, anal bleeding, constipation, diarrhea, nausea, rectal pain and vomiting.   Musculoskeletal: Negative for arthralgias.   Skin: Negative for pallor.   Neurological: Negative for light-headedness.       History:     Past Medical History:   Diagnosis Date   • Acquired hypothyroidism    • Allergic rhinitis    • CHF (congestive heart failure)    • COPD (chronic obstructive pulmonary disease)    • Corneal epithelial dystrophy    • Coronary arteriosclerosis    • Depression    • Generalized atherosclerosis    • GERD (gastroesophageal reflux disease)    • Hemorrhoids    • History of bone density study 08/03/2012    Lumbar spine Osteopenia. Femoral Osteopenia   • History of mammogram 08/20/2004    Birads Category 2 Benign   • History of Papanicolaou smear of cervix 08/12/2004    NEGATIVE   • Hypercholesterolemia    • Hyperlipidemia    •  Hypertension    • Myocardial infarction    • Nonexudative age-related macular degeneration    • Osteoarthritis of multiple joints    • Pseudophakia    • Punctate keratitis     - history Thygeson's keratopathy      • Tobacco dependence syndrome      Past Surgical History:   Procedure Laterality Date   • APPENDECTOMY     • BREAST SURGERY  03/19/1954    Excision, breast lesion (1)  Excision of fibroma. 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 patency in the circumflex artery site of previous angioplasty. Kathe. patency in RCA.Nonobstructive 20-30% stenosis in the LAD and diagonal branch. Preserved LV systolic function with Ef of 55%   • CERVICAL SPINE SURGERY  09/16/1974    Excision of cervical disc, C5-6, C6-7, anterior cervical fusion, C5-6 with iliac bone graft.cervical spondylosis,C5-6, C6-7   • COLONOSCOPY  01/01/2013    patient declined    • CORONARY ANGIOPLASTY  07/21/1993    Angioplasty, coronary (2)    RCA    • DILATATION AND CURETTAGE      3   • ENDOSCOPY AND COLONOSCOPY  10/01/1998    Hemorhoids Rectal Outlet Bleeding   • ESOPHAGOSCOPY / EGD  06/28/2004    Nonerosive gastritis of the body of the stomach. A biopsy was obtained & placed in h. Pylori test agar. Multiple biopsies were obtained from the body of the stomach   • HIP BIPOLAR REPLACEMENT Right 1/23/2018    Procedure: HIP BIPOLAR ANTERIOR APPROACH - right;  Surgeon: Kelvin Mcdowell MD;  Location: [facility];  Service:    • INJECTION OF MEDICATION  11/23/2015    Depo Medrol (Methylprednisone) (5)    • INJECTION OF MEDICATION  02/04/2016    Kenalog (3)     • PACEMAKER REPLACEMENT  2006     Family History   Problem Relation Age of Onset   • Coronary artery disease Other      Social History   Substance Use Topics   • Smoking status: Former Smoker     Packs/day: 0.50     Years: 50.00     Types: Cigarettes     Start date: 1950     Quit date: 1/12/2017   • Smokeless tobacco: Never Used   •  Alcohol use No     Prescriptions Prior to Admission   Medication Sig Dispense Refill Last Dose   • acetaminophen (TYLENOL) 325 MG tablet Take 2 tablets by mouth Every 4 (Four) Hours As Needed for Mild Pain . 1 tablet 1 2018 at Unknown time   • aspirin 81 MG EC tablet Take 81 mg by mouth Daily.   2018 at Unknown time   • atorvastatin (LIPITOR) 20 MG tablet Take 1 tablet by mouth Daily. 1 tablet 1 2018 at Unknown time   • budesonide-formoterol (SYMBICORT) 160-4.5 MCG/ACT inhaler Inhale 2 puffs 2 (Two) Times a Day. 1 inhaler 11 2018 at Unknown time   • carvedilol (COREG) 6.25 MG tablet Take 6.25 mg by mouth 2 (Two) Times a Day With Meals.   2018 at Unknown time   • estradiol (ESTRACE) 0.1 MG/GM vaginal cream One applicator q hs 45 g 1 2018 at Unknown time   • famotidine (PEPCID) 40 MG tablet Take 1 tablet by mouth Daily. 1 tablet 1 2018 at Unknown time   • ferrous sulfate 324 (65 Fe) MG tablet delayed-release EC tablet Take 1 tablet by mouth Daily With Breakfast. 1 tablet 1 2018 at Unknown time   • fluorometholone (FLAREX) 0.1 % ophthalmic suspension Administer 1 drop to both eyes 4 (Four) Times a Day. (Patient taking differently: Administer 1 drop to both eyes 4 (Four) Times a Day As Needed.) 5 mL 3 2018 at Unknown time   • folic acid-vit B6-vit B12 (FOLBEE) 2.5-25-1 MG tablet tablet Take 1 tablet by mouth Daily. 1 tablet 1 2018 at Unknown time   • furosemide (LASIX) 20 MG tablet Take 1 tablet by mouth Daily. 1 tablet 1 2018 at Unknown time   • [] HYDROcodone-acetaminophen (NORCO) 5-325 MG per tablet Take 1 tablet by mouth Every 6 (Six) Hours As Needed for Moderate Pain  for up to 7 days. 50 tablet 0 2018 at Unknown time   • ipratropium-albuterol (DUO-NEB) 0.5-2.5 mg/mL nebulizer USE 1 BY NEBULIZER 4 (FOUR) TIMES A DAY AS NEEDED FOR WHEEZING. 360 mL 1 2018 at Unknown time   • levothyroxine (SYNTHROID, LEVOTHROID) 50 MCG tablet TAKE 1 TABLET BY  MOUTH EVERY DAY 90 tablet 1 2/11/2018 at Unknown time   • montelukast (SINGULAIR) 10 MG tablet Take 10 mg by mouth Every Night.   2/11/2018 at Unknown time   • nitroglycerin (NITRO-DUR) 0.2 MG/HR patch Place 1 patch on the skin Daily. 30 patch 0 2/11/2018 at Unknown time   • polyethylene glycol (MIRALAX) packet Take 17 g by mouth Daily. 100 packet 1 2/11/2018 at Unknown time   • ranolazine (RANEXA) 1000 MG 12 hr tablet Take 1 tablet by mouth Every 12 (Twelve) Hours. 60 tablet 0 2/11/2018 at Unknown time   • sertraline (ZOLOFT) 50 MG tablet TAKE 1 TABLET BY MOUTH EVERY DAY (Patient taking differently: TAKE 1 TABLET BY MOUTH EVERY NIGHT) 90 tablet 1 2/11/2018 at Unknown time   • warfarin (COUMADIN) 3 MG tablet One daily thru 3/6/18 1 tablet 1 2/11/2018 at Unknown time   • Warfarin Sodium (PHARMACY TO DOSE WARFARIN) Continuous As Needed (thru 3/6/18). 1 each 1 2/11/2018 at Unknown time     Allergies:  Ace inhibitors and Lisinopril    I have reviewed the patients medical history, surgical history and family history in the available medical record system.     Current Medications:     Current Facility-Administered Medications   Medication Dose Route Frequency Provider Last Rate Last Dose   • acetaminophen (TYLENOL) tablet 650 mg  650 mg Oral Q4H PRN Azar Buitrago MD       • atorvastatin (LIPITOR) tablet 20 mg  20 mg Oral Daily Azar Buitrago MD   20 mg at 02/13/18 1121   • budesonide-formoterol (SYMBICORT) 160-4.5 MCG/ACT inhaler 2 puff  2 puff Inhalation BID - RT Azar Buitrago MD   2 puff at 02/13/18 0833   • carvedilol (COREG) tablet 6.25 mg  6.25 mg Oral BID With Meals Azar Buitrago MD   6.25 mg at 02/13/18 1122   • ferrous sulfate EC tablet 324 mg  324 mg Oral Daily With Breakfast Azar Buitrago MD   324 mg at 02/13/18 1121   • fluorometholone (FML) 0.1 % ophthalmic suspension 1 drop  1 drop Both Eyes 4x Daily Azar Buitrago MD   1 drop at 02/13/18 1123   • folic acid-vit B6-vit B12 (FOLBEE)  tablet 1 tablet  1 tablet Oral Daily Azar Buitrago MD   1 tablet at 02/13/18 1121   • furosemide (LASIX) tablet 20 mg  20 mg Oral Daily Azar Buitrago MD   20 mg at 02/13/18 1121   • HYDROcodone-acetaminophen (NORCO) 5-325 MG per tablet 1 tablet  1 tablet Oral Q6H PRN Azar Buitrago MD       • ipratropium-albuterol (DUO-NEB) nebulizer solution 3 mL  3 mL Nebulization Q6H PRN Azar Buitrago MD   3 mL at 02/13/18 1124   • levothyroxine (SYNTHROID, LEVOTHROID) tablet 50 mcg  50 mcg Oral Q AM Azar Buitrago MD   50 mcg at 02/13/18 0600   • montelukast (SINGULAIR) tablet 10 mg  10 mg Oral Nightly Azar Buitrago MD   10 mg at 02/12/18 2048   • nitroglycerin (NITRODUR) 0.2 MG/HR patch 1 patch  1 patch Transdermal Daily Azar Buitrago MD   1 patch at 02/13/18 0900   • [START ON 2/14/2018] pantoprazole (PROTONIX) EC tablet 40 mg  40 mg Oral Q AM Marco A Landin, APRN       • polyethylene glycol 3350 powder (packet)  17 g Oral Daily Azar Buitrago MD   17 g at 02/13/18 1120   • ranolazine (RANEXA) 12 hr tablet 1,000 mg  1,000 mg Oral Q12H Azar Buitrago MD   1,000 mg at 02/13/18 1121   • sertraline (ZOLOFT) tablet 50 mg  50 mg Oral Nightly Azar Buitrago MD   50 mg at 02/12/18 2048   • sodium chloride 0.9 % flush 1-10 mL  1-10 mL Intravenous PRN Azar Buitrago MD       • sodium chloride 0.9 % flush 10 mL  10 mL Intravenous PRN Ehsan Franco MD   10 mL at 02/12/18 2049       Objective     Physical Exam:   Temp:  [97 °F (36.1 °C)-98.7 °F (37.1 °C)] 97.6 °F (36.4 °C)  Heart Rate:  [70-97] 71  Resp:  [16-20] 20  BP: (123-148)/(56-68) 141/63    Physical Exam:  General Appearance:    Alert, cooperative, in no acute distress   Head:    Normocephalic, without obvious abnormality, atraumatic   Eyes:            Lids and lashes normal, conjunctivae and sclerae normal, no   icterus, no pallor, corneas clear, PERRLA   Ears:    Ears appear intact with no abnormalities noted   Throat:    No oral lesions, no thrush, oral mucosa moist   Neck:   No adenopathy, supple, trachea midline, no thyromegaly, no     carotid bruit, no JVD   Back:     No kyphosis present, no scoliosis present, no skin lesions,       erythema or scars, no tenderness to percussion or                   palpation,   range of motion normal   Lungs:     Clear to auscultation,respirations regular, even and                   unlabored    Heart:    Regular rhythm and normal rate, normal S1 and S2, no            murmur, no gallop, no rub, no click   Breast Exam:    Deferred   Abdomen:     Normal bowel sounds, no masses, no organomegaly, soft        non-tender, non-distended, no guarding, no rebound                 tenderness   Genitalia:    Deferred   Extremities:   Moves all extremities well, no edema, no cyanosis, no              redness   Pulses:   Pulses palpable and equal bilaterally   Skin:   No bleeding, bruising or rash   Lymph nodes:   No palpable adenopathy   Neurologic:   Cranial nerves 2 - 12 grossly intact, sensation intact, DTR        present and equal bilaterally      Results Review:     Lab Results   Component Value Date    WBC 10.99 (H) 02/13/2018    WBC 13.39 (H) 02/12/2018    WBC 10.79 (H) 02/03/2018    HGB 8.3 (L) 02/13/2018    HGB 8.1 (L) 02/13/2018    HGB 6.8 (L) 02/12/2018    HCT 25.1 (L) 02/13/2018    HCT 23.4 (L) 02/13/2018    HCT 19.7 (L) 02/12/2018     02/13/2018     02/12/2018     02/03/2018       Results from last 7 days  Lab Units 02/12/18  1407   ALK PHOS U/L 65   ALT (SGPT) U/L 37   AST (SGOT) U/L 24       Results from last 7 days  Lab Units 02/12/18  1407   BILIRUBIN mg/dL 0.3   ALK PHOS U/L 65     No results found for: LIPASE  Lab Results   Component Value Date    INR 2.53 (H) 02/12/2018    INR 2.10 (H) 02/05/2018    INR 2.01 (H) 02/04/2018         Radiology Review:  Imaging Results (last 72 hours)     Procedure Component Value Units Date/Time    XR Chest 1 View [[id number]] Collected:   02/12/18 1409     Updated:  02/12/18 1436    Narrative:       PROCEDURE: Chest, AP upright portable at 2:14 PM.    INDICATION: Chest pain.    COMPARISON: 1/22/2018 chest x-ray.    Heart size normal with normal vascularity. Stable position left  subclavian pacemaker.    The lungs are clear. No pleural effusions.    Bilateral degenerative arthritic changes in the shoulders.      Impression:       No acute disease.    Stable position left subclavian pacemaker.    [id number]    Electronically signed by:  Eliud Ro MD  2/12/2018 2:35  PM [facility] Workstation: [employer]           I reviewed the patient's new clinical results.  I reviewed the patient's new imaging results and agree with the interpretation.     ASSESSMENT/PLAN:   ASSESSMENT: Patient with severe anemia after being placed on Coumadin.  Patient states she is also on Plavix and aspirin.  Patient has had a colonoscopy many years ago does not know the findings.  Patient denies any history of peptic ulcer disease or any medications for peptic ulcer disease.    PLAN: #1 we'll schedule patient for EGD to be done tomorrow.  Number to continue to monitor hemoglobin if less than 7 please transfuse.  #3 patient should be on a proton pump inhibitor.  Number for consider colonoscopy if EGD does not show source of bleeding.    The risks, benefits, and alternatives of this procedure have been discussed with the patient or the responsible party- the patient understands and agrees to proceed.         Alex Avitia MD  02/13/18  5:08 PM         This document has been electronically signed by Alex Avitia MD on February 13, 2018 5:08 PM

## 2018-02-13 NOTE — THERAPY EVALUATION
Acute Care - Physical Therapy Initial Evaluation  [facility]     Patient Name: Val Arteaga  : 1932  MRN: [id number]  Today's Date: 2018   Onset of Illness/Injury or Date of Surgery Date: 18  Date of Referral to PT: 18  Referring Physician: Dr. Mcdowell      Admit Date: 2018     Visit Dx:    ICD-10-CM ICD-9-CM   1. Symptomatic anemia D64.9 285.9   2. Gastrointestinal hemorrhage, unspecified gastrointestinal hemorrhage type K92.2 578.9   3. Shortness of breath R06.02 786.05   4. Impaired functional mobility, balance, gait, and endurance Z74.09 V49.89     Patient Active Problem List   Diagnosis   • Essential hypertension   • Coronary artery disease involving native coronary artery of native heart without angina pectoris   • Chronic obstructive pulmonary disease   • Acquired hypothyroidism   • Depressive disorder   • Thygeson's superficial punctate keratitis   • Pseudophakia   • Precordial pain   • Displaced fracture of base of neck of right femur, initial encounter for closed fracture   • Hip fracture, right, closed, initial encounter   • Pulmonary emphysema   • Chronic systolic congestive heart failure   • Hyponatremia   • BENITO (acute kidney injury)   • Acute blood loss anemia   • Urinary retention   • Closed displaced basicervical fracture of right femur   • Urethral caruncle   • Symptomatic anemia     Past Medical History:   Diagnosis Date   • Acquired hypothyroidism    • Allergic rhinitis    • CHF (congestive heart failure)    • COPD (chronic obstructive pulmonary disease)    • Corneal epithelial dystrophy    • Coronary arteriosclerosis    • Depression    • Generalized atherosclerosis    • GERD (gastroesophageal reflux disease)    • Hemorrhoids    • History of bone density study 2012    Lumbar spine Osteopenia. Femoral Osteopenia   • History of mammogram 2004    Birads Category 2 Benign   • History of Papanicolaou smear of cervix 2004    NEGATIVE   •  Hypercholesterolemia    • Hyperlipidemia    • Hypertension    • Myocardial infarction    • Nonexudative age-related macular degeneration    • Osteoarthritis of multiple joints    • Pseudophakia    • Punctate keratitis     - history Thygeson's keratopathy      • Tobacco dependence syndrome      Past Surgical History:   Procedure Laterality Date   • APPENDECTOMY     • BREAST SURGERY  03/19/1954    Excision, breast lesion (1)  Excision of fibroma. 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 patency in the circumflex artery site of previous angioplasty. Kathe. patency in RCA.Nonobstructive 20-30% stenosis in the LAD and diagonal branch. Preserved LV systolic function with Ef of 55%   • CERVICAL SPINE SURGERY  09/16/1974    Excision of cervical disc, C5-6, C6-7, anterior cervical fusion, C5-6 with iliac bone graft.cervical spondylosis,C5-6, C6-7   • COLONOSCOPY  01/01/2013    patient declined    • CORONARY ANGIOPLASTY  07/21/1993    Angioplasty, coronary (2)    RCA    • DILATATION AND CURETTAGE      3   • ENDOSCOPY AND COLONOSCOPY  10/01/1998    Hemorhoids Rectal Outlet Bleeding   • ESOPHAGOSCOPY / EGD  06/28/2004    Nonerosive gastritis of the body of the stomach. A biopsy was obtained & placed in h. Pylori test agar. Multiple biopsies were obtained from the body of the stomach   • HIP BIPOLAR REPLACEMENT Right 1/23/2018    Procedure: HIP BIPOLAR ANTERIOR APPROACH - right;  Surgeon: Kelvin Mcdowell MD;  Location: [facility];  Service:    • INJECTION OF MEDICATION  11/23/2015    Depo Medrol (Methylprednisone) (5)    • INJECTION OF MEDICATION  02/04/2016    Kenalog (3)     • PACEMAKER REPLACEMENT  2006          PT ASSESSMENT (last 72 hours)      PT Evaluation       02/13/18 0959 02/12/18 1831    Rehab Evaluation    Document Type evaluation  -MN     Subjective Information agree to therapy  -MN     Patient Effort, Rehab Treatment good  -MN     General  "Information    Patient Profile Review yes  -MN     Onset of Illness/Injury or Date of Surgery Date 02/12/18  -MN     Referring Physician Dr. Mcdowell  -MN     General Observations Supine HOB up +tele +2L O2 +PICC  -MN     Pertinent History Of Current Problem Pt admitted from [place] for near syncope, found to be anemic requiring blood transfusion. Pt with recent d/c from ARU (2/5/18) to SNF for further rehab after fall with anterior MASHA ~3 weeks ago.   -MN     Precautions/Limitations fall precautions;oxygen therapy device and L/min  -MN     Prior Level of Function min assist:;transfer;gait;bed mobility;mod assist:;bathing;dressing  -MN     Equipment Currently Used at Home oxygen  -MN oxygen;shower chair  -KJ    Plans/Goals Discussed With patient;agreed upon  -MN     Risks Reviewed patient:;LOB;dizziness;increased discomfort;change in vital signs  -MN     Benefits Reviewed patient:;improve function;increase independence;increase strength;increase balance;decrease pain;improve skin integrity;decrease risk of DVT;increase knowledge  -MN     Barriers to Rehab previous functional deficit  -MN     Living Environment    Lives With other (see comments)   currently staying at [place] for rehab  -MN alone  -KJ    Living Arrangements  mobile home  -KJ    Home Accessibility  stairs to enter home  -KJ    Stair Railings at Home  outside, present on left side  -KJ    Type of Financial/Environmental Concern  none  -KJ    Transportation Available  car  -KJ    Living Environment Comment plan to d/c back to [facility] to finish therapy  -MN --   pt was at rehab for therapy.  -KJ    Clinical Impression    Date of Referral to PT 02/13/18  -MN     PT Diagnosis impaired mobility 2* recent MASHA and anemia  -MN     Patient/Family Goals Statement \"Finish up therapy\"  -MN     Criteria for Skilled Therapeutic Interventions Met yes;treatment indicated  -MN     Pathology/Pathophysiology Noted (Describe Specifically for Each System) " musculoskeletal;cardiovascular;pulmonary  -MN     Impairments Found (describe specific impairments) aerobic capacity/endurance;gait, locomotion, and balance;ventilation and respiration/gas exchange  -MN     Rehab Potential good, to achieve stated therapy goals  -MN     Predicted Duration of Therapy Intervention (days/wks) until d/c  -MN     Vital Signs    Pre Systolic BP Rehab 131  -MN     Pre Treatment Diastolic BP 63  -MN     Post Systolic BP Rehab 125  -MN     Post Treatment Diastolic BP 57  -MN     Pretreatment Heart Rate (beats/min) 76  -MN     Intratreatment Heart Rate (beats/min) 90  -MN     Posttreatment Heart Rate (beats/min) 85  -MN     Pre SpO2 (%) 93  -MN     O2 Delivery Pre Treatment supplemental O2  -MN     Intra SpO2 (%) 95  -MN     O2 Delivery Intra Treatment supplemental O2  -MN     Post SpO2 (%) 93  -MN     O2 Delivery Post Treatment supplemental O2  -MN     Pre Patient Position Supine  -MN     Intra Patient Position Sitting  -MN     Post Patient Position Supine  -MN     Pain Assessment    Pain Assessment 0-10  -MN     Pain Score 0  -MN     Post Pain Score 0  -MN     Pain Type Acute pain;Surgical pain  -MN     Pain Intervention(s) Medication (See MAR)  -MN     Vision Assessment/Intervention    Visual Impairment WFL with corrective lenses  -MN     Cognitive Assessment/Intervention    Current Cognitive/Communication Assessment functional  -MN     Orientation Status oriented x 4  -MN     Follows Commands/Answers Questions 100% of the time  -MN     Personal Safety WNL/WFL  -MN     Personal Safety Interventions gait belt;nonskid shoes/slippers when out of bed  -MN     ROM (Range of Motion)    General ROM no range of motion deficits identified   right hip flex to 90*  -MN     MMT (Manual Muscle Testing)    General MMT Assessment lower extremity strength deficits identified  -MN     Lower Extremity    Lower Ext Manual Muscle Testing Detail LLE: 4/5 grossly RLE: hip flex 2+/5, knee ext 3+/5, knee flex  3+/5, DF 3-/5, PF 2/5  -MN     Mobility Assessment/Training    Extremity Weight-Bearing Status right lower extremity  -MN     Right Lower Extremity Weight-Bearing weight-bearing as tolerated  -MN     Bed Mobility, Assessment/Treatment    Bed Mobility, Assistive Device head of bed elevated;bed rails  -MN     Bed Mobility, Scoot/Bridge, [place] conditional independence  -MN     Bed Mob, Supine to Sit, [place] supervision required  -MN     Transfer Assessment/Treatment    Transfers, Sit-Stand [place] contact guard assist  -MN     Transfers, Stand-Sit [place] contact guard assist  -MN     Transfers, Sit-Stand-Sit, Assist Device rolling walker  -MN     Gait Assessment/Treatment    Gait, [place] Level contact guard assist  -MN     Gait, Assistive Device rolling walker  -MN     Gait, Distance (Feet) 90  -MN     Gait, Gait Deviations kari decreased;decreased heel strike  -MN     Gait, Safety Issues supplemental O2  -MN     Therapy Exercises    Bilateral Lower Extremities AROM:;10 reps;sitting;heel raises;hip flexion   toe raises, LAQ with 3 APs at top  -MN     Sensory Assessment/Intervention    Light Touch LLE;RLE  -MN     LLE Light Touch WNL  -MN     RLE Light Touch WNL  -MN     Edema Management    Edema Amount right:;moderate;pitting +1   distal to ankle  -MN     Positioning and Restraints    Pre-Treatment Position in bed  -MN     Post Treatment Position bed  -MN     In Bed sitting EOB;call light within reach;encouraged to call for assist;with family/caregiver;with nsg   with RN students about to get bed bath  -MN       User Key  (r) = Recorded By, (t) = Taken By, (c) = Cosigned By    Initials Name Provider Type    MARIA DOLORES Negrete PT Physical Therapist    CASIE Blue RN Registered Nurse          Physical Therapy Education     Title: PT OT SLP Therapies (Active)     Topic: Physical Therapy (Active)     Point: Mobility training (Done)    Learning Progress Summary    Learner Readiness  Method Response Comment Documented by Status   Patient Acceptance E VU no OOB without assist.. Role of PT in hospital MN 02/13/18 1053 Done               Point: Precautions (Done)    Learning Progress Summary    Learner Readiness Method Response Comment Documented by Status   Patient Acceptance E VU no OOB without assist.. Role of PT in hospital MN 02/13/18 1053 Done                      User Key     Initials Effective Dates Name Provider Type Discipline    MN 10/17/16 -  Hallie Negrete, PT Physical Therapist PT                PT Recommendation and Plan  Anticipated Discharge Disposition: skilled nursing facility  Planned Therapy Interventions: balance training, bed mobility training, gait training, patient/family education, strengthening, stretching, transfer training  PT Frequency: other (see comments) (5-14x/week)  Plan of Care Review  Plan Of Care Reviewed With: patient, family  Outcome Summary/Follow up Plan: PT evaluation completed today. Pt with recent right hip MASHA which she was receiving therapy at [place]. She will benefit from cont skilled PT while hospitalized to help maintain strength, balance and endurance until d/c back to SNF to continue rehab          IP PT Goals       02/13/18 1053          Bed Mobility PT STG    Bed Mobility PT STG, Date Established 02/13/18  -MN      Bed Mobility PT STG, Time to Achieve 4 days  -MN      Bed Mobility PT STG, Activity Type roll left/roll right;supine to sit/sit to supine  -MN      Bed Mobility PT STG, [place] Level independent  -MN      Transfer Training PT STG    Transfer Training PT STG, Date Established 02/13/18  -MN      Transfer Training PT STG, Time to Achieve 4 days  -MN      Transfer Training PT STG, Activity Type bed to chair /chair to bed;sit to stand/stand to sit;toilet  -MN      Transfer Training PT STG, [place] Level supervision required  -MN      Transfer Training PT STG, Assist Device walker, rolling  -MN      Gait Training PT STG     Gait Training Goal PT STG, Date Established 02/13/18  -MN      Gait Training Goal PT STG, Time to Achieve 4 days  -MN      Gait Training Goal PT STG, [place] Level supervision required  -MN      Gait Training Goal PT STG, Assist Device walker, rolling  -MN      Gait Training Goal PT STG, Distance to Achieve 300 ft  -MN        User Key  (r) = Recorded By, (t) = Taken By, (c) = Cosigned By    Initials Name Provider Type    MARIA DOLORES Negrete PT Physical Therapist                Outcome Measures       02/13/18 0959          How much help from another person do you currently need...    Turning from your back to your side while in flat bed without using bedrails? 3  -MN      Moving from lying on back to sitting on the side of a flat bed without bedrails? 3  -MN      Moving to and from a bed to a chair (including a wheelchair)? 3  -MN      Standing up from a chair using your arms (e.g., wheelchair, bedside chair)? 3  -MN      Climbing 3-5 steps with a railing? 3  -MN      To walk in hospital room? 3  -MN      AM-PAC 6 Clicks Score 18  -MN      Functional Assessment    Outcome Measure Options AM-PAC 6 Clicks Basic Mobility (PT)  -MN        User Key  (r) = Recorded By, (t) = Taken By, (c) = Cosigned By    Initials Name Provider Type    MARIA DOLORES Negrete PT Physical Therapist           Time Calculation:         PT Charges       02/13/18 1052          Time Calculation    Start Time 0959  -MN      Stop Time 1039  -MN      Time Calculation (min) 40 min  -MN      PT Received On 02/13/18  -MN      PT Goal Re-Cert Due Date 02/26/18  -MN      Time Calculation- PT    Total Timed Code Minutes- PT 23 minute(s)  -MN        User Key  (r) = Recorded By, (t) = Taken By, (c) = Cosigned By    Initials Name Provider Type    MARIA DOLORES Negrete PT Physical Therapist          Therapy Charges for Today     Code Description Service Date Service Provider Modifiers Qty    29658067612  PT MOBILITY CURRENT 2/13/2018 Hallie Negrete PT  GP, CK 1    23138915227 HC PT MOBILITY PROJECTED 2/13/2018 Hallie Negrete, PT GP, CJ 1    30363887719 HC PT EVAL LOW COMPLEXITY 1 2/13/2018 Hallie Negrete, PT GP 1    19467491306 HC PT THER PROC EA 15 MIN 2/13/2018 Hallie Negrete, PT GP 2          PT G-Codes  PT Professional Judgement Used?: Yes  Outcome Measure Options: AM-PAC 6 Clicks Basic Mobility (PT)  Score: 18  Functional Limitation: Mobility: Walking and moving around  Mobility: Walking and Moving Around Current Status (): At least 40 percent but less than 60 percent impaired, limited or restricted  Mobility: Walking and Moving Around Goal Status (): At least 20 percent but less than 40 percent impaired, limited or restricted      Hallie Negrete, PT  2/13/2018

## 2018-02-13 NOTE — PLAN OF CARE
Problem: Patient Care Overview (Adult)  Goal: Plan of Care Review  Outcome: Ongoing (interventions implemented as appropriate)   02/13/18 1326   Coping/Psychosocial Response Interventions   Plan Of Care Reviewed With patient   Outcome Evaluation   Outcome Summary/Follow up Plan OT eval complete on this date. Pt required mn A for sit to stand and min A for toilet transfers. She needs CGA for ambulating 20'. She could benefit from OT services to increase independence with ADLS and functional mobility/transfers.     Goal: Discharge Needs Assessment  Outcome: Ongoing (interventions implemented as appropriate)   02/13/18 1326   Discharge Needs Assessment   Discharge Facility/Level Of Care Needs nursing facility, skilled       Problem: Inpatient Occupational Therapy  Goal: Transfer Training Goal 1 LTG- OT  Outcome: Ongoing (interventions implemented as appropriate)   02/13/18 1326   Transfer Training OT LTG   Transfer Training OT LTG, Date Established 02/13/18   Transfer Training OT LTG, Time to Achieve by discharge   Transfer Training OT LTG, Activity Type all transfers   Transfer Training OT LTG, [place] Level supervision required   Transfer Training OT LTG, Assist Device walker, rolling     Goal: Static Standing Balance Goal LTG- OT  Outcome: Ongoing (interventions implemented as appropriate)   02/13/18 1326   Static Standing Balance OT LTG   Static Standing Balance OT LTG, Date Established 02/13/18   Static Standing Balance OT LTG, Time to Achieve by discharge   Static Standing Balance OT LTG, [place] Level supervision required  (5 minutes with functional activity.)   Static Standing Balance OT LTG, Assist Device assistive device  (R/W.)     Goal: Patient Education Goal LTG- OT  Outcome: Ongoing (interventions implemented as appropriate)   02/13/18 1326   Patient Education OT LTG   Patient Education OT LTG, Date Established 02/13/18   Patient Education OT LTG, Time to Achieve by discharge   Patient Education  OT LTG, Education Type HEP;home safety;energy conservation;work simplification;adaptive breathing   Patient Education OT LTG, Education Understanding demonstrates adequately;verbalizes understanding     Goal: ADL Goal LTG- OT  Outcome: Ongoing (interventions implemented as appropriate)   02/13/18 1326   ADL OT LTG   ADL OT LTG, Date Established 02/13/18   ADL OT LTG, Time to Achieve by discharge   ADL OT LTG, Activity Type ADL skills  (Sponge bath and dress.)   ADL OT LTG, [place] Level contact guard;assistive device  (R/W.)

## 2018-02-13 NOTE — PROGRESS NOTES
ORTHOPEDIC PROGRESS NOTE:    Name:  Val Arteaga  Date:    2/13/2018  Date of admission:  2/12/2018    Subjective:  No new complaints  Hip is sore but doing better    Vitals:    Vitals:    02/13/18 0407   BP: 129/60   Pulse: 79   Resp: 18   Temp: 97 °F (36.1 °C)   SpO2: 99%       Exam:  Incision is clean and dry  No erythema  Toes up and down both feet  Good distal pulses and sensation    ASSESSMENT:  Hospital Problem List     Symptomatic anemia            PLAN:    S/p bipolar endoprosthesis right hip about 3 weeks ago.  No orthopedic changes.  Agree with medical management for anemia  Advance with PT/OT, strength and conditioning.    02/13/18 at 7:45 AM by Kelvin Mcdowell MD

## 2018-02-13 NOTE — PLAN OF CARE
Problem: Patient Care Overview (Adult)  Goal: Plan of Care Review  Outcome: Ongoing (interventions implemented as appropriate)   02/13/18 1227   Coping/Psychosocial Response Interventions   Plan Of Care Reviewed With patient   Patient Care Overview   Progress no change   Outcome Evaluation   Outcome Summary/Follow up Plan Patient hemoglobin is back up this morning after 2 units of PRBCs last night. Vitals are stable. Patient has been working with therapy. Will continue to monitor.       Problem: Fall Risk (Adult)  Goal: Absence of Falls  Outcome: Ongoing (interventions implemented as appropriate)

## 2018-02-13 NOTE — THERAPY EVALUATION
Acute Care - Occupational Therapy Initial Evaluation  [facility]     Patient Name: Val Arteaga  : 1932  MRN: [id number]  Today's Date: 2018  Onset of Illness/Injury or Date of Surgery Date: 18  Date of Referral to OT: 18  Referring Physician: Jeremias    Admit Date: 2018       ICD-10-CM ICD-9-CM   1. Symptomatic anemia D64.9 285.9   2. Gastrointestinal hemorrhage, unspecified gastrointestinal hemorrhage type K92.2 578.9   3. Shortness of breath R06.02 786.05   4. Impaired functional mobility, balance, gait, and endurance Z74.09 V49.89   5. Impaired mobility and activities of daily living Z74.09 799.89     Patient Active Problem List   Diagnosis   • Essential hypertension   • Coronary artery disease involving native coronary artery of native heart without angina pectoris   • Chronic obstructive pulmonary disease   • Acquired hypothyroidism   • Depressive disorder   • Thygeson's superficial punctate keratitis   • Pseudophakia   • Precordial pain   • Displaced fracture of base of neck of right femur, initial encounter for closed fracture   • Hip fracture, right, closed, initial encounter   • Pulmonary emphysema   • Chronic systolic congestive heart failure   • Hyponatremia   • BENITO (acute kidney injury)   • Acute blood loss anemia   • Urinary retention   • Closed displaced basicervical fracture of right femur   • Urethral caruncle   • Symptomatic anemia     Past Medical History:   Diagnosis Date   • Acquired hypothyroidism    • Allergic rhinitis    • CHF (congestive heart failure)    • COPD (chronic obstructive pulmonary disease)    • Corneal epithelial dystrophy    • Coronary arteriosclerosis    • Depression    • Generalized atherosclerosis    • GERD (gastroesophageal reflux disease)    • Hemorrhoids    • History of bone density study 2012    Lumbar spine Osteopenia. Femoral Osteopenia   • History of mammogram 2004    Birads Category 2 Benign   • History of Papanicolaou  smear of cervix 08/12/2004    NEGATIVE   • Hypercholesterolemia    • Hyperlipidemia    • Hypertension    • Myocardial infarction    • Nonexudative age-related macular degeneration    • Osteoarthritis of multiple joints    • Pseudophakia    • Punctate keratitis     - history Thygeson's keratopathy      • Tobacco dependence syndrome      Past Surgical History:   Procedure Laterality Date   • APPENDECTOMY     • BREAST SURGERY  03/19/1954    Excision, breast lesion (1)  Excision of fibroma. 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 patency in the circumflex artery site of previous angioplasty. Kathe. patency in RCA.Nonobstructive 20-30% stenosis in the LAD and diagonal branch. Preserved LV systolic function with Ef of 55%   • CERVICAL SPINE SURGERY  09/16/1974    Excision of cervical disc, C5-6, C6-7, anterior cervical fusion, C5-6 with iliac bone graft.cervical spondylosis,C5-6, C6-7   • COLONOSCOPY  01/01/2013    patient declined    • CORONARY ANGIOPLASTY  07/21/1993    Angioplasty, coronary (2)    RCA    • DILATATION AND CURETTAGE      3   • ENDOSCOPY AND COLONOSCOPY  10/01/1998    Hemorhoids Rectal Outlet Bleeding   • ESOPHAGOSCOPY / EGD  06/28/2004    Nonerosive gastritis of the body of the stomach. A biopsy was obtained & placed in h. Pylori test agar. Multiple biopsies were obtained from the body of the stomach   • HIP BIPOLAR REPLACEMENT Right 1/23/2018    Procedure: HIP BIPOLAR ANTERIOR APPROACH - right;  Surgeon: Kelvin Mcdowell MD;  Location: [facility];  Service:    • INJECTION OF MEDICATION  11/23/2015    Depo Medrol (Methylprednisone) (5)    • INJECTION OF MEDICATION  02/04/2016    Kenalog (3)     • PACEMAKER REPLACEMENT  2006          OT ASSESSMENT FLOWSHEET (last 72 hours)      OT Evaluation       02/13/18 1050 02/13/18 0959 02/12/18 1831          Rehab Evaluation    Document Type evaluation  -RB evaluation  -MN       Subjective  Information agree to therapy  -RB agree to therapy  -MN       Patient Effort, Rehab Treatment good  -RB good  -MN       Symptoms Noted During/After Treatment shortness of breath  -RB        General Information    Patient Profile Review yes  -RB yes  -MN       Onset of Illness/Injury or Date of Surgery Date 02/12/18  -RB 02/12/18  -MN       Referring Physician Jeremias  -RB Dr. Mcdowell  -MN       General Observations Sitting in bedside chair with telemetry, IV and O2  -RB Supine HOB up +tele +2L O2 +PICC  -MN       Pertinent History Of Current Problem Hosp from [facility] with near syncope.  Dx with anemia and had transfusion.  Pt recently D/C from [facility] to SNF after fall with R hip fx ~ 3 wks ago.  -RB Pt admitted from [facility] for near syncope, found to be anemic requiring blood transfusion. Pt with recent d/c from [facility] (2/5/18) to SNF for further rehab after fall with anterior MASHA ~3 weeks ago.   -MN       Precautions/Limitations fall precautions;oxygen therapy device and L/min  -RB fall precautions;oxygen therapy device and L/min  -MN       Prior Level of Function min assist:;gait;transfer;mod assist:;dressing;bathing  -RB min assist:;transfer;gait;bed mobility;mod assist:;bathing;dressing  -MN       Equipment Currently Used at Home oxygen;walker, rolling  -RB oxygen  -MN oxygen;shower chair  -KJ      Plans/Goals Discussed With patient  -RB patient;agreed upon  -MN       Risks Reviewed patient:  -RB patient:;LOB;dizziness;increased discomfort;change in vital signs  -MN       Benefits Reviewed patient:  -RB patient:;improve function;increase independence;increase strength;increase balance;decrease pain;improve skin integrity;decrease risk of DVT;increase knowledge  -MN       Barriers to Rehab  previous functional deficit  -MN       Living Environment    Lives With alone   Currently @ NH for rehab.  -RB other (see comments)   currently staying at [facility] for rehab  -MN alone  -KJ      Living Arrangements   mobile home  -KJ       Home Accessibility   stairs to enter home  -KJ      Stair Railings at Home   outside, present on left side  -KJ      Type of Financial/Environmental Concern   none  -KJ      Transportation Available   car  -KJ      Living Environment Comment Back to NH and then home hopefully.  -RB plan to d/c back to [place] to finish therapy  -MN --   pt was at rehab for therapy.  -KJ      Clinical Impression    Date of Referral to OT 02/13/18  -RB        OT Diagnosis Impaired mobility and ADLs.  -RB        Functional Level At Time Of Evaluation Impaired mobility and ADLs.  -RB        Impairments Found (describe specific impairments) gait, locomotion, and balance  -RB        Criteria for Skilled Therapeutic Interventions Met yes;treatment indicated  -RB        Rehab Potential fair, will monitor progress closely  -RB        Therapy Frequency --   3-14x/wk  -RB        Predicted Duration of Therapy Intervention (days/wks) Until D/C or goals met.  -RB        Anticipated Discharge Disposition skilled nursing facility  -RB        Functional Level Prior    Ambulation 3-->assistive equipment and person  -RB  3-->assistive equipment and person  -KJ      Transferring 3-->assistive equipment and person  -RB  3-->assistive equipment and person  -KJ      Toileting 3-->assistive equipment and person  -RB  3-->assistive equipment and person  -KJ      Bathing 3-->assistive equipment and person  -RB  3-->assistive equipment and person  -KJ      Dressing 3-->assistive equipment and person  -RB  3-->assistive equipment and person  -KJ      Eating 0-->independent  -RB  0-->independent  -KJ      Communication 0-->understands/communicates without difficulty  -RB  0-->understands/communicates without difficulty  -KJ      Swallowing 0-->swallows foods/liquids without difficulty  -RB  0-->swallows foods/liquids without difficulty  -KJ      Prior Functional Level Comment   na  -KJ      Vital Signs    Pre Systolic BP Rehab 121  -  -MN       Pre  Treatment Diastolic BP 68  -RB 63  -MN       Post Systolic BP Rehab 135  -  -MN       Post Treatment Diastolic BP 64  -RB 57  -MN       Pretreatment Heart Rate (beats/min) 82  -RB 76  -MN       Intratreatment Heart Rate (beats/min) 90  -RB 90  -MN       Posttreatment Heart Rate (beats/min) 83  -RB 85  -MN       Pre SpO2 (%) 97  -RB 93  -MN       O2 Delivery Pre Treatment supplemental O2  -RB supplemental O2  -MN       Intra SpO2 (%) 98  -RB 95  -MN       O2 Delivery Intra Treatment supplemental O2  -RB supplemental O2  -MN       Post SpO2 (%) 97  -RB 93  -MN       O2 Delivery Post Treatment supplemental O2  -RB supplemental O2  -MN       Pre Patient Position Sitting  -RB Supine  -MN       Intra Patient Position Standing  -RB Sitting  -MN       Post Patient Position Sitting  -RB Supine  -MN       Pain Assessment    Pain Assessment No/denies pain  -RB 0-10  -MN       Pain Score  0  -MN       Post Pain Score  0  -MN       Pain Type  Acute pain;Surgical pain  -MN       Pain Intervention(s)  Medication (See MAR)  -MN       Vision Assessment/Intervention    Visual Impairment WFL with corrective lenses  -RB WFL with corrective lenses  -MN       Cognitive Assessment/Intervention    Current Cognitive/Communication Assessment functional  -RB functional  -MN       Orientation Status oriented x 4  -RB oriented x 4  -MN       Follows Commands/Answers Questions 100% of the time  -% of the time  -MN       Personal Safety WNL/WFL  -RB WNL/WFL  -MN       Personal Safety Interventions gait belt;nonskid shoes/slippers when out of bed;supervised activity  -RB gait belt;nonskid shoes/slippers when out of bed  -MN       ROM (Range of Motion)    General ROM no range of motion deficits identified  -RB no range of motion deficits identified   right hip flex to 90*  -MN       MMT (Manual Muscle Testing)    General MMT Assessment upper extremity strength deficits identified  -RB lower extremity strength deficits identified  -MN        General MMT Assessment Detail 4/5 for B shld flex and 4+/5 for rest of B UEs.  -RB        Mobility Assessment/Training    Extremity Weight-Bearing Status  right lower extremity  -MN       Right Lower Extremity Weight-Bearing  weight-bearing as tolerated  -MN       Bed Mobility, Assessment/Treatment    Bed Mobility, Assistive Device  head of bed elevated;bed rails  -MN       Bed Mobility, Scoot/Bridge, [place]  conditional independence  -MN       Bed Mob, Supine to Sit, [place]  supervision required  -MN       Transfer Assessment/Treatment    Transfers, Sit-Stand [place] minimum assist (75% patient effort)  -RB contact guard assist  -MN       Transfers, Stand-Sit [place] contact guard assist  -RB contact guard assist  -MN       Transfers, Sit-Stand-Sit, Assist Device rolling walker  -RB rolling walker  -MN       Toilet Transfer, [place] minimum assist (75% patient effort)  -RB        Toilet Transfer, Assistive Device rolling walker  -RB        Functional Mobility    Functional Mobility- Ind. Level contact guard assist  -RB        Functional Mobility- Device rolling walker  -RB        Functional Mobility-Distance (Feet) 20  -RB        Therapy Exercises    Bilateral Lower Extremities  AROM:;10 reps;sitting;heel raises;hip flexion   toe raises, LAQ with 3 APs at top  -MN       Sensory Assessment/Intervention    Light Touch LUE;RUE  -RB LLE;RLE  -MN       LUE Light Touch WNL  -RB        RUE Light Touch WNL  -RB        LLE Light Touch  WNL  -MN       RLE Light Touch  WNL  -MN       Edema Management    Edema Amount  right:;moderate;pitting +1   distal to ankle  -MN       General Therapy Interventions    Planned Therapy Interventions activity intolerance;ADL retraining;balance training;bed mobility training;energy conservation;strengthening;transfer training;home exercise program  -RB        Activity Intolerance fair to good  -RB        Positioning and Restraints    Pre-Treatment Position sitting  in chair/recliner  -RB in bed  -MN       Post Treatment Position chair  -RB bed  -MN       In Bed sitting;with family/caregiver  -RB sitting EOB;call light within reach;encouraged to call for assist;with family/caregiver;with nsg   with RN students about to get bed bath  -MN       Lower Extremity    Lower Ext Manual Muscle Testing Detail  LLE: 4/5 grossly RLE: hip flex 2+/5, knee ext 3+/5, knee flex 3+/5, DF 3-/5, PF 2/5  -MN         User Key  (r) = Recorded By, (t) = Taken By, (c) = Cosigned By    Initials Name Effective Dates    RB Seth Cash OT 06/15/16 -     MARIA DOLORES Negrete, PT 10/17/16 -     CASIE Blue RN 10/17/16 -            Occupational Therapy Education     Title: PT OT SLP Therapies (Active)     Topic: Occupational Therapy (Active)     Point: Precautions (Done)    Description: Instruct learner(s) on prescribed precautions during self-care and functional transfers.    Learning Progress Summary    Learner Readiness Method Response Comment Documented by Status   Patient Acceptance E VU Edu pt on use of gait belt and non skid socks when OOB and no OOB without assist. RB 02/13/18 1324 Done                      User Key     Initials Effective Dates Name Provider Type Discipline     06/15/16 -  Seth Cash OT Occupational Therapist OT                  OT Recommendation and Plan  Anticipated Discharge Disposition: skilled nursing facility  Planned Therapy Interventions: activity intolerance, ADL retraining, balance training, bed mobility training, energy conservation, strengthening, transfer training, home exercise program  Therapy Frequency:  (3-14x/wk)  Plan of Care Review  Plan Of Care Reviewed With: patient  Outcome Summary/Follow up Plan: OT nav complete on this date.  Pt required mn A for sit to stand and min A for toilet  transfers.  She needs CGA  for ambulating 20'.  She could benefit from OT services to increase independence with ADLS and functional mobility/transfers.          OT  Goals       02/13/18 1326          Transfer Training OT LTG    Transfer Training OT LTG, Date Established 02/13/18  -RB      Transfer Training OT LTG, Time to Achieve by discharge  -RB      Transfer Training OT LTG, Activity Type all transfers  -RB      Transfer Training OT LTG, [place] Level supervision required  -RB      Transfer Training OT LTG, Assist Device walker, rolling  -RB      Static Standing Balance OT LTG    Static Standing Balance OT LTG, Date Established 02/13/18  -RB      Static Standing Balance OT LTG, Time to Achieve by discharge  -RB      Static Standing Balance OT LTG, [place] Level supervision required   5 minutes with functional activity.  -RB      Static Standing Balance OT LTG, Assist Device assistive device   R/W.  -RB      Patient Education OT LTG    Patient Education OT LTG, Date Established 02/13/18  -RB      Patient Education OT LTG, Time to Achieve by discharge  -RB      Patient Education OT LTG, Education Type HEP;home safety;energy conservation;work simplification;adaptive breathing  -RB      Patient Education OT LTG, Education Understanding demonstrates adequately;verbalizes understanding  -RB      ADL OT LTG    ADL OT LTG, Date Established 02/13/18  -RB      ADL OT LTG, Time to Achieve by discharge  -RB      ADL OT LTG, Activity Type ADL skills   Sponge bath and dress.  -RB      ADL OT LTG, [place] Level contact guard;assistive device   R/W.  -RB        User Key  (r) = Recorded By, (t) = Taken By, (c) = Cosigned By    Initials Name Provider Type    OSIRIS Cash, OT Occupational Therapist                Outcome Measures       02/13/18 1050 02/13/18 0959       How much help from another person do you currently need...    Turning from your back to your side while in flat bed without using bedrails?  3  -MN     Moving from lying on back to sitting on the side of a flat bed without bedrails?  3  -MN     Moving to and from a bed to a chair (including a wheelchair)?  3   -MN     Standing up from a chair using your arms (e.g., wheelchair, bedside chair)?  3  -MN     Climbing 3-5 steps with a railing?  3  -MN     To walk in hospital room?  3  -MN     AM-PAC 6 Clicks Score  18  -MN     How much help from another is currently needed...    Putting on and taking off regular lower body clothing? 2  -RB      Bathing (including washing, rinsing, and drying) 2  -RB      Toileting (which includes using toilet bed pan or urinal) 3  -RB      Putting on and taking off regular upper body clothing 3  -RB      Taking care of personal grooming (such as brushing teeth) 4  -RB      Eating meals 4  -RB      Score 18  -RB      Functional Assessment    Outcome Measure Options AM-PAC 6 Clicks Daily Activity (OT)  -RB AM-PAC 6 Clicks Basic Mobility (PT)  -MN       User Key  (r) = Recorded By, (t) = Taken By, (c) = Cosigned By    Initials Name Provider Type    RB Seth Cash OT Occupational Therapist    MARIA DOLORES Negrete, PT Physical Therapist          Time Calculation:   OT Start Time: 1050  OT Stop Time: 1110  OT Time Calculation (min): 20 min    Therapy Charges for Today     Code Description Service Date Service Provider Modifiers Qty    [id number]  OT SELFCARE CURRENT 2/13/2018 MANUEL Novak  1    [id number]  OT SELFCARE PROJECTED 2/13/2018 MANUEL Novak  1    [id number]  OT EVAL MOD COMPLEXITY 1 2/13/2018 Seth Cash OT GO 1          OT G-codes  OT Professional Judgement Used?: Yes  OT Functional Scales Options: AM-PAC 6 Clicks Daily Activity (OT)  Score: 18  Functional Limitation: Self care  Self Care Current Status (): At least 40 percent but less than 60 percent impaired, limited or restricted  Self Care Goal Status (): At least 20 percent but less than 40 percent impaired, limited or restricted    Seth Cash OT  2/13/2018

## 2018-02-13 NOTE — H&P
[facility] Medicine Admission      Date of Admission: 2/12/2018      Primary Care Physician: Roula Albert MD      Chief Complaint:  Near syncope    HPI:  Patient is an 85 year old female who presents with an episode of near syncope at the nursing home.  She states that over night she woke up hot and sweating.  She states that the room started getting very black.  She had chest pain and difficulty breathing.  She had hip surgery after a fracture 3 weeks ago.  She was in ARU for 10 days and has been in SNF for a week.  She hasn't had any symptoms before last night.  She was noted to be heme positive in the ED.  No nausea or vomiting.  No fever.  No alleviating or exacerbating factors.     Concurrent Medical History:  has a past medical history of Acquired hypothyroidism; Allergic rhinitis; CHF (congestive heart failure); COPD (chronic obstructive pulmonary disease); Corneal epithelial dystrophy; Coronary arteriosclerosis; Depression; Generalized atherosclerosis; GERD (gastroesophageal reflux disease); Hemorrhoids; History of bone density study (08/03/2012); History of mammogram (08/20/2004); History of Papanicolaou smear of cervix (08/12/2004); Hypercholesterolemia; Hyperlipidemia; Hypertension; Myocardial infarction; Nonexudative age-related macular degeneration; Osteoarthritis of multiple joints; Pseudophakia; Punctate keratitis; and Tobacco dependence syndrome.    Past Surgical History:  has a past surgical history that includes Coronary angioplasty (07/21/1993); Appendectomy; Cardiac catheterization (06/16/2014); endoscopy and colonoscopy (10/01/1998); Colonoscopy (01/01/2013); Dilation and curettage of uterus; Injection of Medication (11/23/2015); Esophagoscopy / EGD (06/28/2004); Breast surgery (03/19/1954); Cervical spine surgery (09/16/1974); Injection of Medication (02/04/2016); Pacemaker Replacement (2006); and Hip Bipolar (Right, 1/23/2018).    Family History:  family history includes Coronary artery disease in her other.     Social History:  reports that she quit smoking about 13 months ago. Her smoking use included Cigarettes. She started smoking about 68 years ago. She has a 25.00 pack-year smoking history. She has never used smokeless tobacco. She reports that she does not drink alcohol or use illicit drugs.    Allergies:   Allergies   Allergen Reactions   • Ace Inhibitors    • Lisinopril Cough       Medications:   Prescriptions Prior to Admission   Medication Sig Dispense Refill Last Dose   • aspirin 81 MG EC tablet Take 81 mg by mouth Daily.   2/12/2018 at Unknown time   • acetaminophen (TYLENOL) 325 MG tablet Take 2 tablets by mouth Every 4 (Four) Hours As Needed for Mild Pain . 1 tablet 1    • atorvastatin (LIPITOR) 20 MG tablet Take 1 tablet by mouth Daily. 1 tablet 1    • budesonide-formoterol (SYMBICORT) 160-4.5 MCG/ACT inhaler Inhale 2 puffs 2 (Two) Times a Day. 1 inhaler 11 1/26/2018 at 0900   • carvedilol (COREG) 6.25 MG tablet Take 6.25 mg by mouth 2 (Two) Times a Day With Meals.   Past Week at 0900   • estradiol (ESTRACE) 0.1 MG/GM vaginal cream One applicator q hs 45 g 1    • famotidine (PEPCID) 40 MG tablet Take 1 tablet by mouth Daily. 1 tablet 1    • ferrous sulfate 324 (65 Fe) MG tablet delayed-release EC tablet Take 1 tablet by mouth Daily With Breakfast. 1 tablet 1    • fluorometholone (FLAREX) 0.1 % ophthalmic suspension Administer 1 drop to both eyes 4 (Four) Times a Day. (Patient taking differently: Administer 1 drop to both eyes 4 (Four) Times a Day As Needed.) 5 mL 3 Past Week at Unknown time   • folic acid-vit B6-vit B12 (FOLBEE) 2.5-25-1 MG tablet tablet Take 1 tablet by mouth Daily. 1 tablet 1    • furosemide (LASIX) 20 MG tablet Take 1 tablet by mouth Daily. 1 tablet 1    • HYDROcodone-acetaminophen (NORCO) 5-325 MG per tablet Take 1 tablet by mouth Every 6 (Six) Hours As Needed for Moderate Pain  for up to 7 days. 50 tablet 0    •  ipratropium-albuterol (DUO-NEB) 0.5-2.5 mg/mL nebulizer USE 1 BY NEBULIZER 4 (FOUR) TIMES A DAY AS NEEDED FOR WHEEZING. 360 mL 1 1/26/2018 at Unknown time   • levothyroxine (SYNTHROID, LEVOTHROID) 50 MCG tablet TAKE 1 TABLET BY MOUTH EVERY DAY 90 tablet 1 1/22/2018 at 1200   • montelukast (SINGULAIR) 10 MG tablet Take 10 mg by mouth Every Night.   1/26/2018 at Unknown time   • nitroglycerin (NITRO-DUR) 0.2 MG/HR patch Place 1 patch on the skin Daily. 30 patch 0 1/25/2018 at 0900   • polyethylene glycol (MIRALAX) packet Take 17 g by mouth Daily. 100 packet 1 Past Week at Unknown time   • ranolazine (RANEXA) 1000 MG 12 hr tablet Take 1 tablet by mouth Every 12 (Twelve) Hours. 60 tablet 0 1/26/2018 at 0900   • sertraline (ZOLOFT) 50 MG tablet TAKE 1 TABLET BY MOUTH EVERY DAY (Patient taking differently: TAKE 1 TABLET BY MOUTH EVERY NIGHT) 90 tablet 1 1/25/2018 at Unknown time   • warfarin (COUMADIN) 3 MG tablet One daily thru 3/6/18 1 tablet 1    • Warfarin Sodium (PHARMACY TO DOSE WARFARIN) Continuous As Needed (thru 3/6/18). 1 each 1        Review of Systems:  Review of Systems   Constitutional: Negative for appetite change, chills, fatigue, fever and unexpected weight change.   HENT: Negative for congestion, facial swelling, hearing loss, nosebleeds, rhinorrhea, sneezing, trouble swallowing and voice change.    Eyes: Negative for photophobia and visual disturbance.   Respiratory: Positive for shortness of breath. Negative for apnea, cough, choking, chest tightness, wheezing and stridor.    Cardiovascular: Positive for chest pain. Negative for palpitations and leg swelling.   Gastrointestinal: Negative for abdominal pain, blood in stool, constipation, diarrhea, nausea and vomiting.   Endocrine: Negative for cold intolerance, heat intolerance, polydipsia, polyphagia and polyuria.   Genitourinary: Negative for dysuria, flank pain and hematuria.   Musculoskeletal: Negative for arthralgias, back pain, myalgias and neck  pain.   Skin: Negative for rash and wound.   Allergic/Immunologic: Negative for immunocompromised state.   Neurological: Positive for light-headedness. Negative for dizziness, seizures, syncope, speech difficulty, weakness, numbness and headaches.        Near syncope   Hematological: Does not bruise/bleed easily.   Psychiatric/Behavioral: Negative for agitation, behavioral problems, confusion, decreased concentration, hallucinations, self-injury and suicidal ideas. The patient is not nervous/anxious.       Otherwise complete ROS is negative except as mentioned above.    Physical Exam:   Temp:  [97.8 °F (36.6 °C)-98.2 °F (36.8 °C)] 97.8 °F (36.6 °C)  Heart Rate:  [86-96] 91  Resp:  [14-18] 18  BP: (126-148)/(60-69) 148/68     Physical Exam   Constitutional: She is oriented to person, place, and time. She appears well-developed and well-nourished.   HENT:   Head: Normocephalic and atraumatic.   Nose: Nose normal.   Mouth/Throat: Oropharynx is clear and moist.   Eyes: Conjunctivae, EOM and lids are normal. Pupils are equal, round, and reactive to light. No scleral icterus.   Neck: Normal range of motion. Neck supple. No JVD present. No tracheal tenderness and no spinous process tenderness present. No rigidity. No tracheal deviation, no edema and normal range of motion present.   Cardiovascular: Normal rate, regular rhythm, S1 normal, S2 normal, normal heart sounds and normal pulses.  Exam reveals no gallop and no friction rub.    No murmur heard.  Pulmonary/Chest: Effort normal and breath sounds normal. No accessory muscle usage. No respiratory distress. She has no decreased breath sounds. She has no wheezes. She has no rales. She exhibits no tenderness.   Abdominal: Soft. Bowel sounds are normal. She exhibits no distension and no mass. There is no tenderness. There is no rebound and no guarding.   Musculoskeletal: She exhibits no edema or tenderness.   Neurological: She is alert and oriented to person, place, and  time. She has normal reflexes. She displays no atrophy and normal reflexes. No cranial nerve deficit or sensory deficit. She exhibits normal muscle tone. She displays no seizure activity. Coordination normal.   Skin: Skin is warm. No rash noted. No pallor.   Psychiatric: She has a normal mood and affect. Her behavior is normal. Judgment and thought content normal.         Results Reviewed:  I have personally reviewed current lab, radiology, and data and agree with results.  Lab Results (last 24 hours)     Procedure Component Value Units Date/Time    CBC Auto Differential [[id number]]  (Abnormal) Collected:  02/12/18 1407    Specimen:  Blood from Hand, Right Updated:  02/12/18 1431     WBC 13.39 (H) 10*3/mm3      RBC 2.12 (L) 10*6/mm3      Hemoglobin 6.8 (L) g/dL      Hematocrit 19.7 (L) %      MCV 92.9 fL      MCH 32.1 pg      MCHC 34.5 g/dL      RDW 14.6 (H) %      RDW-SD 49.0 (H) fl      MPV 8.8 fL      Platelets 424 10*3/mm3     Comprehensive Metabolic Panel [[id number]]  (Abnormal) Collected:  02/12/18 1407    Specimen:  Blood from Hand, Right Updated:  02/12/18 1454     Glucose 99 mg/dL      BUN 47 (H) mg/dL      Creatinine 0.80 mg/dL      Sodium 132 (L) mmol/L      Potassium 4.1 mmol/L      Chloride 97 mmol/L      CO2 24.0 mmol/L      Calcium 8.2 (L) mg/dL      Total Protein 5.8 (L) g/dL      Albumin 2.90 (L) g/dL      ALT (SGPT) 37 U/L      AST (SGOT) 24 U/L      Alkaline Phosphatase 65 U/L      Total Bilirubin 0.3 mg/dL      eGFR Non African Amer 68 mL/min/1.73      Globulin 2.9 gm/dL      A/G Ratio 1.0 (L) g/dL      BUN/Creatinine Ratio 58.8 (H)     Anion Gap 11.0 mmol/L     Narrative:       The MDRD GFR formula is only valid for adults with stable renal function between ages 18 and 70.    Troponin [[id number]]  (Normal) Collected:  02/12/18 1407    Specimen:  Blood from Hand, Right Updated:  02/12/18 1500     Troponin I <0.012 ng/mL     BNP [[id number]]  (Normal) Collected:  02/12/18 4252    Specimen:  Blood  from Hand, Right Updated:  02/12/18 1500     proBNP 569.0 pg/mL     Protime-INR [[id number]]  (Abnormal) Collected:  02/12/18 1407    Specimen:  Blood from Hand, Right Updated:  02/12/18 1502     Protime 27.5 (H) Seconds      INR 2.53 (H)    Narrative:       Therapeutic range for most indications is 2.0-3.0 INR,  or 2.5-3.5 for mechanical heart valves.    [facility] Draw [[id number]] Collected:  02/12/18 1407    Specimen:  Blood Updated:  02/12/18 1516    Narrative:       The following orders were created for panel order [facility] Draw.  Procedure                               Abnormality         Status                     ---------                               -----------         ------                     Light Blue Top[[id number]]                                   Final result               Green Top (Gel)[[id number]]                                  Final result               Lavender Top[[id number]]                                     Final result               Gold Top - SST[[id number]]                                   Final result                 Please view results for these tests on the individual orders.    Light Blue Top [[id number]] Collected:  02/12/18 1407    Specimen:  Blood from Hand, Right Updated:  02/12/18 1516     Extra Tube hold for add-on      Auto resulted       Green Top (Gel) [[id number]] Collected:  02/12/18 1407    Specimen:  Blood from Hand, Right Updated:  02/12/18 1516     Extra Tube Hold for add-ons.      Auto resulted.       Lavender Top [[id number]] Collected:  02/12/18 1407    Specimen:  Blood from Hand, Right Updated:  02/12/18 1516     Extra Tube hold for add-on      Auto resulted       Gold Top - SST [[id number]] Collected:  02/12/18 1407    Specimen:  Blood from Hand, Right Updated:  02/12/18 1516     Extra Tube Hold for add-ons.      Auto resulted.       CBC & Differential [[id number]] Collected:  02/12/18 1407    Specimen:  Blood Updated:  02/12/18 1711    Narrative:       The following  orders were created for panel order CBC & Differential.  Procedure                               Abnormality         Status                     ---------                               -----------         ------                     Manual Differential[[id number]]          Abnormal            Final result               CBC Auto Differential[[id number]]        Abnormal            Final result                 Please view results for these tests on the individual orders.    Manual Differential [[id number]]  (Abnormal) Collected:  02/12/18 1407    Specimen:  Blood from Hand, Right Updated:  02/12/18 1711     Neutrophil % 64.0 %      Lymphocyte % 27.0 %      Monocyte % 5.0 %      Basophil % 2.0 %      Metamyelocyte % 1.0 (H) %      Atypical Lymphocyte % 1.0 %      Neutrophils Absolute 8.57 10*3/mm3      Lymphocytes Absolute 3.62 10*3/mm3      Monocytes Absolute 0.67 10*3/mm3      Basophils Absolute 0.27 (H) 10*3/mm3      Polychromasia Slight/1+     WBC Morphology Normal     Platelet Estimate Adequate    Troponin [[id number]] Collected:  02/12/18 1832    Specimen:  Blood Updated:  02/12/18 1835        Imaging Results (last 24 hours)     Procedure Component Value Units Date/Time    XR Chest 1 View [[id number]] Collected:  02/12/18 1409     Updated:  02/12/18 1436    Narrative:       PROCEDURE: Chest, AP upright portable at 2:14 PM.    INDICATION: Chest pain.    COMPARISON: 1/22/2018 chest x-ray.    Heart size normal with normal vascularity. Stable position left  subclavian pacemaker.    The lungs are clear. No pleural effusions.    Bilateral degenerative arthritic changes in the shoulders.      Impression:       No acute disease.    Stable position left subclavian pacemaker.    [id number]    Electronically signed by:  Eliud Ro MD  2/12/2018 2:35  PM [employer] Workstation: [id number]            Assessment:    Hospital Problem List     Symptomatic anemia                Plan:  1.  Symptomatic anemia:  Hgb 6.8.  Has started blood  transfusion.  Will get 2 units.  Possibly secondary to anticoagulation.  She has been on Pepcid.  Will hold warfarin and continue Pepcid.  Will likely need GI consult in the morning.  2.  Recent hip fracture:  Doing well with rehab.  I discussed with Dr. Mcdowell, he will see her.  We will have to stop anticoagulation.  3.  CAD:  Recent MI.  Will hold ASA for now.  Continue other meds.  4.  COPD:  No exacerbation.  Continue prn nebs.  5.  DVT Prophylaxis:  SCDs and Teds  6.  GI Prophylaxis:  Pepcid.    I discussed the patients findings and my recommendations with: patient and family        This document has been electronically signed by Azar Buitrago MD on February 12, 2018 6:41 PM

## 2018-02-13 NOTE — THERAPY TREATMENT NOTE
Acute Care - Physical Therapy Treatment Note  [facility]     Patient Name: Val Arteaga  : 1932  MRN: [id number]  Today's Date: 2018  Onset of Illness/Injury or Date of Surgery Date: 18  Date of Referral to PT: 18  Referring Physician: Jeremias    Admit Date: 2018    Visit Dx:    ICD-10-CM ICD-9-CM   1. Symptomatic anemia D64.9 285.9   2. Gastrointestinal hemorrhage, unspecified gastrointestinal hemorrhage type K92.2 578.9   3. Shortness of breath R06.02 786.05   4. Impaired functional mobility, balance, gait, and endurance Z74.09 V49.89   5. Impaired mobility and activities of daily living Z74.09 799.89     Patient Active Problem List   Diagnosis   • Essential hypertension   • Coronary artery disease involving native coronary artery of native heart without angina pectoris   • Chronic obstructive pulmonary disease   • Acquired hypothyroidism   • Depressive disorder   • Thygeson's superficial punctate keratitis   • Pseudophakia   • Precordial pain   • Displaced fracture of base of neck of right femur, initial encounter for closed fracture   • Hip fracture, right, closed, initial encounter   • Pulmonary emphysema   • Chronic systolic congestive heart failure   • Hyponatremia   • BENITO (acute kidney injury)   • Acute blood loss anemia   • Urinary retention   • Closed displaced basicervical fracture of right femur   • Urethral caruncle   • Symptomatic anemia               Adult Rehabilitation Note       18 1359          Rehab Assessment/Intervention    Document Type (P)  therapy note (daily note)  -BM      Subjective Information (P)  agree to therapy  -BM      Patient Effort, Rehab Treatment (P)  good  -BM      Precautions/Limitations (P)  fall precautions;oxygen therapy device and L/min  -BM      Recorded by [BM] Cecilia Joseph, PT Student      Vital Signs    Pre Systolic BP Rehab (P)  130  -BM      Pre Treatment Diastolic BP (P)  60  -BM      Post Systolic BP Rehab (P)  153  -BM       Post Treatment Diastolic BP (P)  66  -BM      Pretreatment Heart Rate (beats/min) (P)  69  -BM      Posttreatment Heart Rate (beats/min) (P)  71  -BM      Pre SpO2 (%) (P)  94  -BM      O2 Delivery Pre Treatment (P)  supplemental O2  -BM      Post SpO2 (%) (P)  94  -BM      O2 Delivery Post Treatment (P)  supplemental O2  -BM      Pre Patient Position (P)  Supine  -BM      Intra Patient Position (P)  Supine  -BM      Post Patient Position (P)  Supine  -BM      Recorded by [KIMBRE] Cecilia Joseph, PT Student      Pain Assessment    Pain Assessment (P)  0-10  -BM      Pain Score (P)  0   at rest  -BM      Post Pain Score (P)  5   with movement  -BM      Pain Type (P)  Acute pain;Surgical pain  -BM      Pain Location (P)  Hip  -BM      Recorded by [KIMBER] Cecilia Joseph, PT Student      Vision Assessment/Intervention    Visual Impairment (P)  WFL with corrective lenses  -BM      Recorded by [KIMBER] Cecilia Joseph PT Student      Cognitive Assessment/Intervention    Current Cognitive/Communication Assessment (P)  functional  -BM      Orientation Status (P)  oriented x 4  -BM      Follows Commands/Answers Questions (P)  100% of the time  -BM      Personal Safety (P)  WNL/WFL  -BM      Personal Safety Interventions (P)  gait belt;muscle strengthening facilitated;nonskid shoes/slippers when out of bed;supervised activity  -BM      Recorded by [KIMBER] Cecilia Joseph, PT Student      Bed Mobility, Assessment/Treatment    Bed Mobility, Comment (P)  pt deferred due to fatigue  -BM      Recorded by [KIMBER] Cecilia Joseph, PT Student      Transfer Assessment/Treatment    Transfer, Comment (P)  deferred  -BM      Recorded by [KIMBER] Cecilia Joseph, PT Student      Gait Assessment/Treatment    Gait, Comment (P)  deferred  -BM      Recorded by [BM] Cecilia Joseph, PT Student      Therapy Exercises    Bilateral Lower Extremities (P)  AROM:;15 reps;supine;ankle pumps/circles;glut sets;hip abduction/adduction;hip ER;hip IR;quad sets  -BM      Recorded by  [BM] Cecilia Joseph PT Student      Positioning and Restraints    Pre-Treatment Position (P)  in bed  -BM      Post Treatment Position (P)  bed  -BM      In Bed (P)  supine;call light within reach;encouraged to call for assist;exit alarm on;with family/caregiver;side rails up x2  -BM      Recorded by [BM] Cecilia Joseph PT Student        User Key  (r) = Recorded By, (t) = Taken By, (c) = Cosigned By    Initials Name Effective Dates    BM Cecilia Joseph, PT Student 12/05/17 -                 IP PT Goals       02/13/18 1359 02/13/18 1053       Bed Mobility PT STG    Bed Mobility PT STG, Date Established  02/13/18  -MN     Bed Mobility PT STG, Time to Achieve  4 days  -MN     Bed Mobility PT STG, Activity Type  roll left/roll right;supine to sit/sit to supine  -MN     Bed Mobility PT STG, [place]mers Level  independent  -MN     Bed Mobility PT STG, Date Goal Reviewed (P)  02/13/18  -BM      Bed Mobility PT STG, Outcome (P)  goal ongoing  -BM      Transfer Training PT STG    Transfer Training PT STG, Date Established  02/13/18  -MN     Transfer Training PT STG, Time to Achieve  4 days  -MN     Transfer Training PT STG, Activity Type  bed to chair /chair to bed;sit to stand/stand to sit;toilet  -MN     Transfer Training PT STG, [place] Level  supervision required  -MN     Transfer Training PT STG, Assist Device  walker, rolling  -MN     Transfer Training PT STG, Date Goal Reviewed (P)  02/13/18  -BM      Transfer Training PT STG, Outcome (P)  goal ongoing  -BM      Gait Training PT STG    Gait Training Goal PT STG, Date Established  02/13/18  -MN     Gait Training Goal PT STG, Time to Achieve  4 days  -MN     Gait Training Goal PT STG, [place] Level  supervision required  -MN     Gait Training Goal PT STG, Assist Device  walker, rolling  -MN     Gait Training Goal PT STG, Distance to Achieve  300 ft  -MN     Gait Training Goal PT STG, Date Goal Reviewed (P)  02/13/18  -BM      Gait Training Goal PT STG, Outcome  (P)  goal ongoing  -BM        User Key  (r) = Recorded By, (t) = Taken By, (c) = Cosigned By    Initials Name Provider Type    MN Hallie Negrete, PT Physical Therapist    BM Cecilia Joseph, PT Student PT Student          Physical Therapy Education     Title: PT OT SLP Therapies (Active)     Topic: Physical Therapy (Active)     Point: Mobility training (Done)    Learning Progress Summary    Learner Readiness Method Response Comment Documented by Status   Patient Acceptance E VU no OOB without assist.. Role of PT in hospital MN 02/13/18 1053 Done               Point: Precautions (Done)    Learning Progress Summary    Learner Readiness Method Response Comment Documented by Status   Patient Acceptance E VU no OOB without assist.. Role of PT in hospital MN 02/13/18 1053 Done                      User Key     Initials Effective Dates Name Provider Type Discipline    MN 10/17/16 -  Hallie Negrete PT Physical Therapist PT                    PT Recommendation and Plan  Anticipated Discharge Disposition: skilled nursing facility  Planned Therapy Interventions: balance training, bed mobility training, gait training, patient/family education, strengthening, stretching, transfer training  PT Frequency: other (see comments) (5-14x/week)  Plan of Care Review  Outcome Summary/Follow up Plan: (P) No new goals met this tx. Pt deferred EOB/OOB due to fatigue from earlier therapy. Pt states her acute/surgical hip pain is 0/10 at rest and 5/10           Outcome Measures       02/13/18 1359 02/13/18 1050 02/13/18 0959    How much help from another person do you currently need...    Turning from your back to your side while in flat bed without using bedrails? (P)  3  -BM  3  -MN    Moving from lying on back to sitting on the side of a flat bed without bedrails? (P)  3  -BM  3  -MN    Moving to and from a bed to a chair (including a wheelchair)? (P)  3  -BM  3  -MN    Standing up from a chair using your arms (e.g., wheelchair, bedside  chair)? (P)  3  -BM  3  -MN    Climbing 3-5 steps with a railing? (P)  3  -BM  3  -MN    To walk in hospital room? (P)  3  -BM  3  -MN    AM-PAC 6 Clicks Score (P)  18  -BM  18  -MN    How much help from another is currently needed...    Putting on and taking off regular lower body clothing?  2  -RB     Bathing (including washing, rinsing, and drying)  2  -RB     Toileting (which includes using toilet bed pan or urinal)  3  -RB     Putting on and taking off regular upper body clothing  3  -RB     Taking care of personal grooming (such as brushing teeth)  4  -RB     Eating meals  4  -RB     Score  18  -RB     Functional Assessment    Outcome Measure Options (P)  AM-PAC 6 Clicks Basic Mobility (PT)  -BM AM-PAC 6 Clicks Daily Activity (OT)  -RB AM-PAC 6 Clicks Basic Mobility (PT)  -MN      User Key  (r) = Recorded By, (t) = Taken By, (c) = Cosigned By    Initials Name Provider Type    RB Seth Cash, OT Occupational Therapist    MN Hallie Negrete, PT Physical Therapist    BM Cecilia Joseph, PT Student PT Student           Time Calculation:         PT Charges       02/13/18 1052          Time Calculation    Start Time 0959  -MN      Stop Time 1039  -MN      Time Calculation (min) 40 min  -MN      PT Received On 02/13/18  -MN      PT Goal Re-Cert Due Date 02/26/18  -MN      Time Calculation- PT    Total Timed Code Minutes- PT 23 minute(s)  -MN        User Key  (r) = Recorded By, (t) = Taken By, (c) = Cosigned By    Initials Name Provider Type    MARIA DOLORES Negrete, PT Physical Therapist              PT G-Codes  PT Professional Judgement Used?: Yes  Outcome Measure Options: (P) AM-PAC 6 Clicks Basic Mobility (PT)  Score: 18  Functional Limitation: Mobility: Walking and moving around  Mobility: Walking and Moving Around Current Status (): At least 40 percent but less than 60 percent impaired, limited or restricted  Mobility: Walking and Moving Around Goal Status (): At least 20 percent but less than 40 percent  impaired, limited or restricted    Cecilia Joseph, PT Student  2/13/2018

## 2018-02-13 NOTE — PLAN OF CARE
Problem: Patient Care Overview (Adult)  Goal: Plan of Care Review  Outcome: Ongoing (interventions implemented as appropriate)   02/13/18 0106   Coping/Psychosocial Response Interventions   Plan Of Care Reviewed With patient   Patient Care Overview   Progress no change     Goal: Adult Individualization and Mutuality  Outcome: Ongoing (interventions implemented as appropriate)    Goal: Discharge Needs Assessment  Outcome: Ongoing (interventions implemented as appropriate)      Problem: Fall Risk (Adult)  Goal: Identify Related Risk Factors and Signs and Symptoms  Outcome: Ongoing (interventions implemented as appropriate)    Goal: Absence of Falls  Outcome: Ongoing (interventions implemented as appropriate)

## 2018-02-13 NOTE — PROGRESS NOTES
[facility] Medicine Services  INPATIENT PROGRESS NOTE    Length of Stay: 1  Date of Admission: 2/12/2018  Primary Care Physician: Roula Albert MD    Subjective   Chief Complaint: Near syncope  HPI:  85 year old female with a significant medical history of recent right hip fracture s/p endoprosthesis 3 weeks ago who was discharged on warfarin for DVT prophylaxis who presents with a near syncopal episode.  She was found to be anemic with Hgb of 6.8 with heme+ stool.  She is s/p 2 units PRBC.      Review of Systems   Constitutional: Negative for chills and fever.   Respiratory: Negative for shortness of breath.    Cardiovascular: Negative for chest pain.   Gastrointestinal: Negative for abdominal pain, blood in stool, diarrhea, nausea and vomiting.      All pertinent negatives and positives are as above. All other systems have been reviewed and are negative unless otherwise stated.     Objective    Temp:  [97 °F (36.1 °C)-98.7 °F (37.1 °C)] 98.7 °F (37.1 °C)  Heart Rate:  [70-97] 84  Resp:  [14-20] 20  BP: (123-148)/(56-69) 136/66    Physical Exam   Constitutional: She is oriented to person, place, and time. She appears well-developed and well-nourished. No distress.   HENT:   Head: Normocephalic.   Cardiovascular: Normal rate, regular rhythm, normal heart sounds and intact distal pulses.    Pulmonary/Chest: Effort normal and breath sounds normal. No respiratory distress.   Abdominal: Soft. Bowel sounds are normal. She exhibits no distension. There is no tenderness.   Musculoskeletal: Normal range of motion. She exhibits no edema.   Neurological: She is alert and oriented to person, place, and time.   Skin: Skin is warm and dry. She is not diaphoretic. No erythema.   Vitals reviewed.    Results Review:  I have reviewed the labs, radiology results, and diagnostic studies.    Laboratory Data:     Results from last 7 days  Lab Units 02/13/18  0525 02/12/18  1407   SODIUM mmol/L  136* 132*   POTASSIUM mmol/L 3.8 4.1   CHLORIDE mmol/L 102 97   CO2 mmol/L 26.0 24.0   BUN mg/dL 37* 47*   CREATININE mg/dL 0.83 0.80   GLUCOSE mg/dL 87 99   CALCIUM mg/dL 8.1* 8.2*   BILIRUBIN mg/dL  --  0.3   ALK PHOS U/L  --  65   ALT (SGPT) U/L  --  37   AST (SGOT) U/L  --  24   ANION GAP mmol/L 8.0 11.0     Estimated Creatinine Clearance: 55.5 mL/min (by C-G formula based on Cr of 0.83).            Results from last 7 days  Lab Units 02/13/18  0525 02/12/18  1407   WBC 10*3/mm3 10.99* 13.39*   HEMOGLOBIN g/dL 8.1* 6.8*   HEMATOCRIT % 23.4* 19.7*   PLATELETS 10*3/mm3 367 424       Results from last 7 days  Lab Units 02/12/18  1407   INR  2.53*       Culture Data:   No results found for: BLOODCX  No results found for: URINECX  No results found for: RESPCX  No results found for: WOUNDCX  No results found for: STOOLCX  No components found for: BODYFLD    Radiology Data:   Imaging Results (last 24 hours)     Procedure Component Value Units Date/Time    XR Chest 1 View [[id number]] Collected:  02/12/18 1409     Updated:  02/12/18 1436    Narrative:       PROCEDURE: Chest, AP upright portable at 2:14 PM.    INDICATION: Chest pain.    COMPARISON: 1/22/2018 chest x-ray.    Heart size normal with normal vascularity. Stable position left  subclavian pacemaker.    The lungs are clear. No pleural effusions.    Bilateral degenerative arthritic changes in the shoulders.      Impression:       No acute disease.    Stable position left subclavian pacemaker.    [id number]    Electronically signed by:  Eliud Ro MD  2/12/2018 2:35  PM CST Workstation: [id number]          I have reviewed the patient current medications.     Assessment/Plan     Hospital Problem List     * (Principal)Acute blood loss anemia    Overview Signed 2/13/2018  1:04 PM by CRESENCIO Ayon     Suspect GI bleeding         Essential hypertension    Coronary artery disease involving native coronary artery of native heart without angina  pectoris    Chronic obstructive pulmonary disease    Chronic systolic congestive heart failure    Closed displaced basicervical fracture of right femur    Overview Signed 1/30/2018 11:13 AM by Shawn Maldonado MD     Bipolar prosthesis anterior approach per Dr Mcdowell on 1/23/18         Symptomatic anemia          Plan:    S/P 2 units PRBC  Serial H&H, transfuse if Hgb <7 g/dl  Hold coumadin  GI consultation  Protonix            This document has been electronically signed by CRESENCIO Ayon on February 13, 2018 1:10 PM

## 2018-02-13 NOTE — PLAN OF CARE
Problem: Patient Care Overview (Adult)  Goal: Plan of Care Review  Outcome: Ongoing (interventions implemented as appropriate)   02/13/18 1359   Outcome Evaluation   Outcome Summary/Follow up Plan No new goals met this tx. Pt deferred EOB/OOB due to fatigue from earlier therapy. Pt states her acute/surgical hip pain is 0/10 at rest and 5/10 with mobility. Pt performed 15x1 AROM LE ex in supine. Pt may benefit from continued rehab to increase strength.       Problem: Inpatient Physical Therapy  Goal: Bed Mobility Goal STG- PT  Outcome: Ongoing (interventions implemented as appropriate)   02/13/18 1053 02/13/18 1359   Bed Mobility PT STG   Bed Mobility PT STG, Date Established 02/13/18 --    Bed Mobility PT STG, Time to Achieve 4 days --    Bed Mobility PT STG, Activity Type roll left/roll right;supine to sit/sit to supine --    Bed Mobility PT STG, [place] Level independent --    Bed Mobility PT STG, Date Goal Reviewed --  02/13/18   Bed Mobility PT STG, Outcome --  goal ongoing     Goal: Transfer Training Goal 1 STG- PT  Outcome: Ongoing (interventions implemented as appropriate)   02/13/18 1053 02/13/18 1359   Transfer Training PT STG   Transfer Training PT STG, Date Established 02/13/18 --    Transfer Training PT STG, Time to Achieve 4 days --    Transfer Training PT STG, Activity Type bed to chair /chair to bed;sit to stand/stand to sit;toilet --    Transfer Training PT STG, [place] Level supervision required --    Transfer Training PT STG, Assist Device walker, rolling --    Transfer Training PT STG, Date Goal Reviewed --  02/13/18   Transfer Training PT STG, Outcome --  goal ongoing     Goal: Gait Training Goal STG- PT  Outcome: Ongoing (interventions implemented as appropriate)   02/13/18 1053 02/13/18 1359   Gait Training PT STG   Gait Training Goal PT STG, Date Established 02/13/18 --    Gait Training Goal PT STG, Time to Achieve 4 days --    Gait Training Goal PT STG, [place] Level  supervision required --    Gait Training Goal PT STG, Assist Device walker, rolling --    Gait Training Goal PT STG, Distance to Achieve 300 ft --    Gait Training Goal PT STG, Date Goal Reviewed --  02/13/18   Gait Training Goal PT STG, Outcome --  goal ongoing

## 2018-02-13 NOTE — CONSULTS
SUBJECTIVE:   2/13/2018    Name: Val Arteaga  DOD: 12/2/1932    REASON FOR CONSULT: Acute GI blood loss anemia    Chief Complaint:     Chief Complaint   Patient presents with   • Chest Pain   • Shortness of Breath       Subjective     Patient is 85 y.o.  female admitted from the nursing home for weakness and a near syncopal episode. Evaluation in the ED revealed acute blood anemia secondary to GI blood loss with a positive hemoccult and a presenting hemoglobin of 6.8g/dL. Patient underwent right hip arthroplasty approximately three weeks ago and was placed on anticoagulation therapy with warfarin. INR at the time of admission was 2.5. Patient's anticoagulation therapy was discontinued, orthopedics was made aware of change, and patient received 2 units of PRBCs. Patient was started on PO Pepcid for GI prophylaxis.    Of note, patient reports that she had a colonoscopy several years ago by Dr. Gramajo. Will try to find records.       ROS/HISTORY/ CURRENT MEDICATIONS/OBJECTIVE/VS/PE:   Review of Systems:   Review of Systems   Constitutional: Positive for fatigue. Negative for chills and diaphoresis.   HENT: Negative for congestion, sneezing and sore throat.    Respiratory: Positive for shortness of breath (chronic secondary to COPD). Negative for cough.    Cardiovascular: Negative for chest pain, palpitations and leg swelling.   Gastrointestinal: Negative for constipation, diarrhea, nausea and vomiting.   Genitourinary: Negative for difficulty urinating, dysuria and hematuria.   Musculoskeletal: Positive for arthralgias (right hip pain following arthroplasty) and gait problem (ambulating with PT/OT).   Skin: Negative for rash and wound.   Neurological: Positive for dizziness, weakness and light-headedness.   Psychiatric/Behavioral: Negative for agitation and confusion.       History:     Past Medical History:   Diagnosis Date   • Acquired hypothyroidism    • Allergic rhinitis    • CHF (congestive heart  failure)    • COPD (chronic obstructive pulmonary disease)    • Corneal epithelial dystrophy    • Coronary arteriosclerosis    • Depression    • Generalized atherosclerosis    • GERD (gastroesophageal reflux disease)    • Hemorrhoids    • History of bone density study 08/03/2012    Lumbar spine Osteopenia. Femoral Osteopenia   • History of mammogram 08/20/2004    Birads Category 2 Benign   • History of Papanicolaou smear of cervix 08/12/2004    NEGATIVE   • Hypercholesterolemia    • Hyperlipidemia    • Hypertension    • Myocardial infarction    • Nonexudative age-related macular degeneration    • Osteoarthritis of multiple joints    • Pseudophakia    • Punctate keratitis     - history Thygeson's keratopathy      • Tobacco dependence syndrome      Past Surgical History:   Procedure Laterality Date   • APPENDECTOMY     • BREAST SURGERY  03/19/1954    Excision, breast lesion (1)  Excision of fibroma. 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 patency in the circumflex artery site of previous angioplasty. Kathe. patency in RCA.Nonobstructive 20-30% stenosis in the LAD and diagonal branch. Preserved LV systolic function with Ef of 55%   • CERVICAL SPINE SURGERY  09/16/1974    Excision of cervical disc, C5-6, C6-7, anterior cervical fusion, C5-6 with iliac bone graft.cervical spondylosis,C5-6, C6-7   • COLONOSCOPY  01/01/2013    patient declined    • CORONARY ANGIOPLASTY  07/21/1993    Angioplasty, coronary (2)    RCA    • DILATATION AND CURETTAGE      3   • ENDOSCOPY AND COLONOSCOPY  10/01/1998    Hemorhoids Rectal Outlet Bleeding   • ESOPHAGOSCOPY / EGD  06/28/2004    Nonerosive gastritis of the body of the stomach. A biopsy was obtained & placed in h. Pylori test agar. Multiple biopsies were obtained from the body of the stomach   • HIP BIPOLAR REPLACEMENT Right 1/23/2018    Procedure: HIP BIPOLAR ANTERIOR APPROACH - right;  Surgeon: Kelvin Mcdowell MD;   Location: [facility] OR;  Service:    • INJECTION OF MEDICATION  11/23/2015    Depo Medrol (Methylprednisone) (5)    • INJECTION OF MEDICATION  02/04/2016    Kenalog (3)     • PACEMAKER REPLACEMENT  2006     Family History   Problem Relation Age of Onset   • Coronary artery disease Other      Social History   Substance Use Topics   • Smoking status: Former Smoker     Packs/day: 0.50     Years: 50.00     Types: Cigarettes     Start date: 1950     Quit date: 1/12/2017   • Smokeless tobacco: Never Used   • Alcohol use No     Prescriptions Prior to Admission   Medication Sig Dispense Refill Last Dose   • acetaminophen (TYLENOL) 325 MG tablet Take 2 tablets by mouth Every 4 (Four) Hours As Needed for Mild Pain . 1 tablet 1 2/11/2018 at Unknown time   • aspirin 81 MG EC tablet Take 81 mg by mouth Daily.   2/11/2018 at Unknown time   • atorvastatin (LIPITOR) 20 MG tablet Take 1 tablet by mouth Daily. 1 tablet 1 2/11/2018 at Unknown time   • budesonide-formoterol (SYMBICORT) 160-4.5 MCG/ACT inhaler Inhale 2 puffs 2 (Two) Times a Day. 1 inhaler 11 2/11/2018 at Unknown time   • carvedilol (COREG) 6.25 MG tablet Take 6.25 mg by mouth 2 (Two) Times a Day With Meals.   2/11/2018 at Unknown time   • estradiol (ESTRACE) 0.1 MG/GM vaginal cream One applicator q hs 45 g 1 2/11/2018 at Unknown time   • famotidine (PEPCID) 40 MG tablet Take 1 tablet by mouth Daily. 1 tablet 1 2/11/2018 at Unknown time   • ferrous sulfate 324 (65 Fe) MG tablet delayed-release EC tablet Take 1 tablet by mouth Daily With Breakfast. 1 tablet 1 2/11/2018 at Unknown time   • fluorometholone (FLAREX) 0.1 % ophthalmic suspension Administer 1 drop to both eyes 4 (Four) Times a Day. (Patient taking differently: Administer 1 drop to both eyes 4 (Four) Times a Day As Needed.) 5 mL 3 2/11/2018 at Unknown time   • folic acid-vit B6-vit B12 (FOLBEE) 2.5-25-1 MG tablet tablet Take 1 tablet by mouth Daily. 1 tablet 1 2/11/2018 at Unknown time   • furosemide (LASIX) 20 MG  tablet Take 1 tablet by mouth Daily. 1 tablet 1 2018 at Unknown time   • [] HYDROcodone-acetaminophen (NORCO) 5-325 MG per tablet Take 1 tablet by mouth Every 6 (Six) Hours As Needed for Moderate Pain  for up to 7 days. 50 tablet 0 2018 at Unknown time   • ipratropium-albuterol (DUO-NEB) 0.5-2.5 mg/mL nebulizer USE 1 BY NEBULIZER 4 (FOUR) TIMES A DAY AS NEEDED FOR WHEEZING. 360 mL 1 2018 at Unknown time   • levothyroxine (SYNTHROID, LEVOTHROID) 50 MCG tablet TAKE 1 TABLET BY MOUTH EVERY DAY 90 tablet 1 2018 at Unknown time   • montelukast (SINGULAIR) 10 MG tablet Take 10 mg by mouth Every Night.   2018 at Unknown time   • nitroglycerin (NITRO-DUR) 0.2 MG/HR patch Place 1 patch on the skin Daily. 30 patch 0 2018 at Unknown time   • polyethylene glycol (MIRALAX) packet Take 17 g by mouth Daily. 100 packet 1 2018 at Unknown time   • ranolazine (RANEXA) 1000 MG 12 hr tablet Take 1 tablet by mouth Every 12 (Twelve) Hours. 60 tablet 0 2018 at Unknown time   • sertraline (ZOLOFT) 50 MG tablet TAKE 1 TABLET BY MOUTH EVERY DAY (Patient taking differently: TAKE 1 TABLET BY MOUTH EVERY NIGHT) 90 tablet 1 2018 at Unknown time   • warfarin (COUMADIN) 3 MG tablet One daily thru 3/6/18 1 tablet 1 2018 at Unknown time   • Warfarin Sodium (PHARMACY TO DOSE WARFARIN) Continuous As Needed (thru 3/6/18). 1 each 1 2018 at Unknown time     Allergies:  Ace inhibitors and Lisinopril    I have reviewed the patients medical history, surgical history and family history in the available medical record system.     Current Medications:     Current Facility-Administered Medications   Medication Dose Route Frequency Provider Last Rate Last Dose   • acetaminophen (TYLENOL) tablet 650 mg  650 mg Oral Q4H PRN Azar Buitrago MD       • atorvastatin (LIPITOR) tablet 20 mg  20 mg Oral Daily Azar Buitrago MD   20 mg at 18 1121   • budesonide-formoterol (SYMBICORT) 160-4.5 MCG/ACT  inhaler 2 puff  2 puff Inhalation BID - RT Azar Buitrago MD   2 puff at 02/13/18 0833   • carvedilol (COREG) tablet 6.25 mg  6.25 mg Oral BID With Meals Azar Buitrago MD   6.25 mg at 02/13/18 1122   • famotidine (PEPCID) tablet 40 mg  40 mg Oral Daily Azar Buitrago MD   40 mg at 02/13/18 1121   • ferrous sulfate EC tablet 324 mg  324 mg Oral Daily With Breakfast Azar Buitrago MD   324 mg at 02/13/18 1121   • fluorometholone (FML) 0.1 % ophthalmic suspension 1 drop  1 drop Both Eyes 4x Daily Azar Buitrago MD   1 drop at 02/13/18 1123   • folic acid-vit B6-vit B12 (FOLBEE) tablet 1 tablet  1 tablet Oral Daily Azar Buitrago MD   1 tablet at 02/13/18 1121   • furosemide (LASIX) tablet 20 mg  20 mg Oral Daily Azar Buitrago MD   20 mg at 02/13/18 1121   • HYDROcodone-acetaminophen (NORCO) 5-325 MG per tablet 1 tablet  1 tablet Oral Q6H PRN zAar Buitrago MD       • ipratropium-albuterol (DUO-NEB) nebulizer solution 3 mL  3 mL Nebulization Q6H PRN Azar Buitrago MD   3 mL at 02/13/18 1124   • levothyroxine (SYNTHROID, LEVOTHROID) tablet 50 mcg  50 mcg Oral Q AM Azar Buitrago MD   50 mcg at 02/13/18 0600   • montelukast (SINGULAIR) tablet 10 mg  10 mg Oral Nightly Azar Buitrago MD   10 mg at 02/12/18 2048   • nitroglycerin (NITRODUR) 0.2 MG/HR patch 1 patch  1 patch Transdermal Daily Azar Buitrago MD   1 patch at 02/13/18 0900   • polyethylene glycol 3350 powder (packet)  17 g Oral Daily Azar Buitrago MD   17 g at 02/13/18 1120   • ranolazine (RANEXA) 12 hr tablet 1,000 mg  1,000 mg Oral Q12H Azar Buitrago MD   1,000 mg at 02/13/18 1121   • sertraline (ZOLOFT) tablet 50 mg  50 mg Oral Nightly Azar Buitrago MD   50 mg at 02/12/18 2048   • sodium chloride 0.9 % flush 1-10 mL  1-10 mL Intravenous PRN Azar Buitrago MD       • sodium chloride 0.9 % flush 10 mL  10 mL Intravenous PRN Ehsan Franco MD   10 mL at 02/12/18 2049       Objective     Physical  Exam:   Temp:  [97 °F (36.1 °C)-98.7 °F (37.1 °C)] 98.7 °F (37.1 °C)  Heart Rate:  [70-97] 84  Resp:  [14-20] 20  BP: (123-148)/(56-69) 136/66    Physical Exam:  General Appearance:    Alert, cooperative, in no acute distress   Head:    Normocephalic, without obvious abnormality, atraumatic   Eyes:            Lids and lashes normal, conjunctivae and sclerae normal, no   icterus, no pallor, corneas clear, PERRLA   Ears:    Ears appear intact with no abnormalities noted   Throat:   No oral lesions, no thrush, oral mucosa moist   Neck:   No adenopathy, supple, trachea midline, no thyromegaly, no     carotid bruit, no JVD   Back:     No kyphosis present, no scoliosis present, no skin lesions,       erythema or scars, no tenderness to percussion or                   palpation, range of motion normal   Lungs:     Clear to auscultation,respirations regular, even and                   unlabored    Heart:    Regular rhythm and normal rate, normal S1 and S2, no            murmur, no gallop, no rub, no click   Abdomen:     Normal bowel sounds, no masses, no organomegaly, soft        non-tender, non-distended, no guarding, no rebound                 tenderness   Extremities:   Moves all extremities well, no edema, no cyanosis, no              redness   Pulses:   Pulses palpable and equal bilaterally   Skin:   No bleeding, bruising or rash   Lymph nodes:   No palpable adenopathy   Neurologic:   Cranial nerves 2 - 12 grossly intact, sensation intact      Results Review:     Lab Results   Component Value Date    WBC 10.99 (H) 02/13/2018    WBC 13.39 (H) 02/12/2018    WBC 10.79 (H) 02/03/2018    HGB 8.1 (L) 02/13/2018    HGB 6.8 (L) 02/12/2018    HGB 9.5 (L) 02/03/2018    HCT 23.4 (L) 02/13/2018    HCT 19.7 (L) 02/12/2018    HCT 28.4 (L) 02/03/2018     02/13/2018     02/12/2018     02/03/2018       Results from last 7 days  Lab Units 02/12/18  1407   ALK PHOS U/L 65   ALT (SGPT) U/L 37   AST (SGOT) U/L 24        Results from last 7 days  Lab Units 02/12/18  1407   BILIRUBIN mg/dL 0.3   ALK PHOS U/L 65     No results found for: LIPASE  Lab Results   Component Value Date    INR 2.53 (H) 02/12/2018    INR 2.10 (H) 02/05/2018    INR 2.01 (H) 02/04/2018         Radiology Review:  Imaging Results (last 72 hours)     Procedure Component Value Units Date/Time    XR Chest 1 View [[id number]] Collected:  02/12/18 1409     Updated:  02/12/18 1436    Narrative:       PROCEDURE: Chest, AP upright portable at 2:14 PM.    INDICATION: Chest pain.    COMPARISON: 1/22/2018 chest x-ray.    Heart size normal with normal vascularity. Stable position left  subclavian pacemaker.    The lungs are clear. No pleural effusions.    Bilateral degenerative arthritic changes in the shoulders.      Impression:       No acute disease.    Stable position left subclavian pacemaker.    [id number]    Electronically signed by:  Eliud Ro MD  2/12/2018 2:35  PM CST Workstation: [employer]          I reviewed the patient's new clinical results.  I reviewed the patient's new imaging results and agree with the interpretation.     ASSESSMENT/PLAN:   ASSESSMENT: 85 y.o.  female with acute GI blood loss anemia status post 2 units PRBCs.    PLAN:   1. Continue to monitor H&H. Transfusion PRBCs if Hb is less than 7g/dL.   2. Hold anticoagulation therapy following endoprosthesis right hip approximately 3 weeks ago.   3. GI prophylaxis: PO Pepcid  4. Will consider colonoscopy    The risks, benefits, and alternatives of this procedure have been discussed with the patient or the responsible party- the patient understands and agrees to proceed.         Signature  Saba Villanueva MD  [facility] Family Medicine Resident, PGY II        This document has been electronically signed by Saba Villanueva MD on February 13, 2018 11:42 AM

## 2018-02-14 ENCOUNTER — ANESTHESIA (OUTPATIENT)
Dept: GASTROENTEROLOGY | Facility: HOSPITAL | Age: 83
End: 2018-02-14

## 2018-02-14 ENCOUNTER — ANESTHESIA EVENT (OUTPATIENT)
Dept: GASTROENTEROLOGY | Facility: HOSPITAL | Age: 83
End: 2018-02-14

## 2018-02-14 VITALS
DIASTOLIC BLOOD PRESSURE: 61 MMHG | HEART RATE: 83 BPM | WEIGHT: 169.25 LBS | SYSTOLIC BLOOD PRESSURE: 139 MMHG | TEMPERATURE: 97.4 F | BODY MASS INDEX: 25.65 KG/M2 | HEIGHT: 68 IN | RESPIRATION RATE: 16 BRPM | OXYGEN SATURATION: 91 %

## 2018-02-14 PROBLEM — D64.9 ANEMIA: Status: ACTIVE | Noted: 2018-02-14

## 2018-02-14 LAB
ABO + RH BLD: NORMAL
ABO + RH BLD: NORMAL
ANION GAP SERPL CALCULATED.3IONS-SCNC: 10 MMOL/L (ref 5–15)
BASOPHILS # BLD AUTO: 0.02 10*3/MM3 (ref 0–0.2)
BASOPHILS NFR BLD AUTO: 0.2 % (ref 0–2)
BH BB BLOOD EXPIRATION DATE: NORMAL
BH BB BLOOD EXPIRATION DATE: NORMAL
BH BB BLOOD TYPE BARCODE: 5100
BH BB BLOOD TYPE BARCODE: 5100
BH BB DISPENSE STATUS: NORMAL
BH BB DISPENSE STATUS: NORMAL
BH BB PRODUCT CODE: NORMAL
BH BB PRODUCT CODE: NORMAL
BH BB UNIT NUMBER: NORMAL
BH BB UNIT NUMBER: NORMAL
BUN BLD-MCNC: 30 MG/DL (ref 7–21)
BUN/CREAT SERPL: 34.9 (ref 7–25)
CALCIUM SPEC-SCNC: 8.4 MG/DL (ref 8.4–10.2)
CHLORIDE SERPL-SCNC: 103 MMOL/L (ref 95–110)
CO2 SERPL-SCNC: 28 MMOL/L (ref 22–31)
CREAT BLD-MCNC: 0.86 MG/DL (ref 0.5–1)
DEPRECATED RDW RBC AUTO: 59.3 FL (ref 36.4–46.3)
EOSINOPHIL # BLD AUTO: 0.41 10*3/MM3 (ref 0–0.7)
EOSINOPHIL NFR BLD AUTO: 3.7 % (ref 0–7)
ERYTHROCYTE [DISTWIDTH] IN BLOOD BY AUTOMATED COUNT: 17.8 % (ref 11.5–14.5)
GFR SERPL CREATININE-BSD FRML MDRD: 63 ML/MIN/1.73 (ref 39–90)
GLUCOSE BLD-MCNC: 91 MG/DL (ref 60–100)
HCT VFR BLD AUTO: 24.7 % (ref 35–45)
HGB BLD-MCNC: 8.4 G/DL (ref 12–15.5)
IMM GRANULOCYTES # BLD: 0.09 10*3/MM3 (ref 0–0.02)
IMM GRANULOCYTES NFR BLD: 0.8 % (ref 0–0.5)
LYMPHOCYTES # BLD AUTO: 2.04 10*3/MM3 (ref 0.6–4.2)
LYMPHOCYTES NFR BLD AUTO: 18.6 % (ref 10–50)
MCH RBC QN AUTO: 31.1 PG (ref 26.5–34)
MCHC RBC AUTO-ENTMCNC: 34 G/DL (ref 31.4–36)
MCV RBC AUTO: 91.5 FL (ref 80–98)
MONOCYTES # BLD AUTO: 0.91 10*3/MM3 (ref 0–0.9)
MONOCYTES NFR BLD AUTO: 8.3 % (ref 0–12)
NEUTROPHILS # BLD AUTO: 7.47 10*3/MM3 (ref 2–8.6)
NEUTROPHILS NFR BLD AUTO: 68.4 % (ref 37–80)
PLATELET # BLD AUTO: 338 10*3/MM3 (ref 150–450)
PMV BLD AUTO: 8.9 FL (ref 8–12)
POTASSIUM BLD-SCNC: 4.2 MMOL/L (ref 3.5–5.1)
RBC # BLD AUTO: 2.7 10*6/MM3 (ref 3.77–5.16)
SODIUM BLD-SCNC: 141 MMOL/L (ref 137–145)
UNIT  ABO: NORMAL
UNIT  ABO: NORMAL
UNIT  RH: NORMAL
UNIT  RH: NORMAL
WBC NRBC COR # BLD: 10.94 10*3/MM3 (ref 3.2–9.8)

## 2018-02-14 PROCEDURE — 85025 COMPLETE CBC W/AUTO DIFF WBC: CPT | Performed by: HOSPITALIST

## 2018-02-14 PROCEDURE — 88342 IMHCHEM/IMCYTCHM 1ST ANTB: CPT | Performed by: INTERNAL MEDICINE

## 2018-02-14 PROCEDURE — 88305 TISSUE EXAM BY PATHOLOGIST: CPT | Performed by: PATHOLOGY

## 2018-02-14 PROCEDURE — 97110 THERAPEUTIC EXERCISES: CPT

## 2018-02-14 PROCEDURE — 88342 IMHCHEM/IMCYTCHM 1ST ANTB: CPT | Performed by: PATHOLOGY

## 2018-02-14 PROCEDURE — 88305 TISSUE EXAM BY PATHOLOGIST: CPT | Performed by: INTERNAL MEDICINE

## 2018-02-14 PROCEDURE — 43239 EGD BIOPSY SINGLE/MULTIPLE: CPT | Performed by: INTERNAL MEDICINE

## 2018-02-14 PROCEDURE — 25010000002 PROPOFOL 10 MG/ML EMULSION: Performed by: NURSE ANESTHETIST, CERTIFIED REGISTERED

## 2018-02-14 PROCEDURE — 80048 BASIC METABOLIC PNL TOTAL CA: CPT | Performed by: HOSPITALIST

## 2018-02-14 PROCEDURE — 94799 UNLISTED PULMONARY SVC/PX: CPT

## 2018-02-14 PROCEDURE — 0DB68ZX EXCISION OF STOMACH, VIA NATURAL OR ARTIFICIAL OPENING ENDOSCOPIC, DIAGNOSTIC: ICD-10-PCS | Performed by: INTERNAL MEDICINE

## 2018-02-14 RX ORDER — SODIUM CHLORIDE 0.9 % (FLUSH) 0.9 %
1-10 SYRINGE (ML) INJECTION AS NEEDED
Status: DISCONTINUED | OUTPATIENT
Start: 2018-02-14 | End: 2018-02-14 | Stop reason: HOSPADM

## 2018-02-14 RX ORDER — SODIUM CHLORIDE 9 MG/ML
50 INJECTION, SOLUTION INTRAVENOUS CONTINUOUS
Status: DISCONTINUED | OUTPATIENT
Start: 2018-02-14 | End: 2018-02-14 | Stop reason: HOSPADM

## 2018-02-14 RX ORDER — HYDROCODONE BITARTRATE AND ACETAMINOPHEN 5; 325 MG/1; MG/1
1 TABLET ORAL EVERY 6 HOURS PRN
Qty: 10 TABLET | Refills: 0 | Status: SHIPPED | OUTPATIENT
Start: 2018-02-14 | End: 2018-03-16

## 2018-02-14 RX ORDER — LIDOCAINE HYDROCHLORIDE 10 MG/ML
INJECTION, SOLUTION INFILTRATION; PERINEURAL AS NEEDED
Status: DISCONTINUED | OUTPATIENT
Start: 2018-02-14 | End: 2018-02-14 | Stop reason: SURG

## 2018-02-14 RX ORDER — DEXTROSE AND SODIUM CHLORIDE 5; .45 G/100ML; G/100ML
30 INJECTION, SOLUTION INTRAVENOUS CONTINUOUS PRN
Status: DISCONTINUED | OUTPATIENT
Start: 2018-02-14 | End: 2018-02-14

## 2018-02-14 RX ORDER — PROPOFOL 10 MG/ML
VIAL (ML) INTRAVENOUS AS NEEDED
Status: DISCONTINUED | OUTPATIENT
Start: 2018-02-14 | End: 2018-02-14 | Stop reason: SURG

## 2018-02-14 RX ADMIN — LIDOCAINE HYDROCHLORIDE 50 MG: 10 INJECTION, SOLUTION INFILTRATION; PERINEURAL at 12:53

## 2018-02-14 RX ADMIN — PROPOFOL 50 MG: 10 INJECTION, EMULSION INTRAVENOUS at 12:53

## 2018-02-14 RX ADMIN — SODIUM CHLORIDE 50 ML/HR: 9 INJECTION, SOLUTION INTRAVENOUS at 11:24

## 2018-02-14 RX ADMIN — IPRATROPIUM BROMIDE AND ALBUTEROL SULFATE 3 ML: 2.5; .5 SOLUTION RESPIRATORY (INHALATION) at 07:17

## 2018-02-14 RX ADMIN — PANTOPRAZOLE SODIUM 40 MG: 40 TABLET, DELAYED RELEASE ORAL at 06:00

## 2018-02-14 RX ADMIN — LEVOTHYROXINE SODIUM 50 MCG: 50 TABLET ORAL at 06:00

## 2018-02-14 RX ADMIN — PROPOFOL 20 MG: 10 INJECTION, EMULSION INTRAVENOUS at 12:57

## 2018-02-14 RX ADMIN — NITROGLYCERIN 1 PATCH: 0.2 PATCH TRANSDERMAL at 08:58

## 2018-02-14 NOTE — CONSULTS
Adult Nutrition  Assessment    Patient Name:  Val Arteaga  YOB: 1932  MRN: [id number]  Admit Date:  2/12/2018    Assessment Date:  2/14/2018    Comments:  Pt with dx Heart Failure.  Pt declined Low Sodium diet ed this date but accepted diet copy.          Reason for Assessment       02/14/18 7820    Reason for Assessment    Reason For Assessment/Visit education;per organizational policy   Low Sodium diet    Identified At Risk By Screening Criteria need for education;diagnosis   dx Heart Failure                              Electronically signed by:  Katie Rivera RD  02/14/18 2:51 PM

## 2018-02-14 NOTE — PLAN OF CARE
Problem: Patient Care Overview (Adult)  Goal: Plan of Care Review  Outcome: Ongoing (interventions implemented as appropriate)   02/14/18 0940   Coping/Psychosocial Response Interventions   Plan Of Care Reviewed With patient;daughter   Patient Care Overview   Progress no change   Outcome Evaluation   Outcome Summary/Follow up Plan Pt going for test soon and agreed to LE ther ex in bed. Pt performed R LE therex with AROM-AAROM and AROM on L LE.     Goal: Discharge Needs Assessment  Outcome: Ongoing (interventions implemented as appropriate)   02/13/18 1557 02/14/18 0257   Discharge Needs Assessment   Concerns To Be Addressed --  no discharge needs identified   Discharge Facility/Level Of Care Needs rehabilitation facility --    Current Discharge Risk dependent with mobility/activities of daily living --    Discharge Disposition skilled nursing facility --    Discharge Planning Comments pt is at [facility] for rehab to home. she plans to return there upon dc. has rx coverage and uses [facility]. [facility] o2 from [facility]  --    Living Environment   Transportation Available car;family or friend will provide --    Self-Care   Equipment Currently Used at Home oxygen --        Problem: Inpatient Physical Therapy  Goal: Bed Mobility Goal STG- PT  Outcome: Ongoing (interventions implemented as appropriate)   02/13/18 1053 02/14/18 0940   Bed Mobility PT STG   Bed Mobility PT STG, Date Established 02/13/18 --    Bed Mobility PT STG, Time to Achieve 4 days --    Bed Mobility PT STG, Activity Type roll left/roll right;supine to sit/sit to supine --    Bed Mobility PT STG, [place] Level independent --    Bed Mobility PT STG, Date Goal Reviewed --  02/14/18   Bed Mobility PT STG, Outcome --  goal ongoing     Goal: Transfer Training Goal 1 STG- PT  Outcome: Ongoing (interventions implemented as appropriate)   02/13/18 1053 02/14/18 0940   Transfer Training PT STG   Transfer Training PT STG, Date Established 02/13/18 --     Transfer Training PT STG, Time to Achieve 4 days --    Transfer Training PT STG, Activity Type bed to chair /chair to bed;sit to stand/stand to sit;toilet --    Transfer Training PT STG, [place] Level supervision required --    Transfer Training PT STG, Assist Device walker, rolling --    Transfer Training PT STG, Date Goal Reviewed --  02/14/18   Transfer Training PT STG, Outcome --  goal ongoing     Goal: Gait Training Goal STG- PT  Outcome: Ongoing (interventions implemented as appropriate)   02/13/18 1053 02/14/18 0940   Gait Training PT STG   Gait Training Goal PT STG, Date Established 02/13/18 --    Gait Training Goal PT STG, Time to Achieve 4 days --    Gait Training Goal PT STG, [place] Level supervision required --    Gait Training Goal PT STG, Assist Device walker, rolling --    Gait Training Goal PT STG, Distance to Achieve 300 ft --    Gait Training Goal PT STG, Date Goal Reviewed --  02/14/18   Gait Training Goal PT STG, Outcome --  goal ongoing

## 2018-02-14 NOTE — PROGRESS NOTES
SUBJECTIVE:   2/14/2018  Chief Complaint:     Subjective      Patient is 85 y.o.  female admitted from the nursing home for acute blood anemia secondary to GI blood loss with a positive hemoccult and a presenting hemoglobin of 6.8g/dL. Patient received 2 units of PRBCs and has been started on PO Pepcid for GI prophylaxis.     Plan for an EGD today to evaluate patient's anemia.      CURRENT MEDICATIONS/OBJECTIVE/VS/PE:     Current Medications:     Current Facility-Administered Medications   Medication Dose Route Frequency Provider Last Rate Last Dose   • acetaminophen (TYLENOL) tablet 650 mg  650 mg Oral Q4H PRN Azar Buitrago MD       • atorvastatin (LIPITOR) tablet 20 mg  20 mg Oral Daily Azar Buitrago MD   Stopped at 02/14/18 0819   • budesonide-formoterol (SYMBICORT) 160-4.5 MCG/ACT inhaler 2 puff  2 puff Inhalation BID - RT Azar Buitrago MD   2 puff at 02/13/18 1839   • carvedilol (COREG) tablet 6.25 mg  6.25 mg Oral BID With Meals Azar Buitrago MD   Stopped at 02/14/18 0819   • ferrous sulfate EC tablet 324 mg  324 mg Oral Daily With Breakfast Azar Buitrago MD   Stopped at 02/14/18 0819   • fluorometholone (FML) 0.1 % ophthalmic suspension 1 drop  1 drop Both Eyes 4x Daily Azar Buitrago MD   1 drop at 02/13/18 1123   • folic acid-vit B6-vit B12 (FOLBEE) tablet 1 tablet  1 tablet Oral Daily Azar Buitrago MD   Stopped at 02/14/18 0820   • furosemide (LASIX) tablet 20 mg  20 mg Oral Daily Azar Buitrago MD   Stopped at 02/14/18 0820   • HYDROcodone-acetaminophen (NORCO) 5-325 MG per tablet 1 tablet  1 tablet Oral Q6H PRN Azar Buitrago MD       • ipratropium-albuterol (DUO-NEB) nebulizer solution 3 mL  3 mL Nebulization Q6H PRN Azar Buitrago MD   3 mL at 02/14/18 0717   • levothyroxine (SYNTHROID, LEVOTHROID) tablet 50 mcg  50 mcg Oral Q AM Azar Buitrago MD   50 mcg at 02/14/18 0600   • montelukast (SINGULAIR) tablet 10 mg  10 mg Oral Nightly Azar SANTAMARIA  MD Iftikhar   10 mg at 02/13/18 2128   • nitroglycerin (NITRODUR) 0.2 MG/HR patch 1 patch  1 patch Transdermal Daily Azar Buitrago MD   1 patch at 02/14/18 0858   • pantoprazole (PROTONIX) EC tablet 40 mg  40 mg Oral Q AM Marco A RenCRESENCIO hernandez   40 mg at 02/14/18 0600   • polyethylene glycol 3350 powder (packet)  17 g Oral Daily Azar Buitrago MD   Stopped at 02/14/18 0821   • ranolazine (RANEXA) 12 hr tablet 1,000 mg  1,000 mg Oral Q12H Azar Buitrago MD   Stopped at 02/14/18 0822   • sertraline (ZOLOFT) tablet 50 mg  50 mg Oral Nightly Azar Buitrago MD   50 mg at 02/13/18 2128   • sodium chloride 0.9 % flush 1-10 mL  1-10 mL Intravenous PRN Azar Buitrago MD       • sodium chloride 0.9 % flush 10 mL  10 mL Intravenous PRN Ehsan Franco MD   10 mL at 02/12/18 2049       Objective     Physical Exam:   Temp:  [97.5 °F (36.4 °C)-98 °F (36.7 °C)] 98 °F (36.7 °C)  Heart Rate:  [71-84] (P) 75  Resp:  [18-20] 20  BP: (134-142)/(60-65) (P) 123/58     Physical Exam:  General Appearance:    Alert, cooperative, in no acute distress   Head:    Normocephalic, without obvious abnormality, atraumatic   Eyes:            Lids and lashes normal, conjunctivae and sclerae normal, no icterus, no pallor, corneas clear, PERRLA   Ears:    Ears appear intact with no abnormalities noted   Throat:   No oral lesions, no thrush, oral mucosa moist   Neck:   No adenopathy, supple, trachea midline, no thyromegaly, no carotid bruit, no JVD   Back:     Kyphosis present, no scoliosis present, no skin lesions,       erythema or scars, no tenderness to percussion or                   palpation, range of motion normal   Lungs:     Clear to auscultation,respirations regular, even and unlabored    Heart:    Regular rhythm and normal rate, normal S1 and S2, no murmur, no gallop, no rub, no click   Abdomen:     Normal bowel sounds, no masses, no organomegaly, soft        non-tender, non-distended, no guarding, no  rebound                 tenderness   Extremities:   Moves all extremities well, no edema, no cyanosis, no redness   Pulses:   Pulses palpable and equal bilaterally   Skin:   No bleeding, bruising or rash   Lymph nodes:   No palpable adenopathy   Neurologic:   Cranial nerves 2 - 12 grossly intact, sensation intact, DTR        present and equal bilaterally      Results Review:     Lab Results (last 24 hours)     Procedure Component Value Units Date/Time    SCANNED - LABS [[id number]] Resulted:  02/12/18      Updated:  02/13/18 1302    Hemoglobin & Hematocrit, Blood [[id number]]  (Abnormal) Collected:  02/13/18 1433    Specimen:  Blood Updated:  02/13/18 1447     Hemoglobin 8.3 (L) g/dL      Hematocrit 25.1 (L) %     CBC & Differential [[id number]] Collected:  02/14/18 0606    Specimen:  Blood Updated:  02/14/18 0625    Narrative:       The following orders were created for panel order CBC & Differential.  Procedure                               Abnormality         Status                     ---------                               -----------         ------                     CBC Auto Differential[[id number]]        Abnormal            Final result                 Please view results for these tests on the individual orders.    CBC Auto Differential [[id number]]  (Abnormal) Collected:  02/14/18 0606    Specimen:  Blood Updated:  02/14/18 0625     WBC 10.94 (H) 10*3/mm3      RBC 2.70 (L) 10*6/mm3      Hemoglobin 8.4 (L) g/dL      Hematocrit 24.7 (L) %      MCV 91.5 fL      MCH 31.1 pg      MCHC 34.0 g/dL      RDW 17.8 (H) %      RDW-SD 59.3 (H) fl      MPV 8.9 fL      Platelets 338 10*3/mm3      Neutrophil % 68.4 %      Lymphocyte % 18.6 %      Monocyte % 8.3 %      Eosinophil % 3.7 %      Basophil % 0.2 %      Immature Grans % 0.8 (H) %      Neutrophils, Absolute 7.47 10*3/mm3      Lymphocytes, Absolute 2.04 10*3/mm3      Monocytes, Absolute 0.91 (H) 10*3/mm3      Eosinophils, Absolute 0.41 10*3/mm3      Basophils,  Absolute 0.02 10*3/mm3      Immature Grans, Absolute 0.09 (H) 10*3/mm3     Basic Metabolic Panel [[id number]]  (Abnormal) Collected:  02/14/18 0606    Specimen:  Blood Updated:  02/14/18 0637     Glucose 91 mg/dL      BUN 30 (H) mg/dL      Creatinine 0.86 mg/dL      Sodium 141 mmol/L      Potassium 4.2 mmol/L      Chloride 103 mmol/L      CO2 28.0 mmol/L      Calcium 8.4 mg/dL      eGFR Non African Amer 63 mL/min/1.73      BUN/Creatinine Ratio 34.9 (H)     Anion Gap 10.0 mmol/L     Narrative:       The MDRD GFR formula is only valid for adults with stable renal function between ages 18 and 70.        I reviewed the patient's new clinical results.     ASSESSMENT/PLAN:   ASSESSMENT: 85 y.o.  female with acute GI blood loss anemia status post 2 units PRBCs.    PLAN:   1. Continue to monitor H&H. Transfusion PRBCs if Hb is less than 7g/dL.   2. Hold anticoagulation therapy following endoprosthesis right hip approximately 3 weeks ago.   3. GI prophylaxis: PO Pepcid  4. Plan for EGD today. Will consider colonoscopy if EGD reveals no source of bleed.          Signature  Saba Villanueva MD  [facility] Resident, PGY II        This document has been electronically signed by Saba Villanueva MD on February 14, 2018 10:53 AM

## 2018-02-14 NOTE — SIGNIFICANT NOTE
02/14/18 1453   Rehab Treatment   Discipline occupational therapy assistant   Treatment Not Performed patient/family declined treatment  (Checked on pt, pt declined PM tx 2' just having visitors arrive . Pt ok'd therapy to check back in the AM)

## 2018-02-14 NOTE — THERAPY TREATMENT NOTE
Acute Care - Physical Therapy Treatment Note  [facility]     Patient Name: Val Arteaga  : 1932  MRN: [id number]  Today's Date: 2018  Onset of Illness/Injury or Date of Surgery Date: 18  Date of Referral to PT: 18  Referring Physician: Jeremias    Admit Date: 2018    Visit Dx:    ICD-10-CM ICD-9-CM   1. Symptomatic anemia D64.9 285.9   2. Gastrointestinal hemorrhage, unspecified gastrointestinal hemorrhage type K92.2 578.9   3. Shortness of breath R06.02 786.05   4. Impaired functional mobility, balance, gait, and endurance Z74.09 V49.89   5. Impaired mobility and activities of daily living Z74.09 799.89   6. Anemia, unspecified type D64.9 285.9     Patient Active Problem List   Diagnosis   • Essential hypertension   • Coronary artery disease involving native coronary artery of native heart without angina pectoris   • Chronic obstructive pulmonary disease   • Acquired hypothyroidism   • Depressive disorder   • Thygeson's superficial punctate keratitis   • Pseudophakia   • Precordial pain   • Displaced fracture of base of neck of right femur, initial encounter for closed fracture   • Hip fracture, right, closed, initial encounter   • Pulmonary emphysema   • Chronic systolic congestive heart failure   • Hyponatremia   • BENITO (acute kidney injury)   • Acute blood loss anemia   • Urinary retention   • Closed displaced basicervical fracture of right femur   • Urethral caruncle   • Symptomatic anemia   • Anemia               Adult Rehabilitation Note       18 0940 18 1359       Rehab Assessment/Intervention    Discipline physical therapy assistant  -TW      Document Type therapy note (daily note)  -TW (P)  therapy note (daily note)  -BM     Subjective Information agree to therapy  -TW (P)  agree to therapy  -BM     Patient Effort, Rehab Treatment good  -TW (P)  good  -BM     Precautions/Limitations  (P)  fall precautions;oxygen therapy device and L/min  -BM     Recorded by  [TW] Keshav Buitrago PTA [BM] Cecilia Joseph, PT Student     Vital Signs    Pre Systolic BP Rehab 134  -TW (P)  130  -BM     Pre Treatment Diastolic BP 67  -TW (P)  60  -BM     Post Systolic BP Rehab  (P)  153  -BM     Post Treatment Diastolic BP  (P)  66  -BM     Pretreatment Heart Rate (beats/min) 80  -TW (P)  69  -BM     Posttreatment Heart Rate (beats/min)  (P)  71  -BM     Pre SpO2 (%) 96  -TW (P)  94  -BM     O2 Delivery Pre Treatment supplemental O2  -TW (P)  supplemental O2  -BM     Post SpO2 (%) 95  -TW (P)  94  -BM     O2 Delivery Post Treatment  (P)  supplemental O2  -BM     Pre Patient Position Supine  -TW (P)  Supine  -BM     Intra Patient Position Supine  -TW (P)  Supine  -BM     Post Patient Position Supine  -TW (P)  Supine  -BM     Recorded by [TW] Keshav Buitrago PTA [BM] Cecilia Joseph, PT Student     Pain Assessment    Pain Assessment No/denies pain  -TW (P)  0-10  -BM     Pain Score  (P)  0   at rest  -BM     Post Pain Score  (P)  5   with movement  -BM     Pain Type  (P)  Acute pain;Surgical pain  -BM     Pain Location  (P)  Hip  -BM     Recorded by [TW] Keshav Buitrago PTA [BM] Cecilia Joseph, PT Student     Vision Assessment/Intervention    Visual Impairment  (P)  WFL with corrective lenses  -BM     Recorded by  [BM] Cecilia Joseph, ZANE Student     Cognitive Assessment/Intervention    Current Cognitive/Communication Assessment functional  -TW (P)  functional  -BM     Orientation Status oriented x 4  -TW (P)  oriented x 4  -BM     Follows Commands/Answers Questions 100% of the time  -TW (P)  100% of the time  -BM     Personal Safety  (P)  WNL/WFL  -BM     Personal Safety Interventions muscle strengthening facilitated  -TW (P)  gait belt;muscle strengthening facilitated;nonskid shoes/slippers when out of bed;supervised activity  -BM     Recorded by [TW] Keshav Buitrago PTA [BM] Cecilia Joseph, ZANE Student     Bed Mobility, Assessment/Treatment    Bed Mobility, Comment Deferred due to pt  going for test soon.  -TW (P)  pt deferred due to fatigue  -BM     Recorded by [TW] Keshav Buitrago PTA [BM] Cecilia Joseph, ZANE Student     Transfer Assessment/Treatment    Transfer, Comment  (P)  deferred  -BM     Recorded by  [BM] Cecilia Joseph, PT Student     Gait Assessment/Treatment    Gait, Comment  (P)  deferred  -BM     Recorded by  [BM] Cecilia Joseph, PT Student     Therapy Exercises    Bilateral Lower Extremities AROM:;15 reps;supine;ankle pumps/circles;glut sets;quad sets;heel slides;SAQ   AAROM with R LE. All x2 sets.  -TW (P)  AROM:;15 reps;supine;ankle pumps/circles;glut sets;hip abduction/adduction;hip ER;hip IR;quad sets  -BM     Recorded by [TW] Keshav Buitrago PTA [BM] Cecilia Joseph, ZANE Student     Positioning and Restraints    Pre-Treatment Position in bed  -TW (P)  in bed  -BM     Post Treatment Position bed  -TW (P)  bed  -BM     In Bed supine;call light within reach;encouraged to call for assist;with family/caregiver  -TW (P)  supine;call light within reach;encouraged to call for assist;exit alarm on;with family/caregiver;side rails up x2  -BM     Recorded by [TW] Keshav Buitrago PTA [BM] Cecilia Joseph, ZANE Student       User Key  (r) = Recorded By, (t) = Taken By, (c) = Cosigned By    Initials Name Effective Dates    TW Keshav Buitrago PTA 10/17/16 -     BM Cecilia Joseph, PT Student 12/05/17 -                 IP PT Goals       02/14/18 0940 02/13/18 1359 02/13/18 1053    Bed Mobility PT STG    Bed Mobility PT STG, Date Established   02/13/18  -MN    Bed Mobility PT STG, Time to Achieve   4 days  -MN    Bed Mobility PT STG, Activity Type   roll left/roll right;supine to sit/sit to supine  -MN    Bed Mobility PT STG, [place] Level   independent  -MN    Bed Mobility PT STG, Date Goal Reviewed 02/14/18  -TW (P)  02/13/18  -BM     Bed Mobility PT STG, Outcome goal ongoing  -TW (P)  goal ongoing  -BM     Transfer Training PT STG    Transfer Training PT STG, Date Established    02/13/18  -MN    Transfer Training PT STG, Time to Achieve   4 days  -MN    Transfer Training PT STG, Activity Type   bed to chair /chair to bed;sit to stand/stand to sit;toilet  -MN    Transfer Training PT STG, [place] Level   supervision required  -MN    Transfer Training PT STG, Assist Device   walker, rolling  -MN    Transfer Training PT STG, Date Goal Reviewed 02/14/18  -TW (P)  02/13/18  -BM     Transfer Training PT STG, Outcome goal ongoing  -TW (P)  goal ongoing  -BM     Gait Training PT STG    Gait Training Goal PT STG, Date Established   02/13/18  -MN    Gait Training Goal PT STG, Time to Achieve   4 days  -MN    Gait Training Goal PT STG, [place] Level   supervision required  -MN    Gait Training Goal PT STG, Assist Device   walker, rolling  -MN    Gait Training Goal PT STG, Distance to Achieve   300 ft  -MN    Gait Training Goal PT STG, Date Goal Reviewed 02/14/18  -TW (P)  02/13/18  -BM     Gait Training Goal PT STG, Outcome goal ongoing  -TW (P)  goal ongoing  -BM       User Key  (r) = Recorded By, (t) = Taken By, (c) = Cosigned By    Initials Name Provider Type    MN Hallie Negrete, PT Physical Therapist    TW Keshav Buitrago, PTA Physical Therapy Assistant    BM Cecilia Joseph, PT Student PT Student          Physical Therapy Education     Title: PT OT SLP Therapies (Active)     Topic: Physical Therapy (Active)     Point: Mobility training (Done)    Learning Progress Summary    Learner Readiness Method Response Comment Documented by Status   Patient Acceptance E VU no OOB without assist.. Role of PT in hospital MN 02/13/18 1053 Done               Point: Precautions (Done)    Learning Progress Summary    Learner Readiness Method Response Comment Documented by Status   Patient Acceptance E VU no OOB without assist.. Role of PT in hospital MN 02/13/18 1053 Done                      User Key     Initials Effective Dates Name Provider Type Discipline    MN 10/17/16 -  Hallie Negrete, PT  Physical Therapist PT                    PT Recommendation and Plan  Anticipated Discharge Disposition: skilled nursing facility  Planned Therapy Interventions: balance training, bed mobility training, gait training, patient/family education, strengthening, stretching, transfer training  PT Frequency: other (see comments) (5-14x/week)  Plan of Care Review  Plan Of Care Reviewed With: patient, daughter  Progress: no change  Outcome Summary/Follow up Plan: Pt going for test soon and agreed to LE ther ex in bed. Pt performed R LE therex with AROM-AAROM and AROM on L LE.          Outcome Measures       02/14/18 0940 02/13/18 1359 02/13/18 1050    How much help from another person do you currently need...    Turning from your back to your side while in flat bed without using bedrails? 3  -TW (P)  3  -BM     Moving from lying on back to sitting on the side of a flat bed without bedrails? 3  -TW (P)  3  -BM     Moving to and from a bed to a chair (including a wheelchair)? 3  -TW (P)  3  -BM     Standing up from a chair using your arms (e.g., wheelchair, bedside chair)? 3  -TW (P)  3  -BM     Climbing 3-5 steps with a railing? 3  -TW (P)  3  -BM     To walk in hospital room? 3  -TW (P)  3  -BM     AM-PAC 6 Clicks Score 18  -TW (P)  18  -TW (r) BM (t)     How much help from another is currently needed...    Putting on and taking off regular lower body clothing?   2  -RB    Bathing (including washing, rinsing, and drying)   2  -RB    Toileting (which includes using toilet bed pan or urinal)   3  -RB    Putting on and taking off regular upper body clothing   3  -RB    Taking care of personal grooming (such as brushing teeth)   4  -RB    Eating meals   4  -RB    Score   18  -RB    Functional Assessment    Outcome Measure Options AM-PAC 6 Clicks Basic Mobility (PT)  -TW (P)  AM-PAC 6 Clicks Basic Mobility (PT)  -BM AM-PAC 6 Clicks Daily Activity (OT)  -RB      02/13/18 0959          How much help from another person do you  currently need...    Turning from your back to your side while in flat bed without using bedrails? 3  -MN      Moving from lying on back to sitting on the side of a flat bed without bedrails? 3  -MN      Moving to and from a bed to a chair (including a wheelchair)? 3  -MN      Standing up from a chair using your arms (e.g., wheelchair, bedside chair)? 3  -MN      Climbing 3-5 steps with a railing? 3  -MN      To walk in hospital room? 3  -MN      AM-PAC 6 Clicks Score 18  -MN      Functional Assessment    Outcome Measure Options AM-PAC 6 Clicks Basic Mobility (PT)  -MN        User Key  (r) = Recorded By, (t) = Taken By, (c) = Cosigned By    Initials Name Provider Type    RB Seth Cash, OT Occupational Therapist    MN Hallie Negrete, PT Physical Therapist    TW Keshav Buitrago PTA Physical Therapy Assistant    BM Cecilia Joseph, PT Student PT Student           Time Calculation:         PT Charges       02/14/18 1143          Time Calculation    Start Time 0940  -TW      Stop Time 1008  -TW      Time Calculation (min) 28 min  -TW      PT Received On 02/14/18  -TW      PT Goal Re-Cert Due Date 02/26/18  -TW      Time Calculation- PT    Total Timed Code Minutes- PT 28 minute(s)  -TW        User Key  (r) = Recorded By, (t) = Taken By, (c) = Cosigned By    Initials Name Provider Type    TW Keshav Buitrago PTA Physical Therapy Assistant          Therapy Charges for Today     Code Description Service Date Service Provider Modifiers Qty    59912421364 HC PT THER SUPP EA 15 MIN 2/14/2018 Keshav Buitrago PTA GP 1    95881739701 HC PT THER PROC EA 15 MIN 2/14/2018 Keshav Buitrago PTA GP 2          PT G-Codes  PT Professional Judgement Used?: Yes  Outcome Measure Options: AM-PAC 6 Clicks Basic Mobility (PT)  Score: 18  Functional Limitation: Mobility: Walking and moving around  Mobility: Walking and Moving Around Current Status (): At least 40 percent but less than 60 percent impaired, limited or  restricted  Mobility: Walking and Moving Around Goal Status (): At least 20 percent but less than 40 percent impaired, limited or restricted    Keshav Buitrago, PTA  2/14/2018

## 2018-02-14 NOTE — SIGNIFICANT NOTE
02/14/18 1418   Rehab Treatment   Discipline occupational therapy assistant   Treatment Not Performed patient unavailable for treatment  (Pt off floor @ this time to endo)

## 2018-02-14 NOTE — DISCHARGE SUMMARY
[facility] Medicine Services  DISCHARGE SUMMARY       Date of Admission: 2/12/2018  Date of Discharge:  2/14/2018  Primary Care Physician: Roula Albert MD    Presenting Problem/History of Present Illness:  Shortness of breath [R06.02]  Symptomatic anemia [D64.9]  Gastrointestinal hemorrhage, unspecified gastrointestinal hemorrhage type [K92.2]     Final Discharge Diagnoses:  Principal Problem:    Acute blood loss anemia  Active Problems:    Essential hypertension    Coronary artery disease involving native coronary artery of native heart without angina pectoris    Chronic obstructive pulmonary disease    Chronic systolic congestive heart failure    Closed displaced basicervical fracture of right femur    Symptomatic anemia      Consults:   Consults     Date and Time Order Name Status Description    2/13/2018 0923 Inpatient Consult to Gastroenterology Completed     2/12/2018 1841 Inpatient Consult to Orthopedic Surgery      2/12/2018 1512 Hospitalist (on-call MD unless specified)      1/28/2018 1155 Inpatient Consult to Urology Completed     1/22/2018 2012 Orthopedics (on-call MD unless specified) Completed           Procedures Performed: Procedure(s):  ESOPHAGOGASTRODUODENOSCOPY           02/14 1215 UPPER GI ENDOSCOPY    HPI/Hospital Course:  The patient is a 85 y.o. female with a significant medical history of recent right hip fracture s/p endoprosthesis 3 weeks ago who was discharged on warfarin for DVT prophylaxis who presented to [facility] with a near syncopal episode.  She was found to be anemic with a Hgb of 6.8 and hemeoccult positive stools.  She received two units PRBC and H&H has remained stable.  She was evaluated by GI who recommended EGD.  EGD showed no evidence of GI bleeding.  She is recommended to not restart coumadin.  Plan of care was discussed with the operating surgeon in regards to discontinuing warfarin.  The patient will be managed  "with full strength aspirin.  She is instructed to continue to mobilize.  She is recommended outpatient GI follow-up to consider colonoscopy in the future.  She will be discharged back to SNF for rehab to home.    Condition on Discharge:  Stable    Physical Exam on Discharge:  /51  Pulse 79  Temp 97.3 °F (36.3 °C) (Temporal Artery )   Resp 16  Ht 172.7 cm (68\")  Wt 76.8 kg (169 lb 4 oz)  LMP  (LMP Unknown) Comment: About 30 years ago  SpO2 98%  BMI 25.73 kg/m2  Physical Exam   Constitutional: She is oriented to person, place, and time. She appears well-developed and well-nourished.   HENT:   Head: Normocephalic and atraumatic.   Cardiovascular: Normal rate, regular rhythm, normal heart sounds and intact distal pulses.    Pulmonary/Chest: Effort normal and breath sounds normal. No respiratory distress.   Abdominal: Soft. Bowel sounds are normal. She exhibits no distension. There is no tenderness.   Musculoskeletal: Normal range of motion. She exhibits no edema.   Neurological: She is alert and oriented to person, place, and time.   Skin: Skin is warm and dry. She is not diaphoretic. No erythema.   Vitals reviewed.    Discharge Disposition:  Skilled Nursing Facility (DC - External)    Discharge Medications:   Val Arteaga   Home Medication Instructions ONEIL:[id number]    Printed on:02/14/18 5987   Medication Information                      acetaminophen (TYLENOL) 325 MG tablet  Take 2 tablets by mouth Every 4 (Four) Hours As Needed for Mild Pain .             aspirin 325 MG EC tablet  Take 1 tablet by mouth Daily.             atorvastatin (LIPITOR) 20 MG tablet  Take 1 tablet by mouth Daily.             budesonide-formoterol (SYMBICORT) 160-4.5 MCG/ACT inhaler  Inhale 2 puffs 2 (Two) Times a Day.             carvedilol (COREG) 6.25 MG tablet  Take 6.25 mg by mouth 2 (Two) Times a Day With Meals.             estradiol (ESTRACE) 0.1 MG/GM vaginal cream  One applicator q hs             famotidine " (PEPCID) 40 MG tablet  Take 1 tablet by mouth Daily.             ferrous sulfate 324 (65 Fe) MG tablet delayed-release EC tablet  Take 1 tablet by mouth Daily With Breakfast.             fluorometholone (FLAREX) 0.1 % ophthalmic suspension  Administer 1 drop to both eyes 4 (Four) Times a Day.             folic acid-vit B6-vit B12 (FOLBEE) 2.5-25-1 MG tablet tablet  Take 1 tablet by mouth Daily.             furosemide (LASIX) 20 MG tablet  Take 1 tablet by mouth Daily.             HYDROcodone-acetaminophen (NORCO) 5-325 MG per tablet  Take 1 tablet by mouth Every 6 (Six) Hours As Needed for Moderate Pain .             ipratropium-albuterol (DUO-NEB) 0.5-2.5 mg/mL nebulizer  USE 1 BY NEBULIZER 4 (FOUR) TIMES A DAY AS NEEDED FOR WHEEZING.             levothyroxine (SYNTHROID, LEVOTHROID) 50 MCG tablet  TAKE 1 TABLET BY MOUTH EVERY DAY             montelukast (SINGULAIR) 10 MG tablet  Take 10 mg by mouth Every Night.             nitroglycerin (NITRO-DUR) 0.2 MG/HR patch  Place 1 patch on the skin Daily.             polyethylene glycol (MIRALAX) packet  Take 17 g by mouth Daily.             ranolazine (RANEXA) 1000 MG 12 hr tablet  Take 1 tablet by mouth Every 12 (Twelve) Hours.             sertraline (ZOLOFT) 50 MG tablet  TAKE 1 TABLET BY MOUTH EVERY DAY                 Discharge Diet:   Diet Instructions     Diet: Regular, Consistent Carbohydrate, Cardiac; Thin       Discharge Diet:   Regular  Consistent Carbohydrate  Cardiac      Fluid Consistency:  Thin                 Activity at Discharge:   Activity Instructions     Activity as Tolerated                     Follow-up Appointments:   Future Appointments  Date Time Provider Department Center   2/26/2018 2:15 PM CRESENCIO Rodríguez None   2/27/2018 1:10 PM MD RIMA Mariscal OSCGHANSHYAM PINEDA None       CRESENCIO Ayon  02/14/18  3:50 PM    Time: Discharge planning and teaching took greater than 30 minutes.

## 2018-02-14 NOTE — H&P (VIEW-ONLY)
SUBJECTIVE:   2/13/2018    Name: Val Arteaga  DOD: 12/2/1932    REASON FOR CONSULT: Anemia and GI blood loss    Chief Complaint:     Chief Complaint   Patient presents with   • Chest Pain   • Shortness of Breath       Subjective     Patient is 85 y.o. female presents with Complaint of fatigue.  Patient has been passing black stool.  Patient was found to be anemic with a hemoglobin of 6.8.  Patient received 2 units packed RBCs.  Patient had a right hip fracture repair 3 weeks ago and was discharged on Coumadin patient is also Plavix and aspirin.  Patient has never had any peptic ulcer disease or abdominal pain.  Patient never been on a proton pump inhibitor.     ROS/HISTORY/ CURRENT MEDICATIONS/OBJECTIVE/VS/PE:   Review of Systems:   Review of Systems   Constitutional: Negative for activity change, appetite change, chills, diaphoresis, fatigue, fever and unexpected weight change.   HENT: Negative for sore throat and trouble swallowing.    Respiratory: Negative for shortness of breath.    Gastrointestinal: Positive for blood in stool. Negative for abdominal distention, abdominal pain, anal bleeding, constipation, diarrhea, nausea, rectal pain and vomiting.   Musculoskeletal: Negative for arthralgias.   Skin: Negative for pallor.   Neurological: Negative for light-headedness.       History:     Past Medical History:   Diagnosis Date   • Acquired hypothyroidism    • Allergic rhinitis    • CHF (congestive heart failure)    • COPD (chronic obstructive pulmonary disease)    • Corneal epithelial dystrophy    • Coronary arteriosclerosis    • Depression    • Generalized atherosclerosis    • GERD (gastroesophageal reflux disease)    • Hemorrhoids    • History of bone density study 08/03/2012    Lumbar spine Osteopenia. Femoral Osteopenia   • History of mammogram 08/20/2004    Birads Category 2 Benign   • History of Papanicolaou smear of cervix 08/12/2004    NEGATIVE   • Hypercholesterolemia    • Hyperlipidemia    •  Hypertension    • Myocardial infarction    • Nonexudative age-related macular degeneration    • Osteoarthritis of multiple joints    • Pseudophakia    • Punctate keratitis     - history Thygeson's keratopathy      • Tobacco dependence syndrome      Past Surgical History:   Procedure Laterality Date   • APPENDECTOMY     • BREAST SURGERY  03/19/1954    Excision, breast lesion (1)  Excision of fibroma. 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 patency in the circumflex artery site of previous angioplasty. Kathe. patency in RCA.Nonobstructive 20-30% stenosis in the LAD and diagonal branch. Preserved LV systolic function with Ef of 55%   • CERVICAL SPINE SURGERY  09/16/1974    Excision of cervical disc, C5-6, C6-7, anterior cervical fusion, C5-6 with iliac bone graft.cervical spondylosis,C5-6, C6-7   • COLONOSCOPY  01/01/2013    patient declined    • CORONARY ANGIOPLASTY  07/21/1993    Angioplasty, coronary (2)    RCA    • DILATATION AND CURETTAGE      3   • ENDOSCOPY AND COLONOSCOPY  10/01/1998    Hemorhoids Rectal Outlet Bleeding   • ESOPHAGOSCOPY / EGD  06/28/2004    Nonerosive gastritis of the body of the stomach. A biopsy was obtained & placed in h. Pylori test agar. Multiple biopsies were obtained from the body of the stomach   • HIP BIPOLAR REPLACEMENT Right 1/23/2018    Procedure: HIP BIPOLAR ANTERIOR APPROACH - right;  Surgeon: Kelvin Mcdowell MD;  Location: [facility];  Service:    • INJECTION OF MEDICATION  11/23/2015    Depo Medrol (Methylprednisone) (5)    • INJECTION OF MEDICATION  02/04/2016    Kenalog (3)     • PACEMAKER REPLACEMENT  2006     Family History   Problem Relation Age of Onset   • Coronary artery disease Other      Social History   Substance Use Topics   • Smoking status: Former Smoker     Packs/day: 0.50     Years: 50.00     Types: Cigarettes     Start date: 1950     Quit date: 1/12/2017   • Smokeless tobacco: Never Used   •  Alcohol use No     Prescriptions Prior to Admission   Medication Sig Dispense Refill Last Dose   • acetaminophen (TYLENOL) 325 MG tablet Take 2 tablets by mouth Every 4 (Four) Hours As Needed for Mild Pain . 1 tablet 1 2018 at Unknown time   • aspirin 81 MG EC tablet Take 81 mg by mouth Daily.   2018 at Unknown time   • atorvastatin (LIPITOR) 20 MG tablet Take 1 tablet by mouth Daily. 1 tablet 1 2018 at Unknown time   • budesonide-formoterol (SYMBICORT) 160-4.5 MCG/ACT inhaler Inhale 2 puffs 2 (Two) Times a Day. 1 inhaler 11 2018 at Unknown time   • carvedilol (COREG) 6.25 MG tablet Take 6.25 mg by mouth 2 (Two) Times a Day With Meals.   2018 at Unknown time   • estradiol (ESTRACE) 0.1 MG/GM vaginal cream One applicator q hs 45 g 1 2018 at Unknown time   • famotidine (PEPCID) 40 MG tablet Take 1 tablet by mouth Daily. 1 tablet 1 2018 at Unknown time   • ferrous sulfate 324 (65 Fe) MG tablet delayed-release EC tablet Take 1 tablet by mouth Daily With Breakfast. 1 tablet 1 2018 at Unknown time   • fluorometholone (FLAREX) 0.1 % ophthalmic suspension Administer 1 drop to both eyes 4 (Four) Times a Day. (Patient taking differently: Administer 1 drop to both eyes 4 (Four) Times a Day As Needed.) 5 mL 3 2018 at Unknown time   • folic acid-vit B6-vit B12 (FOLBEE) 2.5-25-1 MG tablet tablet Take 1 tablet by mouth Daily. 1 tablet 1 2018 at Unknown time   • furosemide (LASIX) 20 MG tablet Take 1 tablet by mouth Daily. 1 tablet 1 2018 at Unknown time   • [] HYDROcodone-acetaminophen (NORCO) 5-325 MG per tablet Take 1 tablet by mouth Every 6 (Six) Hours As Needed for Moderate Pain  for up to 7 days. 50 tablet 0 2018 at Unknown time   • ipratropium-albuterol (DUO-NEB) 0.5-2.5 mg/mL nebulizer USE 1 BY NEBULIZER 4 (FOUR) TIMES A DAY AS NEEDED FOR WHEEZING. 360 mL 1 2018 at Unknown time   • levothyroxine (SYNTHROID, LEVOTHROID) 50 MCG tablet TAKE 1 TABLET BY  MOUTH EVERY DAY 90 tablet 1 2/11/2018 at Unknown time   • montelukast (SINGULAIR) 10 MG tablet Take 10 mg by mouth Every Night.   2/11/2018 at Unknown time   • nitroglycerin (NITRO-DUR) 0.2 MG/HR patch Place 1 patch on the skin Daily. 30 patch 0 2/11/2018 at Unknown time   • polyethylene glycol (MIRALAX) packet Take 17 g by mouth Daily. 100 packet 1 2/11/2018 at Unknown time   • ranolazine (RANEXA) 1000 MG 12 hr tablet Take 1 tablet by mouth Every 12 (Twelve) Hours. 60 tablet 0 2/11/2018 at Unknown time   • sertraline (ZOLOFT) 50 MG tablet TAKE 1 TABLET BY MOUTH EVERY DAY (Patient taking differently: TAKE 1 TABLET BY MOUTH EVERY NIGHT) 90 tablet 1 2/11/2018 at Unknown time   • warfarin (COUMADIN) 3 MG tablet One daily thru 3/6/18 1 tablet 1 2/11/2018 at Unknown time   • Warfarin Sodium (PHARMACY TO DOSE WARFARIN) Continuous As Needed (thru 3/6/18). 1 each 1 2/11/2018 at Unknown time     Allergies:  Ace inhibitors and Lisinopril    I have reviewed the patients medical history, surgical history and family history in the available medical record system.     Current Medications:     Current Facility-Administered Medications   Medication Dose Route Frequency Provider Last Rate Last Dose   • acetaminophen (TYLENOL) tablet 650 mg  650 mg Oral Q4H PRN Azar Buitrago MD       • atorvastatin (LIPITOR) tablet 20 mg  20 mg Oral Daily Azar Buitrago MD   20 mg at 02/13/18 1121   • budesonide-formoterol (SYMBICORT) 160-4.5 MCG/ACT inhaler 2 puff  2 puff Inhalation BID - RT Azar Buitrago MD   2 puff at 02/13/18 0833   • carvedilol (COREG) tablet 6.25 mg  6.25 mg Oral BID With Meals Azar Buitrago MD   6.25 mg at 02/13/18 1122   • ferrous sulfate EC tablet 324 mg  324 mg Oral Daily With Breakfast Azar Buitrago MD   324 mg at 02/13/18 1121   • fluorometholone (FML) 0.1 % ophthalmic suspension 1 drop  1 drop Both Eyes 4x Daily Azar Buitrago MD   1 drop at 02/13/18 1123   • folic acid-vit B6-vit B12 (FOLBEE)  tablet 1 tablet  1 tablet Oral Daily Azar Buitrago MD   1 tablet at 02/13/18 1121   • furosemide (LASIX) tablet 20 mg  20 mg Oral Daily Azar Buitrago MD   20 mg at 02/13/18 1121   • HYDROcodone-acetaminophen (NORCO) 5-325 MG per tablet 1 tablet  1 tablet Oral Q6H PRN Azar Buitrago MD       • ipratropium-albuterol (DUO-NEB) nebulizer solution 3 mL  3 mL Nebulization Q6H PRN Azar Buitrago MD   3 mL at 02/13/18 1124   • levothyroxine (SYNTHROID, LEVOTHROID) tablet 50 mcg  50 mcg Oral Q AM Azar Buitrago MD   50 mcg at 02/13/18 0600   • montelukast (SINGULAIR) tablet 10 mg  10 mg Oral Nightly Azar Buitrago MD   10 mg at 02/12/18 2048   • nitroglycerin (NITRODUR) 0.2 MG/HR patch 1 patch  1 patch Transdermal Daily Azar Buitrago MD   1 patch at 02/13/18 0900   • [START ON 2/14/2018] pantoprazole (PROTONIX) EC tablet 40 mg  40 mg Oral Q AM Marco A Landin, APRN       • polyethylene glycol 3350 powder (packet)  17 g Oral Daily Azar Buitrago MD   17 g at 02/13/18 1120   • ranolazine (RANEXA) 12 hr tablet 1,000 mg  1,000 mg Oral Q12H Azar Buitrago MD   1,000 mg at 02/13/18 1121   • sertraline (ZOLOFT) tablet 50 mg  50 mg Oral Nightly Azar Buitrago MD   50 mg at 02/12/18 2048   • sodium chloride 0.9 % flush 1-10 mL  1-10 mL Intravenous PRN Azar Buitrago MD       • sodium chloride 0.9 % flush 10 mL  10 mL Intravenous PRN Ehsan Franco MD   10 mL at 02/12/18 2049       Objective     Physical Exam:   Temp:  [97 °F (36.1 °C)-98.7 °F (37.1 °C)] 97.6 °F (36.4 °C)  Heart Rate:  [70-97] 71  Resp:  [16-20] 20  BP: (123-148)/(56-68) 141/63    Physical Exam:  General Appearance:    Alert, cooperative, in no acute distress   Head:    Normocephalic, without obvious abnormality, atraumatic   Eyes:            Lids and lashes normal, conjunctivae and sclerae normal, no   icterus, no pallor, corneas clear, PERRLA   Ears:    Ears appear intact with no abnormalities noted   Throat:    No oral lesions, no thrush, oral mucosa moist   Neck:   No adenopathy, supple, trachea midline, no thyromegaly, no     carotid bruit, no JVD   Back:     No kyphosis present, no scoliosis present, no skin lesions,       erythema or scars, no tenderness to percussion or                   palpation,   range of motion normal   Lungs:     Clear to auscultation,respirations regular, even and                   unlabored    Heart:    Regular rhythm and normal rate, normal S1 and S2, no            murmur, no gallop, no rub, no click   Breast Exam:    Deferred   Abdomen:     Normal bowel sounds, no masses, no organomegaly, soft        non-tender, non-distended, no guarding, no rebound                 tenderness   Genitalia:    Deferred   Extremities:   Moves all extremities well, no edema, no cyanosis, no              redness   Pulses:   Pulses palpable and equal bilaterally   Skin:   No bleeding, bruising or rash   Lymph nodes:   No palpable adenopathy   Neurologic:   Cranial nerves 2 - 12 grossly intact, sensation intact, DTR        present and equal bilaterally      Results Review:     Lab Results   Component Value Date    WBC 10.99 (H) 02/13/2018    WBC 13.39 (H) 02/12/2018    WBC 10.79 (H) 02/03/2018    HGB 8.3 (L) 02/13/2018    HGB 8.1 (L) 02/13/2018    HGB 6.8 (L) 02/12/2018    HCT 25.1 (L) 02/13/2018    HCT 23.4 (L) 02/13/2018    HCT 19.7 (L) 02/12/2018     02/13/2018     02/12/2018     02/03/2018       Results from last 7 days  Lab Units 02/12/18  1407   ALK PHOS U/L 65   ALT (SGPT) U/L 37   AST (SGOT) U/L 24       Results from last 7 days  Lab Units 02/12/18  1407   BILIRUBIN mg/dL 0.3   ALK PHOS U/L 65     No results found for: LIPASE  Lab Results   Component Value Date    INR 2.53 (H) 02/12/2018    INR 2.10 (H) 02/05/2018    INR 2.01 (H) 02/04/2018         Radiology Review:  Imaging Results (last 72 hours)     Procedure Component Value Units Date/Time    XR Chest 1 View [[id number]] Collected:   02/12/18 1409     Updated:  02/12/18 1436    Narrative:       PROCEDURE: Chest, AP upright portable at 2:14 PM.    INDICATION: Chest pain.    COMPARISON: 1/22/2018 chest x-ray.    Heart size normal with normal vascularity. Stable position left  subclavian pacemaker.    The lungs are clear. No pleural effusions.    Bilateral degenerative arthritic changes in the shoulders.      Impression:       No acute disease.    Stable position left subclavian pacemaker.    [id number]    Electronically signed by:  Eliud Ro MD  2/12/2018 2:35  PM [facility] Workstation: [employer]           I reviewed the patient's new clinical results.  I reviewed the patient's new imaging results and agree with the interpretation.     ASSESSMENT/PLAN:   ASSESSMENT: Patient with severe anemia after being placed on Coumadin.  Patient states she is also on Plavix and aspirin.  Patient has had a colonoscopy many years ago does not know the findings.  Patient denies any history of peptic ulcer disease or any medications for peptic ulcer disease.    PLAN: #1 we'll schedule patient for EGD to be done tomorrow.  Number to continue to monitor hemoglobin if less than 7 please transfuse.  #3 patient should be on a proton pump inhibitor.  Number for consider colonoscopy if EGD does not show source of bleeding.    The risks, benefits, and alternatives of this procedure have been discussed with the patient or the responsible party- the patient understands and agrees to proceed.         Alex Avitia MD  02/13/18  5:08 PM         This document has been electronically signed by Alex Avitia MD on February 13, 2018 5:08 PM

## 2018-02-14 NOTE — PLAN OF CARE
Problem: Patient Care Overview (Adult)  Goal: Plan of Care Review  Outcome: Ongoing (interventions implemented as appropriate)   02/14/18 0257   Coping/Psychosocial Response Interventions   Plan Of Care Reviewed With patient   Patient Care Overview   Progress no change     Goal: Adult Individualization and Mutuality  Outcome: Ongoing (interventions implemented as appropriate)    Goal: Discharge Needs Assessment  Outcome: Ongoing (interventions implemented as appropriate)      Problem: Fall Risk (Adult)  Goal: Identify Related Risk Factors and Signs and Symptoms  Outcome: Ongoing (interventions implemented as appropriate)    Goal: Absence of Falls  Outcome: Ongoing (interventions implemented as appropriate)

## 2018-02-14 NOTE — PLAN OF CARE
Problem: Patient Care Overview (Adult)  Goal: Plan of Care Review  Outcome: Ongoing (interventions implemented as appropriate)   02/14/18 1141   Coping/Psychosocial Response Interventions   Plan Of Care Reviewed With patient   Patient Care Overview   Progress improving   Outcome Evaluation   Outcome Summary/Follow up Plan Patient hemoglobin is still stable today. Patient will have EGD today with Dr Avitia. Has been NPO since midnight. Vitals stable. Will continue to monitor.       Problem: Fall Risk (Adult)  Goal: Absence of Falls  Outcome: Ongoing (interventions implemented as appropriate)

## 2018-02-14 NOTE — ANESTHESIA POSTPROCEDURE EVALUATION
Patient: Val Arteaga    Procedure Summary     Date Anesthesia Start Anesthesia Stop Room / Location    02/14/18 1238 1258 [facility] ENDOSCOPY 1 / [facility] ENDOSCOPY       Procedure Diagnosis Surgeon Provider    ESOPHAGOGASTRODUODENOSCOPY (N/A Esophagus) Anemia, unspecified type  (Anemia, unspecified type [D64.9]) MD Seth Guallpa CRNA          Anesthesia Type: MAC  Last vitals  BP   134/67 (02/14/18 1115)   Temp   97 °F (36.1 °C) (02/14/18 1115)   Pulse   80 (02/14/18 1115)   Resp   18 (02/14/18 1115)     SpO2   95 % (02/14/18 1115)     Post Anesthesia Care and Evaluation    Patient location during evaluation: bedside  Patient participation: complete - patient participated  Level of consciousness: awake  Pain score: 0  Pain management: adequate  Airway patency: patent  Anesthetic complications: No anesthetic complications  PONV Status: none  Cardiovascular status: acceptable  Respiratory status: acceptable  Hydration status: acceptable

## 2018-02-14 NOTE — NURSING NOTE
Called report to Perlita at [facility] and faxed over all paperwork and orders to [place].

## 2018-02-14 NOTE — PLAN OF CARE
Problem: GI Endoscopy (Adult)  Goal: Signs and Symptoms of Listed Potential Problems Will be Absent or Manageable (GI Endoscopy)  Outcome: Ongoing (interventions implemented as appropriate)   02/14/18 1257   GI Endoscopy   Problems Assessed (GI Endoscopy) all   Problems Present (GI Endoscopy) none

## 2018-02-14 NOTE — PROGRESS NOTES
[facility] Medicine Services  INPATIENT PROGRESS NOTE    Length of Stay: 2  Date of Admission: 2/12/2018  Primary Care Physician: Roula Albert MD    Subjective   Chief Complaint: Near syncope  HPI:  85 year old female with a significant medical history of recent right hip fracture s/p endoprosthesis 3 weeks ago who was discharged on warfarin for DVT prophylaxis who presents with a near syncopal episode.  She was found to be anemic with Hgb of 6.8 with heme+ stool.  She is s/p 2 units PRBC.  H&H is stable.  No new overnight events.     Review of Systems   Constitutional: Negative for chills and fever.   Respiratory: Negative for shortness of breath.    Cardiovascular: Negative for chest pain.   Gastrointestinal: Negative for abdominal pain, blood in stool, diarrhea, nausea and vomiting.      All pertinent negatives and positives are as above. All other systems have been reviewed and are negative unless otherwise stated.     Objective    Temp:  [97 °F (36.1 °C)-98 °F (36.7 °C)] 97 °F (36.1 °C)  Heart Rate:  [71-81] 80  Resp:  [18-20] 18  BP: (134-142)/(60-67) 134/67    Physical Exam   Constitutional: She is oriented to person, place, and time. She appears well-developed and well-nourished. No distress.   HENT:   Head: Normocephalic.   Cardiovascular: Normal rate, regular rhythm, normal heart sounds and intact distal pulses.    Pulmonary/Chest: Effort normal and breath sounds normal. No respiratory distress.   Abdominal: Soft. Bowel sounds are normal. She exhibits no distension. There is no tenderness.   Musculoskeletal: Normal range of motion. She exhibits no edema.   Neurological: She is alert and oriented to person, place, and time.   Skin: Skin is warm and dry. She is not diaphoretic. No erythema.   Vitals reviewed.    Results Review:  I have reviewed the labs, radiology results, and diagnostic studies.    Laboratory Data:     Results from last 7 days  Lab Units  02/14/18  0606 02/13/18  0525 02/12/18  1407   SODIUM mmol/L 141 136* 132*   POTASSIUM mmol/L 4.2 3.8 4.1   CHLORIDE mmol/L 103 102 97   CO2 mmol/L 28.0 26.0 24.0   BUN mg/dL 30* 37* 47*   CREATININE mg/dL 0.86 0.83 0.80   GLUCOSE mg/dL 91 87 99   CALCIUM mg/dL 8.4 8.1* 8.2*   BILIRUBIN mg/dL  --   --  0.3   ALK PHOS U/L  --   --  65   ALT (SGPT) U/L  --   --  37   AST (SGOT) U/L  --   --  24   ANION GAP mmol/L 10.0 8.0 11.0     Estimated Creatinine Clearance: 52.2 mL/min (by C-G formula based on Cr of 0.86).            Results from last 7 days  Lab Units 02/14/18  0606 02/13/18  1433 02/13/18  0525 02/12/18  1407   WBC 10*3/mm3 10.94*  --  10.99* 13.39*   HEMOGLOBIN g/dL 8.4* 8.3* 8.1* 6.8*   HEMATOCRIT % 24.7* 25.1* 23.4* 19.7*   PLATELETS 10*3/mm3 338  --  367 424       Results from last 7 days  Lab Units 02/12/18  1407   INR  2.53*       Culture Data:   No results found for: BLOODCX  No results found for: URINECX  No results found for: RESPCX  No results found for: WOUNDCX  No results found for: STOOLCX  No components found for: BODYFLD    Radiology Data:   Imaging Results (last 24 hours)     ** No results found for the last 24 hours. **          I have reviewed the patient current medications.     Assessment/Plan     Hospital Problem List     * (Principal)Acute blood loss anemia    Overview Signed 2/13/2018  1:04 PM by CRESENCIO Ayon     Suspect GI bleeding         Essential hypertension    Coronary artery disease involving native coronary artery of native heart without angina pectoris    Chronic obstructive pulmonary disease    Chronic systolic congestive heart failure    Closed displaced basicervical fracture of right femur    Overview Signed 1/30/2018 11:13 AM by Shawn Maldonado MD     Bipolar prosthesis anterior approach per Dr Mcdowell on 1/23/18         Symptomatic anemia          Plan:    S/P 2 units PRBC  Serial H&H stable, transfuse if Hgb <7 g/dl  Hold coumadin  EGD today  Possible  colonoscopy if bleeding source not identified  Protonix            This document has been electronically signed by CRESENCIO Ayon on February 14, 2018 12:32 PM

## 2018-02-14 NOTE — PROGRESS NOTES
SUBJECTIVE:   2/14/2018  Chief Complaint:     Subjective      Patient is 85 y.o. female with severe anemia and upper endoscopy shows no evidence of upper GI bleed.  Patient is currently not actively bleeding.     CURRENT MEDICATIONS/OBJECTIVE/VS/PE:     Current Medications:     Current Facility-Administered Medications   Medication Dose Route Frequency Provider Last Rate Last Dose   • acetaminophen (TYLENOL) tablet 650 mg  650 mg Oral Q4H PRN Azar Buitrago MD       • atorvastatin (LIPITOR) tablet 20 mg  20 mg Oral Daily Azar Buitrago MD   Stopped at 02/14/18 0819   • budesonide-formoterol (SYMBICORT) 160-4.5 MCG/ACT inhaler 2 puff  2 puff Inhalation BID - RT Azar Buitrago MD   2 puff at 02/13/18 1839   • carvedilol (COREG) tablet 6.25 mg  6.25 mg Oral BID With Meals Azar Buitrago MD   Stopped at 02/14/18 0819   • ferrous sulfate EC tablet 324 mg  324 mg Oral Daily With Breakfast Azar Buitrago MD   Stopped at 02/14/18 0819   • fluorometholone (FML) 0.1 % ophthalmic suspension 1 drop  1 drop Both Eyes 4x Daily Azar Buitrago MD   1 drop at 02/13/18 1123   • folic acid-vit B6-vit B12 (FOLBEE) tablet 1 tablet  1 tablet Oral Daily Azar Buitrago MD   Stopped at 02/14/18 0820   • furosemide (LASIX) tablet 20 mg  20 mg Oral Daily Azar Buitrago MD   Stopped at 02/14/18 0820   • HYDROcodone-acetaminophen (NORCO) 5-325 MG per tablet 1 tablet  1 tablet Oral Q6H PRN Azar Buitrago MD       • ipratropium-albuterol (DUO-NEB) nebulizer solution 3 mL  3 mL Nebulization Q6H PRN Azar Buitrago MD   3 mL at 02/14/18 0717   • levothyroxine (SYNTHROID, LEVOTHROID) tablet 50 mcg  50 mcg Oral Q AM Azar Buitrago MD   50 mcg at 02/14/18 0600   • montelukast (SINGULAIR) tablet 10 mg  10 mg Oral Nightly Azar Buitrago MD   10 mg at 02/13/18 2128   • nitroglycerin (NITRODUR) 0.2 MG/HR patch 1 patch  1 patch Transdermal Daily Azar Buitrago MD   1 patch at 02/14/18 0858   • pantoprazole  (PROTONIX) EC tablet 40 mg  40 mg Oral Q AM Marco A Plunkett CRESENCIO Landin   40 mg at 02/14/18 0600   • polyethylene glycol 3350 powder (packet)  17 g Oral Daily Azar Buitrago MD   Stopped at 02/14/18 0821   • ranolazine (RANEXA) 12 hr tablet 1,000 mg  1,000 mg Oral Q12H Azar Buitrago MD   Stopped at 02/14/18 0822   • sertraline (ZOLOFT) tablet 50 mg  50 mg Oral Nightly Azar Buitrago MD   50 mg at 02/13/18 2128   • sodium chloride 0.9 % flush 1-10 mL  1-10 mL Intravenous PRN Azar Buitrago MD       • sodium chloride 0.9 % flush 10 mL  10 mL Intravenous PRN Ehsan Franco MD   10 mL at 02/12/18 2049   • sodium chloride 0.9 % infusion  50 mL/hr Intravenous Continuous Alex Avitia MD 50 mL/hr at 02/14/18 1124 50 mL/hr at 02/14/18 1124       Objective     Physical Exam:   Temp:  [97 °F (36.1 °C)-98 °F (36.7 °C)] 97 °F (36.1 °C)  Heart Rate:  [71-81] 80  Resp:  [18-20] 18  BP: (123-142)/(58-67) 134/67     Physical Exam:  General Appearance:    Alert, cooperative, in no acute distress   Head:    Normocephalic, without obvious abnormality, atraumatic   Eyes:            Lids and lashes normal, conjunctivae and sclerae normal, no   icterus, no pallor, corneas clear, PERRLA   Ears:    Ears appear intact with no abnormalities noted   Throat:   No oral lesions, no thrush, oral mucosa moist   Neck:   No adenopathy, supple, trachea midline, no thyromegaly, no     carotid bruit, no JVD   Back:     No kyphosis present, no scoliosis present, no skin lesions,       erythema or scars, no tenderness to percussion or                   palpation,   range of motion normal   Lungs:     Clear to auscultation,respirations regular, even and                   unlabored    Heart:    Regular rhythm and normal rate, normal S1 and S2, no            murmur, no gallop, no rub, no click   Breast Exam:    Deferred   Abdomen:     Normal bowel sounds, no masses, no organomegaly, soft        non-tender, non-distended, no  guarding, no rebound                 tenderness   Genitalia:    Deferred   Extremities:   Moves all extremities well, no edema, no cyanosis, no              redness   Pulses:   Pulses palpable and equal bilaterally   Skin:   No bleeding, bruising or rash   Lymph nodes:   No palpable adenopathy   Neurologic:   Cranial nerves 2 - 12 grossly intact, sensation intact, DTR        present and equal bilaterally      Results Review:     Lab Results (last 24 hours)     Procedure Component Value Units Date/Time    Hemoglobin & Hematocrit, Blood [[id number]]  (Abnormal) Collected:  02/13/18 1433    Specimen:  Blood Updated:  02/13/18 1447     Hemoglobin 8.3 (L) g/dL      Hematocrit 25.1 (L) %     CBC & Differential [[id number]] Collected:  02/14/18 0606    Specimen:  Blood Updated:  02/14/18 0625    Narrative:       The following orders were created for panel order CBC & Differential.  Procedure                               Abnormality         Status                     ---------                               -----------         ------                     CBC Auto Differential[[id number]]        Abnormal            Final result                 Please view results for these tests on the individual orders.    CBC Auto Differential [[id number]]  (Abnormal) Collected:  02/14/18 0606    Specimen:  Blood Updated:  02/14/18 0625     WBC 10.94 (H) 10*3/mm3      RBC 2.70 (L) 10*6/mm3      Hemoglobin 8.4 (L) g/dL      Hematocrit 24.7 (L) %      MCV 91.5 fL      MCH 31.1 pg      MCHC 34.0 g/dL      RDW 17.8 (H) %      RDW-SD 59.3 (H) fl      MPV 8.9 fL      Platelets 338 10*3/mm3      Neutrophil % 68.4 %      Lymphocyte % 18.6 %      Monocyte % 8.3 %      Eosinophil % 3.7 %      Basophil % 0.2 %      Immature Grans % 0.8 (H) %      Neutrophils, Absolute 7.47 10*3/mm3      Lymphocytes, Absolute 2.04 10*3/mm3      Monocytes, Absolute 0.91 (H) 10*3/mm3      Eosinophils, Absolute 0.41 10*3/mm3      Basophils, Absolute 0.02 10*3/mm3       Immature Grans, Absolute 0.09 (H) 10*3/mm3     Basic Metabolic Panel [[id number]]  (Abnormal) Collected:  02/14/18 0606    Specimen:  Blood Updated:  02/14/18 0637     Glucose 91 mg/dL      BUN 30 (H) mg/dL      Creatinine 0.86 mg/dL      Sodium 141 mmol/L      Potassium 4.2 mmol/L      Chloride 103 mmol/L      CO2 28.0 mmol/L      Calcium 8.4 mg/dL      eGFR Non African Amer 63 mL/min/1.73      BUN/Creatinine Ratio 34.9 (H)     Anion Gap 10.0 mmol/L     Narrative:       The MDRD GFR formula is only valid for adults with stable renal function between ages 18 and 70.           I reviewed the patient's new clinical results.  I reviewed the patient's new imaging results and agree with the interpretation.     ASSESSMENT/PLAN:   ASSESSMENT: Patient with severe anemia which appears to have stabilized at this time.  EGD shows no evidence of upper GI bleed.  Only mild gastritis.    PLAN: #1 continue to follow patient's hemoglobin and transfuse if hemoglobin less than 7.  #2 will consider colonoscopy if patient continues to decrease in hemoglobin.  #3 patient to be anticoagulated with suggests not using aspirin and Plavix and Coumadin at the same time if possible.  The risks, benefits, and alternatives of this procedure have been discussed with the patient or the responsible party- the patient understands and agrees to proceed.         Alex Avitia MD  02/14/18  1:04 PM           This document has been electronically signed by Alex Avitia MD on February 14, 2018 1:04 PM

## 2018-02-15 ENCOUNTER — EPISODE CHANGES (OUTPATIENT)
Dept: CASE MANAGEMENT | Facility: OTHER | Age: 83
End: 2018-02-15

## 2018-02-15 NOTE — PLAN OF CARE
Problem: Inpatient Physical Therapy  Goal: Bed Mobility Goal STG- PT  Outcome: Unable to achieve outcome(s) by discharge Date Met: 02/15/18   02/13/18 1053 02/15/18 0803   Bed Mobility PT STG   Bed Mobility PT STG, Date Established 02/13/18 --    Bed Mobility PT STG, Time to Achieve 4 days --    Bed Mobility PT STG, Activity Type roll left/roll right;supine to sit/sit to supine --    Bed Mobility PT STG, [place] Level independent --    Bed Mobility PT STG, Date Goal Reviewed --  02/15/18   Bed Mobility PT STG, Outcome --  goal not met   Bed Mobility PT STG, Reason Goal Not Met --  discharged from facility     Goal: Transfer Training Goal 1 STG- PT  Outcome: Unable to achieve outcome(s) by discharge Date Met: 02/15/18   02/13/18 1053 02/15/18 0803   Transfer Training PT STG   Transfer Training PT STG, Date Established 02/13/18 --    Transfer Training PT STG, Time to Achieve 4 days --    Transfer Training PT STG, Activity Type bed to chair /chair to bed;sit to stand/stand to sit;toilet --    Transfer Training PT STG, [place] Level supervision required --    Transfer Training PT STG, Assist Device walker, rolling --    Transfer Training PT STG, Date Goal Reviewed --  02/15/18   Transfer Training PT STG, Outcome --  goal not met   Transfer Training PT STG, Reason Goal Not Met --  discharged from facility     Goal: Gait Training Goal STG- PT  Outcome: Unable to achieve outcome(s) by discharge Date Met: 02/15/18   02/13/18 1053 02/15/18 0803   Gait Training PT STG   Gait Training Goal PT STG, Date Established 02/13/18 --    Gait Training Goal PT STG, Time to Achieve 4 days --    Gait Training Goal PT STG, [place] Level supervision required --    Gait Training Goal PT STG, Assist Device walker, rolling --    Gait Training Goal PT STG, Distance to Achieve 300 ft --    Gait Training Goal PT STG, Date Goal Reviewed --  02/15/18   Gait Training Goal PT STG, Outcome --  goal not met   Gait Training Goal PT STG,  Reason Goal Not Met --  discharged from facility

## 2018-02-15 NOTE — THERAPY DISCHARGE NOTE
Acute Care - Physical Therapy Discharge Summary  [facility]       Patient Name: Val Arteaga  : 1932  MRN: [id number]    Today's Date: 2/15/2018  Onset of Illness/Injury or Date of Surgery Date: 18    Date of Referral to PT: 18  Referring Physician: Jeremias      Admit Date: 2018      PT Recommendation and Plan    Visit Dx:    ICD-10-CM ICD-9-CM   1. Symptomatic anemia D64.9 285.9   2. Gastrointestinal hemorrhage, unspecified gastrointestinal hemorrhage type K92.2 578.9   3. Shortness of breath R06.02 786.05   4. Impaired functional mobility, balance, gait, and endurance Z74.09 V49.89   5. Impaired mobility and activities of daily living Z74.09 799.89   6. Anemia, unspecified type D64.9 285.9             Outcome Measures       18 0940 18 1359 18 1050    How much help from another person do you currently need...    Turning from your back to your side while in flat bed without using bedrails? 3  -TW (P)  3  -BM     Moving from lying on back to sitting on the side of a flat bed without bedrails? 3  -TW (P)  3  -BM     Moving to and from a bed to a chair (including a wheelchair)? 3  -TW (P)  3  -BM     Standing up from a chair using your arms (e.g., wheelchair, bedside chair)? 3  -TW (P)  3  -BM     Climbing 3-5 steps with a railing? 3  -TW (P)  3  -BM     To walk in hospital room? 3  -TW (P)  3  -BM     AM-PAC 6 Clicks Score 18  -TW (P)  18  -TW (r) BM (t)     How much help from another is currently needed...    Putting on and taking off regular lower body clothing?   2  -RB    Bathing (including washing, rinsing, and drying)   2  -RB    Toileting (which includes using toilet bed pan or urinal)   3  -RB    Putting on and taking off regular upper body clothing   3  -RB    Taking care of personal grooming (such as brushing teeth)   4  -RB    Eating meals   4  -RB    Score   18  -RB    Functional Assessment    Outcome Measure Options AM-PAC 6 Clicks Basic Mobility (PT)  -TW  (P)  AM-PAC 6 Clicks Basic Mobility (PT)  -BM AM-[facility] 6 Clicks Daily Activity (OT)  -RB      02/13/18 0959          How much help from another person do you currently need...    Turning from your back to your side while in flat bed without using bedrails? 3  -MN      Moving from lying on back to sitting on the side of a flat bed without bedrails? 3  -MN      Moving to and from a bed to a chair (including a wheelchair)? 3  -MN      Standing up from a chair using your arms (e.g., wheelchair, bedside chair)? 3  -MN      Climbing 3-5 steps with a railing? 3  -MN      To walk in hospital room? 3  -MN      AM-PAC 6 Clicks Score 18  -MN      Functional Assessment    Outcome Measure Options AM-PAC 6 Clicks Basic Mobility (PT)  -MN        User Key  (r) = Recorded By, (t) = Taken By, (c) = Cosigned By    Initials Name Provider Type    RB Seth Cash, OT Occupational Therapist    MARIA DOLORES Negrete, PT Physical Therapist    TW Keshav Buitrago, PTA Physical Therapy Assistant    BM Cecilia Joseph, PT Student PT Student                      IP PT Goals       02/15/18 0803 02/14/18 0940 02/13/18 1359    Bed Mobility PT STG    Bed Mobility PT STG, Date Goal Reviewed 02/15/18  -MN 02/14/18  -TW (P)  02/13/18  -BM    Bed Mobility PT STG, Outcome goal not met  -MN goal ongoing  -TW (P)  goal ongoing  -BM    Bed Mobility PT STG, Reason Goal Not Met discharged from facility  -MN      Transfer Training PT STG    Transfer Training PT STG, Date Goal Reviewed 02/15/18  -MN 02/14/18  -TW (P)  02/13/18  -BM    Transfer Training PT STG, Outcome goal not met  -MN goal ongoing  -TW (P)  goal ongoing  -BM    Transfer Training PT STG, Reason Goal Not Met discharged from facility  -MN      Gait Training PT STG    Gait Training Goal PT STG, Date Goal Reviewed 02/15/18  -MN 02/14/18  -TW (P)  02/13/18  -BM    Gait Training Goal PT STG, Outcome goal not met  -MN goal ongoing  -TW (P)  goal ongoing  -BM    Gait Training Goal PT STG, Reason Goal Not  Met discharged from facility  -MN        02/13/18 1053          Bed Mobility PT STG    Bed Mobility PT STG, Date Established 02/13/18  -MN      Bed Mobility PT STG, Time to Achieve 4 days  -MN      Bed Mobility PT STG, Activity Type roll left/roll right;supine to sit/sit to supine  -MN      Bed Mobility PT STG, [place] Level independent  -MN      Transfer Training PT STG    Transfer Training PT STG, Date Established 02/13/18  -MN      Transfer Training PT STG, Time to Achieve 4 days  -MN      Transfer Training PT STG, Activity Type bed to chair /chair to bed;sit to stand/stand to sit;toilet  -MN      Transfer Training PT STG, [place] Level supervision required  -MN      Transfer Training PT STG, Assist Device walker, rolling  -MN      Gait Training PT STG    Gait Training Goal PT STG, Date Established 02/13/18  -MN      Gait Training Goal PT STG, Time to Achieve 4 days  -MN      Gait Training Goal PT STG, [place] Level supervision required  -MN      Gait Training Goal PT STG, Assist Device walker, rolling  -MN      Gait Training Goal PT STG, Distance to Achieve 300 ft  -MN        User Key  (r) = Recorded By, (t) = Taken By, (c) = Cosigned By    Initials Name Provider Type    MN Hallie Negrete, PT Physical Therapist    TW Keshav Buitrago, PTA Physical Therapy Assistant    BM Cecilia Joseph, PT Student PT Student              PT Discharge Summary  Anticipated Discharge Disposition: skilled nursing facility  Reason for Discharge: Discharge from facility, Per MD order  Outcomes Achieved: Unable to make functional progress toward goals at this time  Discharge Destination: SNF      Hallie Negrete, PT   2/15/2018

## 2018-02-15 NOTE — THERAPY DISCHARGE NOTE
Acute Care - Occupational Therapy Initial Eval/Discharge  [facility]     Patient Name: Val Arteaga  : 1932  MRN: [id number]  Today's Date: 2/15/2018  Onset of Illness/Injury or Date of Surgery Date: 18  Date of Referral to OT: 18  Referring Physician: Jeremias      Admit Date: 2018       ICD-10-CM ICD-9-CM   1. Symptomatic anemia D64.9 285.9   2. Gastrointestinal hemorrhage, unspecified gastrointestinal hemorrhage type K92.2 578.9   3. Shortness of breath R06.02 786.05   4. Impaired functional mobility, balance, gait, and endurance Z74.09 V49.89   5. Impaired mobility and activities of daily living Z74.09 799.89   6. Anemia, unspecified type D64.9 285.9     Patient Active Problem List   Diagnosis   • Essential hypertension   • Coronary artery disease involving native coronary artery of native heart without angina pectoris   • Chronic obstructive pulmonary disease   • Acquired hypothyroidism   • Depressive disorder   • Thygeson's superficial punctate keratitis   • Pseudophakia   • Precordial pain   • Displaced fracture of base of neck of right femur, initial encounter for closed fracture   • Hip fracture, right, closed, initial encounter   • Pulmonary emphysema   • Chronic systolic congestive heart failure   • Hyponatremia   • BENITO (acute kidney injury)   • Acute blood loss anemia   • Urinary retention   • Closed displaced basicervical fracture of right femur   • Urethral caruncle   • Symptomatic anemia   • Anemia     Past Medical History:   Diagnosis Date   • Acquired hypothyroidism    • Allergic rhinitis    • CHF (congestive heart failure)    • COPD (chronic obstructive pulmonary disease)    • Corneal epithelial dystrophy    • Coronary arteriosclerosis    • Depression    • Generalized atherosclerosis    • GERD (gastroesophageal reflux disease)    • Hemorrhoids    • History of bone density study 2012    Lumbar spine Osteopenia. Femoral Osteopenia   • History of mammogram  08/20/2004    Birads Category 2 Benign   • History of Papanicolaou smear of cervix 08/12/2004    NEGATIVE   • Hypercholesterolemia    • Hyperlipidemia    • Hypertension    • Myocardial infarction    • Nonexudative age-related macular degeneration    • Osteoarthritis of multiple joints    • Pseudophakia    • Punctate keratitis     - history Thygeson's keratopathy      • Tobacco dependence syndrome      Past Surgical History:   Procedure Laterality Date   • APPENDECTOMY     • BREAST SURGERY  03/19/1954    Excision, breast lesion (1)  Excision of fibroma. 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 patency in the circumflex artery site of previous angioplasty. Kathe. patency in RCA.Nonobstructive 20-30% stenosis in the LAD and diagonal branch. Preserved LV systolic function with Ef of 55%   • CERVICAL SPINE SURGERY  09/16/1974    Excision of cervical disc, C5-6, C6-7, anterior cervical fusion, C5-6 with iliac bone graft.cervical spondylosis,C5-6, C6-7   • COLONOSCOPY  01/01/2013    patient declined    • CORONARY ANGIOPLASTY  07/21/1993    Angioplasty, coronary (2)    RCA    • DILATATION AND CURETTAGE      3   • ENDOSCOPY AND COLONOSCOPY  10/01/1998    Hemorhoids Rectal Outlet Bleeding   • ESOPHAGOSCOPY / EGD  06/28/2004    Nonerosive gastritis of the body of the stomach. A biopsy was obtained & placed in h. Pylori test agar. Multiple biopsies were obtained from the body of the stomach   • HIP BIPOLAR REPLACEMENT Right 1/23/2018    Procedure: HIP BIPOLAR ANTERIOR APPROACH - right;  Surgeon: Kelvin Mcdowell MD;  Location: [facility];  Service:    • INJECTION OF MEDICATION  11/23/2015    Depo Medrol (Methylprednisone) (5)    • INJECTION OF MEDICATION  02/04/2016    Kenalog (3)     • JOINT REPLACEMENT Right 01/24/2018    hip   • PACEMAKER REPLACEMENT  2006          OT ASSESSMENT FLOWSHEET (last 72 hours)      OT Evaluation       02/15/18 0623 02/14/18 0943  02/13/18 1557 02/13/18 1359 02/13/18 1050    Rehab Evaluation    Document Type  therapy note (daily note)  -TW  (P)  therapy note (daily note)  -BM evaluation  -RB    Subjective Information  agree to therapy  -TW  (P)  agree to therapy  -BM agree to therapy  -RB    Patient Effort, Rehab Treatment  good  -TW  (P)  good  -BM good  -RB    Symptoms Noted During/After Treatment     shortness of breath  -RB    General Information    Patient Profile Review     yes  -RB    Onset of Illness/Injury or Date of Surgery Date     02/12/18  -RB    Referring Physician     Jeremias  -RB    General Observations     Sitting in bedside chair with telemetry, IV and O2  -RB    Pertinent History Of Current Problem     Hosp from [facility] with near syncope.  Dx with anemia and had transfusion.  Pt recently D/C from ARU to SNF after fall with R hip fx ~ 3 wks ago.  -RB    Precautions/Limitations    (P)  fall precautions;oxygen therapy device and L/min  -BM fall precautions;oxygen therapy device and L/min  -RB    Prior Level of Function     min assist:;gait;transfer;mod assist:;dressing;bathing  -RB    Equipment Currently Used at Home   oxygen  -TC  oxygen;walker, rolling  -RB    Plans/Goals Discussed With     patient  -RB    Risks Reviewed     patient:  -RB    Benefits Reviewed     patient:  -RB    Living Environment    Lives With   alone  -TC  alone   Currently @ NH for rehab.  -RB    Living Arrangements   mobile home   double wide  -TC      Transportation Available   car;family or friend will provide  -TC      Living Environment Comment     Back to NH and then home hopefully.  -RB    Clinical Impression    Date of Referral to OT     02/13/18  -RB    OT Diagnosis     Impaired mobility and ADLs.  -RB    Functional Level At Time Of Evaluation     Impaired mobility and ADLs.  -RB    Impairments Found (describe specific impairments)     gait, locomotion, and balance  -RB    Criteria for Skilled Therapeutic Interventions Met     yes;treatment indicated   -RB    Rehab Potential     fair, will monitor progress closely  -RB    Therapy Frequency     --   3-14x/wk  -RB    Predicted Duration of Therapy Intervention (days/wks)     Until D/C or goals met.  -RB    Anticipated Discharge Disposition skilled nursing facility  -NN    skilled nursing facility  -RB    Functional Level Prior    Ambulation     3-->assistive equipment and person  -RB    Transferring     3-->assistive equipment and person  -RB    Toileting     3-->assistive equipment and person  -RB    Bathing     3-->assistive equipment and person  -RB    Dressing     3-->assistive equipment and person  -RB    Eating     0-->independent  -RB    Communication     0-->understands/communicates without difficulty  -RB    Swallowing     0-->swallows foods/liquids without difficulty  -RB    Vital Signs    Pre Systolic BP Rehab  134  -TW  (P)  130  -  -RB    Pre Treatment Diastolic BP  67  -TW  (P)  60  -BM 68  -RB    Post Systolic BP Rehab    (P)  153  -  -RB    Post Treatment Diastolic BP    (P)  66  -BM 64  -RB    Pretreatment Heart Rate (beats/min)  80  -TW  (P)  69  -BM 82  -RB    Intratreatment Heart Rate (beats/min)     90  -RB    Posttreatment Heart Rate (beats/min)    (P)  71  -BM 83  -RB    Pre SpO2 (%)  96  -TW  (P)  94  -BM 97  -RB    O2 Delivery Pre Treatment  supplemental O2  -TW  (P)  supplemental O2  -BM supplemental O2  -RB    Intra SpO2 (%)     98  -RB    O2 Delivery Intra Treatment     supplemental O2  -RB    Post SpO2 (%)  95  -TW  (P)  94  -BM 97  -RB    O2 Delivery Post Treatment    (P)  supplemental O2  -BM supplemental O2  -RB    Pre Patient Position  Supine  -TW  (P)  Supine  -BM Sitting  -RB    Intra Patient Position  Supine  -TW  (P)  Supine  -BM Standing  -RB    Post Patient Position  Supine  -TW  (P)  Supine  -BM Sitting  -RB    Pain Assessment    Pain Assessment  No/denies pain  -TW  (P)  0-10  -BM No/denies pain  -RB    Pain Score    (P)  0   at rest  -BM     Post Pain Score    (P)   5   with movement  -BM     Pain Type    (P)  Acute pain;Surgical pain  -BM     Pain Location    (P)  Hip  -BM     Vision Assessment/Intervention    Visual Impairment    (P)  WFL with corrective lenses  -BM WFL with corrective lenses  -RB    Cognitive Assessment/Intervention    Current Cognitive/Communication Assessment  functional  -TW  (P)  functional  -BM functional  -RB    Orientation Status  oriented x 4  -TW  (P)  oriented x 4  -BM oriented x 4  -RB    Follows Commands/Answers Questions  100% of the time  -TW  (P)  100% of the time  -% of the time  -RB    Personal Safety    (P)  WNL/WFL  -BM WNL/WFL  -RB    Personal Safety Interventions  muscle strengthening facilitated  -TW  (P)  gait belt;muscle strengthening facilitated;nonskid shoes/slippers when out of bed;supervised activity  -BM gait belt;nonskid shoes/slippers when out of bed;supervised activity  -RB    ROM (Range of Motion)    General ROM     no range of motion deficits identified  -RB    MMT (Manual Muscle Testing)    General MMT Assessment     upper extremity strength deficits identified  -RB    General MMT Assessment Detail     4/5 for B shld flex and 4+/5 for rest of B UEs.  -RB    Bed Mobility, Assessment/Treatment    Bed Mobility, Comment  Deferred due to pt going for test soon.  -TW  (P)  pt deferred due to fatigue  -BM     Transfer Assessment/Treatment    Transfers, Sit-Stand [place]     minimum assist (75% patient effort)  -RB    Transfers, Stand-Sit [place]     contact guard assist  -RB    Transfers, Sit-Stand-Sit, Assist Device     rolling walker  -RB    Toilet Transfer, [place]     minimum assist (75% patient effort)  -RB    Toilet Transfer, Assistive Device     rolling walker  -RB    Transfer, Comment    (P)  deferred  -BM     Functional Mobility    Functional Mobility- Ind. Level     contact guard assist  -RB    Functional Mobility- Device     rolling walker  -RB    Functional Mobility-Distance (Feet)     20  -RB     Therapy Exercises    Bilateral Lower Extremities  AROM:;15 reps;supine;ankle pumps/circles;glut sets;quad sets;heel slides;SAQ   AAROM with R LE. All x2 sets.  -TW  (P)  AROM:;15 reps;supine;ankle pumps/circles;glut sets;hip abduction/adduction;hip ER;hip IR;quad sets  -BM     Sensory Assessment/Intervention    Light Touch     LUE;RUE  -RB    LUE Light Touch     WNL  -RB    RUE Light Touch     WNL  -RB    General Therapy Interventions    Planned Therapy Interventions     activity intolerance;ADL retraining;balance training;bed mobility training;energy conservation;strengthening;transfer training;home exercise program  -RB    Activity Intolerance     fair to good  -RB    Positioning and Restraints    Pre-Treatment Position  in bed  -TW  (P)  in bed  -BM sitting in chair/recliner  -RB    Post Treatment Position  bed  -TW  (P)  bed  -BM chair  -RB    In Bed  supine;call light within reach;encouraged to call for assist;with family/caregiver  -TW  (P)  supine;call light within reach;encouraged to call for assist;exit alarm on;with family/caregiver;side rails up x2  -BM sitting;with family/caregiver  -RB      02/13/18 0959 02/12/18 1831             Rehab Evaluation    Document Type evaluation  -MN        Subjective Information agree to therapy  -MN        Patient Effort, Rehab Treatment good  -MN        General Information    Patient Profile Review yes  -MN        Onset of Illness/Injury or Date of Surgery Date 02/12/18  -MN        Referring Physician Dr. Mcdowell  -MN        General Observations Supine HOB up +tele +2L O2 +PICC  -MN        Pertinent History Of Current Problem Pt admitted from [facility] for near syncope, found to be anemic requiring blood transfusion. Pt with recent d/c from ARU (2/5/18) to SNF for further rehab after fall with anterior MASHA ~3 weeks ago.   -MN        Precautions/Limitations fall precautions;oxygen therapy device and L/min  -MN        Prior Level of Function min assist:;transfer;gait;bed  mobility;mod assist:;bathing;dressing  -MN        Equipment Currently Used at Home oxygen  -MN oxygen;shower chair  -KJ       Plans/Goals Discussed With patient;agreed upon  -MN        Risks Reviewed patient:;LOB;dizziness;increased discomfort;change in vital signs  -MN        Benefits Reviewed patient:;improve function;increase independence;increase strength;increase balance;decrease pain;improve skin integrity;decrease risk of DVT;increase knowledge  -MN        Barriers to Rehab previous functional deficit  -MN        Living Environment    Lives With other (see comments)   currently staying at [place] for rehab  -MN alone  -KJ       Living Arrangements  mobile home  -KJ       Home Accessibility  stairs to enter home  -KJ       Stair Railings at Home  outside, present on left side  -KJ       Type of Financial/Environmental Concern  none  -KJ       Transportation Available  car  -KJ       Living Environment Comment plan to d/c back to [place] to finish therapy  -MN --   pt was at rehab for therapy.  -KJ       Functional Level Prior    Ambulation  3-->assistive equipment and person  -KJ       Transferring  3-->assistive equipment and person  -KJ       Toileting  3-->assistive equipment and person  -KJ       Bathing  3-->assistive equipment and person  -KJ       Dressing  3-->assistive equipment and person  -KJ       Eating  0-->independent  -KJ       Communication  0-->understands/communicates without difficulty  -KJ       Swallowing  0-->swallows foods/liquids without difficulty  -KJ       Prior Functional Level Comment  na  -KJ       Vital Signs    Pre Systolic BP Rehab 131  -MN        Pre Treatment Diastolic BP 63  -MN        Post Systolic BP Rehab 125  -MN        Post Treatment Diastolic BP 57  -MN        Pretreatment Heart Rate (beats/min) 76  -MN        Intratreatment Heart Rate (beats/min) 90  -MN        Posttreatment Heart Rate (beats/min) 85  -MN        Pre SpO2 (%) 93  -MN        O2 Delivery Pre  Treatment supplemental O2  -MN        Intra SpO2 (%) 95  -MN        O2 Delivery Intra Treatment supplemental O2  -MN        Post SpO2 (%) 93  -MN        O2 Delivery Post Treatment supplemental O2  -MN        Pre Patient Position Supine  -MN        Intra Patient Position Sitting  -MN        Post Patient Position Supine  -MN        Pain Assessment    Pain Assessment 0-10  -MN        Pain Score 0  -MN        Post Pain Score 0  -MN        Pain Type Acute pain;Surgical pain  -MN        Pain Intervention(s) Medication (See MAR)  -MN        Vision Assessment/Intervention    Visual Impairment WFL with corrective lenses  -MN        Cognitive Assessment/Intervention    Current Cognitive/Communication Assessment functional  -MN        Orientation Status oriented x 4  -MN        Follows Commands/Answers Questions 100% of the time  -MN        Personal Safety WNL/WFL  -MN        Personal Safety Interventions gait belt;nonskid shoes/slippers when out of bed  -MN        ROM (Range of Motion)    General ROM no range of motion deficits identified   right hip flex to 90*  -MN        MMT (Manual Muscle Testing)    General MMT Assessment lower extremity strength deficits identified  -MN        Mobility Assessment/Training    Extremity Weight-Bearing Status right lower extremity  -MN        Right Lower Extremity Weight-Bearing weight-bearing as tolerated  -MN        Bed Mobility, Assessment/Treatment    Bed Mobility, Assistive Device head of bed elevated;bed rails  -MN        Bed Mobility, Scoot/Bridge, [place] conditional independence  -MN        Bed Mob, Supine to Sit, [place] supervision required  -MN        Transfer Assessment/Treatment    Transfers, Sit-Stand [place] contact guard assist  -MN        Transfers, Stand-Sit [place] contact guard assist  -MN        Transfers, Sit-Stand-Sit, Assist Device rolling walker  -MN        Therapy Exercises    Bilateral Lower Extremities AROM:;10 reps;sitting;heel raises;hip  flexion   toe raises, LAQ with 3 APs at top  -MN        Sensory Assessment/Intervention    Light Touch LLE;RLE  -MN        LLE Light Touch WNL  -MN        RLE Light Touch WNL  -MN        Edema Management    Edema Amount right:;moderate;pitting +1   distal to ankle  -MN        Positioning and Restraints    Pre-Treatment Position in bed  -MN        Post Treatment Position bed  -MN        In Bed sitting EOB;call light within reach;encouraged to call for assist;with family/caregiver;with nsg   with RN students about to get bed bath  -MN        Lower Extremity    Lower Ext Manual Muscle Testing Detail LLE: 4/5 grossly RLE: hip flex 2+/5, knee ext 3+/5, knee flex 3+/5, DF 3-/5, PF 2/5  -MN          User Key  (r) = Recorded By, (t) = Taken By, (c) = Cosigned By    Initials Name Effective Dates    RB Seth Cash OT 06/15/16 -     TC Marilynn Downs 10/17/16 -     MN Hallie Negrete, PT 10/17/16 -     TW Keshav Buitrago, PTA 10/17/16 -     KJ Brooke Blue RN 10/17/16 -     NN Rocío Desai, OTR/L 11/08/17 -     BM Cecilia Joseph, PT Student 12/05/17 -           Occupational Therapy Education     Title: PT OT SLP Therapies (Done)     Topic: Occupational Therapy (Resolved)     Point: Precautions (Resolved)    Description: Instruct learner(s) on prescribed precautions during self-care and functional transfers.    Learning Progress Summary    Learner Readiness Method Response Comment Documented by Status   Patient Acceptance E VU Edu pt on use of gait belt and non skid socks when OOB and no OOB without assist. RB 02/13/18 1324 Done                      User Key     Initials Effective Dates Name Provider Type Discipline     06/15/16 -  Seth Cash OT Occupational Therapist OT                OT Recommendation and Plan  Anticipated Discharge Disposition: skilled nursing facility  Planned Therapy Interventions: activity intolerance, ADL retraining, balance training, bed mobility training, energy conservation,  strengthening, transfer training, home exercise program  Therapy Frequency:  (3-14x/wk)              OT Goals       02/15/18 0622 02/13/18 1326       Transfer Training OT LTG    Transfer Training OT LTG, Date Established  02/13/18  -RB     Transfer Training OT LTG, Time to Achieve  by discharge  -RB     Transfer Training OT LTG, Activity Type  all transfers  -RB     Transfer Training OT LTG, [place] Level  supervision required  -RB     Transfer Training OT LTG, Assist Device  walker, rolling  -RB     Transfer Training OT LTG, Date Goal Reviewed 02/15/18  -      Transfer Training OT LTG, Outcome goal not met  -      Transfer Training OT LTG, Reason Goal Not Met discharged from facility  -NN      Static Standing Balance OT LTG    Static Standing Balance OT LTG, Date Established  02/13/18  -RB     Static Standing Balance OT LTG, Time to Achieve  by discharge  -RB     Static Standing Balance OT LTG, [place] Level  supervision required   5 minutes with functional activity.  -RB     Static Standing Balance OT LTG, Assist Device  assistive device   R/W.  -RB     Static Standing Balance OT LTG, Date Goal Reviewed 02/15/18  -      Static Standing Balance OT LTG, Outcome goal not met  -      Static Standing Balance OT LTG, Reason Goal Not Met discharged from facility  -      Patient Education OT LTG    Patient Education OT LTG, Date Established  02/13/18  -     Patient Education OT LTG, Time to Achieve  by discharge  -RB     Patient Education OT LTG, Education Type  HEP;home safety;energy conservation;work simplification;adaptive breathing  -RB     Patient Education OT LTG, Education Understanding  demonstrates adequately;verbalizes understanding  -RB     Patient Education OT LTG, Date Goal Reviewed 02/15/18  -      Patient Education OT LTG Outcome goal not met  -      Patient Education OT LTG, Reason Goal Not Met discharged from facility  -      ADL OT LTG    ADL OT LTG, Date Established  02/13/18   -RB     ADL OT LTG, Time to Achieve  by discharge  -RB     ADL OT LTG, Activity Type  ADL skills   Sponge bath and dress.  -RB     ADL OT LTG, [place] Level  contact guard;assistive device   R/W.  -RB     ADL OT LTG, Date Goal Reviewed 02/15/18  -NN      ADL OT LTG, Outcome goal not met  -NN      ADL OT LTG, Reason Goal Not Met discharged from facility  -NN        User Key  (r) = Recorded By, (t) = Taken By, (c) = Cosigned By    Initials Name Provider Type    RB Seth Cash, OT Occupational Therapist    NN Rocío Desai, OTR/L Occupational Therapist                Outcome Measures       02/14/18 0940 02/13/18 1359 02/13/18 1050    How much help from another person do you currently need...    Turning from your back to your side while in flat bed without using bedrails? 3  -TW (P)  3  -BM     Moving from lying on back to sitting on the side of a flat bed without bedrails? 3  -TW (P)  3  -BM     Moving to and from a bed to a chair (including a wheelchair)? 3  -TW (P)  3  -BM     Standing up from a chair using your arms (e.g., wheelchair, bedside chair)? 3  -TW (P)  3  -BM     Climbing 3-5 steps with a railing? 3  -TW (P)  3  -BM     To walk in hospital room? 3  -TW (P)  3  -BM     AM-PAC 6 Clicks Score 18  -TW (P)  18  -TW (r) BM (t)     How much help from another is currently needed...    Putting on and taking off regular lower body clothing?   2  -RB    Bathing (including washing, rinsing, and drying)   2  -RB    Toileting (which includes using toilet bed pan or urinal)   3  -RB    Putting on and taking off regular upper body clothing   3  -RB    Taking care of personal grooming (such as brushing teeth)   4  -RB    Eating meals   4  -RB    Score   18  -RB    Functional Assessment    Outcome Measure Options AM-PAC 6 Clicks Basic Mobility (PT)  -TW (P)  AM-PAC 6 Clicks Basic Mobility (PT)  -BM AM-PAC 6 Clicks Daily Activity (OT)  -RB      02/13/18 0959          How much help from another person do you  currently need...    Turning from your back to your side while in flat bed without using bedrails? 3  -MN      Moving from lying on back to sitting on the side of a flat bed without bedrails? 3  -MN      Moving to and from a bed to a chair (including a wheelchair)? 3  -MN      Standing up from a chair using your arms (e.g., wheelchair, bedside chair)? 3  -MN      Climbing 3-5 steps with a railing? 3  -MN      To walk in hospital room? 3  -MN      AM-PAC 6 Clicks Score 18  -MN      Functional Assessment    Outcome Measure Options AM-PAC 6 Clicks Basic Mobility (PT)  -MN        User Key  (r) = Recorded By, (t) = Taken By, (c) = Cosigned By    Initials Name Provider Type    RB Seth Cash, OT Occupational Therapist    MARIA DOLORES Negrete, PT Physical Therapist    TW Keshav Buitrago, PTA Physical Therapy Assistant    BM Cecilia Joseph, PT Student PT Student          Time Calculation:           OT G-codes  OT Professional Judgement Used?: Yes  OT Functional Scales Options: AM-PAC 6 Clicks Daily Activity (OT)  Score: 18  Functional Limitation: Self care  Self Care Current Status (): At least 40 percent but less than 60 percent impaired, limited or restricted  Self Care Goal Status (): At least 20 percent but less than 40 percent impaired, limited or restricted    OT Discharge Summary  Anticipated Discharge Disposition: skilled nursing facility  Reason for Discharge: Discharge from facility  Outcomes Achieved: Refer to plan of care for updates on goals achieved  Discharge Destination: SNF    Rocío Deasi OTR/L  2/15/2018

## 2018-02-15 NOTE — PLAN OF CARE
Problem: Inpatient Occupational Therapy  Goal: Transfer Training Goal 1 LTG- OT  Outcome: Unable to achieve outcome(s) by discharge Date Met: 02/15/18   02/13/18 1326 02/15/18 0622   Transfer Training OT LTG   Transfer Training OT LTG, Date Established 02/13/18 --    Transfer Training OT LTG, Time to Achieve by discharge --    Transfer Training OT LTG, Activity Type all transfers --    Transfer Training OT LTG, [place] Level supervision required --    Transfer Training OT LTG, Assist Device walker, rolling --    Transfer Training OT LTG, Date Goal Reviewed --  02/15/18   Transfer Training OT LTG, Outcome --  goal not met   Transfer Training OT LTG, Reason Goal Not Met --  discharged from facility     Goal: Static Standing Balance Goal LTG- OT  Outcome: Unable to achieve outcome(s) by discharge Date Met: 02/15/18   02/13/18 1326 02/15/18 0622   Static Standing Balance OT LTG   Static Standing Balance OT LTG, Date Established 02/13/18 --    Static Standing Balance OT LTG, Time to Achieve by discharge --    Static Standing Balance OT LTG, [place] Level supervision required  (5 minutes with functional activity.) --    Static Standing Balance OT LTG, Assist Device assistive device  (R/W.) --    Static Standing Balance OT LTG, Date Goal Reviewed --  02/15/18   Static Standing Balance OT LTG, Outcome --  goal not met   Static Standing Balance OT LTG, Reason Goal Not Met --  discharged from facility     Goal: Patient Education Goal LTG- OT  Outcome: Unable to achieve outcome(s) by discharge Date Met: 02/15/18   02/13/18 1326 02/15/18 0622   Patient Education OT LTG   Patient Education OT LTG, Date Established 02/13/18 --    Patient Education OT LTG, Time to Achieve by discharge --    Patient Education OT LTG, Education Type HEP;home safety;energy conservation;work simplification;adaptive breathing --    Patient Education OT LTG, Education Understanding demonstrates adequately;verbalizes understanding --    Patient  Education OT LTG, Date Goal Reviewed --  02/15/18   Patient Education OT LTG Outcome --  goal not met   Patient Education OT LTG, Reason Goal Not Met --  discharged from facility     Goal: ADL Goal LTG- OT  Outcome: Unable to achieve outcome(s) by discharge Date Met: 02/15/18   02/13/18 1326 02/15/18 0622   ADL OT LTG   ADL OT LTG, Date Established 02/13/18 --    ADL OT LTG, Time to Achieve by discharge --    ADL OT LTG, Activity Type ADL skills  (Sponge bath and dress.) --    ADL OT LTG, [place] Level contact guard;assistive device  (R/W.) --    ADL OT LTG, Date Goal Reviewed --  02/15/18   ADL OT LTG, Outcome --  goal not met   ADL OT LTG, Reason Goal Not Met --  discharged from facility

## 2018-02-16 ENCOUNTER — LAB REQUISITION (OUTPATIENT)
Dept: LAB | Facility: HOSPITAL | Age: 83
End: 2018-02-16

## 2018-02-16 ENCOUNTER — ANTICOAGULATION VISIT (OUTPATIENT)
Dept: PHARMACY | Facility: HOSPITAL | Age: 83
End: 2018-02-16

## 2018-02-16 DIAGNOSIS — D64.9 ANEMIA: ICD-10-CM

## 2018-02-16 LAB
ALBUMIN SERPL-MCNC: 3 G/DL (ref 3.4–4.8)
ALBUMIN/GLOB SERPL: 1 G/DL (ref 1.1–1.8)
ALP SERPL-CCNC: 92 U/L (ref 38–126)
ALT SERPL W P-5'-P-CCNC: 30 U/L (ref 9–52)
ANION GAP SERPL CALCULATED.3IONS-SCNC: 11 MMOL/L (ref 5–15)
AST SERPL-CCNC: 34 U/L (ref 14–36)
BASOPHILS # BLD AUTO: 0.03 10*3/MM3 (ref 0–0.2)
BASOPHILS NFR BLD AUTO: 0.3 % (ref 0–2)
BILIRUB SERPL-MCNC: 0.3 MG/DL (ref 0.2–1.3)
BUN BLD-MCNC: 20 MG/DL (ref 7–21)
BUN/CREAT SERPL: 23.8 (ref 7–25)
CALCIUM SPEC-SCNC: 8.6 MG/DL (ref 8.4–10.2)
CHLORIDE SERPL-SCNC: 96 MMOL/L (ref 95–110)
CO2 SERPL-SCNC: 25 MMOL/L (ref 22–31)
CREAT BLD-MCNC: 0.84 MG/DL (ref 0.5–1)
DEPRECATED RDW RBC AUTO: 56.4 FL (ref 36.4–46.3)
EOSINOPHIL # BLD AUTO: 0.38 10*3/MM3 (ref 0–0.7)
EOSINOPHIL NFR BLD AUTO: 3.8 % (ref 0–7)
ERYTHROCYTE [DISTWIDTH] IN BLOOD BY AUTOMATED COUNT: 17.2 % (ref 11.5–14.5)
GFR SERPL CREATININE-BSD FRML MDRD: 64 ML/MIN/1.73 (ref 39–90)
GLOBULIN UR ELPH-MCNC: 2.9 GM/DL (ref 2.3–3.5)
GLUCOSE BLD-MCNC: 100 MG/DL (ref 60–100)
HCT VFR BLD AUTO: 23.9 % (ref 35–45)
HGB BLD-MCNC: 8 G/DL (ref 12–15.5)
IMM GRANULOCYTES # BLD: 0.07 10*3/MM3 (ref 0–0.02)
IMM GRANULOCYTES NFR BLD: 0.7 % (ref 0–0.5)
LYMPHOCYTES # BLD AUTO: 1.83 10*3/MM3 (ref 0.6–4.2)
LYMPHOCYTES NFR BLD AUTO: 18.1 % (ref 10–50)
MCH RBC QN AUTO: 31 PG (ref 26.5–34)
MCHC RBC AUTO-ENTMCNC: 33.5 G/DL (ref 31.4–36)
MCV RBC AUTO: 92.6 FL (ref 80–98)
MONOCYTES # BLD AUTO: 0.81 10*3/MM3 (ref 0–0.9)
MONOCYTES NFR BLD AUTO: 8 % (ref 0–12)
NEUTROPHILS # BLD AUTO: 6.97 10*3/MM3 (ref 2–8.6)
NEUTROPHILS NFR BLD AUTO: 69.1 % (ref 37–80)
PLATELET # BLD AUTO: 345 10*3/MM3 (ref 150–450)
PMV BLD AUTO: 9.3 FL (ref 8–12)
POTASSIUM BLD-SCNC: 4.2 MMOL/L (ref 3.5–5.1)
PROT SERPL-MCNC: 5.9 G/DL (ref 6.3–8.6)
RBC # BLD AUTO: 2.58 10*6/MM3 (ref 3.77–5.16)
SODIUM BLD-SCNC: 132 MMOL/L (ref 137–145)
WBC NRBC COR # BLD: 10.09 10*3/MM3 (ref 3.2–9.8)

## 2018-02-16 PROCEDURE — 80053 COMPREHEN METABOLIC PANEL: CPT | Performed by: FAMILY MEDICINE

## 2018-02-16 PROCEDURE — 85025 COMPLETE CBC W/AUTO DIFF WBC: CPT | Performed by: FAMILY MEDICINE

## 2018-02-16 NOTE — PROGRESS NOTES
Spoke with Dr. Mcdowell.  Decision was made for patient to not resume warfarin, will just be on full strength aspirin.  Risk of another bleed were believed to outweigh benefits when considering warfarin.

## 2018-02-20 LAB
LAB AP CASE REPORT: NORMAL
LAB AP DIAGNOSIS COMMENT: NORMAL
Lab: NORMAL
PATH REPORT.FINAL DX SPEC: NORMAL
PATH REPORT.GROSS SPEC: NORMAL

## 2018-02-22 ENCOUNTER — LAB REQUISITION (OUTPATIENT)
Dept: LAB | Facility: HOSPITAL | Age: 83
End: 2018-02-22

## 2018-02-22 DIAGNOSIS — D64.9 ANEMIA: ICD-10-CM

## 2018-02-22 LAB — HEMOCCULT STL QL IA: POSITIVE

## 2018-02-22 PROCEDURE — 82274 ASSAY TEST FOR BLOOD FECAL: CPT | Performed by: FAMILY MEDICINE

## 2018-02-22 RX ORDER — AMOXICILLIN 500 MG/1
1000 CAPSULE ORAL 2 TIMES DAILY
Qty: 40 CAPSULE | Refills: 0 | Status: SHIPPED | OUTPATIENT
Start: 2018-02-22 | End: 2018-03-16

## 2018-02-22 RX ORDER — PANTOPRAZOLE SODIUM 40 MG/1
40 TABLET, DELAYED RELEASE ORAL DAILY
Qty: 30 TABLET | Refills: 5 | Status: SHIPPED | OUTPATIENT
Start: 2018-02-22 | End: 2018-03-16 | Stop reason: RX

## 2018-02-22 RX ORDER — LANSOPRAZOLE, AMOXICILLIN, CLARITHROMYCIN 30-500-500
KIT ORAL ONCE
Qty: 1 KIT | Refills: 0 | Status: CANCELLED | OUTPATIENT
Start: 2018-02-22 | End: 2018-02-22

## 2018-02-22 RX ORDER — METRONIDAZOLE 500 MG/1
500 TABLET ORAL 2 TIMES DAILY
Qty: 20 TABLET | Refills: 0 | Status: SHIPPED | OUTPATIENT
Start: 2018-02-22 | End: 2018-03-16

## 2018-02-26 DIAGNOSIS — S72.041D CLOSED DISPLACED BASICERVICAL FRACTURE OF RIGHT FEMUR WITH ROUTINE HEALING, SUBSEQUENT ENCOUNTER: Primary | ICD-10-CM

## 2018-02-27 ENCOUNTER — OFFICE VISIT (OUTPATIENT)
Dept: ORTHOPEDIC SURGERY | Facility: CLINIC | Age: 83
End: 2018-02-27

## 2018-02-27 VITALS — HEIGHT: 68 IN | BODY MASS INDEX: 25.76 KG/M2 | WEIGHT: 170 LBS

## 2018-02-27 DIAGNOSIS — I25.10 CORONARY ARTERY DISEASE INVOLVING NATIVE CORONARY ARTERY OF NATIVE HEART WITHOUT ANGINA PECTORIS: ICD-10-CM

## 2018-02-27 DIAGNOSIS — S72.041D CLOSED DISPLACED BASICERVICAL FRACTURE OF RIGHT FEMUR WITH ROUTINE HEALING, SUBSEQUENT ENCOUNTER: Primary | ICD-10-CM

## 2018-02-27 DIAGNOSIS — J43.9 PULMONARY EMPHYSEMA, UNSPECIFIED EMPHYSEMA TYPE (HCC): ICD-10-CM

## 2018-02-27 DIAGNOSIS — I10 ESSENTIAL HYPERTENSION: ICD-10-CM

## 2018-02-27 PROCEDURE — 99024 POSTOP FOLLOW-UP VISIT: CPT | Performed by: ORTHOPAEDIC SURGERY

## 2018-02-27 PROCEDURE — 73502 X-RAY EXAM HIP UNI 2-3 VIEWS: CPT | Performed by: ORTHOPAEDIC SURGERY

## 2018-02-28 ENCOUNTER — PATIENT OUTREACH (OUTPATIENT)
Dept: CASE MANAGEMENT | Facility: OTHER | Age: 83
End: 2018-02-28

## 2018-02-28 NOTE — OUTREACH NOTE
Skilled Nursing Facility Discharge Flowsheet:     Skilled Nursing Facility Discharge Assessment 2/28/2018   Acute Facility Discharged From [facility]   Acute Discharge Date 2/14/2018   Name of the Skilled Nursing Facility? [facility]   Tier Level of the Skilled Nursing Facility 1   Purpose of SNF Admission PT;OT   Estimated length of stay for the patient? unknown   Who is the insurance provider or payor of patient stay? Medicare   Progression of Patient? Therapy in progress

## 2018-03-07 ENCOUNTER — PATIENT OUTREACH (OUTPATIENT)
Dept: CASE MANAGEMENT | Facility: OTHER | Age: 83
End: 2018-03-07

## 2018-03-08 NOTE — OUTREACH NOTE
Skilled Nursing Facility Discharge Flowsheet:     Skilled Nursing Facility Discharge Assessment 3/7/2018   Acute Facility Discharged From [facility]   Acute Discharge Date 2/14/2018   Name of the Skilled Nursing Facility? [facility]   Tier Level of the Skilled Nursing Facility 1   Purpose of SNF Admission PT;OT   Estimated length of stay for the patient? ,Unknown, plan is home at discharge   Who is the insurance provider or payor of patient stay? Medicare   Progression of Patient? Therapy continues

## 2018-03-15 ENCOUNTER — PATIENT OUTREACH (OUTPATIENT)
Dept: CASE MANAGEMENT | Facility: OTHER | Age: 83
End: 2018-03-15

## 2018-03-15 NOTE — OUTREACH NOTE
Skilled Nursing Facility Discharge Flowsheet:     Skilled Nursing Facility Discharge Assessment 3/13/2018   Acute Facility Discharged From [place]   Acute Discharge Date 2/14/2018   Name of the Skilled Nursing Facility? [facility]   Tier Level of the Skilled Nursing Facility 1   Purpose of SNF Admission OT   Estimated length of stay for the patient? 3/8   Who is the insurance provider or payor of patient stay? Medicare   Progression of Patient? Home with home care   Skilled Nursing Discharge Date? 3/8/2018   Where was the patient discharged to? Home   Home Health Agency at discharge? Yes   Name of Home Health Agency? [facility]   Are there any medication concerns at Discharge No   Does the patient have a follow-up appointment? Yes

## 2018-03-16 ENCOUNTER — OFFICE VISIT (OUTPATIENT)
Dept: GASTROENTEROLOGY | Facility: CLINIC | Age: 83
End: 2018-03-16

## 2018-03-16 ENCOUNTER — APPOINTMENT (OUTPATIENT)
Dept: LAB | Facility: HOSPITAL | Age: 83
End: 2018-03-16

## 2018-03-16 ENCOUNTER — EPISODE CHANGES (OUTPATIENT)
Dept: CASE MANAGEMENT | Facility: OTHER | Age: 83
End: 2018-03-16

## 2018-03-16 ENCOUNTER — OFFICE VISIT (OUTPATIENT)
Dept: INTERNAL MEDICINE | Facility: CLINIC | Age: 83
End: 2018-03-16

## 2018-03-16 VITALS
DIASTOLIC BLOOD PRESSURE: 60 MMHG | WEIGHT: 170.4 LBS | BODY MASS INDEX: 25.82 KG/M2 | SYSTOLIC BLOOD PRESSURE: 140 MMHG | HEIGHT: 68 IN

## 2018-03-16 VITALS
DIASTOLIC BLOOD PRESSURE: 78 MMHG | HEART RATE: 86 BPM | HEIGHT: 68 IN | BODY MASS INDEX: 25.79 KG/M2 | SYSTOLIC BLOOD PRESSURE: 146 MMHG | WEIGHT: 170.2 LBS

## 2018-03-16 DIAGNOSIS — I10 ESSENTIAL HYPERTENSION: ICD-10-CM

## 2018-03-16 DIAGNOSIS — J43.9 PULMONARY EMPHYSEMA, UNSPECIFIED EMPHYSEMA TYPE (HCC): ICD-10-CM

## 2018-03-16 DIAGNOSIS — R19.5 HEME POSITIVE STOOL: ICD-10-CM

## 2018-03-16 DIAGNOSIS — B96.81 HELICOBACTER PYLORI GASTRITIS: Primary | ICD-10-CM

## 2018-03-16 DIAGNOSIS — D50.9 IRON DEFICIENCY ANEMIA, UNSPECIFIED IRON DEFICIENCY ANEMIA TYPE: ICD-10-CM

## 2018-03-16 DIAGNOSIS — K21.00 GASTROESOPHAGEAL REFLUX DISEASE WITH ESOPHAGITIS: ICD-10-CM

## 2018-03-16 DIAGNOSIS — D50.0 IRON DEFICIENCY ANEMIA DUE TO CHRONIC BLOOD LOSS: ICD-10-CM

## 2018-03-16 DIAGNOSIS — K29.70 HELICOBACTER PYLORI GASTRITIS: Primary | ICD-10-CM

## 2018-03-16 DIAGNOSIS — E03.9 ACQUIRED HYPOTHYROIDISM: ICD-10-CM

## 2018-03-16 DIAGNOSIS — I50.22 CHRONIC SYSTOLIC HEART FAILURE (HCC): Primary | ICD-10-CM

## 2018-03-16 LAB
ALBUMIN SERPL-MCNC: 3.6 G/DL (ref 3.4–4.8)
ALBUMIN/GLOB SERPL: 1.2 G/DL (ref 1.1–1.8)
ALP SERPL-CCNC: 92 U/L (ref 38–126)
ALT SERPL W P-5'-P-CCNC: 27 U/L (ref 9–52)
ANION GAP SERPL CALCULATED.3IONS-SCNC: 14 MMOL/L (ref 5–15)
AST SERPL-CCNC: 24 U/L (ref 14–36)
BILIRUB SERPL-MCNC: 0.3 MG/DL (ref 0.2–1.3)
BUN BLD-MCNC: 17 MG/DL (ref 7–21)
BUN/CREAT SERPL: 20.7 (ref 7–25)
CALCIUM SPEC-SCNC: 8.7 MG/DL (ref 8.4–10.2)
CHLORIDE SERPL-SCNC: 98 MMOL/L (ref 95–110)
CO2 SERPL-SCNC: 28 MMOL/L (ref 22–31)
CREAT BLD-MCNC: 0.82 MG/DL (ref 0.5–1)
DEPRECATED RDW RBC AUTO: 63.6 FL (ref 36.4–46.3)
ERYTHROCYTE [DISTWIDTH] IN BLOOD BY AUTOMATED COUNT: 17.6 % (ref 11.5–14.5)
GFR SERPL CREATININE-BSD FRML MDRD: 66 ML/MIN/1.73 (ref 39–90)
GLOBULIN UR ELPH-MCNC: 3 GM/DL (ref 2.3–3.5)
GLUCOSE BLD-MCNC: 93 MG/DL (ref 60–100)
HCT VFR BLD AUTO: 31.5 % (ref 35–45)
HGB BLD-MCNC: 10.4 G/DL (ref 12–15.5)
IRON 24H UR-MRATE: 57 MCG/DL (ref 37–170)
IRON SATN MFR SERPL: 24 % (ref 15–50)
MCH RBC QN AUTO: 32.6 PG (ref 26.5–34)
MCHC RBC AUTO-ENTMCNC: 33 G/DL (ref 31.4–36)
MCV RBC AUTO: 98.7 FL (ref 80–98)
PLATELET # BLD AUTO: 346 10*3/MM3 (ref 150–450)
PMV BLD AUTO: 10.7 FL (ref 8–12)
POTASSIUM BLD-SCNC: 3.7 MMOL/L (ref 3.5–5.1)
PROT SERPL-MCNC: 6.6 G/DL (ref 6.3–8.6)
RBC # BLD AUTO: 3.19 10*6/MM3 (ref 3.77–5.16)
SODIUM BLD-SCNC: 140 MMOL/L (ref 137–145)
T4 FREE SERPL-MCNC: 1.46 NG/DL (ref 0.78–2.19)
TIBC SERPL-MCNC: 236 MCG/DL (ref 265–497)
TSH SERPL DL<=0.05 MIU/L-ACNC: 3.11 MIU/ML (ref 0.46–4.68)
WBC NRBC COR # BLD: 8.19 10*3/MM3 (ref 3.2–9.8)

## 2018-03-16 PROCEDURE — 36415 COLL VENOUS BLD VENIPUNCTURE: CPT | Performed by: INTERNAL MEDICINE

## 2018-03-16 PROCEDURE — 84439 ASSAY OF FREE THYROXINE: CPT | Performed by: INTERNAL MEDICINE

## 2018-03-16 PROCEDURE — 84443 ASSAY THYROID STIM HORMONE: CPT | Performed by: INTERNAL MEDICINE

## 2018-03-16 PROCEDURE — 83540 ASSAY OF IRON: CPT | Performed by: INTERNAL MEDICINE

## 2018-03-16 PROCEDURE — 85027 COMPLETE CBC AUTOMATED: CPT | Performed by: INTERNAL MEDICINE

## 2018-03-16 PROCEDURE — 83550 IRON BINDING TEST: CPT | Performed by: INTERNAL MEDICINE

## 2018-03-16 PROCEDURE — 99214 OFFICE O/P EST MOD 30 MIN: CPT | Performed by: INTERNAL MEDICINE

## 2018-03-16 PROCEDURE — 80053 COMPREHEN METABOLIC PANEL: CPT | Performed by: INTERNAL MEDICINE

## 2018-03-16 PROCEDURE — 99213 OFFICE O/P EST LOW 20 MIN: CPT | Performed by: NURSE PRACTITIONER

## 2018-03-16 RX ORDER — FUROSEMIDE 40 MG/1
40 TABLET ORAL DAILY
COMMUNITY
End: 2018-03-16 | Stop reason: SDUPTHER

## 2018-03-16 RX ORDER — ATORVASTATIN CALCIUM 20 MG/1
20 TABLET, FILM COATED ORAL DAILY
Qty: 1 TABLET | Refills: 1
Start: 2018-03-16 | End: 2018-03-30 | Stop reason: SDUPTHER

## 2018-03-16 RX ORDER — FUROSEMIDE 40 MG/1
40 TABLET ORAL DAILY
Qty: 90 TABLET | Refills: 1 | Status: SHIPPED | OUTPATIENT
Start: 2018-03-16 | End: 2018-03-30 | Stop reason: SDUPTHER

## 2018-03-16 NOTE — PROGRESS NOTES
Subjective   Val Arteaga is a 85 y.o. female       Patient presents for recheck visit after being discharged home from Nursing Home.    Patient sustained right hip fracture and underwent  bipolar endoprosthesis of the right hip 1/23/2018. Post op course was complicated by CHF and COPD exacerbation. She was then in Acute Rehab and eventually required Nursing Home placement for additional rehabilitation.  Readmitted back to [facility] with presyncope, Hgb 6.8 and heme positive stools.She received 2 units of PRBCs and Hgb remained stable. Coumadin was placed on hold.  EGD was performed and showed no evidence of bleeding, but evidence of severe esophagitis and gastritis,  Biopsy was positive for which patient completed triple therapy.    Patient is doing better at present.  Breathing is stable. Denies cough or purulent sputum.  Also denies chest pain or chest pressure. Lasix is working well for swelling in her legs.    Also denies any GI complaints.No abdominal pain, nausea or vomiting. Bowels moving without blood.    Congestive Heart Failure   Presents for follow-up visit. Associated symptoms include shortness of breath. Pertinent negatives include no abdominal pain, chest pain, chest pressure, fatigue, orthopnea, palpitations, paroxysmal nocturnal dyspnea or unexpected weight change. The symptoms have been stable. Compliance with total regimen is %. Compliance with diet is %. Compliance with exercise is 51-75%. Compliance with medications is %.   Hypertension   This is a chronic problem. The current episode started more than 1 year ago. The problem has been waxing and waning since onset. Associated symptoms include shortness of breath. Pertinent negatives include no chest pain or palpitations. There are no associated agents to hypertension. Risk factors for coronary artery disease include sedentary lifestyle. Past treatments include beta blockers and diuretics. Hypertensive end-organ  damage includes kidney disease and heart failure. Current antihypertension treatment includes beta blockers and diuretics.   COPD   This is a chronic problem. The problem occurs daily. The problem has been waxing and waning. Pertinent negatives include no abdominal pain, anorexia, chest pain, congestion, coughing, fatigue, rash or weakness.   Hypothyroidism   This is a chronic problem. Pertinent negatives include no abdominal pain, anorexia, chest pain, congestion, coughing, fatigue, rash or weakness.         Past Medical History:   Diagnosis Date   • Acquired hypothyroidism    • Allergic rhinitis    • CHF (congestive heart failure)    • COPD (chronic obstructive pulmonary disease)    • Corneal epithelial dystrophy    • Coronary arteriosclerosis    • Depression    • Generalized atherosclerosis    • GERD (gastroesophageal reflux disease)    • Hemorrhoids    • History of bone density study 08/03/2012    Lumbar spine Osteopenia. Femoral Osteopenia   • History of mammogram 08/20/2004    Birads Category 2 Benign   • History of Papanicolaou smear of cervix 08/12/2004    NEGATIVE   • Hypercholesterolemia    • Hyperlipidemia    • Hypertension    • Myocardial infarction    • Nonexudative age-related macular degeneration    • Osteoarthritis of multiple joints    • Pseudophakia    • Punctate keratitis     - history Thygeson's keratopathy      • Tobacco dependence syndrome      Past Surgical History:   Procedure Laterality Date   • APPENDECTOMY     • BREAST SURGERY  03/19/1954    Excision, breast lesion (1)  Excision of fibroma. 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 patency in the circumflex artery site of previous angioplasty. Kathe. patency in RCA.Nonobstructive 20-30% stenosis in the LAD and diagonal branch. Preserved LV systolic function with Ef of 55%   • CERVICAL SPINE SURGERY  09/16/1974    Excision of cervical disc, C5-6, C6-7, anterior cervical fusion,  C5-6 with iliac bone graft.cervical spondylosis,C5-6, C6-7   • COLONOSCOPY  01/01/2013    patient declined    • CORONARY ANGIOPLASTY  07/21/1993    Angioplasty, coronary (2)    RCA    • DILATATION AND CURETTAGE      3   • ENDOSCOPY N/A 2/14/2018    Procedure: ESOPHAGOGASTRODUODENOSCOPY;  Surgeon: Alex Avitia MD;  Location: [facility] ENDOSCOPY;  Service:    • ENDOSCOPY AND COLONOSCOPY  10/01/1998    Hemorhoids Rectal Outlet Bleeding   • ESOPHAGOSCOPY / EGD  06/28/2004    Nonerosive gastritis of the body of the stomach. A biopsy was obtained & placed in h. Pylori test agar. Multiple biopsies were obtained from the body of the stomach   • HIP BIPOLAR REPLACEMENT Right 1/23/2018    Procedure: HIP BIPOLAR ANTERIOR APPROACH - right;  Surgeon: Kelvin Mcdowell MD;  Location: [facility] OR;  Service:    • INJECTION OF MEDICATION  11/23/2015    Depo Medrol (Methylprednisone) (5)    • INJECTION OF MEDICATION  02/04/2016    Kenalog (3)     • JOINT REPLACEMENT Right 01/24/2018    hip   • PACEMAKER REPLACEMENT  2006       Social History   Substance Use Topics   • Smoking status: Former Smoker     Packs/day: 0.50     Years: 50.00     Types: Cigarettes     Start date: 1950     Quit date: 1/12/2017   • Smokeless tobacco: Never Used   • Alcohol use No     Family History   Problem Relation Age of Onset   • Coronary artery disease Other      Allergies   Allergen Reactions   • Ace Inhibitors    • Lisinopril Cough     Current Outpatient Prescriptions on File Prior to Visit   Medication Sig Dispense Refill   • acetaminophen (TYLENOL) 325 MG tablet Take 2 tablets by mouth Every 4 (Four) Hours As Needed for Mild Pain . 1 tablet 1   • aspirin 325 MG EC tablet Take 1 tablet by mouth Daily.     • budesonide-formoterol (SYMBICORT) 160-4.5 MCG/ACT inhaler Inhale 2 puffs 2 (Two) Times a Day. 1 inhaler 11   • carvedilol (COREG) 6.25 MG tablet Take 6.25 mg by mouth 2 (Two) Times a Day With Meals.     • estradiol (ESTRACE) 0.1 MG/GM vaginal  cream One applicator q hs 45 g 1   • famotidine (PEPCID) 40 MG tablet Take 1 tablet by mouth Daily. 1 tablet 1   • ferrous sulfate 324 (65 Fe) MG tablet delayed-release EC tablet Take 1 tablet by mouth Daily With Breakfast. 1 tablet 1   • fluorometholone (FLAREX) 0.1 % ophthalmic suspension Administer 1 drop to both eyes 4 (Four) Times a Day. (Patient taking differently: Administer 1 drop to both eyes 4 (Four) Times a Day As Needed.) 5 mL 3   • ipratropium-albuterol (DUO-NEB) 0.5-2.5 mg/mL nebulizer USE 1 BY NEBULIZER 4 (FOUR) TIMES A DAY AS NEEDED FOR WHEEZING. 360 mL 1   • levothyroxine (SYNTHROID, LEVOTHROID) 50 MCG tablet TAKE 1 TABLET BY MOUTH EVERY DAY 90 tablet 1   • montelukast (SINGULAIR) 10 MG tablet Take 10 mg by mouth Every Night.     • nitroglycerin (NITRO-DUR) 0.2 MG/HR patch Place 1 patch on the skin Daily. 30 patch 0   • polyethylene glycol (MIRALAX) packet Take 17 g by mouth Daily. 100 packet 1   • ranolazine (RANEXA) 1000 MG 12 hr tablet Take 1 tablet by mouth Every 12 (Twelve) Hours. 60 tablet 0   • sertraline (ZOLOFT) 50 MG tablet TAKE 1 TABLET BY MOUTH EVERY DAY (Patient taking differently: TAKE 1 TABLET BY MOUTH EVERY NIGHT) 90 tablet 1     No current facility-administered medications on file prior to visit.      Review of Systems   Constitutional: Negative for fatigue and unexpected weight change.   HENT: Negative for congestion.    Eyes: Negative for photophobia and visual disturbance.   Respiratory: Positive for shortness of breath. Negative for cough and chest tightness.    Cardiovascular: Negative for chest pain, palpitations and leg swelling.   Gastrointestinal: Negative for abdominal pain, anorexia, constipation and diarrhea.   Skin: Negative for color change and rash.   Neurological: Negative for dizziness, syncope, weakness and light-headedness.   Psychiatric/Behavioral: Negative for sleep disturbance. The patient is not nervous/anxious.        Objective   Physical Exam    Constitutional: She appears well-developed and well-nourished.   Eyes: Conjunctivae are normal. Pupils are equal, round, and reactive to light.   Neck: Neck supple. No JVD present. No thyromegaly present.   Cardiovascular: Normal rate and regular rhythm.    Pulmonary/Chest: Effort normal.   Diminished breath sounds   Abdominal: Soft. Bowel sounds are normal. She exhibits no mass.   Musculoskeletal: She exhibits no tenderness or deformity.   Neurological: She is alert. She has normal reflexes.   Skin: Skin is dry. No rash noted.   Nursing note and vitals reviewed.      Lab Requisition on 02/22/2018   Component Date Value Ref Range Status   • Occult Blood, Fecal by Immunoassay 02/22/2018 Positive* Negative Final   Lab Requisition on 02/16/2018   Component Date Value Ref Range Status   • Glucose 02/16/2018 100  60 - 100 mg/dL Final   • BUN 02/16/2018 20  7 - 21 mg/dL Final   • Creatinine 02/16/2018 0.84  0.50 - 1.00 mg/dL Final   • Sodium 02/16/2018 132* 137 - 145 mmol/L Final   • Potassium 02/16/2018 4.2  3.5 - 5.1 mmol/L Final   • Chloride 02/16/2018 96  95 - 110 mmol/L Final   • CO2 02/16/2018 25.0  22.0 - 31.0 mmol/L Final   • Calcium 02/16/2018 8.6  8.4 - 10.2 mg/dL Final   • Total Protein 02/16/2018 5.9* 6.3 - 8.6 g/dL Final   • Albumin 02/16/2018 3.00* 3.40 - 4.80 g/dL Final   • ALT (SGPT) 02/16/2018 30  9 - 52 U/L Final   • AST (SGOT) 02/16/2018 34  14 - 36 U/L Final   • Alkaline Phosphatase 02/16/2018 92  38 - 126 U/L Final   • Total Bilirubin 02/16/2018 0.3  0.2 - 1.3 mg/dL Final   • eGFR Non  Amer 02/16/2018 64  39 - 90 mL/min/1.73 Final   • Globulin 02/16/2018 2.9  2.3 - 3.5 gm/dL Final   • A/G Ratio 02/16/2018 1.0* 1.1 - 1.8 g/dL Final   • BUN/Creatinine Ratio 02/16/2018 23.8  7.0 - 25.0 Final   • Anion Gap 02/16/2018 11.0  5.0 - 15.0 mmol/L Final   • WBC 02/16/2018 10.09* 3.20 - 9.80 10*3/mm3 Final   • RBC 02/16/2018 2.58* 3.77 - 5.16 10*6/mm3 Final   • Hemoglobin 02/16/2018 8.0* 12.0 - 15.5 g/dL  Final   • Hematocrit 02/16/2018 23.9* 35.0 - 45.0 % Final   • MCV 02/16/2018 92.6  80.0 - 98.0 fL Final   • MCH 02/16/2018 31.0  26.5 - 34.0 pg Final   • MCHC 02/16/2018 33.5  31.4 - 36.0 g/dL Final   • RDW 02/16/2018 17.2* 11.5 - 14.5 % Final   • RDW-SD 02/16/2018 56.4* 36.4 - 46.3 fl Final   • MPV 02/16/2018 9.3  8.0 - 12.0 fL Final   • Platelets 02/16/2018 345  150 - 450 10*3/mm3 Final   • Neutrophil % 02/16/2018 69.1  37.0 - 80.0 % Final   • Lymphocyte % 02/16/2018 18.1  10.0 - 50.0 % Final   • Monocyte % 02/16/2018 8.0  0.0 - 12.0 % Final   • Eosinophil % 02/16/2018 3.8  0.0 - 7.0 % Final   • Basophil % 02/16/2018 0.3  0.0 - 2.0 % Final   • Immature Grans % 02/16/2018 0.7* 0.0 - 0.5 % Final   • Neutrophils, Absolute 02/16/2018 6.97  2.00 - 8.60 10*3/mm3 Final   • Lymphocytes, Absolute 02/16/2018 1.83  0.60 - 4.20 10*3/mm3 Final   • Monocytes, Absolute 02/16/2018 0.81  0.00 - 0.90 10*3/mm3 Final   • Eosinophils, Absolute 02/16/2018 0.38  0.00 - 0.70 10*3/mm3 Final   • Basophils, Absolute 02/16/2018 0.03  0.00 - 0.20 10*3/mm3 Final   • Immature Grans, Absolute 02/16/2018 0.07* 0.00 - 0.02 10*3/mm3 Final   Admission on 02/12/2018, Discharged on 02/14/2018   Component Date Value Ref Range Status   • Protime 02/12/2018 27.5* 11.1 - 15.3 Seconds Final   • INR 02/12/2018 2.53* 0.80 - 1.20 Final   • Troponin I 02/12/2018 <0.012  <=0.034 ng/mL Final   • Troponin I 02/12/2018 <0.012  <=0.034 ng/mL Final   • Glucose 02/12/2018 99  60 - 100 mg/dL Final   • BUN 02/12/2018 47* 7 - 21 mg/dL Final   • Creatinine 02/12/2018 0.80  0.50 - 1.00 mg/dL Final   • Sodium 02/12/2018 132* 137 - 145 mmol/L Final   • Potassium 02/12/2018 4.1  3.5 - 5.1 mmol/L Final   • Chloride 02/12/2018 97  95 - 110 mmol/L Final   • CO2 02/12/2018 24.0  22.0 - 31.0 mmol/L Final   • Calcium 02/12/2018 8.2* 8.4 - 10.2 mg/dL Final   • Total Protein 02/12/2018 5.8* 6.3 - 8.6 g/dL Final   • Albumin 02/12/2018 2.90* 3.40 - 4.80 g/dL Final   • ALT (SGPT)  02/12/2018 37  9 - 52 U/L Final   • AST (SGOT) 02/12/2018 24  14 - 36 U/L Final   • Alkaline Phosphatase 02/12/2018 65  38 - 126 U/L Final   • Total Bilirubin 02/12/2018 0.3  0.2 - 1.3 mg/dL Final   • eGFR Non  Amer 02/12/2018 68  39 - 90 mL/min/1.73 Final   • Globulin 02/12/2018 2.9  2.3 - 3.5 gm/dL Final   • A/G Ratio 02/12/2018 1.0* 1.1 - 1.8 g/dL Final   • BUN/Creatinine Ratio 02/12/2018 58.8* 7.0 - 25.0 Final   • Anion Gap 02/12/2018 11.0  5.0 - 15.0 mmol/L Final   • proBNP 02/12/2018 569.0  0.0 - 1,800.0 pg/mL Final   • Extra Tube 02/12/2018 hold for add-on   Final   • Extra Tube 02/12/2018 Hold for add-ons.   Final   • Extra Tube 02/12/2018 hold for add-on   Final   • Extra Tube 02/12/2018 Hold for add-ons.   Final   • WBC 02/12/2018 13.39* 3.20 - 9.80 10*3/mm3 Final   • RBC 02/12/2018 2.12* 3.77 - 5.16 10*6/mm3 Final   • Hemoglobin 02/12/2018 6.8* 12.0 - 15.5 g/dL Final   • Hematocrit 02/12/2018 19.7* 35.0 - 45.0 % Final   • MCV 02/12/2018 92.9  80.0 - 98.0 fL Final   • MCH 02/12/2018 32.1  26.5 - 34.0 pg Final   • MCHC 02/12/2018 34.5  31.4 - 36.0 g/dL Final   • RDW 02/12/2018 14.6* 11.5 - 14.5 % Final   • RDW-SD 02/12/2018 49.0* 36.4 - 46.3 fl Final   • MPV 02/12/2018 8.8  8.0 - 12.0 fL Final   • Platelets 02/12/2018 424  150 - 450 10*3/mm3 Final   • Neutrophil % 02/12/2018 64.0  37.0 - 80.0 % Final   • Lymphocyte % 02/12/2018 27.0  10.0 - 50.0 % Final   • Monocyte % 02/12/2018 5.0  0.0 - 12.0 % Final   • Basophil % 02/12/2018 2.0  0.0 - 2.0 % Final   • Metamyelocyte % 02/12/2018 1.0* 0.0 - 0.0 % Final   • Atypical Lymphocyte % 02/12/2018 1.0  0.0 - 5.0 % Final   • Neutrophils Absolute 02/12/2018 8.57  2.00 - 8.60 10*3/mm3 Final   • Lymphocytes Absolute 02/12/2018 3.62  0.60 - 4.20 10*3/mm3 Final   • Monocytes Absolute 02/12/2018 0.67  0.00 - 0.90 10*3/mm3 Final   • Basophils Absolute 02/12/2018 0.27* 0.00 - 0.20 10*3/mm3 Final   • Polychromasia 02/12/2018 Slight/1+  None Seen Final   • WBC  Morphology 02/12/2018 Normal  Normal Final   • Platelet Estimate 02/12/2018 Adequate  Normal Final   • ABO Type 02/12/2018 O   Final   • RH type 02/12/2018 Positive   Final   • Antibody Screen 02/12/2018 Negative   Final   • Product Code 02/14/2018 [id number]   Final   • Unit Number 02/14/2018 [id number]   Final   • UNIT  ABO 02/14/2018 O   Final   • UNIT  RH 02/14/2018 POS   Final   • Dispense Status 02/14/2018 PT   Final   • Blood Type 02/14/2018 OPOS   Final   • Blood Expiration Date 02/14/2018 201803082359   Final   • Blood Type Barcode 02/14/2018 5100   Final   • Product Code 02/14/2018 [id number]   Final   • Unit Number 02/14/2018 [id number]   Final   • UNIT  ABO 02/14/2018 O   Final   • UNIT  RH 02/14/2018 POS   Final   • Dispense Status 02/14/2018 PT   Final   • Blood Type 02/14/2018 OPOS   Final   • Blood Expiration Date 02/14/2018 201802202359   Final   • Blood Type Barcode 02/14/2018 5100   Final   • Previous History 02/12/2018 Previous Record on File   Final   • Glucose 02/13/2018 87  60 - 100 mg/dL Final   • BUN 02/13/2018 37* 7 - 21 mg/dL Final   • Creatinine 02/13/2018 0.83  0.50 - 1.00 mg/dL Final   • Sodium 02/13/2018 136* 137 - 145 mmol/L Final   • Potassium 02/13/2018 3.8  3.5 - 5.1 mmol/L Final   • Chloride 02/13/2018 102  95 - 110 mmol/L Final   • CO2 02/13/2018 26.0  22.0 - 31.0 mmol/L Final   • Calcium 02/13/2018 8.1* 8.4 - 10.2 mg/dL Final   • eGFR Non African Amer 02/13/2018 65  39 - 90 mL/min/1.73 Final   • BUN/Creatinine Ratio 02/13/2018 44.6* 7.0 - 25.0 Final   • Anion Gap 02/13/2018 8.0  5.0 - 15.0 mmol/L Final   • WBC 02/13/2018 10.99* 3.20 - 9.80 10*3/mm3 Final   • RBC 02/13/2018 2.63* 3.77 - 5.16 10*6/mm3 Final   • Hemoglobin 02/13/2018 8.1* 12.0 - 15.5 g/dL Final   • Hematocrit 02/13/2018 23.4* 35.0 - 45.0 % Final   • MCV 02/13/2018 89.0  80.0 - 98.0 fL Final   • MCH 02/13/2018 30.8  26.5 - 34.0 pg Final   • MCHC 02/13/2018 34.6  31.4 - 36.0 g/dL Final   • RDW 02/13/2018 17.5*  11.5 - 14.5 % Final   • RDW-SD 02/13/2018 56.1* 36.4 - 46.3 fl Final   • MPV 02/13/2018 9.0  8.0 - 12.0 fL Final   • Platelets 02/13/2018 367  150 - 450 10*3/mm3 Final   • Neutrophil % 02/13/2018 68.9  37.0 - 80.0 % Final   • Lymphocyte % 02/13/2018 18.8  10.0 - 50.0 % Final   • Monocyte % 02/13/2018 7.6  0.0 - 12.0 % Final   • Eosinophil % 02/13/2018 3.1  0.0 - 7.0 % Final   • Basophil % 02/13/2018 0.5  0.0 - 2.0 % Final   • Immature Grans % 02/13/2018 1.1* 0.0 - 0.5 % Final   • Neutrophils, Absolute 02/13/2018 7.57  2.00 - 8.60 10*3/mm3 Final   • Lymphocytes, Absolute 02/13/2018 2.07  0.60 - 4.20 10*3/mm3 Final   • Monocytes, Absolute 02/13/2018 0.83  0.00 - 0.90 10*3/mm3 Final   • Eosinophils, Absolute 02/13/2018 0.34  0.00 - 0.70 10*3/mm3 Final   • Basophils, Absolute 02/13/2018 0.06  0.00 - 0.20 10*3/mm3 Final   • Immature Grans, Absolute 02/13/2018 0.12* 0.00 - 0.02 10*3/mm3 Final   • Hemoglobin 02/13/2018 8.3* 12.0 - 15.5 g/dL Final   • Hematocrit 02/13/2018 25.1* 35.0 - 45.0 % Final   • Glucose 02/14/2018 91  60 - 100 mg/dL Final   • BUN 02/14/2018 30* 7 - 21 mg/dL Final   • Creatinine 02/14/2018 0.86  0.50 - 1.00 mg/dL Final   • Sodium 02/14/2018 141  137 - 145 mmol/L Final   • Potassium 02/14/2018 4.2  3.5 - 5.1 mmol/L Final   • Chloride 02/14/2018 103  95 - 110 mmol/L Final   • CO2 02/14/2018 28.0  22.0 - 31.0 mmol/L Final   • Calcium 02/14/2018 8.4  8.4 - 10.2 mg/dL Final   • eGFR Non  Amer 02/14/2018 63  39 - 90 mL/min/1.73 Final   • BUN/Creatinine Ratio 02/14/2018 34.9* 7.0 - 25.0 Final   • Anion Gap 02/14/2018 10.0  5.0 - 15.0 mmol/L Final   • WBC 02/14/2018 10.94* 3.20 - 9.80 10*3/mm3 Final   • RBC 02/14/2018 2.70* 3.77 - 5.16 10*6/mm3 Final   • Hemoglobin 02/14/2018 8.4* 12.0 - 15.5 g/dL Final   • Hematocrit 02/14/2018 24.7* 35.0 - 45.0 % Final   • MCV 02/14/2018 91.5  80.0 - 98.0 fL Final   • MCH 02/14/2018 31.1  26.5 - 34.0 pg Final   • MCHC 02/14/2018 34.0  31.4 - 36.0 g/dL Final   • RDW  02/14/2018 17.8* 11.5 - 14.5 % Final   • RDW-SD 02/14/2018 59.3* 36.4 - 46.3 fl Final   • MPV 02/14/2018 8.9  8.0 - 12.0 fL Final   • Platelets 02/14/2018 338  150 - 450 10*3/mm3 Final   • Neutrophil % 02/14/2018 68.4  37.0 - 80.0 % Final   • Lymphocyte % 02/14/2018 18.6  10.0 - 50.0 % Final   • Monocyte % 02/14/2018 8.3  0.0 - 12.0 % Final   • Eosinophil % 02/14/2018 3.7  0.0 - 7.0 % Final   • Basophil % 02/14/2018 0.2  0.0 - 2.0 % Final   • Immature Grans % 02/14/2018 0.8* 0.0 - 0.5 % Final   • Neutrophils, Absolute 02/14/2018 7.47  2.00 - 8.60 10*3/mm3 Final   • Lymphocytes, Absolute 02/14/2018 2.04  0.60 - 4.20 10*3/mm3 Final   • Monocytes, Absolute 02/14/2018 0.91* 0.00 - 0.90 10*3/mm3 Final   • Eosinophils, Absolute 02/14/2018 0.41  0.00 - 0.70 10*3/mm3 Final   • Basophils, Absolute 02/14/2018 0.02  0.00 - 0.20 10*3/mm3 Final   • Immature Grans, Absolute 02/14/2018 0.09* 0.00 - 0.02 10*3/mm3 Final   • Case Report 02/20/2018    Final                    Value:Surgical Pathology Report                         Case: [id number]                                  Authorizing Provider:  Alex Avitia MD         Collected:           02/14/2018 12:57 PM          Ordering Location:     [facility]             Received:            02/14/2018 03:15 PM                                 Center ENDO [facility]                                                     Pathologist:           Sandoval Ayala MD                                                            Specimen:    Gastric, Antrum                                                                           • Final Diagnosis 02/20/2018    Final                    Value:This result contains rich text formatting which cannot be displayed here.   • Comment 02/20/2018    Final                    Value:This result contains rich text formatting which cannot be displayed here.   • Gross Description 02/20/2018    Final                    Value:This result contains rich text  formatting which cannot be displayed here.   Admission on 01/26/2018, Discharged on 02/05/2018   Component Date Value Ref Range Status   • WBC 01/27/2018 7.97  3.20 - 9.80 10*3/mm3 Final   • RBC 01/27/2018 2.73* 3.77 - 5.16 10*6/mm3 Final   • Hemoglobin 01/27/2018 8.7* 12.0 - 15.5 g/dL Final   • Hematocrit 01/27/2018 25.3* 35.0 - 45.0 % Final   • MCV 01/27/2018 92.7  80.0 - 98.0 fL Final   • MCH 01/27/2018 31.9  26.5 - 34.0 pg Final   • MCHC 01/27/2018 34.4  31.4 - 36.0 g/dL Final   • RDW 01/27/2018 15.5* 11.5 - 14.5 % Final   • RDW-SD 01/27/2018 52.2* 36.4 - 46.3 fl Final   • MPV 01/27/2018 9.5  8.0 - 12.0 fL Final   • Platelets 01/27/2018 214  150 - 450 10*3/mm3 Final   • Neutrophil % 01/27/2018 73.2  37.0 - 80.0 % Final   • Lymphocyte % 01/27/2018 14.2  10.0 - 50.0 % Final   • Monocyte % 01/27/2018 7.9  0.0 - 12.0 % Final   • Eosinophil % 01/27/2018 4.0  0.0 - 7.0 % Final   • Basophil % 01/27/2018 0.3  0.0 - 2.0 % Final   • Immature Grans % 01/27/2018 0.4  0.0 - 0.5 % Final   • Neutrophils, Absolute 01/27/2018 5.84  2.00 - 8.60 10*3/mm3 Final   • Lymphocytes, Absolute 01/27/2018 1.13  0.60 - 4.20 10*3/mm3 Final   • Monocytes, Absolute 01/27/2018 0.63  0.00 - 0.90 10*3/mm3 Final   • Eosinophils, Absolute 01/27/2018 0.32  0.00 - 0.70 10*3/mm3 Final   • Basophils, Absolute 01/27/2018 0.02  0.00 - 0.20 10*3/mm3 Final   • Immature Grans, Absolute 01/27/2018 0.03* 0.00 - 0.02 10*3/mm3 Final   • Glucose 01/27/2018 88  60 - 100 mg/dL Final   • BUN 01/27/2018 25* 7 - 21 mg/dL Final   • Creatinine 01/27/2018 0.94  0.50 - 1.00 mg/dL Final   • Sodium 01/27/2018 129* 137 - 145 mmol/L Final   • Potassium 01/27/2018 4.1  3.5 - 5.1 mmol/L Final   • Chloride 01/27/2018 91* 95 - 110 mmol/L Final   • CO2 01/27/2018 32.0* 22.0 - 31.0 mmol/L Final   • Calcium 01/27/2018 8.2* 8.4 - 10.2 mg/dL Final   • Total Protein 01/27/2018 5.7* 6.3 - 8.6 g/dL Final   • Albumin 01/27/2018 2.90* 3.40 - 4.80 g/dL Final   • ALT (SGPT) 01/27/2018 31  9  - 52 U/L Final   • AST (SGOT) 01/27/2018 40* 14 - 36 U/L Final   • Alkaline Phosphatase 01/27/2018 60  38 - 126 U/L Final   • Total Bilirubin 01/27/2018 0.4  0.2 - 1.3 mg/dL Final   • eGFR Non African Amer 01/27/2018 57  39 - 90 mL/min/1.73 Final   • Globulin 01/27/2018 2.8  2.3 - 3.5 gm/dL Final   • A/G Ratio 01/27/2018 1.0* 1.1 - 1.8 g/dL Final   • BUN/Creatinine Ratio 01/27/2018 26.6* 7.0 - 25.0 Final   • Anion Gap 01/27/2018 6.0  5.0 - 15.0 mmol/L Final   • Hemoglobin A1C 01/28/2018 5.8* 4 - 5.6 % Final   • Iron 01/27/2018 <10* 37 - 170 mcg/dL Final   • TIBC 01/27/2018 187* 265 - 497 mcg/dL Final   • Iron Saturation 01/27/2018   15 - 50 % Final   • Magnesium 01/27/2018 2.2  1.6 - 2.3 mg/dL Final   • TSH 01/27/2018 0.780  0.460 - 4.680 mIU/mL Final   • Protime 01/27/2018 16.9* 11.1 - 15.3 Seconds Final   • INR 01/27/2018 1.37* 0.80 - 1.20 Final   • Protime 01/28/2018 18.0* 11.1 - 15.3 Seconds Final   • INR 01/28/2018 1.49* 0.80 - 1.20 Final   • Protime 01/29/2018 19.6* 11.1 - 15.3 Seconds Final   • INR 01/29/2018 1.65* 0.80 - 1.20 Final   • Glucose 01/29/2018 83  60 - 100 mg/dL Final   • BUN 01/29/2018 21  7 - 21 mg/dL Final   • Creatinine 01/29/2018 0.83  0.50 - 1.00 mg/dL Final   • Sodium 01/29/2018 131* 137 - 145 mmol/L Final   • Potassium 01/29/2018 4.6  3.5 - 5.1 mmol/L Final   • Chloride 01/29/2018 89* 95 - 110 mmol/L Final   • CO2 01/29/2018 32.0* 22.0 - 31.0 mmol/L Final   • Calcium 01/29/2018 8.0* 8.4 - 10.2 mg/dL Final   • eGFR Non African Amer 01/29/2018 65  >60 mL/min/1.73 Final   • BUN/Creatinine Ratio 01/29/2018 25.3* 7.0 - 25.0 Final   • Anion Gap 01/29/2018 10.0  5.0 - 15.0 mmol/L Final   • WBC 01/29/2018 7.17  3.20 - 9.80 10*3/mm3 Final   • RBC 01/29/2018 2.81* 3.77 - 5.16 10*6/mm3 Final   • Hemoglobin 01/29/2018 8.9* 12.0 - 15.5 g/dL Final   • Hematocrit 01/29/2018 26.7* 35.0 - 45.0 % Final   • MCV 01/29/2018 95.0  80.0 - 98.0 fL Final   • MCH 01/29/2018 31.7  26.5 - 34.0 pg Final   • MCHC  01/29/2018 33.3  31.4 - 36.0 g/dL Final   • RDW 01/29/2018 14.6* 11.5 - 14.5 % Final   • RDW-SD 01/29/2018 50.1* 36.4 - 46.3 fl Final   • MPV 01/29/2018 9.4  8.0 - 12.0 fL Final   • Platelets 01/29/2018 256  150 - 450 10*3/mm3 Final   • Neutrophil % 01/29/2018 62.9  37.0 - 80.0 % Final   • Lymphocyte % 01/29/2018 19.5  10.0 - 50.0 % Final   • Monocyte % 01/29/2018 10.2  0.0 - 12.0 % Final   • Eosinophil % 01/29/2018 6.3  0.0 - 7.0 % Final   • Basophil % 01/29/2018 0.3  0.0 - 2.0 % Final   • Immature Grans % 01/29/2018 0.8* 0.0 - 0.5 % Final   • Neutrophils, Absolute 01/29/2018 4.51  2.00 - 8.60 10*3/mm3 Final   • Lymphocytes, Absolute 01/29/2018 1.40  0.60 - 4.20 10*3/mm3 Final   • Monocytes, Absolute 01/29/2018 0.73  0.00 - 0.90 10*3/mm3 Final   • Eosinophils, Absolute 01/29/2018 0.45  0.00 - 0.70 10*3/mm3 Final   • Basophils, Absolute 01/29/2018 0.02  0.00 - 0.20 10*3/mm3 Final   • Immature Grans, Absolute 01/29/2018 0.06* 0.00 - 0.02 10*3/mm3 Final   • Protime 01/30/2018 20.1* 11.1 - 15.3 Seconds Final   • INR 01/30/2018 1.71* 0.80 - 1.20 Final   • Protime 01/31/2018 21.1* 11.1 - 15.3 Seconds Final   • INR 01/31/2018 1.81* 0.80 - 1.20 Final   • Glucose 01/31/2018 85  60 - 100 mg/dL Final   • BUN 01/31/2018 26* 7 - 21 mg/dL Final   • Creatinine 01/31/2018 0.91  0.50 - 1.00 mg/dL Final   • Sodium 01/31/2018 132* 137 - 145 mmol/L Final   • Potassium 01/31/2018 4.3  3.5 - 5.1 mmol/L Final   • Chloride 01/31/2018 92* 95 - 110 mmol/L Final   • CO2 01/31/2018 32.0* 22.0 - 31.0 mmol/L Final   • Calcium 01/31/2018 8.5  8.4 - 10.2 mg/dL Final   • Albumin 01/31/2018 2.90* 3.40 - 4.80 g/dL Final   • Phosphorus 01/31/2018 3.7  2.4 - 4.4 mg/dL Final   • Anion Gap 01/31/2018 8.0  5.0 - 15.0 mmol/L Final   • BUN/Creatinine Ratio 01/31/2018 28.6* 7.0 - 25.0 Final   • eGFR Non African Amer 01/31/2018 59* >60 mL/min/1.73 Final   • Magnesium 01/31/2018 2.1  1.6 - 2.3 mg/dL Final   • WBC 01/31/2018 7.61  3.20 - 9.80 10*3/mm3 Final    • RBC 01/31/2018 2.83* 3.77 - 5.16 10*6/mm3 Final   • Hemoglobin 01/31/2018 9.0* 12.0 - 15.5 g/dL Final   • Hematocrit 01/31/2018 26.9* 35.0 - 45.0 % Final   • MCV 01/31/2018 95.1  80.0 - 98.0 fL Final   • MCH 01/31/2018 31.8  26.5 - 34.0 pg Final   • MCHC 01/31/2018 33.5  31.4 - 36.0 g/dL Final   • RDW 01/31/2018 14.4  11.5 - 14.5 % Final   • RDW-SD 01/31/2018 49.4* 36.4 - 46.3 fl Final   • MPV 01/31/2018 9.1  8.0 - 12.0 fL Final   • Platelets 01/31/2018 307  150 - 450 10*3/mm3 Final   • Neutrophil % 01/31/2018 60.5  37.0 - 80.0 % Final   • Lymphocyte % 01/31/2018 19.1  10.0 - 50.0 % Final   • Monocyte % 01/31/2018 11.6  0.0 - 12.0 % Final   • Eosinophil % 01/31/2018 6.3  0.0 - 7.0 % Final   • Basophil % 01/31/2018 0.5  0.0 - 2.0 % Final   • Immature Grans % 01/31/2018 2.0* 0.0 - 0.5 % Final   • Neutrophils, Absolute 01/31/2018 4.61  2.00 - 8.60 10*3/mm3 Final   • Lymphocytes, Absolute 01/31/2018 1.45  0.60 - 4.20 10*3/mm3 Final   • Monocytes, Absolute 01/31/2018 0.88  0.00 - 0.90 10*3/mm3 Final   • Eosinophils, Absolute 01/31/2018 0.48  0.00 - 0.70 10*3/mm3 Final   • Basophils, Absolute 01/31/2018 0.04  0.00 - 0.20 10*3/mm3 Final   • Immature Grans, Absolute 01/31/2018 0.15* 0.00 - 0.02 10*3/mm3 Final   • Protime 02/01/2018 23.1* 11.1 - 15.3 Seconds Final   • INR 02/01/2018 2.03* 0.80 - 1.20 Final   • Protime 02/02/2018 23.1* 11.1 - 15.3 Seconds Final   • INR 02/02/2018 2.03* 0.80 - 1.20 Final   • Glucose 02/02/2018 92  60 - 100 mg/dL Final   • BUN 02/02/2018 32* 7 - 21 mg/dL Final   • Creatinine 02/02/2018 1.04* 0.50 - 1.00 mg/dL Final   • Sodium 02/02/2018 137  137 - 145 mmol/L Final   • Potassium 02/02/2018 5.2* 3.5 - 5.1 mmol/L Final   • Chloride 02/02/2018 97  95 - 110 mmol/L Final   • CO2 02/02/2018 35.0* 22.0 - 31.0 mmol/L Final   • Calcium 02/02/2018 8.9  8.4 - 10.2 mg/dL Final   • Albumin 02/02/2018 3.00* 3.40 - 4.80 g/dL Final   • Phosphorus 02/02/2018 4.3  2.4 - 4.4 mg/dL Final   • Anion Gap  02/02/2018 5.0  5.0 - 15.0 mmol/L Final   • BUN/Creatinine Ratio 02/02/2018 30.8* 7.0 - 25.0 Final   • eGFR Non African Amer 02/02/2018 50* >60 mL/min/1.73 Final   • WBC 02/02/2018 8.61  3.20 - 9.80 10*3/mm3 Final   • RBC 02/02/2018 2.76* 3.77 - 5.16 10*6/mm3 Final   • Hemoglobin 02/02/2018 8.9* 12.0 - 15.5 g/dL Final   • Hematocrit 02/02/2018 26.7* 35.0 - 45.0 % Final   • MCV 02/02/2018 96.7  80.0 - 98.0 fL Final   • MCH 02/02/2018 32.2  26.5 - 34.0 pg Final   • MCHC 02/02/2018 33.3  31.4 - 36.0 g/dL Final   • RDW 02/02/2018 14.8* 11.5 - 14.5 % Final   • RDW-SD 02/02/2018 52.2* 36.4 - 46.3 fl Final   • MPV 02/02/2018 9.2  8.0 - 12.0 fL Final   • Platelets 02/02/2018 390  150 - 450 10*3/mm3 Final   • Neutrophil % 02/02/2018 61.2  37.0 - 80.0 % Final   • Lymphocyte % 02/02/2018 22.4  10.0 - 50.0 % Final   • Monocyte % 02/02/2018 9.2  0.0 - 12.0 % Final   • Eosinophil % 02/02/2018 5.3  0.0 - 7.0 % Final   • Basophil % 02/02/2018 0.2  0.0 - 2.0 % Final   • Immature Grans % 02/02/2018 1.7* 0.0 - 0.5 % Final   • Neutrophils, Absolute 02/02/2018 5.26  2.00 - 8.60 10*3/mm3 Final   • Lymphocytes, Absolute 02/02/2018 1.93  0.60 - 4.20 10*3/mm3 Final   • Monocytes, Absolute 02/02/2018 0.79  0.00 - 0.90 10*3/mm3 Final   • Eosinophils, Absolute 02/02/2018 0.46  0.00 - 0.70 10*3/mm3 Final   • Basophils, Absolute 02/02/2018 0.02  0.00 - 0.20 10*3/mm3 Final   • Immature Grans, Absolute 02/02/2018 0.15* 0.00 - 0.02 10*3/mm3 Final   • Protime 02/03/2018 24.3* 11.1 - 15.3 Seconds Final   • INR 02/03/2018 2.16* 0.80 - 1.20 Final   • WBC 02/03/2018 10.79* 3.20 - 9.80 10*3/mm3 Final   • RBC 02/03/2018 2.97* 3.77 - 5.16 10*6/mm3 Final   • Hemoglobin 02/03/2018 9.5* 12.0 - 15.5 g/dL Final   • Hematocrit 02/03/2018 28.4* 35.0 - 45.0 % Final   • MCV 02/03/2018 95.6  80.0 - 98.0 fL Final   • MCH 02/03/2018 32.0  26.5 - 34.0 pg Final   • MCHC 02/03/2018 33.5  31.4 - 36.0 g/dL Final   • RDW 02/03/2018 14.6* 11.5 - 14.5 % Final   • RDW-SD  02/03/2018 50.6* 36.4 - 46.3 fl Final   • MPV 02/03/2018 9.0  8.0 - 12.0 fL Final   • Platelets 02/03/2018 416  150 - 450 10*3/mm3 Final   • Glucose 02/03/2018 88  60 - 100 mg/dL Final   • BUN 02/03/2018 26* 7 - 21 mg/dL Final   • Creatinine 02/03/2018 0.77  0.50 - 1.00 mg/dL Final   • Sodium 02/03/2018 135* 137 - 145 mmol/L Final   • Potassium 02/03/2018 4.5  3.5 - 5.1 mmol/L Final   • Chloride 02/03/2018 96  95 - 110 mmol/L Final   • CO2 02/03/2018 33.0* 22.0 - 31.0 mmol/L Final   • Calcium 02/03/2018 8.5  8.4 - 10.2 mg/dL Final   • Albumin 02/03/2018 2.70* 3.40 - 4.80 g/dL Final   • Phosphorus 02/03/2018 3.2  2.4 - 4.4 mg/dL Final   • Anion Gap 02/03/2018 6.0  5.0 - 15.0 mmol/L Final   • BUN/Creatinine Ratio 02/03/2018 33.8* 7.0 - 25.0 Final   • eGFR Non African Amer 02/03/2018 71  >60 mL/min/1.73 Final   • Protime 02/04/2018 22.9* 11.1 - 15.3 Seconds Final   • INR 02/04/2018 2.01* 0.80 - 1.20 Final   • Protime 02/05/2018 23.7* 11.1 - 15.3 Seconds Final   • INR 02/05/2018 2.10* 0.80 - 1.20 Final   Admission on 01/22/2018, Discharged on 01/26/2018   No results displayed because visit has over 200 results.            Patient Active Problem List   Diagnosis   • Essential hypertension   • Coronary artery disease involving native coronary artery of native heart without angina pectoris   • Chronic obstructive pulmonary disease   • Acquired hypothyroidism   • Depressive disorder   • Thygeson's superficial punctate keratitis   • Pseudophakia   • Precordial pain   • Displaced fracture of base of neck of right femur, initial encounter for closed fracture   • Hip fracture, right, closed, initial encounter   • Pulmonary emphysema   • Chronic systolic congestive heart failure   • Hyponatremia   • BENITO (acute kidney injury)   • Acute blood loss anemia   • Urinary retention   • Closed displaced basicervical fracture of right femur   • Urethral caruncle   • Symptomatic anemia   • Anemia                 Assessment/Plan   Val edwards  seen today for follow-up.    Diagnoses and all orders for this visit:    Chronic systolic heart failure  -     Comprehensive metabolic panel    Essential hypertension  -     Comprehensive metabolic panel    Pulmonary emphysema, unspecified emphysema type    Acquired hypothyroidism  -     TSH  -     T4, free    Iron deficiency anemia due to chronic blood loss  -     CBC (No Diff)  -     Iron and TIBC    Other orders  -     atorvastatin (LIPITOR) 20 MG tablet; Take 1 tablet by mouth Daily.  -     furosemide (LASIX) 40 MG tablet; Take 1 tablet by mouth Daily.             Plan      1. Patient will continue current therapy.      No evidence of fluid overload or decompensation.      Coreg 6.25 mg bid, Losartan 25 mg daily, Lasix 40 mg daily.      Low sodium diet.      Fluid restriction.  2. BP is controlled.      Coreg, Losartan.      Ranexa for CAD as per cardiology.  3. Continue Symbicort 2 puffs bid.      Advised to use Nebs on as needed basis only.  4. TSH and free T 4 will be rechecked.       Dose of Levoxyl will be adjusted if needed.  5.  Will recheck CBC.       Pepcid 40 mg daily for gastritis.       GI reevaluation.  6. Home Health Pt/OT.        Follow up in 3 months.          This document has been electronically signed by Roula Albert MD on March 25, 2018 9:59 PM

## 2018-03-16 NOTE — PROGRESS NOTES
Chief Complaint   Patient presents with   • h pylori       Subjective    Val Arteaga is a 85 y.o. female. she is here today for follow-up.    History of Present Illness  85-year-old female presents for hospital follow-up regarding Acute blood loss anemia.  Currently she denies any abdominal pain, nausea or vomiting.  States her appetite is returning back to normal.  She denies any dysphagia.  Reports her bowel movements are daily to every other day she does take MiraLAX as needed.  She denies any blood in the stool reports her stool is still very dark, is on iron daily.  Patient was hospitalized 2/12/18-2/14/18 found to have hemoglobin of 6.8 and she was Hemoccult positive with melanotic stools.  She was given 2 units of blood and her Coumadin was placed on hold.    EGD noted mildly severe esophagitis, gastritis and normal duodenum.  Biopsies were positive for H. pylori patient was treated with triple therapy.  She has been on Pepcid since finishing therapy 2 weeks ago.  On hospital discharge her hemoglobin was 8 with hematocrit 23.9.  Recheck today notes hemoglobin has improved to 10.4 with hematocrit of 31.5 area iron is 57 total iron-binding capacity 236, iron saturation 24.  Plan; have discussed colonoscopy however patient would prefer not to undergo this procedure she is not having any bright red blood in stool and hemoglobin has improved with blood transfusion iron therapy.  If hemoglobin continues to drop we'll plan colonoscopy and or capsule endoscopy at that time.  Follow-up in one month for recheck return to office sooner if needed.       The following portions of the patient's history were reviewed and updated as appropriate:   Past Medical History:   Diagnosis Date   • Acquired hypothyroidism    • Allergic rhinitis    • CHF (congestive heart failure)    • COPD (chronic obstructive pulmonary disease)    • Corneal epithelial dystrophy    • Coronary arteriosclerosis    • Depression    • Generalized  atherosclerosis    • GERD (gastroesophageal reflux disease)    • Hemorrhoids    • History of bone density study 08/03/2012    Lumbar spine Osteopenia. Femoral Osteopenia   • History of mammogram 08/20/2004    Birads Category 2 Benign   • History of Papanicolaou smear of cervix 08/12/2004    NEGATIVE   • Hypercholesterolemia    • Hyperlipidemia    • Hypertension    • Myocardial infarction    • Nonexudative age-related macular degeneration    • Osteoarthritis of multiple joints    • Pseudophakia    • Punctate keratitis     - history Thygeson's keratopathy      • Tobacco dependence syndrome      Past Surgical History:   Procedure Laterality Date   • APPENDECTOMY     • BREAST SURGERY  03/19/1954    Excision, breast lesion (1)  Excision of fibroma. 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 patency in the circumflex artery site of previous angioplasty. Kathe. patency in RCA.Nonobstructive 20-30% stenosis in the LAD and diagonal branch. Preserved LV systolic function with Ef of 55%   • CERVICAL SPINE SURGERY  09/16/1974    Excision of cervical disc, C5-6, C6-7, anterior cervical fusion, C5-6 with iliac bone graft.cervical spondylosis,C5-6, C6-7   • COLONOSCOPY  01/01/2013    patient declined    • CORONARY ANGIOPLASTY  07/21/1993    Angioplasty, coronary (2)    RCA    • DILATATION AND CURETTAGE      3   • ENDOSCOPY N/A 2/14/2018    Procedure: ESOPHAGOGASTRODUODENOSCOPY;  Surgeon: Alex Avitia MD;  Location: [facility] ENDOSCOPY;  Service:    • ENDOSCOPY AND COLONOSCOPY  10/01/1998    Hemorhoids Rectal Outlet Bleeding   • ESOPHAGOSCOPY / EGD  06/28/2004    Nonerosive gastritis of the body of the stomach. A biopsy was obtained & placed in h. Pylori test agar. Multiple biopsies were obtained from the body of the stomach   • HIP BIPOLAR REPLACEMENT Right 1/23/2018    Procedure: HIP BIPOLAR ANTERIOR APPROACH - right;  Surgeon: Kelvin Mcdowell MD;  Location:   [facility] OR;  Service:    • INJECTION OF MEDICATION  11/23/2015    Depo Medrol (Methylprednisone) (5)    • INJECTION OF MEDICATION  02/04/2016    Kenalog (3)     • JOINT REPLACEMENT Right 01/24/2018    hip   • PACEMAKER REPLACEMENT  2006     Family History   Problem Relation Age of Onset   • Coronary artery disease Other      OB History     No data available        Current Outpatient Prescriptions   Medication Sig Dispense Refill   • acetaminophen (TYLENOL) 325 MG tablet Take 2 tablets by mouth Every 4 (Four) Hours As Needed for Mild Pain . 1 tablet 1   • aspirin 325 MG EC tablet Take 1 tablet by mouth Daily.     • atorvastatin (LIPITOR) 20 MG tablet Take 1 tablet by mouth Daily. 1 tablet 1   • budesonide-formoterol (SYMBICORT) 160-4.5 MCG/ACT inhaler Inhale 2 puffs 2 (Two) Times a Day. 1 inhaler 11   • carvedilol (COREG) 6.25 MG tablet Take 6.25 mg by mouth 2 (Two) Times a Day With Meals.     • estradiol (ESTRACE) 0.1 MG/GM vaginal cream One applicator q hs 45 g 1   • famotidine (PEPCID) 40 MG tablet Take 1 tablet by mouth Daily. 1 tablet 1   • ferrous sulfate 324 (65 Fe) MG tablet delayed-release EC tablet Take 1 tablet by mouth Daily With Breakfast. 1 tablet 1   • fluorometholone (FLAREX) 0.1 % ophthalmic suspension Administer 1 drop to both eyes 4 (Four) Times a Day. (Patient taking differently: Administer 1 drop to both eyes 4 (Four) Times a Day As Needed.) 5 mL 3   • furosemide (LASIX) 40 MG tablet Take 1 tablet by mouth Daily. 90 tablet 1   • ipratropium-albuterol (DUO-NEB) 0.5-2.5 mg/mL nebulizer USE 1 BY NEBULIZER 4 (FOUR) TIMES A DAY AS NEEDED FOR WHEEZING. 360 mL 1   • levothyroxine (SYNTHROID, LEVOTHROID) 50 MCG tablet TAKE 1 TABLET BY MOUTH EVERY DAY 90 tablet 1   • montelukast (SINGULAIR) 10 MG tablet Take 10 mg by mouth Every Night.     • nitroglycerin (NITRO-DUR) 0.2 MG/HR patch Place 1 patch on the skin Daily. 30 patch 0   • polyethylene glycol (MIRALAX) packet Take 17 g by mouth Daily. 100 packet 1   •  "ranolazine (RANEXA) 1000 MG 12 hr tablet Take 1 tablet by mouth Every 12 (Twelve) Hours. 60 tablet 0   • sertraline (ZOLOFT) 50 MG tablet TAKE 1 TABLET BY MOUTH EVERY DAY (Patient taking differently: TAKE 1 TABLET BY MOUTH EVERY NIGHT) 90 tablet 1     No current facility-administered medications for this visit.      Allergies   Allergen Reactions   • Ace Inhibitors    • Lisinopril Cough     Social History     Social History   • Marital status:      Social History Main Topics   • Smoking status: Former Smoker     Packs/day: 0.50     Years: 50.00     Types: Cigarettes     Start date: 1950     Quit date: 1/12/2017   • Smokeless tobacco: Never Used   • Alcohol use No   • Drug use: No   • Sexual activity: Defer     Other Topics Concern   • Not on file       Review of Systems  Review of Systems   Constitutional: Negative for activity change, appetite change, chills, diaphoresis, fatigue, fever and unexpected weight change.   HENT: Negative for sore throat and trouble swallowing.    Respiratory: Negative for shortness of breath.    Gastrointestinal: Positive for blood in stool (reports dark stool, hemoccult positive). Negative for abdominal distention, abdominal pain, anal bleeding, constipation, diarrhea, nausea, rectal pain and vomiting.   Musculoskeletal: Negative for arthralgias.   Skin: Negative for pallor.   Neurological: Negative for light-headedness.        /78   Pulse 86   Ht 172.7 cm (67.99\")   Wt 77.2 kg (170 lb 3.2 oz)   BMI 25.88 kg/m²     Objective    Physical Exam   Constitutional: She is oriented to person, place, and time. She appears well-developed and well-nourished. She is cooperative. No distress.   HENT:   Head: Normocephalic and atraumatic.   Neck: Normal range of motion. Neck supple. No thyromegaly present.   Cardiovascular: Normal rate, regular rhythm and normal heart sounds.    Pulmonary/Chest: Effort normal and breath sounds normal. She has no wheezes. She has no rhonchi. She has " no rales.   Abdominal: Soft. Normal appearance and bowel sounds are normal. She exhibits no distension. There is no hepatosplenomegaly. There is no tenderness. There is no rigidity and no guarding. No hernia.   Lymphadenopathy:     She has no cervical adenopathy.   Neurological: She is alert and oriented to person, place, and time.   Skin: Skin is warm, dry and intact. No rash noted. No pallor.   Psychiatric: She has a normal mood and affect. Her speech is normal.     Office Visit on 03/16/2018   Component Date Value Ref Range Status   • WBC 03/16/2018 8.19  3.20 - 9.80 10*3/mm3 Final   • RBC 03/16/2018 3.19* 3.77 - 5.16 10*6/mm3 Final   • Hemoglobin 03/16/2018 10.4* 12.0 - 15.5 g/dL Final   • Hematocrit 03/16/2018 31.5* 35.0 - 45.0 % Final   • MCV 03/16/2018 98.7* 80.0 - 98.0 fL Final   • MCH 03/16/2018 32.6  26.5 - 34.0 pg Final   • MCHC 03/16/2018 33.0  31.4 - 36.0 g/dL Final   • RDW 03/16/2018 17.6* 11.5 - 14.5 % Final   • RDW-SD 03/16/2018 63.6* 36.4 - 46.3 fl Final   • MPV 03/16/2018 10.7  8.0 - 12.0 fL Final   • Platelets 03/16/2018 346  150 - 450 10*3/mm3 Final   • Iron 03/16/2018 57  37 - 170 mcg/dL Final   • TIBC 03/16/2018 236* 265 - 497 mcg/dL Final   • Iron Saturation 03/16/2018 24  15 - 50 % Final   • Glucose 03/16/2018 93  60 - 100 mg/dL Final   • BUN 03/16/2018 17  7 - 21 mg/dL Final   • Creatinine 03/16/2018 0.82  0.50 - 1.00 mg/dL Final   • Sodium 03/16/2018 140  137 - 145 mmol/L Final   • Potassium 03/16/2018 3.7  3.5 - 5.1 mmol/L Final   • Chloride 03/16/2018 98  95 - 110 mmol/L Final   • CO2 03/16/2018 28.0  22.0 - 31.0 mmol/L Final   • Calcium 03/16/2018 8.7  8.4 - 10.2 mg/dL Final   • Total Protein 03/16/2018 6.6  6.3 - 8.6 g/dL Final   • Albumin 03/16/2018 3.60  3.40 - 4.80 g/dL Final   • ALT (SGPT) 03/16/2018 27  9 - 52 U/L Final   • AST (SGOT) 03/16/2018 24  14 - 36 U/L Final   • Alkaline Phosphatase 03/16/2018 92  38 - 126 U/L Final   • Total Bilirubin 03/16/2018 0.3  0.2 - 1.3 mg/dL Final    • eGFR Non African Amer 03/16/2018 66  39 - 90 mL/min/1.73 Final   • Globulin 03/16/2018 3.0  2.3 - 3.5 gm/dL Final   • A/G Ratio 03/16/2018 1.2  1.1 - 1.8 g/dL Final   • BUN/Creatinine Ratio 03/16/2018 20.7  7.0 - 25.0 Final   • Anion Gap 03/16/2018 14.0  5.0 - 15.0 mmol/L Final   • TSH 03/16/2018 3.110  0.460 - 4.680 mIU/mL Final   • Free T4 03/16/2018 1.46  0.78 - 2.19 ng/dL Final     Assessment/Plan      1. Helicobacter pylori gastritis    2. Gastroesophageal reflux disease with esophagitis    3. Iron deficiency anemia, unspecified iron deficiency anemia type    4. Heme positive stool    .       Orders placed during this encounter include:  No orders of the defined types were placed in this encounter.      * Surgery not found *    Review and/or summary of lab tests, radiology, procedures, medications. Review and summary of old records and obtaining of history. The risks and benefits of my recommendations, as well as other treatment options were discussed with the patient today. Questions were answered.    No orders of the defined types were placed in this encounter.      Follow-up: Return in about 4 weeks (around 4/13/2018) for Recheck.          This document has been electronically signed by CRESENCIO Rondon on March 16, 2018 4:21 PM             Results for orders placed or performed in visit on 03/16/18   Iron and TIBC   Result Value Ref Range    Iron 57 37 - 170 mcg/dL    TIBC 236 (L) 265 - 497 mcg/dL    Iron Saturation 24 15 - 50 %   CBC (No Diff)   Result Value Ref Range    WBC 8.19 3.20 - 9.80 10*3/mm3    RBC 3.19 (L) 3.77 - 5.16 10*6/mm3    Hemoglobin 10.4 (L) 12.0 - 15.5 g/dL    Hematocrit 31.5 (L) 35.0 - 45.0 %    MCV 98.7 (H) 80.0 - 98.0 fL    MCH 32.6 26.5 - 34.0 pg    MCHC 33.0 31.4 - 36.0 g/dL    RDW 17.6 (H) 11.5 - 14.5 %    RDW-SD 63.6 (H) 36.4 - 46.3 fl    MPV 10.7 8.0 - 12.0 fL    Platelets 346 150 - 450 10*3/mm3   TSH   Result Value Ref Range    TSH 3.110 0.460 - 4.680 mIU/mL   T4, free    Result Value Ref Range    Free T4 1.46 0.78 - 2.19 ng/dL   Comprehensive metabolic panel   Result Value Ref Range    Glucose 93 60 - 100 mg/dL    BUN 17 7 - 21 mg/dL    Creatinine 0.82 0.50 - 1.00 mg/dL    Sodium 140 137 - 145 mmol/L    Potassium 3.7 3.5 - 5.1 mmol/L    Chloride 98 95 - 110 mmol/L    CO2 28.0 22.0 - 31.0 mmol/L    Calcium 8.7 8.4 - 10.2 mg/dL    Total Protein 6.6 6.3 - 8.6 g/dL    Albumin 3.60 3.40 - 4.80 g/dL    ALT (SGPT) 27 9 - 52 U/L    AST (SGOT) 24 14 - 36 U/L    Alkaline Phosphatase 92 38 - 126 U/L    Total Bilirubin 0.3 0.2 - 1.3 mg/dL    eGFR Non African Amer 66 39 - 90 mL/min/1.73    Globulin 3.0 2.3 - 3.5 gm/dL    A/G Ratio 1.2 1.1 - 1.8 g/dL    BUN/Creatinine Ratio 20.7 7.0 - 25.0    Anion Gap 14.0 5.0 - 15.0 mmol/L   Results for orders placed or performed in visit on 02/22/18   Occult Blood, Fecal By Immunoassay   Result Value Ref Range    Occult Blood, Fecal by Immunoassay Positive (A) Negative   Results for orders placed or performed in visit on 02/16/18   CBC Auto Differential   Result Value Ref Range    WBC 10.09 (H) 3.20 - 9.80 10*3/mm3    RBC 2.58 (L) 3.77 - 5.16 10*6/mm3    Hemoglobin 8.0 (L) 12.0 - 15.5 g/dL    Hematocrit 23.9 (L) 35.0 - 45.0 %    MCV 92.6 80.0 - 98.0 fL    MCH 31.0 26.5 - 34.0 pg    MCHC 33.5 31.4 - 36.0 g/dL    RDW 17.2 (H) 11.5 - 14.5 %    RDW-SD 56.4 (H) 36.4 - 46.3 fl    MPV 9.3 8.0 - 12.0 fL    Platelets 345 150 - 450 10*3/mm3    Neutrophil % 69.1 37.0 - 80.0 %    Lymphocyte % 18.1 10.0 - 50.0 %    Monocyte % 8.0 0.0 - 12.0 %    Eosinophil % 3.8 0.0 - 7.0 %    Basophil % 0.3 0.0 - 2.0 %    Immature Grans % 0.7 (H) 0.0 - 0.5 %    Neutrophils, Absolute 6.97 2.00 - 8.60 10*3/mm3    Lymphocytes, Absolute 1.83 0.60 - 4.20 10*3/mm3    Monocytes, Absolute 0.81 0.00 - 0.90 10*3/mm3    Eosinophils, Absolute 0.38 0.00 - 0.70 10*3/mm3    Basophils, Absolute 0.03 0.00 - 0.20 10*3/mm3    Immature Grans, Absolute 0.07 (H) 0.00 - 0.02 10*3/mm3   Comprehensive Metabolic  Panel   Result Value Ref Range    Glucose 100 60 - 100 mg/dL    BUN 20 7 - 21 mg/dL    Creatinine 0.84 0.50 - 1.00 mg/dL    Sodium 132 (L) 137 - 145 mmol/L    Potassium 4.2 3.5 - 5.1 mmol/L    Chloride 96 95 - 110 mmol/L    CO2 25.0 22.0 - 31.0 mmol/L    Calcium 8.6 8.4 - 10.2 mg/dL    Total Protein 5.9 (L) 6.3 - 8.6 g/dL    Albumin 3.00 (L) 3.40 - 4.80 g/dL    ALT (SGPT) 30 9 - 52 U/L    AST (SGOT) 34 14 - 36 U/L    Alkaline Phosphatase 92 38 - 126 U/L    Total Bilirubin 0.3 0.2 - 1.3 mg/dL    eGFR Non African Amer 64 39 - 90 mL/min/1.73    Globulin 2.9 2.3 - 3.5 gm/dL    A/G Ratio 1.0 (L) 1.1 - 1.8 g/dL    BUN/Creatinine Ratio 23.8 7.0 - 25.0    Anion Gap 11.0 5.0 - 15.0 mmol/L   Results for orders placed or performed during the hospital encounter of 02/12/18   PREVIOUS HISTORY   Result Value Ref Range    Previous History Previous Record on File    Gold Top - SST   Result Value Ref Range    Extra Tube Hold for add-ons.    Green Top (Gel)   Result Value Ref Range    Extra Tube Hold for add-ons.    CBC Auto Differential   Result Value Ref Range    WBC 10.94 (H) 3.20 - 9.80 10*3/mm3    RBC 2.70 (L) 3.77 - 5.16 10*6/mm3    Hemoglobin 8.4 (L) 12.0 - 15.5 g/dL    Hematocrit 24.7 (L) 35.0 - 45.0 %    MCV 91.5 80.0 - 98.0 fL    MCH 31.1 26.5 - 34.0 pg    MCHC 34.0 31.4 - 36.0 g/dL    RDW 17.8 (H) 11.5 - 14.5 %    RDW-SD 59.3 (H) 36.4 - 46.3 fl    MPV 8.9 8.0 - 12.0 fL    Platelets 338 150 - 450 10*3/mm3    Neutrophil % 68.4 37.0 - 80.0 %    Lymphocyte % 18.6 10.0 - 50.0 %    Monocyte % 8.3 0.0 - 12.0 %    Eosinophil % 3.7 0.0 - 7.0 %    Basophil % 0.2 0.0 - 2.0 %    Immature Grans % 0.8 (H) 0.0 - 0.5 %    Neutrophils, Absolute 7.47 2.00 - 8.60 10*3/mm3    Lymphocytes, Absolute 2.04 0.60 - 4.20 10*3/mm3    Monocytes, Absolute 0.91 (H) 0.00 - 0.90 10*3/mm3    Eosinophils, Absolute 0.41 0.00 - 0.70 10*3/mm3    Basophils, Absolute 0.02 0.00 - 0.20 10*3/mm3    Immature Grans, Absolute 0.09 (H) 0.00 - 0.02 10*3/mm3     *Note:  Due to a large number of results and/or encounters for the requested time period, some results have not been displayed. A complete set of results can be found in Results Review.

## 2018-03-19 ENCOUNTER — EPISODE CHANGES (OUTPATIENT)
Dept: CASE MANAGEMENT | Facility: OTHER | Age: 83
End: 2018-03-19

## 2018-03-19 ENCOUNTER — PATIENT OUTREACH (OUTPATIENT)
Dept: CASE MANAGEMENT | Facility: OTHER | Age: 83
End: 2018-03-19

## 2018-03-19 ENCOUNTER — TELEPHONE (OUTPATIENT)
Dept: INTERNAL MEDICINE | Facility: CLINIC | Age: 83
End: 2018-03-19

## 2018-03-19 NOTE — OUTREACH NOTE
Patient stated she is doing well after discharge from SNF. Home Care following for nursing and therapy, feels she is progressing. Stated her niece helps with her medications, and she is taking them as prescribed. No questions voiced at time of call.

## 2018-03-22 ENCOUNTER — OUTSIDE FACILITY SERVICE (OUTPATIENT)
Dept: INTERNAL MEDICINE | Facility: CLINIC | Age: 83
End: 2018-03-22

## 2018-03-22 PROCEDURE — G0180 MD CERTIFICATION HHA PATIENT: HCPCS | Performed by: INTERNAL MEDICINE

## 2018-03-25 RX ORDER — LOSARTAN POTASSIUM 25 MG/1
25 TABLET ORAL DAILY
COMMUNITY
End: 2018-06-20

## 2018-03-26 ENCOUNTER — TRANSCRIBE ORDERS (OUTPATIENT)
Dept: LAB | Facility: CLINIC | Age: 83
End: 2018-03-26

## 2018-03-26 DIAGNOSIS — I10 ESSENTIAL HYPERTENSION, MALIGNANT: Primary | ICD-10-CM

## 2018-03-26 LAB
ALBUMIN SERPL-MCNC: 3.7 G/DL (ref 3.4–4.8)
ALBUMIN/GLOB SERPL: 1.2 G/DL (ref 1.1–1.8)
ALP SERPL-CCNC: 84 U/L (ref 38–126)
ALT SERPL W P-5'-P-CCNC: 16 U/L (ref 9–52)
ANION GAP SERPL CALCULATED.3IONS-SCNC: 15 MMOL/L (ref 5–15)
AST SERPL-CCNC: 21 U/L (ref 14–36)
BASOPHILS # BLD AUTO: 0.05 10*3/MM3 (ref 0–0.2)
BASOPHILS NFR BLD AUTO: 0.6 % (ref 0–2)
BILIRUB SERPL-MCNC: 0.3 MG/DL (ref 0.2–1.3)
BUN BLD-MCNC: 22 MG/DL (ref 7–21)
BUN/CREAT SERPL: 21.8 (ref 7–25)
CALCIUM SPEC-SCNC: 9.3 MG/DL (ref 8.4–10.2)
CHLORIDE SERPL-SCNC: 93 MMOL/L (ref 95–110)
CO2 SERPL-SCNC: 33 MMOL/L (ref 22–31)
CREAT BLD-MCNC: 1.01 MG/DL (ref 0.5–1)
DEPRECATED RDW RBC AUTO: 58.7 FL (ref 36.4–46.3)
EOSINOPHIL # BLD AUTO: 0.53 10*3/MM3 (ref 0–0.7)
EOSINOPHIL NFR BLD AUTO: 5.9 % (ref 0–7)
ERYTHROCYTE [DISTWIDTH] IN BLOOD BY AUTOMATED COUNT: 16.1 % (ref 11.5–14.5)
GFR SERPL CREATININE-BSD FRML MDRD: 52 ML/MIN/1.73 (ref 39–90)
GLOBULIN UR ELPH-MCNC: 3 GM/DL (ref 2.3–3.5)
GLUCOSE BLD-MCNC: 89 MG/DL (ref 60–100)
HCT VFR BLD AUTO: 34.8 % (ref 35–45)
HGB BLD-MCNC: 11.4 G/DL (ref 12–15.5)
IMM GRANULOCYTES # BLD: 0.03 10*3/MM3 (ref 0–0.02)
IMM GRANULOCYTES NFR BLD: 0.3 % (ref 0–0.5)
LYMPHOCYTES # BLD AUTO: 1.63 10*3/MM3 (ref 0.6–4.2)
LYMPHOCYTES NFR BLD AUTO: 18.2 % (ref 10–50)
MCH RBC QN AUTO: 32.6 PG (ref 26.5–34)
MCHC RBC AUTO-ENTMCNC: 32.8 G/DL (ref 31.4–36)
MCV RBC AUTO: 99.4 FL (ref 80–98)
MONOCYTES # BLD AUTO: 0.64 10*3/MM3 (ref 0–0.9)
MONOCYTES NFR BLD AUTO: 7.2 % (ref 0–12)
NEUTROPHILS # BLD AUTO: 6.07 10*3/MM3 (ref 2–8.6)
NEUTROPHILS NFR BLD AUTO: 67.8 % (ref 37–80)
PLATELET # BLD AUTO: 318 10*3/MM3 (ref 150–450)
PMV BLD AUTO: 10.8 FL (ref 8–12)
POTASSIUM BLD-SCNC: 3.2 MMOL/L (ref 3.5–5.1)
PROT SERPL-MCNC: 6.7 G/DL (ref 6.3–8.6)
RBC # BLD AUTO: 3.5 10*6/MM3 (ref 3.77–5.16)
SODIUM BLD-SCNC: 141 MMOL/L (ref 137–145)
WBC NRBC COR # BLD: 8.95 10*3/MM3 (ref 3.2–9.8)

## 2018-03-26 PROCEDURE — 36415 COLL VENOUS BLD VENIPUNCTURE: CPT | Performed by: FAMILY MEDICINE

## 2018-03-26 PROCEDURE — 80053 COMPREHEN METABOLIC PANEL: CPT | Performed by: INTERNAL MEDICINE

## 2018-03-26 PROCEDURE — 85025 COMPLETE CBC W/AUTO DIFF WBC: CPT | Performed by: INTERNAL MEDICINE

## 2018-03-30 RX ORDER — CYANOCOBALAMIN/FOLIC AC/VIT B6 1-2.5-25MG
TABLET ORAL
Qty: 30 TABLET | Refills: 2 | Status: SHIPPED | OUTPATIENT
Start: 2018-03-30 | End: 2018-06-19 | Stop reason: SDUPTHER

## 2018-03-30 RX ORDER — BUDESONIDE AND FORMOTEROL FUMARATE DIHYDRATE 160; 4.5 UG/1; UG/1
AEROSOL RESPIRATORY (INHALATION)
Qty: 10.2 G | Refills: 2 | Status: SHIPPED | OUTPATIENT
Start: 2018-03-30 | End: 2018-05-21 | Stop reason: ALTCHOICE

## 2018-03-30 RX ORDER — POLYETHYLENE GLYCOL 3350 17 G/17G
POWDER, FOR SOLUTION ORAL
Qty: 527 G | Refills: 2 | Status: SHIPPED | OUTPATIENT
Start: 2018-03-30 | End: 2018-04-20 | Stop reason: SDUPTHER

## 2018-03-30 RX ORDER — MONTELUKAST SODIUM 10 MG/1
TABLET ORAL
Qty: 30 TABLET | Refills: 2 | Status: SHIPPED | OUTPATIENT
Start: 2018-03-30 | End: 2018-06-19 | Stop reason: SDUPTHER

## 2018-03-30 RX ORDER — ERGOCALCIFEROL 1.25 MG/1
CAPSULE ORAL
Qty: 1 CAPSULE | Refills: 2 | Status: SHIPPED | OUTPATIENT
Start: 2018-03-30 | End: 2018-06-15 | Stop reason: SDUPTHER

## 2018-03-30 RX ORDER — LEVOTHYROXINE SODIUM 0.05 MG/1
TABLET ORAL
Qty: 30 TABLET | Refills: 2 | Status: SHIPPED | OUTPATIENT
Start: 2018-03-30 | End: 2018-06-19 | Stop reason: SDUPTHER

## 2018-03-30 RX ORDER — FLUOROMETHOLONE 0.1 %
SUSPENSION, DROPS(FINAL DOSAGE FORM)(ML) OPHTHALMIC (EYE)
Qty: 5 ML | Refills: 2 | Status: SHIPPED | OUTPATIENT
Start: 2018-03-30 | End: 2018-04-20 | Stop reason: SDUPTHER

## 2018-03-30 RX ORDER — ESTRADIOL 0.1 MG/G
CREAM VAGINAL
Qty: 42.5 G | Refills: 2 | Status: SHIPPED | OUTPATIENT
Start: 2018-03-30 | End: 2018-06-19 | Stop reason: SDUPTHER

## 2018-03-30 RX ORDER — FUROSEMIDE 40 MG/1
TABLET ORAL
Qty: 30 TABLET | Refills: 2 | Status: SHIPPED | OUTPATIENT
Start: 2018-03-30 | End: 2018-05-21 | Stop reason: SDUPTHER

## 2018-03-30 RX ORDER — FAMOTIDINE 40 MG/1
TABLET, FILM COATED ORAL
Qty: 30 TABLET | Refills: 2 | Status: SHIPPED | OUTPATIENT
Start: 2018-03-30 | End: 2018-06-19 | Stop reason: SDUPTHER

## 2018-03-30 RX ORDER — CARVEDILOL 6.25 MG/1
TABLET ORAL
Qty: 60 TABLET | Refills: 2 | Status: SHIPPED | OUTPATIENT
Start: 2018-03-30 | End: 2018-06-19 | Stop reason: SDUPTHER

## 2018-03-30 RX ORDER — IPRATROPIUM BROMIDE AND ALBUTEROL SULFATE 2.5; .5 MG/3ML; MG/3ML
SOLUTION RESPIRATORY (INHALATION)
Qty: 360 ML | Refills: 2 | Status: SHIPPED | OUTPATIENT
Start: 2018-03-30 | End: 2018-06-19 | Stop reason: SDUPTHER

## 2018-03-30 RX ORDER — NITROGLYCERIN 40 MG/1
PATCH TRANSDERMAL
Qty: 30 PATCH | Refills: 2 | Status: SHIPPED | OUTPATIENT
Start: 2018-03-30 | End: 2018-06-19 | Stop reason: SDUPTHER

## 2018-03-30 RX ORDER — ATORVASTATIN CALCIUM 20 MG/1
TABLET, FILM COATED ORAL
Qty: 30 TABLET | Refills: 2 | Status: SHIPPED | OUTPATIENT
Start: 2018-03-30 | End: 2018-06-19 | Stop reason: SDUPTHER

## 2018-03-30 RX ORDER — RANOLAZINE 1000 MG/1
1000 TABLET, EXTENDED RELEASE ORAL EVERY 12 HOURS SCHEDULED
Qty: 30 TABLET | Refills: 2 | Status: SHIPPED | OUTPATIENT
Start: 2018-03-30 | End: 2018-04-20 | Stop reason: SDUPTHER

## 2018-04-10 ENCOUNTER — OFFICE VISIT (OUTPATIENT)
Dept: ORTHOPEDIC SURGERY | Facility: CLINIC | Age: 83
End: 2018-04-10

## 2018-04-10 VITALS — HEIGHT: 68 IN | WEIGHT: 164 LBS | BODY MASS INDEX: 24.86 KG/M2

## 2018-04-10 DIAGNOSIS — S72.041D CLOSED DISPLACED BASICERVICAL FRACTURE OF RIGHT FEMUR WITH ROUTINE HEALING, SUBSEQUENT ENCOUNTER: Primary | ICD-10-CM

## 2018-04-10 PROCEDURE — 99024 POSTOP FOLLOW-UP VISIT: CPT | Performed by: ORTHOPAEDIC SURGERY

## 2018-04-10 NOTE — PROGRESS NOTES
"Val Arteaga is a 85 y.o. female is s/p       Chief Complaint   Patient presents with   • Right Hip - Follow-up       HISTORY OF PRESENT ILLNESS: Post op right hip. Patient had surgery on 01/22/2018.  She has finished PT  Very well.  She is complaining of a \"catch\" in her hip today but it has not been a regular phenomenon.  She is using a walker when she is in public but not at home.         Allergies   Allergen Reactions   • Ace Inhibitors    • Lisinopril Cough         Current Outpatient Prescriptions:   •  acetaminophen (TYLENOL) 325 MG tablet, Take 2 tablets by mouth Every 4 (Four) Hours As Needed for Mild Pain ., Disp: 1 tablet, Rfl: 1  •  aspirin 325 MG EC tablet, Take 1 tablet by mouth Daily., Disp: , Rfl:   •  atorvastatin (LIPITOR) 20 MG tablet, TAKE 1 TABLET BY MOUTH EVERY NIGHT AT BEDTIME FOR CHOLESTEROL, Disp: 30 tablet, Rfl: 2  •  carvedilol (COREG) 6.25 MG tablet, TAKE 1 TABLET BY MOUTH TWO (2) TIMES A DAY WITH MEALS FOR HEART, Disp: 60 tablet, Rfl: 2  •  ESTRACE VAGINAL 0.1 MG/GM vaginal cream, INSERT 1 APPRLICATORFUL VAGINALLY EVERY NIGHT AT BEDTIME FOR URETHAL CARUNCLE, Disp: 42.5 g, Rfl: 2  •  famotidine (PEPCID) 40 MG tablet, TAKE 1 TABLET BY MOUTH EVERY DAY FOR GERD, Disp: 30 tablet, Rfl: 2  •  ferrous sulfate 324 (65 Fe) MG tablet delayed-release EC tablet, Take 1 tablet by mouth Daily With Breakfast., Disp: 1 tablet, Rfl: 1  •  fluorometholone (FLAREX) 0.1 % ophthalmic suspension, Administer 1 drop to both eyes 4 (Four) Times a Day. (Patient taking differently: Administer 1 drop to both eyes 4 (Four) Times a Day As Needed.), Disp: 5 mL, Rfl: 3  •  fluorometholone (FML) 0.1 % ophthalmic suspension, INSTILL 1 DROP IN EACH EYE FOUR (4) TIMES DAILY FOR REDNESS, SWELLING, AND EYE INFLAMMATION, Disp: 5 mL, Rfl: 2  •  FOLBEE 2.5-25-1 MG tablet tablet, TAKE 1 TABLET BY MOUTH EVERY DAY FOR SUPPLEMENT, Disp: 30 tablet, Rfl: 2  •  furosemide (LASIX) 40 MG tablet, TAKE 1 TABLET BY MOUTH EVERY DAY FOR " EDEMA, Disp: 30 tablet, Rfl: 2  •  ipratropium-albuterol (DUO-NEB) 0.5-2.5 mg/mL nebulizer, USE 1 VIAL PER NEBULIZER FOUR (4) TIMES DAILY AS NEEDED FOR WHEEZING, Disp: 360 mL, Rfl: 2  •  levothyroxine (SYNTHROID, LEVOTHROID) 50 MCG tablet, TAKE 1 TABLET BY MOUTH EVERY DAY FOR THYROID, Disp: 30 tablet, Rfl: 2  •  losartan (COZAAR) 25 MG tablet, Take 25 mg by mouth Daily., Disp: , Rfl:   •  montelukast (SINGULAIR) 10 MG tablet, TAKE 1 TABLET BY MOUTH EVERY DAY FOR EMPHYSEMA, Disp: 30 tablet, Rfl: 2  •  nitroglycerin (NITRODUR) 0.2 MG/HR patch, APPLY 1 PATCH EVERY DAY FOR 12 HOURS, LEAVE OFF FOR 12 HOURS FOR ANGINA, Disp: 30 patch, Rfl: 2  •  polyethylene glycol (MIRALAX) packet, Take 17 g by mouth Daily., Disp: 100 packet, Rfl: 1  •  polyethylene glycol (MIRALAX) powder, MIX 1 CAPFUL (17G) IN 8 OUNCES OF LIQUID AND DRINK BY MOUTH EVERY DAY FOR CONSTIPATION, Disp: 527 g, Rfl: 2  •  ranolazine (RANEXA) 1000 MG 12 hr tablet, Take 1 tablet by mouth Every 12 (Twelve) Hours., Disp: 60 tablet, Rfl: 0  •  ranolazine (RANEXA) 1000 MG 12 hr tablet, Take 1 tablet by mouth Every 12 (Twelve) Hours., Disp: 30 tablet, Rfl: 2  •  sertraline (ZOLOFT) 50 MG tablet, TAKE 1 TABLET BY MOUTH EVERY DAY FOR DEPRESSION, Disp: 30 tablet, Rfl: 2  •  SYMBICORT 160-4.5 MCG/ACT inhaler, INHALE 2 PUFFS TWO (2) TIMES A DAY FOR EMPHSEMA- RINSE MOUTH AFTER USING., Disp: 10.2 g, Rfl: 2  •  vitamin D (ERGOCALCIFEROL) 28257 units capsule capsule, TAKE 1 CAPSULE BY MOUTH EVERY MONTH, Disp: 1 capsule, Rfl: 2    No fevers or chills.  No nausea or vomiting.      PHYSICAL EXAMINATION:       Val Arteaga is a 85 y.o. female    Patient is awake and alert, answers questions appropriately and is in no apparent distress.    GAIT:     []  Normal  [x]  Antalgic    Assistive device: []  None  [x]  Walker     []  Crutches  []  Cane     []  Wheelchair  []  Stretcher    Right Hip Exam     Tenderness   The patient is experiencing no tenderness.         Range of  Motion   Flexion: 120     Muscle Strength   Flexion: 4/5     Tests   HERIBERTO: negative  Fadir:  Negative FADIR test    Other   Erythema: absent  Scars: present  Sensation: normal  Pulse: present                      ASSESSMENT:    Diagnoses and all orders for this visit:    Closed displaced basicervical fracture of right femur with routine healing, subsequent encounter  -     Cane          PLAN  Discussed the patient's BMI with her. BMI is above normal parameters. Follow-up plan includes:  educational material and nutrition counseling.    She'll start using a 4 pronged cane.  She will continue with range of motion and strengthening exercises as well as conditioning exercises.  Slowly advance activity as tolerated.    Return if symptoms worsen or fail to improve, for recheck.    Kelvin Mcdowell MD

## 2018-04-16 ENCOUNTER — APPOINTMENT (OUTPATIENT)
Dept: LAB | Facility: HOSPITAL | Age: 83
End: 2018-04-16

## 2018-04-16 ENCOUNTER — OFFICE VISIT (OUTPATIENT)
Dept: INTERNAL MEDICINE | Facility: CLINIC | Age: 83
End: 2018-04-16

## 2018-04-16 VITALS
HEIGHT: 68 IN | SYSTOLIC BLOOD PRESSURE: 110 MMHG | DIASTOLIC BLOOD PRESSURE: 60 MMHG | BODY MASS INDEX: 25.13 KG/M2 | WEIGHT: 165.8 LBS

## 2018-04-16 DIAGNOSIS — J44.9 CHRONIC OBSTRUCTIVE PULMONARY DISEASE, UNSPECIFIED COPD TYPE (HCC): ICD-10-CM

## 2018-04-16 DIAGNOSIS — I50.22 CHRONIC SYSTOLIC CHF (CONGESTIVE HEART FAILURE) (HCC): Primary | ICD-10-CM

## 2018-04-16 DIAGNOSIS — E03.9 ACQUIRED HYPOTHYROIDISM: ICD-10-CM

## 2018-04-16 DIAGNOSIS — D50.9 IRON DEFICIENCY ANEMIA, UNSPECIFIED IRON DEFICIENCY ANEMIA TYPE: ICD-10-CM

## 2018-04-16 LAB
ALBUMIN SERPL-MCNC: 4 G/DL (ref 3.4–4.8)
ANION GAP SERPL CALCULATED.3IONS-SCNC: 14 MMOL/L (ref 5–15)
BUN BLD-MCNC: 34 MG/DL (ref 7–21)
BUN/CREAT SERPL: 31.8 (ref 7–25)
CALCIUM SPEC-SCNC: 9.1 MG/DL (ref 8.4–10.2)
CHLORIDE SERPL-SCNC: 95 MMOL/L (ref 95–110)
CO2 SERPL-SCNC: 28 MMOL/L (ref 22–31)
CREAT BLD-MCNC: 1.07 MG/DL (ref 0.5–1)
DEPRECATED RDW RBC AUTO: 52.1 FL (ref 36.4–46.3)
ERYTHROCYTE [DISTWIDTH] IN BLOOD BY AUTOMATED COUNT: 14.6 % (ref 11.5–14.5)
FERRITIN SERPL-MCNC: 82.4 NG/ML (ref 11.1–264)
GFR SERPL CREATININE-BSD FRML MDRD: 49 ML/MIN/1.73 (ref 39–90)
GLUCOSE BLD-MCNC: 95 MG/DL (ref 60–100)
HCT VFR BLD AUTO: 34.1 % (ref 35–45)
HGB BLD-MCNC: 11.5 G/DL (ref 12–15.5)
IRON 24H UR-MRATE: 78 MCG/DL (ref 37–170)
IRON SATN MFR SERPL: 29 % (ref 15–50)
MCH RBC QN AUTO: 33.1 PG (ref 26.5–34)
MCHC RBC AUTO-ENTMCNC: 33.7 G/DL (ref 31.4–36)
MCV RBC AUTO: 98.3 FL (ref 80–98)
PHOSPHATE SERPL-MCNC: 3.8 MG/DL (ref 2.4–4.4)
PLATELET # BLD AUTO: 339 10*3/MM3 (ref 150–450)
PMV BLD AUTO: 10.6 FL (ref 8–12)
POTASSIUM BLD-SCNC: 3.9 MMOL/L (ref 3.5–5.1)
RBC # BLD AUTO: 3.47 10*6/MM3 (ref 3.77–5.16)
SODIUM BLD-SCNC: 137 MMOL/L (ref 137–145)
TIBC SERPL-MCNC: 269 MCG/DL (ref 265–497)
VIT B12 BLD-MCNC: >1000 PG/ML (ref 239–931)
WBC NRBC COR # BLD: 9.01 10*3/MM3 (ref 3.2–9.8)

## 2018-04-16 PROCEDURE — 83550 IRON BINDING TEST: CPT | Performed by: INTERNAL MEDICINE

## 2018-04-16 PROCEDURE — 80069 RENAL FUNCTION PANEL: CPT | Performed by: INTERNAL MEDICINE

## 2018-04-16 PROCEDURE — 82728 ASSAY OF FERRITIN: CPT | Performed by: INTERNAL MEDICINE

## 2018-04-16 PROCEDURE — 36415 COLL VENOUS BLD VENIPUNCTURE: CPT | Performed by: INTERNAL MEDICINE

## 2018-04-16 PROCEDURE — 82607 VITAMIN B-12: CPT | Performed by: INTERNAL MEDICINE

## 2018-04-16 PROCEDURE — 83540 ASSAY OF IRON: CPT | Performed by: INTERNAL MEDICINE

## 2018-04-16 PROCEDURE — 85027 COMPLETE CBC AUTOMATED: CPT | Performed by: INTERNAL MEDICINE

## 2018-04-16 PROCEDURE — 99214 OFFICE O/P EST MOD 30 MIN: CPT | Performed by: INTERNAL MEDICINE

## 2018-04-17 ENCOUNTER — TELEPHONE (OUTPATIENT)
Dept: INTERNAL MEDICINE | Facility: CLINIC | Age: 83
End: 2018-04-17

## 2018-04-20 ENCOUNTER — OFFICE VISIT (OUTPATIENT)
Dept: GASTROENTEROLOGY | Facility: CLINIC | Age: 83
End: 2018-04-20

## 2018-04-20 VITALS
BODY MASS INDEX: 25.01 KG/M2 | SYSTOLIC BLOOD PRESSURE: 130 MMHG | DIASTOLIC BLOOD PRESSURE: 68 MMHG | HEART RATE: 62 BPM | WEIGHT: 165 LBS | HEIGHT: 68 IN

## 2018-04-20 DIAGNOSIS — K29.50 CHRONIC GASTRITIS WITHOUT BLEEDING, UNSPECIFIED GASTRITIS TYPE: ICD-10-CM

## 2018-04-20 DIAGNOSIS — D50.9 IRON DEFICIENCY ANEMIA, UNSPECIFIED IRON DEFICIENCY ANEMIA TYPE: ICD-10-CM

## 2018-04-20 DIAGNOSIS — K21.00 GASTROESOPHAGEAL REFLUX DISEASE WITH ESOPHAGITIS: Primary | ICD-10-CM

## 2018-04-20 PROCEDURE — 99213 OFFICE O/P EST LOW 20 MIN: CPT | Performed by: NURSE PRACTITIONER

## 2018-04-20 NOTE — PROGRESS NOTES
Chief Complaint   Patient presents with   • Heartburn   • helicobacter pylori       Subjective    Val Arteaga is a 85 y.o. female. she is here today for follow-up.    Patient reports she has been doing very well recently states she still somewhat fatigued however overall feels very well.  States bowel movements are about every other day takes MiraLAX as needed.  Denies any visible blood in the stool still has dark stools however on iron daily.  She was hospitalized in February due to acute blood loss anemia with hemoglobin as low 6.8.  Her EGD noted esophagitis gastritis normal duodenum.  Biopsies were positive for H. pylori and she was treated with triple therapy.  States Pepcid is controlling her reflux symptoms well.  Denies any breakthrough symptoms besides belching,bloating, and early satiety.  Her recheck of her lab work has noted improvement of hemoglobin.  My last office visit was 316/18 noted hemoglobin of 10.4.  Another check was done 3/26/18 which showed hemoglobin 11.4 checked for Dr. Albert's office on Monday hemoglobin 11.5 hematocrit 34.1 B12 greater than 1000, ferritin 82, iron 78, TIBC 269 and iron saturation 29..      Heartburn   She complains of belching (bloating ) and early satiety. She reports no abdominal pain, no globus sensation, no nausea or no sore throat. This is a chronic problem. The current episode started more than 1 year ago. The problem occurs rarely. The symptoms are aggravated by certain foods. Associated symptoms include fatigue. Pertinent negatives include no anemia. She has tried a histamine-2 antagonist for the symptoms.     Plan; follow-up in 5 months return to office sooner if needed.  She feels changes in symptoms such as worsening fatigue or notices blood in the stool she will contact offices follow-up sooner and proceed with colonoscopy or capsule endoscopy at that time.       The following portions of the patient's history were reviewed and updated as appropriate:    Past Medical History:   Diagnosis Date   • Acquired hypothyroidism    • Allergic rhinitis    • CHF (congestive heart failure)    • COPD (chronic obstructive pulmonary disease)    • Corneal epithelial dystrophy    • Coronary arteriosclerosis    • Depression    • Generalized atherosclerosis    • GERD (gastroesophageal reflux disease)    • Hemorrhoids    • History of bone density study 08/03/2012    Lumbar spine Osteopenia. Femoral Osteopenia   • History of mammogram 08/20/2004    Birads Category 2 Benign   • History of Papanicolaou smear of cervix 08/12/2004    NEGATIVE   • Hypercholesterolemia    • Hyperlipidemia    • Hypertension    • Myocardial infarction    • Nonexudative age-related macular degeneration    • Osteoarthritis of multiple joints    • Pseudophakia    • Punctate keratitis     - history Thygeson's keratopathy      • Tobacco dependence syndrome      Past Surgical History:   Procedure Laterality Date   • APPENDECTOMY     • BREAST SURGERY  03/19/1954    Excision, breast lesion (1)  Excision of fibroma. 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 patency in the circumflex artery site of previous angioplasty. Kathe. patency in RCA.Nonobstructive 20-30% stenosis in the LAD and diagonal branch. Preserved LV systolic function with Ef of 55%   • CERVICAL SPINE SURGERY  09/16/1974    Excision of cervical disc, C5-6, C6-7, anterior cervical fusion, C5-6 with iliac bone graft.cervical spondylosis,C5-6, C6-7   • COLONOSCOPY  01/01/2013    patient declined    • CORONARY ANGIOPLASTY  07/21/1993    Angioplasty, coronary (2)    RCA    • DILATATION AND CURETTAGE      3   • ENDOSCOPY N/A 2/14/2018    Procedure: ESOPHAGOGASTRODUODENOSCOPY;  Surgeon: Alex Avitia MD;  Location: [facility] ENDOSCOPY;  Service:    • ENDOSCOPY AND COLONOSCOPY  10/01/1998    Hemorhoids Rectal Outlet Bleeding   • ESOPHAGOSCOPY / EGD  06/28/2004    Nonerosive gastritis of the body of the  stomach. A biopsy was obtained & placed in h. Pylori test agar. Multiple biopsies were obtained from the body of the stomach   • HIP BIPOLAR REPLACEMENT Right 1/23/2018    Procedure: HIP BIPOLAR ANTERIOR APPROACH - right;  Surgeon: Kelvin Mcdowell MD;  Location: [facility];  Service:    • INJECTION OF MEDICATION  11/23/2015    Depo Medrol (Methylprednisone) (5)    • INJECTION OF MEDICATION  02/04/2016    Kenalog (3)     • JOINT REPLACEMENT Right 01/24/2018    hip   • PACEMAKER REPLACEMENT  2006     Family History   Problem Relation Age of Onset   • Coronary artery disease Other      OB History     No data available        Current Outpatient Prescriptions   Medication Sig Dispense Refill   • acetaminophen (TYLENOL) 325 MG tablet Take 2 tablets by mouth Every 4 (Four) Hours As Needed for Mild Pain . 1 tablet 1   • aspirin 325 MG EC tablet Take 1 tablet by mouth Daily.     • atorvastatin (LIPITOR) 20 MG tablet TAKE 1 TABLET BY MOUTH EVERY NIGHT AT BEDTIME FOR CHOLESTEROL 30 tablet 2   • carvedilol (COREG) 6.25 MG tablet TAKE 1 TABLET BY MOUTH TWO (2) TIMES A DAY WITH MEALS FOR HEART 60 tablet 2   • ESTRACE VAGINAL 0.1 MG/GM vaginal cream INSERT 1 APPRLICATORFUL VAGINALLY EVERY NIGHT AT BEDTIME FOR URETHAL CARUNCLE 42.5 g 2   • famotidine (PEPCID) 40 MG tablet TAKE 1 TABLET BY MOUTH EVERY DAY FOR GERD 30 tablet 2   • ferrous sulfate 324 (65 Fe) MG tablet delayed-release EC tablet Take 1 tablet by mouth Daily With Breakfast. 1 tablet 1   • fluorometholone (FLAREX) 0.1 % ophthalmic suspension Administer 1 drop to both eyes 4 (Four) Times a Day. (Patient taking differently: Administer 1 drop to both eyes 4 (Four) Times a Day As Needed.) 5 mL 3   • FOLBEE 2.5-25-1 MG tablet tablet TAKE 1 TABLET BY MOUTH EVERY DAY FOR SUPPLEMENT 30 tablet 2   • furosemide (LASIX) 40 MG tablet TAKE 1 TABLET BY MOUTH EVERY DAY FOR EDEMA (Patient taking differently: take 2 tablets q day) 30 tablet 2   • ipratropium-albuterol (DUO-NEB)  0.5-2.5 mg/mL nebulizer USE 1 VIAL PER NEBULIZER FOUR (4) TIMES DAILY AS NEEDED FOR WHEEZING 360 mL 2   • levothyroxine (SYNTHROID, LEVOTHROID) 50 MCG tablet TAKE 1 TABLET BY MOUTH EVERY DAY FOR THYROID 30 tablet 2   • losartan (COZAAR) 25 MG tablet Take 25 mg by mouth Daily.     • montelukast (SINGULAIR) 10 MG tablet TAKE 1 TABLET BY MOUTH EVERY DAY FOR EMPHYSEMA 30 tablet 2   • nitroglycerin (NITRODUR) 0.2 MG/HR patch APPLY 1 PATCH EVERY DAY FOR 12 HOURS, LEAVE OFF FOR 12 HOURS FOR ANGINA 30 patch 2   • polyethylene glycol (MIRALAX) packet Take 17 g by mouth Daily. 100 packet 1   • ranolazine (RANEXA) 1000 MG 12 hr tablet Take 1 tablet by mouth Every 12 (Twelve) Hours. 60 tablet 0   • sertraline (ZOLOFT) 50 MG tablet TAKE 1 TABLET BY MOUTH EVERY DAY FOR DEPRESSION 30 tablet 2   • SYMBICORT 160-4.5 MCG/ACT inhaler INHALE 2 PUFFS TWO (2) TIMES A DAY FOR EMPHSEMA- RINSE MOUTH AFTER USING. 10.2 g 2   • vitamin D (ERGOCALCIFEROL) 67454 units capsule capsule TAKE 1 CAPSULE BY MOUTH EVERY MONTH 1 capsule 2     No current facility-administered medications for this visit.      Allergies   Allergen Reactions   • Ace Inhibitors    • Lisinopril Cough     Social History     Social History   • Marital status:      Social History Main Topics   • Smoking status: Former Smoker     Packs/day: 0.50     Years: 50.00     Types: Cigarettes     Start date: 1950     Quit date: 1/12/2017   • Smokeless tobacco: Never Used   • Alcohol use No   • Drug use: No   • Sexual activity: Defer     Other Topics Concern   • Not on file       Review of Systems  Review of Systems   Constitutional: Positive for fatigue. Negative for activity change, appetite change, chills, diaphoresis, fever and unexpected weight change.   HENT: Negative for sore throat and trouble swallowing.    Respiratory: Negative for shortness of breath.    Gastrointestinal: Positive for abdominal distention (bloating ). Negative for abdominal pain, anal bleeding, blood in  "stool, constipation, diarrhea, nausea, rectal pain and vomiting.   Musculoskeletal: Negative for arthralgias.   Skin: Negative for pallor.   Neurological: Negative for light-headedness.        /68   Pulse 62   Ht 172.7 cm (67.99\")   Wt 74.8 kg (165 lb)   BMI 25.09 kg/m²     Objective    Physical Exam   Constitutional: She is oriented to person, place, and time. She appears well-developed and well-nourished. She is cooperative. No distress.   HENT:   Head: Normocephalic and atraumatic.   Neck: Normal range of motion. Neck supple. No thyromegaly present.   Cardiovascular: Normal rate, regular rhythm and normal heart sounds.    Pulmonary/Chest: Effort normal and breath sounds normal. She has no wheezes. She has no rhonchi. She has no rales.   Abdominal: Soft. Normal appearance and bowel sounds are normal. She exhibits no distension. There is no hepatosplenomegaly. There is no tenderness. There is no rigidity and no guarding. No hernia.   Lymphadenopathy:     She has no cervical adenopathy.   Neurological: She is alert and oriented to person, place, and time.   Skin: Skin is warm, dry and intact. No rash noted. No pallor.   Psychiatric: She has a normal mood and affect. Her speech is normal.     Office Visit on 04/16/2018   Component Date Value Ref Range Status   • Glucose 04/16/2018 95  60 - 100 mg/dL Final   • BUN 04/16/2018 34* 7 - 21 mg/dL Final   • Creatinine 04/16/2018 1.07* 0.50 - 1.00 mg/dL Final   • Sodium 04/16/2018 137  137 - 145 mmol/L Final   • Potassium 04/16/2018 3.9  3.5 - 5.1 mmol/L Final   • Chloride 04/16/2018 95  95 - 110 mmol/L Final   • CO2 04/16/2018 28.0  22.0 - 31.0 mmol/L Final   • Calcium 04/16/2018 9.1  8.4 - 10.2 mg/dL Final   • Albumin 04/16/2018 4.00  3.40 - 4.80 g/dL Final   • Phosphorus 04/16/2018 3.8  2.4 - 4.4 mg/dL Final   • Anion Gap 04/16/2018 14.0  5.0 - 15.0 mmol/L Final   • BUN/Creatinine Ratio 04/16/2018 31.8* 7.0 - 25.0 Final   • eGFR Non African Amer 04/16/2018 49  " 39 - 90 mL/min/1.73 Final   • WBC 04/16/2018 9.01  3.20 - 9.80 10*3/mm3 Final   • RBC 04/16/2018 3.47* 3.77 - 5.16 10*6/mm3 Final   • Hemoglobin 04/16/2018 11.5* 12.0 - 15.5 g/dL Final   • Hematocrit 04/16/2018 34.1* 35.0 - 45.0 % Final   • MCV 04/16/2018 98.3* 80.0 - 98.0 fL Final   • MCH 04/16/2018 33.1  26.5 - 34.0 pg Final   • MCHC 04/16/2018 33.7  31.4 - 36.0 g/dL Final   • RDW 04/16/2018 14.6* 11.5 - 14.5 % Final   • RDW-SD 04/16/2018 52.1* 36.4 - 46.3 fl Final   • MPV 04/16/2018 10.6  8.0 - 12.0 fL Final   • Platelets 04/16/2018 339  150 - 450 10*3/mm3 Final   • Iron 04/16/2018 78  37 - 170 mcg/dL Final   • TIBC 04/16/2018 269  265 - 497 mcg/dL Final   • Iron Saturation 04/16/2018 29  15 - 50 % Final   • Ferritin 04/16/2018 82.40  11.10 - 264.00 ng/mL Final   • Vitamin B-12 04/16/2018 >1000* 239 - 931 pg/mL Final     Assessment/Plan      1. Gastroesophageal reflux disease with esophagitis    2. Chronic gastritis without bleeding, unspecified gastritis type    3. Iron deficiency anemia, unspecified iron deficiency anemia type    .       Orders placed during this encounter include:  No orders of the defined types were placed in this encounter.      * Surgery not found *    Review and/or summary of lab tests, radiology, procedures, medications. Review and summary of old records and obtaining of history. The risks and benefits of my recommendations, as well as other treatment options were discussed with the patient today. Questions were answered.    No orders of the defined types were placed in this encounter.      Follow-up: Return in about 5 months (around 9/20/2018) for Recheck.          This document has been electronically signed by CRESENCIO Rondon on April 20, 2018 11:36 AM             Results for orders placed or performed in visit on 04/16/18   Iron and TIBC   Result Value Ref Range    Iron 78 37 - 170 mcg/dL    TIBC 269 265 - 497 mcg/dL    Iron Saturation 29 15 - 50 %   CBC (No Diff)   Result Value  Ref Range    WBC 9.01 3.20 - 9.80 10*3/mm3    RBC 3.47 (L) 3.77 - 5.16 10*6/mm3    Hemoglobin 11.5 (L) 12.0 - 15.5 g/dL    Hematocrit 34.1 (L) 35.0 - 45.0 %    MCV 98.3 (H) 80.0 - 98.0 fL    MCH 33.1 26.5 - 34.0 pg    MCHC 33.7 31.4 - 36.0 g/dL    RDW 14.6 (H) 11.5 - 14.5 %    RDW-SD 52.1 (H) 36.4 - 46.3 fl    MPV 10.6 8.0 - 12.0 fL    Platelets 339 150 - 450 10*3/mm3   Ferritin   Result Value Ref Range    Ferritin 82.40 11.10 - 264.00 ng/mL   Vitamin B12   Result Value Ref Range    Vitamin B-12 >1,000 (H) 239 - 931 pg/mL   Renal Function Panel   Result Value Ref Range    Glucose 95 60 - 100 mg/dL    BUN 34 (H) 7 - 21 mg/dL    Creatinine 1.07 (H) 0.50 - 1.00 mg/dL    Sodium 137 137 - 145 mmol/L    Potassium 3.9 3.5 - 5.1 mmol/L    Chloride 95 95 - 110 mmol/L    CO2 28.0 22.0 - 31.0 mmol/L    Calcium 9.1 8.4 - 10.2 mg/dL    Albumin 4.00 3.40 - 4.80 g/dL    Phosphorus 3.8 2.4 - 4.4 mg/dL    Anion Gap 14.0 5.0 - 15.0 mmol/L    BUN/Creatinine Ratio 31.8 (H) 7.0 - 25.0    eGFR Non African Amer 49 39 - 90 mL/min/1.73   Results for orders placed or performed in visit on 03/26/18   CBC Auto Differential   Result Value Ref Range    WBC 8.95 3.20 - 9.80 10*3/mm3    RBC 3.50 (L) 3.77 - 5.16 10*6/mm3    Hemoglobin 11.4 (L) 12.0 - 15.5 g/dL    Hematocrit 34.8 (L) 35.0 - 45.0 %    MCV 99.4 (H) 80.0 - 98.0 fL    MCH 32.6 26.5 - 34.0 pg    MCHC 32.8 31.4 - 36.0 g/dL    RDW 16.1 (H) 11.5 - 14.5 %    RDW-SD 58.7 (H) 36.4 - 46.3 fl    MPV 10.8 8.0 - 12.0 fL    Platelets 318 150 - 450 10*3/mm3    Neutrophil % 67.8 37.0 - 80.0 %    Lymphocyte % 18.2 10.0 - 50.0 %    Monocyte % 7.2 0.0 - 12.0 %    Eosinophil % 5.9 0.0 - 7.0 %    Basophil % 0.6 0.0 - 2.0 %    Immature Grans % 0.3 0.0 - 0.5 %    Neutrophils, Absolute 6.07 2.00 - 8.60 10*3/mm3    Lymphocytes, Absolute 1.63 0.60 - 4.20 10*3/mm3    Monocytes, Absolute 0.64 0.00 - 0.90 10*3/mm3    Eosinophils, Absolute 0.53 0.00 - 0.70 10*3/mm3    Basophils, Absolute 0.05 0.00 - 0.20 10*3/mm3     Immature Grans, Absolute 0.03 (H) 0.00 - 0.02 10*3/mm3   Comprehensive Metabolic Panel   Result Value Ref Range    Glucose 89 60 - 100 mg/dL    BUN 22 (H) 7 - 21 mg/dL    Creatinine 1.01 (H) 0.50 - 1.00 mg/dL    Sodium 141 137 - 145 mmol/L    Potassium 3.2 (L) 3.5 - 5.1 mmol/L    Chloride 93 (L) 95 - 110 mmol/L    CO2 33.0 (H) 22.0 - 31.0 mmol/L    Calcium 9.3 8.4 - 10.2 mg/dL    Total Protein 6.7 6.3 - 8.6 g/dL    Albumin 3.70 3.40 - 4.80 g/dL    ALT (SGPT) 16 9 - 52 U/L    AST (SGOT) 21 14 - 36 U/L    Alkaline Phosphatase 84 38 - 126 U/L    Total Bilirubin 0.3 0.2 - 1.3 mg/dL    eGFR Non African Amer 52 39 - 90 mL/min/1.73    Globulin 3.0 2.3 - 3.5 gm/dL    A/G Ratio 1.2 1.1 - 1.8 g/dL    BUN/Creatinine Ratio 21.8 7.0 - 25.0    Anion Gap 15.0 5.0 - 15.0 mmol/L   Results for orders placed or performed in visit on 03/16/18   Iron and TIBC   Result Value Ref Range    Iron 57 37 - 170 mcg/dL    TIBC 236 (L) 265 - 497 mcg/dL    Iron Saturation 24 15 - 50 %   CBC (No Diff)   Result Value Ref Range    WBC 8.19 3.20 - 9.80 10*3/mm3    RBC 3.19 (L) 3.77 - 5.16 10*6/mm3    Hemoglobin 10.4 (L) 12.0 - 15.5 g/dL    Hematocrit 31.5 (L) 35.0 - 45.0 %    MCV 98.7 (H) 80.0 - 98.0 fL    MCH 32.6 26.5 - 34.0 pg    MCHC 33.0 31.4 - 36.0 g/dL    RDW 17.6 (H) 11.5 - 14.5 %    RDW-SD 63.6 (H) 36.4 - 46.3 fl    MPV 10.7 8.0 - 12.0 fL    Platelets 346 150 - 450 10*3/mm3   TSH   Result Value Ref Range    TSH 3.110 0.460 - 4.680 mIU/mL   T4, free   Result Value Ref Range    Free T4 1.46 0.78 - 2.19 ng/dL   Comprehensive metabolic panel   Result Value Ref Range    Glucose 93 60 - 100 mg/dL    BUN 17 7 - 21 mg/dL    Creatinine 0.82 0.50 - 1.00 mg/dL    Sodium 140 137 - 145 mmol/L    Potassium 3.7 3.5 - 5.1 mmol/L    Chloride 98 95 - 110 mmol/L    CO2 28.0 22.0 - 31.0 mmol/L    Calcium 8.7 8.4 - 10.2 mg/dL    Total Protein 6.6 6.3 - 8.6 g/dL    Albumin 3.60 3.40 - 4.80 g/dL    ALT (SGPT) 27 9 - 52 U/L    AST (SGOT) 24 14 - 36 U/L     Alkaline Phosphatase 92 38 - 126 U/L    Total Bilirubin 0.3 0.2 - 1.3 mg/dL    eGFR Non African Amer 66 39 - 90 mL/min/1.73    Globulin 3.0 2.3 - 3.5 gm/dL    A/G Ratio 1.2 1.1 - 1.8 g/dL    BUN/Creatinine Ratio 20.7 7.0 - 25.0    Anion Gap 14.0 5.0 - 15.0 mmol/L     *Note: Due to a large number of results and/or encounters for the requested time period, some results have not been displayed. A complete set of results can be found in Results Review.

## 2018-04-20 NOTE — PATIENT INSTRUCTIONS
Helicobacter Pylori Antibodies Test  Why am I having this test?  This is a blood test that looks for bacteria called Helicobacter pylori (H. pylori). H. pylori is a germ that can be found in the cells that line the stomach. Having high levels of H. pylori in your stomach puts you at risk for stomach ulcers and small bowel ulcers, long-term (chronic) inflammation of the lining of the stomach, or even ulcers that may occur in the canal that runs from the mouth to the stomach (esophagus). Presence of H. pylori can also increase your risk for stomach cancer if it is left untreated.  Most people with H. pylori in their stomach bacteria have no symptoms. Your health care provider may ask you to have this test if you have symptoms of a stomach ulcer or small bowel ulcer, such as stomach pain before or after eating, heartburn, or nausea repeatedly after eating.  What kind of sample is taken?  A blood sample is required for this test. It is usually collected by inserting a needle into a vein or sticking a finger with a small needle.  How do I prepare for this test?  There is no preparation required for this test.  What are the reference ranges?  Reference ranges are considered healthy ranges established after testing a large group of healthy people. Reference ranges may vary among different people, labs, and hospitals. It is your responsibility to obtain your test results. Ask the lab or department performing the test when and how you will get your results.  Your test results will be reported as positive, negative, or equivocal. Equivocal means that your results are neither positive nor negative.  References ranges for these results are as follows:  · Less than or equal to 30 units/mL. This is negative.  · Greater than or equal to 40 units/mL. This is positive.  · 30.01-39.99 units/mL. This is equivocal.  What do the results mean?  Test results that are higher than normal may indicate numerous health conditions. These may  include:  · Short-term or long-term irritation of the stomach lining (gastritis).  · Small bowel ulcer.  · Stomach ulcer.  · Stomach cancer.  Talk with your health care provider to discuss your results, treatment options, and if necessary, the need for more tests. Talk with your health care provider if you have any questions about your results.  Talk with your health care provider to discuss your results, treatment options, and if necessary, the need for more tests. Talk with your health care provider if you have any questions about your results.  This information is not intended to replace advice given to you by your health care provider. Make sure you discuss any questions you have with your health care provider.  Document Released: 01/11/2006 Document Revised: 08/23/2017 Document Reviewed: 05/01/2015  [employer] Interactive Patient Education © 2017 [employer]  MyPlate from [employer]  The general, healthful diet is based on the 2010 Dietary Guidelines for Americans. The amount of food you need to eat from each food group depends on your age, sex, and level of physical activity and can be individualized by a dietitian. Go to ChooseMyPlate.gov for more information.  What do I need to know about the MyPlate plan?  · Enjoy your food, but eat less.  · Avoid oversized portions.  ¨ ½ of your plate should include fruits and vegetables.  ¨ ¼ of your plate should be grains.  ¨ ¼ of your plate should be protein.  Grains   · Make at least half of your grains whole grains.  · For a 2,000 calorie daily food plan, eat 6 oz every day.  · 1 oz is about 1 slice bread, 1 cup cereal, or ½ cup cooked rice, cereal, or pasta.  Vegetables   · Make half your plate fruits and vegetables.  · For a 2,000 calorie daily food plan, eat 2½ cups every day.  · 1 cup is about 1 cup raw or cooked vegetables or vegetable juice or 2 cups raw leafy greens.  Fruits   · Make half your plate fruits and vegetables.  · For a 2,000 calorie daily food plan, eat 2  cups every day.  · 1 cup is about 1 cup fruit or 100% fruit juice or ½ cup dried fruit.  Protein   · For a 2,000 calorie daily food plan, eat 5½ oz every day.  · 1 oz is about 1 oz meat, poultry, or fish, ¼ cup cooked beans, 1 egg, 1 Tbsp peanut butter, or ½ oz nuts or seeds.  Dairy   · Switch to fat-free or low-fat (1%) milk.  · For a 2,000 calorie daily food plan, eat 3 cups every day.  · 1 cup is about 1 cup milk or yogurt or soy milk (soy beverage), 1½ oz natural cheese, or 2 oz processed cheese.  Fats, Oils, and Empty Calories   · Only small amounts of oils are recommended.  · Empty calories are calories from solid fats or added sugars.  · Compare sodium in foods like soup, bread, and frozen meals. Choose the foods with lower numbers.  · Drink water instead of sugary drinks.  What foods can I eat?  Grains   Whole grains such as whole wheat, quinoa, millet, and bulgur. Bread, rolls, and pasta made from whole grains. Brown or wild rice. Hot or cold cereals made from whole grains and without added sugar.  Vegetables   All fresh vegetables, especially fresh red, dark green, or orange vegetables. Peas and beans. Low-sodium frozen or canned vegetables prepared without added salt. Low-sodium vegetable juices.  Fruits   All fresh, frozen, and dried fruits. Canned fruit packed in water or fruit juice without added sugar. Fruit juices without added sugar.  Meats and Other Protein Sources   Boiled, baked, or grilled lean meat trimmed of fat. Skinless poultry. Fresh seafood and shellfish. Canned seafood packed in water. Unsalted nuts and unsalted nut butters. Tofu. Dried beans and pea. Eggs.  Dairy   Low-fat or fat-free milk, yogurt, and cheeses.  Sweets and Desserts   Frozen desserts made from low-fat milk.  Fats and Oils   Olive, peanut, and canola oils and margarine. Salad dressing and mayonnaise made from these oils.  Other   Soups and casseroles made from allowed ingredients and without added fat or salt.  The items  listed above may not be a complete list of recommended foods or beverages. Contact your dietitian for more options.   What foods are not recommended?  Grains   Sweetened, low-fiber cereals. Packaged baked goods. Snack crackers and chips. Cheese crackers, butter crackers, and biscuits. Frozen waffles, sweet breads, doughnuts, pastries, packaged baking mixes, pancakes, cakes, and cookies.  Vegetables   Regular canned or frozen vegetables or vegetables prepared with salt. Canned tomatoes. Canned tomato sauce. Fried vegetables. Vegetables in cream sauce or cheese sauce.  Fruits   Fruits packed in syrup or made with added sugar.  Meats and Other Protein Sources   Marbled or fatty meats such as ribs. Poultry with skin. Fried meats, poultry, eggs, or fish. Sausages, hot dogs, and deli meats such as pastrami, bologna, or salami.  Dairy   Whole milk, cream, cheeses made from whole milk, sour cream. Ice cream or yogurt made from whole milk or with added sugar.  Beverages   For adults, no more than one alcoholic drink per day. Regular soft drinks or other sugary beverages. Juice drinks.  Sweets and Desserts   Sugary or fatty desserts, candy, and other sweets.  Fats and Oils   Solid shortening or partially hydrogenated oils. Solid margarine. Margarine that contains trans fats. Butter.  The items listed above may not be a complete list of foods and beverages to avoid. Contact your dietitian for more information.   This information is not intended to replace advice given to you by your health care provider. Make sure you discuss any questions you have with your health care provider.  Document Released: 01/06/2009 Document Revised: 05/25/2017 Document Reviewed: 11/26/2014  [employer] Interactive Patient Education © 2017 [employer]  BMI for Adults  Body mass index (BMI) is a number that is calculated from a person's weight and height. In most adults, the number is used to find how much of an adult's weight is made up of fat. BMI  is not as accurate as a direct measure of body fat.  How is BMI calculated?  BMI is calculated by dividing weight in kilograms by height in meters squared. It can also be calculated by dividing weight in pounds by height in inches squared, then multiplying the resulting number by 703. Charts are available to help you find your BMI quickly and easily without doing this calculation.  How is BMI interpreted?  Health care professionals use BMI charts to identify whether an adult is underweight, at a normal weight, or overweight based on the following guidelines:  · Underweight: BMI less than 18.5.  · Normal weight: BMI between 18.5 and 24.9.  · Overweight: BMI between 25 and 29.9.  · Obese: BMI of 30 and above.  BMI is usually interpreted the same for males and females.  Weight includes both fat and muscle, so someone with a muscular build, such as an athlete, may have a BMI that is higher than 24.9. In cases like these, BMI may not accurately depict body fat. To determine if excess body fat is the cause of a BMI of 25 or higher, further assessments may need to be done by a health care provider.  Why is BMI a useful tool?  BMI is used to identify a possible weight problem that may be related to a medical problem or may increase the risk for medical problems. BMI can also be used to promote changes to reach a healthy weight.  This information is not intended to replace advice given to you by your health care provider. Make sure you discuss any questions you have with your health care provider.  Document Released: 08/29/2005 Document Revised: 04/27/2017 Document Reviewed: 05/15/2015  [employer] Interactive Patient Education © 2017 [employer]

## 2018-04-30 RX ORDER — RANOLAZINE 1000 MG/1
1000 TABLET, EXTENDED RELEASE ORAL EVERY 12 HOURS SCHEDULED
Qty: 60 TABLET | Refills: 3 | Status: SHIPPED | OUTPATIENT
Start: 2018-04-30 | End: 2018-06-20

## 2018-05-09 ENCOUNTER — EPISODE CHANGES (OUTPATIENT)
Dept: CASE MANAGEMENT | Facility: OTHER | Age: 83
End: 2018-05-09

## 2018-05-16 ENCOUNTER — APPOINTMENT (OUTPATIENT)
Dept: LAB | Facility: HOSPITAL | Age: 83
End: 2018-05-16

## 2018-05-16 PROCEDURE — 85025 COMPLETE CBC W/AUTO DIFF WBC: CPT | Performed by: NURSE PRACTITIONER

## 2018-05-16 PROCEDURE — 83735 ASSAY OF MAGNESIUM: CPT | Performed by: NURSE PRACTITIONER

## 2018-05-16 PROCEDURE — 80053 COMPREHEN METABOLIC PANEL: CPT | Performed by: NURSE PRACTITIONER

## 2018-05-16 PROCEDURE — 83880 ASSAY OF NATRIURETIC PEPTIDE: CPT | Performed by: NURSE PRACTITIONER

## 2018-05-21 ENCOUNTER — OFFICE VISIT (OUTPATIENT)
Dept: INTERNAL MEDICINE | Facility: CLINIC | Age: 83
End: 2018-05-21

## 2018-05-21 VITALS
HEIGHT: 68 IN | DIASTOLIC BLOOD PRESSURE: 60 MMHG | SYSTOLIC BLOOD PRESSURE: 130 MMHG | WEIGHT: 162.6 LBS | BODY MASS INDEX: 24.64 KG/M2

## 2018-05-21 DIAGNOSIS — D50.9 IRON DEFICIENCY ANEMIA, UNSPECIFIED IRON DEFICIENCY ANEMIA TYPE: ICD-10-CM

## 2018-05-21 DIAGNOSIS — N28.9 RENAL INSUFFICIENCY: ICD-10-CM

## 2018-05-21 DIAGNOSIS — R06.2 WHEEZING: ICD-10-CM

## 2018-05-21 DIAGNOSIS — J44.0 CHRONIC OBSTRUCTIVE PULMONARY DISEASE WITH ACUTE LOWER RESPIRATORY INFECTION (HCC): Primary | ICD-10-CM

## 2018-05-21 DIAGNOSIS — I50.32 CHRONIC DIASTOLIC HEART FAILURE (HCC): ICD-10-CM

## 2018-05-21 PROCEDURE — 99214 OFFICE O/P EST MOD 30 MIN: CPT | Performed by: INTERNAL MEDICINE

## 2018-05-21 RX ORDER — METOLAZONE 5 MG/1
5 TABLET ORAL 2 TIMES WEEKLY
COMMUNITY
End: 2018-05-21 | Stop reason: SINTOL

## 2018-05-21 RX ORDER — AZITHROMYCIN 250 MG/1
TABLET, FILM COATED ORAL
Qty: 6 TABLET | Refills: 0 | Status: SHIPPED | OUTPATIENT
Start: 2018-05-21 | End: 2018-06-20

## 2018-05-21 RX ORDER — PREDNISONE 20 MG/1
20 TABLET ORAL DAILY
Qty: 10 TABLET | Refills: 0 | Status: SHIPPED | OUTPATIENT
Start: 2018-05-21 | End: 2018-05-26

## 2018-05-21 RX ORDER — FUROSEMIDE 40 MG/1
40 TABLET ORAL DAILY
Qty: 30 TABLET | Refills: 2 | Status: SHIPPED | OUTPATIENT
Start: 2018-05-21 | End: 2018-06-20 | Stop reason: SDUPTHER

## 2018-05-21 NOTE — PROGRESS NOTES
Subjective   Val Arteaga is a 85 y.o. female       Patient is in for recheck after visit to Care center for dyspnea and wekness.  Patient was given 5 days course of Prednisone 20 mg bid.    Congestive Heart Failure   Presents for follow-up visit. Associated symptoms include fatigue and shortness of breath. Pertinent negatives include no abdominal pain, chest pain, chest pressure, edema or palpitations. Compliance with total regimen is %. Compliance with diet is 51-75%. Compliance with exercise is 51-75%. Compliance with medications is %.   COPD   This is a chronic problem. The current episode started more than 1 year ago. The problem has been waxing and waning. Associated symptoms include coughing, fatigue and weakness. Pertinent negatives include no abdominal pain, chest pain, joint swelling or rash.   Hypertension   This is a chronic problem. The current episode started more than 1 year ago. The problem has been waxing and waning since onset. Associated symptoms include shortness of breath. Pertinent negatives include no chest pain or palpitations. There are no associated agents to hypertension. Risk factors for coronary artery disease include dyslipidemia and sedentary lifestyle. Current antihypertension treatment includes angiotensin blockers, diuretics, beta blockers and lifestyle changes.         Past Medical History:   Diagnosis Date   • Acquired hypothyroidism    • Allergic rhinitis    • CHF (congestive heart failure)    • COPD (chronic obstructive pulmonary disease)    • Corneal epithelial dystrophy    • Coronary arteriosclerosis    • Depression    • Generalized atherosclerosis    • GERD (gastroesophageal reflux disease)    • Hemorrhoids    • History of bone density study 08/03/2012    Lumbar spine Osteopenia. Femoral Osteopenia   • History of mammogram 08/20/2004    Birads Category 2 Benign   • History of Papanicolaou smear of cervix 08/12/2004    NEGATIVE   • Hypercholesterolemia    •  Hyperlipidemia    • Hypertension    • Myocardial infarction    • Nonexudative age-related macular degeneration    • Osteoarthritis of multiple joints    • Pseudophakia    • Punctate keratitis     - history Thygeson's keratopathy      • Tobacco dependence syndrome      Past Surgical History:   Procedure Laterality Date   • APPENDECTOMY     • BREAST SURGERY  03/19/1954    Excision, breast lesion (1)  Excision of fibroma. 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 patency in the circumflex artery site of previous angioplasty. Kathe. patency in RCA.Nonobstructive 20-30% stenosis in the LAD and diagonal branch. Preserved LV systolic function with Ef of 55%   • CERVICAL SPINE SURGERY  09/16/1974    Excision of cervical disc, C5-6, C6-7, anterior cervical fusion, C5-6 with iliac bone graft.cervical spondylosis,C5-6, C6-7   • COLONOSCOPY  01/01/2013    patient declined    • CORONARY ANGIOPLASTY  07/21/1993    Angioplasty, coronary (2)    RCA    • DILATATION AND CURETTAGE      3   • ENDOSCOPY N/A 2/14/2018    Procedure: ESOPHAGOGASTRODUODENOSCOPY;  Surgeon: Alex Avitia MD;  Location: [facility] ENDOSCOPY;  Service:    • ENDOSCOPY AND COLONOSCOPY  10/01/1998    Hemorhoids Rectal Outlet Bleeding   • ESOPHAGOSCOPY / EGD  06/28/2004    Nonerosive gastritis of the body of the stomach. A biopsy was obtained & placed in h. Pylori test agar. Multiple biopsies were obtained from the body of the stomach   • HIP BIPOLAR REPLACEMENT Right 1/23/2018    Procedure: HIP BIPOLAR ANTERIOR APPROACH - right;  Surgeon: Kelvin Mcdowell MD;  Location: [facility] OR;  Service:    • INJECTION OF MEDICATION  11/23/2015    Depo Medrol (Methylprednisone) (5)    • INJECTION OF MEDICATION  02/04/2016    Kenalog (3)     • JOINT REPLACEMENT Right 01/24/2018    hip   • PACEMAKER REPLACEMENT  2006       Social History   Substance Use Topics   • Smoking status: Former Smoker     Packs/day: 0.50      Years: 50.00     Types: Cigarettes     Start date: 1950     Quit date: 1/12/2017   • Smokeless tobacco: Never Used   • Alcohol use No     Family History   Problem Relation Age of Onset   • Coronary artery disease Other      Allergies   Allergen Reactions   • Ace Inhibitors    • Lisinopril Cough     Current Outpatient Prescriptions on File Prior to Visit   Medication Sig Dispense Refill   • acetaminophen (TYLENOL) 325 MG tablet Take 2 tablets by mouth Every 4 (Four) Hours As Needed for Mild Pain . 1 tablet 1   • aspirin 325 MG EC tablet Take 1 tablet by mouth Daily.     • atorvastatin (LIPITOR) 20 MG tablet TAKE 1 TABLET BY MOUTH EVERY NIGHT AT BEDTIME FOR CHOLESTEROL 30 tablet 2   • carvedilol (COREG) 6.25 MG tablet TAKE 1 TABLET BY MOUTH TWO (2) TIMES A DAY WITH MEALS FOR HEART 60 tablet 2   • ESTRACE VAGINAL 0.1 MG/GM vaginal cream INSERT 1 APPRLICATORFUL VAGINALLY EVERY NIGHT AT BEDTIME FOR URETHAL CARUNCLE 42.5 g 2   • famotidine (PEPCID) 40 MG tablet TAKE 1 TABLET BY MOUTH EVERY DAY FOR GERD 30 tablet 2   • ferrous sulfate 324 (65 Fe) MG tablet delayed-release EC tablet Take 1 tablet by mouth Daily With Breakfast. 1 tablet 1   • fluorometholone (FLAREX) 0.1 % ophthalmic suspension Administer 1 drop to both eyes 4 (Four) Times a Day. (Patient taking differently: Administer 1 drop to both eyes 4 (Four) Times a Day As Needed.) 5 mL 3   • FOLBEE 2.5-25-1 MG tablet tablet TAKE 1 TABLET BY MOUTH EVERY DAY FOR SUPPLEMENT 30 tablet 2   • furosemide (LASIX) 40 MG tablet TAKE 1 TABLET BY MOUTH EVERY DAY FOR EDEMA (Patient taking differently: take 2 tablets q day) 30 tablet 2   • ipratropium-albuterol (DUO-NEB) 0.5-2.5 mg/mL nebulizer USE 1 VIAL PER NEBULIZER FOUR (4) TIMES DAILY AS NEEDED FOR WHEEZING 360 mL 2   • levothyroxine (SYNTHROID, LEVOTHROID) 50 MCG tablet TAKE 1 TABLET BY MOUTH EVERY DAY FOR THYROID 30 tablet 2   • losartan (COZAAR) 25 MG tablet Take 25 mg by mouth Daily.     • montelukast (SINGULAIR) 10 MG  tablet TAKE 1 TABLET BY MOUTH EVERY DAY FOR EMPHYSEMA 30 tablet 2   • nitroglycerin (NITRODUR) 0.2 MG/HR patch APPLY 1 PATCH EVERY DAY FOR 12 HOURS, LEAVE OFF FOR 12 HOURS FOR ANGINA 30 patch 2   • polyethylene glycol (MIRALAX) packet Take 17 g by mouth Daily. 100 packet 1   • ranolazine (RANEXA) 1000 MG 12 hr tablet Take 1 tablet by mouth Every 12 (Twelve) Hours. 60 tablet 3   • sertraline (ZOLOFT) 50 MG tablet TAKE 1 TABLET BY MOUTH EVERY DAY FOR DEPRESSION 30 tablet 2   • vitamin D (ERGOCALCIFEROL) 74232 units capsule capsule TAKE 1 CAPSULE BY MOUTH EVERY MONTH 1 capsule 2   • [DISCONTINUED] predniSONE (DELTASONE) 20 MG tablet Take 1 tablet by mouth 2 (Two) Times a Day for 5 days. 10 tablet 0   • [DISCONTINUED] SYMBICORT 160-4.5 MCG/ACT inhaler INHALE 2 PUFFS TWO (2) TIMES A DAY FOR EMPHSEMA- RINSE MOUTH AFTER USING. 10.2 g 2     No current facility-administered medications on file prior to visit.      Review of Systems   Constitutional: Positive for activity change and fatigue.   HENT: Negative.    Respiratory: Positive for cough, shortness of breath and wheezing. Negative for chest tightness.    Cardiovascular: Negative for chest pain, palpitations and leg swelling.   Gastrointestinal: Negative for abdominal pain, blood in stool, constipation and diarrhea.   Endocrine: Negative for cold intolerance and heat intolerance.   Genitourinary: Negative for difficulty urinating, flank pain and frequency.   Musculoskeletal: Negative for back pain and joint swelling.   Skin: Negative for color change, rash and wound.   Neurological: Positive for weakness and light-headedness. Negative for dizziness, tremors and syncope.   Hematological: Negative for adenopathy. Does not bruise/bleed easily.   Psychiatric/Behavioral: Negative for confusion. The patient is not nervous/anxious.        Objective   Physical Exam   Constitutional: She is oriented to person, place, and time. She appears well-developed and well-nourished.    HENT:   Head: Normocephalic and atraumatic.   Eyes: Conjunctivae are normal. No scleral icterus.   Neck: Neck supple. No JVD present.   Cardiovascular: Normal rate and regular rhythm.    Murmur heard.  Pulmonary/Chest: Effort normal. No respiratory distress. She has wheezes.   Abdominal: Soft. Bowel sounds are normal. She exhibits no mass. There is no tenderness.   Musculoskeletal: Normal range of motion. She exhibits no deformity.   She is using cane for ambulation   Neurological: She is alert and oriented to person, place, and time. No cranial nerve deficit.   Skin: Skin is warm and dry. No erythema. No pallor.   Nursing note and vitals reviewed.          Patient Active Problem List   Diagnosis   • Essential hypertension   • Coronary artery disease involving native coronary artery of native heart without angina pectoris   • Chronic obstructive pulmonary disease   • Acquired hypothyroidism   • Depressive disorder   • Thygeson's superficial punctate keratitis   • Pseudophakia   • Precordial pain   • Displaced fracture of base of neck of right femur, initial encounter for closed fracture   • Hip fracture, right, closed, initial encounter   • Pulmonary emphysema   • Chronic systolic congestive heart failure   • Hyponatremia   • BENITO (acute kidney injury)   • Acute blood loss anemia   • Urinary retention   • Closed displaced basicervical fracture of right femur   • Urethral caruncle   • Symptomatic anemia   • Anemia              Admission on 05/16/2018, Discharged on 05/16/2018   Component Date Value Ref Range Status   • Glucose 05/16/2018 91  60 - 100 mg/dL Final   • BUN 05/16/2018 28* 7 - 21 mg/dL Final   • Creatinine 05/16/2018 1.30* 0.50 - 1.00 mg/dL Final   • Sodium 05/16/2018 134* 137 - 145 mmol/L Final   • Potassium 05/16/2018 4.0  3.5 - 5.1 mmol/L Final   • Chloride 05/16/2018 93* 95 - 110 mmol/L Final   • CO2 05/16/2018 29.0  22.0 - 31.0 mmol/L Final   • Calcium 05/16/2018 9.3  8.4 - 10.2 mg/dL Final   •  Total Protein 05/16/2018 7.6  6.3 - 8.6 g/dL Final   • Albumin 05/16/2018 4.10  3.40 - 4.80 g/dL Final   • ALT (SGPT) 05/16/2018 26  9 - 52 U/L Final   • AST (SGOT) 05/16/2018 26  14 - 36 U/L Final   • Alkaline Phosphatase 05/16/2018 91  38 - 126 U/L Final   • Total Bilirubin 05/16/2018 0.4  0.2 - 1.3 mg/dL Final   • eGFR Non African Amer 05/16/2018 39  39 - 90 mL/min/1.73 Final   • Globulin 05/16/2018 3.5  2.3 - 3.5 gm/dL Final   • A/G Ratio 05/16/2018 1.2  1.1 - 1.8 g/dL Final   • BUN/Creatinine Ratio 05/16/2018 21.5  7.0 - 25.0 Final   • Anion Gap 05/16/2018 12.0  5.0 - 15.0 mmol/L Final   • proBNP 05/16/2018 264.0  0.0-1,800.0 pg/mL Final   • Magnesium 05/16/2018 2.0  1.6 - 2.3 mg/dL Final   • WBC 05/16/2018 8.82  3.20 - 9.80 10*3/mm3 Final   • RBC 05/16/2018 3.45* 3.77 - 5.16 10*6/mm3 Final   • Hemoglobin 05/16/2018 11.3* 12.0 - 15.5 g/dL Final   • Hematocrit 05/16/2018 33.3* 35.0 - 45.0 % Final   • MCV 05/16/2018 96.5  80.0 - 98.0 fL Final   • MCH 05/16/2018 32.8  26.5 - 34.0 pg Final   • MCHC 05/16/2018 33.9  31.4 - 36.0 g/dL Final   • RDW 05/16/2018 13.5  11.5 - 14.5 % Final   • RDW-SD 05/16/2018 47.4* 36.4 - 46.3 fl Final   • MPV 05/16/2018 9.7  8.0 - 12.0 fL Final   • Platelets 05/16/2018 376  150 - 450 10*3/mm3 Final   • Neutrophil % 05/16/2018 65.7  37.0 - 80.0 % Final   • Lymphocyte % 05/16/2018 23.8  10.0 - 50.0 % Final   • Monocyte % 05/16/2018 5.8  0.0 - 12.0 % Final   • Eosinophil % 05/16/2018 3.9  0.0 - 7.0 % Final   • Basophil % 05/16/2018 0.6  0.0 - 2.0 % Final   • Immature Grans % 05/16/2018 0.2  0.0 - 0.5 % Final   • Neutrophils, Absolute 05/16/2018 5.80  2.00 - 8.60 10*3/mm3 Final   • Lymphocytes, Absolute 05/16/2018 2.10  0.60 - 4.20 10*3/mm3 Final   • Monocytes, Absolute 05/16/2018 0.51  0.00 - 0.90 10*3/mm3 Final   • Eosinophils, Absolute 05/16/2018 0.34  0.00 - 0.70 10*3/mm3 Final   • Basophils, Absolute 05/16/2018 0.05  0.00 - 0.20 10*3/mm3 Final   • Immature Grans, Absolute 05/16/2018  0.02  0.00 - 0.02 10*3/mm3 Final     Lab Results   Component Value Date    HGB 11.3 (L) 05/16/2018    HGB 11.5 (L) 04/16/2018    HGB 11.4 (L) 03/26/2018     Lab Results   Component Value Date    HCT 33.3 (L) 05/16/2018    HCT 34.1 (L) 04/16/2018    HCT 34.8 (L) 03/26/2018     Lab Results   Component Value Date    BUN 28 (H) 05/16/2018    BUN 34 (H) 04/16/2018    BUN 22 (H) 03/26/2018     Lab Results   Component Value Date    CREATININE 1.30 (H) 05/16/2018    CREATININE 1.07 (H) 04/16/2018    CREATININE 1.01 (H) 03/26/2018         Lab Results   Component Value Date    TSH 3.110 03/16/2018    TSH 0.780 01/27/2018    TSH 3.950 01/03/2018     Results for orders placed during the hospital encounter of 01/03/18   Adult Transthoracic Echo Complete W/ Cont if Necessary Per Protocol    Narrative · The left ventricular cavity is mildly dilated.  · Left ventricular diastolic dysfunction (grade I a) consistent with   impaired relaxation.  · Left ventricular wall thickness is consistent with borderline concentric   hypertrophy.  · Mild to moderate tricuspid valve regurgitation is present.  · Mild mitral valve regurgitation is present        PACS Images     Radiology Images   Study Result        TWO VIEW CHEST     HISTORY: Wheezing     Frontal and lateral films of the chest were obtained.  COMPARISON: February 12, 2018     Chronic obstructive pulmonary disease.  Minimal patchy atelectasis or infiltrate right midlung and right  lung base.  Left-sided pacemaker remains in place with leads in the right  atrium and right ventricle.  The heart is not enlarged.  The pulmonary vasculature is not increased.  No pleural effusion.  No pneumothorax.  No acute osseous abnormality.  Degenerative changes are present in the shoulders.     IMPRESSION:  CONCLUSION:  Chronic obstructive pulmonary disease.  Minimal patchy atelectasis or infiltrate right midlung and right  lung base.  Left-sided pacemaker remains in place with leads in the  right  atrium and right ventricle.     [id number]     Electronically signed by:  Gonsalo Reddy MD  5/16/2018 1:56 PM CDT  Workstation: [email or web address]       Assessment/Plan   Val was seen today for fatigue.    Diagnoses and all orders for this visit:    Chronic obstructive pulmonary disease with acute lower respiratory infection    Chronic diastolic heart failure    Renal insufficiency  -     Renal Function Panel; Future  -     Urinalysis With / Microscopic If Indicated - Urine, Clean Catch; Future    Iron deficiency anemia, unspecified iron deficiency anemia type    Wheezing  -     predniSONE (DELTASONE) 20 MG tablet; Take 1 tablet by mouth Daily for 5 days.    Other orders  -     Fluticasone-Umeclidin-Vilant (TRELEGY ELLIPTA) 100-62.5-25 MCG/INH aerosol powder ; Inhale 1 each Daily.  -     azithromycin (ZITHROMAX Z-NINA) 250 MG tablet; Take 2 tablets the first day, then 1 tablet daily for 4 days.  -     Shingrix Vaccine           Plan      1. Z-pack.  Prednisone 20 mg daily for 5 days.  Nebs PPRN.  Trelegy - sample given.  DC Symbicort.  Discussed nature of medical condition.  2.Losartan, Coreg, Lasix.  DC Metolazone.  Presented an overview of heart failure, expected course, considerations, risk factors and exacerbation preventio  Recommended restricted dietary sodium intake of 2g/day  Fluid restrictions of 2L/day.  Discussed patient action plan for heart failure;  Discussed warning signs requiring additional medical attention for heart failure.  3.Will adjust dose of diuretics.  Avoidance of NSAIDs.  Renal function in 1 week.  4.HGB remains stable.  Continue Ferrous sulfate.  GI follow up as scheduled prior per GI consultants.  5. Rx given for Shingrix, and she will check with her Pharmacy regarding cost and availability.        Follow up in 1 month.              This document has been electronically signed by Roula Albert MD on May 21, 2018 10:16 AM

## 2018-05-21 NOTE — PATIENT INSTRUCTIONS

## 2018-05-29 ENCOUNTER — LAB (OUTPATIENT)
Dept: LAB | Facility: HOSPITAL | Age: 83
End: 2018-05-29

## 2018-05-29 DIAGNOSIS — N39.0 URINARY TRACT INFECTION WITHOUT HEMATURIA, SITE UNSPECIFIED: ICD-10-CM

## 2018-05-29 DIAGNOSIS — N28.9 RENAL INSUFFICIENCY: ICD-10-CM

## 2018-05-29 DIAGNOSIS — N39.0 URINARY TRACT INFECTION WITHOUT HEMATURIA, SITE UNSPECIFIED: Primary | ICD-10-CM

## 2018-05-29 LAB
ALBUMIN SERPL-MCNC: 3.8 G/DL (ref 3.4–4.8)
ANION GAP SERPL CALCULATED.3IONS-SCNC: 11 MMOL/L (ref 5–15)
BACTERIA UR QL AUTO: ABNORMAL /HPF
BILIRUB UR QL STRIP: NEGATIVE
BUN BLD-MCNC: 22 MG/DL (ref 7–21)
BUN/CREAT SERPL: 25.3 (ref 7–25)
CALCIUM SPEC-SCNC: 8.9 MG/DL (ref 8.4–10.2)
CHLORIDE SERPL-SCNC: 99 MMOL/L (ref 95–110)
CLARITY UR: ABNORMAL
CO2 SERPL-SCNC: 27 MMOL/L (ref 22–31)
COLOR UR: YELLOW
CREAT BLD-MCNC: 0.87 MG/DL (ref 0.5–1)
GFR SERPL CREATININE-BSD FRML MDRD: 62 ML/MIN/1.73 (ref 39–90)
GLUCOSE BLD-MCNC: 122 MG/DL (ref 60–100)
GLUCOSE UR STRIP-MCNC: NEGATIVE MG/DL
HGB UR QL STRIP.AUTO: NEGATIVE
HYALINE CASTS UR QL AUTO: ABNORMAL /LPF
KETONES UR QL STRIP: NEGATIVE
LEUKOCYTE ESTERASE UR QL STRIP.AUTO: ABNORMAL
NITRITE UR QL STRIP: NEGATIVE
PH UR STRIP.AUTO: 7 [PH] (ref 5–9)
PHOSPHATE SERPL-MCNC: 3 MG/DL (ref 2.4–4.4)
POTASSIUM BLD-SCNC: 4.4 MMOL/L (ref 3.5–5.1)
PROT UR QL STRIP: NEGATIVE
RBC # UR: ABNORMAL /HPF
REF LAB TEST METHOD: ABNORMAL
SODIUM BLD-SCNC: 137 MMOL/L (ref 137–145)
SP GR UR STRIP: 1.01 (ref 1–1.03)
SQUAMOUS #/AREA URNS HPF: ABNORMAL /HPF
UROBILINOGEN UR QL STRIP: ABNORMAL
WBC UR QL AUTO: ABNORMAL /HPF

## 2018-05-29 PROCEDURE — 87086 URINE CULTURE/COLONY COUNT: CPT

## 2018-05-29 PROCEDURE — 80069 RENAL FUNCTION PANEL: CPT

## 2018-05-29 PROCEDURE — 81001 URINALYSIS AUTO W/SCOPE: CPT

## 2018-05-31 ENCOUNTER — TELEPHONE (OUTPATIENT)
Dept: FAMILY MEDICINE CLINIC | Facility: CLINIC | Age: 83
End: 2018-05-31

## 2018-05-31 LAB — BACTERIA SPEC AEROBE CULT: NORMAL

## 2018-05-31 NOTE — TELEPHONE ENCOUNTER
Pr Dr. Albert, Ms. Arteaga has been called with her recent lab results & recommendations.  Continue her current medications and follow-up as planned or sooner if any problems.      ----- Message from Roula Albert MD sent at 5/31/2018  9:07 AM CDT -----  uti resolved  ----- Message -----  From: Lab, Background User  Sent: 5/29/2018  11:49 AM  To: Roula Albert MD

## 2018-05-31 NOTE — TELEPHONE ENCOUNTER
--Pr Dr. Albert, Ms. Arteaga has been called with her recent lab results & recommendations.  Continue her current medications and follow-up as planned or sooner if any problems.      --- Message from Roula Albert MD sent at 5/29/2018  5:22 PM CDT -----  Kidney function better  Urine culture pending  ----- Message -----  From: Lab, Background User  Sent: 5/29/2018  11:49 AM  To: Roula Albert MD

## 2018-06-01 ENCOUNTER — EPISODE CHANGES (OUTPATIENT)
Dept: CASE MANAGEMENT | Facility: OTHER | Age: 83
End: 2018-06-01

## 2018-06-15 RX ORDER — ERGOCALCIFEROL 1.25 MG/1
CAPSULE ORAL
Qty: 1 CAPSULE | Refills: 2 | Status: SHIPPED | OUTPATIENT
Start: 2018-06-15 | End: 2018-09-11 | Stop reason: SDUPTHER

## 2018-06-19 RX ORDER — CARVEDILOL 6.25 MG/1
TABLET ORAL
Qty: 60 TABLET | Refills: 2 | Status: SHIPPED | OUTPATIENT
Start: 2018-06-19 | End: 2018-06-20 | Stop reason: SDUPTHER

## 2018-06-19 RX ORDER — LEVOTHYROXINE SODIUM 0.05 MG/1
TABLET ORAL
Qty: 30 TABLET | Refills: 2 | Status: SHIPPED | OUTPATIENT
Start: 2018-06-19 | End: 2018-08-01 | Stop reason: SDUPTHER

## 2018-06-19 RX ORDER — NITROGLYCERIN 40 MG/1
PATCH TRANSDERMAL
Qty: 90 PATCH | Refills: 2 | Status: SHIPPED | OUTPATIENT
Start: 2018-06-19 | End: 2018-06-20 | Stop reason: SDUPTHER

## 2018-06-19 RX ORDER — BUDESONIDE AND FORMOTEROL FUMARATE DIHYDRATE 160; 4.5 UG/1; UG/1
AEROSOL RESPIRATORY (INHALATION)
Qty: 10.2 G | Refills: 2 | Status: SHIPPED | OUTPATIENT
Start: 2018-06-19 | End: 2018-06-20

## 2018-06-19 RX ORDER — CYANOCOBALAMIN/FOLIC AC/VIT B6 1-2.5-25MG
TABLET ORAL
Qty: 30 TABLET | Refills: 2 | Status: SHIPPED | OUTPATIENT
Start: 2018-06-19 | End: 2018-08-01 | Stop reason: SDUPTHER

## 2018-06-19 RX ORDER — POLYETHYLENE GLYCOL 3350 17 G/17G
POWDER, FOR SOLUTION ORAL
Qty: 527 G | Refills: 2 | Status: SHIPPED | OUTPATIENT
Start: 2018-06-19

## 2018-06-19 RX ORDER — IPRATROPIUM BROMIDE AND ALBUTEROL SULFATE 2.5; .5 MG/3ML; MG/3ML
SOLUTION RESPIRATORY (INHALATION)
Qty: 360 ML | Refills: 2 | Status: SHIPPED | OUTPATIENT
Start: 2018-06-19 | End: 2018-12-19 | Stop reason: SDUPTHER

## 2018-06-19 RX ORDER — FAMOTIDINE 40 MG/1
TABLET, FILM COATED ORAL
Qty: 30 TABLET | Refills: 2 | Status: SHIPPED | OUTPATIENT
Start: 2018-06-19 | End: 2018-06-20 | Stop reason: SDUPTHER

## 2018-06-19 RX ORDER — ESTRADIOL 0.1 MG/G
CREAM VAGINAL
Qty: 42.5 G | Refills: 2 | Status: SHIPPED | OUTPATIENT
Start: 2018-06-19 | End: 2019-01-22 | Stop reason: SDUPTHER

## 2018-06-19 RX ORDER — ATORVASTATIN CALCIUM 20 MG/1
TABLET, FILM COATED ORAL
Qty: 30 TABLET | Refills: 2 | Status: SHIPPED | OUTPATIENT
Start: 2018-06-19 | End: 2018-06-20 | Stop reason: SDUPTHER

## 2018-06-19 RX ORDER — MONTELUKAST SODIUM 10 MG/1
TABLET ORAL
Qty: 30 TABLET | Refills: 2 | Status: SHIPPED | OUTPATIENT
Start: 2018-06-19 | End: 2018-08-01 | Stop reason: SDUPTHER

## 2018-06-20 ENCOUNTER — OFFICE VISIT (OUTPATIENT)
Dept: INTERNAL MEDICINE | Facility: CLINIC | Age: 83
End: 2018-06-20

## 2018-06-20 VITALS
DIASTOLIC BLOOD PRESSURE: 60 MMHG | WEIGHT: 167.1 LBS | BODY MASS INDEX: 25.33 KG/M2 | HEIGHT: 68 IN | SYSTOLIC BLOOD PRESSURE: 110 MMHG

## 2018-06-20 DIAGNOSIS — I50.22 CHRONIC SYSTOLIC CONGESTIVE HEART FAILURE (HCC): ICD-10-CM

## 2018-06-20 DIAGNOSIS — J44.9 CHRONIC OBSTRUCTIVE PULMONARY DISEASE, UNSPECIFIED COPD TYPE (HCC): ICD-10-CM

## 2018-06-20 DIAGNOSIS — I10 ESSENTIAL HYPERTENSION: Primary | ICD-10-CM

## 2018-06-20 PROCEDURE — 99214 OFFICE O/P EST MOD 30 MIN: CPT | Performed by: INTERNAL MEDICINE

## 2018-06-20 RX ORDER — FAMOTIDINE 40 MG/1
40 TABLET, FILM COATED ORAL DAILY
Qty: 90 TABLET | Refills: 2 | Status: SHIPPED | OUTPATIENT
Start: 2018-06-20 | End: 2019-06-12 | Stop reason: SDUPTHER

## 2018-06-20 RX ORDER — NITROGLYCERIN 40 MG/1
1 PATCH TRANSDERMAL DAILY
Qty: 90 PATCH | Refills: 2 | Status: SHIPPED | OUTPATIENT
Start: 2018-06-20 | End: 2019-05-30 | Stop reason: SDUPTHER

## 2018-06-20 RX ORDER — RANOLAZINE 500 MG/1
500 TABLET, EXTENDED RELEASE ORAL EVERY 12 HOURS SCHEDULED
Qty: 60 TABLET | Refills: 2
Start: 2018-06-20 | End: 2019-04-18 | Stop reason: SDUPTHER

## 2018-06-20 RX ORDER — ATORVASTATIN CALCIUM 20 MG/1
20 TABLET, FILM COATED ORAL DAILY
Qty: 90 TABLET | Refills: 2 | Status: SHIPPED | OUTPATIENT
Start: 2018-06-20 | End: 2019-05-30 | Stop reason: SDUPTHER

## 2018-06-20 RX ORDER — CARVEDILOL 6.25 MG/1
6.25 TABLET ORAL 2 TIMES DAILY WITH MEALS
Qty: 180 TABLET | Refills: 2 | Status: SHIPPED | OUTPATIENT
Start: 2018-06-20 | End: 2019-05-30 | Stop reason: SDUPTHER

## 2018-06-20 RX ORDER — METOLAZONE 2.5 MG/1
2.5 TABLET ORAL 2 TIMES DAILY
COMMUNITY
End: 2018-06-20

## 2018-06-20 RX ORDER — FUROSEMIDE 40 MG/1
40 TABLET ORAL DAILY
Qty: 90 TABLET | Refills: 2 | Status: SHIPPED | OUTPATIENT
Start: 2018-06-20 | End: 2018-08-01 | Stop reason: SDUPTHER

## 2018-08-01 RX ORDER — CYANOCOBALAMIN/FOLIC AC/VIT B6 1-2.5-25MG
TABLET ORAL
Qty: 30 TABLET | Refills: 2 | Status: SHIPPED | OUTPATIENT
Start: 2018-08-01 | End: 2018-10-25 | Stop reason: SDUPTHER

## 2018-08-01 RX ORDER — FUROSEMIDE 40 MG/1
TABLET ORAL
Qty: 30 TABLET | Refills: 2 | Status: SHIPPED | OUTPATIENT
Start: 2018-08-01 | End: 2019-01-22 | Stop reason: SDUPTHER

## 2018-08-01 RX ORDER — MONTELUKAST SODIUM 10 MG/1
TABLET ORAL
Qty: 30 TABLET | Refills: 2 | Status: SHIPPED | OUTPATIENT
Start: 2018-08-01 | End: 2018-12-18 | Stop reason: SDUPTHER

## 2018-08-01 RX ORDER — BUDESONIDE AND FORMOTEROL FUMARATE DIHYDRATE 160; 4.5 UG/1; UG/1
AEROSOL RESPIRATORY (INHALATION)
Qty: 10.2 G | Refills: 2 | Status: SHIPPED | OUTPATIENT
Start: 2018-08-01 | End: 2018-11-20

## 2018-08-01 RX ORDER — LEVOTHYROXINE SODIUM 0.05 MG/1
TABLET ORAL
Qty: 30 TABLET | Refills: 2 | Status: SHIPPED | OUTPATIENT
Start: 2018-08-01 | End: 2019-01-22 | Stop reason: SDUPTHER

## 2018-08-13 ENCOUNTER — PATIENT OUTREACH (OUTPATIENT)
Dept: CASE MANAGEMENT | Facility: OTHER | Age: 83
End: 2018-08-13

## 2018-08-13 NOTE — OUTREACH NOTE
Care Management Plan 8/13/2018   Lifestyle Goals Decrease falls risk   Lifestyle Goals Patient stated she uses a cane for ambulation   Barriers Disease education   Self Management Weight Monitoring   Self Management Patient stated she monitors her weight, but not daily   Annual Wellness Visit:  Patient Has Completed   Specific Disease Process Teaching Heart Failure   Does patient have depression diagnosis? -   Advanced Directives: Not Interested At This Time   Medication Adherence N/A   Goal Progress Making Progress Toward Goal(s)   Readiness Scale 5   Confidence Scale 3   Health Literacy Moderate     The main concerns and/or symptoms the patient would like to address are: continuing to increase activity.    Education/instruction provided by Care Coordinator: Discussed the importance of monitoring daily weights, and reporting rapid increases of 3 pounds or more to her PCP.    Follow Up Outreach Due: Kimberly Templeton RN

## 2018-08-20 ENCOUNTER — OFFICE VISIT (OUTPATIENT)
Dept: INTERNAL MEDICINE | Facility: CLINIC | Age: 83
End: 2018-08-20

## 2018-08-20 VITALS
WEIGHT: 164.1 LBS | HEIGHT: 68 IN | DIASTOLIC BLOOD PRESSURE: 60 MMHG | BODY MASS INDEX: 24.87 KG/M2 | SYSTOLIC BLOOD PRESSURE: 110 MMHG

## 2018-08-20 DIAGNOSIS — I50.22 CHRONIC SYSTOLIC CONGESTIVE HEART FAILURE (HCC): ICD-10-CM

## 2018-08-20 DIAGNOSIS — J44.9 CHRONIC OBSTRUCTIVE PULMONARY DISEASE, UNSPECIFIED COPD TYPE (HCC): ICD-10-CM

## 2018-08-20 DIAGNOSIS — E03.9 ACQUIRED HYPOTHYROIDISM: ICD-10-CM

## 2018-08-20 DIAGNOSIS — I10 ESSENTIAL HYPERTENSION: Primary | ICD-10-CM

## 2018-08-20 PROCEDURE — 99214 OFFICE O/P EST MOD 30 MIN: CPT | Performed by: INTERNAL MEDICINE

## 2018-09-11 RX ORDER — ERGOCALCIFEROL 1.25 MG/1
CAPSULE ORAL
Qty: 1 CAPSULE | Refills: 2 | Status: SHIPPED | OUTPATIENT
Start: 2018-09-11 | End: 2018-12-18 | Stop reason: SDUPTHER

## 2018-09-26 ENCOUNTER — OFFICE VISIT (OUTPATIENT)
Dept: PULMONOLOGY | Facility: CLINIC | Age: 83
End: 2018-09-26

## 2018-09-26 VITALS
HEART RATE: 73 BPM | WEIGHT: 164 LBS | SYSTOLIC BLOOD PRESSURE: 115 MMHG | HEIGHT: 68 IN | BODY MASS INDEX: 24.86 KG/M2 | OXYGEN SATURATION: 97 % | DIASTOLIC BLOOD PRESSURE: 70 MMHG

## 2018-09-26 DIAGNOSIS — J43.1 PANLOBULAR EMPHYSEMA (HCC): Primary | ICD-10-CM

## 2018-09-26 PROCEDURE — 99214 OFFICE O/P EST MOD 30 MIN: CPT | Performed by: INTERNAL MEDICINE

## 2018-09-26 PROCEDURE — 96372 THER/PROPH/DIAG INJ SC/IM: CPT | Performed by: INTERNAL MEDICINE

## 2018-09-26 RX ORDER — ALBUTEROL SULFATE 2.5 MG/3ML
2.5 SOLUTION RESPIRATORY (INHALATION) EVERY 6 HOURS PRN
Qty: 90 VIAL | Refills: 5 | Status: SHIPPED | OUTPATIENT
Start: 2018-09-26 | End: 2019-07-30 | Stop reason: CLARIF

## 2018-09-26 RX ORDER — DOXYCYCLINE 100 MG/1
CAPSULE ORAL
Qty: 20 CAPSULE | Refills: 2 | Status: SHIPPED | OUTPATIENT
Start: 2018-09-26 | End: 2019-01-22

## 2018-09-26 RX ORDER — METHYLPREDNISOLONE ACETATE 40 MG/ML
80 INJECTION, SUSPENSION INTRA-ARTICULAR; INTRALESIONAL; INTRAMUSCULAR; SOFT TISSUE ONCE
Status: COMPLETED | OUTPATIENT
Start: 2018-09-26 | End: 2018-09-26

## 2018-09-26 RX ADMIN — METHYLPREDNISOLONE ACETATE 80 MG: 40 INJECTION, SUSPENSION INTRA-ARTICULAR; INTRALESIONAL; INTRAMUSCULAR; SOFT TISSUE at 10:38

## 2018-09-26 NOTE — PROGRESS NOTES
"This 85-year-old lady has emphysema and complains of a several week history of increasing shortness of breath cough wheeze episodes of discolored sputum and dyspnea on exertion.  She is taking breathing medications and neb treatments.  She is receiving physical therapy at home.  She denies chest pain or hemoptysis    ROS    Constitutional-no night sweats weight loss headaches  GI no abdominal pain nausea or diarrhea  Neuro no seizure or neurologic deficits  Musculoskeletal no deformity or joint pain   no dysuria or hematuria  Skin no rash or other lesions  All other systems reviewed and were negative except for the above.      Physical Exam  /70 (BP Location: Left arm, Patient Position: Sitting, Cuff Size: Adult)   Pulse 73   Ht 172.7 cm (68\")   Wt 74.4 kg (164 lb)   SpO2 97%   BMI 24.94 kg/m²   Vital signs as above  Pupils equally round and reactive to light and accommodation, neck no JVD or adenopathy.  Cardiovascular regular rhythm and rate no murmur or gallop.  Abdomen soft no organomegaly tenderness.  Extremities no clubbing cyanosis or edema.  No cervical adenopathy.  No skin rash.  Neurologic good strength bilaterally without deficits  Elderly female who is dyspneic and wheezing at rest, lungs reveal diminished breath sounds and wheezes    Impression COPD exacerbation, emphysema    Plan Depo-Medrol, doxycycline, nebulized albuterol and Symbicort, return in 1 month        This document has been produced with the assistance of Dragon dictation  This document has been electronically signed by Bryan Jay MD on September 26, 2018 10:34 AM      "

## 2018-09-28 ENCOUNTER — PATIENT OUTREACH (OUTPATIENT)
Dept: CASE MANAGEMENT | Facility: OTHER | Age: 83
End: 2018-09-28

## 2018-09-28 NOTE — OUTREACH NOTE
Care Management Plan 9/28/2018   Lifestyle Goals Self monitor blood pressure   Lifestyle Goals Patient stated she has a cuff at home to monitor blood pressure.  Discussion on checking regularly to maintian control to know baseline readings   Barriers Disease education   Self Management Get flu/pneumonia shot   Self Management Patient stated she will be getting her flu vaccine next week. Reports she always gets her flu shot each year.   Annual Wellness Visit:  Patient Has Completed   Care Gaps Addressed Flu Shot   Specific Disease Process Teaching Hypertension   Does patient have depression diagnosis? Yes   Does patient have depression diagnosis? Stated her depression is controlled at time of call from Care Advisor   Advanced Directives: Not Interested At This Time   Medication Adherence Medications understood   Goal Progress Making Progress Toward Goal(s)   Readiness Scale -   Confidence Scale -   Health Literacy -     The main concerns and/or symptoms the patient would like to address are: Patient reports she tries to watch her salt intake. Stated it is easier to do when she eats at home, less able to control when she eats away from home.    Education/instruction provided by Care Coordinator: Discussion of flu season and flu vaccine, as per above notes.  Declines my chart information due to no computer access.    Follow Up Outreach Due: November    Pat Templeton RN

## 2018-10-03 ENCOUNTER — OFFICE VISIT (OUTPATIENT)
Dept: GASTROENTEROLOGY | Facility: CLINIC | Age: 83
End: 2018-10-03

## 2018-10-03 VITALS
DIASTOLIC BLOOD PRESSURE: 62 MMHG | HEIGHT: 68 IN | BODY MASS INDEX: 24.1 KG/M2 | OXYGEN SATURATION: 92 % | HEART RATE: 74 BPM | WEIGHT: 159 LBS | SYSTOLIC BLOOD PRESSURE: 132 MMHG

## 2018-10-03 DIAGNOSIS — K21.00 GASTROESOPHAGEAL REFLUX DISEASE WITH ESOPHAGITIS: Primary | ICD-10-CM

## 2018-10-03 DIAGNOSIS — D50.0 IRON DEFICIENCY ANEMIA DUE TO CHRONIC BLOOD LOSS: ICD-10-CM

## 2018-10-03 PROCEDURE — 99213 OFFICE O/P EST LOW 20 MIN: CPT | Performed by: NURSE PRACTITIONER

## 2018-10-03 NOTE — PATIENT INSTRUCTIONS
Food Choices for Gastroesophageal Reflux Disease, Adult  When you have gastroesophageal reflux disease (GERD), the foods you eat and your eating habits are very important. Choosing the right foods can help ease the discomfort of GERD. Consider working with a diet and nutrition specialist (dietitian) to help you make healthy food choices.  What general guidelines should I follow?  Eating plan  · Choose healthy foods low in fat, such as fruits, vegetables, whole grains, low-fat dairy products, and lean meat, fish, and poultry.  · Eat frequent, small meals instead of three large meals each day. Eat your meals slowly, in a relaxed setting. Avoid bending over or lying down until 2-3 hours after eating.  · Limit high-fat foods such as fatty meats or fried foods.  · Limit your intake of oils, butter, and shortening to less than 8 teaspoons each day.  · Avoid the following:  ? Foods that cause symptoms. These may be different for different people. Keep a food diary to keep track of foods that cause symptoms.  ? Alcohol.  ? Drinking large amounts of liquid with meals.  ? Eating meals during the 2-3 hours before bed.  · Cook foods using methods other than frying. This may include baking, grilling, or broiling.  Lifestyle    · Maintain a healthy weight. Ask your health care provider what weight is healthy for you. If you need to lose weight, work with your health care provider to do so safely.  · Exercise for at least 30 minutes on 5 or more days each week, or as told by your health care provider.  · Avoid wearing clothes that fit tightly around your waist and chest.  · Do not use any products that contain nicotine or tobacco, such as cigarettes and e-cigarettes. If you need help quitting, ask your health care provider.  · Sleep with the head of your bed raised. Use a wedge under the mattress or blocks under the bed frame to raise the head of the bed.  What foods are not recommended?  The items listed may not be a complete  list. Talk with your dietitian about what dietary choices are best for you.  Grains  Pastries or quick breads with added fat. French toast.  Vegetables  Deep fried vegetables. French fries. Any vegetables prepared with added fat. Any vegetables that cause symptoms. For some people this may include tomatoes and tomato products, chili peppers, onions and garlic, and horseradish.  Fruits  Any fruits prepared with added fat. Any fruits that cause symptoms. For some people this may include citrus fruits, such as oranges, grapefruit, pineapple, and peter.  Meats and other protein foods  High-fat meats, such as fatty beef or pork, hot dogs, ribs, ham, sausage, salami and briseno. Fried meat or protein, including fried fish and fried chicken. Nuts and nut butters.  Dairy  Whole milk and chocolate milk. Sour cream. Cream. Ice cream. Cream cheese. Milk shakes.  Beverages  Coffee and tea, with or without caffeine. Carbonated beverages. Sodas. Energy drinks. Fruit juice made with acidic fruits (such as orange or grapefruit). Tomato juice. Alcoholic drinks.  Fats and oils  Butter. Margarine. Shortening. Ghee.  Sweets and desserts  Chocolate and cocoa. Donuts.  Seasoning and other foods  Pepper. Peppermint and spearmint. Any condiments, herbs, or seasonings that cause symptoms. For some people, this may include singh, hot sauce, or vinegar-based salad dressings.  Summary  · When you have gastroesophageal reflux disease (GERD), food and lifestyle choices are very important to help ease the discomfort of GERD.  · Eat frequent, small meals instead of three large meals each day. Eat your meals slowly, in a relaxed setting. Avoid bending over or lying down until 2-3 hours after eating.  · Limit high-fat foods such as fatty meat or fried foods.  This information is not intended to replace advice given to you by your health care provider. Make sure you discuss any questions you have with your health care provider.  Document Released:  12/18/2006 Document Revised: 12/19/2017 Document Reviewed: 12/19/2017  Elsevier Interactive Patient Education © 2018 [employer]

## 2018-10-03 NOTE — PROGRESS NOTES
Chief Complaint   Patient presents with   • Heartburn       Subjective    Val Arteaga is a 85 y.o. female. she is here today for follow-up.    History of Present Illness  85-year-old female presents for follow-up regarding reflux.  States she has intermittent belching and bloating however overall she reports she has done much better in the last 6 months.  Denies any changes in her bowel habits or blood within her stool.  Blood work was last checked at primary care office and was stable from last check.  She denies any abdominal pain nausea or vomiting.  Plan; follow-up in 6 months continue PPI daily return to GI office sooner if needed.       The following portions of the patient's history were reviewed and updated as appropriate:   Past Medical History:   Diagnosis Date   • Acquired hypothyroidism    • Allergic rhinitis    • CHF (congestive heart failure) (CMS/[facility])    • COPD (chronic obstructive pulmonary disease) (CMS/[facility])    • Corneal epithelial dystrophy    • Coronary arteriosclerosis    • Depression    • Generalized atherosclerosis    • GERD (gastroesophageal reflux disease)    • Hemorrhoids    • History of bone density study 08/03/2012    Lumbar spine Osteopenia. Femoral Osteopenia   • History of mammogram 08/20/2004    Birads Category 2 Benign   • History of Papanicolaou smear of cervix 08/12/2004    NEGATIVE   • Hypercholesterolemia    • Hyperlipidemia    • Hypertension    • Myocardial infarction (CMS/[facility])    • Nonexudative age-related macular degeneration    • Osteoarthritis of multiple joints    • Pseudophakia    • Punctate keratitis     - history Thygeson's keratopathy      • Tobacco dependence syndrome      Past Surgical History:   Procedure Laterality Date   • APPENDECTOMY     • BREAST SURGERY  03/19/1954    Excision, breast lesion (1)  Excision of fibroma. 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 patency in the circumflex artery site  of previous angioplasty. Kathe. patency in RCA.Nonobstructive 20-30% stenosis in the LAD and diagonal branch. Preserved LV systolic function with Ef of 55%   • CERVICAL SPINE SURGERY  09/16/1974    Excision of cervical disc, C5-6, C6-7, anterior cervical fusion, C5-6 with iliac bone graft.cervical spondylosis,C5-6, C6-7   • COLONOSCOPY  01/01/2013    patient declined    • CORONARY ANGIOPLASTY  07/21/1993    Angioplasty, coronary (2)    RCA    • DILATATION AND CURETTAGE      3   • ENDOSCOPY N/A 2/14/2018    Procedure: ESOPHAGOGASTRODUODENOSCOPY;  Surgeon: Alxe Avitia MD;  Location: [facility] ENDOSCOPY;  Service:    • ENDOSCOPY AND COLONOSCOPY  10/01/1998    Hemorhoids Rectal Outlet Bleeding   • ESOPHAGOSCOPY / EGD  06/28/2004    Nonerosive gastritis of the body of the stomach. A biopsy was obtained & placed in h. Pylori test agar. Multiple biopsies were obtained from the body of the stomach   • HIP BIPOLAR REPLACEMENT Right 1/23/2018    Procedure: HIP BIPOLAR ANTERIOR APPROACH - right;  Surgeon: Kelvin Mcdowell MD;  Location: [facility] OR;  Service:    • INJECTION OF MEDICATION  11/23/2015    Depo Medrol (Methylprednisone) (5)    • INJECTION OF MEDICATION  02/04/2016    Kenalog (3)     • JOINT REPLACEMENT Right 01/24/2018    hip   • PACEMAKER REPLACEMENT  2006     Family History   Problem Relation Age of Onset   • Coronary artery disease Other      OB History     No data available        Current Outpatient Prescriptions   Medication Sig Dispense Refill   • acetaminophen (TYLENOL) 325 MG tablet Take 2 tablets by mouth Every 4 (Four) Hours As Needed for Mild Pain . 1 tablet 1   • albuterol (PROVENTIL) (2.5 MG/3ML) 0.083% nebulizer solution Take 2.5 mg by nebulization Every 6 (Six) Hours As Needed for Wheezing. 90 vial 5   • aspirin 325 MG EC tablet Take 1 tablet by mouth Daily.     • atorvastatin (LIPITOR) 20 MG tablet Take 1 tablet by mouth Daily. 90 tablet 2   • carvedilol (COREG) 6.25 MG tablet Take 1 tablet by  mouth 2 (Two) Times a Day With Meals. 180 tablet 2   • doxycycline (MONODOX) 100 MG capsule 1 dose by mouth twice a day 20 capsule 2   • estradiol (ESTRACE) 0.1 MG/GM vaginal cream INSERT 1 APPRLICATORFUL VAGINALLY EVERY NIGHT AT BEDTIME FOR URETHAL CARUNCLE 42.5 g 2   • famotidine (PEPCID) 40 MG tablet Take 1 tablet by mouth Daily. 90 tablet 2   • ferrous sulfate 324 (65 Fe) MG tablet delayed-release EC tablet Take 1 tablet by mouth Daily With Breakfast. 1 tablet 1   • fluorometholone (FLAREX) 0.1 % ophthalmic suspension Administer 1 drop to both eyes 4 (Four) Times a Day. (Patient taking differently: Administer 1 drop to both eyes 4 (Four) Times a Day As Needed.) 5 mL 3   • FOLBEE 2.5-25-1 MG tablet tablet TAKE 1 TABLET BY MOUTH EVERY DAY FOR SUPPLEMENT 30 tablet 2   • furosemide (LASIX) 40 MG tablet TAKE 1 TABLET BY MOUTH EVERY DAY FOR EDEMA 30 tablet 2   • ipratropium-albuterol (DUO-NEB) 0.5-2.5 mg/3 ml nebulizer USE 1 VIAL PER NEBULIZER FOUR (4) TIMES DAILY AS NEEDED FOR WHEEZING 360 mL 2   • levothyroxine (SYNTHROID, LEVOTHROID) 50 MCG tablet TAKE 1 TABLET BY MOUTH EVERY DAY FOR THYROID 30 tablet 2   • montelukast (SINGULAIR) 10 MG tablet TAKE 1 TABLET BY MOUTH EVERY DAY FOR EMPHYSEMA 30 tablet 2   • nitroglycerin (NITRODUR) 0.2 MG/HR patch Place 1 patch on the skin Daily. 90 patch 2   • polyethylene glycol (MIRALAX) powder MIX 1 CAPFUL (17G) IN 8 OUNCES OF LIQUID AND DRINK BY MOUTH EVERY DAY FOR CONSTIPATION 527 g 2   • ranolazine (RANEXA) 500 MG 12 hr tablet Take 1 tablet by mouth Every 12 (Twelve) Hours. 60 tablet 2   • sertraline (ZOLOFT) 50 MG tablet Take 1 tablet by mouth Daily. 90 tablet 2   • SYMBICORT 160-4.5 MCG/ACT inhaler INHALE 2 PUFFS TWO (2) TIMES A DAY FOR EMPHSEMA- RINSE MOUTH AFTER USING. 10.2 g 2   • vitamin D (ERGOCALCIFEROL) 54217 units capsule capsule TAKE 1 CAPSULE BY MOUTH EVERY MONTH 1 capsule 2     No current facility-administered medications for this visit.      Allergies   Allergen  "Reactions   • Ace Inhibitors    • Lisinopril Cough     Social History     Social History   • Marital status:      Social History Main Topics   • Smoking status: Former Smoker     Packs/day: 0.50     Years: 50.00     Types: Cigarettes     Start date: 1950     Quit date: 1/12/2017   • Smokeless tobacco: Never Used   • Alcohol use No   • Drug use: No   • Sexual activity: Defer     Other Topics Concern   • Not on file       Review of Systems  Review of Systems   Constitutional: Positive for fatigue. Negative for activity change, appetite change, chills, diaphoresis, fever and unexpected weight change.   HENT: Negative for sore throat and trouble swallowing.    Respiratory: Positive for shortness of breath (with exertion followed by Dr. Jay ).    Gastrointestinal: Positive for abdominal distention (bloating and increased belching ) and constipation (stool softener and miralax as needed (ferrous sulfate)). Negative for abdominal pain, anal bleeding, blood in stool, diarrhea, nausea, rectal pain and vomiting.   Musculoskeletal: Negative for arthralgias.   Skin: Negative for pallor.   Neurological: Negative for light-headedness.        /62 (BP Location: Left arm)   Pulse 74   Ht 172.7 cm (68\")   Wt 72.1 kg (159 lb)   SpO2 92%   BMI 24.18 kg/m²     Objective    Physical Exam   Constitutional: She is oriented to person, place, and time. She appears well-developed and well-nourished. She is cooperative. No distress.   HENT:   Head: Normocephalic and atraumatic.   Neck: Normal range of motion. Neck supple. No thyromegaly present.   Cardiovascular: Normal rate, regular rhythm and normal heart sounds.    Pulmonary/Chest: Effort normal and breath sounds normal. She has no wheezes. She has no rhonchi. She has no rales.   Abdominal: Soft. Normal appearance and bowel sounds are normal. She exhibits no shifting dullness, no distension, no fluid wave and no ascites. There is no hepatosplenomegaly. There is no " tenderness. There is no rigidity and no guarding. No hernia.   Lymphadenopathy:     She has no cervical adenopathy.   Neurological: She is alert and oriented to person, place, and time.   Skin: Skin is warm, dry and intact. No rash noted. No pallor.   Psychiatric: She has a normal mood and affect. Her speech is normal.     Lab on 05/29/2018   Component Date Value Ref Range Status   • Glucose 05/29/2018 122* 60 - 100 mg/dL Final   • BUN 05/29/2018 22* 7 - 21 mg/dL Final   • Creatinine 05/29/2018 0.87  0.50 - 1.00 mg/dL Final   • Sodium 05/29/2018 137  137 - 145 mmol/L Final   • Potassium 05/29/2018 4.4  3.5 - 5.1 mmol/L Final   • Chloride 05/29/2018 99  95 - 110 mmol/L Final   • CO2 05/29/2018 27.0  22.0 - 31.0 mmol/L Final   • Calcium 05/29/2018 8.9  8.4 - 10.2 mg/dL Final   • Albumin 05/29/2018 3.80  3.40 - 4.80 g/dL Final   • Phosphorus 05/29/2018 3.0  2.4 - 4.4 mg/dL Final   • Anion Gap 05/29/2018 11.0  5.0 - 15.0 mmol/L Final   • BUN/Creatinine Ratio 05/29/2018 25.3* 7.0 - 25.0 Final   • eGFR Non African Amer 05/29/2018 62  39 - 90 mL/min/1.73 Final   • Color, UA 05/29/2018 Yellow  Yellow, Straw, Dark Yellow, [place] Final   • Appearance, UA 05/29/2018 Cloudy* Clear Final   • pH, UA 05/29/2018 7.0  5.0 - 9.0 Final   • Specific Gravity, UA 05/29/2018 1.009  1.003 - 1.030 Final   • Glucose, UA 05/29/2018 Negative  Negative Final   • Ketones, UA 05/29/2018 Negative  Negative Final   • Bilirubin, UA 05/29/2018 Negative  Negative Final   • Blood, UA 05/29/2018 Negative  Negative Final   • Protein, UA 05/29/2018 Negative  Negative Final   • Leuk Esterase, UA 05/29/2018 Small (1+)* Negative Final   • Nitrite, UA 05/29/2018 Negative  Negative Final   • Urobilinogen, UA 05/29/2018 0.2 E.U./dL  0.2 - 1.0 E.U./dL Final   • RBC, UA 05/29/2018 3-5* None Seen /HPF Final   • WBC, UA 05/29/2018 6-12* None Seen, 0-2, 3-5 /HPF Final   • Bacteria, UA 05/29/2018 None Seen  None Seen /HPF Final   • Squamous Epithelial Cells, UA  05/29/2018 21-30* None Seen, 0-2 /HPF Final   • Hyaline Casts, UA 05/29/2018 7-12  None Seen /LPF Final   • Methodology 05/29/2018 Automated Microscopy   Final   • Urine Culture 05/29/2018 No growth at 24 hours   Final     Assessment/Plan      1. Gastroesophageal reflux disease with esophagitis    2. Iron deficiency anemia due to chronic blood loss    .       Orders placed during this encounter include:  No orders of the defined types were placed in this encounter.      * Surgery not found *    Review and/or summary of lab tests, radiology, procedures, medications. Review and summary of old records and obtaining of history. The risks and benefits of my recommendations, as well as other treatment options were discussed with the patient today. Questions were answered.    No orders of the defined types were placed in this encounter.      Follow-up: Return in about 6 months (around 4/3/2019).          This document has been electronically signed by CRESENCIO Rondon on October 3, 2018 2:00 PM             Results for orders placed or performed in visit on 05/29/18   Urinalysis, Microscopic Only - Urine, Clean Catch   Result Value Ref Range    RBC, UA 3-5 (A) None Seen /HPF    WBC, UA 6-12 (A) None Seen, 0-2, 3-5 /HPF    Bacteria, UA None Seen None Seen /HPF    Squamous Epithelial Cells, UA 21-30 (A) None Seen, 0-2 /HPF    Hyaline Casts, UA 7-12 None Seen /LPF    Methodology Automated Microscopy    Urinalysis With / Microscopic If Indicated - Urine, Clean Catch   Result Value Ref Range    Color, UA Yellow Yellow, Straw, Dark Yellow, [place]    Appearance, UA Cloudy (A) Clear    pH, UA 7.0 5.0 - 9.0    Specific Gravity, UA 1.009 1.003 - 1.030    Glucose, UA Negative Negative    Ketones, UA Negative Negative    Bilirubin, UA Negative Negative    Blood, UA Negative Negative    Protein, UA Negative Negative    Leuk Esterase, UA Small (1+) (A) Negative    Nitrite, UA Negative Negative    Urobilinogen, UA 0.2 E.U./dL 0.2 - 1.0  E.U./dL   Urine Culture - Urine, Urine, Catheter   Result Value Ref Range    Urine Culture No growth at 24 hours    Renal Function Panel   Result Value Ref Range    Glucose 122 (H) 60 - 100 mg/dL    BUN 22 (H) 7 - 21 mg/dL    Creatinine 0.87 0.50 - 1.00 mg/dL    Sodium 137 137 - 145 mmol/L    Potassium 4.4 3.5 - 5.1 mmol/L    Chloride 99 95 - 110 mmol/L    CO2 27.0 22.0 - 31.0 mmol/L    Calcium 8.9 8.4 - 10.2 mg/dL    Albumin 3.80 3.40 - 4.80 g/dL    Phosphorus 3.0 2.4 - 4.4 mg/dL    Anion Gap 11.0 5.0 - 15.0 mmol/L    BUN/Creatinine Ratio 25.3 (H) 7.0 - 25.0    eGFR Non African Amer 62 39 - 90 mL/min/1.73   Results for orders placed or performed during the hospital encounter of 05/16/18   CBC Auto Differential   Result Value Ref Range    WBC 8.82 3.20 - 9.80 10*3/mm3    RBC 3.45 (L) 3.77 - 5.16 10*6/mm3    Hemoglobin 11.3 (L) 12.0 - 15.5 g/dL    Hematocrit 33.3 (L) 35.0 - 45.0 %    MCV 96.5 80.0 - 98.0 fL    MCH 32.8 26.5 - 34.0 pg    MCHC 33.9 31.4 - 36.0 g/dL    RDW 13.5 11.5 - 14.5 %    RDW-SD 47.4 (H) 36.4 - 46.3 fl    MPV 9.7 8.0 - 12.0 fL    Platelets 376 150 - 450 10*3/mm3    Neutrophil % 65.7 37.0 - 80.0 %    Lymphocyte % 23.8 10.0 - 50.0 %    Monocyte % 5.8 0.0 - 12.0 %    Eosinophil % 3.9 0.0 - 7.0 %    Basophil % 0.6 0.0 - 2.0 %    Immature Grans % 0.2 0.0 - 0.5 %    Neutrophils, Absolute 5.80 2.00 - 8.60 10*3/mm3    Lymphocytes, Absolute 2.10 0.60 - 4.20 10*3/mm3    Monocytes, Absolute 0.51 0.00 - 0.90 10*3/mm3    Eosinophils, Absolute 0.34 0.00 - 0.70 10*3/mm3    Basophils, Absolute 0.05 0.00 - 0.20 10*3/mm3    Immature Grans, Absolute 0.02 0.00 - 0.02 10*3/mm3   BNP   Result Value Ref Range    proBNP 264.0 0.0-1,800.0 pg/mL   Magnesium   Result Value Ref Range    Magnesium 2.0 1.6 - 2.3 mg/dL   Comprehensive Metabolic Panel   Result Value Ref Range    Glucose 91 60 - 100 mg/dL    BUN 28 (H) 7 - 21 mg/dL    Creatinine 1.30 (H) 0.50 - 1.00 mg/dL    Sodium 134 (L) 137 - 145 mmol/L    Potassium 4.0 3.5 -  5.1 mmol/L    Chloride 93 (L) 95 - 110 mmol/L    CO2 29.0 22.0 - 31.0 mmol/L    Calcium 9.3 8.4 - 10.2 mg/dL    Total Protein 7.6 6.3 - 8.6 g/dL    Albumin 4.10 3.40 - 4.80 g/dL    ALT (SGPT) 26 9 - 52 U/L    AST (SGOT) 26 14 - 36 U/L    Alkaline Phosphatase 91 38 - 126 U/L    Total Bilirubin 0.4 0.2 - 1.3 mg/dL    eGFR Non African Amer 39 39 - 90 mL/min/1.73    Globulin 3.5 2.3 - 3.5 gm/dL    A/G Ratio 1.2 1.1 - 1.8 g/dL    BUN/Creatinine Ratio 21.5 7.0 - 25.0    Anion Gap 12.0 5.0 - 15.0 mmol/L   Results for orders placed or performed in visit on 04/16/18   Iron and TIBC   Result Value Ref Range    Iron 78 37 - 170 mcg/dL    TIBC 269 265 - 497 mcg/dL    Iron Saturation 29 15 - 50 %   CBC (No Diff)   Result Value Ref Range    WBC 9.01 3.20 - 9.80 10*3/mm3    RBC 3.47 (L) 3.77 - 5.16 10*6/mm3    Hemoglobin 11.5 (L) 12.0 - 15.5 g/dL    Hematocrit 34.1 (L) 35.0 - 45.0 %    MCV 98.3 (H) 80.0 - 98.0 fL    MCH 33.1 26.5 - 34.0 pg    MCHC 33.7 31.4 - 36.0 g/dL    RDW 14.6 (H) 11.5 - 14.5 %    RDW-SD 52.1 (H) 36.4 - 46.3 fl    MPV 10.6 8.0 - 12.0 fL    Platelets 339 150 - 450 10*3/mm3   Ferritin   Result Value Ref Range    Ferritin 82.40 11.10 - 264.00 ng/mL   Vitamin B12   Result Value Ref Range    Vitamin B-12 >1,000 (H) 239 - 931 pg/mL   Renal Function Panel   Result Value Ref Range    Glucose 95 60 - 100 mg/dL    BUN 34 (H) 7 - 21 mg/dL    Creatinine 1.07 (H) 0.50 - 1.00 mg/dL    Sodium 137 137 - 145 mmol/L    Potassium 3.9 3.5 - 5.1 mmol/L    Chloride 95 95 - 110 mmol/L    CO2 28.0 22.0 - 31.0 mmol/L    Calcium 9.1 8.4 - 10.2 mg/dL    Albumin 4.00 3.40 - 4.80 g/dL    Phosphorus 3.8 2.4 - 4.4 mg/dL    Anion Gap 14.0 5.0 - 15.0 mmol/L    BUN/Creatinine Ratio 31.8 (H) 7.0 - 25.0    eGFR Non African Amer 49 39 - 90 mL/min/1.73     *Note: Due to a large number of results and/or encounters for the requested time period, some results have not been displayed. A complete set of results can be found in Results Review.

## 2018-10-04 ENCOUNTER — EPISODE CHANGES (OUTPATIENT)
Dept: CASE MANAGEMENT | Facility: OTHER | Age: 83
End: 2018-10-04

## 2018-10-12 ENCOUNTER — FLU SHOT (OUTPATIENT)
Dept: FAMILY MEDICINE CLINIC | Facility: CLINIC | Age: 83
End: 2018-10-12

## 2018-10-12 PROCEDURE — 90662 IIV NO PRSV INCREASED AG IM: CPT | Performed by: FAMILY MEDICINE

## 2018-10-12 PROCEDURE — G0008 ADMIN INFLUENZA VIRUS VAC: HCPCS | Performed by: FAMILY MEDICINE

## 2018-10-25 RX ORDER — CYANOCOBALAMIN/FOLIC AC/VIT B6 1-2.5-25MG
TABLET ORAL
Qty: 30 TABLET | Refills: 1 | Status: SHIPPED | OUTPATIENT
Start: 2018-10-25 | End: 2019-01-22 | Stop reason: SDUPTHER

## 2018-11-01 ENCOUNTER — OFFICE VISIT (OUTPATIENT)
Dept: PULMONOLOGY | Facility: CLINIC | Age: 83
End: 2018-11-01

## 2018-11-01 VITALS
SYSTOLIC BLOOD PRESSURE: 118 MMHG | HEIGHT: 68 IN | OXYGEN SATURATION: 93 % | BODY MASS INDEX: 24.1 KG/M2 | WEIGHT: 159 LBS | HEART RATE: 80 BPM | DIASTOLIC BLOOD PRESSURE: 64 MMHG

## 2018-11-01 DIAGNOSIS — J45.41 MODERATE PERSISTENT ASTHMA WITH ACUTE EXACERBATION: Primary | ICD-10-CM

## 2018-11-01 PROCEDURE — 96372 THER/PROPH/DIAG INJ SC/IM: CPT | Performed by: INTERNAL MEDICINE

## 2018-11-01 PROCEDURE — 99214 OFFICE O/P EST MOD 30 MIN: CPT | Performed by: INTERNAL MEDICINE

## 2018-11-01 RX ORDER — METHYLPREDNISOLONE ACETATE 40 MG/ML
80 INJECTION, SUSPENSION INTRA-ARTICULAR; INTRALESIONAL; INTRAMUSCULAR; SOFT TISSUE ONCE
Status: COMPLETED | OUTPATIENT
Start: 2018-11-01 | End: 2018-11-01

## 2018-11-01 RX ORDER — ALBUTEROL SULFATE 90 UG/1
AEROSOL, METERED RESPIRATORY (INHALATION)
Qty: 1 INHALER | Refills: 5 | Status: SHIPPED | OUTPATIENT
Start: 2018-11-01 | End: 2019-06-27

## 2018-11-01 RX ORDER — PREDNISONE 10 MG/1
TABLET ORAL
Qty: 21 TABLET | Refills: 0 | Status: SHIPPED | OUTPATIENT
Start: 2018-11-01 | End: 2019-01-22

## 2018-11-01 RX ADMIN — METHYLPREDNISOLONE ACETATE 80 MG: 40 INJECTION, SUSPENSION INTRA-ARTICULAR; INTRALESIONAL; INTRAMUSCULAR; SOFT TISSUE at 10:38

## 2018-11-01 NOTE — PROGRESS NOTES
"This 85-year-old lady has moderate persistent asthma and despite medications she continues to have cough wheeze shortness of breath and dyspnea on exertion.  She is not producing discolored sputum and she denies chest pain or hemoptysis    ROS    Constitutional-no night sweats weight loss headaches  GI no abdominal pain nausea or diarrhea  Neuro no seizure or neurologic deficits  Musculoskeletal no deformity or joint pain   no dysuria or hematuria  Skin no rash or other lesions  All other systems reviewed and were negative except for the above.      Physical Exam  /64 (BP Location: Left arm, Patient Position: Sitting, Cuff Size: Adult)   Pulse 80   Ht 172.7 cm (68\")   Wt 72.1 kg (159 lb)   SpO2 93%   BMI 24.18 kg/m²   Vital signs as above  Pupils equally round and reactive to light and accommodation, neck no JVD or adenopathy.  Cardiovascular regular rhythm and rate no murmur or gallop.  Abdomen soft no organomegaly tenderness.  Extremities no clubbing cyanosis or edema.  No cervical adenopathy.  No skin rash.  Neurologic good strength bilaterally without deficits  Dyspneic white female with moderate wheezes and rhonchi bilaterally    Impression moderate persistent asthma with exacerbation    Plan Depo-Medrol, prednisone, Ventolin inhaler in addition to her routine meds, return in 2 weeks        This document has been produced with the assistance of Dragon iSironaation  This document has been electronically signed by Bryan Jay MD on November 1, 2018 10:34 AM      "

## 2018-11-15 ENCOUNTER — OFFICE VISIT (OUTPATIENT)
Dept: PULMONOLOGY | Facility: CLINIC | Age: 83
End: 2018-11-15

## 2018-11-15 VITALS
BODY MASS INDEX: 24.23 KG/M2 | HEIGHT: 68 IN | SYSTOLIC BLOOD PRESSURE: 122 MMHG | DIASTOLIC BLOOD PRESSURE: 68 MMHG | OXYGEN SATURATION: 97 % | WEIGHT: 159.9 LBS | HEART RATE: 68 BPM

## 2018-11-15 DIAGNOSIS — J42 CHRONIC BRONCHITIS, UNSPECIFIED CHRONIC BRONCHITIS TYPE (HCC): Primary | ICD-10-CM

## 2018-11-15 PROCEDURE — 99213 OFFICE O/P EST LOW 20 MIN: CPT | Performed by: INTERNAL MEDICINE

## 2018-11-15 RX ORDER — PREDNISONE 10 MG/1
TABLET ORAL
Qty: 21 TABLET | Refills: 0 | Status: SHIPPED | OUTPATIENT
Start: 2018-11-15 | End: 2018-11-20 | Stop reason: SDUPTHER

## 2018-11-15 NOTE — PROGRESS NOTES
"This lady has asthma and COPD with recent exacerbation.  Her breathing is back to baseline on routine meds.  She has not coughing or wheezing    ROS    Constitutional-no night sweats weight loss headaches  GI no abdominal pain nausea or diarrhea  Neuro no seizure or neurologic deficits  Musculoskeletal no deformity or joint pain   no dysuria or hematuria  Skin no rash or other lesions  All other systems reviewed and were negative except for the above.      Physical Exam  /68 (BP Location: Left arm, Patient Position: Sitting, Cuff Size: Adult)   Pulse 68   Ht 172.7 cm (68\")   Wt 72.5 kg (159 lb 14.4 oz)   SpO2 97%   BMI 24.31 kg/m²   Vital signs as above  Pupils equally round and reactive to light and accommodation, neck no JVD or adenopathy.  Cardiovascular regular rhythm and rate no murmur or gallop.  Abdomen soft no organomegaly tenderness.  Extremities no clubbing cyanosis or edema.  No cervical adenopathy.  No skin rash.  Neurologic good strength bilaterally without deficits  Lungs reveal diminished breath sounds without wheeze    Impression COPD and asthma exacerbation back to baseline    Plan you present medications, return in the spring        This document has been produced with the assistance of Dragon dictation  This document has been electronically signed by Bryan Jay MD on November 15, 2018 10:30 AM      "

## 2018-11-16 ENCOUNTER — TELEPHONE (OUTPATIENT)
Dept: PULMONOLOGY | Facility: CLINIC | Age: 83
End: 2018-11-16

## 2018-11-16 NOTE — TELEPHONE ENCOUNTER
----- Message from Maeve Arteaga sent at 11/15/2018  4:27 PM CST -----  This patient called @ 4:27pm on Thursday, stating that Dr. Jay was supposed to call in a Z-Pac for her to [place].  She also asked for a spacer for a Ventolin inhaler.  If you need to speak with her call [phone]. thx

## 2018-11-20 ENCOUNTER — OFFICE VISIT (OUTPATIENT)
Dept: FAMILY MEDICINE CLINIC | Facility: CLINIC | Age: 83
End: 2018-11-20

## 2018-11-20 ENCOUNTER — TELEPHONE (OUTPATIENT)
Dept: FAMILY MEDICINE CLINIC | Facility: CLINIC | Age: 83
End: 2018-11-20

## 2018-11-20 VITALS
DIASTOLIC BLOOD PRESSURE: 68 MMHG | SYSTOLIC BLOOD PRESSURE: 134 MMHG | BODY MASS INDEX: 24.4 KG/M2 | HEIGHT: 68 IN | WEIGHT: 161 LBS

## 2018-11-20 DIAGNOSIS — J42 CHRONIC BRONCHITIS, UNSPECIFIED CHRONIC BRONCHITIS TYPE (HCC): ICD-10-CM

## 2018-11-20 DIAGNOSIS — Z76.89 ENCOUNTER TO ESTABLISH CARE: ICD-10-CM

## 2018-11-20 DIAGNOSIS — Z13.220 SCREENING FOR HYPERCHOLESTEROLEMIA: ICD-10-CM

## 2018-11-20 DIAGNOSIS — I50.22 CHRONIC SYSTOLIC CONGESTIVE HEART FAILURE (HCC): ICD-10-CM

## 2018-11-20 DIAGNOSIS — E03.9 ACQUIRED HYPOTHYROIDISM: ICD-10-CM

## 2018-11-20 DIAGNOSIS — I10 ESSENTIAL HYPERTENSION: Primary | ICD-10-CM

## 2018-11-20 PROCEDURE — 99214 OFFICE O/P EST MOD 30 MIN: CPT | Performed by: NURSE PRACTITIONER

## 2018-11-20 NOTE — TELEPHONE ENCOUNTER
Lonnie said Vero was supposed to order Anoro for his mother to [facility] in [place] but that has not been done.  They are leaving town for Thanksgiving and he wants to get it.

## 2018-11-20 NOTE — PROGRESS NOTES
"Subjective   Val Arteaga is a 85 y.o. female.  Establish primary care.  Has history of COPD, hypertension and congestive heart failure.  Reports she continues to feel short of breath and is using her rescue  Inhaler \"at least once a day.\"  Was recently seen by her pulmonologist Dr. Jay who placed her on a new prescription for Ventolin.    Hypertension   This is a chronic problem. The current episode started more than 1 year ago. The problem is unchanged. The problem is controlled. Associated symptoms include shortness of breath. Pertinent negatives include no chest pain, orthopnea or peripheral edema. Agents associated with hypertension include thyroid hormones. Risk factors for coronary artery disease include post-menopausal state, sedentary lifestyle, dyslipidemia and family history. Past treatments include beta blockers. Current antihypertension treatment includes beta blockers. The current treatment provides significant improvement.   COPD   This is a chronic problem. The current episode started more than 1 year ago. The problem occurs constantly. The problem has been unchanged. Associated symptoms include fatigue and weakness. Pertinent negatives include no chest pain, chills, diaphoresis or fever. The symptoms are aggravated by exertion. Treatments tried: albuterol inhaler and nebulizer. The treatment provided moderate relief.   Hypothyroidism   This is a chronic problem. The current episode started more than 1 year ago. The problem occurs constantly. The problem has been unchanged. Associated symptoms include fatigue and weakness. Pertinent negatives include no chest pain, chills, diaphoresis or fever. Nothing aggravates the symptoms. Treatments tried: Synthroid  The treatment provided significant relief.   Congestive Heart Failure   Presents for follow-up visit. Associated symptoms include fatigue and shortness of breath. Pertinent negatives include no chest pain. The symptoms have been stable. " Compliance with total regimen is %. Compliance with diet is %. Compliance with exercise is 26-50%.        The following portions of the patient's history were reviewed and updated as appropriate:     She  has a past medical history of Acquired hypothyroidism, Allergic rhinitis, CHF (congestive heart failure) (CMS/Formerly [facility]), COPD (chronic obstructive pulmonary disease) (CMS/Formerly [facility]), Corneal epithelial dystrophy, Coronary arteriosclerosis, Depression, Generalized atherosclerosis, GERD (gastroesophageal reflux disease), Hemorrhoids, History of bone density study (08/03/2012), History of mammogram (08/20/2004), History of Papanicolaou smear of cervix (08/12/2004), Hypercholesterolemia, Hyperlipidemia, Hypertension, Myocardial infarction (CMS/Formerly [facility]), Nonexudative age-related macular degeneration, Osteoarthritis of multiple joints, Pseudophakia, Punctate keratitis, and Tobacco dependence syndrome.  She does not have any pertinent problems on file.  She  has a past surgical history that includes Coronary angioplasty (07/21/1993); Appendectomy; Cardiac catheterization (06/16/2014); endoscopy and colonoscopy (10/01/1998); Colonoscopy (01/01/2013); Dilation and curettage of uterus; Injection of Medication (11/23/2015); Esophagoscopy / EGD (06/28/2004); Breast surgery (03/19/1954); Cervical spine surgery (09/16/1974); Injection of Medication (02/04/2016); Pacemaker Replacement (2006); Joint replacement (Right, 01/24/2018); ESOPHAGOGASTRODUODENOSCOPY (N/A, 2/14/2018); and HIP BIPOLAR ANTERIOR APPROACH - right (Right, 1/23/2018).  Her family history includes Coronary artery disease in her other.  She  reports that she quit smoking about 22 months ago. Her smoking use included cigarettes. She started smoking about 68 years ago. She has a 25.00 pack-year smoking history. she has never used smokeless tobacco. She reports that she does not drink alcohol or use drugs.  Current Outpatient Medications   Medication Sig Dispense Refill    • acetaminophen (TYLENOL) 325 MG tablet Take 2 tablets by mouth Every 4 (Four) Hours As Needed for Mild Pain . 1 tablet 1   • albuterol (PROVENTIL) (2.5 MG/3ML) 0.083% nebulizer solution Take 2.5 mg by nebulization Every 6 (Six) Hours As Needed for Wheezing. 90 vial 5   • albuterol (VENTOLIN HFA) 108 (90 Base) MCG/ACT inhaler 2 puffs every 4 hours as needed for breathing 1 inhaler 5   • aspirin 325 MG EC tablet Take 1 tablet by mouth Daily.     • atorvastatin (LIPITOR) 20 MG tablet Take 1 tablet by mouth Daily. 90 tablet 2   • carvedilol (COREG) 6.25 MG tablet Take 1 tablet by mouth 2 (Two) Times a Day With Meals. 180 tablet 2   • doxycycline (MONODOX) 100 MG capsule 1 dose by mouth twice a day 20 capsule 2   • estradiol (ESTRACE) 0.1 MG/GM vaginal cream INSERT 1 APPRLICATORFUL VAGINALLY EVERY NIGHT AT BEDTIME FOR URETHAL CARUNCLE 42.5 g 2   • famotidine (PEPCID) 40 MG tablet Take 1 tablet by mouth Daily. 90 tablet 2   • ferrous sulfate 324 (65 Fe) MG tablet delayed-release EC tablet Take 1 tablet by mouth Daily With Breakfast. 1 tablet 1   • fluorometholone (FLAREX) 0.1 % ophthalmic suspension Administer 1 drop to both eyes 4 (Four) Times a Day. (Patient taking differently: Administer 1 drop to both eyes 4 (Four) Times a Day As Needed.) 5 mL 3   • FOLBEE 2.5-25-1 MG tablet tablet TAKE 1 TABLET BY MOUTH EVERY DAY FOR SUPPLEMENT 30 tablet 1   • furosemide (LASIX) 40 MG tablet TAKE 1 TABLET BY MOUTH EVERY DAY FOR EDEMA 30 tablet 2   • ipratropium-albuterol (DUO-NEB) 0.5-2.5 mg/3 ml nebulizer USE 1 VIAL PER NEBULIZER FOUR (4) TIMES DAILY AS NEEDED FOR WHEEZING 360 mL 2   • levothyroxine (SYNTHROID, LEVOTHROID) 50 MCG tablet TAKE 1 TABLET BY MOUTH EVERY DAY FOR THYROID 30 tablet 2   • montelukast (SINGULAIR) 10 MG tablet TAKE 1 TABLET BY MOUTH EVERY DAY FOR EMPHYSEMA 30 tablet 2   • polyethylene glycol (MIRALAX) powder MIX 1 CAPFUL (17G) IN 8 OUNCES OF LIQUID AND DRINK BY MOUTH EVERY DAY FOR CONSTIPATION 527 g 2   •  predniSONE (DELTASONE) 10 MG tablet 2 tablets by mouth daily for 1 week, then 1 tablet daily for 1 week 21 tablet 0   • ranolazine (RANEXA) 500 MG 12 hr tablet Take 1 tablet by mouth Every 12 (Twelve) Hours. 60 tablet 2   • sertraline (ZOLOFT) 50 MG tablet Take 1 tablet by mouth Daily. 90 tablet 2   • vitamin D (ERGOCALCIFEROL) 88581 units capsule capsule TAKE 1 CAPSULE BY MOUTH EVERY MONTH 1 capsule 2   • nitroglycerin (NITRODUR) 0.2 MG/HR patch Place 1 patch on the skin Daily. 90 patch 2   • umeclidinium-vilanterol (ANORO ELLIPTA) 62.5-25 MCG/INH aerosol powder  inhaler Inhale 1 puff Daily. 60 each 3     No current facility-administered medications for this visit.      Current Outpatient Medications on File Prior to Visit   Medication Sig   • acetaminophen (TYLENOL) 325 MG tablet Take 2 tablets by mouth Every 4 (Four) Hours As Needed for Mild Pain .   • albuterol (PROVENTIL) (2.5 MG/3ML) 0.083% nebulizer solution Take 2.5 mg by nebulization Every 6 (Six) Hours As Needed for Wheezing.   • albuterol (VENTOLIN HFA) 108 (90 Base) MCG/ACT inhaler 2 puffs every 4 hours as needed for breathing   • aspirin 325 MG EC tablet Take 1 tablet by mouth Daily.   • atorvastatin (LIPITOR) 20 MG tablet Take 1 tablet by mouth Daily.   • carvedilol (COREG) 6.25 MG tablet Take 1 tablet by mouth 2 (Two) Times a Day With Meals.   • doxycycline (MONODOX) 100 MG capsule 1 dose by mouth twice a day   • estradiol (ESTRACE) 0.1 MG/GM vaginal cream INSERT 1 APPRLICATORFUL VAGINALLY EVERY NIGHT AT BEDTIME FOR URETHAL CARUNCLE   • famotidine (PEPCID) 40 MG tablet Take 1 tablet by mouth Daily.   • ferrous sulfate 324 (65 Fe) MG tablet delayed-release EC tablet Take 1 tablet by mouth Daily With Breakfast.   • fluorometholone (FLAREX) 0.1 % ophthalmic suspension Administer 1 drop to both eyes 4 (Four) Times a Day. (Patient taking differently: Administer 1 drop to both eyes 4 (Four) Times a Day As Needed.)   • FOLBEE 2.5-25-1 MG tablet tablet TAKE 1  TABLET BY MOUTH EVERY DAY FOR SUPPLEMENT   • furosemide (LASIX) 40 MG tablet TAKE 1 TABLET BY MOUTH EVERY DAY FOR EDEMA   • ipratropium-albuterol (DUO-NEB) 0.5-2.5 mg/3 ml nebulizer USE 1 VIAL PER NEBULIZER FOUR (4) TIMES DAILY AS NEEDED FOR WHEEZING   • levothyroxine (SYNTHROID, LEVOTHROID) 50 MCG tablet TAKE 1 TABLET BY MOUTH EVERY DAY FOR THYROID   • montelukast (SINGULAIR) 10 MG tablet TAKE 1 TABLET BY MOUTH EVERY DAY FOR EMPHYSEMA   • polyethylene glycol (MIRALAX) powder MIX 1 CAPFUL (17G) IN 8 OUNCES OF LIQUID AND DRINK BY MOUTH EVERY DAY FOR CONSTIPATION   • predniSONE (DELTASONE) 10 MG tablet 2 tablets by mouth daily for 1 week, then 1 tablet daily for 1 week   • ranolazine (RANEXA) 500 MG 12 hr tablet Take 1 tablet by mouth Every 12 (Twelve) Hours.   • sertraline (ZOLOFT) 50 MG tablet Take 1 tablet by mouth Daily.   • vitamin D (ERGOCALCIFEROL) 58133 units capsule capsule TAKE 1 CAPSULE BY MOUTH EVERY MONTH   • nitroglycerin (NITRODUR) 0.2 MG/HR patch Place 1 patch on the skin Daily.     No current facility-administered medications on file prior to visit.      She is allergic to ace inhibitors and lisinopril..    Review of Systems   Constitutional: Positive for fatigue. Negative for chills, diaphoresis and fever.   HENT: Negative.    Eyes: Negative.    Respiratory: Positive for shortness of breath.    Cardiovascular: Positive for leg swelling. Negative for chest pain and orthopnea.   Gastrointestinal: Negative.    Genitourinary: Negative.    Musculoskeletal: Negative.    Skin: Negative.    Neurological: Positive for weakness.   Psychiatric/Behavioral: Negative.  Negative for confusion.       Objective   Physical Exam   Constitutional: She is oriented to person, place, and time. She appears well-developed and well-nourished.   Ambulation assisted by cane    HENT:   Head: Normocephalic.   Right Ear: External ear normal.   Left Ear: External ear normal.   Eyes: EOM are normal. Pupils are equal, round, and  reactive to light.   Neck: Normal range of motion. Neck supple.   Cardiovascular: Normal rate, regular rhythm and normal heart sounds.   Pulmonary/Chest: Effort normal and breath sounds normal.   Abdominal: Soft. Bowel sounds are normal.   Musculoskeletal: Normal range of motion.   Neurological: She is alert and oriented to person, place, and time.   Skin: Skin is warm. Capillary refill takes less than 2 seconds.   Psychiatric: She has a normal mood and affect. Her behavior is normal.   Nursing note and vitals reviewed.      Assessment/Plan   Problems Addressed this Visit        Cardiovascular and Mediastinum    Essential hypertension - Primary    Relevant Orders    CBC & Differential    Comprehensive Metabolic Panel    Chronic systolic congestive heart failure (CMS/HCC)       Respiratory    Chronic obstructive pulmonary disease (CMS/HCC)       Endocrine    Acquired hypothyroidism    Relevant Orders    TSH      Other Visit Diagnoses     Screening for hypercholesterolemia        Relevant Orders    Lipid panel    Encounter to establish care            1.  Essential hypertension:  Continue on Coreg as previously prescribed  Complete CBC and chemistry panel as ordered and will notify of results when available  Encouraged to reduce sodium intake to no more than 2000 mg per day    2.  Chronic obstructive pulmonary disease:  Anoro Samples Lot # [id number]  Exp: 12/2019 X 3 boxes   Continue on albuterol and Ventolin inhalers previously prescribed  Continue on DuoNeb nebulizer treatments as previously prescribed  Encouraged to seek emergency medical treatment for any new or worsening shortness of breath, chest pain or edema  Will continue pulmonology follow-up with Dr. Bryan Jay    3.  Chronic systolic congestive heart failure:  Continue on Lasix as previously prescribed  Encouraged to seek emergency medical treatment for any new or worsening edema, shortness of breath    4.  Acquired hypothyroidism:  Continue on Synthroid  as previously prescribed  Complete TSH as ordered and will notify of results when available    5.  Screening for hypercholesterolemia:  Complete fasting lipid panel as ordered and will notify of results when available  Encouraged to adhere to low-fat diet    6.  Encounter to establish care:  Continue on current medications as previously prescribed   Schedule follow-up appointment with this office in 3 months for recheck or sooner as needed        This document has been electronically signed by CRESENCIO Hall on November 21, 2018 9:10 AM

## 2018-12-18 RX ORDER — MONTELUKAST SODIUM 10 MG/1
10 TABLET ORAL NIGHTLY
Qty: 30 TABLET | Refills: 2 | Status: SHIPPED | OUTPATIENT
Start: 2018-12-18 | End: 2019-01-02 | Stop reason: SDUPTHER

## 2018-12-18 RX ORDER — ERGOCALCIFEROL 1.25 MG/1
50000 CAPSULE ORAL
Qty: 3 CAPSULE | Refills: 3 | Status: SHIPPED | OUTPATIENT
Start: 2018-12-18 | End: 2019-01-02 | Stop reason: SDUPTHER

## 2018-12-19 DIAGNOSIS — R06.2 WHEEZING: Primary | ICD-10-CM

## 2018-12-19 RX ORDER — IPRATROPIUM BROMIDE AND ALBUTEROL SULFATE 2.5; .5 MG/3ML; MG/3ML
3 SOLUTION RESPIRATORY (INHALATION) 4 TIMES DAILY PRN
Qty: 360 ML | Refills: 2 | Status: SHIPPED | OUTPATIENT
Start: 2018-12-19 | End: 2020-06-11 | Stop reason: SDUPTHER

## 2019-01-02 ENCOUNTER — LAB (OUTPATIENT)
Dept: LAB | Facility: HOSPITAL | Age: 84
End: 2019-01-02

## 2019-01-02 DIAGNOSIS — Z13.220 SCREENING FOR HYPERCHOLESTEROLEMIA: ICD-10-CM

## 2019-01-02 DIAGNOSIS — E03.9 ACQUIRED HYPOTHYROIDISM: ICD-10-CM

## 2019-01-02 DIAGNOSIS — I10 ESSENTIAL HYPERTENSION: ICD-10-CM

## 2019-01-02 LAB
ALBUMIN SERPL-MCNC: 4 G/DL (ref 3.4–4.8)
ALBUMIN/GLOB SERPL: 1.4 G/DL (ref 1.1–1.8)
ALP SERPL-CCNC: 71 U/L (ref 38–126)
ALT SERPL W P-5'-P-CCNC: 20 U/L (ref 9–52)
ANION GAP SERPL CALCULATED.3IONS-SCNC: 10 MMOL/L (ref 5–15)
ARTICHOKE IGE QN: 75 MG/DL (ref 1–129)
AST SERPL-CCNC: 32 U/L (ref 14–36)
BASOPHILS # BLD AUTO: 0.05 10*3/MM3 (ref 0–0.2)
BASOPHILS NFR BLD AUTO: 0.7 % (ref 0–2)
BILIRUB SERPL-MCNC: 0.5 MG/DL (ref 0.2–1.3)
BUN BLD-MCNC: 18 MG/DL (ref 7–21)
BUN/CREAT SERPL: 19.1 (ref 7–25)
CALCIUM SPEC-SCNC: 9 MG/DL (ref 8.4–10.2)
CHLORIDE SERPL-SCNC: 99 MMOL/L (ref 95–110)
CHOLEST SERPL-MCNC: 168 MG/DL (ref 0–199)
CO2 SERPL-SCNC: 28 MMOL/L (ref 22–31)
CREAT BLD-MCNC: 0.94 MG/DL (ref 0.5–1)
DEPRECATED RDW RBC AUTO: 48.4 FL (ref 36.4–46.3)
EOSINOPHIL # BLD AUTO: 0.41 10*3/MM3 (ref 0–0.7)
EOSINOPHIL NFR BLD AUTO: 6 % (ref 0–7)
ERYTHROCYTE [DISTWIDTH] IN BLOOD BY AUTOMATED COUNT: 13.2 % (ref 11.5–14.5)
GFR SERPL CREATININE-BSD FRML MDRD: 56 ML/MIN/1.73 (ref 39–90)
GLOBULIN UR ELPH-MCNC: 2.9 GM/DL (ref 2.3–3.5)
GLUCOSE BLD-MCNC: 96 MG/DL (ref 60–100)
HCT VFR BLD AUTO: 34.9 % (ref 35–45)
HDLC SERPL-MCNC: 59 MG/DL (ref 60–200)
HGB BLD-MCNC: 11.9 G/DL (ref 12–15.5)
IMM GRANULOCYTES # BLD AUTO: 0.01 10*3/MM3 (ref 0–0.02)
IMM GRANULOCYTES NFR BLD AUTO: 0.1 % (ref 0–0.5)
LDLC/HDLC SERPL: 1.45 {RATIO} (ref 0–3.22)
LYMPHOCYTES # BLD AUTO: 1.92 10*3/MM3 (ref 0.6–4.2)
LYMPHOCYTES NFR BLD AUTO: 27.9 % (ref 10–50)
MCH RBC QN AUTO: 34.1 PG (ref 26.5–34)
MCHC RBC AUTO-ENTMCNC: 34.1 G/DL (ref 31.4–36)
MCV RBC AUTO: 100 FL (ref 80–98)
MONOCYTES # BLD AUTO: 0.37 10*3/MM3 (ref 0–0.9)
MONOCYTES NFR BLD AUTO: 5.4 % (ref 0–12)
NEUTROPHILS # BLD AUTO: 4.11 10*3/MM3 (ref 2–8.6)
NEUTROPHILS NFR BLD AUTO: 59.9 % (ref 37–80)
PLATELET # BLD AUTO: 292 10*3/MM3 (ref 150–450)
PMV BLD AUTO: 10.2 FL (ref 8–12)
POTASSIUM BLD-SCNC: 4.1 MMOL/L (ref 3.5–5.1)
PROT SERPL-MCNC: 6.9 G/DL (ref 6.3–8.6)
RBC # BLD AUTO: 3.49 10*6/MM3 (ref 3.77–5.16)
SODIUM BLD-SCNC: 137 MMOL/L (ref 137–145)
TRIGL SERPL-MCNC: 118 MG/DL (ref 20–199)
TSH SERPL DL<=0.05 MIU/L-ACNC: 4.07 MIU/ML (ref 0.46–4.68)
WBC NRBC COR # BLD: 6.87 10*3/MM3 (ref 3.2–9.8)

## 2019-01-02 PROCEDURE — 84443 ASSAY THYROID STIM HORMONE: CPT

## 2019-01-02 PROCEDURE — 80053 COMPREHEN METABOLIC PANEL: CPT

## 2019-01-02 PROCEDURE — 80061 LIPID PANEL: CPT

## 2019-01-02 PROCEDURE — 85025 COMPLETE CBC W/AUTO DIFF WBC: CPT

## 2019-01-02 RX ORDER — ERGOCALCIFEROL 1.25 MG/1
50000 CAPSULE ORAL
Qty: 3 CAPSULE | Refills: 3 | Status: SHIPPED | OUTPATIENT
Start: 2019-01-02 | End: 2020-01-15

## 2019-01-02 RX ORDER — MONTELUKAST SODIUM 10 MG/1
10 TABLET ORAL NIGHTLY
Qty: 90 TABLET | Refills: 3 | Status: SHIPPED | OUTPATIENT
Start: 2019-01-02 | End: 2020-02-25

## 2019-01-22 ENCOUNTER — OFFICE VISIT (OUTPATIENT)
Dept: FAMILY MEDICINE CLINIC | Facility: CLINIC | Age: 84
End: 2019-01-22

## 2019-01-22 ENCOUNTER — TELEPHONE (OUTPATIENT)
Dept: FAMILY MEDICINE CLINIC | Facility: CLINIC | Age: 84
End: 2019-01-22

## 2019-01-22 ENCOUNTER — LAB (OUTPATIENT)
Dept: LAB | Facility: HOSPITAL | Age: 84
End: 2019-01-22

## 2019-01-22 VITALS
HEIGHT: 68 IN | OXYGEN SATURATION: 83 % | SYSTOLIC BLOOD PRESSURE: 120 MMHG | HEART RATE: 85 BPM | DIASTOLIC BLOOD PRESSURE: 68 MMHG | BODY MASS INDEX: 24.37 KG/M2 | WEIGHT: 160.8 LBS

## 2019-01-22 DIAGNOSIS — R06.02 SHORTNESS OF BREATH: Primary | ICD-10-CM

## 2019-01-22 DIAGNOSIS — R06.00 DYSPNEA, UNSPECIFIED TYPE: Primary | ICD-10-CM

## 2019-01-22 LAB
ALBUMIN SERPL-MCNC: 4 G/DL (ref 3.4–4.8)
ALBUMIN/GLOB SERPL: 1.5 G/DL (ref 1.1–1.8)
ALP SERPL-CCNC: 74 U/L (ref 38–126)
ALT SERPL W P-5'-P-CCNC: 16 U/L (ref 9–52)
ANION GAP SERPL CALCULATED.3IONS-SCNC: 7 MMOL/L (ref 5–15)
AST SERPL-CCNC: 24 U/L (ref 14–36)
BASOPHILS # BLD AUTO: 0.04 10*3/MM3 (ref 0–0.2)
BASOPHILS NFR BLD AUTO: 0.5 % (ref 0–2)
BILIRUB SERPL-MCNC: 0.5 MG/DL (ref 0.2–1.3)
BUN BLD-MCNC: 20 MG/DL (ref 7–21)
BUN/CREAT SERPL: 17.1 (ref 7–25)
CALCIUM SPEC-SCNC: 9.3 MG/DL (ref 8.4–10.2)
CHLORIDE SERPL-SCNC: 98 MMOL/L (ref 95–110)
CO2 SERPL-SCNC: 28 MMOL/L (ref 22–31)
CREAT BLD-MCNC: 1.17 MG/DL (ref 0.5–1)
DEPRECATED RDW RBC AUTO: 45.1 FL (ref 36.4–46.3)
EOSINOPHIL # BLD AUTO: 0.38 10*3/MM3 (ref 0–0.7)
EOSINOPHIL NFR BLD AUTO: 5.1 % (ref 0–7)
ERYTHROCYTE [DISTWIDTH] IN BLOOD BY AUTOMATED COUNT: 12.6 % (ref 11.5–14.5)
GFR SERPL CREATININE-BSD FRML MDRD: 44 ML/MIN/1.73 (ref 39–90)
GLOBULIN UR ELPH-MCNC: 2.7 GM/DL (ref 2.3–3.5)
GLUCOSE BLD-MCNC: 89 MG/DL (ref 60–100)
HCT VFR BLD AUTO: 34.2 % (ref 35–45)
HGB BLD-MCNC: 11.6 G/DL (ref 12–15.5)
IMM GRANULOCYTES # BLD AUTO: 0.02 10*3/MM3 (ref 0–0.02)
IMM GRANULOCYTES NFR BLD AUTO: 0.3 % (ref 0–0.5)
LYMPHOCYTES # BLD AUTO: 1.99 10*3/MM3 (ref 0.6–4.2)
LYMPHOCYTES NFR BLD AUTO: 26.5 % (ref 10–50)
MCH RBC QN AUTO: 33.3 PG (ref 26.5–34)
MCHC RBC AUTO-ENTMCNC: 33.9 G/DL (ref 31.4–36)
MCV RBC AUTO: 98.3 FL (ref 80–98)
MONOCYTES # BLD AUTO: 0.44 10*3/MM3 (ref 0–0.9)
MONOCYTES NFR BLD AUTO: 5.9 % (ref 0–12)
NEUTROPHILS # BLD AUTO: 4.65 10*3/MM3 (ref 2–8.6)
NEUTROPHILS NFR BLD AUTO: 61.7 % (ref 37–80)
NT-PROBNP SERPL-MCNC: 426 PG/ML (ref 0–1800)
PLATELET # BLD AUTO: 240 10*3/MM3 (ref 150–450)
PMV BLD AUTO: 10.2 FL (ref 8–12)
POTASSIUM BLD-SCNC: 4.4 MMOL/L (ref 3.5–5.1)
PROT SERPL-MCNC: 6.7 G/DL (ref 6.3–8.6)
RBC # BLD AUTO: 3.48 10*6/MM3 (ref 3.77–5.16)
SODIUM BLD-SCNC: 133 MMOL/L (ref 137–145)
WBC NRBC COR # BLD: 7.52 10*3/MM3 (ref 3.2–9.8)

## 2019-01-22 PROCEDURE — 83880 ASSAY OF NATRIURETIC PEPTIDE: CPT

## 2019-01-22 PROCEDURE — 36415 COLL VENOUS BLD VENIPUNCTURE: CPT

## 2019-01-22 PROCEDURE — 96372 THER/PROPH/DIAG INJ SC/IM: CPT | Performed by: NURSE PRACTITIONER

## 2019-01-22 PROCEDURE — 80053 COMPREHEN METABOLIC PANEL: CPT | Performed by: NURSE PRACTITIONER

## 2019-01-22 PROCEDURE — 99213 OFFICE O/P EST LOW 20 MIN: CPT | Performed by: NURSE PRACTITIONER

## 2019-01-22 PROCEDURE — 85025 COMPLETE CBC W/AUTO DIFF WBC: CPT | Performed by: NURSE PRACTITIONER

## 2019-01-22 RX ORDER — FUROSEMIDE 40 MG/1
40 TABLET ORAL DAILY
Qty: 30 TABLET | Refills: 2 | Status: SHIPPED | OUTPATIENT
Start: 2019-01-22 | End: 2019-04-18 | Stop reason: SDUPTHER

## 2019-01-22 RX ORDER — AZITHROMYCIN 250 MG/1
TABLET, FILM COATED ORAL
Qty: 6 TABLET | Refills: 0 | Status: SHIPPED | OUTPATIENT
Start: 2019-01-22 | End: 2019-02-20

## 2019-01-22 RX ORDER — TRIAMCINOLONE ACETONIDE 40 MG/ML
80 INJECTION, SUSPENSION INTRA-ARTICULAR; INTRAMUSCULAR ONCE
Status: COMPLETED | OUTPATIENT
Start: 2019-01-22 | End: 2019-01-22

## 2019-01-22 RX ORDER — PREDNISONE 20 MG/1
TABLET ORAL
Qty: 15 TABLET | Refills: 0 | Status: SHIPPED | OUTPATIENT
Start: 2019-01-22 | End: 2019-02-20

## 2019-01-22 RX ORDER — ESTRADIOL 0.1 MG/G
2 CREAM VAGINAL DAILY
Qty: 42.5 G | Refills: 2 | Status: SHIPPED | OUTPATIENT
Start: 2019-01-22 | End: 2019-09-17

## 2019-01-22 RX ORDER — ESTRADIOL 0.1 MG/G
2 CREAM VAGINAL DAILY
Qty: 42.5 G | Refills: 2 | Status: SHIPPED | OUTPATIENT
Start: 2019-01-22 | End: 2019-01-22 | Stop reason: SDUPTHER

## 2019-01-22 RX ORDER — BUDESONIDE AND FORMOTEROL FUMARATE DIHYDRATE 160; 4.5 UG/1; UG/1
2 AEROSOL RESPIRATORY (INHALATION)
Qty: 1 INHALER | Refills: 5 | Status: ON HOLD | OUTPATIENT
Start: 2019-01-22 | End: 2019-03-25 | Stop reason: ALTCHOICE

## 2019-01-22 RX ORDER — LEVOTHYROXINE SODIUM 0.05 MG/1
50 TABLET ORAL DAILY
Qty: 30 TABLET | Refills: 5 | Status: SHIPPED | OUTPATIENT
Start: 2019-01-22 | End: 2019-05-30 | Stop reason: SDUPTHER

## 2019-01-22 RX ADMIN — TRIAMCINOLONE ACETONIDE 80 MG: 40 INJECTION, SUSPENSION INTRA-ARTICULAR; INTRAMUSCULAR at 11:07

## 2019-01-22 NOTE — PROGRESS NOTES
"Subjective   Val Arteaga is a 86 y.o. female. She sees Dr. Jay for chronic COPD.  She has gotten increasingly more short of breath \"since about thomas\" and also has a dry cough.  She has tried inhalers and breathing treatments with only mild improvement. Denies any significant weight gain but does have some new swelling in her ankles/feet.   Has been wearing o2 at night only.     Shortness of Breath   This is a chronic problem. The current episode started 1 to 4 weeks ago. The problem occurs constantly. The problem has been gradually worsening. Associated symptoms include leg swelling. Pertinent negatives include no vomiting. The symptoms are aggravated by any activity. She has tried ipratropium inhalers for the symptoms. The treatment provided mild relief. Her past medical history is significant for COPD and a heart failure.        The following portions of the patient's history were reviewed and updated as appropriate: allergies, current medications, past family history, past medical history, past social history, past surgical history and problem list.    Review of Systems   Constitutional: Positive for fatigue.   HENT: Positive for congestion.    Eyes: Negative.    Respiratory: Positive for cough and shortness of breath.    Cardiovascular: Positive for leg swelling.   Gastrointestinal: Negative for diarrhea, nausea and vomiting.   Musculoskeletal: Negative.  Negative for myalgias.   Skin: Negative.    Allergic/Immunologic: Negative.    Neurological: Positive for dizziness, weakness and light-headedness.       Objective   Physical Exam   Constitutional: She is oriented to person, place, and time. She appears well-developed and well-nourished.   HENT:   Head: Normocephalic.   Neck: Normal range of motion.   Cardiovascular: Normal rate, regular rhythm, S1 normal, S2 normal and normal heart sounds.   Pulmonary/Chest: She has wheezes in the right upper field and the right middle field.   Abdominal: Soft. "   Musculoskeletal: Normal range of motion.     Vascular Status -  Her right foot exhibits abnormal foot edema. Her left foot exhibits abnormal foot edema.  Neurological: She is alert and oriented to person, place, and time.   Skin: Skin is warm and dry.   Psychiatric: She has a normal mood and affect. Her behavior is normal.   Nursing note and vitals reviewed.      Assessment/Plan   Problems Addressed this Visit     None      Visit Diagnoses     Shortness of breath    -  Primary    Relevant Medications    estradiol (ESTRACE) 0.1 MG/GM vaginal cream    triamcinolone acetonide (KENALOG-40) injection 80 mg (Start on 1/22/2019 10:54 AM)    predniSONE (DELTASONE) 20 MG tablet    azithromycin (ZITHROMAX) 250 MG tablet    Other Relevant Orders    XR Chest PA & Lateral    CBC & Differential    BNP    Comprehensive Metabolic Panel        1. Shortness of breath:  Obtain STAT chest x-ray will call with results when available  Oxygen level monitored while attempting to ambulate 90 feet had to rest at about 45 feet, o2 sat dropped to 83%  Explained to patient she needs to be directly admitted to the hospital but she refused   Attempted to explain to patient the seriousness of this and she is still requesting to try outpatient treatment and refuses hospitalization   Obtain STAT CBC, CMP, and BN, will call with results when available   Continue taking DuoNebs and using albuterol inhaler as previously prescribed  Patient strongly encouraged to seek medical treatment if no improvement or worsening of symptoms in the next 24-48 hours     Continue on current medications as previously prescribed   Keep scheduled appointment on 2/20/2019 or follow up sooner if needed        This document has been electronically signed by CRESENCIO Hall on January 22, 2019 10:52 AM

## 2019-01-23 NOTE — PROGRESS NOTES
Per CRESENCIO Joseph, both Ms Artegaa and her Niece Ms. Madison have been called with her recent CXR results & recommendations.  Continue her current medications and follow-up as planned or sooner if any problems.    She states she has already picked up her Antibiotic and started taking it.

## 2019-01-23 NOTE — TELEPHONE ENCOUNTER
Per CRESENCIO Joseph, both Ms Arteaga and her Niece Ms. Madison have been called with her recent CXR results & recommendations.  Continue her current medications and follow-up as planned or sooner if any problems.    She states she has already picked up her Antibiotic and started taking it.      ----- Message from CRESENCIO Hall sent at 1/22/2019  4:12 PM CST -----  Please call and let her know that her chest x-ray does show an increasing infiltrative change which could be representing a new pneumonitis.  She needs to make sure to begin her antibiotics.  Her sodium was also low so she needs to increase her fluids.  I have not yet received her BNP which will tell me if she is in congestive heart failure.  If she gets any worse she needs to go to the ER emergently.  Thank you.

## 2019-02-20 ENCOUNTER — OFFICE VISIT (OUTPATIENT)
Dept: FAMILY MEDICINE CLINIC | Facility: CLINIC | Age: 84
End: 2019-02-20

## 2019-02-20 VITALS
WEIGHT: 157 LBS | SYSTOLIC BLOOD PRESSURE: 124 MMHG | DIASTOLIC BLOOD PRESSURE: 66 MMHG | BODY MASS INDEX: 23.79 KG/M2 | HEIGHT: 68 IN

## 2019-02-20 DIAGNOSIS — J42 CHRONIC BRONCHITIS, UNSPECIFIED CHRONIC BRONCHITIS TYPE (HCC): ICD-10-CM

## 2019-02-20 DIAGNOSIS — I10 ESSENTIAL HYPERTENSION: Primary | ICD-10-CM

## 2019-02-20 DIAGNOSIS — I50.22 CHRONIC SYSTOLIC CONGESTIVE HEART FAILURE (HCC): ICD-10-CM

## 2019-02-20 DIAGNOSIS — E03.9 ACQUIRED HYPOTHYROIDISM: ICD-10-CM

## 2019-02-20 PROCEDURE — 99213 OFFICE O/P EST LOW 20 MIN: CPT | Performed by: NURSE PRACTITIONER

## 2019-02-20 NOTE — PROGRESS NOTES
Subjective   Val Arteaga is a 86 y.o. female.  Three month-follow up for HTN, COPD, hypothyroidism, and CHF.     Hypertension   This is a chronic problem. The current episode started more than 1 year ago. The problem is unchanged. The problem is controlled. Associated symptoms include shortness of breath. Pertinent negatives include no chest pain, orthopnea or peripheral edema. Agents associated with hypertension include thyroid hormones. Risk factors for coronary artery disease include post-menopausal state, sedentary lifestyle, dyslipidemia and family history. Past treatments include beta blockers. Current antihypertension treatment includes beta blockers. The current treatment provides significant improvement.   COPD   This is a chronic problem. The current episode started more than 1 year ago. The problem occurs constantly. The problem has been unchanged. Pertinent negatives include no chest pain, chills, diaphoresis, fatigue or fever. The symptoms are aggravated by exertion. Treatments tried: albuterol inhaler and nebulizer. The treatment provided moderate relief.   Hypothyroidism   This is a chronic problem. The current episode started more than 1 year ago. The problem occurs constantly. The problem has been unchanged. Pertinent negatives include no chest pain, chills, diaphoresis, fatigue or fever. Nothing aggravates the symptoms. Treatments tried: Synthroid  The treatment provided significant relief.   Congestive Heart Failure   Presents for follow-up visit. Associated symptoms include shortness of breath. Pertinent negatives include no chest pain or fatigue. The symptoms have been stable. Compliance with total regimen is %. Compliance with diet is %. Compliance with exercise is 26-50%.        The following portions of the patient's history were reviewed and updated as appropriate:   Current Outpatient Medications   Medication Sig Dispense Refill   • acetaminophen (TYLENOL) 325 MG tablet  Take 2 tablets by mouth Every 4 (Four) Hours As Needed for Mild Pain . 1 tablet 1   • albuterol (PROVENTIL) (2.5 MG/3ML) 0.083% nebulizer solution Take 2.5 mg by nebulization Every 6 (Six) Hours As Needed for Wheezing. 90 vial 5   • albuterol (VENTOLIN HFA) 108 (90 Base) MCG/ACT inhaler 2 puffs every 4 hours as needed for breathing 1 inhaler 5   • aspirin 325 MG EC tablet Take 1 tablet by mouth Daily.     • atorvastatin (LIPITOR) 20 MG tablet Take 1 tablet by mouth Daily. 90 tablet 2   • budesonide-formoterol (SYMBICORT) 160-4.5 MCG/ACT inhaler Inhale 2 puffs 2 (Two) Times a Day. 1 inhaler 5   • carvedilol (COREG) 6.25 MG tablet Take 1 tablet by mouth 2 (Two) Times a Day With Meals. 180 tablet 2   • estradiol (ESTRACE) 0.1 MG/GM vaginal cream Insert 2 g into the vagina Daily. 42.5 g 2   • famotidine (PEPCID) 40 MG tablet Take 1 tablet by mouth Daily. 90 tablet 2   • ferrous sulfate 324 (65 Fe) MG tablet delayed-release EC tablet Take 1 tablet by mouth Daily With Breakfast. 1 tablet 1   • fluorometholone (FLAREX) 0.1 % ophthalmic suspension Administer 1 drop to both eyes 4 (Four) Times a Day. (Patient taking differently: Administer 1 drop to both eyes 4 (Four) Times a Day As Needed.) 5 mL 3   • folic acid-vit B6-vit B12 (FOLBEE) 2.5-25-1 MG tablet tablet Take 1 tablet by mouth Daily. 30 tablet 5   • furosemide (LASIX) 40 MG tablet Take 1 tablet by mouth Daily. 30 tablet 2   • ipratropium-albuterol (DUO-NEB) 0.5-2.5 mg/3 ml nebulizer Take 3 mL by nebulization 4 (Four) Times a Day As Needed for Wheezing. 360 mL 2   • levothyroxine (SYNTHROID, LEVOTHROID) 50 MCG tablet Take 1 tablet by mouth Daily. 30 tablet 5   • montelukast (SINGULAIR) 10 MG tablet Take 1 tablet by mouth Every Night. 90 tablet 3   • nitroglycerin (NITRODUR) 0.2 MG/HR patch Place 1 patch on the skin Daily. 90 patch 2   • polyethylene glycol (MIRALAX) powder MIX 1 CAPFUL (17G) IN 8 OUNCES OF LIQUID AND DRINK BY MOUTH EVERY DAY FOR CONSTIPATION 527 g 2    • ranolazine (RANEXA) 500 MG 12 hr tablet Take 1 tablet by mouth Every 12 (Twelve) Hours. 60 tablet 2   • sertraline (ZOLOFT) 50 MG tablet Take 1 tablet by mouth Daily. 90 tablet 2   • umeclidinium-vilanterol (ANORO ELLIPTA) 62.5-25 MCG/INH aerosol powder  inhaler Inhale 1 puff Daily. 60 each 3   • vitamin D (ERGOCALCIFEROL) 86601 units capsule capsule Take 1 capsule by mouth Every 30 (Thirty) Days. 3 capsule 3     No current facility-administered medications for this visit.      Current Outpatient Medications on File Prior to Visit   Medication Sig   • acetaminophen (TYLENOL) 325 MG tablet Take 2 tablets by mouth Every 4 (Four) Hours As Needed for Mild Pain .   • albuterol (PROVENTIL) (2.5 MG/3ML) 0.083% nebulizer solution Take 2.5 mg by nebulization Every 6 (Six) Hours As Needed for Wheezing.   • albuterol (VENTOLIN HFA) 108 (90 Base) MCG/ACT inhaler 2 puffs every 4 hours as needed for breathing   • aspirin 325 MG EC tablet Take 1 tablet by mouth Daily.   • atorvastatin (LIPITOR) 20 MG tablet Take 1 tablet by mouth Daily.   • budesonide-formoterol (SYMBICORT) 160-4.5 MCG/ACT inhaler Inhale 2 puffs 2 (Two) Times a Day.   • carvedilol (COREG) 6.25 MG tablet Take 1 tablet by mouth 2 (Two) Times a Day With Meals.   • estradiol (ESTRACE) 0.1 MG/GM vaginal cream Insert 2 g into the vagina Daily.   • famotidine (PEPCID) 40 MG tablet Take 1 tablet by mouth Daily.   • ferrous sulfate 324 (65 Fe) MG tablet delayed-release EC tablet Take 1 tablet by mouth Daily With Breakfast.   • fluorometholone (FLAREX) 0.1 % ophthalmic suspension Administer 1 drop to both eyes 4 (Four) Times a Day. (Patient taking differently: Administer 1 drop to both eyes 4 (Four) Times a Day As Needed.)   • folic acid-vit B6-vit B12 (FOLBEE) 2.5-25-1 MG tablet tablet Take 1 tablet by mouth Daily.   • furosemide (LASIX) 40 MG tablet Take 1 tablet by mouth Daily.   • ipratropium-albuterol (DUO-NEB) 0.5-2.5 mg/3 ml nebulizer Take 3 mL by nebulization 4  "(Four) Times a Day As Needed for Wheezing.   • levothyroxine (SYNTHROID, LEVOTHROID) 50 MCG tablet Take 1 tablet by mouth Daily.   • montelukast (SINGULAIR) 10 MG tablet Take 1 tablet by mouth Every Night.   • nitroglycerin (NITRODUR) 0.2 MG/HR patch Place 1 patch on the skin Daily.   • polyethylene glycol (MIRALAX) powder MIX 1 CAPFUL (17G) IN 8 OUNCES OF LIQUID AND DRINK BY MOUTH EVERY DAY FOR CONSTIPATION   • ranolazine (RANEXA) 500 MG 12 hr tablet Take 1 tablet by mouth Every 12 (Twelve) Hours.   • sertraline (ZOLOFT) 50 MG tablet Take 1 tablet by mouth Daily.   • umeclidinium-vilanterol (ANORO ELLIPTA) 62.5-25 MCG/INH aerosol powder  inhaler Inhale 1 puff Daily.   • vitamin D (ERGOCALCIFEROL) 62947 units capsule capsule Take 1 capsule by mouth Every 30 (Thirty) Days.   • [DISCONTINUED] predniSONE (DELTASONE) 20 MG tablet Take 3 tablets at the same time every day for 5 days   • [DISCONTINUED] azithromycin (ZITHROMAX) 250 MG tablet Take 2 tablets the first day, then 1 tablet daily for 4 days.     No current facility-administered medications on file prior to visit.      She is allergic to ace inhibitors and lisinopril..    Review of Systems   Constitutional: Negative.  Negative for chills, diaphoresis, fatigue and fever.   HENT: Negative.    Eyes: Negative.    Respiratory: Positive for shortness of breath.    Cardiovascular: Negative.  Negative for chest pain and orthopnea.   Gastrointestinal: Negative.  Negative for blood in stool.   Endocrine: Negative.    Genitourinary: Negative.    Musculoskeletal: Negative.    Skin: Negative.    Allergic/Immunologic: Negative.    Neurological: Negative.    Hematological: Negative.    Psychiatric/Behavioral: Negative.  Negative for confusion.       Objective    Visit Vitals  /66   Ht 172.7 cm (68\")   Wt 71.2 kg (157 lb)   LMP  (LMP Unknown)   BMI 23.87 kg/m²       Physical Exam   Constitutional: She is oriented to person, place, and time. She appears well-developed and " well-nourished.   HENT:   Head: Normocephalic.   Right Ear: External ear normal.   Left Ear: External ear normal.   Eyes: EOM are normal. Pupils are equal, round, and reactive to light.   Neck: Normal range of motion. Neck supple.   Cardiovascular: Normal rate, regular rhythm and normal heart sounds.   Pulmonary/Chest: Effort normal. She has decreased breath sounds in the right upper field, the right lower field, the left upper field and the left lower field.   Abdominal: Soft. Bowel sounds are normal.   Musculoskeletal: Normal range of motion.   Neurological: She is alert and oriented to person, place, and time.   Skin: Skin is warm. Capillary refill takes less than 2 seconds.   Psychiatric: She has a normal mood and affect. Her behavior is normal.   Nursing note and vitals reviewed.      Assessment/Plan   Problems Addressed this Visit        Cardiovascular and Mediastinum    Essential hypertension - Primary    Chronic systolic congestive heart failure (CMS/HCC)       Respiratory    Chronic obstructive pulmonary disease (CMS/HCC)       Endocrine    Acquired hypothyroidism        1.  Essential hypertension:  Continue on Coreg as previously prescribed  Encouraged to reduce sodium intake to no more than 2000 mg per day     2.  Chronic obstructive pulmonary disease:   Continue on albuterol and Ventolin inhalers previously prescribed  Continue on DuoNeb nebulizer treatments as previously prescribed  Encouraged to seek emergency medical treatment for any new or worsening shortness of breath, chest pain or edema  Will continue pulmonology follow-up with Dr. Bryan Jay     3.  Chronic systolic congestive heart failure:  Continue on Lasix as previously prescribed  Encouraged to seek emergency medical treatment for any new or worsening edema, shortness of breath     4.  Acquired hypothyroidism:  Continue on Synthroid as previously prescribed       Continue on current medications as previously prescribed   Will complete  fasting lab work at next appointment.  Return in about 3 months (around 5/20/2019) for Recheck.        This document has been electronically signed by CRESENCIO Hall on February 20, 2019 11:58 AM

## 2019-03-18 ENCOUNTER — APPOINTMENT (OUTPATIENT)
Dept: GENERAL RADIOLOGY | Facility: HOSPITAL | Age: 84
End: 2019-03-18

## 2019-03-18 ENCOUNTER — HOSPITAL ENCOUNTER (EMERGENCY)
Facility: HOSPITAL | Age: 84
Discharge: HOME | End: 2019-03-18
Attending: FAMILY MEDICINE | Admitting: FAMILY MEDICINE

## 2019-03-18 VITALS
DIASTOLIC BLOOD PRESSURE: 72 MMHG | SYSTOLIC BLOOD PRESSURE: 136 MMHG | HEIGHT: 68 IN | HEART RATE: 83 BPM | RESPIRATION RATE: 18 BRPM | BODY MASS INDEX: 23.76 KG/M2 | WEIGHT: 156.8 LBS | OXYGEN SATURATION: 92 % | TEMPERATURE: 98 F

## 2019-03-18 DIAGNOSIS — M54.50 ACUTE LOW BACK PAIN WITHOUT SCIATICA, UNSPECIFIED BACK PAIN LATERALITY: Primary | ICD-10-CM

## 2019-03-18 PROCEDURE — 99283 EMERGENCY DEPT VISIT LOW MDM: CPT

## 2019-03-18 PROCEDURE — 72170 X-RAY EXAM OF PELVIS: CPT

## 2019-03-18 PROCEDURE — 72100 X-RAY EXAM L-S SPINE 2/3 VWS: CPT

## 2019-03-18 RX ORDER — HYDROCODONE BITARTRATE AND ACETAMINOPHEN 5; 325 MG/1; MG/1
1 TABLET ORAL ONCE
Status: COMPLETED | OUTPATIENT
Start: 2019-03-18 | End: 2019-03-18

## 2019-03-18 RX ORDER — BACLOFEN 10 MG/1
10 TABLET ORAL 3 TIMES DAILY
Qty: 21 TABLET | Refills: 0 | Status: ON HOLD | OUTPATIENT
Start: 2019-03-18 | End: 2019-03-20

## 2019-03-18 RX ADMIN — HYDROCODONE BITARTRATE AND ACETAMINOPHEN 1 TABLET: 5; 325 TABLET ORAL at 10:09

## 2019-03-18 NOTE — ED PROVIDER NOTES
Subjective   Patient presents to emergency department for fall, low back pain.  States she fell Friday and pain has gotten worse.  She was stepping ip tp her front porch and fell back and landed on her buttocks.  Denies head trauma, syncope, radicular pain, hip pain, changes to bowel/bladder, numbness/weakness.            History provided by:  Patient   used: No    Fall   Mechanism of injury: fall    Injury location:  Torso  Torso injury location:  Back (lumbosacral spine)  Incident location:  Home  Time since incident:  3 days  Arrived directly from scene: no    Fall:     Fall occurred:  Walking    Impact surface:  [place]    Point of impact:  Buttocks  Prior to arrival data:     Patient ambulatory at scene: yes      Loss of consciousness: no      Amnesic to event: no    Associated symptoms: no abdominal pain, no back pain and no chest pain        Review of Systems   Constitutional: Negative for chills and fever.   HENT: Negative for sore throat and trouble swallowing.    Respiratory: Negative for shortness of breath.    Cardiovascular: Negative for chest pain.   Gastrointestinal: Negative for abdominal pain.   Genitourinary: Negative for dysuria and flank pain.   Musculoskeletal: Negative for back pain.   Skin: Negative for color change.   Allergic/Immunologic: Negative for immunocompromised state.   Neurological: Negative for weakness and numbness.   Hematological: Does not bruise/bleed easily.   Psychiatric/Behavioral: Negative for confusion.       Past Medical History:   Diagnosis Date   • Acquired hypothyroidism    • Allergic rhinitis    • CHF (congestive heart failure) (CMS/[facility])    • COPD (chronic obstructive pulmonary disease) (CMS/[facility])    • Corneal epithelial dystrophy    • Coronary arteriosclerosis    • Depression    • Generalized atherosclerosis    • GERD (gastroesophageal reflux disease)    • Hemorrhoids    • History of bone density study 08/03/2012    Lumbar spine Osteopenia.  Femoral Osteopenia   • History of mammogram 08/20/2004    Birads Category 2 Benign   • History of Papanicolaou smear of cervix 08/12/2004    NEGATIVE   • Hypercholesterolemia    • Hyperlipidemia    • Hypertension    • Myocardial infarction (CMS/HCC)    • Nonexudative age-related macular degeneration    • Osteoarthritis of multiple joints    • Pseudophakia    • Punctate keratitis     - history Thygeson's keratopathy      • Tobacco dependence syndrome        Allergies   Allergen Reactions   • Ace Inhibitors    • Lisinopril Cough       Past Surgical History:   Procedure Laterality Date   • APPENDECTOMY     • BREAST SURGERY  03/19/1954    Excision, breast lesion (1)  Excision of fibroma. 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 patency in the circumflex artery site of previous angioplasty. Kathe. patency in RCA.Nonobstructive 20-30% stenosis in the LAD and diagonal branch. Preserved LV systolic function with Ef of 55%   • CERVICAL SPINE SURGERY  09/16/1974    Excision of cervical disc, C5-6, C6-7, anterior cervical fusion, C5-6 with iliac bone graft.cervical spondylosis,C5-6, C6-7   • COLONOSCOPY  01/01/2013    patient declined    • CORONARY ANGIOPLASTY  07/21/1993    Angioplasty, coronary (2)    RCA    • DILATATION AND CURETTAGE      3   • ENDOSCOPY N/A 2/14/2018    Procedure: ESOPHAGOGASTRODUODENOSCOPY;  Surgeon: Alex Avitia MD;  Location: [facility] ENDOSCOPY;  Service:    • ENDOSCOPY AND COLONOSCOPY  10/01/1998    Hemorhoids Rectal Outlet Bleeding   • ESOPHAGOSCOPY / EGD  06/28/2004    Nonerosive gastritis of the body of the stomach. A biopsy was obtained & placed in h. Pylori test agar. Multiple biopsies were obtained from the body of the stomach   • HIP BIPOLAR REPLACEMENT Right 1/23/2018    Procedure: HIP BIPOLAR ANTERIOR APPROACH - right;  Surgeon: Kelvin Mcdowell MD;  Location: [facility] OR;  Service:    • INJECTION OF MEDICATION  11/23/2015     "Depo Medrol (Methylprednisone) (5)    • INJECTION OF MEDICATION  2016    Kenalog (3)     • JOINT REPLACEMENT Right 2018    hip   • PACEMAKER REPLACEMENT  2006       Family History   Problem Relation Age of Onset   • Coronary artery disease Other        Social History     Socioeconomic History   • Marital status:      Spouse name: Not on file   • Number of children: Not on file   • Years of education: Not on file   • Highest education level: Not on file   Tobacco Use   • Smoking status: Former Smoker     Packs/day: 0.50     Years: 50.00     Pack years: 25.00     Types: Cigarettes     Start date:      Last attempt to quit: 2017     Years since quittin.1   • Smokeless tobacco: Never Used   Substance and Sexual Activity   • Alcohol use: No   • Drug use: No   • Sexual activity: Defer           Objective      /72   Pulse 83   Temp 98 °F (36.7 °C) (Oral)   Resp 18   Ht 172.7 cm (68\")   Wt 71.1 kg (156 lb 12.8 oz)   LMP  (LMP Unknown)   SpO2 92%   BMI 23.84 kg/m²     Physical Exam   Constitutional: She is oriented to person, place, and time. She appears well-developed and well-nourished. No distress.   HENT:   Head: Atraumatic.   Eyes: Conjunctivae are normal.   Cardiovascular: Normal rate, regular rhythm and normal heart sounds.   Pulmonary/Chest: Effort normal.   Musculoskeletal: She exhibits tenderness. She exhibits no edema.        Lumbar back: She exhibits tenderness. She exhibits no swelling, no edema and no deformity.        Back:    No step off deformity   Neurological: She is alert and oriented to person, place, and time.   Skin: Skin is warm. Capillary refill takes less than 2 seconds.   Psychiatric: She has a normal mood and affect. Her behavior is normal. Thought content normal.   Nursing note and vitals reviewed.      Procedures           ED Course      Results for orders placed or performed in visit on 19   BNP   Result Value Ref Range    proBNP 426.0 " 0.0-1,800.0 pg/mL     Xr Spine Lumbar 2 Or 3 View    Result Date: 3/18/2019  Narrative: PROCEDURE: Lumbar spine 3 views REASON FOR EXAM: fall, lumbosacral pain FINDINGS: Diffuse bony osteopenia/osteoporosis.. Lumbar spine vertebral body heights and alignment are normal. There is no evidence of fracture or dislocation.Intervertebral disc spaces are intact.     Impression: 1. No acute lumbar spine abnormality.. 2.Diffuse bony osteopenia/osteoporosis. Electronically signed by:  Renny Stovall MD  3/18/2019 10:02 AM CDT Workstation: [id number]    Xr Pelvis 1 Or 2 View    Result Date: 3/18/2019  Narrative: Procedure:  Pelvis Indication: Trauma, pain. Patient fell. Technique:  Single frontal view pelvis   . Prior relevant exam:  None.   No evidence of acute bony, soft tissue, or joint abnormality is noted. Bone mineralization is within normal limits. The visualized joint spaces appear intact. There is a bipolar right femoral head arthroplasty.     Impression: CONCLUSION: Bipolar right femoral head arthroplasty. The pelvis is otherwise unremarkable. No evidence for fracture. Electronically signed by:  Francisco Schmidt MD  3/18/2019 9:59 AM CDT Workstation: [id number]    Discussed results with patient.  Gave educational materials.  Advised close follow up with PCP/Ortho.  Return to emergency department for new or worsening symptoms.              MDM      Final diagnoses:   Acute low back pain without sciatica, unspecified back pain laterality            Satnam Helton PA-C  03/18/19 1051

## 2019-03-19 ENCOUNTER — EPISODE CHANGES (OUTPATIENT)
Dept: CASE MANAGEMENT | Facility: OTHER | Age: 84
End: 2019-03-19

## 2019-03-19 ENCOUNTER — PATIENT OUTREACH (OUTPATIENT)
Dept: CASE MANAGEMENT | Facility: OTHER | Age: 84
End: 2019-03-19

## 2019-03-19 NOTE — OUTREACH NOTE
Care Management Plan 3/19/2019   Lifestyle Goals Routine follow-up with doctor(s);Decrease falls risk   Lifestyle Goals -   Barriers Disease education;Pain   Self Management Medication Adherence;Home BP Monitoring   Self Management -   Annual Wellness Visit:  Patient Will Schedule   Annual Wellness Visit:  discussed AWV and she will talk with PCP at [facility] in May 2019   Care Gaps Addressed Flu Shot;Pneumonia Vaccine   Care Gaps Addressed current with TDAP, flu, and pneumonia vaccines   Specific Disease Process Teaching Hypertension;Heart Disease   Does patient have depression diagnosis? No   Does patient have depression diagnosis? -   Advanced Directives: (No Data)   Advanced Directives: will discuss on next HRCM call   Ed Visits past 12 months: 1   Hospitalizations past 12 months None   Medication Adherence Medications understood   Goal Progress -   Readiness Scale -   Confidence Scale -   Health Literacy Good     The main concerns and/or symptoms the patient would like to address are: Patient seen in ED for sciatica pain after a fall on 3-15-19 at home. Patient c/o pain in buttock area. Patient stated she was stepping up to go into her home a fell on her buttocks. She will see ortho if no improvement.    Education/instruction provided by Care Coordinator: CC inquired about her Baclofen that was prescribed and she has taken 1 tablet this am with some relief. She will talk with PCP at [facility] in May about her AWV. Patient up to date on flu, pneumonia and TDAP. She needs her zoster. CC educated on salt in diet and home BP monitoring.    Follow Up Outreach Due: 2 weeks    Eliane Ortiz RN

## 2019-03-20 ENCOUNTER — APPOINTMENT (OUTPATIENT)
Dept: CT IMAGING | Facility: HOSPITAL | Age: 84
End: 2019-03-20

## 2019-03-20 ENCOUNTER — HOSPITAL ENCOUNTER (OUTPATIENT)
Facility: HOSPITAL | Age: 84
Setting detail: OBSERVATION
Discharge: HOME | End: 2019-03-21
Attending: EMERGENCY MEDICINE | Admitting: HOSPITALIST

## 2019-03-20 ENCOUNTER — APPOINTMENT (OUTPATIENT)
Dept: GENERAL RADIOLOGY | Facility: HOSPITAL | Age: 84
End: 2019-03-20

## 2019-03-20 DIAGNOSIS — J44.1 ACUTE EXACERBATION OF CHRONIC OBSTRUCTIVE PULMONARY DISEASE (COPD) (HCC): ICD-10-CM

## 2019-03-20 DIAGNOSIS — R41.82 ALTERED MENTAL STATUS, UNSPECIFIED ALTERED MENTAL STATUS TYPE: Primary | ICD-10-CM

## 2019-03-20 DIAGNOSIS — Z74.09 IMPAIRED FUNCTIONAL MOBILITY AND ACTIVITY TOLERANCE: ICD-10-CM

## 2019-03-20 DIAGNOSIS — M54.50 ACUTE BILATERAL LOW BACK PAIN WITHOUT SCIATICA: ICD-10-CM

## 2019-03-20 PROBLEM — G92.8 DRUG-INDUCED ENCEPHALOPATHY: Status: ACTIVE | Noted: 2019-03-20

## 2019-03-20 PROBLEM — F32.9 MAJOR DEPRESSIVE DISORDER: Status: ACTIVE | Noted: 2019-03-20

## 2019-03-20 LAB
ALBUMIN SERPL-MCNC: 3.6 G/DL (ref 3.4–4.8)
ALBUMIN/GLOB SERPL: 1.1 G/DL (ref 1.1–1.8)
ALP SERPL-CCNC: 67 U/L (ref 38–126)
ALT SERPL W P-5'-P-CCNC: 19 U/L (ref 9–52)
AMMONIA BLD-SCNC: <9 UMOL/L (ref 9–30)
AMPHET+METHAMPHET UR QL: NEGATIVE
ANION GAP SERPL CALCULATED.3IONS-SCNC: 6 MMOL/L (ref 5–15)
ARTERIAL PATENCY WRIST A: POSITIVE
AST SERPL-CCNC: 29 U/L (ref 14–36)
ATMOSPHERIC PRESS: 753 MMHG
BARBITURATES UR QL SCN: NEGATIVE
BASE EXCESS BLDA CALC-SCNC: 3.4 MMOL/L (ref 0–2)
BASOPHILS # BLD AUTO: 0.06 10*3/MM3 (ref 0–0.2)
BASOPHILS NFR BLD AUTO: 0.6 % (ref 0–1.5)
BDY SITE: ABNORMAL
BENZODIAZ UR QL SCN: NEGATIVE
BILIRUB SERPL-MCNC: 0.7 MG/DL (ref 0.2–1.3)
BILIRUB UR QL STRIP: NEGATIVE
BUN BLD-MCNC: 24 MG/DL (ref 7–21)
BUN/CREAT SERPL: 25.3 (ref 7–25)
CALCIUM SPEC-SCNC: 8.8 MG/DL (ref 8.4–10.2)
CANNABINOIDS SERPL QL: NEGATIVE
CHLORIDE SERPL-SCNC: 102 MMOL/L (ref 95–110)
CLARITY UR: CLEAR
CO2 SERPL-SCNC: 28 MMOL/L (ref 22–31)
COCAINE UR QL: NEGATIVE
COHGB MFR BLD: 3.4 % (ref 0–5)
COLOR UR: YELLOW
CREAT BLD-MCNC: 0.95 MG/DL (ref 0.5–1)
DEPRECATED RDW RBC AUTO: 47.8 FL (ref 37–54)
EOSINOPHIL # BLD AUTO: 0.42 10*3/MM3 (ref 0–0.4)
EOSINOPHIL NFR BLD AUTO: 4.3 % (ref 0.3–6.2)
ERYTHROCYTE [DISTWIDTH] IN BLOOD BY AUTOMATED COUNT: 13.5 % (ref 12.3–15.4)
ETHANOL BLD-MCNC: <10 MG/DL (ref 0–10)
ETHANOL UR QL: <0.01 %
GAS FLOW AIRWAY: 2 LPM
GFR SERPL CREATININE-BSD FRML MDRD: 56 ML/MIN/1.73 (ref 39–90)
GLOBULIN UR ELPH-MCNC: 3.4 GM/DL (ref 2.3–3.5)
GLUCOSE BLD-MCNC: 109 MG/DL (ref 60–100)
GLUCOSE UR STRIP-MCNC: NEGATIVE MG/DL
HCO3 BLDA-SCNC: 27.2 MMOL/L (ref 20–26)
HCT VFR BLD AUTO: 33.8 % (ref 34–46.6)
HGB BLD-MCNC: 11.1 G/DL (ref 12–15.9)
HGB UR QL STRIP.AUTO: NEGATIVE
IMM GRANULOCYTES # BLD AUTO: 0.06 10*3/MM3 (ref 0–0.05)
IMM GRANULOCYTES NFR BLD AUTO: 0.6 % (ref 0–0.5)
KETONES UR QL STRIP: NEGATIVE
L PNEUMO1 AG UR QL IA: NEGATIVE
LEUKOCYTE ESTERASE UR QL STRIP.AUTO: NEGATIVE
LYMPHOCYTES # BLD AUTO: 1.43 10*3/MM3 (ref 0.7–3.1)
LYMPHOCYTES NFR BLD AUTO: 14.6 % (ref 19.6–45.3)
Lab: ABNORMAL
MCH RBC QN AUTO: 31.9 PG (ref 26.6–33)
MCHC RBC AUTO-ENTMCNC: 32.8 G/DL (ref 31.5–35.7)
MCV RBC AUTO: 97.1 FL (ref 79–97)
METHADONE UR QL SCN: NEGATIVE
MODALITY: ABNORMAL
MONOCYTES # BLD AUTO: 0.63 10*3/MM3 (ref 0.1–0.9)
MONOCYTES NFR BLD AUTO: 6.4 % (ref 5–12)
NEUTROPHILS # BLD AUTO: 7.21 10*3/MM3 (ref 1.4–7)
NEUTROPHILS NFR BLD AUTO: 73.5 % (ref 42.7–76)
NITRITE UR QL STRIP: NEGATIVE
NRBC BLD AUTO-RTO: 0 /100 WBC (ref 0–0)
NT-PROBNP SERPL-MCNC: 840 PG/ML (ref 0–1800)
OPIATES UR QL: NEGATIVE
OXYCODONE UR QL SCN: NEGATIVE
PCO2 BLDA: 37.5 MM HG (ref 35–45)
PH BLDA: 7.47 PH UNITS (ref 7.35–7.45)
PH UR STRIP.AUTO: 6.5 [PH] (ref 5–9)
PLATELET # BLD AUTO: 189 10*3/MM3 (ref 140–450)
PMV BLD AUTO: 10 FL (ref 6–12)
PO2 BLDA: 76.3 MM HG (ref 83–108)
POTASSIUM BLD-SCNC: 4.2 MMOL/L (ref 3.5–5.1)
PROT SERPL-MCNC: 7 G/DL (ref 6.3–8.6)
PROT UR QL STRIP: NEGATIVE
RBC # BLD AUTO: 3.48 10*6/MM3 (ref 3.77–5.28)
S PNEUM AG SPEC QL LA: NEGATIVE
SAO2 % BLDCOA: 96.3 % (ref 94–99)
SODIUM BLD-SCNC: 136 MMOL/L (ref 137–145)
SP GR UR STRIP: 1.01 (ref 1–1.03)
TROPONIN I SERPL-MCNC: <0.012 NG/ML
UROBILINOGEN UR QL STRIP: NORMAL
VENTILATOR MODE: ABNORMAL
WBC NRBC COR # BLD: 9.81 10*3/MM3 (ref 3.4–10.8)

## 2019-03-20 PROCEDURE — 99220 PR INITIAL OBSERVATION CARE/DAY 70 MINUTES: CPT | Performed by: STUDENT IN AN ORGANIZED HEALTH CARE EDUCATION/TRAINING PROGRAM

## 2019-03-20 PROCEDURE — 87070 CULTURE OTHR SPECIMN AEROBIC: CPT | Performed by: FAMILY MEDICINE

## 2019-03-20 PROCEDURE — 80307 DRUG TEST PRSMV CHEM ANLYZR: CPT | Performed by: EMERGENCY MEDICINE

## 2019-03-20 PROCEDURE — 94799 UNLISTED PULMONARY SVC/PX: CPT

## 2019-03-20 PROCEDURE — G0378 HOSPITAL OBSERVATION PER HR: HCPCS

## 2019-03-20 PROCEDURE — 99285 EMERGENCY DEPT VISIT HI MDM: CPT

## 2019-03-20 PROCEDURE — 96374 THER/PROPH/DIAG INJ IV PUSH: CPT

## 2019-03-20 PROCEDURE — 72131 CT LUMBAR SPINE W/O DYE: CPT

## 2019-03-20 PROCEDURE — 85025 COMPLETE CBC W/AUTO DIFF WBC: CPT | Performed by: EMERGENCY MEDICINE

## 2019-03-20 PROCEDURE — 36600 WITHDRAWAL OF ARTERIAL BLOOD: CPT

## 2019-03-20 PROCEDURE — 84484 ASSAY OF TROPONIN QUANT: CPT | Performed by: EMERGENCY MEDICINE

## 2019-03-20 PROCEDURE — 71045 X-RAY EXAM CHEST 1 VIEW: CPT

## 2019-03-20 PROCEDURE — 87899 AGENT NOS ASSAY W/OPTIC: CPT | Performed by: FAMILY MEDICINE

## 2019-03-20 PROCEDURE — P9612 CATHETERIZE FOR URINE SPEC: HCPCS

## 2019-03-20 PROCEDURE — 70450 CT HEAD/BRAIN W/O DYE: CPT

## 2019-03-20 PROCEDURE — 93010 ELECTROCARDIOGRAM REPORT: CPT | Performed by: INTERNAL MEDICINE

## 2019-03-20 PROCEDURE — 82140 ASSAY OF AMMONIA: CPT | Performed by: EMERGENCY MEDICINE

## 2019-03-20 PROCEDURE — 80053 COMPREHEN METABOLIC PANEL: CPT | Performed by: EMERGENCY MEDICINE

## 2019-03-20 PROCEDURE — 94760 N-INVAS EAR/PLS OXIMETRY 1: CPT

## 2019-03-20 PROCEDURE — 97162 PT EVAL MOD COMPLEX 30 MIN: CPT

## 2019-03-20 PROCEDURE — 82803 BLOOD GASES ANY COMBINATION: CPT

## 2019-03-20 PROCEDURE — 87581 M.PNEUMON DNA AMP PROBE: CPT | Performed by: FAMILY MEDICINE

## 2019-03-20 PROCEDURE — 82375 ASSAY CARBOXYHB QUANT: CPT | Performed by: EMERGENCY MEDICINE

## 2019-03-20 PROCEDURE — 25010000002 METHYLPREDNISOLONE PER 125 MG: Performed by: EMERGENCY MEDICINE

## 2019-03-20 PROCEDURE — 81003 URINALYSIS AUTO W/O SCOPE: CPT | Performed by: EMERGENCY MEDICINE

## 2019-03-20 PROCEDURE — 94640 AIRWAY INHALATION TREATMENT: CPT

## 2019-03-20 PROCEDURE — 87205 SMEAR GRAM STAIN: CPT | Performed by: FAMILY MEDICINE

## 2019-03-20 PROCEDURE — 93005 ELECTROCARDIOGRAM TRACING: CPT | Performed by: EMERGENCY MEDICINE

## 2019-03-20 PROCEDURE — 96376 TX/PRO/DX INJ SAME DRUG ADON: CPT

## 2019-03-20 PROCEDURE — 83880 ASSAY OF NATRIURETIC PEPTIDE: CPT | Performed by: EMERGENCY MEDICINE

## 2019-03-20 RX ORDER — ATORVASTATIN CALCIUM 20 MG/1
20 TABLET, FILM COATED ORAL NIGHTLY
Status: DISCONTINUED | OUTPATIENT
Start: 2019-03-20 | End: 2019-03-21 | Stop reason: HOSPADM

## 2019-03-20 RX ORDER — PREDNISONE 20 MG/1
20 TABLET ORAL
Status: DISCONTINUED | OUTPATIENT
Start: 2019-03-21 | End: 2019-03-21 | Stop reason: HOSPADM

## 2019-03-20 RX ORDER — ACETAMINOPHEN 325 MG/1
650 TABLET ORAL EVERY 4 HOURS PRN
Status: DISCONTINUED | OUTPATIENT
Start: 2019-03-20 | End: 2019-03-21 | Stop reason: HOSPADM

## 2019-03-20 RX ORDER — LIDOCAINE 50 MG/G
1 PATCH TOPICAL
Status: DISCONTINUED | OUTPATIENT
Start: 2019-03-20 | End: 2019-03-21 | Stop reason: HOSPADM

## 2019-03-20 RX ORDER — IPRATROPIUM BROMIDE AND ALBUTEROL SULFATE 2.5; .5 MG/3ML; MG/3ML
3 SOLUTION RESPIRATORY (INHALATION)
Status: DISCONTINUED | OUTPATIENT
Start: 2019-03-20 | End: 2019-03-20 | Stop reason: SDUPTHER

## 2019-03-20 RX ORDER — ASPIRIN 325 MG
325 TABLET, DELAYED RELEASE (ENTERIC COATED) ORAL ONCE
Status: COMPLETED | OUTPATIENT
Start: 2019-03-20 | End: 2019-03-20

## 2019-03-20 RX ORDER — ALBUTEROL SULFATE 2.5 MG/3ML
2.5 SOLUTION RESPIRATORY (INHALATION) EVERY 6 HOURS PRN
Status: DISCONTINUED | OUTPATIENT
Start: 2019-03-20 | End: 2019-03-21 | Stop reason: HOSPADM

## 2019-03-20 RX ORDER — METHYLPREDNISOLONE SODIUM SUCCINATE 125 MG/2ML
125 INJECTION, POWDER, LYOPHILIZED, FOR SOLUTION INTRAMUSCULAR; INTRAVENOUS ONCE
Status: COMPLETED | OUTPATIENT
Start: 2019-03-20 | End: 2019-03-20

## 2019-03-20 RX ORDER — FAMOTIDINE 40 MG/1
40 TABLET, FILM COATED ORAL DAILY
Status: DISCONTINUED | OUTPATIENT
Start: 2019-03-20 | End: 2019-03-21 | Stop reason: HOSPADM

## 2019-03-20 RX ORDER — IPRATROPIUM BROMIDE AND ALBUTEROL SULFATE 2.5; .5 MG/3ML; MG/3ML
3 SOLUTION RESPIRATORY (INHALATION) ONCE
Status: COMPLETED | OUTPATIENT
Start: 2019-03-20 | End: 2019-03-20

## 2019-03-20 RX ORDER — SODIUM CHLORIDE 0.9 % (FLUSH) 0.9 %
10 SYRINGE (ML) INJECTION AS NEEDED
Status: DISCONTINUED | OUTPATIENT
Start: 2019-03-20 | End: 2019-03-21 | Stop reason: HOSPADM

## 2019-03-20 RX ORDER — LEVOTHYROXINE SODIUM 0.05 MG/1
50 TABLET ORAL
Status: DISCONTINUED | OUTPATIENT
Start: 2019-03-21 | End: 2019-03-21 | Stop reason: HOSPADM

## 2019-03-20 RX ORDER — IPRATROPIUM BROMIDE AND ALBUTEROL SULFATE 2.5; .5 MG/3ML; MG/3ML
3 SOLUTION RESPIRATORY (INHALATION) 4 TIMES DAILY PRN
Status: DISCONTINUED | OUTPATIENT
Start: 2019-03-20 | End: 2019-03-21 | Stop reason: HOSPADM

## 2019-03-20 RX ORDER — RANOLAZINE 500 MG/1
500 TABLET, EXTENDED RELEASE ORAL EVERY 12 HOURS SCHEDULED
Status: DISCONTINUED | OUTPATIENT
Start: 2019-03-20 | End: 2019-03-21 | Stop reason: HOSPADM

## 2019-03-20 RX ORDER — METHYLPREDNISOLONE SODIUM SUCCINATE 125 MG/2ML
60 INJECTION, POWDER, LYOPHILIZED, FOR SOLUTION INTRAMUSCULAR; INTRAVENOUS ONCE
Status: COMPLETED | OUTPATIENT
Start: 2019-03-20 | End: 2019-03-20

## 2019-03-20 RX ORDER — DOCUSATE SODIUM 100 MG/1
100 CAPSULE, LIQUID FILLED ORAL DAILY
Status: DISCONTINUED | OUTPATIENT
Start: 2019-03-20 | End: 2019-03-21 | Stop reason: HOSPADM

## 2019-03-20 RX ORDER — AZITHROMYCIN 250 MG/1
250 TABLET, FILM COATED ORAL DAILY
Status: DISCONTINUED | OUTPATIENT
Start: 2019-03-21 | End: 2019-03-21 | Stop reason: HOSPADM

## 2019-03-20 RX ORDER — FERROUS SULFATE TAB EC 324 MG (65 MG FE EQUIVALENT) 324 (65 FE) MG
324 TABLET DELAYED RESPONSE ORAL
Status: DISCONTINUED | OUTPATIENT
Start: 2019-03-20 | End: 2019-03-21 | Stop reason: HOSPADM

## 2019-03-20 RX ORDER — IPRATROPIUM BROMIDE AND ALBUTEROL SULFATE 2.5; .5 MG/3ML; MG/3ML
3 SOLUTION RESPIRATORY (INHALATION)
Status: DISCONTINUED | OUTPATIENT
Start: 2019-03-20 | End: 2019-03-21 | Stop reason: HOSPADM

## 2019-03-20 RX ORDER — SODIUM CHLORIDE 0.9 % (FLUSH) 0.9 %
3 SYRINGE (ML) INJECTION EVERY 12 HOURS SCHEDULED
Status: DISCONTINUED | OUTPATIENT
Start: 2019-03-20 | End: 2019-03-21 | Stop reason: HOSPADM

## 2019-03-20 RX ORDER — ESTRADIOL 0.1 MG/G
2 CREAM VAGINAL NIGHTLY
Status: DISCONTINUED | OUTPATIENT
Start: 2019-03-20 | End: 2019-03-21 | Stop reason: HOSPADM

## 2019-03-20 RX ORDER — SODIUM CHLORIDE 9 MG/ML
50 INJECTION, SOLUTION INTRAVENOUS CONTINUOUS
Status: DISCONTINUED | OUTPATIENT
Start: 2019-03-20 | End: 2019-03-21

## 2019-03-20 RX ORDER — AZITHROMYCIN 250 MG/1
500 TABLET, FILM COATED ORAL DAILY
Status: COMPLETED | OUTPATIENT
Start: 2019-03-20 | End: 2019-03-20

## 2019-03-20 RX ORDER — MONTELUKAST SODIUM 10 MG/1
10 TABLET ORAL NIGHTLY
Status: DISCONTINUED | OUTPATIENT
Start: 2019-03-20 | End: 2019-03-21 | Stop reason: HOSPADM

## 2019-03-20 RX ORDER — NITROGLYCERIN 40 MG/1
1 PATCH TRANSDERMAL DAILY
Status: DISCONTINUED | OUTPATIENT
Start: 2019-03-20 | End: 2019-03-21 | Stop reason: HOSPADM

## 2019-03-20 RX ORDER — BUDESONIDE AND FORMOTEROL FUMARATE DIHYDRATE 160; 4.5 UG/1; UG/1
2 AEROSOL RESPIRATORY (INHALATION)
Status: DISCONTINUED | OUTPATIENT
Start: 2019-03-20 | End: 2019-03-21 | Stop reason: HOSPADM

## 2019-03-20 RX ORDER — SODIUM CHLORIDE 0.9 % (FLUSH) 0.9 %
3-10 SYRINGE (ML) INJECTION AS NEEDED
Status: DISCONTINUED | OUTPATIENT
Start: 2019-03-20 | End: 2019-03-21 | Stop reason: HOSPADM

## 2019-03-20 RX ORDER — ONDANSETRON 4 MG/1
4 TABLET, FILM COATED ORAL EVERY 6 HOURS PRN
Status: DISCONTINUED | OUTPATIENT
Start: 2019-03-20 | End: 2019-03-21 | Stop reason: HOSPADM

## 2019-03-20 RX ORDER — CARVEDILOL 6.25 MG/1
6.25 TABLET ORAL 2 TIMES DAILY WITH MEALS
Status: DISCONTINUED | OUTPATIENT
Start: 2019-03-20 | End: 2019-03-21 | Stop reason: HOSPADM

## 2019-03-20 RX ADMIN — ATORVASTATIN CALCIUM 20 MG: 20 TABLET, FILM COATED ORAL at 21:39

## 2019-03-20 RX ADMIN — MONTELUKAST SODIUM 10 MG: 10 TABLET, FILM COATED ORAL at 21:39

## 2019-03-20 RX ADMIN — SERTRALINE HYDROCHLORIDE 50 MG: 50 TABLET ORAL at 13:03

## 2019-03-20 RX ADMIN — SODIUM CHLORIDE, PRESERVATIVE FREE 3 ML: 5 INJECTION INTRAVENOUS at 13:04

## 2019-03-20 RX ADMIN — FERROUS SULFATE TAB EC 324 MG (65 MG FE EQUIVALENT) 324 MG: 324 (65 FE) TABLET DELAYED RESPONSE at 13:02

## 2019-03-20 RX ADMIN — FAMOTIDINE 40 MG: 40 TABLET ORAL at 13:02

## 2019-03-20 RX ADMIN — IPRATROPIUM BROMIDE AND ALBUTEROL SULFATE 3 ML: 2.5; .5 SOLUTION RESPIRATORY (INHALATION) at 16:54

## 2019-03-20 RX ADMIN — RANOLAZINE 500 MG: 500 TABLET, FILM COATED, EXTENDED RELEASE ORAL at 21:39

## 2019-03-20 RX ADMIN — SODIUM CHLORIDE 50 ML/HR: 9 INJECTION, SOLUTION INTRAVENOUS at 12:31

## 2019-03-20 RX ADMIN — CARVEDILOL 6.25 MG: 6.25 TABLET, FILM COATED ORAL at 17:50

## 2019-03-20 RX ADMIN — IPRATROPIUM BROMIDE AND ALBUTEROL SULFATE 3 ML: 2.5; .5 SOLUTION RESPIRATORY (INHALATION) at 08:28

## 2019-03-20 RX ADMIN — NITROGLYCERIN 1 PATCH: 0.2 PATCH TRANSDERMAL at 14:40

## 2019-03-20 RX ADMIN — ASPIRIN 325 MG: 325 TABLET, DELAYED RELEASE ORAL at 09:29

## 2019-03-20 RX ADMIN — METHYLPREDNISOLONE SODIUM SUCCINATE 60 MG: 125 INJECTION, POWDER, FOR SOLUTION INTRAMUSCULAR; INTRAVENOUS at 09:29

## 2019-03-20 RX ADMIN — IPRATROPIUM BROMIDE AND ALBUTEROL SULFATE 3 ML: 2.5; .5 SOLUTION RESPIRATORY (INHALATION) at 05:41

## 2019-03-20 RX ADMIN — SODIUM CHLORIDE, PRESERVATIVE FREE 3 ML: 5 INJECTION INTRAVENOUS at 21:44

## 2019-03-20 RX ADMIN — ASPIRIN 325 MG: 325 TABLET, DELAYED RELEASE ORAL at 13:00

## 2019-03-20 RX ADMIN — AZITHROMYCIN 500 MG: 250 TABLET, FILM COATED ORAL at 13:01

## 2019-03-20 RX ADMIN — LIDOCAINE 1 PATCH: 50 PATCH CUTANEOUS at 09:40

## 2019-03-20 RX ADMIN — ESTRADIOL 2 G: 0.1 CREAM VAGINAL at 21:00

## 2019-03-20 RX ADMIN — DOCUSATE SODIUM 100 MG: 100 CAPSULE, LIQUID FILLED ORAL at 13:11

## 2019-03-20 RX ADMIN — RANOLAZINE 500 MG: 500 TABLET, FILM COATED, EXTENDED RELEASE ORAL at 13:03

## 2019-03-20 RX ADMIN — METHYLPREDNISOLONE SODIUM SUCCINATE 125 MG: 125 INJECTION, POWDER, FOR SOLUTION INTRAMUSCULAR; INTRAVENOUS at 05:49

## 2019-03-20 RX ADMIN — Medication 1 TABLET: at 13:03

## 2019-03-20 RX ADMIN — IPRATROPIUM BROMIDE AND ALBUTEROL SULFATE 3 ML: 2.5; .5 SOLUTION RESPIRATORY (INHALATION) at 23:40

## 2019-03-20 RX ADMIN — SODIUM CHLORIDE 50 ML/HR: 9 INJECTION, SOLUTION INTRAVENOUS at 21:39

## 2019-03-20 NOTE — ED NOTES
"Pt arrived via EMS with c/o SOB and AMS. Pt fell two days ago with no LOC ; Pt was seen in this ED at that time; Pt took muscle relaxer last night and after, c/o \"feeling drunk\". Pt is oriented x 4 at this time. Vitals WNL at this time.     Jose Daniel Wong RN  03/20/19 0519    "

## 2019-03-20 NOTE — H&P
"    HISTORY AND PHYSICAL  NAME: Val Arteaga  : 1932  MRN: [id number]    DATE OF ADMISSION: 19    DATE & TIME SEEN: 19 11:30 AM    PCP: Vero Arteaga APRN    CODE STATUS: DNR and DNI    CHIEF COMPLAINT Altered Mental Status    HPI:  Val Arteaga is a 86 y.o. female with a CMH of COPD, HTN,CAD, Hypothyroidisim, Depression, and CHF who presents complaining of  Altered mental status after taking 3x dose of Baclofen yesterday.  Pt states she suffered a fall on 3/15 and hurt her left posterior hip and right arm. Pt did not endorse any dizziness or palpitations proceeding the fall. Pain did not radiate. Was not alleviated with Tylenol or Aleve. Aggravated by movement. On 3/18 Pt presented to the ED where she was prescribed Baclofen, and informed she could take this medicine with alternating administrations of Tylenol and Aleve. Last night after taking the Baclofen Pt states she started to feel as if she was \"drunk\". She lives by herself but had a visitor spending the night who endorsed that she was altered from her baseline. Pt is able to understand that she was not acting like herself,and was initially very troubled by this and states she felt like she was dying. During examination Ms. Arteaga seemed very pleasant and stated she was starting to feel more like herself. She was able to endorse that her acute presentation was due to the medication she was taking. She does not currently endorse any symptoms of dizziness, headache, or chest pain. Pt did report some SOA at this time and was receiving a breathing treatment and placed on 2L NC. She states she uses this much oxygen at night and intermittently through the day secondary to her hx of COPD. She has no other concerns at this time. Pt being admitted for observation and evaluation of her AMS and SOA at this time.     In ED Pt received a Ct head that did not show any acute intracranial abnormality. CT Lumbar spine showed no acute fracture but " there were degenerative changes and stenosis from L2-L3 and L4-L5 levels.     CONCURRENT MEDICAL HISTORY:  Past Medical History:   Diagnosis Date   • Acquired hypothyroidism    • Allergic rhinitis    • CHF (congestive heart failure) (CMS/[facility])    • COPD (chronic obstructive pulmonary disease) (CMS/[facility])    • Corneal epithelial dystrophy    • Coronary arteriosclerosis    • Depression    • Generalized atherosclerosis    • GERD (gastroesophageal reflux disease)    • Hemorrhoids    • History of bone density study 08/03/2012    Lumbar spine Osteopenia. Femoral Osteopenia   • History of mammogram 08/20/2004    Birads Category 2 Benign   • History of Papanicolaou smear of cervix 08/12/2004    NEGATIVE   • Hypercholesterolemia    • Hyperlipidemia    • Hypertension    • Myocardial infarction (CMS/[facility])    • Nonexudative age-related macular degeneration    • Osteoarthritis of multiple joints    • Pseudophakia    • Punctate keratitis     - history Thygeson's keratopathy      • Tobacco dependence syndrome        PAST SURGICAL HISTORY:  Past Surgical History:   Procedure Laterality Date   • APPENDECTOMY     • BREAST SURGERY  03/19/1954    Excision, breast lesion (1)  Excision of fibroma. 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 patency in the circumflex artery site of previous angioplasty. Kathe. patency in RCA.Nonobstructive 20-30% stenosis in the LAD and diagonal branch. Preserved LV systolic function with Ef of 55%   • CERVICAL SPINE SURGERY  09/16/1974    Excision of cervical disc, C5-6, C6-7, anterior cervical fusion, C5-6 with iliac bone graft.cervical spondylosis,C5-6, C6-7   • COLONOSCOPY  01/01/2013    patient declined    • CORONARY ANGIOPLASTY  07/21/1993    Angioplasty, coronary (2)    RCA    • DILATATION AND CURETTAGE      3   • ENDOSCOPY N/A 2/14/2018    Procedure: ESOPHAGOGASTRODUODENOSCOPY;  Surgeon: Alex Avitia MD;  Location: [facility] ENDOSCOPY;   Service:    • ENDOSCOPY AND COLONOSCOPY  10/01/1998    Hemorhoids Rectal Outlet Bleeding   • ESOPHAGOSCOPY / EGD  2004    Nonerosive gastritis of the body of the stomach. A biopsy was obtained & placed in h. Pylori test agar. Multiple biopsies were obtained from the body of the stomach   • HIP BIPOLAR REPLACEMENT Right 2018    Procedure: HIP BIPOLAR ANTERIOR APPROACH - right;  Surgeon: Kelvin Mcdowell MD;  Location: [facility];  Service:    • INJECTION OF MEDICATION  2015    Depo Medrol (Methylprednisone) (5)    • INJECTION OF MEDICATION  2016    Kenalog (3)     • JOINT REPLACEMENT Right 2018    hip   • PACEMAKER REPLACEMENT         FAMILY HISTORY:  Family History   Problem Relation Age of Onset   • Coronary artery disease Other         SOCIAL HISTORY:  Social History     Socioeconomic History   • Marital status:      Spouse name: Not on file   • Number of children: Not on file   • Years of education: Not on file   • Highest education level: Not on file   Tobacco Use   • Smoking status: Former Smoker     Packs/day: 0.50     Years: 50.00     Pack years: 25.00     Types: Cigarettes     Start date:      Last attempt to quit: 2017     Years since quittin.1   • Smokeless tobacco: Never Used   Substance and Sexual Activity   • Alcohol use: No   • Drug use: No   • Sexual activity: Defer       HOME MEDICATIONS:  Prior to Admission medications    Medication Sig Start Date End Date Taking? Authorizing Provider   acetaminophen (TYLENOL) 325 MG tablet Take 2 tablets by mouth Every 4 (Four) Hours As Needed for Mild Pain . 18  Yes Shawn Maldonado MD   albuterol (PROVENTIL) (2.5 MG/3ML) 0.083% nebulizer solution Take 2.5 mg by nebulization Every 6 (Six) Hours As Needed for Wheezing. 18  Yes Bryan Jay MD   albuterol (VENTOLIN HFA) 108 (90 Base) MCG/ACT inhaler 2 puffs every 4 hours as needed for breathing 18  Yes Bryan Jay MD   aspirin 325  MG EC tablet Take 1 tablet by mouth Daily. 2/14/18  Yes Marco A Landin APRN   atorvastatin (LIPITOR) 20 MG tablet Take 1 tablet by mouth Daily. 6/20/18  Yes Roula Albert MD   baclofen (LIORESAL) 10 MG tablet Take 1 tablet by mouth 3 (Three) Times a Day for 7 days. 3/18/19 3/25/19 Yes Satnam Helton PA-C   carvedilol (COREG) 6.25 MG tablet Take 1 tablet by mouth 2 (Two) Times a Day With Meals. 6/20/18  Yes Roula Albert MD   estradiol (ESTRACE) 0.1 MG/GM vaginal cream Insert 2 g into the vagina Daily. 1/22/19  Yes Vero Arteaga APRN   famotidine (PEPCID) 40 MG tablet Take 1 tablet by mouth Daily. 6/20/18  Yes Roula Albert MD   ferrous sulfate 324 (65 Fe) MG tablet delayed-release EC tablet Take 1 tablet by mouth Daily With Breakfast. 2/5/18  Yes Shawn Maldonado MD   fluorometholone (FLAREX) 0.1 % ophthalmic suspension Administer 1 drop to both eyes 4 (Four) Times a Day.  Patient taking differently: Administer 1 drop to both eyes 4 (Four) Times a Day As Needed. 6/20/17  Yes Serafin Cotton MD   furosemide (LASIX) 40 MG tablet Take 1 tablet by mouth Daily. 1/22/19  Yes Vero Arteaga APRN   ipratropium-albuterol (DUO-NEB) 0.5-2.5 mg/3 ml nebulizer Take 3 mL by nebulization 4 (Four) Times a Day As Needed for Wheezing. 12/19/18  Yes Bryan Jay MD   levothyroxine (SYNTHROID, LEVOTHROID) 50 MCG tablet Take 1 tablet by mouth Daily. 1/22/19  Yes Vero Arteaga APRN   montelukast (SINGULAIR) 10 MG tablet Take 1 tablet by mouth Every Night. 1/2/19  Yes Vero Arteaga APRN   polyethylene glycol (MIRALAX) powder MIX 1 CAPFUL (17G) IN 8 OUNCES OF LIQUID AND DRINK BY MOUTH EVERY DAY FOR CONSTIPATION 6/19/18  Yes Roula Albert MD   ranolazine (RANEXA) 500 MG 12 hr tablet Take 1 tablet by mouth Every 12 (Twelve) Hours. 6/20/18  Yes Roula Albert MD   sertraline (ZOLOFT) 50 MG tablet Take 1 tablet by mouth Daily. 6/20/18  Yes Roula Albert MD   umeclidinium-vilanterol (ANORO  ELLIPTA) 62.5-25 MCG/INH aerosol powder  inhaler Inhale 1 puff Daily. 11/20/18  Yes Vero Arteaga APRN   budesonide-formoterol (SYMBICORT) 160-4.5 MCG/ACT inhaler Inhale 2 puffs 2 (Two) Times a Day. 1/22/19   Vero Arteaga APRN   folic acid-vit B6-vit B12 (FOLBEE) 2.5-25-1 MG tablet tablet Take 1 tablet by mouth Daily. 1/22/19   Vero Arteaga APRN   nitroglycerin (NITRODUR) 0.2 MG/HR patch Place 1 patch on the skin Daily. 6/20/18   Roula Albert MD   vitamin D (ERGOCALCIFEROL) 39382 units capsule capsule Take 1 capsule by mouth Every 30 (Thirty) Days. 1/2/19   Vero Arteaga APRN       ALLERGIES:  Ace inhibitors and Lisinopril    REVIEW OF SYSTEMS  Review of Systems   Constitutional: Positive for activity change. Negative for chills, diaphoresis and fever.   HENT: Negative for dental problem, drooling, ear discharge, ear pain, facial swelling, mouth sores, nosebleeds, rhinorrhea, sinus pressure, sinus pain, sneezing and sore throat.    Eyes: Negative for discharge and redness.   Respiratory: Negative for apnea, cough, choking, chest tightness, shortness of breath, wheezing and stridor.    Cardiovascular: Negative for chest pain, palpitations and leg swelling.   Gastrointestinal: Negative for abdominal distention, abdominal pain, constipation, diarrhea, nausea and vomiting.   Genitourinary: Positive for difficulty urinating. Negative for dysuria and urgency.        Required in and out catheter this morning   Musculoskeletal: Positive for back pain (lower back pain secondary to fall). Negative for arthralgias, neck pain and neck stiffness.   Skin: Positive for color change (States she has a bruise her left lower back). Negative for rash and wound.   Neurological: Negative for dizziness, facial asymmetry, speech difficulty, light-headedness and headaches.   Psychiatric/Behavioral: Positive for hallucinations (PT experienced these earlier this morning, now conscious of the fact that she was seeing things that were  "not there. ). Negative for agitation and decreased concentration. The patient is not nervous/anxious.        PHYSICAL EXAM:  Temp:  [98 °F (36.7 °C)] 98 °F (36.7 °C)  Heart Rate:  [85-97] 94  Resp:  [16-22] 18  BP: (148-218)/(77-96) 168/77  Body mass index is 23.8 kg/m².  Physical Exam   Constitutional: She is oriented to person, place, and time. She appears well-developed and well-nourished. No distress.   HENT:   Head: Normocephalic and atraumatic.   Right Ear: External ear normal.   Left Ear: External ear normal.   Nose: Nose normal.   Mouth/Throat: Oropharynx is clear and moist. No oropharyngeal exudate.   Eyes: Conjunctivae and EOM are normal. Pupils are equal, round, and reactive to light. Right eye exhibits no discharge. Left eye exhibits no discharge. No scleral icterus.   Neck: Normal range of motion. Neck supple. No tracheal deviation present.   Cardiovascular: Normal rate, regular rhythm, normal heart sounds and intact distal pulses. Exam reveals no gallop and no friction rub.   No murmur heard.  Pulmonary/Chest: Effort normal and breath sounds normal. No stridor. No respiratory distress. She has no wheezes. She has no rales.   Abdominal: Soft. Bowel sounds are normal. She exhibits no distension. There is no tenderness.   Musculoskeletal: Normal range of motion. She exhibits tenderness (Left lower back). She exhibits no edema or deformity.   Neurological: She is alert and oriented to person, place, and time. She exhibits normal muscle tone.   Skin: Skin is warm and dry. Capillary refill takes less than 2 seconds. No rash noted. She is not diaphoretic. No erythema. No pallor.   Bruising identified on Pt's left lower back and right forearm   Psychiatric:   Pt is pleasant and in high spirits. Able to answer questions properly, states she feels as if she is returning to her baseline. Acknowledges that she's \"being silly\"        DIAGNOSTIC DATA:   Lab Results (last 24 hours)     Procedure Component Value Units " Date/Time    Urine Drug Screen - Urine, Catheter [[id number]]  (Normal) Collected:  03/20/19 0642    Specimen:  Urine, Catheter Updated:  03/20/19 0721     Amphetamine Screen, Urine Negative     Barbiturates Screen, Urine Negative     Benzodiazepine Screen, Urine Negative     Cocaine Screen, Urine Negative     Methadone Screen, Urine Negative     Opiate Screen Negative     Oxycodone Screen, Urine Negative     THC, Screen, Urine Negative    Narrative:       Negative Thresholds For Drugs Screened in Urine:     Amphetamines          500 ng/ml  Barbiturates          200 ng/ml  Benzodiazepines       200 ng/ml  Cocaine               150 ng/ml  Methadone             300 ng/mL  Opiates               300 ng/mL  Oxycodone             100 ng/mL  THC                   20 ng/mL    The normal value for all drugs tested is negative. This report includes final unconfirmed screening results.  A positive result by this assay can be, at your request, sent to the Reference Lab for confirmation by gas chromatography. Unconfirmed results must not be used for non-medical purposes, such as employment or legal testing. Clinical consideration should be applied to any drug of abuse test result, particularly when unconfirmed results are used.    Urinalysis With Microscopic If Indicated (No Culture) - Urine, Catheter [[id number]]  (Normal) Collected:  03/20/19 0642    Specimen:  Urine, Catheter Updated:  03/20/19 0659     Color, UA Yellow     Appearance, UA Clear     pH, UA 6.5     Specific Gravity, UA 1.008     Glucose, UA Negative     Ketones, UA Negative     Bilirubin, UA Negative     Blood, UA Negative     Protein, UA Negative     Leuk Esterase, UA Negative     Nitrite, UA Negative     Urobilinogen, UA 0.2 E.U./dL    Narrative:       Urine microscopic not indicated.    Ammonia [[id number]]  (Abnormal) Collected:  03/20/19 0602    Specimen:  Blood Updated:  03/20/19 0631     Ammonia <9 umol/L     BNP [[id number]]  (Normal) Collected:  03/20/19  0547    Specimen:  Blood Updated:  03/20/19 0621     proBNP 840.0 pg/mL     Narrative:       Among patients with dyspnea, NT-proBNP is highly sensitive for the detection of acute congestive heart failure. In addition NT-proBNP of <300 pg/ml effectively rules out acute congestive heart failure with 99% negative predictive value.    Troponin [[id number]]  (Normal) Collected:  03/20/19 0547    Specimen:  Blood Updated:  03/20/19 0621     Troponin I <0.012 ng/mL     Comprehensive Metabolic Panel [[id number]]  (Abnormal) Collected:  03/20/19 0547    Specimen:  Blood Updated:  03/20/19 0609     Glucose 109 mg/dL      BUN 24 mg/dL      Creatinine 0.95 mg/dL      Sodium 136 mmol/L      Potassium 4.2 mmol/L      Chloride 102 mmol/L      CO2 28.0 mmol/L      Calcium 8.8 mg/dL      Total Protein 7.0 g/dL      Albumin 3.60 g/dL      ALT (SGPT) 19 U/L      AST (SGOT) 29 U/L      Alkaline Phosphatase 67 U/L      Total Bilirubin 0.7 mg/dL      eGFR Non African Amer 56 mL/min/1.73      Globulin 3.4 gm/dL      A/G Ratio 1.1 g/dL      BUN/Creatinine Ratio 25.3     Anion Gap 6.0 mmol/L     Narrative:       The MDRD GFR formula is only valid for adults with stable renal function between ages 18 and 70.    Ethanol [[id number]] Collected:  03/20/19 0547    Specimen:  Blood Updated:  03/20/19 0608     Ethanol <10 mg/dL      Ethanol % <0.010 %     Carboxyhemoglobin [[id number]]  (Normal) Collected:  03/20/19 0604    Specimen:  Blood Updated:  03/20/19 0607     Carboxyhemoglobin 3.4 %     CBC & Differential [[id number]] Collected:  03/20/19 0547    Specimen:  Blood Updated:  03/20/19 0550    Narrative:       The following orders were created for panel order CBC & Differential.  Procedure                               Abnormality         Status                     ---------                               -----------         ------                     CBC Auto Differential[[id number]]        Abnormal            Final result                 Please  view results for these tests on the individual orders.    CBC Auto Differential [[id number]]  (Abnormal) Collected:  03/20/19 0547    Specimen:  Blood Updated:  03/20/19 0550     WBC 9.81 10*3/mm3      RBC 3.48 10*6/mm3      Hemoglobin 11.1 g/dL      Hematocrit 33.8 %      MCV 97.1 fL      MCH 31.9 pg      MCHC 32.8 g/dL      RDW 13.5 %      RDW-SD 47.8 fl      MPV 10.0 fL      Platelets 189 10*3/mm3      Neutrophil % 73.5 %      Lymphocyte % 14.6 %      Monocyte % 6.4 %      Eosinophil % 4.3 %      Basophil % 0.6 %      Immature Grans % 0.6 %      Neutrophils, Absolute 7.21 10*3/mm3      Lymphocytes, Absolute 1.43 10*3/mm3      Monocytes, Absolute 0.63 10*3/mm3      Eosinophils, Absolute 0.42 10*3/mm3      Basophils, Absolute 0.06 10*3/mm3      Immature Grans, Absolute 0.06 10*3/mm3      nRBC 0.0 /100 WBC     Blood Gas, Arterial [[id number]]  (Abnormal) Collected:  03/20/19 0538    Specimen:  Arterial Blood Updated:  03/20/19 0549     Site Left Radial     Serafin's Test Positive     pH, Arterial 7.469 pH units      Comment: 83 Value above reference range        pCO2, Arterial 37.5 mm Hg      pO2, Arterial 76.3 mm Hg      Comment: 84 Value below reference range        HCO3, Arterial 27.2 mmol/L      Comment: 83 Value above reference range        Base Excess, Arterial 3.4 mmol/L      Comment: 83 Value above reference range        O2 Saturation, Arterial 96.3 %      Barometric Pressure for Blood Gas 753 mmHg      Modality Nasal Cannula     Flow Rate 2.0 lpm      Ventilator Mode NA     Collected by KATHY     Comment: Meter: [id number]     :  [id number]              Imaging Results (last 24 hours)     Procedure Component Value Units Date/Time    CT Lumbar Spine Without Contrast [[id number]] Collected:  03/20/19 0554     Updated:  03/20/19 0704    Narrative:       Exam: CT lumbar spine without contrast    INDICATION: Status post fall, back pain    TECHNIQUE: CT lumbar spine without contrast. Sagittal  coronal  reconstructions were obtained. This exam was performed according  to our departmental dose-optimization program, which includes  automated exposure control, adjustment of the mA and/or kV  according to patient size and/or use of iterative reconstruction  technique.    FINDINGS: The bones are demineralized. The lumbar vertebral  bodies are normal in height. The disc space heights are fairly  well maintained. Grade 1 spondylolisthesis at this present at  L2-L3 due to facet arthropathy. No acute fracture.    T12-L1: No spinal or neural foraminal stenosis.    L1-L2: No spinal stenosis. Mild left neural foraminal narrowing.    L2-L3: Mild to moderate spinal stenosis due to spondylolisthesis,  ligamentum flavum hypertrophy and facet arthropathy. Mild to  moderate bilateral neural foraminal narrowing.    L3-L4: Mild-to-moderate stenosis due to bulging disc, ligamentum  flavum hypertrophy and facet arthropathy. There is  mild-to-moderate bilateral neural foraminal narrowing    L4-L5: Mild spinal stenosis due to bulging disc ligamentum flavum  hypertrophy and facet arthropathy. Mild-to-moderate bilateral  neural foraminal narrowing.    L5-S1: No spinal or neural foraminal stenosis.    No obvious paravertebral abnormality.      Impression:       1. No acute fracture  2. Multilevel degenerative changes as described resulting spinal  and neural foraminal stenosis from the L2-L3 through L4-L5 levels    Electronically signed by:  Dany De Dios MD  3/20/2019 7:03 AM  CDT Workstation: [id number]    CT Head Without Contrast [[id number]] Collected:  03/20/19 0554     Updated:  03/20/19 0645    Narrative:       Exam: Head CT without contrast    INDICATION: Altered mental status.    TECHNIQUE: Routine head CT without contrast. Sagittal coronal  reconstructions were obtained. This exam was performed according  to our departmental dose-optimization program, which includes  automated exposure control, adjustment of the mA  and/or kV  according to patient size and/or use of iterative reconstruction  technique.    FINDINGS: Bony calvarium is intact. The imaged paranasal sinuses  and mastoid air cells are clear. Plaque is present in  intracranial arteries. Enlargement of the ventricles and sulci  compatible with age related atrophy. Gray-white differentiation  is maintained. There is decreased attenuation in the white matter  consistent with mild-to-moderate chronic ischemic change. Chronic  ischemic changes R present in the rob. No obvious sign of acute  infarction. No acute intraparenchymal hemorrhage, mass or midline  shift.      Impression:       No obvious acute intracranial abnormality. Recommend  follow-up as clinically warranted.    Electronically signed by:  Dany De Dios MD  3/20/2019 6:44 AM  CDT Workstation: [email or web address]    XR Chest 1 View [[id number]] Collected:  03/20/19 0602     Updated:  03/20/19 0639    Narrative:       Exam: AP portable chest    INDICATION: dyspnea    COMPARISON: 1/22/2019    FINDINGS: AP portable chest. The bony structures are intact.  Arthritic changes are present shoulder joints. The  cardiomediastinal silhouette is unremarkable. Plaques was  ordered. Cardiac pacemaker is in place. Lungs are clear. No  pneumothorax or pleural effusion.      Impression:       No acute cardiopulmonary abnormality.    Electronically signed by:  Dany De Dios MD  3/20/2019 6:38 AM  CDT Workstation: [email or web address]            I reviewed the patient's new clinical results.    ASSESSMENT AND PLAN: This is a 86 y.o. female with:    Active Hospital Problems    Diagnosis Date Noted   • Altered mental status [R41.82] 03/20/2019     -Presumed secondary to Baclofen administration  -Alert and oriented x3  -Fall Precautions  -PT/OT Consult placed and will evaluate        • COPD exacerbation (CMS/[facility]) [J44.1] 03/20/2019     -Pt requiring 2L NC, uses only intermittent oxygen during the day at home. On 2.5L NC at night  -Duoneb and  Albuterol breathing treatment   -O2 Prn  -Atypicals: f/u on results  -Prednisone 20mg  -Abx: Azithromycin Day 1     • Major depressive disorder [F32.9] 03/20/2019     -Continue home medication Zoloft 50mg     • Urinary retention [R33.9] 01/26/2018     -Will order bladder scan  -In and out catheter PRN for bladder volumeof 250-300mL  -No urinary symptoms at this time     • Chronic systolic congestive heart failure (CMS/HCC) [I50.22] 01/24/2018     -Home medication Lasix 40mg  -Currently holding secondary to urinary retention  -Daily weights, will monitor for any change     • Essential hypertension [I10] 11/22/2016     -BP currently stable at 168/77  -Continue home medication Coreg 6.25 mg       • Acquired hypothyroidism [E03.9] 11/22/2016     -Continue home medication Synthroid 50 mcg         DVT prophylaxis: DUTCHs/Carmita FRANCIS # [id number], report delayed for 15 minutes    Expected Length of Stay: Where: home and When:  1-2days    I discussed the patients findings and my recommendations with patient and family.     Dr. Rhoades  is the attending on record at time of admission, She is aware of the patient's status and agrees with the above history and physical.            This document has been electronically signed by Cecilia Magana MD on March 20, 2019 12:19 PM

## 2019-03-20 NOTE — ED NOTES
Put pt in gown, changed her underwear, and put a fresh pad on her. Pt is now resting comfortably with clothes beside bedside.     Marisela Alarcon  03/20/19 0906       Radhika Tapia  03/20/19 0906

## 2019-03-20 NOTE — ED NOTES
Pt was straight cath for urine, tolerated well, 600mL of urine drained.     Jose Daniel Wong, RN  03/20/19 0650       Jose Daniel Wong, RN  03/20/19 0783

## 2019-03-20 NOTE — ED NOTES
"Per EMS, original call was for respiratory difficulty, but pt states she is dizzy headed and feels drunk. Pt is concerned she used too much of her \"breathing medicine.\" Neb given en-route by EMS. No IV established. /110, HR 93 (paced), O2 sat 99% during neb.     Torrie Ruggiero RN  03/20/19 0449    "

## 2019-03-20 NOTE — THERAPY EVALUATION
Acute Care - Physical Therapy Initial Evaluation  [facility]     Patient Name: Val Arteaga  : 1932  MRN: [id number]  Today's Date: 3/20/2019   Onset of Illness/Injury or Date of Surgery: 19  Date of Referral to PT: 19  Referring Physician: Lanie Jain MD      Admit Date: 3/20/2019    Visit Dx:     ICD-10-CM ICD-9-CM   1. Altered mental status, unspecified altered mental status type R41.82 780.97   2. Acute exacerbation of chronic obstructive pulmonary disease (COPD) (CMS/[facility]) J44.1 491.21   3. Acute bilateral low back pain without sciatica M54.5 724.2     338.19   4. Impaired functional mobility and activity tolerance Z74.09 V49.89     Patient Active Problem List   Diagnosis   • Essential hypertension   • Coronary artery disease involving native coronary artery of native heart without angina pectoris   • Chronic obstructive pulmonary disease (CMS/[facility])   • Acquired hypothyroidism   • Depressive disorder   • Thygeson's superficial punctate keratitis   • Pseudophakia   • Precordial pain   • Displaced fracture of base of neck of right femur, initial encounter for closed fracture (CMS/[facility])   • Hip fracture, right, closed, initial encounter (CMS/[facility])   • Pulmonary emphysema (CMS/[facility])   • Chronic systolic congestive heart failure (CMS/[facility])   • Hyponatremia   • BENITO (acute kidney injury) (CMS/[facility])   • Acute blood loss anemia   • Urinary retention   • Closed displaced basicervical fracture of right femur (CMS/[facility])   • Urethral caruncle   • Symptomatic anemia   • Anemia   • COPD exacerbation (CMS/[facility])   • Major depressive disorder   • Drug-induced encephalopathy     Past Medical History:   Diagnosis Date   • Acquired hypothyroidism    • Allergic rhinitis    • CHF (congestive heart failure) (CMS/[facility])    • COPD (chronic obstructive pulmonary disease) (CMS/[facility])    • Corneal epithelial dystrophy    • Coronary arteriosclerosis    • Depression    • Generalized atherosclerosis    • GERD  (gastroesophageal reflux disease)    • Hemorrhoids    • History of bone density study 08/03/2012    Lumbar spine Osteopenia. Femoral Osteopenia   • History of mammogram 08/20/2004    Birads Category 2 Benign   • History of Papanicolaou smear of cervix 08/12/2004    NEGATIVE   • Hypercholesterolemia    • Hyperlipidemia    • Hypertension    • Myocardial infarction (CMS/HCC)    • Nonexudative age-related macular degeneration    • Osteoarthritis of multiple joints    • Pseudophakia    • Punctate keratitis     - history Thygeson's keratopathy      • Tobacco dependence syndrome      Past Surgical History:   Procedure Laterality Date   • APPENDECTOMY     • BREAST SURGERY  03/19/1954    Excision, breast lesion (1)  Excision of fibroma. 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 patency in the circumflex artery site of previous angioplasty. Kathe. patency in RCA.Nonobstructive 20-30% stenosis in the LAD and diagonal branch. Preserved LV systolic function with Ef of 55%   • CERVICAL SPINE SURGERY  09/16/1974    Excision of cervical disc, C5-6, C6-7, anterior cervical fusion, C5-6 with iliac bone graft.cervical spondylosis,C5-6, C6-7   • COLONOSCOPY  01/01/2013    patient declined    • CORONARY ANGIOPLASTY  07/21/1993    Angioplasty, coronary (2)    RCA    • DILATATION AND CURETTAGE      3   • ENDOSCOPY N/A 2/14/2018    Procedure: ESOPHAGOGASTRODUODENOSCOPY;  Surgeon: Alex Avitia MD;  Location: [facility] ENDOSCOPY;  Service:    • ENDOSCOPY AND COLONOSCOPY  10/01/1998    Hemorhoids Rectal Outlet Bleeding   • ESOPHAGOSCOPY / EGD  06/28/2004    Nonerosive gastritis of the body of the stomach. A biopsy was obtained & placed in h. Pylori test agar. Multiple biopsies were obtained from the body of the stomach   • HIP BIPOLAR REPLACEMENT Right 1/23/2018    Procedure: HIP BIPOLAR ANTERIOR APPROACH - right;  Surgeon: Kelvin Mcdowell MD;  Location: [facility] OR;  Service:     • INJECTION OF MEDICATION  11/23/2015    Depo Medrol (Methylprednisone) (5)    • INJECTION OF MEDICATION  02/04/2016    Kenalog (3)     • JOINT REPLACEMENT Right 01/24/2018    hip   • PACEMAKER REPLACEMENT  2006        PT ASSESSMENT (last 12 hours)      Physical Therapy Evaluation     Row Name 03/20/19 1546          PT Evaluation Time/Intention    Subjective Information  complains of;pain intense pain with movement  -LF     Document Type  evaluation  -LF     Mode of Treatment  individual therapy;physical therapy  -LF     Patient Effort  good  -LF     Symptoms Noted During/After Treatment  shortness of breath;significant change in vital signs  -LF     Row Name 03/20/19 1546          General Information    Patient Profile Reviewed?  yes  -LF     Onset of Illness/Injury or Date of Surgery  03/20/19  -     Referring Physician  Lanie Jain MD  -LF     Patient Observations  alert;cooperative;agree to therapy  -LF     Patient/Family Observations  nephew in room  -LF     General Observations of Patient  pt in bed, O2 at 2.5L, IV, tele, bed alarm  -LF     Prior Level of Function  independent:;all household mobility;community mobility;ADL's;driving;shopping has cleaning person, family brings food  -LF     Equipment Currently Used at Home  cane, straight;walker, rolling;oxygen;grab bar;raised toilet pt usually uses SPC; not using shower chair  -LF     Pertinent History of Current Functional Problem  pt hospitalized due to drug-induced encephalopathy. Also, pt fell at home on 3/15/19 and has intense left hip pain. x-rays were negative for fx.  -LF     Existing Precautions/Restrictions  fall;oxygen therapy device and L/min  -LF     Equipment Issued to Patient  gait belt  -LF     Risks Reviewed  patient and family:;LOB;dizziness;increased discomfort;change in vital signs  -LF     Benefits Reviewed  patient and family:;improve function;increase independence;increase strength;increase balance;increase knowledge  -LF      Row Name 03/20/19 1546          Relationship/Environment    Lives With  alone  -LF     Family Caregiver if Needed  other (see comments) has good family support  -LF     Row Name 03/20/19 1546          Resource/Environmental Concerns    Current Living Arrangements  home/apartment/condo  -LF     Row Name 03/20/19 1546          Cognitive Assessment/Intervention- PT/OT    Orientation Status (Cognition)  oriented x 4  -LF     Follows Commands (Cognition)  WNL  -LF     Row Name 03/20/19 1546          Safety Issues, Functional Mobility    Safety Issues Affecting Function (Mobility)  other (see comments) no safety issued identified  -LF     Impairments Affecting Function (Mobility)  endurance/activity tolerance;shortness of breath;pain  -LF     Row Name 03/20/19 1546          Bed Mobility Assessment/Treatment    Bed Mobility Assessment/Treatment  supine-sit;sit-supine  -LF     Supine-Sit [place] (Bed Mobility)  other (see comments) SBA  -LF     Sit-Supine [place] (Bed Mobility)  contact guard;verbal cues  -LF     Assistive Device (Bed Mobility)  bed rails;head of bed elevated  -LF     Row Name 03/20/19 1546          Transfer Assessment/Treatment    Transfer Assessment/Treatment  sit-stand transfer;stand-sit transfer;toilet transfer  -LF     Sit-Stand [place] (Transfers)  stand by assist  -LF     Stand-Sit [place] (Transfers)  contact guard  -     [place] Level (Toilet Transfer)  contact guard;verbal cues  -LF     Assistive Device (Toilet Transfer)  grab bars/safety frame;walker, front-wheeled  -LF     Row Name 03/20/19 1546          Sit-Stand Transfer    Assistive Device (Sit-Stand Transfers)  walker, front-wheeled  -LF     Row Name 03/20/19 1546          Stand-Sit Transfer    Assistive Device (Stand-Sit Transfers)  walker, front-wheeled  -LF     Row Name 03/20/19 1546          Toilet Transfer    Type (Toilet Transfer)  sit-stand;stand-sit  -LF     Row Name 03/20/19 1546          Gait/Stairs  Assessment/Training    [place] Level (Gait)  contact guard;verbal cues  -LF     Assistive Device (Gait)  walker, front-wheeled  -LF     Distance in Feet (Gait)  90 feet  -LF     Pattern (Gait)  step-through  -LF     Deviations/Abnormal Patterns (Gait)  stride length decreased;gait speed decreased;base of support, wide  -LF     Bilateral Gait Deviations  forward flexed posture  -LF     Comment (Gait/Stairs)  pt very SOB after ambulation. SpO2 90% after ambulation on 2.5L O2.  -LF     Row Name 03/20/19 1546          General ROM    GENERAL ROM COMMENTS  BLE AROM WFL  -LF     Row Name 03/20/19 1546          MMT (Manual Muscle Testing)    General MMT Comments  LLE strength 3+/5 (limited by pain); RLE strength 4-/5.  -LF     Row Name 03/20/19 1546          Pain Assessment    Additional Documentation  Pain Scale: Numbers Pre/Post-Treatment (Group)  -LF     Row Name 03/20/19 1546          Pain Scale: Numbers Pre/Post-Treatment    Pain Scale: Numbers, Pretreatment  8/10 with coughing or movement  -LF     Pain Scale: Numbers, Post-Treatment  8/10 with coughing or movement  -LF     Pain Location - Side  Left  -LF     Pain Location  hip  -LF     Row Name 03/20/19 1546          Plan of Care Review    Plan of Care Reviewed With  patient  -LF     Row Name 03/20/19 1546          Physical Therapy Clinical Impression    Date of Referral to PT  03/20/19  -LF     PT Diagnosis (PT Clinical Impression)  impaired functional mobility  -LF     Prognosis (PT Clinical Impression)  good  -LF     Functional Level at Time of Evaluation (PT Clinical Impression)  CGA needed with functional mobility  -LF     Patient/Family Goals Statement (PT Clinical Impression)  return home  -LF     Criteria for Skilled Interventions Met (PT Clinical Impression)  yes;treatment indicated  -LF     Pathology/Pathophysiology Noted (Describe Specifically for Each System)  musculoskeletal;pulmonary  -LF     Impairments Found (describe specific impairments)   aerobic capacity/endurance;ventilation and respiration/gas exchange;muscle performance;gait, locomotion, and balance  -LF     Rehab Potential (PT Clinical Summary)  good, to achieve stated therapy goals  -LF     Predicted Duration of Therapy (PT)  until acute care discharge  -LF     Care Plan Review (PT)  evaluation/treatment results reviewed;care plan/treatment goals reviewed;risks/benefits reviewed;current/potential barriers reviewed;patient/other agree to care plan  -LF     Row Name 03/20/19 1546          Vital Signs    Pre Systolic BP Rehab  162  -LF     Pre Treatment Diastolic BP  86  -LF     Post Systolic BP Rehab  184  -LF     Post Treatment Diastolic BP  104 RN notified  -LF     Pretreatment Heart Rate (beats/min)  101  -LF     Intratreatment Heart Rate (beats/min)  112  -LF     Posttreatment Heart Rate (beats/min)  103  -LF     Pre SpO2 (%)  95  -LF     O2 Delivery Pre Treatment  supplemental O2  -LF     Intra SpO2 (%)  90  -LF     O2 Delivery Intra Treatment  supplemental O2  -LF     Post SpO2 (%)  96  -LF     O2 Delivery Post Treatment  supplemental O2  -LF     Pre Patient Position  Supine  -LF     Intra Patient Position  Sitting  -LF     Post Patient Position  Supine  -LF     Row Name 03/20/19 1546          Physical Therapy Goals    Bed Mobility Goal Selection (PT)  bed mobility, PT goal 1  -LF     Transfer Goal Selection (PT)  transfer, PT goal 1  -LF     Gait Training Goal Selection (PT)  gait training, PT goal 1  -LF     Row Name 03/20/19 1546          Bed Mobility Goal 1 (PT)    Activity/Assistive Device (Bed Mobility Goal 1, PT)  bed mobility activities, all  -LF     [place] Level/Cues Needed (Bed Mobility Goal 1, PT)  conditional independence  -LF     Time Frame (Bed Mobility Goal 1, PT)  3 days  -LF     Barriers (Bed Mobility Goal 1, PT)  pain, SOB  -LF     Progress/Outcomes (Bed Mobility Goal 1, PT)  goal not met  -LF     Row Name 03/20/19 1546          Transfer Goal 1 (PT)     Activity/Assistive Device (Transfer Goal 1, PT)  sit-to-stand/stand-to-sit;bed-to-chair/chair-to-bed;walker, rolling  -LF     [place] Level/Cues Needed (Transfer Goal 1, PT)  conditional independence  -LF     Time Frame (Transfer Goal 1, PT)  3 days  -LF     Barriers (Transfers Goal 1, PT)  pain, SOB  -LF     Progress/Outcome (Transfer Goal 1, PT)  goal not met  -LF     Row Name 03/20/19 1546          Gait Training Goal 1 (PT)    Activity/Assistive Device (Gait Training Goal 1, PT)  walker, rolling;increase endurance/gait distance;increase energy conservation;assistive device use  -LF     [place] Level (Gait Training Goal 1, PT)  supervision required  -LF     Distance (Gait Goal 1, PT)  150 feet  -LF     Time Frame (Gait Training Goal 1, PT)  3 days  -LF     Barriers (Gait Training Goal 1, PT)  pain, SOB  -LF     Progress/Outcome (Gait Training Goal 1, PT)  goal not met  -LF     Row Name 03/20/19 1546          Positioning and Restraints    Pre-Treatment Position  in bed  -LF     Post Treatment Position  bed  -LF     In Bed  fowlers;call light within reach;encouraged to call for assist;exit alarm on;with family/caregiver;side rails up x2;notified nsg  -LF     Row Name 03/20/19 1546          Living Environment    Home Accessibility  other (see comments) ramp to enter  -LF       User Key  (r) = Recorded By, (t) = Taken By, (c) = Cosigned By    Initials Name Provider Type     Eva Ovalle PT Physical Therapist        Physical Therapy Education     Title: PT OT SLP Therapies (In Progress)     Topic: Physical Therapy (In Progress)     Point: Mobility training (Done)     Learning Progress Summary           Patient Acceptance, E, VU by  at 3/20/2019  4:57 PM    Comment:  Role of PT, PT POC, d/c recommendations   Family Acceptance, E, VU by  at 3/20/2019  4:57 PM    Comment:  Role of PT, PT POC, d/c recommendations                   Point: Precautions (Done)     Learning Progress Summary           Patient  Acceptance, E, VU by  at 3/20/2019  4:58 PM    Comment:  gait belt, call light, staff assistance with mobility   Family Acceptance, E, VU by  at 3/20/2019  4:58 PM    Comment:  gait belt, call light, staff assistance with mobility                               User Key     Initials Effective Dates Name Provider Type Discipline     07/23/18 -  Eva Ovalle, PT Physical Therapist PT              PT Recommendation and Plan  Anticipated Discharge Disposition (PT): home with home health, home with OP services(pt not homebound prior to admit)  Planned Therapy Interventions (PT Eval): balance training, bed mobility training, home exercise program, gait training, patient/family education, strengthening, transfer training, stair training  Therapy Frequency (PT Clinical Impression): other (see comments)(6x/wk)  Outcome Summary/Treatment Plan (PT)  Anticipated Discharge Disposition (PT): home with home health, home with OP services(pt not homebound prior to admit)  Plan of Care Reviewed With: patient  Outcome Summary: PT eval completed. Patient was able to complete bed mobility and transfers with CGA-SBA and ambulate 90 feet with RW with CGA. Patient reported 10/10 left hip pain with coughing or movement. Pt fell at home 5 days ago. X-rays were negative, but pt still has intense pain and bruising. Patient was very SOB after ambulation. SpO2 was 90% after ambulation on 2.5L O2. Patient reported her SOB has been worse than normal for a few weeks. Patient can benefit from skilled PT to facilitate improved tolerance for functional mobility. Home health or outpatient PT is recommended after acute care discharge to address pt's worsening SOB/activity tolerance. Patient was not homebound prior to this hospitalization, so pt may not qualify for home health PT.  Outcome Measures     Row Name 03/20/19 1546             How much help from another person do you currently need...    Turning from your back to your side while in flat bed  without using bedrails?  4  -LF      Moving from lying on back to sitting on the side of a flat bed without bedrails?  4  -LF      Moving to and from a bed to a chair (including a wheelchair)?  3  -LF      Standing up from a chair using your arms (e.g., wheelchair, bedside chair)?  3  -LF      Climbing 3-5 steps with a railing?  2  -LF      To walk in hospital room?  3  -LF      AM-PAC 6 Clicks Score  19  -LF         Functional Assessment    Outcome Measure Options  AM-PAC 6 Clicks Basic Mobility (PT)  -LF        User Key  (r) = Recorded By, (t) = Taken By, (c) = Cosigned By    Initials Name Provider Type     Eva Ovalle PT Physical Therapist         Time Calculation:   PT Charges     Row Name 03/20/19 1546             Time Calculation    Start Time  1546  -LF      Stop Time  1635  -LF      Time Calculation (min)  49 min  -LF      PT Received On  03/20/19  -LF      PT Goal Re-Cert Due Date  04/02/19  -LF        User Key  (r) = Recorded By, (t) = Taken By, (c) = Cosigned By    Initials Name Provider Type     Eva Ovalle PT Physical Therapist        Therapy Charges for Today     Code Description Service Date Service Provider Modifiers Qty    [id number] HC PT EVAL MOD COMPLEXITY 3 3/20/2019 Eva Ovalle PT GP 1          PT G-Codes  Outcome Measure Options: AM-PAC 6 Clicks Basic Mobility (PT)  AM-PAC 6 Clicks Score: 19      Eva Ovalle PT  3/20/2019

## 2019-03-20 NOTE — PLAN OF CARE
Problem: Patient Care Overview  Goal: Plan of Care Review  Outcome: Ongoing (interventions implemented as appropriate)   03/20/19 5155   Coping/Psychosocial   Plan of Care Reviewed With patient   OTHER   Outcome Summary PT eval completed. Patient was able to complete bed mobility and transfers with CGA-SBA and ambulate 90 feet with RW with CGA. Patient reported 10/10 left hip pain with coughing or movement. Pt fell at home 5 days ago. X-rays were negative, but pt still has intense pain and bruising. Patient was very SOB after ambulation. SpO2 was 90% after ambulation on 2.5L O2. Patient reported her SOB has been worse than normal for a few weeks. Patient can benefit from skilled PT to facilitate improved tolerance for functional mobility. Home health or outpatient PT is recommended after acute care discharge to address pt's worsening SOB/activity tolerance. Patient was not homebound prior to this hospitalization, so pt may not qualify for home health PT.

## 2019-03-20 NOTE — PLAN OF CARE
Problem: Patient Care Overview  Goal: Plan of Care Review  Outcome: Ongoing (interventions implemented as appropriate)   03/20/19 1032   Coping/Psychosocial   Plan of Care Reviewed With patient     Goal: Individualization and Mutuality  Outcome: Ongoing (interventions implemented as appropriate)    Goal: Discharge Needs Assessment  Outcome: Ongoing (interventions implemented as appropriate)    Goal: Interprofessional Rounds/Family Conf  Outcome: Ongoing (interventions implemented as appropriate)      Problem: Fall Risk (Adult)  Goal: Identify Related Risk Factors and Signs and Symptoms  Outcome: Ongoing (interventions implemented as appropriate)    Goal: Absence of Fall  Outcome: Ongoing (interventions implemented as appropriate)      Problem: Breathing Pattern Ineffective (Adult)  Goal: Identify Related Risk Factors and Signs and Symptoms  Outcome: Ongoing (interventions implemented as appropriate)    Goal: Effective Oxygenation/Ventilation  Outcome: Ongoing (interventions implemented as appropriate)    Goal: Anxiety/Fear Reduction  Outcome: Ongoing (interventions implemented as appropriate)

## 2019-03-20 NOTE — ED PROVIDER NOTES
"Subjective   86-year-old white female presents to the emergency department chief complaint of shortness of breath and altered mental status.  Patient states \"I could not breathe, I just feel crazy, I think I am just drunk.\"  Patient's brother relates \"she started new medication that makes her feel like she is drunk.\"  Patient was seen here March 18 with back pain status post fall had negative lumbar spine and pelvis x-rays and was discharged with prescription for baclofen which she started taking yesterday.  Patient complains of lower back pain that is worse with movement.            Review of Systems   Constitutional: Negative for chills, diaphoresis and fever.   Eyes: Negative for visual disturbance.   Respiratory: Positive for cough and shortness of breath.    Cardiovascular: Negative for chest pain and leg swelling.   Gastrointestinal: Negative for abdominal pain, nausea and vomiting.   Musculoskeletal: Positive for arthralgias and back pain. Negative for neck pain.   Neurological: Positive for dizziness and headaches. Negative for seizures, syncope, speech difficulty, weakness and numbness.   Psychiatric/Behavioral: The patient is nervous/anxious.    All other systems reviewed and are negative.      Past Medical History:   Diagnosis Date   • Acquired hypothyroidism    • Allergic rhinitis    • CHF (congestive heart failure) (CMS/[facility])    • COPD (chronic obstructive pulmonary disease) (CMS/[facility])    • Corneal epithelial dystrophy    • Coronary arteriosclerosis    • Depression    • Generalized atherosclerosis    • GERD (gastroesophageal reflux disease)    • Hemorrhoids    • History of bone density study 08/03/2012    Lumbar spine Osteopenia. Femoral Osteopenia   • History of mammogram 08/20/2004    Birads Category 2 Benign   • History of Papanicolaou smear of cervix 08/12/2004    NEGATIVE   • Hypercholesterolemia    • Hyperlipidemia    • Hypertension    • Myocardial infarction (CMS/[facility])    • Nonexudative age-related " macular degeneration    • Osteoarthritis of multiple joints    • Pseudophakia    • Punctate keratitis     - history Thygeson's keratopathy      • Tobacco dependence syndrome        Allergies   Allergen Reactions   • Ace Inhibitors    • Lisinopril Cough       Past Surgical History:   Procedure Laterality Date   • APPENDECTOMY     • BREAST SURGERY  03/19/1954    Excision, breast lesion (1)  Excision of fibroma. 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 patency in the circumflex artery site of previous angioplasty. Kathe. patency in RCA.Nonobstructive 20-30% stenosis in the LAD and diagonal branch. Preserved LV systolic function with Ef of 55%   • CERVICAL SPINE SURGERY  09/16/1974    Excision of cervical disc, C5-6, C6-7, anterior cervical fusion, C5-6 with iliac bone graft.cervical spondylosis,C5-6, C6-7   • COLONOSCOPY  01/01/2013    patient declined    • CORONARY ANGIOPLASTY  07/21/1993    Angioplasty, coronary (2)    RCA    • DILATATION AND CURETTAGE      3   • ENDOSCOPY N/A 2/14/2018    Procedure: ESOPHAGOGASTRODUODENOSCOPY;  Surgeon: Alex Avitia MD;  Location: [facility] ENDOSCOPY;  Service:    • ENDOSCOPY AND COLONOSCOPY  10/01/1998    Hemorhoids Rectal Outlet Bleeding   • ESOPHAGOSCOPY / EGD  06/28/2004    Nonerosive gastritis of the body of the stomach. A biopsy was obtained & placed in h. Pylori test agar. Multiple biopsies were obtained from the body of the stomach   • HIP BIPOLAR REPLACEMENT Right 1/23/2018    Procedure: HIP BIPOLAR ANTERIOR APPROACH - right;  Surgeon: Kelvin Mcdowell MD;  Location: [facility] OR;  Service:    • INJECTION OF MEDICATION  11/23/2015    Depo Medrol (Methylprednisone) (5)    • INJECTION OF MEDICATION  02/04/2016    Kenalog (3)     • JOINT REPLACEMENT Right 01/24/2018    hip   • PACEMAKER REPLACEMENT  2006       Family History   Problem Relation Age of Onset   • Coronary artery disease Other        Social History      Socioeconomic History   • Marital status:      Spouse name: Not on file   • Number of children: Not on file   • Years of education: Not on file   • Highest education level: Not on file   Tobacco Use   • Smoking status: Former Smoker     Packs/day: 0.50     Years: 50.00     Pack years: 25.00     Types: Cigarettes     Start date:      Last attempt to quit: 2017     Years since quittin.1   • Smokeless tobacco: Never Used   Substance and Sexual Activity   • Alcohol use: No   • Drug use: No   • Sexual activity: Defer           Objective   Physical Exam   Constitutional: She is oriented to person, place, and time. She appears well-developed and well-nourished. No distress.   HENT:   Head: Normocephalic and atraumatic.   Right Ear: External ear normal.   Left Ear: External ear normal.   Nose: Nose normal.   Mouth/Throat: Oropharynx is clear and moist.   Eyes: Conjunctivae and EOM are normal. Pupils are equal, round, and reactive to light.   Neck: Normal range of motion. Neck supple.   Cardiovascular: Normal rate, regular rhythm, normal heart sounds and intact distal pulses.   Pulmonary/Chest: Effort normal. No respiratory distress. She has wheezes.   Abdominal: Soft. Bowel sounds are normal. She exhibits no distension and no mass. There is no tenderness. There is no guarding.   Musculoskeletal: Normal range of motion. She exhibits no edema, tenderness or deformity.   Neurological: She is alert and oriented to person, place, and time. No cranial nerve deficit or sensory deficit. She exhibits normal muscle tone.   Skin: Skin is warm and dry. She is not diaphoretic.   Psychiatric:   Anxious   Nursing note and vitals reviewed.      ECG 12 Lead    Date/Time: 3/20/2019 5:07 AM  Performed by: Abdirizak Rascon MD  Authorized by: Abdirizak Rascon MD   Interpreted by physician  Clinical impression: abnormal ECG  Comments: Normal sinus rhythm rate of 89.  Right bundle branch block.  No ST elevation.  Nonspecific  findings.                 ED Course      Patient signed out to Dr Snyder at shift change at 7 am            MDM      Final diagnoses:   Altered mental status, unspecified altered mental status type   Acute exacerbation of chronic obstructive pulmonary disease (COPD) (CMS/[facility])   Acute bilateral low back pain without sciatica            Gee Snyder MD  03/20/19 0805

## 2019-03-21 VITALS
SYSTOLIC BLOOD PRESSURE: 128 MMHG | OXYGEN SATURATION: 97 % | HEART RATE: 106 BPM | TEMPERATURE: 98 F | RESPIRATION RATE: 18 BRPM | DIASTOLIC BLOOD PRESSURE: 54 MMHG | BODY MASS INDEX: 23.72 KG/M2 | WEIGHT: 156.5 LBS | HEIGHT: 68 IN

## 2019-03-21 LAB
ANION GAP SERPL CALCULATED.3IONS-SCNC: 8 MMOL/L (ref 5–15)
BASOPHILS # BLD AUTO: 0.01 10*3/MM3 (ref 0–0.2)
BASOPHILS NFR BLD AUTO: 0.1 % (ref 0–1.5)
BUN BLD-MCNC: 28 MG/DL (ref 7–21)
BUN/CREAT SERPL: 33.3 (ref 7–25)
CALCIUM SPEC-SCNC: 8.5 MG/DL (ref 8.4–10.2)
CHLORIDE SERPL-SCNC: 101 MMOL/L (ref 95–110)
CO2 SERPL-SCNC: 25 MMOL/L (ref 22–31)
CREAT BLD-MCNC: 0.84 MG/DL (ref 0.5–1)
DEPRECATED RDW RBC AUTO: 47.8 FL (ref 37–54)
EOSINOPHIL # BLD AUTO: 0 10*3/MM3 (ref 0–0.4)
EOSINOPHIL NFR BLD AUTO: 0 % (ref 0.3–6.2)
ERYTHROCYTE [DISTWIDTH] IN BLOOD BY AUTOMATED COUNT: 13.4 % (ref 12.3–15.4)
GFR SERPL CREATININE-BSD FRML MDRD: 64 ML/MIN/1.73 (ref 39–90)
GLUCOSE BLD-MCNC: 122 MG/DL (ref 60–100)
HCT VFR BLD AUTO: 29.9 % (ref 34–46.6)
HGB BLD-MCNC: 9.9 G/DL (ref 12–15.9)
IMM GRANULOCYTES # BLD AUTO: 0.09 10*3/MM3 (ref 0–0.05)
IMM GRANULOCYTES NFR BLD AUTO: 0.8 % (ref 0–0.5)
LYMPHOCYTES # BLD AUTO: 1.02 10*3/MM3 (ref 0.7–3.1)
LYMPHOCYTES NFR BLD AUTO: 8.8 % (ref 19.6–45.3)
MCH RBC QN AUTO: 32 PG (ref 26.6–33)
MCHC RBC AUTO-ENTMCNC: 33.1 G/DL (ref 31.5–35.7)
MCV RBC AUTO: 96.8 FL (ref 79–97)
MONOCYTES # BLD AUTO: 0.46 10*3/MM3 (ref 0.1–0.9)
MONOCYTES NFR BLD AUTO: 4 % (ref 5–12)
MYCOPLASMAE PNEUMONIAE BY PCR: NEGATIVE
NEUTROPHILS # BLD AUTO: 9.96 10*3/MM3 (ref 1.4–7)
NEUTROPHILS NFR BLD AUTO: 86.3 % (ref 42.7–76)
NRBC BLD AUTO-RTO: 0 /100 WBC (ref 0–0)
PLATELET # BLD AUTO: 211 10*3/MM3 (ref 140–450)
PMV BLD AUTO: 10.3 FL (ref 6–12)
POTASSIUM BLD-SCNC: 4.2 MMOL/L (ref 3.5–5.1)
RBC # BLD AUTO: 3.09 10*6/MM3 (ref 3.77–5.28)
SODIUM BLD-SCNC: 134 MMOL/L (ref 137–145)
WBC NRBC COR # BLD: 11.54 10*3/MM3 (ref 3.4–10.8)

## 2019-03-21 PROCEDURE — 85025 COMPLETE CBC W/AUTO DIFF WBC: CPT | Performed by: FAMILY MEDICINE

## 2019-03-21 PROCEDURE — 80048 BASIC METABOLIC PNL TOTAL CA: CPT | Performed by: FAMILY MEDICINE

## 2019-03-21 PROCEDURE — 97110 THERAPEUTIC EXERCISES: CPT

## 2019-03-21 PROCEDURE — 99226 PR SBSQ OBSERVATION CARE/DAY 35 MINUTES: CPT | Performed by: STUDENT IN AN ORGANIZED HEALTH CARE EDUCATION/TRAINING PROGRAM

## 2019-03-21 PROCEDURE — 97116 GAIT TRAINING THERAPY: CPT

## 2019-03-21 PROCEDURE — 94799 UNLISTED PULMONARY SVC/PX: CPT

## 2019-03-21 PROCEDURE — G0378 HOSPITAL OBSERVATION PER HR: HCPCS

## 2019-03-21 PROCEDURE — 97530 THERAPEUTIC ACTIVITIES: CPT

## 2019-03-21 PROCEDURE — 63710000001 PREDNISONE PER 1 MG: Performed by: FAMILY MEDICINE

## 2019-03-21 PROCEDURE — 94760 N-INVAS EAR/PLS OXIMETRY 1: CPT

## 2019-03-21 RX ORDER — FUROSEMIDE 40 MG/1
40 TABLET ORAL DAILY
Status: DISCONTINUED | OUTPATIENT
Start: 2019-03-21 | End: 2019-03-21 | Stop reason: HOSPADM

## 2019-03-21 RX ORDER — TRAMADOL HYDROCHLORIDE 50 MG/1
50 TABLET ORAL EVERY 6 HOURS PRN
Qty: 12 TABLET | Refills: 0 | Status: SHIPPED | OUTPATIENT
Start: 2019-03-21 | End: 2019-03-21 | Stop reason: SDUPTHER

## 2019-03-21 RX ORDER — PREDNISONE 20 MG/1
20 TABLET ORAL
Qty: 4 TABLET | Refills: 0 | Status: SHIPPED | OUTPATIENT
Start: 2019-03-22 | End: 2019-03-29 | Stop reason: HOSPADM

## 2019-03-21 RX ORDER — TRAMADOL HYDROCHLORIDE 50 MG/1
50 TABLET ORAL EVERY 6 HOURS PRN
Qty: 12 TABLET | Refills: 0 | Status: SHIPPED | OUTPATIENT
Start: 2019-03-21 | End: 2019-03-22

## 2019-03-21 RX ORDER — AZITHROMYCIN 250 MG/1
TABLET, FILM COATED ORAL
Qty: 4 TABLET | Refills: 0 | Status: SHIPPED | OUTPATIENT
Start: 2019-03-22 | End: 2019-06-11

## 2019-03-21 RX ADMIN — Medication 1 TABLET: at 08:13

## 2019-03-21 RX ADMIN — FAMOTIDINE 40 MG: 40 TABLET ORAL at 08:13

## 2019-03-21 RX ADMIN — LIDOCAINE 1 PATCH: 50 PATCH CUTANEOUS at 08:13

## 2019-03-21 RX ADMIN — NITROGLYCERIN 1 PATCH: 0.2 PATCH TRANSDERMAL at 08:14

## 2019-03-21 RX ADMIN — DOCUSATE SODIUM 100 MG: 100 CAPSULE, LIQUID FILLED ORAL at 08:13

## 2019-03-21 RX ADMIN — PREDNISONE 20 MG: 20 TABLET ORAL at 08:13

## 2019-03-21 RX ADMIN — RANOLAZINE 500 MG: 500 TABLET, FILM COATED, EXTENDED RELEASE ORAL at 08:13

## 2019-03-21 RX ADMIN — AZITHROMYCIN 250 MG: 250 TABLET, FILM COATED ORAL at 08:13

## 2019-03-21 RX ADMIN — FERROUS SULFATE TAB EC 324 MG (65 MG FE EQUIVALENT) 324 MG: 324 (65 FE) TABLET DELAYED RESPONSE at 08:13

## 2019-03-21 RX ADMIN — LEVOTHYROXINE SODIUM 50 MCG: 50 TABLET ORAL at 05:45

## 2019-03-21 RX ADMIN — CARVEDILOL 6.25 MG: 6.25 TABLET, FILM COATED ORAL at 08:13

## 2019-03-21 RX ADMIN — ACETAMINOPHEN 650 MG: 325 TABLET, FILM COATED ORAL at 10:29

## 2019-03-21 RX ADMIN — IPRATROPIUM BROMIDE AND ALBUTEROL SULFATE 3 ML: 2.5; .5 SOLUTION RESPIRATORY (INHALATION) at 06:32

## 2019-03-21 RX ADMIN — ASPIRIN 325 MG: 325 TABLET, DELAYED RELEASE ORAL at 08:13

## 2019-03-21 RX ADMIN — SODIUM CHLORIDE, PRESERVATIVE FREE 3 ML: 5 INJECTION INTRAVENOUS at 08:14

## 2019-03-21 RX ADMIN — FUROSEMIDE 40 MG: 40 TABLET ORAL at 10:29

## 2019-03-21 RX ADMIN — SERTRALINE HYDROCHLORIDE 50 MG: 50 TABLET ORAL at 08:13

## 2019-03-21 NOTE — DISCHARGE SUMMARY
DISCHARGE SUMMARY    PATIENT NAME: Val Arteaga       PHYSICIAN: Cecilia Magana MD  : [id number]  MRN: [id number]    ADMITTED: 3/20/2019     DISCHARGED: 3/21/19    ADMISSION DIAGNOSES:  Active Hospital Problems    Diagnosis  POA   • **Drug-induced encephalopathy [G92]  Yes   • COPD exacerbation (CMS/HCC) [J44.1]  Yes   • Major depressive disorder [F32.9]  Yes   • Urinary retention [R33.9]  Yes   • Chronic systolic congestive heart failure (CMS/HCC) [I50.22]  Yes   • Essential hypertension [I10]  Yes   • Acquired hypothyroidism [E03.9]  Yes      Resolved Hospital Problems    Diagnosis Date Resolved POA   • Altered mental status [R41.82] 2019 Yes     DISCHARGE DIAGNOSES:   Active Hospital Problems    Diagnosis  POA   • **Drug-induced encephalopathy [G92]  Yes   • COPD exacerbation (CMS/HCC) [J44.1]  Yes   • Major depressive disorder [F32.9]  Yes   • Urinary retention [R33.9]  Yes   • Chronic systolic congestive heart failure (CMS/HCC) [I50.22]  Yes   • Essential hypertension [I10]  Yes   • Acquired hypothyroidism [E03.9]  Yes      Resolved Hospital Problems    Diagnosis Date Resolved POA   • Altered mental status [R41.82] 2019 Yes       SERVICE: Family Medicine.  Attending: Dr. Sanchez  Resident: Cecilia Magana MD    CONSULTS:   Consult Orders (all) (From admission, onward)    Start     Ordered    19 0806  Family Practice - Resident (on-call MD unless specified)  Once     Specialty:  Family Medicine  Provider:  (Not yet assigned)    19 0805          PROCEDURES:   HISTORY OF PRESENT ILLNESS:   Val Arteaga is a 86 y.o. female with a CMH of COPD, HTN,CAD, Hypothyroidisim, Depression, and CHF who presents complaining of  Altered mental status after taking 3x dose of Baclofen yesterday.  Pt states she suffered a fall on 3/15 and hurt her left posterior hip and right arm. Pt did not endorse any dizziness or palpitations proceeding the fall. Pain did not radiate. Was not alleviated  "with Tylenol or Aleve. Aggravated by movement. On 3/18 Pt presented to the ED where she was prescribed Baclofen, and informed she could take this medicine with alternating administrations of Tylenol and Aleve. Last night after taking the Baclofen Pt states she started to feel as if she was \"drunk\". She lives by herself but had a visitor spending the night who endorsed that she was altered from her baseline. Pt is able to understand that she was not acting like herself,and was initially very troubled by this and states she felt like she was dying. During examination Ms. Arteaga seemed very pleasant and stated she was starting to feel more like herself. She was able to endorse that her acute presentation was due to the medication she was taking. She does not currently endorse any symptoms of dizziness, headache, or chest pain. Pt did report some SOA at this time and was receiving a breathing treatment and placed on 2L NC. She states she uses this much oxygen at night and intermittently through the day secondary to her hx of COPD. She has no other concerns at this time. Pt being admitted for observation and evaluation of her AMS and SOA at this time.      In ED Pt received a Ct head that did not show any acute intracranial abnormality. CT Lumbar spine showed no acute fracture but there were degenerative changes and stenosis from L2-L3 and L4-L5 levels.  DIAGNOSTIC DATA:   Results for orders placed or performed during the hospital encounter of 03/20/19   Legionella Antigen, Urine - Urine, Urine, Clean Catch   Result Value Ref Range    LEGIONELLA ANTIGEN, URINE Negative Negative   Mycoplasma Pneumoniae PCR - Swab, Throat   Result Value Ref Range    MYCOPLASMAE PNEUMONIAE BY PCR Negative    S. Pneumo Ag Urine or CSF - Urine, Urine, Clean Catch   Result Value Ref Range    Strep Pneumo Ag Negative Negative   Respiratory Culture - Sputum, Cough   Result Value Ref Range    Respiratory Culture Normal Respiratory Mini     Gram " Stain Few (2+) WBCs per low power field     Gram Stain Rare (1+) Epithelial cells per low power field     Gram Stain Mixed bacterial glenn    Comprehensive Metabolic Panel   Result Value Ref Range    Glucose 109 (H) 60 - 100 mg/dL    BUN 24 (H) 7 - 21 mg/dL    Creatinine 0.95 0.50 - 1.00 mg/dL    Sodium 136 (L) 137 - 145 mmol/L    Potassium 4.2 3.5 - 5.1 mmol/L    Chloride 102 95 - 110 mmol/L    CO2 28.0 22.0 - 31.0 mmol/L    Calcium 8.8 8.4 - 10.2 mg/dL    Total Protein 7.0 6.3 - 8.6 g/dL    Albumin 3.60 3.40 - 4.80 g/dL    ALT (SGPT) 19 9 - 52 U/L    AST (SGOT) 29 14 - 36 U/L    Alkaline Phosphatase 67 38 - 126 U/L    Total Bilirubin 0.7 0.2 - 1.3 mg/dL    eGFR Non African Amer 56 39 - 90 mL/min/1.73    Globulin 3.4 2.3 - 3.5 gm/dL    A/G Ratio 1.1 1.1 - 1.8 g/dL    BUN/Creatinine Ratio 25.3 (H) 7.0 - 25.0    Anion Gap 6.0 5.0 - 15.0 mmol/L   Urinalysis With Microscopic If Indicated (No Culture) - Urine, Catheter   Result Value Ref Range    Color, UA Yellow Yellow, Straw, Dark Yellow, [place]    Appearance, UA Clear Clear    pH, UA 6.5 5.0 - 9.0    Specific Gravity, UA 1.008 1.003 - 1.030    Glucose, UA Negative Negative    Ketones, UA Negative Negative    Bilirubin, UA Negative Negative    Blood, UA Negative Negative    Protein, UA Negative Negative    Leuk Esterase, UA Negative Negative    Nitrite, UA Negative Negative    Urobilinogen, UA 0.2 E.U./dL 0.2 - 1.0 E.U./dL   BNP   Result Value Ref Range    proBNP 840.0 0.0-1,800.0 pg/mL   Troponin   Result Value Ref Range    Troponin I <0.012 <=0.034 ng/mL   Ethanol   Result Value Ref Range    Ethanol <10 0 - 10 mg/dL    Ethanol % <0.010 %   Urine Drug Screen - Urine, Catheter   Result Value Ref Range    Amphetamine Screen, Urine Negative Negative    Barbiturates Screen, Urine Negative Negative    Benzodiazepine Screen, Urine Negative Negative    Cocaine Screen, Urine Negative Negative    Methadone Screen, Urine Negative Negative    Opiate Screen Negative Negative     Oxycodone Screen, Urine Negative Negative    THC, Screen, Urine Negative Negative   Ammonia   Result Value Ref Range    Ammonia <9 (L) 9 - 30 umol/L   CBC Auto Differential   Result Value Ref Range    WBC 9.81 3.40 - 10.80 10*3/mm3    RBC 3.48 (L) 3.77 - 5.28 10*6/mm3    Hemoglobin 11.1 (L) 12.0 - 15.9 g/dL    Hematocrit 33.8 (L) 34.0 - 46.6 %    MCV 97.1 (H) 79.0 - 97.0 fL    MCH 31.9 26.6 - 33.0 pg    MCHC 32.8 31.5 - 35.7 g/dL    RDW 13.5 12.3 - 15.4 %    RDW-SD 47.8 37.0 - 54.0 fl    MPV 10.0 6.0 - 12.0 fL    Platelets 189 140 - 450 10*3/mm3    Neutrophil % 73.5 42.7 - 76.0 %    Lymphocyte % 14.6 (L) 19.6 - 45.3 %    Monocyte % 6.4 5.0 - 12.0 %    Eosinophil % 4.3 0.3 - 6.2 %    Basophil % 0.6 0.0 - 1.5 %    Immature Grans % 0.6 (H) 0.0 - 0.5 %    Neutrophils, Absolute 7.21 (H) 1.40 - 7.00 10*3/mm3    Lymphocytes, Absolute 1.43 0.70 - 3.10 10*3/mm3    Monocytes, Absolute 0.63 0.10 - 0.90 10*3/mm3    Eosinophils, Absolute 0.42 (H) 0.00 - 0.40 10*3/mm3    Basophils, Absolute 0.06 0.00 - 0.20 10*3/mm3    Immature Grans, Absolute 0.06 (H) 0.00 - 0.05 10*3/mm3    nRBC 0.0 0.0 - 0.0 /100 WBC   Blood Gas, Arterial   Result Value Ref Range    Site Left Radial     Serafin's Test Positive     pH, Arterial 7.469 (H) 7.350 - 7.450 pH units    pCO2, Arterial 37.5 35.0 - 45.0 mm Hg    pO2, Arterial 76.3 (L) 83.0 - 108.0 mm Hg    HCO3, Arterial 27.2 (H) 20.0 - 26.0 mmol/L    Base Excess, Arterial 3.4 (H) 0.0 - 2.0 mmol/L    O2 Saturation, Arterial 96.3 94.0 - 99.0 %    Barometric Pressure for Blood Gas 753 mmHg    Modality Nasal Cannula     Flow Rate 2.0 lpm    Ventilator Mode NA     Collected by RRY    Carboxyhemoglobin   Result Value Ref Range    Carboxyhemoglobin 3.4 0 - 5 %   Basic Metabolic Panel   Result Value Ref Range    Glucose 122 (H) 60 - 100 mg/dL    BUN 28 (H) 7 - 21 mg/dL    Creatinine 0.84 0.50 - 1.00 mg/dL    Sodium 134 (L) 137 - 145 mmol/L    Potassium 4.2 3.5 - 5.1 mmol/L    Chloride 101 95 - 110 mmol/L    CO2  "25.0 22.0 - 31.0 mmol/L    Calcium 8.5 8.4 - 10.2 mg/dL    eGFR Non African Amer 64 39 - 90 mL/min/1.73    BUN/Creatinine Ratio 33.3 (H) 7.0 - 25.0    Anion Gap 8.0 5.0 - 15.0 mmol/L   CBC Auto Differential   Result Value Ref Range    WBC 11.54 (H) 3.40 - 10.80 10*3/mm3    RBC 3.09 (L) 3.77 - 5.28 10*6/mm3    Hemoglobin 9.9 (L) 12.0 - 15.9 g/dL    Hematocrit 29.9 (L) 34.0 - 46.6 %    MCV 96.8 79.0 - 97.0 fL    MCH 32.0 26.6 - 33.0 pg    MCHC 33.1 31.5 - 35.7 g/dL    RDW 13.4 12.3 - 15.4 %    RDW-SD 47.8 37.0 - 54.0 fl    MPV 10.3 6.0 - 12.0 fL    Platelets 211 140 - 450 10*3/mm3    Neutrophil % 86.3 (H) 42.7 - 76.0 %    Lymphocyte % 8.8 (L) 19.6 - 45.3 %    Monocyte % 4.0 (L) 5.0 - 12.0 %    Eosinophil % 0.0 (L) 0.3 - 6.2 %    Basophil % 0.1 0.0 - 1.5 %    Immature Grans % 0.8 (H) 0.0 - 0.5 %    Neutrophils, Absolute 9.96 (H) 1.40 - 7.00 10*3/mm3    Lymphocytes, Absolute 1.02 0.70 - 3.10 10*3/mm3    Monocytes, Absolute 0.46 0.10 - 0.90 10*3/mm3    Eosinophils, Absolute 0.00 0.00 - 0.40 10*3/mm3    Basophils, Absolute 0.01 0.00 - 0.20 10*3/mm3    Immature Grans, Absolute 0.09 (H) 0.00 - 0.05 10*3/mm3    nRBC 0.0 0.0 - 0.0 /100 WBC          HOSPITAL COURSE:  Pt was admitted to our service on 3/20/19 due to drug induced encephalopathy after taking 3x dose of Baclofen the previous night. Pt suffered a fall on 3/15 onto her left hip and both arms. She was seen by ED in [facility] and prescribed Baclofen to help with her pain, XR pelvis and lumbarspine were not concerning for any acute bony abnormality at that time. Pt states that after taking the Baclofen she started to feel \"silly\".She usually lives alone but there was someone with her overnight who was able to help her and contact her family.  They brought her in because she was below her baseline and they wanted her evaluated. At admission Pt was alert and oriented x3 but appreciated that the medication was affecting her and she was not at her normal. In ED Pt received a CT " head without contrast which showed no acute intracranial abnormality and a CT of the lumbar spine was also performed due to her continued complaints of back pain but this showed no acute fractures and degenerative changes from the L2-L3 through L4-L5 levels. Pt was also complaining of increased oxygen needs. She is usually on 2.5L at night and uses it intermittently through the day, but was requiring it much more through the day. We treated her as a COPD exacerbation and Atypicals were ordered, but only 2/3 returned negative prior to discharge. We werestill waiting on result of Mycoplasma at that time. Pt started Azithromycin and Prednisone 20mg.  She was admitted for further management and workup. Lidocaine patch was also applied to help with her pain.  The following morning Pt found to be doing much better.She stated she was back at her normal level of functioning. She was able to appreciate that her behavior the previous day was due to medication effect. She was able to work with PT/OT who recommended skilled PT upon discharge to address pt's activity intolerance. She was still complaining of pain in her lowerbackand was informed that we would be prescribing her some Ultram at discharge. She would also be given a prescription for Azithromycin and Prednisone and told to finish this course.  Due to clinical stabilization Pt was deemed medically cleared for discharge. She was instructed to discontinue the Baclofen and continue treatment with medications prescribed at this visit.She was also instructed to return to ED if symptoms worsen or fail to resolve completely. She is to see her PCP in 1 week. Pt expressed understanding and agreement with this plan. She was subsequently discharged to home with referral for PT/OT and skilled nursing for cardiopulmonary assessments.     DISCHARGE CONDITION:   Stable    DISPOSITION:  Home or Self Care    DISCHARGE MEDICATIONS     Discharge Medications      New Medications       Instructions Start Date   azithromycin 250 MG tablet  Commonly known as:  ZITHROMAX   Take 2 tablets the first day, then 1 tablet daily for 4 days.   Start Date:  3/22/2019     predniSONE 20 MG tablet  Commonly known as:  DELTASONE   20 mg, Oral, Daily With Breakfast   Start Date:  3/22/2019     traMADol 50 MG tablet  Commonly known as:  ULTRAM   50 mg, Oral, Every 6 Hours PRN         Changes to Medications      Instructions Start Date   fluorometholone 0.1 % ophthalmic suspension  Commonly known as:  FLAREX  What changed:    · when to take this  · reasons to take this   1 drop, Both Eyes, 4 Times Daily         Continue These Medications      Instructions Start Date   acetaminophen 325 MG tablet  Commonly known as:  TYLENOL   650 mg, Oral, Every 4 Hours PRN      albuterol (2.5 MG/3ML) 0.083% nebulizer solution  Commonly known as:  PROVENTIL   2.5 mg, Nebulization, Every 6 Hours PRN      albuterol sulfate  (90 Base) MCG/ACT inhaler  Commonly known as:  VENTOLIN HFA   2 puffs every 4 hours as needed for breathing      aspirin 325 MG EC tablet   325 mg, Oral, Daily      atorvastatin 20 MG tablet  Commonly known as:  LIPITOR   20 mg, Oral, Daily      budesonide-formoterol 160-4.5 MCG/ACT inhaler  Commonly known as:  SYMBICORT   2 puffs, Inhalation, 2 Times Daily - RT      carvedilol 6.25 MG tablet  Commonly known as:  COREG   6.25 mg, Oral, 2 Times Daily With Meals      estradiol 0.1 MG/GM vaginal cream  Commonly known as:  ESTRACE   2 g, Vaginal, Daily      famotidine 40 MG tablet  Commonly known as:  PEPCID   40 mg, Oral, Daily      ferrous sulfate 324 (65 Fe) MG tablet delayed-release EC tablet   324 mg, Oral, Daily With Breakfast      folic acid-vit B6-vit B12 2.5-25-1 MG tablet tablet  Commonly known as:  FOLBEE   1 tablet, Oral, Daily      furosemide 40 MG tablet  Commonly known as:  LASIX   40 mg, Oral, Daily      ipratropium-albuterol 0.5-2.5 mg/3 ml nebulizer  Commonly known as:  DUO-NEB   3 mL,  Nebulization, 4 Times Daily PRN      levothyroxine 50 MCG tablet  Commonly known as:  SYNTHROID, LEVOTHROID   50 mcg, Oral, Daily      montelukast 10 MG tablet  Commonly known as:  SINGULAIR   10 mg, Oral, Nightly      nitroglycerin 0.2 MG/HR patch  Commonly known as:  NITRODUR   1 patch, Transdermal, Daily      polyethylene glycol powder  Commonly known as:  MIRALAX   MIX 1 CAPFUL (17G) IN 8 OUNCES OF LIQUID AND DRINK BY MOUTH EVERY DAY FOR CONSTIPATION      ranolazine 500 MG 12 hr tablet  Commonly known as:  RANEXA   500 mg, Oral, Every 12 Hours Scheduled      sertraline 50 MG tablet  Commonly known as:  ZOLOFT   50 mg, Oral, Daily      umeclidinium-vilanterol 62.5-25 MCG/INH aerosol powder  inhaler  Commonly known as:  ANORO ELLIPTA   1 puff, Inhalation, Daily - RT      vitamin D 45838 units capsule capsule  Commonly known as:  ERGOCALCIFEROL   50,000 Units, Oral, Every 30 Days             INSTRUCTIONS:  Activity:   Activity Instructions     Activity as Tolerated          Diet:   Diet Instructions     Diet: Regular      Discharge Diet:  Regular        Special instructions: Patient instructed to call MD or return to ED with worsening shortness of breath, chest pain, fever greater than 100.4 degrees F or any other medical concerns..    FOLLOW UP:   Additional Instructions for the Follow-ups that You Need to Schedule     Call MD With Problems / Concerns   As directed      Instructions: If symptoms worsen or fail to improve    Order Comments:  Instructions: If symptoms worsen or fail to improve          Discharge Follow-up with PCP   As directed       Currently Documented PCP:    Vero Arteaga APRN    PCP Phone Number:    [phone]     Follow Up Details:  1 week           Follow-up Information     Vero Arteaga APRN .    Specialty:  Nurse Practitioner  Why:  1 week  Contact information:  [address] [facility] [address] [phone]                   PENDING TEST RESULTS AT DISCHARGE   Order Current  Status    Respiratory Culture - Sputum, Cough Preliminary result          Time: Discharge 30 min    Dr. Snachez is the attending at time of discharge, He is aware of the patient's status and agrees with the above discharge summary.      This document has been electronically signed by Cecilia Magana MD on March 21, 2019 11:38 AM

## 2019-03-21 NOTE — THERAPY TREATMENT NOTE
Acute Care - Physical Therapy Treatment Note  [facility]     Patient Name: Val Arteaga  : 1932  MRN: [id number]  Today's Date: 3/21/2019  Onset of Illness/Injury or Date of Surgery: 19  Date of Referral to PT: 19  Referring Physician: Lanie Jain MD    Admit Date: 3/20/2019    Visit Dx:    ICD-10-CM ICD-9-CM   1. Altered mental status, unspecified altered mental status type R41.82 780.97   2. Acute exacerbation of chronic obstructive pulmonary disease (COPD) (CMS/[facility]) J44.1 491.21   3. Acute bilateral low back pain without sciatica M54.5 724.2     338.19   4. Impaired functional mobility and activity tolerance Z74.09 V49.89     Patient Active Problem List   Diagnosis   • Essential hypertension   • Coronary artery disease involving native coronary artery of native heart without angina pectoris   • Chronic obstructive pulmonary disease (CMS/[facility])   • Acquired hypothyroidism   • Depressive disorder   • Thygeson's superficial punctate keratitis   • Pseudophakia   • Precordial pain   • Displaced fracture of base of neck of right femur, initial encounter for closed fracture (CMS/[facility])   • Hip fracture, right, closed, initial encounter (CMS/[facility])   • Pulmonary emphysema (CMS/[facility])   • Chronic systolic congestive heart failure (CMS/[facility])   • Hyponatremia   • BENITO (acute kidney injury) (CMS/[facility])   • Acute blood loss anemia   • Urinary retention   • Closed displaced basicervical fracture of right femur (CMS/[facility])   • Urethral caruncle   • Symptomatic anemia   • Anemia   • COPD exacerbation (CMS/[facility])   • Major depressive disorder   • Drug-induced encephalopathy       Therapy Treatment    Rehabilitation Treatment Summary     Row Name 19 1059             Treatment Time/Intention    Discipline  physical therapy assistant  -TA      Document Type  therapy note (daily note)  -TA      Subjective Information  complains of;pain  -TA      Mode of Treatment  individual therapy;physical therapy  -TA       Therapy Frequency (PT Clinical Impression)  other (see comments) 6x/wk  -TA      Patient Effort  good  -TA      Existing Precautions/Restrictions  fall;oxygen therapy device and L/min  -TA      Recorded by [TA] Prisca Light, [facility] 03/21/19 1316      Row Name 03/21/19 1059             Vital Signs    Pre Systolic BP Rehab  138  -TA      Pre Treatment Diastolic BP  60  -TA      Post Systolic BP Rehab  --  -TA      Post Treatment Diastolic BP  --  -TA      Pretreatment Heart Rate (beats/min)  90  -TA      Intratreatment Heart Rate (beats/min)  97  -TA      Posttreatment Heart Rate (beats/min)  93  -TA      Pre SpO2 (%)  89  -TA      O2 Delivery Pre Treatment  supplemental O2  -TA      Intra SpO2 (%)  90  -TA      O2 Delivery Intra Treatment  supplemental O2  -TA      Post SpO2 (%)  98  -TA      O2 Delivery Post Treatment  supplemental O2  -TA      Pre Patient Position  Sitting  -TA      Post Patient Position  Supine  -TA      Recorded by [TA] Prisca Light, PTA 03/21/19 1316      Row Name 03/21/19 1059             Cognitive Assessment/Intervention- PT/OT    Affect/Mental Status (Cognitive)  WNL  -TA      Orientation Status (Cognition)  oriented x 4  -TA      Follows Commands (Cognition)  WNL  -TA      Personal Safety Interventions  fall prevention program maintained;gait belt;nonskid shoes/slippers when out of bed;supervised activity  -TA      Recorded by [TA] Prisca Light, PTA 03/21/19 1316      Row Name 03/21/19 1059             Safety Issues, Functional Mobility    Impairments Affecting Function (Mobility)  endurance/activity tolerance;shortness of breath;pain  -TA      Recorded by [TA] Prisca Light, PTA 03/21/19 1316      Row Name 03/21/19 1059             Bed Mobility Assessment/Treatment    Bed Mobility Assessment/Treatment  supine-sit;sit-supine  -TA      Supine-Sit [place] (Bed Mobility)  independent  -TA      Sit-Supine [place] (Bed Mobility)  independent  -TA      Assistive Device (Bed  Mobility)  --  -TA      Recorded by [TA] Prisca Light, PTA 03/21/19 1316      Row Name 03/21/19 1059             Transfer Assessment/Treatment    Transfer Assessment/Treatment  sit-stand transfer;stand-sit transfer;toilet transfer  -TA      Recorded by [TA] Prisca Light, PTA 03/21/19 1316      Row Name 03/21/19 1059             Sit-Stand Transfer    Sit-Stand [place] (Transfers)  supervision  -TA      Assistive Device (Sit-Stand Transfers)  walker, front-wheeled  -TA      Recorded by [TA] Prisca Light, PTA 03/21/19 1316      Row Name 03/21/19 1059             Stand-Sit Transfer    Stand-Sit [place] (Transfers)  supervision  -TA      Assistive Device (Stand-Sit Transfers)  walker, front-wheeled  -TA      Recorded by [TA] Prisca Light, PTA 03/21/19 1316      Row Name 03/21/19 1059             Toilet Transfer    Type (Toilet Transfer)  sit-stand;stand-sit  -TA      [place] Level (Toilet Transfer)  supervision  -TA2      Assistive Device (Toilet Transfer)  grab bars/safety frame;walker, front-wheeled  -TA      Recorded by [TA] Prisca Light, PTA 03/21/19 1316  [TA2] Prisca Light, PTA 03/21/19 1317      Row Name 03/21/19 1059             Gait/Stairs Assessment/Training    [place] Level (Gait)  supervision  -TA      Assistive Device (Gait)  walker, front-wheeled  -TA2      Distance in Feet (Gait)  200  -TA2      Pattern (Gait)  step-through  -TA2      Deviations/Abnormal Patterns (Gait)  stride length decreased;gait speed decreased;base of support, wide  -TA2      Bilateral Gait Deviations  forward flexed posture  -TA2      Negotiation (Stairs)  stairs assistive device;number of steps;stairs independence  -TA2      [place] Level (Stairs)  verbal cues;contact guard  -TA2      Handrail Location (Stairs)  both sides  -TA2      Number of Steps (Stairs)  2 x 2  -TA2      Ascending Technique (Stairs)  step-to-step  -TA2      Descending Technique (Stairs)  step-to-step  -TA2       Recorded by [TA] Prisca Light, [facility] 03/21/19 1317  [TA2] Prisca Light, [facility] 03/21/19 1316      Row Name 03/21/19 1059             Therapeutic Exercise    Lower Extremity (Therapeutic Exercise)  LAQ (long arc quad), bilateral;marching while seated  -TA      Lower Extremity Range of Motion (Therapeutic Exercise)  ankle dorsiflexion/plantar flexion, bilateral  -TA      Exercise Type (Therapeutic Exercise)  AROM (active range of motion)  -TA      Position (Therapeutic Exercise)  seated  -TA      Sets/Reps (Therapeutic Exercise)  10  -TA      Expected Outcome (Therapeutic Exercise)  facilitate normal movement patterns;improve functional stability;improve motor control;improve performance, gait skills;improve performance, transfer skills;increase active range of motion  -TA      Recorded by [TA] Prisca Light, [facility] 03/21/19 1316      Row Name 03/21/19 1059             Positioning and Restraints    Pre-Treatment Position  in bed  -TA      Post Treatment Position  bed  -TA      In Bed  sitting EOB;call light within reach;with family/caregiver  -TA      Recorded by [TA] Prisca Light, [facility] 03/21/19 1316      Row Name 03/21/19 1059             Pain Scale: Numbers Pre/Post-Treatment    Pain Scale: Numbers, Pretreatment  8/10 with coughing or movement  -TA      Pain Scale: Numbers, Post-Treatment  8/10 with coughing or movement  -TA      Pain Location - Side  Left  -TA      Pain Location  hip  -TA      Recorded by [TA] Prisca Light, [facility] 03/21/19 1316      Row Name 03/21/19 1059             Outcome Summary/Treatment Plan (PT)    Daily Summary of Progress (PT)  progress toward functional goals is good  -TA      Plan for Continued Treatment (PT)  continue  -TA      Anticipated Discharge Disposition (PT)  home with home health;home with OP services pt not homebound prior to admit  -TA      Recorded by [TA] Prisca Light, [facility] 03/21/19 1316        User Key  (r) = Recorded By, (t) = Taken By, (c) = Cosigned By    Initials  Name Effective Dates Prisca Uribe PTA 03/07/18 -  PT               Rehab Goal Summary     Row Name 03/21/19 0927             Physical Therapy Goals    Bed Mobility Goal Selection (PT)  bed mobility, PT goal 1  -TA      Transfer Goal Selection (PT)  transfer, PT goal 1  -TA      Gait Training Goal Selection (PT)  gait training, PT goal 1  -TA         Bed Mobility Goal 1 (PT)    Activity/Assistive Device (Bed Mobility Goal 1, PT)  bed mobility activities, all  -TA      [place] Level/Cues Needed (Bed Mobility Goal 1, PT)  conditional independence  -TA      Time Frame (Bed Mobility Goal 1, PT)  3 days  -TA      Barriers (Bed Mobility Goal 1, PT)  pain, SOB  -TA      Progress/Outcomes (Bed Mobility Goal 1, PT)  goal met  (Significant)   -TA         Transfer Goal 1 (PT)    Activity/Assistive Device (Transfer Goal 1, PT)  sit-to-stand/stand-to-sit;bed-to-chair/chair-to-bed;walker, rolling  -TA      [place] Level/Cues Needed (Transfer Goal 1, PT)  conditional independence  -TA      Time Frame (Transfer Goal 1, PT)  3 days  -TA      Barriers (Transfers Goal 1, PT)  pain, SOB  -TA      Progress/Outcome (Transfer Goal 1, PT)  goal not met  -TA         Gait Training Goal 1 (PT)    Activity/Assistive Device (Gait Training Goal 1, PT)  walker, rolling;increase endurance/gait distance;increase energy conservation;assistive device use  -TA      [place] Level (Gait Training Goal 1, PT)  supervision required  -TA      Distance (Gait Goal 1, PT)  150 feet  -TA      Time Frame (Gait Training Goal 1, PT)  3 days  -TA      Barriers (Gait Training Goal 1, PT)  pain, SOB  -TA      Progress/Outcome (Gait Training Goal 1, PT)  goal met  (Significant)   -TA        User Key  (r) = Recorded By, (t) = Taken By, (c) = Cosigned By    Initials Name Provider Type Discipline    Prisca Mccann PTA Physical Therapy Assistant PT          Physical Therapy Education     Title: PT OT SLP Therapies (In Progress)      Topic: Physical Therapy (In Progress)     Point: Mobility training (Done)     Learning Progress Summary           Patient Acceptance, E, VU by  at 3/20/2019  4:57 PM    Comment:  Role of PT, PT POC, d/c recommendations   Family Acceptance, E, VU by  at 3/20/2019  4:57 PM    Comment:  Role of PT, PT POC, d/c recommendations                   Point: Precautions (Done)     Learning Progress Summary           Patient Acceptance, E, VU by  at 3/20/2019  4:58 PM    Comment:  gait belt, call light, staff assistance with mobility   Family Acceptance, E, VU by  at 3/20/2019  4:58 PM    Comment:  gait belt, call light, staff assistance with mobility                               User Key     Initials Effective Dates Name Provider Type Discipline     07/23/18 -  Eva Ovalle, PT Physical Therapist PT                PT Recommendation and Plan  Anticipated Discharge Disposition (PT): home with home health, home with OP services(pt not homebound prior to admit)  Therapy Frequency (PT Clinical Impression): other (see comments)(6x/wk)  Outcome Summary/Treatment Plan (PT)  Daily Summary of Progress (PT): progress toward functional goals is good  Plan for Continued Treatment (PT): continue  Anticipated Discharge Disposition (PT): home with home health, home with OP services(pt not homebound prior to admit)  Progress: improving  Outcome Summary: pt sup<>sit with independence, sit<>stand with SBA, pt ambulated 200` with RW & SBA, pt up/down 2 steps x 2 with B HRs & CGA. pt would benefit from assistance @ home & HHPT @ D/C  Outcome Measures     Row Name 03/21/19 1300 03/20/19 1546          How much help from another person do you currently need...    Turning from your back to your side while in flat bed without using bedrails?  4  -TA  4  -LF     Moving from lying on back to sitting on the side of a flat bed without bedrails?  4  -TA  4  -LF     Moving to and from a bed to a chair (including a wheelchair)?  3  -TA  3  -LF      Standing up from a chair using your arms (e.g., wheelchair, bedside chair)?  3  -TA  3  -LF     Climbing 3-5 steps with a railing?  3  -TA  2  -LF     To walk in hospital room?  3  -TA  3  -LF     AM-PAC 6 Clicks Score  20  -TA  19  -LF        Functional Assessment    Outcome Measure Options  AM-PAC 6 Clicks Basic Mobility (PT)  -TA  AM-PAC 6 Clicks Basic Mobility (PT)  -LF       User Key  (r) = Recorded By, (t) = Taken By, (c) = Cosigned By    Initials Name Provider Type    Prisca Mccann PTA Physical Therapy Assistant    LF Eva Ovalle, PT Physical Therapist         Time Calculation:   PT Charges     Row Name 03/21/19 1319             Time Calculation    Start Time  0927  -TA      Stop Time  1023  -TA      Time Calculation (min)  56 min  -TA         Time Calculation- PT    Total Timed Code Minutes- PT  56 minute(s)  -TA        User Key  (r) = Recorded By, (t) = Taken By, (c) = Cosigned By    Initials Name Provider Type    Prisca Mccann PTA Physical Therapy Assistant        Therapy Charges for Today     Code Description Service Date Service Provider Modifiers Qty    97596738363 HC GAIT TRAINING EA 15 MIN 3/21/2019 Prisca Light, PTA GP 1    13814440684 HC PT THERAPEUTIC ACT EA 15 MIN 3/21/2019 Prisca Light PTA GP 2    08591039158 HC PT THER PROC EA 15 MIN 3/21/2019 Prisca Light, JEANNETTE GP 1          PT G-Codes  Outcome Measure Options: AM-PAC 6 Clicks Basic Mobility (PT)  AM-PAC 6 Clicks Score: 20    Prisca Light PTA  3/21/2019

## 2019-03-21 NOTE — THERAPY DISCHARGE NOTE
Acute Care - Physical Therapy Discharge Summary  [facility]       Patient Name: Val Arteaga  : 1932  MRN: [id number]    Today's Date: 3/21/2019  Onset of Illness/Injury or Date of Surgery: 19    Date of Referral to PT: 19  Referring Physician: Lanie Jain MD      Admit Date: 3/20/2019      PT Recommendation and Plan    Visit Dx:    ICD-10-CM ICD-9-CM   1. Altered mental status, unspecified altered mental status type R41.82 780.97   2. Acute exacerbation of chronic obstructive pulmonary disease (COPD) (CMS/[facility]) J44.1 491.21   3. Acute bilateral low back pain without sciatica M54.5 724.2     338.19   4. Impaired functional mobility and activity tolerance Z74.09 V49.89       Outcome Measures     Row Name 19 1300 19 1546          How much help from another person do you currently need...    Turning from your back to your side while in flat bed without using bedrails?  4  -TA  4  -LF     Moving from lying on back to sitting on the side of a flat bed without bedrails?  4  -TA  4  -LF     Moving to and from a bed to a chair (including a wheelchair)?  3  -TA  3  -LF     Standing up from a chair using your arms (e.g., wheelchair, bedside chair)?  3  -TA  3  -LF     Climbing 3-5 steps with a railing?  3  -TA  2  -LF     To walk in hospital room?  3  -TA  3  -LF     AM-PAC 6 Clicks Score  20  -TA  19  -LF        Functional Assessment    Outcome Measure Options  AM-PAC 6 Clicks Basic Mobility (PT)  -TA  AM-PAC 6 Clicks Basic Mobility (PT)  -LF       User Key  (r) = Recorded By, (t) = Taken By, (c) = Cosigned By    Initials Name Provider Type    Prisca Mccann PTA Physical Therapy Assistant    Eva Perera PT Physical Therapist          PT Charges     Row Name 19 1319             Time Calculation    Start Time  0927  -TA      Stop Time  1023  -TA      Time Calculation (min)  56 min  -TA         Time Calculation- PT    Total Timed Code Minutes- PT  56 minute(s)  -TA         User Key  (r) = Recorded By, (t) = Taken By, (c) = Cosigned By    Initials Name Provider Type    Prisca Mccann PTA Physical Therapy Assistant          Rehab Goal Summary     Row Name 03/21/19 0927             Physical Therapy Goals    Bed Mobility Goal Selection (PT)  bed mobility, PT goal 1  -TA      Transfer Goal Selection (PT)  transfer, PT goal 1  -TA      Gait Training Goal Selection (PT)  gait training, PT goal 1  -TA         Bed Mobility Goal 1 (PT)    Activity/Assistive Device (Bed Mobility Goal 1, PT)  bed mobility activities, all  -TA      [place] Level/Cues Needed (Bed Mobility Goal 1, PT)  conditional independence  -TA      Time Frame (Bed Mobility Goal 1, PT)  3 days  -TA      Barriers (Bed Mobility Goal 1, PT)  pain, SOB  -TA      Progress/Outcomes (Bed Mobility Goal 1, PT)  goal met  (Significant)   -TA         Transfer Goal 1 (PT)    Activity/Assistive Device (Transfer Goal 1, PT)  sit-to-stand/stand-to-sit;bed-to-chair/chair-to-bed;walker, rolling  -TA      [place] Level/Cues Needed (Transfer Goal 1, PT)  conditional independence  -TA      Time Frame (Transfer Goal 1, PT)  3 days  -TA      Barriers (Transfers Goal 1, PT)  pain, SOB  -TA      Progress/Outcome (Transfer Goal 1, PT)  goal not met  -TA         Gait Training Goal 1 (PT)    Activity/Assistive Device (Gait Training Goal 1, PT)  walker, rolling;increase endurance/gait distance;increase energy conservation;assistive device use  -TA      [place] Level (Gait Training Goal 1, PT)  supervision required  -TA      Distance (Gait Goal 1, PT)  150 feet  -TA      Time Frame (Gait Training Goal 1, PT)  3 days  -TA      Barriers (Gait Training Goal 1, PT)  pain, SOB  -TA      Progress/Outcome (Gait Training Goal 1, PT)  goal met  (Significant)   -TA        User Key  (r) = Recorded By, (t) = Taken By, (c) = Cosigned By    Initials Name Provider Type Discipline    Prisca Mccann PTA Physical Therapy Assistant PT               PT Discharge Summary  Anticipated Discharge Disposition (PT): home with home health, home with OP services  Reason for Discharge: Discharge from facility, Per MD order  Outcomes Achieved: Patient able to partially acheive established goals  Discharge Destination: Home      Leatha Tirado, PT   3/21/2019

## 2019-03-21 NOTE — PROGRESS NOTES
Discharge Planning Assessment  [facility]     Patient Name: Val Arteaga  MRN: [id number]  Today's Date: 3/21/2019    Admit Date: 3/20/2019    Discharge Needs Assessment     Row Name 03/21/19 1128       Living Environment    Lives With  alone    Current Living Arrangements  home/apartment/condo    Primary Care Provided by  self    Provides Primary Care For  no one    Family Caregiver if Needed  other relative(s);grandchild(wesley), adult;child(wesley), adult;sibling(s)    Quality of Family Relationships  helpful;involved;supportive    Able to Return to Prior Arrangements  yes       Resource/Environmental Concerns    Resource/Environmental Concerns  none    Transportation Concerns  car, none       Transition Planning    Patient/Family Anticipates Transition to  home with help/services    Patient/Family Anticipated Services at Transition  home health care    Transportation Anticipated  car, drives self       Discharge Needs Assessment    Readmission Within the Last 30 Days  no previous admission in last 30 days    Concerns to be Addressed  discharge planning    Equipment Currently Used at Home  oxygen;nebulizer;walker, rolling;cane, quad;cane, straight;shower chair    Anticipated Changes Related to Illness  none    Equipment Needed After Discharge  none    Outpatient/Agency/Support Group Needs  homecare agency    Discharge Facility/Level of Care Needs  home with home health    Offered/Gave Vendor List  yes    Patient's Choice of Community Agency(s)  [facility]    Current Discharge Risk  lives alone        Discharge Plan     Row Name 03/21/19 1131       Plan    Plan  [facility]    Patient/Family in Agreement with Plan  yes    Plan Comments  Assessment completed. SW discussed PT recommendations. Patient agreeable to HH services at d/c. Patient states she has had [facility] in past and would like again. Left note for provider to order HH services @ d/c. Confirmed RX and prx coverage. PCP is CRESENCIO Joseph. No other needs voiced at  this time...SHANNON Cruz        Destination      No service coordination in this encounter.      Durable Medical Equipment      No service coordination in this encounter.      Dialysis/Infusion      No service coordination in this encounter.      Home Medical Care      Service Provider Request Status Selected Services Address Phone Number Fax Number    [facility] [place] Pending - No Request Sent N/A [address] [place] [address] [phone]      Community Resources      No service coordination in this encounter.          Demographic Summary     Row Name 03/21/19 1123       General Information    Admission Type  observation    Arrived From  home    Required Notices Provided  Observation Status Notice    Referral Source  case finding    Reason for Consult  discharge planning    Preferred Language  English     Used During This Interaction  no       Contact Information    Permission Granted to Share Info With      Contact Information Obtained for          Functional Status     Row Name 03/21/19 1123       Functional Status    Usual Activity Tolerance  good    Current Activity Tolerance  moderate       Functional Status, IADL    Medications  assistive person niece organizes and patient takes herself.    Meal Preparation  assistive person family helps with meals    Housekeeping  assistive person    Laundry  assistive person    Shopping  assistive person    IADL Comments  Patient is still fully IND at home but has family support that helps with some things at home.       Mental Status    General Appearance WDL  WDL       Mental Status Summary    Recent Changes in Mental Status/Cognitive Functioning  no changes        Psychosocial    No documentation.       Abuse/Neglect    No documentation.       Legal    No documentation.       Substance Abuse    No documentation.       Patient Forms    No documentation.           Daksha Crystal

## 2019-03-21 NOTE — PLAN OF CARE
Problem: Patient Care Overview  Goal: Plan of Care Review  Outcome: Ongoing (interventions implemented as appropriate)   03/21/19 2941   Coping/Psychosocial   Plan of Care Reviewed With patient   OTHER   Outcome Summary Pt resting at this time. Complaining of pain in left hip when ambulating. VSS and all needs met, Will continue to monitor.    Plan of Care Review   Progress improving     Goal: Individualization and Mutuality  Outcome: Ongoing (interventions implemented as appropriate)    Goal: Discharge Needs Assessment  Outcome: Ongoing (interventions implemented as appropriate)    Goal: Interprofessional Rounds/Family Conf  Outcome: Ongoing (interventions implemented as appropriate)      Problem: Breathing Pattern Ineffective (Adult)  Goal: Identify Related Risk Factors and Signs and Symptoms  Outcome: Ongoing (interventions implemented as appropriate)    Goal: Effective Oxygenation/Ventilation  Outcome: Ongoing (interventions implemented as appropriate)    Goal: Anxiety/Fear Reduction  Outcome: Ongoing (interventions implemented as appropriate)

## 2019-03-21 NOTE — PLAN OF CARE
Problem: Patient Care Overview  Goal: Plan of Care Review  Outcome: Ongoing (interventions implemented as appropriate)   03/21/19 0140   Coping/Psychosocial   Plan of Care Reviewed With patient   OTHER   Outcome Summary New admit   Plan of Care Review   Progress no change       Problem: Fall Risk (Adult)  Goal: Identify Related Risk Factors and Signs and Symptoms  Outcome: Outcome(s) achieved Date Met: 03/21/19    Goal: Absence of Fall  Outcome: Outcome(s) achieved Date Met: 03/21/19      Problem: Breathing Pattern Ineffective (Adult)  Goal: Identify Related Risk Factors and Signs and Symptoms  Outcome: Ongoing (interventions implemented as appropriate)    Goal: Effective Oxygenation/Ventilation  Outcome: Ongoing (interventions implemented as appropriate)    Goal: Anxiety/Fear Reduction  Outcome: Ongoing (interventions implemented as appropriate)

## 2019-03-21 NOTE — NURSING NOTE
Discussed with patient regarding urination,   States it sometimes a problem. But ok currently.   Explained let us know if problem voiding

## 2019-03-21 NOTE — PROGRESS NOTES
FAMILY MEDICINE DAILY PROGRESS NOTE  NAME: Val Arteaga  : 1932  MRN: [id number]     LOS: 0 days     PROVIDER OF SERVICE: Cecilia Magana MD    Chief Complaint: Drug-induced encephalopathy    Subjective:     Interval History:  History taken from: patient chart  Pt is a 86y.o female who presented yesterday due to drug induced encephalopathy after administration of Baclofen that she used after a fall sustained on 3/15. PT states she is doing much better this morning. Believes she is back to her baseline. Is oriented to person, place, and time. States she would like to go home today.She has no complaints of headaches, dizziness, SOA, or chest pain. No other complaints at this time.    Review of Systems:   Review of Systems   Constitutional: Positive for activity change. Negative for chills, diaphoresis and fever.   HENT: Negative for dental problem, drooling, ear discharge, ear pain, facial swelling, mouth sores, nosebleeds, rhinorrhea, sinus pressure, sinus pain, sneezing and sore throat.    Eyes: Negative for discharge and redness.   Respiratory: Negative for apnea, cough, chest tightness, shortness of breath, wheezing and stridor.    Cardiovascular: Negative for chest pain, palpitations and leg swelling.   Gastrointestinal: Negative for abdominal distention, abdominal pain, constipation, diarrhea, nausea and vomiting.   Genitourinary: Negative for dysuria, frequency and urgency.   Musculoskeletal: Negative for arthralgias and back pain.   Skin: Negative for color change, rash and wound.   Neurological: Negative for dizziness, facial asymmetry, light-headedness and headaches.   Psychiatric/Behavioral: Negative for agitation, behavioral problems, confusion and decreased concentration. The patient is not nervous/anxious.        Objective:     Vital Signs  Temp:  [96.4 °F (35.8 °C)-98 °F (36.7 °C)] 97.6 °F (36.4 °C)  Heart Rate:  [] 96  Resp:  [16-20] 18  BP: (146-206)/(70-83) 148/78    Physical  Exam  Physical Exam   Constitutional: She is oriented to person, place, and time. She appears well-developed and well-nourished. No distress.   HENT:   Head: Normocephalic and atraumatic.   Right Ear: External ear normal.   Left Ear: External ear normal.   Eyes: Conjunctivae and EOM are normal. Right eye exhibits no discharge. Left eye exhibits no discharge. No scleral icterus.   Neck: Normal range of motion. Neck supple. No tracheal deviation present.   Cardiovascular: Normal rate, regular rhythm, normal heart sounds and intact distal pulses. Exam reveals no gallop and no friction rub.   No murmur heard.  Pulmonary/Chest: Effort normal and breath sounds normal. No stridor. No respiratory distress. She has no wheezes. She has no rales.   Abdominal: Soft. Bowel sounds are normal. She exhibits no distension. There is no tenderness.   Musculoskeletal: Normal range of motion. She exhibits no edema, tenderness or deformity.   Neurological: She is alert and oriented to person, place, and time.   Skin: Skin is warm and dry. No rash noted. She is not diaphoretic. No erythema. No pallor.   Psychiatric: She has a normal mood and affect. Her behavior is normal.       Medication Review    Current Facility-Administered Medications:   •  acetaminophen (TYLENOL) tablet 650 mg, 650 mg, Oral, Q4H PRN, Lanie Jain MD  •  albuterol (PROVENTIL) nebulizer solution 0.083% 2.5 mg/3mL, 2.5 mg, Nebulization, Q6H PRN, Lanie Jain MD  •  aspirin EC tablet 325 mg, 325 mg, Oral, Daily, Lanie Jain MD, 325 mg at 03/21/19 0813  •  atorvastatin (LIPITOR) tablet 20 mg, 20 mg, Oral, Nightly, Lanie Jain MD, 20 mg at 03/20/19 2139  •  [COMPLETED] azithromycin (ZITHROMAX) tablet 500 mg, 500 mg, Oral, Daily, 500 mg at 03/20/19 1301 **FOLLOWED BY** azithromycin (ZITHROMAX) tablet 250 mg, 250 mg, Oral, Daily, Lanie Jain MD, 250 mg at 03/21/19 0813  •  budesonide-formoterol (SYMBICORT)  160-4.5 MCG/ACT inhaler 2 puff, 2 puff, Inhalation, BID - RT, Lanie Jain MD  •  carvedilol (COREG) tablet 6.25 mg, 6.25 mg, Oral, BID With Meals, Lanie Jain MD, 6.25 mg at 03/21/19 0813  •  docusate sodium (COLACE) capsule 100 mg, 100 mg, Oral, Daily, Lanie Jain MD, 100 mg at 03/21/19 0813  •  estradiol (ESTRACE) vaginal cream 2 g, 2 g, Vaginal, Nightly, Lanie Jain MD, 2 g at 03/20/19 2100  •  famotidine (PEPCID) tablet 40 mg, 40 mg, Oral, Daily, Lanie Jain MD, 40 mg at 03/21/19 0813  •  ferrous sulfate EC tablet 324 mg, 324 mg, Oral, Daily With Breakfast, Lanie Jain MD, 324 mg at 03/21/19 0813  •  folic acid-vit B6-vit B12 (FOLBEE) tablet 1 tablet, 1 tablet, Oral, Daily, Lanie Jain MD, 1 tablet at 03/21/19 0813  •  ipratropium-albuterol (DUO-NEB) nebulizer solution 3 mL, 3 mL, Nebulization, 4x Daily PRN, Lanie Jain MD, 3 mL at 03/20/19 2340  •  ipratropium-albuterol (DUO-NEB) nebulizer solution 3 mL, 3 mL, Nebulization, Q8H - RT, Lanie Jain MD, 3 mL at 03/21/19 0632  •  levothyroxine (SYNTHROID, LEVOTHROID) tablet 50 mcg, 50 mcg, Oral, Q AM, Lanie Jain MD, 50 mcg at 03/21/19 0545  •  lidocaine (LIDODERM) 5 % 1 patch, 1 patch, Transdermal, Q24H, Lanie Jain MD, 1 patch at 03/21/19 0813  •  montelukast (SINGULAIR) tablet 10 mg, 10 mg, Oral, Nightly, Lanie Jain MD, 10 mg at 03/20/19 2139  •  nitroglycerin (NITRODUR) 0.2 MG/HR patch 1 patch, 1 patch, Transdermal, Daily, Lanie Jain MD, 1 patch at 03/21/19 0814  •  ondansetron (ZOFRAN) tablet 4 mg, 4 mg, Oral, Q6H PRN, Lanie Jain MD  •  predniSONE (DELTASONE) tablet 20 mg, 20 mg, Oral, Daily With Breakfast, Lanie Jain MD, 20 mg at 03/21/19 0813  •  ranolazine (RANEXA) 12 hr tablet 500 mg, 500 mg, Oral, Q12H, Lanie Jain MD, 500 mg at 03/21/19 0813  •   sertraline (ZOLOFT) tablet 50 mg, 50 mg, Oral, Daily, Lanie Jain MD, 50 mg at 03/21/19 0813  •  [COMPLETED] Insert peripheral IV, , , Once **AND** sodium chloride 0.9 % flush 10 mL, 10 mL, Intravenous, PRN, Abdirizak Rascon MD  •  sodium chloride 0.9 % flush 3 mL, 3 mL, Intravenous, Q12H, Lanie Jain MD, 3 mL at 03/21/19 0814  •  sodium chloride 0.9 % flush 3-10 mL, 3-10 mL, Intravenous, PRN, Lanie Jain MD  •  sodium chloride 0.9 % infusion, 50 mL/hr, Intravenous, Continuous, Lanie Jain MD, Last Rate: 50 mL/hr at 03/21/19 0746, 50 mL/hr at 03/21/19 0746     Diagnostic Data    Lab Results (last 24 hours)     Procedure Component Value Units Date/Time    Basic Metabolic Panel [[id number]]  (Abnormal) Collected:  03/21/19 0546    Specimen:  Blood Updated:  03/21/19 0654     Glucose 122 mg/dL      BUN 28 mg/dL      Creatinine 0.84 mg/dL      Sodium 134 mmol/L      Potassium 4.2 mmol/L      Chloride 101 mmol/L      CO2 25.0 mmol/L      Calcium 8.5 mg/dL      eGFR Non African Amer 64 mL/min/1.73      BUN/Creatinine Ratio 33.3     Anion Gap 8.0 mmol/L     Narrative:       The MDRD GFR formula is only valid for adults with stable renal function between ages 18 and 70.    CBC & Differential [[id number]] Collected:  03/21/19 0546    Specimen:  Blood Updated:  03/21/19 0636    Narrative:       The following orders were created for panel order CBC & Differential.  Procedure                               Abnormality         Status                     ---------                               -----------         ------                     CBC Auto Differential[[id number]]        Abnormal            Final result                 Please view results for these tests on the individual orders.    CBC Auto Differential [[id number]]  (Abnormal) Collected:  03/21/19 0546    Specimen:  Blood Updated:  03/21/19 0636     WBC 11.54 10*3/mm3      RBC 3.09 10*6/mm3      Hemoglobin 9.9 g/dL       Hematocrit 29.9 %      MCV 96.8 fL      MCH 32.0 pg      MCHC 33.1 g/dL      RDW 13.4 %      RDW-SD 47.8 fl      MPV 10.3 fL      Platelets 211 10*3/mm3      Neutrophil % 86.3 %      Lymphocyte % 8.8 %      Monocyte % 4.0 %      Eosinophil % 0.0 %      Basophil % 0.1 %      Immature Grans % 0.8 %      Neutrophils, Absolute 9.96 10*3/mm3      Lymphocytes, Absolute 1.02 10*3/mm3      Monocytes, Absolute 0.46 10*3/mm3      Eosinophils, Absolute 0.00 10*3/mm3      Basophils, Absolute 0.01 10*3/mm3      Immature Grans, Absolute 0.09 10*3/mm3      nRBC 0.0 /100 WBC     Respiratory Culture - Sputum, Cough [[id number]] Collected:  03/20/19 1727    Specimen:  Sputum from Cough Updated:  03/21/19 0632     Respiratory Culture Normal Respiratory Glenn     Gram Stain Few (2+) WBCs per low power field      Rare (1+) Epithelial cells per low power field      Mixed bacterial glenn    S. Pneumo Ag Urine or CSF - Urine, Urine, Clean Catch [[id number]]  (Normal) Collected:  03/20/19 1840    Specimen:  Urine, Clean Catch Updated:  03/20/19 2125     Strep Pneumo Ag Negative    Legionella Antigen, Urine - Urine, Urine, Clean Catch [[id number]]  (Normal) Collected:  03/20/19 1840    Specimen:  Urine, Clean Catch Updated:  03/20/19 2124     LEGIONELLA ANTIGEN, URINE Negative    Mycoplasma Pneumoniae PCR - Swab, Throat [[id number]] Collected:  03/20/19 1541    Specimen:  Swab from Throat Updated:  03/20/19 1556           Imaging Results (last 24 hours)     ** No results found for the last 24 hours. **          I reviewed the patient's new clinical results.    Assessment/Plan:     Active Hospital Problems    Diagnosis   • **Drug-induced encephalopathy     -Due to baclofen  -Alert and oriented x3  -Fall precautions   -PT/OT consult placed and will evaluate     • COPD exacerbation (CMS/HCC)     -Pt not on oxygen this morning,requires only intermittent oxygen during the day at home. On 2.5L NC at night  -Duoneb and Albuterol breathing treatment    -O2 Prn  -Atypicals: Negative x2.Waiting on results of Mycoplasma  -Prednisone 20mg  -Abx: Azithromycin Day 2     • Major depressive disorder     -Continue home medication Zoloft 50mg     • Urinary retention     -Will order bladder scan  -In and out catheter PRN for bladder volume of 250-300mL  -No urinary symptoms at this time     • Chronic systolic congestive heart failure (CMS/HCC)     -Home medication Lasix 40mg  -Urinary retention has resolved, continue home meds  -Daily weights, will monitor for any change     • Essential hypertension     -BP currently stable at 148/77  -Continue home medication Coreg 6.25 mg       • Acquired hypothyroidism     -Continue home medication Synthroid 50 mcg           DVT prophylaxis: SCDs/TEDs  Code Status and Medical Interventions:   Ordered at: 03/20/19 1146     Limited Support to NOT Include:    Intubation     Level Of Support Discussed With:    Patient     Code Status:    No CPR     Medical Interventions (Level of Support Prior to Arrest):    Limited       Plan for disposition:Where: home and When:  1-2days      Time: 20 minutes        This document has been electronically signed by Cecilia Magana MD on March 21, 2019 8:28 AM

## 2019-03-22 ENCOUNTER — READMISSION MANAGEMENT (OUTPATIENT)
Dept: CALL CENTER | Facility: HOSPITAL | Age: 84
End: 2019-03-22

## 2019-03-22 ENCOUNTER — EPISODE CHANGES (OUTPATIENT)
Dept: CASE MANAGEMENT | Facility: OTHER | Age: 84
End: 2019-03-22

## 2019-03-22 LAB
BACTERIA SPEC RESP CULT: NORMAL
GRAM STN SPEC: NORMAL

## 2019-03-22 RX ORDER — TRAMADOL HYDROCHLORIDE 50 MG/1
50 TABLET ORAL EVERY 4 HOURS PRN
Qty: 42 TABLET | Refills: 0 | Status: SHIPPED | OUTPATIENT
Start: 2019-03-22 | End: 2019-03-29

## 2019-03-22 NOTE — OUTREACH NOTE
Prep Survey      Responses   Facility patient discharged from?  [facility]   Is patient eligible?  No   What are the reasons patient is not eligible?  Other [reason for admit is AMS r/t medications]   Does the patient have one of the following disease processes/diagnoses(primary or secondary)?  Other   Prep survey completed?  Yes          Monae Blair RN

## 2019-03-24 ENCOUNTER — APPOINTMENT (OUTPATIENT)
Dept: GENERAL RADIOLOGY | Facility: HOSPITAL | Age: 84
End: 2019-03-24

## 2019-03-24 ENCOUNTER — HOSPITAL ENCOUNTER (INPATIENT)
Facility: HOSPITAL | Age: 84
LOS: 4 days | Discharge: NURSING, REHAB OR LONG-TERM CARE | End: 2019-03-29
Attending: EMERGENCY MEDICINE | Admitting: FAMILY MEDICINE

## 2019-03-24 ENCOUNTER — APPOINTMENT (OUTPATIENT)
Dept: CT IMAGING | Facility: HOSPITAL | Age: 84
End: 2019-03-24

## 2019-03-24 DIAGNOSIS — M54.50 ACUTE LEFT-SIDED LOW BACK PAIN WITHOUT SCIATICA: Primary | ICD-10-CM

## 2019-03-24 DIAGNOSIS — I10 UNCONTROLLED HYPERTENSION: ICD-10-CM

## 2019-03-24 DIAGNOSIS — Z79.01 WARFARIN ANTICOAGULATION: ICD-10-CM

## 2019-03-24 DIAGNOSIS — Z78.9 IMPAIRED MOBILITY AND ADLS: ICD-10-CM

## 2019-03-24 DIAGNOSIS — Z74.09 IMPAIRED FUNCTIONAL MOBILITY, BALANCE, GAIT, AND ENDURANCE: ICD-10-CM

## 2019-03-24 DIAGNOSIS — M84.48XA BILATERAL SACRAL INSUFFICIENCY FRACTURE, INITIAL ENCOUNTER: ICD-10-CM
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
## 2019-03-24 DIAGNOSIS — Z74.09 IMPAIRED FUNCTIONAL MOBILITY AND ACTIVITY TOLERANCE: ICD-10-CM

## 2019-03-24 DIAGNOSIS — Z74.09 IMPAIRED MOBILITY AND ADLS: ICD-10-CM

## 2019-03-24 LAB
ALBUMIN SERPL-MCNC: 3.4 G/DL (ref 3.4–4.8)
ALBUMIN/GLOB SERPL: 1.2 G/DL (ref 1.1–1.8)
ALP SERPL-CCNC: 74 U/L (ref 38–126)
ALT SERPL W P-5'-P-CCNC: 24 U/L (ref 9–52)
ANION GAP SERPL CALCULATED.3IONS-SCNC: 7 MMOL/L (ref 5–15)
AST SERPL-CCNC: 35 U/L (ref 14–36)
BASOPHILS # BLD AUTO: 0.02 10*3/MM3 (ref 0–0.2)
BASOPHILS NFR BLD AUTO: 0.2 % (ref 0–1.5)
BILIRUB SERPL-MCNC: 0.7 MG/DL (ref 0.2–1.3)
BUN BLD-MCNC: 24 MG/DL (ref 7–21)
BUN/CREAT SERPL: 32.4 (ref 7–25)
CALCIUM SPEC-SCNC: 8.6 MG/DL (ref 8.4–10.2)
CHLORIDE SERPL-SCNC: 96 MMOL/L (ref 95–110)
CO2 SERPL-SCNC: 29 MMOL/L (ref 22–31)
CREAT BLD-MCNC: 0.74 MG/DL (ref 0.5–1)
DEPRECATED RDW RBC AUTO: 45.7 FL (ref 37–54)
EOSINOPHIL # BLD AUTO: 0.03 10*3/MM3 (ref 0–0.4)
EOSINOPHIL NFR BLD AUTO: 0.3 % (ref 0.3–6.2)
ERYTHROCYTE [DISTWIDTH] IN BLOOD BY AUTOMATED COUNT: 13.2 % (ref 12.3–15.4)
GFR SERPL CREATININE-BSD FRML MDRD: 74 ML/MIN/1.73 (ref 39–90)
GLOBULIN UR ELPH-MCNC: 2.9 GM/DL (ref 2.3–3.5)
GLUCOSE BLD-MCNC: 96 MG/DL (ref 60–100)
HCT VFR BLD AUTO: 31.2 % (ref 34–46.6)
HGB BLD-MCNC: 10.7 G/DL (ref 12–15.9)
HOLD SPECIMEN: NORMAL
IMM GRANULOCYTES # BLD AUTO: 0.13 10*3/MM3 (ref 0–0.05)
IMM GRANULOCYTES NFR BLD AUTO: 1.3 % (ref 0–0.5)
INR PPP: 0.96 (ref 0.8–1.2)
LYMPHOCYTES # BLD AUTO: 1.08 10*3/MM3 (ref 0.7–3.1)
LYMPHOCYTES NFR BLD AUTO: 11.1 % (ref 19.6–45.3)
MCH RBC QN AUTO: 32.7 PG (ref 26.6–33)
MCHC RBC AUTO-ENTMCNC: 34.3 G/DL (ref 31.5–35.7)
MCV RBC AUTO: 95.4 FL (ref 79–97)
MONOCYTES # BLD AUTO: 0.24 10*3/MM3 (ref 0.1–0.9)
MONOCYTES NFR BLD AUTO: 2.5 % (ref 5–12)
NEUTROPHILS # BLD AUTO: 8.25 10*3/MM3 (ref 1.4–7)
NEUTROPHILS NFR BLD AUTO: 84.6 % (ref 42.7–76)
NRBC BLD AUTO-RTO: 0 /100 WBC (ref 0–0)
PLATELET # BLD AUTO: 251 10*3/MM3 (ref 140–450)
PMV BLD AUTO: 10.2 FL (ref 6–12)
POTASSIUM BLD-SCNC: 3.7 MMOL/L (ref 3.5–5.1)
PROT SERPL-MCNC: 6.3 G/DL (ref 6.3–8.6)
PROTHROMBIN TIME: 12.6 SECONDS (ref 11.1–15.3)
RBC # BLD AUTO: 3.27 10*6/MM3 (ref 3.77–5.28)
SODIUM BLD-SCNC: 132 MMOL/L (ref 137–145)
WBC NRBC COR # BLD: 9.75 10*3/MM3 (ref 3.4–10.8)

## 2019-03-24 PROCEDURE — 73700 CT LOWER EXTREMITY W/O DYE: CPT

## 2019-03-24 PROCEDURE — 93010 ELECTROCARDIOGRAM REPORT: CPT | Performed by: INTERNAL MEDICINE

## 2019-03-24 PROCEDURE — 73502 X-RAY EXAM HIP UNI 2-3 VIEWS: CPT

## 2019-03-24 PROCEDURE — G0378 HOSPITAL OBSERVATION PER HR: HCPCS

## 2019-03-24 PROCEDURE — 25010000002 MORPHINE PER 10 MG: Performed by: STUDENT IN AN ORGANIZED HEALTH CARE EDUCATION/TRAINING PROGRAM

## 2019-03-24 PROCEDURE — 80053 COMPREHEN METABOLIC PANEL: CPT | Performed by: STUDENT IN AN ORGANIZED HEALTH CARE EDUCATION/TRAINING PROGRAM

## 2019-03-24 PROCEDURE — 99220 PR INITIAL OBSERVATION CARE/DAY 70 MINUTES: CPT | Performed by: STUDENT IN AN ORGANIZED HEALTH CARE EDUCATION/TRAINING PROGRAM

## 2019-03-24 PROCEDURE — 25010000002 HYDRALAZINE PER 20 MG: Performed by: EMERGENCY MEDICINE

## 2019-03-24 PROCEDURE — 85025 COMPLETE CBC W/AUTO DIFF WBC: CPT | Performed by: STUDENT IN AN ORGANIZED HEALTH CARE EDUCATION/TRAINING PROGRAM

## 2019-03-24 PROCEDURE — 71046 X-RAY EXAM CHEST 2 VIEWS: CPT

## 2019-03-24 PROCEDURE — 93005 ELECTROCARDIOGRAM TRACING: CPT | Performed by: STUDENT IN AN ORGANIZED HEALTH CARE EDUCATION/TRAINING PROGRAM

## 2019-03-24 PROCEDURE — 25010000002 HEPARIN (PORCINE) PER 1000 UNITS: Performed by: HOSPITALIST

## 2019-03-24 PROCEDURE — 25010000002 MORPHINE PER 10 MG: Performed by: EMERGENCY MEDICINE

## 2019-03-24 PROCEDURE — 85610 PROTHROMBIN TIME: CPT | Performed by: STUDENT IN AN ORGANIZED HEALTH CARE EDUCATION/TRAINING PROGRAM

## 2019-03-24 PROCEDURE — 99285 EMERGENCY DEPT VISIT HI MDM: CPT

## 2019-03-24 RX ORDER — ALBUTEROL SULFATE 2.5 MG/3ML
2.5 SOLUTION RESPIRATORY (INHALATION) EVERY 4 HOURS PRN
Status: DISCONTINUED | OUTPATIENT
Start: 2019-03-24 | End: 2019-03-29 | Stop reason: HOSPADM

## 2019-03-24 RX ORDER — SODIUM CHLORIDE 0.9 % (FLUSH) 0.9 %
3-10 SYRINGE (ML) INJECTION AS NEEDED
Status: DISCONTINUED | OUTPATIENT
Start: 2019-03-24 | End: 2019-03-29 | Stop reason: HOSPADM

## 2019-03-24 RX ORDER — ALBUTEROL SULFATE 2.5 MG/3ML
2.5 SOLUTION RESPIRATORY (INHALATION) EVERY 6 HOURS PRN
Status: DISCONTINUED | OUTPATIENT
Start: 2019-03-24 | End: 2019-03-24 | Stop reason: SDUPTHER

## 2019-03-24 RX ORDER — ATORVASTATIN CALCIUM 20 MG/1
20 TABLET, FILM COATED ORAL DAILY
Status: DISCONTINUED | OUTPATIENT
Start: 2019-03-25 | End: 2019-03-29 | Stop reason: HOSPADM

## 2019-03-24 RX ORDER — HYDRALAZINE HYDROCHLORIDE 20 MG/ML
10 INJECTION INTRAMUSCULAR; INTRAVENOUS ONCE
Status: COMPLETED | OUTPATIENT
Start: 2019-03-24 | End: 2019-03-24

## 2019-03-24 RX ORDER — NALOXONE HCL 0.4 MG/ML
0.4 VIAL (ML) INJECTION
Status: DISCONTINUED | OUTPATIENT
Start: 2019-03-24 | End: 2019-03-29

## 2019-03-24 RX ORDER — HEPARIN SODIUM 5000 [USP'U]/ML
5000 INJECTION, SOLUTION INTRAVENOUS; SUBCUTANEOUS EVERY 8 HOURS SCHEDULED
Status: DISCONTINUED | OUTPATIENT
Start: 2019-03-24 | End: 2019-03-29 | Stop reason: HOSPADM

## 2019-03-24 RX ORDER — AZITHROMYCIN 250 MG/1
250 TABLET, FILM COATED ORAL
Status: COMPLETED | OUTPATIENT
Start: 2019-03-25 | End: 2019-03-26

## 2019-03-24 RX ORDER — FUROSEMIDE 40 MG/1
40 TABLET ORAL DAILY
Status: DISCONTINUED | OUTPATIENT
Start: 2019-03-25 | End: 2019-03-25

## 2019-03-24 RX ORDER — NITROGLYCERIN 40 MG/1
1 PATCH TRANSDERMAL DAILY
Status: DISCONTINUED | OUTPATIENT
Start: 2019-03-25 | End: 2019-03-29 | Stop reason: HOSPADM

## 2019-03-24 RX ORDER — RANOLAZINE 500 MG/1
500 TABLET, EXTENDED RELEASE ORAL EVERY 12 HOURS SCHEDULED
Status: DISCONTINUED | OUTPATIENT
Start: 2019-03-24 | End: 2019-03-29 | Stop reason: HOSPADM

## 2019-03-24 RX ORDER — POLYETHYLENE GLYCOL 3350 17 G/17G
17 POWDER, FOR SOLUTION ORAL DAILY
Status: DISCONTINUED | OUTPATIENT
Start: 2019-03-25 | End: 2019-03-29 | Stop reason: HOSPADM

## 2019-03-24 RX ORDER — FAMOTIDINE 40 MG/1
40 TABLET, FILM COATED ORAL DAILY
Status: DISCONTINUED | OUTPATIENT
Start: 2019-03-25 | End: 2019-03-29 | Stop reason: HOSPADM

## 2019-03-24 RX ORDER — MONTELUKAST SODIUM 10 MG/1
10 TABLET ORAL NIGHTLY
Status: DISCONTINUED | OUTPATIENT
Start: 2019-03-25 | End: 2019-03-29 | Stop reason: HOSPADM

## 2019-03-24 RX ORDER — PREDNISONE 20 MG/1
20 TABLET ORAL
Status: DISCONTINUED | OUTPATIENT
Start: 2019-03-25 | End: 2019-03-25

## 2019-03-24 RX ORDER — ESTRADIOL 0.1 MG/G
2 CREAM VAGINAL NIGHTLY
Status: DISCONTINUED | OUTPATIENT
Start: 2019-03-24 | End: 2019-03-29 | Stop reason: HOSPADM

## 2019-03-24 RX ORDER — MORPHINE SULFATE 2 MG/ML
1 INJECTION, SOLUTION INTRAMUSCULAR; INTRAVENOUS EVERY 4 HOURS PRN
Status: DISCONTINUED | OUTPATIENT
Start: 2019-03-24 | End: 2019-03-29

## 2019-03-24 RX ORDER — LEVOTHYROXINE SODIUM 0.05 MG/1
50 TABLET ORAL EVERY MORNING
Status: DISCONTINUED | OUTPATIENT
Start: 2019-03-25 | End: 2019-03-29 | Stop reason: HOSPADM

## 2019-03-24 RX ORDER — CARVEDILOL 6.25 MG/1
6.25 TABLET ORAL 2 TIMES DAILY WITH MEALS
Status: DISCONTINUED | OUTPATIENT
Start: 2019-03-24 | End: 2019-03-29 | Stop reason: HOSPADM

## 2019-03-24 RX ORDER — FERROUS SULFATE TAB EC 324 MG (65 MG FE EQUIVALENT) 324 (65 FE) MG
324 TABLET DELAYED RESPONSE ORAL
Status: DISCONTINUED | OUTPATIENT
Start: 2019-03-25 | End: 2019-03-29 | Stop reason: HOSPADM

## 2019-03-24 RX ORDER — ACETAMINOPHEN 325 MG/1
650 TABLET ORAL EVERY 4 HOURS PRN
Status: DISCONTINUED | OUTPATIENT
Start: 2019-03-24 | End: 2019-03-29 | Stop reason: HOSPADM

## 2019-03-24 RX ORDER — HYDROCODONE BITARTRATE AND ACETAMINOPHEN 10; 325 MG/1; MG/1
1 TABLET ORAL EVERY 4 HOURS PRN
Status: DISCONTINUED | OUTPATIENT
Start: 2019-03-24 | End: 2019-03-29 | Stop reason: HOSPADM

## 2019-03-24 RX ORDER — SODIUM CHLORIDE 0.9 % (FLUSH) 0.9 %
3 SYRINGE (ML) INJECTION EVERY 12 HOURS SCHEDULED
Status: DISCONTINUED | OUTPATIENT
Start: 2019-03-24 | End: 2019-03-29 | Stop reason: HOSPADM

## 2019-03-24 RX ORDER — FENTANYL 25 UG/H
1 PATCH TRANSDERMAL
Status: DISCONTINUED | OUTPATIENT
Start: 2019-03-24 | End: 2019-03-25

## 2019-03-24 RX ORDER — FENTANYL 12 UG/H
1 PATCH TRANSDERMAL ONCE
Status: DISCONTINUED | OUTPATIENT
Start: 2019-03-24 | End: 2019-03-24

## 2019-03-24 RX ADMIN — HYDROCODONE BITARTRATE AND ACETAMINOPHEN 1 TABLET: 10; 325 TABLET ORAL at 18:10

## 2019-03-24 RX ADMIN — MORPHINE SULFATE 1 MG: 2 INJECTION, SOLUTION INTRAMUSCULAR; INTRAVENOUS at 21:34

## 2019-03-24 RX ADMIN — FENTANYL 1 PATCH: 25 PATCH, EXTENDED RELEASE TRANSDERMAL at 14:09

## 2019-03-24 RX ADMIN — HEPARIN SODIUM 5000 UNITS: 5000 INJECTION INTRAVENOUS; SUBCUTANEOUS at 21:34

## 2019-03-24 RX ADMIN — HYDRALAZINE HYDROCHLORIDE 10 MG: 20 INJECTION INTRAMUSCULAR; INTRAVENOUS at 14:01

## 2019-03-24 RX ADMIN — CARVEDILOL 6.25 MG: 6.25 TABLET, FILM COATED ORAL at 18:10

## 2019-03-24 RX ADMIN — RANOLAZINE 500 MG: 500 TABLET, FILM COATED, EXTENDED RELEASE ORAL at 21:33

## 2019-03-24 RX ADMIN — MORPHINE SULFATE 4 MG: 4 INJECTION, SOLUTION INTRAMUSCULAR; INTRAVENOUS at 12:13

## 2019-03-25 ENCOUNTER — TELEPHONE (OUTPATIENT)
Dept: FAMILY MEDICINE CLINIC | Facility: CLINIC | Age: 84
End: 2019-03-25

## 2019-03-25 ENCOUNTER — EPISODE CHANGES (OUTPATIENT)
Dept: CASE MANAGEMENT | Facility: OTHER | Age: 84
End: 2019-03-25

## 2019-03-25 PROBLEM — M84.48XA BILATERAL SACRAL INSUFFICIENCY FRACTURE: Status: ACTIVE | Noted: 2019-03-25

## 2019-03-25 LAB
ALBUMIN SERPL-MCNC: 3.4 G/DL (ref 3.4–4.8)
ALBUMIN/GLOB SERPL: 1.2 G/DL (ref 1.1–1.8)
ALP SERPL-CCNC: 76 U/L (ref 38–126)
ALT SERPL W P-5'-P-CCNC: 27 U/L (ref 9–52)
ANION GAP SERPL CALCULATED.3IONS-SCNC: 6 MMOL/L (ref 5–15)
AST SERPL-CCNC: 37 U/L (ref 14–36)
BASOPHILS # BLD AUTO: 0.02 10*3/MM3 (ref 0–0.2)
BASOPHILS NFR BLD AUTO: 0.2 % (ref 0–1.5)
BILIRUB SERPL-MCNC: 0.7 MG/DL (ref 0.2–1.3)
BUN BLD-MCNC: 28 MG/DL (ref 7–21)
BUN/CREAT SERPL: 32.9 (ref 7–25)
CALCIUM SPEC-SCNC: 8.7 MG/DL (ref 8.4–10.2)
CHLORIDE SERPL-SCNC: 97 MMOL/L (ref 95–110)
CO2 SERPL-SCNC: 30 MMOL/L (ref 22–31)
CREAT BLD-MCNC: 0.85 MG/DL (ref 0.5–1)
DEPRECATED RDW RBC AUTO: 47.4 FL (ref 37–54)
EOSINOPHIL # BLD AUTO: 0.24 10*3/MM3 (ref 0–0.4)
EOSINOPHIL NFR BLD AUTO: 2.1 % (ref 0.3–6.2)
ERYTHROCYTE [DISTWIDTH] IN BLOOD BY AUTOMATED COUNT: 13.4 % (ref 12.3–15.4)
GFR SERPL CREATININE-BSD FRML MDRD: 63 ML/MIN/1.73 (ref 39–90)
GLOBULIN UR ELPH-MCNC: 2.8 GM/DL (ref 2.3–3.5)
GLUCOSE BLD-MCNC: 81 MG/DL (ref 60–100)
HCT VFR BLD AUTO: 33.8 % (ref 34–46.6)
HGB BLD-MCNC: 11.2 G/DL (ref 12–15.9)
IMM GRANULOCYTES # BLD AUTO: 0.13 10*3/MM3 (ref 0–0.05)
IMM GRANULOCYTES NFR BLD AUTO: 1.1 % (ref 0–0.5)
INR PPP: 1.01 (ref 0.8–1.2)
LYMPHOCYTES # BLD AUTO: 2.67 10*3/MM3 (ref 0.7–3.1)
LYMPHOCYTES NFR BLD AUTO: 23.1 % (ref 19.6–45.3)
MCH RBC QN AUTO: 32.4 PG (ref 26.6–33)
MCHC RBC AUTO-ENTMCNC: 33.1 G/DL (ref 31.5–35.7)
MCV RBC AUTO: 97.7 FL (ref 79–97)
MONOCYTES # BLD AUTO: 0.73 10*3/MM3 (ref 0.1–0.9)
MONOCYTES NFR BLD AUTO: 6.3 % (ref 5–12)
NEUTROPHILS # BLD AUTO: 7.76 10*3/MM3 (ref 1.4–7)
NEUTROPHILS NFR BLD AUTO: 67.2 % (ref 42.7–76)
NRBC BLD AUTO-RTO: 0 /100 WBC (ref 0–0)
PLATELET # BLD AUTO: 273 10*3/MM3 (ref 140–450)
PMV BLD AUTO: 9.9 FL (ref 6–12)
POTASSIUM BLD-SCNC: 3.6 MMOL/L (ref 3.5–5.1)
PROT SERPL-MCNC: 6.2 G/DL (ref 6.3–8.6)
PROTHROMBIN TIME: 13.1 SECONDS (ref 11.1–15.3)
RBC # BLD AUTO: 3.46 10*6/MM3 (ref 3.77–5.28)
SODIUM BLD-SCNC: 133 MMOL/L (ref 137–145)
WBC NRBC COR # BLD: 11.55 10*3/MM3 (ref 3.4–10.8)

## 2019-03-25 PROCEDURE — 94799 UNLISTED PULMONARY SVC/PX: CPT

## 2019-03-25 PROCEDURE — 99232 SBSQ HOSP IP/OBS MODERATE 35: CPT | Performed by: STUDENT IN AN ORGANIZED HEALTH CARE EDUCATION/TRAINING PROGRAM

## 2019-03-25 PROCEDURE — 94640 AIRWAY INHALATION TREATMENT: CPT

## 2019-03-25 PROCEDURE — 85025 COMPLETE CBC W/AUTO DIFF WBC: CPT | Performed by: STUDENT IN AN ORGANIZED HEALTH CARE EDUCATION/TRAINING PROGRAM

## 2019-03-25 PROCEDURE — 94760 N-INVAS EAR/PLS OXIMETRY 1: CPT

## 2019-03-25 PROCEDURE — 93010 ELECTROCARDIOGRAM REPORT: CPT | Performed by: INTERNAL MEDICINE

## 2019-03-25 PROCEDURE — 27197 CLSD TX PELVIC RING FX: CPT | Performed by: ORTHOPAEDIC SURGERY

## 2019-03-25 PROCEDURE — 97530 THERAPEUTIC ACTIVITIES: CPT

## 2019-03-25 PROCEDURE — 99232 SBSQ HOSP IP/OBS MODERATE 35: CPT | Performed by: ORTHOPAEDIC SURGERY

## 2019-03-25 PROCEDURE — 97167 OT EVAL HIGH COMPLEX 60 MIN: CPT

## 2019-03-25 PROCEDURE — 80053 COMPREHEN METABOLIC PANEL: CPT | Performed by: STUDENT IN AN ORGANIZED HEALTH CARE EDUCATION/TRAINING PROGRAM

## 2019-03-25 PROCEDURE — 25010000002 HEPARIN (PORCINE) PER 1000 UNITS: Performed by: HOSPITALIST

## 2019-03-25 PROCEDURE — 63710000001 PREDNISONE PER 1 MG: Performed by: STUDENT IN AN ORGANIZED HEALTH CARE EDUCATION/TRAINING PROGRAM

## 2019-03-25 PROCEDURE — 85610 PROTHROMBIN TIME: CPT | Performed by: STUDENT IN AN ORGANIZED HEALTH CARE EDUCATION/TRAINING PROGRAM

## 2019-03-25 PROCEDURE — 97162 PT EVAL MOD COMPLEX 30 MIN: CPT

## 2019-03-25 PROCEDURE — 25010000002 MORPHINE PER 10 MG: Performed by: STUDENT IN AN ORGANIZED HEALTH CARE EDUCATION/TRAINING PROGRAM

## 2019-03-25 PROCEDURE — 93005 ELECTROCARDIOGRAM TRACING: CPT | Performed by: STUDENT IN AN ORGANIZED HEALTH CARE EDUCATION/TRAINING PROGRAM

## 2019-03-25 RX ORDER — WARFARIN SODIUM 2.5 MG/1
2.5 TABLET ORAL
Status: DISCONTINUED | OUTPATIENT
Start: 2019-03-25 | End: 2019-03-28

## 2019-03-25 RX ORDER — IPRATROPIUM BROMIDE AND ALBUTEROL SULFATE 2.5; .5 MG/3ML; MG/3ML
3 SOLUTION RESPIRATORY (INHALATION)
Status: DISCONTINUED | OUTPATIENT
Start: 2019-03-25 | End: 2019-03-29 | Stop reason: HOSPADM

## 2019-03-25 RX ORDER — FUROSEMIDE 40 MG/1
40 TABLET ORAL DAILY
Status: DISCONTINUED | OUTPATIENT
Start: 2019-03-26 | End: 2019-03-29 | Stop reason: HOSPADM

## 2019-03-25 RX ORDER — HYDROCODONE BITARTRATE AND ACETAMINOPHEN 5; 325 MG/1; MG/1
1 TABLET ORAL EVERY 6 HOURS
Status: DISCONTINUED | OUTPATIENT
Start: 2019-03-25 | End: 2019-03-27

## 2019-03-25 RX ORDER — PREDNISONE 20 MG/1
40 TABLET ORAL
Status: COMPLETED | OUTPATIENT
Start: 2019-03-26 | End: 2019-03-26

## 2019-03-25 RX ADMIN — HEPARIN SODIUM 5000 UNITS: 5000 INJECTION INTRAVENOUS; SUBCUTANEOUS at 21:57

## 2019-03-25 RX ADMIN — SODIUM CHLORIDE, PRESERVATIVE FREE 3 ML: 5 INJECTION INTRAVENOUS at 08:40

## 2019-03-25 RX ADMIN — RANOLAZINE 500 MG: 500 TABLET, FILM COATED, EXTENDED RELEASE ORAL at 20:38

## 2019-03-25 RX ADMIN — MORPHINE SULFATE 1 MG: 2 INJECTION, SOLUTION INTRAMUSCULAR; INTRAVENOUS at 18:01

## 2019-03-25 RX ADMIN — SERTRALINE HYDROCHLORIDE 50 MG: 50 TABLET ORAL at 08:39

## 2019-03-25 RX ADMIN — HEPARIN SODIUM 5000 UNITS: 5000 INJECTION INTRAVENOUS; SUBCUTANEOUS at 06:02

## 2019-03-25 RX ADMIN — MORPHINE SULFATE 1 MG: 2 INJECTION, SOLUTION INTRAMUSCULAR; INTRAVENOUS at 09:58

## 2019-03-25 RX ADMIN — FERROUS SULFATE TAB EC 324 MG (65 MG FE EQUIVALENT) 324 MG: 324 (65 FE) TABLET DELAYED RESPONSE at 08:38

## 2019-03-25 RX ADMIN — HYDROCODONE BITARTRATE AND ACETAMINOPHEN 1 TABLET: 10; 325 TABLET ORAL at 01:45

## 2019-03-25 RX ADMIN — RANOLAZINE 500 MG: 500 TABLET, FILM COATED, EXTENDED RELEASE ORAL at 08:39

## 2019-03-25 RX ADMIN — MORPHINE SULFATE 1 MG: 2 INJECTION, SOLUTION INTRAMUSCULAR; INTRAVENOUS at 22:22

## 2019-03-25 RX ADMIN — LEVOTHYROXINE SODIUM 50 MCG: 50 TABLET ORAL at 06:02

## 2019-03-25 RX ADMIN — ATORVASTATIN CALCIUM 20 MG: 20 TABLET, FILM COATED ORAL at 08:38

## 2019-03-25 RX ADMIN — HYDROCODONE BITARTRATE AND ACETAMINOPHEN 1 TABLET: 5; 325 TABLET ORAL at 20:39

## 2019-03-25 RX ADMIN — MORPHINE SULFATE 1 MG: 2 INJECTION, SOLUTION INTRAMUSCULAR; INTRAVENOUS at 13:59

## 2019-03-25 RX ADMIN — PREDNISONE 20 MG: 20 TABLET ORAL at 08:38

## 2019-03-25 RX ADMIN — HYDROCODONE BITARTRATE AND ACETAMINOPHEN 1 TABLET: 10; 325 TABLET ORAL at 08:39

## 2019-03-25 RX ADMIN — MONTELUKAST SODIUM 10 MG: 10 TABLET, FILM COATED ORAL at 20:38

## 2019-03-25 RX ADMIN — HYDROCODONE BITARTRATE AND ACETAMINOPHEN 1 TABLET: 10; 325 TABLET ORAL at 13:16

## 2019-03-25 RX ADMIN — WARFARIN SODIUM 2.5 MG: 2.5 TABLET ORAL at 17:14

## 2019-03-25 RX ADMIN — HEPARIN SODIUM 5000 UNITS: 5000 INJECTION INTRAVENOUS; SUBCUTANEOUS at 13:18

## 2019-03-25 RX ADMIN — MORPHINE SULFATE 1 MG: 2 INJECTION, SOLUTION INTRAMUSCULAR; INTRAVENOUS at 04:45

## 2019-03-25 RX ADMIN — CARVEDILOL 6.25 MG: 6.25 TABLET, FILM COATED ORAL at 08:39

## 2019-03-25 RX ADMIN — FAMOTIDINE 40 MG: 40 TABLET ORAL at 08:38

## 2019-03-25 RX ADMIN — IPRATROPIUM BROMIDE AND ALBUTEROL SULFATE 3 ML: 2.5; .5 SOLUTION RESPIRATORY (INHALATION) at 15:39

## 2019-03-25 RX ADMIN — HYDROCODONE BITARTRATE AND ACETAMINOPHEN 1 TABLET: 10; 325 TABLET ORAL at 17:14

## 2019-03-25 RX ADMIN — NITROGLYCERIN 1 PATCH: 0.2 PATCH TRANSDERMAL at 08:40

## 2019-03-25 RX ADMIN — AZITHROMYCIN 250 MG: 250 TABLET, FILM COATED ORAL at 08:38

## 2019-03-25 RX ADMIN — POLYETHYLENE GLYCOL 3350 17 G: 17 POWDER, FOR SOLUTION ORAL at 08:37

## 2019-03-25 RX ADMIN — CARVEDILOL 6.25 MG: 6.25 TABLET, FILM COATED ORAL at 17:14

## 2019-03-25 RX ADMIN — ALBUTEROL SULFATE 2.5 MG: 2.5 SOLUTION RESPIRATORY (INHALATION) at 05:19

## 2019-03-25 NOTE — TELEPHONE ENCOUNTER
MAN FROM THE [facility]ITAL [facility] IS NEEDING TO KNOW IF ADALBERTO NOLASCO IS SUPPOSE TO BE ON BOTH THE ANORA ELLIPTA INHALER THAT WAS GIVEN 11/20/18 AND THE SIMBACORT?PLEASE CALL HIM BACK AT EXT  [phone]

## 2019-03-26 LAB
ALBUMIN SERPL-MCNC: 3.1 G/DL (ref 3.5–5.2)
ALBUMIN/GLOB SERPL: 1.1 G/DL
ALP SERPL-CCNC: 74 U/L (ref 39–117)
ALT SERPL W P-5'-P-CCNC: 17 U/L (ref 1–33)
ANION GAP SERPL CALCULATED.3IONS-SCNC: 11 MMOL/L
AST SERPL-CCNC: 20 U/L (ref 1–32)
BASOPHILS # BLD AUTO: 0.02 10*3/MM3 (ref 0–0.2)
BASOPHILS NFR BLD AUTO: 0.2 % (ref 0–1.5)
BILIRUB SERPL-MCNC: 0.5 MG/DL (ref 0.2–1.2)
BUN BLD-MCNC: 28 MG/DL (ref 8–23)
BUN/CREAT SERPL: 30.8 (ref 7–25)
CALCIUM SPEC-SCNC: 8.8 MG/DL (ref 8.6–10.5)
CHLORIDE SERPL-SCNC: 100 MMOL/L (ref 98–107)
CO2 SERPL-SCNC: 28 MMOL/L (ref 22–29)
CREAT BLD-MCNC: 0.91 MG/DL (ref 0.57–1)
DEPRECATED RDW RBC AUTO: 47.6 FL (ref 37–54)
EOSINOPHIL # BLD AUTO: 0.22 10*3/MM3 (ref 0–0.4)
EOSINOPHIL NFR BLD AUTO: 1.8 % (ref 0.3–6.2)
ERYTHROCYTE [DISTWIDTH] IN BLOOD BY AUTOMATED COUNT: 13.3 % (ref 12.3–15.4)
GFR SERPL CREATININE-BSD FRML MDRD: 59 ML/MIN/1.73
GLOBULIN UR ELPH-MCNC: 2.8 GM/DL
GLUCOSE BLD-MCNC: 88 MG/DL (ref 65–99)
HCT VFR BLD AUTO: 31.1 % (ref 34–46.6)
HGB BLD-MCNC: 10.6 G/DL (ref 12–15.9)
IMM GRANULOCYTES # BLD AUTO: 0.15 10*3/MM3 (ref 0–0.05)
IMM GRANULOCYTES NFR BLD AUTO: 1.2 % (ref 0–0.5)
INR PPP: 0.95 (ref 0.8–1.2)
LYMPHOCYTES # BLD AUTO: 2.36 10*3/MM3 (ref 0.7–3.1)
LYMPHOCYTES NFR BLD AUTO: 19.6 % (ref 19.6–45.3)
MCH RBC QN AUTO: 33.5 PG (ref 26.6–33)
MCHC RBC AUTO-ENTMCNC: 34.1 G/DL (ref 31.5–35.7)
MCV RBC AUTO: 98.4 FL (ref 79–97)
MONOCYTES # BLD AUTO: 0.77 10*3/MM3 (ref 0.1–0.9)
MONOCYTES NFR BLD AUTO: 6.4 % (ref 5–12)
NEUTROPHILS # BLD AUTO: 8.54 10*3/MM3 (ref 1.4–7)
NEUTROPHILS NFR BLD AUTO: 70.8 % (ref 42.7–76)
NRBC BLD AUTO-RTO: 0 /100 WBC (ref 0–0)
PLATELET # BLD AUTO: 291 10*3/MM3 (ref 140–450)
PMV BLD AUTO: 9.8 FL (ref 6–12)
POTASSIUM BLD-SCNC: 4.1 MMOL/L (ref 3.5–5.2)
PROT SERPL-MCNC: 5.9 G/DL (ref 6–8.5)
PROTHROMBIN TIME: 12.5 SECONDS (ref 11.1–15.3)
RBC # BLD AUTO: 3.16 10*6/MM3 (ref 3.77–5.28)
SODIUM BLD-SCNC: 139 MMOL/L (ref 136–145)
WBC NRBC COR # BLD: 12.06 10*3/MM3 (ref 3.4–10.8)

## 2019-03-26 PROCEDURE — 63710000001 PREDNISONE PER 1 MG: Performed by: HOSPITALIST

## 2019-03-26 PROCEDURE — 25010000002 HEPARIN (PORCINE) PER 1000 UNITS: Performed by: HOSPITALIST

## 2019-03-26 PROCEDURE — 85610 PROTHROMBIN TIME: CPT | Performed by: STUDENT IN AN ORGANIZED HEALTH CARE EDUCATION/TRAINING PROGRAM

## 2019-03-26 PROCEDURE — 97530 THERAPEUTIC ACTIVITIES: CPT

## 2019-03-26 PROCEDURE — 85025 COMPLETE CBC W/AUTO DIFF WBC: CPT | Performed by: STUDENT IN AN ORGANIZED HEALTH CARE EDUCATION/TRAINING PROGRAM

## 2019-03-26 PROCEDURE — 97535 SELF CARE MNGMENT TRAINING: CPT

## 2019-03-26 PROCEDURE — 80053 COMPREHEN METABOLIC PANEL: CPT | Performed by: STUDENT IN AN ORGANIZED HEALTH CARE EDUCATION/TRAINING PROGRAM

## 2019-03-26 PROCEDURE — 99231 SBSQ HOSP IP/OBS SF/LOW 25: CPT | Performed by: ORTHOPAEDIC SURGERY

## 2019-03-26 PROCEDURE — 97110 THERAPEUTIC EXERCISES: CPT

## 2019-03-26 PROCEDURE — 99232 SBSQ HOSP IP/OBS MODERATE 35: CPT | Performed by: STUDENT IN AN ORGANIZED HEALTH CARE EDUCATION/TRAINING PROGRAM

## 2019-03-26 PROCEDURE — 25010000002 MORPHINE PER 10 MG: Performed by: STUDENT IN AN ORGANIZED HEALTH CARE EDUCATION/TRAINING PROGRAM

## 2019-03-26 PROCEDURE — 94799 UNLISTED PULMONARY SVC/PX: CPT

## 2019-03-26 RX ADMIN — LEVOTHYROXINE SODIUM 50 MCG: 50 TABLET ORAL at 05:37

## 2019-03-26 RX ADMIN — HEPARIN SODIUM 5000 UNITS: 5000 INJECTION INTRAVENOUS; SUBCUTANEOUS at 05:37

## 2019-03-26 RX ADMIN — HYDROCODONE BITARTRATE AND ACETAMINOPHEN 1 TABLET: 10; 325 TABLET ORAL at 12:15

## 2019-03-26 RX ADMIN — MORPHINE SULFATE 1 MG: 2 INJECTION, SOLUTION INTRAMUSCULAR; INTRAVENOUS at 21:50

## 2019-03-26 RX ADMIN — ATORVASTATIN CALCIUM 20 MG: 20 TABLET, FILM COATED ORAL at 08:44

## 2019-03-26 RX ADMIN — FERROUS SULFATE TAB EC 324 MG (65 MG FE EQUIVALENT) 324 MG: 324 (65 FE) TABLET DELAYED RESPONSE at 08:44

## 2019-03-26 RX ADMIN — SODIUM CHLORIDE, PRESERVATIVE FREE 3 ML: 5 INJECTION INTRAVENOUS at 08:45

## 2019-03-26 RX ADMIN — SODIUM CHLORIDE, PRESERVATIVE FREE 10 ML: 5 INJECTION INTRAVENOUS at 21:50

## 2019-03-26 RX ADMIN — ESTRADIOL 2 G: 0.1 CREAM VAGINAL at 20:10

## 2019-03-26 RX ADMIN — RANOLAZINE 500 MG: 500 TABLET, FILM COATED, EXTENDED RELEASE ORAL at 08:44

## 2019-03-26 RX ADMIN — HYDROCODONE BITARTRATE AND ACETAMINOPHEN 1 TABLET: 5; 325 TABLET ORAL at 08:43

## 2019-03-26 RX ADMIN — MONTELUKAST SODIUM 10 MG: 10 TABLET, FILM COATED ORAL at 20:10

## 2019-03-26 RX ADMIN — AZITHROMYCIN 250 MG: 250 TABLET, FILM COATED ORAL at 08:44

## 2019-03-26 RX ADMIN — CARVEDILOL 6.25 MG: 6.25 TABLET, FILM COATED ORAL at 08:44

## 2019-03-26 RX ADMIN — HYDROCODONE BITARTRATE AND ACETAMINOPHEN 1 TABLET: 5; 325 TABLET ORAL at 20:10

## 2019-03-26 RX ADMIN — IPRATROPIUM BROMIDE AND ALBUTEROL SULFATE 3 ML: 2.5; .5 SOLUTION RESPIRATORY (INHALATION) at 21:41

## 2019-03-26 RX ADMIN — WARFARIN SODIUM 2.5 MG: 2.5 TABLET ORAL at 18:22

## 2019-03-26 RX ADMIN — RANOLAZINE 500 MG: 500 TABLET, FILM COATED, EXTENDED RELEASE ORAL at 20:10

## 2019-03-26 RX ADMIN — SERTRALINE HYDROCHLORIDE 50 MG: 50 TABLET ORAL at 08:44

## 2019-03-26 RX ADMIN — HYDROCODONE BITARTRATE AND ACETAMINOPHEN 1 TABLET: 5; 325 TABLET ORAL at 03:14

## 2019-03-26 RX ADMIN — FUROSEMIDE 40 MG: 40 TABLET ORAL at 08:44

## 2019-03-26 RX ADMIN — CARVEDILOL 6.25 MG: 6.25 TABLET, FILM COATED ORAL at 18:22

## 2019-03-26 RX ADMIN — PREDNISONE 40 MG: 20 TABLET ORAL at 08:44

## 2019-03-26 RX ADMIN — FAMOTIDINE 40 MG: 40 TABLET ORAL at 08:44

## 2019-03-26 RX ADMIN — POLYETHYLENE GLYCOL 3350 17 G: 17 POWDER, FOR SOLUTION ORAL at 08:43

## 2019-03-26 RX ADMIN — NITROGLYCERIN 1 PATCH: 0.2 PATCH TRANSDERMAL at 08:43

## 2019-03-26 RX ADMIN — IPRATROPIUM BROMIDE AND ALBUTEROL SULFATE 3 ML: 2.5; .5 SOLUTION RESPIRATORY (INHALATION) at 07:27

## 2019-03-26 RX ADMIN — MORPHINE SULFATE 1 MG: 2 INJECTION, SOLUTION INTRAMUSCULAR; INTRAVENOUS at 05:52

## 2019-03-26 RX ADMIN — HEPARIN SODIUM 5000 UNITS: 5000 INJECTION INTRAVENOUS; SUBCUTANEOUS at 13:26

## 2019-03-26 RX ADMIN — SODIUM CHLORIDE, PRESERVATIVE FREE 3 ML: 5 INJECTION INTRAVENOUS at 20:11

## 2019-03-26 RX ADMIN — MORPHINE SULFATE 1 MG: 2 INJECTION, SOLUTION INTRAMUSCULAR; INTRAVENOUS at 13:54

## 2019-03-26 RX ADMIN — HEPARIN SODIUM 5000 UNITS: 5000 INJECTION INTRAVENOUS; SUBCUTANEOUS at 23:58

## 2019-03-26 RX ADMIN — MORPHINE SULFATE 1 MG: 2 INJECTION, SOLUTION INTRAMUSCULAR; INTRAVENOUS at 10:13

## 2019-03-26 RX ADMIN — HYDROCODONE BITARTRATE AND ACETAMINOPHEN 1 TABLET: 10; 325 TABLET ORAL at 16:12

## 2019-03-27 ENCOUNTER — APPOINTMENT (OUTPATIENT)
Dept: CT IMAGING | Facility: HOSPITAL | Age: 84
End: 2019-03-27

## 2019-03-27 LAB
ALBUMIN SERPL-MCNC: 3.1 G/DL (ref 3.5–5.2)
ALBUMIN/GLOB SERPL: 1 G/DL
ALP SERPL-CCNC: 92 U/L (ref 39–117)
ALT SERPL W P-5'-P-CCNC: 18 U/L (ref 1–33)
ANION GAP SERPL CALCULATED.3IONS-SCNC: 8 MMOL/L
AST SERPL-CCNC: 21 U/L (ref 1–32)
BASOPHILS # BLD AUTO: 0.01 10*3/MM3 (ref 0–0.2)
BASOPHILS NFR BLD AUTO: 0.1 % (ref 0–1.5)
BILIRUB SERPL-MCNC: 0.5 MG/DL (ref 0.2–1.2)
BUN BLD-MCNC: 29 MG/DL (ref 8–23)
BUN/CREAT SERPL: 32.2 (ref 7–25)
CALCIUM SPEC-SCNC: 9 MG/DL (ref 8.6–10.5)
CHLORIDE SERPL-SCNC: 98 MMOL/L (ref 98–107)
CO2 SERPL-SCNC: 31 MMOL/L (ref 22–29)
CREAT BLD-MCNC: 0.9 MG/DL (ref 0.57–1)
DEPRECATED RDW RBC AUTO: 47 FL (ref 37–54)
EOSINOPHIL # BLD AUTO: 0.06 10*3/MM3 (ref 0–0.4)
EOSINOPHIL NFR BLD AUTO: 0.5 % (ref 0.3–6.2)
ERYTHROCYTE [DISTWIDTH] IN BLOOD BY AUTOMATED COUNT: 13.2 % (ref 12.3–15.4)
GFR SERPL CREATININE-BSD FRML MDRD: 59 ML/MIN/1.73
GLOBULIN UR ELPH-MCNC: 3 GM/DL
GLUCOSE BLD-MCNC: 94 MG/DL (ref 65–99)
HCT VFR BLD AUTO: 33.1 % (ref 34–46.6)
HGB BLD-MCNC: 11.1 G/DL (ref 12–15.9)
IMM GRANULOCYTES # BLD AUTO: 0.16 10*3/MM3 (ref 0–0.05)
IMM GRANULOCYTES NFR BLD AUTO: 1.2 % (ref 0–0.5)
INR PPP: 1.02 (ref 0.8–1.2)
LYMPHOCYTES # BLD AUTO: 2.1 10*3/MM3 (ref 0.7–3.1)
LYMPHOCYTES NFR BLD AUTO: 16 % (ref 19.6–45.3)
MCH RBC QN AUTO: 32.6 PG (ref 26.6–33)
MCHC RBC AUTO-ENTMCNC: 33.5 G/DL (ref 31.5–35.7)
MCV RBC AUTO: 97.4 FL (ref 79–97)
MONOCYTES # BLD AUTO: 0.74 10*3/MM3 (ref 0.1–0.9)
MONOCYTES NFR BLD AUTO: 5.6 % (ref 5–12)
NEUTROPHILS # BLD AUTO: 10.05 10*3/MM3 (ref 1.4–7)
NEUTROPHILS NFR BLD AUTO: 76.6 % (ref 42.7–76)
NRBC BLD AUTO-RTO: 0 /100 WBC (ref 0–0)
PLATELET # BLD AUTO: 308 10*3/MM3 (ref 140–450)
PMV BLD AUTO: 9.8 FL (ref 6–12)
POTASSIUM BLD-SCNC: 4.2 MMOL/L (ref 3.5–5.2)
PROT SERPL-MCNC: 6.1 G/DL (ref 6–8.5)
PROTHROMBIN TIME: 13.2 SECONDS (ref 11.1–15.3)
RBC # BLD AUTO: 3.4 10*6/MM3 (ref 3.77–5.28)
SODIUM BLD-SCNC: 137 MMOL/L (ref 136–145)
WBC NRBC COR # BLD: 13.12 10*3/MM3 (ref 3.4–10.8)

## 2019-03-27 PROCEDURE — 80053 COMPREHEN METABOLIC PANEL: CPT | Performed by: STUDENT IN AN ORGANIZED HEALTH CARE EDUCATION/TRAINING PROGRAM

## 2019-03-27 PROCEDURE — 94799 UNLISTED PULMONARY SVC/PX: CPT

## 2019-03-27 PROCEDURE — 85610 PROTHROMBIN TIME: CPT | Performed by: STUDENT IN AN ORGANIZED HEALTH CARE EDUCATION/TRAINING PROGRAM

## 2019-03-27 PROCEDURE — 97530 THERAPEUTIC ACTIVITIES: CPT

## 2019-03-27 PROCEDURE — 85025 COMPLETE CBC W/AUTO DIFF WBC: CPT | Performed by: STUDENT IN AN ORGANIZED HEALTH CARE EDUCATION/TRAINING PROGRAM

## 2019-03-27 PROCEDURE — 72133 CT LUMBAR SPINE W/O & W/DYE: CPT

## 2019-03-27 PROCEDURE — 25010000002 MORPHINE PER 10 MG: Performed by: STUDENT IN AN ORGANIZED HEALTH CARE EDUCATION/TRAINING PROGRAM

## 2019-03-27 PROCEDURE — 25010000002 IOPAMIDOL 61 % SOLUTION: Performed by: STUDENT IN AN ORGANIZED HEALTH CARE EDUCATION/TRAINING PROGRAM

## 2019-03-27 PROCEDURE — 94760 N-INVAS EAR/PLS OXIMETRY 1: CPT

## 2019-03-27 PROCEDURE — 25010000002 HEPARIN (PORCINE) PER 1000 UNITS: Performed by: HOSPITALIST

## 2019-03-27 PROCEDURE — 97110 THERAPEUTIC EXERCISES: CPT

## 2019-03-27 PROCEDURE — 99232 SBSQ HOSP IP/OBS MODERATE 35: CPT | Performed by: STUDENT IN AN ORGANIZED HEALTH CARE EDUCATION/TRAINING PROGRAM

## 2019-03-27 PROCEDURE — 99231 SBSQ HOSP IP/OBS SF/LOW 25: CPT | Performed by: ORTHOPAEDIC SURGERY

## 2019-03-27 RX ORDER — OXYCODONE HCL 10 MG/1
10 TABLET, FILM COATED, EXTENDED RELEASE ORAL EVERY 12 HOURS
Status: DISCONTINUED | OUTPATIENT
Start: 2019-03-28 | End: 2019-03-29 | Stop reason: HOSPADM

## 2019-03-27 RX ORDER — OXYCODONE HCL 10 MG/1
10 TABLET, FILM COATED, EXTENDED RELEASE ORAL ONCE
Status: COMPLETED | OUTPATIENT
Start: 2019-03-27 | End: 2019-03-27

## 2019-03-27 RX ADMIN — ACETAMINOPHEN 650 MG: 325 TABLET, FILM COATED ORAL at 21:55

## 2019-03-27 RX ADMIN — MORPHINE SULFATE 1 MG: 2 INJECTION, SOLUTION INTRAMUSCULAR; INTRAVENOUS at 16:37

## 2019-03-27 RX ADMIN — IPRATROPIUM BROMIDE AND ALBUTEROL SULFATE 3 ML: 2.5; .5 SOLUTION RESPIRATORY (INHALATION) at 11:07

## 2019-03-27 RX ADMIN — LEVOTHYROXINE SODIUM 50 MCG: 50 TABLET ORAL at 06:17

## 2019-03-27 RX ADMIN — IPRATROPIUM BROMIDE AND ALBUTEROL SULFATE 3 ML: 2.5; .5 SOLUTION RESPIRATORY (INHALATION) at 07:28

## 2019-03-27 RX ADMIN — FUROSEMIDE 40 MG: 40 TABLET ORAL at 08:23

## 2019-03-27 RX ADMIN — HYDROCODONE BITARTRATE AND ACETAMINOPHEN 1 TABLET: 10; 325 TABLET ORAL at 10:21

## 2019-03-27 RX ADMIN — NITROGLYCERIN 1 PATCH: 0.2 PATCH TRANSDERMAL at 08:18

## 2019-03-27 RX ADMIN — RANOLAZINE 500 MG: 500 TABLET, FILM COATED, EXTENDED RELEASE ORAL at 08:22

## 2019-03-27 RX ADMIN — OXYCODONE HYDROCHLORIDE 10 MG: 10 TABLET, FILM COATED, EXTENDED RELEASE ORAL at 13:36

## 2019-03-27 RX ADMIN — MONTELUKAST SODIUM 10 MG: 10 TABLET, FILM COATED ORAL at 20:22

## 2019-03-27 RX ADMIN — FERROUS SULFATE TAB EC 324 MG (65 MG FE EQUIVALENT) 324 MG: 324 (65 FE) TABLET DELAYED RESPONSE at 08:22

## 2019-03-27 RX ADMIN — HYDROCODONE BITARTRATE AND ACETAMINOPHEN 1 TABLET: 10; 325 TABLET ORAL at 00:07

## 2019-03-27 RX ADMIN — SODIUM CHLORIDE, PRESERVATIVE FREE 3 ML: 5 INJECTION INTRAVENOUS at 10:09

## 2019-03-27 RX ADMIN — IOPAMIDOL 80 ML: 612 INJECTION, SOLUTION INTRAVENOUS at 08:50

## 2019-03-27 RX ADMIN — RANOLAZINE 500 MG: 500 TABLET, FILM COATED, EXTENDED RELEASE ORAL at 20:22

## 2019-03-27 RX ADMIN — FAMOTIDINE 40 MG: 40 TABLET ORAL at 10:08

## 2019-03-27 RX ADMIN — SERTRALINE HYDROCHLORIDE 50 MG: 50 TABLET ORAL at 08:23

## 2019-03-27 RX ADMIN — HYDROCODONE BITARTRATE AND ACETAMINOPHEN 1 TABLET: 10; 325 TABLET ORAL at 20:22

## 2019-03-27 RX ADMIN — CARVEDILOL 6.25 MG: 6.25 TABLET, FILM COATED ORAL at 18:04

## 2019-03-27 RX ADMIN — WARFARIN SODIUM 2.5 MG: 2.5 TABLET ORAL at 18:04

## 2019-03-27 RX ADMIN — HYDROCODONE BITARTRATE AND ACETAMINOPHEN 1 TABLET: 10; 325 TABLET ORAL at 23:31

## 2019-03-27 RX ADMIN — HEPARIN SODIUM 5000 UNITS: 5000 INJECTION INTRAVENOUS; SUBCUTANEOUS at 21:55

## 2019-03-27 RX ADMIN — MORPHINE SULFATE 1 MG: 2 INJECTION, SOLUTION INTRAMUSCULAR; INTRAVENOUS at 20:33

## 2019-03-27 RX ADMIN — SODIUM CHLORIDE, PRESERVATIVE FREE 3 ML: 5 INJECTION INTRAVENOUS at 20:23

## 2019-03-27 RX ADMIN — HEPARIN SODIUM 5000 UNITS: 5000 INJECTION INTRAVENOUS; SUBCUTANEOUS at 06:18

## 2019-03-27 RX ADMIN — MORPHINE SULFATE 1 MG: 2 INJECTION, SOLUTION INTRAMUSCULAR; INTRAVENOUS at 08:18

## 2019-03-27 RX ADMIN — ATORVASTATIN CALCIUM 20 MG: 20 TABLET, FILM COATED ORAL at 08:23

## 2019-03-27 RX ADMIN — HYDROCODONE BITARTRATE AND ACETAMINOPHEN 1 TABLET: 5; 325 TABLET ORAL at 08:23

## 2019-03-27 RX ADMIN — POLYETHYLENE GLYCOL 3350 17 G: 17 POWDER, FOR SOLUTION ORAL at 10:08

## 2019-03-27 RX ADMIN — CARVEDILOL 6.25 MG: 6.25 TABLET, FILM COATED ORAL at 08:23

## 2019-03-27 RX ADMIN — HEPARIN SODIUM 5000 UNITS: 5000 INJECTION INTRAVENOUS; SUBCUTANEOUS at 13:36

## 2019-03-27 RX ADMIN — MORPHINE SULFATE 1 MG: 2 INJECTION, SOLUTION INTRAMUSCULAR; INTRAVENOUS at 11:57

## 2019-03-27 RX ADMIN — IPRATROPIUM BROMIDE AND ALBUTEROL SULFATE 3 ML: 2.5; .5 SOLUTION RESPIRATORY (INHALATION) at 18:49

## 2019-03-28 LAB
ALBUMIN SERPL-MCNC: 3.2 G/DL (ref 3.5–5.2)
ALBUMIN/GLOB SERPL: 1 G/DL
ALP SERPL-CCNC: 112 U/L (ref 39–117)
ALT SERPL W P-5'-P-CCNC: 18 U/L (ref 1–33)
ANION GAP SERPL CALCULATED.3IONS-SCNC: 11 MMOL/L
AST SERPL-CCNC: 21 U/L (ref 1–32)
BASOPHILS # BLD AUTO: 0.04 10*3/MM3 (ref 0–0.2)
BASOPHILS NFR BLD AUTO: 0.3 % (ref 0–1.5)
BILIRUB SERPL-MCNC: 0.4 MG/DL (ref 0.2–1.2)
BUN BLD-MCNC: 27 MG/DL (ref 8–23)
BUN/CREAT SERPL: 28.1 (ref 7–25)
CALCIUM SPEC-SCNC: 9.1 MG/DL (ref 8.6–10.5)
CHLORIDE SERPL-SCNC: 98 MMOL/L (ref 98–107)
CO2 SERPL-SCNC: 29 MMOL/L (ref 22–29)
CREAT BLD-MCNC: 0.96 MG/DL (ref 0.57–1)
DEPRECATED RDW RBC AUTO: 46.8 FL (ref 37–54)
EOSINOPHIL # BLD AUTO: 0.5 10*3/MM3 (ref 0–0.4)
EOSINOPHIL NFR BLD AUTO: 4 % (ref 0.3–6.2)
ERYTHROCYTE [DISTWIDTH] IN BLOOD BY AUTOMATED COUNT: 13.4 % (ref 12.3–15.4)
GFR SERPL CREATININE-BSD FRML MDRD: 55 ML/MIN/1.73
GLOBULIN UR ELPH-MCNC: 3.1 GM/DL
GLUCOSE BLD-MCNC: 90 MG/DL (ref 65–99)
HCT VFR BLD AUTO: 33.5 % (ref 34–46.6)
HGB BLD-MCNC: 11.5 G/DL (ref 12–15.9)
IMM GRANULOCYTES # BLD AUTO: 0.15 10*3/MM3 (ref 0–0.05)
IMM GRANULOCYTES NFR BLD AUTO: 1.2 % (ref 0–0.5)
INR PPP: 1.08 (ref 0.8–1.2)
LYMPHOCYTES # BLD AUTO: 2.37 10*3/MM3 (ref 0.7–3.1)
LYMPHOCYTES NFR BLD AUTO: 19.1 % (ref 19.6–45.3)
MCH RBC QN AUTO: 33 PG (ref 26.6–33)
MCHC RBC AUTO-ENTMCNC: 34.3 G/DL (ref 31.5–35.7)
MCV RBC AUTO: 96 FL (ref 79–97)
MONOCYTES # BLD AUTO: 0.75 10*3/MM3 (ref 0.1–0.9)
MONOCYTES NFR BLD AUTO: 6 % (ref 5–12)
NEUTROPHILS # BLD AUTO: 8.61 10*3/MM3 (ref 1.4–7)
NEUTROPHILS NFR BLD AUTO: 69.4 % (ref 42.7–76)
NRBC BLD AUTO-RTO: 0 /100 WBC (ref 0–0)
PLATELET # BLD AUTO: 321 10*3/MM3 (ref 140–450)
PMV BLD AUTO: 9.6 FL (ref 6–12)
POTASSIUM BLD-SCNC: 4.5 MMOL/L (ref 3.5–5.2)
PROT SERPL-MCNC: 6.3 G/DL (ref 6–8.5)
PROTHROMBIN TIME: 13.8 SECONDS (ref 11.1–15.3)
RBC # BLD AUTO: 3.49 10*6/MM3 (ref 3.77–5.28)
SODIUM BLD-SCNC: 138 MMOL/L (ref 136–145)
WBC NRBC COR # BLD: 12.42 10*3/MM3 (ref 3.4–10.8)

## 2019-03-28 PROCEDURE — 25010000002 MORPHINE PER 10 MG: Performed by: STUDENT IN AN ORGANIZED HEALTH CARE EDUCATION/TRAINING PROGRAM

## 2019-03-28 PROCEDURE — 94799 UNLISTED PULMONARY SVC/PX: CPT

## 2019-03-28 PROCEDURE — 85610 PROTHROMBIN TIME: CPT | Performed by: STUDENT IN AN ORGANIZED HEALTH CARE EDUCATION/TRAINING PROGRAM

## 2019-03-28 PROCEDURE — 97530 THERAPEUTIC ACTIVITIES: CPT

## 2019-03-28 PROCEDURE — 85025 COMPLETE CBC W/AUTO DIFF WBC: CPT | Performed by: STUDENT IN AN ORGANIZED HEALTH CARE EDUCATION/TRAINING PROGRAM

## 2019-03-28 PROCEDURE — 80053 COMPREHEN METABOLIC PANEL: CPT | Performed by: STUDENT IN AN ORGANIZED HEALTH CARE EDUCATION/TRAINING PROGRAM

## 2019-03-28 PROCEDURE — 94760 N-INVAS EAR/PLS OXIMETRY 1: CPT

## 2019-03-28 PROCEDURE — 99231 SBSQ HOSP IP/OBS SF/LOW 25: CPT | Performed by: ORTHOPAEDIC SURGERY

## 2019-03-28 PROCEDURE — 97110 THERAPEUTIC EXERCISES: CPT

## 2019-03-28 PROCEDURE — 25010000002 HEPARIN (PORCINE) PER 1000 UNITS: Performed by: HOSPITALIST

## 2019-03-28 PROCEDURE — 99232 SBSQ HOSP IP/OBS MODERATE 35: CPT | Performed by: STUDENT IN AN ORGANIZED HEALTH CARE EDUCATION/TRAINING PROGRAM

## 2019-03-28 RX ADMIN — SODIUM CHLORIDE, PRESERVATIVE FREE 3 ML: 5 INJECTION INTRAVENOUS at 16:53

## 2019-03-28 RX ADMIN — HYDROCODONE BITARTRATE AND ACETAMINOPHEN 1 TABLET: 10; 325 TABLET ORAL at 16:53

## 2019-03-28 RX ADMIN — HYDROCODONE BITARTRATE AND ACETAMINOPHEN 1 TABLET: 10; 325 TABLET ORAL at 09:04

## 2019-03-28 RX ADMIN — FAMOTIDINE 40 MG: 40 TABLET ORAL at 09:04

## 2019-03-28 RX ADMIN — POLYETHYLENE GLYCOL 3350 17 G: 17 POWDER, FOR SOLUTION ORAL at 15:00

## 2019-03-28 RX ADMIN — IPRATROPIUM BROMIDE AND ALBUTEROL SULFATE 3 ML: 2.5; .5 SOLUTION RESPIRATORY (INHALATION) at 14:36

## 2019-03-28 RX ADMIN — RANOLAZINE 500 MG: 500 TABLET, FILM COATED, EXTENDED RELEASE ORAL at 23:34

## 2019-03-28 RX ADMIN — CARVEDILOL 6.25 MG: 6.25 TABLET, FILM COATED ORAL at 09:04

## 2019-03-28 RX ADMIN — MORPHINE SULFATE 1 MG: 2 INJECTION, SOLUTION INTRAMUSCULAR; INTRAVENOUS at 04:03

## 2019-03-28 RX ADMIN — RANOLAZINE 500 MG: 500 TABLET, FILM COATED, EXTENDED RELEASE ORAL at 09:04

## 2019-03-28 RX ADMIN — FERROUS SULFATE TAB EC 324 MG (65 MG FE EQUIVALENT) 324 MG: 324 (65 FE) TABLET DELAYED RESPONSE at 09:04

## 2019-03-28 RX ADMIN — HEPARIN SODIUM 5000 UNITS: 5000 INJECTION INTRAVENOUS; SUBCUTANEOUS at 06:14

## 2019-03-28 RX ADMIN — SODIUM CHLORIDE, PRESERVATIVE FREE 3 ML: 5 INJECTION INTRAVENOUS at 23:35

## 2019-03-28 RX ADMIN — CARVEDILOL 6.25 MG: 6.25 TABLET, FILM COATED ORAL at 17:59

## 2019-03-28 RX ADMIN — OXYCODONE HYDROCHLORIDE 10 MG: 10 TABLET, FILM COATED, EXTENDED RELEASE ORAL at 06:14

## 2019-03-28 RX ADMIN — MONTELUKAST SODIUM 10 MG: 10 TABLET, FILM COATED ORAL at 23:33

## 2019-03-28 RX ADMIN — LEVOTHYROXINE SODIUM 50 MCG: 50 TABLET ORAL at 06:14

## 2019-03-28 RX ADMIN — OXYCODONE HYDROCHLORIDE 10 MG: 10 TABLET, FILM COATED, EXTENDED RELEASE ORAL at 18:00

## 2019-03-28 RX ADMIN — ATORVASTATIN CALCIUM 20 MG: 20 TABLET, FILM COATED ORAL at 09:04

## 2019-03-28 RX ADMIN — HEPARIN SODIUM 5000 UNITS: 5000 INJECTION INTRAVENOUS; SUBCUTANEOUS at 23:33

## 2019-03-28 RX ADMIN — ESTRADIOL 2 G: 0.1 CREAM VAGINAL at 23:32

## 2019-03-28 RX ADMIN — SERTRALINE HYDROCHLORIDE 50 MG: 50 TABLET ORAL at 09:04

## 2019-03-28 RX ADMIN — WARFARIN SODIUM 3.5 MG: 3 TABLET ORAL at 17:59

## 2019-03-28 RX ADMIN — IPRATROPIUM BROMIDE AND ALBUTEROL SULFATE 3 ML: 2.5; .5 SOLUTION RESPIRATORY (INHALATION) at 19:40

## 2019-03-28 RX ADMIN — HYDROCODONE BITARTRATE AND ACETAMINOPHEN 1 TABLET: 10; 325 TABLET ORAL at 22:04

## 2019-03-28 RX ADMIN — NITROGLYCERIN 1 PATCH: 0.2 PATCH TRANSDERMAL at 09:05

## 2019-03-28 RX ADMIN — FUROSEMIDE 40 MG: 40 TABLET ORAL at 09:04

## 2019-03-28 RX ADMIN — HEPARIN SODIUM 5000 UNITS: 5000 INJECTION INTRAVENOUS; SUBCUTANEOUS at 15:00

## 2019-03-29 VITALS
RESPIRATION RATE: 18 BRPM | DIASTOLIC BLOOD PRESSURE: 54 MMHG | HEART RATE: 68 BPM | SYSTOLIC BLOOD PRESSURE: 128 MMHG | HEIGHT: 68 IN | OXYGEN SATURATION: 92 % | BODY MASS INDEX: 22.73 KG/M2 | TEMPERATURE: 97 F | WEIGHT: 150 LBS

## 2019-03-29 LAB
ALBUMIN SERPL-MCNC: 3 G/DL (ref 3.5–5.2)
ALBUMIN/GLOB SERPL: 1 G/DL
ALP SERPL-CCNC: 128 U/L (ref 39–117)
ALT SERPL W P-5'-P-CCNC: 17 U/L (ref 1–33)
ANION GAP SERPL CALCULATED.3IONS-SCNC: 10 MMOL/L
AST SERPL-CCNC: 20 U/L (ref 1–32)
BASOPHILS # BLD AUTO: 0.05 10*3/MM3 (ref 0–0.2)
BASOPHILS NFR BLD AUTO: 0.4 % (ref 0–1.5)
BILIRUB SERPL-MCNC: 0.4 MG/DL (ref 0.2–1.2)
BUN BLD-MCNC: 33 MG/DL (ref 8–23)
BUN/CREAT SERPL: 34.4 (ref 7–25)
CALCIUM SPEC-SCNC: 8.6 MG/DL (ref 8.6–10.5)
CHLORIDE SERPL-SCNC: 94 MMOL/L (ref 98–107)
CO2 SERPL-SCNC: 30 MMOL/L (ref 22–29)
CREAT BLD-MCNC: 0.96 MG/DL (ref 0.57–1)
DEPRECATED RDW RBC AUTO: 48.7 FL (ref 37–54)
EOSINOPHIL # BLD AUTO: 0.54 10*3/MM3 (ref 0–0.4)
EOSINOPHIL NFR BLD AUTO: 4.4 % (ref 0.3–6.2)
ERYTHROCYTE [DISTWIDTH] IN BLOOD BY AUTOMATED COUNT: 13.7 % (ref 12.3–15.4)
GFR SERPL CREATININE-BSD FRML MDRD: 55 ML/MIN/1.73
GLOBULIN UR ELPH-MCNC: 3 GM/DL
GLUCOSE BLD-MCNC: 95 MG/DL (ref 65–99)
HCT VFR BLD AUTO: 33.2 % (ref 34–46.6)
HGB BLD-MCNC: 11 G/DL (ref 12–15.9)
IMM GRANULOCYTES # BLD AUTO: 0.21 10*3/MM3 (ref 0–0.05)
IMM GRANULOCYTES NFR BLD AUTO: 1.7 % (ref 0–0.5)
INR PPP: 1.3 (ref 0.8–1.2)
LYMPHOCYTES # BLD AUTO: 2.05 10*3/MM3 (ref 0.7–3.1)
LYMPHOCYTES NFR BLD AUTO: 16.7 % (ref 19.6–45.3)
MCH RBC QN AUTO: 32.4 PG (ref 26.6–33)
MCHC RBC AUTO-ENTMCNC: 33.1 G/DL (ref 31.5–35.7)
MCV RBC AUTO: 97.9 FL (ref 79–97)
MONOCYTES # BLD AUTO: 0.87 10*3/MM3 (ref 0.1–0.9)
MONOCYTES NFR BLD AUTO: 7.1 % (ref 5–12)
NEUTROPHILS # BLD AUTO: 8.54 10*3/MM3 (ref 1.4–7)
NEUTROPHILS NFR BLD AUTO: 69.7 % (ref 42.7–76)
NRBC BLD AUTO-RTO: 0 /100 WBC (ref 0–0)
PLATELET # BLD AUTO: 342 10*3/MM3 (ref 140–450)
PMV BLD AUTO: 10.2 FL (ref 6–12)
POTASSIUM BLD-SCNC: 4.5 MMOL/L (ref 3.5–5.2)
PROT SERPL-MCNC: 6 G/DL (ref 6–8.5)
PROTHROMBIN TIME: 15.8 SECONDS (ref 11.1–15.3)
RBC # BLD AUTO: 3.39 10*6/MM3 (ref 3.77–5.28)
SODIUM BLD-SCNC: 134 MMOL/L (ref 136–145)
WBC NRBC COR # BLD: 12.26 10*3/MM3 (ref 3.4–10.8)

## 2019-03-29 PROCEDURE — 97530 THERAPEUTIC ACTIVITIES: CPT

## 2019-03-29 PROCEDURE — 80053 COMPREHEN METABOLIC PANEL: CPT | Performed by: STUDENT IN AN ORGANIZED HEALTH CARE EDUCATION/TRAINING PROGRAM

## 2019-03-29 PROCEDURE — 25010000002 MORPHINE PER 10 MG: Performed by: STUDENT IN AN ORGANIZED HEALTH CARE EDUCATION/TRAINING PROGRAM

## 2019-03-29 PROCEDURE — 97110 THERAPEUTIC EXERCISES: CPT

## 2019-03-29 PROCEDURE — 85610 PROTHROMBIN TIME: CPT | Performed by: STUDENT IN AN ORGANIZED HEALTH CARE EDUCATION/TRAINING PROGRAM

## 2019-03-29 PROCEDURE — 85025 COMPLETE CBC W/AUTO DIFF WBC: CPT | Performed by: STUDENT IN AN ORGANIZED HEALTH CARE EDUCATION/TRAINING PROGRAM

## 2019-03-29 PROCEDURE — 94760 N-INVAS EAR/PLS OXIMETRY 1: CPT

## 2019-03-29 PROCEDURE — 94799 UNLISTED PULMONARY SVC/PX: CPT

## 2019-03-29 PROCEDURE — 99232 SBSQ HOSP IP/OBS MODERATE 35: CPT | Performed by: STUDENT IN AN ORGANIZED HEALTH CARE EDUCATION/TRAINING PROGRAM

## 2019-03-29 PROCEDURE — 25010000002 HEPARIN (PORCINE) PER 1000 UNITS: Performed by: HOSPITALIST

## 2019-03-29 PROCEDURE — 99231 SBSQ HOSP IP/OBS SF/LOW 25: CPT | Performed by: ORTHOPAEDIC SURGERY

## 2019-03-29 PROCEDURE — 97535 SELF CARE MNGMENT TRAINING: CPT

## 2019-03-29 RX ORDER — DOCUSATE SODIUM 100 MG/1
100 CAPSULE, LIQUID FILLED ORAL 2 TIMES DAILY
Qty: 60 CAPSULE | Refills: 0 | Status: SHIPPED | OUTPATIENT
Start: 2019-03-29

## 2019-03-29 RX ORDER — WARFARIN SODIUM 1 MG/1
TABLET ORAL
Qty: 42 TABLET | Refills: 0 | Status: SHIPPED | OUTPATIENT
Start: 2019-03-29 | End: 2019-06-11

## 2019-03-29 RX ORDER — OXYCODONE HCL 10 MG/1
10 TABLET, FILM COATED, EXTENDED RELEASE ORAL EVERY 12 HOURS
Qty: 6 TABLET | Refills: 0 | Status: SHIPPED | OUTPATIENT
Start: 2019-03-29 | End: 2019-03-29

## 2019-03-29 RX ORDER — HYDROCODONE BITARTRATE AND ACETAMINOPHEN 10; 325 MG/1; MG/1
1 TABLET ORAL EVERY 4 HOURS PRN
Qty: 24 TABLET | Refills: 0 | Status: SHIPPED | OUTPATIENT
Start: 2019-03-29 | End: 2019-03-29

## 2019-03-29 RX ORDER — OXYCODONE HCL 10 MG/1
10 TABLET, FILM COATED, EXTENDED RELEASE ORAL EVERY 12 HOURS
Qty: 6 TABLET | Refills: 0 | Status: SHIPPED | OUTPATIENT
Start: 2019-03-29 | End: 2019-04-07

## 2019-03-29 RX ORDER — HYDROCODONE BITARTRATE AND ACETAMINOPHEN 10; 325 MG/1; MG/1
1 TABLET ORAL EVERY 4 HOURS PRN
Qty: 24 TABLET | Refills: 0 | Status: SHIPPED | OUTPATIENT
Start: 2019-03-29 | End: 2019-04-03

## 2019-03-29 RX ADMIN — IPRATROPIUM BROMIDE AND ALBUTEROL SULFATE 3 ML: 2.5; .5 SOLUTION RESPIRATORY (INHALATION) at 06:49

## 2019-03-29 RX ADMIN — SODIUM CHLORIDE, PRESERVATIVE FREE 3 ML: 5 INJECTION INTRAVENOUS at 04:19

## 2019-03-29 RX ADMIN — FAMOTIDINE 40 MG: 40 TABLET ORAL at 08:33

## 2019-03-29 RX ADMIN — MONTELUKAST SODIUM 10 MG: 10 TABLET, FILM COATED ORAL at 04:18

## 2019-03-29 RX ADMIN — NITROGLYCERIN 1 PATCH: 0.2 PATCH TRANSDERMAL at 08:33

## 2019-03-29 RX ADMIN — FERROUS SULFATE TAB EC 324 MG (65 MG FE EQUIVALENT) 324 MG: 324 (65 FE) TABLET DELAYED RESPONSE at 08:33

## 2019-03-29 RX ADMIN — OXYCODONE HYDROCHLORIDE 10 MG: 10 TABLET, FILM COATED, EXTENDED RELEASE ORAL at 06:09

## 2019-03-29 RX ADMIN — POLYETHYLENE GLYCOL 3350 17 G: 17 POWDER, FOR SOLUTION ORAL at 08:33

## 2019-03-29 RX ADMIN — RANOLAZINE 500 MG: 500 TABLET, FILM COATED, EXTENDED RELEASE ORAL at 08:33

## 2019-03-29 RX ADMIN — MORPHINE SULFATE 1 MG: 2 INJECTION, SOLUTION INTRAMUSCULAR; INTRAVENOUS at 04:52

## 2019-03-29 RX ADMIN — IPRATROPIUM BROMIDE AND ALBUTEROL SULFATE 3 ML: 2.5; .5 SOLUTION RESPIRATORY (INHALATION) at 11:28

## 2019-03-29 RX ADMIN — HEPARIN SODIUM 5000 UNITS: 5000 INJECTION INTRAVENOUS; SUBCUTANEOUS at 06:09

## 2019-03-29 RX ADMIN — CARVEDILOL 6.25 MG: 6.25 TABLET, FILM COATED ORAL at 08:34

## 2019-03-29 RX ADMIN — LEVOTHYROXINE SODIUM 50 MCG: 50 TABLET ORAL at 06:10

## 2019-03-29 RX ADMIN — ATORVASTATIN CALCIUM 20 MG: 20 TABLET, FILM COATED ORAL at 08:33

## 2019-03-29 RX ADMIN — SODIUM CHLORIDE, PRESERVATIVE FREE 3 ML: 5 INJECTION INTRAVENOUS at 08:34

## 2019-03-29 RX ADMIN — SODIUM CHLORIDE, PRESERVATIVE FREE 10 ML: 5 INJECTION INTRAVENOUS at 04:53

## 2019-03-29 RX ADMIN — IPRATROPIUM BROMIDE AND ALBUTEROL SULFATE 3 ML: 2.5; .5 SOLUTION RESPIRATORY (INHALATION) at 15:32

## 2019-03-29 RX ADMIN — FUROSEMIDE 40 MG: 40 TABLET ORAL at 08:33

## 2019-03-29 RX ADMIN — RANOLAZINE 500 MG: 500 TABLET, FILM COATED, EXTENDED RELEASE ORAL at 04:19

## 2019-03-29 RX ADMIN — HYDROCODONE BITARTRATE AND ACETAMINOPHEN 1 TABLET: 10; 325 TABLET ORAL at 14:09

## 2019-03-29 RX ADMIN — HYDROCODONE BITARTRATE AND ACETAMINOPHEN 1 TABLET: 10; 325 TABLET ORAL at 08:56

## 2019-03-29 RX ADMIN — HEPARIN SODIUM 5000 UNITS: 5000 INJECTION INTRAVENOUS; SUBCUTANEOUS at 14:07

## 2019-03-29 RX ADMIN — SERTRALINE HYDROCHLORIDE 50 MG: 50 TABLET ORAL at 08:33

## 2019-03-30 ENCOUNTER — LAB REQUISITION (OUTPATIENT)
Dept: LAB | Facility: HOSPITAL | Age: 84
End: 2019-03-30

## 2019-03-30 DIAGNOSIS — Z79.01 LONG TERM CURRENT USE OF ANTICOAGULANT: ICD-10-CM

## 2019-03-30 LAB
INR PPP: 1.18 (ref 0.8–1.2)
PROTHROMBIN TIME: 14.7 SECONDS (ref 11.1–15.3)

## 2019-03-30 PROCEDURE — 85610 PROTHROMBIN TIME: CPT | Performed by: FAMILY MEDICINE

## 2019-04-01 ENCOUNTER — EPISODE CHANGES (OUTPATIENT)
Dept: CASE MANAGEMENT | Facility: OTHER | Age: 84
End: 2019-04-01

## 2019-04-02 ENCOUNTER — APPOINTMENT (OUTPATIENT)
Dept: CT IMAGING | Facility: HOSPITAL | Age: 84
End: 2019-04-02

## 2019-04-02 ENCOUNTER — HOSPITAL ENCOUNTER (EMERGENCY)
Facility: HOSPITAL | Age: 84
Discharge: HOME | End: 2019-04-03
Attending: FAMILY MEDICINE | Admitting: FAMILY MEDICINE

## 2019-04-02 ENCOUNTER — LAB REQUISITION (OUTPATIENT)
Dept: LAB | Facility: HOSPITAL | Age: 84
End: 2019-04-02

## 2019-04-02 DIAGNOSIS — N39.0 URINARY TRACT INFECTION WITHOUT HEMATURIA, SITE UNSPECIFIED: Primary | ICD-10-CM

## 2019-04-02 DIAGNOSIS — N39.0 URINARY TRACT INFECTION: ICD-10-CM

## 2019-04-02 DIAGNOSIS — S00.03XA CONTUSION OF SCALP, INITIAL ENCOUNTER: ICD-10-CM
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
## 2019-04-02 PROCEDURE — 85025 COMPLETE CBC W/AUTO DIFF WBC: CPT | Performed by: FAMILY MEDICINE

## 2019-04-02 PROCEDURE — 84443 ASSAY THYROID STIM HORMONE: CPT | Performed by: FAMILY MEDICINE

## 2019-04-02 PROCEDURE — 85610 PROTHROMBIN TIME: CPT | Performed by: FAMILY MEDICINE

## 2019-04-02 PROCEDURE — 96365 THER/PROPH/DIAG IV INF INIT: CPT

## 2019-04-02 PROCEDURE — 80053 COMPREHEN METABOLIC PANEL: CPT | Performed by: FAMILY MEDICINE

## 2019-04-02 PROCEDURE — 83735 ASSAY OF MAGNESIUM: CPT | Performed by: FAMILY MEDICINE

## 2019-04-02 PROCEDURE — 72125 CT NECK SPINE W/O DYE: CPT

## 2019-04-02 PROCEDURE — 70450 CT HEAD/BRAIN W/O DYE: CPT

## 2019-04-02 PROCEDURE — 84439 ASSAY OF FREE THYROXINE: CPT | Performed by: FAMILY MEDICINE

## 2019-04-02 PROCEDURE — 85730 THROMBOPLASTIN TIME PARTIAL: CPT | Performed by: FAMILY MEDICINE

## 2019-04-02 PROCEDURE — 99285 EMERGENCY DEPT VISIT HI MDM: CPT

## 2019-04-03 ENCOUNTER — APPOINTMENT (OUTPATIENT)
Dept: GENERAL RADIOLOGY | Facility: HOSPITAL | Age: 84
End: 2019-04-03

## 2019-04-03 ENCOUNTER — EPISODE CHANGES (OUTPATIENT)
Dept: CASE MANAGEMENT | Facility: OTHER | Age: 84
End: 2019-04-03

## 2019-04-03 VITALS
OXYGEN SATURATION: 94 % | BODY MASS INDEX: 26.21 KG/M2 | DIASTOLIC BLOOD PRESSURE: 56 MMHG | WEIGHT: 157.3 LBS | TEMPERATURE: 98.5 F | HEART RATE: 85 BPM | RESPIRATION RATE: 18 BRPM | SYSTOLIC BLOOD PRESSURE: 115 MMHG | HEIGHT: 65 IN

## 2019-04-03 LAB
ALBUMIN SERPL-MCNC: 3.2 G/DL (ref 3.5–5.2)
ALBUMIN/GLOB SERPL: 1.1 G/DL
ALP SERPL-CCNC: 187 U/L (ref 39–117)
ALT SERPL W P-5'-P-CCNC: 17 U/L (ref 1–33)
ANION GAP SERPL CALCULATED.3IONS-SCNC: 10 MMOL/L
APTT PPP: 36.9 SECONDS (ref 20–40.3)
AST SERPL-CCNC: 25 U/L (ref 1–32)
BACTERIA UR QL AUTO: ABNORMAL /HPF
BACTERIA UR QL AUTO: ABNORMAL /HPF
BASOPHILS # BLD AUTO: 0.06 10*3/MM3 (ref 0–0.2)
BASOPHILS NFR BLD AUTO: 0.5 % (ref 0–1.5)
BILIRUB SERPL-MCNC: 0.4 MG/DL (ref 0.2–1.2)
BILIRUB UR QL STRIP: NEGATIVE
BILIRUB UR QL STRIP: NEGATIVE
BUN BLD-MCNC: 29 MG/DL (ref 8–23)
BUN/CREAT SERPL: 26.4 (ref 7–25)
CALCIUM SPEC-SCNC: 9 MG/DL (ref 8.6–10.5)
CHLORIDE SERPL-SCNC: 95 MMOL/L (ref 98–107)
CLARITY UR: ABNORMAL
CLARITY UR: CLEAR
CO2 SERPL-SCNC: 31 MMOL/L (ref 22–29)
COLOR UR: ABNORMAL
COLOR UR: YELLOW
CREAT BLD-MCNC: 1.1 MG/DL (ref 0.57–1)
DEPRECATED RDW RBC AUTO: 48 FL (ref 37–54)
EOSINOPHIL # BLD AUTO: 0.31 10*3/MM3 (ref 0–0.4)
EOSINOPHIL NFR BLD AUTO: 2.5 % (ref 0.3–6.2)
ERYTHROCYTE [DISTWIDTH] IN BLOOD BY AUTOMATED COUNT: 13.5 % (ref 12.3–15.4)
GFR SERPL CREATININE-BSD FRML MDRD: 47 ML/MIN/1.73
GLOBULIN UR ELPH-MCNC: 3 GM/DL
GLUCOSE BLD-MCNC: 106 MG/DL (ref 65–99)
GLUCOSE UR STRIP-MCNC: NEGATIVE MG/DL
GLUCOSE UR STRIP-MCNC: NEGATIVE MG/DL
HCT VFR BLD AUTO: 29.4 % (ref 34–46.6)
HGB BLD-MCNC: 9.7 G/DL (ref 12–15.9)
HGB UR QL STRIP.AUTO: ABNORMAL
HGB UR QL STRIP.AUTO: NEGATIVE
HOLD SPECIMEN: NORMAL
HOLD SPECIMEN: NORMAL
HYALINE CASTS UR QL AUTO: ABNORMAL /LPF
HYALINE CASTS UR QL AUTO: ABNORMAL /LPF
IMM GRANULOCYTES # BLD AUTO: 0.08 10*3/MM3 (ref 0–0.05)
IMM GRANULOCYTES NFR BLD AUTO: 0.7 % (ref 0–0.5)
INR PPP: 1.32 (ref 0.8–1.2)
KETONES UR QL STRIP: NEGATIVE
KETONES UR QL STRIP: NEGATIVE
LEUKOCYTE ESTERASE UR QL STRIP.AUTO: ABNORMAL
LEUKOCYTE ESTERASE UR QL STRIP.AUTO: ABNORMAL
LYMPHOCYTES # BLD AUTO: 1.74 10*3/MM3 (ref 0.7–3.1)
LYMPHOCYTES NFR BLD AUTO: 14.2 % (ref 19.6–45.3)
MAGNESIUM SERPL-MCNC: 2.1 MG/DL (ref 1.6–2.4)
MCH RBC QN AUTO: 32.6 PG (ref 26.6–33)
MCHC RBC AUTO-ENTMCNC: 33 G/DL (ref 31.5–35.7)
MCV RBC AUTO: 98.7 FL (ref 79–97)
MONOCYTES # BLD AUTO: 1.03 10*3/MM3 (ref 0.1–0.9)
MONOCYTES NFR BLD AUTO: 8.4 % (ref 5–12)
NEUTROPHILS # BLD AUTO: 9.04 10*3/MM3 (ref 1.4–7)
NEUTROPHILS NFR BLD AUTO: 73.7 % (ref 42.7–76)
NITRITE UR QL STRIP: NEGATIVE
NITRITE UR QL STRIP: NEGATIVE
NRBC BLD AUTO-RTO: 0 /100 WBC (ref 0–0)
PH UR STRIP.AUTO: 5.5 [PH] (ref 5–8)
PH UR STRIP.AUTO: 5.5 [PH] (ref 5–9)
PLATELET # BLD AUTO: 332 10*3/MM3 (ref 140–450)
PMV BLD AUTO: 10.2 FL (ref 6–12)
POTASSIUM BLD-SCNC: 4.2 MMOL/L (ref 3.5–5.2)
PROT SERPL-MCNC: 6.2 G/DL (ref 6–8.5)
PROT UR QL STRIP: NEGATIVE
PROT UR QL STRIP: NEGATIVE
PROTHROMBIN TIME: 16 SECONDS (ref 11.1–15.3)
RBC # BLD AUTO: 2.98 10*6/MM3 (ref 3.77–5.28)
RBC # UR: ABNORMAL /HPF
RBC # UR: ABNORMAL /HPF
REF LAB TEST METHOD: ABNORMAL
REF LAB TEST METHOD: ABNORMAL
SODIUM BLD-SCNC: 136 MMOL/L (ref 136–145)
SP GR UR STRIP: 1.01 (ref 1–1.03)
SP GR UR STRIP: 1.02 (ref 1–1.03)
SQUAMOUS #/AREA URNS HPF: ABNORMAL /HPF
SQUAMOUS #/AREA URNS HPF: ABNORMAL /HPF
T4 FREE SERPL-MCNC: 1.21 NG/DL (ref 0.93–1.7)
TSH SERPL DL<=0.05 MIU/L-ACNC: 3.21 MIU/ML (ref 0.27–4.2)
UROBILINOGEN UR QL STRIP: ABNORMAL
UROBILINOGEN UR QL STRIP: ABNORMAL
WBC NRBC COR # BLD: 12.26 10*3/MM3 (ref 3.4–10.8)
WBC UR QL AUTO: ABNORMAL /HPF
WBC UR QL AUTO: ABNORMAL /HPF
WHOLE BLOOD HOLD SPECIMEN: NORMAL

## 2019-04-03 PROCEDURE — 87077 CULTURE AEROBIC IDENTIFY: CPT | Performed by: FAMILY MEDICINE

## 2019-04-03 PROCEDURE — 81001 URINALYSIS AUTO W/SCOPE: CPT | Performed by: FAMILY MEDICINE

## 2019-04-03 PROCEDURE — 87086 URINE CULTURE/COLONY COUNT: CPT | Performed by: FAMILY MEDICINE

## 2019-04-03 PROCEDURE — 96365 THER/PROPH/DIAG IV INF INIT: CPT

## 2019-04-03 PROCEDURE — 71045 X-RAY EXAM CHEST 1 VIEW: CPT

## 2019-04-03 PROCEDURE — 87186 SC STD MICRODIL/AGAR DIL: CPT | Performed by: FAMILY MEDICINE

## 2019-04-03 PROCEDURE — 25010000002 CEFTRIAXONE: Performed by: FAMILY MEDICINE

## 2019-04-03 RX ORDER — SULFAMETHOXAZOLE AND TRIMETHOPRIM 800; 160 MG/1; MG/1
1 TABLET ORAL 2 TIMES DAILY
Qty: 14 TABLET | Refills: 0 | Status: SHIPPED | OUTPATIENT
Start: 2019-04-03 | End: 2019-04-10

## 2019-04-03 RX ADMIN — CEFTRIAXONE 1 G: 1 INJECTION, POWDER, FOR SOLUTION INTRAMUSCULAR; INTRAVENOUS at 01:17

## 2019-04-03 NOTE — ED PROVIDER NOTES
Subjective   86-year-old white female presents the emergency department via EMS from the nursing home due to a fall.  Her daughter presents at the bedside and states that a week ago she fell at home and hit her tailbone.  She was seen in this emergency department and diagnosed with a sacral fracture.  She was admitted to this facility for pain management.  She was  subsequently transferred to the nursing home for rehab.  She has been doing fairly well with rehab but they state that on Friday they transitioned her from morphine, which she had been doing well with, to Percocet.  Friday she started asking about  family members.  They have been started her on Neurontin and she started having some shaking episodes.  They stopped the Neurontin but her shaking episodes have not resolved.      She fell at the nursing home today and hit her head today.  She has been unsteady and having some shaking episodes.  Her daughter would like to know why she is falling and feels that it is a medication related issue.            Review of Systems   Constitutional: Negative for activity change, appetite change, chills, fatigue, fever and unexpected weight change.   HENT: Negative for nosebleeds, rhinorrhea, sore throat, trouble swallowing and voice change.    Eyes: Negative for photophobia, pain and visual disturbance.   Respiratory: Negative for apnea, cough, chest tightness, shortness of breath, wheezing and stridor.    Cardiovascular: Negative for chest pain, palpitations and leg swelling.   Gastrointestinal: Negative for abdominal distention, abdominal pain, blood in stool, constipation, diarrhea, nausea and vomiting.   Endocrine: Negative for cold intolerance, heat intolerance, polydipsia and polyuria.   Genitourinary: Negative for decreased urine volume, difficulty urinating, dysuria, flank pain, hematuria and urgency.   Musculoskeletal: Negative for arthralgias, myalgias, neck pain and neck stiffness.   Skin: Negative for  color change, pallor and rash.   Allergic/Immunologic: Negative for immunocompromised state.   Neurological: Negative for dizziness, seizures, syncope, weakness, light-headedness and numbness.   Hematological: Negative for adenopathy.   Psychiatric/Behavioral: Negative for agitation, confusion, dysphoric mood and suicidal ideas. The patient is not nervous/anxious.        Past Medical History:   Diagnosis Date   • Acquired hypothyroidism    • Allergic rhinitis    • CHF (congestive heart failure) (CMS/[facility])    • COPD (chronic obstructive pulmonary disease) (CMS/[facility])    • Corneal epithelial dystrophy    • Coronary arteriosclerosis    • Depression    • Generalized atherosclerosis    • GERD (gastroesophageal reflux disease)    • Hemorrhoids    • History of bone density study 08/03/2012    Lumbar spine Osteopenia. Femoral Osteopenia   • History of mammogram 08/20/2004    Birads Category 2 Benign   • History of Papanicolaou smear of cervix 08/12/2004    NEGATIVE   • Hypercholesterolemia    • Hyperlipidemia    • Hypertension    • Myocardial infarction (CMS/[facility])    • Nonexudative age-related macular degeneration    • Osteoarthritis of multiple joints    • Pseudophakia    • Punctate keratitis     - history Thygeson's keratopathy      • Tobacco dependence syndrome        Allergies   Allergen Reactions   • Ace Inhibitors    • Baclofen Unknown (See Comments)     unknown   • Lisinopril Cough       Past Surgical History:   Procedure Laterality Date   • APPENDECTOMY     • BREAST SURGERY  03/19/1954    Excision, breast lesion (1)  Excision of fibroma. 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 patency in the circumflex artery site of previous angioplasty. Kathe. patency in RCA.Nonobstructive 20-30% stenosis in the LAD and diagonal branch. Preserved LV systolic function with Ef of 55%   • CERVICAL SPINE SURGERY  09/16/1974    Excision of cervical disc, C5-6, C6-7, anterior  cervical fusion, C5-6 with iliac bone graft.cervical spondylosis,C5-6, C6-7   • COLONOSCOPY  2013    patient declined    • CORONARY ANGIOPLASTY  1993    Angioplasty, coronary (2)    RCA    • DILATATION AND CURETTAGE      3   • ENDOSCOPY N/A 2018    Procedure: ESOPHAGOGASTRODUODENOSCOPY;  Surgeon: Alex Avitia MD;  Location: [facility] ENDOSCOPY;  Service:    • ENDOSCOPY AND COLONOSCOPY  10/01/1998    Hemorhoids Rectal Outlet Bleeding   • ESOPHAGOSCOPY / EGD  2004    Nonerosive gastritis of the body of the stomach. A biopsy was obtained & placed in h. Pylori test agar. Multiple biopsies were obtained from the body of the stomach   • HIP BIPOLAR REPLACEMENT Right 2018    Procedure: HIP BIPOLAR ANTERIOR APPROACH - right;  Surgeon: Kelvin Mcdowell MD;  Location: [facility] OR;  Service:    • INJECTION OF MEDICATION  2015    Depo Medrol (Methylprednisone) (5)    • INJECTION OF MEDICATION  2016    Kenalog (3)     • JOINT REPLACEMENT Right 2018    hip   • PACEMAKER REPLACEMENT  2006       Family History   Problem Relation Age of Onset   • Coronary artery disease Other        Social History     Socioeconomic History   • Marital status:      Spouse name: Not on file   • Number of children: Not on file   • Years of education: Not on file   • Highest education level: Not on file   Tobacco Use   • Smoking status: Former Smoker     Packs/day: 0.50     Years: 50.00     Pack years: 25.00     Types: Cigarettes     Start date:      Last attempt to quit: 2017     Years since quittin.2   • Smokeless tobacco: Never Used   Substance and Sexual Activity   • Alcohol use: No   • Drug use: No   • Sexual activity: Defer           Objective   Physical Exam   Constitutional: She is oriented to person, place, and time. She appears well-developed and well-nourished. No distress.   HENT:   Head: Atraumatic.   Right Ear: External ear normal.   Left Ear: External ear normal.    Mouth/Throat: Oropharynx is clear and moist. No oropharyngeal exudate.   She has a contusion on her left forehead.   Eyes: Conjunctivae and EOM are normal. Pupils are equal, round, and reactive to light. Right eye exhibits no discharge. Left eye exhibits no discharge. No scleral icterus.   Neck: Neck supple. No JVD present. No tracheal deviation present. No thyromegaly present.   C-collar was applied by the nurses.  She does have C-spine tenderness to palpation without step-offs or deviations.   Cardiovascular: Normal rate, regular rhythm, normal heart sounds and intact distal pulses. Exam reveals no friction rub.   No murmur heard.  Pulmonary/Chest: Effort normal and breath sounds normal. No stridor. No respiratory distress. She has no wheezes. She has no rales. She exhibits no tenderness.   Abdominal: Soft. Bowel sounds are normal. She exhibits no distension and no mass. There is no tenderness. There is no rebound and no guarding.   Musculoskeletal: She exhibits no edema, tenderness or deformity.   Lymphadenopathy:     She has no cervical adenopathy.   Neurological: She is alert and oriented to person, place, and time. No cranial nerve deficit. She exhibits normal muscle tone. Coordination normal.   She is having a whole body tremor occurring in bouts.  It is a rhythmic contraction of her arms and upper body.   Skin: Skin is warm and dry. Capillary refill takes 2 to 3 seconds. No rash noted. She is not diaphoretic. No erythema.   She has ecchymoses on both forearms   Psychiatric: She has a normal mood and affect. Her behavior is normal. Judgment and thought content normal.   Nursing note and vitals reviewed.      Procedures           ED Course  ED Course as of Apr 03 0203 Wed Apr 03, 2019   0137 Patient was placed in room 9 and evaluated by me.  Labs were obtained and showed evidence of a urinary tract infection.  She was given Rocephin in the emergency department.  I suspect that this is the likely etiology  for her symptoms.  Head CT was obtained and unremarkable.  At this point I believe the patient is stable for discharge and will follow up with her primary care provider as an outpatient.  [CE]      ED Course User Index  [CE] Mendez Clifton,       Labs Reviewed   PROTIME-INR - Abnormal; Notable for the following components:       Result Value    Protime 16.0 (*)     INR 1.32 (*)     All other components within normal limits    Narrative:     Therapeutic range for most indications is 2.0-3.0 INR,  or 2.5-3.5 for mechanical heart valves.   COMPREHENSIVE METABOLIC PANEL - Abnormal; Notable for the following components:    Glucose 106 (*)     BUN 29 (*)     Creatinine 1.10 (*)     Chloride 95 (*)     CO2 31.0 (*)     Albumin 3.20 (*)     Alkaline Phosphatase 187 (*)     eGFR Non African Amer 47 (*)     BUN/Creatinine Ratio 26.4 (*)     All other components within normal limits    Narrative:     GFR Normal >60  Chronic Kidney Disease <60  Kidney Failure <15   URINALYSIS W/ MICROSCOPIC IF INDICATED (NO CULTURE) - Abnormal; Notable for the following components:    Appearance, UA Turbid (*)     Blood, UA Large (3+) (*)     Leuk Esterase, UA Large (3+) (*)     All other components within normal limits   CBC WITH AUTO DIFFERENTIAL - Abnormal; Notable for the following components:    WBC 12.26 (*)     RBC 2.98 (*)     Hemoglobin 9.7 (*)     Hematocrit 29.4 (*)     MCV 98.7 (*)     Lymphocyte % 14.2 (*)     Immature Grans % 0.7 (*)     Neutrophils, Absolute 9.04 (*)     Monocytes, Absolute 1.03 (*)     Immature Grans, Absolute 0.08 (*)     All other components within normal limits   URINALYSIS, MICROSCOPIC ONLY - Abnormal; Notable for the following components:    RBC, UA 3-5 (*)     WBC, UA 31-50 (*)     Bacteria, UA 2+ (*)     Squamous Epithelial Cells, UA 6-12 (*)     All other components within normal limits   APTT - Normal    Narrative:     The recommended Heparin therapeutic range is 68-97 seconds.   TSH - Normal    T4, FREE - Normal   MAGNESIUM - Normal   POCT PROTIME - INR   EXTRA TUBES    Narrative:     The following orders were created for panel order Extra Tubes.  Procedure                               Abnormality         Status                     ---------                               -----------         ------                     Lavender Top[[id number]]                                     Final result               Gold Top - SST[[id number]]                                   Final result               Green Top (Gel)[[id number]]                                  Final result                 Please view results for these tests on the individual orders.   LAVENDER TOP   GOLD TOP - SST   GREEN TOP   CBC AND DIFFERENTIAL    Narrative:     The following orders were created for panel order CBC & Differential.  Procedure                               Abnormality         Status                     ---------                               -----------         ------                     CBC Auto Differential[[id number]]        Abnormal            Final result                 Please view results for these tests on the individual orders.     Xr Chest 2 View    Result Date: 3/24/2019  Narrative: PROCEDURE: Two-view chest COMPARISON: 3/20/2019 HISTORY: cp, M54.5 Low back pain W19.XXXA Unspecified fall, initial encounter I10 Essential (primary) hypertension FINDINGS: Frontal and lateral views of the chest are obtained. Devices: Left-sided cardiac pacer appears to be stable in position. Lungs/Pleura: No overt pulmonary vascular congestion, focal pulmonary parenchymal opacity, pleural effusion or pneumothorax. Cardiomediastinal structures: Cardiac silhouette is normal in size. Thoracic aorta appears tortuous and contains atherosclerotic calcification.     Impression: CONCLUSION:  No acute cardiopulmonary disease Electronically signed by:  González Forrest MD  3/24/2019 4:59 PM CDT Workstation: [id number]    Xr Spine Lumbar 2 Or 3 View    Result  Date: 3/18/2019  Narrative: PROCEDURE: Lumbar spine 3 views REASON FOR EXAM: fall, lumbosacral pain FINDINGS: Diffuse bony osteopenia/osteoporosis.. Lumbar spine vertebral body heights and alignment are normal. There is no evidence of fracture or dislocation.Intervertebral disc spaces are intact.     Impression: 1. No acute lumbar spine abnormality.. 2.Diffuse bony osteopenia/osteoporosis. Electronically signed by:  Renny Stovall MD  3/18/2019 10:02 AM CDT Workstation: [id number]    Ct Head Without Contrast    Result Date: 4/3/2019  Narrative: Exam: Head CT without contrast INDICATION: Trauma TECHNIQUE: Routine head CT without contrast. Sagittal coronal reconstructions were obtained. This exam was performed according to our departmental dose-optimization program, which includes automated exposure control, adjustment of the mA and/or kV according to patient size and/or use of iterative reconstruction technique. COMPARISON: 3/20/2019 Readings: The bony calvarium is intact. The imaged paranasal sinuses and mastoid air cells are clear. Mild soft tissue swelling in the left frontal scalp. Plaque is present in the intracranial arteries. No extra-axial fluid collection. The ventricular system is normal. Gray-white differentiation is maintained. Decreased attenuation in the white matter consistent with mild to moderate ischemic change. No obvious sign of acute infarction. No intraparenchymal hemorrhage, mass or midline shift.     Impression: No obvious acute intracranial abnormality Electronically signed by:  Dany De Dios MD  4/3/2019 12:04 AM CDT Workstation: [id number]    Ct Head Without Contrast    Result Date: 3/20/2019  Narrative: Exam: Head CT without contrast INDICATION: Altered mental status. TECHNIQUE: Routine head CT without contrast. Sagittal coronal reconstructions were obtained. This exam was performed according to our departmental dose-optimization program, which includes automated exposure control, adjustment  of the mA and/or kV according to patient size and/or use of iterative reconstruction technique. FINDINGS: Bony calvarium is intact. The imaged paranasal sinuses and mastoid air cells are clear. Plaque is present in intracranial arteries. Enlargement of the ventricles and sulci compatible with age related atrophy. Gray-white differentiation is maintained. There is decreased attenuation in the white matter consistent with mild-to-moderate chronic ischemic change. Chronic ischemic changes R present in the rob. No obvious sign of acute infarction. No acute intraparenchymal hemorrhage, mass or midline shift.     Impression: No obvious acute intracranial abnormality. Recommend follow-up as clinically warranted. Electronically signed by:  Dany De Dios MD  3/20/2019 6:44 AM CDT Workstation: [id number]    Ct Cervical Spine Without Contrast    Result Date: 4/3/2019  Narrative: Exam: CT cervical spine without contrast INDICATION: Trauma TECHNIQUE: Routine CT cervical spine without contrast. Sagittal coronal constructions were obtained. This exam was performed according to our departmental dose-optimization program, which includes automated exposure control, adjustment of the mA and/or kV according to patient size and/or use of iterative reconstruction technique. FINDINGS: No prevertebral soft tissue swelling. Fusion of the C5-C6 vertebral bodies. There is grade 1 anterolisthesis at C4-C5 most likely due to facet arthropathy. No acute fracture. There is mild spinal stenosis at C6-C7 due to posterior disc ridging.     Impression: 1. No acute bony abnormality 2. Degenerate changes as described Electronically signed by:  Dany De Dios MD  4/3/2019 12:09 AM CDT Workstation: [id number]    Ct Lumbar Spine Without Contrast    Result Date: 3/20/2019  Narrative: Exam: CT lumbar spine without contrast INDICATION: Status post fall, back pain TECHNIQUE: CT lumbar spine without contrast. Sagittal coronal reconstructions were  obtained. This exam was performed according to our departmental dose-optimization program, which includes automated exposure control, adjustment of the mA and/or kV according to patient size and/or use of iterative reconstruction technique. FINDINGS: The bones are demineralized. The lumbar vertebral bodies are normal in height. The disc space heights are fairly well maintained. Grade 1 spondylolisthesis at this present at L2-L3 due to facet arthropathy. No acute fracture. T12-L1: No spinal or neural foraminal stenosis. L1-L2: No spinal stenosis. Mild left neural foraminal narrowing. L2-L3: Mild to moderate spinal stenosis due to spondylolisthesis, ligamentum flavum hypertrophy and facet arthropathy. Mild to moderate bilateral neural foraminal narrowing. L3-L4: Mild-to-moderate stenosis due to bulging disc, ligamentum flavum hypertrophy and facet arthropathy. There is mild-to-moderate bilateral neural foraminal narrowing L4-L5: Mild spinal stenosis due to bulging disc ligamentum flavum hypertrophy and facet arthropathy. Mild-to-moderate bilateral neural foraminal narrowing. L5-S1: No spinal or neural foraminal stenosis. No obvious paravertebral abnormality.     Impression: 1. No acute fracture 2. Multilevel degenerative changes as described resulting spinal and neural foraminal stenosis from the L2-L3 through L4-L5 levels Electronically signed by:  Dany De Dios MD  3/20/2019 7:03 AM CDT Workstation: [id number]    Ct Lumbar Spine With & Without Contrast    Result Date: 3/27/2019  Narrative: Procedure: CT lumbar spine with without contrast Reason for exam: Sacral fracture with neuro symptoms. FINDINGS: Axial computer tomography sequential imaging was performed of the lumbar spine with without contrast. Sagittal and coronal reformation was performed. This exam was performed according to our departmental dose optimization program, which includes automated exposure control, adjustment of the mA and/or KV according  to patient size and/or use of iterative reconstruction technique. Complex acute sacral fractures with bilateral para-midline vertical nondisplaced fractures and bilateral anterior oblique corner fractures. These fractures do not appear significantly displaced. Otherwise bony structures of the lumbar spine are unremarkable. There is no abnormal focus of enhancement. The conus of the spinal cord appears normal with tip at the L1-2 intervertebral disc space. T11-12: Disk space is normal. Spinal cord and nerve roots exit this level without compromise. T12-L1: Disc space normal. Spinal cord and nerve roots exit without compromise. L1-L2: Disc space normal. No disc protrusion or extrusion. Spinal cord and nerve roots exit without compromise. L2-L3: Very mild diffuse disc protrusion minimally indenting the anterior thecal sac. Nerve roots exit without compromise. L3-L4: Very mild diffuse disc protrusion minimally indenting the anterior thecal sac. Nerve roots exit without compromise. L4-L5: Disc space normal. No disc protrusion or extrusion. Nerve roots exit without compromise. L5-S1: Disc space normal. No disc protrusion or extrusion. Nerve roots exit without compromise.     Impression: 1.  Complex acute sacral fractures with bilateral para-midline vertical nondisplaced fractures and bilateral anterior oblique corner fractures. These fractures do not appear significantly displaced. 2.  L2-L3: Very mild diffuse disc protrusion minimally indenting the anterior thecal sac. Nerve roots exit without compromise. 3.  L3-L4: Very mild diffuse disc protrusion minimally indenting the anterior thecal sac. Nerve roots exit without compromise. 4.  Otherwise unremarkable CT of the lumbar spine with without contrast Electronically signed by:  Renny Stovall MD  3/27/2019 9:31 AM CDT Workstation: [id number]    Xr Chest 1 View    Result Date: 4/3/2019  Narrative: Exam: AP portable chest INDICATION: Status post fall COMPARISON: 3/24/2019  FINDINGS: AP portable chest. Arthritic changes at the shoulder joints. Heart size is normal. A left pacemaker is in place. Lungs are hyperinflated with parenchymal changes compatible with emphysema. No acute infiltrate, pneumothorax or pleural effusion.     Impression: No acute cardiopulmonary abnormality. Electronically signed by:  Dany De Dios MD  4/3/2019 1:44 AM CDT Workstation: [id number]    Xr Chest 1 View    Result Date: 3/20/2019  Narrative: Exam: AP portable chest INDICATION: dyspnea COMPARISON: 1/22/2019 FINDINGS: AP portable chest. The bony structures are intact. Arthritic changes are present shoulder joints. The cardiomediastinal silhouette is unremarkable. Plaques was ordered. Cardiac pacemaker is in place. Lungs are clear. No pneumothorax or pleural effusion.     Impression: No acute cardiopulmonary abnormality. Electronically signed by:  Dany De Dios MD  3/20/2019 6:38 AM CDT Workstation: [id number]    Xr Pelvis 1 Or 2 View    Result Date: 3/18/2019  Narrative: Procedure:  Pelvis Indication: Trauma, pain. Patient fell. Technique:  Single frontal view pelvis   . Prior relevant exam:  None.   No evidence of acute bony, soft tissue, or joint abnormality is noted. Bone mineralization is within normal limits. The visualized joint spaces appear intact. There is a bipolar right femoral head arthroplasty.     Impression: CONCLUSION: Bipolar right femoral head arthroplasty. The pelvis is otherwise unremarkable. No evidence for fracture. Electronically signed by:  Francisco Schmidt MD  3/18/2019 9:59 AM CDT Workstation: [id number]    Ct Lower Extremity Bilateral Without Contrast    Result Date: 3/24/2019  Narrative: PROCEDURE: CT LOWER EXTREMITY WITHOUT IV CONTRAST BILATERAL TECHNIQUE: Multichannel computed tomography of the bilateral hips without contrast. Coronal and sagittal reformatted images were also obtained to improve fracture detection. Contrast: None This exam was performed using radiation doses  that are as low as reasonably achievable (ALARA). This exam was performed according to our departmental dose optimization program, which includes automated exposure control, adjustment of the mA and/or KV according to patient size and/or use of iterative reconstruction technique. COMPARISON: None HISTORY: fall, M54.5 Low back pain W19.XXXA Unspecified fall, initial encounter I10 Essential (primary) hypertension FINDINGS Axial imaging of the hips with coronal and sagittal reformats. There are bilateral acute sacral fractures including a minimally displaced right sacral alar fracture and at least two bilateral nondisplaced fractures in the sacral body at the S2 segment. There is a right hip replacement. The urinary bladder is very distended.     Impression: CONCLUSION: Bilateral acute sacral fractures as described above. Electronically signed by:  González Forrest MD  3/24/2019 5:14 PM CDT Workstation: [id number]    Xr Hip With Or Without Pelvis 2 - 3 View Left    Result Date: 3/24/2019  Narrative: Two view left hip with single view pelvis HISTORY: Pain and bruising. Fell last week. AP and frog-leg lateral views of the left hip and AP film of the pelvis obtained. COMPARISON: Pelvis March 18, 2019. No fracture or dislocation. Right hip prosthesis. Minimal degenerative change each iliac crest. No other osseous or articular abnormality. Atherosclerotic calcification abdominal aorta and iliac arteries.     Impression: CONCLUSION: No fracture or dislocation [id number] Electronically signed by:  Gonsalo Reddy MD  3/24/2019 12:30 PM CDT Workstation: [id number]              [facility]      Final diagnoses:   Urinary tract infection without hematuria, site unspecified   Fall, initial encounter   Contusion of scalp, initial encounter            Mendez Clifton DO  04/03/19 0203

## 2019-04-05 LAB — BACTERIA SPEC AEROBE CULT: ABNORMAL

## 2019-04-18 RX ORDER — FUROSEMIDE 40 MG/1
40 TABLET ORAL DAILY
Qty: 30 TABLET | Refills: 2 | Status: SHIPPED | OUTPATIENT
Start: 2019-04-18 | End: 2019-08-19 | Stop reason: SDUPTHER

## 2019-04-18 RX ORDER — RANOLAZINE 500 MG/1
500 TABLET, EXTENDED RELEASE ORAL 2 TIMES DAILY
Qty: 60 TABLET | Refills: 2 | Status: SHIPPED | OUTPATIENT
Start: 2019-04-18 | End: 2019-10-19 | Stop reason: SDUPTHER

## 2019-05-30 ENCOUNTER — TELEPHONE (OUTPATIENT)
Dept: FAMILY MEDICINE CLINIC | Facility: CLINIC | Age: 84
End: 2019-05-30

## 2019-05-30 RX ORDER — CARVEDILOL 6.25 MG/1
6.25 TABLET ORAL 2 TIMES DAILY WITH MEALS
Qty: 180 TABLET | Refills: 2 | Status: SHIPPED | OUTPATIENT
Start: 2019-05-30 | End: 2020-04-29

## 2019-05-30 RX ORDER — ATORVASTATIN CALCIUM 20 MG/1
20 TABLET, FILM COATED ORAL DAILY
Qty: 90 TABLET | Refills: 2 | Status: SHIPPED | OUTPATIENT
Start: 2019-05-30 | End: 2020-02-24

## 2019-05-30 RX ORDER — LIDOCAINE 50 MG/G
1 PATCH TOPICAL EVERY 24 HOURS
Qty: 30 PATCH | Refills: 2 | Status: SHIPPED | OUTPATIENT
Start: 2019-05-30 | End: 2019-06-11 | Stop reason: SDUPTHER

## 2019-05-30 RX ORDER — LEVOTHYROXINE SODIUM 0.05 MG/1
50 TABLET ORAL DAILY
Qty: 30 TABLET | Refills: 5 | Status: SHIPPED | OUTPATIENT
Start: 2019-05-30 | End: 2020-04-29

## 2019-05-30 RX ORDER — NITROGLYCERIN 40 MG/1
1 PATCH TRANSDERMAL DAILY
Qty: 90 PATCH | Refills: 2 | Status: SHIPPED | OUTPATIENT
Start: 2019-05-30 | End: 2020-04-29

## 2019-05-30 NOTE — TELEPHONE ENCOUNTER
ADALBERTO NOLASCO HAS JUST BEEN RELEASED FROM [place] TODAY...THE D/C PAPERS SAID TO GET HER LIDOCANE PATCHES AT PHARM..BUT SHE NEEDS A PRESP FOR THEM TO BE SENT TO [facility]  ASKING IF NILES WILL DO THAT?HER APPT IS NOT UNTIL 6/11  CALL MARK  [phone]  AS SHE IS STILL IN A LOT OF PAIN

## 2019-06-06 ENCOUNTER — EPISODE CHANGES (OUTPATIENT)
Dept: CASE MANAGEMENT | Facility: OTHER | Age: 84
End: 2019-06-06

## 2019-06-10 ENCOUNTER — PATIENT OUTREACH (OUTPATIENT)
Dept: CASE MANAGEMENT | Facility: OTHER | Age: 84
End: 2019-06-10

## 2019-06-10 NOTE — OUTREACH NOTE
Patient Outreach Note    Patient DC from [place] on 5-30-19 with home health. Called patient to ensure home health has admitted. She stated the SN was there this morning. Explained my role and she is agreeable to future calls.     Eliane Ortiz RN    6/10/2019, 11:12 AM

## 2019-06-11 ENCOUNTER — OFFICE VISIT (OUTPATIENT)
Dept: FAMILY MEDICINE CLINIC | Facility: CLINIC | Age: 84
End: 2019-06-11

## 2019-06-11 ENCOUNTER — OFFICE VISIT (OUTPATIENT)
Dept: PULMONOLOGY | Facility: CLINIC | Age: 84
End: 2019-06-11

## 2019-06-11 ENCOUNTER — APPOINTMENT (OUTPATIENT)
Dept: LAB | Facility: HOSPITAL | Age: 84
End: 2019-06-11

## 2019-06-11 VITALS
OXYGEN SATURATION: 91 % | WEIGHT: 145.2 LBS | SYSTOLIC BLOOD PRESSURE: 141 MMHG | HEIGHT: 68 IN | DIASTOLIC BLOOD PRESSURE: 80 MMHG | HEART RATE: 91 BPM | BODY MASS INDEX: 22.01 KG/M2

## 2019-06-11 VITALS
DIASTOLIC BLOOD PRESSURE: 58 MMHG | WEIGHT: 157 LBS | SYSTOLIC BLOOD PRESSURE: 116 MMHG | BODY MASS INDEX: 26.16 KG/M2 | HEIGHT: 65 IN

## 2019-06-11 DIAGNOSIS — M15.9 PRIMARY OSTEOARTHRITIS INVOLVING MULTIPLE JOINTS: ICD-10-CM
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
## 2019-06-11 DIAGNOSIS — J43.1 PANLOBULAR EMPHYSEMA (HCC): Primary | ICD-10-CM

## 2019-06-11 DIAGNOSIS — J42 CHRONIC BRONCHITIS, UNSPECIFIED CHRONIC BRONCHITIS TYPE (HCC): ICD-10-CM

## 2019-06-11 DIAGNOSIS — Z79.899 HIGH RISK MEDICATION USE: ICD-10-CM

## 2019-06-11 PROCEDURE — 80307 DRUG TEST PRSMV CHEM ANLYZR: CPT | Performed by: NURSE PRACTITIONER

## 2019-06-11 PROCEDURE — 99213 OFFICE O/P EST LOW 20 MIN: CPT | Performed by: NURSE PRACTITIONER

## 2019-06-11 PROCEDURE — 99213 OFFICE O/P EST LOW 20 MIN: CPT | Performed by: INTERNAL MEDICINE

## 2019-06-11 RX ORDER — ESOMEPRAZOLE MAGNESIUM 40 MG/1
40 CAPSULE, DELAYED RELEASE ORAL EVERY MORNING
Refills: 0 | COMMUNITY
Start: 2019-05-30 | End: 2019-08-14 | Stop reason: SDUPTHER

## 2019-06-11 RX ORDER — FLUOROMETHOLONE 0.1 %
SUSPENSION, DROPS(FINAL DOSAGE FORM)(ML) OPHTHALMIC (EYE)
Refills: 0 | COMMUNITY
Start: 2019-05-30 | End: 2021-01-01

## 2019-06-11 RX ORDER — TRAMADOL HYDROCHLORIDE 50 MG/1
50 TABLET ORAL EVERY 6 HOURS PRN
Qty: 120 TABLET | Refills: 2 | Status: SHIPPED | OUTPATIENT
Start: 2019-06-11

## 2019-06-11 RX ORDER — FERROUS SULFATE 325(65) MG
325 TABLET ORAL
COMMUNITY
End: 2021-01-01

## 2019-06-11 RX ORDER — LIDOCAINE 50 MG/G
1 PATCH TOPICAL EVERY 24 HOURS
Qty: 30 PATCH | Refills: 2 | Status: SHIPPED | OUTPATIENT
Start: 2019-06-11 | End: 2021-01-01

## 2019-06-11 NOTE — PROGRESS NOTES
"Subjective   Val Arteaga is a 86 y.o. female.  Two week follow-up after discharge from [facility].  Was admitted for rehabilitation after suffering a sacral fracture this past April.  Now reports that \"my tailbone feels numb but I am feeling better.\"  Would like to go back on her Percocet.  Continues to complain of intermittent pain in her lower back and hips due to the fall.  Was on Percocet while at [place] and that did help her pain quite a bit.    Fall   The accident occurred more than 1 week ago. The fall occurred while walking. She fell from a height of 3 to 5 ft. Impact surface: Stairs. There was no blood loss. The point of impact was the buttocks. The pain is present in the buttocks and back. The pain is at a severity of 7/10. The pain is moderate. The symptoms are aggravated by pressure on injury and sitting. Treatments tried: Percocet and rehabilitation. The treatment provided moderate relief.        The following portions of the patient's history were reviewed and updated as appropriate:     Current Outpatient Medications   Medication Sig Dispense Refill   • acetaminophen (TYLENOL) 325 MG tablet Take 2 tablets by mouth Every 4 (Four) Hours As Needed for Mild Pain . 1 tablet 1   • albuterol (PROVENTIL) (2.5 MG/3ML) 0.083% nebulizer solution Take 2.5 mg by nebulization Every 6 (Six) Hours As Needed for Wheezing. 90 vial 5   • albuterol (VENTOLIN HFA) 108 (90 Base) MCG/ACT inhaler 2 puffs every 4 hours as needed for breathing 1 inhaler 5   • aspirin 325 MG EC tablet Take 1 tablet by mouth Daily.     • atorvastatin (LIPITOR) 20 MG tablet Take 1 tablet by mouth Daily. 90 tablet 2   • carvedilol (COREG) 6.25 MG tablet Take 1 tablet by mouth 2 (Two) Times a Day With Meals. 180 tablet 2   • docusate sodium (COLACE) 100 MG capsule Take 1 capsule by mouth 2 (Two) Times a Day. 60 capsule 0   • esomeprazole (nexIUM) 40 MG capsule Take 40 mg by mouth Every Morning.  0   • estradiol (ESTRACE) 0.1 MG/GM " vaginal cream Insert 2 g into the vagina Daily. (Patient taking differently: Insert 2 g into the vagina every night at bedtime.) 42.5 g 2   • ferrous sulfate 324 (65 Fe) MG tablet delayed-release EC tablet Take 1 tablet by mouth Daily With Breakfast. 1 tablet 1   • ferrous sulfate 325 (65 FE) MG tablet Take 325 mg by mouth Daily With Breakfast.     • fluorometholone (FML) 0.1 % ophthalmic suspension INSTILL 1 DROP IN BOTH EYES FOUR (4) TIMES DAILY  0   • folic acid-vit B6-vit B12 (FOLBEE) 2.5-25-1 MG tablet tablet Take 1 tablet by mouth Daily. 30 tablet 5   • furosemide (LASIX) 40 MG tablet Take 1 tablet by mouth Daily. (Patient taking differently: Take 20 mg by mouth Daily.) 30 tablet 2   • ipratropium-albuterol (DUO-NEB) 0.5-2.5 mg/3 ml nebulizer Take 3 mL by nebulization 4 (Four) Times a Day As Needed for Wheezing. 360 mL 2   • levothyroxine (SYNTHROID, LEVOTHROID) 50 MCG tablet Take 1 tablet by mouth Daily. 30 tablet 5   • lidocaine (LIDODERM) 5 % Place 1 patch on the skin as directed by provider Daily. Remove & Discard patch within 12 hours 30 patch 2   • montelukast (SINGULAIR) 10 MG tablet Take 1 tablet by mouth Every Night. 90 tablet 3   • nitroglycerin (NITRODUR) 0.2 MG/HR patch Place 1 patch on the skin as directed by provider Daily. 90 patch 2   • polyethylene glycol (MIRALAX) powder MIX 1 CAPFUL (17G) IN 8 OUNCES OF LIQUID AND DRINK BY MOUTH EVERY DAY FOR CONSTIPATION 527 g 2   • ranolazine (RANEXA) 500 MG 12 hr tablet Take 1 tablet by mouth 2 (Two) Times a Day. 60 tablet 2   • rivaroxaban (XARELTO) 15 MG tablet Take 1 tablet by mouth Daily. 90 tablet 1   • sertraline (ZOLOFT) 50 MG tablet Take 1 tablet by mouth Daily. 90 tablet 2   • umeclidinium-vilanterol (ANORO ELLIPTA) 62.5-25 MCG/INH aerosol powder  inhaler Inhale 1 puff Daily. 60 each 3   • vitamin D (ERGOCALCIFEROL) 14262 units capsule capsule Take 1 capsule by mouth Every 30 (Thirty) Days. 3 capsule 3   • famotidine (PEPCID) 40 MG tablet Take 1  tablet by mouth Daily. 90 tablet 2   • traMADol (ULTRAM) 50 MG tablet Take 1 tablet by mouth Every 6 (Six) Hours As Needed for Moderate Pain . 120 tablet 2     No current facility-administered medications for this visit.      Current Outpatient Medications on File Prior to Visit   Medication Sig   • acetaminophen (TYLENOL) 325 MG tablet Take 2 tablets by mouth Every 4 (Four) Hours As Needed for Mild Pain .   • albuterol (PROVENTIL) (2.5 MG/3ML) 0.083% nebulizer solution Take 2.5 mg by nebulization Every 6 (Six) Hours As Needed for Wheezing.   • albuterol (VENTOLIN HFA) 108 (90 Base) MCG/ACT inhaler 2 puffs every 4 hours as needed for breathing   • aspirin 325 MG EC tablet Take 1 tablet by mouth Daily.   • atorvastatin (LIPITOR) 20 MG tablet Take 1 tablet by mouth Daily.   • carvedilol (COREG) 6.25 MG tablet Take 1 tablet by mouth 2 (Two) Times a Day With Meals.   • docusate sodium (COLACE) 100 MG capsule Take 1 capsule by mouth 2 (Two) Times a Day.   • esomeprazole (nexIUM) 40 MG capsule Take 40 mg by mouth Every Morning.   • estradiol (ESTRACE) 0.1 MG/GM vaginal cream Insert 2 g into the vagina Daily. (Patient taking differently: Insert 2 g into the vagina every night at bedtime.)   • ferrous sulfate 324 (65 Fe) MG tablet delayed-release EC tablet Take 1 tablet by mouth Daily With Breakfast.   • ferrous sulfate 325 (65 FE) MG tablet Take 325 mg by mouth Daily With Breakfast.   • fluorometholone (FML) 0.1 % ophthalmic suspension INSTILL 1 DROP IN BOTH EYES FOUR (4) TIMES DAILY   • folic acid-vit B6-vit B12 (FOLBEE) 2.5-25-1 MG tablet tablet Take 1 tablet by mouth Daily.   • furosemide (LASIX) 40 MG tablet Take 1 tablet by mouth Daily. (Patient taking differently: Take 20 mg by mouth Daily.)   • ipratropium-albuterol (DUO-NEB) 0.5-2.5 mg/3 ml nebulizer Take 3 mL by nebulization 4 (Four) Times a Day As Needed for Wheezing.   • levothyroxine (SYNTHROID, LEVOTHROID) 50 MCG tablet Take 1 tablet by mouth Daily.   •  "montelukast (SINGULAIR) 10 MG tablet Take 1 tablet by mouth Every Night.   • nitroglycerin (NITRODUR) 0.2 MG/HR patch Place 1 patch on the skin as directed by provider Daily.   • polyethylene glycol (MIRALAX) powder MIX 1 CAPFUL (17G) IN 8 OUNCES OF LIQUID AND DRINK BY MOUTH EVERY DAY FOR CONSTIPATION   • ranolazine (RANEXA) 500 MG 12 hr tablet Take 1 tablet by mouth 2 (Two) Times a Day.   • sertraline (ZOLOFT) 50 MG tablet Take 1 tablet by mouth Daily.   • umeclidinium-vilanterol (ANORO ELLIPTA) 62.5-25 MCG/INH aerosol powder  inhaler Inhale 1 puff Daily.   • vitamin D (ERGOCALCIFEROL) 80009 units capsule capsule Take 1 capsule by mouth Every 30 (Thirty) Days.     No current facility-administered medications on file prior to visit.      She is allergic to ace inhibitors; baclofen; and lisinopril..    Review of Systems   Constitutional: Positive for fatigue.   Respiratory: Negative.    Cardiovascular: Negative.    Musculoskeletal: Positive for arthralgias and myalgias.   Skin: Negative.    Neurological: Negative.        Objective    Visit Vitals  /58   Ht 165.1 cm (65\")   Wt 71.2 kg (157 lb)   LMP  (LMP Unknown)   BMI 26.13 kg/m²       Physical Exam   Constitutional: She is oriented to person, place, and time. She appears well-developed and well-nourished.   Cardiovascular: Normal rate and regular rhythm.   Pulmonary/Chest: Effort normal and breath sounds normal.   Musculoskeletal: She exhibits tenderness.   Moderate amount of tenderness to sacral area    Neurological: She is alert and oriented to person, place, and time.   Skin: Skin is warm. Capillary refill takes less than 2 seconds.   Nursing note and vitals reviewed.      Assessment/Plan   Problems Addressed this Visit        Musculoskeletal and Integument    Osteoarthritis of multiple joints    Relevant Medications    lidocaine (LIDODERM) 5 %    traMADol (ULTRAM) 50 MG tablet      Other Visit Diagnoses     Fall (on) (from) other stairs and steps, sequela "    -  Primary    High risk medication use        Relevant Orders    Urine Drug Screen - Urine, Clean Catch (Completed)        New Medications Ordered This Visit   Medications   • rivaroxaban (XARELTO) 15 MG tablet     Sig: Take 1 tablet by mouth Daily.     Dispense:  90 tablet     Refill:  1   • lidocaine (LIDODERM) 5 %     Sig: Place 1 patch on the skin as directed by provider Daily. Remove & Discard patch within 12 hours     Dispense:  30 patch     Refill:  2   • traMADol (ULTRAM) 50 MG tablet     Sig: Take 1 tablet by mouth Every 6 (Six) Hours As Needed for Moderate Pain .     Dispense:  120 tablet     Refill:  2     New Medications Ordered This Visit   Medications   • rivaroxaban (XARELTO) 15 MG tablet     Sig: Take 1 tablet by mouth Daily.     Dispense:  90 tablet     Refill:  1   • lidocaine (LIDODERM) 5 %     Sig: Place 1 patch on the skin as directed by provider Daily. Remove & Discard patch within 12 hours     Dispense:  30 patch     Refill:  2   • traMADol (ULTRAM) 50 MG tablet     Sig: Take 1 tablet by mouth Every 6 (Six) Hours As Needed for Moderate Pain .     Dispense:  120 tablet     Refill:  2       1.  Fall on or from other stairs and steps, sequela:  Encouraged to continue to use of ambulation assisted with walker  Discussed increased risk for fall due to pain and weakness    2.  Osteoarthritis of multiple joints:  Begin Ultram as prescribed be taken 1 p.o. every 6 hours as needed for pain  Educated on possible side effects of this medication including but not limited to increased risk for drowsiness, dependency and falls  Encouraged to only use this medication during times of pain at or above the VAS of 5  May continue use of Tylenol OTC according to package directions    3.  High risk medication use:  TITO reviewed by me and will be scanned into medical record  Controlled substance contract signed and dated and will be scanned into medical record  Complete urine drug screen as ordered      Continue on current medications as previously prescribed   Return in about 4 weeks (around 7/9/2019) for Recheck.        This document has been electronically signed by CRESENCIO Hall on June 13, 2019 7:49 AM

## 2019-06-11 NOTE — PROGRESS NOTES
"This lady has advanced obstructive lung disease with persistent dyspnea.  She has dyspnea despite taking breathing medications and oxygen.  She has shortness of breath at rest with minimal exertion.  She is not producing purulent sputum.  She is been in the nursing home recently.    ROS    Constitutional-no night sweats weight loss headaches  GI no abdominal pain nausea or diarrhea  Neuro no seizure or neurologic deficits  Musculoskeletal no deformity or joint pain   no dysuria or hematuria  Skin no rash or other lesions  All other systems reviewed and were negative except for the above.      Physical Exam  /80   Pulse 91   Ht 172.7 cm (68\")   Wt 65.9 kg (145 lb 3.2 oz)   LMP  (LMP Unknown)   SpO2 91%   BMI 22.08 kg/m²   Vital signs as above  Pupils equally round and reactive to light and accommodation, neck no JVD or adenopathy.  Cardiovascular regular rhythm and rate no murmur or gallop.  Abdomen soft no organomegaly tenderness.  Extremities no clubbing cyanosis or edema.  No cervical adenopathy.  No skin rash.  Neurologic good strength bilaterally without deficits  Chronically ill-appearing dyspneic white female riding in a transport chair using oxygen.  Lungs reveal diminished breath sounds and a few rhonchi    Impression advanced COPD with general debility    Recommendations continue oxygen and routine meds.  Patient was advised to move slowly to minimize her dyspnea, return in 3 months        This document has been produced with the assistance of Dragon dictation  This document has been electronically signed by Bryan Jay MD on June 11, 2019 3:21 PM      "

## 2019-06-12 LAB
AMPHET+METHAMPHET UR QL: NEGATIVE
BARBITURATES UR QL SCN: NEGATIVE
BENZODIAZ UR QL SCN: NEGATIVE
CANNABINOIDS SERPL QL: NEGATIVE
COCAINE UR QL: NEGATIVE
METHADONE UR QL SCN: NEGATIVE
OPIATES UR QL: NEGATIVE
OXYCODONE UR QL SCN: POSITIVE

## 2019-06-12 RX ORDER — FAMOTIDINE 40 MG/1
40 TABLET, FILM COATED ORAL DAILY
Qty: 90 TABLET | Refills: 2 | Status: SHIPPED | OUTPATIENT
Start: 2019-06-12 | End: 2019-06-27

## 2019-06-27 ENCOUNTER — TELEPHONE (OUTPATIENT)
Dept: FAMILY MEDICINE CLINIC | Facility: CLINIC | Age: 84
End: 2019-06-27

## 2019-06-27 PROCEDURE — 87077 CULTURE AEROBIC IDENTIFY: CPT | Performed by: FAMILY MEDICINE

## 2019-06-27 PROCEDURE — 87086 URINE CULTURE/COLONY COUNT: CPT | Performed by: FAMILY MEDICINE

## 2019-06-27 PROCEDURE — 87186 SC STD MICRODIL/AGAR DIL: CPT | Performed by: FAMILY MEDICINE

## 2019-07-09 ENCOUNTER — TELEPHONE (OUTPATIENT)
Dept: FAMILY MEDICINE CLINIC | Facility: CLINIC | Age: 84
End: 2019-07-09

## 2019-07-09 NOTE — TELEPHONE ENCOUNTER
JOSEE/HOME HEALTH HAS  CALLED ON [facility]OLAS[facility]..SHE IS HAVING SOME INCREASED SWELLING AND SHORT OF BREATH ..SHE IS ALREADY ON 20MG OF LASIX AND HAD ORDERS IF NEEDED CAN INCREASE ANOTHER 20MG..THEY HAVE DONE THAT INCREASE FOR 1WK  ALSO PT IS ON O-2 AT HOME 2 1/2 LITERS..THEY HAVE INCREASED THAT TO 3 LITERS TO HELP WITH BREATHING/SHE HAS EXTENDED TUBING ON O-2   ANY QUESTIONS CALL JOSEE

## 2019-07-09 NOTE — TELEPHONE ENCOUNTER
Clarification:  After talking to [facility] she states the patient is on Lasix 40 mg daily, with the order to add half a tablet or 20 mg, for a total of 60 mg.  They started that today.  She has an appointment to see you on 07/15/19.  If she continues needing 60 mg daily she will have been on it for a week when she see's you.      She has Not already tan on the increased does for a week.    She states she increased the O2 today from 2.5 liters to 3 liters due to her tubing being longer and thinking that may help.    She will call the family and relay the information to them.   states she did not think she needed to go to the ER at this time but will relay the information to the family.

## 2019-07-09 NOTE — TELEPHONE ENCOUNTER
You please call her house and talk to her family and asked them how she is breathing.  If she is increasingly short of breath and requiring more oxygen despite being on the higher dose of Lasix and she probably needs to seek emergency medical treatment.  This could be indicative of a worsening of her condition.  Thank you.

## 2019-07-15 ENCOUNTER — APPOINTMENT (OUTPATIENT)
Dept: LAB | Facility: HOSPITAL | Age: 84
End: 2019-07-15

## 2019-07-15 ENCOUNTER — OFFICE VISIT (OUTPATIENT)
Dept: FAMILY MEDICINE CLINIC | Facility: CLINIC | Age: 84
End: 2019-07-15

## 2019-07-15 VITALS
SYSTOLIC BLOOD PRESSURE: 150 MMHG | HEIGHT: 68 IN | WEIGHT: 143 LBS | DIASTOLIC BLOOD PRESSURE: 60 MMHG | BODY MASS INDEX: 21.67 KG/M2

## 2019-07-15 DIAGNOSIS — E03.9 ACQUIRED HYPOTHYROIDISM: ICD-10-CM

## 2019-07-15 DIAGNOSIS — R30.0 DYSURIA: ICD-10-CM

## 2019-07-15 DIAGNOSIS — I50.22 CHRONIC SYSTOLIC CONGESTIVE HEART FAILURE (HCC): ICD-10-CM

## 2019-07-15 DIAGNOSIS — I10 ESSENTIAL HYPERTENSION: Primary | ICD-10-CM

## 2019-07-15 DIAGNOSIS — J42 CHRONIC BRONCHITIS, UNSPECIFIED CHRONIC BRONCHITIS TYPE (HCC): ICD-10-CM

## 2019-07-15 PROCEDURE — 87186 SC STD MICRODIL/AGAR DIL: CPT | Performed by: NURSE PRACTITIONER

## 2019-07-15 PROCEDURE — 85025 COMPLETE CBC W/AUTO DIFF WBC: CPT | Performed by: NURSE PRACTITIONER

## 2019-07-15 PROCEDURE — 87077 CULTURE AEROBIC IDENTIFY: CPT | Performed by: NURSE PRACTITIONER

## 2019-07-15 PROCEDURE — 99214 OFFICE O/P EST MOD 30 MIN: CPT | Performed by: NURSE PRACTITIONER

## 2019-07-15 PROCEDURE — 87086 URINE CULTURE/COLONY COUNT: CPT | Performed by: NURSE PRACTITIONER

## 2019-07-15 PROCEDURE — 81001 URINALYSIS AUTO W/SCOPE: CPT | Performed by: NURSE PRACTITIONER

## 2019-07-15 PROCEDURE — 84443 ASSAY THYROID STIM HORMONE: CPT | Performed by: NURSE PRACTITIONER

## 2019-07-15 PROCEDURE — 80053 COMPREHEN METABOLIC PANEL: CPT | Performed by: NURSE PRACTITIONER

## 2019-07-15 NOTE — PROGRESS NOTES
"Subjective   Val Arteaga is a 86 y.o. female.   For the past several days has been complaining of pain and burning with urination.  Was seen at Urgent Care on 06/27/2019 and placed on antibiotic for acute cystitis.  \"I took all of the medicine but I am still hurting. \"      Hypertension   This is a chronic problem. The current episode started more than 1 year ago. The problem is unchanged. The problem is controlled. Pertinent negatives include no chest pain, orthopnea, peripheral edema or shortness of breath. Agents associated with hypertension include thyroid hormones. Risk factors for coronary artery disease include post-menopausal state, sedentary lifestyle, dyslipidemia and family history. Past treatments include beta blockers. Current antihypertension treatment includes beta blockers. The current treatment provides significant improvement.   COPD   This is a chronic problem. The current episode started more than 1 year ago. The problem occurs constantly. The problem has been unchanged. Associated symptoms include arthralgias. Pertinent negatives include no chest pain, chills, diaphoresis, fatigue or fever. The symptoms are aggravated by exertion. Treatments tried: albuterol inhaler and nebulizer. The treatment provided moderate relief.   Hypothyroidism   This is a chronic problem. The current episode started more than 1 year ago. The problem occurs constantly. The problem has been unchanged. Associated symptoms include arthralgias. Pertinent negatives include no chest pain, chills, diaphoresis, fatigue or fever. Nothing aggravates the symptoms. Treatments tried: Synthroid  The treatment provided significant relief.   Congestive Heart Failure   Presents for follow-up visit. Pertinent negatives include no chest pain, fatigue or shortness of breath. The symptoms have been stable. Compliance with total regimen is %. Compliance with diet is %. Compliance with exercise is 26-50%.   Urinary Tract " Infection    This is a recurrent problem. The current episode started 1 to 4 weeks ago. The problem occurs every urination. The problem has been gradually worsening. The quality of the pain is described as burning and shooting. The pain is at a severity of 8/10. The pain is severe. There has been no fever. She is not sexually active. Associated symptoms include hesitancy and urgency. Pertinent negatives include no chills. She has tried antibiotics for the symptoms. The treatment provided mild relief.        The following portions of the patient's history were reviewed and updated as appropriate:     Current Outpatient Medications   Medication Sig Dispense Refill   • acetaminophen (TYLENOL) 325 MG tablet Take 2 tablets by mouth Every 4 (Four) Hours As Needed for Mild Pain . 1 tablet 1   • albuterol (PROVENTIL) (2.5 MG/3ML) 0.083% nebulizer solution Take 2.5 mg by nebulization Every 6 (Six) Hours As Needed for Wheezing. 90 vial 5   • aspirin 325 MG EC tablet Take 1 tablet by mouth Daily.     • atorvastatin (LIPITOR) 20 MG tablet Take 1 tablet by mouth Daily. 90 tablet 2   • carvedilol (COREG) 6.25 MG tablet Take 1 tablet by mouth 2 (Two) Times a Day With Meals. 180 tablet 2   • docusate sodium (COLACE) 100 MG capsule Take 1 capsule by mouth 2 (Two) Times a Day. 60 capsule 0   • esomeprazole (nexIUM) 40 MG capsule Take 40 mg by mouth Every Morning.  0   • estradiol (ESTRACE) 0.1 MG/GM vaginal cream Insert 2 g into the vagina Daily. (Patient taking differently: Insert 2 g into the vagina every night at bedtime.) 42.5 g 2   • ferrous sulfate 325 (65 FE) MG tablet Take 325 mg by mouth Daily With Breakfast.     • fluorometholone (FML) 0.1 % ophthalmic suspension INSTILL 1 DROP IN BOTH EYES FOUR (4) TIMES DAILY  0   • folic acid-vit B6-vit B12 (FOLBEE) 2.5-25-1 MG tablet tablet Take 1 tablet by mouth Daily. 30 tablet 5   • furosemide (LASIX) 40 MG tablet Take 1 tablet by mouth Daily. (Patient taking differently: Take 20 mg  by mouth Daily.) 30 tablet 2   • ipratropium-albuterol (DUO-NEB) 0.5-2.5 mg/3 ml nebulizer Take 3 mL by nebulization 4 (Four) Times a Day As Needed for Wheezing. 360 mL 2   • levothyroxine (SYNTHROID, LEVOTHROID) 50 MCG tablet Take 1 tablet by mouth Daily. 30 tablet 5   • lidocaine (LIDODERM) 5 % Place 1 patch on the skin as directed by provider Daily. Remove & Discard patch within 12 hours 30 patch 2   • montelukast (SINGULAIR) 10 MG tablet Take 1 tablet by mouth Every Night. 90 tablet 3   • nitroglycerin (NITRODUR) 0.2 MG/HR patch Place 1 patch on the skin as directed by provider Daily. 90 patch 2   • polyethylene glycol (MIRALAX) powder MIX 1 CAPFUL (17G) IN 8 OUNCES OF LIQUID AND DRINK BY MOUTH EVERY DAY FOR CONSTIPATION 527 g 2   • ranolazine (RANEXA) 500 MG 12 hr tablet Take 1 tablet by mouth 2 (Two) Times a Day. 60 tablet 2   • rivaroxaban (XARELTO) 15 MG tablet Take 1 tablet by mouth Daily. 90 tablet 1   • sertraline (ZOLOFT) 50 MG tablet Take 1 tablet by mouth Daily. 90 tablet 2   • traMADol (ULTRAM) 50 MG tablet Take 1 tablet by mouth Every 6 (Six) Hours As Needed for Moderate Pain . 120 tablet 2   • umeclidinium-vilanterol (ANORO ELLIPTA) 62.5-25 MCG/INH aerosol powder  inhaler Inhale 1 puff Daily. 60 each 3   • vitamin D (ERGOCALCIFEROL) 48369 units capsule capsule Take 1 capsule by mouth Every 30 (Thirty) Days. 3 capsule 3     No current facility-administered medications for this visit.      Current Outpatient Medications on File Prior to Visit   Medication Sig   • acetaminophen (TYLENOL) 325 MG tablet Take 2 tablets by mouth Every 4 (Four) Hours As Needed for Mild Pain .   • albuterol (PROVENTIL) (2.5 MG/3ML) 0.083% nebulizer solution Take 2.5 mg by nebulization Every 6 (Six) Hours As Needed for Wheezing.   • aspirin 325 MG EC tablet Take 1 tablet by mouth Daily.   • atorvastatin (LIPITOR) 20 MG tablet Take 1 tablet by mouth Daily.   • carvedilol (COREG) 6.25 MG tablet Take 1 tablet by mouth 2 (Two)  Times a Day With Meals.   • docusate sodium (COLACE) 100 MG capsule Take 1 capsule by mouth 2 (Two) Times a Day.   • esomeprazole (nexIUM) 40 MG capsule Take 40 mg by mouth Every Morning.   • estradiol (ESTRACE) 0.1 MG/GM vaginal cream Insert 2 g into the vagina Daily. (Patient taking differently: Insert 2 g into the vagina every night at bedtime.)   • ferrous sulfate 325 (65 FE) MG tablet Take 325 mg by mouth Daily With Breakfast.   • fluorometholone (FML) 0.1 % ophthalmic suspension INSTILL 1 DROP IN BOTH EYES FOUR (4) TIMES DAILY   • folic acid-vit B6-vit B12 (FOLBEE) 2.5-25-1 MG tablet tablet Take 1 tablet by mouth Daily.   • furosemide (LASIX) 40 MG tablet Take 1 tablet by mouth Daily. (Patient taking differently: Take 20 mg by mouth Daily.)   • ipratropium-albuterol (DUO-NEB) 0.5-2.5 mg/3 ml nebulizer Take 3 mL by nebulization 4 (Four) Times a Day As Needed for Wheezing.   • levothyroxine (SYNTHROID, LEVOTHROID) 50 MCG tablet Take 1 tablet by mouth Daily.   • lidocaine (LIDODERM) 5 % Place 1 patch on the skin as directed by provider Daily. Remove & Discard patch within 12 hours   • montelukast (SINGULAIR) 10 MG tablet Take 1 tablet by mouth Every Night.   • nitroglycerin (NITRODUR) 0.2 MG/HR patch Place 1 patch on the skin as directed by provider Daily.   • polyethylene glycol (MIRALAX) powder MIX 1 CAPFUL (17G) IN 8 OUNCES OF LIQUID AND DRINK BY MOUTH EVERY DAY FOR CONSTIPATION   • ranolazine (RANEXA) 500 MG 12 hr tablet Take 1 tablet by mouth 2 (Two) Times a Day.   • rivaroxaban (XARELTO) 15 MG tablet Take 1 tablet by mouth Daily.   • sertraline (ZOLOFT) 50 MG tablet Take 1 tablet by mouth Daily.   • traMADol (ULTRAM) 50 MG tablet Take 1 tablet by mouth Every 6 (Six) Hours As Needed for Moderate Pain .   • umeclidinium-vilanterol (ANORO ELLIPTA) 62.5-25 MCG/INH aerosol powder  inhaler Inhale 1 puff Daily.   • vitamin D (ERGOCALCIFEROL) 62599 units capsule capsule Take 1 capsule by mouth Every 30 (Thirty)  "Days.   • [DISCONTINUED] nitrofurantoin, macrocrystal-monohydrate, (MACROBID) 100 MG capsule Take 1 capsule by mouth 2 (Two) Times a Day.   • [DISCONTINUED] phenazopyridine (PYRIDIUM) 100 MG tablet Take 1 tablet by mouth 3 (Three) Times a Day As Needed for bladder spasms.     No current facility-administered medications on file prior to visit.      She is allergic to ace inhibitors; baclofen; and lisinopril..    Review of Systems   Constitutional: Negative.  Negative for chills, diaphoresis, fatigue and fever.   HENT: Negative.    Eyes: Negative.    Respiratory: Negative for shortness of breath.    Cardiovascular: Negative.  Negative for chest pain and orthopnea.   Gastrointestinal: Negative.  Negative for blood in stool.   Endocrine: Negative.    Genitourinary: Positive for hesitancy and urgency.   Musculoskeletal: Positive for arthralgias.   Skin: Negative.    Allergic/Immunologic: Negative.    Neurological: Negative.    Hematological: Negative.    Psychiatric/Behavioral: Negative.  Negative for confusion.       Objective    Visit Vitals  /60   Ht 172.7 cm (68\")   Wt 64.9 kg (143 lb)   LMP  (LMP Unknown)   BMI 21.74 kg/m²       Physical Exam   Constitutional: She is oriented to person, place, and time. She appears well-developed and well-nourished.   Arrives via clinical wheelchair   HENT:   Head: Normocephalic.   Right Ear: External ear normal.   Left Ear: External ear normal.   Eyes: EOM are normal. Pupils are equal, round, and reactive to light.   Neck: Normal range of motion. Neck supple.   Cardiovascular: Normal rate, regular rhythm and normal heart sounds.   2+ pitting edema to bilateral lower legs, ankles and feet   Pulmonary/Chest: Effort normal and breath sounds normal.   Abdominal: Soft. Bowel sounds are normal.   Musculoskeletal: Normal range of motion.   Neurological: She is alert and oriented to person, place, and time.   Skin: Skin is warm. Capillary refill takes less than 2 seconds. "   Psychiatric: She has a normal mood and affect. Her behavior is normal.   Nursing note and vitals reviewed.      Assessment/Plan   Problems Addressed this Visit        Cardiovascular and Mediastinum    Essential hypertension - Primary    Relevant Orders    Comprehensive Metabolic Panel    CBC & Differential    Chronic systolic congestive heart failure (CMS/HCC)       Respiratory    Chronic obstructive pulmonary disease (CMS/HCC)       Endocrine    Acquired hypothyroidism    Relevant Orders    TSH      Other Visit Diagnoses     Dysuria        Relevant Orders    Urinalysis With Culture If Indicated -      No orders of the defined types were placed in this encounter.    1.  Essential hypertension  Continue on Coreg as previously prescribed  Encouraged to reduce sodium intake to no more than 2000 mg per day  Complete CBC and chemistry panel as ordered and will notify results when available    2.  Chronic obstructive pulmonary disease:   Continue on albuterol and Ventolin inhalers previously prescribed  Continue on DuoNeb nebulizer treatments as previously prescribed  Encouraged to seek emergency medical treatment for any new or worsening shortness of breath, chest pain or edema  Will continue pulmonology follow-up with Dr. Bryan Jay and appointment scheduled by my office for July 16, 2019 at 10 am     3.  Chronic systolic congestive heart failure:  Continue on Lasix as previously prescribed  Encouraged to seek emergency medical treatment for any new, worsening edema, shortness of breath or unexpected weight gain of greater than 2 to 3 pounds  Encouraged the use of compression stockings daily     4.  Acquired hypothyroidism:  Complete TSH as ordered and will notify of results when available   Continue on Synthroid as previously prescribed    5.  Dysuria:  Complete urinalysis as ordered and will notify results when available  Urged to increase fluids, especially water, to promote hydration and elimination  Discussed  the use of cranberry juice for acidity purposes    Continue on current medications as previously prescribed   Return in about 4 weeks (around 8/12/2019) for Recheck.        This document has been electronically signed by CRESENCIO Hall on July 15, 2019 1:23 PM

## 2019-07-16 ENCOUNTER — OFFICE VISIT (OUTPATIENT)
Dept: PULMONOLOGY | Facility: CLINIC | Age: 84
End: 2019-07-16

## 2019-07-16 VITALS
DIASTOLIC BLOOD PRESSURE: 67 MMHG | OXYGEN SATURATION: 94 % | HEIGHT: 68 IN | SYSTOLIC BLOOD PRESSURE: 114 MMHG | BODY MASS INDEX: 22.01 KG/M2 | HEART RATE: 85 BPM | WEIGHT: 145.2 LBS

## 2019-07-16 DIAGNOSIS — J43.1 PANLOBULAR EMPHYSEMA (HCC): Primary | ICD-10-CM

## 2019-07-16 LAB
ALBUMIN SERPL-MCNC: 3.7 G/DL (ref 3.5–5.2)
ALBUMIN/GLOB SERPL: 1.3 G/DL
ALP SERPL-CCNC: 77 U/L (ref 39–117)
ALT SERPL W P-5'-P-CCNC: 10 U/L (ref 1–33)
ANION GAP SERPL CALCULATED.3IONS-SCNC: 14 MMOL/L (ref 5–15)
AST SERPL-CCNC: 17 U/L (ref 1–32)
BACTERIA UR QL AUTO: ABNORMAL /HPF
BASOPHILS # BLD AUTO: 0.06 10*3/MM3 (ref 0–0.2)
BASOPHILS NFR BLD AUTO: 0.6 % (ref 0–1.5)
BILIRUB SERPL-MCNC: 0.3 MG/DL (ref 0.2–1.2)
BILIRUB UR QL STRIP: NEGATIVE
BUN BLD-MCNC: 12 MG/DL (ref 8–23)
BUN/CREAT SERPL: 11.7 (ref 7–25)
CALCIUM SPEC-SCNC: 9.1 MG/DL (ref 8.6–10.5)
CHLORIDE SERPL-SCNC: 94 MMOL/L (ref 98–107)
CLARITY UR: ABNORMAL
CO2 SERPL-SCNC: 29 MMOL/L (ref 22–29)
COLOR UR: YELLOW
CREAT BLD-MCNC: 1.03 MG/DL (ref 0.57–1)
DEPRECATED RDW RBC AUTO: 46.3 FL (ref 37–54)
EOSINOPHIL # BLD AUTO: 0.38 10*3/MM3 (ref 0–0.4)
EOSINOPHIL NFR BLD AUTO: 3.8 % (ref 0.3–6.2)
ERYTHROCYTE [DISTWIDTH] IN BLOOD BY AUTOMATED COUNT: 13.1 % (ref 12.3–15.4)
GFR SERPL CREATININE-BSD FRML MDRD: 51 ML/MIN/1.73
GLOBULIN UR ELPH-MCNC: 2.8 GM/DL
GLUCOSE BLD-MCNC: 102 MG/DL (ref 65–99)
GLUCOSE UR STRIP-MCNC: NEGATIVE MG/DL
HCT VFR BLD AUTO: 30.3 % (ref 34–46.6)
HGB BLD-MCNC: 10 G/DL (ref 12–15.9)
HGB UR QL STRIP.AUTO: ABNORMAL
HYALINE CASTS UR QL AUTO: ABNORMAL /LPF
IMM GRANULOCYTES # BLD AUTO: 0.04 10*3/MM3 (ref 0–0.05)
IMM GRANULOCYTES NFR BLD AUTO: 0.4 % (ref 0–0.5)
KETONES UR QL STRIP: NEGATIVE
LEUKOCYTE ESTERASE UR QL STRIP.AUTO: ABNORMAL
LYMPHOCYTES # BLD AUTO: 2.94 10*3/MM3 (ref 0.7–3.1)
LYMPHOCYTES NFR BLD AUTO: 29.5 % (ref 19.6–45.3)
MCH RBC QN AUTO: 31.9 PG (ref 26.6–33)
MCHC RBC AUTO-ENTMCNC: 33 G/DL (ref 31.5–35.7)
MCV RBC AUTO: 96.8 FL (ref 79–97)
MONOCYTES # BLD AUTO: 0.57 10*3/MM3 (ref 0.1–0.9)
MONOCYTES NFR BLD AUTO: 5.7 % (ref 5–12)
NEUTROPHILS # BLD AUTO: 5.98 10*3/MM3 (ref 1.7–7)
NEUTROPHILS NFR BLD AUTO: 60 % (ref 42.7–76)
NITRITE UR QL STRIP: NEGATIVE
NRBC BLD AUTO-RTO: 0 /100 WBC (ref 0–0.2)
PH UR STRIP.AUTO: 7 [PH] (ref 5–8)
PLATELET # BLD AUTO: 251 10*3/MM3 (ref 140–450)
PMV BLD AUTO: 11.7 FL (ref 6–12)
POTASSIUM BLD-SCNC: 3.3 MMOL/L (ref 3.5–5.2)
PROT SERPL-MCNC: 6.5 G/DL (ref 6–8.5)
PROT UR QL STRIP: NEGATIVE
RBC # BLD AUTO: 3.13 10*6/MM3 (ref 3.77–5.28)
RBC # UR: ABNORMAL /HPF
REF LAB TEST METHOD: ABNORMAL
SODIUM BLD-SCNC: 137 MMOL/L (ref 136–145)
SP GR UR STRIP: 1.01 (ref 1–1.03)
SQUAMOUS #/AREA URNS HPF: ABNORMAL /HPF
TSH SERPL DL<=0.05 MIU/L-ACNC: 2.93 MIU/ML (ref 0.27–4.2)
UROBILINOGEN UR QL STRIP: ABNORMAL
WBC NRBC COR # BLD: 9.97 10*3/MM3 (ref 3.4–10.8)
WBC UR QL AUTO: ABNORMAL /HPF

## 2019-07-16 PROCEDURE — 99214 OFFICE O/P EST MOD 30 MIN: CPT | Performed by: INTERNAL MEDICINE

## 2019-07-16 RX ORDER — PREDNISONE 10 MG/1
TABLET ORAL
Qty: 21 TABLET | Refills: 0 | Status: SHIPPED | OUTPATIENT
Start: 2019-07-16 | End: 2019-07-30

## 2019-07-16 NOTE — PROGRESS NOTES
"This 86-year-old lady has advanced emphysema and hypoxemia.  She is more short of breath than usual and she is had some pedal edema.  She denies purulent sputum or chills or fever    The following portions of the patient's history were reviewed and updated as appropriate: current medications, past family history, past medical history, past social history, past surgical history and problem list.      ROS    Constitutional-no night sweats weight loss headaches  GI no abdominal pain nausea or diarrhea  Neuro no seizure or neurologic deficits  Musculoskeletal no deformity or joint pain   no dysuria or hematuria  Skin no rash or other lesions  All other systems reviewed and were negative except for the above.      Physical Exam  /67   Pulse 85   Ht 172.7 cm (68\")   Wt 65.9 kg (145 lb 3.2 oz)   LMP  (LMP Unknown)   SpO2 94%   BMI 22.08 kg/m²   Vital signs as above  Pupils equally round and reactive to light and accommodation, neck no JVD or adenopathy.  Cardiovascular regular rhythm and rate no murmur or gallop.  Abdomen soft no organomegaly tenderness.  Extremities no clubbing cyanosis or edema.  No cervical adenopathy.  No skin rash.  Neurologic good strength bilaterally without deficits  Dyspneic elderly white female lungs are quiet, respiratory rate in the high 20s and unlabored with use of accessory muscles of respiration    Impression emphysema with exacerbation, edema probably secondary to right heart failure    Plan use oxygen 24 hours a day, Daliresp, a burst of p.o. steroids, return in 2 weeks        This document has been produced with the assistance of Dragon "[employer]ation  This document has been electronically signed by Bryan Jay MD on July 16, 2019 10:12 AM      "

## 2019-07-17 ENCOUNTER — TELEPHONE (OUTPATIENT)
Dept: FAMILY MEDICINE CLINIC | Facility: CLINIC | Age: 84
End: 2019-07-17

## 2019-07-18 ENCOUNTER — TELEPHONE (OUTPATIENT)
Dept: FAMILY MEDICINE CLINIC | Facility: CLINIC | Age: 84
End: 2019-07-18

## 2019-07-18 DIAGNOSIS — N30.00 ACUTE CYSTITIS WITHOUT HEMATURIA: ICD-10-CM

## 2019-07-18 DIAGNOSIS — N30.01 ACUTE CYSTITIS WITH HEMATURIA: Primary | ICD-10-CM

## 2019-07-18 DIAGNOSIS — A49.9 GRAM-NEGATIVE INFECTION: ICD-10-CM

## 2019-07-18 LAB — BACTERIA SPEC AEROBE CULT: ABNORMAL

## 2019-07-18 RX ORDER — NITROFURANTOIN 25; 75 MG/1; MG/1
100 CAPSULE ORAL 2 TIMES DAILY
Qty: 14 CAPSULE | Refills: 0 | Status: SHIPPED | OUTPATIENT
Start: 2019-07-18 | End: 2019-07-25

## 2019-07-18 NOTE — TELEPHONE ENCOUNTER
Per CRESENCIO Pineda, Ms. Arteaga has been called with recent lab results & recommendations.  Continue current medications and follow-up as planned or sooner if any problems.        ----- Message from CRESENCIO Hall sent at 7/18/2019  7:06 AM CDT -----  Urine culture came back positive for gram-negative bacilli.  I have sent in an antibiotic to her pharmacy for Macrobid.  She will take this twice a day for 7 days.  She needs to increase her water intake.  Creatinine was still slightly elevated but overall kidney functions have improved.  Potassium was slightly low at 3.3 so she needs to increase the potassium rich foods in her diet such as bananas and potatoes skins.  Blood count remains a little low but has improved from 3 months ago.  She needs to continue her medications as prescribed.  Thank you.

## 2019-07-18 NOTE — PROGRESS NOTES
Per CRESENCIO Pineda, Ms. Arteaga has been called with recent lab results & recommendations.  Continue current medications and follow-up as planned or sooner if any problems.

## 2019-07-30 ENCOUNTER — OFFICE VISIT (OUTPATIENT)
Dept: PULMONOLOGY | Facility: CLINIC | Age: 84
End: 2019-07-30

## 2019-07-30 VITALS
DIASTOLIC BLOOD PRESSURE: 60 MMHG | HEIGHT: 68 IN | BODY MASS INDEX: 21.49 KG/M2 | HEART RATE: 85 BPM | WEIGHT: 141.8 LBS | OXYGEN SATURATION: 93 % | SYSTOLIC BLOOD PRESSURE: 113 MMHG

## 2019-07-30 DIAGNOSIS — J43.1 PANLOBULAR EMPHYSEMA (HCC): Primary | ICD-10-CM

## 2019-07-30 DIAGNOSIS — R09.02 EXERCISE HYPOXEMIA: ICD-10-CM

## 2019-07-30 PROCEDURE — 99213 OFFICE O/P EST LOW 20 MIN: CPT | Performed by: INTERNAL MEDICINE

## 2019-07-30 NOTE — PROGRESS NOTES
"This lady has advanced emphysema with hypoxemia on minimal exertion.  She still short of breath.  Daliresp did not benefit her.  She is not producing purulent sputum    ROS    Constitutional-no night sweats weight loss headaches  GI no abdominal pain nausea or diarrhea  Neuro no seizure or neurologic deficits  Musculoskeletal no deformity or joint pain   no dysuria or hematuria  Skin no rash or other lesions  All other systems reviewed and were negative except for the above.      Physical Exam  /60   Pulse 85   Ht 172.7 cm (68\")   Wt 64.3 kg (141 lb 12.8 oz)   LMP  (LMP Unknown)   SpO2 93%   BMI 21.56 kg/m²   Vital signs as above  Pupils equally round and reactive to light and accommodation, neck no JVD or adenopathy.  Cardiovascular regular rhythm and rate no murmur or gallop.  Abdomen soft no organomegaly tenderness.  Extremities no clubbing cyanosis or edema.  No cervical adenopathy.  No skin rash.  Neurologic good strength bilaterally without deficits  Chronically ill-appearing dyspneic white female, lungs reveal diminished breath sounds minimal wheeze and a respiratory rate in the 20s    Impression advanced emphysema with exertional dyspnea and hypoxemia    Plan continue present medications, will write a prescription for an O2 concentrator that is portable however I have had little luck in getting insurance companies to pay for these    Return in 1 month        This document has been produced with the assistance of Dragon dictation  This document has been electronically signed by Bryan Jay MD on July 30, 2019 10:36 AM      "

## 2019-08-06 ENCOUNTER — OUTSIDE FACILITY SERVICE (OUTPATIENT)
Dept: FAMILY MEDICINE CLINIC | Facility: CLINIC | Age: 84
End: 2019-08-06

## 2019-08-06 PROCEDURE — G0179 MD RECERTIFICATION HHA PT: HCPCS | Performed by: GENERAL PRACTICE

## 2019-08-14 ENCOUNTER — APPOINTMENT (OUTPATIENT)
Dept: GENERAL RADIOLOGY | Facility: HOSPITAL | Age: 84
End: 2019-08-14

## 2019-08-14 ENCOUNTER — HOSPITAL ENCOUNTER (OUTPATIENT)
Facility: HOSPITAL | Age: 84
Setting detail: OBSERVATION
Discharge: HOME HEALTH | End: 2019-08-16
Attending: EMERGENCY MEDICINE | Admitting: EMERGENCY MEDICINE

## 2019-08-14 ENCOUNTER — LAB (OUTPATIENT)
Dept: LAB | Facility: HOSPITAL | Age: 84
End: 2019-08-14

## 2019-08-14 DIAGNOSIS — R06.02 SHORTNESS OF BREATH: Primary | ICD-10-CM

## 2019-08-14 DIAGNOSIS — R77.8 ELEVATED TROPONIN I LEVEL: ICD-10-CM

## 2019-08-14 DIAGNOSIS — R07.9 CHEST PAIN, UNSPECIFIED TYPE: ICD-10-CM

## 2019-08-14 DIAGNOSIS — E87.6 HYPOKALEMIA: ICD-10-CM

## 2019-08-14 DIAGNOSIS — R06.00 DYSPNEA, UNSPECIFIED TYPE: ICD-10-CM

## 2019-08-14 DIAGNOSIS — I49.5 SSS (SICK SINUS SYNDROME) (HCC): ICD-10-CM

## 2019-08-14 DIAGNOSIS — I25.10 ATHEROSCLEROSIS OF NATIVE CORONARY ARTERY OF NATIVE HEART, ANGINA PRESENCE UNSPECIFIED: ICD-10-CM

## 2019-08-14 DIAGNOSIS — R77.8 TROPONIN LEVEL ELEVATED: ICD-10-CM

## 2019-08-14 DIAGNOSIS — R06.00 DYSPNEA, UNSPECIFIED TYPE: Primary | ICD-10-CM

## 2019-08-14 DIAGNOSIS — I50.22 CHRONIC SYSTOLIC CONGESTIVE HEART FAILURE (HCC): ICD-10-CM

## 2019-08-14 LAB
ALBUMIN SERPL-MCNC: 3.3 G/DL (ref 3.5–5.2)
ALBUMIN SERPL-MCNC: 3.4 G/DL (ref 3.5–5.2)
ALBUMIN/GLOB SERPL: 0.9 G/DL
ALBUMIN/GLOB SERPL: 1 G/DL
ALP SERPL-CCNC: 61 U/L (ref 39–117)
ALP SERPL-CCNC: 66 U/L (ref 39–117)
ALT SERPL W P-5'-P-CCNC: 7 U/L (ref 1–33)
ALT SERPL W P-5'-P-CCNC: 8 U/L (ref 1–33)
ANION GAP SERPL CALCULATED.3IONS-SCNC: 12 MMOL/L (ref 5–15)
ANION GAP SERPL CALCULATED.3IONS-SCNC: 14 MMOL/L (ref 5–15)
AST SERPL-CCNC: 12 U/L (ref 1–32)
AST SERPL-CCNC: 14 U/L (ref 1–32)
BASOPHILS # BLD AUTO: 0.04 10*3/MM3 (ref 0–0.2)
BASOPHILS # BLD AUTO: 0.05 10*3/MM3 (ref 0–0.2)
BASOPHILS NFR BLD AUTO: 0.6 % (ref 0–1.5)
BASOPHILS NFR BLD AUTO: 0.7 % (ref 0–1.5)
BILIRUB SERPL-MCNC: 0.2 MG/DL (ref 0.2–1.2)
BILIRUB SERPL-MCNC: 0.3 MG/DL (ref 0.2–1.2)
BUN BLD-MCNC: 22 MG/DL (ref 8–23)
BUN BLD-MCNC: 25 MG/DL (ref 8–23)
BUN/CREAT SERPL: 17.1 (ref 7–25)
BUN/CREAT SERPL: 21.2 (ref 7–25)
CALCIUM SPEC-SCNC: 9.1 MG/DL (ref 8.6–10.5)
CALCIUM SPEC-SCNC: 9.2 MG/DL (ref 8.6–10.5)
CHLORIDE SERPL-SCNC: 97 MMOL/L (ref 98–107)
CHLORIDE SERPL-SCNC: 98 MMOL/L (ref 98–107)
CO2 SERPL-SCNC: 28 MMOL/L (ref 22–29)
CO2 SERPL-SCNC: 30 MMOL/L (ref 22–29)
CREAT BLD-MCNC: 1.18 MG/DL (ref 0.57–1)
CREAT BLD-MCNC: 1.29 MG/DL (ref 0.57–1)
D-DIMER, QUANTITATIVE (MAD,POW, STR): 436 NG/ML (FEU) (ref 0–470)
DEPRECATED RDW RBC AUTO: 48.4 FL (ref 37–54)
DEPRECATED RDW RBC AUTO: 48.6 FL (ref 37–54)
EOSINOPHIL # BLD AUTO: 0.46 10*3/MM3 (ref 0–0.4)
EOSINOPHIL # BLD AUTO: 0.52 10*3/MM3 (ref 0–0.4)
EOSINOPHIL NFR BLD AUTO: 6.6 % (ref 0.3–6.2)
EOSINOPHIL NFR BLD AUTO: 7.7 % (ref 0.3–6.2)
ERYTHROCYTE [DISTWIDTH] IN BLOOD BY AUTOMATED COUNT: 13.6 % (ref 12.3–15.4)
ERYTHROCYTE [DISTWIDTH] IN BLOOD BY AUTOMATED COUNT: 13.7 % (ref 12.3–15.4)
FERRITIN SERPL-MCNC: 288.7 NG/ML (ref 13–150)
GFR SERPL CREATININE-BSD FRML MDRD: 39 ML/MIN/1.73
GFR SERPL CREATININE-BSD FRML MDRD: 43 ML/MIN/1.73
GLOBULIN UR ELPH-MCNC: 3.4 GM/DL
GLOBULIN UR ELPH-MCNC: 3.6 GM/DL
GLUCOSE BLD-MCNC: 106 MG/DL (ref 65–99)
GLUCOSE BLD-MCNC: 95 MG/DL (ref 65–99)
HCT VFR BLD AUTO: 26.8 % (ref 34–46.6)
HCT VFR BLD AUTO: 29 % (ref 34–46.6)
HGB BLD-MCNC: 8.9 G/DL (ref 12–15.9)
HGB BLD-MCNC: 9.6 G/DL (ref 12–15.9)
HOLD SPECIMEN: NORMAL
HOLD SPECIMEN: NORMAL
IMM GRANULOCYTES # BLD AUTO: 0.02 10*3/MM3 (ref 0–0.05)
IMM GRANULOCYTES # BLD AUTO: 0.03 10*3/MM3 (ref 0–0.05)
IMM GRANULOCYTES NFR BLD AUTO: 0.3 % (ref 0–0.5)
IMM GRANULOCYTES NFR BLD AUTO: 0.4 % (ref 0–0.5)
IRON 24H UR-MRATE: 35 MCG/DL (ref 37–145)
IRON SATN MFR SERPL: 15 % (ref 20–50)
LYMPHOCYTES # BLD AUTO: 1.82 10*3/MM3 (ref 0.7–3.1)
LYMPHOCYTES # BLD AUTO: 2.02 10*3/MM3 (ref 0.7–3.1)
LYMPHOCYTES NFR BLD AUTO: 26.1 % (ref 19.6–45.3)
LYMPHOCYTES NFR BLD AUTO: 30 % (ref 19.6–45.3)
MAGNESIUM SERPL-MCNC: 1.8 MG/DL (ref 1.6–2.4)
MCH RBC QN AUTO: 31.8 PG (ref 26.6–33)
MCH RBC QN AUTO: 32 PG (ref 26.6–33)
MCHC RBC AUTO-ENTMCNC: 33.1 G/DL (ref 31.5–35.7)
MCHC RBC AUTO-ENTMCNC: 33.2 G/DL (ref 31.5–35.7)
MCV RBC AUTO: 96 FL (ref 79–97)
MCV RBC AUTO: 96.4 FL (ref 79–97)
MONOCYTES # BLD AUTO: 0.49 10*3/MM3 (ref 0.1–0.9)
MONOCYTES # BLD AUTO: 0.64 10*3/MM3 (ref 0.1–0.9)
MONOCYTES NFR BLD AUTO: 7 % (ref 5–12)
MONOCYTES NFR BLD AUTO: 9.5 % (ref 5–12)
NEUTROPHILS # BLD AUTO: 3.48 10*3/MM3 (ref 1.7–7)
NEUTROPHILS # BLD AUTO: 4.13 10*3/MM3 (ref 1.7–7)
NEUTROPHILS NFR BLD AUTO: 51.8 % (ref 42.7–76)
NEUTROPHILS NFR BLD AUTO: 59.3 % (ref 42.7–76)
NRBC BLD AUTO-RTO: 0 /100 WBC (ref 0–0.2)
NRBC BLD AUTO-RTO: 0 /100 WBC (ref 0–0.2)
NT-PROBNP SERPL-MCNC: 2166 PG/ML (ref 5–1800)
PLATELET # BLD AUTO: 298 10*3/MM3 (ref 140–450)
PLATELET # BLD AUTO: 347 10*3/MM3 (ref 140–450)
PMV BLD AUTO: 9.6 FL (ref 6–12)
PMV BLD AUTO: 9.9 FL (ref 6–12)
POTASSIUM BLD-SCNC: 3.1 MMOL/L (ref 3.5–5.2)
POTASSIUM BLD-SCNC: 3.4 MMOL/L (ref 3.5–5.2)
PROT SERPL-MCNC: 6.8 G/DL (ref 6–8.5)
PROT SERPL-MCNC: 6.9 G/DL (ref 6–8.5)
RBC # BLD AUTO: 2.78 10*6/MM3 (ref 3.77–5.28)
RBC # BLD AUTO: 3.02 10*6/MM3 (ref 3.77–5.28)
SODIUM BLD-SCNC: 139 MMOL/L (ref 136–145)
SODIUM BLD-SCNC: 140 MMOL/L (ref 136–145)
TIBC SERPL-MCNC: 231 MCG/DL (ref 298–536)
TRANSFERRIN SERPL-MCNC: 155 MG/DL (ref 200–360)
TROPONIN T SERPL-MCNC: 0.12 NG/ML (ref 0–0.03)
WBC NRBC COR # BLD: 6.73 10*3/MM3 (ref 3.4–10.8)
WBC NRBC COR # BLD: 6.97 10*3/MM3 (ref 3.4–10.8)
WHOLE BLOOD HOLD SPECIMEN: NORMAL
WHOLE BLOOD HOLD SPECIMEN: NORMAL

## 2019-08-14 PROCEDURE — 93005 ELECTROCARDIOGRAM TRACING: CPT | Performed by: EMERGENCY MEDICINE

## 2019-08-14 PROCEDURE — 83540 ASSAY OF IRON: CPT | Performed by: FAMILY MEDICINE

## 2019-08-14 PROCEDURE — 85379 FIBRIN DEGRADATION QUANT: CPT

## 2019-08-14 PROCEDURE — G0378 HOSPITAL OBSERVATION PER HR: HCPCS

## 2019-08-14 PROCEDURE — 80053 COMPREHEN METABOLIC PANEL: CPT

## 2019-08-14 PROCEDURE — 82728 ASSAY OF FERRITIN: CPT | Performed by: FAMILY MEDICINE

## 2019-08-14 PROCEDURE — 93005 ELECTROCARDIOGRAM TRACING: CPT

## 2019-08-14 PROCEDURE — 83735 ASSAY OF MAGNESIUM: CPT | Performed by: FAMILY MEDICINE

## 2019-08-14 PROCEDURE — 85025 COMPLETE CBC W/AUTO DIFF WBC: CPT | Performed by: EMERGENCY MEDICINE

## 2019-08-14 PROCEDURE — 84484 ASSAY OF TROPONIN QUANT: CPT | Performed by: EMERGENCY MEDICINE

## 2019-08-14 PROCEDURE — 25010000002 FUROSEMIDE PER 20 MG: Performed by: EMERGENCY MEDICINE

## 2019-08-14 PROCEDURE — 84484 ASSAY OF TROPONIN QUANT: CPT

## 2019-08-14 PROCEDURE — 84484 ASSAY OF TROPONIN QUANT: CPT | Performed by: FAMILY MEDICINE

## 2019-08-14 PROCEDURE — 94640 AIRWAY INHALATION TREATMENT: CPT

## 2019-08-14 PROCEDURE — 94799 UNLISTED PULMONARY SVC/PX: CPT

## 2019-08-14 PROCEDURE — 94760 N-INVAS EAR/PLS OXIMETRY 1: CPT

## 2019-08-14 PROCEDURE — 99285 EMERGENCY DEPT VISIT HI MDM: CPT

## 2019-08-14 PROCEDURE — 96374 THER/PROPH/DIAG INJ IV PUSH: CPT

## 2019-08-14 PROCEDURE — 93010 ELECTROCARDIOGRAM REPORT: CPT | Performed by: INTERNAL MEDICINE

## 2019-08-14 PROCEDURE — 85025 COMPLETE CBC W/AUTO DIFF WBC: CPT

## 2019-08-14 PROCEDURE — 80053 COMPREHEN METABOLIC PANEL: CPT | Performed by: EMERGENCY MEDICINE

## 2019-08-14 PROCEDURE — 71046 X-RAY EXAM CHEST 2 VIEWS: CPT

## 2019-08-14 PROCEDURE — 36415 COLL VENOUS BLD VENIPUNCTURE: CPT

## 2019-08-14 PROCEDURE — 84466 ASSAY OF TRANSFERRIN: CPT | Performed by: FAMILY MEDICINE

## 2019-08-14 PROCEDURE — 83880 ASSAY OF NATRIURETIC PEPTIDE: CPT | Performed by: EMERGENCY MEDICINE

## 2019-08-14 RX ORDER — SODIUM CHLORIDE 0.9 % (FLUSH) 0.9 %
3-10 SYRINGE (ML) INJECTION AS NEEDED
Status: DISCONTINUED | OUTPATIENT
Start: 2019-08-14 | End: 2019-08-16 | Stop reason: HOSPADM

## 2019-08-14 RX ORDER — ESOMEPRAZOLE MAGNESIUM 40 MG/1
CAPSULE, DELAYED RELEASE ORAL
Qty: 30 CAPSULE | Refills: 0 | Status: SHIPPED | OUTPATIENT
Start: 2019-08-14 | End: 2019-10-21 | Stop reason: SDUPTHER

## 2019-08-14 RX ORDER — SODIUM CHLORIDE 0.9 % (FLUSH) 0.9 %
3 SYRINGE (ML) INJECTION EVERY 12 HOURS SCHEDULED
Status: DISCONTINUED | OUTPATIENT
Start: 2019-08-14 | End: 2019-08-16 | Stop reason: HOSPADM

## 2019-08-14 RX ORDER — POTASSIUM CHLORIDE 750 MG/1
40 CAPSULE, EXTENDED RELEASE ORAL ONCE
Status: COMPLETED | OUTPATIENT
Start: 2019-08-14 | End: 2019-08-14

## 2019-08-14 RX ORDER — CARVEDILOL 3.12 MG/1
3.12 TABLET ORAL 2 TIMES DAILY WITH MEALS
Status: DISCONTINUED | OUTPATIENT
Start: 2019-08-14 | End: 2019-08-16 | Stop reason: HOSPADM

## 2019-08-14 RX ORDER — POTASSIUM CHLORIDE 7.45 MG/ML
10 INJECTION INTRAVENOUS
Status: DISCONTINUED | OUTPATIENT
Start: 2019-08-14 | End: 2019-08-16 | Stop reason: HOSPADM

## 2019-08-14 RX ORDER — RANOLAZINE 500 MG/1
500 TABLET, EXTENDED RELEASE ORAL 2 TIMES DAILY
Status: DISCONTINUED | OUTPATIENT
Start: 2019-08-14 | End: 2019-08-16 | Stop reason: HOSPADM

## 2019-08-14 RX ORDER — FENTANYL CITRATE 50 UG/ML
50 INJECTION, SOLUTION INTRAMUSCULAR; INTRAVENOUS ONCE
Status: DISCONTINUED | OUTPATIENT
Start: 2019-08-14 | End: 2019-08-14

## 2019-08-14 RX ORDER — DOCUSATE SODIUM 100 MG/1
100 CAPSULE, LIQUID FILLED ORAL 2 TIMES DAILY
Status: DISCONTINUED | OUTPATIENT
Start: 2019-08-14 | End: 2019-08-16 | Stop reason: HOSPADM

## 2019-08-14 RX ORDER — SODIUM CHLORIDE 0.9 % (FLUSH) 0.9 %
10 SYRINGE (ML) INJECTION AS NEEDED
Status: DISCONTINUED | OUTPATIENT
Start: 2019-08-14 | End: 2019-08-16 | Stop reason: HOSPADM

## 2019-08-14 RX ORDER — MONTELUKAST SODIUM 10 MG/1
10 TABLET ORAL NIGHTLY
Status: DISCONTINUED | OUTPATIENT
Start: 2019-08-14 | End: 2019-08-16 | Stop reason: HOSPADM

## 2019-08-14 RX ORDER — FAMOTIDINE 40 MG/1
40 TABLET, FILM COATED ORAL DAILY
Status: DISCONTINUED | OUTPATIENT
Start: 2019-08-15 | End: 2019-08-16 | Stop reason: HOSPADM

## 2019-08-14 RX ORDER — FUROSEMIDE 10 MG/ML
40 INJECTION INTRAMUSCULAR; INTRAVENOUS ONCE
Status: COMPLETED | OUTPATIENT
Start: 2019-08-14 | End: 2019-08-14

## 2019-08-14 RX ORDER — ATORVASTATIN CALCIUM 20 MG/1
20 TABLET, FILM COATED ORAL DAILY
Status: DISCONTINUED | OUTPATIENT
Start: 2019-08-15 | End: 2019-08-16 | Stop reason: HOSPADM

## 2019-08-14 RX ORDER — IPRATROPIUM BROMIDE AND ALBUTEROL SULFATE 2.5; .5 MG/3ML; MG/3ML
3 SOLUTION RESPIRATORY (INHALATION)
Status: DISCONTINUED | OUTPATIENT
Start: 2019-08-15 | End: 2019-08-15

## 2019-08-14 RX ORDER — ONDANSETRON 2 MG/ML
4 INJECTION INTRAMUSCULAR; INTRAVENOUS EVERY 6 HOURS PRN
Status: DISCONTINUED | OUTPATIENT
Start: 2019-08-14 | End: 2019-08-16 | Stop reason: HOSPADM

## 2019-08-14 RX ORDER — FUROSEMIDE 40 MG/1
40 TABLET ORAL DAILY
Status: DISCONTINUED | OUTPATIENT
Start: 2019-08-15 | End: 2019-08-16 | Stop reason: HOSPADM

## 2019-08-14 RX ORDER — ASPIRIN 81 MG/1
81 TABLET ORAL DAILY
Status: DISCONTINUED | OUTPATIENT
Start: 2019-08-15 | End: 2019-08-16 | Stop reason: HOSPADM

## 2019-08-14 RX ORDER — LEVOTHYROXINE SODIUM 0.05 MG/1
50 TABLET ORAL EVERY MORNING
Status: DISCONTINUED | OUTPATIENT
Start: 2019-08-15 | End: 2019-08-16 | Stop reason: HOSPADM

## 2019-08-14 RX ORDER — POTASSIUM CHLORIDE 1.5 G/1.77G
40 POWDER, FOR SOLUTION ORAL AS NEEDED
Status: DISCONTINUED | OUTPATIENT
Start: 2019-08-14 | End: 2019-08-16 | Stop reason: HOSPADM

## 2019-08-14 RX ORDER — FERROUS SULFATE TAB EC 324 MG (65 MG FE EQUIVALENT) 324 (65 FE) MG
324 TABLET DELAYED RESPONSE ORAL 2 TIMES DAILY WITH MEALS
Status: DISCONTINUED | OUTPATIENT
Start: 2019-08-15 | End: 2019-08-16 | Stop reason: HOSPADM

## 2019-08-14 RX ORDER — POTASSIUM CHLORIDE 750 MG/1
40 CAPSULE, EXTENDED RELEASE ORAL AS NEEDED
Status: DISCONTINUED | OUTPATIENT
Start: 2019-08-14 | End: 2019-08-16 | Stop reason: HOSPADM

## 2019-08-14 RX ADMIN — NITROGLYCERIN 1 INCH: 20 OINTMENT TOPICAL at 20:21

## 2019-08-14 RX ADMIN — CARVEDILOL 3.12 MG: 3.12 TABLET, FILM COATED ORAL at 22:27

## 2019-08-14 RX ADMIN — RANOLAZINE 500 MG: 500 TABLET, FILM COATED, EXTENDED RELEASE ORAL at 22:27

## 2019-08-14 RX ADMIN — FUROSEMIDE 40 MG: 10 INJECTION, SOLUTION INTRAMUSCULAR; INTRAVENOUS at 20:19

## 2019-08-14 RX ADMIN — DOCUSATE SODIUM 100 MG: 100 CAPSULE, LIQUID FILLED ORAL at 22:27

## 2019-08-14 RX ADMIN — MONTELUKAST SODIUM 10 MG: 10 TABLET, COATED ORAL at 22:27

## 2019-08-14 RX ADMIN — IPRATROPIUM BROMIDE AND ALBUTEROL SULFATE 3 ML: 2.5; .5 SOLUTION RESPIRATORY (INHALATION) at 22:03

## 2019-08-14 RX ADMIN — POTASSIUM CHLORIDE 40 MEQ: 750 CAPSULE, EXTENDED RELEASE ORAL at 20:04

## 2019-08-14 NOTE — ED NOTES
Pt presents to ED with C/O SOA that began yesterday. Pt was seen by Dr Rogel this AM, sent to lab, then was told to come to the ED.     Misyt Washington RN  08/14/19 [id number]

## 2019-08-15 ENCOUNTER — APPOINTMENT (OUTPATIENT)
Dept: CARDIOLOGY | Facility: HOSPITAL | Age: 84
End: 2019-08-15

## 2019-08-15 ENCOUNTER — EPISODE CHANGES (OUTPATIENT)
Dept: CASE MANAGEMENT | Facility: OTHER | Age: 84
End: 2019-08-15

## 2019-08-15 LAB
ANION GAP SERPL CALCULATED.3IONS-SCNC: 10 MMOL/L (ref 5–15)
BASOPHILS # BLD AUTO: 0.03 10*3/MM3 (ref 0–0.2)
BASOPHILS NFR BLD AUTO: 0.5 % (ref 0–1.5)
BH CV ECHO MEAS - AO MAX PG (FULL): 7.2 MMHG
BH CV ECHO MEAS - AO MAX PG: 13.7 MMHG
BH CV ECHO MEAS - AO MEAN PG (FULL): 4 MMHG
BH CV ECHO MEAS - AO MEAN PG: 8 MMHG
BH CV ECHO MEAS - AO V2 MAX: 185 CM/SEC
BH CV ECHO MEAS - AO V2 MEAN: 131 CM/SEC
BH CV ECHO MEAS - AO V2 VTI: 44.7 CM
BH CV ECHO MEAS - AVA(I,A): 2.5 CM^2
BH CV ECHO MEAS - AVA(I,D): 2.5 CM^2
BH CV ECHO MEAS - AVA(V,A): 2.6 CM^2
BH CV ECHO MEAS - AVA(V,D): 2.6 CM^2
BH CV ECHO MEAS - BSA(HAYCOCK): 1.7 M^2
BH CV ECHO MEAS - BSA: 1.8 M^2
BH CV ECHO MEAS - BZI_BMI: 21.3 KILOGRAMS/M^2
BH CV ECHO MEAS - BZI_METRIC_HEIGHT: 172.7 CM
BH CV ECHO MEAS - BZI_METRIC_WEIGHT: 63.5 KG
BH CV ECHO MEAS - EDV(CUBED): 65.9 ML
BH CV ECHO MEAS - EDV(TEICH): 71.7 ML
BH CV ECHO MEAS - EF(CUBED): 74.3 %
BH CV ECHO MEAS - EF(TEICH): 66.6 %
BH CV ECHO MEAS - ESV(CUBED): 17 ML
BH CV ECHO MEAS - ESV(TEICH): 23.9 ML
BH CV ECHO MEAS - FS: 36.4 %
BH CV ECHO MEAS - IVS/LVPW: 1.3
BH CV ECHO MEAS - IVSD: 1.6 CM
BH CV ECHO MEAS - LA DIMENSION: 3.7 CM
BH CV ECHO MEAS - LV MASS(C)D: 210.8 GRAMS
BH CV ECHO MEAS - LV MASS(C)DI: 120 GRAMS/M^2
BH CV ECHO MEAS - LV MAX PG: 6.5 MMHG
BH CV ECHO MEAS - LV MEAN PG: 4 MMHG
BH CV ECHO MEAS - LV V1 MAX: 127 CM/SEC
BH CV ECHO MEAS - LV V1 MEAN: 85.9 CM/SEC
BH CV ECHO MEAS - LV V1 VTI: 29.3 CM
BH CV ECHO MEAS - LVIDD: 4 CM
BH CV ECHO MEAS - LVIDS: 2.6 CM
BH CV ECHO MEAS - LVOT AREA (M): 3.8 CM^2
BH CV ECHO MEAS - LVOT AREA: 3.8 CM^2
BH CV ECHO MEAS - LVOT DIAM: 2.2 CM
BH CV ECHO MEAS - LVPWD: 1.2 CM
BH CV ECHO MEAS - MR MAX PG: 46.8 MMHG
BH CV ECHO MEAS - MR MAX VEL: 342 CM/SEC
BH CV ECHO MEAS - MV A MAX VEL: 79.8 CM/SEC
BH CV ECHO MEAS - MV DEC SLOPE: 390 CM/SEC^2
BH CV ECHO MEAS - MV E MAX VEL: 96.7 CM/SEC
BH CV ECHO MEAS - MV E/A: 1.2
BH CV ECHO MEAS - MV P1/2T MAX VEL: 113 CM/SEC
BH CV ECHO MEAS - MV P1/2T: 84.9 MSEC
BH CV ECHO MEAS - MVA P1/2T LCG: 1.9 CM^2
BH CV ECHO MEAS - MVA(P1/2T): 2.6 CM^2
BH CV ECHO MEAS - PA MAX PG (FULL): -0.14 MMHG
BH CV ECHO MEAS - PA MAX PG: 3.7 MMHG
BH CV ECHO MEAS - PA V2 MAX: 96.5 CM/SEC
BH CV ECHO MEAS - RAP SYSTOLE: 5 MMHG
BH CV ECHO MEAS - RV MAX PG: 3.9 MMHG
BH CV ECHO MEAS - RV MEAN PG: 2 MMHG
BH CV ECHO MEAS - RV V1 MAX: 98.3 CM/SEC
BH CV ECHO MEAS - RV V1 MEAN: 64.5 CM/SEC
BH CV ECHO MEAS - RV V1 VTI: 18.1 CM
BH CV ECHO MEAS - RVDD: 1.5 CM
BH CV ECHO MEAS - RVSP: 28.8 MMHG
BH CV ECHO MEAS - SI(CUBED): 27.9 ML/M^2
BH CV ECHO MEAS - SI(LVOT): 63.4 ML/M^2
BH CV ECHO MEAS - SI(TEICH): 27.2 ML/M^2
BH CV ECHO MEAS - SV(CUBED): 49 ML
BH CV ECHO MEAS - SV(LVOT): 111.4 ML
BH CV ECHO MEAS - SV(TEICH): 47.8 ML
BH CV ECHO MEAS - TR MAX VEL: 244 CM/SEC
BUN BLD-MCNC: 22 MG/DL (ref 8–23)
BUN/CREAT SERPL: 22.4 (ref 7–25)
CALCIUM SPEC-SCNC: 9.2 MG/DL (ref 8.6–10.5)
CHLORIDE SERPL-SCNC: 103 MMOL/L (ref 98–107)
CO2 SERPL-SCNC: 29 MMOL/L (ref 22–29)
CREAT BLD-MCNC: 0.98 MG/DL (ref 0.57–1)
DEPRECATED RDW RBC AUTO: 47.4 FL (ref 37–54)
EOSINOPHIL # BLD AUTO: 0.58 10*3/MM3 (ref 0–0.4)
EOSINOPHIL NFR BLD AUTO: 9.2 % (ref 0.3–6.2)
ERYTHROCYTE [DISTWIDTH] IN BLOOD BY AUTOMATED COUNT: 13.4 % (ref 12.3–15.4)
GFR SERPL CREATININE-BSD FRML MDRD: 54 ML/MIN/1.73
GLUCOSE BLD-MCNC: 116 MG/DL (ref 65–99)
HCT VFR BLD AUTO: 26.5 % (ref 34–46.6)
HGB BLD-MCNC: 8.7 G/DL (ref 12–15.9)
IMM GRANULOCYTES # BLD AUTO: 0.03 10*3/MM3 (ref 0–0.05)
IMM GRANULOCYTES NFR BLD AUTO: 0.5 % (ref 0–0.5)
LYMPHOCYTES # BLD AUTO: 1.59 10*3/MM3 (ref 0.7–3.1)
LYMPHOCYTES NFR BLD AUTO: 25.3 % (ref 19.6–45.3)
MCH RBC QN AUTO: 31.4 PG (ref 26.6–33)
MCHC RBC AUTO-ENTMCNC: 32.8 G/DL (ref 31.5–35.7)
MCV RBC AUTO: 95.7 FL (ref 79–97)
MONOCYTES # BLD AUTO: 0.46 10*3/MM3 (ref 0.1–0.9)
MONOCYTES NFR BLD AUTO: 7.3 % (ref 5–12)
NEUTROPHILS # BLD AUTO: 3.6 10*3/MM3 (ref 1.7–7)
NEUTROPHILS NFR BLD AUTO: 57.2 % (ref 42.7–76)
NRBC BLD AUTO-RTO: 0 /100 WBC (ref 0–0.2)
PLATELET # BLD AUTO: 319 10*3/MM3 (ref 140–450)
PMV BLD AUTO: 9.9 FL (ref 6–12)
POTASSIUM BLD-SCNC: 4.8 MMOL/L (ref 3.5–5.2)
RBC # BLD AUTO: 2.77 10*6/MM3 (ref 3.77–5.28)
SODIUM BLD-SCNC: 142 MMOL/L (ref 136–145)
WBC NRBC COR # BLD: 6.29 10*3/MM3 (ref 3.4–10.8)

## 2019-08-15 PROCEDURE — 96376 TX/PRO/DX INJ SAME DRUG ADON: CPT

## 2019-08-15 PROCEDURE — 80048 BASIC METABOLIC PNL TOTAL CA: CPT | Performed by: FAMILY MEDICINE

## 2019-08-15 PROCEDURE — 96375 TX/PRO/DX INJ NEW DRUG ADDON: CPT

## 2019-08-15 PROCEDURE — 94640 AIRWAY INHALATION TREATMENT: CPT

## 2019-08-15 PROCEDURE — 93306 TTE W/DOPPLER COMPLETE: CPT

## 2019-08-15 PROCEDURE — 85025 COMPLETE CBC W/AUTO DIFF WBC: CPT | Performed by: FAMILY MEDICINE

## 2019-08-15 PROCEDURE — G0378 HOSPITAL OBSERVATION PER HR: HCPCS

## 2019-08-15 PROCEDURE — 94799 UNLISTED PULMONARY SVC/PX: CPT

## 2019-08-15 PROCEDURE — 25010000002 METHYLPREDNISOLONE PER 40 MG: Performed by: INTERNAL MEDICINE

## 2019-08-15 PROCEDURE — 25010000002 FUROSEMIDE PER 20 MG: Performed by: INTERNAL MEDICINE

## 2019-08-15 PROCEDURE — 94760 N-INVAS EAR/PLS OXIMETRY 1: CPT

## 2019-08-15 RX ORDER — METHYLPREDNISOLONE SODIUM SUCCINATE 40 MG/ML
40 INJECTION, POWDER, LYOPHILIZED, FOR SOLUTION INTRAMUSCULAR; INTRAVENOUS EVERY 12 HOURS
Status: DISCONTINUED | OUTPATIENT
Start: 2019-08-15 | End: 2019-08-16 | Stop reason: HOSPADM

## 2019-08-15 RX ORDER — FUROSEMIDE 10 MG/ML
20 INJECTION INTRAMUSCULAR; INTRAVENOUS ONCE
Status: COMPLETED | OUTPATIENT
Start: 2019-08-15 | End: 2019-08-15

## 2019-08-15 RX ORDER — IPRATROPIUM BROMIDE AND ALBUTEROL SULFATE 2.5; .5 MG/3ML; MG/3ML
3 SOLUTION RESPIRATORY (INHALATION)
Status: DISCONTINUED | OUTPATIENT
Start: 2019-08-15 | End: 2019-08-16 | Stop reason: HOSPADM

## 2019-08-15 RX ORDER — BUDESONIDE AND FORMOTEROL FUMARATE DIHYDRATE 160; 4.5 UG/1; UG/1
2 AEROSOL RESPIRATORY (INHALATION)
Status: DISCONTINUED | OUTPATIENT
Start: 2019-08-15 | End: 2019-08-16 | Stop reason: HOSPADM

## 2019-08-15 RX ADMIN — SODIUM CHLORIDE, PRESERVATIVE FREE 3 ML: 5 INJECTION INTRAVENOUS at 21:58

## 2019-08-15 RX ADMIN — DOCUSATE SODIUM 100 MG: 100 CAPSULE, LIQUID FILLED ORAL at 21:58

## 2019-08-15 RX ADMIN — FERROUS SULFATE TAB EC 324 MG (65 MG FE EQUIVALENT) 324 MG: 324 (65 FE) TABLET DELAYED RESPONSE at 12:46

## 2019-08-15 RX ADMIN — ASPIRIN 81 MG: 81 TABLET, COATED ORAL at 08:03

## 2019-08-15 RX ADMIN — IPRATROPIUM BROMIDE AND ALBUTEROL SULFATE 3 ML: 2.5; .5 SOLUTION RESPIRATORY (INHALATION) at 04:32

## 2019-08-15 RX ADMIN — FERROUS SULFATE TAB EC 324 MG (65 MG FE EQUIVALENT) 324 MG: 324 (65 FE) TABLET DELAYED RESPONSE at 18:30

## 2019-08-15 RX ADMIN — IPRATROPIUM BROMIDE AND ALBUTEROL SULFATE 3 ML: 2.5; .5 SOLUTION RESPIRATORY (INHALATION) at 14:20

## 2019-08-15 RX ADMIN — CARVEDILOL 3.12 MG: 3.12 TABLET, FILM COATED ORAL at 08:03

## 2019-08-15 RX ADMIN — RANOLAZINE 500 MG: 500 TABLET, FILM COATED, EXTENDED RELEASE ORAL at 08:03

## 2019-08-15 RX ADMIN — CARVEDILOL 3.12 MG: 3.12 TABLET, FILM COATED ORAL at 18:30

## 2019-08-15 RX ADMIN — MONTELUKAST SODIUM 10 MG: 10 TABLET, COATED ORAL at 21:58

## 2019-08-15 RX ADMIN — IPRATROPIUM BROMIDE AND ALBUTEROL SULFATE 3 ML: 2.5; .5 SOLUTION RESPIRATORY (INHALATION) at 23:26

## 2019-08-15 RX ADMIN — LEVOTHYROXINE SODIUM 50 MCG: 50 TABLET ORAL at 06:20

## 2019-08-15 RX ADMIN — IPRATROPIUM BROMIDE AND ALBUTEROL SULFATE 3 ML: 2.5; .5 SOLUTION RESPIRATORY (INHALATION) at 10:23

## 2019-08-15 RX ADMIN — FAMOTIDINE 40 MG: 40 TABLET ORAL at 08:03

## 2019-08-15 RX ADMIN — SERTRALINE 50 MG: 50 TABLET, FILM COATED ORAL at 08:03

## 2019-08-15 RX ADMIN — IPRATROPIUM BROMIDE AND ALBUTEROL SULFATE 3 ML: 2.5; .5 SOLUTION RESPIRATORY (INHALATION) at 20:11

## 2019-08-15 RX ADMIN — SODIUM CHLORIDE, PRESERVATIVE FREE 10 ML: 5 INJECTION INTRAVENOUS at 08:04

## 2019-08-15 RX ADMIN — METHYLPREDNISOLONE SODIUM SUCCINATE 40 MG: 40 INJECTION, POWDER, FOR SOLUTION INTRAMUSCULAR; INTRAVENOUS at 15:16

## 2019-08-15 RX ADMIN — FUROSEMIDE 20 MG: 10 INJECTION, SOLUTION INTRAVENOUS at 15:12

## 2019-08-15 RX ADMIN — BUDESONIDE AND FORMOTEROL FUMARATE DIHYDRATE 2 PUFF: 160; 4.5 AEROSOL RESPIRATORY (INHALATION) at 20:11

## 2019-08-15 RX ADMIN — POTASSIUM CHLORIDE 40 MEQ: 750 CAPSULE, EXTENDED RELEASE ORAL at 00:28

## 2019-08-15 RX ADMIN — FUROSEMIDE 40 MG: 40 TABLET ORAL at 08:03

## 2019-08-15 RX ADMIN — RANOLAZINE 500 MG: 500 TABLET, FILM COATED, EXTENDED RELEASE ORAL at 21:58

## 2019-08-15 RX ADMIN — ATORVASTATIN CALCIUM 20 MG: 20 TABLET, FILM COATED ORAL at 08:03

## 2019-08-15 RX ADMIN — POTASSIUM CHLORIDE 40 MEQ: 750 CAPSULE, EXTENDED RELEASE ORAL at 04:17

## 2019-08-15 NOTE — H&P
HISTORY AND PHYSICAL  NAME: Val Arteaga  : 1932  MRN: [id number]    DATE OF ADMISSION: 19    DATE & TIME SEEN: 19 8:17 PM    PCP: Vero Arteaga APRN    CODE STATUS:   Code Status and Medical Interventions:   Ordered at: 19     Level Of Support Discussed With:    Patient     Code Status:    No CPR     Medical Interventions (Level of Support Prior to Arrest):    Full       CHIEF COMPLAINT Dyspnea    HPI:  Val Arteaga is a 86 y.o. female with medical history significant for SSS, CHF, hypothyroidism, CAD, HLD, GERD, HTN, COPD, and chronic hypoxic respiratory failure presents with dyspnea.     Patient reports increasing shortness of breath started this morning.  Patient feels like she is smothering.  Patient has difficulty lying flat today.  On questioning about orthopnea patient states that she sleeps with one pillow. No overt weight gain noted by patient.  Patient does also endorse some chest heaviness.  Patient denies any diaphoresis, nausea, vomiting, fever, chills, diarrhea, syncope, or near syncope.    Patient was evaluated by primary cardiologist in office today and had labs drawn at that time.  These labs revealed an elevated troponin as such cardiologist's office called the patient and asked her to present to the ER.  Patient presented to the ER and is now being admitted for dyspnea that is likely multifactorial origin with an elevated troponin.    CONCURRENT MEDICAL HISTORY:  Past Medical History:   Diagnosis Date   • Acquired hypothyroidism    • Allergic rhinitis    • CHF (congestive heart failure) (CMS/[facility])    • COPD (chronic obstructive pulmonary disease) (CMS/[facility])    • Corneal epithelial dystrophy    • Coronary arteriosclerosis    • Depression    • Generalized atherosclerosis    • GERD (gastroesophageal reflux disease)    • Hemorrhoids    • History of bone density study 2012    Lumbar spine Osteopenia. Femoral Osteopenia   • History of mammogram 2004     Birads Category 2 Benign   • History of Papanicolaou smear of cervix 08/12/2004    NEGATIVE   • Hypercholesterolemia    • Hyperlipidemia    • Hypertension    • Myocardial infarction (CMS/HCC)    • Nonexudative age-related macular degeneration    • Osteoarthritis of multiple joints    • Pseudophakia    • Punctate keratitis     - history Thygeson's keratopathy      • Tobacco dependence syndrome        PAST SURGICAL HISTORY:  Past Surgical History:   Procedure Laterality Date   • APPENDECTOMY     • BREAST SURGERY  03/19/1954    Excision, breast lesion (1)  Excision of fibroma. 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 patency in the circumflex artery site of previous angioplasty. Kathe. patency in RCA.Nonobstructive 20-30% stenosis in the LAD and diagonal branch. Preserved LV systolic function with Ef of 55%   • CERVICAL SPINE SURGERY  09/16/1974    Excision of cervical disc, C5-6, C6-7, anterior cervical fusion, C5-6 with iliac bone graft.cervical spondylosis,C5-6, C6-7   • COLONOSCOPY  01/01/2013    patient declined    • CORONARY ANGIOPLASTY  07/21/1993    Angioplasty, coronary (2)    RCA    • DILATATION AND CURETTAGE      3   • ENDOSCOPY N/A 2/14/2018    Procedure: ESOPHAGOGASTRODUODENOSCOPY;  Surgeon: Alex Avitia MD;  Location: [facility] ENDOSCOPY;  Service:    • ENDOSCOPY AND COLONOSCOPY  10/01/1998    Hemorhoids Rectal Outlet Bleeding   • ESOPHAGOSCOPY / EGD  06/28/2004    Nonerosive gastritis of the body of the stomach. A biopsy was obtained & placed in h. Pylori test agar. Multiple biopsies were obtained from the body of the stomach   • HIP BIPOLAR REPLACEMENT Right 1/23/2018    Procedure: HIP BIPOLAR ANTERIOR APPROACH - right;  Surgeon: Kelvin Mcdowell MD;  Location: [facility] OR;  Service:    • INJECTION OF MEDICATION  11/23/2015    Depo Medrol (Methylprednisone) (5)    • INJECTION OF MEDICATION  02/04/2016    Kenalog (3)     • JOINT  REPLACEMENT Right 2018    hip   • PACEMAKER REPLACEMENT         FAMILY HISTORY:  Family History   Problem Relation Age of Onset   • Coronary artery disease Other         SOCIAL HISTORY:  Social History     Socioeconomic History   • Marital status:      Spouse name: Not on file   • Number of children: Not on file   • Years of education: Not on file   • Highest education level: Not on file   Tobacco Use   • Smoking status: Former Smoker     Packs/day: 0.50     Years: 50.00     Pack years: 25.00     Types: Cigarettes     Start date:      Last attempt to quit: 2017     Years since quittin.5   • Smokeless tobacco: Never Used   Substance and Sexual Activity   • Alcohol use: No   • Drug use: No   • Sexual activity: Defer       HOME MEDICATIONS:  Prior to Admission medications    Medication Sig Start Date End Date Taking? Authorizing Provider   acetaminophen (TYLENOL) 325 MG tablet Take 2 tablets by mouth Every 4 (Four) Hours As Needed for Mild Pain . 18   Shawn Maldonado MD   aspirin 325 MG EC tablet Take 1 tablet by mouth Daily. 18   Marco A Landin APRN   atorvastatin (LIPITOR) 20 MG tablet Take 1 tablet by mouth Daily. 19   Vero Arteaga APRN   carvedilol (COREG) 6.25 MG tablet Take 1 tablet by mouth 2 (Two) Times a Day With Meals. 19   Vero Arteaga APRN   docusate sodium (COLACE) 100 MG capsule Take 1 capsule by mouth 2 (Two) Times a Day. 3/29/19   Lanie Jain MD   esomeprazole (nexIUM) 40 MG capsule TAKE 1 CAPSULE BY MOUTH EVERY MORNING 19   Vero Arteaga APRN   estradiol (ESTRACE) 0.1 MG/GM vaginal cream Insert 2 g into the vagina Daily.  Patient taking differently: Insert 2 g into the vagina every night at bedtime. 19   Vero Arteaga APRN   ferrous sulfate 325 (65 FE) MG tablet Take 325 mg by mouth Daily With Breakfast.    Provider, MD [place]   fluorometholone (FML) 0.1 % ophthalmic suspension INSTILL 1 DROP IN BOTH EYES  FOUR (4) TIMES DAILY 5/30/19   Provider, MD [place]   folic acid-vit B6-vit B12 (FOLBEE) 2.5-25-1 MG tablet tablet Take 1 tablet by mouth Daily. 1/22/19   Vero Arteaga APRN   furosemide (LASIX) 40 MG tablet Take 1 tablet by mouth Daily. 4/18/19   Vero Arteaga APRN   ipratropium-albuterol (DUO-NEB) 0.5-2.5 mg/3 ml nebulizer Take 3 mL by nebulization 4 (Four) Times a Day As Needed for Wheezing. 12/19/18   Bryan Jay MD   levothyroxine (SYNTHROID, LEVOTHROID) 50 MCG tablet Take 1 tablet by mouth Daily. 5/30/19   Vero Arteaga APRN   lidocaine (LIDODERM) 5 % Place 1 patch on the skin as directed by provider Daily. Remove & Discard patch within 12 hours 6/11/19   Vero Arteaga APRN   montelukast (SINGULAIR) 10 MG tablet Take 1 tablet by mouth Every Night. 1/2/19   Vero Arteaga APRN   nitroglycerin (NITRODUR) 0.2 MG/HR patch Place 1 patch on the skin as directed by provider Daily. 5/30/19   Vero Arteaga APRN   polyethylene glycol (MIRALAX) powder MIX 1 CAPFUL (17G) IN 8 OUNCES OF LIQUID AND DRINK BY MOUTH EVERY DAY FOR CONSTIPATION 6/19/18   Roula Albert MD   ranolazine (RANEXA) 500 MG 12 hr tablet Take 1 tablet by mouth 2 (Two) Times a Day. 4/18/19   Vero Arteaga APRN   rivaroxaban (XARELTO) 15 MG tablet Take 1 tablet by mouth Daily. 6/11/19   Vero Arteaga APRN   sertraline (ZOLOFT) 50 MG tablet Take 1 tablet by mouth Daily. 5/30/19   Vero Arteaga APRN   traMADol (ULTRAM) 50 MG tablet Take 1 tablet by mouth Every 6 (Six) Hours As Needed for Moderate Pain . 6/11/19   Vero Arteaga APRN   umeclidinium-vilanterol (ANORO ELLIPTA) 62.5-25 MCG/INH aerosol powder  inhaler Inhale 1 puff Daily. 11/20/18   Vero Arteaga APRN   vitamin D (ERGOCALCIFEROL) 84036 units capsule capsule Take 1 capsule by mouth Every 30 (Thirty) Days. 1/2/19   Vero Arteaga APRN   esomeprazole (nexIUM) 40 MG capsule Take 40 mg by mouth Every Morning. 5/30/19 8/14/19  Provider, MD [place]       ALLERGIES:  Ace  inhibitors; Baclofen; and Lisinopril    REVIEW OF SYSTEMS  Review of Systems   Constitutional: Positive for activity change and fatigue. Negative for appetite change and fever.   Respiratory: Positive for shortness of breath. Negative for cough.    Cardiovascular: Positive for leg swelling. Negative for chest pain and palpitations.   Gastrointestinal: Negative for abdominal pain, nausea and vomiting.   Genitourinary: Negative for difficulty urinating and dysuria.   Musculoskeletal: Positive for arthralgias. Negative for gait problem.   Skin: Positive for pallor. Negative for color change and rash.   Neurological: Negative for dizziness and headaches.   Psychiatric/Behavioral: Negative for agitation, confusion and sleep disturbance.       PHYSICAL EXAM:  Temp:  [98.1 °F (36.7 °C)] 98.1 °F (36.7 °C)  Heart Rate:  [82-90] 86  Resp:  [20] 20  BP: ()/(61-85) 151/72  Body mass index is 21.56 kg/m².     Physical Exam   Constitutional: She is oriented to person, place, and time. She appears well-developed and well-nourished. No distress.   HENT:   Head: Normocephalic and atraumatic.   Right Ear: External ear normal.   Left Ear: External ear normal.   Nose: Nose normal.   Nasal cannula in place.   Eyes: Conjunctivae and EOM are normal. Pupils are equal, round, and reactive to light.   Neck: Neck supple. No thyromegaly present.   Cardiovascular: Normal rate, regular rhythm and normal heart sounds.   Pulmonary/Chest: Effort normal. She has no wheezes. She has rales. She exhibits no tenderness.   Nasal cannula in place.   Abdominal: Soft. Bowel sounds are normal. She exhibits no distension and no mass. There is no tenderness. There is no rebound and no guarding.   Musculoskeletal: She exhibits edema.   Neurological: She is alert and oriented to person, place, and time.   Skin: Skin is warm and dry. No rash noted. She is not diaphoretic. No erythema. No pallor.   Psychiatric: She has a normal mood and affect. Her behavior  is normal.   Nursing note and vitals reviewed.      DIAGNOSTIC DATA:   Lab Results (last 24 hours)     Procedure Component Value Units Date/Time    Troponin [[id number]]  (Abnormal) Collected:  08/14/19 1910    Specimen:  Blood Updated:  08/14/19 1947     Troponin T 0.121 ng/mL     Narrative:       Troponin T Reference Range:  <= 0.03 ng/mL-   Negative for AMI  >0.03 ng/mL-     Abnormal for myocardial necrosis.  Clinicians would have to utilize clinical acumen, EKG, Troponin and serial changes to determine if it is an Acute Myocardial Infarction or myocardial injury due to an underlying chronic condition.     BNP [[id number]]  (Abnormal) Collected:  08/14/19 1910    Specimen:  Blood Updated:  08/14/19 1941     proBNP 2,166.0 pg/mL     Narrative:       Among patients with dyspnea, NT-proBNP is highly sensitive for the detection of acute congestive heart failure. In addition NT-proBNP of <300 pg/ml effectively rules out acute congestive heart failure with 99% negative predictive value.    Comprehensive Metabolic Panel [[id number]]  (Abnormal) Collected:  08/14/19 1910    Specimen:  Blood Updated:  08/14/19 1938     Glucose 95 mg/dL      BUN 25 mg/dL      Creatinine 1.18 mg/dL      Sodium 139 mmol/L      Potassium 3.1 mmol/L      Chloride 97 mmol/L      CO2 30.0 mmol/L      Calcium 9.1 mg/dL      Total Protein 6.8 g/dL      Albumin 3.40 g/dL      ALT (SGPT) 7 U/L      AST (SGOT) 12 U/L      Alkaline Phosphatase 61 U/L      Total Bilirubin 0.2 mg/dL      eGFR Non African Amer 43 mL/min/1.73      Globulin 3.4 gm/dL      A/G Ratio 1.0 g/dL      BUN/Creatinine Ratio 21.2     Anion Gap 12.0 mmol/L     Narrative:       GFR Normal >60  Chronic Kidney Disease <60  Kidney Failure <15    CBC & Differential [[id number]] Collected:  08/14/19 1910    Specimen:  Blood Updated:  08/14/19 1918    Narrative:       The following orders were created for panel order CBC & Differential.  Procedure                               Abnormality          Status                     ---------                               -----------         ------                     CBC Auto Differential[[id number]]        Abnormal            Final result                 Please view results for these tests on the individual orders.    CBC Auto Differential [[id number]]  (Abnormal) Collected:  08/14/19 1910    Specimen:  Blood Updated:  08/14/19 1918     WBC 6.73 10*3/mm3      RBC 2.78 10*6/mm3      Hemoglobin 8.9 g/dL      Hematocrit 26.8 %      MCV 96.4 fL      MCH 32.0 pg      MCHC 33.2 g/dL      RDW 13.6 %      RDW-SD 48.6 fl      MPV 9.6 fL      Platelets 298 10*3/mm3      Neutrophil % 51.8 %      Lymphocyte % 30.0 %      Monocyte % 9.5 %      Eosinophil % 7.7 %      Basophil % 0.6 %      Immature Grans % 0.4 %      Neutrophils, Absolute 3.48 10*3/mm3      Lymphocytes, Absolute 2.02 10*3/mm3      Monocytes, Absolute 0.64 10*3/mm3      Eosinophils, Absolute 0.52 10*3/mm3      Basophils, Absolute 0.04 10*3/mm3      Immature Grans, Absolute 0.03 10*3/mm3      nRBC 0.0 /100 WBC     Lavender Top [[id number]] Collected:  08/14/19 1910    Specimen:  Blood Updated:  08/14/19 1913    Gold Top - SST [[id number]] Collected:  08/14/19 1910    Specimen:  Blood Updated:  08/14/19 1913    Light Blue Top [[id number]] Collected:  08/14/19 1910    Specimen:  Blood Updated:  08/14/19 1913    [place] Draw [[id number]] Collected:  08/14/19 1910    Specimen:  Blood Updated:  08/14/19 1913    Narrative:       The following orders were created for panel order [place] Draw.  Procedure                               Abnormality         Status                     ---------                               -----------         ------                     Light Blue Top[[id number]]                                   In process                 Green Top (Gel)[[id number]]                                  In process                 Lavender Top[[id number]]                                     In process                 Gold  [facility][[id number]]                                   In process                   Please view results for these tests on the individual orders.    Green Top (Gel) [[id number]] Collected:  08/14/19 1910    Specimen:  Blood Updated:  08/14/19 1913        Estimated Creatinine Clearance: 34.7 mL/min (A) (by C-G formula based on SCr of 1.18 mg/dL (H)).     Imaging Results (last 24 hours)     Procedure Component Value Units Date/Time    XR Chest 2 View [[id number]] Collected:  08/14/19 1921     Updated:  08/14/19 1943    Narrative:       TWO VIEW CHEST    HISTORY: Shortness of air    Frontal and lateral films of the chest were obtained.    COMPARISON: April 2, 2019    FINDINGS:    EKG leads.  Chronic obstructive pulmonary disease.  Linear atelectasis lung bases.  Left-sided pacemaker remains in place with leads in the right  atrium and right ventricle.  The heart is not enlarged.  The pulmonary vasculature is not increased.  No pleural effusion.  No pneumothorax.  No acute osseous abnormality.  Osteoporosis.  Degenerative changes each shoulder.      Impression:       CONCLUSION:  Chronic obstructive pulmonary disease.  Linear atelectasis lung bases.  Left-sided pacemaker remains in place with leads in the right  atrium and right ventricle.    [id number]    Electronically signed by:  Gonsalo Reddy MD  8/14/2019 7:42 PM CDT  Workstation: [id number] reviewed the patient's new clinical results.    ASSESSMENT AND PLAN: This is a 86 y.o. female with:    Active Hospital Problems    Diagnosis POA   • Shortness of breath [R06.02] Yes       #.  Dyspnea.  Consideration for decompensated heart failure.  Cardiology consulted.  IV Lasix given.  Monitor strict I's and O's.  Appreciate cardiology input.  Echocardiogram ordered.  #.  Chronic hypoxic respiratory failure.  Patient currently on nasal cannula.  Titrate to home dose.  #.  CHF?  Suspect decompensated heart failure.  Echocardiogram pending.  Cardiology consulted.   Diuresis.  Strict I's and O's.  Daily weights.  #.  Sick sinus syndrome.  Status post PPM.  #.  Hypertension.  Coreg.  #.  COPD.  Not in acute exacerbation.  Monitor. Duoneb scheduled.  #.  Hypothyroidism.  Synthroid.  #.  HLD.  Statin.  #.  Hypokalemia. Protocol ordered. Magnesium ordered.  Results from last 7 days   Lab Units 08/14/19 1910 08/14/19  1510   POTASSIUM mmol/L 3.1* 3.4*   #.  GERD.  Pepcid.  #.  Anemia. Occult stool ordered. Iron profile, Ferritin. Xarelto discontinued.  In talking with cardiology if patient is symptomatic with a hemoglobin of less than 8 would consider transfusing 1 unit PRBC.  Patient's hemoglobin currently 8.9.  Trend H&H.  Monitor.  I did discuss risk and benefits of blood transfusion with the patient.  She is agreeable to blood products as needed.  Results from last 7 days   Lab Units 08/14/19 1910 08/14/19  1510   HEMOGLOBIN g/dL 8.9* 9.6*   HEMATOCRIT % 26.8* 29.0*       Will monitor patient's hospital course and adjust treatment as hospital course dictates.    DVT prophylaxis: Admitted taking Xarelto. Hold. Likely to need Heparin SQ once Xarelto metabolized.  Code status is   Code Status and Medical Interventions:   Ordered at: 08/14/19 2120     Level Of Support Discussed With:    Patient     Code Status:    No CPR     Medical Interventions (Level of Support Prior to Arrest):    Full      TITO # [id number], reviewed and consistent with patient reported medications.      I discussed the patients findings and my recommendations with patient, family, nursing staff and consulting provider.           This document has been electronically signed by Abimael Medley MD on August 14, 2019 8:17 PM

## 2019-08-15 NOTE — CONSULTS
Cardiology Consultation Note.        Patient Name: Val Arteaga  Age/Sex: 86 y.o. female  : 1932  MRN: [id number]    Date of consultation: 2019  Consulting Physician: Alpesh Rogel MD  Primary care Physician: Vero Arteaga, CRESENCIO  Requesting Physician:  Abimael Medley MD         Reason for consultation:  Chest chest heaviness positive troponin history of atherosclerotic coronary artery disease with previous PTCA  sinus syndrome status post pacemaker implantation  Subjective:       Chief Complaint: Chest heaviness shortness of breath       History of Present Illness:  Val Arteaga is a 86 y.o. female     Body mass index is 21.56 kg/m². with a past medical history significant for atherosclerotic coronary artery disease status post PTCA and stenting of the mid circumflex artery with deployment of a 3.5 mm x 18 mm Vision stent, with previous PTCA and stenting of the right coronary artery with the last coronary angiogram  done in , which had revealed sustained patency in the circumflex  artery, site of previous angioplasty and stenting with evidence of  luminal irregularity and sustained patency in the right coronary artery,  site of previous angioplasty and stenting, with 20% to 30% stenosis in the left anterior descending artery and the diagonal branch, with a nuclear Cardiolite stress test done in 2018 which was positive for reversible ischemia with left ventricular systolic dysfunction with an ejection fraction of 35% with mild to moderate tricuspid regurgitation and mild mitral regurgitation treated medically, sick sinus syndrome, with a [employer] pacemaker generator Advantio DR, serial number [id number], model number K063 implanted in 2013 for  sick sinus syndrome and sinus arrest, history of chronic obstructive  lung disease, tobacco abuse, positive tilt table testing, hypothyroidism, gastroesophageal reflux disease, H. pylori gastritis, intolerance to Lipitor and  chronic obstructive lung disease and anemia.        Patient was hospitalized in March 2019 with sacral fracture and was recommended physical therapy and was subsequently transferred to [facility] for rehabilitation and was subsequently discharged home.      Patient had done reasonably well since the discharge from the [facility].  Patient on discharge form [facility] was sent home on Xarelto 15 mg daily.  Review of the record indicate patient has not had previous history of atrial fibrillation.  Patient was started on anticoagulation after she had sustained the sacral fracture for DVT prophylaxis.     Patient over the last few weeks has been having increasing episodes of fatigue shortness of breath and over the last 2 days had symptoms of chest pain.  Patient earlier this morning had symptoms of severe substernal chest pain which resolved with rest.  Patient had recurrent symptoms of chest pain on the way coming to the office which had resolved.  Patient on questioning describes the discomfort more pleuritic in nature.  Patient does complain of having symptoms of shortness of breath.  Patient denies any prolonged episodes of palpitation.  Patient denies any fever chill patient denies any hemoptysis hematuria bright red blood per rectum.  Patient has had decreased p.o. intake.    Patient was subsequently recommended to undergo a complete blood count including troponin checked.  Patient troponin was mildly elevated and patient was recommended to present to the emergency room.  Patient repeat troponin in the emergency room was still elevated and patient was subsequently hospitalized and cardiology was consulted.    Patient on further questioning denies any nausea vomiting patient denies any paroxysmal nocturnal dyspnea orthopnea.  Patient denies excessive intake of salt.      Patient denies any lower extremity edema.    Patient 10 point review of system except for stated  in the history of present illness is negative.      Past Medical History:  1.  Chest pain shortness of breath with indeterminate troponin.  2.  Atherosclerotic coronary artery disease.  3.  Lexiscan Cardiolite stress test done in January 2018 which was suggestive of ischemia in the anterior wall and the septal wall which was intermediate risk study with an ejection fraction of 52%.  4.  Last coronary angiogram done in 2014 which had revealed:   a.   Sustained patency in the circumflex artery site of previous angioplasty and stenting with a patent 3.5 mm x 18 mm stent.   b.   Sustained patency in the right coronary artery.   c.   Nonobstructive disease of 30% in the left anterior descending artery and the diagonal branch with evidence of good CASANDRA-3  flow.  5.   Previous PTCA and stenting of the circumflex artery with deployment of a 3.5 mm x 18 mm stent done in 2008 with previous  PTCA and stenting of the right coronary artery.  6.   Sick sinus syndrome, sinus arrest with a head-up tilt table testing.  7.   Status post [place] [employer] dual chamber pacemaker generator replacement done in January 2013 with the pacemaker generator Prismaticio, model number K063, serial [id number].  8.   Arterial hypertension.  9.   Hypertensive heart disease.  10.  Mild left ventricular systolic dysfunction with an ejection fraction of 35 to 40%.  11.  Mild mitral, mild to moderate tricuspid, and mild aortic insufficiency.  12.  Hyperlipidemia.  13.  Hypothyroidism.  14.  H.pylori gastritis.  15.  Intolerant to statin.  16.  Chronic obstructive lung disease with tobacco abuse.  17.  Arthritis.  18.  Thygeson's keratopathy.  19.  Hospitalization in December 2015 in [place], [place] with  symptoms of palpitation.  20.  Sacral fracture with rehabilitation at [facility].      PAST SURGICAL HISTORY:  1.   Cataract surgery.  2.   Esophagogastroduodenoscopy.  3.   Bone fusion in the neck.  4.   Appendectomy.  5.   Dilatation and curettage.  6.    Gastric biopsy.      SOCIAL HISTORY:  Significant for 1/2 pack per day tobacco abuse. Deniesy alcohol intake. Patient has been active and continues to travel.      FAMILY HISTORY:  Noncontributory.      CARDIAC RISK FACTORS:  1.   Postmenopausal.  2.   Tobacco abuse.  3.   Arterial hypertension.  4.   Hyperlipidemia.    Allergies:  Allergies   Allergen Reactions   • Ace Inhibitors    • Baclofen Unknown (See Comments)     unknown   • Lisinopril Cough       Medication::    (Not in a hospital admission)        Review of Systems:       Constitutional:  Denies recent weight loss, weight gain, fever or chills, no change in exercise tolerance     HENT:  Denies any hearing loss, epistaxis, hoarseness, or difficulty speaking.     Eyes: Wears eyeglasses or contact lenses     Respiratory:  Denies dyspnea with exertion,no cough, wheezing, or hemoptysis.     Cardiovascular: Positive for shortness of breath and chest heaviness Negative for palpitations,  orthopnea, PND, peripheral edema, syncope, or claudication.     Gastrointestinal:  Denies change in bowel habits, dyspepsia, ulcer disease, hematochezia, or melena.  No nausea, no vomiting, no hematemesis, no diarrhea or constipation, no melena      Endocrine: Negative for cold intolerance, heat intolerance, polydipsia, polyphagia and polyuria. Denies any history of weight change, heat/cold intolerance, polydipsia, polyuria     Genitourinary: Negative for hematuria.      Musculoskeletal: Denies any history of arthritic symptoms or back problems .  No joint pain, joint stiffness, joint swelling, muscle pain, muscle weakness and neck pain    Skin:  Denies any change in hair or nails, rashes, or skin lesions.     Allergic/Immunologic: Negative.  Negative for environmental allergies, food allergies and immunocompromised state.     Neurological:  Denies any history of recurrent headaches, strokes, TIA, or seizure disorder.     Hematological: Denies excessive bleeding, easy bruising,  fatigue, lymphadenopathy and petechiae or any bleeding disorders, or lymphadenopathy.     Psychiatric/Behavioral: Denies any history of depression, substance abuse, or change in cognitive function. Denies any psychomotor reaction or tangential thought.  No depression, homicidal ideations and suicidal ideations    Endocrine: No frequent urination and nocturia, temperature intolerance, weight gain, unintended and weight loss, unintended            Objective:     Objective:  Vitals:    08/14/19 2019   BP: 165/76   Pulse: 89   Resp:    Temp:    SpO2:      .    Body mass index is 21.56 kg/m².           Physical Exam:   General Appearance:    Alert, oriented, cooperative, in no acute distress   Head:    Normocephalic, atraumatic, without obvious abnormality   Eyes:           TOI  Lids and lashes normal, conjunctivae and sclerae normal, no icterus, no pallor   Ears:    Ears appear intact with no abnormalities noted   Throat:   Mucous membranes pink and moist   Neck:   Supple, trachea midline, no carotid bruit, no organomegaly or JVD   Lungs:     Clear to auscultation and percussion, respirations regular, even and Unlabored. No wheezes, rales, rhonchi    Heart:    Regular rhythm and normal rate, normal S1 and S2, no            murmur, no gallop, no rub, no click   Abdomen:     Soft, non-tender, non-distended, no guarding, no rebound tenderness, Normal bowel sounds in all four quadrants, no masses, liver and spleen nonpalpable,    Genitalia:    Deferred   Extremities:   Moves all extremities well, no edema, no cyanosis, no              Redness, no clubbing   Pulses:   Pulses palpable and equal bilaterally   Skin:   Moist and warm. No bleeding, bruising or rash   Neurologic/Psychiatric:   Alert and oriented to person, place, and time.  Motor, power and tone in upper and lower extremities are grossly intact.  No focal neurological deficits. Normal cognitive function. No psychomotor reaction or tangential thought. No  depression, homicidal ideations and suicidal ideations           Lab Review:     Results from last 7 days   Lab Units 08/14/19 1910   SODIUM mmol/L 139   POTASSIUM mmol/L 3.1*   CHLORIDE mmol/L 97*   CO2 mmol/L 30.0*   BUN mg/dL 25*   CREATININE mg/dL 1.18*   CALCIUM mg/dL 9.1   BILIRUBIN mg/dL 0.2   ALK PHOS U/L 61   ALT (SGPT) U/L 7   AST (SGOT) U/L 12   GLUCOSE mg/dL 95     Results from last 7 days   Lab Units 08/14/19  1910 08/14/19  1510   TROPONIN T ng/mL 0.121* 0.123*         Results from last 7 days   Lab Units 08/14/19 1910   WBC 10*3/mm3 6.73   HEMOGLOBIN g/dL 8.9*   HEMATOCRIT % 26.8*   PLATELETS 10*3/mm3 298                       Invalid input(s):  T4,  FREET4    EKG:   ECG/EMG Results (last 24 hours)     Procedure Component Value Units Date/Time    SCANNED EKG [[id number]] Resulted:  01/03/18      Updated:  01/03/18 1603    ECG 12 Lead [[id number]] Collected:  01/03/18 1546     Updated:  01/03/18 1759          Imaging:  Imaging Results (last 24 hours)     Procedure Component Value Units Date/Time    XR Chest 2 View [[id number]] Collected:  08/14/19 1921     Updated:  08/14/19 1943    Narrative:       TWO VIEW CHEST    HISTORY: Shortness of air    Frontal and lateral films of the chest were obtained.    COMPARISON: April 2, 2019    FINDINGS:    EKG leads.  Chronic obstructive pulmonary disease.  Linear atelectasis lung bases.  Left-sided pacemaker remains in place with leads in the right  atrium and right ventricle.  The heart is not enlarged.  The pulmonary vasculature is not increased.  No pleural effusion.  No pneumothorax.  No acute osseous abnormality.  Osteoporosis.  Degenerative changes each shoulder.      Impression:       CONCLUSION:  Chronic obstructive pulmonary disease.  Linear atelectasis lung bases.  Left-sided pacemaker remains in place with leads in the right  atrium and right ventricle.    02009    Electronically signed by:  Gonsalo Reddy MD  8/14/2019 7:42 PM CDT  Workstation:  FERDINAND LEONARD personally viewed and interpreted the patient's EKG/Telemetry data.    Assessment:   1.  Chest pain with shortness of breath with indeterminate troponin with history of documented atherosclerotic coronary artery disease.  2.  Positive nuclear Cardiolite stress test in January 2018 treated medically.  3.  Left ventricular systolic dysfunction with an ejection fraction of 35 to 40% with mild to moderate tricuspid regurgitation and mild mitral regurgitation.  4.  Sick sinus syndrome status post pacemaker implantation.          Plan:   1.   Chest pain, shortness of breath with positive troponin.  Patient has history of documented atherosclerotic coronary artery disease with the last coronary angiogram done in 2014 which had revealed sustained patency in the left anterior descending artery, sustained patency in the right  coronary artery site of previous angioplasty and stenting and  sustained patency in the circumflex artery with nonobstructive  disease in the left anterior descending artery. Patient last Lexiscan Cardiolite stress test was an intermediate risk study with ischemia in the anterior and septal wall.  Patient has a present time would follow serial troponin be treated medically..     2.   Sick sinus syndrome with sinus arrest status post [employer] pacemaker generator implantation model K063, serial number [id number]. Patient's pacemaker was reprogrammed to decrease the lower rate to  60 beats per minute to promote intrinsic conduction. Patient's pacemaker generator interrogation did not reveal any evidence of any atrial fibrillation in the past.  Patient pacemaker interrogation had revealed appropriate sensing and pacing function.      3.   Arterial hypertension with hypertensive heart disease. Patient's blood pressure is currently 134/63 Patient's blood pressure at home has been normal. Patient will be continued on the present dose of the Micardis and the Coreg.    4.    Symptoms of shortness of breath with mild left ventricular systolic dysfunction with an ejection fraction of 35-40 % with mild mitral regurgitation and mild to moderate tricuspid regurgitation.  Clinically, patient is not in congestive heart failure. Patient has been recommended to continue with the present dose of the Lasix.    5.   Chronic obstructive lung disease with tobacco abuse.  Patient is currently not smoking since January 2017.    6.   History of hypothyroidism. Patient is currently on thyroid  replacement and patient's last thyroid profile check had revealed a TSH of 3.57.    7.   History of gastroesophageal reflux disease is noted. Patient is currently not on any proton pump inhibitor. Patient will be started on Nexium 40 mg once a day.    8.   Hyperlipidemia. Patient is currently tolerating the simvastatin 40 mg daily. Patient has been counseled on low fat, low cholesterol diet and to undergo a lipid profile check.    9.  Anemia.  Patient's hemoglobin is 8.9.  Patient has not complained of having any hemoptysis hematuria bright red blood per rectum.    10.  Hypokalemia with a potassium of 3.4 and a repeat potassium of 3.1.  Patient would receive potassium supplement.    11.  Anticoagulation with Xarelto.  Patient anticoagulation was initiated in March 2019 after she has sustained sacral fracture and was started on anticoagulation with Coumadin and transferred to [facility] for rehabilitation and on discharge from [facility] was initiated on Xarelto.  Patient has been active and has been ambulating.  Patient does not have any history of documented atrial fibrillation.  Would consider stopping anticoagulation with Xarelto as it was clearly for DVT prophylaxis after the sacral fracture.  Patient does have history of anemia.      Thank you for the consultation.    Above plan of management were discussed with the patient      Time: time spent in face-to-face evaluation of greater  than 55 minutes and interacting and formulating examining and discussing the plan with the patient with 50% of greater time spent in face-to-face interaction.    Alpesh Rogel MD  08/14/19  8:24 PM    Dictated utilizing Dragon dictation.

## 2019-08-15 NOTE — ED PROVIDER NOTES
"Subjective   Patient is an 86-year-old female who had an episode of \"not feeling right this morning\".  Patient has had increased shortness of breath with this.  Patient has known congestive heart failure as well as COPD with coronary arterial sclerosis.  Patient with multiple histories of coronary angioplasties.  Patient with known disease and has had pacer placement.  Patient was seen by Dr. Rogel her cardiologist today.  Patient had some labs drawn at that time.  He was noted that in the setting of the patient's not feeling right this morning episode that she also has a positive troponin at this time.  Patient was sent to the ED for further management and observation.  Patient does relate the shortness of breath and episode this morning.  Patient now without chest pain or symptoms of the same.  Patient is mild to moderate respiratory distress.  Patient notes that there is a chronic element of this with her COPD.            Review of Systems   Constitutional: Negative for appetite change, chills and fever.   HENT: Negative.  Negative for congestion.    Eyes: Negative.  Negative for photophobia and visual disturbance.   Respiratory: Positive for shortness of breath. Negative for cough and chest tightness.    Cardiovascular: Negative.  Negative for chest pain and palpitations.   Gastrointestinal: Negative.  Negative for abdominal pain, constipation, diarrhea, nausea and vomiting.   Endocrine: Negative.    Genitourinary: Negative.  Negative for decreased urine volume, dysuria, flank pain and hematuria.   Musculoskeletal: Negative.  Negative for arthralgias, back pain, myalgias, neck pain and neck stiffness.   Skin: Negative.  Negative for pallor.   Neurological: Positive for dizziness, weakness and light-headedness. Negative for syncope, numbness and headaches.   Psychiatric/Behavioral: Negative.  Negative for confusion and suicidal ideas. The patient is not nervous/anxious.    All other systems reviewed and are " negative.      Past Medical History:   Diagnosis Date   • Acquired hypothyroidism    • Allergic rhinitis    • CHF (congestive heart failure) (CMS/[facility])    • COPD (chronic obstructive pulmonary disease) (CMS/[facility])    • Corneal epithelial dystrophy    • Coronary arteriosclerosis    • Depression    • Generalized atherosclerosis    • GERD (gastroesophageal reflux disease)    • Hemorrhoids    • History of bone density study 08/03/2012    Lumbar spine Osteopenia. Femoral Osteopenia   • History of mammogram 08/20/2004    Birads Category 2 Benign   • History of Papanicolaou smear of cervix 08/12/2004    NEGATIVE   • Hypercholesterolemia    • Hyperlipidemia    • Hypertension    • Myocardial infarction (CMS/HCC)    • Nonexudative age-related macular degeneration    • Osteoarthritis of multiple joints    • Pseudophakia    • Punctate keratitis     - history Thygeson's keratopathy      • Tobacco dependence syndrome        Allergies   Allergen Reactions   • Ace Inhibitors    • Baclofen Unknown (See Comments)     unknown   • Lisinopril Cough       Past Surgical History:   Procedure Laterality Date   • APPENDECTOMY     • BREAST SURGERY  03/19/1954    Excision, breast lesion (1)  Excision of fibroma. 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 patency in the circumflex artery site of previous angioplasty. Kathe. patency in RCA.Nonobstructive 20-30% stenosis in the LAD and diagonal branch. Preserved LV systolic function with Ef of 55%   • CERVICAL SPINE SURGERY  09/16/1974    Excision of cervical disc, C5-6, C6-7, anterior cervical fusion, C5-6 with iliac bone graft.cervical spondylosis,C5-6, C6-7   • COLONOSCOPY  01/01/2013    patient declined    • CORONARY ANGIOPLASTY  07/21/1993    Angioplasty, coronary (2)    RCA    • DILATATION AND CURETTAGE      3   • ENDOSCOPY N/A 2/14/2018    Procedure: ESOPHAGOGASTRODUODENOSCOPY;  Surgeon: Alex Avitia MD;  Location: [facility] [place]  ENDOSCOPY;  Service:    • ENDOSCOPY AND COLONOSCOPY  10/01/1998    Hemorhoids Rectal Outlet Bleeding   • ESOPHAGOSCOPY / EGD  2004    Nonerosive gastritis of the body of the stomach. A biopsy was obtained & placed in h. Pylori test agar. Multiple biopsies were obtained from the body of the stomach   • HIP BIPOLAR REPLACEMENT Right 2018    Procedure: HIP BIPOLAR ANTERIOR APPROACH - right;  Surgeon: Kelvin Mcdowell MD;  Location: [facility];  Service:    • INJECTION OF MEDICATION  2015    Depo Medrol (Methylprednisone) (5)    • INJECTION OF MEDICATION  2016    Kenalog (3)     • JOINT REPLACEMENT Right 2018    hip   • PACEMAKER REPLACEMENT  2006       Family History   Problem Relation Age of Onset   • Coronary artery disease Other        Social History     Socioeconomic History   • Marital status:      Spouse name: Not on file   • Number of children: Not on file   • Years of education: Not on file   • Highest education level: Not on file   Tobacco Use   • Smoking status: Former Smoker     Packs/day: 0.50     Years: 50.00     Pack years: 25.00     Types: Cigarettes     Start date:      Last attempt to quit: 2017     Years since quittin.5   • Smokeless tobacco: Never Used   Substance and Sexual Activity   • Alcohol use: No   • Drug use: No   • Sexual activity: Defer           Objective   Physical Exam   Constitutional: She is oriented to person, place, and time. She appears well-developed and well-nourished. She appears toxic. She does not appear ill. No distress.   HENT:   Head: Normocephalic and atraumatic.   Nose: Nose normal.   Mouth/Throat: Oropharynx is clear and moist.   Eyes: Conjunctivae and EOM are normal. No scleral icterus.   Neck: Normal range of motion. Neck supple. No JVD present.   Cardiovascular: Normal rate, regular rhythm, normal heart sounds and intact distal pulses. Exam reveals no gallop and no friction rub.   No murmur heard.  Pulmonary/Chest:  Accessory muscle usage present. She is in respiratory distress. She has no wheezes. She has no rhonchi. She has no rales. She exhibits no tenderness.   Sent breath sounds with increased work of breathing.  No rales or rhonchi are noted.   Abdominal: Soft. She exhibits no distension and no mass. There is no tenderness. There is no rebound and no guarding.   Musculoskeletal: Normal range of motion. She exhibits no tenderness or deformity.        Right lower leg: She exhibits edema.        Left lower leg: She exhibits edema.   Lymphadenopathy:     She has no cervical adenopathy.   Neurological: She is alert and oriented to person, place, and time. No cranial nerve deficit. She exhibits normal muscle tone.   Skin: Skin is warm and dry. Capillary refill takes less than 2 seconds. No rash noted. She is not diaphoretic. No erythema. No pallor.   Psychiatric: She has a normal mood and affect. Her behavior is normal. Judgment and thought content normal.   Nursing note and vitals reviewed.      ECG 12 Lead    Date/Time: 8/14/2019 6:49 PM  Performed by: Jose Daniel Ramsey MD  Authorized by: Jose Daniel Ramsey MD   Interpreted by physician  Rhythm: paced  BPM: 87  Clinical impression: non-specific ECG                 ED Course      Labs Reviewed   COMPREHENSIVE METABOLIC PANEL - Abnormal; Notable for the following components:       Result Value    BUN 25 (*)     Creatinine 1.18 (*)     Potassium 3.1 (*)     Chloride 97 (*)     CO2 30.0 (*)     Albumin 3.40 (*)     eGFR Non  Amer 43 (*)     All other components within normal limits    Narrative:     GFR Normal >60  Chronic Kidney Disease <60  Kidney Failure <15   BNP (IN-HOUSE) - Abnormal; Notable for the following components:    proBNP 2,166.0 (*)     All other components within normal limits    Narrative:     Among patients with dyspnea, NT-proBNP is highly sensitive for the detection of acute congestive heart failure. In addition NT-proBNP of <300 pg/ml effectively rules  out acute congestive heart failure with 99% negative predictive value.   TROPONIN (IN-HOUSE) - Abnormal; Notable for the following components:    Troponin T 0.121 (*)     All other components within normal limits    Narrative:     Troponin T Reference Range:  <= 0.03 ng/mL-   Negative for AMI  >0.03 ng/mL-     Abnormal for myocardial necrosis.  Clinicians would have to utilize clinical acumen, EKG, Troponin and serial changes to determine if it is an Acute Myocardial Infarction or myocardial injury due to an underlying chronic condition.    CBC WITH AUTO DIFFERENTIAL - Abnormal; Notable for the following components:    RBC 2.78 (*)     Hemoglobin 8.9 (*)     Hematocrit 26.8 (*)     Eosinophil % 7.7 (*)     Eosinophils, Absolute 0.52 (*)     All other components within normal limits   RAINBOW DRAW    Narrative:     The following orders were created for panel order [place] Draw.  Procedure                               Abnormality         Status                     ---------                               -----------         ------                     Light Blue Top[[id number]]                                   In process                 Green Top (Gel)[[id number]]                                  In process                 Lavender Top[[id number]]                                     In process                 Gold Top - SST[[id number]]                                   In process                   Please view results for these tests on the individual orders.   CBC AND DIFFERENTIAL    Narrative:     The following orders were created for panel order CBC & Differential.  Procedure                               Abnormality         Status                     ---------                               -----------         ------                     CBC Auto Differential[[id number]]        Abnormal            Final result                 Please view results for these tests on the individual orders.   LIGHT BLUE TOP   GREEN TOP    LAVENDER TOP   GOLD TOP - SST       XR Chest 2 View   Final Result   CONCLUSION:   Chronic obstructive pulmonary disease.   Linear atelectasis lung bases.   Left-sided pacemaker remains in place with leads in the right   atrium and right ventricle.      [id number]      Electronically signed by:  Gonsalo Reddy MD  8/14/2019 7:42 PM CDT   Workstation: [id number]                    [facility]      Final diagnoses:   Shortness of breath   Elevated troponin I level   Hypokalemia            Jose Daniel Ramsey MD  08/14/19 2004

## 2019-08-15 NOTE — ED NOTES
"Pt states she feels better. States \"I just had that episode this morning where I didn't feel right.\"     Torrie Ruggiero RN  08/14/19 1947    "

## 2019-08-15 NOTE — PLAN OF CARE
Problem: Patient Care Overview  Goal: Plan of Care Review  Outcome: Ongoing (interventions implemented as appropriate)   08/15/19 0429   Coping/Psychosocial   Plan of Care Reviewed With patient   Plan of Care Review   Progress no change   OTHER   Outcome Summary pt's vss, episode of soa/wheezing--resp to give breathing tx, appears to have rested well, k+ protocol, will continue to monitor     Goal: Individualization and Mutuality  Outcome: Ongoing (interventions implemented as appropriate)    Goal: Discharge Needs Assessment  Outcome: Ongoing (interventions implemented as appropriate)    Goal: Interprofessional Rounds/Family Conf  Outcome: Ongoing (interventions implemented as appropriate)      Problem: Fall Risk (Adult)  Goal: Identify Related Risk Factors and Signs and Symptoms  Outcome: Outcome(s) achieved Date Met: 08/15/19    Goal: Absence of Fall  Outcome: Ongoing (interventions implemented as appropriate)      Problem: Pain, Chronic (Adult)  Goal: Identify Related Risk Factors and Signs and Symptoms  Outcome: Outcome(s) achieved Date Met: 08/15/19    Goal: Acceptable Pain/Comfort Level and Functional Ability  Outcome: Ongoing (interventions implemented as appropriate)      Problem: Breathing Pattern Ineffective (Adult)  Goal: Identify Related Risk Factors and Signs and Symptoms  Outcome: Outcome(s) achieved Date Met: 08/15/19    Goal: Effective Oxygenation/Ventilation  Outcome: Ongoing (interventions implemented as appropriate)    Goal: Anxiety/Fear Reduction  Outcome: Ongoing (interventions implemented as appropriate)      Problem: Chronic Obstructive Pulmonary Disease (Adult)  Goal: Signs and Symptoms of Listed Potential Problems Will be Absent, Minimized or Managed (Chronic Obstructive Pulmonary Disease)  Outcome: Ongoing (interventions implemented as appropriate)      Problem: Fluid Volume Excess (Adult)  Goal: Identify Related Risk Factors and Signs and Symptoms  Outcome: Outcome(s) achieved Date Met:  08/15/19    Goal: Optimal Fluid Balance  Outcome: Ongoing (interventions implemented as appropriate)      Problem: Arrhythmia/Dysrhythmia (Symptomatic) (Adult)  Goal: Signs and Symptoms of Listed Potential Problems Will be Absent, Minimized or Managed (Arrhythmia/Dysrhythmia)  Outcome: Ongoing (interventions implemented as appropriate)      Problem: Hypertensive Disease/Crisis (Arterial) (Adult)  Goal: Signs and Symptoms of Listed Potential Problems Will be Absent, Minimized or Managed (Hypertensive Disease/Crisis)  Outcome: Ongoing (interventions implemented as appropriate)

## 2019-08-16 VITALS
OXYGEN SATURATION: 95 % | TEMPERATURE: 98 F | BODY MASS INDEX: 20.92 KG/M2 | WEIGHT: 138 LBS | SYSTOLIC BLOOD PRESSURE: 132 MMHG | HEIGHT: 68 IN | HEART RATE: 86 BPM | RESPIRATION RATE: 18 BRPM | DIASTOLIC BLOOD PRESSURE: 68 MMHG

## 2019-08-16 PROBLEM — I50.23 ACUTE ON CHRONIC SYSTOLIC CHF (CONGESTIVE HEART FAILURE) (HCC): Status: ACTIVE | Noted: 2018-01-24

## 2019-08-16 LAB
ANION GAP SERPL CALCULATED.3IONS-SCNC: 12 MMOL/L (ref 5–15)
BASOPHILS # BLD AUTO: 0.01 10*3/MM3 (ref 0–0.2)
BASOPHILS NFR BLD AUTO: 0.1 % (ref 0–1.5)
BUN BLD-MCNC: 24 MG/DL (ref 8–23)
BUN/CREAT SERPL: 27.9 (ref 7–25)
CALCIUM SPEC-SCNC: 9.5 MG/DL (ref 8.6–10.5)
CHLORIDE SERPL-SCNC: 102 MMOL/L (ref 98–107)
CO2 SERPL-SCNC: 28 MMOL/L (ref 22–29)
CREAT BLD-MCNC: 0.86 MG/DL (ref 0.57–1)
DEPRECATED RDW RBC AUTO: 48.9 FL (ref 37–54)
EOSINOPHIL # BLD AUTO: 0 10*3/MM3 (ref 0–0.4)
EOSINOPHIL NFR BLD AUTO: 0 % (ref 0.3–6.2)
ERYTHROCYTE [DISTWIDTH] IN BLOOD BY AUTOMATED COUNT: 13.6 % (ref 12.3–15.4)
GFR SERPL CREATININE-BSD FRML MDRD: 63 ML/MIN/1.73
GLUCOSE BLD-MCNC: 151 MG/DL (ref 65–99)
HCT VFR BLD AUTO: 29.2 % (ref 34–46.6)
HGB BLD-MCNC: 9.7 G/DL (ref 12–15.9)
IMM GRANULOCYTES # BLD AUTO: 0.04 10*3/MM3 (ref 0–0.05)
IMM GRANULOCYTES NFR BLD AUTO: 0.5 % (ref 0–0.5)
LYMPHOCYTES # BLD AUTO: 0.71 10*3/MM3 (ref 0.7–3.1)
LYMPHOCYTES NFR BLD AUTO: 9.6 % (ref 19.6–45.3)
MCH RBC QN AUTO: 32.4 PG (ref 26.6–33)
MCHC RBC AUTO-ENTMCNC: 33.2 G/DL (ref 31.5–35.7)
MCV RBC AUTO: 97.7 FL (ref 79–97)
MONOCYTES # BLD AUTO: 0.05 10*3/MM3 (ref 0.1–0.9)
MONOCYTES NFR BLD AUTO: 0.7 % (ref 5–12)
NEUTROPHILS # BLD AUTO: 6.57 10*3/MM3 (ref 1.7–7)
NEUTROPHILS NFR BLD AUTO: 89.1 % (ref 42.7–76)
NRBC BLD AUTO-RTO: 0 /100 WBC (ref 0–0.2)
PLATELET # BLD AUTO: 347 10*3/MM3 (ref 140–450)
PMV BLD AUTO: 9.9 FL (ref 6–12)
POTASSIUM BLD-SCNC: 4.4 MMOL/L (ref 3.5–5.2)
RBC # BLD AUTO: 2.99 10*6/MM3 (ref 3.77–5.28)
SODIUM BLD-SCNC: 142 MMOL/L (ref 136–145)
WBC NRBC COR # BLD: 7.38 10*3/MM3 (ref 3.4–10.8)

## 2019-08-16 PROCEDURE — 80048 BASIC METABOLIC PNL TOTAL CA: CPT | Performed by: FAMILY MEDICINE

## 2019-08-16 PROCEDURE — 25010000002 METHYLPREDNISOLONE PER 40 MG: Performed by: INTERNAL MEDICINE

## 2019-08-16 PROCEDURE — 96376 TX/PRO/DX INJ SAME DRUG ADON: CPT

## 2019-08-16 PROCEDURE — 94799 UNLISTED PULMONARY SVC/PX: CPT

## 2019-08-16 PROCEDURE — G0378 HOSPITAL OBSERVATION PER HR: HCPCS

## 2019-08-16 PROCEDURE — 94760 N-INVAS EAR/PLS OXIMETRY 1: CPT

## 2019-08-16 PROCEDURE — 85025 COMPLETE CBC W/AUTO DIFF WBC: CPT | Performed by: FAMILY MEDICINE

## 2019-08-16 RX ADMIN — IPRATROPIUM BROMIDE AND ALBUTEROL SULFATE 3 ML: 2.5; .5 SOLUTION RESPIRATORY (INHALATION) at 03:11

## 2019-08-16 RX ADMIN — SODIUM CHLORIDE, PRESERVATIVE FREE 10 ML: 5 INJECTION INTRAVENOUS at 03:00

## 2019-08-16 RX ADMIN — IPRATROPIUM BROMIDE AND ALBUTEROL SULFATE 3 ML: 2.5; .5 SOLUTION RESPIRATORY (INHALATION) at 15:19

## 2019-08-16 RX ADMIN — IPRATROPIUM BROMIDE AND ALBUTEROL SULFATE 3 ML: 2.5; .5 SOLUTION RESPIRATORY (INHALATION) at 11:17

## 2019-08-16 RX ADMIN — ASPIRIN 81 MG: 81 TABLET, COATED ORAL at 08:57

## 2019-08-16 RX ADMIN — METHYLPREDNISOLONE SODIUM SUCCINATE 40 MG: 40 INJECTION, POWDER, FOR SOLUTION INTRAMUSCULAR; INTRAVENOUS at 03:00

## 2019-08-16 RX ADMIN — IPRATROPIUM BROMIDE AND ALBUTEROL SULFATE 3 ML: 2.5; .5 SOLUTION RESPIRATORY (INHALATION) at 07:00

## 2019-08-16 RX ADMIN — LEVOTHYROXINE SODIUM 50 MCG: 50 TABLET ORAL at 06:40

## 2019-08-16 RX ADMIN — DOCUSATE SODIUM 100 MG: 100 CAPSULE, LIQUID FILLED ORAL at 08:57

## 2019-08-16 RX ADMIN — FERROUS SULFATE TAB EC 324 MG (65 MG FE EQUIVALENT) 324 MG: 324 (65 FE) TABLET DELAYED RESPONSE at 08:57

## 2019-08-16 RX ADMIN — FAMOTIDINE 40 MG: 40 TABLET ORAL at 08:57

## 2019-08-16 RX ADMIN — ATORVASTATIN CALCIUM 20 MG: 20 TABLET, FILM COATED ORAL at 08:57

## 2019-08-16 RX ADMIN — BUDESONIDE AND FORMOTEROL FUMARATE DIHYDRATE 2 PUFF: 160; 4.5 AEROSOL RESPIRATORY (INHALATION) at 07:00

## 2019-08-16 RX ADMIN — SERTRALINE 50 MG: 50 TABLET, FILM COATED ORAL at 08:57

## 2019-08-16 RX ADMIN — CARVEDILOL 3.12 MG: 3.12 TABLET, FILM COATED ORAL at 08:57

## 2019-08-16 RX ADMIN — FUROSEMIDE 40 MG: 40 TABLET ORAL at 08:57

## 2019-08-16 RX ADMIN — RANOLAZINE 500 MG: 500 TABLET, FILM COATED, EXTENDED RELEASE ORAL at 08:57

## 2019-08-16 NOTE — PROGRESS NOTES
Cardiology Progress Note     LOS: 0 days   Patient Care Team:  Vero Arteaga APRN as PCP - General (Nurse Practitioner)  Roula Albert MD as PCP - Claims Attributed  Kelvin Mcdowell MD as Surgeon (Orthopedic Surgery)  Eliane Ortiz RN as Care Coordinator (Population Health)    Subjective:    Chart reviewed. Patient seen and examined. Patient denies any chest pain,  or palpitation.  Patient complains of having symptoms of shortness of breath.  Patient transthoracic echocardiogram had revealed preserved left ventricular systolic function with evidence of atrial septal defect with right-to-left shunting.  Patient's hemoglobin is 8.7.    Objective:  Temp:  [96.4 °F (35.8 °C)-98.1 °F (36.7 °C)] 97.5 °F (36.4 °C)  Heart Rate:  [64-92] 79  Resp:  [18-22] 18  BP: (100-159)/(56-69) 151/67    Intake/Output Summary (Last 24 hours) at 8/15/2019 2105  Last data filed at 8/15/2019 1800  Gross per 24 hour   Intake 840 ml   Output 2400 ml   Net -1560 ml       Physical Exam:   General Appearance:    Alert, oriented, cooperative, in no acute distress.   Head:    Normocephalic, atraumatic, without obvious abnormality   Eyes:             TOI. Lids and lashes normal, conjunctivae and sclerae normal, no icterus, no pallor.   Ears:    Ears appear intact with no abnormalities noted.   Throat:   Mucous membranes pink and moist.   Neck:  Supple, trachea midline, no carotid bruit, no organomegaly or JVD.   Lungs:    Clear to auscultation and percussion.  Respirations regular, even and unlabored. No wheezes, rales, or rhonchi.    Heart:    Regular rhythm and normal rate, normal S1 and S2, no      murmur, no gallop, no rub, no click.   Abdomen:    Soft, non-tender, non-distended, no guarding, no rebound tenderness. Normal bowel sounds in all four quadrants, no masses, liver and spleen nonpalpable.    Genitalia:    Deferred.   Extremities:   Moves all extremities well, no edema, no cyanosis, no       redness, no clubbing.    Pulses:   Pulses palpable and equal bilaterally.   Skin:   Moist and warm. No bleeding, bruising or rash.   Neurologic/Psychiatric:   Alert and oriented to person, place, and time.  Motor, power and tone in upper and lower extremities are grossly intact.  No focal neurological deficits. Normal cognitive function. No psychomotor reaction or tangential thought. No depression, homicidal ideations and suicidal ideations.          Results Review:    Results from last 7 days   Lab Units 08/15/19  0840 08/14/19  1910   SODIUM mmol/L 142 139   POTASSIUM mmol/L 4.8 3.1*   CHLORIDE mmol/L 103 97*   CO2 mmol/L 29.0 30.0*   BUN mg/dL 22 25*   CREATININE mg/dL 0.98 1.18*   CALCIUM mg/dL 9.2 9.1   BILIRUBIN mg/dL  --  0.2   ALK PHOS U/L  --  61   ALT (SGPT) U/L  --  7   AST (SGOT) U/L  --  12   GLUCOSE mg/dL 116* 95     Results from last 7 days   Lab Units 08/14/19  2214 08/14/19  1910 08/14/19  1510   TROPONIN T ng/mL 0.118* 0.121* 0.123*         Results from last 7 days   Lab Units 08/15/19  0840   WBC 10*3/mm3 6.29   HEMOGLOBIN g/dL 8.7*   HEMATOCRIT % 26.5*   PLATELETS 10*3/mm3 319         Results from last 7 days   Lab Units 08/14/19  1910   MAGNESIUM mg/dL 1.8                   ECHO:  Results for orders placed during the hospital encounter of 08/14/19   Adult Transthoracic Echo Complete W/ Cont if Necessary Per Protocol    Narrative · Left ventricular wall thickness is consistent with mild concentric   hypertrophy.  · Mild mitral valve regurgitation is present  · Mild to moderate tricuspid valve regurgitation is present.  · Mild aortic valve regurgitation is present.          ECG 12 Lead   ED Interpretation   Jose Daniel Ramsey MD     8/14/2019  8:04 PM   ECG 12 Lead      Date/Time: 8/14/2019 6:49 PM   Performed by: Jose Daniel Ramsey MD   Authorized by: Jose Daniel Ramsey MD    Interpreted by physician   Rhythm: paced   BPM: 87   Clinical impression: non-specific ECG         Final Result   Test Reason : elevated trop    Blood Pressure : **/** mmHG   Vent. Rate : 087 BPM     Atrial Rate : 061 BPM      P-R Int : 000 ms          QRS Dur : 180 ms       QT Int : 498 ms       P-R-T Axes : 000 -78 094 degrees      QTc Int : 599 ms      electronic pacemaker   Abnormal ECG      Confirmed by NANCY LEAL MD [id number]) on 8/15/2019 3:29:27 PM      Referred By:             Confirmed By:NANCY LEAL MD      SCANNED EKG   Final Result           Medication Review:   Current Facility-Administered Medications   Medication Dose Route Frequency Provider Last Rate Last Dose   • aspirin EC tablet 81 mg  81 mg Oral Daily Abimael Medley MD   81 mg at 08/15/19 0803   • atorvastatin (LIPITOR) tablet 20 mg  20 mg Oral Daily Abimael Medley MD   20 mg at 08/15/19 0803   • budesonide-formoterol (SYMBICORT) 160-4.5 MCG/ACT inhaler 2 puff  2 puff Inhalation BID - RT Pepe Anaya MD   2 puff at 08/15/19 2011   • carvedilol (COREG) tablet 3.125 mg  3.125 mg Oral BID With Meals Abimael Medley MD   3.125 mg at 08/15/19 1830   • docusate sodium (COLACE) capsule 100 mg  100 mg Oral BID Abimael Medley MD   100 mg at 08/14/19 2227   • famotidine (PEPCID) tablet 40 mg  40 mg Oral Daily Abimael Medley MD   40 mg at 08/15/19 0803   • ferrous sulfate EC tablet 324 mg  324 mg Oral BID With Meals Abimael Medley MD   324 mg at 08/15/19 1830   • furosemide (LASIX) tablet 40 mg  40 mg Oral Daily Abimael Medley MD   40 mg at 08/15/19 0803   • ipratropium-albuterol (DUO-NEB) nebulizer solution 3 mL  3 mL Nebulization Q4H - RT Pepe Anaya MD   3 mL at 08/15/19 2011   • levothyroxine (SYNTHROID, LEVOTHROID) tablet 50 mcg  50 mcg Oral QAM Abimael Medley MD   50 mcg at 08/15/19 0620   • methylPREDNISolone sodium succinate (SOLU-Medrol) injection 40 mg  40 mg Intravenous Q12H Pepe Anaya MD   40 mg at 08/15/19 1516   • montelukast (SINGULAIR) tablet 10 mg  10 mg Oral Nightly Abimael Medley MD   10 mg at 08/14/19 2227   • ondansetron (ZOFRAN) injection 4  mg  4 mg Intravenous Q6H PRN Abimael Medley MD       • potassium chloride (MICRO-K) CR capsule 40 mEq  40 mEq Oral PRN Abimael Medley MD   40 mEq at 08/15/19 0417    Or   • potassium chloride (KLOR-CON) packet 40 mEq  40 mEq Oral PRN Abimael Medley MD        Or   • potassium chloride 10 mEq in 100 mL IVPB  10 mEq Intravenous Q1H PRN Abimael Medley MD       • ranolazine (RANEXA) 12 hr tablet 500 mg  500 mg Oral BID Abimael Medley MD   500 mg at 08/15/19 0803   • sertraline (ZOLOFT) tablet 50 mg  50 mg Oral Daily Abimael Medley MD   50 mg at 08/15/19 0803   • sodium chloride 0.9 % flush 10 mL  10 mL Intravenous PRN Jose Daniel Ramsey MD       • sodium chloride 0.9 % flush 3 mL  3 mL Intravenous Q12H Abimael Medley MD   10 mL at 08/15/19 0804   • sodium chloride 0.9 % flush 3-10 mL  3-10 mL Intravenous PRN Abimael Medley MD           Assessment and Plan:      Shortness of breath  1.  Shortness of breath.  Patient had preserved left ventricular systolic function with tricuspid and mitral regurgitation with evidence of atrial septal defect.  Patient does not have any evidence of right atrial right ventricular enlargement.  Patient is currently on Lasix 40 mg daily along with the carvedilol.  Patient Lasix would be changed to IV every 12 hours.  2.  Atherosclerotic coronary artery disease with indeterminate troponin.  Patient does have history of documented coronary artery disease.  Patient has not complained of having symptoms of chest pain.  Patient would be continued on the Ranexa.  Patient would not be subjected to any invasive evaluation.  3.  Anemia.  Patient's hemoglobin is 8.7.  Patient had not complained of having any hemoptysis hematuria bright red blood per rectum.  4.  Atrial septal defect with right-to-left shunting.  Patient bubble study does indicate evidence of right-to-left shunting.  At the present time patient would not be subjected to any closure device intervention for the septal defect.    Above plan  of management were discussed with the patient            Alpesh Rogel MD  08/15/19  9:05 PM      Time: Time spent on face-to-face interaction 20 minutes    Dictated utilizing Dragon dictation.

## 2019-08-16 NOTE — DISCHARGE SUMMARY
[facility] Medicine Services  DISCHARGE SUMMARY       Date of Admission: 8/14/2019  Date of Discharge:  8/16/2019  Primary Care Physician: Vero Arteaga APRN    Presenting Problem/History of Present Illness:  Shortness of breath [R06.02]  Hypokalemia [E87.6]  Elevated troponin I level [R74.8]       Final Discharge Diagnoses:  Active Hospital Problems    Diagnosis   • Shortness of breath   • Acute on chronic systolic CHF (congestive heart failure) (CMS/[facility])   • Essential hypertension   • Coronary artery disease involving native coronary artery of native heart without angina pectoris   • Chronic obstructive pulmonary disease (CMS/[facility])   • Acquired hypothyroidism       Consults:   Consults     Date and Time Order Name Status Description    8/14/2019 2018 Inpatient Cardiology Consult Completed           Procedures Performed:                 Pertinent Test Results:   Lab Results (last 24 hours)     Procedure Component Value Units Date/Time    Basic Metabolic Panel [[id number]]  (Abnormal) Collected:  08/16/19 0618    Specimen:  Blood Updated:  08/16/19 0654     Glucose 151 mg/dL      BUN 24 mg/dL      Creatinine 0.86 mg/dL      Sodium 142 mmol/L      Potassium 4.4 mmol/L      Chloride 102 mmol/L      CO2 28.0 mmol/L      Calcium 9.5 mg/dL      eGFR Non African Amer 63 mL/min/1.73      BUN/Creatinine Ratio 27.9     Anion Gap 12.0 mmol/L     Narrative:       GFR Normal >60  Chronic Kidney Disease <60  Kidney Failure <15    CBC & Differential [[id number]] Collected:  08/16/19 0618    Specimen:  Blood Updated:  08/16/19 0644    Narrative:       The following orders were created for panel order CBC & Differential.  Procedure                               Abnormality         Status                     ---------                               -----------         ------                     CBC Auto Differential[[id number]]        Abnormal            Final result                 Please view  results for these tests on the individual orders.    CBC Auto Differential [[id number]]  (Abnormal) Collected:  08/16/19 0618    Specimen:  Blood Updated:  08/16/19 0644     WBC 7.38 10*3/mm3      RBC 2.99 10*6/mm3      Hemoglobin 9.7 g/dL      Hematocrit 29.2 %      MCV 97.7 fL      MCH 32.4 pg      MCHC 33.2 g/dL      RDW 13.6 %      RDW-SD 48.9 fl      MPV 9.9 fL      Platelets 347 10*3/mm3      Neutrophil % 89.1 %      Lymphocyte % 9.6 %      Monocyte % 0.7 %      Eosinophil % 0.0 %      Basophil % 0.1 %      Immature Grans % 0.5 %      Neutrophils, Absolute 6.57 10*3/mm3      Lymphocytes, Absolute 0.71 10*3/mm3      Monocytes, Absolute 0.05 10*3/mm3      Eosinophils, Absolute 0.00 10*3/mm3      Basophils, Absolute 0.01 10*3/mm3      Immature Grans, Absolute 0.04 10*3/mm3      nRBC 0.0 /100 WBC         Imaging Results (all)     Procedure Component Value Units Date/Time    XR Chest 2 View [[id number]] Collected:  08/14/19 1921     Updated:  08/14/19 1943    Narrative:       TWO VIEW CHEST    HISTORY: Shortness of air    Frontal and lateral films of the chest were obtained.    COMPARISON: April 2, 2019    FINDINGS:    EKG leads.  Chronic obstructive pulmonary disease.  Linear atelectasis lung bases.  Left-sided pacemaker remains in place with leads in the right  atrium and right ventricle.  The heart is not enlarged.  The pulmonary vasculature is not increased.  No pleural effusion.  No pneumothorax.  No acute osseous abnormality.  Osteoporosis.  Degenerative changes each shoulder.      Impression:       CONCLUSION:  Chronic obstructive pulmonary disease.  Linear atelectasis lung bases.  Left-sided pacemaker remains in place with leads in the right  atrium and right ventricle.    [id number]    Electronically signed by:  Gonsalo Reddy MD  8/14/2019 7:42 PM CDT  Workstation: [email or web address]            Chief Complaint on Day of Discharge: none    Hospital Course:  86-year-old  female with past medical history of  "sick sinus syndrome, congestive heart failure, hypothyroidism, hyperlipidemia, hypertension, COPD, coronary artery disease, chronic hypoxic respiratory failure who presented on 8/14/2019 with complaints of dyspnea.  Patient reported increasing shortness of breath starting the morning of her admission.  She felt a heaviness in the center of her chest and had difficulty lying flat in the bed.  The patient was admitted to the hospital and treated for acute exacerbation of congestive heart failure as well as COPD exacerbation.  Patient was provided with IV Lasix for diuresis and given nebulizers and steroids as well.    Over the course of 48 hours, the patient has diuresed approximately 4 pounds of fluid and has been weaned back to her baseline oxygen setting of 3 L per nasal cannula.  The patient was evaluated by cardiology during her hospital stay.  Cardiology notes atrial septal defect with right to left shunting on echocardiogram, however per discussion with Dr. oRgel, no invasive intervention is recommended at this time.  Per cardiology, the patient is to be continued on her Lasix, Coreg, Ranexa.  The patient has been able to ambulate the hallways on day of discharge and has no complaints of chest pain or worsening shortness of breath.  Patient will be discharged home today in stable condition with instructions for 1 week follow-up with PCP in 2-week follow-up with cardiology.  Home health services have been arranged for physical and occupational therapy as well as CHF monitoring at discharge.    Condition on Discharge:  stable    Physical Exam on Discharge:  /44 (BP Location: Left arm, Patient Position: Lying)   Pulse 91   Temp 98.3 °F (36.8 °C) (Oral)   Resp 18   Ht 172.7 cm (68\")   Wt 62.6 kg (138 lb)   LMP  (LMP Unknown)   SpO2 98%   BMI 20.98 kg/m²   Physical Exam   Constitutional: She is oriented to person, place, and time. She appears well-developed and well-nourished. No distress.   HENT: "   Head: Normocephalic and atraumatic.   Eyes: Conjunctivae and lids are normal.   Neck: Normal range of motion. Neck supple.   Cardiovascular: Normal rate, normal heart sounds and intact distal pulses.   Pulmonary/Chest: Effort normal and breath sounds normal. No respiratory distress.   Abdominal: Soft. Bowel sounds are normal. She exhibits no distension. There is no tenderness.   Musculoskeletal: Normal range of motion. She exhibits no edema.   Neurological: She is alert and oriented to person, place, and time.   Skin: Skin is warm and dry. She is not diaphoretic.   Psychiatric: She has a normal mood and affect. Her behavior is normal.   Nursing note and vitals reviewed.        Discharge Disposition:  Home-Health Care [facility]    Discharge Medications:     Discharge Medications      Changes to Medications      Instructions Start Date   carvedilol 6.25 MG tablet  Commonly known as:  COREG  What changed:  how much to take   6.25 mg, Oral, 2 Times Daily With Meals      estradiol 0.1 MG/GM vaginal cream  Commonly known as:  ESTRACE  What changed:  when to take this   2 g, Vaginal, Daily         Continue These Medications      Instructions Start Date   acetaminophen 325 MG tablet  Commonly known as:  TYLENOL   650 mg, Oral, Every 4 Hours PRN      aspirin 325 MG EC tablet   325 mg, Oral, Daily      atorvastatin 20 MG tablet  Commonly known as:  LIPITOR   20 mg, Oral, Daily      docusate sodium 100 MG capsule  Commonly known as:  COLACE   100 mg, Oral, 2 Times Daily      esomeprazole 40 MG capsule  Commonly known as:  nexIUM   TAKE 1 CAPSULE BY MOUTH EVERY MORNING      ferrous sulfate 325 (65 FE) MG tablet   325 mg, Oral, Daily With Breakfast & Dinner      fluorometholone 0.1 % ophthalmic suspension  Commonly known as:  FML   INSTILL 1 DROP IN BOTH EYES FOUR (4) TIMES DAILY      folic acid-vit B6-vit B12 2.5-25-1 MG tablet tablet  Commonly known as:  FOLBEE   1 tablet, Oral, Daily      furosemide 40 MG tablet  Commonly known  as:  LASIX   40 mg, Oral, Daily      ipratropium-albuterol 0.5-2.5 mg/3 ml nebulizer  Commonly known as:  DUO-NEB   3 mL, Nebulization, 4 Times Daily PRN      levothyroxine 50 MCG tablet  Commonly known as:  SYNTHROID, LEVOTHROID   50 mcg, Oral, Daily      lidocaine 5 %  Commonly known as:  LIDODERM   1 patch, Transdermal, Every 24 Hours, Remove & Discard patch within 12 hours      montelukast 10 MG tablet  Commonly known as:  SINGULAIR   10 mg, Oral, Nightly      nitroglycerin 0.2 MG/HR patch  Commonly known as:  NITRODUR   1 patch, Transdermal, Daily      polyethylene glycol powder  Commonly known as:  MIRALAX   MIX 1 CAPFUL (17G) IN 8 OUNCES OF LIQUID AND DRINK BY MOUTH EVERY DAY FOR CONSTIPATION      ranolazine 500 MG 12 hr tablet  Commonly known as:  RANEXA   500 mg, Oral, 2 Times Daily      sertraline 50 MG tablet  Commonly known as:  ZOLOFT   50 mg, Oral, Daily      traMADol 50 MG tablet  Commonly known as:  ULTRAM   50 mg, Oral, Every 6 Hours PRN      umeclidinium-vilanterol 62.5-25 MCG/INH aerosol powder  inhaler  Commonly known as:  ANORO ELLIPTA   1 puff, Inhalation, Daily - RT      vitamin D 94311 units capsule capsule  Commonly known as:  ERGOCALCIFEROL   50,000 Units, Oral, Every 30 Days         Stop These Medications    rivaroxaban 15 MG tablet  Commonly known as:  XARELTO            Discharge Diet:   Diet Instructions     Diet: Cardiac; Thin      Discharge Diet:  Cardiac    Fluid Consistency:  Thin          Activity at Discharge:   Activity Instructions     Activity as Tolerated            Discharge Care Plan/Instructions: Follow up with PCP within one week of discharge.  Follow up with Dr. Rogel in 2 weeks.     Follow-up Appointments:   Additional Instructions for the Follow-ups that You Need to Schedule     Ambulatory Referral to Home Health   As directed      Face to Face Visit Date:  8/16/2019    Follow-up Provider for Plan of Care?:  I treated the patient in an acute care facility and will not  continue treatment after discharge.    Follow-up Provider:  NILES ARTEAGA [[id number]]    Reason/Clinical Findings:  deconditioning r/t chf    Describe mobility limitations that make leaving home difficult:  deconditioning r/t chf    Nursing/Therapeutic Services Requested:  Skilled Nursing Physical Therapy Occupational Therapy    Skilled nursing orders:  Cardiopulmonary assessments CHF management    PT orders:  Strengthening    Occupational orders:  Strengthening Energy conservation    Frequency:  1 Week 1         Discharge Follow-up with PCP   As directed       Currently Documented PCP:    Niles Arteaga APRN    PCP Phone Number:    [phone]     Follow Up Details:  one week         Discharge Follow-up with Specified Provider: Pebbles; 2 Weeks   As directed      To:  Pebbles    Follow Up:  2 Weeks           Follow-up Information     Niles Arteaga APRN .    Specialty:  Nurse Practitioner  Why:  one week  Contact information:  [address] [place] [id number] [phone]                   Test Results Pending at Discharge:           This document has been electronically signed by CRESENCIO Winters on August 16, 2019 3:16 PM

## 2019-08-16 NOTE — PROGRESS NOTES
[facility] Medicine Services  INPATIENT PROGRESS NOTE    Length of Stay: 0  Date of Admission: 8/14/2019  Primary Care Physician: Vero Arteaga APRN    Subjective   Chief Complaint: Shortness of breath    HPI: Val Arteaga is a 86 y.o. female with medical history significant for SSS, CHF, hypothyroidism, CAD, HLD, GERD, HTN, COPD, and chronic hypoxic respiratory failure presents with dyspnea. Patient reported increasing shortness of breath started this morning.  Patient felt like she was smothering. Patient had difficulty lying flat on the day of presentation. Patient did also endorse some chest heaviness.  Patient denied diaphoresis, nausea, vomiting, fever, chills, diarrhea, syncope, or near syncope. Patient was evaluated by primary cardiologist in office on the day of presentation and had labs drawn at that time.  These labs revealed an elevated troponin as such cardiologist's office called the patient and asked her to present to the ER.    She has been managed for dyspnea of multifactorial origin with history of COPD and CHF and elevated troponin.      This morning patient is short of breath.  She is also complaining of some wheezing.  She denies chest pain, fevers or chills.    Review of Systems   Constitutional: Negative for activity change, appetite change, chills, fatigue and fever.   HENT: Negative for congestion and trouble swallowing.    Respiratory: Positive for shortness of breath and wheezing. Negative for cough and chest tightness.    Cardiovascular: Negative for chest pain, palpitations and leg swelling.   Gastrointestinal: Negative for abdominal distention, abdominal pain, diarrhea, nausea and vomiting.   Genitourinary: Negative for difficulty urinating, dysuria and hematuria.   Musculoskeletal: Negative for arthralgias, back pain and myalgias.   Skin: Negative for pallor and rash.   Neurological: Negative for dizziness, syncope, weakness,  light-headedness and headaches.   Hematological: Negative for adenopathy. Does not bruise/bleed easily.   Psychiatric/Behavioral: Negative for agitation and confusion. The patient is not nervous/anxious.        Objective    Temp:  [97.5 °F (36.4 °C)-98.3 °F (36.8 °C)] 98.3 °F (36.8 °C)  Heart Rate:  [] 91  Resp:  [18-22] 18  BP: (107-151)/(44-69) 107/44    Physical Exam   Constitutional: She is oriented to person, place, and time. She appears well-developed and well-nourished. No distress.   HENT:   Head: Normocephalic and atraumatic.   Mouth/Throat: Oropharynx is clear and moist.   Eyes: Conjunctivae and EOM are normal. Pupils are equal, round, and reactive to light. No scleral icterus.   Neck: Normal range of motion. Neck supple. No JVD present. No tracheal deviation present. No thyromegaly present.   Cardiovascular: Normal rate, regular rhythm, normal heart sounds and intact distal pulses. Exam reveals no gallop and no friction rub.   No murmur heard.  Pulmonary/Chest: No stridor. She is in respiratory distress. She has no wheezes. She has no rales. She exhibits no tenderness.   Bilateral scattered wheeze and few basal crepitations.   Abdominal: Soft. Bowel sounds are normal. She exhibits no distension and no mass. There is no tenderness. There is no rebound and no guarding. No hernia.   Musculoskeletal: Normal range of motion. She exhibits no edema, tenderness or deformity.   Lymphadenopathy:     She has no cervical adenopathy.   Neurological: She is alert and oriented to person, place, and time. No cranial nerve deficit. She exhibits normal muscle tone.   Skin: Skin is warm and dry. No rash noted. She is not diaphoretic. No erythema. No pallor.   Psychiatric: She has a normal mood and affect. Her behavior is normal. Judgment and thought content normal.     Medication Review:    Current Facility-Administered Medications:   •  aspirin EC tablet 81 mg, 81 mg, Oral, Daily, Abimael Medley MD, 81 mg at 08/16/19  0857  •  atorvastatin (LIPITOR) tablet 20 mg, 20 mg, Oral, Daily, Abimael Medley MD, 20 mg at 08/16/19 0857  •  budesonide-formoterol (SYMBICORT) 160-4.5 MCG/ACT inhaler 2 puff, 2 puff, Inhalation, BID - RT, Pepe Anaya MD, 2 puff at 08/16/19 0700  •  carvedilol (COREG) tablet 3.125 mg, 3.125 mg, Oral, BID With Meals, Abimael Medley MD, 3.125 mg at 08/16/19 0857  •  docusate sodium (COLACE) capsule 100 mg, 100 mg, Oral, BID, Abimael Medley MD, 100 mg at 08/16/19 0857  •  famotidine (PEPCID) tablet 40 mg, 40 mg, Oral, Daily, Abimael Medley MD, 40 mg at 08/16/19 0857  •  ferrous sulfate EC tablet 324 mg, 324 mg, Oral, BID With Meals, Abimael Medley MD, 324 mg at 08/16/19 0857  •  furosemide (LASIX) tablet 40 mg, 40 mg, Oral, Daily, Abimael Medley MD, 40 mg at 08/16/19 0857  •  ipratropium-albuterol (DUO-NEB) nebulizer solution 3 mL, 3 mL, Nebulization, Q4H - RT, Pepe Anaya MD, 3 mL at 08/16/19 1117  •  levothyroxine (SYNTHROID, LEVOTHROID) tablet 50 mcg, 50 mcg, Oral, QAM, Abimael Medley MD, 50 mcg at 08/16/19 0640  •  methylPREDNISolone sodium succinate (SOLU-Medrol) injection 40 mg, 40 mg, Intravenous, Q12H, Pepe Anaya MD, 40 mg at 08/16/19 0300  •  montelukast (SINGULAIR) tablet 10 mg, 10 mg, Oral, Nightly, Abimael Medley MD, 10 mg at 08/15/19 2158  •  ondansetron (ZOFRAN) injection 4 mg, 4 mg, Intravenous, Q6H PRN, Abimael Medley MD  •  potassium chloride (MICRO-K) CR capsule 40 mEq, 40 mEq, Oral, PRN, 40 mEq at 08/15/19 0417 **OR** potassium chloride (KLOR-CON) packet 40 mEq, 40 mEq, Oral, PRN **OR** potassium chloride 10 mEq in 100 mL IVPB, 10 mEq, Intravenous, Q1H PRN, Abimael Medley MD  •  ranolazine (RANEXA) 12 hr tablet 500 mg, 500 mg, Oral, BID, Abimael Medley MD, 500 mg at 08/16/19 0857  •  sertraline (ZOLOFT) tablet 50 mg, 50 mg, Oral, Daily, Abimael Medley MD, 50 mg at 08/16/19 0857  •  sodium chloride 0.9 % flush 10 mL, 10 mL, Intravenous, PRN, Jose Daniel Ramsey MD, 10 mL at  08/16/19 0300  •  sodium chloride 0.9 % flush 3 mL, 3 mL, Intravenous, Q12H, Abimael Medley MD, 3 mL at 08/15/19 2158  •  sodium chloride 0.9 % flush 3-10 mL, 3-10 mL, Intravenous, PRN, Abimael Medley MD    Results Review:  I have reviewed the labs, radiology results, and diagnostic studies.    Laboratory Data:   Results from last 7 days   Lab Units 08/16/19  0618 08/15/19  0840 08/14/19 1910 08/14/19  1510   SODIUM mmol/L 142 142 139 140   POTASSIUM mmol/L 4.4 4.8 3.1* 3.4*   CHLORIDE mmol/L 102 103 97* 98   CO2 mmol/L 28.0 29.0 30.0* 28.0   BUN mg/dL 24* 22 25* 22   CREATININE mg/dL 0.86 0.98 1.18* 1.29*   GLUCOSE mg/dL 151* 116* 95 106*   CALCIUM mg/dL 9.5 9.2 9.1 9.2   BILIRUBIN mg/dL  --   --  0.2 0.3   ALK PHOS U/L  --   --  61 66   ALT (SGPT) U/L  --   --  7 8   AST (SGOT) U/L  --   --  12 14   ANION GAP mmol/L 12.0 10.0 12.0 14.0     Estimated Creatinine Clearance: 46.4 mL/min (by C-G formula based on SCr of 0.86 mg/dL).  Results from last 7 days   Lab Units 08/14/19 1910   MAGNESIUM mg/dL 1.8         Results from last 7 days   Lab Units 08/16/19  0618 08/15/19  0840 08/14/19 1910 08/14/19  1510   WBC 10*3/mm3 7.38 6.29 6.73 6.97   HEMOGLOBIN g/dL 9.7* 8.7* 8.9* 9.6*   HEMATOCRIT % 29.2* 26.5* 26.8* 29.0*   PLATELETS 10*3/mm3 347 319 298 347           Culture Data:   No results found for: BLOODCX  No results found for: URINECX  No results found for: RESPCX  No results found for: WOUNDCX  No results found for: STOOLCX  No components found for: BODYFLD    Radiology Data:   Imaging Results (last 24 hours)     ** No results found for the last 24 hours. **          I have reviewed the patient's current medications.     Assessment/Plan     Hospital Problem List:  Active Problems:    Shortness of breath  Dyspnea  Chronic hypoxic respiratory failure  COPD exacerbation  Decompensated congestive heart failure  Sick sinus syndrome status post pacemaker  Essential  hypertension  Hypothyroidism  GERD  Anemia    Plan  -Patient appears to be in some respiratory distress.  - We will give IV Lasix 20 mg x 1 dose.  Continue p.o. Lasix 40 mg daily  - DuoNeb every 4 hours around-the-clock.  Start Symbicort inhaler twice daily.  Start IV Solu-Medrol 40 mg every 12 hours.  - Cardiology input appreciated.  Continue cardiac medications including Ranexa, Coreg, aspirin  -Follow echocardiogram-continue other medications for hypothyroidism and GERD.-Monitor electrolytes closely  - DVT prophylaxis with Xarelto currently being held  - CODE STATUS is DNR    Discharge Planning: In progress    Pepe Anaya MD   08/16/19   2:26 PM

## 2019-08-16 NOTE — PLAN OF CARE
Problem: Patient Care Overview  Goal: Plan of Care Review  Outcome: Ongoing (interventions implemented as appropriate)   08/16/19 0501   Coping/Psychosocial   Plan of Care Reviewed With patient   Plan of Care Review   Progress improving   OTHER   Outcome Summary pt's vss, appears to be resting, will continue to monitor     Goal: Individualization and Mutuality  Outcome: Ongoing (interventions implemented as appropriate)    Goal: Discharge Needs Assessment  Outcome: Ongoing (interventions implemented as appropriate)    Goal: Interprofessional Rounds/Family Conf  Outcome: Ongoing (interventions implemented as appropriate)      Problem: Fall Risk (Adult)  Goal: Absence of Fall  Outcome: Ongoing (interventions implemented as appropriate)      Problem: Pain, Chronic (Adult)  Goal: Acceptable Pain/Comfort Level and Functional Ability  Outcome: Ongoing (interventions implemented as appropriate)      Problem: Breathing Pattern Ineffective (Adult)  Goal: Effective Oxygenation/Ventilation  Outcome: Ongoing (interventions implemented as appropriate)    Goal: Anxiety/Fear Reduction  Outcome: Ongoing (interventions implemented as appropriate)      Problem: Chronic Obstructive Pulmonary Disease (Adult)  Goal: Signs and Symptoms of Listed Potential Problems Will be Absent, Minimized or Managed (Chronic Obstructive Pulmonary Disease)  Outcome: Ongoing (interventions implemented as appropriate)      Problem: Fluid Volume Excess (Adult)  Goal: Optimal Fluid Balance  Outcome: Ongoing (interventions implemented as appropriate)      Problem: Arrhythmia/Dysrhythmia (Symptomatic) (Adult)  Goal: Signs and Symptoms of Listed Potential Problems Will be Absent, Minimized or Managed (Arrhythmia/Dysrhythmia)  Outcome: Ongoing (interventions implemented as appropriate)      Problem: Hypertensive Disease/Crisis (Arterial) (Adult)  Goal: Signs and Symptoms of Listed Potential Problems Will be Absent, Minimized or Managed (Hypertensive  Disease/Crisis)  Outcome: Ongoing (interventions implemented as appropriate)

## 2019-08-16 NOTE — PROGRESS NOTES
Discharge Planning Assessment  [facility]     Patient Name: Val Arteaga  MRN: [id number]  Today's Date: 8/16/2019    Admit Date: 8/14/2019    Discharge Needs Assessment     Row Name 08/16/19 3727       Living Environment    Lives With  alone    Name(s) of Who Lives With Patient  sitter stays during the night    Current Living Arrangements  home/apartment/condo    Primary Care Provided by  self    Provides Primary Care For  no one, unable/limited ability to care for self    Family Caregiver if Needed  sibling(s);other (see comments) nieces    Quality of Family Relationships  supportive    Able to Return to Prior Arrangements  yes       Resource/Environmental Concerns    Resource/Environmental Concerns  none    Transportation Concerns  car, none       Transition Planning    Patient/Family Anticipates Transition to  home with help/services    Patient/Family Anticipated Services at Transition  home health care    Transportation Anticipated  family or friend will provide       Discharge Needs Assessment    Readmission Within the Last 30 Days  no previous admission in last 30 days    Concerns to be Addressed  adjustment to diagnosis/illness    Equipment Currently Used at Home  walker, rolling;rollator;nebulizer;oxygen    Anticipated Changes Related to Illness  none    Equipment Needed After Discharge  none    Discharge Facility/Level of Care Needs  home with home health    Current Discharge Risk  chronically ill        Discharge Plan     Row Name 08/16/19 6746       Plan    Plan  home with home health services    Patient/Family in Agreement with Plan  yes    Plan Comments  Lace assessment complete- pt lives at home alone, has brother/axrhkl-b-vio that check on her often, nieces assist as needed and set up medications in planner. has a sitter during the night.  active with [facility] prior to admission, verified with [facility] active for skilled nursing. verified demogrphics and preferred pharmacy, [facility]. has rx  coverage.....Perlita Quintanilla RN        Destination      No service coordination in this encounter.      Durable Medical Equipment      No service coordination in this encounter.      Dialysis/Infusion      No service coordination in this encounter.      Home Medical Care      No service coordination in this encounter.      Therapy      No service coordination in this encounter.      Community Resources      No service coordination in this encounter.        Expected Discharge Date and Time     Expected Discharge Date Expected Discharge Time    Aug 16, 2019         Demographic Summary     Row Name 08/16/19 1455       General Information    Admission Type  observation    Arrived From  home    Required Notices Provided  Observation Status Notice    Referral Source  high risk screening    Reason for Consult  discharge planning    Preferred Language  English     Used During This Interaction  no       Contact Information    Permission Granted to Share Info With          Functional Status     Row Name 08/16/19 1456       Functional Status, IADL    Medications  assistive person niece sets up meds in planner    Meal Preparation  assistive person    Housekeeping  assistive person    Laundry  assistive person    Shopping  assistive person       Mental Status Summary    Recent Changes in Mental Status/Cognitive Functioning  no changes       Employment/    Employment Status  retired        Psychosocial    No documentation.       Abuse/Neglect    No documentation.       Legal    No documentation.       Substance Abuse    No documentation.       Patient Forms    No documentation.           Perlita Quintanilla

## 2019-08-17 ENCOUNTER — READMISSION MANAGEMENT (OUTPATIENT)
Dept: CALL CENTER | Facility: HOSPITAL | Age: 84
End: 2019-08-17

## 2019-08-17 NOTE — OUTREACH NOTE
Prep Survey      Responses   Facility patient discharged from?  [place]   Is patient eligible?  Yes   Discharge diagnosis  Acute on chronic systolic CHF    Does the patient have one of the following disease processes/diagnoses(primary or secondary)?  CHF   Does the patient have Home health ordered?  Yes   What is the Home health agency?   [facility]   Is there a DME ordered?  No   Prep survey completed?  Yes          Traci Ponce RN

## 2019-08-19 ENCOUNTER — EPISODE CHANGES (OUTPATIENT)
Dept: CASE MANAGEMENT | Facility: OTHER | Age: 84
End: 2019-08-19

## 2019-08-19 ENCOUNTER — READMISSION MANAGEMENT (OUTPATIENT)
Dept: CALL CENTER | Facility: HOSPITAL | Age: 84
End: 2019-08-19

## 2019-08-19 RX ORDER — FUROSEMIDE 40 MG/1
40 TABLET ORAL DAILY
Qty: 30 TABLET | Refills: 2 | Status: SHIPPED | OUTPATIENT
Start: 2019-08-19 | End: 2020-07-31

## 2019-08-19 NOTE — OUTREACH NOTE
CHF Week 1 Survey      Responses   Facility patient discharged from?  [place]   Does the patient have one of the following disease processes/diagnoses(primary or secondary)?  CHF   Is there a successful TCM telephone encounter documented?  No   CHF Week 1 attempt successful?  Yes   Call start time  0951   Call end time  1005   Discharge diagnosis  Acute on chronic systolic CHF    Meds reviewed with patient/caregiver?  Yes   Is the patient having any side effects they believe may be caused by any medication additions or changes?  No   Does the patient have all medications ordered at discharge?  N/A   Is the patient taking all medications as directed (includes completed medication regime)?  N/A   Does the patient have a primary care provider?   Yes   Does the patient have an appointment with their PCP within 7 days of discharge?  Yes   Has the patient kept scheduled appointments due by today?  N/A   What is the Home health agency?   [facility]   Has home health visited the patient within 72 hours of discharge?  Yes   Psychosocial issues?  No   Did the patient receive a copy of their discharge instructions?  Yes   Nursing interventions  Reviewed instructions with patient   What is the patient's perception of their health status since discharge?  Improving   Nursing interventions  Nurse provided patient education   Is the patient weighing daily?  Yes   Does the patient have scales?  Yes   Daily weight interventions  Education provided on importance of daily weight   Is the patient able to teach back Heart Failure diet management?  Yes   Is the patient able to teach back Heart Failure Zones?  Yes   Is the patient able to teach back signs and symptoms of worsening condition? (i.e. weight gain, shortness of air, etc.)  Yes   Is the patient/caregiver able to teach back the hierarchy of who to call/visit for symptoms/problems? PCP, Specialist, Home health nurse, Urgent Care, ED, 911  Yes    CHF Week 1 call completed?  Yes           Rissa Dumont, RN

## 2019-08-20 ENCOUNTER — EPISODE CHANGES (OUTPATIENT)
Dept: CASE MANAGEMENT | Facility: OTHER | Age: 84
End: 2019-08-20

## 2019-08-22 ENCOUNTER — OFFICE VISIT (OUTPATIENT)
Dept: FAMILY MEDICINE CLINIC | Facility: CLINIC | Age: 84
End: 2019-08-22

## 2019-08-22 VITALS
HEIGHT: 68 IN | OXYGEN SATURATION: 95 % | HEART RATE: 74 BPM | WEIGHT: 138 LBS | RESPIRATION RATE: 28 BRPM | BODY MASS INDEX: 20.92 KG/M2 | DIASTOLIC BLOOD PRESSURE: 74 MMHG | SYSTOLIC BLOOD PRESSURE: 130 MMHG

## 2019-08-22 DIAGNOSIS — R79.9 ELEVATED BUN: ICD-10-CM

## 2019-08-22 DIAGNOSIS — Z09 HOSPITAL DISCHARGE FOLLOW-UP: ICD-10-CM

## 2019-08-22 DIAGNOSIS — R06.2 WHEEZING: Primary | ICD-10-CM

## 2019-08-22 DIAGNOSIS — I50.23 ACUTE ON CHRONIC SYSTOLIC CHF (CONGESTIVE HEART FAILURE) (HCC): ICD-10-CM

## 2019-08-22 PROCEDURE — 99213 OFFICE O/P EST LOW 20 MIN: CPT | Performed by: NURSE PRACTITIONER

## 2019-08-22 RX ORDER — IPRATROPIUM BROMIDE AND ALBUTEROL SULFATE 2.5; .5 MG/3ML; MG/3ML
3 SOLUTION RESPIRATORY (INHALATION) ONCE
Status: COMPLETED | OUTPATIENT
Start: 2019-08-22 | End: 2019-08-22

## 2019-08-22 RX ORDER — IPRATROPIUM BROMIDE AND ALBUTEROL SULFATE 2.5; .5 MG/3ML; MG/3ML
3 SOLUTION RESPIRATORY (INHALATION)
Status: DISCONTINUED | OUTPATIENT
Start: 2019-08-22 | End: 2019-08-22

## 2019-08-22 RX ADMIN — IPRATROPIUM BROMIDE AND ALBUTEROL SULFATE 3 ML: 2.5; .5 SOLUTION RESPIRATORY (INHALATION) at 14:25

## 2019-08-22 NOTE — PROGRESS NOTES
"Subjective   Orean Dao Arteaga is a 86 y.o. female.  1 week hospital discharge follow-up.  Continues to complain of increasing shortness of breath.  Has oxygen, but according to her daughter \"she did not feel like bringing it all the way up.\"  Has follow-up appointment with Dr. Rogel on 10/04/2019.      Date of Admission: 8/14/2019  Date of Discharge:  8/16/2019  Primary Care Physician: Vero Arteaga APRN     Presenting Problem/History of Present Illness:  Shortness of breath [R06.02]  Hypokalemia [E87.6]  Elevated troponin I level [R74.8]                Final Discharge Diagnoses:      Active Hospital Problems     Diagnosis   • Shortness of breath   • Acute on chronic systolic CHF (congestive heart failure) (CMS/[facility])   • Essential hypertension   • Coronary artery disease involving native coronary artery of native heart without angina pectoris   • Chronic obstructive pulmonary disease (CMS/[facility])   • Acquired hypothyroidism          Congestive Heart Failure   Presents for follow-up visit. Associated symptoms include fatigue, orthopnea and shortness of breath. The symptoms have been stable. Compliance with total regimen is 51-75%. Compliance with diet is %. Compliance with exercise is 26-50%. Compliance with medications is %.        The following portions of the patient's history were reviewed and updated as appropriate:   Current Outpatient Medications   Medication Sig Dispense Refill   • acetaminophen (TYLENOL) 325 MG tablet Take 2 tablets by mouth Every 4 (Four) Hours As Needed for Mild Pain . 1 tablet 1   • atorvastatin (LIPITOR) 20 MG tablet Take 1 tablet by mouth Daily. 90 tablet 2   • carvedilol (COREG) 6.25 MG tablet Take 1 tablet by mouth 2 (Two) Times a Day With Meals. (Patient taking differently: Take 3.125 mg by mouth 2 (Two) Times a Day With Meals.) 180 tablet 2   • docusate sodium (COLACE) 100 MG capsule Take 1 capsule by mouth 2 (Two) Times a Day. 60 capsule 0   • esomeprazole (nexIUM) 40 MG " capsule TAKE 1 CAPSULE BY MOUTH EVERY MORNING 30 capsule 0   • estradiol (ESTRACE) 0.1 MG/GM vaginal cream Insert 2 g into the vagina Daily. (Patient taking differently: Insert 2 g into the vagina every night at bedtime.) 42.5 g 2   • ferrous sulfate 325 (65 FE) MG tablet Take 325 mg by mouth Daily With Breakfast & Dinner.     • fluorometholone (FML) 0.1 % ophthalmic suspension INSTILL 1 DROP IN BOTH EYES FOUR (4) TIMES DAILY  0   • folic acid-vit B6-vit B12 (FOLBEE) 2.5-25-1 MG tablet tablet Take 1 tablet by mouth Daily. 30 tablet 5   • furosemide (LASIX) 40 MG tablet TAKE 1 TABLET BY MOUTH DAILY. 30 tablet 2   • ipratropium-albuterol (DUO-NEB) 0.5-2.5 mg/3 ml nebulizer Take 3 mL by nebulization 4 (Four) Times a Day As Needed for Wheezing. 360 mL 2   • levothyroxine (SYNTHROID, LEVOTHROID) 50 MCG tablet Take 1 tablet by mouth Daily. 30 tablet 5   • lidocaine (LIDODERM) 5 % Place 1 patch on the skin as directed by provider Daily. Remove & Discard patch within 12 hours 30 patch 2   • montelukast (SINGULAIR) 10 MG tablet Take 1 tablet by mouth Every Night. 90 tablet 3   • nitroglycerin (NITRODUR) 0.2 MG/HR patch Place 1 patch on the skin as directed by provider Daily. 90 patch 2   • polyethylene glycol (MIRALAX) powder MIX 1 CAPFUL (17G) IN 8 OUNCES OF LIQUID AND DRINK BY MOUTH EVERY DAY FOR CONSTIPATION 527 g 2   • ranolazine (RANEXA) 500 MG 12 hr tablet Take 1 tablet by mouth 2 (Two) Times a Day. 60 tablet 2   • sertraline (ZOLOFT) 50 MG tablet Take 1 tablet by mouth Daily. 90 tablet 2   • traMADol (ULTRAM) 50 MG tablet Take 1 tablet by mouth Every 6 (Six) Hours As Needed for Moderate Pain . 120 tablet 2   • umeclidinium-vilanterol (ANORO ELLIPTA) 62.5-25 MCG/INH aerosol powder  inhaler Inhale 1 puff Daily. 60 each 3   • vitamin D (ERGOCALCIFEROL) 73832 units capsule capsule Take 1 capsule by mouth Every 30 (Thirty) Days. 3 capsule 3     No current facility-administered medications for this visit.      Current  Outpatient Medications on File Prior to Visit   Medication Sig   • acetaminophen (TYLENOL) 325 MG tablet Take 2 tablets by mouth Every 4 (Four) Hours As Needed for Mild Pain .   • atorvastatin (LIPITOR) 20 MG tablet Take 1 tablet by mouth Daily.   • carvedilol (COREG) 6.25 MG tablet Take 1 tablet by mouth 2 (Two) Times a Day With Meals. (Patient taking differently: Take 3.125 mg by mouth 2 (Two) Times a Day With Meals.)   • docusate sodium (COLACE) 100 MG capsule Take 1 capsule by mouth 2 (Two) Times a Day.   • esomeprazole (nexIUM) 40 MG capsule TAKE 1 CAPSULE BY MOUTH EVERY MORNING   • estradiol (ESTRACE) 0.1 MG/GM vaginal cream Insert 2 g into the vagina Daily. (Patient taking differently: Insert 2 g into the vagina every night at bedtime.)   • ferrous sulfate 325 (65 FE) MG tablet Take 325 mg by mouth Daily With Breakfast & Dinner.   • fluorometholone (FML) 0.1 % ophthalmic suspension INSTILL 1 DROP IN BOTH EYES FOUR (4) TIMES DAILY   • folic acid-vit B6-vit B12 (FOLBEE) 2.5-25-1 MG tablet tablet Take 1 tablet by mouth Daily.   • furosemide (LASIX) 40 MG tablet TAKE 1 TABLET BY MOUTH DAILY.   • ipratropium-albuterol (DUO-NEB) 0.5-2.5 mg/3 ml nebulizer Take 3 mL by nebulization 4 (Four) Times a Day As Needed for Wheezing.   • levothyroxine (SYNTHROID, LEVOTHROID) 50 MCG tablet Take 1 tablet by mouth Daily.   • lidocaine (LIDODERM) 5 % Place 1 patch on the skin as directed by provider Daily. Remove & Discard patch within 12 hours   • montelukast (SINGULAIR) 10 MG tablet Take 1 tablet by mouth Every Night.   • nitroglycerin (NITRODUR) 0.2 MG/HR patch Place 1 patch on the skin as directed by provider Daily.   • polyethylene glycol (MIRALAX) powder MIX 1 CAPFUL (17G) IN 8 OUNCES OF LIQUID AND DRINK BY MOUTH EVERY DAY FOR CONSTIPATION   • ranolazine (RANEXA) 500 MG 12 hr tablet Take 1 tablet by mouth 2 (Two) Times a Day.   • sertraline (ZOLOFT) 50 MG tablet Take 1 tablet by mouth Daily.   • traMADol (ULTRAM) 50 MG  "tablet Take 1 tablet by mouth Every 6 (Six) Hours As Needed for Moderate Pain .   • umeclidinium-vilanterol (ANORO ELLIPTA) 62.5-25 MCG/INH aerosol powder  inhaler Inhale 1 puff Daily.   • vitamin D (ERGOCALCIFEROL) 80506 units capsule capsule Take 1 capsule by mouth Every 30 (Thirty) Days.     No current facility-administered medications on file prior to visit.      She is allergic to ace inhibitors; baclofen; and lisinopril..    Review of Systems   Constitutional: Positive for fatigue.   HENT: Negative.    Eyes: Negative.    Respiratory: Positive for shortness of breath and wheezing.    Cardiovascular: Positive for leg swelling.   Gastrointestinal: Negative.    Genitourinary: Negative.    Musculoskeletal: Negative.    Skin: Negative.    Psychiatric/Behavioral: Negative.        Objective    Visit Vitals  /74   Pulse 74   Resp 28   Ht 172.7 cm (68\")   Wt 62.6 kg (138 lb)   LMP  (LMP Unknown)   SpO2 95%   BMI 20.98 kg/m²       Physical Exam   Constitutional: She is oriented to person, place, and time. She appears well-developed and well-nourished.   Arrives via clinical wheelchair   HENT:   Head: Normocephalic.   Right Ear: External ear normal.   Left Ear: External ear normal.   Eyes: EOM are normal. Pupils are equal, round, and reactive to light.   Neck: Normal range of motion. Neck supple.   Cardiovascular: Normal rate, regular rhythm and normal heart sounds.   2+ pitting edema to bilateral lower legs, ankles and feet   Pulmonary/Chest: Effort normal. Tachypnea noted. She has decreased breath sounds in the right upper field, the right lower field, the left upper field and the left lower field. She has wheezes in the right lower field and the left lower field.   Pursed lip breathing   Abdominal: Soft. Bowel sounds are normal.   Musculoskeletal: Normal range of motion.   Neurological: She is alert and oriented to person, place, and time.   Skin: Skin is warm. Capillary refill takes less than 2 seconds. "   Psychiatric: She has a normal mood and affect. Her behavior is normal.   Nursing note and vitals reviewed.      Assessment/Plan   Problems Addressed this Visit        Cardiovascular and Mediastinum    Acute on chronic systolic CHF (congestive heart failure) (CMS/[facility])      Other Visit Diagnoses     Wheezing    -  Primary    Relevant Medications    ipratropium-albuterol (DUO-NEB) nebulizer solution 3 mL (Completed)    Hospital discharge follow-up        Elevated BUN        Relevant Orders    Ambulatory Referral to Nephrology        New Medications Ordered This Visit   Medications   • ipratropium-albuterol (DUO-NEB) nebulizer solution 3 mL       1.  Acute on chronic systolic congestive heart failure:  Begin Lasix 80 mg pos daily X 3 days then resume Lasix 40 mg daily  Educated on possible side effects of this medication and occluding but not limited to increased risk for urinary incontinence  Continue on Coreg and Lasix as previously prescribed  Encouraged use of compression stockings daily    2.  Wheezing:  DuoNeb nebulizer performed in office and lungs having wheezing in all four posterior lobes to auscultation post nebulization  Encouraged home nebulizer every 4 hours as needed shortness of breath/wheezing    3.  Hospital discharge follow-up:  Current outpatient and discharge medications have been reconciled for the patient.  Reviewed by: CRESENCIO Hall    4.  Elevated BUN:  Referral placed to nephrology for further evaluation and will call to schedule appointment    Continue on current medications as previously prescribed   Return in about 4 weeks (around 9/19/2019) for Recheck.        This document has been electronically signed by CRESENCIO Hall on August 27, 2019 7:11 AM

## 2019-08-27 ENCOUNTER — READMISSION MANAGEMENT (OUTPATIENT)
Dept: CALL CENTER | Facility: HOSPITAL | Age: 84
End: 2019-08-27

## 2019-08-27 NOTE — OUTREACH NOTE
CHF Week 2 Survey      Responses   Facility patient discharged from?  [place]   Does the patient have one of the following disease processes/diagnoses(primary or secondary)?  CHF   Week 2 attempt successful?  Yes   Call start time  1013   Call end time  1016   Discharge diagnosis  Acute on chronic systolic CHF    Meds reviewed with patient/caregiver?  Yes   Is the patient having any side effects they believe may be caused by any medication additions or changes?  No   Does the patient have all medications ordered at discharge?  N/A   Is the patient taking all medications as directed (includes completed medication regime)?  Yes   Does the patient have a primary care provider?   Yes   Does the patient have an appointment with their PCP within 7 days of discharge?  Yes   Has the patient kept scheduled appointments due by today?  Yes   What is the Home health agency?   [facility]   Has home health visited the patient within 72 hours of discharge?  Yes   Psychosocial issues?  No   Did the patient receive a copy of their discharge instructions?  Yes   Nursing interventions  Reviewed instructions with patient   What is the patient's perception of their health status since discharge?  Improving   Nursing interventions  Nurse provided patient education   Is the patient weighing daily?  Yes   Does the patient have scales?  Yes   Daily weight interventions  Education provided on importance of daily weight   Is the patient able to teach back Heart Failure diet management?  Yes   Is the patient able to teach back Heart Failure Zones?  Yes   Is the patient able to teach back signs and symptoms of worsening condition? (i.e. weight gain, shortness of air, etc.)  Yes   Is the patient/caregiver able to teach back the hierarchy of who to call/visit for symptoms/problems? PCP, Specialist, Home health nurse, Urgent Care, ED, 911  Yes   Additional teach back comments  doing pretty well   CHF Week 2 call completed?  Yes          Neelima BERMUDEZ  TAINA Kamara

## 2019-09-04 ENCOUNTER — OFFICE VISIT (OUTPATIENT)
Dept: PULMONOLOGY | Facility: CLINIC | Age: 84
End: 2019-09-04

## 2019-09-04 ENCOUNTER — HOSPITAL ENCOUNTER (OUTPATIENT)
Dept: GENERAL RADIOLOGY | Facility: HOSPITAL | Age: 84
Discharge: HOME | End: 2019-09-04
Admitting: INTERNAL MEDICINE

## 2019-09-04 ENCOUNTER — READMISSION MANAGEMENT (OUTPATIENT)
Dept: CALL CENTER | Facility: HOSPITAL | Age: 84
End: 2019-09-04

## 2019-09-04 VITALS
HEIGHT: 68 IN | HEART RATE: 86 BPM | WEIGHT: 141.5 LBS | SYSTOLIC BLOOD PRESSURE: 91 MMHG | OXYGEN SATURATION: 93 % | BODY MASS INDEX: 21.44 KG/M2 | DIASTOLIC BLOOD PRESSURE: 54 MMHG

## 2019-09-04 DIAGNOSIS — J43.1 PANLOBULAR EMPHYSEMA (HCC): Primary | ICD-10-CM

## 2019-09-04 DIAGNOSIS — R09.02 EXERCISE HYPOXEMIA: ICD-10-CM

## 2019-09-04 PROCEDURE — 99213 OFFICE O/P EST LOW 20 MIN: CPT | Performed by: INTERNAL MEDICINE

## 2019-09-04 PROCEDURE — 71046 X-RAY EXAM CHEST 2 VIEWS: CPT

## 2019-09-04 RX ORDER — ALBUTEROL SULFATE 2.5 MG/3ML
2.5 SOLUTION RESPIRATORY (INHALATION) EVERY 6 HOURS PRN
Qty: 90 VIAL | Refills: 5 | Status: SHIPPED | OUTPATIENT
Start: 2019-09-04 | End: 2019-12-02 | Stop reason: SDUPTHER

## 2019-09-04 NOTE — PLAN OF CARE
Problem: Patient Care Overview (Adult)  Goal: Plan of Care Review  Outcome: Ongoing (interventions implemented as appropriate)   02/01/18 0549   Coping/Psychosocial Response Interventions   Plan Of Care Reviewed With patient   Outcome Evaluation   Outcome Summary/Follow up Plan Patient slept well. Says she continues to have pain/tendernes to caruncle. Vitals stable.    Patient Care Overview   Progress no change       Problem: Fractured Hip (Adult)  Goal: Signs and Symptoms of Listed Potential Problems Will be Absent or Manageable (Fractured Hip)  Outcome: Ongoing (interventions implemented as appropriate)      Problem: Fall Risk (Adult)  Goal: Absence of Falls  Outcome: Ongoing (interventions implemented as appropriate)      Problem: Functional Deficit (Adult,Obstetrics,Pediatric)  Goal: Signs and Symptoms of Listed Potential Problems Will be Absent or Manageable (Functional Deficit)  Outcome: Ongoing (interventions implemented as appropriate)         0 = understands/communicates without difficulty

## 2019-09-04 NOTE — OUTREACH NOTE
CHF Week 3 Survey      Responses   Facility patient discharged from?  [place]   Does the patient have one of the following disease processes/diagnoses(primary or secondary)?  CHF   Week 3 attempt successful?  Yes   Call start time  1055   Call end time  1105   Discharge diagnosis  Acute on chronic systolic CHF    Meds reviewed with patient/caregiver?  Yes   Is the patient having any side effects they believe may be caused by any medication additions or changes?  No   Does the patient have all medications ordered at discharge?  N/A   Is the patient taking all medications as directed (includes completed medication regime)?  N/A   Does the patient have a primary care provider?   Yes   Does the patient have an appointment with their PCP within 7 days of discharge?  Yes   Has the patient kept scheduled appointments due by today?  Yes   What is the Home health agency?   [facility]   Has home health visited the patient within 72 hours of discharge?  Yes   Psychosocial issues?  No   Comments  Patient reports she is still weak.    Did the patient receive a copy of their discharge instructions?  Yes   Nursing interventions  Reviewed instructions with patient   What is the patient's perception of their health status since discharge?  Improving   Nursing interventions  Nurse provided patient education   Is the patient weighing daily?  Yes   Does the patient have scales?  Yes   Is the patient able to teach back Heart Failure diet management?  Yes   Is the patient able to teach back Heart Failure Zones?  Yes   Is the patient able to teach back signs and symptoms of worsening condition? (i.e. weight gain, shortness of air, etc.)  Yes   Is the patient/caregiver able to teach back the hierarchy of who to call/visit for symptoms/problems? PCP, Specialist, Home health nurse, Urgent Care, ED, 911  Yes   CHF Week 3 call completed?  Yes          Mario Adame RN

## 2019-09-04 NOTE — PROGRESS NOTES
"This 86-year-old female with emphysema had an MI recently and now is more short of breath than usual.  She is not coughing or wheezing.  She does have pedal edema.    ROS    Constitutional-no night sweats weight loss headaches  GI no abdominal pain nausea or diarrhea  Neuro no seizure or neurologic deficits  Musculoskeletal no deformity or joint pain   no dysuria or hematuria  Skin no rash or other lesions  All other systems reviewed and were negative except for the above.      Physical Exam  BP 91/54   Pulse 86   Ht 172.7 cm (68\")   Wt 64.2 kg (141 lb 8 oz)   LMP  (LMP Unknown)   SpO2 93%   BMI 21.52 kg/m²   Vital signs as above  Pupils equally round and reactive to light and accommodation, neck no JVD or adenopathy.  Cardiovascular regular rhythm and rate no murmur or gallop.  Abdomen soft no organomegaly tenderness.  Extremities no clubbing cyanosis or edema.  No cervical adenopathy.  No skin rash.  Neurologic good strength bilaterally without deficits  Elderly female in a transport chair dyspneic lungs reveal diminished breath sounds without wheeze, cardiovascular regular rhythm and rate pulse 85    Impression emphysema and recent MI with dyspnea    Plan refill medications, chest x-ray today, call tomorrow about chest x-ray, return in 1 month        This document has been produced with the assistance of [facility]  This document has been electronically signed by Bryan Jay MD on September 4, 2019 2:07 PM      "

## 2019-09-06 ENCOUNTER — TELEPHONE (OUTPATIENT)
Dept: PULMONOLOGY | Facility: CLINIC | Age: 84
End: 2019-09-06

## 2019-09-06 NOTE — TELEPHONE ENCOUNTER
Gave xray results pr dr. Jay        ----- Message from Nidia Hurley sent at 9/5/2019  1:59 PM CDT -----  Contact: [phone]  MARK PEREIRA    Calling for cxr results from yesterday

## 2019-09-11 ENCOUNTER — READMISSION MANAGEMENT (OUTPATIENT)
Dept: CALL CENTER | Facility: HOSPITAL | Age: 84
End: 2019-09-11

## 2019-09-11 ENCOUNTER — EPISODE CHANGES (OUTPATIENT)
Dept: CASE MANAGEMENT | Facility: OTHER | Age: 84
End: 2019-09-11

## 2019-09-11 NOTE — OUTREACH NOTE
CHF Week 4 Survey      Responses   Facility patient discharged from?  [place]   Does the patient have one of the following disease processes/diagnoses(primary or secondary)?  CHF   Week 4 attempt successful?  Yes   Call start time  0807   Call end time  0810   Discharge diagnosis  Acute on chronic systolic CHF    Has the patient kept scheduled appointments due by today?  Yes   Is the patient still receiving Home Health Services?  Yes   Psychosocial comments  Pt does have someone who stays with her at night.   What is the patient's perception of their health status since discharge?  Improving   Is the patient weighing daily?  Yes   Is the patient able to teach back Heart Failure Zones?  Yes   Additional teach back comments  Pt reports doing well and has had no other problems.   Week 4 Call Completed?  Yes   Would the patient like one additional call?  No   Graduated  Yes   Did the patient feel the follow up calls were helpful during their recovery period?  Yes   Was the number of calls appropriate?  Yes          Kennedi Uriostegui RN

## 2019-09-17 ENCOUNTER — OFFICE VISIT (OUTPATIENT)
Dept: FAMILY MEDICINE CLINIC | Facility: CLINIC | Age: 84
End: 2019-09-17

## 2019-09-17 VITALS
SYSTOLIC BLOOD PRESSURE: 114 MMHG | DIASTOLIC BLOOD PRESSURE: 70 MMHG | WEIGHT: 139.5 LBS | HEIGHT: 68 IN | BODY MASS INDEX: 21.14 KG/M2

## 2019-09-17 DIAGNOSIS — J42 CHRONIC BRONCHITIS, UNSPECIFIED CHRONIC BRONCHITIS TYPE (HCC): Primary | ICD-10-CM

## 2019-09-17 DIAGNOSIS — Z23 NEED FOR INFLUENZA VACCINATION: ICD-10-CM

## 2019-09-17 DIAGNOSIS — I50.9 CHRONIC CONGESTIVE HEART FAILURE, UNSPECIFIED HEART FAILURE TYPE (HCC): ICD-10-CM

## 2019-09-17 PROCEDURE — 90653 IIV ADJUVANT VACCINE IM: CPT | Performed by: NURSE PRACTITIONER

## 2019-09-17 PROCEDURE — 99213 OFFICE O/P EST LOW 20 MIN: CPT | Performed by: NURSE PRACTITIONER

## 2019-09-17 PROCEDURE — G0008 ADMIN INFLUENZA VIRUS VAC: HCPCS | Performed by: NURSE PRACTITIONER

## 2019-09-17 NOTE — PROGRESS NOTES
"Subjective   Val Arteaga is a 86 y.o. female.  Four week follow-up for shortness of breath due to CHF and COPD.  Has appointment to see Dr. Jay on 10/04/2019.  \"I feel like I can't breath when I move.\" Has also been complaining of \"pressure in my chest for a good long time.  Ever since I had that heart attack two months ago.  It is very uncomfortable but not a great pain.\"  Has appointment with Dr. Rogel on 10/04/2019 for further evaluation.      COPD   This is a chronic problem. The current episode started more than 1 year ago. The problem occurs constantly. The problem has been unchanged. Associated symptoms include dyspnea on exertion, malaise/fatigue and orthopnea. Her symptoms are aggravated by exertion. Her symptoms are alleviated by rest and beta-agonist. She reports moderate improvement on treatment. There are no known risk factors for lung disease. Her past medical history is significant for COPD.   Congestive Heart Failure   Presents for follow-up visit. Associated symptoms include orthopnea. The symptoms have been stable. Compliance with total regimen is %. Compliance with diet is %. Compliance with exercise is 26-50%.        The following portions of the patient's history were reviewed and updated as appropriate:       Current Outpatient Medications   Medication Sig Dispense Refill   • acetaminophen (TYLENOL) 325 MG tablet Take 2 tablets by mouth Every 4 (Four) Hours As Needed for Mild Pain . 1 tablet 1   • albuterol (PROVENTIL) (2.5 MG/3ML) 0.083% nebulizer solution Take 2.5 mg by nebulization Every 6 (Six) Hours As Needed for Wheezing. 90 vial 5   • atorvastatin (LIPITOR) 20 MG tablet Take 1 tablet by mouth Daily. 90 tablet 2   • carvedilol (COREG) 6.25 MG tablet Take 1 tablet by mouth 2 (Two) Times a Day With Meals. (Patient taking differently: Take 3.125 mg by mouth 2 (Two) Times a Day With Meals.) 180 tablet 2   • docusate sodium (COLACE) 100 MG capsule Take 1 capsule by mouth " 2 (Two) Times a Day. 60 capsule 0   • esomeprazole (nexIUM) 40 MG capsule TAKE 1 CAPSULE BY MOUTH EVERY MORNING 30 capsule 0   • ferrous sulfate 325 (65 FE) MG tablet Take 325 mg by mouth Daily With Breakfast & Dinner.     • fluorometholone (FML) 0.1 % ophthalmic suspension INSTILL 1 DROP IN BOTH EYES FOUR (4) TIMES DAILY  0   • folic acid-vit B6-vit B12 (FOLBEE) 2.5-25-1 MG tablet tablet Take 1 tablet by mouth Daily. 30 tablet 5   • furosemide (LASIX) 40 MG tablet TAKE 1 TABLET BY MOUTH DAILY. 30 tablet 2   • ipratropium-albuterol (DUO-NEB) 0.5-2.5 mg/3 ml nebulizer Take 3 mL by nebulization 4 (Four) Times a Day As Needed for Wheezing. 360 mL 2   • levothyroxine (SYNTHROID, LEVOTHROID) 50 MCG tablet Take 1 tablet by mouth Daily. 30 tablet 5   • lidocaine (LIDODERM) 5 % Place 1 patch on the skin as directed by provider Daily. Remove & Discard patch within 12 hours 30 patch 2   • montelukast (SINGULAIR) 10 MG tablet Take 1 tablet by mouth Every Night. 90 tablet 3   • nitroglycerin (NITRODUR) 0.2 MG/HR patch Place 1 patch on the skin as directed by provider Daily. 90 patch 2   • polyethylene glycol (MIRALAX) powder MIX 1 CAPFUL (17G) IN 8 OUNCES OF LIQUID AND DRINK BY MOUTH EVERY DAY FOR CONSTIPATION 527 g 2   • ranolazine (RANEXA) 500 MG 12 hr tablet Take 1 tablet by mouth 2 (Two) Times a Day. 60 tablet 2   • sertraline (ZOLOFT) 50 MG tablet Take 1 tablet by mouth Daily. 90 tablet 2   • traMADol (ULTRAM) 50 MG tablet Take 1 tablet by mouth Every 6 (Six) Hours As Needed for Moderate Pain . 120 tablet 2   • umeclidinium-vilanterol (ANORO ELLIPTA) 62.5-25 MCG/INH aerosol powder  inhaler Inhale 1 puff Daily. 60 each 3   • vitamin D (ERGOCALCIFEROL) 12943 units capsule capsule Take 1 capsule by mouth Every 30 (Thirty) Days. 3 capsule 3     No current facility-administered medications for this visit.      Current Outpatient Medications on File Prior to Visit   Medication Sig   • acetaminophen (TYLENOL) 325 MG tablet Take 2  tablets by mouth Every 4 (Four) Hours As Needed for Mild Pain .   • albuterol (PROVENTIL) (2.5 MG/3ML) 0.083% nebulizer solution Take 2.5 mg by nebulization Every 6 (Six) Hours As Needed for Wheezing.   • atorvastatin (LIPITOR) 20 MG tablet Take 1 tablet by mouth Daily.   • carvedilol (COREG) 6.25 MG tablet Take 1 tablet by mouth 2 (Two) Times a Day With Meals. (Patient taking differently: Take 3.125 mg by mouth 2 (Two) Times a Day With Meals.)   • docusate sodium (COLACE) 100 MG capsule Take 1 capsule by mouth 2 (Two) Times a Day.   • esomeprazole (nexIUM) 40 MG capsule TAKE 1 CAPSULE BY MOUTH EVERY MORNING   • ferrous sulfate 325 (65 FE) MG tablet Take 325 mg by mouth Daily With Breakfast & Dinner.   • fluorometholone (FML) 0.1 % ophthalmic suspension INSTILL 1 DROP IN BOTH EYES FOUR (4) TIMES DAILY   • folic acid-vit B6-vit B12 (FOLBEE) 2.5-25-1 MG tablet tablet Take 1 tablet by mouth Daily.   • furosemide (LASIX) 40 MG tablet TAKE 1 TABLET BY MOUTH DAILY.   • ipratropium-albuterol (DUO-NEB) 0.5-2.5 mg/3 ml nebulizer Take 3 mL by nebulization 4 (Four) Times a Day As Needed for Wheezing.   • levothyroxine (SYNTHROID, LEVOTHROID) 50 MCG tablet Take 1 tablet by mouth Daily.   • lidocaine (LIDODERM) 5 % Place 1 patch on the skin as directed by provider Daily. Remove & Discard patch within 12 hours   • montelukast (SINGULAIR) 10 MG tablet Take 1 tablet by mouth Every Night.   • nitroglycerin (NITRODUR) 0.2 MG/HR patch Place 1 patch on the skin as directed by provider Daily.   • polyethylene glycol (MIRALAX) powder MIX 1 CAPFUL (17G) IN 8 OUNCES OF LIQUID AND DRINK BY MOUTH EVERY DAY FOR CONSTIPATION   • ranolazine (RANEXA) 500 MG 12 hr tablet Take 1 tablet by mouth 2 (Two) Times a Day.   • sertraline (ZOLOFT) 50 MG tablet Take 1 tablet by mouth Daily.   • traMADol (ULTRAM) 50 MG tablet Take 1 tablet by mouth Every 6 (Six) Hours As Needed for Moderate Pain .   • umeclidinium-vilanterol (ANORO ELLIPTA) 62.5-25 MCG/INH  "aerosol powder  inhaler Inhale 1 puff Daily.   • vitamin D (ERGOCALCIFEROL) 29512 units capsule capsule Take 1 capsule by mouth Every 30 (Thirty) Days.   • [DISCONTINUED] estradiol (ESTRACE) 0.1 MG/GM vaginal cream Insert 2 g into the vagina Daily. (Patient taking differently: Insert 2 g into the vagina every night at bedtime.)     No current facility-administered medications on file prior to visit.      She is allergic to ace inhibitors; baclofen; and lisinopril..    Review of Systems   Constitutional: Positive for malaise/fatigue.   HENT: Negative.    Eyes: Negative.    Respiratory: Positive for chest tightness.    Cardiovascular: Positive for dyspnea on exertion and leg swelling.   Gastrointestinal: Negative.    Genitourinary: Negative.    Skin: Negative.    Psychiatric/Behavioral: Negative.  Negative for confusion.       Objective    Visit Vitals  /70   Ht 172.7 cm (68\")   Wt 63.3 kg (139 lb 8 oz)   LMP  (LMP Unknown)   BMI 21.21 kg/m²       Physical Exam   Constitutional: She is oriented to person, place, and time. She appears well-developed and well-nourished.   HENT:   Head: Normocephalic.   Right Ear: External ear normal.   Left Ear: External ear normal.   Eyes: EOM are normal. Pupils are equal, round, and reactive to light.   Neck: Normal range of motion. Neck supple.   Cardiovascular: Normal rate, regular rhythm and normal heart sounds.   Pulmonary/Chest: Effort normal and breath sounds normal.   Abdominal: Soft. Bowel sounds are normal.   Musculoskeletal: Normal range of motion.   Neurological: She is alert and oriented to person, place, and time.   Skin: Skin is warm. Capillary refill takes less than 2 seconds.   Psychiatric: She has a normal mood and affect. Her behavior is normal.   Nursing note and vitals reviewed.      Assessment/Plan   Problems Addressed this Visit        Cardiovascular and Mediastinum    CHF (congestive heart failure) (CMS/[facility])       Respiratory    Chronic obstructive " pulmonary disease (CMS/[facility]) - Primary      Other Visit Diagnoses     Need for influenza vaccination        Relevant Orders    Flu[employer] High Dose 65YR+ ([id number])      No orders of the defined types were placed in this encounter.    1.  Chronic obstructive pulmonary disease:   Continue on albuterol inhalers previously prescribed  Continue on DuoNeb nebulizer treatments as previously prescribed  Begin Trelegy as prescribed to be used once daily  Sample Trelegy 100 mcg X 2 boxes Lot#[id number] Exp: 01/2021  Educated on possible side effects of this medication including but not limited to increased risk for bronchospasm and oral candidiasis  Encouraged to thoroughly rinse mouth and mouth piece of apparatus after use  Instructed to hold Anoro while on Trelegy   Encouraged to seek emergency medical treatment for any new or worsening shortness of breath, chest pain or edema  Will continue pulmonology follow-up with Dr. Bryan Jay and appointment scheduled      3.  Chronic systolic congestive heart failure:  Continue on Lasix as previously prescribed  Encouraged to seek emergency medical treatment for any new, worsening edema, shortness of breath or unexpected weight gain of greater than 2 to 3 pounds  Encouraged the use of compression stockings   Encouraged to elevate lower extremities when not in use like when sitting and on pillows at night when sleeping  Discussed at length with patient and her daughter disease progression    Continue on current medications as previously prescribed   I spent 18 minutes in direct face to face contact with patient.  Greater than 50% of this time was spent counseling patient and discussing plan of care.  Return in about 3 months (around 12/17/2019) for Medicare Wellness.        This document has been electronically signed by CRESENCIO Hall on September 17, 2019 3:24 PM

## 2019-10-01 ENCOUNTER — PATIENT OUTREACH (OUTPATIENT)
Dept: CASE MANAGEMENT | Facility: OTHER | Age: 84
End: 2019-10-01

## 2019-10-01 NOTE — OUTREACH NOTE
Care Coordination Assessment    Documented/Reviewed By:  Eliane Ortiz RN Date/time:  10/1/2019  3:47 PM   Assessment completed with:  patient  Enrolled in care management program:  No  Living arrangement:  alone  Support system:  family  Type of residence:  private residence  Home care services:  No  Equipment used at home:  walker, oxygen/respiratory treatment  Communication device:  Yes  Bed or wheelchair confined:  No  Inadequate nutrition:  No  Medication adherence problem:  No  Experiencing side effects from current medications:  No  History of fall(s) in last 6 months:  No  Difficulty keeping appointments:  No  Family aware of the patient's advance care planning wishes:   (Comment: refused advance directive information)

## 2019-10-01 NOTE — OUTREACH NOTE
"Care Plan Note      Responses   Self Management  Home BP Monitoring, Get flu/pneumonia shot, Medication Adherence   Annual Wellness Visit:   -- [scheduled for 12-17-19]   Care Gaps Addressed  Colon Cancer Screening, Flu Shot, Mammogram, Pneumonia Vaccine   Flu Shot Status  Up to Date   Flu Shot Completion at [facility] or Other  [facility]   Flu Shot Comments  completed in 2018 and 2019 at PCP office   Mammogram Status  Exempt   Pneumonia Vaccine Status  Up to Date   Pneumonia Vaccine Completion at [facility] or Other  [facility]   Pneumonia Vaccine Comments  completed PCV 13 in 2015 and PPSV 23 in 1013 at PCP office   Specific Disease Process Teaching  COPD   Other Patient Education/Resources   24/[facility] Nurse Call Line   Does patient have depression diagnosis?  Yes   Advanced Directives:  Not Interested At This Time   Ed Visits past 12 months:  2 or 3   Hospitalizations past 12 months  3 or more   Medication Adherence  Medications understood        The main concerns and/or symptoms the patient would like to address are: Patient discharged from [facility] 8-16-[facility] for CHF and elevated troponin \"they told me I had a small heart attack\". She received successful calls from the call center and had home health services.    Education/instruction provided by Care Coordinator: RN-CC outreach with the patient. She reports that her \"breathing is hard when I move around much with this COPD\". She wears oxygen at hs and prn during the day. Aware of oxygen safety. She uses her nebulizer and stated \"it works really good when I am having a hard time breathing\". She uses a walker and cane for safety. She monitors her weight and wears compression stockings. She changes position slowly due to \"dizzy\" when I get up and her cardiologist has adjusted her BP medication. She monitors her BP at home. Her medications are in a daily planner that is prepared by her niece. She has a sitter at . She is independent of ADL's and does not smoke. " Discussed care gaps as listed above. Provided my contact information for needs.     Follow Up Outreach Due: as needed    Eliane Ortiz RN  Community Care Coordinator    10/1/2019, 3:54 PM

## 2019-10-04 ENCOUNTER — OFFICE VISIT (OUTPATIENT)
Dept: PULMONOLOGY | Facility: CLINIC | Age: 84
End: 2019-10-04

## 2019-10-04 VITALS
DIASTOLIC BLOOD PRESSURE: 61 MMHG | WEIGHT: 141.1 LBS | OXYGEN SATURATION: 87 % | BODY MASS INDEX: 21.38 KG/M2 | HEIGHT: 68 IN | HEART RATE: 67 BPM | SYSTOLIC BLOOD PRESSURE: 93 MMHG

## 2019-10-04 DIAGNOSIS — J44.1 COPD EXACERBATION (HCC): Primary | ICD-10-CM

## 2019-10-04 PROCEDURE — 99213 OFFICE O/P EST LOW 20 MIN: CPT | Performed by: INTERNAL MEDICINE

## 2019-10-04 NOTE — PROGRESS NOTES
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  She complains of weakness shortness of breath wheeze    ROS    Constitutional-no night sweats weight loss headaches  GI no abdominal pain nausea or diarrhea  Neuro no seizure or neurologic deficits  Musculoskeletal no deformity or joint pain   no dysuria or hematuria  Skin no rash or other lesions  All other systems reviewed and were negative except for the above.      Physical Exam  BP 93/61   Pulse 67   Ht 172.7 cm (68\")   Wt 64 kg (141 lb 1.6 oz)   LMP  (LMP Unknown)   SpO2 (!) 87%   BMI 21.45 kg/m²   Vital signs as above  Pupils equally round and reactive to light and accommodation, neck no JVD or adenopathy.  Cardiovascular regular rhythm and rate no murmur or gallop.  Abdomen soft no organomegaly tenderness.  Extremities no clubbing cyanosis or edema.  No cervical adenopathy.  No skin rash.  Neurologic good strength bilaterally without deficits  Elderly chronically ill-appearing dyspneic hypotensive white female.  Lungs reveal scattered rhonchi and a few wheezes    Impression hypotension, COPD exacerbation    Plan add Spiriva inhaler, return in 1 month, she will visit her cardiologist today and ask him about blood pressure medications        This document has been produced with the assistance of Dragon dictation  This document has been electronically signed by Bryan Jay MD on October 4, 2019 11:44 AM      "

## 2019-10-10 ENCOUNTER — TELEPHONE (OUTPATIENT)
Dept: PULMONOLOGY | Facility: CLINIC | Age: 84
End: 2019-10-10

## 2019-10-10 NOTE — TELEPHONE ENCOUNTER
Sent scrpt for Nebulizer and acessories to [facility] by fax  Per Dr. Jay          ----- Message from Harper Staley sent at 10/10/2019  8:49 AM CDT -----  Contact: [phone]  Pt's niece, Kelly, has called and says Ms. Arteaga's nebulizer device has not been working as well as it used to.  They went to [facility] to see if they could just get a new one, but they are saying she needs a prescription for a new device.  Kelly's phone number is [phone].

## 2019-10-15 ENCOUNTER — EPISODE CHANGES (OUTPATIENT)
Dept: CASE MANAGEMENT | Facility: OTHER | Age: 84
End: 2019-10-15

## 2019-10-21 RX ORDER — ESOMEPRAZOLE MAGNESIUM 40 MG/1
CAPSULE, DELAYED RELEASE ORAL
Qty: 30 CAPSULE | Refills: 0 | Status: SHIPPED | OUTPATIENT
Start: 2019-10-21 | End: 2019-11-08 | Stop reason: SDUPTHER

## 2019-10-21 RX ORDER — RANOLAZINE 500 MG/1
500 TABLET, EXTENDED RELEASE ORAL 2 TIMES DAILY
Qty: 60 TABLET | Refills: 2 | Status: SHIPPED | OUTPATIENT
Start: 2019-10-21 | End: 2019-12-02 | Stop reason: SDUPTHER

## 2019-11-04 ENCOUNTER — OFFICE VISIT (OUTPATIENT)
Dept: PULMONOLOGY | Facility: CLINIC | Age: 84
End: 2019-11-04

## 2019-11-04 VITALS
HEART RATE: 73 BPM | DIASTOLIC BLOOD PRESSURE: 74 MMHG | SYSTOLIC BLOOD PRESSURE: 117 MMHG | BODY MASS INDEX: 21.11 KG/M2 | WEIGHT: 139.3 LBS | OXYGEN SATURATION: 91 % | HEIGHT: 68 IN

## 2019-11-04 DIAGNOSIS — J44.1 CHRONIC OBSTRUCTIVE PULMONARY DISEASE WITH ACUTE EXACERBATION (HCC): Primary | ICD-10-CM

## 2019-11-04 PROCEDURE — 99214 OFFICE O/P EST MOD 30 MIN: CPT | Performed by: INTERNAL MEDICINE

## 2019-11-04 PROCEDURE — 96372 THER/PROPH/DIAG INJ SC/IM: CPT | Performed by: INTERNAL MEDICINE

## 2019-11-04 RX ORDER — AZITHROMYCIN 250 MG/1
TABLET, FILM COATED ORAL
Qty: 6 TABLET | Refills: 1 | Status: SHIPPED | OUTPATIENT
Start: 2019-11-04 | End: 2019-11-12

## 2019-11-04 RX ORDER — METHYLPREDNISOLONE ACETATE 80 MG/ML
80 INJECTION, SUSPENSION INTRA-ARTICULAR; INTRALESIONAL; INTRAMUSCULAR; SOFT TISSUE ONCE
Status: COMPLETED | OUTPATIENT
Start: 2019-11-04 | End: 2019-11-04

## 2019-11-04 RX ORDER — MIDODRINE HYDROCHLORIDE 10 MG/1
10 TABLET ORAL 2 TIMES DAILY
Refills: 5 | Status: ON HOLD | COMMUNITY
Start: 2019-10-05 | End: 2020-11-07

## 2019-11-04 RX ORDER — PREDNISONE 10 MG/1
TABLET ORAL
Qty: 21 TABLET | Refills: 0 | Status: SHIPPED | OUTPATIENT
Start: 2019-11-04 | End: 2019-12-11 | Stop reason: SDUPTHER

## 2019-11-04 RX ADMIN — METHYLPREDNISOLONE ACETATE 80 MG: 80 INJECTION, SUSPENSION INTRA-ARTICULAR; INTRALESIONAL; INTRAMUSCULAR; SOFT TISSUE at 11:03

## 2019-11-04 NOTE — PROGRESS NOTES
"This 86-year-old lady with long-standing COPD and asthma complains of a several day history of increasing shortness of breath cough wheeze discolored sputum and decreased exercise tolerance.  She denies chest pain or hemoptysis.    The following portions of the patient's history were reviewed and updated as appropriate: current medications, past family history, past medical history, past social history, past surgical history and problem list.      ROS    Constitutional-no night sweats weight loss headaches  GI no abdominal pain nausea or diarrhea  Neuro no seizure or neurologic deficits  Musculoskeletal no deformity or joint pain   no dysuria or hematuria  Skin no rash or other lesions  All other systems reviewed and were negative except for the above.      Physical Exam  /74   Pulse 73   Ht 172.7 cm (68\")   Wt 63.2 kg (139 lb 4.8 oz)   LMP  (LMP Unknown)   SpO2 91%   BMI 21.18 kg/m²   Vital signs as above  Pupils equally round and reactive to light and accommodation, neck no JVD or adenopathy.  Cardiovascular regular rhythm and rate no murmur or gallop.  Abdomen soft no organomegaly tenderness.  Extremities no clubbing cyanosis or edema.  No cervical adenopathy.  No skin rash.  Neurologic good strength bilaterally without deficits  Elderly dyspneic white female in a transport chair short of breath using nasal oxygen.  Lungs reveal wheezes and rhonchi bilaterally    Impression COPD exacerbation    Plan Depo-Medrol, prednisone, Zithromax, continue routine meds, return middle of next week        This document has been produced with the assistance of Dragon dictation  This document has been electronically signed by Bryan Jay MD on November 4, 2019 10:40 AM      "

## 2019-11-08 RX ORDER — ESOMEPRAZOLE MAGNESIUM 40 MG/1
CAPSULE, DELAYED RELEASE ORAL
Qty: 30 CAPSULE | Refills: 0 | Status: SHIPPED | OUTPATIENT
Start: 2019-11-08 | End: 2019-12-02 | Stop reason: SDUPTHER

## 2019-11-12 ENCOUNTER — OFFICE VISIT (OUTPATIENT)
Dept: PULMONOLOGY | Facility: CLINIC | Age: 84
End: 2019-11-12

## 2019-11-12 VITALS
OXYGEN SATURATION: 94 % | WEIGHT: 136.6 LBS | DIASTOLIC BLOOD PRESSURE: 54 MMHG | HEIGHT: 68 IN | SYSTOLIC BLOOD PRESSURE: 93 MMHG | HEART RATE: 62 BPM | BODY MASS INDEX: 20.7 KG/M2

## 2019-11-12 DIAGNOSIS — J43.1 PANLOBULAR EMPHYSEMA (HCC): Primary | ICD-10-CM

## 2019-11-12 PROCEDURE — 99213 OFFICE O/P EST LOW 20 MIN: CPT | Performed by: INTERNAL MEDICINE

## 2019-11-12 RX ORDER — ALBUTEROL SULFATE 2.5 MG/3ML
2.5 SOLUTION RESPIRATORY (INHALATION) EVERY 6 HOURS PRN
Qty: 90 VIAL | Refills: 5 | Status: SHIPPED | OUTPATIENT
Start: 2019-11-12 | End: 2020-06-11 | Stop reason: SDUPTHER

## 2019-11-12 NOTE — PROGRESS NOTES
"This 86-year-old lady has long-standing COPD with an asthmatic component.  She had an exacerbation recently.  Medications were given she has improved.  She has a few more days of prednisone to take    ROS    Constitutional-no night sweats weight loss headaches  GI no abdominal pain nausea or diarrhea  Neuro no seizure or neurologic deficits  Musculoskeletal no deformity or joint pain   no dysuria or hematuria  Skin no rash or other lesions  All other systems reviewed and were negative except for the above.      ElderlyPhysical Exam  BP 93/54   Pulse 62   Ht 172.7 cm (68\")   Wt 62 kg (136 lb 9.6 oz)   LMP  (LMP Unknown)   SpO2 94%   BMI 20.77 kg/m²   Vital signs as above  Pupils equally round and reactive to light and accommodation, neck no JVD or adenopathy.  Cardiovascular regular rhythm and rate no murmur or gallop.  Abdomen soft no organomegaly tenderness.  Extremities no clubbing cyanosis or edema.  No cervical adenopathy.  No skin rash.  Neurologic good strength bilaterally without deficits  Male alert afebrile no distress at rest, lungs are clear    Impression COPD exacerbation improved    Plan continue present medications, return in 3 months        This document has been produced with the assistance of Jocelin dictation  This document has been electronically signed by Bryan Jay MD on November 12, 2019 10:23 AM      "

## 2019-12-02 RX ORDER — ALBUTEROL SULFATE 2.5 MG/3ML
2.5 SOLUTION RESPIRATORY (INHALATION) EVERY 6 HOURS PRN
Qty: 90 ML | Refills: 5 | Status: SHIPPED | OUTPATIENT
Start: 2019-12-02 | End: 2020-06-11 | Stop reason: SDUPTHER

## 2019-12-02 RX ORDER — ESOMEPRAZOLE MAGNESIUM 40 MG/1
CAPSULE, DELAYED RELEASE ORAL
Qty: 30 CAPSULE | Refills: 0 | Status: SHIPPED | OUTPATIENT
Start: 2019-12-02 | End: 2020-01-28

## 2019-12-02 RX ORDER — RANOLAZINE 500 MG/1
500 TABLET, EXTENDED RELEASE ORAL 2 TIMES DAILY
Qty: 60 TABLET | Refills: 2 | Status: SHIPPED | OUTPATIENT
Start: 2019-12-02 | End: 2020-02-24

## 2019-12-11 ENCOUNTER — TELEPHONE (OUTPATIENT)
Dept: PULMONOLOGY | Facility: CLINIC | Age: 84
End: 2019-12-11

## 2019-12-11 RX ORDER — PREDNISONE 10 MG/1
TABLET ORAL
Qty: 21 TABLET | Refills: 0 | Status: SHIPPED | OUTPATIENT
Start: 2019-12-11 | End: 2019-12-17

## 2019-12-11 RX ORDER — AZITHROMYCIN 250 MG/1
TABLET, FILM COATED ORAL
Qty: 6 TABLET | Refills: 0 | Status: SHIPPED | OUTPATIENT
Start: 2019-12-11 | End: 2019-12-17

## 2019-12-11 NOTE — TELEPHONE ENCOUNTER
Sent script for Prednisone 10mg no refills and Zpac to [facility] per Dr. Jay          ----- Message from Risa Charles sent at 2019  8:28 AM CST -----  Regarding: call back  Contact: [phone]  Val Arteaga [address] 32 called and stated she having problems breathing and wanted some meds (prednisone 10mg/Zpack)  call in for her, but she wanted a call back @ [phone] to talk about this. thxs

## 2019-12-12 ENCOUNTER — TELEPHONE (OUTPATIENT)
Dept: FAMILY MEDICINE CLINIC | Facility: CLINIC | Age: 84
End: 2019-12-12

## 2019-12-17 ENCOUNTER — OFFICE VISIT (OUTPATIENT)
Dept: FAMILY MEDICINE CLINIC | Facility: CLINIC | Age: 84
End: 2019-12-17

## 2019-12-17 VITALS
SYSTOLIC BLOOD PRESSURE: 156 MMHG | HEIGHT: 68 IN | DIASTOLIC BLOOD PRESSURE: 70 MMHG | BODY MASS INDEX: 21.17 KG/M2 | WEIGHT: 139.7 LBS

## 2019-12-17 DIAGNOSIS — E03.9 ACQUIRED HYPOTHYROIDISM: ICD-10-CM

## 2019-12-17 DIAGNOSIS — I10 ESSENTIAL HYPERTENSION: ICD-10-CM

## 2019-12-17 DIAGNOSIS — Z00.00 MEDICARE ANNUAL WELLNESS VISIT, INITIAL: Primary | ICD-10-CM

## 2019-12-17 PROCEDURE — G0438 PPPS, INITIAL VISIT: HCPCS | Performed by: NURSE PRACTITIONER

## 2019-12-17 RX ORDER — AZITHROMYCIN 250 MG/1
250 TABLET, FILM COATED ORAL DAILY
COMMUNITY
End: 2020-03-10

## 2019-12-17 RX ORDER — PREDNISONE 10 MG/1
10 TABLET ORAL DAILY
COMMUNITY
End: 2020-03-10

## 2019-12-17 NOTE — PROGRESS NOTES
The ABCs of the Annual Wellness Visit  Initial Medicare Wellness Visit    Chief Complaint   Patient presents with   • Medicare Wellness-Initial Visit       Subjective   History of Present Illness:  Val Arteaga is a 87 y.o. female who presents for an Initial Medicare Wellness Visit.    HEALTH RISK ASSESSMENT    Recent Hospitalizations:  Recently treated at the following:  [facility]    Current Medical Providers:  Patient Care Team:  Vero Arteaga APRN as PCP - General (Nurse Practitioner)  Roula Albert MD as PCP - Claims Attributed  Kelvin Mcdowell MD as Surgeon (Orthopedic Surgery)    Smoking Status:  Social History     Tobacco Use   Smoking Status Former Smoker   • Packs/day: 0.50   • Years: 50.00   • Pack years: 25.00   • Types: Cigarettes   • Start date:    • Last attempt to quit: 2017   • Years since quittin.9   Smokeless Tobacco Never Used       Alcohol Consumption:  Social History     Substance and Sexual Activity   Alcohol Use No       Depression Screen:   PHQ-2/PHQ-9 Depression Screening 2019   Little interest or pleasure in doing things 0   Feeling down, depressed, or hopeless 1   Trouble falling or staying asleep, or sleeping too much 0   Feeling tired or having little energy 1   Poor appetite or overeating 0   Feeling bad about yourself - or that you are a failure or have let yourself or your family down 1   Trouble concentrating on things, such as reading the newspaper or watching television 0   Moving or speaking so slowly that other people could have noticed. Or the opposite - being so fidgety or restless that you have been moving around a lot more than usual 0   Thoughts that you would be better off dead, or of hurting yourself in some way 0   Total Score 3   If you checked off any problems, how difficult have these problems made it for you to do your work, take care of things at home, or get along with other people? Not difficult at all       Fall  Risk Screen:  MERTADI Fall Risk Assessment has not been completed.    Health Habits and Functional and Cognitive Screening:  Functional & Cognitive Status 12/17/2019   Do you have difficulty preparing food and eating? No   Do you have difficulty bathing yourself, getting dressed or grooming yourself? No   Do you have difficulty using the toilet? No   Do you have difficulty moving around from place to place? Yes   Do you have trouble with steps or getting out of a bed or a chair? Yes   Current Diet Well Balanced Diet   Dental Exam Not up to date   Eye Exam Up to date   Exercise (times per week) 0 times per week   Current Exercise Activities Include None   Do you need help using the phone?  No   Are you deaf or do you have serious difficulty hearing?  No   Do you need help with transportation? Yes   Do you need help shopping? Yes   Do you need help preparing meals?  No   Do you need help with housework?  Yes   Do you need help with laundry? No   Do you need help taking your medications? No   Do you need help managing money? No   Do you ever drive or ride in a car without wearing a seat belt? No   Have you felt unusual stress, anger or loneliness in the last month? No   Who do you live with? Alone   If you need help, do you have trouble finding someone available to you? No   Have you been bothered in the last four weeks by sexual problems? No   Do you have difficulty concentrating, remembering or making decisions? No         Does the patient have evidence of cognitive impairment? No    Asprin use counseling:Taking ASA appropriately as indicated    Age-appropriate Screening Schedule:  Refer to the list below for future screening recommendations based on patient's age, sex and/or medical conditions. Orders for these recommended tests are listed in the plan section. The patient has been provided with a written plan.    Health Maintenance   Topic Date Due   • ZOSTER VACCINE (2 of 2) 12/17/2020 (Originally 1/17/2017)   •  LIPID PANEL  01/02/2020   • TDAP/TD VACCINES (2 - Td) 11/22/2026   • PNEUMOCOCCAL VACCINE (65+ HIGH RISK)  Completed   • INFLUENZA VACCINE  Completed          The following portions of the patient's history were reviewed and updated as appropriate:   She  has a past medical history of Acquired hypothyroidism, Allergic rhinitis, CHF (congestive heart failure) (CMS/[facility]), COPD (chronic obstructive pulmonary disease) (CMS/[facility]), Corneal epithelial dystrophy, Coronary arteriosclerosis, Depression, Generalized atherosclerosis, GERD (gastroesophageal reflux disease), Hemorrhoids, History of bone density study (08/03/2012), History of mammogram (08/20/2004), History of Papanicolaou smear of cervix (08/12/2004), Hypercholesterolemia, Hyperlipidemia, Hypertension, Myocardial infarction (CMS/[facility]), Nonexudative age-related macular degeneration, Osteoarthritis of multiple joints, Pseudophakia, Punctate keratitis, and Tobacco dependence syndrome.  She does not have any pertinent problems on file.  She  has a past surgical history that includes Coronary angioplasty (07/21/1993); Appendectomy; Cardiac catheterization (06/16/2014); endoscopy and colonoscopy (10/01/1998); Colonoscopy (01/01/2013); Dilation and curettage of uterus; Injection of Medication (11/23/2015); Esophagoscopy / EGD (06/28/2004); Breast surgery (03/19/1954); Cervical spine surgery (09/16/1974); Injection of Medication (02/04/2016); Pacemaker Replacement (2006); Hip Bipolar (Right, 1/23/2018); Joint replacement (Right, 01/24/2018); and Esophagogastroduodenoscopy (N/A, 2/14/2018).  Her family history includes Coronary artery disease in an other family member.  She  reports that she quit smoking about 2 years ago. Her smoking use included cigarettes. She started smoking about 70 years ago. She has a 25.00 pack-year smoking history. She has never used smokeless tobacco. She reports that she does not drink alcohol or use drugs.  Current Outpatient Medications    Medication Sig Dispense Refill   • acetaminophen (TYLENOL) 325 MG tablet Take 2 tablets by mouth Every 4 (Four) Hours As Needed for Mild Pain . 1 tablet 1   • albuterol (PROVENTIL) (2.5 MG/3ML) 0.083% nebulizer solution Take 2.5 mg by nebulization Every 6 (Six) Hours As Needed for Wheezing. 90 vial 5   • albuterol (PROVENTIL) (2.5 MG/3ML) 0.083% nebulizer solution TAKE 2.5 MG BY NEBULIZATION EVERY 6 (SIX) HOURS AS NEEDED FOR WHEEZING. 90 mL 5   • atorvastatin (LIPITOR) 20 MG tablet Take 1 tablet by mouth Daily. 90 tablet 2   • azithromycin (ZITHROMAX) 250 MG tablet Take 250 mg by mouth Daily. Take 2 tablets the first day, then 1 tablet daily for 4 days.     • carvedilol (COREG) 6.25 MG tablet Take 1 tablet by mouth 2 (Two) Times a Day With Meals. (Patient taking differently: Take 3.125 mg by mouth 2 (Two) Times a Day With Meals.) 180 tablet 2   • docusate sodium (COLACE) 100 MG capsule Take 1 capsule by mouth 2 (Two) Times a Day. 60 capsule 0   • esomeprazole (nexIUM) 40 MG capsule TAKE 1 CAPSULE BY MOUTH EVERY MORNING 30 capsule 0   • ferrous sulfate 325 (65 FE) MG tablet Take 325 mg by mouth Daily With Breakfast & Dinner.     • fluorometholone (FML) 0.1 % ophthalmic suspension INSTILL 1 DROP IN BOTH EYES FOUR (4) TIMES DAILY  0   • folic acid-vit B6-vit B12 (FOLBEE) 2.5-25-1 MG tablet tablet Take 1 tablet by mouth Daily. 30 tablet 5   • furosemide (LASIX) 40 MG tablet TAKE 1 TABLET BY MOUTH DAILY. 30 tablet 2   • ipratropium-albuterol (DUO-NEB) 0.5-2.5 mg/3 ml nebulizer Take 3 mL by nebulization 4 (Four) Times a Day As Needed for Wheezing. 360 mL 2   • levothyroxine (SYNTHROID, LEVOTHROID) 50 MCG tablet Take 1 tablet by mouth Daily. 30 tablet 5   • lidocaine (LIDODERM) 5 % Place 1 patch on the skin as directed by provider Daily. Remove & Discard patch within 12 hours 30 patch 2   • midodrine (PROAMATINE) 10 MG tablet Take 10 mg by mouth 2 (Two) Times a Day.  5   • montelukast (SINGULAIR) 10 MG tablet Take 1  tablet by mouth Every Night. 90 tablet 3   • nitroglycerin (NITRODUR) 0.2 MG/HR patch Place 1 patch on the skin as directed by provider Daily. 90 patch 2   • polyethylene glycol (MIRALAX) powder MIX 1 CAPFUL (17G) IN 8 OUNCES OF LIQUID AND DRINK BY MOUTH EVERY DAY FOR CONSTIPATION 527 g 2   • predniSONE (DELTASONE) 10 MG tablet Take 10 mg by mouth Daily.     • ranolazine (RANEXA) 500 MG 12 hr tablet TAKE 1 TABLET BY MOUTH 2 (TWO) TIMES A DAY. 60 tablet 2   • sertraline (ZOLOFT) 50 MG tablet Take 1 tablet by mouth Daily. 90 tablet 2   • traMADol (ULTRAM) 50 MG tablet Take 1 tablet by mouth Every 6 (Six) Hours As Needed for Moderate Pain . 120 tablet 2   • umeclidinium-vilanterol (ANORO ELLIPTA) 62.5-25 MCG/INH aerosol powder  inhaler Inhale 1 puff Daily. 60 each 3   • vitamin D (ERGOCALCIFEROL) 03384 units capsule capsule Take 1 capsule by mouth Every 30 (Thirty) Days. 3 capsule 3     No current facility-administered medications for this visit.      Current Outpatient Medications on File Prior to Visit   Medication Sig   • acetaminophen (TYLENOL) 325 MG tablet Take 2 tablets by mouth Every 4 (Four) Hours As Needed for Mild Pain .   • albuterol (PROVENTIL) (2.5 MG/3ML) 0.083% nebulizer solution Take 2.5 mg by nebulization Every 6 (Six) Hours As Needed for Wheezing.   • albuterol (PROVENTIL) (2.5 MG/3ML) 0.083% nebulizer solution TAKE 2.5 MG BY NEBULIZATION EVERY 6 (SIX) HOURS AS NEEDED FOR WHEEZING.   • atorvastatin (LIPITOR) 20 MG tablet Take 1 tablet by mouth Daily.   • azithromycin (ZITHROMAX) 250 MG tablet Take 250 mg by mouth Daily. Take 2 tablets the first day, then 1 tablet daily for 4 days.   • carvedilol (COREG) 6.25 MG tablet Take 1 tablet by mouth 2 (Two) Times a Day With Meals. (Patient taking differently: Take 3.125 mg by mouth 2 (Two) Times a Day With Meals.)   • docusate sodium (COLACE) 100 MG capsule Take 1 capsule by mouth 2 (Two) Times a Day.   • esomeprazole (nexIUM) 40 MG capsule TAKE 1 CAPSULE BY  MOUTH EVERY MORNING   • ferrous sulfate 325 (65 FE) MG tablet Take 325 mg by mouth Daily With Breakfast & Dinner.   • fluorometholone (FML) 0.1 % ophthalmic suspension INSTILL 1 DROP IN BOTH EYES FOUR (4) TIMES DAILY   • folic acid-vit B6-vit B12 (FOLBEE) 2.5-25-1 MG tablet tablet Take 1 tablet by mouth Daily.   • furosemide (LASIX) 40 MG tablet TAKE 1 TABLET BY MOUTH DAILY.   • ipratropium-albuterol (DUO-NEB) 0.5-2.5 mg/3 ml nebulizer Take 3 mL by nebulization 4 (Four) Times a Day As Needed for Wheezing.   • levothyroxine (SYNTHROID, LEVOTHROID) 50 MCG tablet Take 1 tablet by mouth Daily.   • lidocaine (LIDODERM) 5 % Place 1 patch on the skin as directed by provider Daily. Remove & Discard patch within 12 hours   • midodrine (PROAMATINE) 10 MG tablet Take 10 mg by mouth 2 (Two) Times a Day.   • montelukast (SINGULAIR) 10 MG tablet Take 1 tablet by mouth Every Night.   • nitroglycerin (NITRODUR) 0.2 MG/HR patch Place 1 patch on the skin as directed by provider Daily.   • polyethylene glycol (MIRALAX) powder MIX 1 CAPFUL (17G) IN 8 OUNCES OF LIQUID AND DRINK BY MOUTH EVERY DAY FOR CONSTIPATION   • predniSONE (DELTASONE) 10 MG tablet Take 10 mg by mouth Daily.   • ranolazine (RANEXA) 500 MG 12 hr tablet TAKE 1 TABLET BY MOUTH 2 (TWO) TIMES A DAY.   • sertraline (ZOLOFT) 50 MG tablet Take 1 tablet by mouth Daily.   • traMADol (ULTRAM) 50 MG tablet Take 1 tablet by mouth Every 6 (Six) Hours As Needed for Moderate Pain .   • umeclidinium-vilanterol (ANORO ELLIPTA) 62.5-25 MCG/INH aerosol powder  inhaler Inhale 1 puff Daily.   • vitamin D (ERGOCALCIFEROL) 36211 units capsule capsule Take 1 capsule by mouth Every 30 (Thirty) Days.   • [DISCONTINUED] predniSONE (DELTASONE) 10 MG tablet 2 tablets by mouth daily for 1 week, then 1 tablet daily for 1 week   • [DISCONTINUED] azithromycin (ZITHROMAX) 250 MG tablet Take 2 by mouth today then 1 daily for 4 days     No current facility-administered medications on file prior to  visit.      She is allergic to ace inhibitors; baclofen; and lisinopril..    Outpatient Medications Prior to Visit   Medication Sig Dispense Refill   • acetaminophen (TYLENOL) 325 MG tablet Take 2 tablets by mouth Every 4 (Four) Hours As Needed for Mild Pain . 1 tablet 1   • albuterol (PROVENTIL) (2.5 MG/3ML) 0.083% nebulizer solution Take 2.5 mg by nebulization Every 6 (Six) Hours As Needed for Wheezing. 90 vial 5   • albuterol (PROVENTIL) (2.5 MG/3ML) 0.083% nebulizer solution TAKE 2.5 MG BY NEBULIZATION EVERY 6 (SIX) HOURS AS NEEDED FOR WHEEZING. 90 mL 5   • atorvastatin (LIPITOR) 20 MG tablet Take 1 tablet by mouth Daily. 90 tablet 2   • azithromycin (ZITHROMAX) 250 MG tablet Take 250 mg by mouth Daily. Take 2 tablets the first day, then 1 tablet daily for 4 days.     • carvedilol (COREG) 6.25 MG tablet Take 1 tablet by mouth 2 (Two) Times a Day With Meals. (Patient taking differently: Take 3.125 mg by mouth 2 (Two) Times a Day With Meals.) 180 tablet 2   • docusate sodium (COLACE) 100 MG capsule Take 1 capsule by mouth 2 (Two) Times a Day. 60 capsule 0   • esomeprazole (nexIUM) 40 MG capsule TAKE 1 CAPSULE BY MOUTH EVERY MORNING 30 capsule 0   • ferrous sulfate 325 (65 FE) MG tablet Take 325 mg by mouth Daily With Breakfast & Dinner.     • fluorometholone (FML) 0.1 % ophthalmic suspension INSTILL 1 DROP IN BOTH EYES FOUR (4) TIMES DAILY  0   • folic acid-vit B6-vit B12 (FOLBEE) 2.5-25-1 MG tablet tablet Take 1 tablet by mouth Daily. 30 tablet 5   • furosemide (LASIX) 40 MG tablet TAKE 1 TABLET BY MOUTH DAILY. 30 tablet 2   • ipratropium-albuterol (DUO-NEB) 0.5-2.5 mg/3 ml nebulizer Take 3 mL by nebulization 4 (Four) Times a Day As Needed for Wheezing. 360 mL 2   • levothyroxine (SYNTHROID, LEVOTHROID) 50 MCG tablet Take 1 tablet by mouth Daily. 30 tablet 5   • lidocaine (LIDODERM) 5 % Place 1 patch on the skin as directed by provider Daily. Remove & Discard patch within 12 hours 30 patch 2   • midodrine  (PROAMATINE) 10 MG tablet Take 10 mg by mouth 2 (Two) Times a Day.  5   • montelukast (SINGULAIR) 10 MG tablet Take 1 tablet by mouth Every Night. 90 tablet 3   • nitroglycerin (NITRODUR) 0.2 MG/HR patch Place 1 patch on the skin as directed by provider Daily. 90 patch 2   • polyethylene glycol (MIRALAX) powder MIX 1 CAPFUL (17G) IN 8 OUNCES OF LIQUID AND DRINK BY MOUTH EVERY DAY FOR CONSTIPATION 527 g 2   • predniSONE (DELTASONE) 10 MG tablet Take 10 mg by mouth Daily.     • ranolazine (RANEXA) 500 MG 12 hr tablet TAKE 1 TABLET BY MOUTH 2 (TWO) TIMES A DAY. 60 tablet 2   • sertraline (ZOLOFT) 50 MG tablet Take 1 tablet by mouth Daily. 90 tablet 2   • traMADol (ULTRAM) 50 MG tablet Take 1 tablet by mouth Every 6 (Six) Hours As Needed for Moderate Pain . 120 tablet 2   • umeclidinium-vilanterol (ANORO ELLIPTA) 62.5-25 MCG/INH aerosol powder  inhaler Inhale 1 puff Daily. 60 each 3   • vitamin D (ERGOCALCIFEROL) 99309 units capsule capsule Take 1 capsule by mouth Every 30 (Thirty) Days. 3 capsule 3   • predniSONE (DELTASONE) 10 MG tablet 2 tablets by mouth daily for 1 week, then 1 tablet daily for 1 week 21 tablet 0   • azithromycin (ZITHROMAX) 250 MG tablet Take 2 by mouth today then 1 daily for 4 days 6 tablet 0     No facility-administered medications prior to visit.        Patient Active Problem List   Diagnosis   • Essential hypertension   • Coronary artery disease involving native coronary artery of native heart without angina pectoris   • Chronic obstructive pulmonary disease (CMS/[facility])   • Acquired hypothyroidism   • Depressive disorder   • Thygeson's superficial punctate keratitis   • Pseudophakia   • Precordial pain   • Displaced fracture of base of neck of right femur, initial encounter for closed fracture (CMS/[facility])   • Hip fracture, right, closed, initial encounter (CMS/[facility])   • Pulmonary emphysema (CMS/[facility])   • Acute on chronic systolic CHF (congestive heart failure) (CMS/[facility])   • Hyponatremia   • BENITO  "(acute kidney injury) (CMS/[facility])   • Acute blood loss anemia   • Urinary retention   • Closed displaced basicervical fracture of right femur (CMS/[facility])   • Urethral caruncle   • Symptomatic anemia   • Anemia   • COPD exacerbation (CMS/[facility])   • Major depressive disorder   • Drug-induced encephalopathy   • Acute left-sided low back pain without sciatica   • Bilateral sacral insufficiency fracture   • Osteoarthritis of multiple joints   • Shortness of breath   • CHF (congestive heart failure) (CMS/[facility])       Advanced Care Planning:  Patient does not have an advance directive - not interested in additional information    Review of Systems   Constitutional: Negative.  Negative for chills, diaphoresis, fatigue and fever.   HENT: Negative.    Eyes: Negative.    Respiratory: Negative.    Cardiovascular: Negative.    Gastrointestinal: Negative.    Genitourinary: Negative.    Musculoskeletal: Negative.    Skin: Negative.    Neurological: Negative.    Psychiatric/Behavioral: Negative.  Negative for confusion.       Compared to one year ago, the patient feels her physical health is the same.  Compared to one year ago, the patient feels her mental health is the same.    Reviewed chart for potential of high risk medication in the elderly: yes  Reviewed chart for potential of harmful drug interactions in the elderly:yes    Objective         Vitals:    12/17/19 1246   BP: 156/70   Weight: 63.4 kg (139 lb 11.2 oz)   Height: 172.7 cm (68\")       Body mass index is 21.24 kg/m².  Discussed the patient's BMI with her. The BMI is in the acceptable range.    Physical Exam   Constitutional: She is oriented to person, place, and time. She appears well-developed and well-nourished.   Ambulation assisted via cane   HENT:   Head: Normocephalic.   Right Ear: External ear normal.   Left Ear: External ear normal.   Eyes: Pupils are equal, round, and reactive to light. EOM are normal.   Neck: Normal range of motion. Neck supple.   Cardiovascular: " Normal rate, regular rhythm and normal heart sounds.   Pulmonary/Chest: Effort normal and breath sounds normal.   Abdominal: Soft. Bowel sounds are normal.   Musculoskeletal: Normal range of motion.   Neurological: She is alert and oriented to person, place, and time.   Skin: Skin is warm. Capillary refill takes less than 2 seconds.   Psychiatric: She has a normal mood and affect. Her behavior is normal.   Nursing note and vitals reviewed.            Assessment/Plan   Medicare Risks and Personalized Health Plan  CMS Preventative Services Quick Reference  Cardiovascular risk  Fall Risk  Osteoprorosis Risk    The above risks/problems have been discussed with the patient.  Pertinent information has been shared with the patient in the After Visit Summary.  Follow up plans and orders are seen below in the Assessment/Plan Section.    Diagnoses and all orders for this visit:    1. Medicare annual wellness visit, initial (Primary)    2. Essential hypertension  -     CBC & Differential; Future  -     Comprehensive Metabolic Panel; Future    3. Acquired hypothyroidism  -     TSH; Future      Follow Up:  Return in about 3 months (around 3/17/2020) for Recheck.     An After Visit Summary and PPPS were given to the patient.        This document has been electronically signed by CRESENCIO Hall on December 17, 2019 1:14 PM

## 2019-12-17 NOTE — PATIENT INSTRUCTIONS
Preventing Osteoporosis, Adult  Osteoporosis is a condition that causes the bones to get weaker. With osteoporosis, the bones become thinner, and the normal spaces in bone tissue become larger. This can make the bones weak and cause them to break more easily. People who have osteoporosis are more likely to break their wrist, spine, or hip. Even a minor accident or injury can be enough to break weak bones.  Osteoporosis can occur with aging. Your body constantly replaces old bone tissue with new tissue. As you get older, you may lose bone tissue more quickly, or it may be replaced more slowly. Osteoporosis is more likely to develop if you have poor nutrition or do not get enough calcium or vitamin D. Other lifestyle factors can also play a role. By making some diet and lifestyle changes, you can help to keep your bones healthy and help to prevent osteoporosis.  What nutrition changes can be made?  Nutrition plays an important role in maintaining healthy, strong bones.  · Make sure you get enough calcium every day from food or from calcium supplements.  ? If you are age 50 or younger, aim to get 1,000 mg of calcium every day.  ? If you are older than age 50, aim to get 1,200 mg of calcium every day.  · Try to get enough vitamin D every day.  ? If you are age 70 or younger, aim to get 600 international units (IU) every day.  ? If you are older than age 70, aim to get 800 international units every day.  · Follow a healthy diet. Eat plenty of foods that contain calcium and vitamin D.  ? Calcium is in milk, cheese, yogurt, and other dairy products. Some fish and vegetables are also good sources of calcium. Many foods such as cereals and breads have had calcium added to them (are fortified). Check nutrition labels to see how much calcium is in a food or drink.  ? Foods that contain vitamin D include milk, cereals, salmon, and tuna. Your body also makes vitamin D when you are out in the sun. Bare skin exposure to the sun on  your face, arms, legs, or back for no more than 30 minutes a day, 2 times per week is more than enough. Beyond that, it is important to use sunblock to protect your skin from sunburn, which increases your risk for skin cancer.  What lifestyle changes can be made?  Making changes in your everyday life can also play an important role in preventing osteoporosis.  · Stay active and get exercise every day. Ask your health care provider what types of exercise are best for you.  · Do not use any products that contain nicotine or tobacco, such as cigarettes and e-cigarettes. If you need help quitting, ask your health care provider.  · Limit alcohol intake to no more than 1 drink a day for nonpregnant women and 2 drinks a day for men. One drink equals 12 oz of beer, 5 oz of wine, or 1½ oz of hard liquor.  Why are these changes important?  Making these nutrition and lifestyle changes can:  · Help you develop and maintain healthy, strong bones.  · Prevent loss of bone mass and the problems that are caused by that loss, such as broken bones and delayed healing.  · Make you feel better mentally and physically.  What can happen if changes are not made?  Problems that can result from osteoporosis can be very serious. These may include:  · A higher risk of broken bones that are painful and do not heal well.  · Physical malformations, such as a collapsed spine or a hunched back.  · Problems with movement.  Where to find support  If you need help making changes to prevent osteoporosis, talk with your health care provider. You can ask for a referral to a diet and nutrition specialist (dietitian) and a physical therapist.  Where to find more information  Learn more about osteoporosis from:  · NIH Osteoporosis and Related Bone Diseases National Resource Center: www.niams.nih.gov/health_info/bone/osteoporosis/osteoporosis_ff.asp  · [place] Office on Women’s Health:  www.womenshealth.gov/publications/our-publications/fact-sheet/osteoporosis.html  · National Osteoporosis Foundation: www.nof.org/patients/what-is-osteoporosis/  Summary  · Osteoporosis is a condition that causes weak bones that are more likely to break.  · Eating a healthy diet and making sure you get enough calcium and vitamin D can help prevent osteoporosis.  · Other ways to reduce your risk of osteoporosis include getting regular exercise and avoiding alcohol and products that contain nicotine or tobacco.  This information is not intended to replace advice given to you by your health care provider. Make sure you discuss any questions you have with your health care provider.  Document Released: 01/01/2017 Document Revised: 09/25/2018 Document Reviewed: 08/28/2017  [employer] Interactive Patient Education © 2019 [employer]  Preventing Heart Failure  Heart failure is a condition in which the heart has trouble pumping blood. This may mean that the heart cannot pump enough blood out to the body, or that the heart does not fill up with enough blood. Either of those problems can lead to symptoms such as fatigue, trouble breathing, and swelling throughout the body.  This is a common medical condition that affects not only the heart, but the entire body. Making certain nutrition and lifestyle changes can help you prevent heart failure and avoid serious health problems.  What nutrition changes can be made?    · If you are overweight or obese, reduce how many calories you eat each day so that you lose weight. Work with your health care provider or a diet and nutrition specialist (dietitian) to determine how many calories you need each day.  · Eat foods that are low in salt (sodium). Avoid adding extra salt to foods.  · Eat a well-balanced diet that includes a lot of:  ? Fresh fruits and vegetables.  ? Whole grains.  ? Lean meats.  ? Beans.  ? Fat-free or low-fat dairy products.  · Avoid foods that contain a lot  of:  ? Trans fats.  ? Saturated fats.  ? Sugar.  ? Cholesterol.  What lifestyle changes can be made?    · Do not use any products that contain nicotine or tobacco, such as cigarettes and e-cigarettes. If you need help quitting or reducing how much you smoke, ask your health care provider.  · Stop using alcohol, or limit alcohol intake to no more than 1 drink a day for nonpregnant women and 2 drinks a day for men. One drink equals 12 oz of beer, 5 oz of wine, or 1½ oz of hard liquor.  · Exercise for at least 150 minutes each week, or as much as told by your health care provider.  ? Do moderate-intensity exercise, such as brisk walking, bicycling, or water aerobics.  ? Ask your health care provider which activities are safe for you.  · See a health care provider regularly for screening and wellness checks. Know your heart health indicators, such as:  ? Blood pressure.  ? Cholesterol levels.  ? Blood sugar (glucose) levels.  ? Weight and BMI.  · If you have diabetes, manage your condition and follow your treatment plan as instructed.  · Try to get 7-9 hours of sleep each night. To help with sleep:  ? Keep your bedroom cool and dark.  ? Do not eat a heavy meal during the hour before you go to bed.  ? Do not drink alcohol or caffeinated drinks before bed.  ? Avoid screen time before bedtime. This means avoiding television, computers, tablets, and cell phones.  · Find ways to relax and manage stress. These may include:  ? Breathing exercises.  ? Meditation.  ? Yoga.  ? Listening to music.  Why are these changes important?  · A well-balanced diet with the appropriate amount of calories can keep your body weight at a healthy level, which reduces strain on your heart.  · A low-sodium diet can help keep your blood pressure in a normal range and keep your blood vessels working properly.  · Quitting smoking and limiting alcohol intake can reduce harmful effects that these substances have on your heart and blood  vessels.  · Regular exercise can keep your heart strong so it can pump blood normally.  · Managing diabetes helps your blood circulate and can help you maintain a healthy weight.  · Managing stress helps to reduce the risk of high blood pressure and heart problems.  What can happen if changes are not made?  Heart failure can cause very serious problems that may get worse over time, such as:  · Extreme fatigue during normal physical activities.  · Shortness of breath or trouble breathing.  · Swelling in your abdomen, legs, ankles, feet, or neck.  · Fluid buildup throughout the body.  · Weight gain.  · Cough.  · Frequent urination.  What can I do to lower my risk?  You may be able to lower your risk of heart failure by:  · Losing weight or keeping your weight under control.  · Working with your health care provider to manage your:  ? Cholesterol.  ? Blood pressure.  ? Diabetes, if this applies.  · Eating a healthy diet.  · Exercising regularly.  · Avoiding unhealthy habits, such as smoking, drinking, or using drugs.  · Getting plenty of sleep.  · Managing your stress.  How is this treated?  Heart failure cannot be cured except by heart transplant, but treatment can help to improve your quality of life. Treatment may include:  · Medicines to help:  ? Lower blood pressure.  ? Remove excess sodium from your body.  ? Relax blood vessels.  ? Improve heart function.  ? Control other symptoms of heart failure.  · Surgery to open blocked coronary arteries or repair damaged heart valves.  · Implantation of a biventricular pacemaker to improve heart muscle function (cardiac resynchronization therapy). This device paces both the right ventricle and left ventricle.  · Implantation of a device to treat serious abnormal heart rhythms (implantable cardioverter defibrillator, ICD).  · Implantation of a mechanical heart pump to improve the pumping ability of your heart (left ventricular assist device, LVAD).  · Heart transplant. This  treatment is considered for certain people who do not improve with other treatments.  Where to find more information  · National Heart, Lung, and [facility] [place]: www.nhlbi.nih.gov/health/health-topics/topics/hf  · Centers for Disease Control and Prevention: www.cdc.gov/dhdsp/data_statistics/fact_sheets/fs_heart_failure.htm  · [facility]: [facility]/heartfailure/heartfailuredefined/01.html  · American Heart Association: www.heart.org/HEARTORG/Conditions/HeartFailure/Heart-Failure_UCM_002019_SubHomePage.jsp  Contact a health care provider if:  · You have rapid weight gain.  · You have increasing shortness of breath that is unusual for you.  · You tire easily, or you are unable to participate in your usual activities.  · You cough more than normal, especially with physical activity.  · You have any swelling or more swelling in areas such as your hands, feet, ankles, or abdomen.  Summary  · Heart failure can be prevented by making changes to your diet and your lifestyle.  · It is important to eat a healthy diet, manage your weight, exercise regularly, manage stress, avoid drugs and alcohol, and keep your cholesterol and blood pressure under control.  · Heart failure can cause very serious problems over time.  This information is not intended to replace advice given to you by your health care provider. Make sure you discuss any questions you have with your health care provider.  Document Released: 08/08/2017 Document Revised: 03/06/2018 Document Reviewed: 08/08/2017  [employer] Interactive Patient Education © 2019 [employer]  Fall Prevention in the Home, Adult  Falls can cause injuries. They can happen to people of all ages. There are many things you can do to make your home safe and to help prevent falls. Ask for help when making these changes, if needed.  What actions can I take to prevent falls?  General Instructions  · Use good lighting in all rooms. Replace any light bulbs that burn out.  · Turn on  the lights when you go into a dark area. Use night-lights.  · Keep items that you use often in easy-to-reach places. Lower the shelves around your home if necessary.  · Set up your furniture so you have a clear path. Avoid moving your furniture around.  · Do not have throw rugs and other things on the floor that can make you trip.  · Avoid walking on wet floors.  · If any of your floors are uneven, fix them.  · Add color or contrast paint or tape to clearly ofelia and help you see:  ? Any grab bars or handrails.  ? First and last steps of stairways.  ? Where the edge of each step is.  · If you use a stepladder:  ? Make sure that it is fully opened. Do not climb a closed stepladder.  ? Make sure that both sides of the stepladder are locked into place.  ? Ask someone to hold the stepladder for you while you use it.  · If there are any pets around you, be aware of where they are.  What can I do in the bathroom?         · Keep the floor dry. Clean up any water that spills onto the floor as soon as it happens.  · Remove soap buildup in the tub or shower regularly.  · Use non-skid mats or decals on the floor of the tub or shower.  · Attach bath mats securely with double-sided, non-slip rug tape.  · If you need to sit down in the shower, use a plastic, non-slip stool.  · Install grab bars by the toilet and in the tub and shower. Do not use towel bars as grab bars.  What can I do in the bedroom?  · Make sure that you have a light by your bed that is easy to reach.  · Do not use any sheets or blankets that are too big for your bed. They should not hang down onto the floor.  · Have a firm chair that has side arms. You can use this for support while you get dressed.  What can I do in the kitchen?  · Clean up any spills right away.  · If you need to reach something above you, use a strong step stool that has a grab bar.  · Keep electrical cords out of the way.  · Do not use floor polish or wax that makes floors slippery. If you  must use wax, use non-skid floor wax.  What can I do with my stairs?  · Do not leave any items on the stairs.  · Make sure that you have a light switch at the top of the stairs and the bottom of the stairs. If you do not have them, ask someone to add them for you.  · Make sure that there are handrails on both sides of the stairs, and use them. Fix handrails that are broken or loose. Make sure that handrails are as long as the stairways.  · Install non-slip stair treads on all stairs in your home.  · Avoid having throw rugs at the top or bottom of the stairs. If you do have throw rugs, attach them to the floor with carpet tape.  · Choose a carpet that does not hide the edge of the steps on the stairway.  · Check any carpeting to make sure that it is firmly attached to the stairs. Fix any carpet that is loose or worn.  What can I do on the outside of my home?  · Use bright outdoor lighting.  · Regularly fix the edges of walkways and driveways and fix any cracks.  · Remove anything that might make you trip as you walk through a door, such as a raised step or threshold.  · Trim any bushes or trees on the path to your home.  · Regularly check to see if handrails are loose or broken. Make sure that both sides of any steps have handrails.  · Install guardrails along the edges of any raised decks and porches.  · Clear walking paths of anything that might make someone trip, such as tools or rocks.  · Have any leaves, snow, or ice cleared regularly.  · Use sand or salt on walking paths during winter.  · Clean up any spills in your garage right away. This includes grease or oil spills.  What other actions can I take?  · Wear shoes that:  ? Have a low heel. Do not wear high heels.  ? Have rubber bottoms.  ? Are comfortable and fit you well.  ? Are closed at the toe. Do not wear open-toe sandals.  · Use tools that help you move around (mobility aids) if they are needed. These  include:  ? Canes.  ? Walkers.  ? Scooters.  ? Crutches.  · Review your medicines with your doctor. Some medicines can make you feel dizzy. This can increase your chance of falling.  Ask your doctor what other things you can do to help prevent falls.  Where to find more information  · Centers for Disease Control and Prevention, [place]: https://cdc.gov  · National [place] on Aging: https://qm6ycaq.sahil.nih.gov  Contact a doctor if:  · You are afraid of falling at home.  · You feel weak, drowsy, or dizzy at home.  · You fall at home.  Summary  · There are many simple things that you can do to make your home safe and to help prevent falls.  · Ways to make your home safe include removing tripping hazards and installing grab bars in the bathroom.  · Ask for help when making these changes in your home.  This information is not intended to replace advice given to you by your health care provider. Make sure you discuss any questions you have with your health care provider.  Document Released: 10/14/2010 Document Revised: 08/02/2018 Document Reviewed: 08/02/2018  [employer] Interactive Patient Education © 2019 [employer]    Depression Screening  Depression screening is a tool that your health care provider can use to learn if you have symptoms of depression. Depression is a common condition with many symptoms that are also often found in other conditions. Depression is treatable, but it must first be diagnosed. You may not know that certain feelings, thoughts, and behaviors that you are having can be symptoms of depression. Taking a depression screening test can help you and your health care provider decide if you need more assessment, or if you should be referred to a mental health care provider.  What are the screening tests?  · You may have a physical exam to see if another condition is affecting your mental health. You may have a blood or urine sample taken during the physical exam.  · You may be interviewed using a  screening tool that was developed from research, such as one of these:  ? Patient Health Questionnaire (PHQ). This is a set of either 2 or 9 questions. A health care provider who has been trained to score this screening test uses a guide to assess if your symptoms suggest that you may have depression.  ? Steen Depression Rating Scale (HAM-D). This is a set of either 17 or 24 questions. You may be asked to take it again during or after your treatment, to see if your depression has gotten better.  ? Watson Depression Inventory (BDI). This is a set of 21 multiple choice questions. Your health care provider scores your answers to assess:  § Your level of depression, ranging from mild to severe.  § Your response to treatment.  · Your health care provider may talk with you about your daily activities, such as eating, sleeping, work, and recreation, and ask if you have had any changes in activity.  · Your health care provider may ask you to see a mental health specialist, such as a psychiatrist or psychologist, for more evaluation.  Who should be screened for depression?    · All adults, including adults with a family history of a mental health disorder.  · Adolescents who are 12-18 years old.  · People who are recovering from a myocardial infarction (MI).  · Pregnant women, or women who have given birth.  · People who have a long-term (chronic) illness.  · Anyone who has been diagnosed with another type of a mental health disorder.  · Anyone who has symptoms that could show depression.  What do my results mean?  Your health care provider will review the results of your depression screening, physical exam, and lab tests. Positive screens suggest that you may have depression. Screening is the first step in getting the care that you may need. It is up to you to get your screening results. Ask your health care provider, or the department that is doing your screening tests, when your results will be ready. Talk with your  health care provider about your results and diagnosis.  A diagnosis of depression is made using the Diagnostic and Statistical Manual of Mental Disorders (DSM-V). This is a book that lists the number and type of symptoms that must be present for a health care provider to give a specific diagnosis.   Your health care provider may work with you to treat your symptoms of depression, or your health care provider may help you find a mental health provider who can assess, diagnose, and treat your depression.  Get help right away if:  · You have thoughts about hurting yourself or others.  If you ever feel like you may hurt yourself or others, or have thoughts about taking your own life, get help right away. You can go to your nearest emergency department or call:  · Your local emergency services (911 in the [place]  · A suicide crisis helpline, such as the National Suicide Prevention Lifeline at 1-199.665.1431. This is open 24 hours a day.  Summary  · Depression screening is the first step in getting the help that you may need.  · If your screening test shows symptoms of depression (is positive), your health care provider may ask you to see a mental health provider.  · Anyone who is age 12 or older should be screened for depression.  This information is not intended to replace advice given to you by your health care provider. Make sure you discuss any questions you have with your health care provider.  Document Released: 05/04/2018 Document Revised: 05/04/2018 Document Reviewed: 05/04/2018  [employer] Interactive Patient Education © 2019 [employer]

## 2019-12-27 ENCOUNTER — LAB (OUTPATIENT)
Dept: LAB | Facility: HOSPITAL | Age: 84
End: 2019-12-27

## 2019-12-27 DIAGNOSIS — E03.9 ACQUIRED HYPOTHYROIDISM: ICD-10-CM

## 2019-12-27 DIAGNOSIS — I10 ESSENTIAL HYPERTENSION: ICD-10-CM

## 2019-12-27 LAB
ALBUMIN SERPL-MCNC: 3.7 G/DL (ref 3.5–5.2)
ALBUMIN/GLOB SERPL: 1.4 G/DL
ALP SERPL-CCNC: 62 U/L (ref 39–117)
ALT SERPL W P-5'-P-CCNC: 15 U/L (ref 1–33)
ANION GAP SERPL CALCULATED.3IONS-SCNC: 13.1 MMOL/L (ref 5–15)
AST SERPL-CCNC: 16 U/L (ref 1–32)
BASOPHILS # BLD AUTO: 0.1 10*3/MM3 (ref 0–0.2)
BASOPHILS NFR BLD AUTO: 1 % (ref 0–1.5)
BILIRUB SERPL-MCNC: 0.5 MG/DL (ref 0.2–1.2)
BUN BLD-MCNC: 16 MG/DL (ref 8–23)
BUN/CREAT SERPL: 17 (ref 7–25)
CALCIUM SPEC-SCNC: 8.9 MG/DL (ref 8.6–10.5)
CHLORIDE SERPL-SCNC: 98 MMOL/L (ref 98–107)
CO2 SERPL-SCNC: 29.9 MMOL/L (ref 22–29)
CREAT BLD-MCNC: 0.94 MG/DL (ref 0.57–1)
DEPRECATED RDW RBC AUTO: 44.4 FL (ref 37–54)
EOSINOPHIL # BLD AUTO: 0.47 10*3/MM3 (ref 0–0.4)
EOSINOPHIL NFR BLD AUTO: 4.6 % (ref 0.3–6.2)
ERYTHROCYTE [DISTWIDTH] IN BLOOD BY AUTOMATED COUNT: 13.1 % (ref 12.3–15.4)
GFR SERPL CREATININE-BSD FRML MDRD: 56 ML/MIN/1.73
GLOBULIN UR ELPH-MCNC: 2.6 GM/DL
GLUCOSE BLD-MCNC: 85 MG/DL (ref 65–99)
HCT VFR BLD AUTO: 34.4 % (ref 34–46.6)
HGB BLD-MCNC: 11.7 G/DL (ref 12–15.9)
IMM GRANULOCYTES # BLD AUTO: 0.05 10*3/MM3 (ref 0–0.05)
IMM GRANULOCYTES NFR BLD AUTO: 0.5 % (ref 0–0.5)
LYMPHOCYTES # BLD AUTO: 2.59 10*3/MM3 (ref 0.7–3.1)
LYMPHOCYTES NFR BLD AUTO: 25.5 % (ref 19.6–45.3)
MCH RBC QN AUTO: 32.1 PG (ref 26.6–33)
MCHC RBC AUTO-ENTMCNC: 34 G/DL (ref 31.5–35.7)
MCV RBC AUTO: 94.5 FL (ref 79–97)
MONOCYTES # BLD AUTO: 0.66 10*3/MM3 (ref 0.1–0.9)
MONOCYTES NFR BLD AUTO: 6.5 % (ref 5–12)
NEUTROPHILS # BLD AUTO: 6.3 10*3/MM3 (ref 1.7–7)
NEUTROPHILS NFR BLD AUTO: 61.9 % (ref 42.7–76)
NRBC BLD AUTO-RTO: 0 /100 WBC (ref 0–0.2)
PLATELET # BLD AUTO: 213 10*3/MM3 (ref 140–450)
PMV BLD AUTO: 10.9 FL (ref 6–12)
POTASSIUM BLD-SCNC: 3.6 MMOL/L (ref 3.5–5.2)
PROT SERPL-MCNC: 6.3 G/DL (ref 6–8.5)
RBC # BLD AUTO: 3.64 10*6/MM3 (ref 3.77–5.28)
SODIUM BLD-SCNC: 141 MMOL/L (ref 136–145)
TSH SERPL DL<=0.05 MIU/L-ACNC: 4.54 UIU/ML (ref 0.27–4.2)
WBC NRBC COR # BLD: 10.17 10*3/MM3 (ref 3.4–10.8)

## 2019-12-27 PROCEDURE — 80053 COMPREHEN METABOLIC PANEL: CPT

## 2019-12-27 PROCEDURE — 85025 COMPLETE CBC W/AUTO DIFF WBC: CPT

## 2019-12-27 PROCEDURE — 84443 ASSAY THYROID STIM HORMONE: CPT

## 2020-01-01 ENCOUNTER — TELEPHONE (OUTPATIENT)
Dept: FAMILY MEDICINE CLINIC | Facility: CLINIC | Age: 85
End: 2020-01-01

## 2020-01-01 ENCOUNTER — OUTSIDE FACILITY SERVICE (OUTPATIENT)
Dept: FAMILY MEDICINE CLINIC | Facility: CLINIC | Age: 85
End: 2020-01-01

## 2020-01-01 PROCEDURE — G0180 MD CERTIFICATION HHA PATIENT: HCPCS | Performed by: NURSE PRACTITIONER

## 2020-01-01 RX ORDER — AZITHROMYCIN 250 MG/1
TABLET, FILM COATED ORAL
Qty: 6 TABLET | Refills: 0 | Status: SHIPPED | OUTPATIENT
Start: 2020-01-01 | End: 2021-01-01

## 2020-01-06 ENCOUNTER — TELEPHONE (OUTPATIENT)
Dept: FAMILY MEDICINE CLINIC | Facility: CLINIC | Age: 85
End: 2020-01-06

## 2020-01-07 NOTE — TELEPHONE ENCOUNTER
Per  CRESENCIO Pineda, Ms. Arteaga has been called with recent lab results & recommendations.  Continue current medications and follow-up as planned or sooner if any problems.      ----- Message from CRESENCIO Hall sent at 1/1/2020  3:51 PM CST -----  TSH slightly elevated but not enough to make changes to her synthroid at this time.  Will recheck her TSH at next appointment.  Blood count has improved and hemoglobin is now up to 11.7.  All other labs essentially normal.

## 2020-01-09 ENCOUNTER — OFFICE VISIT (OUTPATIENT)
Dept: PULMONOLOGY | Facility: CLINIC | Age: 85
End: 2020-01-09

## 2020-01-09 VITALS
HEIGHT: 68 IN | OXYGEN SATURATION: 92 % | SYSTOLIC BLOOD PRESSURE: 138 MMHG | HEART RATE: 82 BPM | BODY MASS INDEX: 21.26 KG/M2 | DIASTOLIC BLOOD PRESSURE: 73 MMHG | WEIGHT: 140.3 LBS

## 2020-01-09 DIAGNOSIS — J44.1 COPD EXACERBATION (HCC): Primary | ICD-10-CM

## 2020-01-09 PROCEDURE — 99213 OFFICE O/P EST LOW 20 MIN: CPT | Performed by: INTERNAL MEDICINE

## 2020-01-09 PROCEDURE — 96372 THER/PROPH/DIAG INJ SC/IM: CPT | Performed by: INTERNAL MEDICINE

## 2020-01-09 RX ORDER — PREDNISONE 10 MG/1
TABLET ORAL
Qty: 21 TABLET | Refills: 0 | Status: SHIPPED | OUTPATIENT
Start: 2020-01-09 | End: 2020-03-10

## 2020-01-09 RX ORDER — METHYLPREDNISOLONE ACETATE 80 MG/ML
80 INJECTION, SUSPENSION INTRA-ARTICULAR; INTRALESIONAL; INTRAMUSCULAR; SOFT TISSUE ONCE
Status: COMPLETED | OUTPATIENT
Start: 2020-01-09 | End: 2020-01-09

## 2020-01-09 RX ADMIN — METHYLPREDNISOLONE ACETATE 80 MG: 80 INJECTION, SUSPENSION INTRA-ARTICULAR; INTRALESIONAL; INTRAMUSCULAR; SOFT TISSUE at 15:12

## 2020-01-09 NOTE — PROGRESS NOTES
"This elderly lady has COPD and recently developed increasing shortness of breath cough and wheeze.  She is not producing purulent sputum and denies chest pain she does not take an inhaler on a regular basis    ROS    Constitutional-no night sweats weight loss headaches  GI no abdominal pain nausea or diarrhea  Neuro no seizure or neurologic deficits  Musculoskeletal no deformity or joint pain   no dysuria or hematuria  Skin no rash or other lesions  All other systems reviewed and were negative except for the above.      Physical Exam  /73   Pulse 82   Ht 172.7 cm (68\")   Wt 63.6 kg (140 lb 4.8 oz)   LMP  (LMP Unknown)   SpO2 92%   BMI 21.33 kg/m²   Vital signs as above  Pupils equally round and reactive to light and accommodation, neck no JVD or adenopathy.  Cardiovascular regular rhythm and rate no murmur or gallop.  Abdomen soft no organomegaly tenderness.  Extremities no clubbing cyanosis or edema.  No cervical adenopathy.  No skin rash.  Neurologic good strength bilaterally without deficits  Alert afebrile dyspneic elderly white female, lungs reveal diminished breath sounds and wheezes    Impression COPD with asthma episode    Plan Depo-Medrol, prednisone for a few days, Brio inhaler, return in 2 weeks        This document has been produced with the assistance of Jocelin dictation  This document has been electronically signed by Bryan Jay MD on January 9, 2020 3:05 PM      "

## 2020-01-15 RX ORDER — ERGOCALCIFEROL 1.25 MG/1
50000 CAPSULE ORAL
Qty: 3 CAPSULE | Refills: 3 | Status: SHIPPED | OUTPATIENT
Start: 2020-01-15 | End: 2021-01-01

## 2020-01-23 ENCOUNTER — OFFICE VISIT (OUTPATIENT)
Dept: PULMONOLOGY | Facility: CLINIC | Age: 85
End: 2020-01-23

## 2020-01-23 VITALS
DIASTOLIC BLOOD PRESSURE: 70 MMHG | WEIGHT: 140 LBS | OXYGEN SATURATION: 97 % | BODY MASS INDEX: 21.22 KG/M2 | HEART RATE: 61 BPM | SYSTOLIC BLOOD PRESSURE: 112 MMHG | HEIGHT: 68 IN

## 2020-01-23 DIAGNOSIS — J43.1 PANLOBULAR EMPHYSEMA (HCC): Primary | ICD-10-CM

## 2020-01-23 PROCEDURE — 99213 OFFICE O/P EST LOW 20 MIN: CPT | Performed by: INTERNAL MEDICINE

## 2020-01-23 NOTE — PROGRESS NOTES
"This lady has COPD and has not receiving relief on medications right now.  She complains of shortness of breath and dyspnea on exertion.  She is not coughing purulent sputum    ROS    Constitutional-no night sweats weight loss headaches  GI no abdominal pain nausea or diarrhea  Neuro no seizure or neurologic deficits  Musculoskeletal no deformity or joint pain   no dysuria or hematuria  Skin no rash or other lesions  All other systems reviewed and were negative except for the above.      Physical Exam  /70 (BP Location: Right arm, Patient Position: Sitting, Cuff Size: Adult)   Pulse 61   Ht 172.7 cm (68\")   Wt 63.5 kg (140 lb)   LMP  (LMP Unknown)   SpO2 97%   BMI 21.29 kg/m²   Vital signs as above  Pupils equally round and reactive to light and accommodation, neck no JVD or adenopathy.  Cardiovascular regular rhythm and rate no murmur or gallop.  Abdomen soft no organomegaly tenderness.  Extremities no clubbing cyanosis or edema.  No cervical adenopathy.  No skin rash.  Neurologic good strength bilaterally without deficits  Patient is alert dyspneic white female with pursed lip breathing.  Lungs reveal diminished breath sounds and prolonged expiration    Impression COPD/emphysema with persistent dyspnea    Plan a trial of Trelegy inhaler, continue routine meds, return in 1 month        This document has been produced with the assistance of [email or web address]  This document has been electronically signed by Bryan Jay MD on January 23, 2020 10:56 AM      "

## 2020-01-28 RX ORDER — ESOMEPRAZOLE MAGNESIUM 40 MG/1
CAPSULE, DELAYED RELEASE ORAL
Qty: 30 CAPSULE | Refills: 0 | Status: SHIPPED | OUTPATIENT
Start: 2020-01-28 | End: 2020-02-24

## 2020-02-20 ENCOUNTER — OFFICE VISIT (OUTPATIENT)
Dept: PULMONOLOGY | Facility: CLINIC | Age: 85
End: 2020-02-20

## 2020-02-20 VITALS
DIASTOLIC BLOOD PRESSURE: 70 MMHG | OXYGEN SATURATION: 94 % | SYSTOLIC BLOOD PRESSURE: 122 MMHG | HEART RATE: 94 BPM | HEIGHT: 68 IN | BODY MASS INDEX: 21.19 KG/M2 | WEIGHT: 139.8 LBS

## 2020-02-20 DIAGNOSIS — J43.1 PANLOBULAR EMPHYSEMA (HCC): Primary | ICD-10-CM

## 2020-02-20 PROCEDURE — 99213 OFFICE O/P EST LOW 20 MIN: CPT | Performed by: INTERNAL MEDICINE

## 2020-02-20 RX ORDER — AZITHROMYCIN 250 MG/1
TABLET, FILM COATED ORAL
Qty: 6 TABLET | Refills: 1 | Status: SHIPPED | OUTPATIENT
Start: 2020-02-20 | End: 2020-03-10

## 2020-02-20 NOTE — PROGRESS NOTES
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  She has dyspnea at rest and minimal movement.  She is on multiple medications.  She is not coughing purulent material    ROS    Constitutional-no night sweats weight loss headaches  GI no abdominal pain nausea or diarrhea  Neuro no seizure or neurologic deficits  Musculoskeletal no deformity or joint pain   no dysuria or hematuria  Skin no rash or other lesions  All other systems reviewed and were negative except for the above.      Physical Exam  /70 (BP Location: Right arm, Patient Position: Sitting, Cuff Size: Adult)   Pulse 94   Ht 172.7 cm (68\")   Wt 63.4 kg (139 lb 12.8 oz)   LMP  (LMP Unknown)   SpO2 94%   BMI 21.26 kg/m²   Vital signs as above  Pupils equally round and reactive to light and accommodation, neck no JVD or adenopathy.  Cardiovascular regular rhythm and rate no murmur or gallop.  Abdomen soft no organomegaly tenderness.  Extremities no clubbing cyanosis or edema.  No cervical adenopathy.  No skin rash.  Neurologic good strength bilaterally without deficits  Alert afebrile lungs diminished breath sounds patient is in respiratory distress at rest    Impression COPD/emphysema with hypoxemia    Plan continue present medications Zithromax if needed, return in 1 month        This document has been produced with the assistance of [employer]  This document has been electronically signed by Bryan Jay MD on February 20, 2020 10:58 AM      "

## 2020-02-24 RX ORDER — ESOMEPRAZOLE MAGNESIUM 40 MG/1
CAPSULE, DELAYED RELEASE ORAL
Qty: 30 CAPSULE | Refills: 0 | Status: SHIPPED | OUTPATIENT
Start: 2020-02-24 | End: 2020-04-29

## 2020-02-24 RX ORDER — RANOLAZINE 500 MG/1
500 TABLET, EXTENDED RELEASE ORAL 2 TIMES DAILY
Qty: 60 TABLET | Refills: 1 | Status: SHIPPED | OUTPATIENT
Start: 2020-02-24 | End: 2020-04-29

## 2020-02-24 RX ORDER — ATORVASTATIN CALCIUM 20 MG/1
20 TABLET, FILM COATED ORAL DAILY
Qty: 90 TABLET | Refills: 1 | Status: SHIPPED | OUTPATIENT
Start: 2020-02-24 | End: 2020-10-29

## 2020-02-25 RX ORDER — MONTELUKAST SODIUM 10 MG/1
10 TABLET ORAL NIGHTLY
Qty: 90 TABLET | Refills: 2 | Status: SHIPPED | OUTPATIENT
Start: 2020-02-25 | End: 2021-01-01

## 2020-03-10 ENCOUNTER — LAB (OUTPATIENT)
Dept: LAB | Facility: HOSPITAL | Age: 85
End: 2020-03-10

## 2020-03-10 ENCOUNTER — OFFICE VISIT (OUTPATIENT)
Dept: FAMILY MEDICINE CLINIC | Facility: CLINIC | Age: 85
End: 2020-03-10

## 2020-03-10 VITALS
HEART RATE: 77 BPM | BODY MASS INDEX: 21.22 KG/M2 | DIASTOLIC BLOOD PRESSURE: 68 MMHG | HEIGHT: 68 IN | SYSTOLIC BLOOD PRESSURE: 116 MMHG | OXYGEN SATURATION: 96 % | WEIGHT: 140 LBS

## 2020-03-10 DIAGNOSIS — L03.031 CELLULITIS OF TOE OF RIGHT FOOT: ICD-10-CM

## 2020-03-10 DIAGNOSIS — R60.0 BILATERAL LOWER EXTREMITY EDEMA: ICD-10-CM

## 2020-03-10 DIAGNOSIS — L60.0 INGROWN TOENAIL OF BOTH FEET: ICD-10-CM

## 2020-03-10 DIAGNOSIS — L03.031 CELLULITIS OF TOE OF RIGHT FOOT: Primary | ICD-10-CM

## 2020-03-10 LAB
ALBUMIN SERPL-MCNC: 3.6 G/DL (ref 3.5–5.2)
ALBUMIN/GLOB SERPL: 1.4 G/DL
ALP SERPL-CCNC: 75 U/L (ref 39–117)
ALT SERPL W P-5'-P-CCNC: 11 U/L (ref 1–33)
ANION GAP SERPL CALCULATED.3IONS-SCNC: 13.7 MMOL/L (ref 5–15)
AST SERPL-CCNC: 16 U/L (ref 1–32)
BASOPHILS # BLD AUTO: 0.04 10*3/MM3 (ref 0–0.2)
BASOPHILS NFR BLD AUTO: 0.5 % (ref 0–1.5)
BILIRUB SERPL-MCNC: 0.2 MG/DL (ref 0.2–1.2)
BUN BLD-MCNC: 24 MG/DL (ref 8–23)
BUN/CREAT SERPL: 23.5 (ref 7–25)
CALCIUM SPEC-SCNC: 8.7 MG/DL (ref 8.6–10.5)
CHLORIDE SERPL-SCNC: 99 MMOL/L (ref 98–107)
CO2 SERPL-SCNC: 27.3 MMOL/L (ref 22–29)
CREAT BLD-MCNC: 1.02 MG/DL (ref 0.57–1)
DEPRECATED RDW RBC AUTO: 42.5 FL (ref 37–54)
EOSINOPHIL # BLD AUTO: 0.4 10*3/MM3 (ref 0–0.4)
EOSINOPHIL NFR BLD AUTO: 5.4 % (ref 0.3–6.2)
ERYTHROCYTE [DISTWIDTH] IN BLOOD BY AUTOMATED COUNT: 12.2 % (ref 12.3–15.4)
GFR SERPL CREATININE-BSD FRML MDRD: 51 ML/MIN/1.73
GLOBULIN UR ELPH-MCNC: 2.5 GM/DL
GLUCOSE BLD-MCNC: 97 MG/DL (ref 65–99)
HCT VFR BLD AUTO: 30.4 % (ref 34–46.6)
HGB BLD-MCNC: 10.6 G/DL (ref 12–15.9)
IMM GRANULOCYTES # BLD AUTO: 0.03 10*3/MM3 (ref 0–0.05)
IMM GRANULOCYTES NFR BLD AUTO: 0.4 % (ref 0–0.5)
LYMPHOCYTES # BLD AUTO: 1.74 10*3/MM3 (ref 0.7–3.1)
LYMPHOCYTES NFR BLD AUTO: 23.4 % (ref 19.6–45.3)
MCH RBC QN AUTO: 33.4 PG (ref 26.6–33)
MCHC RBC AUTO-ENTMCNC: 34.9 G/DL (ref 31.5–35.7)
MCV RBC AUTO: 95.9 FL (ref 79–97)
MONOCYTES # BLD AUTO: 0.45 10*3/MM3 (ref 0.1–0.9)
MONOCYTES NFR BLD AUTO: 6 % (ref 5–12)
NEUTROPHILS # BLD AUTO: 4.79 10*3/MM3 (ref 1.7–7)
NEUTROPHILS NFR BLD AUTO: 64.3 % (ref 42.7–76)
NRBC BLD AUTO-RTO: 0 /100 WBC (ref 0–0.2)
PLATELET # BLD AUTO: 292 10*3/MM3 (ref 140–450)
PMV BLD AUTO: 10.6 FL (ref 6–12)
POTASSIUM BLD-SCNC: 4 MMOL/L (ref 3.5–5.2)
PROT SERPL-MCNC: 6.1 G/DL (ref 6–8.5)
RBC # BLD AUTO: 3.17 10*6/MM3 (ref 3.77–5.28)
SODIUM BLD-SCNC: 140 MMOL/L (ref 136–145)
URATE SERPL-MCNC: 7.6 MG/DL (ref 2.4–5.7)
WBC NRBC COR # BLD: 7.45 10*3/MM3 (ref 3.4–10.8)

## 2020-03-10 PROCEDURE — 80053 COMPREHEN METABOLIC PANEL: CPT

## 2020-03-10 PROCEDURE — 85025 COMPLETE CBC W/AUTO DIFF WBC: CPT

## 2020-03-10 PROCEDURE — 84550 ASSAY OF BLOOD/URIC ACID: CPT

## 2020-03-10 PROCEDURE — 99213 OFFICE O/P EST LOW 20 MIN: CPT | Performed by: NURSE PRACTITIONER

## 2020-03-10 RX ORDER — CLINDAMYCIN HYDROCHLORIDE 300 MG/1
300 CAPSULE ORAL 3 TIMES DAILY
Qty: 21 CAPSULE | Refills: 0 | Status: SHIPPED | OUTPATIENT
Start: 2020-03-10 | End: 2020-06-24

## 2020-03-10 NOTE — PROGRESS NOTES
"Subjective   Val Arteaga is a 87 y.o. female.  Has history of chronic leg swelling but over the past 2 weeks swelling in the right foot especially has been getting worse.  \"My 2 toes on my right foot have just been so sore I can even hardly stand for the covers to touch it at night.\"  Reports she has had a history of chronic ingrown toenails \"and I tried to cut one out on that little toe the other night.\"    Leg Swelling   This is a chronic problem. The current episode started more than 1 year ago. The problem occurs intermittently. The problem has been waxing and waning. Associated symptoms include fatigue and weakness. The symptoms are aggravated by standing and walking. Treatments tried: Lasix  The treatment provided mild relief.   Pain   This is a new problem. The current episode started 1 to 4 weeks ago. The problem occurs constantly. The problem has been gradually worsening. Associated symptoms include fatigue and weakness. The symptoms are aggravated by walking and standing. She has tried nothing for the symptoms. The treatment provided no relief.        The following portions of the patient's history were reviewed and updated as appropriate:     Current Outpatient Medications   Medication Sig Dispense Refill   • acetaminophen (TYLENOL) 325 MG tablet Take 2 tablets by mouth Every 4 (Four) Hours As Needed for Mild Pain . 1 tablet 1   • albuterol (PROVENTIL) (2.5 MG/3ML) 0.083% nebulizer solution Take 2.5 mg by nebulization Every 6 (Six) Hours As Needed for Wheezing. 90 vial 5   • albuterol (PROVENTIL) (2.5 MG/3ML) 0.083% nebulizer solution TAKE 2.5 MG BY NEBULIZATION EVERY 6 (SIX) HOURS AS NEEDED FOR WHEEZING. 90 mL 5   • atorvastatin (LIPITOR) 20 MG tablet TAKE 1 TABLET BY MOUTH DAILY. 90 tablet 1   • BREO ELLIPTA 200-25 MCG/INH inhaler Inhale 1 puff Daily. 1 each 5   • carvedilol (COREG) 6.25 MG tablet Take 1 tablet by mouth 2 (Two) Times a Day With Meals. (Patient taking differently: Take 3.125 mg by " mouth 2 (Two) Times a Day With Meals.) 180 tablet 2   • docusate sodium (COLACE) 100 MG capsule Take 1 capsule by mouth 2 (Two) Times a Day. 60 capsule 0   • esomeprazole (nexIUM) 40 MG capsule TAKE 1 CAPSULE BY MOUTH EVERY MORNING 30 capsule 0   • ferrous sulfate 325 (65 FE) MG tablet Take 325 mg by mouth Daily With Breakfast & Dinner.     • fluorometholone (FML) 0.1 % ophthalmic suspension INSTILL 1 DROP IN BOTH EYES FOUR (4) TIMES DAILY  0   • folic acid-vit B6-vit B12 (FOLBEE) 2.5-25-1 MG tablet tablet Take 1 tablet by mouth Daily. 30 tablet 5   • furosemide (LASIX) 40 MG tablet TAKE 1 TABLET BY MOUTH DAILY. 30 tablet 2   • ipratropium-albuterol (DUO-NEB) 0.5-2.5 mg/3 ml nebulizer Take 3 mL by nebulization 4 (Four) Times a Day As Needed for Wheezing. 360 mL 2   • levothyroxine (SYNTHROID, LEVOTHROID) 50 MCG tablet Take 1 tablet by mouth Daily. 30 tablet 5   • lidocaine (LIDODERM) 5 % Place 1 patch on the skin as directed by provider Daily. Remove & Discard patch within 12 hours 30 patch 2   • midodrine (PROAMATINE) 10 MG tablet Take 10 mg by mouth 2 (Two) Times a Day.  5   • montelukast (SINGULAIR) 10 MG tablet TAKE 1 TABLET BY MOUTH EVERY NIGHT. 90 tablet 2   • nitroglycerin (NITRODUR) 0.2 MG/HR patch Place 1 patch on the skin as directed by provider Daily. 90 patch 2   • polyethylene glycol (MIRALAX) powder MIX 1 CAPFUL (17G) IN 8 OUNCES OF LIQUID AND DRINK BY MOUTH EVERY DAY FOR CONSTIPATION 527 g 2   • ranolazine (RANEXA) 500 MG 12 hr tablet TAKE 1 TABLET BY MOUTH 2 (TWO) TIMES A DAY. 60 tablet 1   • sertraline (ZOLOFT) 50 MG tablet TAKE 1 TABLET BY MOUTH DAILY. 90 tablet 1   • traMADol (ULTRAM) 50 MG tablet Take 1 tablet by mouth Every 6 (Six) Hours As Needed for Moderate Pain . 120 tablet 2   • umeclidinium-vilanterol (ANORO ELLIPTA) 62.5-25 MCG/INH aerosol powder  inhaler Inhale 1 puff Daily. 60 each 3   • vitamin D (ERGOCALCIFEROL) 1.25 MG (98042 UT) capsule capsule TAKE 1 CAPSULE BY MOUTH EVERY 30  (THIRTY) DAYS. 3 capsule 3   • clindamycin (CLEOCIN) 300 MG capsule Take 1 capsule by mouth 3 (Three) Times a Day. 21 capsule 0   • colchicine 0.6 MG tablet Take 1.2 mg by mouth x1 then take 0.6 mg by mouth 1 hour later x1 3 tablet 0     No current facility-administered medications for this visit.      Current Outpatient Medications on File Prior to Visit   Medication Sig   • acetaminophen (TYLENOL) 325 MG tablet Take 2 tablets by mouth Every 4 (Four) Hours As Needed for Mild Pain .   • albuterol (PROVENTIL) (2.5 MG/3ML) 0.083% nebulizer solution Take 2.5 mg by nebulization Every 6 (Six) Hours As Needed for Wheezing.   • albuterol (PROVENTIL) (2.5 MG/3ML) 0.083% nebulizer solution TAKE 2.5 MG BY NEBULIZATION EVERY 6 (SIX) HOURS AS NEEDED FOR WHEEZING.   • atorvastatin (LIPITOR) 20 MG tablet TAKE 1 TABLET BY MOUTH DAILY.   • BREO ELLIPTA 200-25 MCG/INH inhaler Inhale 1 puff Daily.   • carvedilol (COREG) 6.25 MG tablet Take 1 tablet by mouth 2 (Two) Times a Day With Meals. (Patient taking differently: Take 3.125 mg by mouth 2 (Two) Times a Day With Meals.)   • docusate sodium (COLACE) 100 MG capsule Take 1 capsule by mouth 2 (Two) Times a Day.   • esomeprazole (nexIUM) 40 MG capsule TAKE 1 CAPSULE BY MOUTH EVERY MORNING   • ferrous sulfate 325 (65 FE) MG tablet Take 325 mg by mouth Daily With Breakfast & Dinner.   • fluorometholone (FML) 0.1 % ophthalmic suspension INSTILL 1 DROP IN BOTH EYES FOUR (4) TIMES DAILY   • folic acid-vit B6-vit B12 (FOLBEE) 2.5-25-1 MG tablet tablet Take 1 tablet by mouth Daily.   • furosemide (LASIX) 40 MG tablet TAKE 1 TABLET BY MOUTH DAILY.   • ipratropium-albuterol (DUO-NEB) 0.5-2.5 mg/3 ml nebulizer Take 3 mL by nebulization 4 (Four) Times a Day As Needed for Wheezing.   • levothyroxine (SYNTHROID, LEVOTHROID) 50 MCG tablet Take 1 tablet by mouth Daily.   • lidocaine (LIDODERM) 5 % Place 1 patch on the skin as directed by provider Daily. Remove & Discard patch within 12 hours   •  "midodrine (PROAMATINE) 10 MG tablet Take 10 mg by mouth 2 (Two) Times a Day.   • montelukast (SINGULAIR) 10 MG tablet TAKE 1 TABLET BY MOUTH EVERY NIGHT.   • nitroglycerin (NITRODUR) 0.2 MG/HR patch Place 1 patch on the skin as directed by provider Daily.   • polyethylene glycol (MIRALAX) powder MIX 1 CAPFUL (17G) IN 8 OUNCES OF LIQUID AND DRINK BY MOUTH EVERY DAY FOR CONSTIPATION   • ranolazine (RANEXA) 500 MG 12 hr tablet TAKE 1 TABLET BY MOUTH 2 (TWO) TIMES A DAY.   • sertraline (ZOLOFT) 50 MG tablet TAKE 1 TABLET BY MOUTH DAILY.   • traMADol (ULTRAM) 50 MG tablet Take 1 tablet by mouth Every 6 (Six) Hours As Needed for Moderate Pain .   • umeclidinium-vilanterol (ANORO ELLIPTA) 62.5-25 MCG/INH aerosol powder  inhaler Inhale 1 puff Daily.   • vitamin D (ERGOCALCIFEROL) 1.25 MG (42940 UT) capsule capsule TAKE 1 CAPSULE BY MOUTH EVERY 30 (THIRTY) DAYS.     No current facility-administered medications on file prior to visit.      She is allergic to ace inhibitors; baclofen; and lisinopril..    Review of Systems   Constitutional: Positive for fatigue.   Respiratory: Negative.    Cardiovascular: Positive for leg swelling.   Gastrointestinal: Negative.    Genitourinary: Negative.    Musculoskeletal: Positive for gait problem.   Skin: Negative.    Neurological: Positive for weakness.   Psychiatric/Behavioral: Negative.  Negative for confusion.       Objective    Visit Vitals  /68   Pulse 77   Ht 172.7 cm (68\")   Wt 63.5 kg (140 lb)   LMP  (LMP Unknown)   SpO2 96%   BMI 21.29 kg/m²       Physical Exam   Constitutional: She is oriented to person, place, and time. She appears well-developed and well-nourished.   HENT:   Head: Normocephalic.   Right Ear: External ear normal.   Left Ear: External ear normal.   Eyes: Pupils are equal, round, and reactive to light. EOM are normal.   Neck: Normal range of motion. Neck supple.   Cardiovascular: Normal rate, regular rhythm and normal heart sounds.   2+ pitting edema to " right foot, ankle and lower leg, 1+ pitting edema to left lower leg, ankle and foot   Pulmonary/Chest: Effort normal and breath sounds normal.   Abdominal: Soft. Bowel sounds are normal.   Musculoskeletal: Normal range of motion.     Skin Integrity  -  She has right foot onychomycosis and right foot ingrown toenail.She has left foot onychomycosis and left foot ingrown toenail..     Neurological: She is alert and oriented to person, place, and time.   Skin: Skin is warm. Capillary refill takes less than 2 seconds.   Psychiatric: She has a normal mood and affect. Her behavior is normal.   Nursing note and vitals reviewed.      Assessment/Plan   Problems Addressed this Visit     None      Visit Diagnoses     Cellulitis of toe of right foot    -  Primary    Relevant Medications    clindamycin (CLEOCIN) 300 MG capsule    Other Relevant Orders    CBC & Differential (Completed)    Comprehensive Metabolic Panel (Completed)    Uric acid (Completed)    Ingrown toenail of both feet        Relevant Orders    Ambulatory Referral to Podiatry    Bilateral lower extremity edema        Relevant Orders    Duplex Venous Lower Extremity - Bilateral CAR        New Medications Ordered This Visit   Medications   • clindamycin (CLEOCIN) 300 MG capsule     Sig: Take 1 capsule by mouth 3 (Three) Times a Day.     Dispense:  21 capsule     Refill:  0     1.  Cellulitis of toe of right foot:  Begin Cleocin as prescribed  Educated on importance of completing full dose of antibiotic therapy  Complete CBC, chemistry panel and uric acid as ordered and will notify results when available  May use Tylenol OTC according to package directions for any fever/pain  Strongly encouraged to discontinue attempting to remove her own ingrown toenails  Encouraged to seek emergency medical treatment for any new or worsening redness, drainage, fever or warmth    2.  Ingrown toenail of both feet:  Referral placed to podiatry will call to schedule appointment    3.   Bilateral lower extremity edema:  Radiology will call to schedule venous duplex and will notify results when available  Instructed to increase Lasix dose from 40 mg p.o. daily to 80 mg p.o. daily x3 days then return to 40 mg daily  Strongly encourage compression stockings daily for minimum of 2 hours  Encouraged to elevate lower extremities when sitting and when sleeping    Continue on current medications as previously prescribed   I spent 21 minutes in direct face to face contact with patient.  Greater than 50% of this time was spent counseling patient and discussing plan of care.  Return if symptoms worsen or fail to improve, for Next scheduled follow up.        This document has been electronically signed by CRESENCIO Hall on March 11, 2020 07:57

## 2020-03-11 ENCOUNTER — OFFICE VISIT (OUTPATIENT)
Dept: FAMILY MEDICINE CLINIC | Facility: CLINIC | Age: 85
End: 2020-03-11

## 2020-03-11 ENCOUNTER — TELEPHONE (OUTPATIENT)
Dept: FAMILY MEDICINE CLINIC | Facility: CLINIC | Age: 85
End: 2020-03-11

## 2020-03-11 VITALS
BODY MASS INDEX: 21.22 KG/M2 | DIASTOLIC BLOOD PRESSURE: 62 MMHG | HEIGHT: 68 IN | WEIGHT: 140 LBS | SYSTOLIC BLOOD PRESSURE: 118 MMHG

## 2020-03-11 DIAGNOSIS — D64.9 LOW HEMOGLOBIN: Primary | ICD-10-CM

## 2020-03-11 DIAGNOSIS — M10.9 ACUTE GOUT INVOLVING TOE OF RIGHT FOOT, UNSPECIFIED CAUSE: Primary | ICD-10-CM

## 2020-03-11 PROCEDURE — 99213 OFFICE O/P EST LOW 20 MIN: CPT | Performed by: NURSE PRACTITIONER

## 2020-03-11 PROCEDURE — 81002 URINALYSIS NONAUTO W/O SCOPE: CPT | Performed by: NURSE PRACTITIONER

## 2020-03-11 RX ORDER — COLCHICINE 0.6 MG/1
TABLET ORAL
Qty: 3 TABLET | Refills: 0 | Status: SHIPPED | OUTPATIENT
Start: 2020-03-11 | End: 2020-10-29

## 2020-03-11 NOTE — PROGRESS NOTES
"Subjective   Val Arteaga is a 87 y.o. female.  Seen in this office yesterday and was complaining of right foot pain with redness and swelling.  Diagnosed with gout and had blood work at that time.  CBC came back this morning with low hemoglobin and hematocrit.  When she was contacted she reports she has been having dark-colored stools but states she is on iron.  When speaking to her niece she does not take her iron on a routine basis and has had a history of low hemoglobin in the past.  Patient here today for further evaluation.  Niece states \"  She had a problem like this about 2 years ago.  They did a scope in the top and the bottom and they could not find the reason why she was losing blood.  That is why they put her on iron.\"    HPI: Dark-colored stools.  Chronicity is new.  Onset more than a month ago.  Progression since onset is unchanged.  No associated symptoms.  Aggravating factors include iron supplement.  Denies pain.       The following portions of the patient's history were reviewed and updated as appropriate:     Current Outpatient Medications   Medication Sig Dispense Refill   • acetaminophen (TYLENOL) 325 MG tablet Take 2 tablets by mouth Every 4 (Four) Hours As Needed for Mild Pain . 1 tablet 1   • albuterol (PROVENTIL) (2.5 MG/3ML) 0.083% nebulizer solution Take 2.5 mg by nebulization Every 6 (Six) Hours As Needed for Wheezing. 90 vial 5   • albuterol (PROVENTIL) (2.5 MG/3ML) 0.083% nebulizer solution TAKE 2.5 MG BY NEBULIZATION EVERY 6 (SIX) HOURS AS NEEDED FOR WHEEZING. 90 mL 5   • atorvastatin (LIPITOR) 20 MG tablet TAKE 1 TABLET BY MOUTH DAILY. 90 tablet 1   • BREO ELLIPTA 200-25 MCG/INH inhaler Inhale 1 puff Daily. 1 each 5   • carvedilol (COREG) 6.25 MG tablet Take 1 tablet by mouth 2 (Two) Times a Day With Meals. (Patient taking differently: Take 3.125 mg by mouth 2 (Two) Times a Day With Meals.) 180 tablet 2   • clindamycin (CLEOCIN) 300 MG capsule Take 1 capsule by mouth 3 (Three) " Times a Day. 21 capsule 0   • colchicine 0.6 MG tablet Take 1.2 mg by mouth x1 then take 0.6 mg by mouth 1 hour later x1 3 tablet 0   • docusate sodium (COLACE) 100 MG capsule Take 1 capsule by mouth 2 (Two) Times a Day. 60 capsule 0   • esomeprazole (nexIUM) 40 MG capsule TAKE 1 CAPSULE BY MOUTH EVERY MORNING 30 capsule 0   • ferrous sulfate 325 (65 FE) MG tablet Take 325 mg by mouth Daily With Breakfast & Dinner.     • fluorometholone (FML) 0.1 % ophthalmic suspension INSTILL 1 DROP IN BOTH EYES FOUR (4) TIMES DAILY  0   • folic acid-vit B6-vit B12 (FOLBEE) 2.5-25-1 MG tablet tablet Take 1 tablet by mouth Daily. 30 tablet 5   • furosemide (LASIX) 40 MG tablet TAKE 1 TABLET BY MOUTH DAILY. 30 tablet 2   • ipratropium-albuterol (DUO-NEB) 0.5-2.5 mg/3 ml nebulizer Take 3 mL by nebulization 4 (Four) Times a Day As Needed for Wheezing. 360 mL 2   • levothyroxine (SYNTHROID, LEVOTHROID) 50 MCG tablet Take 1 tablet by mouth Daily. 30 tablet 5   • lidocaine (LIDODERM) 5 % Place 1 patch on the skin as directed by provider Daily. Remove & Discard patch within 12 hours 30 patch 2   • midodrine (PROAMATINE) 10 MG tablet Take 10 mg by mouth 2 (Two) Times a Day.  5   • montelukast (SINGULAIR) 10 MG tablet TAKE 1 TABLET BY MOUTH EVERY NIGHT. 90 tablet 2   • nitroglycerin (NITRODUR) 0.2 MG/HR patch Place 1 patch on the skin as directed by provider Daily. 90 patch 2   • polyethylene glycol (MIRALAX) powder MIX 1 CAPFUL (17G) IN 8 OUNCES OF LIQUID AND DRINK BY MOUTH EVERY DAY FOR CONSTIPATION 527 g 2   • ranolazine (RANEXA) 500 MG 12 hr tablet TAKE 1 TABLET BY MOUTH 2 (TWO) TIMES A DAY. 60 tablet 1   • sertraline (ZOLOFT) 50 MG tablet TAKE 1 TABLET BY MOUTH DAILY. 90 tablet 1   • traMADol (ULTRAM) 50 MG tablet Take 1 tablet by mouth Every 6 (Six) Hours As Needed for Moderate Pain . 120 tablet 2   • umeclidinium-vilanterol (ANORO ELLIPTA) 62.5-25 MCG/INH aerosol powder  inhaler Inhale 1 puff Daily. 60 each 3   • vitamin D  (ERGOCALCIFEROL) 1.25 MG (19386 UT) capsule capsule TAKE 1 CAPSULE BY MOUTH EVERY 30 (THIRTY) DAYS. 3 capsule 3     No current facility-administered medications for this visit.      Current Outpatient Medications on File Prior to Visit   Medication Sig   • acetaminophen (TYLENOL) 325 MG tablet Take 2 tablets by mouth Every 4 (Four) Hours As Needed for Mild Pain .   • albuterol (PROVENTIL) (2.5 MG/3ML) 0.083% nebulizer solution Take 2.5 mg by nebulization Every 6 (Six) Hours As Needed for Wheezing.   • albuterol (PROVENTIL) (2.5 MG/3ML) 0.083% nebulizer solution TAKE 2.5 MG BY NEBULIZATION EVERY 6 (SIX) HOURS AS NEEDED FOR WHEEZING.   • atorvastatin (LIPITOR) 20 MG tablet TAKE 1 TABLET BY MOUTH DAILY.   • BREO ELLIPTA 200-25 MCG/INH inhaler Inhale 1 puff Daily.   • carvedilol (COREG) 6.25 MG tablet Take 1 tablet by mouth 2 (Two) Times a Day With Meals. (Patient taking differently: Take 3.125 mg by mouth 2 (Two) Times a Day With Meals.)   • clindamycin (CLEOCIN) 300 MG capsule Take 1 capsule by mouth 3 (Three) Times a Day.   • colchicine 0.6 MG tablet Take 1.2 mg by mouth x1 then take 0.6 mg by mouth 1 hour later x1   • docusate sodium (COLACE) 100 MG capsule Take 1 capsule by mouth 2 (Two) Times a Day.   • esomeprazole (nexIUM) 40 MG capsule TAKE 1 CAPSULE BY MOUTH EVERY MORNING   • ferrous sulfate 325 (65 FE) MG tablet Take 325 mg by mouth Daily With Breakfast & Dinner.   • fluorometholone (FML) 0.1 % ophthalmic suspension INSTILL 1 DROP IN BOTH EYES FOUR (4) TIMES DAILY   • folic acid-vit B6-vit B12 (FOLBEE) 2.5-25-1 MG tablet tablet Take 1 tablet by mouth Daily.   • furosemide (LASIX) 40 MG tablet TAKE 1 TABLET BY MOUTH DAILY.   • ipratropium-albuterol (DUO-NEB) 0.5-2.5 mg/3 ml nebulizer Take 3 mL by nebulization 4 (Four) Times a Day As Needed for Wheezing.   • levothyroxine (SYNTHROID, LEVOTHROID) 50 MCG tablet Take 1 tablet by mouth Daily.   • lidocaine (LIDODERM) 5 % Place 1 patch on the skin as directed by  "provider Daily. Remove & Discard patch within 12 hours   • midodrine (PROAMATINE) 10 MG tablet Take 10 mg by mouth 2 (Two) Times a Day.   • montelukast (SINGULAIR) 10 MG tablet TAKE 1 TABLET BY MOUTH EVERY NIGHT.   • nitroglycerin (NITRODUR) 0.2 MG/HR patch Place 1 patch on the skin as directed by provider Daily.   • polyethylene glycol (MIRALAX) powder MIX 1 CAPFUL (17G) IN 8 OUNCES OF LIQUID AND DRINK BY MOUTH EVERY DAY FOR CONSTIPATION   • ranolazine (RANEXA) 500 MG 12 hr tablet TAKE 1 TABLET BY MOUTH 2 (TWO) TIMES A DAY.   • sertraline (ZOLOFT) 50 MG tablet TAKE 1 TABLET BY MOUTH DAILY.   • traMADol (ULTRAM) 50 MG tablet Take 1 tablet by mouth Every 6 (Six) Hours As Needed for Moderate Pain .   • umeclidinium-vilanterol (ANORO ELLIPTA) 62.5-25 MCG/INH aerosol powder  inhaler Inhale 1 puff Daily.   • vitamin D (ERGOCALCIFEROL) 1.25 MG (62671 UT) capsule capsule TAKE 1 CAPSULE BY MOUTH EVERY 30 (THIRTY) DAYS.     No current facility-administered medications on file prior to visit.      She is allergic to ace inhibitors; baclofen; and lisinopril..    Review of Systems   Constitutional: Positive for fatigue. Negative for diaphoresis.   HENT: Negative.    Respiratory: Negative.    Cardiovascular: Negative.    Gastrointestinal:        Dark-colored stools   Musculoskeletal: Positive for gait problem.   Skin: Negative.  Negative for color change.       Objective    Visit Vitals  /62   Ht 172.7 cm (68\")   Wt 63.5 kg (140 lb)   LMP  (LMP Unknown)   BMI 21.29 kg/m²       Physical Exam   Constitutional: She is oriented to person, place, and time. She appears well-developed and well-nourished.   HENT:   Head: Normocephalic.   Right Ear: External ear normal.   Left Ear: External ear normal.   Eyes: Pupils are equal, round, and reactive to light. EOM are normal.   Neck: Normal range of motion. Neck supple.   Cardiovascular: Normal rate, regular rhythm and normal heart sounds.   Pulmonary/Chest: Effort normal and breath " sounds normal.   Abdominal: Soft. Bowel sounds are normal.   Genitourinary: Rectum normal. Rectal exam shows no external hemorrhoid, no internal hemorrhoid, no mass, no tenderness and guaiac negative stool.   Musculoskeletal: Normal range of motion.   Neurological: She is alert and oriented to person, place, and time.   Skin: Skin is warm. Capillary refill takes less than 2 seconds.   Psychiatric: She has a normal mood and affect. Her behavior is normal.   Nursing note and vitals reviewed.      Assessment/Plan   Problems Addressed this Visit     None      Visit Diagnoses     Low hemoglobin    -  Primary    Relevant Orders    CBC & Differential    Iron and TIBC    Ferritin    Vitamin B12    POCT urinalysis dipstick, manual (Completed)      No orders of the defined types were placed in this encounter.      1.  Low hemoglobin:  Complete CBC, iron, TIBC, ferritin and vitamin B12 level as ordered and will notify results when available  Dipstick urinalysis performed in office and trace amount of blood, small amount of leukocytes  Hemoccult performed in office and resulted as negative  Encouraged to continue iron daily as previously prescribed  Encouraged to seek emergency medical treatment for any new or worsening stool color changes, gross blood or hematuria    Continue on current medications as previously prescribed   I spent 16 minutes in direct face to face contact with patient.  Greater than 50% of this time was spent counseling patient and discussing plan of care.  Return if symptoms worsen or fail to improve, for Next scheduled follow up.        This document has been electronically signed by CRESENCIO Hall on March 13, 2020 07:41

## 2020-03-11 NOTE — PROGRESS NOTES
Per CRESENCIO Pineda, both Ms Arteaga and her niece Ms Madison have been called with recent lab results & recommendations.  Continue current medications and follow-up as planned or sooner if any problems.    Vero,   When I talked to Ms. Arteaga she said she has been having dark stools but she though it was from the Iron tablets.  When I called and talked to her niece Ms Madison, she reports that Ms Arteaga has not been taking her Iron tablet.   She states she takes everything else in her med tray except for the iron tablet.

## 2020-03-11 NOTE — TELEPHONE ENCOUNTER
Per CRESENCIO Pineda, both Ms Arteaga and her neice Ms Madison have been called with recent lab results & recommendations.  Continue current medications and follow-up as planned or sooner if any problems.    Vero,   When I talked to Ms. Arteaga she said she has been having dark stools but she though it was from the Iron tablets.  When I called and talked to her niece Ms Madison, she reports that Ms Arteaga has not been taking her Iron tablet.   She states she takes everything else in her med tray except for the iron tablet.       ----- Message from CRESENCIO Hall sent at 3/11/2020  6:48 AM CDT -----  Kidney functions are slightly elevated.  She needs to increase her water intake.  Uric acid was elevated at 7.6.  This is an indication of acute gout.  She needs to take the antibiotic as prescribed yesterday for the cellulitis in her foot but I have also sent in a prescription for medication called colchicine.  She will only take a total of 3 pills.  Blood count has also started to drop again and is now down to 10.6.  I will recheck her blood count at her next appointment but can you please ask her if she is noticed any blood in her stool or dark, black or tarry looking stools?  Has she noticed any blood in her urine?

## 2020-03-12 LAB
BILIRUB BLD-MCNC: NEGATIVE MG/DL
CLARITY, POC: CLEAR
COLOR UR: YELLOW
GLUCOSE UR STRIP-MCNC: NEGATIVE MG/DL
KETONES UR QL: NEGATIVE
LEUKOCYTE EST, POC: ABNORMAL
NITRITE UR-MCNC: NEGATIVE MG/ML
PH UR: 5 [PH] (ref 5–8)
PROT UR STRIP-MCNC: ABNORMAL MG/DL
RBC # UR STRIP: ABNORMAL /UL
SP GR UR: 1.01 (ref 1–1.03)
UROBILINOGEN UR QL: NORMAL

## 2020-03-17 ENCOUNTER — TELEPHONE (OUTPATIENT)
Dept: FAMILY MEDICINE CLINIC | Facility: CLINIC | Age: 85
End: 2020-03-17

## 2020-03-18 ENCOUNTER — OFFICE VISIT (OUTPATIENT)
Dept: PODIATRY | Facility: CLINIC | Age: 85
End: 2020-03-18

## 2020-03-18 ENCOUNTER — LAB (OUTPATIENT)
Dept: LAB | Facility: HOSPITAL | Age: 85
End: 2020-03-18

## 2020-03-18 VITALS — HEIGHT: 68 IN | OXYGEN SATURATION: 95 % | WEIGHT: 140 LBS | BODY MASS INDEX: 21.22 KG/M2 | HEART RATE: 81 BPM

## 2020-03-18 DIAGNOSIS — L03.115 CELLULITIS OF RIGHT FOOT: ICD-10-CM

## 2020-03-18 DIAGNOSIS — L97.512 SKIN ULCER OF RIGHT FOOT WITH FAT LAYER EXPOSED (HCC): ICD-10-CM

## 2020-03-18 DIAGNOSIS — M79.671 RIGHT FOOT PAIN: Primary | ICD-10-CM

## 2020-03-18 DIAGNOSIS — D64.9 LOW HEMOGLOBIN: ICD-10-CM

## 2020-03-18 LAB
BASOPHILS # BLD AUTO: 0.06 10*3/MM3 (ref 0–0.2)
BASOPHILS NFR BLD AUTO: 0.7 % (ref 0–1.5)
DEPRECATED RDW RBC AUTO: 44.6 FL (ref 37–54)
EOSINOPHIL # BLD AUTO: 0.37 10*3/MM3 (ref 0–0.4)
EOSINOPHIL NFR BLD AUTO: 4.6 % (ref 0.3–6.2)
ERYTHROCYTE [DISTWIDTH] IN BLOOD BY AUTOMATED COUNT: 12.6 % (ref 12.3–15.4)
FERRITIN SERPL-MCNC: 227 NG/ML (ref 13–150)
HCT VFR BLD AUTO: 32.4 % (ref 34–46.6)
HGB BLD-MCNC: 11.2 G/DL (ref 12–15.9)
IMM GRANULOCYTES # BLD AUTO: 0.04 10*3/MM3 (ref 0–0.05)
IMM GRANULOCYTES NFR BLD AUTO: 0.5 % (ref 0–0.5)
IRON 24H UR-MRATE: 82 MCG/DL (ref 37–145)
IRON SATN MFR SERPL: 30 % (ref 20–50)
LYMPHOCYTES # BLD AUTO: 1.59 10*3/MM3 (ref 0.7–3.1)
LYMPHOCYTES NFR BLD AUTO: 19.6 % (ref 19.6–45.3)
MCH RBC QN AUTO: 33.4 PG (ref 26.6–33)
MCHC RBC AUTO-ENTMCNC: 34.6 G/DL (ref 31.5–35.7)
MCV RBC AUTO: 96.7 FL (ref 79–97)
MONOCYTES # BLD AUTO: 0.55 10*3/MM3 (ref 0.1–0.9)
MONOCYTES NFR BLD AUTO: 6.8 % (ref 5–12)
NEUTROPHILS # BLD AUTO: 5.5 10*3/MM3 (ref 1.7–7)
NEUTROPHILS NFR BLD AUTO: 67.8 % (ref 42.7–76)
NRBC BLD AUTO-RTO: 0 /100 WBC (ref 0–0.2)
PLATELET # BLD AUTO: 285 10*3/MM3 (ref 140–450)
PMV BLD AUTO: 11.3 FL (ref 6–12)
RBC # BLD AUTO: 3.35 10*6/MM3 (ref 3.77–5.28)
TIBC SERPL-MCNC: 273 MCG/DL (ref 298–536)
TRANSFERRIN SERPL-MCNC: 183 MG/DL (ref 200–360)
VIT B12 BLD-MCNC: 468 PG/ML (ref 211–946)
WBC NRBC COR # BLD: 8.11 10*3/MM3 (ref 3.4–10.8)

## 2020-03-18 PROCEDURE — 84466 ASSAY OF TRANSFERRIN: CPT

## 2020-03-18 PROCEDURE — 85025 COMPLETE CBC W/AUTO DIFF WBC: CPT

## 2020-03-18 PROCEDURE — 99203 OFFICE O/P NEW LOW 30 MIN: CPT | Performed by: PODIATRIST

## 2020-03-18 PROCEDURE — 82607 VITAMIN B-12: CPT

## 2020-03-18 PROCEDURE — 83540 ASSAY OF IRON: CPT

## 2020-03-18 PROCEDURE — 82728 ASSAY OF FERRITIN: CPT

## 2020-03-18 NOTE — PROGRESS NOTES
Val Arteaga  12/2/1932  87 y.o. female  PCP: Vero Arteaga APRN 03/11/2020 03/18/2020    Chief Complaint   Patient presents with   • Left Foot - Non diabetic care     Non diabetic care     • Right Foot - Non diabetic care     Non diabetic care       History of Present Illness    Val Arteaga is a 87 y.o.female who presents to clinic today with chief complaint of right foot pain.  Pain is primarily located to the fourth and fifth toes.  Pain is been present for a few weeks.  Patient relates to redness, swelling and drainage from the space between her fourth and fifth toes.  There are no associated injuries or trauma.  She has been treated for gout and infection and is currently taking clindamycin.  Currently she rates her pain as a 4 out of 10.  Pain is aggravated with close toed shoes and pressure.  There are no associated injuries or trauma.  She has no other lower extremity complaints.    Past Medical History:   Diagnosis Date   • Acquired hypothyroidism    • Allergic rhinitis    • CHF (congestive heart failure) (CMS/[facility])    • COPD (chronic obstructive pulmonary disease) (CMS/[facility])    • Corneal epithelial dystrophy    • Coronary arteriosclerosis    • Depression    • Generalized atherosclerosis    • GERD (gastroesophageal reflux disease)    • Hemorrhoids    • History of bone density study 08/03/2012    Lumbar spine Osteopenia. Femoral Osteopenia   • History of mammogram 08/20/2004    Birads Category 2 Benign   • History of Papanicolaou smear of cervix 08/12/2004    NEGATIVE   • Hypercholesterolemia    • Hyperlipidemia    • Hypertension    • Myocardial infarction (CMS/[facility])    • Nonexudative age-related macular degeneration    • Osteoarthritis of multiple joints    • Pseudophakia    • Punctate keratitis     - history Thygeson's keratopathy      • Tobacco dependence syndrome          Past Surgical History:   Procedure Laterality Date   • APPENDECTOMY     • BREAST SURGERY  03/19/1954    Excision, breast  lesion (1)  Excision of fibroma. 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 patency in the circumflex artery site of previous angioplasty. Kathe. patency in RCA.Nonobstructive 20-30% stenosis in the LAD and diagonal branch. Preserved LV systolic function with Ef of 55%   • CERVICAL SPINE SURGERY  09/16/1974    Excision of cervical disc, C5-6, C6-7, anterior cervical fusion, C5-6 with iliac bone graft.cervical spondylosis,C5-6, C6-7   • COLONOSCOPY  01/01/2013    patient declined    • CORONARY ANGIOPLASTY  07/21/1993    Angioplasty, coronary (2)    RCA    • DILATATION AND CURETTAGE      3   • ENDOSCOPY N/A 2/14/2018    Procedure: ESOPHAGOGASTRODUODENOSCOPY;  Surgeon: Alex Avitia MD;  Location: [facility] ENDOSCOPY;  Service:    • ENDOSCOPY AND COLONOSCOPY  10/01/1998    Hemorhoids Rectal Outlet Bleeding   • ESOPHAGOSCOPY / EGD  06/28/2004    Nonerosive gastritis of the body of the stomach. A biopsy was obtained & placed in h. Pylori test agar. Multiple biopsies were obtained from the body of the stomach   • HIP BIPOLAR REPLACEMENT Right 1/23/2018    Procedure: HIP BIPOLAR ANTERIOR APPROACH - right;  Surgeon: Kelvin Mcdowell MD;  Location: [facility] OR;  Service:    • INJECTION OF MEDICATION  11/23/2015    Depo Medrol (Methylprednisone) (5)    • INJECTION OF MEDICATION  02/04/2016    Kenalog (3)     • JOINT REPLACEMENT Right 01/24/2018    hip   • PACEMAKER REPLACEMENT  2006         Family History   Problem Relation Age of Onset   • Coronary artery disease Other        Allergies   Allergen Reactions   • Ace Inhibitors Dizziness   • Baclofen Delirium     unknown   • Lisinopril Cough     Keeps her awake all night coughing       Social History     Socioeconomic History   • Marital status:      Spouse name: Not on file   • Number of children: Not on file   • Years of education: Not on file   • Highest education level: Not on file   Tobacco Use   •  Smoking status: Former Smoker     Packs/day: 0.50     Years: 50.00     Pack years: 25.00     Types: Cigarettes     Start date: 1950     Last attempt to quit: 1/12/2017     Years since quitting: 3.1   • Smokeless tobacco: Never Used   Substance and Sexual Activity   • Alcohol use: No   • Drug use: No   • Sexual activity: Defer         Current Outpatient Medications   Medication Sig Dispense Refill   • acetaminophen (TYLENOL) 325 MG tablet Take 2 tablets by mouth Every 4 (Four) Hours As Needed for Mild Pain . 1 tablet 1   • albuterol (PROVENTIL) (2.5 MG/3ML) 0.083% nebulizer solution Take 2.5 mg by nebulization Every 6 (Six) Hours As Needed for Wheezing. 90 vial 5   • albuterol (PROVENTIL) (2.5 MG/3ML) 0.083% nebulizer solution TAKE 2.5 MG BY NEBULIZATION EVERY 6 (SIX) HOURS AS NEEDED FOR WHEEZING. 90 mL 5   • atorvastatin (LIPITOR) 20 MG tablet TAKE 1 TABLET BY MOUTH DAILY. 90 tablet 1   • BREO ELLIPTA 200-25 MCG/INH inhaler Inhale 1 puff Daily. 1 each 5   • carvedilol (COREG) 6.25 MG tablet Take 1 tablet by mouth 2 (Two) Times a Day With Meals. (Patient taking differently: Take 3.125 mg by mouth 2 (Two) Times a Day With Meals.) 180 tablet 2   • clindamycin (CLEOCIN) 300 MG capsule Take 1 capsule by mouth 3 (Three) Times a Day. 21 capsule 0   • colchicine 0.6 MG tablet Take 1.2 mg by mouth x1 then take 0.6 mg by mouth 1 hour later x1 3 tablet 0   • docusate sodium (COLACE) 100 MG capsule Take 1 capsule by mouth 2 (Two) Times a Day. 60 capsule 0   • esomeprazole (nexIUM) 40 MG capsule TAKE 1 CAPSULE BY MOUTH EVERY MORNING 30 capsule 0   • ferrous sulfate 325 (65 FE) MG tablet Take 325 mg by mouth Daily With Breakfast & Dinner.     • fluorometholone (FML) 0.1 % ophthalmic suspension INSTILL 1 DROP IN BOTH EYES FOUR (4) TIMES DAILY  0   • folic acid-vit B6-vit B12 (FOLBEE) 2.5-25-1 MG tablet tablet Take 1 tablet by mouth Daily. 30 tablet 5   • furosemide (LASIX) 40 MG tablet TAKE 1 TABLET BY MOUTH DAILY. 30 tablet 2   •  ipratropium-albuterol (DUO-NEB) 0.5-2.5 mg/3 ml nebulizer Take 3 mL by nebulization 4 (Four) Times a Day As Needed for Wheezing. 360 mL 2   • levothyroxine (SYNTHROID, LEVOTHROID) 50 MCG tablet Take 1 tablet by mouth Daily. 30 tablet 5   • lidocaine (LIDODERM) 5 % Place 1 patch on the skin as directed by provider Daily. Remove & Discard patch within 12 hours 30 patch 2   • midodrine (PROAMATINE) 10 MG tablet Take 10 mg by mouth 2 (Two) Times a Day.  5   • montelukast (SINGULAIR) 10 MG tablet TAKE 1 TABLET BY MOUTH EVERY NIGHT. 90 tablet 2   • nitroglycerin (NITRODUR) 0.2 MG/HR patch Place 1 patch on the skin as directed by provider Daily. 90 patch 2   • polyethylene glycol (MIRALAX) powder MIX 1 CAPFUL (17G) IN 8 OUNCES OF LIQUID AND DRINK BY MOUTH EVERY DAY FOR CONSTIPATION 527 g 2   • ranolazine (RANEXA) 500 MG 12 hr tablet TAKE 1 TABLET BY MOUTH 2 (TWO) TIMES A DAY. 60 tablet 1   • sertraline (ZOLOFT) 50 MG tablet TAKE 1 TABLET BY MOUTH DAILY. 90 tablet 1   • traMADol (ULTRAM) 50 MG tablet Take 1 tablet by mouth Every 6 (Six) Hours As Needed for Moderate Pain . 120 tablet 2   • umeclidinium-vilanterol (ANORO ELLIPTA) 62.5-25 MCG/INH aerosol powder  inhaler Inhale 1 puff Daily. 60 each 3   • vitamin D (ERGOCALCIFEROL) 1.25 MG (38677 UT) capsule capsule TAKE 1 CAPSULE BY MOUTH EVERY 30 (THIRTY) DAYS. 3 capsule 3     No current facility-administered medications for this visit.        Review of Systems   Constitutional: Positive for appetite change, chills and fatigue.   HENT: Negative.    Eyes: Negative.    Respiratory: Positive for cough, chest tightness, shortness of breath and wheezing.    Cardiovascular: Positive for chest pain, palpitations and leg swelling.   Gastrointestinal: Negative.    Endocrine: Negative.    Genitourinary: Negative.    Musculoskeletal:        Right foot pain    Skin: Positive for color change and wound.   Neurological: Positive for dizziness and numbness.   Psychiatric/Behavioral:  "Negative for agitation. The patient is nervous/anxious.          OBJECTIVE    Pulse 81   Ht 172.7 cm (68\")   Wt 63.5 kg (140 lb)   LMP  (LMP Unknown)   SpO2 95%   BMI 21.29 kg/m²       Physical Exam   Constitutional: She is oriented to person, place, and time. She appears well-developed and well-nourished. No distress.   HENT:   Head: Normocephalic and atraumatic.   Nose: Nose normal.   Eyes: Pupils are equal, round, and reactive to light. Conjunctivae and EOM are normal.   Pulmonary/Chest: Effort normal. No respiratory distress. She has no wheezes.   Musculoskeletal: Normal range of motion. She exhibits edema, tenderness and deformity.   Neurological: She is alert and oriented to person, place, and time.   Skin: Skin is warm and dry. Capillary refill takes less than 2 seconds. There is erythema.   Psychiatric: She has a normal mood and affect. Her behavior is normal.   Vitals reviewed.      Lower Extremity    Cardiovascular:    DP/PT pulses palpable bilateral  CFT brisk  to all digits  Pitting edema noted to bilateral lower extremities  Musculoskeletal:  Muscle strength is 4/5 for all muscle groups tested   ROM of the 1st MTP is WNL bilateral   ROM of the ankle joint is  WNL bilateral   Dermatological:   Open wound noted to the medial aspect of the right fifth digit.  Serous drainage noted.  Mild surrounding erythema.  No foul odor.  No subcutaneous nodules or masses noted    Nails 1-5 bilateral are within normal limits for length  Neurological:   Protective sensation intact   Sensation intact to light touch        Procedures        ASSESSMENT AND PLAN    Val was seen today for non diabetic care and non diabetic care.    Diagnoses and all orders for this visit:    Right foot pain  -     XR Foot 3+ View Right    Skin ulcer of right foot with fat layer exposed (CMS/HCC)    Cellulitis of right foot      - Comprehensive foot and ankle exam performed.   -Radiographs taken and reviewed.  No acute osseous or " articular abnormalities.  -Dressing applied to right foot.  Patient instructed on daily dressing changes.  Dispensed toe spacer.  -Continue antibiotics until course is complete.  - All questions were answered to the patients satisfaction.  - RTC 2 weeks          This document has been electronically signed by Remy Doe DPM on March 18, 2020 16:22     3/18/2020  16:22

## 2020-03-24 ENCOUNTER — OFFICE VISIT (OUTPATIENT)
Dept: PULMONOLOGY | Facility: CLINIC | Age: 85
End: 2020-03-24

## 2020-03-24 VITALS
HEART RATE: 74 BPM | SYSTOLIC BLOOD PRESSURE: 140 MMHG | BODY MASS INDEX: 21.22 KG/M2 | WEIGHT: 140 LBS | HEIGHT: 68 IN | DIASTOLIC BLOOD PRESSURE: 70 MMHG | OXYGEN SATURATION: 97 %

## 2020-03-24 DIAGNOSIS — J44.1 CHRONIC OBSTRUCTIVE PULMONARY DISEASE WITH ACUTE EXACERBATION (HCC): Primary | ICD-10-CM

## 2020-03-24 PROCEDURE — 99213 OFFICE O/P EST LOW 20 MIN: CPT | Performed by: INTERNAL MEDICINE

## 2020-03-24 RX ORDER — DOXYCYCLINE 100 MG/1
CAPSULE ORAL
Qty: 20 CAPSULE | Refills: 0 | Status: SHIPPED | OUTPATIENT
Start: 2020-03-24 | End: 2020-06-24

## 2020-03-24 RX ORDER — PREDNISONE 10 MG/1
TABLET ORAL
Qty: 21 TABLET | Refills: 0 | Status: SHIPPED | OUTPATIENT
Start: 2020-03-24 | End: 2020-05-08 | Stop reason: SDUPTHER

## 2020-03-24 NOTE — PROGRESS NOTES
"This 87-year-old has advanced emphysema and hypoxemia.  She is a little more short of breath than usual and has dyspnea on minimal exertion.  She is not coughing wheezing or producing purulent sputum    ROS    Constitutional-no night sweats weight loss headaches  GI no abdominal pain nausea or diarrhea  Neuro no seizure or neurologic deficits  Musculoskeletal no deformity or joint pain   no dysuria or hematuria  Skin no rash or other lesions  All other systems reviewed and were negative except for the above.      Physical Exam  /70 (BP Location: Left arm, Patient Position: Sitting, Cuff Size: Adult)   Pulse 74   Ht 172.7 cm (68\")   Wt 63.5 kg (140 lb)   LMP  (LMP Unknown)   SpO2 97% Comment: 3 LTRS NC  BMI 21.29 kg/m²   Vital signs as above  Pupils equally round and reactive to light and accommodation, neck no JVD or adenopathy.  Cardiovascular regular rhythm and rate no murmur or gallop.  Abdomen soft no organomegaly tenderness.  Extremities no clubbing cyanosis or edema.  No cervical adenopathy.  No skin rash.  Neurologic good strength bilaterally without deficits  Elderly female in a wheelchair using oxygen, lungs reveal diminished breath sounds prolonged expiration, respiratory rate in the 20s    Impression emphysema with exacerbation    Plan doxycycline as needed prednisone 20 mg tapering over 2 weeks, return as needed        This document has been produced with the assistance of [employer]  This document has been electronically signed by Bryan Jay MD on March 24, 2020 11:01      "

## 2020-03-27 ENCOUNTER — TELEPHONE (OUTPATIENT)
Dept: PODIATRY | Facility: CLINIC | Age: 85
End: 2020-03-27

## 2020-03-27 NOTE — TELEPHONE ENCOUNTER
PT STATES SHE HAS INFECTION ON FT AND WANTS TO KNOW IF A PRESCRIPTION CAN BE CALLED IN FOR HER FOR THIS.  CALL BACK # [phone]

## 2020-03-30 ENCOUNTER — OFFICE VISIT (OUTPATIENT)
Dept: PODIATRY | Facility: CLINIC | Age: 85
End: 2020-03-30

## 2020-03-30 VITALS — BODY MASS INDEX: 21.22 KG/M2 | HEART RATE: 74 BPM | HEIGHT: 68 IN | OXYGEN SATURATION: 92 % | WEIGHT: 140 LBS

## 2020-03-30 DIAGNOSIS — L97.512 SKIN ULCER OF RIGHT FOOT WITH FAT LAYER EXPOSED (HCC): Primary | ICD-10-CM

## 2020-03-30 PROCEDURE — 99213 OFFICE O/P EST LOW 20 MIN: CPT | Performed by: PODIATRIST

## 2020-03-30 NOTE — PROGRESS NOTES
Val Arteaga  12/2/1932  87 y.o. female    Patient presents today for a recheck of her right foot.    03/30/2020     Chief Complaint   Patient presents with   • Right Foot - Follow-up       History of Present Illness    Val Arteaga is a 87 y.o.female who presents to clinic today follow-up.  She has been dressing the right foot as instructed.  She is finished her antibiotics.  States that the pain and swelling in her right foot has improved.    Past Medical History:   Diagnosis Date   • Acquired hypothyroidism    • Allergic rhinitis    • CHF (congestive heart failure) (CMS/HCC)    • COPD (chronic obstructive pulmonary disease) (CMS/HCC)    • Corneal epithelial dystrophy    • Coronary arteriosclerosis    • Depression    • Generalized atherosclerosis    • GERD (gastroesophageal reflux disease)    • Hemorrhoids    • History of bone density study 08/03/2012    Lumbar spine Osteopenia. Femoral Osteopenia   • History of mammogram 08/20/2004    Birads Category 2 Benign   • History of Papanicolaou smear of cervix 08/12/2004    NEGATIVE   • Hypercholesterolemia    • Hyperlipidemia    • Hypertension    • Myocardial infarction (CMS/[facility])    • Nonexudative age-related macular degeneration    • Osteoarthritis of multiple joints    • Pseudophakia    • Punctate keratitis     - history Thygeson's keratopathy      • Tobacco dependence syndrome          Past Surgical History:   Procedure Laterality Date   • APPENDECTOMY     • BREAST SURGERY  03/19/1954    Excision, breast lesion (1)  Excision of fibroma. 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 patency in the circumflex artery site of previous angioplasty. Kathe. patency in RCA.Nonobstructive 20-30% stenosis in the LAD and diagonal branch. Preserved LV systolic function with Ef of 55%   • CERVICAL SPINE SURGERY  09/16/1974    Excision of cervical disc, C5-6, C6-7, anterior cervical fusion, C5-6 with iliac bone  graft.cervical spondylosis,C5-6, C6-7   • COLONOSCOPY  01/01/2013    patient declined    • CORONARY ANGIOPLASTY  07/21/1993    Angioplasty, coronary (2)    RCA    • DILATATION AND CURETTAGE      3   • ENDOSCOPY N/A 2/14/2018    Procedure: ESOPHAGOGASTRODUODENOSCOPY;  Surgeon: Alex Avitia MD;  Location: [facility] ENDOSCOPY;  Service:    • ENDOSCOPY AND COLONOSCOPY  10/01/1998    Hemorhoids Rectal Outlet Bleeding   • ESOPHAGOSCOPY / EGD  06/28/2004    Nonerosive gastritis of the body of the stomach. A biopsy was obtained & placed in h. Pylori test agar. Multiple biopsies were obtained from the body of the stomach   • HIP BIPOLAR REPLACEMENT Right 1/23/2018    Procedure: HIP BIPOLAR ANTERIOR APPROACH - right;  Surgeon: Kelvin Mcdowell MD;  Location: [facility] OR;  Service:    • INJECTION OF MEDICATION  11/23/2015    Depo Medrol (Methylprednisone) (5)    • INJECTION OF MEDICATION  02/04/2016    Kenalog (3)     • JOINT REPLACEMENT Right 01/24/2018    hip   • PACEMAKER REPLACEMENT  2006         Family History   Problem Relation Age of Onset   • Coronary artery disease Other        Allergies   Allergen Reactions   • Ace Inhibitors Dizziness   • Baclofen Delirium     unknown   • Lisinopril Cough     Keeps her awake all night coughing       Social History     Socioeconomic History   • Marital status:      Spouse name: Not on file   • Number of children: Not on file   • Years of education: Not on file   • Highest education level: Not on file   Tobacco Use   • Smoking status: Former Smoker     Packs/day: 0.50     Years: 50.00     Pack years: 25.00     Types: Cigarettes     Start date: 1950     Last attempt to quit: 1/12/2017     Years since quitting: 3.2   • Smokeless tobacco: Never Used   Substance and Sexual Activity   • Alcohol use: No   • Drug use: No   • Sexual activity: Defer         Current Outpatient Medications   Medication Sig Dispense Refill   • acetaminophen (TYLENOL) 325 MG tablet Take 2 tablets by  mouth Every 4 (Four) Hours As Needed for Mild Pain . 1 tablet 1   • albuterol (PROVENTIL) (2.5 MG/3ML) 0.083% nebulizer solution Take 2.5 mg by nebulization Every 6 (Six) Hours As Needed for Wheezing. 90 vial 5   • albuterol (PROVENTIL) (2.5 MG/3ML) 0.083% nebulizer solution TAKE 2.5 MG BY NEBULIZATION EVERY 6 (SIX) HOURS AS NEEDED FOR WHEEZING. 90 mL 5   • atorvastatin (LIPITOR) 20 MG tablet TAKE 1 TABLET BY MOUTH DAILY. 90 tablet 1   • BREO ELLIPTA 200-25 MCG/INH inhaler Inhale 1 puff Daily. 1 each 5   • carvedilol (COREG) 6.25 MG tablet Take 1 tablet by mouth 2 (Two) Times a Day With Meals. (Patient taking differently: Take 3.125 mg by mouth 2 (Two) Times a Day With Meals.) 180 tablet 2   • clindamycin (CLEOCIN) 300 MG capsule Take 1 capsule by mouth 3 (Three) Times a Day. 21 capsule 0   • colchicine 0.6 MG tablet Take 1.2 mg by mouth x1 then take 0.6 mg by mouth 1 hour later x1 3 tablet 0   • docusate sodium (COLACE) 100 MG capsule Take 1 capsule by mouth 2 (Two) Times a Day. 60 capsule 0   • doxycycline (MONODOX) 100 MG capsule 1 dose by mouth twice a day 20 capsule 0   • esomeprazole (nexIUM) 40 MG capsule TAKE 1 CAPSULE BY MOUTH EVERY MORNING 30 capsule 0   • ferrous sulfate 325 (65 FE) MG tablet Take 325 mg by mouth Daily With Breakfast & Dinner.     • fluorometholone (FML) 0.1 % ophthalmic suspension INSTILL 1 DROP IN BOTH EYES FOUR (4) TIMES DAILY  0   • folic acid-vit B6-vit B12 (FOLBEE) 2.5-25-1 MG tablet tablet Take 1 tablet by mouth Daily. 30 tablet 5   • furosemide (LASIX) 40 MG tablet TAKE 1 TABLET BY MOUTH DAILY. 30 tablet 2   • ipratropium-albuterol (DUO-NEB) 0.5-2.5 mg/3 ml nebulizer Take 3 mL by nebulization 4 (Four) Times a Day As Needed for Wheezing. 360 mL 2   • levothyroxine (SYNTHROID, LEVOTHROID) 50 MCG tablet Take 1 tablet by mouth Daily. 30 tablet 5   • lidocaine (LIDODERM) 5 % Place 1 patch on the skin as directed by provider Daily. Remove & Discard patch within 12 hours 30 patch 2   •  "midodrine (PROAMATINE) 10 MG tablet Take 10 mg by mouth 2 (Two) Times a Day.  5   • montelukast (SINGULAIR) 10 MG tablet TAKE 1 TABLET BY MOUTH EVERY NIGHT. 90 tablet 2   • nitroglycerin (NITRODUR) 0.2 MG/HR patch Place 1 patch on the skin as directed by provider Daily. 90 patch 2   • polyethylene glycol (MIRALAX) powder MIX 1 CAPFUL (17G) IN 8 OUNCES OF LIQUID AND DRINK BY MOUTH EVERY DAY FOR CONSTIPATION 527 g 2   • predniSONE (DELTASONE) 10 MG tablet 2 tablets by mouth daily for 1 week, then 1 tablet daily for 1 week 21 tablet 0   • ranolazine (RANEXA) 500 MG 12 hr tablet TAKE 1 TABLET BY MOUTH 2 (TWO) TIMES A DAY. 60 tablet 1   • sertraline (ZOLOFT) 50 MG tablet TAKE 1 TABLET BY MOUTH DAILY. 90 tablet 1   • traMADol (ULTRAM) 50 MG tablet Take 1 tablet by mouth Every 6 (Six) Hours As Needed for Moderate Pain . 120 tablet 2   • umeclidinium-vilanterol (ANORO ELLIPTA) 62.5-25 MCG/INH aerosol powder  inhaler Inhale 1 puff Daily. 60 each 3   • vitamin D (ERGOCALCIFEROL) 1.25 MG (17473 UT) capsule capsule TAKE 1 CAPSULE BY MOUTH EVERY 30 (THIRTY) DAYS. 3 capsule 3     No current facility-administered medications for this visit.        Review of Systems   Constitutional: Negative.    Gastrointestinal: Negative.    Psychiatric/Behavioral: Negative.          OBJECTIVE    Pulse 74   Ht 172.7 cm (68\")   Wt 63.5 kg (140 lb)   LMP  (LMP Unknown)   SpO2 92%   BMI 21.29 kg/m²       Physical Exam   Constitutional: She is oriented to person, place, and time. She appears well-developed and well-nourished. No distress.   HENT:   Head: Normocephalic and atraumatic.   Nose: Nose normal.   Pulmonary/Chest: Effort normal. No respiratory distress. She has no wheezes.   Musculoskeletal: Normal range of motion. She exhibits deformity.   Neurological: She is alert and oriented to person, place, and time.   Skin: Skin is warm and dry. Capillary refill takes less than 2 seconds.   Psychiatric: She has a normal mood and affect. Her " behavior is normal.   Vitals reviewed.      Lower Extremity    Cardiovascular:    DP/PT pulses palpable bilateral  CFT brisk  to all digits  Pitting edema noted to bilateral lower extremities  Musculoskeletal:  Muscle strength is 4/5 for all muscle groups tested   ROM of the 1st MTP is WNL bilateral   ROM of the ankle joint is  WNL bilateral   Dermatological:   Open wound noted to the medial aspect of the right fifth digit.  No drainage or foul odor.  No surrounding erythema.  Slight maceration noted to right fourth webspace.  No subcutaneous nodules or masses noted    Nails 1-5 bilateral are within normal limits for length  Neurological:   Protective sensation intact   Sensation intact to light touch        Procedures        ASSESSMENT AND PLAN    Val was seen today for follow-up.    Diagnoses and all orders for this visit:    Skin ulcer of right foot with fat layer exposed (CMS/HCC)      -Right foot is improving.  Start Betadine daily to webspace.  - All questions were answered to the patients satisfaction.  - RTC 4 weeks          This document has been electronically signed by Remy Doe DPM on March 30, 2020 11:25     3/30/2020  11:25

## 2020-04-01 ENCOUNTER — TELEPHONE (OUTPATIENT)
Dept: PODIATRY | Facility: CLINIC | Age: 85
End: 2020-04-01

## 2020-04-01 NOTE — TELEPHONE ENCOUNTER
ms yu called back and said Kelly wasn't coming to her house today to help her with her meds. so she took the new compression sock off and put the old one on hoping it would help take pressure off her toe.

## 2020-04-01 NOTE — TELEPHONE ENCOUNTER
PT STATES FEET ARE HURTING BADLY AND WANTS A PRESCRIPTION CALLED IN FOR THIS.  REQUESTS CALLBACK AT [phone]

## 2020-04-01 NOTE — TELEPHONE ENCOUNTER
patient was given a 10 day supply of doxy from Dr DALILA Jay. Ms Arteaga states someone fixes her medication for her every week and she isn't sure if that is one of her pills. I told her I would speak to this caregiver if they wanted to call me back.

## 2020-04-12 LAB
BH CV LOWER VASCULAR LEFT COMMON FEMORAL AUGMENT: NORMAL
BH CV LOWER VASCULAR LEFT COMMON FEMORAL COMPRESS: NORMAL
BH CV LOWER VASCULAR LEFT DISTAL FEMORAL AUGMENT: NORMAL
BH CV LOWER VASCULAR LEFT DISTAL FEMORAL COMPRESS: NORMAL
BH CV LOWER VASCULAR LEFT GREATER SAPH AK AUGMENT: NORMAL
BH CV LOWER VASCULAR LEFT GREATER SAPH AK COMPRESS: NORMAL
BH CV LOWER VASCULAR LEFT GREATER SAPH BK AUGMENT: NORMAL
BH CV LOWER VASCULAR LEFT GREATER SAPH BK COMPRESS: NORMAL
BH CV LOWER VASCULAR LEFT MID FEMORAL AUGMENT: NORMAL
BH CV LOWER VASCULAR LEFT MID FEMORAL COMPRESS: NORMAL
BH CV LOWER VASCULAR LEFT PERONEAL AUGMENT: NORMAL
BH CV LOWER VASCULAR LEFT POPLITEAL AUGMENT: NORMAL
BH CV LOWER VASCULAR LEFT POPLITEAL COMPRESS: NORMAL
BH CV LOWER VASCULAR LEFT POSTERIOR TIBIAL COMPRESS: NORMAL
BH CV LOWER VASCULAR LEFT PROFUNDA FEMORAL AUGMENT: NORMAL
BH CV LOWER VASCULAR LEFT PROXIMAL FEMORAL AUGMENT: NORMAL
BH CV LOWER VASCULAR LEFT PROXIMAL FEMORAL COMPRESS: NORMAL
BH CV LOWER VASCULAR LEFT SAPHENOFEMORAL JUNCTION AUGMENT: NORMAL
BH CV LOWER VASCULAR LEFT SAPHENOFEMORAL JUNCTION COMPRESS: NORMAL
BH CV LOWER VASCULAR RIGHT COMMON FEMORAL AUGMENT: NORMAL
BH CV LOWER VASCULAR RIGHT COMMON FEMORAL COMPRESS: NORMAL
BH CV LOWER VASCULAR RIGHT COMMON FEMORAL PHASIC: NORMAL
BH CV LOWER VASCULAR RIGHT DISTAL FEMORAL AUGMENT: NORMAL
BH CV LOWER VASCULAR RIGHT DISTAL FEMORAL COMPRESS: NORMAL
BH CV LOWER VASCULAR RIGHT GREATER SAPH AK AUGMENT: NORMAL
BH CV LOWER VASCULAR RIGHT GREATER SAPH AK COMPRESS: NORMAL
BH CV LOWER VASCULAR RIGHT GREATER SAPH BK AUGMENT: NORMAL
BH CV LOWER VASCULAR RIGHT GREATER SAPH BK COMPRESS: NORMAL
BH CV LOWER VASCULAR RIGHT MID FEMORAL AUGMENT: NORMAL
BH CV LOWER VASCULAR RIGHT MID FEMORAL COMPRESS: NORMAL
BH CV LOWER VASCULAR RIGHT PERONEAL AUGMENT: NORMAL
BH CV LOWER VASCULAR RIGHT POPLITEAL AUGMENT: NORMAL
BH CV LOWER VASCULAR RIGHT POPLITEAL COMPETENT: NORMAL
BH CV LOWER VASCULAR RIGHT POPLITEAL COMPRESS: NORMAL
BH CV LOWER VASCULAR RIGHT POSTERIOR TIBIAL AUGMENT: NORMAL
BH CV LOWER VASCULAR RIGHT PROFUNDA FEMORAL AUGMENT: NORMAL
BH CV LOWER VASCULAR RIGHT PROXIMAL FEMORAL AUGMENT: NORMAL
BH CV LOWER VASCULAR RIGHT PROXIMAL FEMORAL COMPRESS: NORMAL
BH CV LOWER VASCULAR RIGHT SAPHENOFEMORAL JUNCTION AUGMENT: NORMAL
BH CV LOWER VASCULAR RIGHT SAPHENOFEMORAL JUNCTION COMPRESS: NORMAL

## 2020-04-13 ENCOUNTER — TELEPHONE (OUTPATIENT)
Dept: FAMILY MEDICINE CLINIC | Facility: CLINIC | Age: 85
End: 2020-04-13

## 2020-04-13 NOTE — TELEPHONE ENCOUNTER
-Per CRESENCIO Pineda, Ms Arteaga has been called with recent Bilateral Venous Doppler of the lower extremities results & recommendations.  Continue current medications and follow-up as planned or sooner if any problems.      ---- Message from CRESENCIO Hall sent at 4/12/2020  1:45 PM CDT -----  Negative for DVT but has bilateral reflux.  Needs to wear compression stockings daily.

## 2020-04-17 ENCOUNTER — TELEPHONE (OUTPATIENT)
Dept: PODIATRY | Facility: CLINIC | Age: 85
End: 2020-04-17

## 2020-04-17 NOTE — TELEPHONE ENCOUNTER
PT REQUESTS A REFILL ON ANTIBIOTICS FOR HER FOOT. I TOLD HER NOONE WAS IN OFFICE YET BUT WILL GET BACK TO HER  SOON WHEN  POSSIBLE.  CALL PT  BACK  [phone].

## 2020-04-17 NOTE — TELEPHONE ENCOUNTER
Spoke with a caregiver and explained that her toe is not infected unless something has changed and she currently does not need an antibiotic. Caregiver states that her toe is just the same but Ms Arteaga says that the only thing that helps the burning and tingling is an antibiotic. Explained that she may need to be seen by her PCP for possible neuropathy.

## 2020-04-17 NOTE — TELEPHONE ENCOUNTER
Ms Arteaga states that her toe gets better and then it gets worse. She really thinks that an antibiotic will help because it wakes her up in the bed when she is trying to sleep and when she is taking an antibiotic it just doesn't do that.  I asked if the toe looked red or swollen?  No it just burns and tingles all the time.    Told her I would give Dr Doe the message.

## 2020-04-29 RX ORDER — FAMOTIDINE 20 MG/1
TABLET, FILM COATED ORAL
Qty: 180 TABLET | Refills: 0 | Status: SHIPPED | OUTPATIENT
Start: 2020-04-29 | End: 2020-07-31

## 2020-04-29 RX ORDER — LEVOTHYROXINE SODIUM 0.05 MG/1
50 TABLET ORAL DAILY
Qty: 90 TABLET | Refills: 0 | Status: SHIPPED | OUTPATIENT
Start: 2020-04-29 | End: 2021-01-01

## 2020-04-29 RX ORDER — RANOLAZINE 500 MG/1
500 TABLET, EXTENDED RELEASE ORAL 2 TIMES DAILY
Qty: 60 TABLET | Refills: 1 | Status: SHIPPED | OUTPATIENT
Start: 2020-04-29 | End: 2020-07-30

## 2020-04-29 RX ORDER — ESOMEPRAZOLE MAGNESIUM 40 MG/1
CAPSULE, DELAYED RELEASE ORAL
Qty: 30 CAPSULE | Refills: 0 | Status: SHIPPED | OUTPATIENT
Start: 2020-04-29 | End: 2020-06-22

## 2020-04-29 RX ORDER — NITROGLYCERIN 40 MG/1
1 PATCH TRANSDERMAL DAILY
Qty: 90 EACH | Refills: 1 | Status: SHIPPED | OUTPATIENT
Start: 2020-04-29 | End: 2020-11-12 | Stop reason: HOSPADM

## 2020-04-29 RX ORDER — CARVEDILOL 6.25 MG/1
6.25 TABLET ORAL 2 TIMES DAILY WITH MEALS
Qty: 180 TABLET | Refills: 1 | Status: SHIPPED | OUTPATIENT
Start: 2020-04-29 | End: 2021-01-01

## 2020-05-04 ENCOUNTER — OFFICE VISIT (OUTPATIENT)
Dept: PODIATRY | Facility: CLINIC | Age: 85
End: 2020-05-04

## 2020-05-04 VITALS — OXYGEN SATURATION: 92 % | HEART RATE: 78 BPM | BODY MASS INDEX: 21.22 KG/M2 | HEIGHT: 68 IN | WEIGHT: 140 LBS

## 2020-05-04 DIAGNOSIS — M79.671 RIGHT FOOT PAIN: ICD-10-CM

## 2020-05-04 DIAGNOSIS — L84 CORNS: Primary | ICD-10-CM

## 2020-05-04 PROCEDURE — 99213 OFFICE O/P EST LOW 20 MIN: CPT | Performed by: PODIATRIST

## 2020-05-04 NOTE — PROGRESS NOTES
Val Arteaga  12/2/1932  87 y.o. female    Patient presents today for a recheck of her right 4th webspace.    05/04/2020     Chief Complaint   Patient presents with   • Right Foot - Follow-up, Wound Check       History of Present Illness    Val Arteaga is a 87 y.o.female who presents to clinic today follow-up.  Continues to complain of pain in between her right fourth and fifth toes.  States that she has not been wearing her toe spacer.  Denies any other complaints today.    Past Medical History:   Diagnosis Date   • Acquired hypothyroidism    • Allergic rhinitis    • CHF (congestive heart failure) (CMS/[facility])    • COPD (chronic obstructive pulmonary disease) (CMS/[facility])    • Corneal epithelial dystrophy    • Coronary arteriosclerosis    • Depression    • Generalized atherosclerosis    • GERD (gastroesophageal reflux disease)    • Hemorrhoids    • History of bone density study 08/03/2012    Lumbar spine Osteopenia. Femoral Osteopenia   • History of mammogram 08/20/2004    Birads Category 2 Benign   • History of Papanicolaou smear of cervix 08/12/2004    NEGATIVE   • Hypercholesterolemia    • Hyperlipidemia    • Hypertension    • Myocardial infarction (CMS/[facility])    • Nonexudative age-related macular degeneration    • Osteoarthritis of multiple joints    • Pseudophakia    • Punctate keratitis     - history Thygeson's keratopathy      • Tobacco dependence syndrome          Past Surgical History:   Procedure Laterality Date   • APPENDECTOMY     • BREAST SURGERY  03/19/1954    Excision, breast lesion (1)  Excision of fibroma. 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 patency in the circumflex artery site of previous angioplasty. Kathe. patency in RCA.Nonobstructive 20-30% stenosis in the LAD and diagonal branch. Preserved LV systolic function with Ef of 55%   • CERVICAL SPINE SURGERY  09/16/1974    Excision of cervical disc, C5-6, C6-7, anterior cervical  fusion, C5-6 with iliac bone graft.cervical spondylosis,C5-6, C6-7   • COLONOSCOPY  01/01/2013    patient declined    • CORONARY ANGIOPLASTY  07/21/1993    Angioplasty, coronary (2)    RCA    • DILATATION AND CURETTAGE      3   • ENDOSCOPY N/A 2/14/2018    Procedure: ESOPHAGOGASTRODUODENOSCOPY;  Surgeon: Alex Avitia MD;  Location: [facility] ENDOSCOPY;  Service:    • ENDOSCOPY AND COLONOSCOPY  10/01/1998    Hemorhoids Rectal Outlet Bleeding   • ESOPHAGOSCOPY / EGD  06/28/2004    Nonerosive gastritis of the body of the stomach. A biopsy was obtained & placed in h. Pylori test agar. Multiple biopsies were obtained from the body of the stomach   • HIP BIPOLAR REPLACEMENT Right 1/23/2018    Procedure: HIP BIPOLAR ANTERIOR APPROACH - right;  Surgeon: Kelvin Mcdowell MD;  Location: [facility] OR;  Service:    • INJECTION OF MEDICATION  11/23/2015    Depo Medrol (Methylprednisone) (5)    • INJECTION OF MEDICATION  02/04/2016    Kenalog (3)     • JOINT REPLACEMENT Right 01/24/2018    hip   • PACEMAKER REPLACEMENT  2006         Family History   Problem Relation Age of Onset   • Coronary artery disease Other        Allergies   Allergen Reactions   • Ace Inhibitors Dizziness   • Baclofen Delirium     unknown   • Lisinopril Cough     Keeps her awake all night coughing       Social History     Socioeconomic History   • Marital status:      Spouse name: Not on file   • Number of children: Not on file   • Years of education: Not on file   • Highest education level: Not on file   Tobacco Use   • Smoking status: Former Smoker     Packs/day: 0.50     Years: 50.00     Pack years: 25.00     Types: Cigarettes     Start date: 1950     Last attempt to quit: 1/12/2017     Years since quitting: 3.3   • Smokeless tobacco: Never Used   Substance and Sexual Activity   • Alcohol use: No   • Drug use: No   • Sexual activity: Defer         Current Outpatient Medications   Medication Sig Dispense Refill   • acetaminophen (TYLENOL) 325 MG  tablet Take 2 tablets by mouth Every 4 (Four) Hours As Needed for Mild Pain . 1 tablet 1   • albuterol (PROVENTIL) (2.5 MG/3ML) 0.083% nebulizer solution Take 2.5 mg by nebulization Every 6 (Six) Hours As Needed for Wheezing. 90 vial 5   • albuterol (PROVENTIL) (2.5 MG/3ML) 0.083% nebulizer solution TAKE 2.5 MG BY NEBULIZATION EVERY 6 (SIX) HOURS AS NEEDED FOR WHEEZING. 90 mL 5   • atorvastatin (LIPITOR) 20 MG tablet TAKE 1 TABLET BY MOUTH DAILY. 90 tablet 1   • BREO ELLIPTA 200-25 MCG/INH inhaler Inhale 1 puff Daily. 1 each 5   • carvedilol (COREG) 6.25 MG tablet TAKE 1 TABLET BY MOUTH 2 (TWO) TIMES A DAY WITH MEALS. 180 tablet 1   • clindamycin (CLEOCIN) 300 MG capsule Take 1 capsule by mouth 3 (Three) Times a Day. 21 capsule 0   • colchicine 0.6 MG tablet Take 1.2 mg by mouth x1 then take 0.6 mg by mouth 1 hour later x1 3 tablet 0   • docusate sodium (COLACE) 100 MG capsule Take 1 capsule by mouth 2 (Two) Times a Day. 60 capsule 0   • doxycycline (MONODOX) 100 MG capsule 1 dose by mouth twice a day 20 capsule 0   • esomeprazole (nexIUM) 40 MG capsule TAKE 1 CAPSULE BY MOUTH EVERY MORNING 30 capsule 0   • famotidine (PEPCID) 20 MG tablet TAKE 2 TABLETS BY MOUTH EVERY  tablet 0   • ferrous sulfate 325 (65 FE) MG tablet Take 325 mg by mouth Daily With Breakfast & Dinner.     • fluorometholone (FML) 0.1 % ophthalmic suspension INSTILL 1 DROP IN BOTH EYES FOUR (4) TIMES DAILY  0   • folic acid-vit B6-vit B12 (FOLBEE) 2.5-25-1 MG tablet tablet Take 1 tablet by mouth Daily. 30 tablet 5   • furosemide (LASIX) 40 MG tablet TAKE 1 TABLET BY MOUTH DAILY. 30 tablet 2   • ipratropium-albuterol (DUO-NEB) 0.5-2.5 mg/3 ml nebulizer Take 3 mL by nebulization 4 (Four) Times a Day As Needed for Wheezing. 360 mL 2   • levothyroxine (SYNTHROID, LEVOTHROID) 50 MCG tablet TAKE 1 TABLET BY MOUTH DAILY. 90 tablet 0   • lidocaine (LIDODERM) 5 % Place 1 patch on the skin as directed by provider Daily. Remove & Discard patch within  "12 hours 30 patch 2   • midodrine (PROAMATINE) 10 MG tablet Take 10 mg by mouth 2 (Two) Times a Day.  5   • montelukast (SINGULAIR) 10 MG tablet TAKE 1 TABLET BY MOUTH EVERY NIGHT. 90 tablet 2   • nitroglycerin (NITRODUR) 0.2 MG/HR patch PLACE 1 PATCH ON THE SKIN AS DIRECTED BY PROVIDER DAILY. 90 each 1   • polyethylene glycol (MIRALAX) powder MIX 1 CAPFUL (17G) IN 8 OUNCES OF LIQUID AND DRINK BY MOUTH EVERY DAY FOR CONSTIPATION 527 g 2   • predniSONE (DELTASONE) 10 MG tablet 2 tablets by mouth daily for 1 week, then 1 tablet daily for 1 week 21 tablet 0   • ranolazine (RANEXA) 500 MG 12 hr tablet TAKE 1 TABLET BY MOUTH 2 (TWO) TIMES A DAY. 60 tablet 1   • sertraline (ZOLOFT) 50 MG tablet TAKE 1 TABLET BY MOUTH DAILY. 90 tablet 1   • traMADol (ULTRAM) 50 MG tablet Take 1 tablet by mouth Every 6 (Six) Hours As Needed for Moderate Pain . 120 tablet 2   • umeclidinium-vilanterol (ANORO ELLIPTA) 62.5-25 MCG/INH aerosol powder  inhaler Inhale 1 puff Daily. 60 each 3   • vitamin D (ERGOCALCIFEROL) 1.25 MG (33779 UT) capsule capsule TAKE 1 CAPSULE BY MOUTH EVERY 30 (THIRTY) DAYS. 3 capsule 3     No current facility-administered medications for this visit.        Review of Systems   Constitutional: Negative.    HENT: Negative.    Eyes: Negative.    Respiratory: Negative.    Cardiovascular: Negative.    Gastrointestinal: Negative.    Genitourinary: Negative.    Musculoskeletal:        Right foot pain    Psychiatric/Behavioral: Negative.          OBJECTIVE    Pulse 78   Ht 172.7 cm (68\")   Wt 63.5 kg (140 lb)   LMP  (LMP Unknown)   SpO2 92%   BMI 21.29 kg/m²       Physical Exam   Constitutional: She is oriented to person, place, and time. She appears well-developed and well-nourished. No distress.   HENT:   Head: Normocephalic and atraumatic.   Nose: Nose normal.   Pulmonary/Chest: Effort normal. No respiratory distress.   Musculoskeletal: Normal range of motion. She exhibits deformity.   Neurological: She is alert and " oriented to person, place, and time.   Skin: Skin is warm and dry. Capillary refill takes less than 2 seconds.   Psychiatric: She has a normal mood and affect. Her behavior is normal.   Vitals reviewed.      Lower Extremity    Cardiovascular:    DP/PT pulses palpable bilateral  CFT brisk  to all digits  Pitting edema noted to bilateral lower extremities  Musculoskeletal:  Muscle strength is 4/5 for all muscle groups tested   ROM of the 1st MTP is WNL bilateral   ROM of the ankle joint is  WNL bilateral   Dermatological:   Corn noted to the medial aspect of the right fifth digit.  No drainage or foul odor.  No surrounding erythema.  Slight maceration    No subcutaneous nodules or masses noted    Nails 1-5 bilateral are within normal limits for length  Neurological:   Protective sensation intact   Sensation intact to light touch        Procedures        ASSESSMENT AND PLAN    Val was seen today for follow-up and wound check.    Diagnoses and all orders for this visit:    Corns    Right foot pain      -Continue Betadine daily to webspace.  Dispensed toe spacer.  Recommended open toed compression stockings.  - All questions were answered to the patients satisfaction.  - RTC as needed          This document has been electronically signed by Remy Doe DPM on May 4, 2020 11:31     5/4/2020  11:31

## 2020-05-08 DIAGNOSIS — R06.02 SHORTNESS OF BREATH: Primary | ICD-10-CM

## 2020-05-08 RX ORDER — PREDNISONE 10 MG/1
TABLET ORAL
Qty: 21 TABLET | Refills: 0 | Status: SHIPPED | OUTPATIENT
Start: 2020-05-08 | End: 2020-06-25

## 2020-06-11 DIAGNOSIS — J42 CHRONIC BRONCHITIS, UNSPECIFIED CHRONIC BRONCHITIS TYPE (HCC): Primary | ICD-10-CM

## 2020-06-11 RX ORDER — ALBUTEROL SULFATE 2.5 MG/3ML
2.5 SOLUTION RESPIRATORY (INHALATION) EVERY 6 HOURS PRN
Qty: 90 VIAL | Refills: 5 | Status: SHIPPED | OUTPATIENT
Start: 2020-06-11 | End: 2020-11-12 | Stop reason: HOSPADM

## 2020-06-22 RX ORDER — ESOMEPRAZOLE MAGNESIUM 40 MG/1
CAPSULE, DELAYED RELEASE ORAL
Qty: 30 CAPSULE | Refills: 0 | Status: SHIPPED | OUTPATIENT
Start: 2020-06-22 | End: 2020-07-10

## 2020-06-23 ENCOUNTER — EPISODE CHANGES (OUTPATIENT)
Dept: CASE MANAGEMENT | Facility: OTHER | Age: 85
End: 2020-06-23

## 2020-06-23 ENCOUNTER — PATIENT OUTREACH (OUTPATIENT)
Dept: CASE MANAGEMENT | Facility: OTHER | Age: 85
End: 2020-06-23

## 2020-06-23 NOTE — OUTREACH NOTE
"Care Plan Note      Responses   Annual Wellness Visit:   Patient Has Completed [completed 12-17-19]   Care Gaps Addressed  Colon Cancer Screening, Flu Shot, Mammogram, Pneumonia Vaccine   Colon Cancer Screening Type  Exempt   Flu Shot Status  Up to Date   Flu Shot Completion at [facility] or Other  [facility]   Flu Shot Comments  completed 9-17-19   Mammogram Status  Exempt   Pneumonia Vaccine Status  Up to Date   Pneumonia Vaccine Comments  per patient \"doctor griselda gave me one around the time I got my flu shot last year\"   Specific Disease Process Teaching  COPD   Other Patient Education/Resources   -- [provided ACM contact information]   Does patient have depression diagnosis?  Yes   Advanced Directives:  -- [discussed but she has not completed]   Ed Visits past 12 months:  None   Hospitalizations past 12 months  None   Medication Adherence  Medications understood   Health Literacy  Good        ACM HRCM proactive outreach on a patient at risk of developing complications from COVID-19 should an exposure occur. ACM spoke with patient and she is doing well. She uses home oxygen \"mostly all the time I do take a break for a little while during the day\". She does experience SOB with exertion but paces herself with activity. She avoids extreme heat and cold to prevent exacerbations of her COPD;does not smoke. She has not been out of the home during the pandemic except for provider appointments.  She is compliant with medications;denies questions. She has family and neighbor support for transportation and groceries. She remains independent with ADL's \"I'm slow but I get it done\". She uses a walker for ambulation;denies falls. She monitors her BP tid. She currently has home health \"some agency out of [place]\". She has close follow up with providers. Discussed care gaps and SDOH completed. AC provided contact information.     Eliane Ortiz RN  Ambulatory     6/23/2020, 15:04    "

## 2020-06-24 ENCOUNTER — OFFICE VISIT (OUTPATIENT)
Dept: FAMILY MEDICINE CLINIC | Facility: CLINIC | Age: 85
End: 2020-06-24

## 2020-06-24 VITALS
HEIGHT: 68 IN | BODY MASS INDEX: 21.22 KG/M2 | HEART RATE: 68 BPM | OXYGEN SATURATION: 98 % | DIASTOLIC BLOOD PRESSURE: 72 MMHG | WEIGHT: 140 LBS | SYSTOLIC BLOOD PRESSURE: 158 MMHG

## 2020-06-24 DIAGNOSIS — J44.1 COPD EXACERBATION (HCC): Primary | ICD-10-CM

## 2020-06-24 PROCEDURE — 96372 THER/PROPH/DIAG INJ SC/IM: CPT | Performed by: NURSE PRACTITIONER

## 2020-06-24 PROCEDURE — 99214 OFFICE O/P EST MOD 30 MIN: CPT | Performed by: NURSE PRACTITIONER

## 2020-06-24 RX ORDER — IPRATROPIUM BROMIDE AND ALBUTEROL SULFATE 2.5; .5 MG/3ML; MG/3ML
3 SOLUTION RESPIRATORY (INHALATION) ONCE
Qty: 3 ML | Refills: 0 | Status: ON HOLD | OUTPATIENT
Start: 2020-06-24 | End: 2020-11-12 | Stop reason: SDUPTHER

## 2020-06-24 RX ORDER — TRIAMCINOLONE ACETONIDE 40 MG/ML
80 INJECTION, SUSPENSION INTRA-ARTICULAR; INTRAMUSCULAR ONCE
Status: COMPLETED | OUTPATIENT
Start: 2020-06-24 | End: 2020-06-24

## 2020-06-24 RX ADMIN — TRIAMCINOLONE ACETONIDE 80 MG: 40 INJECTION, SUSPENSION INTRA-ARTICULAR; INTRAMUSCULAR at 14:50

## 2020-06-24 NOTE — PROGRESS NOTES
"Subjective   Val Arteaga is a 87 y.o. female.  3-month follow-up for hypertension, COPD, congestive heart failure, high cholesterol.  Has become increasingly short of breath over the last several weeks.  Was seeing Dr. Bryan Jay but he has since left at [facility] and now has no pulmonologist.  Has a prescription for Brio inhaler from Dr. Jay \"but I do not use that.  I do my nebulizers and I could not breathe without that.\"    COPD   This is a chronic problem. The current episode started more than 1 year ago. The problem occurs constantly. The problem has been gradually worsening. Associated symptoms include dyspnea on exertion and malaise/fatigue. Pertinent negatives include no fever. Her symptoms are aggravated by exertion. Her symptoms are alleviated by rest and beta-agonist. She reports minimal improvement on treatment. There are no known risk factors for lung disease. Her past medical history is significant for COPD.        The following portions of the patient's history were reviewed and updated as appropriate:     Current Outpatient Medications   Medication Sig Dispense Refill   • acetaminophen (TYLENOL) 325 MG tablet Take 2 tablets by mouth Every 4 (Four) Hours As Needed for Mild Pain . 1 tablet 1   • albuterol (PROVENTIL) (2.5 MG/3ML) 0.083% nebulizer solution Take 2.5 mg by nebulization Every 6 (Six) Hours As Needed for Wheezing. 90 vial 5   • atorvastatin (LIPITOR) 20 MG tablet TAKE 1 TABLET BY MOUTH DAILY. 90 tablet 1   • BREO ELLIPTA 200-25 MCG/INH inhaler Inhale 1 puff Daily. 1 each 5   • carvedilol (COREG) 6.25 MG tablet TAKE 1 TABLET BY MOUTH 2 (TWO) TIMES A DAY WITH MEALS. 180 tablet 1   • colchicine 0.6 MG tablet Take 1.2 mg by mouth x1 then take 0.6 mg by mouth 1 hour later x1 3 tablet 0   • docusate sodium (COLACE) 100 MG capsule Take 1 capsule by mouth 2 (Two) Times a Day. 60 capsule 0   • esomeprazole (nexIUM) 40 MG capsule TAKE 1 CAPSULE BY MOUTH EVERY MORNING 30 capsule 0   • " famotidine (PEPCID) 20 MG tablet TAKE 2 TABLETS BY MOUTH EVERY  tablet 0   • ferrous sulfate 325 (65 FE) MG tablet Take 325 mg by mouth Daily With Breakfast & Dinner.     • fluorometholone (FML) 0.1 % ophthalmic suspension INSTILL 1 DROP IN BOTH EYES FOUR (4) TIMES DAILY  0   • folic acid-vit B6-vit B12 (FOLBEE) 2.5-25-1 MG tablet tablet Take 1 tablet by mouth Daily. 30 tablet 5   • furosemide (LASIX) 40 MG tablet TAKE 1 TABLET BY MOUTH DAILY. 30 tablet 2   • levothyroxine (SYNTHROID, LEVOTHROID) 50 MCG tablet TAKE 1 TABLET BY MOUTH DAILY. 90 tablet 0   • lidocaine (LIDODERM) 5 % Place 1 patch on the skin as directed by provider Daily. Remove & Discard patch within 12 hours 30 patch 2   • midodrine (PROAMATINE) 10 MG tablet Take 10 mg by mouth 2 (Two) Times a Day.  5   • montelukast (SINGULAIR) 10 MG tablet TAKE 1 TABLET BY MOUTH EVERY NIGHT. 90 tablet 2   • nitroglycerin (NITRODUR) 0.2 MG/HR patch PLACE 1 PATCH ON THE SKIN AS DIRECTED BY PROVIDER DAILY. 90 each 1   • polyethylene glycol (MIRALAX) powder MIX 1 CAPFUL (17G) IN 8 OUNCES OF LIQUID AND DRINK BY MOUTH EVERY DAY FOR CONSTIPATION 527 g 2   • ranolazine (RANEXA) 500 MG 12 hr tablet TAKE 1 TABLET BY MOUTH 2 (TWO) TIMES A DAY. 60 tablet 1   • sertraline (ZOLOFT) 50 MG tablet TAKE 1 TABLET BY MOUTH DAILY. 90 tablet 1   • traMADol (ULTRAM) 50 MG tablet Take 1 tablet by mouth Every 6 (Six) Hours As Needed for Moderate Pain . 120 tablet 2   • vitamin D (ERGOCALCIFEROL) 1.25 MG (76220 UT) capsule capsule TAKE 1 CAPSULE BY MOUTH EVERY 30 (THIRTY) DAYS. 3 capsule 3   • predniSONE (DELTASONE) 20 MG tablet Take 3 tablets at the same time daily x5 days 15 tablet 0     No current facility-administered medications for this visit.      Current Outpatient Medications on File Prior to Visit   Medication Sig   • acetaminophen (TYLENOL) 325 MG tablet Take 2 tablets by mouth Every 4 (Four) Hours As Needed for Mild Pain .   • albuterol (PROVENTIL) (2.5 MG/3ML) 0.083%  nebulizer solution Take 2.5 mg by nebulization Every 6 (Six) Hours As Needed for Wheezing.   • atorvastatin (LIPITOR) 20 MG tablet TAKE 1 TABLET BY MOUTH DAILY.   • carvedilol (COREG) 6.25 MG tablet TAKE 1 TABLET BY MOUTH 2 (TWO) TIMES A DAY WITH MEALS.   • colchicine 0.6 MG tablet Take 1.2 mg by mouth x1 then take 0.6 mg by mouth 1 hour later x1   • docusate sodium (COLACE) 100 MG capsule Take 1 capsule by mouth 2 (Two) Times a Day.   • esomeprazole (nexIUM) 40 MG capsule TAKE 1 CAPSULE BY MOUTH EVERY MORNING   • famotidine (PEPCID) 20 MG tablet TAKE 2 TABLETS BY MOUTH EVERY DAY   • ferrous sulfate 325 (65 FE) MG tablet Take 325 mg by mouth Daily With Breakfast & Dinner.   • fluorometholone (FML) 0.1 % ophthalmic suspension INSTILL 1 DROP IN BOTH EYES FOUR (4) TIMES DAILY   • folic acid-vit B6-vit B12 (FOLBEE) 2.5-25-1 MG tablet tablet Take 1 tablet by mouth Daily.   • furosemide (LASIX) 40 MG tablet TAKE 1 TABLET BY MOUTH DAILY.   • levothyroxine (SYNTHROID, LEVOTHROID) 50 MCG tablet TAKE 1 TABLET BY MOUTH DAILY.   • lidocaine (LIDODERM) 5 % Place 1 patch on the skin as directed by provider Daily. Remove & Discard patch within 12 hours   • midodrine (PROAMATINE) 10 MG tablet Take 10 mg by mouth 2 (Two) Times a Day.   • montelukast (SINGULAIR) 10 MG tablet TAKE 1 TABLET BY MOUTH EVERY NIGHT.   • nitroglycerin (NITRODUR) 0.2 MG/HR patch PLACE 1 PATCH ON THE SKIN AS DIRECTED BY PROVIDER DAILY.   • polyethylene glycol (MIRALAX) powder MIX 1 CAPFUL (17G) IN 8 OUNCES OF LIQUID AND DRINK BY MOUTH EVERY DAY FOR CONSTIPATION   • ranolazine (RANEXA) 500 MG 12 hr tablet TAKE 1 TABLET BY MOUTH 2 (TWO) TIMES A DAY.   • sertraline (ZOLOFT) 50 MG tablet TAKE 1 TABLET BY MOUTH DAILY.   • traMADol (ULTRAM) 50 MG tablet Take 1 tablet by mouth Every 6 (Six) Hours As Needed for Moderate Pain .   • vitamin D (ERGOCALCIFEROL) 1.25 MG (27222 UT) capsule capsule TAKE 1 CAPSULE BY MOUTH EVERY 30 (THIRTY) DAYS.     No current  "facility-administered medications on file prior to visit.      She is allergic to ace inhibitors; baclofen; and lisinopril..    Review of Systems   Constitutional: Positive for malaise/fatigue. Negative for chills, diaphoresis and fever.   HENT: Negative.    Respiratory: Negative.    Cardiovascular: Positive for dyspnea on exertion.   Gastrointestinal: Negative.    Genitourinary: Negative.    Skin: Negative.    Psychiatric/Behavioral: Negative for confusion.       Objective    Visit Vitals  /72   Pulse 68   Ht 172.7 cm (68\")   Wt 63.5 kg (140 lb)   LMP  (LMP Unknown)   SpO2 98%   BMI 21.29 kg/m²       Physical Exam   Constitutional: She is oriented to person, place, and time. She appears well-developed and well-nourished. She has a sickly appearance. She appears distressed.   HENT:   Head: Normocephalic.   Right Ear: External ear normal.   Left Ear: External ear normal.   Eyes: Pupils are equal, round, and reactive to light. EOM are normal.   Neck: Normal range of motion. Neck supple.   Cardiovascular: Normal rate, regular rhythm and normal heart sounds.   Pulmonary/Chest: Accessory muscle usage present. Tachypnea noted. She has decreased breath sounds in the right lower field and the left lower field.   Abdominal: Soft. Bowel sounds are normal.   Musculoskeletal: Normal range of motion.   Neurological: She is alert and oriented to person, place, and time.   Skin: Skin is warm. Capillary refill takes less than 2 seconds.   Psychiatric: She has a normal mood and affect. Her behavior is normal.   Nursing note and vitals reviewed.      Assessment/Plan   Problems Addressed this Visit        Respiratory    COPD exacerbation (CMS/[facility]) - Primary    Relevant Medications    triamcinolone acetonide (KENALOG-40) injection 80 mg (Completed)    BREO ELLIPTA 200-25 MCG/INH inhaler    predniSONE (DELTASONE) 20 MG tablet        1.  COPD exacerbation:  Emphatically back to patient to let me try to direct admit her to the " hospital for acute COPD exacerbation.  She refuses at this time.  Thoroughly discussed with her and her daughter my concern for hypoxia and the effects hypoxia can have on other organ systems.  Ms. Arteaga continues to refuse hospitalization.  Again discussed at length my concerns of her remaining at home with this increasing shortness of breath but again declines hospitalization.  Strongly encouraged to begin use of Breo inhaler and prescription sent to pharmacy  Kenalog 80 mg given IM in office  DuoNeb nebulizer given in office and increased airflow to bilateral lower lungs post nebulization auscultated  Begin steroid burst pack of 60 mg p.o. x5 days as prescribed  Strongly encouraged to seek emergency medical treatment for any new or worsening shortness of breath, difficulty breathing or chest tightness    Continue on current medications as previously prescribed   Will follow-up on hypertension, high cholesterol and congestive heart failure at next appointment  I spent 25 minutes in direct face to face contact with patient.  Greater than 50% of this time was spent counseling patient and discussing plan of care.  Return in about 1 week (around 7/1/2020).        This document has been electronically signed by CRESENCIO Hall on June 26, 2020 07:37

## 2020-06-25 ENCOUNTER — TELEPHONE (OUTPATIENT)
Dept: FAMILY MEDICINE CLINIC | Facility: CLINIC | Age: 85
End: 2020-06-25

## 2020-06-25 RX ORDER — PREDNISONE 20 MG/1
TABLET ORAL
Qty: 15 TABLET | Refills: 0 | Status: SHIPPED | OUTPATIENT
Start: 2020-06-25 | End: 2020-08-28 | Stop reason: SDUPTHER

## 2020-06-26 PROBLEM — E78.00 HYPERCHOLESTEROLEMIA: Status: ACTIVE | Noted: 2020-06-26

## 2020-06-30 ENCOUNTER — EPISODE CHANGES (OUTPATIENT)
Dept: CASE MANAGEMENT | Facility: OTHER | Age: 85
End: 2020-06-30

## 2020-07-01 ENCOUNTER — OFFICE VISIT (OUTPATIENT)
Dept: FAMILY MEDICINE CLINIC | Facility: CLINIC | Age: 85
End: 2020-07-01

## 2020-07-01 VITALS
DIASTOLIC BLOOD PRESSURE: 78 MMHG | SYSTOLIC BLOOD PRESSURE: 148 MMHG | HEIGHT: 68 IN | WEIGHT: 140 LBS | OXYGEN SATURATION: 98 % | BODY MASS INDEX: 21.22 KG/M2 | HEART RATE: 67 BPM

## 2020-07-01 DIAGNOSIS — J42 CHRONIC BRONCHITIS, UNSPECIFIED CHRONIC BRONCHITIS TYPE (HCC): Primary | ICD-10-CM

## 2020-07-01 PROCEDURE — 99213 OFFICE O/P EST LOW 20 MIN: CPT | Performed by: NURSE PRACTITIONER

## 2020-07-01 RX ORDER — DOXYCYCLINE HYCLATE 100 MG/1
100 CAPSULE ORAL 2 TIMES DAILY
Qty: 20 CAPSULE | Refills: 0 | Status: SHIPPED | OUTPATIENT
Start: 2020-07-01 | End: 2020-07-29

## 2020-07-01 RX ORDER — IPRATROPIUM BROMIDE AND ALBUTEROL SULFATE 2.5; .5 MG/3ML; MG/3ML
SOLUTION RESPIRATORY (INHALATION)
COMMUNITY
Start: 2020-06-25 | End: 2020-07-30

## 2020-07-01 NOTE — PROGRESS NOTES
"Subjective   Val Arteaga is a 87 y.o. female.  Week follow-up for exacerbation of COPD.  \"I feel much better.  I am breathing a lot better.\"    COPD   She complains of shortness of breath. This is a chronic problem. The current episode started more than 1 year ago. The problem occurs constantly. The problem has been gradually worsening. Associated symptoms include dyspnea on exertion and malaise/fatigue. Pertinent negatives include no fever or sore throat. Her symptoms are aggravated by exertion. Her symptoms are alleviated by rest, beta-agonist and oral steroids. She reports moderate improvement on treatment. There are no known risk factors for lung disease. Her past medical history is significant for COPD.        The following portions of the patient's history were reviewed and updated as appropriate:   Current Outpatient Medications   Medication Sig Dispense Refill   • acetaminophen (TYLENOL) 325 MG tablet Take 2 tablets by mouth Every 4 (Four) Hours As Needed for Mild Pain . 1 tablet 1   • albuterol (PROVENTIL) (2.5 MG/3ML) 0.083% nebulizer solution Take 2.5 mg by nebulization Every 6 (Six) Hours As Needed for Wheezing. 90 vial 5   • atorvastatin (LIPITOR) 20 MG tablet TAKE 1 TABLET BY MOUTH DAILY. 90 tablet 1   • BREO ELLIPTA 200-25 MCG/INH inhaler Inhale 1 puff Daily. 1 each 5   • carvedilol (COREG) 6.25 MG tablet TAKE 1 TABLET BY MOUTH 2 (TWO) TIMES A DAY WITH MEALS. 180 tablet 1   • colchicine 0.6 MG tablet Take 1.2 mg by mouth x1 then take 0.6 mg by mouth 1 hour later x1 3 tablet 0   • docusate sodium (COLACE) 100 MG capsule Take 1 capsule by mouth 2 (Two) Times a Day. 60 capsule 0   • esomeprazole (nexIUM) 40 MG capsule TAKE 1 CAPSULE BY MOUTH EVERY MORNING 30 capsule 0   • famotidine (PEPCID) 20 MG tablet TAKE 2 TABLETS BY MOUTH EVERY  tablet 0   • ferrous sulfate 325 (65 FE) MG tablet Take 325 mg by mouth Daily With Breakfast & Dinner.     • fluorometholone (FML) 0.1 % ophthalmic suspension " INSTILL 1 DROP IN BOTH EYES FOUR (4) TIMES DAILY  0   • folic acid-vit B6-vit B12 (FOLBEE) 2.5-25-1 MG tablet tablet Take 1 tablet by mouth Daily. 30 tablet 5   • furosemide (LASIX) 40 MG tablet TAKE 1 TABLET BY MOUTH DAILY. 30 tablet 2   • ipratropium-albuterol (DUO-NEB) 0.5-2.5 mg/3 ml nebulizer USE 1 BY NEBULIZER EVERY 4 TO 6 HOURS AS NEEDED     • levothyroxine (SYNTHROID, LEVOTHROID) 50 MCG tablet TAKE 1 TABLET BY MOUTH DAILY. 90 tablet 0   • lidocaine (LIDODERM) 5 % Place 1 patch on the skin as directed by provider Daily. Remove & Discard patch within 12 hours 30 patch 2   • midodrine (PROAMATINE) 10 MG tablet Take 10 mg by mouth 2 (Two) Times a Day.  5   • montelukast (SINGULAIR) 10 MG tablet TAKE 1 TABLET BY MOUTH EVERY NIGHT. 90 tablet 2   • nitroglycerin (NITRODUR) 0.2 MG/HR patch PLACE 1 PATCH ON THE SKIN AS DIRECTED BY PROVIDER DAILY. 90 each 1   • polyethylene glycol (MIRALAX) powder MIX 1 CAPFUL (17G) IN 8 OUNCES OF LIQUID AND DRINK BY MOUTH EVERY DAY FOR CONSTIPATION 527 g 2   • predniSONE (DELTASONE) 20 MG tablet Take 3 tablets at the same time daily x5 days 15 tablet 0   • ranolazine (RANEXA) 500 MG 12 hr tablet TAKE 1 TABLET BY MOUTH 2 (TWO) TIMES A DAY. 60 tablet 1   • sertraline (ZOLOFT) 50 MG tablet TAKE 1 TABLET BY MOUTH DAILY. 90 tablet 1   • traMADol (ULTRAM) 50 MG tablet Take 1 tablet by mouth Every 6 (Six) Hours As Needed for Moderate Pain . 120 tablet 2   • vitamin D (ERGOCALCIFEROL) 1.25 MG (13868 UT) capsule capsule TAKE 1 CAPSULE BY MOUTH EVERY 30 (THIRTY) DAYS. 3 capsule 3   • doxycycline (VIBRAMYCIN) 100 MG capsule Take 1 capsule by mouth 2 (Two) Times a Day. 20 capsule 0     No current facility-administered medications for this visit.      Current Outpatient Medications on File Prior to Visit   Medication Sig   • acetaminophen (TYLENOL) 325 MG tablet Take 2 tablets by mouth Every 4 (Four) Hours As Needed for Mild Pain .   • albuterol (PROVENTIL) (2.5 MG/3ML) 0.083% nebulizer solution  Take 2.5 mg by nebulization Every 6 (Six) Hours As Needed for Wheezing.   • atorvastatin (LIPITOR) 20 MG tablet TAKE 1 TABLET BY MOUTH DAILY.   • BREO ELLIPTA 200-25 MCG/INH inhaler Inhale 1 puff Daily.   • carvedilol (COREG) 6.25 MG tablet TAKE 1 TABLET BY MOUTH 2 (TWO) TIMES A DAY WITH MEALS.   • colchicine 0.6 MG tablet Take 1.2 mg by mouth x1 then take 0.6 mg by mouth 1 hour later x1   • docusate sodium (COLACE) 100 MG capsule Take 1 capsule by mouth 2 (Two) Times a Day.   • esomeprazole (nexIUM) 40 MG capsule TAKE 1 CAPSULE BY MOUTH EVERY MORNING   • famotidine (PEPCID) 20 MG tablet TAKE 2 TABLETS BY MOUTH EVERY DAY   • ferrous sulfate 325 (65 FE) MG tablet Take 325 mg by mouth Daily With Breakfast & Dinner.   • fluorometholone (FML) 0.1 % ophthalmic suspension INSTILL 1 DROP IN BOTH EYES FOUR (4) TIMES DAILY   • folic acid-vit B6-vit B12 (FOLBEE) 2.5-25-1 MG tablet tablet Take 1 tablet by mouth Daily.   • furosemide (LASIX) 40 MG tablet TAKE 1 TABLET BY MOUTH DAILY.   • ipratropium-albuterol (DUO-NEB) 0.5-2.5 mg/3 ml nebulizer USE 1 BY NEBULIZER EVERY 4 TO 6 HOURS AS NEEDED   • levothyroxine (SYNTHROID, LEVOTHROID) 50 MCG tablet TAKE 1 TABLET BY MOUTH DAILY.   • lidocaine (LIDODERM) 5 % Place 1 patch on the skin as directed by provider Daily. Remove & Discard patch within 12 hours   • midodrine (PROAMATINE) 10 MG tablet Take 10 mg by mouth 2 (Two) Times a Day.   • montelukast (SINGULAIR) 10 MG tablet TAKE 1 TABLET BY MOUTH EVERY NIGHT.   • nitroglycerin (NITRODUR) 0.2 MG/HR patch PLACE 1 PATCH ON THE SKIN AS DIRECTED BY PROVIDER DAILY.   • polyethylene glycol (MIRALAX) powder MIX 1 CAPFUL (17G) IN 8 OUNCES OF LIQUID AND DRINK BY MOUTH EVERY DAY FOR CONSTIPATION   • predniSONE (DELTASONE) 20 MG tablet Take 3 tablets at the same time daily x5 days   • ranolazine (RANEXA) 500 MG 12 hr tablet TAKE 1 TABLET BY MOUTH 2 (TWO) TIMES A DAY.   • sertraline (ZOLOFT) 50 MG tablet TAKE 1 TABLET BY MOUTH DAILY.   • traMADol  "(ULTRAM) 50 MG tablet Take 1 tablet by mouth Every 6 (Six) Hours As Needed for Moderate Pain .   • vitamin D (ERGOCALCIFEROL) 1.25 MG (06184 UT) capsule capsule TAKE 1 CAPSULE BY MOUTH EVERY 30 (THIRTY) DAYS.     No current facility-administered medications on file prior to visit.      She is allergic to ace inhibitors; baclofen; and lisinopril..    Review of Systems   Constitutional: Positive for fatigue and malaise/fatigue. Negative for chills, diaphoresis and fever.   HENT: Negative.  Negative for sore throat.    Eyes: Negative.    Respiratory: Positive for shortness of breath.    Cardiovascular: Positive for dyspnea on exertion.   Gastrointestinal: Negative.    Genitourinary: Negative.    Musculoskeletal: Positive for gait problem.   Skin: Negative.    Neurological: Positive for weakness.   Psychiatric/Behavioral: Negative.  Negative for confusion.       Objective    Visit Vitals  /78   Pulse 67   Ht 172.7 cm (68\")   Wt 63.5 kg (140 lb)   LMP  (LMP Unknown)   SpO2 98%   BMI 21.29 kg/m²       Physical Exam   Constitutional: She is oriented to person, place, and time. She appears well-developed and well-nourished. She appears ill.   HENT:   Head: Normocephalic.   Right Ear: External ear normal.   Left Ear: External ear normal.   Eyes: Pupils are equal, round, and reactive to light. EOM are normal.   Neck: Normal range of motion. Neck supple.   Cardiovascular: Normal rate, regular rhythm and normal heart sounds.   Pulmonary/Chest: Effort normal. Tachypnea noted. She has decreased breath sounds in the right lower field and the left lower field.   Oxygen at 3 L per nasal cannula   Abdominal: Soft. Bowel sounds are normal.   Musculoskeletal: Normal range of motion.        Neurological: She is alert and oriented to person, place, and time.   Skin: Skin is warm. Capillary refill takes less than 2 seconds.   Psychiatric: She has a normal mood and affect. Her behavior is normal.   Nursing note and vitals " reviewed.      Assessment/Plan   Problems Addressed this Visit        Respiratory    Chronic obstructive pulmonary disease (CMS/HCC) - Primary    Relevant Medications    ipratropium-albuterol (DUO-NEB) 0.5-2.5 mg/3 ml nebulizer    doxycycline (VIBRAMYCIN) 100 MG capsule        New Medications Ordered This Visit   Medications   • doxycycline (VIBRAMYCIN) 100 MG capsule     Sig: Take 1 capsule by mouth 2 (Two) Times a Day.     Dispense:  20 capsule     Refill:  0     1.  Chronic obstructive pulmonary disease:  Begin doxycycline as prescribed  Educated on importance of completing full dose of antibiotic therapy  Abram at length how doxycycline will help upper respiratory infection as well as ulceration on her toe  Encouraged to continue on albuterol and Atrovent nebulizers as previously prescribed at last visit  Continue oxygen at 2 to 3 L per nasal cannula on continuous basis  Seek emergency medical treatment for any new or worsening shortness of breath, chest tightness or difficulty breathing    Continue on current medications as previously prescribed   I spent 22 minutes in direct face to face contact with patient.  Greater than 50% of this time was spent counseling patient and discussing plan of care.  Return in about 4 weeks (around 7/29/2020) for Recheck.        This document has been electronically signed by CRESENCIO Hall on July 2, 2020 12:54

## 2020-07-10 RX ORDER — ESOMEPRAZOLE MAGNESIUM 40 MG/1
CAPSULE, DELAYED RELEASE ORAL
Qty: 30 CAPSULE | Refills: 3 | Status: SHIPPED | OUTPATIENT
Start: 2020-07-10 | End: 2021-01-01

## 2020-07-15 ENCOUNTER — TELEPHONE (OUTPATIENT)
Dept: FAMILY MEDICINE CLINIC | Facility: CLINIC | Age: 85
End: 2020-07-15

## 2020-07-15 NOTE — TELEPHONE ENCOUNTER
Val called and said she has a night sitter and she has been around her brother that was exposed to Covid19.  Her brother has not been tested but his friend did test positive.  She is wanting Vero to call her, she said her family is not wanting the sitter to come to her house and she wants to know what she should do.  [phone]

## 2020-07-29 ENCOUNTER — LAB (OUTPATIENT)
Dept: LAB | Facility: HOSPITAL | Age: 85
End: 2020-07-29

## 2020-07-29 ENCOUNTER — OFFICE VISIT (OUTPATIENT)
Dept: FAMILY MEDICINE CLINIC | Facility: CLINIC | Age: 85
End: 2020-07-29

## 2020-07-29 VITALS
SYSTOLIC BLOOD PRESSURE: 160 MMHG | BODY MASS INDEX: 21.22 KG/M2 | HEART RATE: 76 BPM | WEIGHT: 140 LBS | HEIGHT: 68 IN | OXYGEN SATURATION: 91 % | DIASTOLIC BLOOD PRESSURE: 78 MMHG

## 2020-07-29 DIAGNOSIS — E03.9 ACQUIRED HYPOTHYROIDISM: ICD-10-CM

## 2020-07-29 DIAGNOSIS — E78.00 HYPERCHOLESTEROLEMIA: ICD-10-CM

## 2020-07-29 DIAGNOSIS — Z13.220 SCREENING FOR HYPERCHOLESTEROLEMIA: ICD-10-CM

## 2020-07-29 DIAGNOSIS — S91.109D OPEN WOUND OF TOE, SUBSEQUENT ENCOUNTER: ICD-10-CM

## 2020-07-29 DIAGNOSIS — J42 CHRONIC BRONCHITIS, UNSPECIFIED CHRONIC BRONCHITIS TYPE (HCC): ICD-10-CM

## 2020-07-29 DIAGNOSIS — I10 ESSENTIAL HYPERTENSION: ICD-10-CM

## 2020-07-29 DIAGNOSIS — I10 ESSENTIAL HYPERTENSION: Primary | ICD-10-CM

## 2020-07-29 LAB
ALBUMIN SERPL-MCNC: 4.1 G/DL (ref 3.5–5.2)
ALBUMIN/GLOB SERPL: 1.6 G/DL
ALP SERPL-CCNC: 57 U/L (ref 39–117)
ALT SERPL W P-5'-P-CCNC: 14 U/L (ref 1–33)
ANION GAP SERPL CALCULATED.3IONS-SCNC: 7.9 MMOL/L (ref 5–15)
AST SERPL-CCNC: 15 U/L (ref 1–32)
BASOPHILS # BLD AUTO: 0.05 10*3/MM3 (ref 0–0.2)
BASOPHILS NFR BLD AUTO: 0.5 % (ref 0–1.5)
BILIRUB SERPL-MCNC: 0.4 MG/DL (ref 0–1.2)
BUN SERPL-MCNC: 26 MG/DL (ref 8–23)
BUN/CREAT SERPL: 20.8 (ref 7–25)
CALCIUM SPEC-SCNC: 9.3 MG/DL (ref 8.6–10.5)
CHLORIDE SERPL-SCNC: 94 MMOL/L (ref 98–107)
CHOLEST SERPL-MCNC: 307 MG/DL (ref 0–200)
CO2 SERPL-SCNC: 28.1 MMOL/L (ref 22–29)
CREAT SERPL-MCNC: 1.25 MG/DL (ref 0.57–1)
DEPRECATED RDW RBC AUTO: 44.4 FL (ref 37–54)
EOSINOPHIL # BLD AUTO: 0.24 10*3/MM3 (ref 0–0.4)
EOSINOPHIL NFR BLD AUTO: 2.6 % (ref 0.3–6.2)
ERYTHROCYTE [DISTWIDTH] IN BLOOD BY AUTOMATED COUNT: 12.8 % (ref 12.3–15.4)
GFR SERPL CREATININE-BSD FRML MDRD: 41 ML/MIN/1.73
GLOBULIN UR ELPH-MCNC: 2.5 GM/DL
GLUCOSE SERPL-MCNC: 86 MG/DL (ref 65–99)
HCT VFR BLD AUTO: 34 % (ref 34–46.6)
HDLC SERPL-MCNC: 65 MG/DL (ref 40–60)
HGB BLD-MCNC: 11.9 G/DL (ref 12–15.9)
IMM GRANULOCYTES # BLD AUTO: 0.07 10*3/MM3 (ref 0–0.05)
IMM GRANULOCYTES NFR BLD AUTO: 0.8 % (ref 0–0.5)
LDLC SERPL CALC-MCNC: 200 MG/DL (ref 0–100)
LDLC/HDLC SERPL: 3.08 {RATIO}
LYMPHOCYTES # BLD AUTO: 2.2 10*3/MM3 (ref 0.7–3.1)
LYMPHOCYTES NFR BLD AUTO: 23.7 % (ref 19.6–45.3)
MCH RBC QN AUTO: 33.2 PG (ref 26.6–33)
MCHC RBC AUTO-ENTMCNC: 35 G/DL (ref 31.5–35.7)
MCV RBC AUTO: 95 FL (ref 79–97)
MONOCYTES # BLD AUTO: 0.55 10*3/MM3 (ref 0.1–0.9)
MONOCYTES NFR BLD AUTO: 5.9 % (ref 5–12)
NEUTROPHILS NFR BLD AUTO: 6.19 10*3/MM3 (ref 1.7–7)
NEUTROPHILS NFR BLD AUTO: 66.5 % (ref 42.7–76)
NRBC BLD AUTO-RTO: 0 /100 WBC (ref 0–0.2)
PLATELET # BLD AUTO: 218 10*3/MM3 (ref 140–450)
PMV BLD AUTO: 10.7 FL (ref 6–12)
POTASSIUM SERPL-SCNC: 4.8 MMOL/L (ref 3.5–5.2)
PROT SERPL-MCNC: 6.6 G/DL (ref 6–8.5)
RBC # BLD AUTO: 3.58 10*6/MM3 (ref 3.77–5.28)
SODIUM SERPL-SCNC: 130 MMOL/L (ref 136–145)
TRIGL SERPL-MCNC: 210 MG/DL (ref 0–150)
TSH SERPL DL<=0.05 MIU/L-ACNC: 3.89 UIU/ML (ref 0.27–4.2)
VLDLC SERPL-MCNC: 42 MG/DL (ref 5–40)
WBC # BLD AUTO: 9.3 10*3/MM3 (ref 3.4–10.8)

## 2020-07-29 PROCEDURE — 80053 COMPREHEN METABOLIC PANEL: CPT

## 2020-07-29 PROCEDURE — 80061 LIPID PANEL: CPT

## 2020-07-29 PROCEDURE — 85025 COMPLETE CBC W/AUTO DIFF WBC: CPT

## 2020-07-29 PROCEDURE — 84443 ASSAY THYROID STIM HORMONE: CPT

## 2020-07-29 PROCEDURE — 99214 OFFICE O/P EST MOD 30 MIN: CPT | Performed by: NURSE PRACTITIONER

## 2020-07-29 NOTE — PROGRESS NOTES
Subjective   Val Arteaga is a 87 y.o. female.  Three month follow-up high blood pressure, high cholesterol, COPD and hypothyroidism.    COPD   She complains of shortness of breath. There is no cough. This is a chronic problem. The current episode started more than 1 year ago. The problem occurs constantly. The problem has been unchanged. Associated symptoms include dyspnea on exertion and malaise/fatigue. Pertinent negatives include no fever or sore throat. Her symptoms are aggravated by exertion. Her symptoms are alleviated by rest, beta-agonist and oral steroids. She reports moderate improvement on treatment. There are no known risk factors for lung disease. Her past medical history is significant for COPD.   Hypertension   This is a chronic problem. The current episode started more than 1 year ago. The problem is unchanged. The problem is controlled. Associated symptoms include malaise/fatigue and shortness of breath. Agents associated with hypertension include thyroid hormones. Risk factors for coronary artery disease include dyslipidemia, post-menopausal state and sedentary lifestyle. Current antihypertension treatment includes lifestyle changes. The current treatment provides moderate improvement. There are no compliance problems.    Hypothyroidism   This is a chronic problem. The current episode started more than 1 year ago. The problem occurs constantly. The problem has been unchanged. Associated symptoms include fatigue and weakness. Pertinent negatives include no chills, coughing, diaphoresis, fever or sore throat. Nothing aggravates the symptoms. Treatments tried: Synthroid. The treatment provided significant relief.   Hyperlipidemia   This is a chronic problem. The current episode started more than 1 year ago. The problem is controlled. Exacerbating diseases include hypothyroidism. Factors aggravating her hyperlipidemia include thiazides. Associated symptoms include shortness of breath. The current  treatment provides moderate improvement of lipids. There are no compliance problems.  Risk factors for coronary artery disease include post-menopausal, hypertension, family history and dyslipidemia.        The following portions of the patient's history were reviewed and updated as appropriate:     Current Outpatient Medications   Medication Sig Dispense Refill   • acetaminophen (TYLENOL) 325 MG tablet Take 2 tablets by mouth Every 4 (Four) Hours As Needed for Mild Pain . 1 tablet 1   • albuterol (PROVENTIL) (2.5 MG/3ML) 0.083% nebulizer solution Take 2.5 mg by nebulization Every 6 (Six) Hours As Needed for Wheezing. 90 vial 5   • atorvastatin (LIPITOR) 20 MG tablet TAKE 1 TABLET BY MOUTH DAILY. 90 tablet 1   • BREO ELLIPTA 200-25 MCG/INH inhaler Inhale 1 puff Daily. 1 each 5   • carvedilol (COREG) 6.25 MG tablet TAKE 1 TABLET BY MOUTH 2 (TWO) TIMES A DAY WITH MEALS. 180 tablet 1   • colchicine 0.6 MG tablet Take 1.2 mg by mouth x1 then take 0.6 mg by mouth 1 hour later x1 3 tablet 0   • docusate sodium (COLACE) 100 MG capsule Take 1 capsule by mouth 2 (Two) Times a Day. 60 capsule 0   • esomeprazole (nexIUM) 40 MG capsule TAKE 1 CAPSULE BY MOUTH EVERY MORNING 30 capsule 3   • famotidine (PEPCID) 20 MG tablet TAKE 2 TABLETS BY MOUTH EVERY  tablet 0   • ferrous sulfate 325 (65 FE) MG tablet Take 325 mg by mouth Daily With Breakfast & Dinner.     • fluorometholone (FML) 0.1 % ophthalmic suspension INSTILL 1 DROP IN BOTH EYES FOUR (4) TIMES DAILY  0   • folic acid-vit B6-vit B12 (FOLBEE) 2.5-25-1 MG tablet tablet Take 1 tablet by mouth Daily. 30 tablet 5   • furosemide (LASIX) 40 MG tablet TAKE 1 TABLET BY MOUTH DAILY. 30 tablet 2   • ipratropium-albuterol (DUO-NEB) 0.5-2.5 mg/3 ml nebulizer USE 1 BY NEBULIZER EVERY 4 TO 6 HOURS AS NEEDED     • levothyroxine (SYNTHROID, LEVOTHROID) 50 MCG tablet TAKE 1 TABLET BY MOUTH DAILY. 90 tablet 0   • lidocaine (LIDODERM) 5 % Place 1 patch on the skin as directed by  provider Daily. Remove & Discard patch within 12 hours 30 patch 2   • midodrine (PROAMATINE) 10 MG tablet Take 10 mg by mouth 2 (Two) Times a Day.  5   • montelukast (SINGULAIR) 10 MG tablet TAKE 1 TABLET BY MOUTH EVERY NIGHT. 90 tablet 2   • nitroglycerin (NITRODUR) 0.2 MG/HR patch PLACE 1 PATCH ON THE SKIN AS DIRECTED BY PROVIDER DAILY. 90 each 1   • polyethylene glycol (MIRALAX) powder MIX 1 CAPFUL (17G) IN 8 OUNCES OF LIQUID AND DRINK BY MOUTH EVERY DAY FOR CONSTIPATION 527 g 2   • ranolazine (RANEXA) 500 MG 12 hr tablet TAKE 1 TABLET BY MOUTH 2 (TWO) TIMES A DAY. 60 tablet 1   • sertraline (ZOLOFT) 50 MG tablet TAKE 1 TABLET BY MOUTH DAILY. 90 tablet 1   • traMADol (ULTRAM) 50 MG tablet Take 1 tablet by mouth Every 6 (Six) Hours As Needed for Moderate Pain . 120 tablet 2   • vitamin D (ERGOCALCIFEROL) 1.25 MG (02869 UT) capsule capsule TAKE 1 CAPSULE BY MOUTH EVERY 30 (THIRTY) DAYS. 3 capsule 3   • predniSONE (DELTASONE) 20 MG tablet Take 3 tablets at the same time daily x5 days 15 tablet 0     No current facility-administered medications for this visit.      Current Outpatient Medications on File Prior to Visit   Medication Sig   • acetaminophen (TYLENOL) 325 MG tablet Take 2 tablets by mouth Every 4 (Four) Hours As Needed for Mild Pain .   • albuterol (PROVENTIL) (2.5 MG/3ML) 0.083% nebulizer solution Take 2.5 mg by nebulization Every 6 (Six) Hours As Needed for Wheezing.   • atorvastatin (LIPITOR) 20 MG tablet TAKE 1 TABLET BY MOUTH DAILY.   • BREO ELLIPTA 200-25 MCG/INH inhaler Inhale 1 puff Daily.   • carvedilol (COREG) 6.25 MG tablet TAKE 1 TABLET BY MOUTH 2 (TWO) TIMES A DAY WITH MEALS.   • colchicine 0.6 MG tablet Take 1.2 mg by mouth x1 then take 0.6 mg by mouth 1 hour later x1   • docusate sodium (COLACE) 100 MG capsule Take 1 capsule by mouth 2 (Two) Times a Day.   • esomeprazole (nexIUM) 40 MG capsule TAKE 1 CAPSULE BY MOUTH EVERY MORNING   • famotidine (PEPCID) 20 MG tablet TAKE 2 TABLETS BY MOUTH  EVERY DAY   • ferrous sulfate 325 (65 FE) MG tablet Take 325 mg by mouth Daily With Breakfast & Dinner.   • fluorometholone (FML) 0.1 % ophthalmic suspension INSTILL 1 DROP IN BOTH EYES FOUR (4) TIMES DAILY   • folic acid-vit B6-vit B12 (FOLBEE) 2.5-25-1 MG tablet tablet Take 1 tablet by mouth Daily.   • furosemide (LASIX) 40 MG tablet TAKE 1 TABLET BY MOUTH DAILY.   • ipratropium-albuterol (DUO-NEB) 0.5-2.5 mg/3 ml nebulizer USE 1 BY NEBULIZER EVERY 4 TO 6 HOURS AS NEEDED   • levothyroxine (SYNTHROID, LEVOTHROID) 50 MCG tablet TAKE 1 TABLET BY MOUTH DAILY.   • lidocaine (LIDODERM) 5 % Place 1 patch on the skin as directed by provider Daily. Remove & Discard patch within 12 hours   • midodrine (PROAMATINE) 10 MG tablet Take 10 mg by mouth 2 (Two) Times a Day.   • montelukast (SINGULAIR) 10 MG tablet TAKE 1 TABLET BY MOUTH EVERY NIGHT.   • nitroglycerin (NITRODUR) 0.2 MG/HR patch PLACE 1 PATCH ON THE SKIN AS DIRECTED BY PROVIDER DAILY.   • polyethylene glycol (MIRALAX) powder MIX 1 CAPFUL (17G) IN 8 OUNCES OF LIQUID AND DRINK BY MOUTH EVERY DAY FOR CONSTIPATION   • ranolazine (RANEXA) 500 MG 12 hr tablet TAKE 1 TABLET BY MOUTH 2 (TWO) TIMES A DAY.   • sertraline (ZOLOFT) 50 MG tablet TAKE 1 TABLET BY MOUTH DAILY.   • traMADol (ULTRAM) 50 MG tablet Take 1 tablet by mouth Every 6 (Six) Hours As Needed for Moderate Pain .   • vitamin D (ERGOCALCIFEROL) 1.25 MG (28536 UT) capsule capsule TAKE 1 CAPSULE BY MOUTH EVERY 30 (THIRTY) DAYS.   • [DISCONTINUED] doxycycline (VIBRAMYCIN) 100 MG capsule Take 1 capsule by mouth 2 (Two) Times a Day.   • predniSONE (DELTASONE) 20 MG tablet Take 3 tablets at the same time daily x5 days     No current facility-administered medications on file prior to visit.      She is allergic to ace inhibitors; baclofen; and lisinopril..    Review of Systems   Constitutional: Positive for fatigue and malaise/fatigue. Negative for chills, diaphoresis and fever.   HENT: Negative.  Negative for sore  "throat.    Eyes: Negative.    Respiratory: Positive for shortness of breath. Negative for cough.    Cardiovascular: Positive for dyspnea on exertion and leg swelling.   Gastrointestinal: Negative.    Genitourinary: Negative.    Musculoskeletal: Positive for gait problem.   Skin: Positive for wound.   Neurological: Positive for weakness.   Psychiatric/Behavioral: Negative for confusion.       Objective    Visit Vitals  /78   Pulse 76   Ht 172.7 cm (68\")   Wt 63.5 kg (140 lb)   LMP  (LMP Unknown)   SpO2 91%   BMI 21.29 kg/m²       Physical Exam   Constitutional: She is oriented to person, place, and time. She appears well-developed and well-nourished.   HENT:   Head: Normocephalic.   Right Ear: External ear normal.   Left Ear: External ear normal.   Eyes: Pupils are equal, round, and reactive to light. EOM are normal.   Neck: Normal range of motion. Neck supple.   Cardiovascular: Normal rate, regular rhythm and normal heart sounds.   2+ pitting edema to right foot, ankle and lower leg, 1+ pitting edema to left lower leg, ankle and foot   Pulmonary/Chest: Effort normal and breath sounds normal.   Abdominal: Soft. Bowel sounds are normal.   Musculoskeletal: Normal range of motion.     Skin Integrity  -  She has right foot onychomycosis and right foot ingrown toenail.She has left foot onychomycosis and left foot ingrown toenail..     Neurological: She is alert and oriented to person, place, and time.   Skin: Skin is warm. Capillary refill takes less than 2 seconds.   White-colored ulceration of unknown depth with pungent odor between fourth and fifth digit on right foot   Psychiatric: She has a normal mood and affect. Her behavior is normal.   Nursing note and vitals reviewed.      Assessment/Plan   Problems Addressed this Visit        Cardiovascular and Mediastinum    Essential hypertension - Primary    Relevant Orders    CBC & Differential    Comprehensive Metabolic Panel    Hypercholesterolemia    Relevant " Orders    Lipid panel       Respiratory    Chronic obstructive pulmonary disease (CMS/[facility])       Endocrine    Acquired hypothyroidism    Relevant Orders    TSH      Other Visit Diagnoses     Open wound of toe, subsequent encounter        Relevant Orders    Ambulatory Referral to Wound Clinic      No orders of the defined types were placed in this encounter.    1.  Essential hypertension:  Complete CBC and chemistry panel as ordered and will notify results when available  Restrict sodium intake to no more than 2000 mg/day  Encouraged avoid high sodium content foods such as canned soups and vegetables, processed meats and microwavable meals    2.  Hypercholesterolemia:  Complete fasting lipid panel as ordered and will notify results when available  Continue on Lipitor as previously prescribed  Encouraged adhere to low-fat diet  Encourage lean meat such as chicken and fish as opposed to fatty red meat    3.  Acquired hypothyroidism:  Complete TSH as ordered and will notify results when available  Continue on Synthroid as previously prescribed    4.  Chronic obstructive pulmonary disease:  Continue on albuterol inhaler and nebulizer as previously prescribed  Continue on Breo as prescribed  Educated on possible side effects of this medication including but not limited to increased risk for oral candidiasis and bronchospasm  Courage use of compression stockings daily for minimum of 8 hours help reduce edema  Encouraged to seek emergency medical treatment for any new or worsening shortness of breath, difficulty breathing or chest tightness    5.  Open wound of toe, subsequent encounter:  Referral placed to wound clinic and will call to schedule appointment  Encouraged to seek emergency medical treatment for any new or worsening ulceration, redness, streaking or warmth as this could indicate worsening infection    Continue on current medications as previously prescribed   Offered physical therapy services but declines at  this time stating Dr. Rogel has therapy coming to her house  I spent 23 minutes in direct face to face contact with patient.  Greater than 50% of this time was spent counseling patient and discussing plan of care.  Return in about 3 months (around 10/29/2020).        This document has been electronically signed by CRESENCIO Hall on July 29, 2020 13:08

## 2020-07-30 RX ORDER — IPRATROPIUM BROMIDE AND ALBUTEROL SULFATE 2.5; .5 MG/3ML; MG/3ML
SOLUTION RESPIRATORY (INHALATION)
Qty: 360 ML | Refills: 0 | Status: SHIPPED | OUTPATIENT
Start: 2020-07-30 | End: 2020-10-15

## 2020-07-30 RX ORDER — RANOLAZINE 500 MG/1
500 TABLET, EXTENDED RELEASE ORAL 2 TIMES DAILY
Qty: 60 TABLET | Refills: 1 | Status: SHIPPED | OUTPATIENT
Start: 2020-07-30 | End: 2021-01-01

## 2020-08-04 ENCOUNTER — OUTSIDE FACILITY SERVICE (OUTPATIENT)
Dept: PODIATRY | Facility: CLINIC | Age: 85
End: 2020-08-04

## 2020-08-04 ENCOUNTER — OFFICE VISIT (OUTPATIENT)
Dept: WOUND CARE | Facility: HOSPITAL | Age: 85
End: 2020-08-04

## 2020-08-04 PROCEDURE — 99212 OFFICE O/P EST SF 10 MIN: CPT | Performed by: PODIATRIST

## 2020-08-04 PROCEDURE — G0463 HOSPITAL OUTPT CLINIC VISIT: HCPCS

## 2020-08-07 ENCOUNTER — TELEPHONE (OUTPATIENT)
Dept: FAMILY MEDICINE CLINIC | Facility: CLINIC | Age: 85
End: 2020-08-07

## 2020-08-07 DIAGNOSIS — N18.9 CHRONIC KIDNEY DISEASE, UNSPECIFIED CKD STAGE: Primary | ICD-10-CM

## 2020-08-07 NOTE — PROGRESS NOTES
Per CRESENCIO Pineda Ms. Arteaga has been called with recent lab results & recommendations.  Continue current medications and follow-up as planned or sooner if any problems.

## 2020-08-07 NOTE — TELEPHONE ENCOUNTER
-Per CRESENCIO Pineda Ms. Arteaga has been called with recent lab results & recommendations.  Continue current medications and follow-up as planned or sooner if any problems.      ---- Message from CRESENCIO Hall sent at 8/7/2020  3:15 PM CDT -----  Blood count is still just a little low but has improved and is now up to 11.9.  Potassium, liver function and thyroid were within normal limits.  Kidney functions have continued to decline and sodium is low.  I want her to increase the salt in her diet and I would like to repeat her kidney functions in 2 weeks.  Orders in the computer and she does not need to be fasting.  Can you please ask if she is ever seen a nephrologist in the past?

## 2020-08-10 ENCOUNTER — LAB (OUTPATIENT)
Dept: LAB | Facility: HOSPITAL | Age: 85
End: 2020-08-10

## 2020-08-10 DIAGNOSIS — N18.9 CHRONIC KIDNEY DISEASE, UNSPECIFIED CKD STAGE: ICD-10-CM

## 2020-08-10 LAB
ALBUMIN SERPL-MCNC: 3.7 G/DL (ref 3.5–5.2)
ANION GAP SERPL CALCULATED.3IONS-SCNC: 8 MMOL/L (ref 5–15)
BUN SERPL-MCNC: 25 MG/DL (ref 8–23)
BUN/CREAT SERPL: 20.8 (ref 7–25)
CALCIUM SPEC-SCNC: 9.1 MG/DL (ref 8.6–10.5)
CHLORIDE SERPL-SCNC: 95 MMOL/L (ref 98–107)
CO2 SERPL-SCNC: 26 MMOL/L (ref 22–29)
CREAT SERPL-MCNC: 1.2 MG/DL (ref 0.57–1)
GFR SERPL CREATININE-BSD FRML MDRD: 42 ML/MIN/1.73
GLUCOSE SERPL-MCNC: 92 MG/DL (ref 65–99)
PHOSPHATE SERPL-MCNC: 3.6 MG/DL (ref 2.5–4.5)
POTASSIUM SERPL-SCNC: 4.2 MMOL/L (ref 3.5–5.2)
SODIUM SERPL-SCNC: 129 MMOL/L (ref 136–145)

## 2020-08-10 PROCEDURE — 80069 RENAL FUNCTION PANEL: CPT

## 2020-08-11 ENCOUNTER — TELEPHONE (OUTPATIENT)
Dept: FAMILY MEDICINE CLINIC | Facility: CLINIC | Age: 85
End: 2020-08-11

## 2020-08-11 DIAGNOSIS — R94.4 DECREASED GFR: Primary | ICD-10-CM

## 2020-08-11 DIAGNOSIS — E87.1 HYPONATREMIA: ICD-10-CM

## 2020-08-11 DIAGNOSIS — R34 DECREASED URINE OUTPUT: ICD-10-CM

## 2020-08-11 RX ORDER — SODIUM BICARBONATE 650 MG/1
650 TABLET ORAL 2 TIMES DAILY
Qty: 60 TABLET | Refills: 3 | Status: SHIPPED | OUTPATIENT
Start: 2020-08-11

## 2020-08-11 NOTE — TELEPHONE ENCOUNTER
Per CRESENCIO Pineda, Ms. Arteaga's family has been called with recent lab results & recommendations.  Continue current medications and follow-up as planned or sooner if any problems.    She states she has very little urine output.  She states its usually more in the morning and less throughout the day.  She said she urinates maybe 2-3 times per day.   She said she drinks about 2-3 glasses of water a day along with her other fluids, coffee, juice and occasional soft drink  She has never seen a nephrologist.      ----- Message from CRESENCIO Hall sent at 8/11/2020  6:48 AM CDT -----  BUN and creatinine remain elevated and GFR still low as well as her sodium.  This could represent renal failure versus chronic kidney disease.  Can you please ask her how her urine output has been?  Is she drinking much water?  Has she ever seen a nephrologist?

## 2020-08-11 NOTE — PROGRESS NOTES
Per CRESENCIO Pineda, Ms. Arteaga's family has been called with new recommendations recommendations.  Continue current medications and follow-up as planned or sooner if any problems.

## 2020-08-11 NOTE — PROGRESS NOTES
Per CRESENCIO Pineda, Ms. Arteaga's family has been called with recent lab results & recommendations.  Continue current medications and follow-up as planned or sooner if any problems.    She states she has very little urine output.  She states its usually more in the morning and less throughout the day.  She said she urinates maybe 2-3 times per day.   She said she drinks about 2-3 glasses of water a day along with her other fluids, coffee, juice and occasional soft drink  She has never seen a nephrologist.

## 2020-08-27 ENCOUNTER — TELEPHONE (OUTPATIENT)
Dept: FAMILY MEDICINE CLINIC | Facility: CLINIC | Age: 85
End: 2020-08-27

## 2020-08-27 NOTE — TELEPHONE ENCOUNTER
Patient fantasma Mendoza called and said that patient is still having an issue with her wheezing and coughing. Patient wanted to request for Rx to be called in for azithromycin (ZITHROMAX) tablet 250 mg and predniSONE (DELTASONE) 20 MG tablet.     Patient said that if she wouldn't be able to get both Rx's called in she would prefer to have Rx for azithromycin (ZITHROMAX) tablet 250 mg called in.     [facility] confirmed    Please contact patient and advise once Rx called in @ [phone].

## 2020-08-28 DIAGNOSIS — J44.1 COPD EXACERBATION (HCC): ICD-10-CM

## 2020-08-28 RX ORDER — PREDNISONE 20 MG/1
TABLET ORAL
Qty: 15 TABLET | Refills: 0 | Status: SHIPPED | OUTPATIENT
Start: 2020-08-28 | End: 2020-09-21

## 2020-08-28 RX ORDER — AZITHROMYCIN 250 MG/1
TABLET, FILM COATED ORAL
Qty: 6 TABLET | Refills: 0 | Status: SHIPPED | OUTPATIENT
Start: 2020-08-28 | End: 2020-09-21 | Stop reason: SDUPTHER

## 2020-09-10 ENCOUNTER — APPOINTMENT (OUTPATIENT)
Dept: GENERAL RADIOLOGY | Facility: HOSPITAL | Age: 85
End: 2020-09-10

## 2020-09-10 ENCOUNTER — HOSPITAL ENCOUNTER (EMERGENCY)
Facility: HOSPITAL | Age: 85
Discharge: LEFT BEFORE DISCHARGE | End: 2020-09-11
Attending: EMERGENCY MEDICINE | Admitting: INTERNAL MEDICINE

## 2020-09-10 ENCOUNTER — TELEPHONE (OUTPATIENT)
Dept: FAMILY MEDICINE CLINIC | Facility: CLINIC | Age: 85
End: 2020-09-10

## 2020-09-10 DIAGNOSIS — R06.02 SHORTNESS OF BREATH: ICD-10-CM

## 2020-09-10 DIAGNOSIS — R07.9 CHEST PAIN, UNSPECIFIED TYPE: Primary | ICD-10-CM

## 2020-09-10 PROBLEM — I50.33 ACUTE ON CHRONIC DIASTOLIC HEART FAILURE (HCC): Chronic | Status: ACTIVE | Noted: 2020-09-10

## 2020-09-10 PROBLEM — J44.1 COPD WITH ACUTE EXACERBATION (HCC): Chronic | Status: ACTIVE | Noted: 2019-03-20

## 2020-09-10 PROBLEM — J96.21 ACUTE ON CHRONIC RESPIRATORY FAILURE WITH HYPOXIA (HCC): Chronic | Status: ACTIVE | Noted: 2020-09-10

## 2020-09-10 PROBLEM — R06.00 DYSPNEA: Status: ACTIVE | Noted: 2019-08-14

## 2020-09-10 LAB
ALBUMIN SERPL-MCNC: 3.7 G/DL (ref 3.5–5.2)
ALBUMIN/GLOB SERPL: 1.4 G/DL
ALP SERPL-CCNC: 68 U/L (ref 39–117)
ALT SERPL W P-5'-P-CCNC: 21 U/L (ref 1–33)
ANION GAP SERPL CALCULATED.3IONS-SCNC: 14 MMOL/L (ref 5–15)
AST SERPL-CCNC: 20 U/L (ref 1–32)
BASOPHILS # BLD AUTO: 0.07 10*3/MM3 (ref 0–0.2)
BASOPHILS NFR BLD AUTO: 0.8 % (ref 0–1.5)
BILIRUB SERPL-MCNC: 0.3 MG/DL (ref 0–1.2)
BUN SERPL-MCNC: 21 MG/DL (ref 8–23)
BUN/CREAT SERPL: 19.1 (ref 7–25)
CALCIUM SPEC-SCNC: 8.7 MG/DL (ref 8.6–10.5)
CHLORIDE SERPL-SCNC: 96 MMOL/L (ref 98–107)
CO2 SERPL-SCNC: 26 MMOL/L (ref 22–29)
CREAT SERPL-MCNC: 1.1 MG/DL (ref 0.57–1)
DEPRECATED RDW RBC AUTO: 49.6 FL (ref 37–54)
EOSINOPHIL # BLD AUTO: 0.33 10*3/MM3 (ref 0–0.4)
EOSINOPHIL NFR BLD AUTO: 3.5 % (ref 0.3–6.2)
ERYTHROCYTE [DISTWIDTH] IN BLOOD BY AUTOMATED COUNT: 13.7 % (ref 12.3–15.4)
GFR SERPL CREATININE-BSD FRML MDRD: 47 ML/MIN/1.73
GLOBULIN UR ELPH-MCNC: 2.7 GM/DL
GLUCOSE SERPL-MCNC: 94 MG/DL (ref 65–99)
HCT VFR BLD AUTO: 35 % (ref 34–46.6)
HGB BLD-MCNC: 11.6 G/DL (ref 12–15.9)
IMM GRANULOCYTES # BLD AUTO: 0.06 10*3/MM3 (ref 0–0.05)
IMM GRANULOCYTES NFR BLD AUTO: 0.6 % (ref 0–0.5)
LYMPHOCYTES # BLD AUTO: 2.35 10*3/MM3 (ref 0.7–3.1)
LYMPHOCYTES NFR BLD AUTO: 25.3 % (ref 19.6–45.3)
MCH RBC QN AUTO: 32.6 PG (ref 26.6–33)
MCHC RBC AUTO-ENTMCNC: 33.1 G/DL (ref 31.5–35.7)
MCV RBC AUTO: 98.3 FL (ref 79–97)
MONOCYTES # BLD AUTO: 0.5 10*3/MM3 (ref 0.1–0.9)
MONOCYTES NFR BLD AUTO: 5.4 % (ref 5–12)
NEUTROPHILS NFR BLD AUTO: 5.99 10*3/MM3 (ref 1.7–7)
NEUTROPHILS NFR BLD AUTO: 64.4 % (ref 42.7–76)
NRBC BLD AUTO-RTO: 0 /100 WBC (ref 0–0.2)
NT-PROBNP SERPL-MCNC: 612.8 PG/ML (ref 0–1800)
PLATELET # BLD AUTO: 220 10*3/MM3 (ref 140–450)
PMV BLD AUTO: 9.5 FL (ref 6–12)
POTASSIUM SERPL-SCNC: 4.1 MMOL/L (ref 3.5–5.2)
PROT SERPL-MCNC: 6.4 G/DL (ref 6–8.5)
RBC # BLD AUTO: 3.56 10*6/MM3 (ref 3.77–5.28)
SODIUM SERPL-SCNC: 136 MMOL/L (ref 136–145)
TROPONIN T SERPL-MCNC: <0.01 NG/ML (ref 0–0.03)
TROPONIN T SERPL-MCNC: <0.01 NG/ML (ref 0–0.03)
WBC # BLD AUTO: 9.3 10*3/MM3 (ref 3.4–10.8)

## 2020-09-10 PROCEDURE — 25010000002 METHYLPREDNISOLONE PER 125 MG: Performed by: PHYSICIAN ASSISTANT

## 2020-09-10 PROCEDURE — 80053 COMPREHEN METABOLIC PANEL: CPT | Performed by: PHYSICIAN ASSISTANT

## 2020-09-10 PROCEDURE — 96375 TX/PRO/DX INJ NEW DRUG ADDON: CPT

## 2020-09-10 PROCEDURE — 93010 ELECTROCARDIOGRAM REPORT: CPT | Performed by: INTERNAL MEDICINE

## 2020-09-10 PROCEDURE — 71045 X-RAY EXAM CHEST 1 VIEW: CPT

## 2020-09-10 PROCEDURE — 96374 THER/PROPH/DIAG INJ IV PUSH: CPT

## 2020-09-10 PROCEDURE — 99283 EMERGENCY DEPT VISIT LOW MDM: CPT

## 2020-09-10 PROCEDURE — C9803 HOPD COVID-19 SPEC COLLECT: HCPCS | Performed by: PHYSICIAN ASSISTANT

## 2020-09-10 PROCEDURE — 99284 EMERGENCY DEPT VISIT MOD MDM: CPT

## 2020-09-10 PROCEDURE — 93005 ELECTROCARDIOGRAM TRACING: CPT | Performed by: PHYSICIAN ASSISTANT

## 2020-09-10 PROCEDURE — 84484 ASSAY OF TROPONIN QUANT: CPT | Performed by: PHYSICIAN ASSISTANT

## 2020-09-10 PROCEDURE — G0378 HOSPITAL OBSERVATION PER HR: HCPCS

## 2020-09-10 PROCEDURE — 85025 COMPLETE CBC W/AUTO DIFF WBC: CPT | Performed by: PHYSICIAN ASSISTANT

## 2020-09-10 PROCEDURE — 93005 ELECTROCARDIOGRAM TRACING: CPT | Performed by: INTERNAL MEDICINE

## 2020-09-10 PROCEDURE — 87635 SARS-COV-2 COVID-19 AMP PRB: CPT | Performed by: PHYSICIAN ASSISTANT

## 2020-09-10 PROCEDURE — 83880 ASSAY OF NATRIURETIC PEPTIDE: CPT | Performed by: PHYSICIAN ASSISTANT

## 2020-09-10 RX ORDER — ACETAMINOPHEN 325 MG/1
650 TABLET ORAL EVERY 4 HOURS PRN
Status: CANCELLED | OUTPATIENT
Start: 2020-09-10

## 2020-09-10 RX ORDER — SODIUM CHLORIDE 0.9 % (FLUSH) 0.9 %
10 SYRINGE (ML) INJECTION AS NEEDED
Status: CANCELLED | OUTPATIENT
Start: 2020-09-10

## 2020-09-10 RX ORDER — METHYLPREDNISOLONE SODIUM SUCCINATE 125 MG/2ML
40 INJECTION, POWDER, LYOPHILIZED, FOR SOLUTION INTRAMUSCULAR; INTRAVENOUS EVERY 6 HOURS
Status: CANCELLED | OUTPATIENT
Start: 2020-09-10

## 2020-09-10 RX ORDER — RANOLAZINE 500 MG/1
500 TABLET, EXTENDED RELEASE ORAL 2 TIMES DAILY
Status: CANCELLED | OUTPATIENT
Start: 2020-09-10

## 2020-09-10 RX ORDER — METHYLPREDNISOLONE SODIUM SUCCINATE 125 MG/2ML
125 INJECTION, POWDER, LYOPHILIZED, FOR SOLUTION INTRAMUSCULAR; INTRAVENOUS ONCE
Status: COMPLETED | OUTPATIENT
Start: 2020-09-10 | End: 2020-09-10

## 2020-09-10 RX ORDER — ATORVASTATIN CALCIUM 20 MG/1
20 TABLET, FILM COATED ORAL DAILY
Status: CANCELLED | OUTPATIENT
Start: 2020-09-10

## 2020-09-10 RX ORDER — CARVEDILOL 12.5 MG/1
12.5 TABLET ORAL 2 TIMES DAILY WITH MEALS
Status: CANCELLED | OUTPATIENT
Start: 2020-09-10

## 2020-09-10 RX ORDER — SODIUM CHLORIDE 0.9 % (FLUSH) 0.9 %
10 SYRINGE (ML) INJECTION EVERY 12 HOURS SCHEDULED
Status: CANCELLED | OUTPATIENT
Start: 2020-09-10

## 2020-09-10 RX ORDER — ENALAPRILAT 2.5 MG/2ML
1.25 INJECTION INTRAVENOUS EVERY 6 HOURS PRN
Status: DISCONTINUED | OUTPATIENT
Start: 2020-09-10 | End: 2020-09-11 | Stop reason: HOSPADM

## 2020-09-10 RX ORDER — SODIUM CHLORIDE 0.9 % (FLUSH) 0.9 %
10 SYRINGE (ML) INJECTION AS NEEDED
Status: DISCONTINUED | OUTPATIENT
Start: 2020-09-10 | End: 2020-09-11 | Stop reason: HOSPADM

## 2020-09-10 RX ORDER — NITROGLYCERIN 40 MG/1
1 PATCH TRANSDERMAL DAILY
Status: CANCELLED | OUTPATIENT
Start: 2020-09-10

## 2020-09-10 RX ORDER — AMLODIPINE BESYLATE 5 MG/1
5 TABLET ORAL
Status: CANCELLED | OUTPATIENT
Start: 2020-09-10

## 2020-09-10 RX ORDER — LEVOTHYROXINE SODIUM 0.05 MG/1
50 TABLET ORAL DAILY
Status: CANCELLED | OUTPATIENT
Start: 2020-09-10

## 2020-09-10 RX ORDER — LOSARTAN POTASSIUM 50 MG/1
50 TABLET ORAL
Status: CANCELLED | OUTPATIENT
Start: 2020-09-10

## 2020-09-10 RX ORDER — FUROSEMIDE 10 MG/ML
20 INJECTION INTRAMUSCULAR; INTRAVENOUS EVERY 12 HOURS
Status: CANCELLED | OUTPATIENT
Start: 2020-09-10

## 2020-09-10 RX ORDER — MONTELUKAST SODIUM 10 MG/1
10 TABLET ORAL NIGHTLY
Status: CANCELLED | OUTPATIENT
Start: 2020-09-10

## 2020-09-10 RX ORDER — ALBUTEROL SULFATE 90 UG/1
2 AEROSOL, METERED RESPIRATORY (INHALATION)
Status: CANCELLED | OUTPATIENT
Start: 2020-09-10

## 2020-09-10 RX ADMIN — SODIUM CHLORIDE, PRESERVATIVE FREE 10 ML: 5 INJECTION INTRAVENOUS at 17:13

## 2020-09-10 RX ADMIN — SODIUM CHLORIDE, PRESERVATIVE FREE 10 ML: 5 INJECTION INTRAVENOUS at 17:09

## 2020-09-10 RX ADMIN — ENALAPRILAT 1.25 MG: 1.25 INJECTION INTRAVENOUS at 20:22

## 2020-09-10 RX ADMIN — METHYLPREDNISOLONE SODIUM SUCCINATE 125 MG: 125 INJECTION, POWDER, FOR SOLUTION INTRAMUSCULAR; INTRAVENOUS at 17:09

## 2020-09-10 NOTE — TELEPHONE ENCOUNTER
NIMO-SPEECH THERAPIST STATES SHE IS AT HER HOUSE AND SHE HAS SHORTNESS OF BREATH, SHALLOW BREATHING,NOT FEELING WELL AND HAS BEEN TAKING THE PREDNISONE AND ZITHROMAX     NIMO-SPEECH THERAPIST   [phone]

## 2020-09-10 NOTE — ED PROVIDER NOTES
Subjective   Patient presents to emergency department for shortness of breath x 2 weeks.  States she had an episode today where she felt some chest tightness/heaviness with radiating pain into her neck which has since resolved.  She was sent by her PCP for further evaluation.  She is currently taking prednisone and Azithromycin for COPD exacerbation.  Hx of CHF, COPD, CAD, GERD, HTN, hyperlipidemia.  Endorses chronic cough no worse than usual.  She wear 2.5L home oxygen.        History provided by:  Patient   used: No    Shortness of Breath   Severity:  Moderate  Onset quality:  Sudden  Duration:  2 weeks  Timing:  Constant  Progression:  Worsening  Chronicity:  Recurrent  Associated symptoms: chest pain ( tightness) and cough    Associated symptoms: no abdominal pain, no fever, no sore throat, no vomiting and no wheezing        Review of Systems   Constitutional: Negative for chills and fever.   HENT: Negative for sore throat and trouble swallowing.    Respiratory: Positive for cough and shortness of breath. Negative for wheezing.    Cardiovascular: Positive for chest pain ( tightness).   Gastrointestinal: Negative for abdominal pain, nausea and vomiting.   Endocrine: Negative for polyuria.   Genitourinary: Negative for dysuria and flank pain.   Musculoskeletal: Negative for back pain.   Skin: Negative for color change.   Allergic/Immunologic: Negative for immunocompromised state.   Neurological: Negative for syncope and weakness.   Hematological: Does not bruise/bleed easily.   Psychiatric/Behavioral: Negative for confusion.       Past Medical History:   Diagnosis Date   • Acquired hypothyroidism    • Allergic rhinitis    • CHF (congestive heart failure) (CMS/[facility])    • COPD (chronic obstructive pulmonary disease) (CMS/[facility])    • Corneal epithelial dystrophy    • Coronary arteriosclerosis    • Depression    • Generalized atherosclerosis    • GERD (gastroesophageal reflux disease)    • Hemorrhoids     • History of bone density study 08/03/2012    Lumbar spine Osteopenia. Femoral Osteopenia   • History of mammogram 08/20/2004    Birads Category 2 Benign   • History of Papanicolaou smear of cervix 08/12/2004    NEGATIVE   • Hypercholesterolemia    • Hypertension    • Myocardial infarction (CMS/HCC)    • Nonexudative age-related macular degeneration    • Osteoarthritis of multiple joints    • Pseudophakia    • Punctate keratitis     - history Thygeson's keratopathy      • Tobacco dependence syndrome        Allergies   Allergen Reactions   • Ace Inhibitors Dizziness   • Baclofen Delirium     unknown   • Lisinopril Cough     Keeps her awake all night coughing       Past Surgical History:   Procedure Laterality Date   • APPENDECTOMY     • BREAST SURGERY  03/19/1954    Excision, breast lesion (1)  Excision of fibroma. 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 patency in the circumflex artery site of previous angioplasty. Kathe. patency in RCA.Nonobstructive 20-30% stenosis in the LAD and diagonal branch. Preserved LV systolic function with Ef of 55%   • CERVICAL SPINE SURGERY  09/16/1974    Excision of cervical disc, C5-6, C6-7, anterior cervical fusion, C5-6 with iliac bone graft.cervical spondylosis,C5-6, C6-7   • COLONOSCOPY  01/01/2013    patient declined    • CORONARY ANGIOPLASTY  07/21/1993    Angioplasty, coronary (2)    RCA    • DILATATION AND CURETTAGE      3   • ENDOSCOPY N/A 2/14/2018    Procedure: ESOPHAGOGASTRODUODENOSCOPY;  Surgeon: Alex Avitia MD;  Location: [facility] ENDOSCOPY;  Service:    • ENDOSCOPY AND COLONOSCOPY  10/01/1998    Hemorhoids Rectal Outlet Bleeding   • ESOPHAGOSCOPY / EGD  06/28/2004    Nonerosive gastritis of the body of the stomach. A biopsy was obtained & placed in h. Pylori test agar. Multiple biopsies were obtained from the body of the stomach   • HIP BIPOLAR REPLACEMENT Right 1/23/2018    Procedure: HIP BIPOLAR  "ANTERIOR APPROACH - right;  Surgeon: Kelvin Mcdowell MD;  Location: [facility];  Service:    • INJECTION OF MEDICATION  11/23/2015    Depo Medrol (Methylprednisone) (5)    • INJECTION OF MEDICATION  02/04/2016    Kenalog (3)     • JOINT REPLACEMENT Right 01/24/2018    hip   • PACEMAKER REPLACEMENT  2006       Family History   Problem Relation Age of Onset   • Coronary artery disease Other        Social History     Socioeconomic History   • Marital status:      Spouse name: Not on file   • Number of children: Not on file   • Years of education: Not on file   • Highest education level: Not on file   Social Needs   • Financial resource strain: Not on file   • Food insecurity:     Worry: Never true     Inability: Never true   • Transportation needs:     Medical: No     Non-medical: No   Tobacco Use   • Smoking status: Former Smoker     Packs/day: 0.50     Years: 50.00     Pack years: 25.00     Types: Cigarettes     Start date: 1950     Last attempt to quit: 1/12/2017     Years since quitting: 3.6   • Smokeless tobacco: Never Used   Substance and Sexual Activity   • Alcohol use: No   • Drug use: No   • Sexual activity: Defer   Lifestyle   • Physical activity:     Days per week: 0 days     Minutes per session: 0 min   • Stress: Not on file   Relationships   • Social connections:     Talks on phone: More than three times a week     Gets together: More than three times a week     Attends Oriental orthodox service: Never     Active member of club or organization: No     Attends meetings of clubs or organizations: Never     Relationship status:            Objective      /66   Pulse 68   Temp 97.9 °F (36.6 °C) (Temporal)   Resp 20   Ht 172.7 cm (68\")   Wt 61.8 kg (136 lb 3.2 oz)   LMP  (LMP Unknown)   SpO2 98%   BMI 20.71 kg/m²     Physical Exam   Constitutional: She is oriented to person, place, and time. She appears well-developed and well-nourished. She does not appear ill. No distress.   HENT: "   Head: Normocephalic and atraumatic.   Eyes: Conjunctivae are normal.   Cardiovascular: Normal rate, regular rhythm, normal heart sounds and intact distal pulses.   Pulmonary/Chest: She is in respiratory distress (labored, tripod stance ). She has wheezes.   Musculoskeletal: She exhibits no edema.   Neurological: She is alert and oriented to person, place, and time.   Skin: Skin is warm. Capillary refill takes less than 2 seconds.   Psychiatric: She has a normal mood and affect. Her behavior is normal. Thought content normal.   Nursing note and vitals reviewed.      ECG 12 Lead    Date/Time: 9/10/2020 4:21 PM  Performed by: Satnam Helton PA-C  Authorized by: Satnam Helton PA-C   Interpreted by physician  Comparison: compared with previous ECG from 8/14/2019  Similar to previous ECG  Rhythm: sinus rhythm  Rate: normal  BPM: 70  Conduction: right bundle branch block  Other findings: LVH  Clinical impression: abnormal ECG  Comments: T wave inversions in Inferior/precordial leads                 ED Course      Results for orders placed or performed during the hospital encounter of 09/10/20   Comprehensive Metabolic Panel   Result Value Ref Range    Glucose 94 65 - 99 mg/dL    BUN 21 8 - 23 mg/dL    Creatinine 1.10 (H) 0.57 - 1.00 mg/dL    Sodium 136 136 - 145 mmol/L    Potassium 4.1 3.5 - 5.2 mmol/L    Chloride 96 (L) 98 - 107 mmol/L    CO2 26.0 22.0 - 29.0 mmol/L    Calcium 8.7 8.6 - 10.5 mg/dL    Total Protein 6.4 6.0 - 8.5 g/dL    Albumin 3.70 3.50 - 5.20 g/dL    ALT (SGPT) 21 1 - 33 U/L    AST (SGOT) 20 1 - 32 U/L    Alkaline Phosphatase 68 39 - 117 U/L    Total Bilirubin 0.3 0.0 - 1.2 mg/dL    eGFR Non African Amer 47 (L) >60 mL/min/1.73    Globulin 2.7 gm/dL    A/G Ratio 1.4 g/dL    BUN/Creatinine Ratio 19.1 7.0 - 25.0    Anion Gap 14.0 5.0 - 15.0 mmol/L   Troponin   Result Value Ref Range    Troponin T <0.010 0.000 - 0.030 ng/mL   BNP   Result Value Ref Range    proBNP 612.8 0.0-1,800.0 pg/mL    CBC Auto Differential   Result Value Ref Range    WBC 9.30 3.40 - 10.80 10*3/mm3    RBC 3.56 (L) 3.77 - 5.28 10*6/mm3    Hemoglobin 11.6 (L) 12.0 - 15.9 g/dL    Hematocrit 35.0 34.0 - 46.6 %    MCV 98.3 (H) 79.0 - 97.0 fL    MCH 32.6 26.6 - 33.0 pg    MCHC 33.1 31.5 - 35.7 g/dL    RDW 13.7 12.3 - 15.4 %    RDW-SD 49.6 37.0 - 54.0 fl    MPV 9.5 6.0 - 12.0 fL    Platelets 220 140 - 450 10*3/mm3    Neutrophil % 64.4 42.7 - 76.0 %    Lymphocyte % 25.3 19.6 - 45.3 %    Monocyte % 5.4 5.0 - 12.0 %    Eosinophil % 3.5 0.3 - 6.2 %    Basophil % 0.8 0.0 - 1.5 %    Immature Grans % 0.6 (H) 0.0 - 0.5 %    Neutrophils, Absolute 5.99 1.70 - 7.00 10*3/mm3    Lymphocytes, Absolute 2.35 0.70 - 3.10 10*3/mm3    Monocytes, Absolute 0.50 0.10 - 0.90 10*3/mm3    Eosinophils, Absolute 0.33 0.00 - 0.40 10*3/mm3    Basophils, Absolute 0.07 0.00 - 0.20 10*3/mm3    Immature Grans, Absolute 0.06 (H) 0.00 - 0.05 10*3/mm3    nRBC 0.0 0.0 - 0.2 /100 WBC     Xr Chest 1 View    Result Date: 9/10/2020  Narrative: Chest x-ray single view CLINICAL INDICATION: Shortness of breath COMPARISON: Chest September 4, 2019. FINDINGS: Cardiac silhouette is normal in size. Pulmonary vascularity is unremarkable. Pacemaker leads right atrium, right ventricle. No focal infiltrate or consolidation.  No pleural effusion.  No pneumothorax. Subtle fibrotic changes left lung base.     Impression: No evidence of active disease. Electronically signed by:  Francisco Schmidt MD  9/10/2020 4:35 PM CDT Workstation: [id number]       Patient admitted to hospitalist.                                [facility]    Final diagnoses:   Chest pain, unspecified type   Shortness of breath            Satnam Helton PA-C  09/10/20 1580

## 2020-09-11 ENCOUNTER — TELEPHONE (OUTPATIENT)
Dept: OTHER | Facility: HOSPITAL | Age: 85
End: 2020-09-11

## 2020-09-11 ENCOUNTER — TELEPHONE (OUTPATIENT)
Dept: FAMILY MEDICINE CLINIC | Facility: CLINIC | Age: 85
End: 2020-09-11

## 2020-09-11 VITALS
WEIGHT: 136.2 LBS | BODY MASS INDEX: 20.64 KG/M2 | DIASTOLIC BLOOD PRESSURE: 68 MMHG | OXYGEN SATURATION: 93 % | TEMPERATURE: 97.9 F | SYSTOLIC BLOOD PRESSURE: 150 MMHG | RESPIRATION RATE: 20 BRPM | HEART RATE: 94 BPM | HEIGHT: 68 IN

## 2020-09-11 LAB — SARS-COV-2 N GENE RESP QL NAA+PROBE: NOT DETECTED

## 2020-09-11 PROCEDURE — G0378 HOSPITAL OBSERVATION PER HR: HCPCS

## 2020-09-11 NOTE — TELEPHONE ENCOUNTER
Pt's niece called and said that she took her aunt to the ER yesterday and the visit didn't go well because they wanted to put her on the Covid floor and she doesn't have Covid. She left and took her home at 1:00 am. She wanted to talk to you about what happened there. Her phone is [phone]

## 2020-09-11 NOTE — ED NOTES
Spoke with Dr. Vergara regarding pt wanting to leave AMA.     Jose Daniel Wong, RN  09/11/20 0053

## 2020-09-11 NOTE — DISCHARGE SUMMARY
Discharge Summary  Devante Hilliard MD  Hospitalist     Date of Discharge:  9/11/2020    Discharge Diagnosis:      Acute on chronic respiratory failure with hypoxia (CMS/HCC)    Acute on chronic diastolic heart failure (CMS/HCC)    Uncontrolled hypertension    COPD with acute exacerbation (CMS/HCC)    Dyspnea      Presenting Problem/History of Present Illness:  Dyspnea    Hospital Course  Patient is a 87 y.o. female admitted for dyspnea due to an acute exacerbation of the underlying chronic bronchitis and chronic diastolic heart failure.    She signed out against medical advice over concerns regarding potential Covid exposure.    Consults:   Consults     No orders found for last 30 day(s).        Condition on Discharge:  stable    Vital Signs  Temp:  [97.9 °F (36.6 °C)] 97.9 °F (36.6 °C)  Heart Rate:  [68-94] 94  Resp:  [20] 20  BP: (129-203)/(60-97) 150/68    Physical Exam:  Physical Exam    Discharge Disposition  Left Against Medical Advice    Discharge Medications     Discharge Medications      ASK your doctor about these medications      Instructions Start Date   acetaminophen 325 MG tablet  Commonly known as:  TYLENOL   650 mg, Oral, Every 4 Hours PRN      albuterol (2.5 MG/3ML) 0.083% nebulizer solution  Commonly known as:  PROVENTIL   2.5 mg, Nebulization, Every 6 Hours PRN      atorvastatin 20 MG tablet  Commonly known as:  LIPITOR   20 mg, Oral, Daily      azithromycin 250 MG tablet  Commonly known as:  ZITHROMAX   Take 2 tablets the first day, then 1 tablet daily for 4 days.      Breo Ellipta 200-25 MCG/INH inhaler  Generic drug:  Fluticasone Furoate-Vilanterol   1 puff, Inhalation, Daily      carvedilol 6.25 MG tablet  Commonly known as:  COREG   6.25 mg, Oral, 2 Times Daily With Meals      colchicine 0.6 MG tablet   Take 1.2 mg by mouth x1 then take 0.6 mg by mouth 1 hour later x1      docusate sodium 100 MG capsule  Commonly known as:  Colace   100 mg, Oral, 2 Times Daily      esomeprazole 40 MG  capsule  Commonly known as:  nexIUM   TAKE 1 CAPSULE BY MOUTH EVERY MORNING      ferrous sulfate 325 (65 FE) MG tablet   325 mg, Oral, Daily With Breakfast & Dinner      fluorometholone 0.1 % ophthalmic suspension  Commonly known as:  FML   INSTILL 1 DROP IN BOTH EYES FOUR (4) TIMES DAILY      folic acid-vit B6-vit B12 2.5-25-1 MG tablet tablet  Commonly known as:  Folbee   1 tablet, Oral, Daily      ipratropium-albuterol 0.5-2.5 mg/3 ml nebulizer  Commonly known as:  DUO-NEB   USE 1 BY NEBULIZER EVERY 4 TO 6 HOURS AS NEEDED      levothyroxine 50 MCG tablet  Commonly known as:  SYNTHROID, LEVOTHROID   50 mcg, Oral, Daily      lidocaine 5 %  Commonly known as:  Lidoderm   1 patch, Transdermal, Every 24 Hours, Remove & Discard patch within 12 hours      midodrine 10 MG tablet  Commonly known as:  PROAMATINE   10 mg, Oral, 2 Times Daily      montelukast 10 MG tablet  Commonly known as:  SINGULAIR   10 mg, Oral, Nightly      nitroglycerin 0.2 MG/HR patch  Commonly known as:  NITRODUR   1 patch, Transdermal, Daily      polyethylene glycol 17 GM/SCOOP powder  Commonly known as:  MIRALAX   MIX 1 CAPFUL (17G) IN 8 OUNCES OF LIQUID AND DRINK BY MOUTH EVERY DAY FOR CONSTIPATION      predniSONE 20 MG tablet  Commonly known as:  DELTASONE   Take 3 tablets at the same time daily x5 days      ranolazine 500 MG 12 hr tablet  Commonly known as:  RANEXA   500 mg, Oral, 2 Times Daily      sertraline 50 MG tablet  Commonly known as:  ZOLOFT   50 mg, Oral, Daily      sodium bicarbonate 650 MG tablet   650 mg, Oral, 2 Times Daily      traMADol 50 MG tablet  Commonly known as:  ULTRAM   50 mg, Oral, Every 6 Hours PRN      vitamin D 1.25 MG (88584 UT) capsule capsule  Commonly known as:  ERGOCALCIFEROL   50,000 Units, Oral, Every 30 Days             Discharge Diet: as tolerated    Activity at Discharge: as tolerated    Follow-up Appointments  Future Appointments   Date Time Provider Department Center   9/21/2020 11:15 AM [facility]  [facility]   10/29/2020 10:30 AM Vero Arteaga, CRESENCIO [facility] None         Devante Hilliard MD  09/11/20  07:34

## 2020-09-11 NOTE — ED NOTES
After pt family talking with pt son pt and family decided to leave AMA due to pt admitting bed being on the Covid floor. Pt family states they did not want to expose pt in her condition. Pt signed AMA form and verbalized understanding of the risk of leaving.     Jose Daniel Wong, RN  09/11/20 0050       Jose Daniel Wong RN  09/11/20 0051

## 2020-09-11 NOTE — H&P
History and Physical  Devante Hilliard MD  Hospitalist    Date of admission: 9/10/2020    Patient Care Team:  Vero Arteaga APRN as PCP    Chief complaint   Chief Complaint   Patient presents with   • Shortness of Breath     Subjective     Patient is a 87 y.o. female admitted for worsening shortness of breath, cough, generalized weakness, poor appetite and a need for an increased flow of Oxygen. Her symptoms go back several days and are worse even with minimal exertion.    History  Past Medical History:   Diagnosis Date   • Allergic rhinitis    • Chronic diastolic heart failure (CMS/HCC)    • COPD (chronic obstructive pulmonary disease) (CMS/HCC)    • Coronary arteriosclerosis    • Depression    • GERD (gastroesophageal reflux disease)    • Hypercholesterolemia    • Hypertension    • Hypothyroidism    • LVH (left ventricular hypertrophy)    • Myocardial infarction (CMS/HCC)    • Osteoarthritis      Past Surgical History:   Procedure Laterality Date   • BREAST SURGERY     • CARDIAC CATHETERIZATION     • CERVICAL SPINE SURGERY     • COLONOSCOPY     • CORONARY ANGIOPLASTY     • ENDOSCOPY N/A 2/14/2018   • ENDOSCOPY AND COLONOSCOPY     • HIP BIPOLAR REPLACEMENT Right 1/23/2018   • PACEMAKER REPLACEMENT       Family History   Problem Relation Age of Onset   • Coronary artery disease Father      Social History     Tobacco Use   • Smoking status: Former Smoker     Packs/day: 0.50     Years: 50.00     Pack years: 25.00     Types: Cigarettes     Start date: 1950     Last attempt to quit: 1/12/2017     Years since quitting: 3.6   • Smokeless tobacco: Never Used   Substance Use Topics   • Alcohol use: No   • Drug use: No     Allergies:  Ace inhibitors; Baclofen; and Lisinopril    Home medications:  Aspirin 81 mg daily  Tylenol  mg every 6 h  Lipitor 20 mg daily  Coreg 6.25 mg BID  Synthroid 50 mcg daily  Singular 10 mg daily  Nitro patch 0.2 mg/h daily  Ranexa 1000 mg BID  Zoloft 100 mg daily    Review of Systems  Review  of Systems   Constitutional: Positive for fatigue. Negative for fever.   Respiratory: Positive for cough, shortness of breath and wheezing.    Cardiovascular: Negative for chest pain, palpitations and leg swelling.   Gastrointestinal: Negative for abdominal distention, abdominal pain, constipation, diarrhea, nausea and vomiting.   Genitourinary: Negative for decreased urine volume, dysuria, frequency, hematuria and urgency.   Musculoskeletal: Positive for arthralgias. Negative for back pain.   Skin: Positive for pallor. Negative for color change and rash.   Neurological: Positive for weakness. Negative for seizures, syncope and headaches.   Psychiatric/Behavioral: Negative for agitation, behavioral problems and confusion.   All other systems reviewed and are negative.      Objective     Vital Signs  Temp:  [97.9 °F (36.6 °C)] 97.9 °F (36.6 °C)  Heart Rate:  [68-94] 94  Resp:  [20] 20  BP: (129-203)/(60-97) 150/68    Physical Exam:  Physical Exam   Constitutional: She is oriented to person, place, and time. She appears ill. She appears distressed.   HENT:   Head: Normocephalic and atraumatic.   Eyes: Pupils are equal, round, and reactive to light. EOM are normal. No scleral icterus.   Neck: Normal range of motion. Neck supple.   Cardiovascular: Normal rate and regular rhythm.   Pulmonary/Chest: Effort normal. No stridor. No respiratory distress. She has wheezes.   Abdominal: Soft. Bowel sounds are normal. She exhibits no distension and no mass. There is no rebound and no guarding.   Musculoskeletal: Normal range of motion. She exhibits no edema or tenderness.   Neurological: She is alert and oriented to person, place, and time. No cranial nerve deficit. Coordination normal.   Skin: Skin is warm and dry. There is pallor.   Psychiatric: She has a normal mood and affect. Her behavior is normal.   Vitals reviewed.      Results Review:   Lab Results (last 24 hours)     Procedure Component Value Units Date/Time    Troponin  [[id number]]  (Normal) Collected:  09/10/20 2003    Specimen:  Blood Updated:  09/10/20 2023     Troponin T <0.010 ng/mL     Narrative:       Troponin T Reference Range:  <= 0.03 ng/mL-   Negative for AMI  >0.03 ng/mL-     Abnormal for myocardial necrosis.  Clinicians would have to utilize clinical acumen, EKG, Troponin and serial changes to determine if it is an Acute Myocardial Infarction or myocardial injury due to an underlying chronic condition.       Results may be falsely decreased if patient taking Biotin.      Comprehensive Metabolic Panel [[id number]]  (Abnormal) Collected:  09/10/20 1656    Specimen:  Blood Updated:  09/10/20 1720     Glucose 94 mg/dL      BUN 21 mg/dL      Creatinine 1.10 mg/dL      Sodium 136 mmol/L      Potassium 4.1 mmol/L      Comment: Slight hemolysis detected by analyzer. Results may be affected.        Chloride 96 mmol/L      CO2 26.0 mmol/L      Calcium 8.7 mg/dL      Total Protein 6.4 g/dL      Albumin 3.70 g/dL      ALT (SGPT) 21 U/L      AST (SGOT) 20 U/L      Alkaline Phosphatase 68 U/L      Total Bilirubin 0.3 mg/dL      eGFR Non African Amer 47 mL/min/1.73      Globulin 2.7 gm/dL      A/G Ratio 1.4 g/dL      BUN/Creatinine Ratio 19.1     Anion Gap 14.0 mmol/L     Narrative:       GFR Normal >60  Chronic Kidney Disease <60  Kidney Failure <15      Troponin [[id number]]  (Normal) Collected:  09/10/20 1656    Specimen:  Blood Updated:  09/10/20 1720     Troponin T <0.010 ng/mL     Narrative:       Troponin T Reference Range:  <= 0.03 ng/mL-   Negative for AMI  >0.03 ng/mL-     Abnormal for myocardial necrosis.  Clinicians would have to utilize clinical acumen, EKG, Troponin and serial changes to determine if it is an Acute Myocardial Infarction or myocardial injury due to an underlying chronic condition.       Results may be falsely decreased if patient taking Biotin.      BNP [[id number]]  (Normal) Collected:  09/10/20 1656    Specimen:  Blood Updated:  09/10/20 1718     proBNP  612.8 pg/mL     Narrative:       Among patients with dyspnea, NT-proBNP is highly sensitive for the detection of acute congestive heart failure. In addition NT-proBNP of <300 pg/ml effectively rules out acute congestive heart failure with 99% negative predictive value.    Results may be falsely decreased if patient taking Biotin.      CBC & Differential [[id number]] Collected:  09/10/20 1656    Specimen:  Blood Updated:  09/10/20 1700    Narrative:       The following orders were created for panel order CBC & Differential.  Procedure                               Abnormality         Status                     ---------                               -----------         ------                     CBC Auto Differential[[id number]]        Abnormal            Final result                 Please view results for these tests on the individual orders.    CBC Auto Differential [[id number]]  (Abnormal) Collected:  09/10/20 1656    Specimen:  Blood Updated:  09/10/20 1700     WBC 9.30 10*3/mm3      RBC 3.56 10*6/mm3      Hemoglobin 11.6 g/dL      Hematocrit 35.0 %      MCV 98.3 fL      MCH 32.6 pg      MCHC 33.1 g/dL      RDW 13.7 %      RDW-SD 49.6 fl      MPV 9.5 fL      Platelets 220 10*3/mm3      Neutrophil % 64.4 %      Lymphocyte % 25.3 %      Monocyte % 5.4 %      Eosinophil % 3.5 %      Basophil % 0.8 %      Immature Grans % 0.6 %      Neutrophils, Absolute 5.99 10*3/mm3      Lymphocytes, Absolute 2.35 10*3/mm3      Monocytes, Absolute 0.50 10*3/mm3      Eosinophils, Absolute 0.33 10*3/mm3      Basophils, Absolute 0.07 10*3/mm3      Immature Grans, Absolute 0.06 10*3/mm3      nRBC 0.0 /100 WBC           Imaging Results (Last 24 Hours)     Procedure Component Value Units Date/Time    XR Chest 1 View [[id number]] Collected:  09/10/20 1611     Updated:  09/10/20 1636    Narrative:       Chest x-ray single view     CLINICAL INDICATION: Shortness of breath    COMPARISON: Chest September 4, 2019.     FINDINGS: Cardiac silhouette  is normal in size. Pulmonary  vascularity is unremarkable.   Pacemaker leads right atrium, right ventricle.  No focal infiltrate or consolidation.  No pleural effusion.  No  pneumothorax.  Subtle fibrotic changes left lung base.        Impression:       No evidence of active disease.    Electronically signed by:  Francisco Schmidt MD  9/10/2020 4:35 PM CDT  Workstation: [id number]          Assessment/Plan       Acute on chronic respiratory failure with hypoxia (CMS/HCC)    Acute on chronic diastolic heart failure (CMS/HCC)    Uncontrolled hypertension    COPD with acute exacerbation (CMS/HCC)    Dyspnea    Provide her with supplemental Oxygen, IV diuresis, IV steroids, Proventil PRN, blood pressure control. Obtain a Covid screen test even though the admission CXRay shows no acute infiltrates.    Devante Hilliard MD  09/11/20  07:29

## 2020-09-14 ENCOUNTER — TELEPHONE (OUTPATIENT)
Dept: FAMILY MEDICINE CLINIC | Facility: CLINIC | Age: 85
End: 2020-09-14

## 2020-09-14 NOTE — TELEPHONE ENCOUNTER
Hilda / Speech therapy called and said Val came home last week when she went to ER because she was negative for Covid19 and they wanted to put her on Covid unit.  She said she still has shortness of breath but her lungs sound better.  They did chest xray and blood work but she does not know the results.      Hilda [phone]

## 2020-09-21 ENCOUNTER — TRANSCRIBE ORDERS (OUTPATIENT)
Dept: LAB | Facility: HOSPITAL | Age: 85
End: 2020-09-21

## 2020-09-21 ENCOUNTER — LAB (OUTPATIENT)
Dept: LAB | Facility: HOSPITAL | Age: 85
End: 2020-09-21

## 2020-09-21 ENCOUNTER — TELEPHONE (OUTPATIENT)
Dept: FAMILY MEDICINE CLINIC | Facility: CLINIC | Age: 85
End: 2020-09-21

## 2020-09-21 ENCOUNTER — HOSPITAL ENCOUNTER (OUTPATIENT)
Dept: ULTRASOUND IMAGING | Facility: HOSPITAL | Age: 85
Discharge: HOME | End: 2020-09-21

## 2020-09-21 DIAGNOSIS — E87.1 HYPONATREMIA: ICD-10-CM

## 2020-09-21 DIAGNOSIS — E87.1 HYPO-OSMOLAR HYPONATREMIA: Primary | ICD-10-CM

## 2020-09-21 DIAGNOSIS — J44.1 COPD EXACERBATION (HCC): ICD-10-CM

## 2020-09-21 DIAGNOSIS — E87.1 HYPO-OSMOLAR HYPONATREMIA: ICD-10-CM

## 2020-09-21 LAB
ANION GAP SERPL CALCULATED.3IONS-SCNC: 11.1 MMOL/L (ref 5–15)
BUN SERPL-MCNC: 17 MG/DL (ref 8–23)
BUN/CREAT SERPL: 17.2 (ref 7–25)
CALCIUM SPEC-SCNC: 8.8 MG/DL (ref 8.6–10.5)
CHLORIDE SERPL-SCNC: 99 MMOL/L (ref 98–107)
CO2 SERPL-SCNC: 28.9 MMOL/L (ref 22–29)
CREAT SERPL-MCNC: 0.99 MG/DL (ref 0.57–1)
GFR SERPL CREATININE-BSD FRML MDRD: 53 ML/MIN/1.73
GLUCOSE SERPL-MCNC: 86 MG/DL (ref 65–99)
OSMOLALITY SERPL: 290 MOSM/KG (ref 280–301)
POTASSIUM SERPL-SCNC: 3.8 MMOL/L (ref 3.5–5.2)
SODIUM SERPL-SCNC: 139 MMOL/L (ref 136–145)
TSH SERPL DL<=0.05 MIU/L-ACNC: 2.86 UIU/ML (ref 0.27–4.2)

## 2020-09-21 PROCEDURE — 36415 COLL VENOUS BLD VENIPUNCTURE: CPT | Performed by: INTERNAL MEDICINE

## 2020-09-21 PROCEDURE — 83935 ASSAY OF URINE OSMOLALITY: CPT

## 2020-09-21 PROCEDURE — 84300 ASSAY OF URINE SODIUM: CPT

## 2020-09-21 PROCEDURE — 84443 ASSAY THYROID STIM HORMONE: CPT | Performed by: INTERNAL MEDICINE

## 2020-09-21 PROCEDURE — 80048 BASIC METABOLIC PNL TOTAL CA: CPT | Performed by: INTERNAL MEDICINE

## 2020-09-21 PROCEDURE — 83930 ASSAY OF BLOOD OSMOLALITY: CPT

## 2020-09-21 PROCEDURE — 76775 US EXAM ABDO BACK WALL LIM: CPT

## 2020-09-21 RX ORDER — AZITHROMYCIN 250 MG/1
TABLET, FILM COATED ORAL
Qty: 6 TABLET | Refills: 0 | Status: SHIPPED | OUTPATIENT
Start: 2020-09-21 | End: 2020-10-29

## 2020-09-21 RX ORDER — PREDNISONE 20 MG/1
TABLET ORAL
Qty: 10 TABLET | Refills: 0 | Status: ON HOLD | OUTPATIENT
Start: 2020-09-21 | End: 2020-11-07

## 2020-09-21 NOTE — TELEPHONE ENCOUNTER
PATIENTS NIECE IS REQUESTING STEROID AND ANTIBIOTICS TO BE SENT TO THE PHARMACY SHE STATES SHE WAS TAKING THE PRESCRIPTIONS BEFORE WITH DR DHILLON AND NILES NOLASCO IS NOW HER PCP   SHE STATES SHE WAS SENT TO THE ER AND TESTED NEGATIVE FOR COVID       THE CALL WAS DISCONNECTED UNABLE TO GET MORE INFO    CONTACT NUMBER   [phone] (H)    UNABLE TO  VERIFY  [facility], [address] - [phone]  - [phone] FX

## 2020-09-22 LAB
OSMOLALITY UR: 455 MOSM/KG
SODIUM UR-SCNC: 32 MMOL/L

## 2020-10-15 RX ORDER — IPRATROPIUM BROMIDE AND ALBUTEROL SULFATE 2.5; .5 MG/3ML; MG/3ML
SOLUTION RESPIRATORY (INHALATION)
Qty: 360 ML | Refills: 0 | Status: SHIPPED | OUTPATIENT
Start: 2020-10-15 | End: 2020-10-29 | Stop reason: SDUPTHER

## 2020-10-29 ENCOUNTER — LAB (OUTPATIENT)
Dept: LAB | Facility: HOSPITAL | Age: 85
End: 2020-10-29

## 2020-10-29 ENCOUNTER — OFFICE VISIT (OUTPATIENT)
Dept: FAMILY MEDICINE CLINIC | Facility: CLINIC | Age: 85
End: 2020-10-29

## 2020-10-29 VITALS
BODY MASS INDEX: 21.22 KG/M2 | OXYGEN SATURATION: 97 % | WEIGHT: 140 LBS | SYSTOLIC BLOOD PRESSURE: 128 MMHG | HEIGHT: 68 IN | HEART RATE: 68 BPM | DIASTOLIC BLOOD PRESSURE: 66 MMHG

## 2020-10-29 DIAGNOSIS — Z23 FLU VACCINE NEED: ICD-10-CM

## 2020-10-29 DIAGNOSIS — I11.0 HYPERTENSIVE HEART DISEASE WITH HEART FAILURE (HCC): ICD-10-CM

## 2020-10-29 DIAGNOSIS — R60.9 PERIPHERAL EDEMA: ICD-10-CM

## 2020-10-29 DIAGNOSIS — J44.1 COPD EXACERBATION (HCC): ICD-10-CM

## 2020-10-29 DIAGNOSIS — E78.00 HYPERCHOLESTEROLEMIA: ICD-10-CM

## 2020-10-29 DIAGNOSIS — E03.9 ACQUIRED HYPOTHYROIDISM: ICD-10-CM

## 2020-10-29 DIAGNOSIS — I11.0 HYPERTENSIVE HEART DISEASE WITH HEART FAILURE (HCC): Primary | ICD-10-CM

## 2020-10-29 LAB
ANION GAP SERPL CALCULATED.3IONS-SCNC: 9.3 MMOL/L (ref 5–15)
BUN SERPL-MCNC: 14 MG/DL (ref 8–23)
BUN/CREAT SERPL: 16.9 (ref 7–25)
CALCIUM SPEC-SCNC: 8.8 MG/DL (ref 8.6–10.5)
CHLORIDE SERPL-SCNC: 96 MMOL/L (ref 98–107)
CO2 SERPL-SCNC: 33.7 MMOL/L (ref 22–29)
CREAT SERPL-MCNC: 0.83 MG/DL (ref 0.57–1)
GFR SERPL CREATININE-BSD FRML MDRD: 65 ML/MIN/1.73
GLUCOSE SERPL-MCNC: 80 MG/DL (ref 65–99)
NT-PROBNP SERPL-MCNC: 1378 PG/ML (ref 0–1800)
POTASSIUM SERPL-SCNC: 3.4 MMOL/L (ref 3.5–5.2)
SODIUM SERPL-SCNC: 139 MMOL/L (ref 136–145)

## 2020-10-29 PROCEDURE — 80048 BASIC METABOLIC PNL TOTAL CA: CPT

## 2020-10-29 PROCEDURE — 83880 ASSAY OF NATRIURETIC PEPTIDE: CPT

## 2020-10-29 PROCEDURE — 36415 COLL VENOUS BLD VENIPUNCTURE: CPT

## 2020-10-29 PROCEDURE — 96372 THER/PROPH/DIAG INJ SC/IM: CPT | Performed by: NURSE PRACTITIONER

## 2020-10-29 PROCEDURE — 99214 OFFICE O/P EST MOD 30 MIN: CPT | Performed by: NURSE PRACTITIONER

## 2020-10-29 NOTE — PROGRESS NOTES
"Subjective   Val Arteaga is a 87 y.o. female.  Follow-up for high blood pressure, high cholesterol, hypothyroidism and COPD.  Currently on 2-1/2 L of oxygen per nasal cannula.  \"I just get so short of breath doing anything.\"  Has an appointment to see Dr. Dubois, pulmonologist, on Tuesday, November 3 for further evaluation.  Escorted by her daughter who states \"when she used to see Dr. Jay he would give her a steroid shot and she would do much better with that.\"    Hypertension  This is a chronic problem. The current episode started more than 1 year ago. The problem is unchanged. The problem is controlled. Associated symptoms include malaise/fatigue and shortness of breath. Agents associated with hypertension include thyroid hormones. Risk factors for coronary artery disease include dyslipidemia, post-menopausal state and sedentary lifestyle. Current antihypertension treatment includes lifestyle changes. The current treatment provides moderate improvement. There are no compliance problems.    COPD  She complains of shortness of breath. There is no cough. This is a chronic problem. The current episode started more than 1 year ago. The problem occurs constantly. The problem has been unchanged. Associated symptoms include dyspnea on exertion and malaise/fatigue. Pertinent negatives include no fever or sore throat. Her symptoms are aggravated by exertion. Her symptoms are alleviated by rest, beta-agonist and oral steroids. She reports moderate improvement on treatment. There are no known risk factors for lung disease. Her past medical history is significant for COPD.   Hypothyroidism  This is a chronic problem. The current episode started more than 1 year ago. The problem occurs constantly. The problem has been unchanged. Associated symptoms include fatigue and weakness. Pertinent negatives include no chills, coughing, diaphoresis, fever or sore throat. Nothing aggravates the symptoms. Treatments tried: " "Synthroid. The treatment provided significant relief.   Hyperlipidemia  This is a chronic problem. The current episode started more than 1 year ago. The problem is controlled. Exacerbating diseases include hypothyroidism. Factors aggravating her hyperlipidemia include thiazides. Associated symptoms include shortness of breath. The current treatment provides moderate improvement of lipids. There are no compliance problems.  Risk factors for coronary artery disease include post-menopausal, hypertension, family history and dyslipidemia.        The following portions of the patient's history were reviewed and updated as appropriate: past social history, past surgical history and problem list.     Review of Systems   Constitutional: Positive for fatigue and malaise/fatigue. Negative for chills, diaphoresis and fever.   HENT: Negative for sore throat.    Respiratory: Positive for shortness of breath. Negative for cough.    Cardiovascular: Positive for dyspnea on exertion and leg swelling.   Gastrointestinal: Negative.    Genitourinary: Negative.    Musculoskeletal: Positive for gait problem.   Skin: Negative.    Neurological: Positive for weakness.       Objective    Visit Vitals  /66   Pulse 68   Ht 172.7 cm (68\")   Wt 63.5 kg (140 lb)   LMP  (LMP Unknown)   SpO2 97%   BMI 21.29 kg/m²       Physical Exam  Vitals signs and nursing note reviewed.   Constitutional:       Appearance: Normal appearance. She is well-developed and normal weight.      Comments: Arrives via wheelchair   HENT:      Head: Normocephalic.      Right Ear: External ear normal.      Left Ear: External ear normal.   Eyes:      Pupils: Pupils are equal, round, and reactive to light.   Neck:      Musculoskeletal: Normal range of motion and neck supple.   Cardiovascular:      Rate and Rhythm: Normal rate and regular rhythm.      Heart sounds: Normal heart sounds.      Comments: Compression stockings in place to bilateral lower extremities  Pulmonary: "      Effort: Tachypnea and accessory muscle usage present.      Breath sounds: Examination of the right-upper field reveals decreased breath sounds. Examination of the left-upper field reveals decreased breath sounds. Examination of the right-lower field reveals decreased breath sounds. Examination of the left-lower field reveals decreased breath sounds. Decreased breath sounds present.   Abdominal:      General: Bowel sounds are normal.      Palpations: Abdomen is soft.   Musculoskeletal: Normal range of motion.      Right lower le+ Edema present.      Left lower le+ Edema present.   Skin:     General: Skin is warm.      Capillary Refill: Capillary refill takes less than 2 seconds.   Neurological:      Mental Status: She is alert and oriented to person, place, and time.   Psychiatric:         Behavior: Behavior normal.         Assessment/Plan   Problems Addressed this Visit        Cardiovascular and Mediastinum    Hypercholesterolemia       Endocrine    Acquired hypothyroidism      Other Visit Diagnoses     Hypertensive heart disease with heart failure (CMS/HCC)     -  Primary    Relevant Orders    BNP    COPD exacerbation (CMS/[facility])        Relevant Medications    triamcinolone acetonide (KENALOG) injection 80 mg (Completed)    Other Relevant Orders    Basic Metabolic Panel    Peripheral edema        Relevant Orders    BNP    Flu vaccine need        Relevant Orders    Fluad Quad 65+ yrs ([id number])      Diagnoses       Codes Comments    Hypertensive heart disease with heart failure (CMS/HCC)     -  Primary ICD-10-CM: I11.0  ICD-9-CM: 402.91, 428.9     Hypercholesterolemia     ICD-10-CM: E78.00  ICD-9-CM: 272.0     Acquired hypothyroidism     ICD-10-CM: E03.9  ICD-9-CM: 244.9     COPD exacerbation (CMS/HCC)     ICD-10-CM: J44.1  ICD-9-CM: 491.21     Peripheral edema     ICD-10-CM: R60.9  ICD-9-CM: 782.3     Flu vaccine need     ICD-10-CM: Z23  ICD-9-CM: V04.81         New Medications Ordered This Visit    Medications   • triamcinolone acetonide (KENALOG) injection 80 mg     1.  Hypertensive heart disease with heart failure,  Complete BMP as ordered and will notify of results when available  Continue on Lasix as previously prescribed and will call to let this office know dosage  Continue on Coreg as previously prescribed  Encouraged to continue to adhere to a low-sodium diet    2.  Hypercholesterolemia:  Continue to adhere to a low-fat diet  Encouraged consuming oatmeal as this has been shown to help to reduce cholesterol levels    3.  Acquired hypothyroidism,  Continue on levothyroxine as previously prescribed  Educated on possible signs of worsening thyroid function including but not limited to change in skin texture, hair loss, intolerance to heat/cold    4.  COPD exacerbation:  Kenalog 80 mg given IM in office  Educated on possible side effects of long-term use of steroid medications including not limited to increased risk for glaucoma and osteoporosis  Continue on albuterol, Breo inhalers as previously prescribed  Continue on O2 and encouraged to increase to 3 L per nasal cannula until follow-up with Dr. Dubois as scheduled on Tuesday, November 3  Encouraged to seek emergency medical treatment for any new or worsening shortness of breath or difficulty breathing    5.  Peripheral edema:  Continue on Lasix as previously prescribed  Continue use of compression stockings as discussed above    6.  Flu vaccine need:  Opted for Kenalog to help improve shortness of breath and will return to clinic in approximately 2 weeks to receive flu vaccination    Continue on current medications as previously prescribed   I spent 26 minutes in direct face to face contact with patient.  Greater than 50% of this time was spent counseling patient and discussing plan of care.  Return in about 3 months (around 1/29/2021) for Medicare Wellness.        This document has been electronically signed by CRESENCIO Hall on October 29,  2020 14:59 CDT

## 2020-11-03 ENCOUNTER — OFFICE VISIT (OUTPATIENT)
Dept: PULMONOLOGY | Facility: CLINIC | Age: 85
End: 2020-11-03

## 2020-11-03 VITALS
HEART RATE: 72 BPM | SYSTOLIC BLOOD PRESSURE: 142 MMHG | DIASTOLIC BLOOD PRESSURE: 86 MMHG | WEIGHT: 140 LBS | BODY MASS INDEX: 21.22 KG/M2 | OXYGEN SATURATION: 98 % | HEIGHT: 68 IN

## 2020-11-03 DIAGNOSIS — J43.1 PANLOBULAR EMPHYSEMA (HCC): ICD-10-CM

## 2020-11-03 DIAGNOSIS — J44.1 COPD WITH ACUTE EXACERBATION (HCC): Chronic | ICD-10-CM

## 2020-11-03 DIAGNOSIS — J41.1 MUCOPURULENT CHRONIC BRONCHITIS (HCC): Primary | Chronic | ICD-10-CM

## 2020-11-03 DIAGNOSIS — J96.21 ACUTE ON CHRONIC RESPIRATORY FAILURE WITH HYPOXIA (HCC): Chronic | ICD-10-CM

## 2020-11-03 DIAGNOSIS — R06.09 DYSPNEA ON EXERTION: ICD-10-CM

## 2020-11-03 PROCEDURE — 99213 OFFICE O/P EST LOW 20 MIN: CPT | Performed by: INTERNAL MEDICINE

## 2020-11-03 RX ORDER — BUDESONIDE 0.5 MG/2ML
0.5 INHALANT ORAL
Qty: 120 ML | Refills: 3 | Status: SHIPPED | OUTPATIENT
Start: 2020-11-03 | End: 2021-01-01 | Stop reason: SDUPTHER

## 2020-11-03 NOTE — PROGRESS NOTES
"      Val Arteaga is a 87 y.o. female.     Chief Complaint   Patient presents with   • COPD     former Dr. Jay          History of Present Illness   Is an 87-year-old female with end-stage chronic obstructive pulmonary disease she is on continuous oxygen and does not take it off much during the day.  She wears it at night.  Current medications are Breo duo nebs 4 times a day Proventil inhaler rescue and Singulair as her continuous oxygen she is wheelchair-bound and does not get around much so she does not report a lot of dyspnea.  She has a chest x-ray from April 10, 2020 which shows flat diaphragms on the lateral hyperinflation on the PA.  Patient does not smoke.      The following portions of the patient's history were reviewed and updated as appropriate: allergies, current medications, past family history, past medical history, past social history, past surgical history and problem list.      Review of Systems   Constitutional: Negative.    HENT: Negative.    Eyes: Negative.    Respiratory: Positive for cough, chest tightness, shortness of breath and wheezing.    Cardiovascular: Negative.    Gastrointestinal: Negative.    Endocrine: Negative.    Skin: Negative.    Neurological: Negative.    Psychiatric/Behavioral: Negative.        /86   Pulse 72   Ht 172.7 cm (68\")   Wt 63.5 kg (140 lb)   LMP  (LMP Unknown)   SpO2 98%   BMI 21.29 kg/m²   Physical Exam  Constitutional:       Appearance: Normal appearance.      Comments: Patient has distal muscle wasting typical of chronic protein malnutrition.   HENT:      Head: Normocephalic and atraumatic.      Right Ear: External ear normal.      Left Ear: External ear normal.      Nose: Nose normal.      Mouth/Throat:      Mouth: Mucous membranes are moist.   Eyes:      Extraocular Movements: Extraocular movements intact.      Conjunctiva/sclera: Conjunctivae normal.      Pupils: Pupils are equal, round, and reactive to light.   Neck:      Musculoskeletal: " Normal range of motion and neck supple. No neck rigidity.   Cardiovascular:      Rate and Rhythm: Normal rate and regular rhythm.      Heart sounds: No murmur.   Pulmonary:      Effort: Pulmonary effort is normal.      Comments: Patient has markedly diminished expiratory breath sounds and on forced expiration she has tight expiratory wheezes.  She has some rales at the left base.  Abdominal:      General: Abdomen is flat. Bowel sounds are normal.      Palpations: Abdomen is soft.   Musculoskeletal: Normal range of motion.      Right lower leg: Edema present.      Left lower leg: Edema present.   Lymphadenopathy:      Cervical: No cervical adenopathy.   Skin:     General: Skin is warm and dry.   Neurological:      General: No focal deficit present.      Mental Status: She is oriented to person, place, and time.   Psychiatric:         Mood and Affect: Mood normal.         Behavior: Behavior normal.           Current Outpatient Medications:   •  acetaminophen (TYLENOL) 325 MG tablet, Take 2 tablets by mouth Every 4 (Four) Hours As Needed for Mild Pain ., Disp: 1 tablet, Rfl: 1  •  albuterol (PROVENTIL) (2.5 MG/3ML) 0.083% nebulizer solution, Take 2.5 mg by nebulization Every 6 (Six) Hours As Needed for Wheezing., Disp: 90 vial, Rfl: 5  •  carvedilol (COREG) 6.25 MG tablet, TAKE 1 TABLET BY MOUTH 2 (TWO) TIMES A DAY WITH MEALS., Disp: 180 tablet, Rfl: 1  •  docusate sodium (COLACE) 100 MG capsule, Take 1 capsule by mouth 2 (Two) Times a Day., Disp: 60 capsule, Rfl: 0  •  esomeprazole (nexIUM) 40 MG capsule, TAKE 1 CAPSULE BY MOUTH EVERY MORNING, Disp: 30 capsule, Rfl: 3  •  ferrous sulfate 325 (65 FE) MG tablet, Take 325 mg by mouth Daily With Breakfast & Dinner., Disp: , Rfl:   •  fluorometholone (FML) 0.1 % ophthalmic suspension, INSTILL 1 DROP IN BOTH EYES FOUR (4) TIMES DAILY, Disp: , Rfl: 0  •  folic acid-vit B6-vit B12 (FOLBEE) 2.5-25-1 MG tablet tablet, Take 1 tablet by mouth Daily., Disp: 30 tablet, Rfl: 5  •   ipratropium-albuterol (DUO-NEB) 0.5-2.5 mg/3 ml nebulizer, Take 3 mL by nebulization 1 (One) Time for 1 dose., Disp: 3 mL, Rfl: 0  •  levothyroxine (SYNTHROID, LEVOTHROID) 50 MCG tablet, TAKE 1 TABLET BY MOUTH DAILY., Disp: 90 tablet, Rfl: 0  •  lidocaine (LIDODERM) 5 %, Place 1 patch on the skin as directed by provider Daily. Remove & Discard patch within 12 hours, Disp: 30 patch, Rfl: 2  •  midodrine (PROAMATINE) 10 MG tablet, Take 10 mg by mouth 2 (Two) Times a Day., Disp: , Rfl: 5  •  montelukast (SINGULAIR) 10 MG tablet, TAKE 1 TABLET BY MOUTH EVERY NIGHT., Disp: 90 tablet, Rfl: 2  •  nitroglycerin (NITRODUR) 0.2 MG/HR patch, PLACE 1 PATCH ON THE SKIN AS DIRECTED BY PROVIDER DAILY., Disp: 90 each, Rfl: 1  •  polyethylene glycol (MIRALAX) powder, MIX 1 CAPFUL (17G) IN 8 OUNCES OF LIQUID AND DRINK BY MOUTH EVERY DAY FOR CONSTIPATION, Disp: 527 g, Rfl: 2  •  predniSONE (DELTASONE) 20 MG tablet, Take 2 tablets at the same time daily x5 days, Disp: 10 tablet, Rfl: 0  •  ranolazine (RANEXA) 500 MG 12 hr tablet, TAKE 1 TABLET BY MOUTH 2 (TWO) TIMES A DAY., Disp: 60 tablet, Rfl: 1  •  sertraline (ZOLOFT) 50 MG tablet, TAKE 1 TABLET BY MOUTH DAILY., Disp: 90 tablet, Rfl: 1  •  sodium bicarbonate 650 MG tablet, Take 1 tablet by mouth 2 (Two) Times a Day., Disp: 60 tablet, Rfl: 3  •  traMADol (ULTRAM) 50 MG tablet, Take 1 tablet by mouth Every 6 (Six) Hours As Needed for Moderate Pain ., Disp: 120 tablet, Rfl: 2  •  vitamin D (ERGOCALCIFEROL) 1.25 MG (87817 UT) capsule capsule, TAKE 1 CAPSULE BY MOUTH EVERY 30 (THIRTY) DAYS., Disp: 3 capsule, Rfl: 3  •  budesonide (Pulmicort) 0.5 MG/2ML nebulizer solution, Take 2 mL by nebulization Daily., Disp: 120 mL, Rfl: 3    Assessment/Plan   Problems Addressed this Visit        Respiratory    Chronic obstructive pulmonary disease (CMS/[facility]) - Primary (Chronic)    Relevant Medications    budesonide (Pulmicort) 0.5 MG/2ML nebulizer solution    COPD with acute exacerbation (CMS/[facility])  (Chronic)    Relevant Medications    budesonide (Pulmicort) 0.5 MG/2ML nebulizer solution    Acute on chronic respiratory failure with hypoxia (CMS/[facility]) (Chronic)    Pulmonary emphysema (CMS/[facility])    Relevant Medications    budesonide (Pulmicort) 0.5 MG/2ML nebulizer solution    Dyspnea      Diagnoses       Codes Comments    Mucopurulent chronic bronchitis (CMS/[facility])    -  Primary ICD-10-CM: J41.1  ICD-9-CM: 491.1     COPD with acute exacerbation (CMS/[facility])     ICD-10-CM: J44.1  ICD-9-CM: 491.21     Acute on chronic respiratory failure with hypoxia (CMS/[facility])     ICD-10-CM: J96.21  ICD-9-CM: 518.84, 799.02     Panlobular emphysema (CMS/[facility])     ICD-10-CM: J43.1  ICD-9-CM: 492.8     Dyspnea on exertion     ICD-10-CM: R06.00  ICD-9-CM: 786.09           She has extremely severe COPD with chronic hypoxia and respiratory failure.  The only thing that I can think of in her regimen is to improve her absorption of inhaled steroids by taking her off of the Breo and putting her on desonide and one of her for 4 times daily nebulizer treatments.  Think it is highly doubtful that she gets much absorption from her Breo inhaler once a day.  Like to see her back in 3 months to see if this is made any improvement in her cough or dyspnea      This document has been electronically signed by Chava Dubois MD on November 3, 2020 10:01 CST

## 2020-11-04 ENCOUNTER — TELEPHONE (OUTPATIENT)
Dept: ADMINISTRATIVE | Facility: HOSPITAL | Age: 85
End: 2020-11-04

## 2020-11-06 ENCOUNTER — TELEPHONE (OUTPATIENT)
Dept: FAMILY MEDICINE CLINIC | Facility: CLINIC | Age: 85
End: 2020-11-06

## 2020-11-06 ENCOUNTER — APPOINTMENT (OUTPATIENT)
Dept: CT IMAGING | Facility: HOSPITAL | Age: 85
End: 2020-11-06

## 2020-11-06 ENCOUNTER — HOSPITAL ENCOUNTER (EMERGENCY)
Facility: HOSPITAL | Age: 85
Discharge: HOME | End: 2020-11-07
Attending: EMERGENCY MEDICINE | Admitting: EMERGENCY MEDICINE

## 2020-11-06 ENCOUNTER — APPOINTMENT (OUTPATIENT)
Dept: GENERAL RADIOLOGY | Facility: HOSPITAL | Age: 85
End: 2020-11-06

## 2020-11-06 DIAGNOSIS — R51.9 ACUTE NONINTRACTABLE HEADACHE, UNSPECIFIED HEADACHE TYPE: ICD-10-CM

## 2020-11-06 DIAGNOSIS — I16.0 HYPERTENSIVE URGENCY: Primary | ICD-10-CM

## 2020-11-06 LAB
ALBUMIN SERPL-MCNC: 4 G/DL (ref 3.5–5.2)
ALBUMIN/GLOB SERPL: 1.5 G/DL
ALP SERPL-CCNC: 82 U/L (ref 39–117)
ALT SERPL W P-5'-P-CCNC: 11 U/L (ref 1–33)
ANION GAP SERPL CALCULATED.3IONS-SCNC: 11 MMOL/L (ref 5–15)
AST SERPL-CCNC: 14 U/L (ref 1–32)
BASOPHILS # BLD AUTO: 0.04 10*3/MM3 (ref 0–0.2)
BASOPHILS NFR BLD AUTO: 0.4 % (ref 0–1.5)
BILIRUB SERPL-MCNC: 0.3 MG/DL (ref 0–1.2)
BUN SERPL-MCNC: 29 MG/DL (ref 8–23)
BUN/CREAT SERPL: 27.6 (ref 7–25)
CALCIUM SPEC-SCNC: 9 MG/DL (ref 8.6–10.5)
CHLORIDE SERPL-SCNC: 94 MMOL/L (ref 98–107)
CO2 SERPL-SCNC: 29 MMOL/L (ref 22–29)
CREAT SERPL-MCNC: 1.05 MG/DL (ref 0.57–1)
DEPRECATED RDW RBC AUTO: 44.5 FL (ref 37–54)
EOSINOPHIL # BLD AUTO: 0.14 10*3/MM3 (ref 0–0.4)
EOSINOPHIL NFR BLD AUTO: 1.4 % (ref 0.3–6.2)
ERYTHROCYTE [DISTWIDTH] IN BLOOD BY AUTOMATED COUNT: 12.4 % (ref 12.3–15.4)
GFR SERPL CREATININE-BSD FRML MDRD: 50 ML/MIN/1.73
GLOBULIN UR ELPH-MCNC: 2.6 GM/DL
GLUCOSE SERPL-MCNC: 117 MG/DL (ref 65–99)
HCT VFR BLD AUTO: 35 % (ref 34–46.6)
HGB BLD-MCNC: 11.6 G/DL (ref 12–15.9)
HOLD SPECIMEN: NORMAL
HOLD SPECIMEN: NORMAL
IMM GRANULOCYTES # BLD AUTO: 0.05 10*3/MM3 (ref 0–0.05)
IMM GRANULOCYTES NFR BLD AUTO: 0.5 % (ref 0–0.5)
LYMPHOCYTES # BLD AUTO: 1.86 10*3/MM3 (ref 0.7–3.1)
LYMPHOCYTES NFR BLD AUTO: 18.2 % (ref 19.6–45.3)
MAGNESIUM SERPL-MCNC: 2.2 MG/DL (ref 1.6–2.4)
MCH RBC QN AUTO: 32.3 PG (ref 26.6–33)
MCHC RBC AUTO-ENTMCNC: 33.1 G/DL (ref 31.5–35.7)
MCV RBC AUTO: 97.5 FL (ref 79–97)
MONOCYTES # BLD AUTO: 0.67 10*3/MM3 (ref 0.1–0.9)
MONOCYTES NFR BLD AUTO: 6.5 % (ref 5–12)
NEUTROPHILS NFR BLD AUTO: 7.48 10*3/MM3 (ref 1.7–7)
NEUTROPHILS NFR BLD AUTO: 73 % (ref 42.7–76)
NRBC BLD AUTO-RTO: 0 /100 WBC (ref 0–0.2)
NT-PROBNP SERPL-MCNC: 1896 PG/ML (ref 0–1800)
PLATELET # BLD AUTO: 258 10*3/MM3 (ref 140–450)
PMV BLD AUTO: 9.6 FL (ref 6–12)
POTASSIUM SERPL-SCNC: 3.6 MMOL/L (ref 3.5–5.2)
PROT SERPL-MCNC: 6.6 G/DL (ref 6–8.5)
RBC # BLD AUTO: 3.59 10*6/MM3 (ref 3.77–5.28)
SODIUM SERPL-SCNC: 134 MMOL/L (ref 136–145)
TROPONIN T SERPL-MCNC: 0.01 NG/ML (ref 0–0.03)
WBC # BLD AUTO: 10.24 10*3/MM3 (ref 3.4–10.8)
WHOLE BLOOD HOLD SPECIMEN: NORMAL
WHOLE BLOOD HOLD SPECIMEN: NORMAL

## 2020-11-06 PROCEDURE — 85025 COMPLETE CBC W/AUTO DIFF WBC: CPT | Performed by: EMERGENCY MEDICINE

## 2020-11-06 PROCEDURE — 93010 ELECTROCARDIOGRAM REPORT: CPT | Performed by: INTERNAL MEDICINE

## 2020-11-06 PROCEDURE — 80053 COMPREHEN METABOLIC PANEL: CPT | Performed by: EMERGENCY MEDICINE

## 2020-11-06 PROCEDURE — 99285 EMERGENCY DEPT VISIT HI MDM: CPT

## 2020-11-06 PROCEDURE — 93005 ELECTROCARDIOGRAM TRACING: CPT | Performed by: EMERGENCY MEDICINE

## 2020-11-06 PROCEDURE — 96374 THER/PROPH/DIAG INJ IV PUSH: CPT

## 2020-11-06 PROCEDURE — 83880 ASSAY OF NATRIURETIC PEPTIDE: CPT | Performed by: EMERGENCY MEDICINE

## 2020-11-06 PROCEDURE — 71045 X-RAY EXAM CHEST 1 VIEW: CPT

## 2020-11-06 PROCEDURE — 70450 CT HEAD/BRAIN W/O DYE: CPT

## 2020-11-06 PROCEDURE — 84484 ASSAY OF TROPONIN QUANT: CPT | Performed by: EMERGENCY MEDICINE

## 2020-11-06 PROCEDURE — 83735 ASSAY OF MAGNESIUM: CPT | Performed by: EMERGENCY MEDICINE

## 2020-11-06 RX ORDER — SODIUM CHLORIDE 0.9 % (FLUSH) 0.9 %
10 SYRINGE (ML) INJECTION AS NEEDED
Status: DISCONTINUED | OUTPATIENT
Start: 2020-11-06 | End: 2020-11-07 | Stop reason: HOSPADM

## 2020-11-06 RX ORDER — HYDRALAZINE HYDROCHLORIDE 25 MG/1
25 TABLET, FILM COATED ORAL ONCE
Status: COMPLETED | OUTPATIENT
Start: 2020-11-06 | End: 2020-11-06

## 2020-11-06 RX ORDER — LABETALOL HYDROCHLORIDE 5 MG/ML
5 INJECTION, SOLUTION INTRAVENOUS ONCE
Status: COMPLETED | OUTPATIENT
Start: 2020-11-06 | End: 2020-11-06

## 2020-11-06 RX ORDER — CLONIDINE HYDROCHLORIDE 0.1 MG/1
0.1 TABLET ORAL ONCE
Status: COMPLETED | OUTPATIENT
Start: 2020-11-06 | End: 2020-11-06

## 2020-11-06 RX ADMIN — CLONIDINE HYDROCHLORIDE 0.1 MG: 0.1 TABLET ORAL at 20:47

## 2020-11-06 RX ADMIN — LABETALOL HYDROCHLORIDE 5 MG: 5 INJECTION INTRAVENOUS at 23:01

## 2020-11-06 RX ADMIN — HYDRALAZINE HYDROCHLORIDE 25 MG: 25 TABLET ORAL at 22:12

## 2020-11-06 NOTE — TELEPHONE ENCOUNTER
Caller: Kelly Madison (Shyann)    Relationship: Emergency Contact    Best call back number: [phone] (H)    Caller requesting test results:SHYANN    What test was performed: LAB    When was the test performed: 10/29/2020    Where was the test performed: IN OFFICE    Additional notes:

## 2020-11-07 ENCOUNTER — HOSPITAL ENCOUNTER (OUTPATIENT)
Facility: HOSPITAL | Age: 85
Setting detail: OBSERVATION
Discharge: HOME HEALTH | End: 2020-11-12
Attending: EMERGENCY MEDICINE | Admitting: INTERNAL MEDICINE

## 2020-11-07 ENCOUNTER — APPOINTMENT (OUTPATIENT)
Dept: CT IMAGING | Facility: HOSPITAL | Age: 85
End: 2020-11-07

## 2020-11-07 ENCOUNTER — APPOINTMENT (OUTPATIENT)
Dept: CARDIOLOGY | Facility: HOSPITAL | Age: 85
End: 2020-11-07

## 2020-11-07 ENCOUNTER — APPOINTMENT (OUTPATIENT)
Dept: GENERAL RADIOLOGY | Facility: HOSPITAL | Age: 85
End: 2020-11-07

## 2020-11-07 VITALS
SYSTOLIC BLOOD PRESSURE: 182 MMHG | WEIGHT: 145 LBS | DIASTOLIC BLOOD PRESSURE: 74 MMHG | OXYGEN SATURATION: 99 % | BODY MASS INDEX: 21.98 KG/M2 | TEMPERATURE: 99 F | HEART RATE: 60 BPM | HEIGHT: 68 IN | RESPIRATION RATE: 20 BRPM

## 2020-11-07 DIAGNOSIS — I49.5 SSS (SICK SINUS SYNDROME) (HCC): ICD-10-CM

## 2020-11-07 DIAGNOSIS — I16.0 HYPERTENSIVE URGENCY: ICD-10-CM

## 2020-11-07 DIAGNOSIS — Z78.9 IMPAIRED MOBILITY AND ADLS: ICD-10-CM

## 2020-11-07 DIAGNOSIS — Z74.09 IMPAIRED FUNCTIONAL MOBILITY, BALANCE, GAIT, AND ENDURANCE: ICD-10-CM

## 2020-11-07 DIAGNOSIS — I10 BENIGN HYPERTENSION: ICD-10-CM

## 2020-11-07 DIAGNOSIS — Z74.09 IMPAIRED MOBILITY AND ADLS: ICD-10-CM

## 2020-11-07 DIAGNOSIS — Z98.61 HISTORY OF PTCA: ICD-10-CM

## 2020-11-07 DIAGNOSIS — R06.00 DYSPNEA, UNSPECIFIED TYPE: ICD-10-CM

## 2020-11-07 DIAGNOSIS — J44.1 COPD WITH ACUTE EXACERBATION (HCC): Primary | Chronic | ICD-10-CM

## 2020-11-07 DIAGNOSIS — G45.9 TIA (TRANSIENT ISCHEMIC ATTACK): ICD-10-CM

## 2020-11-07 DIAGNOSIS — I25.10 ATHEROSCLEROSIS OF NATIVE CORONARY ARTERY OF NATIVE HEART, ANGINA PRESENCE UNSPECIFIED: ICD-10-CM

## 2020-11-07 DIAGNOSIS — J44.1 COPD EXACERBATION (HCC): ICD-10-CM

## 2020-11-07 LAB
ALBUMIN SERPL-MCNC: 4.2 G/DL (ref 3.5–5.2)
ALBUMIN/GLOB SERPL: 1.7 G/DL
ALP SERPL-CCNC: 77 U/L (ref 39–117)
ALT SERPL W P-5'-P-CCNC: 10 U/L (ref 1–33)
ANION GAP SERPL CALCULATED.3IONS-SCNC: 15 MMOL/L (ref 5–15)
ARTERIAL PATENCY WRIST A: ABNORMAL
AST SERPL-CCNC: 15 U/L (ref 1–32)
ATMOSPHERIC PRESS: 751 MMHG
BASE EXCESS BLDA CALC-SCNC: 7 MMOL/L (ref 0–2)
BDY SITE: ABNORMAL
BH CV ECHO MEAS - ACS: 1.6 CM
BH CV ECHO MEAS - AO MAX PG (FULL): 5 MMHG
BH CV ECHO MEAS - AO MAX PG: 13.5 MMHG
BH CV ECHO MEAS - AO MEAN PG (FULL): 3 MMHG
BH CV ECHO MEAS - AO MEAN PG: 7 MMHG
BH CV ECHO MEAS - AO ROOT AREA (BSA CORRECTED): 1.8
BH CV ECHO MEAS - AO ROOT AREA: 8 CM^2
BH CV ECHO MEAS - AO ROOT DIAM: 3.2 CM
BH CV ECHO MEAS - AO V2 MAX: 184 CM/SEC
BH CV ECHO MEAS - AO V2 MEAN: 119 CM/SEC
BH CV ECHO MEAS - AO V2 VTI: 36.3 CM
BH CV ECHO MEAS - AVA(I,A): 2.3 CM^2
BH CV ECHO MEAS - AVA(I,D): 2.3 CM^2
BH CV ECHO MEAS - AVA(V,A): 2.2 CM^2
BH CV ECHO MEAS - AVA(V,D): 2.2 CM^2
BH CV ECHO MEAS - BSA(HAYCOCK): 1.8 M^2
BH CV ECHO MEAS - BSA: 1.8 M^2
BH CV ECHO MEAS - BZI_BMI: 21.7 KILOGRAMS/M^2
BH CV ECHO MEAS - BZI_METRIC_HEIGHT: 172.7 CM
BH CV ECHO MEAS - BZI_METRIC_WEIGHT: 64.9 KG
BH CV ECHO MEAS - EDV(CUBED): 87.5 ML
BH CV ECHO MEAS - EDV(MOD-SP2): 62.1 ML
BH CV ECHO MEAS - EDV(MOD-SP4): 68.5 ML
BH CV ECHO MEAS - EDV(TEICH): 89.6 ML
BH CV ECHO MEAS - EF(CUBED): 73 %
BH CV ECHO MEAS - EF(MOD-SP2): 55.4 %
BH CV ECHO MEAS - EF(MOD-SP4): 51.1 %
BH CV ECHO MEAS - EF(TEICH): 64.9 %
BH CV ECHO MEAS - ESV(CUBED): 23.6 ML
BH CV ECHO MEAS - ESV(MOD-SP2): 27.7 ML
BH CV ECHO MEAS - ESV(MOD-SP4): 33.5 ML
BH CV ECHO MEAS - ESV(TEICH): 31.4 ML
BH CV ECHO MEAS - FS: 35.4 %
BH CV ECHO MEAS - IVS/LVPW: 1.1
BH CV ECHO MEAS - IVSD: 1.2 CM
BH CV ECHO MEAS - LA DIMENSION: 4 CM
BH CV ECHO MEAS - LA/AO: 1.2
BH CV ECHO MEAS - LV DIASTOLIC VOL/BSA (35-75): 38.6 ML/M^2
BH CV ECHO MEAS - LV MASS(C)D: 172.5 GRAMS
BH CV ECHO MEAS - LV MASS(C)DI: 97.3 GRAMS/M^2
BH CV ECHO MEAS - LV MAX PG: 8.5 MMHG
BH CV ECHO MEAS - LV MEAN PG: 4 MMHG
BH CV ECHO MEAS - LV SYSTOLIC VOL/BSA (12-30): 18.9 ML/M^2
BH CV ECHO MEAS - LV V1 MAX: 146 CM/SEC
BH CV ECHO MEAS - LV V1 MEAN: 99.2 CM/SEC
BH CV ECHO MEAS - LV V1 VTI: 29.6 CM
BH CV ECHO MEAS - LVIDD: 4.4 CM
BH CV ECHO MEAS - LVIDS: 2.9 CM
BH CV ECHO MEAS - LVLD AP2: 7.7 CM
BH CV ECHO MEAS - LVLD AP4: 7.3 CM
BH CV ECHO MEAS - LVLS AP2: 6.8 CM
BH CV ECHO MEAS - LVLS AP4: 6.9 CM
BH CV ECHO MEAS - LVOT AREA (M): 2.8 CM^2
BH CV ECHO MEAS - LVOT AREA: 2.8 CM^2
BH CV ECHO MEAS - LVOT DIAM: 1.9 CM
BH CV ECHO MEAS - LVPWD: 1.1 CM
BH CV ECHO MEAS - MR MAX PG: 38.2 MMHG
BH CV ECHO MEAS - MR MAX VEL: 309 CM/SEC
BH CV ECHO MEAS - MV A MAX VEL: 114 CM/SEC
BH CV ECHO MEAS - MV DEC SLOPE: 392 CM/SEC^2
BH CV ECHO MEAS - MV E MAX VEL: 57.8 CM/SEC
BH CV ECHO MEAS - MV E/A: 0.51
BH CV ECHO MEAS - MV MAX PG: 7.4 MMHG
BH CV ECHO MEAS - MV MEAN PG: 2 MMHG
BH CV ECHO MEAS - MV P1/2T MAX VEL: 65.8 CM/SEC
BH CV ECHO MEAS - MV P1/2T: 49.2 MSEC
BH CV ECHO MEAS - MV V2 MAX: 136 CM/SEC
BH CV ECHO MEAS - MV V2 MEAN: 66.1 CM/SEC
BH CV ECHO MEAS - MV V2 VTI: 21.1 CM
BH CV ECHO MEAS - MVA P1/2T LCG: 3.3 CM^2
BH CV ECHO MEAS - MVA(P1/2T): 4.5 CM^2
BH CV ECHO MEAS - MVA(VTI): 4 CM^2
BH CV ECHO MEAS - PA MAX PG: 3.5 MMHG
BH CV ECHO MEAS - PA V2 MAX: 93.8 CM/SEC
BH CV ECHO MEAS - PULM SYS VEL: 40.8 CM/SEC
BH CV ECHO MEAS - RAP SYSTOLE: 5 MMHG
BH CV ECHO MEAS - RVDD: 1.7 CM
BH CV ECHO MEAS - RVSP: 17.1 MMHG
BH CV ECHO MEAS - SI(AO): 164.7 ML/M^2
BH CV ECHO MEAS - SI(CUBED): 36 ML/M^2
BH CV ECHO MEAS - SI(LVOT): 47.4 ML/M^2
BH CV ECHO MEAS - SI(MOD-SP2): 19.4 ML/M^2
BH CV ECHO MEAS - SI(MOD-SP4): 19.7 ML/M^2
BH CV ECHO MEAS - SI(TEICH): 32.8 ML/M^2
BH CV ECHO MEAS - SV(AO): 291.9 ML
BH CV ECHO MEAS - SV(CUBED): 63.9 ML
BH CV ECHO MEAS - SV(LVOT): 83.9 ML
BH CV ECHO MEAS - SV(MOD-SP2): 34.4 ML
BH CV ECHO MEAS - SV(MOD-SP4): 35 ML
BH CV ECHO MEAS - SV(TEICH): 58.2 ML
BH CV ECHO MEAS - TR MAX VEL: 174 CM/SEC
BILIRUB SERPL-MCNC: 0.4 MG/DL (ref 0–1.2)
BUN SERPL-MCNC: 22 MG/DL (ref 8–23)
BUN/CREAT SERPL: 21.8 (ref 7–25)
CALCIUM SPEC-SCNC: 9 MG/DL (ref 8.6–10.5)
CHLORIDE SERPL-SCNC: 96 MMOL/L (ref 98–107)
CHOLEST SERPL-MCNC: 316 MG/DL (ref 0–200)
CO2 SERPL-SCNC: 28 MMOL/L (ref 22–29)
CREAT SERPL-MCNC: 1.01 MG/DL (ref 0.57–1)
DEPRECATED RDW RBC AUTO: 43.4 FL (ref 37–54)
ERYTHROCYTE [DISTWIDTH] IN BLOOD BY AUTOMATED COUNT: 12.6 % (ref 12.3–15.4)
GAS FLOW AIRWAY: 3.5 LPM
GFR SERPL CREATININE-BSD FRML MDRD: 52 ML/MIN/1.73
GLOBULIN UR ELPH-MCNC: 2.5 GM/DL
GLUCOSE BLDC GLUCOMTR-MCNC: 108 MG/DL (ref 70–130)
GLUCOSE SERPL-MCNC: 126 MG/DL (ref 65–99)
HBA1C MFR BLD: 5 % (ref 4.8–5.6)
HCO3 BLDA-SCNC: 32.6 MMOL/L (ref 20–26)
HCT VFR BLD AUTO: 35.2 % (ref 34–46.6)
HDLC SERPL-MCNC: 76 MG/DL (ref 40–60)
HGB BLD-MCNC: 11.9 G/DL (ref 12–15.9)
LDLC SERPL CALC-MCNC: 222 MG/DL (ref 0–100)
LDLC/HDLC SERPL: 2.87 {RATIO}
Lab: ABNORMAL
MAXIMAL PREDICTED HEART RATE: 133 BPM
MCH RBC QN AUTO: 32.1 PG (ref 26.6–33)
MCHC RBC AUTO-ENTMCNC: 33.8 G/DL (ref 31.5–35.7)
MCV RBC AUTO: 94.9 FL (ref 79–97)
MODALITY: ABNORMAL
PCO2 BLDA: 49.4 MM HG (ref 35–45)
PH BLDA: 7.43 PH UNITS (ref 7.35–7.45)
PLATELET # BLD AUTO: 170 10*3/MM3 (ref 140–450)
PMV BLD AUTO: 12.3 FL (ref 6–12)
PO2 BLDA: 105 MM HG (ref 83–108)
POTASSIUM SERPL-SCNC: 3.5 MMOL/L (ref 3.5–5.2)
PROT SERPL-MCNC: 6.7 G/DL (ref 6–8.5)
RBC # BLD AUTO: 3.71 10*6/MM3 (ref 3.77–5.28)
SAO2 % BLDCOA: 98.7 % (ref 94–99)
SARS-COV-2 N GENE RESP QL NAA+PROBE: NOT DETECTED
SODIUM SERPL-SCNC: 139 MMOL/L (ref 136–145)
STRESS TARGET HR: 113 BPM
TRIGL SERPL-MCNC: 108 MG/DL (ref 0–150)
VENTILATOR MODE: ABNORMAL
VLDLC SERPL-MCNC: 18 MG/DL (ref 5–40)
WBC # BLD AUTO: 9.14 10*3/MM3 (ref 3.4–10.8)

## 2020-11-07 PROCEDURE — 25010000002 METHYLPREDNISOLONE PER 40 MG: Performed by: INTERNAL MEDICINE

## 2020-11-07 PROCEDURE — 70450 CT HEAD/BRAIN W/O DYE: CPT

## 2020-11-07 PROCEDURE — 96365 THER/PROPH/DIAG IV INF INIT: CPT

## 2020-11-07 PROCEDURE — 87635 SARS-COV-2 COVID-19 AMP PRB: CPT | Performed by: EMERGENCY MEDICINE

## 2020-11-07 PROCEDURE — 25010000002 FUROSEMIDE PER 20 MG: Performed by: INTERNAL MEDICINE

## 2020-11-07 PROCEDURE — 82803 BLOOD GASES ANY COMBINATION: CPT

## 2020-11-07 PROCEDURE — C9803 HOPD COVID-19 SPEC COLLECT: HCPCS

## 2020-11-07 PROCEDURE — G0378 HOSPITAL OBSERVATION PER HR: HCPCS

## 2020-11-07 PROCEDURE — 25010000002 MAGNESIUM SULFATE IN D5W 1G/100ML (PREMIX) 1-5 GM/100ML-% SOLUTION: Performed by: EMERGENCY MEDICINE

## 2020-11-07 PROCEDURE — 80061 LIPID PANEL: CPT | Performed by: HOSPITALIST

## 2020-11-07 PROCEDURE — 94760 N-INVAS EAR/PLS OXIMETRY 1: CPT

## 2020-11-07 PROCEDURE — 96375 TX/PRO/DX INJ NEW DRUG ADDON: CPT

## 2020-11-07 PROCEDURE — 85027 COMPLETE CBC AUTOMATED: CPT | Performed by: HOSPITALIST

## 2020-11-07 PROCEDURE — 36600 WITHDRAWAL OF ARTERIAL BLOOD: CPT

## 2020-11-07 PROCEDURE — 36415 COLL VENOUS BLD VENIPUNCTURE: CPT | Performed by: HOSPITALIST

## 2020-11-07 PROCEDURE — 71045 X-RAY EXAM CHEST 1 VIEW: CPT

## 2020-11-07 PROCEDURE — 82962 GLUCOSE BLOOD TEST: CPT

## 2020-11-07 PROCEDURE — 93306 TTE W/DOPPLER COMPLETE: CPT

## 2020-11-07 PROCEDURE — 94799 UNLISTED PULMONARY SVC/PX: CPT

## 2020-11-07 PROCEDURE — 99284 EMERGENCY DEPT VISIT MOD MDM: CPT

## 2020-11-07 PROCEDURE — 94640 AIRWAY INHALATION TREATMENT: CPT

## 2020-11-07 PROCEDURE — 83036 HEMOGLOBIN GLYCOSYLATED A1C: CPT | Performed by: HOSPITALIST

## 2020-11-07 PROCEDURE — 96367 TX/PROPH/DG ADDL SEQ IV INF: CPT

## 2020-11-07 PROCEDURE — 96376 TX/PRO/DX INJ SAME DRUG ADON: CPT

## 2020-11-07 PROCEDURE — 80053 COMPREHEN METABOLIC PANEL: CPT | Performed by: HOSPITALIST

## 2020-11-07 RX ORDER — FUROSEMIDE 10 MG/ML
20 INJECTION INTRAMUSCULAR; INTRAVENOUS DAILY
Status: DISCONTINUED | OUTPATIENT
Start: 2020-11-07 | End: 2020-11-09

## 2020-11-07 RX ORDER — RANOLAZINE 500 MG/1
500 TABLET, EXTENDED RELEASE ORAL 2 TIMES DAILY
Status: DISCONTINUED | OUTPATIENT
Start: 2020-11-07 | End: 2020-11-12 | Stop reason: HOSPADM

## 2020-11-07 RX ORDER — AMLODIPINE BESYLATE 10 MG/1
10 TABLET ORAL
Status: DISCONTINUED | OUTPATIENT
Start: 2020-11-07 | End: 2020-11-12 | Stop reason: HOSPADM

## 2020-11-07 RX ORDER — BUDESONIDE 0.5 MG/2ML
0.5 INHALANT ORAL
Status: DISCONTINUED | OUTPATIENT
Start: 2020-11-07 | End: 2020-11-12 | Stop reason: HOSPADM

## 2020-11-07 RX ORDER — LEVOTHYROXINE SODIUM 0.05 MG/1
50 TABLET ORAL DAILY
Status: DISCONTINUED | OUTPATIENT
Start: 2020-11-07 | End: 2020-11-12 | Stop reason: HOSPADM

## 2020-11-07 RX ORDER — ASPIRIN 300 MG/1
300 SUPPOSITORY RECTAL DAILY
Status: DISCONTINUED | OUTPATIENT
Start: 2020-11-07 | End: 2020-11-07

## 2020-11-07 RX ORDER — SODIUM CHLORIDE 0.9 % (FLUSH) 0.9 %
10 SYRINGE (ML) INJECTION EVERY 12 HOURS SCHEDULED
Status: DISCONTINUED | OUTPATIENT
Start: 2020-11-07 | End: 2020-11-12 | Stop reason: HOSPADM

## 2020-11-07 RX ORDER — ATORVASTATIN CALCIUM 40 MG/1
80 TABLET, FILM COATED ORAL NIGHTLY
Status: DISCONTINUED | OUTPATIENT
Start: 2020-11-07 | End: 2020-11-12 | Stop reason: HOSPADM

## 2020-11-07 RX ORDER — MONTELUKAST SODIUM 10 MG/1
10 TABLET ORAL NIGHTLY
Status: DISCONTINUED | OUTPATIENT
Start: 2020-11-07 | End: 2020-11-12 | Stop reason: HOSPADM

## 2020-11-07 RX ORDER — SODIUM CHLORIDE 0.9 % (FLUSH) 0.9 %
10 SYRINGE (ML) INJECTION AS NEEDED
Status: DISCONTINUED | OUTPATIENT
Start: 2020-11-07 | End: 2020-11-12 | Stop reason: HOSPADM

## 2020-11-07 RX ORDER — ONDANSETRON 2 MG/ML
4 INJECTION INTRAMUSCULAR; INTRAVENOUS EVERY 6 HOURS PRN
Status: DISCONTINUED | OUTPATIENT
Start: 2020-11-07 | End: 2020-11-12 | Stop reason: HOSPADM

## 2020-11-07 RX ORDER — FUROSEMIDE 20 MG/1
20 TABLET ORAL DAILY
COMMUNITY
End: 2021-01-01 | Stop reason: DRUGHIGH

## 2020-11-07 RX ORDER — MAGNESIUM SULFATE 1 G/100ML
1 INJECTION INTRAVENOUS ONCE
Status: COMPLETED | OUTPATIENT
Start: 2020-11-07 | End: 2020-11-07

## 2020-11-07 RX ORDER — TRAMADOL HYDROCHLORIDE 50 MG/1
50 TABLET ORAL EVERY 6 HOURS PRN
Status: DISCONTINUED | OUTPATIENT
Start: 2020-11-07 | End: 2020-11-12 | Stop reason: HOSPADM

## 2020-11-07 RX ORDER — FERROUS SULFATE TAB EC 324 MG (65 MG FE EQUIVALENT) 324 (65 FE) MG
324 TABLET DELAYED RESPONSE ORAL 2 TIMES DAILY WITH MEALS
Status: DISCONTINUED | OUTPATIENT
Start: 2020-11-07 | End: 2020-11-12 | Stop reason: HOSPADM

## 2020-11-07 RX ORDER — POLYETHYLENE GLYCOL 3350 17 G/17G
17 POWDER, FOR SOLUTION ORAL DAILY PRN
Status: DISCONTINUED | OUTPATIENT
Start: 2020-11-07 | End: 2020-11-12 | Stop reason: HOSPADM

## 2020-11-07 RX ORDER — CARVEDILOL 6.25 MG/1
6.25 TABLET ORAL 2 TIMES DAILY WITH MEALS
Status: DISCONTINUED | OUTPATIENT
Start: 2020-11-07 | End: 2020-11-12 | Stop reason: HOSPADM

## 2020-11-07 RX ORDER — METHYLPREDNISOLONE SODIUM SUCCINATE 40 MG/ML
40 INJECTION, POWDER, LYOPHILIZED, FOR SOLUTION INTRAMUSCULAR; INTRAVENOUS EVERY 8 HOURS
Status: DISCONTINUED | OUTPATIENT
Start: 2020-11-07 | End: 2020-11-10

## 2020-11-07 RX ORDER — ASPIRIN 81 MG/1
81 TABLET, CHEWABLE ORAL DAILY
Status: DISCONTINUED | OUTPATIENT
Start: 2020-11-07 | End: 2020-11-12 | Stop reason: HOSPADM

## 2020-11-07 RX ORDER — ALBUTEROL SULFATE 2.5 MG/3ML
2.5 SOLUTION RESPIRATORY (INHALATION) EVERY 6 HOURS PRN
Status: DISCONTINUED | OUTPATIENT
Start: 2020-11-07 | End: 2020-11-12 | Stop reason: HOSPADM

## 2020-11-07 RX ADMIN — RANOLAZINE 500 MG: 500 TABLET, FILM COATED, EXTENDED RELEASE ORAL at 09:44

## 2020-11-07 RX ADMIN — ATORVASTATIN CALCIUM 80 MG: 40 TABLET, FILM COATED ORAL at 04:14

## 2020-11-07 RX ADMIN — LEVOTHYROXINE SODIUM 50 MCG: 50 TABLET ORAL at 09:44

## 2020-11-07 RX ADMIN — SODIUM CHLORIDE, PRESERVATIVE FREE 10 ML: 5 INJECTION INTRAVENOUS at 20:20

## 2020-11-07 RX ADMIN — SERTRALINE HYDROCHLORIDE 50 MG: 50 TABLET ORAL at 09:44

## 2020-11-07 RX ADMIN — FERROUS SULFATE TAB EC 324 MG (65 MG FE EQUIVALENT) 324 MG: 324 (65 FE) TABLET DELAYED RESPONSE at 17:13

## 2020-11-07 RX ADMIN — ALBUTEROL SULFATE 2.5 MG: 2.5 SOLUTION RESPIRATORY (INHALATION) at 18:58

## 2020-11-07 RX ADMIN — RANOLAZINE 500 MG: 500 TABLET, FILM COATED, EXTENDED RELEASE ORAL at 20:18

## 2020-11-07 RX ADMIN — SODIUM CHLORIDE, PRESERVATIVE FREE 10 ML: 5 INJECTION INTRAVENOUS at 09:44

## 2020-11-07 RX ADMIN — ASPIRIN 81 MG: 81 TABLET, CHEWABLE ORAL at 09:44

## 2020-11-07 RX ADMIN — MONTELUKAST SODIUM 10 MG: 10 TABLET, COATED ORAL at 20:18

## 2020-11-07 RX ADMIN — METHYLPREDNISOLONE SODIUM SUCCINATE 40 MG: 40 INJECTION, POWDER, FOR SOLUTION INTRAMUSCULAR; INTRAVENOUS at 13:18

## 2020-11-07 RX ADMIN — ALBUTEROL SULFATE 2.5 MG: 2.5 SOLUTION RESPIRATORY (INHALATION) at 15:45

## 2020-11-07 RX ADMIN — MAGNESIUM SULFATE HEPTAHYDRATE 1 G: 1 INJECTION, SOLUTION INTRAVENOUS at 03:22

## 2020-11-07 RX ADMIN — CARVEDILOL 6.25 MG: 6.25 TABLET, FILM COATED ORAL at 17:13

## 2020-11-07 RX ADMIN — METHYLPREDNISOLONE SODIUM SUCCINATE 40 MG: 40 INJECTION, POWDER, FOR SOLUTION INTRAMUSCULAR; INTRAVENOUS at 22:00

## 2020-11-07 RX ADMIN — NICARDIPINE HYDROCHLORIDE 5 MG/HR: 25 INJECTION INTRAVENOUS at 03:40

## 2020-11-07 RX ADMIN — AMLODIPINE BESYLATE 10 MG: 10 TABLET ORAL at 14:45

## 2020-11-07 RX ADMIN — ATORVASTATIN CALCIUM 80 MG: 40 TABLET, FILM COATED ORAL at 20:18

## 2020-11-07 RX ADMIN — FERROUS SULFATE TAB EC 324 MG (65 MG FE EQUIVALENT) 324 MG: 324 (65 FE) TABLET DELAYED RESPONSE at 09:44

## 2020-11-07 RX ADMIN — SODIUM CHLORIDE, PRESERVATIVE FREE 10 ML: 5 INJECTION INTRAVENOUS at 04:14

## 2020-11-07 RX ADMIN — FUROSEMIDE 20 MG: 10 INJECTION, SOLUTION INTRAMUSCULAR; INTRAVENOUS at 13:18

## 2020-11-07 RX ADMIN — BUDESONIDE 0.5 MG: 0.5 INHALANT RESPIRATORY (INHALATION) at 13:28

## 2020-11-07 NOTE — PROGRESS NOTES
Progress Note  Devante Hilliard MD  Hospitalist    Date of visit: 11/7/2020     LOS: 0 days   Patient Care Team:  Vero Arteaga APRN as PCP - General (Nurse Practitioner)    Chief Complaint: Shortness of breath    Subjective     Interval History:     Patient Complaints: Shortness of breath, at times so severe that she could hardly talk and out of control blood pressure values - improved with the help of the current medications.    History taken from: Patient and nursing    Medication Review:   Current Facility-Administered Medications   Medication Dose Route Frequency Provider Last Rate Last Dose   • albuterol (PROVENTIL) nebulizer solution 0.083% 2.5 mg/3mL  2.5 mg Nebulization Q6H PRN Devante Hilliard MD       • aspirin chewable tablet 81 mg  81 mg Oral Daily Fran Yuan MD   81 mg at 11/07/20 0944    Or   • aspirin suppository 300 mg  300 mg Rectal Daily Fran Yuan MD       • atorvastatin (LIPITOR) tablet 80 mg  80 mg Oral Nightly Fran Yuan MD   80 mg at 11/07/20 0414   • budesonide (PULMICORT) nebulizer solution 0.5 mg  0.5 mg Nebulization Daily - RT eDvante Hilliard MD       • carvedilol (COREG) tablet 6.25 mg  6.25 mg Oral BID With Meals Devante Hilliard MD       • ferrous sulfate EC tablet 324 mg  324 mg Oral BID With Meals Fran Yuan MD   324 mg at 11/07/20 0944   • furosemide (LASIX) injection 20 mg  20 mg Intravenous Daily Devante Hilliard MD       • levothyroxine (SYNTHROID, LEVOTHROID) tablet 50 mcg  50 mcg Oral Daily Fran Yuan MD   50 mcg at 11/07/20 0944   • methylPREDNISolone sodium succinate (SOLU-Medrol) injection 40 mg  40 mg Intravenous Q8H Devante Hilliard MD       • montelukast (SINGULAIR) tablet 10 mg  10 mg Oral Nightly Devante Hilliard MD       • niCARdipine (CARDENE) 25 mg in 250 mL NS infusion  5-15 mg/hr Intravenous Titrated Sebas Buitrago, DO   Stopped  at 11/07/20 0445   • ondansetron (ZOFRAN) injection 4 mg  4 mg Intravenous Q6H PRN Fran Yuan MD       • polyethylene glycol (MIRALAX) packet 17 g  17 g Oral Daily PRN Devante Hilliard MD       • ranolazine (RANEXA) 12 hr tablet 500 mg  500 mg Oral BID Fran Yuan MD   500 mg at 11/07/20 0944   • sertraline (ZOLOFT) tablet 50 mg  50 mg Oral Daily Fran Yuan MD   50 mg at 11/07/20 0944   • sodium chloride 0.9 % flush 10 mL  10 mL Intravenous PRN Sebas Buitrago DO       • sodium chloride 0.9 % flush 10 mL  10 mL Intravenous Q12H Fran Yuan MD   10 mL at 11/07/20 0944   • sodium chloride 0.9 % flush 10 mL  10 mL Intravenous PRN Fran Yuan MD       • traMADol (ULTRAM) tablet 50 mg  50 mg Oral Q6H PRN Fran Yuan MD           Review of Systems:   Review of Systems   Constitutional: Positive for fatigue. Negative for fever.   Respiratory: Positive for cough, shortness of breath and wheezing.    Cardiovascular: Negative for chest pain, palpitations and leg swelling.   Gastrointestinal: Negative for abdominal distention, abdominal pain, blood in stool, diarrhea and vomiting.   Genitourinary: Negative for decreased urine volume, dysuria, frequency, hematuria, pelvic pain, urgency and vaginal discharge.   Musculoskeletal: Positive for arthralgias. Negative for back pain, gait problem and joint swelling.   Skin: Positive for pallor. Negative for color change and rash.   Neurological: Positive for weakness. Negative for seizures, syncope, numbness and headaches.   Psychiatric/Behavioral: Negative for agitation, behavioral problems and confusion.   All other systems reviewed and are negative.      Objective     Vital Signs  Temp:  [96.7 °F (35.9 °C)-99 °F (37.2 °C)] 98.4 °F (36.9 °C)  Heart Rate:  [60-85] 67  Resp:  [18-24] 20  BP: (118-235)/()  174/79    Physical Exam:  Physical Exam  Vitals signs reviewed.   Constitutional:       General: She is in acute distress.      Appearance: She is ill-appearing.   HENT:      Head: Normocephalic and atraumatic.   Eyes:      General: No scleral icterus.     Extraocular Movements: Extraocular movements intact.      Pupils: Pupils are equal, round, and reactive to light.   Cardiovascular:      Rate and Rhythm: Normal rate and regular rhythm.   Pulmonary:      Effort: Respiratory distress present.      Breath sounds: Wheezing present. No rales.      Comments: Emphysematous chest  Abdominal:      General: Bowel sounds are normal. There is no distension.      Palpations: Abdomen is soft. There is no mass.      Tenderness: There is no abdominal tenderness.      Hernia: No hernia is present.   Musculoskeletal: Normal range of motion.         General: No swelling, tenderness, deformity or signs of injury.   Skin:     General: Skin is warm and dry.      Coloration: Skin is pale. Skin is not jaundiced.      Findings: No bruising or erythema.   Neurological:      Mental Status: She is alert and oriented to person, place, and time. Mental status is at baseline.      Cranial Nerves: No cranial nerve deficit.      Sensory: No sensory deficit.   Psychiatric:         Mood and Affect: Mood normal.         Behavior: Behavior normal.          Results Review:    Lab Results (last 24 hours)     Procedure Component Value Units Date/Time    COVID PRE-OP / PRE-PROCEDURE SCREENING ORDER (NO ISOLATION) - Swab, Nasopharynx [[id number]]  (Normal) Collected: 11/07/20 0320    Specimen: Swab from Nasopharynx Updated: 11/07/20 0932    Narrative:      The following orders were created for panel order COVID PRE-OP / PRE-PROCEDURE SCREENING ORDER (NO ISOLATION) - Swab, Nasopharynx.  Procedure                               Abnormality         Status                     ---------                               -----------         ------                      COVID-19, [facility]S...[[id number]]  Normal              Final result                 Please view results for these tests on the individual orders.    COVID-19, [facility] IN-HOUSE, NP SWAB IN TRANSPORT MEDIA 8-10 HR TAT - Swab, Nasopharynx [[id number]]  (Normal) Collected: 11/07/20 0320    Specimen: Swab from Nasopharynx Updated: 11/07/20 0932     COVID19 Not Detected    Narrative:      Testing performed by Real Time RT-PCR  This test has not been approved by the [facility] but is authorized under the Emergency Use Act (EUA)    Fact sheet for providers: https://www.fda.gov/media/276836/download    Fact sheet for patients: https://www.fda.gov/media/341162/download        Hemoglobin A1c [[id number]]  (Normal) Collected: 11/07/20 0407    Specimen: Blood Updated: 11/07/20 0558     Hemoglobin A1C 5.00 %     Narrative:      Hemoglobin A1C Ranges:    Increased Risk for Diabetes  5.7% to 6.4%  Diabetes                     >= 6.5%  Diabetic Goal                < 7.0%    POC Glucose Once [[id number]]  (Normal) Collected: 11/07/20 0540    Specimen: Blood Updated: 11/07/20 0556     Glucose 108 mg/dL      Comment: : [id number] MIMA Kim ID: [id number]       Comprehensive Metabolic Panel [[id number]]  (Abnormal) Collected: 11/07/20 0407    Specimen: Blood Updated: 11/07/20 0509     Glucose 126 mg/dL      BUN 22 mg/dL      Creatinine 1.01 mg/dL      Sodium 139 mmol/L      Potassium 3.5 mmol/L      Comment: Slight hemolysis detected by analyzer. Results may be affected.        Chloride 96 mmol/L      CO2 28.0 mmol/L      Calcium 9.0 mg/dL      Total Protein 6.7 g/dL      Albumin 4.20 g/dL      ALT (SGPT) 10 U/L      AST (SGOT) 15 U/L      Comment: Slight hemolysis detected by analyzer. Results may be affected.        Alkaline Phosphatase 77 U/L      Total Bilirubin 0.4 mg/dL      eGFR Non African Amer 52 mL/min/1.73      Globulin 2.5 gm/dL      A/G Ratio 1.7 g/dL      BUN/Creatinine Ratio 21.8     Anion Gap 15.0  mmol/L     Narrative:      GFR Normal >60  Chronic Kidney Disease <60  Kidney Failure <15      Lipid Panel [[id number]]  (Abnormal) Collected: 11/07/20 0407    Specimen: Blood Updated: 11/07/20 0503     Total Cholesterol 316 mg/dL      Triglycerides 108 mg/dL      HDL Cholesterol 76 mg/dL      LDL Cholesterol  222 mg/dL      VLDL Cholesterol 18 mg/dL      LDL/HDL Ratio 2.87    Narrative:      Cholesterol Reference Ranges  (U.S. Department of Health and Human Services ATP III Classifications)    Desirable          <200 mg/dL  Borderline High    200-239 mg/dL  High Risk          >240 mg/dL      Triglyceride Reference Ranges  (U.S. Department of Health and Human Services ATP III Classifications)    Normal           <150 mg/dL  Borderline High  150-199 mg/dL  High             200-499 mg/dL  Very High        >500 mg/dL    HDL Reference Ranges  (U.S. Department of Health and Human Services ATP III Classifcations)    Low     <40 mg/dl (major risk factor for CHD)  High    >60 mg/dl ('negative' risk factor for CHD)        LDL Reference Ranges  (U.S. Department of Health and Human Services ATP III Classifcations)    Optimal          <100 mg/dL  Near Optimal     100-129 mg/dL  Borderline High  130-159 mg/dL  High             160-189 mg/dL  Very High        >189 mg/dL    CBC (No Diff) [[id number]]  (Abnormal) Collected: 11/07/20 0407    Specimen: Blood Updated: 11/07/20 0446     WBC 9.14 10*3/mm3      RBC 3.71 10*6/mm3      Hemoglobin 11.9 g/dL      Hematocrit 35.2 %      MCV 94.9 fL      MCH 32.1 pg      MCHC 33.8 g/dL      RDW 12.6 %      RDW-SD 43.4 fl      MPV 12.3 fL      Platelets 170 10*3/mm3           Imaging Results (Last 24 Hours)     Procedure Component Value Units Date/Time    CT Head Without Contrast [[id number]] Collected: 11/07/20 0239     Updated: 11/07/20 0330    Narrative:      EXAM DESCRIPTION:   CT HEAD WO CONTRAST  Rad[facility]: CT HEAD WITHOUT IV CONTRAST     CLINICAL HISTORY:   headache, slurred  speech.    TECHNIQUE:   Noncontrast CT head.  All CT scans at this facility use dose modulation, iterative  reconstruction, and/or weight based dosing when appropriate to  reduce radiation dose to as low as reasonably achievable.    COMPARISON: CT brain 11/6/2020.    FINDINGS:  There is no acute intracranial bleed. No acute major territorial  infarct is identified. There is stable volume loss and chronic  white matter changes. The subarachnoid spaces appear  unremarkable. There is no midline shift or abnormal mass effect.  No acute fracture is seen.      Impression:        1.  No acute intracranial abnormality.    Electronically signed by:  Jonny Kebede DO  11/7/2020 3:29 AM CST  Workstation: [id number]          Assessment/Plan       Malignant hypertension    COPD with acute exacerbation (CMS/HCC)    Emphysema of lung (CMS/HCC)    Acute on chronic diastolic heart failure (CMS/HCC)    Continue with the IV Cardene drip for the blood pressure control, resume some of her home medications, including Lasix. Follow-up on the Echo ordered on admission.    Start IV steroids, nebulized treatments/inhaled steroids, continue to provide her with supplemental oxygen.  The Covid screen test obtained on admission was negative.    Devante Hilliard MD  11/07/20  13:16 CST

## 2020-11-07 NOTE — ED PROVIDER NOTES
Subjective   87-year-old female is brought back to the emergency department just an hour or 2 after discharge by EMS from home with concern for difficulty with speech.  She was seen for hypertension and reportedly got home and was sitting in a chair trying to talk to her niece and could not find the correct words.  EMS was called and she was brought back.  Blood pressure has returned to greater than 200 systolic.  All speech difficulties have since resolved at time of evaluation.  She denies any numbness, tingling or weakness during the episode.  Continues to have headache.    Family history, surgical history, social history, current medications and allergies are reviewed with the patient and triage documentation and vitals are reviewed.      History provided by:  Patient, medical records and relative   used: No        Review of Systems   Constitutional: Negative for chills, diaphoresis and fever.   HENT: Negative for congestion and sore throat.    Eyes: Negative for photophobia and visual disturbance.   Respiratory: Negative for cough and shortness of breath.    Cardiovascular: Negative for chest pain, palpitations and leg swelling.   Gastrointestinal: Negative for abdominal pain, diarrhea, nausea and vomiting.   Endocrine: Negative for polydipsia, polyphagia and polyuria.   Genitourinary: Negative for dysuria, frequency and urgency.   Musculoskeletal: Negative for arthralgias, back pain, myalgias and neck pain.   Skin: Negative for wound.   Allergic/Immunologic: Negative.    Neurological: Positive for speech difficulty and headaches. Negative for facial asymmetry, weakness, light-headedness and numbness.   Hematological: Negative.    Psychiatric/Behavioral: Negative.        Past Medical History:   Diagnosis Date   • Allergic rhinitis    • Chronic diastolic heart failure (CMS/HCC)    • COPD (chronic obstructive pulmonary disease) (CMS/HCC)    • Coronary arteriosclerosis    • Depression    • GERD  (gastroesophageal reflux disease)    • Hypercholesterolemia    • Hypertension    • Hypothyroidism    • LVH (left ventricular hypertrophy)    • Myocardial infarction (CMS/HCC)    • Osteoarthritis        Allergies   Allergen Reactions   • Ace Inhibitors Dizziness   • Baclofen Delirium     unknown   • Lisinopril Cough     Keeps her awake all night coughing       Past Surgical History:   Procedure Laterality Date   • BREAST SURGERY     • CARDIAC CATHETERIZATION     • CERVICAL SPINE SURGERY     • COLONOSCOPY     • CORONARY ANGIOPLASTY     • ENDOSCOPY N/A 2/14/2018   • ENDOSCOPY AND COLONOSCOPY     • HIP BIPOLAR REPLACEMENT Right 1/23/2018   • PACEMAKER REPLACEMENT         Family History   Problem Relation Age of Onset   • Coronary artery disease Father        Social History     Socioeconomic History   • Marital status:      Spouse name: Not on file   • Number of children: Not on file   • Years of education: Not on file   • Highest education level: Not on file   Social Needs   • Financial resource strain: Not on file   • Food insecurity     Worry: Never true     Inability: Never true   • Transportation needs     Medical: No     Non-medical: No   Tobacco Use   • Smoking status: Former Smoker     Packs/day: 0.50     Years: 50.00     Pack years: 25.00     Types: Cigarettes     Start date: 1950     Quit date: 1/12/2017     Years since quitting: 3.8   • Smokeless tobacco: Never Used   Substance and Sexual Activity   • Alcohol use: No   • Drug use: No   • Sexual activity: Not Currently   Lifestyle   • Physical activity     Days per week: 0 days     Minutes per session: 0 min   • Stress: Not on file   Relationships   • Social connections     Talks on phone: More than three times a week     Gets together: More than three times a week     Attends Latter day service: Never     Active member of club or organization: No     Attends meetings of clubs or organizations: Never     Relationship status:            Objective    Physical Exam  Vitals signs and nursing note reviewed.   Constitutional:       General: She is not in acute distress.     Appearance: Normal appearance. She is not ill-appearing, toxic-appearing or diaphoretic.   HENT:      Head: Normocephalic and atraumatic.      Nose: Nose normal.      Mouth/Throat:      Mouth: Mucous membranes are moist.   Eyes:      Extraocular Movements: Extraocular movements intact.      Conjunctiva/sclera: Conjunctivae normal.      Pupils: Pupils are equal, round, and reactive to light.   Neck:      Musculoskeletal: Normal range of motion.   Cardiovascular:      Rate and Rhythm: Normal rate and regular rhythm.      Pulses: Normal pulses.      Heart sounds: No murmur.   Pulmonary:      Effort: Pulmonary effort is normal. No respiratory distress.      Breath sounds: Normal breath sounds. No wheezing or rhonchi.      Comments: guppie type breaths with inhalation but reported baseline  Abdominal:      General: Bowel sounds are normal.      Palpations: Abdomen is soft.   Musculoskeletal: Normal range of motion.   Skin:     General: Skin is warm and dry.      Capillary Refill: Capillary refill takes less than 2 seconds.   Neurological:      General: No focal deficit present.      Mental Status: She is alert and oriented to person, place, and time.      Cranial Nerves: No cranial nerve deficit.      Sensory: No sensory deficit.      Motor: No weakness.   Psychiatric:         Mood and Affect: Mood normal.         Behavior: Behavior normal.         Procedures  none         ED Course      Labs Reviewed   LIPID PANEL - Abnormal; Notable for the following components:       Result Value    Total Cholesterol 316 (*)     HDL Cholesterol 76 (*)     LDL Cholesterol  222 (*)     All other components within normal limits    Narrative:     Cholesterol Reference Ranges  (U.S. Department of Health and Human Services ATP III Classifications)    Desirable          <200 mg/dL  Borderline High    200-239 mg/dL  High  Risk          >240 mg/dL      Triglyceride Reference Ranges  (U.S. Department of Health and Human Services ATP III Classifications)    Normal           <150 mg/dL  Borderline High  150-199 mg/dL  High             200-499 mg/dL  Very High        >500 mg/dL    HDL Reference Ranges  (U.S. Department of Health and Human Services ATP III Classifcations)    Low     <40 mg/dl (major risk factor for CHD)  High    >60 mg/dl ('negative' risk factor for CHD)        LDL Reference Ranges  (U.S. Department of Health and Human Services ATP III Classifcations)    Optimal          <100 mg/dL  Near Optimal     100-129 mg/dL  Borderline High  130-159 mg/dL  High             160-189 mg/dL  Very High        >189 mg/dL   CBC (NO DIFF) - Abnormal; Notable for the following components:    RBC 3.71 (*)     Hemoglobin 11.9 (*)     MPV 12.3 (*)     All other components within normal limits   COMPREHENSIVE METABOLIC PANEL - Abnormal; Notable for the following components:    Glucose 126 (*)     Creatinine 1.01 (*)     Chloride 96 (*)     eGFR Non  Amer 52 (*)     All other components within normal limits    Narrative:     GFR Normal >60  Chronic Kidney Disease <60  Kidney Failure <15     HEMOGLOBIN A1C - Normal    Narrative:     Hemoglobin A1C Ranges:    Increased Risk for Diabetes  5.7% to 6.4%  Diabetes                     >= 6.5%  Diabetic Goal                < 7.0%   POCT GLUCOSE FINGERSTICK - Normal   COVID PRE-OP / PRE-PROCEDURE SCREENING ORDER (NO ISOLATION)    Narrative:     The following orders were created for panel order COVID PRE-OP / PRE-PROCEDURE SCREENING ORDER (NO ISOLATION) - Swab, Nasopharynx.  Procedure                               Abnormality         Status                     ---------                               -----------         ------                     COVID-19, [facility]HOUS...[[id number]]                      In process                   Please view results for these tests on the individual orders.    COVID-19,[facility]-HOUSE, NP SWAB IN TRANSPORT MEDIA 8-10 HR TAT   POCT GLUCOSE FINGERSTICK   POCT GLUCOSE FINGERSTICK   POCT GLUCOSE FINGERSTICK   POCT GLUCOSE FINGERSTICK   POCT GLUCOSE FINGERSTICK     Ct Head Without Contrast    Result Date: 11/7/2020  Narrative: EXAM DESCRIPTION:  CT HEAD WO CONTRAST RadLex: CT HEAD WITHOUT IV CONTRAST CLINICAL HISTORY: headache, slurred speech. TECHNIQUE: Noncontrast CT head. All CT scans at this facility use dose modulation, iterative reconstruction, and/or weight based dosing when appropriate to reduce radiation dose to as low as reasonably achievable. COMPARISON: CT brain 11/6/2020. FINDINGS: There is no acute intracranial bleed. No acute major territorial infarct is identified. There is stable volume loss and chronic white matter changes. The subarachnoid spaces appear unremarkable. There is no midline shift or abnormal mass effect. No acute fracture is seen.     Impression: 1.  No acute intracranial abnormality. Electronically signed by:  Jonny Kebede DO  11/7/2020 3:29 AM CST Workstation: [id number]    Ct Head Without Contrast    Result Date: 11/6/2020  Narrative: CT head without contrast on 11/6/2020 CLINICAL INDICATION: Headache, hypertension TECHNIQUE:  Multiple axial images are obtained throughout the head without the administration of contrast.  This exam was performed according to our departmental dose-optimization program, which includes automated exposure control, adjustment of the mA and/or kV according to patient size and/or use of iterative reconstruction technique. Total DLP is 845.3 mGy*cm. COMPARISON: 4/2/2019 FINDINGS:  There is generalized cerebral atrophy. There is low density in the periventricular white matter consistent with chronic small vessel ischemic changes. There is no hydrocephalus. There is no hemorrhage. There are no abnormal extra-axial fluid collections. There is no mass, mass effect or midline shift. There is no CT evidence of acute infarct.  No bony abnormality is noted.     Impression: Atrophy and chronic small vessel ischemic changes with no acute intracranial abnormality. Electronically signed by:  Giovanni Hamilton  11/6/2020 9:57 PM CST Workstation: [id number]    Xr Chest 1 View    Result Date: 11/6/2020  Narrative: CHEST X-RAY 1 VIEW on 11/6/2020 CLINICAL INDICATION: Chest pain, hypertension COMPARISON: 9/10/2020 FINDINGS: 2-lead left subclavian pacemaker is noted in place. Multiple wires are noted projecting over the chest. Vascular calcification is noted in the aorta. There is minimal linear atelectasis or scarring in the left lung base. The lungs are otherwise clear. Cardiac, hilar and mediastinal contours are within normal limits. Degenerative changes are noted in the shoulders.     Impression: No acute disease. Electronically signed by:  Giovanni Hamilton  11/6/2020 9:35 PM CST Workstation: [id number]          MDM  Number of Diagnoses or Management Options  Hypertensive urgency:   TIA (transient ischemic attack):      Amount and/or Complexity of Data Reviewed  Clinical lab tests: reviewed  Tests in the radiology section of CPT®: reviewed  Discuss the patient with other providers: yes    Patient Progress  Patient progress: stable    Pressure again elevated.  Repeat head CT reveals no acute abnormality.  Patient started on Cardene and admitted to the stepdown unit.    Final diagnoses:   TIA (transient ischemic attack)   Hypertensive urgency            Sebas Buitrago,   11/07/20 0744

## 2020-11-07 NOTE — PLAN OF CARE
Problem: Adult Inpatient Plan of Care  Goal: Plan of Care Review  Outcome: Ongoing, Progressing  Flowsheets (Taken 11/7/2020 0627)  Plan of Care Reviewed With: patient  Outcome Summary: New admit to unit   Goal Outcome Evaluation:  Plan of Care Reviewed With: patient     Outcome Summary: New admit to unit

## 2020-11-07 NOTE — SIGNIFICANT NOTE
11/07/20 1433   OTHER   Discipline occupational therapist;physical therapist   Rehab Time/Intention   Session Not Performed other (see comments)  (Clinical leader rec hold today and recheck tomorrow;)

## 2020-11-07 NOTE — ED PROVIDER NOTES
Subjective   87-year-old female is brought to the emergency department by her niece for concern for elevated blood pressure and chest pressure and headache.  Patient has been under a lot of stress recently because her brother passed away.  History of hypertension that was previously well controlled.  No recent medication changes.  No shortness of breath.  No lightheadedness.  Frontal headache with no vision change. Presents on her baseline home O2.    Family history, surgical history, social history, current medications and allergies are reviewed with the patient and triage documentation and vitals are reviewed.      History provided by:  Patient   used: No        Review of Systems   Constitutional: Negative for chills and fever.   HENT: Negative for congestion and sore throat.    Eyes: Negative for photophobia and visual disturbance.   Respiratory: Positive for chest tightness. Negative for cough, shortness of breath and wheezing.    Cardiovascular: Negative for chest pain, palpitations and leg swelling.   Gastrointestinal: Negative for abdominal pain, diarrhea, nausea and vomiting.   Endocrine: Negative for polydipsia, polyphagia and polyuria.   Genitourinary: Negative for dysuria, frequency and urgency.   Musculoskeletal: Negative for arthralgias, back pain, myalgias and neck pain.   Skin: Negative for rash and wound.   Allergic/Immunologic: Negative.    Neurological: Positive for headaches. Negative for speech difficulty, weakness, light-headedness and numbness.   Hematological: Negative.    Psychiatric/Behavioral: Negative.        Past Medical History:   Diagnosis Date   • Allergic rhinitis    • Chronic diastolic heart failure (CMS/HCC)    • COPD (chronic obstructive pulmonary disease) (CMS/HCC)    • Coronary arteriosclerosis    • Depression    • GERD (gastroesophageal reflux disease)    • Hypercholesterolemia    • Hypertension    • Hypothyroidism    • LVH (left ventricular hypertrophy)    •  Myocardial infarction (CMS/HCC)    • Osteoarthritis        Allergies   Allergen Reactions   • Ace Inhibitors Dizziness   • Baclofen Delirium     unknown   • Lisinopril Cough     Keeps her awake all night coughing       Past Surgical History:   Procedure Laterality Date   • BREAST SURGERY     • CARDIAC CATHETERIZATION     • CERVICAL SPINE SURGERY     • COLONOSCOPY     • CORONARY ANGIOPLASTY     • ENDOSCOPY N/A 2/14/2018   • ENDOSCOPY AND COLONOSCOPY     • HIP BIPOLAR REPLACEMENT Right 1/23/2018   • PACEMAKER REPLACEMENT         Family History   Problem Relation Age of Onset   • Coronary artery disease Father        Social History     Socioeconomic History   • Marital status:      Spouse name: Not on file   • Number of children: Not on file   • Years of education: Not on file   • Highest education level: Not on file   Social Needs   • Financial resource strain: Not on file   • Food insecurity     Worry: Never true     Inability: Never true   • Transportation needs     Medical: No     Non-medical: No   Tobacco Use   • Smoking status: Former Smoker     Packs/day: 0.50     Years: 50.00     Pack years: 25.00     Types: Cigarettes     Start date: 1950     Quit date: 1/12/2017     Years since quitting: 3.8   • Smokeless tobacco: Never Used   Substance and Sexual Activity   • Alcohol use: No   • Drug use: No   • Sexual activity: Not Currently   Lifestyle   • Physical activity     Days per week: 0 days     Minutes per session: 0 min   • Stress: Not on file   Relationships   • Social connections     Talks on phone: More than three times a week     Gets together: More than three times a week     Attends Pentecostalism service: Never     Active member of club or organization: No     Attends meetings of clubs or organizations: Never     Relationship status:            Objective   Physical Exam  Vitals signs and nursing note reviewed.   Constitutional:       General: She is not in acute distress.     Appearance: Normal  "appearance. She is not ill-appearing, toxic-appearing or diaphoretic.   HENT:      Head: Normocephalic.   Eyes:      Extraocular Movements: Extraocular movements intact.      Conjunctiva/sclera: Conjunctivae normal.      Pupils: Pupils are equal, round, and reactive to light.   Neck:      Musculoskeletal: Normal range of motion and neck supple.   Cardiovascular:      Rate and Rhythm: Normal rate and regular rhythm.      Pulses: Normal pulses.      Heart sounds: No murmur.   Pulmonary:      Effort: Pulmonary effort is normal. No respiratory distress.      Breath sounds: Normal breath sounds. No wheezing.      Comments: Patient doing a consistent guppy type inhaler which she states is her baseline because it \"makes her feel better\".  Abdominal:      General: Bowel sounds are normal.      Palpations: Abdomen is soft.      Tenderness: There is no abdominal tenderness.   Musculoskeletal: Normal range of motion.         General: No swelling or tenderness.      Right lower leg: No edema.      Left lower leg: No edema.   Skin:     General: Skin is warm and dry.      Capillary Refill: Capillary refill takes less than 2 seconds.   Neurological:      General: No focal deficit present.      Mental Status: She is alert and oriented to person, place, and time.      Sensory: No sensory deficit.      Motor: No weakness.   Psychiatric:         Mood and Affect: Mood normal.         Behavior: Behavior normal.         Procedures  none         ED Course      Labs Reviewed   COMPREHENSIVE METABOLIC PANEL - Abnormal; Notable for the following components:       Result Value    Glucose 117 (*)     BUN 29 (*)     Creatinine 1.05 (*)     Sodium 134 (*)     Chloride 94 (*)     eGFR Non African Amer 50 (*)     BUN/Creatinine Ratio 27.6 (*)     All other components within normal limits    Narrative:     GFR Normal >60  Chronic Kidney Disease <60  Kidney Failure <15     BNP (IN-HOUSE) - Abnormal; Notable for the following components:    proBNP " 1,896.0 (*)     All other components within normal limits    Narrative:     Among patients with dyspnea, NT-proBNP is highly sensitive for the detection of acute congestive heart failure. In addition NT-proBNP of <300 pg/ml effectively rules out acute congestive heart failure with 99% negative predictive value.    Results may be falsely decreased if patient taking Biotin.     CBC WITH AUTO DIFFERENTIAL - Abnormal; Notable for the following components:    RBC 3.59 (*)     Hemoglobin 11.6 (*)     MCV 97.5 (*)     Lymphocyte % 18.2 (*)     Neutrophils, Absolute 7.48 (*)     All other components within normal limits   TROPONIN (IN-HOUSE) - Normal    Narrative:     Troponin T Reference Range:  <= 0.03 ng/mL-   Negative for AMI  >0.03 ng/mL-     Abnormal for myocardial necrosis.  Clinicians would have to utilize clinical acumen, EKG, Troponin and serial changes to determine if it is an Acute Myocardial Infarction or myocardial injury due to an underlying chronic condition.       Results may be falsely decreased if patient taking Biotin.     MAGNESIUM - Normal   RAINBOW DRAW    Narrative:     The following orders were created for panel order [place] Draw.  Procedure                               Abnormality         Status                     ---------                               -----------         ------                     Light Blue Top[[id number]]                                   Final result               Green Top (Gel)[[id number]]                                  Final result               Lavender Top[[id number]]                                     Final result               Gold Top - SST[[id number]]                                   Final result                 Please view results for these tests on the individual orders.   LIGHT BLUE TOP   GREEN TOP   LAVENDER TOP   GOLD TOP - SST   CBC AND DIFFERENTIAL    Narrative:     The following orders were created for panel order CBC & Differential.  Procedure                                Abnormality         Status                     ---------                               -----------         ------                     CBC Auto Differential[[id number]]        Abnormal            Final result                 Please view results for these tests on the individual orders.     Ct Head Without Contrast    Result Date: 11/7/2020  Narrative: EXAM DESCRIPTION:  CT HEAD WO CONTRAST RadLex: CT HEAD WITHOUT IV CONTRAST CLINICAL HISTORY: headache, slurred speech. TECHNIQUE: Noncontrast CT head. All CT scans at this facility use dose modulation, iterative reconstruction, and/or weight based dosing when appropriate to reduce radiation dose to as low as reasonably achievable. COMPARISON: CT brain 11/6/2020. FINDINGS: There is no acute intracranial bleed. No acute major territorial infarct is identified. There is stable volume loss and chronic white matter changes. The subarachnoid spaces appear unremarkable. There is no midline shift or abnormal mass effect. No acute fracture is seen.     Impression: 1.  No acute intracranial abnormality. Electronically signed by:  Jonny Kebede DO  11/7/2020 3:29 AM CST Workstation: [id number]    Ct Head Without Contrast    Result Date: 11/6/2020  Narrative: CT head without contrast on 11/6/2020 CLINICAL INDICATION: Headache, hypertension TECHNIQUE:  Multiple axial images are obtained throughout the head without the administration of contrast.  This exam was performed according to our departmental dose-optimization program, which includes automated exposure control, adjustment of the mA and/or kV according to patient size and/or use of iterative reconstruction technique. Total DLP is 845.3 mGy*cm. COMPARISON: 4/2/2019 FINDINGS:  There is generalized cerebral atrophy. There is low density in the periventricular white matter consistent with chronic small vessel ischemic changes. There is no hydrocephalus. There is no hemorrhage. There are no abnormal extra-axial fluid  collections. There is no mass, mass effect or midline shift. There is no CT evidence of acute infarct. No bony abnormality is noted.     Impression: Atrophy and chronic small vessel ischemic changes with no acute intracranial abnormality. Electronically signed by:  Giovanni Hamilton  11/6/2020 9:57 PM CST Workstation: [id number]    Xr Chest 1 View    Result Date: 11/6/2020  Narrative: CHEST X-RAY 1 VIEW on 11/6/2020 CLINICAL INDICATION: Chest pain, hypertension COMPARISON: 9/10/2020 FINDINGS: 2-lead left subclavian pacemaker is noted in place. Multiple wires are noted projecting over the chest. Vascular calcification is noted in the aorta. There is minimal linear atelectasis or scarring in the left lung base. The lungs are otherwise clear. Cardiac, hilar and mediastinal contours are within normal limits. Degenerative changes are noted in the shoulders.     Impression: No acute disease. Electronically signed by:  Giovanni Hamilton  11/6/2020 9:35 PM CST Workstation: [id number]    EKG November 6, 2020 at 2051 reveals normal sinus rhythm rate of 63 bpm.  Right bundle branch block and left anterior fascicular block with T wave inversions in the lateral and inferior leads.  EKG is unchanged from previous.          MDM  Number of Diagnoses or Management Options  Acute nonintractable headache, unspecified headache type:   Hypertensive urgency:      Amount and/or Complexity of Data Reviewed  Clinical lab tests: reviewed  Tests in the radiology section of CPT®: reviewed  Tests in the medicine section of CPT®: reviewed    Patient Progress  Patient progress: stable    Patient finally has improvement of blood pressure after 3 medications in the emergency department.  Work-up unremarkable.  She is feeling better and is agreeable to home with follow-up with primary care for adjustment of blood pressure medications.    Final diagnoses:   Hypertensive urgency   Acute nonintractable headache, unspecified headache type            Iftikhar  Sebas MORRISSEY DO  11/07/20 0609

## 2020-11-07 NOTE — ED NOTES
TC to [facility] in lab to request they collect the cmp and cbc on previously sent rainbow     Torrie Ruggiero, TAINA  11/06/20 2044       Torrie Ruggiero RN  11/06/20 210

## 2020-11-07 NOTE — H&P
.        [facility] Medicine Admission      Date of Admission: 11/7/2020  Patient seen and evaluated on 11/7/2020    Primary Care Physician: Vero Arteaga APRN      Chief Complaint: *Difficulty putting words together*    HPI:*  Patient is a 87-year-old female who presents to the [facility] emergency room with difficulty putting words together.  She was seen earlier in the emergency room with elevated blood pressure which was controlled and she was sent home at that time family member at the bedside says that once they got home they were getting ready for bed and she had a difficulty putting her words together and finding words.  She did not have slurred speech they could understand what she was saying.  They did not notice any other deficits such as facial drooping or weakness.  They called 911 and came back to the emergency room.  It is noted that she is on oxygen and her Oxymizer was not working and her O2 saturations were low upon presentation.  She does report having a headache that is been going on all evening as well as nausea but no vomiting.    Concurrent Medical History:  has a past medical history of Allergic rhinitis, Chronic diastolic heart failure (CMS/HCC), COPD (chronic obstructive pulmonary disease) (CMS/HCC), Coronary arteriosclerosis, Depression, GERD (gastroesophageal reflux disease), Hypercholesterolemia, Hypertension, Hypothyroidism, LVH (left ventricular hypertrophy), Myocardial infarction (CMS/HCC), and Osteoarthritis.    Past Surgical History:  has a past surgical history that includes Coronary angioplasty; Cardiac catheterization; endoscopy and colonoscopy; Colonoscopy; Breast surgery; Cervical spine surgery; Pacemaker Replacement; Hip Bipolar (Right, 1/23/2018); and Esophagogastroduodenoscopy (N/A, 2/14/2018).    Family History: family history includes Coronary artery disease in her father. **    Social History:  reports that she quit smoking about  3 years ago. Her smoking use included cigarettes. She started smoking about 70 years ago. She has a 25.00 pack-year smoking history. She has never used smokeless tobacco. She reports that she does not drink alcohol or use drugs.    Allergies:   Allergies   Allergen Reactions   • Ace Inhibitors Dizziness   • Baclofen Delirium     unknown   • Lisinopril Cough     Keeps her awake all night coughing       Medications:   Prior to Admission medications    Medication Sig Start Date End Date Taking? Authorizing Provider   acetaminophen (TYLENOL) 325 MG tablet Take 2 tablets by mouth Every 4 (Four) Hours As Needed for Mild Pain . 2/5/18   Shawn Maldonado MD   albuterol (PROVENTIL) (2.5 MG/3ML) 0.083% nebulizer solution Take 2.5 mg by nebulization Every 6 (Six) Hours As Needed for Wheezing. 6/11/20   Bryan Jay MD   budesonide (Pulmicort) 0.5 MG/2ML nebulizer solution Take 2 mL by nebulization Daily. 11/3/20   Chava Dubois MD   carvedilol (COREG) 6.25 MG tablet TAKE 1 TABLET BY MOUTH 2 (TWO) TIMES A DAY WITH MEALS. 4/29/20   Vero Arteaga APRN   docusate sodium (COLACE) 100 MG capsule Take 1 capsule by mouth 2 (Two) Times a Day. 3/29/19   Lanie Jain MD   esomeprazole (nexIUM) 40 MG capsule TAKE 1 CAPSULE BY MOUTH EVERY MORNING 7/10/20   Vero Arteaga APRN   ferrous sulfate 325 (65 FE) MG tablet Take 325 mg by mouth Daily With Breakfast & Dinner.    ProviderOrlando MD   fluorometholone (FML) 0.1 % ophthalmic suspension INSTILL 1 DROP IN BOTH EYES FOUR (4) TIMES DAILY 5/30/19   Orlando Lerma MD   folic acid-vit B6-vit B12 (FOLBEE) 2.5-25-1 MG tablet tablet Take 1 tablet by mouth Daily. 1/22/19   Vero Arteaga APRN   ipratropium-albuterol (DUO-NEB) 0.5-2.5 mg/3 ml nebulizer Take 3 mL by nebulization 1 (One) Time for 1 dose. 6/24/20 10/15/21  Vero Arteaga APRN   levothyroxine (SYNTHROID, LEVOTHROID) 50 MCG tablet TAKE 1 TABLET BY MOUTH DAILY. 4/29/20   Vero Arteaga APRN      lidocaine (LIDODERM) 5 % Place 1 patch on the skin as directed by provider Daily. Remove & Discard patch within 12 hours 6/11/19   Vero Arteaga APRN   midodrine (PROAMATINE) 10 MG tablet Take 10 mg by mouth 2 (Two) Times a Day. 10/5/19   ProviderOrlando MD   montelukast (SINGULAIR) 10 MG tablet TAKE 1 TABLET BY MOUTH EVERY NIGHT. 2/25/20   Vero Arteaga APRN   nitroglycerin (NITRODUR) 0.2 MG/HR patch PLACE 1 PATCH ON THE SKIN AS DIRECTED BY PROVIDER DAILY. 4/29/20   Vero Arteaga APRN   polyethylene glycol (MIRALAX) powder MIX 1 CAPFUL (17G) IN 8 OUNCES OF LIQUID AND DRINK BY MOUTH EVERY DAY FOR CONSTIPATION 6/19/18   Roula Albert MD   predniSONE (DELTASONE) 20 MG tablet Take 2 tablets at the same time daily x5 days 9/21/20   Vero Arteaga APRN   ranolazine (RANEXA) 500 MG 12 hr tablet TAKE 1 TABLET BY MOUTH 2 (TWO) TIMES A DAY. 7/30/20   Vero Arteaga APRN   sertraline (ZOLOFT) 50 MG tablet TAKE 1 TABLET BY MOUTH DAILY. 2/24/20   Vero Arteaga APRN   sodium bicarbonate 650 MG tablet Take 1 tablet by mouth 2 (Two) Times a Day. 8/11/20   Vero Arteaga APRN   traMADol (ULTRAM) 50 MG tablet Take 1 tablet by mouth Every 6 (Six) Hours As Needed for Moderate Pain . 6/11/19   Vero Arteaga APRN   vitamin D (ERGOCALCIFEROL) 1.25 MG (57576 UT) capsule capsule TAKE 1 CAPSULE BY MOUTH EVERY 30 (THIRTY) DAYS. 1/15/20   Vero Arteaga APRN       Review of Systems:  Review of Systems   12 point review of systems obtained pertinent positives and negatives noted above in HPI otherwise complete ROS is negative except as mentioned above.    Physical Exam:   Temp:  [97.8 °F (36.6 °C)-99 °F (37.2 °C)] 97.8 °F (36.6 °C)  Heart Rate:  [60-67] 67  Resp:  [18-20] 18  BP: (163-235)/() 195/86  Physical Exam  Vitals signs and nursing note reviewed.   Constitutional:       General: She is not in acute distress.     Appearance: She is normal weight. She is not ill-appearing or diaphoretic.   HENT:      Head:  Normocephalic and atraumatic.      Nose: Nose normal.      Mouth/Throat:      Pharynx: Oropharynx is clear.   Eyes:      Extraocular Movements: Extraocular movements intact.      Conjunctiva/sclera: Conjunctivae normal.      Pupils: Pupils are equal, round, and reactive to light.   Neck:      Musculoskeletal: Neck supple.   Cardiovascular:      Rate and Rhythm: Normal rate and regular rhythm.      Pulses: Normal pulses.      Heart sounds: Normal heart sounds.   Pulmonary:      Effort: Pulmonary effort is normal.      Breath sounds: Normal breath sounds.   Abdominal:      General: Bowel sounds are normal.      Palpations: Abdomen is soft.   Musculoskeletal:         General: No swelling or deformity.   Skin:     General: Skin is warm and dry.      Capillary Refill: Capillary refill takes less than 2 seconds.   Neurological:      Mental Status: She is alert. Mental status is at baseline.      Cranial Nerves: No cranial nerve deficit.   Psychiatric:         Mood and Affect: Mood normal.         Behavior: Behavior normal.           Results Reviewed:  I have personally reviewed current lab, radiology, and data and agree with results.  Lab Results (last 24 hours)     ** No results found for the last 24 hours. **        Imaging Results (Last 24 Hours)     ** No results found for the last 24 hours. **            Assessment:    There are no active hospital problems to display for this patient.            Plan:*  TIA-Will get repeat CT scan of the head will have neurology evaluate patient in the a.m. we will get an  Echocardiogram unable to get MRI secondary to metallic implant    Malignant  Hypertension*-patient's blood pressure elevated 225/97 and staying consistently in the 200s.  She will be started on a Cardene drip by the emergency room and will continue to try to titrate down the blood pressure.    Hypothyroidism we will continue Synthroid    DVT prophylaxis SCDs    Full code    I discussed the patient's findings and my  recommendations with: *Patient and family at the bedside    Fran Yuan MD

## 2020-11-07 NOTE — DISCHARGE INSTRUCTIONS
Please return with new or worsening symptoms.  Monitor blood pressure at home and follow-up with primary care to discuss further management.

## 2020-11-08 LAB
ALBUMIN SERPL-MCNC: 4 G/DL (ref 3.5–5.2)
ALBUMIN/GLOB SERPL: 1.5 G/DL
ALP SERPL-CCNC: 73 U/L (ref 39–117)
ALT SERPL W P-5'-P-CCNC: 11 U/L (ref 1–33)
ANION GAP SERPL CALCULATED.3IONS-SCNC: 14 MMOL/L (ref 5–15)
AST SERPL-CCNC: 18 U/L (ref 1–32)
BILIRUB SERPL-MCNC: 0.5 MG/DL (ref 0–1.2)
BILIRUB UR QL STRIP: NEGATIVE
BUN SERPL-MCNC: 21 MG/DL (ref 8–23)
BUN/CREAT SERPL: 24.4 (ref 7–25)
CALCIUM SPEC-SCNC: 9.2 MG/DL (ref 8.6–10.5)
CHLORIDE SERPL-SCNC: 99 MMOL/L (ref 98–107)
CLARITY UR: ABNORMAL
CO2 SERPL-SCNC: 27 MMOL/L (ref 22–29)
COLOR UR: YELLOW
CREAT SERPL-MCNC: 0.86 MG/DL (ref 0.57–1)
DEPRECATED RDW RBC AUTO: 43.4 FL (ref 37–54)
ERYTHROCYTE [DISTWIDTH] IN BLOOD BY AUTOMATED COUNT: 12.4 % (ref 12.3–15.4)
GFR SERPL CREATININE-BSD FRML MDRD: 62 ML/MIN/1.73
GLOBULIN UR ELPH-MCNC: 2.7 GM/DL
GLUCOSE BLDC GLUCOMTR-MCNC: 139 MG/DL (ref 70–130)
GLUCOSE SERPL-MCNC: 157 MG/DL (ref 65–99)
GLUCOSE UR STRIP-MCNC: NEGATIVE MG/DL
HCT VFR BLD AUTO: 38.8 % (ref 34–46.6)
HGB BLD-MCNC: 13 G/DL (ref 12–15.9)
HGB UR QL STRIP.AUTO: NEGATIVE
KETONES UR QL STRIP: NEGATIVE
LEUKOCYTE ESTERASE UR QL STRIP.AUTO: NEGATIVE
MCH RBC QN AUTO: 32.3 PG (ref 26.6–33)
MCHC RBC AUTO-ENTMCNC: 33.5 G/DL (ref 31.5–35.7)
MCV RBC AUTO: 96.3 FL (ref 79–97)
NITRITE UR QL STRIP: NEGATIVE
PH UR STRIP.AUTO: 6 [PH] (ref 5–9)
PLATELET # BLD AUTO: 261 10*3/MM3 (ref 140–450)
PMV BLD AUTO: 10.7 FL (ref 6–12)
POTASSIUM SERPL-SCNC: 3.1 MMOL/L (ref 3.5–5.2)
PROT SERPL-MCNC: 6.7 G/DL (ref 6–8.5)
PROT UR QL STRIP: ABNORMAL
RBC # BLD AUTO: 4.03 10*6/MM3 (ref 3.77–5.28)
SODIUM SERPL-SCNC: 140 MMOL/L (ref 136–145)
SP GR UR STRIP: 1.01 (ref 1–1.03)
UROBILINOGEN UR QL STRIP: ABNORMAL
WBC # BLD AUTO: 7.82 10*3/MM3 (ref 3.4–10.8)

## 2020-11-08 PROCEDURE — 97166 OT EVAL MOD COMPLEX 45 MIN: CPT

## 2020-11-08 PROCEDURE — 80053 COMPREHEN METABOLIC PANEL: CPT | Performed by: INTERNAL MEDICINE

## 2020-11-08 PROCEDURE — G0378 HOSPITAL OBSERVATION PER HR: HCPCS

## 2020-11-08 PROCEDURE — 25010000002 METHYLPREDNISOLONE PER 40 MG: Performed by: INTERNAL MEDICINE

## 2020-11-08 PROCEDURE — 97162 PT EVAL MOD COMPLEX 30 MIN: CPT

## 2020-11-08 PROCEDURE — 82962 GLUCOSE BLOOD TEST: CPT

## 2020-11-08 PROCEDURE — 25010000002 FUROSEMIDE PER 20 MG: Performed by: INTERNAL MEDICINE

## 2020-11-08 PROCEDURE — 92610 EVALUATE SWALLOWING FUNCTION: CPT | Performed by: SPEECH-LANGUAGE PATHOLOGIST

## 2020-11-08 PROCEDURE — 81003 URINALYSIS AUTO W/O SCOPE: CPT | Performed by: HOSPITALIST

## 2020-11-08 PROCEDURE — 94799 UNLISTED PULMONARY SVC/PX: CPT

## 2020-11-08 PROCEDURE — 96376 TX/PRO/DX INJ SAME DRUG ADON: CPT

## 2020-11-08 PROCEDURE — 85027 COMPLETE CBC AUTOMATED: CPT | Performed by: INTERNAL MEDICINE

## 2020-11-08 RX ORDER — METOPROLOL TARTRATE 5 MG/5ML
INJECTION INTRAVENOUS
Status: DISPENSED
Start: 2020-11-08 | End: 2020-11-08

## 2020-11-08 RX ORDER — POTASSIUM CHLORIDE 1.5 G/1.77G
40 POWDER, FOR SOLUTION ORAL AS NEEDED
Status: DISCONTINUED | OUTPATIENT
Start: 2020-11-08 | End: 2020-11-12 | Stop reason: HOSPADM

## 2020-11-08 RX ORDER — POTASSIUM CHLORIDE 7.45 MG/ML
10 INJECTION INTRAVENOUS
Status: DISCONTINUED | OUTPATIENT
Start: 2020-11-08 | End: 2020-11-12 | Stop reason: HOSPADM

## 2020-11-08 RX ORDER — POTASSIUM CHLORIDE 750 MG/1
40 CAPSULE, EXTENDED RELEASE ORAL AS NEEDED
Status: DISCONTINUED | OUTPATIENT
Start: 2020-11-08 | End: 2020-11-12 | Stop reason: HOSPADM

## 2020-11-08 RX ADMIN — METHYLPREDNISOLONE SODIUM SUCCINATE 40 MG: 40 INJECTION, POWDER, FOR SOLUTION INTRAMUSCULAR; INTRAVENOUS at 20:18

## 2020-11-08 RX ADMIN — POTASSIUM CHLORIDE 40 MEQ: 10 CAPSULE, COATED, EXTENDED RELEASE ORAL at 22:28

## 2020-11-08 RX ADMIN — BUDESONIDE 0.5 MG: 0.5 INHALANT RESPIRATORY (INHALATION) at 07:35

## 2020-11-08 RX ADMIN — ASPIRIN 81 MG: 81 TABLET, CHEWABLE ORAL at 10:16

## 2020-11-08 RX ADMIN — ATORVASTATIN CALCIUM 80 MG: 40 TABLET, FILM COATED ORAL at 20:18

## 2020-11-08 RX ADMIN — RANOLAZINE 500 MG: 500 TABLET, FILM COATED, EXTENDED RELEASE ORAL at 10:17

## 2020-11-08 RX ADMIN — ALBUTEROL SULFATE 2.5 MG: 2.5 SOLUTION RESPIRATORY (INHALATION) at 12:18

## 2020-11-08 RX ADMIN — CARVEDILOL 6.25 MG: 6.25 TABLET, FILM COATED ORAL at 18:30

## 2020-11-08 RX ADMIN — MONTELUKAST SODIUM 10 MG: 10 TABLET, COATED ORAL at 20:19

## 2020-11-08 RX ADMIN — POTASSIUM CHLORIDE 40 MEQ: 10 CAPSULE, COATED, EXTENDED RELEASE ORAL at 12:42

## 2020-11-08 RX ADMIN — ALBUTEROL SULFATE 2.5 MG: 2.5 SOLUTION RESPIRATORY (INHALATION) at 00:39

## 2020-11-08 RX ADMIN — SODIUM CHLORIDE, PRESERVATIVE FREE 10 ML: 5 INJECTION INTRAVENOUS at 10:17

## 2020-11-08 RX ADMIN — FERROUS SULFATE TAB EC 324 MG (65 MG FE EQUIVALENT) 324 MG: 324 (65 FE) TABLET DELAYED RESPONSE at 10:17

## 2020-11-08 RX ADMIN — CARVEDILOL 6.25 MG: 6.25 TABLET, FILM COATED ORAL at 10:16

## 2020-11-08 RX ADMIN — AMLODIPINE BESYLATE 10 MG: 10 TABLET ORAL at 10:16

## 2020-11-08 RX ADMIN — RANOLAZINE 500 MG: 500 TABLET, FILM COATED, EXTENDED RELEASE ORAL at 20:18

## 2020-11-08 RX ADMIN — FERROUS SULFATE TAB EC 324 MG (65 MG FE EQUIVALENT) 324 MG: 324 (65 FE) TABLET DELAYED RESPONSE at 18:30

## 2020-11-08 RX ADMIN — METHYLPREDNISOLONE SODIUM SUCCINATE 40 MG: 40 INJECTION, POWDER, FOR SOLUTION INTRAMUSCULAR; INTRAVENOUS at 05:22

## 2020-11-08 RX ADMIN — POTASSIUM CHLORIDE 40 MEQ: 10 CAPSULE, COATED, EXTENDED RELEASE ORAL at 18:30

## 2020-11-08 RX ADMIN — METHYLPREDNISOLONE SODIUM SUCCINATE 40 MG: 40 INJECTION, POWDER, FOR SOLUTION INTRAMUSCULAR; INTRAVENOUS at 14:01

## 2020-11-08 RX ADMIN — SERTRALINE HYDROCHLORIDE 50 MG: 50 TABLET ORAL at 10:16

## 2020-11-08 RX ADMIN — FUROSEMIDE 20 MG: 10 INJECTION, SOLUTION INTRAMUSCULAR; INTRAVENOUS at 10:24

## 2020-11-08 RX ADMIN — LEVOTHYROXINE SODIUM 50 MCG: 50 TABLET ORAL at 10:16

## 2020-11-08 NOTE — THERAPY EVALUATION
Patient Name: Val Arteaga  : 1932    MRN: [id number]                              Today's Date: 2020       Admit Date: 2020    Visit Dx:     ICD-10-CM ICD-9-CM   1. TIA (transient ischemic attack)  G45.9 435.9   2. Hypertensive urgency  I16.0 401.9   3. Atherosclerosis of native coronary artery of native heart, angina presence unspecified  I25.10 414.01   4. History of PTCA  Z98.61 V45.82   5. SSS (sick sinus syndrome) (CMS/[facility])  I49.5 427.81   6. Dyspnea, unspecified type  R06.00 786.09   7. Benign hypertension  I10 401.1   8. Impaired mobility and ADLs  Z74.09 V49.89    Z78.9    9. Impaired functional mobility, balance, gait, and endurance  Z74.09 V49.89     Patient Active Problem List   Diagnosis   • Uncontrolled hypertension   • CAD (coronary artery disease)   • Chronic obstructive pulmonary disease (CMS/[facility])   • Acquired hypothyroidism   • Depressive disorder   • Thygeson's superficial punctate keratitis   • Pseudophakia   • Precordial pain   • Displaced fracture of base of neck of right femur, initial encounter for closed fracture (CMS/HCC)   • Hip fracture, right, closed, initial encounter (CMS/HCC)   • Emphysema of lung (CMS/[facility])   • Acute on chronic systolic CHF (congestive heart failure) (CMS/[facility])   • Hyponatremia   • BENITO (acute kidney injury) (CMS/[facility])   • Acute blood loss anemia   • Urinary retention   • Closed displaced basicervical fracture of right femur (CMS/[facility])   • Urethral caruncle   • Symptomatic anemia   • Anemia   • COPD with acute exacerbation (CMS/[facility])   • Major depressive disorder   • Drug-induced encephalopathy   • Acute left-sided low back pain without sciatica   • Bilateral sacral insufficiency fracture   • Osteoarthritis of multiple joints   • Dyspnea   • CHF (congestive heart failure) (CMS/[facility])   • Hypercholesterolemia   • Chest pain   • Acute on chronic respiratory failure with hypoxia (CMS/[facility])   • Acute on chronic diastolic heart failure (CMS/[facility])   • Malignant  hypertension     Past Medical History:   Diagnosis Date   • Allergic rhinitis    • Chronic diastolic heart failure (CMS/HCC)    • COPD (chronic obstructive pulmonary disease) (CMS/HCC)    • Coronary arteriosclerosis    • Depression    • GERD (gastroesophageal reflux disease)    • Hypercholesterolemia    • Hypertension    • Hypothyroidism    • LVH (left ventricular hypertrophy)    • Myocardial infarction (CMS/HCC)    • Osteoarthritis      Past Surgical History:   Procedure Laterality Date   • BREAST SURGERY     • CARDIAC CATHETERIZATION     • CERVICAL SPINE SURGERY     • COLONOSCOPY     • CORONARY ANGIOPLASTY     • ENDOSCOPY N/A 2/14/2018   • ENDOSCOPY AND COLONOSCOPY     • HIP BIPOLAR REPLACEMENT Right 1/23/2018   • PACEMAKER REPLACEMENT       General Information     Row Name 11/08/20 1235          Physical Therapy Time and Intention    Mode of Treatment  co-treatment;physical therapy;occupational therapy  -     Row Name 11/08/20 1235          General Information    Patient Profile Reviewed  yes  -     Prior Level of Function  independent:;all household mobility;transfer;ADL's use RW and SC;has someone stay at night;HH was coming  -     Existing Precautions/Restrictions  fall;oxygen therapy device and L/min  -     Row Name 11/08/20 1235          Living Environment    Lives With  alone someone stayed at night;pt had [employer] members coming in/out frequently  -     Row Name 11/08/20 1235          Home Main Entrance    Number of Stairs, Main Entrance  one  -     Stair Railings, Main Entrance  railings on both sides of stairs  -     Row Name 11/08/20 1235          Stairs Within Home, Primary    Number of Stairs, Within Home, Primary  none  -     Row Name 11/08/20 1235          Cognition    Orientation Status (Cognition)  oriented x 4  -     Row Name 11/08/20 1235          Safety Issues, Functional Mobility    Safety Issues Affecting Function (Mobility)  insight into deficits/self-awareness  -      Impairments Affecting Function (Mobility)  endurance/activity tolerance;balance;shortness of breath;strength  -       User Key  (r) = Recorded By, (t) = Taken By, (c) = Cosigned By    Initials Name Provider Type    Malena Ma PT Physical Therapist        Mobility     Row Name 11/08/20 1235          Bed Mobility    Comment (Bed Mobility)  not tested pt in chair before and after  -     Row Name 11/08/20 1235          Bed-Chair Transfer    Bed-Chair [place] (Transfers)  minimum assist (75% patient effort)  -     Assistive Device (Bed-Chair Transfers)  walker, front-wheeled  -RONAN     Row Name 11/08/20 1235          Sit-Stand Transfer    Sit-Stand [place] (Transfers)  minimum assist (75% patient effort)  -     Assistive Device (Sit-Stand Transfers)  walker, front-wheeled  -     Row Name 11/08/20 1235          Gait/Stairs (Locomotion)    [place] Level (Gait)  contact guard;1 person assist;1 person to manage equipment  -     Assistive Device (Gait)  walker, front-wheeled  -     Distance in Feet (Gait)  10ft,5,ft,5ft  -     Comment (Gait/Stairs)  pt SOA with ambulation and O2 sats dropped to low 80's at times pt took 1 standing and 1 sitting rest break on BSC before able to walk back to chair  -       User Key  (r) = Recorded By, (t) = Taken By, (c) = Cosigned By    Initials Name Provider Type    Malena Ma PT Physical Therapist        Obj/Interventions     Row Name 11/08/20 1235          Range of Motion Comprehensive    Comment, General Range of Motion  BLE: AROM WFL  -     Row Name 11/08/20 1235          Strength Comprehensive (MMT)    Comment, General Manual Muscle Testing (MMT) Assessment  BLE: 4/5 ankles/feet, 4-/5 knees, hips  -     Row Name 11/08/20 1235          Balance    Balance Assessment  sitting static balance;sitting dynamic balance;standing static balance;standing dynamic balance  -     Static Sitting Balance  WFL;sitting in chair  -     Dynamic Sitting  Balance  sitting in chair;mild impairment  -RONAN     Static Standing Balance  mild impairment  -RONAN     Dynamic Standing Balance  mild impairment  -RONAN     Row Name 11/08/20 1235          Sensory Assessment (Somatosensory)    Sensory Assessment (Somatosensory)  sensation intact to light touch  -RONAN       User Key  (r) = Recorded By, (t) = Taken By, (c) = Cosigned By    Initials Name Provider Type    RONAN Malena Mendoza, PT Physical Therapist        Goals/Plan     Row Name 11/08/20 1235          Bed Mobility Goal 1 (PT)    Activity/Assistive Device (Bed Mobility Goal 1, PT)  sit to supine;supine to sit  -RONAN     [place] Level/Cues Needed (Bed Mobility Goal 1, PT)  independent  -RONAN     Time Frame (Bed Mobility Goal 1, PT)  by discharge  -RONAN     Progress/Outcomes (Bed Mobility Goal 1, PT)  goal not met  -     Row Name 11/08/20 1230          Transfer Goal 1 (PT)    Activity/Assistive Device (Transfer Goal 1, PT)  sit-to-stand/stand-to-sit;bed-to-chair/chair-to-bed  -RONAN     [place] Level/Cues Needed (Transfer Goal 1, PT)  modified independence  -RONAN     Time Frame (Transfer Goal 1, PT)  by discharge  -RONAN     Progress/Outcome (Transfer Goal 1, PT)  goal not met  -     Row Name 11/08/20 1238          Gait Training Goal 1 (PT)    Activity/Assistive Device (Gait Training Goal 1, PT)  gait (walking locomotion);walker, rolling;increase endurance/gait distance;decrease fall risk  -RONAN     [place] Level (Gait Training Goal 1, PT)  modified independence  -RONAN     Distance (Gait Training Goal 1, PT)  30ft or more x2 O2 sats will not drop below 90%  -RONAN     Time Frame (Gait Training Goal 1, PT)  by discharge  -RONAN     Progress/Outcome (Gait Training Goal 1, PT)  goal not met  -     Row Name 11/08/20 1231          Stairs Goal 1 (PT)    Activity/Assistive Device (Stairs Goal 1, PT)  ascending stairs;descending stairs;using handrail, left;using handrail, right  -RONAN     [place] Level/Cues Needed (Stairs Goal 1, PT)   modified independence  -     Number of Stairs (Stairs Goal 1, PT)  1  -RONAN     Time Frame (Stairs Goal 1, PT)  by discharge  -RONAN     Progress/Outcome (Stairs Goal 1, PT)  goal not met  -       User Key  (r) = Recorded By, (t) = Taken By, (c) = Cosigned By    Initials Name Provider Type    Malena Ma, PT Physical Therapist        Clinical Impression     Row Name 11/08/20 1235          Pain    Additional Documentation  Pain Scale: Numbers Pre/Post-Treatment (Group)  -     Row Name 11/08/20 1235          Pain Scale: Numbers Pre/Post-Treatment    Pretreatment Pain Rating  0/10 - no pain  -RONAN     Posttreatment Pain Rating  0/10 - no pain  -RONAN     Row Name 11/08/20 1235          Plan of Care Review    Plan of Care Reviewed With  patient  -     Outcome Summary  PT evaluation completed. Pt pleasant and agreeable to therapy. Pt SOA at times with talking as well as with mobility. Pt transferred sit to stand to sit with min assistance and ambulated 10ft then took standing rest break as O2 sats dropped to 82% on 2.5 lpm, then pt ambulated 5 ft to BSC. Pt needed rest break to being O2 sats back up to 90's ptthen walked 5ft back to chair. Function limited by decreased strength, balance, and tolerance for functional mobility and activites. Pt will benefit from PT to gradually regain lost function as pt improves medically. Antipate home with assistance at discharge with  therapy to follow.  -     Row Name 11/08/20 1232          Therapy Assessment/Plan (PT)    Patient/Family Therapy Goals Statement (PT)  improve strength and mobility as breathing improves.  -     Rehab Potential (PT)  good, to achieve stated therapy goals  -     Criteria for Skilled Interventions Met (PT)  yes;meets criteria;skilled treatment is necessary  -     Predicted Duration of Therapy Intervention (PT)  until discharge or goals met  -     Row Name 11/08/20 123          Vital Signs    Pre Systolic BP Rehab  152  -     Pre Treatment  Diastolic BP  70  -RONAN     Pretreatment Heart Rate (beats/min)  77  -RONAN     Posttreatment Heart Rate (beats/min)  83  -RONAN     Pre SpO2 (%)  99  -RONAN     O2 Delivery Pre Treatment  supplemental O2 2.5llpm  -RONAN     Intra SpO2 (%)  82  -RONAN     O2 Delivery Intra Treatment  supplemental O2  -RONAN     Post SpO2 (%)  97  -RONAN     O2 Delivery Post Treatment  supplemental O2  -RONAN     Pre Patient Position  Sitting  -RONAN     Intra Patient Position  Sitting  -RONAN     Post Patient Position  Sitting  -RONAN     Row Name 11/08/20 1235          Positioning and Restraints    Pre-Treatment Position  sitting in chair/recliner  -RONAN     Post Treatment Position  chair  -RONAN     In Chair  call light within reach;encouraged to call for assist  -RONAN       User Key  (r) = Recorded By, (t) = Taken By, (c) = Cosigned By    Initials Name Provider Type    Malena Ma PT Physical Therapist        Outcome Measures     Row Name 11/08/20 1235          How much help from another person do you currently need...    Turning from your back to your side while in flat bed without using bedrails?  4  -RONAN     Moving from lying on back to sitting on the side of a flat bed without bedrails?  4  -RONAN     Moving to and from a bed to a chair (including a wheelchair)?  3  -RONAN     Standing up from a chair using your arms (e.g., wheelchair, bedside chair)?  3  -RONAN     Climbing 3-5 steps with a railing?  3  -RONAN     To walk in hospital room?  3  -RONAN     AM-PAC 6 Clicks Score (PT)  20  -RONAN     Row Name 11/08/20 1235          Functional Assessment    Outcome Measure Options  AM-PAC 6 Clicks Basic Mobility (PT)  -RONAN       User Key  (r) = Recorded By, (t) = Taken By, (c) = Cosigned By    Initials Name Provider Type    Malena Ma PT Physical Therapist        Physical Therapy Education                 Title: PT OT SLP Therapies (In Progress)     Topic: Physical Therapy (In Progress)     Point: Mobility training (In Progress)     Learning Progress Summary           Patient  Acceptance, E, NR by  at 11/8/2020 1524                   Point: Home exercise program (Not Started)     Learner Progress:  Not documented in this visit.          Point: Body mechanics (In Progress)     Learning Progress Summary           Patient Acceptance, E, NR by  at 11/8/2020 1524                   Point: Precautions (In Progress)     Learning Progress Summary           Patient Acceptance, E, NR by  at 11/8/2020 1524                               User Key     Initials Effective Dates Name Provider Type Discipline     04/03/18 -  Malena Mendoza PT Physical Therapist PT              PT Recommendation and Plan     Plan of Care Reviewed With: patient  Outcome Summary: PT evaluation completed. Pt pleasant and agreeable to therapy. Pt SOA at times with talking as well as with mobility. Pt transferred sit to stand to sit with min assistance and ambulated 10ft then took standing rest break as O2 sats dropped to 82% on 2.5 lpm, then pt ambulated 5 ft to BSC. Pt needed rest break to being O2 sats back up to 90's ptthen walked 5ft back to chair. Function limited by decreased strength, balance, and tolerance for functional mobility and activites. Pt will benefit from PT to gradually regain lost function as pt improves medically. Antipate home with assistance at discharge with  therapy to follow.     Time Calculation:   PT Charges     Row Name 11/08/20 1525             Time Calculation    Start Time  1235  -      Stop Time  1319  -      Time Calculation (min)  44 min  -      PT Received On  11/08/20  -      PT Goal Re-Cert Due Date  11/21/20  -         Time Calculation- PT    Total Timed Code Minutes- PT  0 minute(s)  -        User Key  (r) = Recorded By, (t) = Taken By, (c) = Cosigned By    Initials Name Provider Type     Malena Mendoza PT Physical Therapist        Therapy Charges for Today     Code Description Service Date Service Provider Modifiers Qty    42166940111 HC PT EVAL MOD COMPLEXITY 3  11/8/2020 Malena Mendoza, PT GP 1          PT G-Codes  Outcome Measure Options: AM-PAC 6 Clicks Basic Mobility (PT)  AM-PAC 6 Clicks Score (PT): 20  AM-PAC 6 Clicks Score (OT): 19    Malena Mendoza, PT  11/8/2020

## 2020-11-08 NOTE — PROGRESS NOTES
Progress Note  Devante Hilliard MD  Hospitalist    Date of visit: 11/8/2020     LOS: 0 days   Patient Care Team:  Vero Arteaga APRN as PCP - General (Nurse Practitioner)    Chief Complaint: Shortness of breath    Subjective     Interval History:     Patient Complaints: Shortness of breath, at times so severe that she could hardly talk and out of control blood pressure values - improved with the help of the current medications.    History taken from: Patient and nursing    Medication Review:   Current Facility-Administered Medications   Medication Dose Route Frequency Provider Last Rate Last Dose   • albuterol (PROVENTIL) nebulizer solution 0.083% 2.5 mg/3mL  2.5 mg Nebulization Q6H PRN Devante Hilliard MD   2.5 mg at 11/08/20 0039   • amLODIPine (NORVASC) tablet 10 mg  10 mg Oral Q24H Devante Hilliard MD   10 mg at 11/08/20 1016   • aspirin chewable tablet 81 mg  81 mg Oral Daily Devante Hilliard MD   81 mg at 11/08/20 1016   • atorvastatin (LIPITOR) tablet 80 mg  80 mg Oral Nightly Devante Hilliard MD   80 mg at 11/07/20 2018   • budesonide (PULMICORT) nebulizer solution 0.5 mg  0.5 mg Nebulization Daily - RT Devante Hilliard MD   0.5 mg at 11/08/20 0735   • carvedilol (COREG) tablet 6.25 mg  6.25 mg Oral BID With Meals Devante Hilliard MD   6.25 mg at 11/08/20 1016   • ferrous sulfate EC tablet 324 mg  324 mg Oral BID With Meals Devante Hilliard MD   324 mg at 11/08/20 1017   • furosemide (LASIX) injection 20 mg  20 mg Intravenous Daily Devante Hilliard MD   20 mg at 11/08/20 1024   • levothyroxine (SYNTHROID, LEVOTHROID) tablet 50 mcg  50 mcg Oral Daily Devante Hilliard MD   50 mcg at 11/08/20 1016   • methylPREDNISolone sodium succinate (SOLU-Medrol) injection 40 mg  40 mg Intravenous Q8H Devante Hilliard MD   40 mg at 11/08/20 0522   • metoprolol tartrate (LOPRESSOR) 5 MG/5ML injection  - ADS Override Pull            • metoprolol tartrate (LOPRESSOR) injection 5 mg  5 mg Intravenous Q6H PRN Fran Yuan  Kalyn Abrams MD       • montelukast (SINGULAIR) tablet 10 mg  10 mg Oral Nightly Devante Hilliard MD   10 mg at 11/07/20 2018   • ondansetron (ZOFRAN) injection 4 mg  4 mg Intravenous Q6H PRN Devante Hilliard MD       • polyethylene glycol (MIRALAX) packet 17 g  17 g Oral Daily PRN Devante Hilliard MD       • potassium chloride (MICRO-K) CR capsule 40 mEq  40 mEq Oral PRN Devante Hilliard MD        Or   • potassium chloride (KLOR-CON) packet 40 mEq  40 mEq Oral PRN Devante Hilliard MD        Or   • potassium chloride 10 mEq in 100 mL IVPB  10 mEq Intravenous Q1H PRN Devante Hilliard MD       • ranolazine (RANEXA) 12 hr tablet 500 mg  500 mg Oral BID Devante Hilliard MD   500 mg at 11/08/20 1017   • sertraline (ZOLOFT) tablet 50 mg  50 mg Oral Daily Devante Hilliard MD   50 mg at 11/08/20 1016   • sodium chloride 0.9 % flush 10 mL  10 mL Intravenous PRN Devante Hilliard MD       • sodium chloride 0.9 % flush 10 mL  10 mL Intravenous Q12H Devante Hilliard MD   10 mL at 11/08/20 1017   • sodium chloride 0.9 % flush 10 mL  10 mL Intravenous PRN Devante Hilliard MD       • traMADol (ULTRAM) tablet 50 mg  50 mg Oral Q6H PRN Devante Hilliard MD           Review of Systems:   Review of Systems   Constitutional: Positive for fatigue. Negative for fever.   Respiratory: Positive for cough, shortness of breath and wheezing.    Cardiovascular: Negative for chest pain, palpitations and leg swelling.   Gastrointestinal: Negative for abdominal distention, abdominal pain, blood in stool, diarrhea and vomiting.   Genitourinary: Negative for decreased urine volume, dysuria, frequency, hematuria, pelvic pain, urgency and vaginal discharge.   Musculoskeletal: Positive for arthralgias. Negative for back pain, gait problem and joint swelling.   Skin: Positive for pallor. Negative for color change and rash.   Neurological: Positive for weakness. Negative for seizures, syncope, numbness and headaches.   Psychiatric/Behavioral: Negative for agitation,  behavioral problems and confusion.   All other systems reviewed and are negative.      Objective     Vital Signs  Temp:  [97.7 °F (36.5 °C)-98.5 °F (36.9 °C)] 98.1 °F (36.7 °C)  Heart Rate:  [] 82  Resp:  [16-24] 22  BP: (125-185)/(65-79) 125/65    Physical Exam:  Physical Exam  Vitals signs reviewed.   Constitutional:       General: She is in acute distress.      Appearance: She is ill-appearing.   HENT:      Head: Normocephalic and atraumatic.   Eyes:      General: No scleral icterus.     Extraocular Movements: Extraocular movements intact.      Pupils: Pupils are equal, round, and reactive to light.   Cardiovascular:      Rate and Rhythm: Normal rate and regular rhythm.   Pulmonary:      Effort: Respiratory distress present.      Breath sounds: Wheezing present. No rales.      Comments: Emphysematous chest  Abdominal:      General: Bowel sounds are normal. There is no distension.      Palpations: Abdomen is soft. There is no mass.      Tenderness: There is no abdominal tenderness.      Hernia: No hernia is present.   Musculoskeletal: Normal range of motion.         General: No swelling, tenderness, deformity or signs of injury.   Skin:     General: Skin is warm and dry.      Coloration: Skin is pale. Skin is not jaundiced.      Findings: No bruising or erythema.   Neurological:      Mental Status: She is alert and oriented to person, place, and time. Mental status is at baseline.      Cranial Nerves: No cranial nerve deficit.      Sensory: No sensory deficit.   Psychiatric:         Mood and Affect: Mood normal.         Behavior: Behavior normal.          Results Review:    Lab Results (last 24 hours)     Procedure Component Value Units Date/Time    CBC (No Diff) [[id number]]  (Normal) Collected: 11/08/20 0502    Specimen: Blood Updated: 11/08/20 0647     WBC 7.82 10*3/mm3      RBC 4.03 10*6/mm3      Hemoglobin 13.0 g/dL      Hematocrit 38.8 %      MCV 96.3 fL      MCH 32.3 pg      MCHC 33.5 g/dL      RDW  12.4 %      RDW-SD 43.4 fl      MPV 10.7 fL      Platelets 261 10*3/mm3     Comprehensive Metabolic Panel [[id number]]  (Abnormal) Collected: 11/08/20 0502    Specimen: Blood Updated: 11/08/20 0545     Glucose 157 mg/dL      BUN 21 mg/dL      Creatinine 0.86 mg/dL      Sodium 140 mmol/L      Potassium 3.1 mmol/L      Chloride 99 mmol/L      CO2 27.0 mmol/L      Calcium 9.2 mg/dL      Total Protein 6.7 g/dL      Albumin 4.00 g/dL      ALT (SGPT) 11 U/L      AST (SGOT) 18 U/L      Alkaline Phosphatase 73 U/L      Total Bilirubin 0.5 mg/dL      eGFR Non African Amer 62 mL/min/1.73      Globulin 2.7 gm/dL      A/G Ratio 1.5 g/dL      BUN/Creatinine Ratio 24.4     Anion Gap 14.0 mmol/L     Narrative:      GFR Normal >60  Chronic Kidney Disease <60  Kidney Failure <15      Urinalysis With Culture If Indicated - Urine, Clean Catch [[id number]]  (Abnormal) Collected: 11/08/20 0445    Specimen: Urine, Clean Catch Updated: 11/08/20 0452     Color, UA Yellow     Appearance, UA Cloudy     pH, UA 6.0     Specific Gravity, UA 1.014     Glucose, UA Negative     Ketones, UA Negative     Bilirubin, UA Negative     Blood, UA Negative     Protein, UA Trace     Leuk Esterase, UA Negative     Nitrite, UA Negative     Urobilinogen, UA 0.2 E.U./dL    Narrative:      Urine microscopic not indicated.    Blood Gas, Arterial - [[id number]]  (Abnormal) Collected: 11/07/20 1509    Specimen: Arterial Blood Updated: 11/07/20 1515     Site Left Radial     Serafin's Test N/A     pH, Arterial 7.427 pH units      pCO2, Arterial 49.4 mm Hg      Comment: 83 Value above reference range        pO2, Arterial 105.0 mm Hg      HCO3, Arterial 32.6 mmol/L      Comment: 83 Value above reference range        Base Excess, Arterial 7.0 mmol/L      Comment: 83 Value above reference range        O2 Saturation, Arterial 98.7 %      Barometric Pressure for Blood Gas 751 mmHg      Modality Nasal Cannula     Flow Rate 3.5 lpm      Ventilator Mode NA     Collected by       Comment: Meter: [id number]     :  [id number]             Imaging Results (Last 24 Hours)     Procedure Component Value Units Date/Time    XR Chest 1 View [[id number]] Collected: 11/07/20 1421     Updated: 11/07/20 1458    Narrative:      PROCEDURE: XR CHEST 1 VW    VIEWS:Single    INDICATION: Dyspnea, TIA    COMPARISON: CXR: 11/6/2020    FINDINGS:       - lines/tubes: None. 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    - cardiac: Size within normal limits.    - mediastinum: Contour within normal limits. Sclerotic vascular  calcification    - lungs: No evidence of a focal air space process, pulmonary  interstitial edema, nodule(s)/mass. Lungs are hyperinflated.    - pleura: No evidence of  fluid.      - osseous: Degenerative changes of both shoulders appear  unchanged from previous study.      Impression:      No acute abnormality identified. Chronic changes as above.      Electronically signed by:  Concepcion Crawford MD  11/7/2020 2:57 PM CST  Workstation: [id number]          Assessment/Plan       Malignant hypertension    COPD with acute exacerbation (CMS/HCC)    Emphysema of lung (CMS/HCC)    Acute on chronic diastolic heart failure (CMS/HCC)    Her blood pressure control has improved  resume some of her home medications, including Lasix. Follow-up on the Echo ordered on admission.    Start IV steroids, nebulized treatments/inhaled steroids, continue to provide her with supplemental oxygen.  The Covid screen test obtained on admission was negative.    Devante Hilliard MD  11/08/20  11:07 CST

## 2020-11-08 NOTE — PLAN OF CARE
Problem: Adult Inpatient Plan of Care  Goal: Plan of Care Review  Flowsheets (Taken 11/8/2020 6335)  Plan of Care Reviewed With: patient  Outcome Summary: OT nav completed this date, co-nav with PT. Pt was pleasant and agreeable. Pt fatigued quickly with tasks and O2 drops into low 80s with activity and pt has to rest and deep breath to get back into 90s. Pt CGA for sit to stand out of chair but min assist to stand to sit back into chair. Pt min assist with BSC t/f, Pt min assist to doff and don underwear. Pt CGA for pericare. Pt set up assist to use wipe to wash hands. Pt could benefit from further skilled OT to reach PLOF/max independence with ADL. Recommend 24/7 care and further OT and PT therapy at d/c.

## 2020-11-08 NOTE — THERAPY EVALUATION
Acute Care - Occupational Therapy Initial Evaluation  [facility]     Patient Name: Val Arteaga  : 1932  MRN: [id number]  Today's Date: 2020     Date of Referral to OT: 20       Admit Date: 2020       ICD-10-CM ICD-9-CM   1. TIA (transient ischemic attack)  G45.9 435.9   2. Hypertensive urgency  I16.0 401.9   3. Atherosclerosis of native coronary artery of native heart, angina presence unspecified  I25.10 414.01   4. History of PTCA  Z98.61 V45.82   5. SSS (sick sinus syndrome) (CMS/[facility])  I49.5 427.81   6. Dyspnea, unspecified type  R06.00 786.09   7. Benign hypertension  I10 401.1   8. Impaired mobility and ADLs  Z74.09 V49.89    Z78.9      Patient Active Problem List   Diagnosis   • Uncontrolled hypertension   • CAD (coronary artery disease)   • Chronic obstructive pulmonary disease (CMS/[facility])   • Acquired hypothyroidism   • Depressive disorder   • Thygeson's superficial punctate keratitis   • Pseudophakia   • Precordial pain   • Displaced fracture of base of neck of right femur, initial encounter for closed fracture (CMS/[facility])   • Hip fracture, right, closed, initial encounter (CMS/[facility])   • Emphysema of lung (CMS/[facility])   • Acute on chronic systolic CHF (congestive heart failure) (CMS/[facility])   • Hyponatremia   • BENITO (acute kidney injury) (CMS/[facility])   • Acute blood loss anemia   • Urinary retention   • Closed displaced basicervical fracture of right femur (CMS/[facility])   • Urethral caruncle   • Symptomatic anemia   • Anemia   • COPD with acute exacerbation (CMS/[facility])   • Major depressive disorder   • Drug-induced encephalopathy   • Acute left-sided low back pain without sciatica   • Bilateral sacral insufficiency fracture   • Osteoarthritis of multiple joints   • Dyspnea   • CHF (congestive heart failure) (CMS/[facility])   • Hypercholesterolemia   • Chest pain   • Acute on chronic respiratory failure with hypoxia (CMS/[facility])   • Acute on chronic diastolic heart failure (CMS/[facility])   • Malignant  hypertension     Past Medical History:   Diagnosis Date   • Allergic rhinitis    • Chronic diastolic heart failure (CMS/HCC)    • COPD (chronic obstructive pulmonary disease) (CMS/HCC)    • Coronary arteriosclerosis    • Depression    • GERD (gastroesophageal reflux disease)    • Hypercholesterolemia    • Hypertension    • Hypothyroidism    • LVH (left ventricular hypertrophy)    • Myocardial infarction (CMS/HCC)    • Osteoarthritis      Past Surgical History:   Procedure Laterality Date   • BREAST SURGERY     • CARDIAC CATHETERIZATION     • CERVICAL SPINE SURGERY     • COLONOSCOPY     • CORONARY ANGIOPLASTY     • ENDOSCOPY N/A 2/14/2018   • ENDOSCOPY AND COLONOSCOPY     • HIP BIPOLAR REPLACEMENT Right 1/23/2018   • PACEMAKER REPLACEMENT            OT ASSESSMENT FLOWSHEET (last 12 hours)      OT Evaluation and Treatment     Row Name 11/08/20 1234                   OT Time and Intention    Document Type  evaluation  -        Mode of Treatment  co-treatment;occupational therapy;physical therapy  -        Total Minutes, Occupational Therapy  45  -        Patient Effort  good  -        Symptoms Noted During/After Treatment  fatigue;shortness of breath  -        Comment  Pt with noticable open mouth inhale when resting/breathing; Pt O2 drops to 82 with activity but if pt stops, rests, and deep breaths it rises to 90s.   -           General Information    Patient Profile Reviewed  yes  -        Patient/Family/Caregiver Comments/Observations  son present inititally but then left   -        Prior Level of Function  independent:;all household mobility;transfer;bed mobility;ADL's family assists with IADL   -        Equipment Currently Used at Home  walker, rolling;shower chair;grab bar;cane, straight;oxygen;raised toilet O2 at night   -        Existing Precautions/Restrictions  fall;oxygen therapy device and L/min  -        Limitations/Impairments  safety/cognitive  -        Risks Reviewed  patient:   "-        Benefits Reviewed  patient:  -        Barriers to Rehab  medically complex  -           Living Environment    Current Living Arrangements  home/apartment/condo  -        Home Accessibility  stairs to enter home  -        Lives With  alone someone there 7 at night to 8 in morning   -           Home Main Entrance    Number of Stairs, Main Entrance  one  -        Stair Railings, Main Entrance  railings on both sides of stairs  -        Stairs Comment, Main Entrance  back ; 3 in front does not use ; has a walk in shower and tub shower , uses walk in shower seat in corner HH shower head, grab bar   -           Sensory    Additional Documentation  Hearing Assessment (Group);Sensory Interventions (Group);Vision Assessment/Intervention (Group)  -           Hearing Assessment    Hearing Status  WF  -           Vision Assessment/Intervention    Visual Impairment/Limitations  corrective lenses full-time  -           Sensory Interventions    Comment, Sensory Intervention  BUE light touch WFL   -           Cognition    Affect/Mental Status (Cognitive)  [facility]        Orientation Status (Cognition)  oriented x 4  -        Follows Commands (Cognition)  over 90% accuracy;repetition of directions required;verbal cues/prompting required  -        Cognitive Function (Cognitive)  safety deficit  -        Safety Deficit (Cognitive)  minimal deficit;moderate deficit  -        Personal Safety Interventions  gait belt;nonskid shoes/slippers when out of bed;supervised activity  -           Pain Assessment    Additional Documentation  Pain Scale: Numbers Pre/Post-Treatment (Group)  -           Pain Scale: Numbers Pre/Post-Treatment    Pretreatment Pain Rating  -- \"dont feel good breathing got bad\"   -        Posttreatment Pain Rating  3/10  [facility]        Pain Intervention(s)  Repositioned;Ambulation/increased activity;Distraction;Emotional support;Rest;Prayers  -           Range of Motion " Comprehensive    Comment, General Range of Motion  BUE WFL fair digit to thumb reports difficulty with shoulders but grossly 80 degrees   -           Strength Comprehensive (MMT)    Comment, General Manual Muscle Testing (MMT) Assessment  BUE grossly  4/5; BUE grossly 3+ go 4-/5   -           Bed Mobility    Comment (Bed Mobility)  defer pt up in chair and left in the chair   -           Functional Mobility    Functional Mobility- Ind. Level  contact guard assist;nonverbal cues required (demo/gesture);verbal cues required  -        Functional Mobility- Device  rolling walker  -        Functional Mobility- Comment  pt unsteady, gets fatigued quickly and SOB   -           Transfer Assessment/Treatment    Transfers  sit-stand transfer;stand-sit transfer;toilet transfer  -        Comment (Transfers)  cues and assist for hand placement   -           Transfers    Sit-Stand [place] (Transfers)  contact guard;nonverbal cues (demo/gesture);verbal cues  -        Stand-Sit [place] (Transfers)  minimum assist (75% patient effort);nonverbal cues (demo/gesture);verbal cues  -        [place] Level (Toilet Transfer)  minimum assist (75% patient effort);nonverbal cues (demo/gesture);verbal cues  -        Assistive Device (Toilet Transfer)  commode, bedside without drop arms;walker, front-wheeled  -           Sit-Stand Transfer    Assistive Device (Sit-Stand Transfers)  walker, front-wheeled  [facility]           Stand-Sit Transfer    Assistive Device (Stand-Sit Transfers)  walker, front-wheeled  [facility]           Toilet Transfer    Type (Toilet Transfer)  stand-sit;sit-stand  -           Safety Issues, Functional Mobility    Safety Issues Affecting Function (Mobility)  ability to follow commands;at risk behavior observed;awareness of need for assistance;impulsivity;insight into deficits/self-awareness;positioning of assistive device;judgment;problem-solving;safety precaution awareness;safety  precautions follow-through/compliance;sequencing abilities  -        Impairments Affecting Function (Mobility)  balance;coordination;endurance/activity tolerance;motor control;motor planning;pain;postural/trunk control;shortness of breath;strength  -           Balance    Balance Assessment  sitting static balance;sitting dynamic balance;standing static balance;standing dynamic balance  -        Static Sitting Balance  WFL  -        Dynamic Sitting Balance  mild impairment  -        Static Standing Balance  mild impairment  -        Dynamic Standing Balance  mild impairment;moderate impairment  -           Activities of Daily Living    BADL Assessment/Intervention  feeding;lower body dressing;toileting;bathing;grooming  [facility]           Bathing Assessment/Intervention    Comment (Bathing)  educated pt not to shower without somoene prestent to assist if needed.   -           Lower Body Dressing Assessment/Training    [place] Level (Lower Body Dressing)  doff;don;minimum assist (75% patient effort) underwear   -           Grooming Assessment/Training    Comment (Grooming)  pt set up assist to use wipe to wash off hands   -           Self-Feeding Assessment/Training    [place] Level (Feeding)  liquids to mouth;set up;modified independence  -           Toileting Assessment/Training    Comment (Toileting)  pt CGA and redirection to wash off after changing soiled underwear   -           BADL Safety/Performance    Impairments, [facility] Safety/Performance  balance;endurance/activity tolerance;coordination;motor control;motor planning;pain;shortness of breath;strength;trunk/postural control  [facility]           Plan of Care Review    Plan of Care Reviewed With  patient  -           Vital Signs    Pre Systolic BP Rehab  152  -BH        Pre Treatment Diastolic BP  70  -        Post Systolic BP Rehab  140  -BH        Post Treatment Diastolic BP  73  -        Pretreatment Heart Rate (beats/min)  77  -         Posttreatment Heart Rate (beats/min)  79  -BH        Pre SpO2 (%)  99  -BH        O2 Delivery Pre Treatment  supplemental O2 2.5 L   -BH        Intra SpO2 (%)  82  -BH        O2 Delivery Intra Treatment  supplemental O2  -BH        Post SpO2 (%)  96  -BH        O2 Delivery Post Treatment  supplemental O2  -BH        Pre Patient Position  Sitting  -BH        Intra Patient Position  Standing  -BH        Post Patient Position  Sitting  -BH           OT Goals    Transfer Goal Selection (OT)  transfer, OT goal 1  -BH        Bathing Goal Selection (OT)  bathing, OT goal 1  -BH        Dressing Goal Selection (OT)  dressing, OT goal 1  -BH        Toileting Goal Selection (OT)  toileting, OT goal 1  -BH        Endurance Goal Selection (OT)  endurance, OT goal 1  -BH        Functional Mobility Goal Selection (OT)  functional mobility, OT goal 1  -BH           Transfer Goal 1 (OT)    Activity/Assistive Device (Transfer Goal 1, OT)  toilet  -BH        [place] Level/Cues Needed (Transfer Goal 1, OT)  supervision required  -BH        Time Frame (Transfer Goal 1, OT)  long term goal (LTG);by discharge  -BH        Progress/Outcome (Transfer Goal 1, OT)  goal not met  -           Bathing Goal 1 (OT)    Activity/Device (Bathing Goal 1, OT)  bathing skills, all  -BH        [place] Level/Cues Needed (Bathing Goal 1, OT)  set-up required;supervision required;verbal cues required  -BH        Time Frame (Bathing Goal 1, OT)  long term goal (LTG);by discharge  -        Progress/Outcomes (Bathing Goal 1, OT)  goal not met  -BH           Dressing Goal 1 (OT)    Activity/Device (Dressing Goal 1, OT)  dressing skills, all  -BH        [place]/Cues Needed (Dressing Goal 1, OT)  set-up required;supervision required;verbal cues required  -BH        Time Frame (Dressing Goal 1, OT)  long term goal (LTG);by discharge  -        Progress/Outcome (Dressing Goal 1, OT)  goal not met  -           Toileting Goal 1 (OT)     Activity/Device (Toileting Goal 1, OT)  toileting skills, all  -        [place] Level/Cues Needed (Toileting Goal 1, OT)  supervision required;set-up required;verbal cues required  -        Time Frame (Toileting Goal 1, OT)  long term goal (LTG);by discharge  -        Progress/Outcome (Toileting Goal 1, OT)  goal not met  -            Endurance Goal 1 (OT)    Activity Level (Endurance Goal 1, OT)  endurance 2 fair + 25 min functional task 3 or less rest breaks O2 88 or up  -        Time Frame (Endurance Goal 1, OT)  long term goal (LTG);by discharge  -        Progress/Outcome (Endurance Goal 1, OT)  goal not met  -           Functional Mobility Goal 1 (OT)    Activity/Assistive Device (Functional Mobility Goal 1, OT)  walker, rolling  -        [place] Level/Cues Needed (Functional Mobility Goal 1, OT)  supervision required  -        Distance Goal 1 (Functional Mobility, OT)  for ADL tasks no LOB and good safety  -        Time Frame (Functional Mobility Goal 1, OT)  long term goal (LTG);by discharge  -        Progress/Outcome (Functional Mobility Goal 1, OT)  goal not met  -           Patient Education Goal (OT)    Activity (Patient Education Goal, OT)  Pt will communicate good home safety awareness.   -        [place]/Cues/Accuracy (Memory Goal 2, OT)  verbalizes understanding  -        Time Frame (Patient Education Goal, OT)  long term goal (LTG);by discharge  -        Progress/Outcome (Patient Education Goal, OT)  goal not met  -           Positioning and Restraints    Pre-Treatment Position  sitting in chair/recliner  -        Post Treatment Position  chair  -        In Chair  reclined;sitting;call light within reach;encouraged to call for assist  -           Therapy Assessment/Plan (OT)    Date of Referral to OT  11/06/20  -        Patient/Family Therapy Goal Statement (OT)  to go home  -        Functional Level at Time of Evaluation (OT)  Pt decreased  strength, endurance, balance, safety and independence with ADL   -        OT Diagnosis  Impaired mobility and ADL   -        Rehab Potential (OT)  fair, will monitor progress closely  -        Criteria for Skilled Therapeutic Interventions Met (OT)  yes;meets criteria;skilled treatment is necessary  -        Therapy Frequency (OT)  other (see comments) 5-7 days a week   -        Predicted Duration of Therapy Intervention (OT)  until d/c   -        Problem List (OT)  problems related to;balance;coordination;mobility;strength;pain;postural control  -        Activity Limitations Related to Problem List (OT)  unable to ambulate safely;unable to transfer safely;BADLs not performed adequately or safely;IADLs not performed adequately or safely  -        Planned Therapy Interventions (OT)  activity tolerance training;adaptive equipment training;BADL retraining;cognitive/visual perception retraining;functional balance retraining;IADL retraining;neuromuscular control/coordination retraining;occupation/activity based interventions;passive ROM/stretching;patient/caregiver education/training;ROM/therapeutic exercise;strengthening exercise;transfer/mobility retraining  -           Evaluation Complexity (OT)    Review Occupational Profile/Medical/Therapy History Complexity  moderate complexity  -        Assessment, Occupational Performance/Identification of Deficit Complexity  moderate complexity  -        Clinical Decision Making Complexity (OT)  moderate complexity  -        Overall Complexity of Evaluation (OT)  moderate complexity  -           Therapy Plan Review/Discharge Plan (OT)    Therapy Plan Review (OT)  evaluation/treatment results reviewed;care plan/treatment goals reviewed;risks/benefits reviewed  -        Anticipated Discharge Disposition (OT)  home with 24/7 care;home with home health  -          User Key  (r) = Recorded By, (t) = Taken By, (c) = Cosigned By    Initials Name Effective  Dates     Marisol Burdick OTR/L 06/08/18 -          Occupational Therapy Education                 Title: PT OT SLP Therapies (In Progress)     Topic: Occupational Therapy (In Progress)     Point: ADL training (In Progress)     Description:   Instruct learner(s) on proper safety adaptation and remediation techniques during self care or transfers.   Instruct in proper use of assistive devices.              Learning Progress Summary           Patient Acceptance, E, NR by  at 11/8/2020 1424    Comment: Educated about OT and POC. Educated on safety. Educated to call for assist.                   Point: Home exercise program (Not Started)     Description:   Instruct learner(s) on appropriate technique for monitoring, assisting and/or progressing therapeutic exercises/activities.              Learner Progress:  Not documented in this visit.          Point: Precautions (In Progress)     Description:   Instruct learner(s) on prescribed precautions during self-care and functional transfers.              Learning Progress Summary           Patient Acceptance, E, NR by  at 11/8/2020 1424    Comment: Educated about OT and POC. Educated on safety. Educated to call for assist.                   Point: Body mechanics (Not Started)     Description:   Instruct learner(s) on proper positioning and spine alignment during self-care, functional mobility activities and/or exercises.              Learner Progress:  Not documented in this visit.                      User Key     Initials Effective Dates Name Provider Type Discipline     06/08/18 -  Marisol Burdick OTR/L Occupational Therapist OT                  OT Recommendation and Plan  Planned Therapy Interventions (OT): activity tolerance training, adaptive equipment training, BADL retraining, cognitive/visual perception retraining, functional balance retraining, IADL retraining, neuromuscular control/coordination retraining, occupation/activity based interventions, passive  ROM/stretching, patient/caregiver education/training, ROM/therapeutic exercise, strengthening exercise, transfer/mobility retraining  Therapy Frequency (OT): other (see comments)(5-7 days a week )  Plan of Care Review  Plan of Care Reviewed With: patient  Outcome Summary: OT nav completed this date, co-eval with PT. Pt was pleasant and agreeable. Pt fatigued quickly with tasks and O2 drops into low 80s with activity and pt has to rest and deep breath to get back into 90s. Pt CGA for sit to stand out of chair but min assist to stand to sit back into chair. Pt min assist with BSC t/f, Pt min assist to doff and don underwear. Pt CGA for pericare. Pt set up assist to use wipe to wash hands. Pt could benefit from further skilled OT to reach PLOF/max independence with ADL. Recommend 24/7 care and further OT and PT therapy at d/c.  Plan of Care Reviewed With: patient  Outcome Summary: OT nav completed this date, co-eval with PT. Pt was pleasant and agreeable. Pt fatigued quickly with tasks and O2 drops into low 80s with activity and pt has to rest and deep breath to get back into 90s. Pt CGA for sit to stand out of chair but min assist to stand to sit back into chair. Pt min assist with BSC t/f, Pt min assist to doff and don underwear. Pt CGA for pericare. Pt set up assist to use wipe to wash hands. Pt could benefit from further skilled OT to reach PLOF/max independence with ADL. Recommend 24/7 care and further OT and PT therapy at d/c.    Outcome Measures     Row Name 11/08/20 8222             How much help from another is currently needed...    Putting on and taking off regular lower body clothing?  3  -BH      Bathing (including washing, rinsing, and drying)  3  -BH      Toileting (which includes using toilet bed pan or urinal)  3  -BH      Putting on and taking off regular upper body clothing  3  -BH      Taking care of personal grooming (such as brushing teeth)  3  -BH      Eating meals  4  -BH      AM-PAC 6 Clicks  Score (OT)  19  -         Functional Assessment    Outcome Measure Options  AM-PAC 6 Clicks Daily Activity (OT)  -        User Key  (r) = Recorded By, (t) = Taken By, (c) = Cosigned By    Initials Name Provider Type    Marisol Sadler, OTR/L Occupational Therapist          Time Calculation:   Time Calculation- OT     Row Name 11/08/20 1426             Time Calculation-     OT Start Time  1234  -      OT Stop Time  1319  -      OT Time Calculation (min)  45 min  -      OT Received On  11/08/20  -      OT Goal Re-Cert Due Date  11/21/20  -        User Key  (r) = Recorded By, (t) = Taken By, (c) = Cosigned By    Initials Name Provider Type     Marisol Burdick, OTR/L Occupational Therapist        Therapy Charges for Today     Code Description Service Date Service Provider Modifiers Qty    [id number]  OT EVAL MOD COMPLEXITY 3 11/8/2020 Marisol Burdick OTR/L GO 1               AYAD Vazquez/L  11/8/2020

## 2020-11-08 NOTE — PLAN OF CARE
Problem: Adult Inpatient Plan of Care  Goal: Plan of Care Review  Outcome: Ongoing, Progressing  Flowsheets (Taken 11/8/2020 0952)  Progress: improving  Plan of Care Reviewed With: patient  Outcome Summary: bedside swallow evaluation completed on patient with abnormal inhale pattern.  pt has notable open mouth intake of breath with use of aux muscles with intake of breath does not apper to be associated with low o2 or high co2.  Pt educated that this intake of breath increases r/o aspiration with PO.  no further f/u needed at this time.

## 2020-11-08 NOTE — THERAPY EVALUATION
Acute Care - Speech Language Pathology   Swallow Initial Evaluation [facility]     Patient Name: Val Arteaga  : 1932  MRN: [id number]  Today's Date: 2020               Admit Date: 2020    Visit Dx:     ICD-10-CM ICD-9-CM   1. TIA (transient ischemic attack)  G45.9 435.9   2. Hypertensive urgency  I16.0 401.9   3. Atherosclerosis of native coronary artery of native heart, angina presence unspecified  I25.10 414.01   4. History of PTCA  Z98.61 V45.82   5. SSS (sick sinus syndrome) (CMS/[facility])  I49.5 427.81   6. Dyspnea, unspecified type  R06.00 786.09   7. Benign hypertension  I10 401.1     Patient Active Problem List   Diagnosis   • Uncontrolled hypertension   • CAD (coronary artery disease)   • Chronic obstructive pulmonary disease (CMS/[facility])   • Acquired hypothyroidism   • Depressive disorder   • Thygeson's superficial punctate keratitis   • Pseudophakia   • Precordial pain   • Displaced fracture of base of neck of right femur, initial encounter for closed fracture (CMS/[facility])   • Hip fracture, right, closed, initial encounter (CMS/[facility])   • Emphysema of lung (CMS/[facility])   • Acute on chronic systolic CHF (congestive heart failure) (CMS/[facility])   • Hyponatremia   • BENITO (acute kidney injury) (CMS/[facility])   • Acute blood loss anemia   • Urinary retention   • Closed displaced basicervical fracture of right femur (CMS/[facility])   • Urethral caruncle   • Symptomatic anemia   • Anemia   • COPD with acute exacerbation (CMS/[facility])   • Major depressive disorder   • Drug-induced encephalopathy   • Acute left-sided low back pain without sciatica   • Bilateral sacral insufficiency fracture   • Osteoarthritis of multiple joints   • Dyspnea   • CHF (congestive heart failure) (CMS/[facility])   • Hypercholesterolemia   • Chest pain   • Acute on chronic respiratory failure with hypoxia (CMS/[facility])   • Acute on chronic diastolic heart failure (CMS/[facility])   • Malignant hypertension     Past Medical History:   Diagnosis Date   • Allergic  rhinitis    • Chronic diastolic heart failure (CMS/HCC)    • COPD (chronic obstructive pulmonary disease) (CMS/HCC)    • Coronary arteriosclerosis    • Depression    • GERD (gastroesophageal reflux disease)    • Hypercholesterolemia    • Hypertension    • Hypothyroidism    • LVH (left ventricular hypertrophy)    • Myocardial infarction (CMS/HCC)    • Osteoarthritis      Past Surgical History:   Procedure Laterality Date   • BREAST SURGERY     • CARDIAC CATHETERIZATION     • CERVICAL SPINE SURGERY     • COLONOSCOPY     • CORONARY ANGIOPLASTY     • ENDOSCOPY N/A 2/14/2018   • ENDOSCOPY AND COLONOSCOPY     • HIP BIPOLAR REPLACEMENT Right 1/23/2018   • PACEMAKER REPLACEMENT          SWALLOW EVALUATION (last 72 hours)      SLP Adult Swallow Evaluation     Row Name 11/08/20 0815                   Rehab Evaluation    Document Type  discharge evaluation/summary  -EC        Subjective Information  no complaints  -EC        Patient Observations  alert;cooperative;agree to therapy  -EC        Patient/Family/Caregiver Comments/Observations  none  -EC        Care Plan Review  evaluation/treatment results reviewed  -EC        Patient Effort  good  -EC        Comment  pt with noticable open mouth inhale when breathing/resting  -EC        Symptoms Noted During/After Treatment  none  -EC           General Information    Patient Profile Reviewed  yes  -EC        Pertinent History Of Current Problem  admitted with possible TIA yesterday, requires o2 via NC; visible breath inhale when at rest as if air hungry  -EC        Current Method of Nutrition  regular textures;thin liquids  -EC           Oral Motor Structure and Function    Dentition Assessment  natural, present and adequate  -EC        Secretion Management  WNL/WFL  -EC        Mucosal Quality  moist, healthy  -EC        Gag Response  WFL  -EC        Volitional Swallow  WFL  -EC        Volitional Cough  unable to elicit  -EC           General Eating/Swallowing Observations     "Respiratory Support Currently in Use  nasal cannula  -EC        Eating/Swallowing Skills  self-fed  -EC        Positioning During Eating  upright 90 degree;upright in bed  -EC        Utensils Used  straw;spoon  -EC        Consistencies Trialed  soft textures;pureed;thin liquids  -EC        Pre SpO2 (%)  97  -EC        Post SpO2 (%)  99  -EC           Respiratory    Respiratory Status  other (see comments) pt has obvious open mouth breathing with inhale during rest,  -EC        Respiratory Pattern  inhale-inhale pattern  -EC           Clinical Swallow Eval    Oral Prep Phase  WFL  -EC        Oral Transit  WFL  -EC        Oral Residue  WFL  -EC        Pharyngeal Phase  WFL  -EC        Esophageal Phase  unremarkable  -EC        Clinical Swallow Evaluation Summary  pt has increased r/o aspiration with inhale pattern that appears to be habit? she demonstrated safe intake of solids and liquids during bedside, but was educated for energy conservation techniques to decrease r/o aspiration with intake of \"easy foods and drinks\" that require less oral prep time.    -EC           Clinical Impression    Daily Summary of Progress (SLP)  progress toward functional goals as expected  -EC        Barriers to Overall Progress (SLP)  breathing pattern  -EC        SLP Swallowing Diagnosis  swallow WFL  -EC        Functional Impact  risk of aspiration/pneumonia  -EC        Swallow Criteria for Skilled Therapeutic Interventions Met  not appropriate  -EC        Plan for Continued Treatment (SLP)  continue curent diet no f/u at this time.    -EC           Recommendations    Therapy Frequency (Swallow)  evaluation only  -EC        SLP Diet Recommendation  regular textures  -EC          User Key  (r) = Recorded By, (t) = Taken By, (c) = Cosigned By    Initials Name Effective Dates    Kizzy Rutherford CCC-SLP 02/11/20 -           EDUCATION  The patient has been educated in the following areas:   Dysphagia (Swallowing " Impairment).    SLP Recommendation and Plan  SLP Swallowing Diagnosis: swallow WFL  SLP Diet Recommendation: regular textures                 Swallow Criteria for Skilled Therapeutic Interventions Met: not appropriate        Therapy Frequency (Swallow): evaluation only        Daily Summary of Progress (SLP): progress toward functional goals as expected    Plan for Continued Treatment (SLP): continue curent diet no f/u at this time.                Plan of Care Reviewed With: patient  Progress: improving  Outcome Summary: bedside swallow evaluation completed on patient with abnormal inhale pattern.  pt has notable open mouth intake of breath with use of aux muscles with intake of breath does not apper to be associated with low o2 or high co2.  Pt educated that this intake of breath increases r/o aspiration with PO.  no further f/u needed at this time.           Time Calculation:   Time Calculation- SLP     Row Name 11/08/20 0955             Time Calculation- SLP    SLP Start Time  0815  -EC      SLP Stop Time  0838  -EC      SLP Time Calculation (min)  23 min  -EC      Total Timed Code Minutes- SLP  23 minute(s)  -EC      SLP Received On  11/08/20  -EC        User Key  (r) = Recorded By, (t) = Taken By, (c) = Cosigned By    Initials Name Provider Type    EC Kizzy Rollins CCC-SLP Speech and Language Pathologist          Therapy Charges for Today     Code Description Service Date Service Provider Modifiers Qty    71104030055 HC ST EVAL ORAL PHARYNG SWALLOW 2 11/8/2020 Kizzy Rollins CCC-SLP GN 1               MARZENA Hinojosa  11/8/2020

## 2020-11-08 NOTE — PLAN OF CARE
Goal Outcome Evaluation:  Plan of Care Reviewed With: patient, family  Progress: no change  Outcome Summary: Pt remains off cardene gtt, PRN Lopressor ordered, awaiting neuro consult

## 2020-11-08 NOTE — PLAN OF CARE
Problem: Adult Inpatient Plan of Care  Goal: Plan of Care Review  Recent Flowsheet Documentation  Taken 11/8/2020 1235 by Malena Mendoza, PT  Plan of Care Reviewed With: patient  Outcome Summary: PT evaluation completed. Pt pleasant and agreeable to therapy. Pt SOA at times with talking as well as with mobility. Pt transferred sit to stand to sit with min assistance and ambulated 10ft then took standing rest break as O2 sats dropped to 82% on 2.5 lpm, then pt ambulated 5 ft to [facility]. Pt needed rest break to being O2 sats back up to 90's ptthen walked 5ft back to chair. Function limited by decreased strength, balance, and tolerance for functional mobility and activites. Pt will benefit from PT to gradually regain lost function as pt improves medically. Antipate home with assistance at discharge with  therapy to follow.

## 2020-11-09 LAB
ALBUMIN SERPL-MCNC: 3.9 G/DL (ref 3.5–5.2)
ALBUMIN/GLOB SERPL: 1.6 G/DL
ALP SERPL-CCNC: 67 U/L (ref 39–117)
ALT SERPL W P-5'-P-CCNC: 11 U/L (ref 1–33)
ANION GAP SERPL CALCULATED.3IONS-SCNC: 9 MMOL/L (ref 5–15)
AST SERPL-CCNC: 16 U/L (ref 1–32)
BILIRUB SERPL-MCNC: 0.3 MG/DL (ref 0–1.2)
BUN SERPL-MCNC: 35 MG/DL (ref 8–23)
BUN/CREAT SERPL: 31.5 (ref 7–25)
CALCIUM SPEC-SCNC: 9.1 MG/DL (ref 8.6–10.5)
CHLORIDE SERPL-SCNC: 102 MMOL/L (ref 98–107)
CO2 SERPL-SCNC: 29 MMOL/L (ref 22–29)
CREAT SERPL-MCNC: 1.11 MG/DL (ref 0.57–1)
DEPRECATED RDW RBC AUTO: 45 FL (ref 37–54)
ERYTHROCYTE [DISTWIDTH] IN BLOOD BY AUTOMATED COUNT: 12.5 % (ref 12.3–15.4)
GFR SERPL CREATININE-BSD FRML MDRD: 46 ML/MIN/1.73
GLOBULIN UR ELPH-MCNC: 2.4 GM/DL
GLUCOSE SERPL-MCNC: 115 MG/DL (ref 65–99)
HCT VFR BLD AUTO: 38.3 % (ref 34–46.6)
HGB BLD-MCNC: 12.8 G/DL (ref 12–15.9)
MCH RBC QN AUTO: 32.4 PG (ref 26.6–33)
MCHC RBC AUTO-ENTMCNC: 33.4 G/DL (ref 31.5–35.7)
MCV RBC AUTO: 97 FL (ref 79–97)
PLATELET # BLD AUTO: 284 10*3/MM3 (ref 140–450)
PMV BLD AUTO: 10.5 FL (ref 6–12)
POTASSIUM SERPL-SCNC: 4.4 MMOL/L (ref 3.5–5.2)
PROT SERPL-MCNC: 6.3 G/DL (ref 6–8.5)
RBC # BLD AUTO: 3.95 10*6/MM3 (ref 3.77–5.28)
SODIUM SERPL-SCNC: 140 MMOL/L (ref 136–145)
WBC # BLD AUTO: 11.76 10*3/MM3 (ref 3.4–10.8)

## 2020-11-09 PROCEDURE — 25010000002 METHYLPREDNISOLONE PER 40 MG: Performed by: INTERNAL MEDICINE

## 2020-11-09 PROCEDURE — 96376 TX/PRO/DX INJ SAME DRUG ADON: CPT

## 2020-11-09 PROCEDURE — 97116 GAIT TRAINING THERAPY: CPT

## 2020-11-09 PROCEDURE — 80053 COMPREHEN METABOLIC PANEL: CPT | Performed by: INTERNAL MEDICINE

## 2020-11-09 PROCEDURE — G0378 HOSPITAL OBSERVATION PER HR: HCPCS

## 2020-11-09 PROCEDURE — 97530 THERAPEUTIC ACTIVITIES: CPT

## 2020-11-09 PROCEDURE — 36415 COLL VENOUS BLD VENIPUNCTURE: CPT | Performed by: INTERNAL MEDICINE

## 2020-11-09 PROCEDURE — 25010000002 FUROSEMIDE PER 20 MG: Performed by: INTERNAL MEDICINE

## 2020-11-09 PROCEDURE — 97110 THERAPEUTIC EXERCISES: CPT

## 2020-11-09 PROCEDURE — 94799 UNLISTED PULMONARY SVC/PX: CPT

## 2020-11-09 PROCEDURE — 85027 COMPLETE CBC AUTOMATED: CPT | Performed by: INTERNAL MEDICINE

## 2020-11-09 RX ORDER — FUROSEMIDE 20 MG/1
20 TABLET ORAL DAILY
Status: DISCONTINUED | OUTPATIENT
Start: 2020-11-10 | End: 2020-11-12 | Stop reason: HOSPADM

## 2020-11-09 RX ADMIN — FERROUS SULFATE TAB EC 324 MG (65 MG FE EQUIVALENT) 324 MG: 324 (65 FE) TABLET DELAYED RESPONSE at 08:22

## 2020-11-09 RX ADMIN — METHYLPREDNISOLONE SODIUM SUCCINATE 40 MG: 40 INJECTION, POWDER, FOR SOLUTION INTRAMUSCULAR; INTRAVENOUS at 13:20

## 2020-11-09 RX ADMIN — BUDESONIDE 0.5 MG: 0.5 INHALANT RESPIRATORY (INHALATION) at 07:02

## 2020-11-09 RX ADMIN — SODIUM CHLORIDE, PRESERVATIVE FREE 10 ML: 5 INJECTION INTRAVENOUS at 20:01

## 2020-11-09 RX ADMIN — METHYLPREDNISOLONE SODIUM SUCCINATE 40 MG: 40 INJECTION, POWDER, FOR SOLUTION INTRAMUSCULAR; INTRAVENOUS at 20:01

## 2020-11-09 RX ADMIN — RANOLAZINE 500 MG: 500 TABLET, FILM COATED, EXTENDED RELEASE ORAL at 20:01

## 2020-11-09 RX ADMIN — SERTRALINE HYDROCHLORIDE 50 MG: 50 TABLET ORAL at 08:22

## 2020-11-09 RX ADMIN — ASPIRIN 81 MG: 81 TABLET, CHEWABLE ORAL at 08:22

## 2020-11-09 RX ADMIN — CARVEDILOL 6.25 MG: 6.25 TABLET, FILM COATED ORAL at 17:31

## 2020-11-09 RX ADMIN — ALBUTEROL SULFATE 2.5 MG: 2.5 SOLUTION RESPIRATORY (INHALATION) at 19:43

## 2020-11-09 RX ADMIN — SODIUM CHLORIDE, PRESERVATIVE FREE 10 ML: 5 INJECTION INTRAVENOUS at 08:23

## 2020-11-09 RX ADMIN — POTASSIUM CHLORIDE 40 MEQ: 1.5 POWDER, FOR SOLUTION ORAL at 18:50

## 2020-11-09 RX ADMIN — METHYLPREDNISOLONE SODIUM SUCCINATE 40 MG: 40 INJECTION, POWDER, FOR SOLUTION INTRAMUSCULAR; INTRAVENOUS at 06:05

## 2020-11-09 RX ADMIN — FUROSEMIDE 20 MG: 10 INJECTION, SOLUTION INTRAMUSCULAR; INTRAVENOUS at 08:23

## 2020-11-09 RX ADMIN — POTASSIUM CHLORIDE 40 MEQ: 1.5 POWDER, FOR SOLUTION ORAL at 08:22

## 2020-11-09 RX ADMIN — ATORVASTATIN CALCIUM 80 MG: 40 TABLET, FILM COATED ORAL at 20:01

## 2020-11-09 RX ADMIN — POTASSIUM CHLORIDE 40 MEQ: 1.5 POWDER, FOR SOLUTION ORAL at 11:52

## 2020-11-09 RX ADMIN — ALBUTEROL SULFATE 2.5 MG: 2.5 SOLUTION RESPIRATORY (INHALATION) at 11:17

## 2020-11-09 RX ADMIN — RANOLAZINE 500 MG: 500 TABLET, FILM COATED, EXTENDED RELEASE ORAL at 08:22

## 2020-11-09 RX ADMIN — AMLODIPINE BESYLATE 10 MG: 10 TABLET ORAL at 08:22

## 2020-11-09 RX ADMIN — CARVEDILOL 6.25 MG: 6.25 TABLET, FILM COATED ORAL at 08:22

## 2020-11-09 RX ADMIN — LEVOTHYROXINE SODIUM 50 MCG: 50 TABLET ORAL at 08:22

## 2020-11-09 RX ADMIN — FERROUS SULFATE TAB EC 324 MG (65 MG FE EQUIVALENT) 324 MG: 324 (65 FE) TABLET DELAYED RESPONSE at 17:31

## 2020-11-09 RX ADMIN — MONTELUKAST SODIUM 10 MG: 10 TABLET, COATED ORAL at 20:01

## 2020-11-09 NOTE — PROGRESS NOTES
Discharge Planning Assessment  [facility]     Patient Name: Val Arteaga  MRN: [id number]  Today's Date: 11/9/2020    Admit Date: 11/7/2020    Discharge Needs Assessment     Row Name 11/09/20 0929       Living Environment    Lives With  alone    Current Living Arrangements  home/apartment/condo    Primary Care Provided by  self    Provides Primary Care For  no one, unable/limited ability to care for self    Family Caregiver if Needed  child(wesley), adult    Quality of Family Relationships  supportive    Able to Return to Prior Arrangements  yes       Resource/Environmental Concerns    Resource/Environmental Concerns  none    Transportation Concerns  car, none       Transition Planning    Patient/Family Anticipates Transition to  home with help/services    Patient/Family Anticipated Services at Transition  home health care    Transportation Anticipated  family or friend will provide       Discharge Needs Assessment    Readmission Within the Last 30 Days  no previous admission in last 30 days    Equipment Currently Used at Home  walker, rolling;cane, straight;oxygen;nebulizer [facility]    Concerns to be Addressed  denies needs/concerns at this time    Anticipated Changes Related to Illness  none    Equipment Needed After Discharge  none    Discharge Facility/Level of Care Needs  home with home health    Provided Post Acute Provider List?  Yes    Post Acute Provider List  Home Health;DME Supplier    Method of Delivery  Telephone    Patient's Choice of Community Agency(s)  per pt she uses [facility] for DME, active with [facility]        Discharge Plan     Row Name 11/09/20 0929       Plan    Provided Post Acute Provider Quality & Resource List?  Yes    Delivered To  Patient per pt she uses [facility] for DME, active with [facility]  but prefers [facility].    Plan Comments  Cathye assessment completed- per pt she lives alone but has a sitter that stays with her at night, active with [facility] . pt voices that she prefers  [facility]. CM advised her to contact [facility] and inform that she wants to discontinue services and then [facility] can be arranged. she voiced understanding. family provides most of meals. uses [facility] for DME- has home O2@2.5-3lpm.CM discussed therapy recommendations PT- home with PT and OT- home with 24/7 and HH. per pt she feels she will do fine returning home alone with sitter at night and HH services. family provides transporation. verified demographics/preferred pharmacy....Perlita Quintanilla RN        Continued Care and Services - Admitted Since 11/7/2020    Coordination has not been started for this encounter.         Demographic Summary     Row Name 11/09/20 0925       General Information    Admission Type  observation    Arrived From  home    Required Notices Provided  Observation Status Notice    Referral Source  high risk screening    Reason for Consult  discharge planning    Preferred Language  English     Used During This Interaction  yes       Contact Information    Permission Granted to Share Info With          Functional Status     Row Name 11/09/20 0927       Functional Status, IADL    Medications  independent medication planner    Meal Preparation  assistive person    Housekeeping  assistive person house keeper every 2 weeks    Laundry  assistive person    Shopping  assistive person    IADL Comments  independent with bathing/dressing       Mental Status Summary    Recent Changes in Mental Status/Cognitive Functioning  no changes        Psychosocial    No documentation.       Abuse/Neglect    No documentation.       Legal    No documentation.       Substance Abuse    No documentation.       Patient Forms    No documentation.           Perlita Quintanilla RN

## 2020-11-09 NOTE — THERAPY TREATMENT NOTE
Acute Care - Occupational Therapy Treatment Note  [facility]     Patient Name: Val Arteaga  : 1932  MRN: [id number]  Today's Date: 2020     Date of Referral to OT: 20       Admit Date: 2020       ICD-10-CM ICD-9-CM   1. TIA (transient ischemic attack)  G45.9 435.9   2. Hypertensive urgency  I16.0 401.9   3. Atherosclerosis of native coronary artery of native heart, angina presence unspecified  I25.10 414.01   4. History of PTCA  Z98.61 V45.82   5. SSS (sick sinus syndrome) (CMS/[facility])  I49.5 427.81   6. Dyspnea, unspecified type  R06.00 786.09   7. Benign hypertension  I10 401.1   8. Impaired mobility and ADLs  Z74.09 V49.89    Z78.9    9. Impaired functional mobility, balance, gait, and endurance  Z74.09 V49.89     Patient Active Problem List   Diagnosis   • Uncontrolled hypertension   • CAD (coronary artery disease)   • Chronic obstructive pulmonary disease (CMS/[facility])   • Acquired hypothyroidism   • Depressive disorder   • Thygeson's superficial punctate keratitis   • Pseudophakia   • Precordial pain   • Displaced fracture of base of neck of right femur, initial encounter for closed fracture (CMS/[facility])   • Hip fracture, right, closed, initial encounter (CMS/HCC)   • Emphysema of lung (CMS/[facility])   • Acute on chronic systolic CHF (congestive heart failure) (CMS/[facility])   • Hyponatremia   • BENITO (acute kidney injury) (CMS/[facility])   • Acute blood loss anemia   • Urinary retention   • Closed displaced basicervical fracture of right femur (CMS/[facility])   • Urethral caruncle   • Symptomatic anemia   • Anemia   • COPD with acute exacerbation (CMS/[facility])   • Major depressive disorder   • Drug-induced encephalopathy   • Acute left-sided low back pain without sciatica   • Bilateral sacral insufficiency fracture   • Osteoarthritis of multiple joints   • Dyspnea   • CHF (congestive heart failure) (CMS/[facility])   • Hypercholesterolemia   • Chest pain   • Acute on chronic respiratory failure with hypoxia (CMS/[facility])    • Acute on chronic diastolic heart failure (CMS/HCC)   • Malignant hypertension     Past Medical History:   Diagnosis Date   • Allergic rhinitis    • Chronic diastolic heart failure (CMS/HCC)    • COPD (chronic obstructive pulmonary disease) (CMS/HCC)    • Coronary arteriosclerosis    • Depression    • GERD (gastroesophageal reflux disease)    • Hypercholesterolemia    • Hypertension    • Hypothyroidism    • LVH (left ventricular hypertrophy)    • Myocardial infarction (CMS/HCC)    • Osteoarthritis      Past Surgical History:   Procedure Laterality Date   • BREAST SURGERY     • CARDIAC CATHETERIZATION     • CERVICAL SPINE SURGERY     • COLONOSCOPY     • CORONARY ANGIOPLASTY     • ENDOSCOPY N/A 2/14/2018   • ENDOSCOPY AND COLONOSCOPY     • HIP BIPOLAR REPLACEMENT Right 1/23/2018   • PACEMAKER REPLACEMENT            OT ASSESSMENT FLOWSHEET (last 12 hours)      OT Evaluation and Treatment     Row Name 11/09/20 0856                   OT Time and Intention    Subjective Information  no complaints  -RC        Document Type  therapy note (daily note)  -RC        Mode of Treatment  occupational therapy  -RC        Total Minutes, Occupational Therapy  24  -RC        Patient Effort  adequate  -RC        Symptoms Noted During/After Treatment  shortness of breath;fatigue  -RC           General Information    Patient Profile Reviewed  yes  -RC        Existing Precautions/Restrictions  fall;oxygen therapy device and L/min  -RC           Cognition    Affect/Mental Status (Cognitive)  WFL  -RC           Pain Scale: Numbers Pre/Post-Treatment    Pretreatment Pain Rating  0/10 - no pain  -RC        Posttreatment Pain Rating  0/10 - no pain  -RC           Shoulder (Therapeutic Exercise)    Shoulder (Therapeutic Exercise)  AROM (active range of motion);strengthening exercise  -RC        Shoulder AROM (Therapeutic Exercise)  left;right;flexion;extension  -RC           Elbow/Forearm (Therapeutic Exercise)    Elbow/Forearm (Therapeutic  Exercise)  AROM (active range of motion);strengthening exercise  -RC        Elbow/Forearm AROM (Therapeutic Exercise)  left;right;flexion;extension  -RC           Grooming Assessment/Training    [place] Level (Grooming)  grooming skills;wash face, hands;set up  -        Position (Grooming)  supine  -RC           Plan of Care Review    Plan of Care Reviewed With  patient  -RC        Outcome Summary  Pt agreeable to ue ex declined oob or bath, fatigues easily w/ ex,  rest prn.  cont poc   -RC           Vital Signs    Pre Systolic BP Rehab  170  -RC        Pre Treatment Diastolic BP  76  -RC        Pretreatment Heart Rate (beats/min)  73  -RC        Posttreatment Heart Rate (beats/min)  64  -RC        Pre SpO2 (%)  98  -RC        O2 Delivery Pre Treatment  supplemental O2  -RC        Post SpO2 (%)  97  -RC        O2 Delivery Post Treatment  supplemental O2  -RC           Positioning and Restraints    Pre-Treatment Position  in bed  -RC        Post Treatment Position  bed  -RC           Progress Summary (OT)    Progress Toward Functional Goals (OT)  progress toward functional goals as expected  -          User Key  (r) = Recorded By, (t) = Taken By, (c) = Cosigned By    Initials Name Effective Dates     Lianne Hernandez COTA/L 03/07/18 -          Occupational Therapy Education                 Title: PT OT SLP Therapies (In Progress)     Topic: Occupational Therapy (In Progress)     Point: ADL training (In Progress)     Description:   Instruct learner(s) on proper safety adaptation and remediation techniques during self care or transfers.   Instruct in proper use of assistive devices.              Learning Progress Summary           Patient Acceptance, E, NR by  at 11/8/2020 7628    Comment: Educated about OT and POC. Educated on safety. Educated to call for assist.                   Point: Home exercise program (Not Started)     Description:   Instruct learner(s) on appropriate technique for monitoring,  assisting and/or progressing therapeutic exercises/activities.              Learner Progress:  Not documented in this visit.          Point: Precautions (In Progress)     Description:   Instruct learner(s) on prescribed precautions during self-care and functional transfers.              Learning Progress Summary           Patient Acceptance, E, NR by  at 11/8/2020 1601    Comment: Educated about OT and POC. Educated on safety. Educated to call for assist.                   Point: Body mechanics (Not Started)     Description:   Instruct learner(s) on proper positioning and spine alignment during self-care, functional mobility activities and/or exercises.              Learner Progress:  Not documented in this visit.                      User Key     Initials Effective Dates Name Provider Type Discipline     06/08/18 -  Marisol Burdick, OTR/L Occupational Therapist OT                  OT Recommendation and Plan     Progress Toward Functional Goals (OT): progress toward functional goals as expected  Plan of Care Review  Plan of Care Reviewed With: patient  Outcome Summary: Pt agreeable to ue ex declined oob or bath, fatigues easily w/ ex,  rest prn.  cont poc   Plan of Care Reviewed With: patient  Outcome Summary: Pt agreeable to ue ex declined oob or bath, fatigues easily w/ ex,  rest prn.  cont poc     Outcome Measures     Row Name 11/09/20 0856 11/08/20 1234          How much help from another is currently needed...    Putting on and taking off regular lower body clothing?  3  -RC  3  -BH     Bathing (including washing, rinsing, and drying)  3  -  3  -BH     Toileting (which includes using toilet bed pan or urinal)  3  -  3  -BH     Putting on and taking off regular upper body clothing  3  -  3  -BH     Taking care of personal grooming (such as brushing teeth)  3  -RC  3  -BH     Eating meals  4  -RC  4  -BH     AM-PAC 6 Clicks Score (OT)  19  -RC  19  -BH        Functional Assessment    Outcome Measure  Options  --  AM-PAC 6 Clicks Daily Activity (OT)  -       User Key  (r) = Recorded By, (t) = Taken By, (c) = Cosigned By    Initials Name Provider Type     Marisol Burdick, OTR/L Occupational Therapist    Lianne Schneider NELSON/L Occupational Therapy Assistant          Time Calculation:   Time Calculation- OT     Row Name 11/09/20 1332 11/09/20 1326          Time Calculation- OT    OT Start Time  --  -  0856  -     OT Stop Time  --  -  0920  -     OT Time Calculation (min)  --  -  24 min  -     Total Timed Code Minutes- OT  --  -  24 minute(s)  -     OT Received On  --  -  11/09/20  -       User Key  (r) = Recorded By, (t) = Taken By, (c) = Cosigned By    Initials Name Provider Type     Lianne Hernandez NELSON/L Occupational Therapy Assistant        Therapy Charges for Today     Code Description Service Date Service Provider Modifiers Qty    [id number]  OT THER PROC EA 15 MIN 11/9/2020 Lianne Hernandez NELSON/HINA GO 2               YULI ShahA/HINA  11/9/2020

## 2020-11-09 NOTE — PROGRESS NOTES
[facility] Medicine Services  INPATIENT PROGRESS NOTE    Length of Stay: 0  Date of Admission: 11/7/2020  Primary Care Physician: Vero Arteaga APRN    Subjective   Chief Complaint: Difficulty breathing   HPI:  Not feeling well today. No further HA, dizziness but states shortness of breath increases with minimal exertion (repositioning in bed). At home, wears 2.5LT NC qHS. Says that nebs help her.   No chest pain. Minimal cough. No congestion. No fever, chills.     Review of Systems     All pertinent negatives and positives are as above. All other systems have been reviewed and are negative unless otherwise stated.     Objective    Temp:  [97.2 °F (36.2 °C)-98.4 °F (36.9 °C)] 98.2 °F (36.8 °C)  Heart Rate:  [66-82] 70  Resp:  [16-22] 22  BP: (126-164)/() 164/110    Physical Exam  Vitals signs and nursing note reviewed.   Constitutional:       Comments: Short of breath with conversation   HENT:      Head: Normocephalic and atraumatic.   Cardiovascular:      Rate and Rhythm: Normal rate and regular rhythm.      Pulses: Normal pulses.      Heart sounds: Normal heart sounds.   Pulmonary:      Breath sounds: Wheezing (faint expiratory wheeze bilaterally) present.   Abdominal:      General: Bowel sounds are normal.      Palpations: Abdomen is soft.   Skin:     General: Skin is warm and dry.   Neurological:      General: No focal deficit present.      Mental Status: She is alert and oriented to person, place, and time.   Psychiatric:         Mood and Affect: Mood normal.         Behavior: Behavior normal.             Results Review:  I have reviewed the labs, radiology results, and diagnostic studies.    Laboratory Data:   Results from last 7 days   Lab Units 11/09/20  0805 11/08/20  0502 11/07/20  0407   SODIUM mmol/L 140 140 139   POTASSIUM mmol/L 4.4 3.1* 3.5   CHLORIDE mmol/L 102 99 96*   CO2 mmol/L 29.0 27.0 28.0   BUN mg/dL 35* 21 22   CREATININE mg/dL 1.11* 0.86 1.01*    GLUCOSE mg/dL 115* 157* 126*   CALCIUM mg/dL 9.1 9.2 9.0   BILIRUBIN mg/dL 0.3 0.5 0.4   ALK PHOS U/L 67 73 77   ALT (SGPT) U/L 11 11 10   AST (SGOT) U/L 16 18 15   ANION GAP mmol/L 9.0 14.0 15.0     Estimated Creatinine Clearance: 36.1 mL/min (A) (by C-G formula based on SCr of 1.11 mg/dL (H)).  Results from last 7 days   Lab Units 11/06/20  2040   MAGNESIUM mg/dL 2.2         Results from last 7 days   Lab Units 11/09/20  0805 11/08/20  0502 11/07/20  0407 11/06/20  2035   WBC 10*3/mm3 11.76* 7.82 9.14 10.24   HEMOGLOBIN g/dL 12.8 13.0 11.9* 11.6*   HEMATOCRIT % 38.3 38.8 35.2 35.0   PLATELETS 10*3/mm3 284 261 170 258           Culture Data:   No results found for: BLOODCX  No results found for: URINECX  No results found for: RESPCX  No results found for: WOUNDCX  No results found for: STOOLCX  No components found for: BODYFLD    Radiology Data:   Imaging Results (Last 24 Hours)     ** No results found for the last 24 hours. **          I have reviewed the patient's current medications.     Assessment/Plan     Principal Problem:    Malignant hypertension-Marked improvement on amlodipine, coreg, and lasix. Transition lasix from IV to PO today.   Active Problems:    COPD with acute exacerbation (CMS/HCC)-Very gradual improvement. Will continue IV steroids, O2 support, and bronchodilator therapy. Hopeful for DC in next 24-48 hours.     Acute on chronic diastolic heart failure (CMS/HCC)-Echo noted grade I diastolic dysfunction with preserved EF. Clinically euvolemic. Continue BB, lasix.         Discharge Planning: I expect patient to be discharged to home in 1-2 days.    Katty Jay MD

## 2020-11-09 NOTE — THERAPY TREATMENT NOTE
Acute Care - Physical Therapy Treatment Note  [facility]     Patient Name: Val Arteaga  : 1932  MRN: [id number]  Today's Date: 2020           PT Assessment (last 12 hours)      PT Evaluation and Treatment     Row Name 20          Physical Therapy Time and Intention    Subjective Information  complains of;dyspnea  -TW     Document Type  therapy note (daily note)  -TW     Mode of Treatment  physical therapy;individual therapy  -TW     Patient Effort  good  -TW     Row Name 20          General Information    Patient Profile Reviewed  yes  -TW     Patient Observations  alert;cooperative;agree to therapy  -TW     Patient/Family/Caregiver Comments/Observations  Pt with no family present.  -TW     Existing Precautions/Restrictions  fall;oxygen therapy device and L/min  -TW     Row Name 20          Cognition    Affect/Mental Status (Cognitive)  WFL  -TW     Orientation Status (Cognition)  oriented x 4  -TW     Follows Commands (Cognition)  over 90% accuracy  -TW     Personal Safety Interventions  fall prevention program maintained;gait belt;nonskid shoes/slippers when out of bed  -TW     Row Name 20          Pain Scale: Numbers Pre/Post-Treatment    Pretreatment Pain Rating  0/10 - no pain  -TW     Posttreatment Pain Rating  0/10 - no pain  -TW     Row Name 20          Bed Mobility    Bed Mobility  scooting/bridging;supine-sit;sit-supine  -TW     Scooting/Bridging Atlas (Bed Mobility)  standby assist  -TW     Supine-Sit Atlas (Bed Mobility)  contact guard  -TW     Sit-Supine Atlas (Bed Mobility)  contact guard  -TW     Assistive Device (Bed Mobility)  bed rails;head of bed elevated  -TW     Row Name 20          Transfers    Sit-Stand Atlas (Transfers)  contact guard  -TW     Stand-Sit Atlas (Transfers)  contact guard  -TW     Row Name 20          Sit-Stand Transfer    Assistive Device  (Sit-Stand Transfers)  walker, front-wheeled  -TW     Row Name 11/09/20 0941          Stand-Sit Transfer    Assistive Device (Stand-Sit Transfers)  walker, front-wheeled  -TW     Row Name 11/09/20 0941          Gait/Stairs (Locomotion)    Gait/Stairs Locomotion  gait/ambulation assistive device  -TW     [place] Level (Gait)  contact guard  -TW     Assistive Device (Gait)  walker, front-wheeled  -TW     Distance in Feet (Gait)  16ft x2   -TW     Pattern (Gait)  step-through  -TW     Deviations/Abnormal Patterns (Gait)  kari decreased;stride length decreased  -TW     Comment (Gait/Stairs)  Pt gets very SOA but O2 sats remained above 90% throughout tx.  -TW     Row Name 11/09/20 0941          Plan of Care Review    Plan of Care Reviewed With  patient  -TW     Progress  improving  -TW     Outcome Summary  Pt was very agreeable to therapy. Pt t/f sup to sit with CGA and stood with SBA/CGA. Pt amb with RW for 16ft x 2 trials. Pt becomes very SOA but O2 sats monitored throughout and remained above 91%. Pt performed seated LE ther ex in recliner but requires lots of rest due to SOA. Pt would cont to benefit from therapy upon DC.  -TW     Row Name 11/09/20 0941          Vital Signs    Pre Systolic BP Rehab  189  -TW     Pre Treatment Diastolic BP  101  -TW     Post Systolic BP Rehab  177  -TW     Post Treatment Diastolic BP  82  -TW     Pretreatment Heart Rate (beats/min)  72  -TW     Intratreatment Heart Rate (beats/min)  80  -TW     Posttreatment Heart Rate (beats/min)  74  -TW     Pre SpO2 (%)  99  -TW     O2 Delivery Pre Treatment  supplemental O2  -TW     Intra SpO2 (%)  92  -TW     O2 Delivery Intra Treatment  supplemental O2  -TW     Post SpO2 (%)  96  -TW     O2 Delivery Post Treatment  supplemental O2  -TW     Pre Patient Position  Supine  -TW     Intra Patient Position  Standing  -TW     Post Patient Position  Supine  -TW     Row Name 11/09/20 0941          Bed Mobility Goal 1 (PT)    Activity/Assistive  Device (Bed Mobility Goal 1, PT)  sit to supine;supine to sit  -TW     [place] Level/Cues Needed (Bed Mobility Goal 1, PT)  independent  -TW     Time Frame (Bed Mobility Goal 1, PT)  by discharge  -TW     Progress/Outcomes (Bed Mobility Goal 1, PT)  goal not met  -TW     Row Name 11/09/20 0941          Transfer Goal 1 (PT)    Activity/Assistive Device (Transfer Goal 1, PT)  sit-to-stand/stand-to-sit;bed-to-chair/chair-to-bed  -TW     [place] Level/Cues Needed (Transfer Goal 1, PT)  modified independence  -TW     Time Frame (Transfer Goal 1, PT)  by discharge  -TW     Progress/Outcome (Transfer Goal 1, PT)  goal not met  -TW     Row Name 11/09/20 0941          Gait Training Goal 1 (PT)    Activity/Assistive Device (Gait Training Goal 1, PT)  gait (walking locomotion);walker, rolling;increase endurance/gait distance;decrease fall risk  -TW     [place] Level (Gait Training Goal 1, PT)  modified independence  -TW     Distance (Gait Training Goal 1, PT)  30ft or more x2 O2 sats will not drop below 90%  -TW     Time Frame (Gait Training Goal 1, PT)  by discharge  -TW     Progress/Outcome (Gait Training Goal 1, PT)  goal not met  -TW     Row Name 11/09/20 0941          Stairs Goal 1 (PT)    Activity/Assistive Device (Stairs Goal 1, PT)  ascending stairs;descending stairs;using handrail, left;using handrail, right  -TW     [place] Level/Cues Needed (Stairs Goal 1, PT)  modified independence  -TW     Number of Stairs (Stairs Goal 1, PT)  1  -TW     Time Frame (Stairs Goal 1, PT)  by discharge  -TW     Progress/Outcome (Stairs Goal 1, PT)  goal not met  -TW     Row Name 11/09/20 0941          Positioning and Restraints    Pre-Treatment Position  in bed  -TW     Post Treatment Position  bed  -TW     In Bed  side lying right;call light within reach;encouraged to call for assist;exit alarm on  -TW     Row Name 11/09/20 0941          Therapy Assessment/Plan (PT)    Rehab Potential (PT)  good, to achieve stated  therapy goals  -     Row Name 11/09/20 0941          Progress Summary (PT)    Progress Toward Functional Goals (PT)  progress toward functional goals is good  -       User Key  (r) = Recorded By, (t) = Taken By, (c) = Cosigned By    Initials Name Provider Type    TW Keshav Buitrago PTA Physical Therapy Assistant        Physical Therapy Education                 Title: PT OT SLP Therapies (In Progress)     Topic: Physical Therapy (In Progress)     Point: Mobility training (In Progress)     Learning Progress Summary           Patient Acceptance, E, NR by  at 11/8/2020 1524                   Point: Home exercise program (Not Started)     Learner Progress:  Not documented in this visit.          Point: Body mechanics (In Progress)     Learning Progress Summary           Patient Acceptance, E, NR by RONAN at 11/8/2020 1524                   Point: Precautions (In Progress)     Learning Progress Summary           Patient Acceptance, E, NR by  at 11/8/2020 1524                               User Key     Initials Effective Dates Name Provider Type Discipline     04/03/18 -  Malena Mendoza PT Physical Therapist PT              PT Recommendation and Plan  Anticipated Discharge Disposition (PT): home with assist, home with home health  Progress Summary (PT)  Progress Toward Functional Goals (PT): progress toward functional goals is good  Plan of Care Reviewed With: patient  Progress: improving  Outcome Summary: Pt was very agreeable to therapy. Pt t/f sup to sit with CGA and stood with SBA/CGA. Pt amb with RW for 16ft x 2 trials. Pt becomes very SOA but O2 sats monitored throughout and remained above 91%. Pt performed seated LE ther ex in recliner but requires lots of rest due to SOA. Pt would cont to benefit from therapy upon DC.  Outcome Measures     Row Name 11/09/20 0941 11/09/20 0856 11/08/20 1234       How much help from another person do you currently need...    Turning from your back to your side while in  flat bed without using bedrails?  4  -TW  --  --    Moving from lying on back to sitting on the side of a flat bed without bedrails?  4  -TW  --  --    Moving to and from a bed to a chair (including a wheelchair)?  3  -TW  --  --    Standing up from a chair using your arms (e.g., wheelchair, bedside chair)?  3  -TW  --  --    Climbing 3-5 steps with a railing?  3  -TW  --  --    To walk in hospital room?  3  -TW  --  --    AM-PAC 6 Clicks Score (PT)  20  -TW  --  --       How much help from another is currently needed...    Putting on and taking off regular lower body clothing?  --  3  -  3  -BH    Bathing (including washing, rinsing, and drying)  --  3  -  3  -BH    Toileting (which includes using toilet bed pan or urinal)  --  3  -  3  -BH    Putting on and taking off regular upper body clothing  --  3  -  3  -BH    Taking care of personal grooming (such as brushing teeth)  --  3  -RC  3  -BH    Eating meals  --  4  -RC  4  -BH    AM-PAC 6 Clicks Score (OT)  --  19  -RC  19  -BH       Functional Assessment    Outcome Measure Options  --  --  AM-PAC 6 Clicks Daily Activity (OT)  -      User Key  (r) = Recorded By, (t) = Taken By, (c) = Cosigned By    Initials Name Provider Type     Marisol Burdick, OTR/L Occupational Therapist     Keshav Buitrago, JEANNETTE Physical Therapy Assistant     Lianne Hernandez, NELSON/L Occupational Therapy Assistant           Time Calculation:   PT Charges     Row Name 11/09/20 1348             Time Calculation    Start Time  0941  -TW      Stop Time  1019  -TW      Time Calculation (min)  38 min  -TW      PT Received On  11/09/20  -TW      PT Goal Re-Cert Due Date  11/21/20  -TW         Time Calculation- PT    Total Timed Code Minutes- PT  38 minute(s)  -TW        User Key  (r) = Recorded By, (t) = Taken By, (c) = Cosigned By    Initials Name Provider Type     Keshav Buitrago, JEANNETTE Physical Therapy Assistant        Therapy Charges for [facility]     Code Description Service  Date Service Provider Modifiers Qty    72972539461 HC GAIT TRAINING EA 15 MIN 11/9/2020 Keshav Buitrago, PTA GP 1    97920922447 HC PT THERAPEUTIC ACT EA 15 MIN 11/9/2020 Keshav Buitrago, JEANNETTE GP 1    75831521484 HC PT THER PROC EA 15 MIN 11/9/2020 Keshav Buitrago, JEANNETTE GP 1          PT G-Codes  Outcome Measure Options: AM-PAC 6 Clicks Basic Mobility (PT)  AM-PAC 6 Clicks Score (PT): 20  AM-PAC 6 Clicks Score (OT): 19    Keshav Buitrago PTA  11/9/2020

## 2020-11-09 NOTE — PLAN OF CARE
Problem: Adult Inpatient Plan of Care  Goal: Plan of Care Review  Outcome: Ongoing, Progressing  Goal: Patient-Specific Goal (Individualized)  Outcome: Ongoing, Progressing  Goal: Absence of Hospital-Acquired Illness or Injury  Outcome: Ongoing, Progressing  Goal: Optimal Comfort and Wellbeing  Outcome: Ongoing, Progressing  Goal: Readiness for Transition of Care  Outcome: Ongoing, Progressing     Problem: Fall Injury Risk  Goal: Absence of Fall and Fall-Related Injury  Outcome: Ongoing, Progressing     Problem: Adjustment to Illness (Stroke, Ischemic/Transient Ischemic Attack)  Goal: Optimal Coping  Outcome: Ongoing, Progressing     Problem: Bowel Elimination Impaired (Stroke, Ischemic/Transient Ischemic Attack)  Goal: Effective Bowel Elimination  Outcome: Ongoing, Progressing     Problem: Cerebral Tissue Perfusion Risk (Stroke, Ischemic/Transient Ischemic Attack)  Goal: Optimal Cerebral Tissue Perfusion  Outcome: Ongoing, Progressing     Problem: Communication Impairment (Stroke, Ischemic/Transient Ischemic Attack)  Goal: Improved Communication Skills  Outcome: Ongoing, Progressing     Problem: Eating/Swallowing Impairment (Stroke, Ischemic/Transient Ischemic Attack)  Goal: Oral Intake without Aspiration  Outcome: Ongoing, Progressing     Problem: Functional Ability Impaired (Stroke, Ischemic/Transient Ischemic Attack)  Goal: Optimal Functional Ability  Outcome: Ongoing, Progressing     Problem: Hemodynamic Instability (Stroke, Ischemic/Transient Ischemic Attack)  Goal: Vital Signs Remain in Desired Range  Outcome: Ongoing, Progressing     Problem: Pain (Stroke, Ischemic/Transient Ischemic Attack)  Goal: Acceptable Pain Control  Outcome: Ongoing, Progressing     Problem: Sensorimotor Impairment (Stroke, Ischemic/Transient Ischemic Attack)  Goal: Improved Sensorimotor Function  Outcome: Ongoing, Progressing     Problem: Urinary Elimination Impaired (Stroke, Ischemic/Transient Ischemic Attack)  Goal: Effective  Urinary Elimination  Outcome: Ongoing, Progressing     Problem: Hypertension Acute  Goal: Blood Pressure Within Desired Range  Outcome: Ongoing, Progressing   Goal Outcome Evaluation:  Plan of Care Reviewed With: patient  Progress: improving

## 2020-11-10 LAB
ALBUMIN SERPL-MCNC: 3.8 G/DL (ref 3.5–5.2)
ALBUMIN/GLOB SERPL: 1.4 G/DL
ALP SERPL-CCNC: 66 U/L (ref 39–117)
ALT SERPL W P-5'-P-CCNC: 15 U/L (ref 1–33)
ANION GAP SERPL CALCULATED.3IONS-SCNC: 10 MMOL/L (ref 5–15)
ANION GAP SERPL CALCULATED.3IONS-SCNC: 8 MMOL/L (ref 5–15)
AST SERPL-CCNC: 19 U/L (ref 1–32)
BILIRUB SERPL-MCNC: 0.4 MG/DL (ref 0–1.2)
BUN SERPL-MCNC: 37 MG/DL (ref 8–23)
BUN SERPL-MCNC: 37 MG/DL (ref 8–23)
BUN/CREAT SERPL: 36.6 (ref 7–25)
BUN/CREAT SERPL: 38.9 (ref 7–25)
CALCIUM SPEC-SCNC: 9 MG/DL (ref 8.6–10.5)
CALCIUM SPEC-SCNC: 9.2 MG/DL (ref 8.6–10.5)
CHLORIDE SERPL-SCNC: 100 MMOL/L (ref 98–107)
CHLORIDE SERPL-SCNC: 99 MMOL/L (ref 98–107)
CO2 SERPL-SCNC: 24 MMOL/L (ref 22–29)
CO2 SERPL-SCNC: 26 MMOL/L (ref 22–29)
CREAT SERPL-MCNC: 0.95 MG/DL (ref 0.57–1)
CREAT SERPL-MCNC: 1.01 MG/DL (ref 0.57–1)
DEPRECATED RDW RBC AUTO: 44.8 FL (ref 37–54)
ERYTHROCYTE [DISTWIDTH] IN BLOOD BY AUTOMATED COUNT: 12.5 % (ref 12.3–15.4)
GFR SERPL CREATININE-BSD FRML MDRD: 52 ML/MIN/1.73
GFR SERPL CREATININE-BSD FRML MDRD: 56 ML/MIN/1.73
GLOBULIN UR ELPH-MCNC: 2.7 GM/DL
GLUCOSE SERPL-MCNC: 145 MG/DL (ref 65–99)
GLUCOSE SERPL-MCNC: 182 MG/DL (ref 65–99)
HCT VFR BLD AUTO: 39.3 % (ref 34–46.6)
HGB BLD-MCNC: 13.1 G/DL (ref 12–15.9)
MCH RBC QN AUTO: 32.7 PG (ref 26.6–33)
MCHC RBC AUTO-ENTMCNC: 33.3 G/DL (ref 31.5–35.7)
MCV RBC AUTO: 98 FL (ref 79–97)
PLATELET # BLD AUTO: 276 10*3/MM3 (ref 140–450)
PMV BLD AUTO: 9.8 FL (ref 6–12)
POTASSIUM SERPL-SCNC: 5.4 MMOL/L (ref 3.5–5.2)
POTASSIUM SERPL-SCNC: 5.7 MMOL/L (ref 3.5–5.2)
PROT SERPL-MCNC: 6.5 G/DL (ref 6–8.5)
RBC # BLD AUTO: 4.01 10*6/MM3 (ref 3.77–5.28)
SODIUM SERPL-SCNC: 133 MMOL/L (ref 136–145)
SODIUM SERPL-SCNC: 134 MMOL/L (ref 136–145)
WBC # BLD AUTO: 10.87 10*3/MM3 (ref 3.4–10.8)

## 2020-11-10 PROCEDURE — 94799 UNLISTED PULMONARY SVC/PX: CPT

## 2020-11-10 PROCEDURE — 97535 SELF CARE MNGMENT TRAINING: CPT

## 2020-11-10 PROCEDURE — 25010000002 METHYLPREDNISOLONE PER 40 MG: Performed by: INTERNAL MEDICINE

## 2020-11-10 PROCEDURE — 96376 TX/PRO/DX INJ SAME DRUG ADON: CPT

## 2020-11-10 PROCEDURE — 80053 COMPREHEN METABOLIC PANEL: CPT | Performed by: INTERNAL MEDICINE

## 2020-11-10 PROCEDURE — G0378 HOSPITAL OBSERVATION PER HR: HCPCS

## 2020-11-10 PROCEDURE — 85027 COMPLETE CBC AUTOMATED: CPT | Performed by: INTERNAL MEDICINE

## 2020-11-10 RX ORDER — PREDNISONE 20 MG/1
40 TABLET ORAL
Status: DISCONTINUED | OUTPATIENT
Start: 2020-11-11 | End: 2020-11-12 | Stop reason: HOSPADM

## 2020-11-10 RX ADMIN — RANOLAZINE 500 MG: 500 TABLET, FILM COATED, EXTENDED RELEASE ORAL at 20:16

## 2020-11-10 RX ADMIN — NYSTATIN 500000 UNITS: 500000 SUSPENSION ORAL at 17:30

## 2020-11-10 RX ADMIN — MONTELUKAST SODIUM 10 MG: 10 TABLET, COATED ORAL at 20:16

## 2020-11-10 RX ADMIN — AMLODIPINE BESYLATE 10 MG: 10 TABLET ORAL at 08:25

## 2020-11-10 RX ADMIN — SODIUM CHLORIDE, PRESERVATIVE FREE 10 ML: 5 INJECTION INTRAVENOUS at 08:26

## 2020-11-10 RX ADMIN — METHYLPREDNISOLONE SODIUM SUCCINATE 40 MG: 40 INJECTION, POWDER, FOR SOLUTION INTRAMUSCULAR; INTRAVENOUS at 05:23

## 2020-11-10 RX ADMIN — CARVEDILOL 6.25 MG: 6.25 TABLET, FILM COATED ORAL at 08:25

## 2020-11-10 RX ADMIN — ALBUTEROL SULFATE 2.5 MG: 2.5 SOLUTION RESPIRATORY (INHALATION) at 16:13

## 2020-11-10 RX ADMIN — CARVEDILOL 6.25 MG: 6.25 TABLET, FILM COATED ORAL at 17:29

## 2020-11-10 RX ADMIN — SERTRALINE HYDROCHLORIDE 50 MG: 50 TABLET ORAL at 08:25

## 2020-11-10 RX ADMIN — RANOLAZINE 500 MG: 500 TABLET, FILM COATED, EXTENDED RELEASE ORAL at 08:25

## 2020-11-10 RX ADMIN — FERROUS SULFATE TAB EC 324 MG (65 MG FE EQUIVALENT) 324 MG: 324 (65 FE) TABLET DELAYED RESPONSE at 08:25

## 2020-11-10 RX ADMIN — SODIUM CHLORIDE, PRESERVATIVE FREE 10 ML: 5 INJECTION INTRAVENOUS at 20:16

## 2020-11-10 RX ADMIN — ALBUTEROL SULFATE 2.5 MG: 2.5 SOLUTION RESPIRATORY (INHALATION) at 04:01

## 2020-11-10 RX ADMIN — ATORVASTATIN CALCIUM 80 MG: 40 TABLET, FILM COATED ORAL at 20:16

## 2020-11-10 RX ADMIN — LEVOTHYROXINE SODIUM 50 MCG: 50 TABLET ORAL at 08:25

## 2020-11-10 RX ADMIN — ASPIRIN 81 MG: 81 TABLET, CHEWABLE ORAL at 08:25

## 2020-11-10 RX ADMIN — FERROUS SULFATE TAB EC 324 MG (65 MG FE EQUIVALENT) 324 MG: 324 (65 FE) TABLET DELAYED RESPONSE at 17:29

## 2020-11-10 RX ADMIN — BUDESONIDE 0.5 MG: 0.5 INHALANT RESPIRATORY (INHALATION) at 07:43

## 2020-11-10 RX ADMIN — ALBUTEROL SULFATE 2.5 MG: 2.5 SOLUTION RESPIRATORY (INHALATION) at 11:23

## 2020-11-10 RX ADMIN — FUROSEMIDE 20 MG: 20 TABLET ORAL at 08:25

## 2020-11-10 NOTE — SIGNIFICANT NOTE
11/10/20 3408   OTHER   Discipline physical therapy assistant   Rehab Time/Intention   Session Not Performed patient/family declined treatment  (Pt states she isnt feeling well and hasnt all day. Pt reports having a headache and says nsg is aware. Pt's son present and also confirms that pt has been c/o headache since he has been present.)

## 2020-11-10 NOTE — NURSING NOTE
Pt K+ recheck was 5.7. Notified Dr. Vergara and asked if she would like to administer Kayexalate. MD says no. No other new orders at this time. Will continue to monitor

## 2020-11-10 NOTE — DISCHARGE PLACEMENT REQUEST
"Val Arteaga (87 y.o. Female)     Date of Birth Social Security Number Address Home Phone MRN    12/02/1932  [id number] [facility] [address] [phone] [id number]    Congregational Marital Status          Mormon        Admission Date Admission Type Admitting Provider Attending Provider Department, Room/Bed    11/7/20 Emergency Fran Yuan MD Stewart-Hubbard, Takasha Latoya Renee, MD [address], [address]/1    Discharge Date Discharge Disposition Discharge Destination                       Attending Provider: Fran Yuan MD    Allergies: Ace Inhibitors, Baclofen, Lisinopril    Isolation: None   Infection: None   Code Status: CPR    Ht: 172.7 cm (68\")   Wt: 64.1 kg (141 lb 6.4 oz)    Admission Cmt: None   Principal Problem: Malignant hypertension [I10]                 Active Insurance as of 11/7/2020     Primary Coverage     Payor Plan Insurance Group Employer/Plan Group    MEDICARE MEDICARE A & B      Payor Plan Address Payor Plan Phone Number Payor Plan Fax Number Effective Dates    [address] [phone]  12/1/1997 - None Entered    [facility] [id number]       Subscriber Name Subscriber Birth Date Member ID       VAL ARTEAGA 12/2/1932 [id number]           Secondary Coverage     Payor Plan Insurance Group Employer/Plan Group    [facility] [id number]     Payor Plan Address Payor Plan Phone Number Payor Plan Fax Number Effective Dates    [address] [address] [phone]  10/22/2015 - None Entered    [facility] [id number]       Subscriber Name Subscriber Birth Date Member ID       VAL ARTEAGA 12/2/1932 [id number]                 Emergency Contacts      (Rel.) Home Phone Work Phone Mobile Phone    Kelly Madison (Neice) (Relative) [phone]    Clair Odonnell (Neice) (Relative) [phone]    Mitchel Amado (Relative) [phone] -- [phone]    " akash yu (Son) -- -- [phone]            Insurance Information                MEDICARE/MEDICARE A & B Phone: [phone]    Subscriber: Val Yu Subscriber#: [id number]    Group#:  Precert#:         UMR/UMR Phone: [phone]    Subscriber: Val Yu Subscriber#: [id number]    Group#: [id number] Precert#:              History & Physical      Fran Yuan MD at 11/07/20 0240          .        [facility] Medicine Admission      Date of Admission: 11/7/2020  Patient seen and evaluated on 11/7/2020    Primary Care Physician: Vero Yu APRN      Chief Complaint: *Difficulty putting words together*    HPI:*  Patient is a 87-year-old female who presents to the [facility] emergency room with difficulty putting words together.  She was seen earlier in the emergency room with elevated blood pressure which was controlled and she was sent home at that time family member at the bedside says that once they got home they were getting ready for bed and she had a difficulty putting her words together and finding words.  She did not have slurred speech they could understand what she was saying.  They did not notice any other deficits such as facial drooping or weakness.  They called 911 and came back to the emergency room.  It is noted that she is on oxygen and her Oxymizer was not working and her O2 saturations were low upon presentation.  She does report having a headache that is been going on all evening as well as nausea but no vomiting.    Concurrent Medical History:  has a past medical history of Allergic rhinitis, Chronic diastolic heart failure (CMS/HCC), COPD (chronic obstructive pulmonary disease) (CMS/[facility]), Coronary arteriosclerosis, Depression, GERD (gastroesophageal reflux disease), Hypercholesterolemia, Hypertension, Hypothyroidism, LVH (left ventricular hypertrophy), Myocardial infarction (CMS/HCC), and  Osteoarthritis.    Past Surgical History:  has a past surgical history that includes Coronary angioplasty; Cardiac catheterization; endoscopy and colonoscopy; Colonoscopy; Breast surgery; Cervical spine surgery; Pacemaker Replacement; Hip Bipolar (Right, 1/23/2018); and Esophagogastroduodenoscopy (N/A, 2/14/2018).    Family History: family history includes Coronary artery disease in her father. **    Social History:  reports that she quit smoking about 3 years ago. Her smoking use included cigarettes. She started smoking about 70 years ago. She has a 25.00 pack-year smoking history. She has never used smokeless tobacco. She reports that she does not drink alcohol or use drugs.    Allergies:   Allergies   Allergen Reactions   • Ace Inhibitors Dizziness   • Baclofen Delirium     unknown   • Lisinopril Cough     Keeps her awake all night coughing       Medications:   Prior to Admission medications    Medication Sig Start Date End Date Taking? Authorizing Provider   acetaminophen (TYLENOL) 325 MG tablet Take 2 tablets by mouth Every 4 (Four) Hours As Needed for Mild Pain . 2/5/18   Shawn Maldonado MD   albuterol (PROVENTIL) (2.5 MG/3ML) 0.083% nebulizer solution Take 2.5 mg by nebulization Every 6 (Six) Hours As Needed for Wheezing. 6/11/20   Bryan Jay MD   budesonide (Pulmicort) 0.5 MG/2ML nebulizer solution Take 2 mL by nebulization Daily. 11/3/20   Chava Dubois MD   carvedilol (COREG) 6.25 MG tablet TAKE 1 TABLET BY MOUTH 2 (TWO) TIMES A DAY WITH MEALS. 4/29/20   Vero Arteaga APRN   docusate sodium (COLACE) 100 MG capsule Take 1 capsule by mouth 2 (Two) Times a Day. 3/29/19   Lanie Jain MD   esomeprazole (nexIUM) 40 MG capsule TAKE 1 CAPSULE BY MOUTH EVERY MORNING 7/10/20   Vero Arteaga APRN   ferrous sulfate 325 (65 FE) MG tablet Take 325 mg by mouth Daily With Breakfast & Dinner.    Provider, MD [place]   fluorometholone (FML) 0.1 % ophthalmic suspension INSTILL 1 DROP IN  BOTH EYES FOUR (4) TIMES DAILY 5/30/19   ProviderOrlando MD   folic acid-vit B6-vit B12 (FOLBEE) 2.5-25-1 MG tablet tablet Take 1 tablet by mouth Daily. 1/22/19   Vero Arteaga APRN   ipratropium-albuterol (DUO-NEB) 0.5-2.5 mg/3 ml nebulizer Take 3 mL by nebulization 1 (One) Time for 1 dose. 6/24/20 10/15/21  Vero Arteaga APRN   levothyroxine (SYNTHROID, LEVOTHROID) 50 MCG tablet TAKE 1 TABLET BY MOUTH DAILY. 4/29/20   Vero Arteaga APRN   lidocaine (LIDODERM) 5 % Place 1 patch on the skin as directed by provider Daily. Remove & Discard patch within 12 hours 6/11/19   Vero Arteaga APRN   midodrine (PROAMATINE) 10 MG tablet Take 10 mg by mouth 2 (Two) Times a Day. 10/5/19   ProviderOrlando MD   montelukast (SINGULAIR) 10 MG tablet TAKE 1 TABLET BY MOUTH EVERY NIGHT. 2/25/20   Vero Arteaga APRN   nitroglycerin (NITRODUR) 0.2 MG/HR patch PLACE 1 PATCH ON THE SKIN AS DIRECTED BY PROVIDER DAILY. 4/29/20   Vero Arteaga APRN   polyethylene glycol (MIRALAX) powder MIX 1 CAPFUL (17G) IN 8 OUNCES OF LIQUID AND DRINK BY MOUTH EVERY DAY FOR CONSTIPATION 6/19/18   Roula Albert MD   predniSONE (DELTASONE) 20 MG tablet Take 2 tablets at the same time daily x5 days 9/21/20   eVro Arteaga APRN   ranolazine (RANEXA) 500 MG 12 hr tablet TAKE 1 TABLET BY MOUTH 2 (TWO) TIMES A DAY. 7/30/20   Vero Arteaga APRN   sertraline (ZOLOFT) 50 MG tablet TAKE 1 TABLET BY MOUTH DAILY. 2/24/20   Vero Arteaga APRN   sodium bicarbonate 650 MG tablet Take 1 tablet by mouth 2 (Two) Times a Day. 8/11/20   Vero Arteaga APRN   traMADol (ULTRAM) 50 MG tablet Take 1 tablet by mouth Every 6 (Six) Hours As Needed for Moderate Pain . 6/11/19   Vero Arteaga, APRN   vitamin D (ERGOCALCIFEROL) 1.25 MG (93062 UT) capsule capsule TAKE 1 CAPSULE BY MOUTH EVERY 30 (THIRTY) DAYS. 1/15/20   Vero Arteaga, APRN       Review of Systems:  Review of Systems   12 point review of systems obtained pertinent positives and negatives noted above  in HPI otherwise complete ROS is negative except as mentioned above.    Physical Exam:   Temp:  [97.8 °F (36.6 °C)-99 °F (37.2 °C)] 97.8 °F (36.6 °C)  Heart Rate:  [60-67] 67  Resp:  [18-20] 18  BP: (163-235)/() 195/86  Physical Exam  Vitals signs and nursing note reviewed.   Constitutional:       General: She is not in acute distress.     Appearance: She is normal weight. She is not ill-appearing or diaphoretic.   HENT:      Head: Normocephalic and atraumatic.      Nose: Nose normal.      Mouth/Throat:      Pharynx: Oropharynx is clear.   Eyes:      Extraocular Movements: Extraocular movements intact.      Conjunctiva/sclera: Conjunctivae normal.      Pupils: Pupils are equal, round, and reactive to light.   Neck:      Musculoskeletal: Neck supple.   Cardiovascular:      Rate and Rhythm: Normal rate and regular rhythm.      Pulses: Normal pulses.      Heart sounds: Normal heart sounds.   Pulmonary:      Effort: Pulmonary effort is normal.      Breath sounds: Normal breath sounds.   Abdominal:      General: Bowel sounds are normal.      Palpations: Abdomen is soft.   Musculoskeletal:         General: No swelling or deformity.   Skin:     General: Skin is warm and dry.      Capillary Refill: Capillary refill takes less than 2 seconds.   Neurological:      Mental Status: She is alert. Mental status is at baseline.      Cranial Nerves: No cranial nerve deficit.   Psychiatric:         Mood and Affect: Mood normal.         Behavior: Behavior normal.           Results Reviewed:  I have personally reviewed current lab, radiology, and data and agree with results.  Lab Results (last 24 hours)     ** No results found for the last 24 hours. **        Imaging Results (Last 24 Hours)     ** No results found for the last 24 hours. **            Assessment:    There are no active hospital problems to display for this patient.            Plan:*  TIA-Will get repeat CT scan of the head will have neurology evaluate patient in  the a.m. we will get an  Echocardiogram unable to get MRI secondary to metallic implant    Malignant  Hypertension*-patient's blood pressure elevated 225/97 and staying consistently in the 200s.  She will be started on a Cardene drip by the emergency room and will continue to try to titrate down the blood pressure.    Hypothyroidism we will continue Synthroid    DVT prophylaxis SCDs    Full code    I discussed the patient's findings and my recommendations with: *Patient and family at the bedside    Fran Yuan MD                  Electronically signed by Fran Yuan MD at 11/07/20 0755         Current Facility-Administered Medications   Medication Dose Route Frequency Provider Last Rate Last Dose   • albuterol (PROVENTIL) nebulizer solution 0.083% 2.5 mg/3mL  2.5 mg Nebulization Q6H PRN Devante Hilliard MD   2.5 mg at 11/10/20 1123   • amLODIPine (NORVASC) tablet 10 mg  10 mg Oral Q24H Devante Hilliard MD   10 mg at 11/10/20 0825   • aspirin chewable tablet 81 mg  81 mg Oral Daily Devante Hilliard MD   81 mg at 11/10/20 0825   • atorvastatin (LIPITOR) tablet 80 mg  80 mg Oral Nightly Devante Hilliard MD   80 mg at 11/09/20 2001   • budesonide (PULMICORT) nebulizer solution 0.5 mg  0.5 mg Nebulization Daily - RT Devante Hilliard MD   0.5 mg at 11/10/20 0743   • carvedilol (COREG) tablet 6.25 mg  6.25 mg Oral BID With Meals Devante Hilliard MD   6.25 mg at 11/10/20 0825   • ferrous sulfate EC tablet 324 mg  324 mg Oral BID With Meals Devante Hilliard MD   324 mg at 11/10/20 0825   • furosemide (LASIX) tablet 20 mg  20 mg Oral Daily Katty Jay MD   20 mg at 11/10/20 0825   • levothyroxine (SYNTHROID, LEVOTHROID) tablet 50 mcg  50 mcg Oral Daily Devante Hilliard MD   50 mcg at 11/10/20 0825   • methylPREDNISolone sodium succinate (SOLU-Medrol) injection 40 mg  40 mg Intravenous Q8H Devante Hilliard MD   40 mg at 11/10/20 0523   • metoprolol tartrate (LOPRESSOR) injection 5 mg  5 mg  Intravenous Q6H PRN Fran Yuan MD       • montelukast (SINGULAIR) tablet 10 mg  10 mg Oral Nightly Devante Hilliard MD   10 mg at 20   • ondansetron (ZOFRAN) injection 4 mg  4 mg Intravenous Q6H PRN Devante Hilliard MD       • polyethylene glycol (MIRALAX) packet 17 g  17 g Oral Daily PRN Devante Hilliard MD       • potassium chloride (MICRO-K) CR capsule 40 mEq  40 mEq Oral PRN Devante Hilliard MD   40 mEq at 20 2228    Or   • potassium chloride (KLOR-CON) packet 40 mEq  40 mEq Oral PRN Devante Hilliard MD   40 mEq at 20    Or   • potassium chloride 10 mEq in 100 mL IVPB  10 mEq Intravenous Q1H PRN Devante Hilliard MD       • ranolazine (RANEXA) 12 hr tablet 500 mg  500 mg Oral BID Devante Hilliard MD   500 mg at 11/10/20 0825   • sertraline (ZOLOFT) tablet 50 mg  50 mg Oral Daily Devante Hilliard MD   50 mg at 11/10/20 0825   • sodium chloride 0.9 % flush 10 mL  10 mL Intravenous PRN Devante Hilliard MD       • sodium chloride 0.9 % flush 10 mL  10 mL Intravenous Q12H Devante Hilliard MD   10 mL at 11/10/20 0826   • sodium chloride 0.9 % flush 10 mL  10 mL Intravenous PRN Devante Hilliard MD       • traMADol (ULTRAM) tablet 50 mg  50 mg Oral Q6H PRN Devante Hilliard MD            Physical Therapy Notes (most recent note)      Keshav Buitrago, PTA at 20 1349  Version 1 of 1         Acute Care - Physical Therapy Treatment Note  [facility]     Patient Name: Val Arteaga  : 1932  MRN: [id number]  Today's Date: 2020           PT Assessment (last 12 hours)      PT Evaluation and Treatment     Row Name 20 0941          Physical Therapy Time and Intention    Subjective Information  complains of;dyspnea  -TW     Document Type  therapy note (daily note)  -TW     Mode of Treatment  physical therapy;individual therapy  -TW     Patient Effort  good  -TW     Row Name 20 2921          General Information    Patient Profile Reviewed  yes  -TW      Patient Observations  alert;cooperative;agree to therapy  -TW     Patient/Family/Caregiver Comments/Observations  Pt with no family present.  -TW     Existing Precautions/Restrictions  fall;oxygen therapy device and L/min  -TW     Row Name 11/09/20 0941          Cognition    Affect/Mental Status (Cognitive)  WFL  -TW     Orientation Status (Cognition)  oriented x 4  -TW     Follows Commands (Cognition)  over 90% accuracy  -TW     Personal Safety Interventions  fall prevention program maintained;gait belt;nonskid shoes/slippers when out of bed  -TW     Row Name 11/09/20 0941          Pain Scale: Numbers Pre/Post-Treatment    Pretreatment Pain Rating  0/10 - no pain  -TW     Posttreatment Pain Rating  0/10 - no pain  -TW     Row Name 11/09/20 0941          Bed Mobility    Bed Mobility  scooting/bridging;supine-sit;sit-supine  -TW     Scooting/Bridging [place] (Bed Mobility)  standby assist  -TW     Supine-Sit [place] (Bed Mobility)  contact guard  -TW     Sit-Supine [place] (Bed Mobility)  contact guard  -TW     Assistive Device (Bed Mobility)  bed rails;head of bed elevated  -TW     Row Name 11/09/20 0941          Transfers    Sit-Stand [place] (Transfers)  contact guard  -TW     Stand-Sit [place] (Transfers)  contact guard  -TW     Row Name 11/09/20 0941          Sit-Stand Transfer    Assistive Device (Sit-Stand Transfers)  walker, front-wheeled  -TW     Row Name 11/09/20 0941          Stand-Sit Transfer    Assistive Device (Stand-Sit Transfers)  walker, front-wheeled  -TW     Row Name 11/09/20 0941          Gait/Stairs (Locomotion)    Gait/Stairs Locomotion  gait/ambulation assistive device  -TW     [place] Level (Gait)  contact guard  -TW     Assistive Device (Gait)  walker, front-wheeled  -TW     Distance in Feet (Gait)  16ft x2   -TW     Pattern (Gait)  step-through  -TW     Deviations/Abnormal Patterns (Gait)  kari decreased;stride length decreased  -TW     Comment  (Gait/Stairs)  Pt gets very SOA but O2 sats remained above 90% throughout tx.  -TW     Row Name 11/09/20 0941          Plan of Care Review    Plan of Care Reviewed With  patient  -TW     Progress  improving  -TW     Outcome Summary  Pt was very agreeable to therapy. Pt t/f sup to sit with CGA and stood with SBA/CGA. Pt amb with RW for 16ft x 2 trials. Pt becomes very SOA but O2 sats monitored throughout and remained above 91%. Pt performed seated LE ther ex in recliner but requires lots of rest due to SOA. Pt would cont to benefit from therapy upon DC.  -TW     Row Name 11/09/20 0941          Vital Signs    Pre Systolic BP Rehab  189  -TW     Pre Treatment Diastolic BP  101  -TW     Post Systolic BP Rehab  177  -TW     Post Treatment Diastolic BP  82  -TW     Pretreatment Heart Rate (beats/min)  72  -TW     Intratreatment Heart Rate (beats/min)  80  -TW     Posttreatment Heart Rate (beats/min)  74  -TW     Pre SpO2 (%)  99  -TW     O2 Delivery Pre Treatment  supplemental O2  -TW     Intra SpO2 (%)  92  -TW     O2 Delivery Intra Treatment  supplemental O2  -TW     Post SpO2 (%)  96  -TW     O2 Delivery Post Treatment  supplemental O2  -TW     Pre Patient Position  Supine  -TW     Intra Patient Position  Standing  -TW     Post Patient Position  Supine  -TW     Row Name 11/09/20 0941          Bed Mobility Goal 1 (PT)    Activity/Assistive Device (Bed Mobility Goal 1, PT)  sit to supine;supine to sit  -TW     [place] Level/Cues Needed (Bed Mobility Goal 1, PT)  independent  -TW     Time Frame (Bed Mobility Goal 1, PT)  by discharge  -TW     Progress/Outcomes (Bed Mobility Goal 1, PT)  goal not met  -TW     Row Name 11/09/20 0941          Transfer Goal 1 (PT)    Activity/Assistive Device (Transfer Goal 1, PT)  sit-to-stand/stand-to-sit;bed-to-chair/chair-to-bed  -TW     [place] Level/Cues Needed (Transfer Goal 1, PT)  modified independence  -TW     Time Frame (Transfer Goal 1, PT)  by discharge  -TW      Progress/Outcome (Transfer Goal 1, PT)  goal not met  -TW     Row Name 11/09/20 0941          Gait Training Goal 1 (PT)    Activity/Assistive Device (Gait Training Goal 1, PT)  gait (walking locomotion);walker, rolling;increase endurance/gait distance;decrease fall risk  -TW     [place] Level (Gait Training Goal 1, PT)  modified independence  -TW     Distance (Gait Training Goal 1, PT)  30ft or more x2 O2 sats will not drop below 90%  -TW     Time Frame (Gait Training Goal 1, PT)  by discharge  -TW     Progress/Outcome (Gait Training Goal 1, PT)  goal not met  -TW     Row Name 11/09/20 0941          Stairs Goal 1 (PT)    Activity/Assistive Device (Stairs Goal 1, PT)  ascending stairs;descending stairs;using handrail, left;using handrail, right  -TW     [place] Level/Cues Needed (Stairs Goal 1, PT)  modified independence  -TW     Number of Stairs (Stairs Goal 1, PT)  1  -TW     Time Frame (Stairs Goal 1, PT)  by discharge  -TW     Progress/Outcome (Stairs Goal 1, PT)  goal not met  -TW     Row Name 11/09/20 0941          Positioning and Restraints    Pre-Treatment Position  in bed  -TW     Post Treatment Position  bed  -TW     In Bed  side lying right;call light within reach;encouraged to call for assist;exit alarm on  -TW     Row Name 11/09/20 0941          Therapy Assessment/Plan (PT)    Rehab Potential (PT)  good, to achieve stated therapy goals  -TW     Row Name 11/09/20 0941          Progress Summary (PT)    Progress Toward Functional Goals (PT)  progress toward functional goals is good  -TW       User Key  (r) = Recorded By, (t) = Taken By, (c) = Cosigned By    Initials Name Provider Type    TW Keshav Buitrago, PTA Physical Therapy Assistant        Physical Therapy Education                 Title: PT OT SLP Therapies (In Progress)     Topic: Physical Therapy (In Progress)     Point: Mobility training (In Progress)     Learning Progress Summary           Patient Acceptance, E, NR by RONAN at 11/8/2020  1524                   Point: Home exercise program (Not Started)     Learner Progress:  Not documented in this visit.          Point: Body mechanics (In Progress)     Learning Progress Summary           Patient Acceptance, E, NR by RONAN at 11/8/2020 1524                   Point: Precautions (In Progress)     Learning Progress Summary           Patient Acceptance, E, NR by RONAN at 11/8/2020 1524                               User Key     Initials Effective Dates Name Provider Type Discipline     04/03/18 -  Malena Mendoza, PT Physical Therapist PT              PT Recommendation and Plan  Anticipated Discharge Disposition (PT): home with assist, home with home health  Progress Summary (PT)  Progress Toward Functional Goals (PT): progress toward functional goals is good  Plan of Care Reviewed With: patient  Progress: improving  Outcome Summary: Pt was very agreeable to therapy. Pt t/f sup to sit with CGA and stood with SBA/CGA. Pt amb with RW for 16ft x 2 trials. Pt becomes very SOA but O2 sats monitored throughout and remained above 91%. Pt performed seated LE ther ex in recliner but requires lots of rest due to SOA. Pt would cont to benefit from therapy upon DC.  Outcome Measures     Row Name 11/09/20 0941 11/09/20 0856 11/08/20 1234       How much help from another person do you currently need...    Turning from your back to your side while in flat bed without using bedrails?  4  -TW  --  --    Moving from lying on back to sitting on the side of a flat bed without bedrails?  4  -TW  --  --    Moving to and from a bed to a chair (including a wheelchair)?  3  -TW  --  --    Standing up from a chair using your arms (e.g., wheelchair, bedside chair)?  3  -TW  --  --    Climbing 3-5 steps with a railing?  3  -TW  --  --    To walk in hospital room?  3  -TW  --  --    AM-PAC 6 Clicks Score (PT)  20  -TW  --  --       How much help from another is currently needed...    Putting on and taking off regular lower body clothing?   --  3  -RC  3  -BH    Bathing (including washing, rinsing, and drying)  --  3  -RC  3  -BH    Toileting (which includes using toilet bed pan or urinal)  --  3  -RC  3  -BH    Putting on and taking off regular upper body clothing  --  3  -RC  3  -BH    Taking care of personal grooming (such as brushing teeth)  --  3  -RC  3  -BH    Eating meals  --  4  -RC  4  -BH    AM-PAC 6 Clicks Score (OT)  --  19  -  19  -       Functional Assessment    Outcome Measure Options  --  --  AM-PAC 6 Clicks Daily Activity (OT)  -      User Key  (r) = Recorded By, (t) = Taken By, (c) = Cosigned By    Initials Name Provider Type     Marisol Burdick, OTR/L Occupational Therapist    TW Keshav Buitrago PTA Physical Therapy Assistant     Lianne Hernandez COTA/L Occupational Therapy Assistant           Time Calculation:   PT Charges     Row Name 11/09/20 1348             Time Calculation    Start Time  0941  -TW      Stop Time  1019  -TW      Time Calculation (min)  38 min  -TW      PT Received On  11/09/20  -TW      PT Goal Re-Cert Due Date  11/21/20  -TW         Time Calculation- PT    Total Timed Code Minutes- PT  38 minute(s)  -TW        User Key  (r) = Recorded By, (t) = Taken By, (c) = Cosigned By    Initials Name Provider Type     Keshav Buitrago PTA Physical Therapy Assistant        Therapy Charges for Today     Code Description Service Date Service Provider Modifiers Qty    53286882275 HC GAIT TRAINING EA 15 MIN 11/9/2020 Keshav Buitrago PTA GP 1    00180539993 HC PT THERAPEUTIC ACT EA 15 MIN 11/9/2020 Keshav Buitrago PTA GP 1    26216166722 HC PT THER PROC EA 15 MIN 11/9/2020 Keshav Buitrago PTA GP 1          PT G-Codes  Outcome Measure Options: AM-PAC 6 Clicks Basic Mobility (PT)  AM-PAC 6 Clicks Score (PT): 20  AM-PAC 6 Clicks Score (OT): 19    Keshav Buitrago PTA  11/9/2020      Electronically signed by Keshav Buitrago PTA at 11/09/20 1349          Occupational Therapy Notes (most  recent note)      Lianne Hernandez COTA/L at 11/10/20 1135          Acute Care - Occupational Therapy Treatment Note  [facility]     Patient Name: Val Arteaga  : 1932  MRN: [id number]  Today's Date: 11/10/2020     Date of Referral to OT: 20       Admit Date: 2020       ICD-10-CM ICD-9-CM   1. TIA (transient ischemic attack)  G45.9 435.9   2. Hypertensive urgency  I16.0 401.9   3. Atherosclerosis of native coronary artery of native heart, angina presence unspecified  I25.10 414.01   4. History of PTCA  Z98.61 V45.82   5. SSS (sick sinus syndrome) (CMS/[facility])  I49.5 427.81   6. Dyspnea, unspecified type  R06.00 786.09   7. Benign hypertension  I10 401.1   8. Impaired mobility and ADLs  Z74.09 V49.89    Z78.9    9. Impaired functional mobility, balance, gait, and endurance  Z74.09 V49.89     Patient Active Problem List   Diagnosis   • Uncontrolled hypertension   • CAD (coronary artery disease)   • Chronic obstructive pulmonary disease (CMS/[facility])   • Acquired hypothyroidism   • Depressive disorder   • Thygeson's superficial punctate keratitis   • Pseudophakia   • Precordial pain   • Displaced fracture of base of neck of right femur, initial encounter for closed fracture (CMS/[facility])   • Hip fracture, right, closed, initial encounter (CMS/[facility])   • Emphysema of lung (CMS/[facility])   • Acute on chronic systolic CHF (congestive heart failure) (CMS/[facility])   • Hyponatremia   • BENITO (acute kidney injury) (CMS/[facility])   • Acute blood loss anemia   • Urinary retention   • Closed displaced basicervical fracture of right femur (CMS/[facility])   • Urethral caruncle   • Symptomatic anemia   • Anemia   • COPD with acute exacerbation (CMS/[facility])   • Major depressive disorder   • Drug-induced encephalopathy   • Acute left-sided low back pain without sciatica   • Bilateral sacral insufficiency fracture   • Osteoarthritis of multiple joints   • Dyspnea   • CHF (congestive heart failure) (CMS/[facility])   • Hypercholesterolemia   • Chest  pain   • Acute on chronic respiratory failure with hypoxia (CMS/HCC)   • Acute on chronic diastolic heart failure (CMS/HCC)   • Malignant hypertension     Past Medical History:   Diagnosis Date   • Allergic rhinitis    • Chronic diastolic heart failure (CMS/HCC)    • COPD (chronic obstructive pulmonary disease) (CMS/HCC)    • Coronary arteriosclerosis    • Depression    • GERD (gastroesophageal reflux disease)    • Hypercholesterolemia    • Hypertension    • Hypothyroidism    • LVH (left ventricular hypertrophy)    • Myocardial infarction (CMS/HCC)    • Osteoarthritis      Past Surgical History:   Procedure Laterality Date   • BREAST SURGERY     • CARDIAC CATHETERIZATION     • CERVICAL SPINE SURGERY     • COLONOSCOPY     • CORONARY ANGIOPLASTY     • ENDOSCOPY N/A 2/14/2018   • ENDOSCOPY AND COLONOSCOPY     • HIP BIPOLAR REPLACEMENT Right 1/23/2018   • PACEMAKER REPLACEMENT            OT ASSESSMENT FLOWSHEET (last 12 hours)      OT Evaluation and Treatment     Row Name 11/10/20 1030 11/10/20 0900                OT Time and Intention    Subjective Information  complains of;pain  -RC  fatigue;complains of  -RC       Document Type  therapy note (daily note)  -RC  therapy note (daily note)  -RC       Mode of Treatment  occupational therapy;individual therapy  -RC  occupational therapy;individual therapy  -RC       Total Minutes, Occupational Therapy  23  -RC  23  -RC       Patient Effort  fair  -RC  adequate  -RC       Comment  pt requested toilet transfer   -RC  reviewed home safety fall prevention at pt request.   -RC          General Information    Patient Profile Reviewed  yes  -RC  yes  -RC          Cognition    Affect/Mental Status (Cognitive)  WFL  -RC  WFL  -RC          Pain Scale: Numbers Pre/Post-Treatment    Pretreatment Pain Rating  10/10  -RC  0/10 - no pain  -RC       Posttreatment Pain Rating  10/10  -RC  0/10 - no pain  -RC       Pain Location - Orientation  posterior  -RC  --       Pain Location  --  hemoridal   -RC  --       Pain Intervention(s)  Medication (See MAR)  -RC  --          Bed Mobility    Supine-Sit Foard (Bed Mobility)  contact guard  -RC  --       Sit-Supine Foard (Bed Mobility)  contact guard  -RC  --          Functional Mobility    Functional Mobility- Ind. Level  minimum assist (75% patient effort)  -  --       Functional Mobility- Device  rolling walker  -RC  --       Functional Mobility-Distance (Feet)  8  -RC  --          Transfers    Sit-Stand Foard (Transfers)  minimum assist (75% patient effort);verbal cues  -RC  --       Stand-Sit Foard (Transfers)  minimum assist (75% patient effort);verbal cues  -RC  --       Foard Level (Toilet Transfer)  minimum assist (75% patient effort)  -RC  --       Assistive Device (Toilet Transfer)  grab bars/safety frame;raised toilet seat;walker, front-wheeled  -  --          Sit-Stand Transfer    Assistive Device (Sit-Stand Transfers)  walker, front-wheeled  -  --          Stand-Sit Transfer    Assistive Device (Stand-Sit Transfers)  walker, front-wheeled  -  --          Toilet Transfer    Type (Toilet Transfer)  stand pivot/stand step  -  --          Toileting Assessment/Training    Foard Level (Toileting)  minimum assist (75% patient effort);adjust/manage clothing;perform perineal hygiene  -  --       Assistive Devices (Toileting)  raised toilet seat;grab bar/safety frame  -  --       Position (Toileting)  supported sitting;supported standing  -RC  --          Plan of Care Review    Plan of Care Reviewed With  patient  -RC  patient  -RC       Outcome Summary   Pt request to go to bathroom, sba for sup>sit min @ for amb to toilet and for toilet transfer min @ for clothing mgmt, sba for sit>supine vss   reported to nursing   cont poc   -RC  pt declined oob bathing or ex this AM she was agreeable to review of home safety fall prevention.    -RC          Vital Signs    Pre Systolic BP Rehab  --  137  -RC        Pre Treatment Diastolic BP  --  67  -RC       Post Systolic BP Rehab  175  -RC  --       Post Treatment Diastolic BP  71  -RC  --       Pretreatment Heart Rate (beats/min)  --  76  -RC       Posttreatment Heart Rate (beats/min)  66  -RC  --       Pre SpO2 (%)  --  98  -RC       O2 Delivery Pre Treatment  --  supplemental O2  -RC       Post SpO2 (%)  95  -RC  --       O2 Delivery Post Treatment  supplemental O2  -RC  --          Positioning and Restraints    Pre-Treatment Position  in bed  -RC  in bed  -RC       Post Treatment Position  bed  -RC  bed  -RC       In Bed  encouraged to call for assist;exit alarm on;call light within reach;notified nsg  -RC  call light within reach;encouraged to call for assist;exit alarm on  -RC          Progress Summary (OT)    Progress Toward Functional Goals (OT)  progress toward functional goals as expected  -RC  progress toward functional goals as expected  -         User Key  (r) = Recorded By, (t) = Taken By, (c) = Cosigned By    Initials Name Effective Dates     Lianne Hernandez COTA/HINA 03/07/18 -          Occupational Therapy Education                 Title: PT OT SLP Therapies (In Progress)     Topic: Occupational Therapy (In Progress)     Point: ADL training (In Progress)     Description:   Instruct learner(s) on proper safety adaptation and remediation techniques during self care or transfers.   Instruct in proper use of assistive devices.              Learning Progress Summary           Patient Acceptance, E, NR by  at 11/10/2020 1120    Acceptance, E, NR by  at 11/8/2020 1424    Comment: Educated about OT and POC. Educated on safety. Educated to call for assist.                   Point: Home exercise program (Not Started)     Description:   Instruct learner(s) on appropriate technique for monitoring, assisting and/or progressing therapeutic exercises/activities.              Learner Progress:  Not documented in this visit.          Point: Precautions (In  Progress)     Description:   Instruct learner(s) on prescribed precautions during self-care and functional transfers.              Learning Progress Summary           Patient Acceptance, E, NR by  at 11/10/2020 1120    Acceptance, E, NR by  at 11/8/2020 1424    Comment: Educated about OT and POC. Educated on safety. Educated to call for assist.                   Point: Body mechanics (In Progress)     Description:   Instruct learner(s) on proper positioning and spine alignment during self-care, functional mobility activities and/or exercises.              Learning Progress Summary           Patient Acceptance, E, NR by  at 11/10/2020 1120                               User Key     Initials Effective Dates Name Provider Type Discipline     06/08/18 -  Marisol Burdick OTR/L Occupational Therapist OT     03/07/18 -  Lianne Hernandez NELSON/L Occupational Therapy Assistant OT                  OT Recommendation and Plan     Progress Toward Functional Goals (OT): progress toward functional goals as expected  Plan of Care Review  Plan of Care Reviewed With: patient  Outcome Summary:  Pt request to go to bathroom, sba for sup>sit min @ for amb to toilet and for toilet transfer min @ for clothing mgmt, sba for sit>supine vss   reported to nursing   cont poc   Plan of Care Reviewed With: patient  Outcome Summary:  Pt request to go to bathroom, sba for sup>sit min @ for amb to toilet and for toilet transfer min @ for clothing mgmt, sba for sit>supine vss   reported to nursing   cont poc     Outcome Measures     Row Name 11/10/20 1030 11/09/20 0941 11/09/20 0856       How much help from another person do you currently need...    Turning from your back to your side while in flat bed without using bedrails?  --  4  -TW  --    Moving from lying on back to sitting on the side of a flat bed without bedrails?  --  4  -TW  --    Moving to and from a bed to a chair (including a wheelchair)?  --  3  -TW  --    Standing up from  a chair using your arms (e.g., wheelchair, bedside chair)?  --  3  -TW  --    Climbing 3-5 steps with a railing?  --  3  -TW  --    To walk in hospital room?  --  3  -TW  --    AM-PAC 6 Clicks Score (PT)  --  20  -TW  --       How much help from another is currently needed...    Putting on and taking off regular lower body clothing?  3  -RC  --  3  -RC    Bathing (including washing, rinsing, and drying)  3  -RC  --  3  -RC    Toileting (which includes using toilet bed pan or urinal)  3  -RC  --  3  -RC    Putting on and taking off regular upper body clothing  3  -RC  --  3  -RC    Taking care of personal grooming (such as brushing teeth)  3  -RC  --  3  -RC    Eating meals  4  -RC  --  4  -RC    AM-PAC 6 Clicks Score (OT)  19  -RC  --  19  -RC    Row Name 11/08/20 1234             How much help from another is currently needed...    Putting on and taking off regular lower body clothing?  3  -BH      Bathing (including washing, rinsing, and drying)  3  -BH      Toileting (which includes using toilet bed pan or urinal)  3  -BH      Putting on and taking off regular upper body clothing  3  -BH      Taking care of personal grooming (such as brushing teeth)  3  -BH      Eating meals  4  -BH      AM-PAC 6 Clicks Score (OT)  19  -         Functional Assessment    Outcome Measure Options  AM-PAC 6 Clicks Daily Activity (OT)  -        User Key  (r) = Recorded By, (t) = Taken By, (c) = Cosigned By    Initials Name Provider Type     Marisol Burdick, OTR/L Occupational Therapist    TW Keshav Buitrago, PTA Physical Therapy Assistant    RC Lianne Hernandez, NELSON/L Occupational Therapy Assistant          Time Calculation:   Time Calculation- OT     Row Name 11/10/20 1133 11/10/20 1131          Time Calculation- OT    OT Start Time  1030  -RC  0900  -RC     OT Stop Time  1053  -RC  0923  -RC     OT Time Calculation (min)  23 min  -RC  23 min  -     Total Timed Code Minutes- OT  23 minute(s)  -RC  23 minute(s)  -      OT Received On  11/10/20  -  11/10/20  -       User Key  (r) = Recorded By, (t) = Taken By, (c) = Cosigned By    Initials Name Provider Type     Lianne Hernandez COTA/L Occupational Therapy Assistant        Therapy Charges for Today     Code Description Service Date Service Provider Modifiers Qty    57664502279 HC OT THER PROC EA 15 MIN 11/9/2020 Lianne Hernandez NELSON/L GO 2    28965845373 HC OT SELF CARE/MGMT/TRAIN EA 15 MIN 11/10/2020 Lianne Hernandez NELSON/L GO 2    57829611578 HC OT SELF CARE/MGMT/TRAIN EA 15 MIN 11/10/2020 Lianne Hernandez COTA/HINA GO 2               OMAR Shah/HINA  11/10/2020    Electronically signed by Lianne Hernandez COTA/L at 11/10/20 1135

## 2020-11-10 NOTE — PROGRESS NOTES
"    [facility] Medicine Services  INPATIENT PROGRESS NOTE    Length of Stay: 0  Date of Admission: 11/7/2020  Primary Care Physician: Vero Arteaga APRN    Subjective   Chief Complaint: Difficulty breathing   HPI:  No new problems today. Breathing slightly improved and \"close\" to her baseline. No new cough, congestion, chest pain. No fever, chills.   Son says that patient's brother passed away approx 3 weeks ago and since that time has had increasing difficulty with her memory.   Wants to go home. Discussed safety at home with her son at bedside. She lives alone with  services and someone intermittently checking on her in the evenings. Son lives in [place] and is trying to get more assistance in the home 24/7.     Review of Systems     All pertinent negatives and positives are as above. All other systems have been reviewed and are negative unless otherwise stated.     Objective    Temp:  [96.2 °F (35.7 °C)-98.9 °F (37.2 °C)] 98.9 °F (37.2 °C)  Heart Rate:  [60-97] 80  Resp:  [18-21] 18  BP: (128-180)/(61-80) 128/63    Physical Exam  Vitals signs and nursing note reviewed.   Constitutional:       Comments: Short of breath with conversation   HENT:      Head: Normocephalic and atraumatic.   Cardiovascular:      Rate and Rhythm: Normal rate and regular rhythm.      Pulses: Normal pulses.      Heart sounds: Normal heart sounds.   Pulmonary:      Breath sounds: Clear throughout  Abdominal:      General: Bowel sounds are normal.      Palpations: Abdomen is soft.   Skin:     General: Skin is warm and dry.   Neurological:      General: No focal deficit present.      Mental Status: She is alert and oriented to person, place, and time.   Psychiatric:         Mood and Affect: Mood normal.         Behavior: Behavior normal      Results Review:  I have reviewed the labs, radiology results, and diagnostic studies.    Laboratory Data:   Results from last 7 days   Lab Units 11/10/20  1124 " 11/10/20  0321 11/09/20  0805 11/08/20  0502   SODIUM mmol/L 133* 134* 140 140   POTASSIUM mmol/L 5.4* 5.7* 4.4 3.1*   CHLORIDE mmol/L 99 100 102 99   CO2 mmol/L 24.0 26.0 29.0 27.0   BUN mg/dL 37* 37* 35* 21   CREATININE mg/dL 1.01* 0.95 1.11* 0.86   GLUCOSE mg/dL 182* 145* 115* 157*   CALCIUM mg/dL 9.0 9.2 9.1 9.2   BILIRUBIN mg/dL  --  0.4 0.3 0.5   ALK PHOS U/L  --  66 67 73   ALT (SGPT) U/L  --  15 11 11   AST (SGOT) U/L  --  19 16 18   ANION GAP mmol/L 10.0 8.0 9.0 14.0     Estimated Creatinine Clearance: 39.7 mL/min (A) (by C-G formula based on SCr of 1.01 mg/dL (H)).  Results from last 7 days   Lab Units 11/06/20  2040   MAGNESIUM mg/dL 2.2         Results from last 7 days   Lab Units 11/10/20  0321 11/09/20  0805 11/08/20  0502 11/07/20  0407 11/06/20  2035   WBC 10*3/mm3 10.87* 11.76* 7.82 9.14 10.24   HEMOGLOBIN g/dL 13.1 12.8 13.0 11.9* 11.6*   HEMATOCRIT % 39.3 38.3 38.8 35.2 35.0   PLATELETS 10*3/mm3 276 284 261 170 258           Culture Data:   No results found for: BLOODCX  No results found for: URINECX  No results found for: RESPCX  No results found for: WOUNDCX  No results found for: STOOLCX  No components found for: BODYFLD    Radiology Data:   Imaging Results (Last 24 Hours)     ** No results found for the last 24 hours. **          I have reviewed the patient's current medications.     Assessment/Plan     Principal Problem:    Malignant hypertension-Marked improvement on amlodipine, coreg, and lasix. Continue current regimen.   Active Problems:    Hyperkalemia-Isolated K of 5.7 this morning. Repeat BMP ordered and pending. Further orders pending result.     COPD with acute exacerbation (CMS/HCC)-Very gradual improvement. Plan to taper steroids to PO today. Continue O2 support by NC and bronchodilator therapy.     Acute on chronic diastolic heart failure (CMS/HCC)-Echo noted grade I diastolic dysfunction with preserved EF. Remains clinically euvolemic. Continue BB, lasix.         Discharge  Planning: I expect patient to be discharged to SNF in 1-2 days.Case management consulted. PT and OT evaluations already completed.     Katty Jay MD

## 2020-11-10 NOTE — PLAN OF CARE
Problem: Adult Inpatient Plan of Care  Goal: Plan of Care Review  Outcome: Ongoing, Progressing  Flowsheets  Taken 11/10/2020 1120  Plan of Care Reviewed With: patient  Taken 11/10/2020 1030  Plan of Care Reviewed With: patient  Outcome Summary:  Pt request to go to bathroom, sba for sup>sit min @ for amb to toilet and for toilet transfer min @ for clothing mgmt, sba for sit>supine vss   reported to nursing   pt reports not feeling well today nursing notified      cont poc  will need 24/7 at d/c

## 2020-11-10 NOTE — PLAN OF CARE
Goal Outcome Evaluation:  Plan of Care Reviewed With: patient  Progress: no change  Outcome Summary: pt vs stable and is resting comfortably at this time; pt tearful at the beginning of the shift talking about her brother's recent passing; comfort provided; will continue to monitor

## 2020-11-10 NOTE — PLAN OF CARE
Goal Outcome Evaluation:  Plan of Care Reviewed With: patient  Progress: no change   Pt is having difficulty catching her breath and her nutrition is in adequate because she says its just not worth the effort.offered more soft food but pt declined.decreased her 02 to 1.5 LNC  and that has helped the air hunger she feels at least for right now.son has discussed advanced care planning with MD.vitals remain wnl.will cont to monitor.K+ is wnl

## 2020-11-10 NOTE — THERAPY TREATMENT NOTE
Acute Care - Occupational Therapy Treatment Note  [facility]     Patient Name: Val Arteaga  : 1932  MRN: [id number]  Today's Date: 11/10/2020     Date of Referral to OT: 20       Admit Date: 2020       ICD-10-CM ICD-9-CM   1. TIA (transient ischemic attack)  G45.9 435.9   2. Hypertensive urgency  I16.0 401.9   3. Atherosclerosis of native coronary artery of native heart, angina presence unspecified  I25.10 414.01   4. History of PTCA  Z98.61 V45.82   5. SSS (sick sinus syndrome) (CMS/[facility])  I49.5 427.81   6. Dyspnea, unspecified type  R06.00 786.09   7. Benign hypertension  I10 401.1   8. Impaired mobility and ADLs  Z74.09 V49.89    Z78.9    9. Impaired functional mobility, balance, gait, and endurance  Z74.09 V49.89     Patient Active Problem List   Diagnosis   • Uncontrolled hypertension   • CAD (coronary artery disease)   • Chronic obstructive pulmonary disease (CMS/[facility])   • Acquired hypothyroidism   • Depressive disorder   • Thygeson's superficial punctate keratitis   • Pseudophakia   • Precordial pain   • Displaced fracture of base of neck of right femur, initial encounter for closed fracture (CMS/[facility])   • Hip fracture, right, closed, initial encounter (CMS/HCC)   • Emphysema of lung (CMS/[facility])   • Acute on chronic systolic CHF (congestive heart failure) (CMS/[facility])   • Hyponatremia   • BENITO (acute kidney injury) (CMS/[facility])   • Acute blood loss anemia   • Urinary retention   • Closed displaced basicervical fracture of right femur (CMS/[facility])   • Urethral caruncle   • Symptomatic anemia   • Anemia   • COPD with acute exacerbation (CMS/[facility])   • Major depressive disorder   • Drug-induced encephalopathy   • Acute left-sided low back pain without sciatica   • Bilateral sacral insufficiency fracture   • Osteoarthritis of multiple joints   • Dyspnea   • CHF (congestive heart failure) (CMS/[facility])   • Hypercholesterolemia   • Chest pain   • Acute on chronic respiratory failure with hypoxia (CMS/[facility])    • Acute on chronic diastolic heart failure (CMS/HCC)   • Malignant hypertension     Past Medical History:   Diagnosis Date   • Allergic rhinitis    • Chronic diastolic heart failure (CMS/HCC)    • COPD (chronic obstructive pulmonary disease) (CMS/HCC)    • Coronary arteriosclerosis    • Depression    • GERD (gastroesophageal reflux disease)    • Hypercholesterolemia    • Hypertension    • Hypothyroidism    • LVH (left ventricular hypertrophy)    • Myocardial infarction (CMS/HCC)    • Osteoarthritis      Past Surgical History:   Procedure Laterality Date   • BREAST SURGERY     • CARDIAC CATHETERIZATION     • CERVICAL SPINE SURGERY     • COLONOSCOPY     • CORONARY ANGIOPLASTY     • ENDOSCOPY N/A 2/14/2018   • ENDOSCOPY AND COLONOSCOPY     • HIP BIPOLAR REPLACEMENT Right 1/23/2018   • PACEMAKER REPLACEMENT            OT ASSESSMENT FLOWSHEET (last 12 hours)      OT Evaluation and Treatment     Row Name 11/10/20 1030 11/10/20 0900                OT Time and Intention    Subjective Information  complains of;pain  -RC  fatigue;complains of  -RC       Document Type  therapy note (daily note)  -RC  therapy note (daily note)  -RC       Mode of Treatment  occupational therapy;individual therapy  -RC  occupational therapy;individual therapy  -RC       Total Minutes, Occupational Therapy  23  -RC  23  -RC       Patient Effort  fair  -RC  adequate  -RC       Comment  pt requested toilet transfer   -RC  reviewed home safety fall prevention at pt request.   -RC          General Information    Patient Profile Reviewed  yes  -RC  yes  -RC          Cognition    Affect/Mental Status (Cognitive)  WFL  -RC  WFL  -RC          Pain Scale: Numbers Pre/Post-Treatment    Pretreatment Pain Rating  10/10  -RC  0/10 - no pain  -RC       Posttreatment Pain Rating  10/10  -RC  0/10 - no pain  -RC       Pain Location - Orientation  posterior  -RC  --       Pain Location  -- hemoridal   -RC  --       Pain Intervention(s)  Medication (See MAR)  -RC   --          Bed Mobility    Supine-Sit [place] (Bed Mobility)  contact guard  -RC  --       Sit-Supine [place] (Bed Mobility)  contact guard  -RC  --          Functional Mobility    Functional Mobility- Ind. Level  minimum assist (75% patient effort)  -RC  --       Functional Mobility- Device  rolling walker  -RC  --       Functional Mobility-Distance (Feet)  8  -RC  --          Transfers    Sit-Stand [place] (Transfers)  minimum assist (75% patient effort);verbal cues  -RC  --       Stand-Sit [place] (Transfers)  minimum assist (75% patient effort);verbal cues  -RC  --       [place] Level (Toilet Transfer)  minimum assist (75% patient effort)  -RC  --       Assistive Device (Toilet Transfer)  grab bars/safety frame;raised toilet seat;walker, front-wheeled  -RC  --          Sit-Stand Transfer    Assistive Device (Sit-Stand Transfers)  walker, front-wheeled  -RC  --          Stand-Sit Transfer    Assistive Device (Stand-Sit Transfers)  walker, front-wheeled  -RC  --          Toilet Transfer    Type (Toilet Transfer)  stand pivot/stand step  -RC  --          Toileting Assessment/Training    [place] Level (Toileting)  minimum assist (75% patient effort);adjust/manage clothing;perform perineal hygiene  -RC  --       Assistive Devices (Toileting)  raised toilet seat;grab bar/safety frame  -RC  --       Position (Toileting)  supported sitting;supported standing  -RC  --          Plan of Care Review    Plan of Care Reviewed With  patient  -RC  patient  -RC       Outcome Summary   Pt request to go to bathroom, sba for sup>sit min @ for amb to toilet and for toilet transfer min @ for clothing mgmt, sba for sit>supine vss   reported to nursing   cont poc   -RC  pt declined oob bathing or ex this AM she was agreeable to review of home safety fall prevention.    -RC          Vital Signs    Pre Systolic BP Rehab  --  137  -RC       Pre Treatment Diastolic BP  --  67  -RC       Post Systolic BP  Rehab  175  -RC  --       Post Treatment Diastolic BP  71  -RC  --       Pretreatment Heart Rate (beats/min)  --  76  -RC       Posttreatment Heart Rate (beats/min)  66  -RC  --       Pre SpO2 (%)  --  98  -RC       O2 Delivery Pre Treatment  --  supplemental O2  -RC       Post SpO2 (%)  95  -RC  --       O2 Delivery Post Treatment  supplemental O2  -RC  --          Positioning and Restraints    Pre-Treatment Position  in bed  -RC  in bed  -RC       Post Treatment Position  bed  -RC  bed  -RC       In Bed  encouraged to call for assist;exit alarm on;call light within reach;notified nsg  -RC  call light within reach;encouraged to call for assist;exit alarm on  -          Progress Summary (OT)    Progress Toward Functional Goals (OT)  progress toward functional goals as expected  -  progress toward functional goals as expected  -         User Key  (r) = Recorded By, (t) = Taken By, (c) = Cosigned By    Initials Name Effective Dates     David LianneEJNNIFFER Monreal 03/07/18 -          Occupational Therapy Education                 Title: PT OT SLP Therapies (In Progress)     Topic: Occupational Therapy (In Progress)     Point: ADL training (In Progress)     Description:   Instruct learner(s) on proper safety adaptation and remediation techniques during self care or transfers.   Instruct in proper use of assistive devices.              Learning Progress Summary           Patient Acceptance, E, NR by  at 11/10/2020 1120    Acceptance, E, NR by  at 11/8/2020 1424    Comment: Educated about OT and POC. Educated on safety. Educated to call for assist.                   Point: Home exercise program (Not Started)     Description:   Instruct learner(s) on appropriate technique for monitoring, assisting and/or progressing therapeutic exercises/activities.              Learner Progress:  Not documented in this visit.          Point: Precautions (In Progress)     Description:   Instruct learner(s) on prescribed  precautions during self-care and functional transfers.              Learning Progress Summary           Patient Acceptance, E, NR by  at 11/10/2020 1120    Acceptance, E, NR by  at 11/8/2020 1424    Comment: Educated about OT and POC. Educated on safety. Educated to call for assist.                   Point: Body mechanics (In Progress)     Description:   Instruct learner(s) on proper positioning and spine alignment during self-care, functional mobility activities and/or exercises.              Learning Progress Summary           Patient Acceptance, E, NR by  at 11/10/2020 1120                               User Key     Initials Effective Dates Name Provider Type Discipline     06/08/18 -  Marisol Burdick OTR/L Occupational Therapist OT     03/07/18 -  Lianne Hernandez NELSON/L Occupational Therapy Assistant OT                  OT Recommendation and Plan     Progress Toward Functional Goals (OT): progress toward functional goals as expected  Plan of Care Review  Plan of Care Reviewed With: patient  Outcome Summary:  Pt request to go to bathroom, sba for sup>sit min @ for amb to toilet and for toilet transfer min @ for clothing mgmt, sba for sit>supine vss   reported to nursing   cont poc   Plan of Care Reviewed With: patient  Outcome Summary:  Pt request to go to bathroom, sba for sup>sit min @ for amb to toilet and for toilet transfer min @ for clothing mgmt, sba for sit>supine vss   reported to nursing   cont poc     Outcome Measures     Row Name 11/10/20 1030 11/09/20 0941 11/09/20 0856       How much help from another person do you currently need...    Turning from your back to your side while in flat bed without using bedrails?  --  4  -TW  --    Moving from lying on back to sitting on the side of a flat bed without bedrails?  --  4  -TW  --    Moving to and from a bed to a chair (including a wheelchair)?  --  3  -TW  --    Standing up from a chair using your arms (e.g., wheelchair, bedside chair)?  --   3  -TW  --    Climbing 3-5 steps with a railing?  --  3  -TW  --    To walk in hospital room?  --  3  -TW  --    AM-PAC 6 Clicks Score (PT)  --  20  -TW  --       How much help from another is currently needed...    Putting on and taking off regular lower body clothing?  3  -RC  --  3  -RC    Bathing (including washing, rinsing, and drying)  3  -RC  --  3  -RC    Toileting (which includes using toilet bed pan or urinal)  3  -RC  --  3  -RC    Putting on and taking off regular upper body clothing  3  -RC  --  3  -RC    Taking care of personal grooming (such as brushing teeth)  3  -RC  --  3  -RC    Eating meals  4  -RC  --  4  -RC    AM-PAC 6 Clicks Score (OT)  19  -RC  --  19  -RC    Row Name 11/08/20 1234             How much help from another is currently needed...    Putting on and taking off regular lower body clothing?  3  -BH      Bathing (including washing, rinsing, and drying)  3  -BH      Toileting (which includes using toilet bed pan or urinal)  3  -BH      Putting on and taking off regular upper body clothing  3  -BH      Taking care of personal grooming (such as brushing teeth)  3  -      Eating meals  4  -BH      AM-PAC 6 Clicks Score (OT)  19  -         Functional Assessment    Outcome Measure Options  AM-[facility] 6 Clicks Daily Activity (OT)  -        User Key  (r) = Recorded By, (t) = Taken By, (c) = Cosigned By    Initials Name Provider Type     Marisol Burdick, OTR/L Occupational Therapist     Keshav Buitrago, PTA Physical Therapy Assistant    RC Lianne Hernandez COTA/L Occupational Therapy Assistant          Time Calculation:   Time Calculation- OT     Row Name 11/10/20 1133 11/10/20 1131          Time Calculation- OT    OT Start Time  1030  -  0900  -     OT Stop Time  1053  -  0923  -     OT Time Calculation (min)  23 min  -  23 min  -     Total Timed Code Minutes- OT  23 minute(s)  -  23 minute(s)  -     OT Received On  11/10/20  -  11/10/20  -       User Key  (r)  = Recorded By, (t) = Taken By, (c) = Cosigned By    Initials Name Provider Type    Lianne Schneider NELSON/L Occupational Therapy Assistant        Therapy Charges for Today     Code Description Service Date Service Provider Modifiers Qty    80002559394 HC OT THER PROC EA 15 MIN 11/9/2020 Lianne Hernandez, NELSON/L GO 2    96774451159 HC OT SELF CARE/MGMT/TRAIN EA 15 MIN 11/10/2020 Lianne Hernandez, NELSON/L GO 2    42154459914 HC OT SELF CARE/MGMT/TRAIN EA 15 MIN 11/10/2020 Lianne Hernandez, NELSON/L GO 2               Lianne Hernandez NELSON/L  11/10/2020

## 2020-11-11 LAB
ANION GAP SERPL CALCULATED.3IONS-SCNC: 8 MMOL/L (ref 5–15)
BASOPHILS # BLD AUTO: 0.01 10*3/MM3 (ref 0–0.2)
BASOPHILS NFR BLD AUTO: 0.1 % (ref 0–1.5)
BUN SERPL-MCNC: 35 MG/DL (ref 8–23)
BUN/CREAT SERPL: 35.4 (ref 7–25)
CALCIUM SPEC-SCNC: 8.6 MG/DL (ref 8.6–10.5)
CHLORIDE SERPL-SCNC: 98 MMOL/L (ref 98–107)
CO2 SERPL-SCNC: 28 MMOL/L (ref 22–29)
CREAT SERPL-MCNC: 0.99 MG/DL (ref 0.57–1)
DEPRECATED RDW RBC AUTO: 46.8 FL (ref 37–54)
EOSINOPHIL # BLD AUTO: 0 10*3/MM3 (ref 0–0.4)
EOSINOPHIL NFR BLD AUTO: 0 % (ref 0.3–6.2)
ERYTHROCYTE [DISTWIDTH] IN BLOOD BY AUTOMATED COUNT: 12.6 % (ref 12.3–15.4)
GFR SERPL CREATININE-BSD FRML MDRD: 53 ML/MIN/1.73
GLUCOSE SERPL-MCNC: 102 MG/DL (ref 65–99)
HCT VFR BLD AUTO: 42.8 % (ref 34–46.6)
HGB BLD-MCNC: 13.8 G/DL (ref 12–15.9)
IMM GRANULOCYTES # BLD AUTO: 0.06 10*3/MM3 (ref 0–0.05)
IMM GRANULOCYTES NFR BLD AUTO: 0.5 % (ref 0–0.5)
LYMPHOCYTES # BLD AUTO: 1.67 10*3/MM3 (ref 0.7–3.1)
LYMPHOCYTES NFR BLD AUTO: 13.9 % (ref 19.6–45.3)
MCH RBC QN AUTO: 32.3 PG (ref 26.6–33)
MCHC RBC AUTO-ENTMCNC: 32.2 G/DL (ref 31.5–35.7)
MCV RBC AUTO: 100.2 FL (ref 79–97)
MONOCYTES # BLD AUTO: 0.76 10*3/MM3 (ref 0.1–0.9)
MONOCYTES NFR BLD AUTO: 6.3 % (ref 5–12)
NEUTROPHILS NFR BLD AUTO: 79.2 % (ref 42.7–76)
NEUTROPHILS NFR BLD AUTO: 9.49 10*3/MM3 (ref 1.7–7)
NRBC BLD AUTO-RTO: 0 /100 WBC (ref 0–0.2)
PLATELET # BLD AUTO: 285 10*3/MM3 (ref 140–450)
PMV BLD AUTO: 9.9 FL (ref 6–12)
POTASSIUM SERPL-SCNC: 4.7 MMOL/L (ref 3.5–5.2)
RBC # BLD AUTO: 4.27 10*6/MM3 (ref 3.77–5.28)
SODIUM SERPL-SCNC: 134 MMOL/L (ref 136–145)
WBC # BLD AUTO: 11.99 10*3/MM3 (ref 3.4–10.8)

## 2020-11-11 PROCEDURE — 80048 BASIC METABOLIC PNL TOTAL CA: CPT | Performed by: FAMILY MEDICINE

## 2020-11-11 PROCEDURE — G0378 HOSPITAL OBSERVATION PER HR: HCPCS

## 2020-11-11 PROCEDURE — 85025 COMPLETE CBC W/AUTO DIFF WBC: CPT | Performed by: FAMILY MEDICINE

## 2020-11-11 PROCEDURE — G0008 ADMIN INFLUENZA VIRUS VAC: HCPCS | Performed by: HOSPITALIST

## 2020-11-11 PROCEDURE — 25010000002 INFLUENZA VAC SPLIT QUAD 0.5 ML SUSPENSION PREFILLED SYRINGE: Performed by: HOSPITALIST

## 2020-11-11 PROCEDURE — 63710000001 PREDNISONE PER 1 MG: Performed by: FAMILY MEDICINE

## 2020-11-11 PROCEDURE — 97110 THERAPEUTIC EXERCISES: CPT

## 2020-11-11 PROCEDURE — 97535 SELF CARE MNGMENT TRAINING: CPT

## 2020-11-11 PROCEDURE — 90686 IIV4 VACC NO PRSV 0.5 ML IM: CPT | Performed by: HOSPITALIST

## 2020-11-11 PROCEDURE — 94799 UNLISTED PULMONARY SVC/PX: CPT

## 2020-11-11 RX ADMIN — FERROUS SULFATE TAB EC 324 MG (65 MG FE EQUIVALENT) 324 MG: 324 (65 FE) TABLET DELAYED RESPONSE at 08:06

## 2020-11-11 RX ADMIN — SERTRALINE HYDROCHLORIDE 50 MG: 50 TABLET ORAL at 08:06

## 2020-11-11 RX ADMIN — TRAMADOL HYDROCHLORIDE 50 MG: 50 TABLET, FILM COATED ORAL at 13:52

## 2020-11-11 RX ADMIN — MONTELUKAST SODIUM 10 MG: 10 TABLET, COATED ORAL at 20:18

## 2020-11-11 RX ADMIN — NYSTATIN 500000 UNITS: 500000 SUSPENSION ORAL at 08:06

## 2020-11-11 RX ADMIN — ALBUTEROL SULFATE 2.5 MG: 2.5 SOLUTION RESPIRATORY (INHALATION) at 20:24

## 2020-11-11 RX ADMIN — FERROUS SULFATE TAB EC 324 MG (65 MG FE EQUIVALENT) 324 MG: 324 (65 FE) TABLET DELAYED RESPONSE at 17:22

## 2020-11-11 RX ADMIN — ATORVASTATIN CALCIUM 80 MG: 40 TABLET, FILM COATED ORAL at 20:18

## 2020-11-11 RX ADMIN — PREDNISONE 40 MG: 20 TABLET ORAL at 08:06

## 2020-11-11 RX ADMIN — INFLUENZA VIRUS VACCINE 0.5 ML: 15; 15; 15; 15 SUSPENSION INTRAMUSCULAR at 12:15

## 2020-11-11 RX ADMIN — LEVOTHYROXINE SODIUM 50 MCG: 50 TABLET ORAL at 08:06

## 2020-11-11 RX ADMIN — ASPIRIN 81 MG: 81 TABLET, CHEWABLE ORAL at 08:06

## 2020-11-11 RX ADMIN — FUROSEMIDE 20 MG: 20 TABLET ORAL at 08:06

## 2020-11-11 RX ADMIN — ALBUTEROL SULFATE 2.5 MG: 2.5 SOLUTION RESPIRATORY (INHALATION) at 06:23

## 2020-11-11 RX ADMIN — RANOLAZINE 500 MG: 500 TABLET, FILM COATED, EXTENDED RELEASE ORAL at 08:06

## 2020-11-11 RX ADMIN — SODIUM CHLORIDE, PRESERVATIVE FREE 10 ML: 5 INJECTION INTRAVENOUS at 08:06

## 2020-11-11 RX ADMIN — NYSTATIN 500000 UNITS: 500000 SUSPENSION ORAL at 17:22

## 2020-11-11 RX ADMIN — CARVEDILOL 6.25 MG: 6.25 TABLET, FILM COATED ORAL at 08:06

## 2020-11-11 RX ADMIN — RANOLAZINE 500 MG: 500 TABLET, FILM COATED, EXTENDED RELEASE ORAL at 20:18

## 2020-11-11 RX ADMIN — CARVEDILOL 6.25 MG: 6.25 TABLET, FILM COATED ORAL at 17:22

## 2020-11-11 RX ADMIN — BUDESONIDE 0.5 MG: 0.5 INHALANT RESPIRATORY (INHALATION) at 07:19

## 2020-11-11 RX ADMIN — SODIUM CHLORIDE, PRESERVATIVE FREE 10 ML: 5 INJECTION INTRAVENOUS at 20:18

## 2020-11-11 RX ADMIN — AMLODIPINE BESYLATE 10 MG: 10 TABLET ORAL at 08:06

## 2020-11-11 RX ADMIN — NYSTATIN 500000 UNITS: 500000 SUSPENSION ORAL at 20:18

## 2020-11-11 RX ADMIN — ALBUTEROL SULFATE 2.5 MG: 2.5 SOLUTION RESPIRATORY (INHALATION) at 13:00

## 2020-11-11 NOTE — PLAN OF CARE
Goal Outcome Evaluation:  Plan of Care Reviewed With: patient  Progress: no change  Outcome Summary: pt vs stable and is resting comfortably at this time; pt appears to be breathing easier this shift; will continue to monitor

## 2020-11-11 NOTE — PLAN OF CARE
Goal Outcome Evaluation:  Plan of Care Reviewed With: patient  Progress: no change   Pt appears weak and has had difficulty breathing with increased neb tx's needed.called dietician to get a supplement that might be easier for the patient.vitals remain stable.will cont to monitor

## 2020-11-11 NOTE — THERAPY TREATMENT NOTE
Acute Care - Physical Therapy Treatment Note  [facility]     Patient Name: Val Arteaga  : 1932  MRN: [id number]  Today's Date: 2020           PT Assessment (last 12 hours)      PT Evaluation and Treatment     Row Name 20 1320          Physical Therapy Time and Intention    Subjective Information  complains of;weakness;fatigue  -TW     Document Type  therapy note (daily note)  -TW     Mode of Treatment  physical therapy;individual therapy  -TW     Patient Effort  good  -TW     Row Name 20 1320          General Information    Patient Profile Reviewed  yes  -TW     Patient Observations  alert;lethargic  -TW     Patient/Family/Caregiver Comments/Observations  Pt's son present and encouraged pt to perform LE ther ex.  -TW     Existing Precautions/Restrictions  fall;oxygen therapy device and L/min  -TW     Row Name 20 1320          Cognition    Affect/Mental Status (Cognitive)  WFL  -TW     Orientation Status (Cognition)  oriented x 4  -TW     Follows Commands (Cognition)  over 90% accuracy  -TW     Personal Safety Interventions  muscle strengthening facilitated  -TW     Row Name 20 1320          Pain Scale: Numbers Pre/Post-Treatment    Pretreatment Pain Rating  0/10 - no pain  -TW     Posttreatment Pain Rating  0/10 - no pain  -TW     Row Name 20 1320          Bed Mobility    Comment (Bed Mobility)  Pt declined due to not feeling well.  -TW     Row Name 20 1320          Gait/Stairs (Locomotion)    Comment (Gait/Stairs)  Not tested due to pt not feeling well and has not eaten much in 2 days per son.  -TW     Row Name 20 1320          Motor Skills    Therapeutic Exercise  hip;knee;ankle  -TW     Row Name 20 1320          Hip (Therapeutic Exercise)    Hip (Therapeutic Exercise)  AROM (active range of motion)  -TW     Hip AROM (Therapeutic Exercise)  bilateral;supine  -TW     Row Name 20 1320          Knee (Therapeutic Exercise)    Knee  (Therapeutic Exercise)  AROM (active range of motion)  -TW     Knee AROM (Therapeutic Exercise)  bilateral;supine  -TW     Row Name 11/11/20 1320          Ankle (Therapeutic Exercise)    Ankle (Therapeutic Exercise)  AROM (active range of motion)  -TW     Ankle AROM (Therapeutic Exercise)  bilateral;supine  -TW     Row Name 11/11/20 1320          Plan of Care Review    Plan of Care Reviewed With  patient  -TW     Progress  no change  -TW     Outcome Summary  Pt only agreeable to ther ex in bed at this time. Pt's son present and voices concern requarding pt's decline in participation. Pt cont to declined EOB/OOB with son encouraging but did agree to B LE ther ex in supine. Pt performed 10-15 reps of sup ex's with good ROM noted. Pt would cont to benefit from therapy upon DC.  -TW     Row Name 11/11/20 1320          Vital Signs    Pretreatment Heart Rate (beats/min)  70  -TW     Posttreatment Heart Rate (beats/min)  72  -TW     Pre SpO2 (%)  97  -TW     O2 Delivery Pre Treatment  supplemental O2  -TW     Post SpO2 (%)  96  -TW     Pre Patient Position  Supine  -TW     Intra Patient Position  Supine  -TW     Post Patient Position  Side Lying  -TW     Row Name 11/11/20 1320          Bed Mobility Goal 1 (PT)    Activity/Assistive Device (Bed Mobility Goal 1, PT)  sit to supine;supine to sit  -TW     Meta Level/Cues Needed (Bed Mobility Goal 1, PT)  independent  -TW     Time Frame (Bed Mobility Goal 1, PT)  by discharge  -TW     Progress/Outcomes (Bed Mobility Goal 1, PT)  goal not met  -TW     Row Name 11/11/20 1320          Transfer Goal 1 (PT)    Activity/Assistive Device (Transfer Goal 1, PT)  sit-to-stand/stand-to-sit;bed-to-chair/chair-to-bed  -TW     Meta Level/Cues Needed (Transfer Goal 1, PT)  modified independence  -TW     Time Frame (Transfer Goal 1, PT)  by discharge  -TW     Progress/Outcome (Transfer Goal 1, PT)  goal not met  -TW     Row Name 11/11/20 1320          Gait Training Goal 1 (PT)     Activity/Assistive Device (Gait Training Goal 1, PT)  gait (walking locomotion);walker, rolling;increase endurance/gait distance;decrease fall risk  -TW     [place] Level (Gait Training Goal 1, PT)  modified independence  -TW     Distance (Gait Training Goal 1, PT)  30ft or more x2 O2 sats will not drop below 90%  -TW     Time Frame (Gait Training Goal 1, PT)  by discharge  -TW     Progress/Outcome (Gait Training Goal 1, PT)  goal not met  -TW     Row Name 11/11/20 1320          Stairs Goal 1 (PT)    Activity/Assistive Device (Stairs Goal 1, PT)  ascending stairs;descending stairs;using handrail, left;using handrail, right  -TW     [place] Level/Cues Needed (Stairs Goal 1, PT)  modified independence  -TW     Number of Stairs (Stairs Goal 1, PT)  1  -TW     Time Frame (Stairs Goal 1, PT)  by discharge  -TW     Progress/Outcome (Stairs Goal 1, PT)  goal not met  -TW     Row Name 11/11/20 1320          Positioning and Restraints    Pre-Treatment Position  in bed  -TW     Post Treatment Position  bed  -TW     In Bed  side lying right;call light within reach;encouraged to call for assist;exit alarm on  -TW     Row Name 11/11/20 1320          Therapy Assessment/Plan (PT)    Rehab Potential (PT)  good, to achieve stated therapy goals  -TW     Row Name 11/11/20 1320          Progress Summary (PT)    Progress Toward Functional Goals (PT)  progress toward functional goals is fair  -TW       User Key  (r) = Recorded By, (t) = Taken By, (c) = Cosigned By    Initials Name Provider Type    TW Keshav Buitrago, PTA Physical Therapy Assistant        Physical Therapy Education                 Title: PT OT SLP Therapies (In Progress)     Topic: Physical Therapy (In Progress)     Point: Mobility training (In Progress)     Learning Progress Summary           Patient Acceptance, E, NR by RONAN at 11/8/2020 1524                   Point: Home exercise program (Not Started)     Learner Progress:  Not documented in this visit.           Point: Body mechanics (In Progress)     Learning Progress Summary           Patient Acceptance, E, NR by RONAN at 11/8/2020 1524                   Point: Precautions (In Progress)     Learning Progress Summary           Patient Acceptance, E, NR by RONAN at 11/8/2020 1524                               User Key     Initials Effective Dates Name Provider Type Discipline    RONAN 04/03/18 -  Malena Mendoza, PT Physical Therapist PT              PT Recommendation and Plan  Anticipated Discharge Disposition (PT): home with assist, home with home health  Progress Summary (PT)  Progress Toward Functional Goals (PT): progress toward functional goals is fair  Plan of Care Reviewed With: patient  Progress: no change  Outcome Summary: Pt only agreeable to ther ex in bed at this time. Pt's son present and voices concern requarding pt's decline in participation. Pt cont to declined EOB/OOB with son encouraging but did agree to B LE ther ex in supine. Pt performed 10-15 reps of sup ex's with good ROM noted. Pt would cont to benefit from therapy upon DC.  Outcome Measures     Row Name 11/11/20 1320 11/11/20 0855 11/10/20 1030       How much help from another person do you currently need...    Turning from your back to your side while in flat bed without using bedrails?  4  -TW  --  --    Moving from lying on back to sitting on the side of a flat bed without bedrails?  4  -TW  --  --    Moving to and from a bed to a chair (including a wheelchair)?  3  -TW  --  --    Standing up from a chair using your arms (e.g., wheelchair, bedside chair)?  3  -TW  --  --    Climbing 3-5 steps with a railing?  3  -TW  --  --    To walk in hospital room?  3  -TW  --  --    AM-PAC 6 Clicks Score (PT)  20  -TW  --  --       How much help from another is currently needed...    Putting on and taking off regular lower body clothing?  --  3  -RC  3  -RC    Bathing (including washing, rinsing, and drying)  --  3  -RC  3  -RC    Toileting (which includes  using toilet bed pan or urinal)  --  3  -RC  3  -RC    Putting on and taking off regular upper body clothing  --  3  -RC  3  -RC    Taking care of personal grooming (such as brushing teeth)  --  3  -RC  3  -RC    Eating meals  --  4  -RC  4  -RC    AM-PAC 6 Clicks Score (OT)  --  19  -RC  19  -RC    Row Name 11/09/20 0941 11/09/20 0856          How much help from another person do you currently need...    Turning from your back to your side while in flat bed without using bedrails?  4  -TW  --     Moving from lying on back to sitting on the side of a flat bed without bedrails?  4  -TW  --     Moving to and from a bed to a chair (including a wheelchair)?  3  -TW  --     Standing up from a chair using your arms (e.g., wheelchair, bedside chair)?  3  -TW  --     Climbing 3-5 steps with a railing?  3  -TW  --     To walk in hospital room?  3  -TW  --     AM-PAC 6 Clicks Score (PT)  20  -TW  --        How much help from another is currently needed...    Putting on and taking off regular lower body clothing?  --  3  -RC     Bathing (including washing, rinsing, and drying)  --  3  -RC     Toileting (which includes using toilet bed pan or urinal)  --  3  -RC     Putting on and taking off regular upper body clothing  --  3  -RC     Taking care of personal grooming (such as brushing teeth)  --  3  -RC     Eating meals  --  4  -RC     AM-PAC 6 Clicks Score (OT)  --  19  -RC       User Key  (r) = Recorded By, (t) = Taken By, (c) = Cosigned By    Initials Name Provider Type    TW Keshav Buitrago, JEANNETTE Physical Therapy Assistant    RC Lianne Hernandez COTA/L Occupational Therapy Assistant           Time Calculation:   PT Charges     Row Name 11/11/20 1532             Time Calculation    Start Time  1320  -TW      Stop Time  1343  -TW      Time Calculation (min)  23 min  -TW      PT Received On  11/11/20  -TW      PT Goal Re-Cert Due Date  11/21/20  -TW         Time Calculation- PT    Total Timed Code Minutes- PT  23 minute(s)   -TW        User Key  (r) = Recorded By, (t) = Taken By, (c) = Cosigned By    Initials Name Provider Type    TW Keshav Buitrago PTA Physical Therapy Assistant        Therapy Charges for Today     Code Description Service Date Service Provider Modifiers Qty    20772059917 HC PT THER PROC EA 15 MIN 11/11/2020 Keshav Buitrago PTA GP 2          PT G-Codes  Outcome Measure Options: AM-PAC 6 Clicks Basic Mobility (PT)  AM-PAC 6 Clicks Score (PT): 20  AM-PAC 6 Clicks Score (OT): 19    Keshav Buitrago PTA  11/11/2020

## 2020-11-11 NOTE — PLAN OF CARE
Problem: Adult Inpatient Plan of Care  Goal: Plan of Care Review  Outcome: Ongoing, Progressing  Flowsheets  Taken 11/11/2020 1110  Progress: no change  Plan of Care Reviewed With: patient  Taken 11/11/2020 0855  Plan of Care Reviewed With: patient  Outcome Summary: Pt c/o fatigue, w/ encouragement she participated in grooming act, and ue e'x,  she needs many rest breaks and reports that she wants to just rest.   recommend 24/7 assist at d/c   cont poc

## 2020-11-11 NOTE — THERAPY TREATMENT NOTE
Acute Care - Occupational Therapy Treatment Note  [facility]     Patient Name: Val Arteaga  : 1932  MRN: [id number]  Today's Date: 2020     Date of Referral to OT: 20       Admit Date: 2020       ICD-10-CM ICD-9-CM   1. TIA (transient ischemic attack)  G45.9 435.9   2. Hypertensive urgency  I16.0 401.9   3. Atherosclerosis of native coronary artery of native heart, angina presence unspecified  I25.10 414.01   4. History of PTCA  Z98.61 V45.82   5. SSS (sick sinus syndrome) (CMS/[facility])  I49.5 427.81   6. Dyspnea, unspecified type  R06.00 786.09   7. Benign hypertension  I10 401.1   8. Impaired mobility and ADLs  Z74.09 V49.89    Z78.9    9. Impaired functional mobility, balance, gait, and endurance  Z74.09 V49.89     Patient Active Problem List   Diagnosis   • Uncontrolled hypertension   • CAD (coronary artery disease)   • Chronic obstructive pulmonary disease (CMS/[facility])   • Acquired hypothyroidism   • Depressive disorder   • Thygeson's superficial punctate keratitis   • Pseudophakia   • Precordial pain   • Displaced fracture of base of neck of right femur, initial encounter for closed fracture (CMS/[facility])   • Hip fracture, right, closed, initial encounter (CMS/HCC)   • Emphysema of lung (CMS/[facility])   • Acute on chronic systolic CHF (congestive heart failure) (CMS/[facility])   • Hyponatremia   • BENITO (acute kidney injury) (CMS/[facility])   • Acute blood loss anemia   • Urinary retention   • Closed displaced basicervical fracture of right femur (CMS/[facility])   • Urethral caruncle   • Symptomatic anemia   • Anemia   • COPD with acute exacerbation (CMS/[facility])   • Major depressive disorder   • Drug-induced encephalopathy   • Acute left-sided low back pain without sciatica   • Bilateral sacral insufficiency fracture   • Osteoarthritis of multiple joints   • Dyspnea   • CHF (congestive heart failure) (CMS/[facility])   • Hypercholesterolemia   • Chest pain   • Acute on chronic respiratory failure with hypoxia (CMS/[facility])  "  • Acute on chronic diastolic heart failure (CMS/HCC)   • Malignant hypertension     Past Medical History:   Diagnosis Date   • Allergic rhinitis    • Chronic diastolic heart failure (CMS/HCC)    • COPD (chronic obstructive pulmonary disease) (CMS/HCC)    • Coronary arteriosclerosis    • Depression    • GERD (gastroesophageal reflux disease)    • Hypercholesterolemia    • Hypertension    • Hypothyroidism    • LVH (left ventricular hypertrophy)    • Myocardial infarction (CMS/HCC)    • Osteoarthritis      Past Surgical History:   Procedure Laterality Date   • BREAST SURGERY     • CARDIAC CATHETERIZATION     • CERVICAL SPINE SURGERY     • COLONOSCOPY     • CORONARY ANGIOPLASTY     • ENDOSCOPY N/A 2/14/2018   • ENDOSCOPY AND COLONOSCOPY     • HIP BIPOLAR REPLACEMENT Right 1/23/2018   • PACEMAKER REPLACEMENT            OT ASSESSMENT FLOWSHEET (last 12 hours)      OT Evaluation and Treatment     Row Name 11/11/20 0855                   OT Time and Intention    Subjective Information  complains of \"yucky\"  -RC        Document Type  therapy note (daily note)  -RC        Mode of Treatment  occupational therapy;individual therapy  -RC        Total Minutes, Occupational Therapy  38  -RC        Patient Effort  fair  -RC        Comment  many rest breaks needed   -RC           General Information    Patient Profile Reviewed  yes  -RC        Existing Precautions/Restrictions  fall;oxygen therapy device and L/min  -RC           Cognition    Affect/Mental Status (Cognitive)  WFL  -RC        Orientation Status (Cognition)  oriented x 4  -RC           Pain Scale: Numbers Pre/Post-Treatment    Pretreatment Pain Rating  0/10 - no pain  -RC        Posttreatment Pain Rating  0/10 - no pain  -RC           Shoulder (Therapeutic Exercise)    Shoulder (Therapeutic Exercise)  AROM (active range of motion)  -RC        Shoulder AROM (Therapeutic Exercise)  left;right;flexion;external rotation;internal rotation;10 repetitions  -RC           " Elbow/Forearm (Therapeutic Exercise)    Elbow/Forearm (Therapeutic Exercise)  AROM (active range of motion)  -        Elbow/Forearm AROM (Therapeutic Exercise)  flexion;extension;left;right;10 repetitions;2 sets  -           Grooming Assessment/Training    [place] Level (Grooming)  grooming skills;set up  -        Position (Grooming)  sitting up in bed hob up   -           Plan of Care Review    Plan of Care Reviewed With  patient  -RC        Outcome Summary  Pt c/o fatigue, w/ encouragement she participated in grooming act, and ue e'x,  she needs many rest breaks and reports that she wants to just rest.   recommend 24/7 assist at d/c   cont poc   -RC           Vital Signs    Pre Systolic BP Rehab  121  -RC        Pre Treatment Diastolic BP  59  -RC        Pretreatment Heart Rate (beats/min)  70  -RC        Pre SpO2 (%)  97  -RC        O2 Delivery Pre Treatment  supplemental O2  -RC          User Key  (r) = Recorded By, (t) = Taken By, (c) = Cosigned By    Initials Name Effective Dates     Lianne Hernandez COTA/L 03/07/18 -          Occupational Therapy Education                 Title: PT OT SLP Therapies (In Progress)     Topic: Occupational Therapy (In Progress)     Point: ADL training (In Progress)     Description:   Instruct learner(s) on proper safety adaptation and remediation techniques during self care or transfers.   Instruct in proper use of assistive devices.              Learning Progress Summary           Patient Acceptance, E, NR by  at 11/10/2020 1120    Acceptance, E, NR by  at 11/8/2020 1424    Comment: Educated about OT and POC. Educated on safety. Educated to call for assist.                   Point: Home exercise program (Not Started)     Description:   Instruct learner(s) on appropriate technique for monitoring, assisting and/or progressing therapeutic exercises/activities.              Learner Progress:  Not documented in this visit.          Point: Precautions (In Progress)      Description:   Instruct learner(s) on prescribed precautions during self-care and functional transfers.              Learning Progress Summary           Patient Acceptance, E, NR by  at 11/10/2020 1120    Acceptance, E, NR by  at 11/8/2020 1424    Comment: Educated about OT and POC. Educated on safety. Educated to call for assist.                   Point: Body mechanics (In Progress)     Description:   Instruct learner(s) on proper positioning and spine alignment during self-care, functional mobility activities and/or exercises.              Learning Progress Summary           Patient Acceptance, E, NR by  at 11/10/2020 1120                               User Key     Initials Effective Dates Name Provider Type Discipline     06/08/18 -  Marisol Burdick OTR/L Occupational Therapist OT     03/07/18 -  Lianne Hernandez NELSON/L Occupational Therapy Assistant OT                  OT Recommendation and Plan     Progress Toward Functional Goals (OT): progress toward functional goals as expected  Plan of Care Review  Plan of Care Reviewed With: patient  Progress: no change  Outcome Summary: Pt c/o fatigue, w/ encouragement she participated in grooming act, and ue e'x,  she needs many rest breaks and reports that she wants to just rest.   recommend 24/7 assist at d/c   cont poc   Plan of Care Reviewed With: patient  Outcome Summary: Pt c/o fatigue, w/ encouragement she participated in grooming act, and ue e'x,  she needs many rest breaks and reports that she wants to just rest.   recommend 24/7 assist at d/c   cont poc     Outcome Measures     Row Name 11/11/20 0855 11/10/20 1030 11/09/20 0941       How much help from another person do you currently need...    Turning from your back to your side while in flat bed without using bedrails?  --  --  4  -TW    Moving from lying on back to sitting on the side of a flat bed without bedrails?  --  --  4  -TW    Moving to and from a bed to a chair (including a  wheelchair)?  --  --  3  -TW    Standing up from a chair using your arms (e.g., wheelchair, bedside chair)?  --  --  3  -TW    Climbing 3-5 steps with a railing?  --  --  3  -TW    To walk in hospital room?  --  --  3  -TW    AM-PAC 6 Clicks Score (PT)  --  --  20  -TW       How much help from another is currently needed...    Putting on and taking off regular lower body clothing?  3  -RC  3  -RC  --    Bathing (including washing, rinsing, and drying)  3  -RC  3  -RC  --    Toileting (which includes using toilet bed pan or urinal)  3  -RC  3  -RC  --    Putting on and taking off regular upper body clothing  3  -RC  3  -RC  --    Taking care of personal grooming (such as brushing teeth)  3  -RC  3  -RC  --    Eating meals  4  -RC  4  -RC  --    AM-PAC 6 Clicks Score (OT)  19  -RC  19  -RC  --    Row Name 11/09/20 0856 11/08/20 1234          How much help from another is currently needed...    Putting on and taking off regular lower body clothing?  3  -RC  3  -BH     Bathing (including washing, rinsing, and drying)  3  -RC  3  -BH     Toileting (which includes using toilet bed pan or urinal)  3  -RC  3  -BH     Putting on and taking off regular upper body clothing  3  -RC  3  -BH     Taking care of personal grooming (such as brushing teeth)  3  -RC  3  -BH     Eating meals  4  -RC  4  -BH     AM-PAC 6 Clicks Score (OT)  19  -RC  19  -BH        Functional Assessment    Outcome Measure Options  --  AM-PAC 6 Clicks Daily Activity (OT)  -       User Key  (r) = Recorded By, (t) = Taken By, (c) = Cosigned By    Initials Name Provider Type     Marisol Burdick, OTR/L Occupational Therapist    TW Keshav Buitrago, PTA Physical Therapy Assistant    RC Lianne Hernandez, NELSON/L Occupational Therapy Assistant          Time Calculation:   Time Calculation- OT     Row Name 11/11/20 1103             Time Calculation- OT    OT Start Time  0855  -      OT Stop Time  0933  -      OT Time Calculation (min)  38 min  [facility]       Total Timed Code Minutes- OT  38 minute(s)  -      OT Received On  11/11/20  -        User Key  (r) = Recorded By, (t) = Taken By, (c) = Cosigned By    Initials Name Provider Type     David Lianne G, NELSON/L Occupational Therapy Assistant        Therapy Charges for Today     Code Description Service Date Service Provider Modifiers Qty    44961835407 HC OT SELF CARE/MGMT/TRAIN EA 15 MIN 11/10/2020 DavidLianne, NELSON/L GO 2    15725305446 HC OT SELF CARE/MGMT/TRAIN EA 15 MIN 11/10/2020 DavidLianne G, NELSNO/L GO 2    17377112746 HC OT SELF CARE/MGMT/TRAIN EA 15 MIN 11/11/2020 DavidLianne, NELSON/L GO 2    86185730919 HC OT THER PROC EA 15 MIN 11/11/2020 DavidLianne, NELSON/L GO 1               Liannenayeli Hernandez, NELSON/L  11/11/2020

## 2020-11-11 NOTE — PLAN OF CARE
Problem: Adult Inpatient Plan of Care  Goal: Plan of Care Review  Recent Flowsheet Documentation  Taken 11/11/2020 1320 by Keshav Buitrago, PTA  Progress: no change  Plan of Care Reviewed With: patient  Outcome Summary: Pt only agreeable to ther ex in bed at this time. Pt's son present and voices concern requarding pt's decline in participation. Pt cont to declined EOB/OOB with son encouraging but did agree to B LE ther ex in supine. Pt performed 10-15 reps of sup ex's with good ROM noted. Pt would cont to benefit from therapy upon DC.

## 2020-11-11 NOTE — DISCHARGE SUMMARY
[facility] Medicine Services  DISCHARGE SUMMARY       Date of Admission: 11/7/2020  Date of Discharge:  11/11/2020  Primary Care Physician: Vero Arteaga APRN    Presenting Problem/History of Present Illness:  Malignant hypertension [I10]  TIA (transient ischemic attack) [G45.9]  Hypertensive urgency [I16.0]       Final Discharge Diagnoses:  Active Hospital Problems    Diagnosis   • **Malignant hypertension   • Acute on chronic diastolic heart failure (CMS/HCC)   • COPD with acute exacerbation (CMS/Formerly [facility])   • Emphysema of lung (CMS/Formerly [facility])       Consults:   Consults     No orders found from 10/9/2020 to 11/8/2020.          Procedures Performed:                 Pertinent Test Results:   Lab Results (most recent)     Procedure Component Value Units Date/Time    Basic Metabolic Panel [[id number]]  (Abnormal) Collected: 11/11/20 0640    Specimen: Blood Updated: 11/11/20 0717     Glucose 102 mg/dL      BUN 35 mg/dL      Creatinine 0.99 mg/dL      Sodium 134 mmol/L      Potassium 4.7 mmol/L      Chloride 98 mmol/L      CO2 28.0 mmol/L      Calcium 8.6 mg/dL      eGFR Non African Amer 53 mL/min/1.73      BUN/Creatinine Ratio 35.4     Anion Gap 8.0 mmol/L     Narrative:      GFR Normal >60  Chronic Kidney Disease <60  Kidney Failure <15      CBC & Differential [[id number]]  (Abnormal) Collected: 11/11/20 0640    Specimen: Blood Updated: 11/11/20 0659    Narrative:      The following orders were created for panel order CBC & Differential.  Procedure                               Abnormality         Status                     ---------                               -----------         ------                     CBC Auto Differential[[id number]]        Abnormal            Final result                 Please view results for these tests on the individual orders.    CBC Auto Differential [[id number]]  (Abnormal) Collected: 11/11/20 0640    Specimen: Blood Updated: 11/11/20 0659     WBC 11.99  10*3/mm3      RBC 4.27 10*6/mm3      Hemoglobin 13.8 g/dL      Hematocrit 42.8 %      .2 fL      MCH 32.3 pg      MCHC 32.2 g/dL      RDW 12.6 %      RDW-SD 46.8 fl      MPV 9.9 fL      Platelets 285 10*3/mm3      Neutrophil % 79.2 %      Lymphocyte % 13.9 %      Monocyte % 6.3 %      Eosinophil % 0.0 %      Basophil % 0.1 %      Immature Grans % 0.5 %      Neutrophils, Absolute 9.49 10*3/mm3      Lymphocytes, Absolute 1.67 10*3/mm3      Monocytes, Absolute 0.76 10*3/mm3      Eosinophils, Absolute 0.00 10*3/mm3      Basophils, Absolute 0.01 10*3/mm3      Immature Grans, Absolute 0.06 10*3/mm3      nRBC 0.0 /100 WBC     Basic Metabolic Panel [[id number]]  (Abnormal) Collected: 11/10/20 1124    Specimen: Blood Updated: 11/10/20 1221     Glucose 182 mg/dL      BUN 37 mg/dL      Creatinine 1.01 mg/dL      Sodium 133 mmol/L      Potassium 5.4 mmol/L      Comment: Slight hemolysis detected by analyzer. Results may be affected.        Chloride 99 mmol/L      CO2 24.0 mmol/L      Calcium 9.0 mg/dL      eGFR Non African Amer 52 mL/min/1.73      BUN/Creatinine Ratio 36.6     Anion Gap 10.0 mmol/L     Narrative:      GFR Normal >60  Chronic Kidney Disease <60  Kidney Failure <15      Comprehensive Metabolic Panel [[id number]]  (Abnormal) Collected: 11/10/20 0321    Specimen: Blood Updated: 11/10/20 0419     Glucose 145 mg/dL      BUN 37 mg/dL      Creatinine 0.95 mg/dL      Sodium 134 mmol/L      Potassium 5.7 mmol/L      Chloride 100 mmol/L      CO2 26.0 mmol/L      Calcium 9.2 mg/dL      Total Protein 6.5 g/dL      Albumin 3.80 g/dL      ALT (SGPT) 15 U/L      AST (SGOT) 19 U/L      Alkaline Phosphatase 66 U/L      Total Bilirubin 0.4 mg/dL      eGFR Non African Amer 56 mL/min/1.73      Globulin 2.7 gm/dL      A/G Ratio 1.4 g/dL      BUN/Creatinine Ratio 38.9     Anion Gap 8.0 mmol/L     Narrative:      GFR Normal >60  Chronic Kidney Disease <60  Kidney Failure <15      CBC (No Diff) [[id number]]  (Abnormal) Collected:  11/10/20 0321    Specimen: Blood Updated: 11/10/20 0329     WBC 10.87 10*3/mm3      RBC 4.01 10*6/mm3      Hemoglobin 13.1 g/dL      Hematocrit 39.3 %      MCV 98.0 fL      MCH 32.7 pg      MCHC 33.3 g/dL      RDW 12.5 %      RDW-SD 44.8 fl      MPV 9.8 fL      Platelets 276 10*3/mm3     Comprehensive Metabolic Panel [[id number]]  (Abnormal) Collected: 11/09/20 0805    Specimen: Blood Updated: 11/09/20 0928     Glucose 115 mg/dL      BUN 35 mg/dL      Creatinine 1.11 mg/dL      Sodium 140 mmol/L      Potassium 4.4 mmol/L      Chloride 102 mmol/L      CO2 29.0 mmol/L      Calcium 9.1 mg/dL      Total Protein 6.3 g/dL      Albumin 3.90 g/dL      ALT (SGPT) 11 U/L      AST (SGOT) 16 U/L      Alkaline Phosphatase 67 U/L      Total Bilirubin 0.3 mg/dL      eGFR Non African Amer 46 mL/min/1.73      Globulin 2.4 gm/dL      A/G Ratio 1.6 g/dL      BUN/Creatinine Ratio 31.5     Anion Gap 9.0 mmol/L     Narrative:      GFR Normal >60  Chronic Kidney Disease <60  Kidney Failure <15      CBC (No Diff) [[id number]]  (Abnormal) Collected: 11/09/20 0805    Specimen: Blood Updated: 11/09/20 0912     WBC 11.76 10*3/mm3      RBC 3.95 10*6/mm3      Hemoglobin 12.8 g/dL      Hematocrit 38.3 %      MCV 97.0 fL      MCH 32.4 pg      MCHC 33.4 g/dL      RDW 12.5 %      RDW-SD 45.0 fl      MPV 10.5 fL      Platelets 284 10*3/mm3     POC Glucose Once [[id number]]  (Abnormal) Collected: 11/08/20 2004    Specimen: Blood Updated: 11/08/20 2048     Glucose 139 mg/dL      Comment: RN NotifiedOperator: [id number] MANISHA Cooley ID: [id number]       Urinalysis With Culture If Indicated - Urine, Clean Catch [[id number]]  (Abnormal) Collected: 11/08/20 0445    Specimen: Urine, Clean Catch Updated: 11/08/20 0452     Color, UA Yellow     Appearance, UA Cloudy     pH, UA 6.0     Specific Gravity, UA 1.014     Glucose, UA Negative     Ketones, UA Negative     Bilirubin, UA Negative     Blood, UA Negative     Protein, UA Trace     Leuk Esterase, UA  Negative     Nitrite, UA Negative     Urobilinogen, UA 0.2 E.U./dL    Narrative:      Urine microscopic not indicated.    Blood Gas, Arterial - [[id number]]  (Abnormal) Collected: 11/07/20 1509    Specimen: Arterial Blood Updated: 11/07/20 1515     Site Left Radial     Serafin's Test N/A     pH, Arterial 7.427 pH units      pCO2, Arterial 49.4 mm Hg      Comment: 83 Value above reference range        pO2, Arterial 105.0 mm Hg      HCO3, Arterial 32.6 mmol/L      Comment: 83 Value above reference range        Base Excess, Arterial 7.0 mmol/L      Comment: 83 Value above reference range        O2 Saturation, Arterial 98.7 %      Barometric Pressure for Blood Gas 751 mmHg      Modality Nasal Cannula     Flow Rate 3.5 lpm      Ventilator Mode NA     Collected by      Comment: Meter: [id number]     :  [id number]       COVID PRE-OP / PRE-PROCEDURE SCREENING ORDER (NO ISOLATION) - Swab, Nasopharynx [[id number]]  (Normal) Collected: 11/07/20 0320    Specimen: Swab from Nasopharynx Updated: 11/07/20 0932    Narrative:      The following orders were created for panel order COVID PRE-OP / PRE-PROCEDURE SCREENING ORDER (NO ISOLATION) - Swab, Nasopharynx.  Procedure                               Abnormality         Status                     ---------                               -----------         ------                     COVID-19[facility]YRON IN-HOUS...[[id number]]  Normal              Final result                 Please view results for these tests on the individual orders.    COVID-TYRON Saleh IN-HOUSE, NP SWAB IN TRANSPORT MEDIA 8-10 HR TAT - Swab, Nasopharynx [[id number]]  (Normal) Collected: 11/07/20 0320    Specimen: Swab from Nasopharynx Updated: 11/07/20 0932     COVID19 Not Detected    Narrative:      Testing performed by Real Time RT-PCR  This test has not been approved by the [facility] but is authorized under the Emergency Use Act (EUA)    Fact sheet for providers: https://www.fda.gov/media/077340/download    Fact  sheet for patients: https://www.fda.gov/media/343602/download        Hemoglobin A1c [[id number]]  (Normal) Collected: 11/07/20 0407    Specimen: Blood Updated: 11/07/20 0558     Hemoglobin A1C 5.00 %     Narrative:      Hemoglobin A1C Ranges:    Increased Risk for Diabetes  5.7% to 6.4%  Diabetes                     >= 6.5%  Diabetic Goal                < 7.0%    POC Glucose Once [[id number]]  (Normal) Collected: 11/07/20 0540    Specimen: Blood Updated: 11/07/20 0556     Glucose 108 mg/dL      Comment: : [id number] MIMA ABBOTTYMeter ID: [id number]       Lipid Panel [[id number]]  (Abnormal) Collected: 11/07/20 0407    Specimen: Blood Updated: 11/07/20 0503     Total Cholesterol 316 mg/dL      Triglycerides 108 mg/dL      HDL Cholesterol 76 mg/dL      LDL Cholesterol  222 mg/dL      VLDL Cholesterol 18 mg/dL      LDL/HDL Ratio 2.87    Narrative:      Cholesterol Reference Ranges  (U.S. Department of Health and Human Services ATP III Classifications)    Desirable          <200 mg/dL  Borderline High    200-239 mg/dL  High Risk          >240 mg/dL      Triglyceride Reference Ranges  (U.S. Department of Health and Human Services ATP III Classifications)    Normal           <150 mg/dL  Borderline High  150-199 mg/dL  High             200-499 mg/dL  Very High        >500 mg/dL    HDL Reference Ranges  (U.S. Department of Health and Human Services ATP III Classifcations)    Low     <40 mg/dl (major risk factor for CHD)  High    >60 mg/dl ('negative' risk factor for CHD)        LDL Reference Ranges  (U.S. Department of Health and Human Services ATP III Classifcations)    Optimal          <100 mg/dL  Near Optimal     100-129 mg/dL  Borderline High  130-159 mg/dL  High             160-189 mg/dL  Very High        >189 mg/dL        Imaging Results (Most Recent)     Procedure Component Value Units Date/Time    XR Chest 1 View [[id number]] Collected: 11/07/20 1421     Updated: 11/07/20 1458    Narrative:      PROCEDURE:  XR CHEST 1 VW    VIEWS:Single    INDICATION: Dyspnea, TIA    COMPARISON: CXR: 11/6/2020    FINDINGS:       - lines/tubes: None. 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    - cardiac: Size within normal limits.    - mediastinum: Contour within normal limits. Sclerotic vascular  calcification    - lungs: No evidence of a focal air space process, pulmonary  interstitial edema, nodule(s)/mass. Lungs are hyperinflated.    - pleura: No evidence of  fluid.      - osseous: Degenerative changes of both shoulders appear  unchanged from previous study.      Impression:      No acute abnormality identified. Chronic changes as above.      Electronically signed by:  Concepcion Crawford MD  11/7/2020 2:57 PM CST  Workstation: [id number]    CT Head Without Contrast [[id number]] Collected: 11/07/20 0239     Updated: 11/07/20 0330    Narrative:      EXAM DESCRIPTION:   CT HEAD WO CONTRAST  RadLex: CT HEAD WITHOUT IV CONTRAST     CLINICAL HISTORY:   headache, slurred speech.    TECHNIQUE:   Noncontrast CT head.  All CT scans at this facility use dose modulation, iterative  reconstruction, and/or weight based dosing when appropriate to  reduce radiation dose to as low as reasonably achievable.    COMPARISON: CT brain 11/6/2020.    FINDINGS:  There is no acute intracranial bleed. No acute major territorial  infarct is identified. There is stable volume loss and chronic  white matter changes. The subarachnoid spaces appear  unremarkable. There is no midline shift or abnormal mass effect.  No acute fracture is seen.      Impression:        1.  No acute intracranial abnormality.    Electronically signed by:  Jonny Kebede DO  11/7/2020 3:29 AM CST  Workstation: [id number]          Chief Complaint on Day of Discharge: No new complaints    Hospital Course:  The patient is a 87 y.o. female with past medical history most notable for COPD and hypertension who presented to [facility] on November 7, 2020 with symptomatic malignant  hypertension.  Upon evaluation in ER patient's blood pressure was found to be as high as 225 systolic.  While in the ER she was started on a Cardene drip and referred to the hospital service for admission.  For complete admission history details please see H&P dated November 7, 2020 as per Dr. James Vergara.  Adequate blood pressure control was obtained and she was able to be weaned from the Cardene drip.  Home medications were altered to include addition of amlodipine  To ongoing Coreg and Lasix.  Patient remained hemodynamically stable with adequate blood pressure control throughout remainder of admission.  Systolic blood pressure ranged from 120s to 150s.  Patient was also noted noted to be increasingly short of breath, over her baseline.  She was treated for acute COPD exacerbation with IV steroids, supplemental oxygen via nasal cannula and bronchodilator therapies.  She had very gradual clinical improvement and thought to have a significant anxiety component to her shortness of breath.  Was supported in conversation with the patient's son who noted patient's recent cognitive and emotional decline after her brother's passing approximately 3 weeks ago.  Patient exhibited significant physical deconditioning was evaluated by physical and Occupational Therapy.  Following these evaluations and recommendations for continued therapy she was agreeable to transition to skilled nursing facility.  Case management has been consulted to assist with this.  She will undergo repeat Covid testing prior to her discharge to this facility.  In the interim her son is working on obtaining 24/7 assistance at home for the patient in addition to her home health therapy.  At this time her breathing is also significantly improved and at her baseline.  We will continue weaning from oral steroid.  She is to follow-up with her primary care physician within 1 week.  Indications for more urgent evaluation reviewed with patient and her son  "prior to discharge.  They do voice understanding and are in agreement with plan.  Condition on Discharge:  Stable    Physical Exam on Discharge:  /62 (BP Location: Left arm, Patient Position: Lying)   Pulse 87   Temp 97.4 °F (36.3 °C) (Oral)   Resp 18   Ht 172.7 cm (68\")   Wt 62.6 kg (138 lb)   LMP  (LMP Unknown)   SpO2 98%   BMI 20.98 kg/m²   Physical Exam  Vitals signs and nursing note reviewed.   Constitutional:       Comments: Short of breath with conversation   HENT:      Head: Normocephalic and atraumatic.   Cardiovascular:      Rate and Rhythm: Normal rate and regular rhythm.      Pulses: Normal pulses.      Heart sounds: Normal heart sounds.   Pulmonary:      Breath sounds: Clear throughout  Abdominal:      General: Bowel sounds are normal.      Palpations: Abdomen is soft.   Skin:     General: Skin is warm and dry.   Neurological:      General: No focal deficit present.      Mental Status: She is alert and oriented to person, place, and time.   Psychiatric:         Mood and Affect: Mood normal.         Behavior: Behavior normal    Discharge Disposition: SNF      Discharge Medications:     Discharge Medications      ASK your doctor about these medications      Instructions Start Date   acetaminophen 325 MG tablet  Commonly known as: TYLENOL   650 mg, Oral, Every 4 Hours PRN      albuterol (2.5 MG/3ML) 0.083% nebulizer solution  Commonly known as: PROVENTIL   2.5 mg, Nebulization, Every 6 Hours PRN      budesonide 0.5 MG/2ML nebulizer solution  Commonly known as: Pulmicort   0.5 mg, Nebulization, Daily - RT      carvedilol 6.25 MG tablet  Commonly known as: COREG   6.25 mg, Oral, 2 Times Daily With Meals      docusate sodium 100 MG capsule  Commonly known as: Colace   100 mg, Oral, 2 Times Daily      esomeprazole 40 MG capsule  Commonly known as: nexIUM   TAKE 1 CAPSULE BY MOUTH EVERY MORNING      ferrous sulfate 325 (65 FE) MG tablet   325 mg, Oral, Daily With Breakfast & Dinner      "   fluorometholone 0.1 % ophthalmic suspension  Commonly known as: FML   INSTILL 1 DROP IN BOTH EYES FOUR (4) TIMES DAILY      folic acid-vit B6-vit B12 2.5-25-1 MG tablet tablet  Commonly known as: Folbee   1 tablet, Oral, Daily      furosemide 20 MG tablet  Commonly known as: LASIX   20 mg, Oral, Daily      ipratropium-albuterol 0.5-2.5 mg/3 ml nebulizer  Commonly known as: DUO-NEB   3 mL, Nebulization, Once      levothyroxine 50 MCG tablet  Commonly known as: SYNTHROID, LEVOTHROID   50 mcg, Oral, Daily      lidocaine 5 %  Commonly known as: Lidoderm   1 patch, Transdermal, Every 24 Hours, Remove & Discard patch within 12 hours      montelukast 10 MG tablet  Commonly known as: SINGULAIR   10 mg, Oral, Nightly      nitroglycerin 0.2 MG/HR patch  Commonly known as: NITRODUR   1 patch, Transdermal, Daily      polyethylene glycol 17 GM/SCOOP powder  Commonly known as: MIRALAX   MIX 1 CAPFUL (17G) IN 8 OUNCES OF LIQUID AND DRINK BY MOUTH EVERY DAY FOR CONSTIPATION      ranolazine 500 MG 12 hr tablet  Commonly known as: RANEXA   500 mg, Oral, 2 Times Daily      sertraline 50 MG tablet  Commonly known as: ZOLOFT   50 mg, Oral, Daily      sodium bicarbonate 650 MG tablet   650 mg, Oral, 2 Times Daily      traMADol 50 MG tablet  Commonly known as: ULTRAM   50 mg, Oral, Every 6 Hours PRN      vitamin D 1.25 MG (18878 UT) capsule capsule  Commonly known as: ERGOCALCIFEROL   50,000 Units, Oral, Every 30 Days             Discharge Diet: Cardiac     Activity at Discharge: Regular        Follow-up Appointments:   Future Appointments   Date Time Provider Department Center   1/28/2021 10:30 AM Vero Arteaga APRN [facility]   2/8/2021  9:30 AM Chava Dubois MD [facility] None       Test Results Pending at Discharge:     Katty Jay MD    Time: greater than 30 minutes spent preparing discharge

## 2020-11-12 ENCOUNTER — READMISSION MANAGEMENT (OUTPATIENT)
Dept: CALL CENTER | Facility: HOSPITAL | Age: 85
End: 2020-11-12

## 2020-11-12 VITALS
WEIGHT: 136.8 LBS | BODY MASS INDEX: 20.73 KG/M2 | HEIGHT: 68 IN | RESPIRATION RATE: 18 BRPM | DIASTOLIC BLOOD PRESSURE: 62 MMHG | TEMPERATURE: 96.4 F | SYSTOLIC BLOOD PRESSURE: 156 MMHG | OXYGEN SATURATION: 93 % | HEART RATE: 76 BPM

## 2020-11-12 PROBLEM — J43.9 EMPHYSEMA OF LUNG (HCC): Chronic | Status: ACTIVE | Noted: 2018-01-22

## 2020-11-12 PROCEDURE — 94799 UNLISTED PULMONARY SVC/PX: CPT

## 2020-11-12 PROCEDURE — 97116 GAIT TRAINING THERAPY: CPT

## 2020-11-12 PROCEDURE — G0378 HOSPITAL OBSERVATION PER HR: HCPCS

## 2020-11-12 PROCEDURE — 97535 SELF CARE MNGMENT TRAINING: CPT

## 2020-11-12 PROCEDURE — 97110 THERAPEUTIC EXERCISES: CPT

## 2020-11-12 PROCEDURE — 63710000001 PREDNISONE PER 1 MG: Performed by: FAMILY MEDICINE

## 2020-11-12 PROCEDURE — 94760 N-INVAS EAR/PLS OXIMETRY 1: CPT

## 2020-11-12 RX ORDER — ASPIRIN 81 MG/1
81 TABLET, CHEWABLE ORAL DAILY
Start: 2020-11-13

## 2020-11-12 RX ORDER — SERTRALINE HYDROCHLORIDE 100 MG/1
100 TABLET, FILM COATED ORAL DAILY
Qty: 30 TABLET | Refills: 1 | Status: SHIPPED | OUTPATIENT
Start: 2020-11-12 | End: 2021-01-01 | Stop reason: SDUPTHER

## 2020-11-12 RX ORDER — LOSARTAN POTASSIUM 25 MG/1
25 TABLET ORAL DAILY
Qty: 30 TABLET | Refills: 1 | Status: SHIPPED | OUTPATIENT
Start: 2020-11-12 | End: 2021-01-01 | Stop reason: SDUPTHER

## 2020-11-12 RX ORDER — PREDNISONE 10 MG/1
TABLET ORAL DAILY
Qty: 48 TABLET | Refills: 0 | Status: SHIPPED | OUTPATIENT
Start: 2020-11-12 | End: 2021-01-01

## 2020-11-12 RX ORDER — IPRATROPIUM BROMIDE AND ALBUTEROL SULFATE 2.5; .5 MG/3ML; MG/3ML
3 SOLUTION RESPIRATORY (INHALATION)
Qty: 120 VIAL | Refills: 1 | Status: SHIPPED | OUTPATIENT
Start: 2020-11-12 | End: 2021-01-01

## 2020-11-12 RX ORDER — ATORVASTATIN CALCIUM 10 MG/1
10 TABLET, FILM COATED ORAL DAILY
Qty: 30 TABLET | Refills: 1 | Status: SHIPPED | OUTPATIENT
Start: 2020-11-12 | End: 2021-01-01 | Stop reason: SDUPTHER

## 2020-11-12 RX ADMIN — AMLODIPINE BESYLATE 10 MG: 10 TABLET ORAL at 08:29

## 2020-11-12 RX ADMIN — RANOLAZINE 500 MG: 500 TABLET, FILM COATED, EXTENDED RELEASE ORAL at 08:29

## 2020-11-12 RX ADMIN — FUROSEMIDE 20 MG: 20 TABLET ORAL at 08:29

## 2020-11-12 RX ADMIN — SODIUM CHLORIDE, PRESERVATIVE FREE 10 ML: 5 INJECTION INTRAVENOUS at 08:31

## 2020-11-12 RX ADMIN — LEVOTHYROXINE SODIUM 50 MCG: 50 TABLET ORAL at 08:29

## 2020-11-12 RX ADMIN — CARVEDILOL 6.25 MG: 6.25 TABLET, FILM COATED ORAL at 08:28

## 2020-11-12 RX ADMIN — PREDNISONE 40 MG: 20 TABLET ORAL at 08:29

## 2020-11-12 RX ADMIN — FERROUS SULFATE TAB EC 324 MG (65 MG FE EQUIVALENT) 324 MG: 324 (65 FE) TABLET DELAYED RESPONSE at 08:29

## 2020-11-12 RX ADMIN — BUDESONIDE 0.5 MG: 0.5 INHALANT RESPIRATORY (INHALATION) at 07:44

## 2020-11-12 RX ADMIN — ASPIRIN 81 MG: 81 TABLET, CHEWABLE ORAL at 08:29

## 2020-11-12 RX ADMIN — NYSTATIN 500000 UNITS: 500000 SUSPENSION ORAL at 08:31

## 2020-11-12 RX ADMIN — ALBUTEROL SULFATE 2.5 MG: 2.5 SOLUTION RESPIRATORY (INHALATION) at 06:04

## 2020-11-12 RX ADMIN — SERTRALINE HYDROCHLORIDE 50 MG: 50 TABLET ORAL at 08:29

## 2020-11-12 NOTE — THERAPY TREATMENT NOTE
Acute Care - Occupational Therapy Treatment Note  [facility]     Patient Name: Val Arteaga  : 1932  MRN: [id number]  Today's Date: 2020     Date of Referral to OT: 20       Admit Date: 2020       ICD-10-CM ICD-9-CM   1. COPD with acute exacerbation (CMS/HCC)  J44.1 491.21   2. TIA (transient ischemic attack)  G45.9 435.9   3. Hypertensive urgency  I16.0 401.9   4. Atherosclerosis of native coronary artery of native heart, angina presence unspecified  I25.10 414.01   5. History of PTCA  Z98.61 V45.82   6. SSS (sick sinus syndrome) (CMS/HCC)  I49.5 427.81   7. Dyspnea, unspecified type  R06.00 786.09   8. Benign hypertension  I10 401.1   9. Impaired mobility and ADLs  Z74.09 V49.89    Z78.9    10. Impaired functional mobility, balance, gait, and endurance  Z74.09 V49.89   11. COPD exacerbation (CMS/HCC)  J44.1 491.21     Patient Active Problem List   Diagnosis   • Uncontrolled hypertension   • CAD (coronary artery disease)   • Chronic obstructive pulmonary disease (CMS/[facility])   • Acquired hypothyroidism   • Depressive disorder   • Thygeson's superficial punctate keratitis   • Pseudophakia   • Precordial pain   • Displaced fracture of base of neck of right femur, initial encounter for closed fracture (CMS/HCC)   • Hip fracture, right, closed, initial encounter (CMS/HCC)   • Emphysema of lung (CMS/HCC)   • Acute on chronic systolic CHF (congestive heart failure) (CMS/HCC)   • Hyponatremia   • BENITO (acute kidney injury) (CMS/HCC)   • Acute blood loss anemia   • Urinary retention   • Closed displaced basicervical fracture of right femur (CMS/[facility])   • Urethral caruncle   • Symptomatic anemia   • Anemia   • COPD with acute exacerbation (CMS/[facility])   • Major depressive disorder   • Drug-induced encephalopathy   • Acute left-sided low back pain without sciatica   • Bilateral sacral insufficiency fracture   • Osteoarthritis of multiple joints   • Dyspnea   • CHF (congestive heart failure)  (CMS/HCC)   • Hypercholesterolemia   • Chest pain   • Acute on chronic respiratory failure with hypoxia (CMS/HCC)   • Acute on chronic diastolic heart failure (CMS/HCC)   • Malignant hypertension     Past Medical History:   Diagnosis Date   • Allergic rhinitis    • Chronic diastolic heart failure (CMS/HCC)    • COPD (chronic obstructive pulmonary disease) (CMS/HCC)    • Coronary arteriosclerosis    • Depression    • GERD (gastroesophageal reflux disease)    • Hypercholesterolemia    • Hypertension    • Hypothyroidism    • LVH (left ventricular hypertrophy)    • Myocardial infarction (CMS/HCC)    • Osteoarthritis      Past Surgical History:   Procedure Laterality Date   • BREAST SURGERY     • CARDIAC CATHETERIZATION     • CERVICAL SPINE SURGERY     • COLONOSCOPY     • CORONARY ANGIOPLASTY     • ENDOSCOPY N/A 2/14/2018   • ENDOSCOPY AND COLONOSCOPY     • HIP BIPOLAR REPLACEMENT Right 1/23/2018   • PACEMAKER REPLACEMENT            OT ASSESSMENT FLOWSHEET (last 12 hours)      OT Evaluation and Treatment     Row Name 11/12/20 0943 11/12/20 0940                OT Time and Intention    Subjective Information  --  -RC  complains of;fatigue  -RC       Document Type  --  -RC  therapy note (daily note)  -RC       Mode of Treatment  --  -RC  occupational therapy  -RC       Total Minutes, Occupational Therapy  --  -RC  23  -RC       Patient Effort  --  adequate  -RC       Symptoms Noted During/After Treatment  --  shortness of breath  -RC          Cognition    Affect/Mental Status (Cognitive)  --  WFL  -RC          Pain Scale: Numbers Pre/Post-Treatment    Pretreatment Pain Rating  0/10 - no pain  -RC  0/10 - no pain  -RC       Posttreatment Pain Rating  0/10 - no pain  -RC  0/10 - no pain  -RC          Transfers    Sit-Stand [place] (Transfers)  --  standby assist  -RC       Stand-Sit [place] (Transfers)  --  standby assist  -RC          Sit-Stand Transfer    Assistive Device (Sit-Stand Transfers)  --  walker,  front-wheeled  -RC          Stand-Sit Transfer    Assistive Device (Stand-Sit Transfers)  --  walker, front-wheeled  -RC          Upper Body Dressing Assessment/Training    [place] Level (Upper Body Dressing)  --  doff;don;front opening garment;minimum assist (75% patient effort)  -RC       Position (Upper Body Dressing)  --  supported sitting  -RC          Lower Body Dressing Assessment/Training    [place] Level (Lower Body Dressing)  --  pants/bottoms;don;standby assist  -RC       Position (Lower Body Dressing)  --  supported sitting;unsupported standing  -          Grooming Assessment/Training    [place] Level (Grooming)  --  set up;hair care, combing/brushing  -          Plan of Care Review    Plan of Care Reviewed With  --  patient  -       Outcome Summary  --  Pt reports she is going home today request to get dressed declines bath, hansen for grooming, min @ for ub dressing and sba for pants. sba for sit<>stand, rest breaks prn    reommend 24/7 assist and cont therapy at next level of care   -          Vital Signs    Pre Systolic BP Rehab  148  -RC  --       Pre Treatment Diastolic BP  80  -RC  --       Pretreatment Heart Rate (beats/min)  --  -RC  82  -RC       Pre SpO2 (%)  --  -  96  -RC       O2 Delivery Pre Treatment  --  -  supplemental O2  -          Positioning and Restraints    Pre-Treatment Position  --  sitting in chair/recliner  -       Post Treatment Position  --  chair  -RC       In Bed  --  call light within reach;encouraged to call for assist;notified nsg  -          Progress Summary (OT)    Progress Toward Functional Goals (OT)  --  progress toward functional goals as expected  -         User Key  (r) = Recorded By, (t) = Taken By, (c) = Cosigned By    Initials Name Effective Dates     Lianne Hernandez COTA/L 03/07/18 -          Occupational Therapy Education                 Title: PT OT SLP Therapies (Resolved)     Topic: Occupational Therapy (Resolved)      Point: ADL training (Resolved)     Description:   Instruct learner(s) on proper safety adaptation and remediation techniques during self care or transfers.   Instruct in proper use of assistive devices.              Learning Progress Summary           Patient Acceptance, E, NR by  at 11/10/2020 1120    Acceptance, E, NR by  at 11/8/2020 1424    Comment: Educated about OT and POC. Educated on safety. Educated to call for assist.                   Point: Home exercise program (Resolved)     Description:   Instruct learner(s) on appropriate technique for monitoring, assisting and/or progressing therapeutic exercises/activities.              Learner Progress:  Not documented in this visit.          Point: Precautions (Resolved)     Description:   Instruct learner(s) on prescribed precautions during self-care and functional transfers.              Learning Progress Summary           Patient Acceptance, E, NR by  at 11/10/2020 1120    Acceptance, E, NR by  at 11/8/2020 1424    Comment: Educated about OT and POC. Educated on safety. Educated to call for assist.                   Point: Body mechanics (Resolved)     Description:   Instruct learner(s) on proper positioning and spine alignment during self-care, functional mobility activities and/or exercises.              Learning Progress Summary           Patient Acceptance, E, NR by  at 11/10/2020 1120                               User Key     Initials Effective Dates Name Provider Type Discipline     06/08/18 -  Marisol Burdick, OTR/L Occupational Therapist OT     03/07/18 -  Lianne Hernandez COTA/L Occupational Therapy Assistant OT                  OT Recommendation and Plan     Progress Toward Functional Goals (OT): progress toward functional goals as expected  Plan of Care Review  Plan of Care Reviewed With: patient  Progress: no change  Outcome Summary: Pt reports she is going home today request to get dressed declines bath, hansen for grooming, min @ for  ub dressing and sba for pants. sba for sit<>stand, rest breaks prn    reommend 24/7 assist and cont therapy at next level of care   Plan of Care Reviewed With: patient  Outcome Summary: Pt reports she is going home today request to get dressed declines bath, hansen for grooming, min @ for ub dressing and sba for pants. sba for sit<>stand, rest breaks prn    reommend 24/7 assist and cont therapy at next level of care     Outcome Measures     Row Name 11/12/20 1005 11/12/20 0940 11/11/20 1320       How much help from another person do you currently need...    Turning from your back to your side while in flat bed without using bedrails?  4  -TW  --  4  -TW    Moving from lying on back to sitting on the side of a flat bed without bedrails?  4  -TW  --  4  -TW    Moving to and from a bed to a chair (including a wheelchair)?  3  -TW  --  3  -TW    Standing up from a chair using your arms (e.g., wheelchair, bedside chair)?  3  -TW  --  3  -TW    Climbing 3-5 steps with a railing?  3  -TW  --  3  -TW    To walk in hospital room?  3  -TW  --  3  -TW    AM-PAC 6 Clicks Score (PT)  20  -TW  --  20  -TW       How much help from another is currently needed...    Putting on and taking off regular lower body clothing?  --  3  -RC  --    Bathing (including washing, rinsing, and drying)  --  3  -RC  --    Toileting (which includes using toilet bed pan or urinal)  --  3  -RC  --    Putting on and taking off regular upper body clothing  --  3  -RC  --    Taking care of personal grooming (such as brushing teeth)  --  4  -RC  --    Eating meals  --  4  -RC  --    AM-PAC 6 Clicks Score (OT)  --  20  -RC  --    Row Name 11/11/20 0855 11/10/20 1030          How much help from another is currently needed...    Putting on and taking off regular lower body clothing?  3  -RC  3  -RC     Bathing (including washing, rinsing, and drying)  3  -RC  3  -RC     Toileting (which includes using toilet bed pan or urinal)  3  -RC  3  -RC     Putting on and  taking off regular upper body clothing  3  -RC  3  -RC     Taking care of personal grooming (such as brushing teeth)  3  -RC  3  -RC     Eating meals  4  -RC  4  -RC     AM-PAC 6 Clicks Score (OT)  19  -RC  19  -RC       User Key  (r) = Recorded By, (t) = Taken By, (c) = Cosigned By    Initials Name Provider Type    TW Keshav Buitrago, PTA Physical Therapy Assistant     Lianne Hernandez NELSON/L Occupational Therapy Assistant          Time Calculation:   Time Calculation- OT     Row Name 11/12/20 1018             Time Calculation- OT    OT Start Time  0940  -RC      OT Stop Time  1003  -      OT Time Calculation (min)  23 min  -      Total Timed Code Minutes- OT  23 minute(s)  -      OT Received On  11/12/20  -        User Key  (r) = Recorded By, (t) = Taken By, (c) = Cosigned By    Initials Name Provider Type     Lianne Hernandez, NELSON/L Occupational Therapy Assistant        Therapy Charges for Today     Code Description Service Date Service Provider Modifiers Qty    37448665512 HC OT SELF CARE/MGMT/TRAIN EA 15 MIN 11/11/2020 DavidDalea G, NELSON/L GO 2    67448930722 HC OT THER PROC EA 15 MIN 11/11/2020 DavidDalea G, NELSON/L GO 1    83520595805 HC OT SELF CARE/MGMT/TRAIN EA 15 MIN 11/12/2020 David, Lianne G, NELSON/L GO 2               Lianne G David, NELSON/L  11/12/2020

## 2020-11-12 NOTE — OUTREACH NOTE
Prep Survey      Responses   [facility] facility patient discharged from?  [place]   Is LACE score < 7 ?  No   Eligibility  [facility]   Date of Admission  11/07/20   Date of Discharge  11/12/20   Discharge Disposition  Home-Health Care S[facility]   Discharge diagnosis  Malignant hypertension  TIA   Does the patient have one of the following disease processes/diagnoses(primary or secondary)?  Stroke (TIA)   Does the patient have Home health ordered?  No   Is there a DME ordered?  No   Prep survey completed?  Yes          Melany Shen RN

## 2020-11-12 NOTE — PLAN OF CARE
Problem: Adult Inpatient Plan of Care  Goal: Plan of Care Review  11/12/2020 1119 by Dat Santoro RN  Outcome: Met  11/12/2020 1118 by Dat Santoro RN  Outcome: Ongoing, Progressing  Flowsheets  Taken 11/12/2020 1118 by Dat Santoro RN  Progress: improving  Outcome Summary: Pt discharging today  Taken 11/12/2020 0018 by Petrona Buitrago RN  Plan of Care Reviewed With: patient  Goal: Patient-Specific Goal (Individualized)  11/12/2020 1119 by Dat Santoro RN  Outcome: Met  11/12/2020 1118 by Dat Santoro RN  Outcome: Ongoing, Progressing  Goal: Absence of Hospital-Acquired Illness or Injury  11/12/2020 1119 by Dat Santoro RN  Outcome: Met  11/12/2020 1118 by Dat Santoro RN  Outcome: Ongoing, Progressing  Intervention: Identify and Manage Fall Risk  Recent Flowsheet Documentation  Taken 11/12/2020 0900 by Dat Santoro RN  Safety Promotion/Fall Prevention:   fall prevention program maintained   nonskid shoes/slippers when out of bed   safety round/check completed  Taken 11/12/2020 0700 by Dat Santoro RN  Safety Promotion/Fall Prevention:   fall prevention program maintained   nonskid shoes/slippers when out of bed   safety round/check completed  Goal: Optimal Comfort and Wellbeing  11/12/2020 1119 by Dat Santoro RN  Outcome: Met  11/12/2020 1118 by Dat Santoro RN  Outcome: Ongoing, Progressing  Intervention: Provide Person-Centered Care  Recent Flowsheet Documentation  Taken 11/12/2020 0842 by Dat Santoro RN  Trust Relationship/Rapport:   care explained   choices provided   empathic listening provided   questions answered   questions encouraged   reassurance provided   thoughts/feelings acknowledged  Goal: Readiness for Transition of Care  11/12/2020 1119 by Dat Santoro RN  Outcome: Met  11/12/2020 1118 by Dat Santoro RN  Outcome: Ongoing, Progressing     Problem: Fall Injury Risk  Goal: Absence of Fall and  Fall-Related Injury  11/12/2020 1119 by Dat Santoro RN  Outcome: Met  11/12/2020 1118 by Dat Santoro RN  Outcome: Ongoing, Progressing  Intervention: Promote Injury-Free Environment  Recent Flowsheet Documentation  Taken 11/12/2020 0900 by Dat Santoro RN  Safety Promotion/Fall Prevention:   fall prevention program maintained   nonskid shoes/slippers when out of bed   safety round/check completed  Taken 11/12/2020 0700 by Dat Santoro RN  Safety Promotion/Fall Prevention:   fall prevention program maintained   nonskid shoes/slippers when out of bed   safety round/check completed     Problem: Adjustment to Illness (Stroke, Ischemic/Transient Ischemic Attack)  Goal: Optimal Coping  11/12/2020 1119 by Dat Santoro RN  Outcome: Met  11/12/2020 1118 by Dat Santoro RN  Outcome: Ongoing, Progressing  Intervention: Support Patient/Family Psychosocial Response to Stroke  Recent Flowsheet Documentation  Taken 11/12/2020 0842 by Dat Santoro RN  Supportive Measures:   active listening utilized   self-care encouraged     Problem: Bowel Elimination Impaired (Stroke, Ischemic/Transient Ischemic Attack)  Goal: Effective Bowel Elimination  11/12/2020 1119 by Dat Santoro RN  Outcome: Met  11/12/2020 1118 by Dat Santoro RN  Outcome: Ongoing, Progressing     Problem: Cerebral Tissue Perfusion Risk (Stroke, Ischemic/Transient Ischemic Attack)  Goal: Optimal Cerebral Tissue Perfusion  11/12/2020 1119 by Dat Santoro RN  Outcome: Met  11/12/2020 1118 by Dat Santoro RN  Outcome: Ongoing, Progressing     Problem: Communication Impairment (Stroke, Ischemic/Transient Ischemic Attack)  Goal: Improved Communication Skills  11/12/2020 1119 by Dat Santoro, RN  Outcome: Met  11/12/2020 1118 by Dat Santoro RN  Outcome: Ongoing, Progressing  Intervention: Optimize Cognitive and Communication Skills  Recent Flowsheet Documentation  Taken 11/12/2020 0842  by Dat Santoro RN  Communication Enhancement Strategies: call light answered in person     Problem: Eating/Swallowing Impairment (Stroke, Ischemic/Transient Ischemic Attack)  Goal: Oral Intake without Aspiration  11/12/2020 1119 by Dat Santoro RN  Outcome: Met  11/12/2020 1118 by Dat Santoro RN  Outcome: Ongoing, Progressing     Problem: Functional Ability Impaired (Stroke, Ischemic/Transient Ischemic Attack)  Goal: Optimal Functional Ability  11/12/2020 1119 by Dat Santoro RN  Outcome: Met  11/12/2020 1118 by Dat Santoro RN  Outcome: Ongoing, Progressing  Intervention: Optimize Functional Ability  Recent Flowsheet Documentation  Taken 11/12/2020 0900 by Dat Santoro RN  Activity Management: up in chair  Taken 11/12/2020 0700 by Dat Santoro RN  Activity Management: up in chair     Problem: Hemodynamic Instability (Stroke, Ischemic/Transient Ischemic Attack)  Goal: Vital Signs Remain in Desired Range  11/12/2020 1119 by Dat Santoro RN  Outcome: Met  11/12/2020 1118 by Dat Santoro RN  Outcome: Ongoing, Progressing     Problem: Pain (Stroke, Ischemic/Transient Ischemic Attack)  Goal: Acceptable Pain Control  11/12/2020 1119 by Dat Santoro RN  Outcome: Met  11/12/2020 1118 by Dat Santoro RN  Outcome: Ongoing, Progressing     Problem: Sensorimotor Impairment (Stroke, Ischemic/Transient Ischemic Attack)  Goal: Improved Sensorimotor Function  11/12/2020 1119 by Dat Santoro RN  Outcome: Met  11/12/2020 1118 by Dat Santoro RN  Outcome: Ongoing, Progressing     Problem: Urinary Elimination Impaired (Stroke, Ischemic/Transient Ischemic Attack)  Goal: Effective Urinary Elimination  11/12/2020 1119 by Dat Santoro RN  Outcome: Met  11/12/2020 1118 by Dat Santoro RN  Outcome: Ongoing, Progressing     Problem: Hypertension Acute  Goal: Blood Pressure Within Desired Range  11/12/2020 1119 by Dat Santoro  RN  Outcome: Met  11/12/2020 1118 by Dat Santoro RN  Outcome: Ongoing, Progressing     Problem: Skin Injury Risk Increased  Goal: Skin Health and Integrity  11/12/2020 1119 by Dat Santoro RN  Outcome: Met  11/12/2020 1118 by Dat Santoro, RN  Outcome: Ongoing, Progressing   Goal Outcome Evaluation:  Plan of Care Reviewed With: patient  Progress: improving  Outcome Summary: Pt discharging today

## 2020-11-12 NOTE — THERAPY TREATMENT NOTE
Acute Care - Physical Therapy Treatment Note  [facility]     Patient Name: Val Arteaga  : 1932  MRN: [id number]  Today's Date: 2020           PT Assessment (last 12 hours)      PT Evaluation and Treatment     Row Name 20 1005          Physical Therapy Time and Intention    Subjective Information  no complaints  -TW     Document Type  therapy note (daily note)  -TW     Mode of Treatment  physical therapy;individual therapy  -TW     Patient Effort  good  -TW     Row Name 20 1005          General Information    Patient Profile Reviewed  yes  -TW     Patient Observations  alert;cooperative;agree to therapy  -TW     Patient/Family/Caregiver Comments/Observations  Pts son arrived mid tx. Pt up in recliner and agreeable to therapy.  -TW     Existing Precautions/Restrictions  fall;oxygen therapy device and L/min  -TW     Row Name 20 1005          Cognition    Affect/Mental Status (Cognitive)  WFL  -TW     Orientation Status (Cognition)  oriented x 4  -TW     Follows Commands (Cognition)  WFL  -TW     Cognitive Function (Cognitive)  WFL  -TW     Personal Safety Interventions  fall prevention program maintained;gait belt;nonskid shoes/slippers when out of bed  -TW     Row Name 20 1005          Pain Scale: Numbers Pre/Post-Treatment    Pretreatment Pain Rating  0/10 - no pain  -TW     Posttreatment Pain Rating  0/10 - no pain  -TW     Row Name 20 1005          Bed Mobility    Comment (Bed Mobility)  Not tested due to pt up in recliner and returned to recliner at end of tx.  -TW     Row Name 20 1005          Transfers    Sit-Stand [place] (Transfers)  standby assist  -     Stand-Sit [place] (Transfers)  standby assist  -     Row Name 20 1005          Sit-Stand Transfer    Assistive Device (Sit-Stand Transfers)  walker, front-wheeled  -     Row Name 20 1005          Stand-Sit Transfer    Assistive Device (Stand-Sit Transfers)  walker,  front-wheeled  -TW     Row Name 11/12/20 1005          Gait/Stairs (Locomotion)    Gait/Stairs Locomotion  gait/ambulation assistive device  -TW     [place] Level (Gait)  standby assist  -TW     Assistive Device (Gait)  walker, front-wheeled  -TW     Distance in Feet (Gait)  12ft x2  -TW     Pattern (Gait)  step-through  -TW     Deviations/Abnormal Patterns (Gait)  kari decreased;stride length decreased  -TW     Row Name 11/12/20 1005          Hip (Therapeutic Exercise)    Hip (Therapeutic Exercise)  AROM (active range of motion)  -TW     Hip AROM (Therapeutic Exercise)  bilateral;sitting  -TW     Row Name 11/12/20 1005          Knee (Therapeutic Exercise)    Knee (Therapeutic Exercise)  AROM (active range of motion)  -TW     Knee AROM (Therapeutic Exercise)  bilateral;sitting  -TW     Row Name 11/12/20 1005          Ankle (Therapeutic Exercise)    Ankle (Therapeutic Exercise)  AROM (active range of motion)  -TW     Ankle AROM (Therapeutic Exercise)  bilateral;sitting  -TW     Row Name 11/12/20 1005          Plan of Care Review    Plan of Care Reviewed With  patient;son  -TW     Progress  improving  -TW     Outcome Summary  Pt up in recliner and agreeable to therapy. Pt stood with SBA and amb 12 ft x 2 with RW and SBA. Pt performed B LE ther ex in sitting x 10 reps with good tech and recall noted. Pt would cont to benefit from therapy upon DC.  -TW     Row Name 11/12/20 1005          Vital Signs    Pretreatment Heart Rate (beats/min)  86  -TW     Posttreatment Heart Rate (beats/min)  84  -TW     Pre SpO2 (%)  96  -TW     O2 Delivery Pre Treatment  supplemental O2  -TW     Post SpO2 (%)  95  -TW     Pre Patient Position  Sitting  -TW     Intra Patient Position  Standing  -TW     Post Patient Position  Sitting  -TW     Row Name 11/12/20 1005          Bed Mobility Goal 1 (PT)    Activity/Assistive Device (Bed Mobility Goal 1, PT)  sit to supine;supine to sit  -TW     [place] Level/Cues Needed (Bed Mobility  Goal 1, PT)  independent  -TW     Time Frame (Bed Mobility Goal 1, PT)  by discharge  -TW     Progress/Outcomes (Bed Mobility Goal 1, PT)  goal not met  -TW     Row Name 11/12/20 1005          Transfer Goal 1 (PT)    Activity/Assistive Device (Transfer Goal 1, PT)  sit-to-stand/stand-to-sit;bed-to-chair/chair-to-bed  -TW     [place] Level/Cues Needed (Transfer Goal 1, PT)  modified independence  -TW     Time Frame (Transfer Goal 1, PT)  by discharge  -TW     Progress/Outcome (Transfer Goal 1, PT)  goal not met  -TW     Row Name 11/12/20 1005          Gait Training Goal 1 (PT)    Activity/Assistive Device (Gait Training Goal 1, PT)  gait (walking locomotion);walker, rolling;increase endurance/gait distance;decrease fall risk  -TW     [place] Level (Gait Training Goal 1, PT)  modified independence  -TW     Distance (Gait Training Goal 1, PT)  30ft or more x2 O2 sats will not drop below 90%  -TW     Time Frame (Gait Training Goal 1, PT)  by discharge  -TW     Progress/Outcome (Gait Training Goal 1, PT)  goal not met  -TW     Row Name 11/12/20 1005          Stairs Goal 1 (PT)    Activity/Assistive Device (Stairs Goal 1, PT)  ascending stairs;descending stairs;using handrail, left;using handrail, right  -TW     [place] Level/Cues Needed (Stairs Goal 1, PT)  modified independence  -TW     Number of Stairs (Stairs Goal 1, PT)  1  -TW     Time Frame (Stairs Goal 1, PT)  by discharge  -TW     Progress/Outcome (Stairs Goal 1, PT)  goal not met  -TW     Row Name 11/12/20 1005          Positioning and Restraints    Pre-Treatment Position  sitting in chair/recliner  -TW     Post Treatment Position  chair  -TW     In Chair  sitting;call light within reach;encouraged to call for assist;exit alarm on;with family/caregiver  -TW     Row Name 11/12/20 1005          Therapy Assessment/Plan (PT)    Rehab Potential (PT)  good, to achieve stated therapy goals  -TW     Row Name 11/12/20 1005          Progress Summary (PT)     Progress Toward Functional Goals (PT)  progress toward functional goals is fair  -TW       User Key  (r) = Recorded By, (t) = Taken By, (c) = Cosigned By    Initials Name Provider Type    TW Keshav Buitrago, PTA Physical Therapy Assistant        Physical Therapy Education                 Title: PT OT SLP Therapies (Resolved)     Topic: Physical Therapy (Resolved)     Point: Mobility training (Resolved)     Learning Progress Summary           Patient Acceptance, E, NR by  at 11/8/2020 1524                   Point: Home exercise program (Resolved)     Learner Progress:  Not documented in this visit.          Point: Body mechanics (Resolved)     Learning Progress Summary           Patient Acceptance, E, NR by  at 11/8/2020 1524                   Point: Precautions (Resolved)     Learning Progress Summary           Patient Acceptance, E, NR by  at 11/8/2020 1524                               User Key     Initials Effective Dates Name Provider Type Discipline     04/03/18 -  Malena Mendoza, PT Physical Therapist PT              PT Recommendation and Plan  Anticipated Discharge Disposition (PT): home with home health  Progress Summary (PT)  Progress Toward Functional Goals (PT): progress toward functional goals is fair  Plan of Care Reviewed With: patient, son  Progress: improving  Outcome Summary: Pt up in recliner and agreeable to therapy. Pt stood with SBA and amb 12 ft x 2 with RW and SBA. Pt performed B LE ther ex in sitting x 10 reps with good tech and recall noted. Pt would cont to benefit from therapy upon DC.  Outcome Measures     Row Name 11/12/20 1005 11/11/20 1320 11/11/20 0855       How much help from another person do you currently need...    Turning from your back to your side while in flat bed without using bedrails?  4  -TW  4  -TW  --    Moving from lying on back to sitting on the side of a flat bed without bedrails?  4  -TW  4  -TW  --    Moving to and from a bed to a chair (including a  wheelchair)?  3  -TW  3  -TW  --    Standing up from a chair using your arms (e.g., wheelchair, bedside chair)?  3  -TW  3  -TW  --    Climbing 3-5 steps with a railing?  3  -TW  3  -TW  --    To walk in hospital room?  3  -TW  3  -TW  --    AM-PAC 6 Clicks Score (PT)  20  -TW  20  -TW  --       How much help from another is currently needed...    Putting on and taking off regular lower body clothing?  --  --  3  -RC    Bathing (including washing, rinsing, and drying)  --  --  3  -RC    Toileting (which includes using toilet bed pan or urinal)  --  --  3  -RC    Putting on and taking off regular upper body clothing  --  --  3  -RC    Taking care of personal grooming (such as brushing teeth)  --  --  3  -RC    Eating meals  --  --  4  -RC    AM-PAC 6 Clicks Score (OT)  --  --  19  -RC    Row Name 11/10/20 1030             How much help from another is currently needed...    Putting on and taking off regular lower body clothing?  3  -RC      Bathing (including washing, rinsing, and drying)  3  -RC      Toileting (which includes using toilet bed pan or urinal)  3  -RC      Putting on and taking off regular upper body clothing  3  -RC      Taking care of personal grooming (such as brushing teeth)  3  -RC      Eating meals  4  -RC      AM-PAC 6 Clicks Score (OT)  19  -RC        User Key  (r) = Recorded By, (t) = Taken By, (c) = Cosigned By    Initials Name Provider Type    TW Keshav Buitrago, JEANNETTE Physical Therapy Assistant    Lianne Schneider COTA/L Occupational Therapy Assistant           Time Calculation:   PT Charges     Row Name 11/12/20 1132             Time Calculation    Start Time  1005  -TW      Stop Time  1028  -TW      Time Calculation (min)  23 min  -TW      PT Received On  11/12/20  -TW      PT Goal Re-Cert Due Date  11/21/20  -TW         Time Calculation- PT    Total Timed Code Minutes- PT  23 minute(s)  -TW        User Key  (r) = Recorded By, (t) = Taken By, (c) = Cosigned By    Initials Name  Provider Type    TW Keshav Buitrago PTA Physical Therapy Assistant        Therapy Charges for Today     Code Description Service Date Service Provider Modifiers Qty    88145462101 HC PT THER PROC EA 15 MIN 11/11/2020 Keshav Buitrago PTA GP 2    66836414572 HC GAIT TRAINING EA 15 MIN 11/12/2020 Keshav Buitrago, JEANNETTE GP 1    10678837576 HC PT THER PROC EA 15 MIN 11/12/2020 Keshav Buitrago PTA GP 1          PT G-Codes  Outcome Measure Options: AM-PAC 6 Clicks Basic Mobility (PT)  AM-PAC 6 Clicks Score (PT): 20  AM-PAC 6 Clicks Score (OT): 19    Keshav Buitrago PTA  11/12/2020

## 2020-11-12 NOTE — PLAN OF CARE
Goal Outcome Evaluation:  Plan of Care Reviewed With: patient  Progress: improving  Outcome Summary: Pt discharging today

## 2020-11-12 NOTE — DISCHARGE SUMMARY
Discharge Summary  Devante Hilliard MD  Hospitalist     Date of Discharge:  11/12/2020    Discharge Diagnosis:      Acute on chronic respiratory failure with hypoxia (CMS/HCC)    COPD with acute exacerbation (CMS/HCC)    Emphysema of lung (CMS/HCC)    Acute on chronic diastolic heart failure (CMS/HCC)    Malignant hypertension      Presenting Problem/History of Present Illness  Shortness of breath    Hospital Course  Patient is a 87 y.o. female admitted for worsening shortness of breath accompanied by out of control blood pressure values. She improved with the help of supplemental Oxygen, diuretics, steroids, nebulized treatments, hypertensive control. Her vital signs are stable now, her O2 saturation is above 90% on 3 L/min NC and she will be discharged home with Home Health. Her son will make sure she has 24h/day sitters hoping to avoid Nursing Home placement for the time being.      Consults:   Consults     No orders found from 10/9/2020 to 11/8/2020.        Condition on Discharge:  stable    Vital Signs  Temp:  [96.4 °F (35.8 °C)-98.5 °F (36.9 °C)] 96.4 °F (35.8 °C)  Heart Rate:  [68-80] 76  Resp:  [18-22] 18  BP: (148-177)/(62-80) 156/62    Physical Exam:  Physical Exam  Vitals signs reviewed.   Constitutional:       General: She is not in acute distress.     Appearance: Normal appearance. She is ill-appearing.   HENT:      Head: Normocephalic and atraumatic.   Eyes:      General: No scleral icterus.     Extraocular Movements: Extraocular movements intact.      Pupils: Pupils are equal, round, and reactive to light.   Neck:      Musculoskeletal: Normal range of motion and neck supple. No neck rigidity or muscular tenderness.   Cardiovascular:      Rate and Rhythm: Normal rate and regular rhythm.   Pulmonary:      Effort: Pulmonary effort is normal. No respiratory distress.      Breath sounds: Normal breath sounds. No stridor. No wheezing, rhonchi or rales.      Comments: Emphysematous chest  Abdominal:       General: Bowel sounds are normal. There is no distension.      Palpations: Abdomen is soft. There is no mass.      Tenderness: There is no abdominal tenderness.   Musculoskeletal:         General: No swelling, tenderness, deformity or signs of injury.   Skin:     General: Skin is warm.      Coloration: Skin is pale.      Findings: No bruising or erythema.   Neurological:      General: No focal deficit present.      Mental Status: She is alert and oriented to person, place, and time. Mental status is at baseline.      Cranial Nerves: No cranial nerve deficit.      Sensory: No sensory deficit.   Psychiatric:         Mood and Affect: Mood normal.         Behavior: Behavior normal.         Discharge Disposition  [facility]    Discharge Medications     Discharge Medications      New Medications      Instructions Start Date   aspirin 81 MG chewable tablet   81 mg, Oral, Daily   Start Date: November 13, 2020     atorvastatin 10 MG tablet  Commonly known as: Lipitor   10 mg, Oral, Daily      Hydrocort-Pramoxine (Perianal) 1-1 % rectal foam  Commonly known as: PROCTOFOAM-HS   1 applicator, Rectal, 2 Times Daily      losartan 25 MG tablet  Commonly known as: Cozaar   25 mg, Oral, Daily      predniSONE 10 MG (48) dose pack  Commonly known as: DELTASONE   Oral, Daily, Take as directed         Changes to Medications      Instructions Start Date   docusate sodium 100 MG capsule  Commonly known as: Colace  What changed:   · when to take this  · reasons to take this   100 mg, Oral, 2 Times Daily      ipratropium-albuterol 0.5-2.5 mg/3 ml nebulizer  Commonly known as: DUO-NEB  What changed: when to take this   3 mL, Nebulization, 4 Times Daily - RT      polyethylene glycol 17 GM/SCOOP powder  Commonly known as: MIRALAX  What changed: See the new instructions.   MIX 1 CAPFUL (17G) IN 8 OUNCES OF LIQUID AND DRINK BY MOUTH EVERY DAY FOR CONSTIPATION      sertraline 100 MG tablet  Commonly known as: ZOLOFT  What changed:    · medication strength  · how much to take   100 mg, Oral, Daily         Continue These Medications      Instructions Start Date   acetaminophen 325 MG tablet  Commonly known as: TYLENOL   650 mg, Oral, Every 4 Hours PRN      budesonide 0.5 MG/2ML nebulizer solution  Commonly known as: Pulmicort   0.5 mg, Nebulization, Daily - RT      carvedilol 6.25 MG tablet  Commonly known as: COREG   6.25 mg, Oral, 2 Times Daily With Meals      esomeprazole 40 MG capsule  Commonly known as: nexIUM   TAKE 1 CAPSULE BY MOUTH EVERY MORNING      ferrous sulfate 325 (65 FE) MG tablet   325 mg, Oral, Daily With Breakfast & Dinner      fluorometholone 0.1 % ophthalmic suspension  Commonly known as: FML   INSTILL 1 DROP IN BOTH EYES FOUR (4) TIMES DAILY      folic acid-vit B6-vit B12 2.5-25-1 MG tablet tablet  Commonly known as: Folbee   1 tablet, Oral, Daily      furosemide 20 MG tablet  Commonly known as: LASIX   20 mg, Oral, Daily      levothyroxine 50 MCG tablet  Commonly known as: SYNTHROID, LEVOTHROID   50 mcg, Oral, Daily      lidocaine 5 %  Commonly known as: Lidoderm   1 patch, Transdermal, Every 24 Hours, Remove & Discard patch within 12 hours      montelukast 10 MG tablet  Commonly known as: SINGULAIR   10 mg, Oral, Nightly      ranolazine 500 MG 12 hr tablet  Commonly known as: RANEXA   500 mg, Oral, 2 Times Daily      sodium bicarbonate 650 MG tablet   650 mg, Oral, 2 Times Daily      traMADol 50 MG tablet  Commonly known as: ULTRAM   50 mg, Oral, Every 6 Hours PRN      vitamin D 1.25 MG (57245 UT) capsule capsule  Commonly known as: ERGOCALCIFEROL   50,000 Units, Oral, Every 30 Days         Stop These Medications    albuterol (2.5 MG/3ML) 0.083% nebulizer solution  Commonly known as: PROVENTIL     nitroglycerin 0.2 MG/HR patch  Commonly known as: NITRODUR            Discharge Diet:   Diet Instructions     Diet: Regular      Discharge Diet: Regular          Activity at Discharge:   Activity Instructions     Activity as  Tolerated            Follow-up Appointments  Future Appointments   Date Time Provider Department Center   11/19/2020  2:00 PM Niles Arteaga APRN [facility]  [facility]   1/28/2021 10:30 AM Niles Arteaga APRN [facility]   2/8/2021  9:30 AM Chava Dubois MD [facility] None     Additional Instructions for the Follow-ups that You Need to Schedule     Ambulatory Referral to Home Health   As directed      Face to Face Visit Date: 11/12/2020    Follow-up provider for Plan of Care?: I treated the patient in an acute care facility and will not continue treatment after discharge.    Follow-up provider: NILES ARTEAGA [[id number]]    Reason/Clinical Findings: emphysema    Describe mobility limitations that make leaving home difficult: emphysema    Nursing/Therapeutic Services Requested: Skilled Nursing Physical Therapy Occupational Therapy    Skilled nursing orders: Medication education Cardiopulmonary assessments COPD management    PT orders: Therapeutic exercise    Occupational orders: Strengthening    Frequency: 1 Week 1         Discharge Follow-up with PCP   As directed       Currently Documented PCP:    Niles Arteaga APRN    PCP Phone Number:    [phone]     Follow Up Details: in 1 week               Devante Hilliard MD  11/12/20  20:24 CST

## 2020-11-12 NOTE — PLAN OF CARE
Goal Outcome Evaluation:  Plan of Care Reviewed With: patient  Progress: no change  Outcome Summary: pt vs stable and is resting comfortably at this time; no new events throughout the night; will continue to monitor

## 2020-11-13 ENCOUNTER — TRANSITIONAL CARE MANAGEMENT TELEPHONE ENCOUNTER (OUTPATIENT)
Dept: CALL CENTER | Facility: HOSPITAL | Age: 85
End: 2020-11-13

## 2020-11-13 NOTE — OUTREACH NOTE
"Call Center TCM Note      Responses   [facility] patient discharged from?  [place]   Does the patient have one of the following disease processes/diagnoses(primary or secondary)?  Stroke (TIA)   TCM attempt successful?  Yes   Call start time  1135   Call end time  1143   Discharge diagnosis  Malignant hypertension  TIA   Person spoke with today (if not patient) and relationship  Lonnie-son   Meds reviewed with patient/caregiver?  Yes   Is the patient having any side effects they believe may be caused by any medication additions or changes?  No   Does the patient have all medications ordered at discharge?  Yes   Is the patient taking all medications as directed (includes completed medication regime)?  Yes   Does the patient have a primary care provider?   Yes   Does the patient have an appointment with their PCP within 7 days of discharge?  No   What is preventing the patient from scheduling follow up appointments within 7 days of discharge?  -- [Son requested hospital d/c f/u appt is on 11/19/20 at 2:00 pm be canceled as she's to weak at this time]   Nursing Interventions  Advised patient to make appointment   Has the patient kept scheduled appointments due by today?  N/A   What is the Home health agency?   Home health   Has home health visited the patient within 72 hours of discharge?  Call prior to 72 hours   Home health comments  HH should visit on 11/13/20   Psychosocial issues?  No   Psychosocial comments  Son lives in [place]. He has people lined up to stay with patient \"pretty much\" 24/7 when he lives.    Does the patient require any assistance with activities of daily living such as eating, bathing, dressing, walking, etc.?  Yes   Does the patient have any residual symptoms from stroke/TIA?  No   Does the patient understand the diet ordered at discharge?  Yes [Regular diet]   Comments  Pt was unable to get into [place] for rehab r/t COVID   Did the patient receive a copy of their discharge instructions? "  Yes   Nursing interventions  Reviewed instructions with patient   What is the patient's perception of their health status since discharge?  Same   Nursing interventions  Nurse provided patient education   Is the patient able to teach back FAST for Stroke?  No [Provided education ]   Is the patient/caregiver able to teach back the risk factors for a stroke?  Smoking, History of TIAs   Is the patient/caregiver able to teach back signs and symptoms related to disease process for when to call PCP?  Yes   Is the patient/caregiver able to teach back signs and symptoms related to disease process for when to call 911?  Yes   If the patient is a current smoker, are they able to teach back resources for cessation?  Not a smoker   Is the patient/caregiver able to teach back the hierarchy of who to call/visit for symptoms/problems? PCP, Specialist, Home health nurse, Urgent Care, ED, 911  Yes   TCM call completed?  Yes          Maria Guadalupe Lizama RN    11/13/2020, 11:44 EST

## 2020-11-15 LAB
QT INTERVAL: 516 MS
QTC INTERVAL: 528 MS

## 2020-11-23 ENCOUNTER — READMISSION MANAGEMENT (OUTPATIENT)
Dept: CALL CENTER | Facility: HOSPITAL | Age: 85
End: 2020-11-23

## 2020-11-23 NOTE — OUTREACH NOTE
Stroke Week 2 Survey      Responses   [facility] patient discharged from?  [place]   Does the patient have one of the following disease processes/diagnoses(primary or secondary)?  Stroke (TIA)   Week 2 attempt successful?  Yes   Call start time  0742   Call end time  0750   Discharge diagnosis  Malignant hypertension  TIA   Meds reviewed with patient/caregiver?  Yes   Is the patient taking all medications as directed (includes completed medication regime)?  Yes   Does the patient have an appointment with their PCP within 7 days of discharge?  No   Nursing Interventions  Advised patient to make appointment   Has the patient kept scheduled appointments due by today?  N/A   Comments  Patient says dutch makes appts she will ask her niece to call for appt.   What is the Home health agency?   Home health   What DME was ordered?  Wears O2 most of theime and she has nebulizer   Does the patient require any assistance with activities of daily living such as eating, bathing, dressing, walking, etc.?  Yes   ADL comments  Has a lady that stays with her   Does the patient have any residual symptoms from stroke/TIA?  No   Does the patient understand the diet ordered at discharge?  Yes   What is the patient's perception of their health status since discharge?  Same   Is the patient able to teach back FAST for Stroke?  Yes   Is the patient/caregiver able to teach back the risk factors for a stroke?  High blood pressure-goal below 120/80, High Cholesterol, Physical inactivity and obesity   Week 2 call completed?  Yes          Cyn Julio RN

## 2021-01-01 ENCOUNTER — APPOINTMENT (OUTPATIENT)
Dept: GENERAL RADIOLOGY | Facility: HOSPITAL | Age: 86
End: 2021-01-01

## 2021-01-01 ENCOUNTER — IMMUNIZATION (OUTPATIENT)
Dept: VACCINE CLINIC | Facility: HOSPITAL | Age: 86
End: 2021-01-01

## 2021-01-01 ENCOUNTER — TRANSCRIBE ORDERS (OUTPATIENT)
Dept: LAB | Facility: HOSPITAL | Age: 86
End: 2021-01-01

## 2021-01-01 ENCOUNTER — OFFICE VISIT (OUTPATIENT)
Dept: FAMILY MEDICINE CLINIC | Facility: CLINIC | Age: 86
End: 2021-01-01

## 2021-01-01 ENCOUNTER — APPOINTMENT (OUTPATIENT)
Dept: ULTRASOUND IMAGING | Facility: HOSPITAL | Age: 86
End: 2021-01-01

## 2021-01-01 ENCOUNTER — APPOINTMENT (OUTPATIENT)
Dept: CT IMAGING | Facility: HOSPITAL | Age: 86
End: 2021-01-01

## 2021-01-01 ENCOUNTER — TELEPHONE (OUTPATIENT)
Dept: FAMILY MEDICINE CLINIC | Facility: CLINIC | Age: 86
End: 2021-01-01

## 2021-01-01 ENCOUNTER — APPOINTMENT (OUTPATIENT)
Dept: CARDIOLOGY | Facility: HOSPITAL | Age: 86
End: 2021-01-01

## 2021-01-01 ENCOUNTER — LAB (OUTPATIENT)
Dept: LAB | Facility: HOSPITAL | Age: 86
End: 2021-01-01

## 2021-01-01 ENCOUNTER — HOSPITAL ENCOUNTER (INPATIENT)
Facility: HOSPITAL | Age: 86
LOS: 10 days | Discharge: NURSING, REHAB OR LONG-TERM CARE | End: 2021-11-13
Attending: EMERGENCY MEDICINE | Admitting: INTERNAL MEDICINE

## 2021-01-01 ENCOUNTER — HOSPITAL ENCOUNTER (INPATIENT)
Facility: HOSPITAL | Age: 86
LOS: 2 days | End: 2021-12-03
Attending: STUDENT IN AN ORGANIZED HEALTH CARE EDUCATION/TRAINING PROGRAM | Admitting: INTERNAL MEDICINE

## 2021-01-01 VITALS
SYSTOLIC BLOOD PRESSURE: 148 MMHG | HEART RATE: 70 BPM | SYSTOLIC BLOOD PRESSURE: 130 MMHG | HEIGHT: 68 IN | DIASTOLIC BLOOD PRESSURE: 74 MMHG | OXYGEN SATURATION: 98 % | DIASTOLIC BLOOD PRESSURE: 72 MMHG | RESPIRATION RATE: 20 BRPM | WEIGHT: 140 LBS | HEART RATE: 73 BPM | OXYGEN SATURATION: 98 % | HEIGHT: 68 IN | BODY MASS INDEX: 21.22 KG/M2 | BODY MASS INDEX: 21.64 KG/M2 | WEIGHT: 142.8 LBS | TEMPERATURE: 98.2 F

## 2021-01-01 VITALS
HEIGHT: 68 IN | OXYGEN SATURATION: 95 % | HEART RATE: 78 BPM | BODY MASS INDEX: 21.98 KG/M2 | DIASTOLIC BLOOD PRESSURE: 68 MMHG | SYSTOLIC BLOOD PRESSURE: 144 MMHG | WEIGHT: 145 LBS

## 2021-01-01 VITALS
OXYGEN SATURATION: 99 % | HEIGHT: 68 IN | DIASTOLIC BLOOD PRESSURE: 56 MMHG | TEMPERATURE: 97 F | SYSTOLIC BLOOD PRESSURE: 116 MMHG | WEIGHT: 104.06 LBS | RESPIRATION RATE: 24 BRPM | BODY MASS INDEX: 15.77 KG/M2 | HEART RATE: 85 BPM

## 2021-01-01 VITALS
BODY MASS INDEX: 21.35 KG/M2 | SYSTOLIC BLOOD PRESSURE: 112 MMHG | DIASTOLIC BLOOD PRESSURE: 72 MMHG | HEIGHT: 68 IN | WEIGHT: 140.9 LBS

## 2021-01-01 DIAGNOSIS — E78.00 HYPERCHOLESTEROLEMIA: ICD-10-CM

## 2021-01-01 DIAGNOSIS — J44.1 COPD EXACERBATION (HCC): ICD-10-CM

## 2021-01-01 DIAGNOSIS — Z51.5 PALLIATIVE CARE PATIENT: ICD-10-CM

## 2021-01-01 DIAGNOSIS — J96.21 ACUTE ON CHRONIC RESPIRATORY FAILURE WITH HYPOXIA (HCC): Chronic | ICD-10-CM

## 2021-01-01 DIAGNOSIS — E87.1 HYPO-OSMOLALITY AND HYPONATREMIA: Primary | ICD-10-CM

## 2021-01-01 DIAGNOSIS — R77.8 ELEVATED TROPONIN: ICD-10-CM

## 2021-01-01 DIAGNOSIS — J41.1 MUCOPURULENT CHRONIC BRONCHITIS (HCC): Primary | ICD-10-CM

## 2021-01-01 DIAGNOSIS — I49.5 SSS (SICK SINUS SYNDROME) (HCC): ICD-10-CM

## 2021-01-01 DIAGNOSIS — J44.1 COPD EXACERBATION (HCC): Primary | ICD-10-CM

## 2021-01-01 DIAGNOSIS — I26.99 ACUTE PULMONARY EMBOLISM WITHOUT ACUTE COR PULMONALE, UNSPECIFIED PULMONARY EMBOLISM TYPE (HCC): Primary | ICD-10-CM

## 2021-01-01 DIAGNOSIS — J96.02 ACUTE RESPIRATORY FAILURE WITH HYPERCAPNIA (HCC): ICD-10-CM

## 2021-01-01 DIAGNOSIS — E87.1 HYPONATREMIA: ICD-10-CM

## 2021-01-01 DIAGNOSIS — J41.1 MUCOPURULENT CHRONIC BRONCHITIS (HCC): Chronic | ICD-10-CM

## 2021-01-01 DIAGNOSIS — Z98.61 HISTORY OF PTCA: ICD-10-CM

## 2021-01-01 DIAGNOSIS — Z00.00 MEDICARE ANNUAL WELLNESS VISIT, SUBSEQUENT: Primary | ICD-10-CM

## 2021-01-01 DIAGNOSIS — E87.1 HYPOSMOLALITY: Primary | ICD-10-CM

## 2021-01-01 DIAGNOSIS — E03.9 ACQUIRED HYPOTHYROIDISM: ICD-10-CM

## 2021-01-01 DIAGNOSIS — E87.1 HYPOSMOLALITY: ICD-10-CM

## 2021-01-01 DIAGNOSIS — Z74.09 IMPAIRED MOBILITY AND ADLS: ICD-10-CM

## 2021-01-01 DIAGNOSIS — J44.1 COPD WITH ACUTE EXACERBATION (HCC): Chronic | ICD-10-CM

## 2021-01-01 DIAGNOSIS — Z74.09 IMPAIRED FUNCTIONAL MOBILITY, BALANCE, GAIT, AND ENDURANCE: ICD-10-CM

## 2021-01-01 DIAGNOSIS — Z78.9 IMPAIRED MOBILITY AND ADLS: ICD-10-CM

## 2021-01-01 DIAGNOSIS — I10 ESSENTIAL HYPERTENSION: Primary | ICD-10-CM

## 2021-01-01 DIAGNOSIS — E87.1 HYPOSMOLALITY SYNDROME: Primary | ICD-10-CM

## 2021-01-01 DIAGNOSIS — Z78.0 POST-MENOPAUSAL: ICD-10-CM

## 2021-01-01 DIAGNOSIS — I10 UNCONTROLLED HYPERTENSION: ICD-10-CM

## 2021-01-01 DIAGNOSIS — R06.00 DYSPNEA, UNSPECIFIED TYPE: ICD-10-CM

## 2021-01-01 DIAGNOSIS — I25.10 ATHEROSCLEROSIS OF NATIVE CORONARY ARTERY OF NATIVE HEART WITHOUT ANGINA PECTORIS: ICD-10-CM

## 2021-01-01 DIAGNOSIS — J41.1 MUCOPURULENT CHRONIC BRONCHITIS (HCC): ICD-10-CM

## 2021-01-01 LAB
ALBUMIN SERPL-MCNC: 3.6 G/DL (ref 3.5–5.2)
ALBUMIN SERPL-MCNC: 3.6 G/DL (ref 3.5–5.2)
ALBUMIN SERPL-MCNC: 4 G/DL (ref 3.5–5.2)
ALBUMIN/GLOB SERPL: 1.1 G/DL
ALBUMIN/GLOB SERPL: 1.2 G/DL
ALBUMIN/GLOB SERPL: 1.4 G/DL
ALP SERPL-CCNC: 76 U/L (ref 39–117)
ALP SERPL-CCNC: 80 U/L (ref 39–117)
ALP SERPL-CCNC: 91 U/L (ref 39–117)
ALT SERPL W P-5'-P-CCNC: 12 U/L (ref 1–33)
ALT SERPL W P-5'-P-CCNC: 14 U/L (ref 1–33)
ALT SERPL W P-5'-P-CCNC: 41 U/L (ref 1–33)
ANION GAP SERPL CALCULATED.3IONS-SCNC: 10 MMOL/L (ref 5–15)
ANION GAP SERPL CALCULATED.3IONS-SCNC: 10.6 MMOL/L (ref 5–15)
ANION GAP SERPL CALCULATED.3IONS-SCNC: 11 MMOL/L (ref 5–15)
ANION GAP SERPL CALCULATED.3IONS-SCNC: 7 MMOL/L (ref 5–15)
ANION GAP SERPL CALCULATED.3IONS-SCNC: 7 MMOL/L (ref 5–15)
ANION GAP SERPL CALCULATED.3IONS-SCNC: 8 MMOL/L (ref 5–15)
ANION GAP SERPL CALCULATED.3IONS-SCNC: 9 MMOL/L (ref 5–15)
ANION GAP SERPL CALCULATED.3IONS-SCNC: 9 MMOL/L (ref 5–15)
APTT PPP: 110.2 SECONDS (ref 20–40.3)
APTT PPP: 32.7 SECONDS (ref 20–40.3)
APTT PPP: 52.4 SECONDS (ref 20–40.3)
APTT PPP: 86.5 SECONDS (ref 20–40.3)
APTT PPP: 95.8 SECONDS (ref 20–40.3)
APTT PPP: 99.5 SECONDS (ref 20–40.3)
ARTERIAL PATENCY WRIST A: ABNORMAL
AST SERPL-CCNC: 19 U/L (ref 1–32)
AST SERPL-CCNC: 25 U/L (ref 1–32)
AST SERPL-CCNC: 46 U/L (ref 1–32)
ATMOSPHERIC PRESS: 746 MMHG
ATMOSPHERIC PRESS: 746 MMHG
ATMOSPHERIC PRESS: 747 MMHG
ATMOSPHERIC PRESS: 755 MMHG
BACTERIA BLD CULT: ABNORMAL
BACTERIA SPEC AEROBE CULT: ABNORMAL
BACTERIA SPEC AEROBE CULT: ABNORMAL
BACTERIA UR QL AUTO: ABNORMAL /HPF
BASE EXCESS BLDA CALC-SCNC: -1 MMOL/L (ref 0–2)
BASE EXCESS BLDA CALC-SCNC: 1.6 MMOL/L (ref 0–2)
BASE EXCESS BLDA CALC-SCNC: 2.3 MMOL/L (ref 0–2)
BASE EXCESS BLDA CALC-SCNC: 4.9 MMOL/L (ref 0–2)
BASOPHILS # BLD AUTO: 0.01 10*3/MM3 (ref 0–0.2)
BASOPHILS # BLD AUTO: 0.02 10*3/MM3 (ref 0–0.2)
BASOPHILS # BLD AUTO: 0.03 10*3/MM3 (ref 0–0.2)
BASOPHILS # BLD AUTO: 0.06 10*3/MM3 (ref 0–0.2)
BASOPHILS NFR BLD AUTO: 0.1 % (ref 0–1.5)
BASOPHILS NFR BLD AUTO: 0.2 % (ref 0–1.5)
BASOPHILS NFR BLD AUTO: 0.5 % (ref 0–1.5)
BASOPHILS NFR BLD AUTO: 0.7 % (ref 0–1.5)
BDY SITE: ABNORMAL
BH CV ECHO MEAS - ACS: 2 CM
BH CV ECHO MEAS - AO MAX PG (FULL): 4.3 MMHG
BH CV ECHO MEAS - AO MAX PG: 9.9 MMHG
BH CV ECHO MEAS - AO MEAN PG (FULL): 3 MMHG
BH CV ECHO MEAS - AO MEAN PG: 6 MMHG
BH CV ECHO MEAS - AO ROOT AREA (BSA CORRECTED): 2
BH CV ECHO MEAS - AO ROOT AREA: 7.5 CM^2
BH CV ECHO MEAS - AO ROOT DIAM: 3.1 CM
BH CV ECHO MEAS - AO V2 MAX: 157 CM/SEC
BH CV ECHO MEAS - AO V2 MEAN: 118 CM/SEC
BH CV ECHO MEAS - AO V2 VTI: 33.2 CM
BH CV ECHO MEAS - AVA(I,A): 1.7 CM^2
BH CV ECHO MEAS - AVA(I,D): 1.7 CM^2
BH CV ECHO MEAS - AVA(V,A): 1.9 CM^2
BH CV ECHO MEAS - AVA(V,D): 1.9 CM^2
BH CV ECHO MEAS - BSA(HAYCOCK): 1.5 M^2
BH CV ECHO MEAS - BSA: 1.5 M^2
BH CV ECHO MEAS - BZI_BMI: 15.8 KILOGRAMS/M^2
BH CV ECHO MEAS - BZI_METRIC_HEIGHT: 172.7 CM
BH CV ECHO MEAS - BZI_METRIC_WEIGHT: 47.2 KG
BH CV ECHO MEAS - EDV(CUBED): 39 ML
BH CV ECHO MEAS - EDV(MOD-SP2): 48.3 ML
BH CV ECHO MEAS - EDV(MOD-SP4): 47.1 ML
BH CV ECHO MEAS - EDV(TEICH): 47.1 ML
BH CV ECHO MEAS - EF(CUBED): 59.4 %
BH CV ECHO MEAS - EF(MOD-SP2): 61.9 %
BH CV ECHO MEAS - EF(MOD-SP4): 54.6 %
BH CV ECHO MEAS - EF(TEICH): 52.1 %
BH CV ECHO MEAS - ESV(CUBED): 15.8 ML
BH CV ECHO MEAS - ESV(MOD-SP2): 18.4 ML
BH CV ECHO MEAS - ESV(MOD-SP4): 21.4 ML
BH CV ECHO MEAS - ESV(TEICH): 22.5 ML
BH CV ECHO MEAS - FS: 26 %
BH CV ECHO MEAS - IVS/LVPW: 1
BH CV ECHO MEAS - IVSD: 1.3 CM
BH CV ECHO MEAS - LA DIMENSION: 2.7 CM
BH CV ECHO MEAS - LA/AO: 0.87
BH CV ECHO MEAS - LV DIASTOLIC VOL/BSA (35-75): 30.4 ML/M^2
BH CV ECHO MEAS - LV MASS(C)D: 139.1 GRAMS
BH CV ECHO MEAS - LV MASS(C)DI: 89.8 GRAMS/M^2
BH CV ECHO MEAS - LV MAX PG: 5.6 MMHG
BH CV ECHO MEAS - LV MEAN PG: 3 MMHG
BH CV ECHO MEAS - LV SYSTOLIC VOL/BSA (12-30): 13.8 ML/M^2
BH CV ECHO MEAS - LV V1 MAX: 118 CM/SEC
BH CV ECHO MEAS - LV V1 MEAN: 72.2 CM/SEC
BH CV ECHO MEAS - LV V1 VTI: 21.8 CM
BH CV ECHO MEAS - LVIDD: 3.4 CM
BH CV ECHO MEAS - LVIDS: 2.5 CM
BH CV ECHO MEAS - LVLD AP2: 6.6 CM
BH CV ECHO MEAS - LVLD AP4: 6.7 CM
BH CV ECHO MEAS - LVLS AP2: 5.7 CM
BH CV ECHO MEAS - LVLS AP4: 5.9 CM
BH CV ECHO MEAS - LVOT AREA (M): 2.5 CM^2
BH CV ECHO MEAS - LVOT AREA: 2.5 CM^2
BH CV ECHO MEAS - LVOT DIAM: 1.8 CM
BH CV ECHO MEAS - LVPWD: 1.3 CM
BH CV ECHO MEAS - MV A MAX VEL: 119 CM/SEC
BH CV ECHO MEAS - MV DEC SLOPE: 816 CM/SEC^2
BH CV ECHO MEAS - MV E MAX VEL: 71 CM/SEC
BH CV ECHO MEAS - MV E/A: 0.6
BH CV ECHO MEAS - MV MAX PG: 10.4 MMHG
BH CV ECHO MEAS - MV MEAN PG: 5 MMHG
BH CV ECHO MEAS - MV P1/2T MAX VEL: 103 CM/SEC
BH CV ECHO MEAS - MV P1/2T: 37 MSEC
BH CV ECHO MEAS - MV V2 MAX: 161 CM/SEC
BH CV ECHO MEAS - MV V2 MEAN: 106 CM/SEC
BH CV ECHO MEAS - MV V2 VTI: 15.2 CM
BH CV ECHO MEAS - MVA P1/2T LCG: 2.1 CM^2
BH CV ECHO MEAS - MVA(P1/2T): 6 CM^2
BH CV ECHO MEAS - MVA(VTI): 3.6 CM^2
BH CV ECHO MEAS - PA MAX PG: 7.6 MMHG
BH CV ECHO MEAS - PA V2 MAX: 138 CM/SEC
BH CV ECHO MEAS - RAP SYSTOLE: 20 MMHG
BH CV ECHO MEAS - RVDD: 1.4 CM
BH CV ECHO MEAS - RVSP: 51.8 MMHG
BH CV ECHO MEAS - SI(AO): 161.9 ML/M^2
BH CV ECHO MEAS - SI(CUBED): 15 ML/M^2
BH CV ECHO MEAS - SI(LVOT): 35.8 ML/M^2
BH CV ECHO MEAS - SI(MOD-SP2): 19.3 ML/M^2
BH CV ECHO MEAS - SI(MOD-SP4): 16.6 ML/M^2
BH CV ECHO MEAS - SI(TEICH): 15.9 ML/M^2
BH CV ECHO MEAS - SV(AO): 250.6 ML
BH CV ECHO MEAS - SV(CUBED): 23.1 ML
BH CV ECHO MEAS - SV(LVOT): 55.5 ML
BH CV ECHO MEAS - SV(MOD-SP2): 29.9 ML
BH CV ECHO MEAS - SV(MOD-SP4): 25.7 ML
BH CV ECHO MEAS - SV(TEICH): 24.6 ML
BH CV ECHO MEAS - TR MAX VEL: 282 CM/SEC
BILIRUB SERPL-MCNC: 0.2 MG/DL (ref 0–1.2)
BILIRUB SERPL-MCNC: 0.3 MG/DL (ref 0–1.2)
BILIRUB SERPL-MCNC: 0.3 MG/DL (ref 0–1.2)
BILIRUB UR QL STRIP: NEGATIVE
BUN SERPL-MCNC: 15 MG/DL (ref 8–23)
BUN SERPL-MCNC: 15 MG/DL (ref 8–23)
BUN SERPL-MCNC: 18 MG/DL (ref 8–23)
BUN SERPL-MCNC: 19 MG/DL (ref 8–23)
BUN SERPL-MCNC: 23 MG/DL (ref 8–23)
BUN SERPL-MCNC: 24 MG/DL (ref 8–23)
BUN SERPL-MCNC: 29 MG/DL (ref 8–23)
BUN SERPL-MCNC: 32 MG/DL (ref 8–23)
BUN/CREAT SERPL: 15 (ref 7–25)
BUN/CREAT SERPL: 15.6 (ref 7–25)
BUN/CREAT SERPL: 18.6 (ref 7–25)
BUN/CREAT SERPL: 21.3 (ref 7–25)
BUN/CREAT SERPL: 24 (ref 7–25)
BUN/CREAT SERPL: 25.3 (ref 7–25)
BUN/CREAT SERPL: 31.1 (ref 7–25)
BUN/CREAT SERPL: 31.9 (ref 7–25)
BUN/CREAT SERPL: 32 (ref 7–25)
BUN/CREAT SERPL: 32 (ref 7–25)
CALCIUM SPEC-SCNC: 8 MG/DL (ref 8.6–10.5)
CALCIUM SPEC-SCNC: 8.3 MG/DL (ref 8.6–10.5)
CALCIUM SPEC-SCNC: 8.3 MG/DL (ref 8.6–10.5)
CALCIUM SPEC-SCNC: 8.4 MG/DL (ref 8.6–10.5)
CALCIUM SPEC-SCNC: 8.6 MG/DL (ref 8.6–10.5)
CALCIUM SPEC-SCNC: 8.7 MG/DL (ref 8.6–10.5)
CALCIUM SPEC-SCNC: 8.7 MG/DL (ref 8.6–10.5)
CALCIUM SPEC-SCNC: 8.8 MG/DL (ref 8.6–10.5)
CALCIUM SPEC-SCNC: 8.9 MG/DL (ref 8.6–10.5)
CALCIUM SPEC-SCNC: 9.2 MG/DL (ref 8.6–10.5)
CHLORIDE SERPL-SCNC: 101 MMOL/L (ref 98–107)
CHLORIDE SERPL-SCNC: 102 MMOL/L (ref 98–107)
CHLORIDE SERPL-SCNC: 102 MMOL/L (ref 98–107)
CHLORIDE SERPL-SCNC: 103 MMOL/L (ref 98–107)
CHLORIDE SERPL-SCNC: 103 MMOL/L (ref 98–107)
CHLORIDE SERPL-SCNC: 96 MMOL/L (ref 98–107)
CHLORIDE SERPL-SCNC: 97 MMOL/L (ref 98–107)
CHLORIDE SERPL-SCNC: 97 MMOL/L (ref 98–107)
CHLORIDE SERPL-SCNC: 99 MMOL/L (ref 98–107)
CHLORIDE SERPL-SCNC: 99 MMOL/L (ref 98–107)
CLARITY UR: CLEAR
CO2 SERPL-SCNC: 25 MMOL/L (ref 22–29)
CO2 SERPL-SCNC: 26 MMOL/L (ref 22–29)
CO2 SERPL-SCNC: 27 MMOL/L (ref 22–29)
CO2 SERPL-SCNC: 28 MMOL/L (ref 22–29)
CO2 SERPL-SCNC: 28 MMOL/L (ref 22–29)
CO2 SERPL-SCNC: 28.4 MMOL/L (ref 22–29)
CO2 SERPL-SCNC: 29 MMOL/L (ref 22–29)
CO2 SERPL-SCNC: 29 MMOL/L (ref 22–29)
CO2 SERPL-SCNC: 30 MMOL/L (ref 22–29)
CO2 SERPL-SCNC: 31 MMOL/L (ref 22–29)
COLOR UR: YELLOW
CRE SCREEN PCR: NOT DETECTED
CREAT SERPL-MCNC: 0.75 MG/DL (ref 0.57–1)
CREAT SERPL-MCNC: 0.91 MG/DL (ref 0.57–1)
CREAT SERPL-MCNC: 0.95 MG/DL (ref 0.57–1)
CREAT SERPL-MCNC: 0.96 MG/DL (ref 0.57–1)
CREAT SERPL-MCNC: 1 MG/DL (ref 0.57–1)
CREAT SERPL-MCNC: 1.02 MG/DL (ref 0.57–1)
CREAT SERPL-MCNC: 1.03 MG/DL (ref 0.57–1)
CREAT SERPL-MCNC: 1.08 MG/DL (ref 0.57–1)
D-DIMER, QUANTITATIVE (MAD,POW, STR): 3402 NG/ML (FEU) (ref 0–470)
DEPRECATED RDW RBC AUTO: 45.8 FL (ref 37–54)
DEPRECATED RDW RBC AUTO: 45.9 FL (ref 37–54)
DEPRECATED RDW RBC AUTO: 46.1 FL (ref 37–54)
DEPRECATED RDW RBC AUTO: 46.9 FL (ref 37–54)
DEPRECATED RDW RBC AUTO: 47 FL (ref 37–54)
DEPRECATED RDW RBC AUTO: 48.2 FL (ref 37–54)
DEPRECATED RDW RBC AUTO: 48.4 FL (ref 37–54)
DEPRECATED RDW RBC AUTO: 49.1 FL (ref 37–54)
DEPRECATED RDW RBC AUTO: 49.6 FL (ref 37–54)
EOSINOPHIL # BLD AUTO: 0 10*3/MM3 (ref 0–0.4)
EOSINOPHIL # BLD AUTO: 0.01 10*3/MM3 (ref 0–0.4)
EOSINOPHIL # BLD AUTO: 0.35 10*3/MM3 (ref 0–0.4)
EOSINOPHIL # BLD AUTO: 0.47 10*3/MM3 (ref 0–0.4)
EOSINOPHIL NFR BLD AUTO: 0 % (ref 0.3–6.2)
EOSINOPHIL NFR BLD AUTO: 0.2 % (ref 0.3–6.2)
EOSINOPHIL NFR BLD AUTO: 5.7 % (ref 0.3–6.2)
EOSINOPHIL NFR BLD AUTO: 5.8 % (ref 0.3–6.2)
ERYTHROCYTE [DISTWIDTH] IN BLOOD BY AUTOMATED COUNT: 12.9 % (ref 12.3–15.4)
ERYTHROCYTE [DISTWIDTH] IN BLOOD BY AUTOMATED COUNT: 13.2 % (ref 12.3–15.4)
ERYTHROCYTE [DISTWIDTH] IN BLOOD BY AUTOMATED COUNT: 13.2 % (ref 12.3–15.4)
ERYTHROCYTE [DISTWIDTH] IN BLOOD BY AUTOMATED COUNT: 13.3 % (ref 12.3–15.4)
ERYTHROCYTE [DISTWIDTH] IN BLOOD BY AUTOMATED COUNT: 13.6 % (ref 12.3–15.4)
ERYTHROCYTE [DISTWIDTH] IN BLOOD BY AUTOMATED COUNT: 13.7 % (ref 12.3–15.4)
ERYTHROCYTE [DISTWIDTH] IN BLOOD BY AUTOMATED COUNT: 13.8 % (ref 12.3–15.4)
FLUAV RNA RESP QL NAA+PROBE: NOT DETECTED
FLUAV RNA RESP QL NAA+PROBE: NOT DETECTED
FLUBV RNA RESP QL NAA+PROBE: NOT DETECTED
FLUBV RNA RESP QL NAA+PROBE: NOT DETECTED
GAS FLOW AIRWAY: 15 LPM
GAS FLOW AIRWAY: 2 LPM
GAS FLOW AIRWAY: 3 LPM
GFR SERPL CREATININE-BSD FRML MDRD: 48 ML/MIN/1.73
GFR SERPL CREATININE-BSD FRML MDRD: 51 ML/MIN/1.73
GFR SERPL CREATININE-BSD FRML MDRD: 51 ML/MIN/1.73
GFR SERPL CREATININE-BSD FRML MDRD: 52 ML/MIN/1.73
GFR SERPL CREATININE-BSD FRML MDRD: 55 ML/MIN/1.73
GFR SERPL CREATININE-BSD FRML MDRD: 56 ML/MIN/1.73
GFR SERPL CREATININE-BSD FRML MDRD: 58 ML/MIN/1.73
GFR SERPL CREATININE-BSD FRML MDRD: 73 ML/MIN/1.73
GLOBULIN UR ELPH-MCNC: 2.8 GM/DL
GLOBULIN UR ELPH-MCNC: 2.9 GM/DL
GLOBULIN UR ELPH-MCNC: 3.2 GM/DL
GLUCOSE SERPL-MCNC: 120 MG/DL (ref 65–99)
GLUCOSE SERPL-MCNC: 133 MG/DL (ref 65–99)
GLUCOSE SERPL-MCNC: 137 MG/DL (ref 65–99)
GLUCOSE SERPL-MCNC: 140 MG/DL (ref 65–99)
GLUCOSE SERPL-MCNC: 144 MG/DL (ref 65–99)
GLUCOSE SERPL-MCNC: 170 MG/DL (ref 65–99)
GLUCOSE SERPL-MCNC: 91 MG/DL (ref 65–99)
GLUCOSE SERPL-MCNC: 95 MG/DL (ref 65–99)
GLUCOSE UR STRIP-MCNC: ABNORMAL MG/DL
GRAM STN SPEC: ABNORMAL
HBA1C MFR BLD: 5.6 % (ref 4.8–5.6)
HCO3 BLDA-SCNC: 27.5 MMOL/L (ref 20–26)
HCO3 BLDA-SCNC: 29.6 MMOL/L (ref 20–26)
HCO3 BLDA-SCNC: 31.5 MMOL/L (ref 20–26)
HCO3 BLDA-SCNC: 32.2 MMOL/L (ref 20–26)
HCT VFR BLD AUTO: 26.2 % (ref 34–46.6)
HCT VFR BLD AUTO: 26.8 % (ref 34–46.6)
HCT VFR BLD AUTO: 28.7 % (ref 34–46.6)
HCT VFR BLD AUTO: 28.8 % (ref 34–46.6)
HCT VFR BLD AUTO: 30.1 % (ref 34–46.6)
HCT VFR BLD AUTO: 30.2 % (ref 34–46.6)
HCT VFR BLD AUTO: 31.4 % (ref 34–46.6)
HCT VFR BLD AUTO: 35.2 % (ref 34–46.6)
HCT VFR BLD AUTO: 36.5 % (ref 34–46.6)
HGB BLD-MCNC: 10 G/DL (ref 12–15.9)
HGB BLD-MCNC: 10.2 G/DL (ref 12–15.9)
HGB BLD-MCNC: 10.5 G/DL (ref 12–15.9)
HGB BLD-MCNC: 11.4 G/DL (ref 12–15.9)
HGB BLD-MCNC: 12.1 G/DL (ref 12–15.9)
HGB BLD-MCNC: 8.4 G/DL (ref 12–15.9)
HGB BLD-MCNC: 8.6 G/DL (ref 12–15.9)
HGB BLD-MCNC: 9.5 G/DL (ref 12–15.9)
HGB BLD-MCNC: 9.6 G/DL (ref 12–15.9)
HGB UR QL STRIP.AUTO: ABNORMAL
HOLD SPECIMEN: NORMAL
HYALINE CASTS UR QL AUTO: ABNORMAL /LPF
IMM GRANULOCYTES # BLD AUTO: 0.03 10*3/MM3 (ref 0–0.05)
IMM GRANULOCYTES # BLD AUTO: 0.03 10*3/MM3 (ref 0–0.05)
IMM GRANULOCYTES # BLD AUTO: 0.04 10*3/MM3 (ref 0–0.05)
IMM GRANULOCYTES # BLD AUTO: 0.06 10*3/MM3 (ref 0–0.05)
IMM GRANULOCYTES # BLD AUTO: 0.08 10*3/MM3 (ref 0–0.05)
IMM GRANULOCYTES NFR BLD AUTO: 0.4 % (ref 0–0.5)
IMM GRANULOCYTES NFR BLD AUTO: 0.5 % (ref 0–0.5)
IMM GRANULOCYTES NFR BLD AUTO: 0.6 % (ref 0–0.5)
IMM GRANULOCYTES NFR BLD AUTO: 0.7 % (ref 0–0.5)
IMM GRANULOCYTES NFR BLD AUTO: 0.9 % (ref 0–0.5)
IMM GRANULOCYTES NFR BLD AUTO: 1 % (ref 0–0.5)
IMP STRAIN: NOT DETECTED
INHALED O2 CONCENTRATION: 35 %
INR PPP: 0.9 (ref 0.8–1.2)
INR PPP: 1.01 (ref 0.8–1.2)
ISOLATED FROM: ABNORMAL
ISOLATED FROM: ABNORMAL
KETONES UR QL STRIP: NEGATIVE
KPC STRAIN: NOT DETECTED
LEUKOCYTE ESTERASE UR QL STRIP.AUTO: NEGATIVE
LYMPHOCYTES # BLD AUTO: 0.32 10*3/MM3 (ref 0.7–3.1)
LYMPHOCYTES # BLD AUTO: 0.44 10*3/MM3 (ref 0.7–3.1)
LYMPHOCYTES # BLD AUTO: 0.45 10*3/MM3 (ref 0.7–3.1)
LYMPHOCYTES # BLD AUTO: 0.58 10*3/MM3 (ref 0.7–3.1)
LYMPHOCYTES # BLD AUTO: 0.63 10*3/MM3 (ref 0.7–3.1)
LYMPHOCYTES # BLD AUTO: 0.75 10*3/MM3 (ref 0.7–3.1)
LYMPHOCYTES # BLD AUTO: 1.21 10*3/MM3 (ref 0.7–3.1)
LYMPHOCYTES # BLD AUTO: 1.98 10*3/MM3 (ref 0.7–3.1)
LYMPHOCYTES # BLD MANUAL: 0.94 10*3/MM3 (ref 0.7–3.1)
LYMPHOCYTES NFR BLD AUTO: 10.3 % (ref 19.6–45.3)
LYMPHOCYTES NFR BLD AUTO: 19.7 % (ref 19.6–45.3)
LYMPHOCYTES NFR BLD AUTO: 24.3 % (ref 19.6–45.3)
LYMPHOCYTES NFR BLD AUTO: 5.8 % (ref 19.6–45.3)
LYMPHOCYTES NFR BLD AUTO: 6.6 % (ref 19.6–45.3)
LYMPHOCYTES NFR BLD AUTO: 6.8 % (ref 19.6–45.3)
LYMPHOCYTES NFR BLD AUTO: 6.9 % (ref 19.6–45.3)
LYMPHOCYTES NFR BLD AUTO: 7.2 % (ref 19.6–45.3)
LYMPHOCYTES NFR BLD MANUAL: 1 % (ref 5–12)
LYMPHOCYTES NFR BLD MANUAL: 12 % (ref 19.6–45.3)
Lab: ABNORMAL
MAGNESIUM SERPL-MCNC: 2.2 MG/DL (ref 1.6–2.4)
MCH RBC QN AUTO: 31.4 PG (ref 26.6–33)
MCH RBC QN AUTO: 31.4 PG (ref 26.6–33)
MCH RBC QN AUTO: 31.7 PG (ref 26.6–33)
MCH RBC QN AUTO: 31.8 PG (ref 26.6–33)
MCH RBC QN AUTO: 31.9 PG (ref 26.6–33)
MCH RBC QN AUTO: 32.2 PG (ref 26.6–33)
MCHC RBC AUTO-ENTMCNC: 32.1 G/DL (ref 31.5–35.7)
MCHC RBC AUTO-ENTMCNC: 32.1 G/DL (ref 31.5–35.7)
MCHC RBC AUTO-ENTMCNC: 32.4 G/DL (ref 31.5–35.7)
MCHC RBC AUTO-ENTMCNC: 33 G/DL (ref 31.5–35.7)
MCHC RBC AUTO-ENTMCNC: 33.2 G/DL (ref 31.5–35.7)
MCHC RBC AUTO-ENTMCNC: 33.2 G/DL (ref 31.5–35.7)
MCHC RBC AUTO-ENTMCNC: 33.4 G/DL (ref 31.5–35.7)
MCHC RBC AUTO-ENTMCNC: 33.4 G/DL (ref 31.5–35.7)
MCHC RBC AUTO-ENTMCNC: 33.8 G/DL (ref 31.5–35.7)
MCV RBC AUTO: 94.4 FL (ref 79–97)
MCV RBC AUTO: 94.9 FL (ref 79–97)
MCV RBC AUTO: 95 FL (ref 79–97)
MCV RBC AUTO: 96.2 FL (ref 79–97)
MCV RBC AUTO: 96.3 FL (ref 79–97)
MCV RBC AUTO: 96.3 FL (ref 79–97)
MCV RBC AUTO: 97 FL (ref 79–97)
MCV RBC AUTO: 99.2 FL (ref 79–97)
MCV RBC AUTO: 99.3 FL (ref 79–97)
MODALITY: ABNORMAL
MONOCYTES # BLD AUTO: 0.04 10*3/MM3 (ref 0.1–0.9)
MONOCYTES # BLD AUTO: 0.05 10*3/MM3 (ref 0.1–0.9)
MONOCYTES # BLD AUTO: 0.07 10*3/MM3 (ref 0.1–0.9)
MONOCYTES # BLD AUTO: 0.07 10*3/MM3 (ref 0.1–0.9)
MONOCYTES # BLD AUTO: 0.09 10*3/MM3 (ref 0.1–0.9)
MONOCYTES # BLD AUTO: 0.11 10*3/MM3 (ref 0.1–0.9)
MONOCYTES # BLD AUTO: 0.52 10*3/MM3 (ref 0.1–0.9)
MONOCYTES # BLD AUTO: 0.55 10*3/MM3 (ref 0.1–0.9)
MONOCYTES # BLD AUTO: 0.56 10*3/MM3 (ref 0.1–0.9)
MONOCYTES NFR BLD AUTO: 0.6 % (ref 5–12)
MONOCYTES NFR BLD AUTO: 0.8 % (ref 5–12)
MONOCYTES NFR BLD AUTO: 0.9 % (ref 5–12)
MONOCYTES NFR BLD AUTO: 0.9 % (ref 5–12)
MONOCYTES NFR BLD AUTO: 2.4 % (ref 5–12)
MONOCYTES NFR BLD AUTO: 6.4 % (ref 5–12)
MONOCYTES NFR BLD AUTO: 6.9 % (ref 5–12)
MONOCYTES NFR BLD AUTO: 9 % (ref 5–12)
NDM STRAIN: NOT DETECTED
NEUTROPHILS # BLD AUTO: 6.21 10*3/MM3 (ref 1.7–7)
NEUTROPHILS NFR BLD AUTO: 3.96 10*3/MM3 (ref 1.7–7)
NEUTROPHILS NFR BLD AUTO: 4.16 10*3/MM3 (ref 1.7–7)
NEUTROPHILS NFR BLD AUTO: 5.05 10*3/MM3 (ref 1.7–7)
NEUTROPHILS NFR BLD AUTO: 5.91 10*3/MM3 (ref 1.7–7)
NEUTROPHILS NFR BLD AUTO: 6.41 10*3/MM3 (ref 1.7–7)
NEUTROPHILS NFR BLD AUTO: 6.87 10*3/MM3 (ref 1.7–7)
NEUTROPHILS NFR BLD AUTO: 62.1 % (ref 42.7–76)
NEUTROPHILS NFR BLD AUTO: 64.6 % (ref 42.7–76)
NEUTROPHILS NFR BLD AUTO: 7.24 10*3/MM3 (ref 1.7–7)
NEUTROPHILS NFR BLD AUTO: 8.81 10*3/MM3 (ref 1.7–7)
NEUTROPHILS NFR BLD AUTO: 84.7 % (ref 42.7–76)
NEUTROPHILS NFR BLD AUTO: 88.4 % (ref 42.7–76)
NEUTROPHILS NFR BLD AUTO: 89.6 % (ref 42.7–76)
NEUTROPHILS NFR BLD AUTO: 91.5 % (ref 42.7–76)
NEUTROPHILS NFR BLD AUTO: 92 % (ref 42.7–76)
NEUTROPHILS NFR BLD AUTO: 92.7 % (ref 42.7–76)
NEUTROPHILS NFR BLD MANUAL: 85 % (ref 42.7–76)
NEUTS BAND NFR BLD MANUAL: 1 % (ref 0–5)
NITRITE UR QL STRIP: NEGATIVE
NRBC BLD AUTO-RTO: 0 /100 WBC (ref 0–0.2)
NT-PROBNP SERPL-MCNC: 1085 PG/ML (ref 0–1800)
NT-PROBNP SERPL-MCNC: 1640 PG/ML (ref 0–1800)
OSMOLALITY UR: 371 MOSM/KG
OXA 48 STRAIN: NOT DETECTED
PCO2 BLDA: 48.5 MM HG (ref 35–45)
PCO2 BLDA: 57.2 MM HG (ref 35–45)
PCO2 BLDA: 61.2 MM HG (ref 35–45)
PCO2 BLDA: >102 MM HG (ref 35–45)
PEEP RESPIRATORY: 8 CM[H2O]
PH BLDA: 7.01 PH UNITS (ref 7.35–7.45)
PH BLDA: 7.29 PH UNITS (ref 7.35–7.45)
PH BLDA: 7.35 PH UNITS (ref 7.35–7.45)
PH BLDA: 7.36 PH UNITS (ref 7.35–7.45)
PH UR STRIP.AUTO: 5.5 [PH] (ref 5–9)
PLATELET # BLD AUTO: 175 10*3/MM3 (ref 140–450)
PLATELET # BLD AUTO: 179 10*3/MM3 (ref 140–450)
PLATELET # BLD AUTO: 182 10*3/MM3 (ref 140–450)
PLATELET # BLD AUTO: 193 10*3/MM3 (ref 140–450)
PLATELET # BLD AUTO: 202 10*3/MM3 (ref 140–450)
PLATELET # BLD AUTO: 204 10*3/MM3 (ref 140–450)
PLATELET # BLD AUTO: 211 10*3/MM3 (ref 140–450)
PLATELET # BLD AUTO: 225 10*3/MM3 (ref 140–450)
PLATELET # BLD AUTO: 237 10*3/MM3 (ref 140–450)
PMV BLD AUTO: 10 FL (ref 6–12)
PMV BLD AUTO: 10.1 FL (ref 6–12)
PMV BLD AUTO: 10.3 FL (ref 6–12)
PMV BLD AUTO: 10.4 FL (ref 6–12)
PMV BLD AUTO: 10.4 FL (ref 6–12)
PMV BLD AUTO: 10.5 FL (ref 6–12)
PMV BLD AUTO: 10.7 FL (ref 6–12)
PMV BLD AUTO: 9.5 FL (ref 6–12)
PMV BLD AUTO: 9.8 FL (ref 6–12)
PO2 BLDA: 104 MM HG (ref 83–108)
PO2 BLDA: 316 MM HG (ref 83–108)
PO2 BLDA: 440 MM HG (ref 83–108)
PO2 BLDA: 76.1 MM HG (ref 83–108)
POTASSIUM SERPL-SCNC: 3.9 MMOL/L (ref 3.5–5.2)
POTASSIUM SERPL-SCNC: 3.9 MMOL/L (ref 3.5–5.2)
POTASSIUM SERPL-SCNC: 4 MMOL/L (ref 3.5–5.2)
POTASSIUM SERPL-SCNC: 4.1 MMOL/L (ref 3.5–5.2)
POTASSIUM SERPL-SCNC: 4.1 MMOL/L (ref 3.5–5.2)
POTASSIUM SERPL-SCNC: 4.2 MMOL/L (ref 3.5–5.2)
POTASSIUM SERPL-SCNC: 4.4 MMOL/L (ref 3.5–5.2)
POTASSIUM SERPL-SCNC: 4.7 MMOL/L (ref 3.5–5.2)
PROCALCITONIN SERPL-MCNC: 0.07 NG/ML (ref 0–0.25)
PROT SERPL-MCNC: 6.5 G/DL (ref 6–8.5)
PROT SERPL-MCNC: 6.8 G/DL (ref 6–8.5)
PROT SERPL-MCNC: 6.8 G/DL (ref 6–8.5)
PROT UR QL STRIP: ABNORMAL
PROTHROMBIN TIME: 12.1 SECONDS (ref 11.1–15.3)
PROTHROMBIN TIME: 13.2 SECONDS (ref 11.1–15.3)
QT INTERVAL: 444 MS
QT INTERVAL: 460 MS
QTC INTERVAL: 454 MS
QTC INTERVAL: 499 MS
RBC # BLD AUTO: 2.64 10*6/MM3 (ref 3.77–5.28)
RBC # BLD AUTO: 2.7 10*6/MM3 (ref 3.77–5.28)
RBC # BLD AUTO: 2.98 10*6/MM3 (ref 3.77–5.28)
RBC # BLD AUTO: 3.03 10*6/MM3 (ref 3.77–5.28)
RBC # BLD AUTO: 3.13 10*6/MM3 (ref 3.77–5.28)
RBC # BLD AUTO: 3.2 10*6/MM3 (ref 3.77–5.28)
RBC # BLD AUTO: 3.31 10*6/MM3 (ref 3.77–5.28)
RBC # BLD AUTO: 3.63 10*6/MM3 (ref 3.77–5.28)
RBC # BLD AUTO: 3.79 10*6/MM3 (ref 3.77–5.28)
RBC # UR STRIP: ABNORMAL /HPF
RBC MORPH BLD: NORMAL
REF LAB TEST METHOD: ABNORMAL
SAO2 % BLDCOA: 96 % (ref 94–99)
SAO2 % BLDCOA: 98.5 % (ref 94–99)
SAO2 % BLDCOA: 99.8 % (ref 94–99)
SAO2 % BLDCOA: >100 % (ref 94–99)
SARS-COV-2 N GENE RESP QL NAA+PROBE: NOT DETECTED
SARS-COV-2 RNA PNL SPEC NAA+PROBE: NOT DETECTED
SARS-COV-2 RNA RESP QL NAA+PROBE: NOT DETECTED
SARS-COV-2 RNA RESP QL NAA+PROBE: NOT DETECTED
SET MECH RESP RATE: 16
SMALL PLATELETS BLD QL SMEAR: ADEQUATE
SODIUM SERPL-SCNC: 133 MMOL/L (ref 136–145)
SODIUM SERPL-SCNC: 133 MMOL/L (ref 136–145)
SODIUM SERPL-SCNC: 135 MMOL/L (ref 136–145)
SODIUM SERPL-SCNC: 135 MMOL/L (ref 136–145)
SODIUM SERPL-SCNC: 136 MMOL/L (ref 136–145)
SODIUM SERPL-SCNC: 138 MMOL/L (ref 136–145)
SODIUM SERPL-SCNC: 139 MMOL/L (ref 136–145)
SODIUM SERPL-SCNC: 139 MMOL/L (ref 136–145)
SODIUM SERPL-SCNC: 140 MMOL/L (ref 136–145)
SODIUM SERPL-SCNC: 140 MMOL/L (ref 136–145)
SP GR UR STRIP: 1.02 (ref 1–1.03)
SQUAMOUS #/AREA URNS HPF: ABNORMAL /HPF
T4 FREE SERPL-MCNC: 1.05 NG/DL (ref 0.93–1.7)
TROPONIN T SERPL-MCNC: 0.01 NG/ML (ref 0–0.03)
TROPONIN T SERPL-MCNC: 0.04 NG/ML (ref 0–0.03)
TROPONIN T SERPL-MCNC: <0.01 NG/ML (ref 0–0.03)
TSH SERPL DL<=0.05 MIU/L-ACNC: 2.15 UIU/ML (ref 0.27–4.2)
UROBILINOGEN UR QL STRIP: ABNORMAL
VARIANT LYMPHS NFR BLD MANUAL: 1 % (ref 0–5)
VENTILATOR MODE: ABNORMAL
VIM STRAIN: NOT DETECTED
VT ON VENT VENT: 100 ML
VT ON VENT VENT: 500 ML
WBC # BLD AUTO: 6.45 10*3/MM3 (ref 3.4–10.8)
WBC # BLD AUTO: 7.22 10*3/MM3 (ref 3.4–10.8)
WBC # BLD AUTO: 7.25 10*3/MM3 (ref 3.4–10.8)
WBC # BLD AUTO: 7.81 10*3/MM3 (ref 3.4–10.8)
WBC # BLD AUTO: 8.14 10*3/MM3 (ref 3.4–10.8)
WBC # BLD AUTO: 9.58 10*3/MM3 (ref 3.4–10.8)
WBC # UR STRIP: ABNORMAL /HPF
WBC MORPH BLD: NORMAL
WBC NRBC COR # BLD: 4.64 10*3/MM3 (ref 3.4–10.8)
WBC NRBC COR # BLD: 6.13 10*3/MM3 (ref 3.4–10.8)
WBC NRBC COR # BLD: 8.11 10*3/MM3 (ref 3.4–10.8)
WHOLE BLOOD HOLD SPECIMEN: NORMAL

## 2021-01-01 PROCEDURE — 94760 N-INVAS EAR/PLS OXIMETRY 1: CPT

## 2021-01-01 PROCEDURE — 80048 BASIC METABOLIC PNL TOTAL CA: CPT | Performed by: FAMILY MEDICINE

## 2021-01-01 PROCEDURE — 94640 AIRWAY INHALATION TREATMENT: CPT

## 2021-01-01 PROCEDURE — 84439 ASSAY OF FREE THYROXINE: CPT | Performed by: INTERNAL MEDICINE

## 2021-01-01 PROCEDURE — 94799 UNLISTED PULMONARY SVC/PX: CPT

## 2021-01-01 PROCEDURE — 80053 COMPREHEN METABOLIC PANEL: CPT | Performed by: STUDENT IN AN ORGANIZED HEALTH CARE EDUCATION/TRAINING PROGRAM

## 2021-01-01 PROCEDURE — 25010000002 METHYLPREDNISOLONE PER 40 MG: Performed by: INTERNAL MEDICINE

## 2021-01-01 PROCEDURE — 93970 EXTREMITY STUDY: CPT

## 2021-01-01 PROCEDURE — 97535 SELF CARE MNGMENT TRAINING: CPT

## 2021-01-01 PROCEDURE — 85025 COMPLETE CBC W/AUTO DIFF WBC: CPT | Performed by: FAMILY MEDICINE

## 2021-01-01 PROCEDURE — 97168 OT RE-EVAL EST PLAN CARE: CPT

## 2021-01-01 PROCEDURE — 91300 HC SARSCOV02 VAC 30MCG/0.3ML IM: CPT | Performed by: THORACIC SURGERY (CARDIOTHORACIC VASCULAR SURGERY)

## 2021-01-01 PROCEDURE — 25010000002 MORPHINE PER 10 MG: Performed by: FAMILY MEDICINE

## 2021-01-01 PROCEDURE — 25010000002 ENOXAPARIN PER 10 MG: Performed by: INTERNAL MEDICINE

## 2021-01-01 PROCEDURE — 94660 CPAP INITIATION&MGMT: CPT

## 2021-01-01 PROCEDURE — 87636 SARSCOV2 & INF A&B AMP PRB: CPT | Performed by: PHYSICIAN ASSISTANT

## 2021-01-01 PROCEDURE — 83935 ASSAY OF URINE OSMOLALITY: CPT

## 2021-01-01 PROCEDURE — 25010000002 DEXAMETHASONE PER 1 MG: Performed by: EMERGENCY MEDICINE

## 2021-01-01 PROCEDURE — 97116 GAIT TRAINING THERAPY: CPT

## 2021-01-01 PROCEDURE — 97166 OT EVAL MOD COMPLEX 45 MIN: CPT

## 2021-01-01 PROCEDURE — 93005 ELECTROCARDIOGRAM TRACING: CPT | Performed by: STUDENT IN AN ORGANIZED HEALTH CARE EDUCATION/TRAINING PROGRAM

## 2021-01-01 PROCEDURE — 25010000002 HYDRALAZINE PER 20 MG: Performed by: INTERNAL MEDICINE

## 2021-01-01 PROCEDURE — G0378 HOSPITAL OBSERVATION PER HR: HCPCS

## 2021-01-01 PROCEDURE — 83036 HEMOGLOBIN GLYCOSYLATED A1C: CPT | Performed by: INTERNAL MEDICINE

## 2021-01-01 PROCEDURE — 97530 THERAPEUTIC ACTIVITIES: CPT

## 2021-01-01 PROCEDURE — 93010 ELECTROCARDIOGRAM REPORT: CPT | Performed by: INTERNAL MEDICINE

## 2021-01-01 PROCEDURE — 87635 SARS-COV-2 COVID-19 AMP PRB: CPT | Performed by: NURSE PRACTITIONER

## 2021-01-01 PROCEDURE — 63710000001 PREDNISONE PER 1 MG: Performed by: INTERNAL MEDICINE

## 2021-01-01 PROCEDURE — 71275 CT ANGIOGRAPHY CHEST: CPT

## 2021-01-01 PROCEDURE — 25010000002 MORPHINE PER 10 MG: Performed by: INTERNAL MEDICINE

## 2021-01-01 PROCEDURE — 87040 BLOOD CULTURE FOR BACTERIA: CPT | Performed by: INTERNAL MEDICINE

## 2021-01-01 PROCEDURE — 87086 URINE CULTURE/COLONY COUNT: CPT | Performed by: INTERNAL MEDICINE

## 2021-01-01 PROCEDURE — 25010000002 METHYLPREDNISOLONE PER 125 MG: Performed by: FAMILY MEDICINE

## 2021-01-01 PROCEDURE — 99285 EMERGENCY DEPT VISIT HI MDM: CPT

## 2021-01-01 PROCEDURE — 84484 ASSAY OF TROPONIN QUANT: CPT | Performed by: INTERNAL MEDICINE

## 2021-01-01 PROCEDURE — 93005 ELECTROCARDIOGRAM TRACING: CPT

## 2021-01-01 PROCEDURE — 25010000002 AZITHROMYCIN PER 500 MG: Performed by: INTERNAL MEDICINE

## 2021-01-01 PROCEDURE — 85610 PROTHROMBIN TIME: CPT | Performed by: PHYSICIAN ASSISTANT

## 2021-01-01 PROCEDURE — 85025 COMPLETE CBC W/AUTO DIFF WBC: CPT

## 2021-01-01 PROCEDURE — 25010000002 HEPARIN (PORCINE) PER 1000 UNITS: Performed by: INTERNAL MEDICINE

## 2021-01-01 PROCEDURE — 87635 SARS-COV-2 COVID-19 AMP PRB: CPT | Performed by: INTERNAL MEDICINE

## 2021-01-01 PROCEDURE — 83880 ASSAY OF NATRIURETIC PEPTIDE: CPT | Performed by: EMERGENCY MEDICINE

## 2021-01-01 PROCEDURE — 85730 THROMBOPLASTIN TIME PARTIAL: CPT | Performed by: INTERNAL MEDICINE

## 2021-01-01 PROCEDURE — 25010000002 LORAZEPAM PER 2 MG: Performed by: INTERNAL MEDICINE

## 2021-01-01 PROCEDURE — 85610 PROTHROMBIN TIME: CPT | Performed by: EMERGENCY MEDICINE

## 2021-01-01 PROCEDURE — 85025 COMPLETE CBC W/AUTO DIFF WBC: CPT | Performed by: INTERNAL MEDICINE

## 2021-01-01 PROCEDURE — 87077 CULTURE AEROBIC IDENTIFY: CPT | Performed by: INTERNAL MEDICINE

## 2021-01-01 PROCEDURE — 71046 X-RAY EXAM CHEST 2 VIEWS: CPT

## 2021-01-01 PROCEDURE — 36415 COLL VENOUS BLD VENIPUNCTURE: CPT | Performed by: INTERNAL MEDICINE

## 2021-01-01 PROCEDURE — 97110 THERAPEUTIC EXERCISES: CPT

## 2021-01-01 PROCEDURE — 85730 THROMBOPLASTIN TIME PARTIAL: CPT | Performed by: PHYSICIAN ASSISTANT

## 2021-01-01 PROCEDURE — 84145 PROCALCITONIN (PCT): CPT | Performed by: FAMILY MEDICINE

## 2021-01-01 PROCEDURE — 85007 BL SMEAR W/DIFF WBC COUNT: CPT | Performed by: INTERNAL MEDICINE

## 2021-01-01 PROCEDURE — 80053 COMPREHEN METABOLIC PANEL: CPT | Performed by: INTERNAL MEDICINE

## 2021-01-01 PROCEDURE — 71045 X-RAY EXAM CHEST 1 VIEW: CPT

## 2021-01-01 PROCEDURE — 97164 PT RE-EVAL EST PLAN CARE: CPT

## 2021-01-01 PROCEDURE — 87081 CULTURE SCREEN ONLY: CPT | Performed by: INTERNAL MEDICINE

## 2021-01-01 PROCEDURE — 36600 WITHDRAWAL OF ARTERIAL BLOOD: CPT

## 2021-01-01 PROCEDURE — 85379 FIBRIN DEGRADATION QUANT: CPT | Performed by: PHYSICIAN ASSISTANT

## 2021-01-01 PROCEDURE — 80048 BASIC METABOLIC PNL TOTAL CA: CPT | Performed by: INTERNAL MEDICINE

## 2021-01-01 PROCEDURE — 87150 DNA/RNA AMPLIFIED PROBE: CPT | Performed by: EMERGENCY MEDICINE

## 2021-01-01 PROCEDURE — 84484 ASSAY OF TROPONIN QUANT: CPT | Performed by: STUDENT IN AN ORGANIZED HEALTH CARE EDUCATION/TRAINING PROGRAM

## 2021-01-01 PROCEDURE — 82803 BLOOD GASES ANY COMBINATION: CPT

## 2021-01-01 PROCEDURE — 87186 SC STD MICRODIL/AGAR DIL: CPT | Performed by: INTERNAL MEDICINE

## 2021-01-01 PROCEDURE — 87147 CULTURE TYPE IMMUNOLOGIC: CPT | Performed by: EMERGENCY MEDICINE

## 2021-01-01 PROCEDURE — 93005 ELECTROCARDIOGRAM TRACING: CPT | Performed by: FAMILY MEDICINE

## 2021-01-01 PROCEDURE — 99213 OFFICE O/P EST LOW 20 MIN: CPT | Performed by: NURSE PRACTITIONER

## 2021-01-01 PROCEDURE — 80053 COMPREHEN METABOLIC PANEL: CPT | Performed by: EMERGENCY MEDICINE

## 2021-01-01 PROCEDURE — 83735 ASSAY OF MAGNESIUM: CPT | Performed by: INTERNAL MEDICINE

## 2021-01-01 PROCEDURE — 25010000002 LORAZEPAM PER 2 MG: Performed by: FAMILY MEDICINE

## 2021-01-01 PROCEDURE — 25010000002 METHYLPREDNISOLONE PER 125 MG: Performed by: INTERNAL MEDICINE

## 2021-01-01 PROCEDURE — 97162 PT EVAL MOD COMPLEX 30 MIN: CPT

## 2021-01-01 PROCEDURE — 87040 BLOOD CULTURE FOR BACTERIA: CPT | Performed by: EMERGENCY MEDICINE

## 2021-01-01 PROCEDURE — 87636 SARSCOV2 & INF A&B AMP PRB: CPT | Performed by: EMERGENCY MEDICINE

## 2021-01-01 PROCEDURE — 25010000002 HEPARIN (PORCINE) PER 1000 UNITS: Performed by: PHYSICIAN ASSISTANT

## 2021-01-01 PROCEDURE — 81001 URINALYSIS AUTO W/SCOPE: CPT | Performed by: INTERNAL MEDICINE

## 2021-01-01 PROCEDURE — G0439 PPPS, SUBSEQ VISIT: HCPCS | Performed by: NURSE PRACTITIONER

## 2021-01-01 PROCEDURE — 93005 ELECTROCARDIOGRAM TRACING: CPT | Performed by: EMERGENCY MEDICINE

## 2021-01-01 PROCEDURE — 83880 ASSAY OF NATRIURETIC PEPTIDE: CPT | Performed by: STUDENT IN AN ORGANIZED HEALTH CARE EDUCATION/TRAINING PROGRAM

## 2021-01-01 PROCEDURE — 0001A: CPT | Performed by: THORACIC SURGERY (CARDIOTHORACIC VASCULAR SURGERY)

## 2021-01-01 PROCEDURE — 85025 COMPLETE CBC W/AUTO DIFF WBC: CPT | Performed by: EMERGENCY MEDICINE

## 2021-01-01 PROCEDURE — 0002A: CPT | Performed by: THORACIC SURGERY (CARDIOTHORACIC VASCULAR SURGERY)

## 2021-01-01 PROCEDURE — 84443 ASSAY THYROID STIM HORMONE: CPT | Performed by: INTERNAL MEDICINE

## 2021-01-01 PROCEDURE — 25010000002 CEFTRIAXONE PER 250 MG: Performed by: EMERGENCY MEDICINE

## 2021-01-01 PROCEDURE — 84484 ASSAY OF TROPONIN QUANT: CPT | Performed by: EMERGENCY MEDICINE

## 2021-01-01 PROCEDURE — 0 IOPAMIDOL PER 1 ML: Performed by: FAMILY MEDICINE

## 2021-01-01 PROCEDURE — 25010000002 AZITHROMYCIN PER 500 MG: Performed by: EMERGENCY MEDICINE

## 2021-01-01 PROCEDURE — 25010000002 CEFTRIAXONE PER 250 MG: Performed by: INTERNAL MEDICINE

## 2021-01-01 PROCEDURE — 70450 CT HEAD/BRAIN W/O DYE: CPT

## 2021-01-01 PROCEDURE — 93306 TTE W/DOPPLER COMPLETE: CPT

## 2021-01-01 RX ORDER — ECHINACEA PURPUREA EXTRACT 125 MG
2 TABLET ORAL AS NEEDED
Status: DISCONTINUED | OUTPATIENT
Start: 2021-01-01 | End: 2021-01-01 | Stop reason: HOSPADM

## 2021-01-01 RX ORDER — LORAZEPAM 0.5 MG/1
1 TABLET ORAL
Status: DISCONTINUED | OUTPATIENT
Start: 2021-01-01 | End: 2021-01-01

## 2021-01-01 RX ORDER — HEPARIN SODIUM 10000 [USP'U]/100ML
18 INJECTION, SOLUTION INTRAVENOUS
Status: DISCONTINUED | OUTPATIENT
Start: 2021-01-01 | End: 2021-01-01

## 2021-01-01 RX ORDER — NICOTINE 21 MG/24HR
1 PATCH, TRANSDERMAL 24 HOURS TRANSDERMAL
Status: DISCONTINUED | OUTPATIENT
Start: 2021-01-01 | End: 2021-01-01 | Stop reason: HOSPADM

## 2021-01-01 RX ORDER — BUDESONIDE 0.5 MG/2ML
0.5 INHALANT ORAL
Qty: 120 ML | Refills: 3 | Status: SHIPPED | OUTPATIENT
Start: 2021-01-01

## 2021-01-01 RX ORDER — MORPHINE SULFATE 2 MG/ML
2 INJECTION, SOLUTION INTRAMUSCULAR; INTRAVENOUS EVERY 4 HOURS PRN
Status: DISCONTINUED | OUTPATIENT
Start: 2021-01-01 | End: 2021-01-01 | Stop reason: HOSPADM

## 2021-01-01 RX ORDER — ATORVASTATIN CALCIUM 10 MG/1
10 TABLET, FILM COATED ORAL DAILY
Qty: 30 TABLET | Refills: 0 | OUTPATIENT
Start: 2021-01-01

## 2021-01-01 RX ORDER — LORAZEPAM 2 MG/ML
0.5 INJECTION INTRAMUSCULAR
Status: DISCONTINUED | OUTPATIENT
Start: 2021-01-01 | End: 2021-01-01

## 2021-01-01 RX ORDER — HEPARIN SODIUM 5000 [USP'U]/ML
80 INJECTION, SOLUTION INTRAVENOUS; SUBCUTANEOUS AS NEEDED
Status: DISCONTINUED | OUTPATIENT
Start: 2021-01-01 | End: 2021-01-01

## 2021-01-01 RX ORDER — AZITHROMYCIN 250 MG/1
TABLET, FILM COATED ORAL
Qty: 6 TABLET | Refills: 0 | OUTPATIENT
Start: 2021-01-01 | End: 2021-01-01

## 2021-01-01 RX ORDER — LOSARTAN POTASSIUM 50 MG/1
100 TABLET ORAL DAILY
Status: DISCONTINUED | OUTPATIENT
Start: 2021-01-01 | End: 2021-01-01 | Stop reason: HOSPADM

## 2021-01-01 RX ORDER — ESOMEPRAZOLE MAGNESIUM 40 MG/1
CAPSULE, DELAYED RELEASE ORAL
Qty: 30 CAPSULE | Refills: 3 | Status: SHIPPED | OUTPATIENT
Start: 2021-01-01 | End: 2021-01-01

## 2021-01-01 RX ORDER — LOSARTAN POTASSIUM 100 MG/1
100 TABLET ORAL DAILY
Qty: 30 TABLET | Refills: 2 | Status: SHIPPED | OUTPATIENT
Start: 2021-01-01

## 2021-01-01 RX ORDER — LORAZEPAM 2 MG/ML
1 INJECTION INTRAMUSCULAR
Status: DISCONTINUED | OUTPATIENT
Start: 2021-01-01 | End: 2021-01-01

## 2021-01-01 RX ORDER — HYDROCODONE BITARTRATE AND ACETAMINOPHEN 7.5; 325 MG/1; MG/1
1 TABLET ORAL EVERY 6 HOURS PRN
Qty: 10 TABLET | Refills: 0 | Status: SHIPPED | OUTPATIENT
Start: 2021-01-01 | End: 2021-01-01

## 2021-01-01 RX ORDER — LORAZEPAM 2 MG/ML
1 INJECTION INTRAMUSCULAR EVERY 4 HOURS PRN
Status: DISCONTINUED | OUTPATIENT
Start: 2021-01-01 | End: 2021-01-01 | Stop reason: HOSPADM

## 2021-01-01 RX ORDER — HYDRALAZINE HYDROCHLORIDE 20 MG/ML
10 INJECTION INTRAMUSCULAR; INTRAVENOUS EVERY 6 HOURS PRN
Status: DISCONTINUED | OUTPATIENT
Start: 2021-01-01 | End: 2021-01-01

## 2021-01-01 RX ORDER — CARVEDILOL 6.25 MG/1
6.25 TABLET ORAL 2 TIMES DAILY WITH MEALS
Qty: 180 TABLET | Refills: 1 | Status: SHIPPED | OUTPATIENT
Start: 2021-01-01

## 2021-01-01 RX ORDER — IPRATROPIUM BROMIDE AND ALBUTEROL SULFATE 2.5; .5 MG/3ML; MG/3ML
SOLUTION RESPIRATORY (INHALATION)
Qty: 360 ML | Refills: 0 | Status: SHIPPED | OUTPATIENT
Start: 2021-01-01 | End: 2021-01-01

## 2021-01-01 RX ORDER — CARVEDILOL 12.5 MG/1
12.5 TABLET ORAL 2 TIMES DAILY WITH MEALS
Status: DISCONTINUED | OUTPATIENT
Start: 2021-01-01 | End: 2021-01-01

## 2021-01-01 RX ORDER — HYDRALAZINE HYDROCHLORIDE 20 MG/ML
10 INJECTION INTRAMUSCULAR; INTRAVENOUS ONCE
Status: COMPLETED | OUTPATIENT
Start: 2021-01-01 | End: 2021-01-01

## 2021-01-01 RX ORDER — LEVOTHYROXINE SODIUM 0.05 MG/1
50 TABLET ORAL DAILY
Qty: 90 TABLET | Refills: 0 | Status: SHIPPED | OUTPATIENT
Start: 2021-01-01 | End: 2021-01-01

## 2021-01-01 RX ORDER — FUROSEMIDE 40 MG/1
40 TABLET ORAL DAILY
COMMUNITY
Start: 2021-01-01 | End: 2021-01-01

## 2021-01-01 RX ORDER — RANOLAZINE 500 MG/1
500 TABLET, EXTENDED RELEASE ORAL 2 TIMES DAILY
Qty: 60 TABLET | Refills: 1 | Status: SHIPPED | OUTPATIENT
Start: 2021-01-01 | End: 2021-01-01

## 2021-01-01 RX ORDER — PREDNISONE 20 MG/1
40 TABLET ORAL
Qty: 8 TABLET | Refills: 0 | Status: SHIPPED | OUTPATIENT
Start: 2021-01-01 | End: 2021-01-01

## 2021-01-01 RX ORDER — MORPHINE SULFATE 2 MG/ML
2 INJECTION, SOLUTION INTRAMUSCULAR; INTRAVENOUS
Status: DISCONTINUED | OUTPATIENT
Start: 2021-01-01 | End: 2021-01-01

## 2021-01-01 RX ORDER — LEVOTHYROXINE SODIUM 0.05 MG/1
50 TABLET ORAL
Status: DISCONTINUED | OUTPATIENT
Start: 2021-01-01 | End: 2021-01-01

## 2021-01-01 RX ORDER — FAMOTIDINE 20 MG/1
40 TABLET, FILM COATED ORAL DAILY
COMMUNITY
Start: 2020-11-17

## 2021-01-01 RX ORDER — BUDESONIDE 0.5 MG/2ML
0.5 INHALANT ORAL
Status: DISCONTINUED | OUTPATIENT
Start: 2021-01-01 | End: 2021-01-01 | Stop reason: HOSPADM

## 2021-01-01 RX ORDER — IPRATROPIUM BROMIDE AND ALBUTEROL SULFATE 2.5; .5 MG/3ML; MG/3ML
3 SOLUTION RESPIRATORY (INHALATION) AS NEEDED
Qty: 360 ML | Refills: 1 | Status: SHIPPED | OUTPATIENT
Start: 2021-01-01

## 2021-01-01 RX ORDER — TRAMADOL HYDROCHLORIDE 50 MG/1
50 TABLET ORAL EVERY 6 HOURS PRN
Status: DISCONTINUED | OUTPATIENT
Start: 2021-01-01 | End: 2021-01-01 | Stop reason: HOSPADM

## 2021-01-01 RX ORDER — SERTRALINE HYDROCHLORIDE 100 MG/1
100 TABLET, FILM COATED ORAL DAILY
Qty: 30 TABLET | Refills: 1 | Status: SHIPPED | OUTPATIENT
Start: 2021-01-01 | End: 2021-01-01

## 2021-01-01 RX ORDER — IPRATROPIUM BROMIDE AND ALBUTEROL SULFATE 2.5; .5 MG/3ML; MG/3ML
SOLUTION RESPIRATORY (INHALATION)
Qty: 360 ML | Refills: 0 | Status: SHIPPED | OUTPATIENT
Start: 2021-01-01 | End: 2021-01-01 | Stop reason: SDUPTHER

## 2021-01-01 RX ORDER — LORAZEPAM 0.5 MG/1
0.5 TABLET ORAL
Status: DISCONTINUED | OUTPATIENT
Start: 2021-01-01 | End: 2021-01-01

## 2021-01-01 RX ORDER — LORAZEPAM 2 MG/ML
2 INJECTION INTRAMUSCULAR
Status: DISCONTINUED | OUTPATIENT
Start: 2021-01-01 | End: 2021-01-01

## 2021-01-01 RX ORDER — HEPARIN SODIUM 5000 [USP'U]/ML
80 INJECTION, SOLUTION INTRAVENOUS; SUBCUTANEOUS ONCE
Status: COMPLETED | OUTPATIENT
Start: 2021-01-01 | End: 2021-01-01

## 2021-01-01 RX ORDER — METHYLPREDNISOLONE SODIUM SUCCINATE 40 MG/ML
40 INJECTION, POWDER, LYOPHILIZED, FOR SOLUTION INTRAMUSCULAR; INTRAVENOUS EVERY 8 HOURS
Status: DISCONTINUED | OUTPATIENT
Start: 2021-01-01 | End: 2021-01-01

## 2021-01-01 RX ORDER — LOSARTAN POTASSIUM 25 MG/1
25 TABLET ORAL DAILY
Qty: 30 TABLET | Refills: 1 | Status: SHIPPED | OUTPATIENT
Start: 2021-01-01 | End: 2021-01-01

## 2021-01-01 RX ORDER — DOCUSATE SODIUM 100 MG/1
100 CAPSULE, LIQUID FILLED ORAL 2 TIMES DAILY PRN
Status: DISCONTINUED | OUTPATIENT
Start: 2021-01-01 | End: 2021-01-01 | Stop reason: HOSPADM

## 2021-01-01 RX ORDER — HYDRALAZINE HYDROCHLORIDE 20 MG/ML
10 INJECTION INTRAMUSCULAR; INTRAVENOUS EVERY 6 HOURS PRN
Status: DISCONTINUED | OUTPATIENT
Start: 2021-01-01 | End: 2021-01-01 | Stop reason: HOSPADM

## 2021-01-01 RX ORDER — ACETAMINOPHEN 325 MG/1
650 TABLET ORAL EVERY 4 HOURS PRN
Status: DISCONTINUED | OUTPATIENT
Start: 2021-01-01 | End: 2021-01-01 | Stop reason: HOSPADM

## 2021-01-01 RX ORDER — LOSARTAN POTASSIUM 50 MG/1
100 TABLET ORAL DAILY
Status: DISCONTINUED | OUTPATIENT
Start: 2021-01-01 | End: 2021-01-01

## 2021-01-01 RX ORDER — IPRATROPIUM BROMIDE AND ALBUTEROL SULFATE 2.5; .5 MG/3ML; MG/3ML
3 SOLUTION RESPIRATORY (INHALATION)
Status: DISCONTINUED | OUTPATIENT
Start: 2021-01-01 | End: 2021-01-01

## 2021-01-01 RX ORDER — FAMOTIDINE 40 MG/1
40 TABLET, FILM COATED ORAL DAILY
Status: DISCONTINUED | OUTPATIENT
Start: 2021-01-01 | End: 2021-01-01 | Stop reason: HOSPADM

## 2021-01-01 RX ORDER — SODIUM CHLORIDE 0.9 % (FLUSH) 0.9 %
10 SYRINGE (ML) INJECTION AS NEEDED
Status: DISCONTINUED | OUTPATIENT
Start: 2021-01-01 | End: 2021-01-01 | Stop reason: HOSPADM

## 2021-01-01 RX ORDER — ASPIRIN 81 MG/1
81 TABLET, CHEWABLE ORAL DAILY
Status: DISCONTINUED | OUTPATIENT
Start: 2021-01-01 | End: 2021-01-01

## 2021-01-01 RX ORDER — LABETALOL HYDROCHLORIDE 5 MG/ML
10 INJECTION, SOLUTION INTRAVENOUS ONCE
Status: COMPLETED | OUTPATIENT
Start: 2021-01-01 | End: 2021-01-01

## 2021-01-01 RX ORDER — LOSARTAN POTASSIUM 25 MG/1
25 TABLET ORAL DAILY
Qty: 30 TABLET | Refills: 0 | Status: SHIPPED | OUTPATIENT
Start: 2021-01-01 | End: 2021-01-01

## 2021-01-01 RX ORDER — IPRATROPIUM BROMIDE AND ALBUTEROL SULFATE 2.5; .5 MG/3ML; MG/3ML
3 SOLUTION RESPIRATORY (INHALATION)
Status: DISCONTINUED | OUTPATIENT
Start: 2021-01-01 | End: 2021-01-01 | Stop reason: HOSPADM

## 2021-01-01 RX ORDER — DILTIAZEM HYDROCHLORIDE 60 MG/1
60 TABLET, FILM COATED ORAL EVERY 8 HOURS SCHEDULED
Status: DISCONTINUED | OUTPATIENT
Start: 2021-01-01 | End: 2021-01-01

## 2021-01-01 RX ORDER — LANOLIN ALCOHOL/MO/W.PET/CERES
3 CREAM (GRAM) TOPICAL NIGHTLY
Status: DISCONTINUED | OUTPATIENT
Start: 2021-01-01 | End: 2021-01-01

## 2021-01-01 RX ORDER — MORPHINE SULFATE 100 MG/5ML
2 SOLUTION ORAL EVERY 4 HOURS PRN
Qty: 30 ML | Refills: 0 | Status: SHIPPED | OUTPATIENT
Start: 2021-01-01

## 2021-01-01 RX ORDER — ERGOCALCIFEROL 1.25 MG/1
50000 CAPSULE ORAL
Qty: 3 CAPSULE | Refills: 2 | Status: SHIPPED | OUTPATIENT
Start: 2021-01-01 | End: 2021-01-01

## 2021-01-01 RX ORDER — ACETAMINOPHEN 160 MG/5ML
650 SOLUTION ORAL EVERY 4 HOURS PRN
Status: DISCONTINUED | OUTPATIENT
Start: 2021-01-01 | End: 2021-01-01 | Stop reason: HOSPADM

## 2021-01-01 RX ORDER — SODIUM CHLORIDE 0.9 % (FLUSH) 0.9 %
10 SYRINGE (ML) INJECTION EVERY 12 HOURS SCHEDULED
Status: DISCONTINUED | OUTPATIENT
Start: 2021-01-01 | End: 2021-01-01 | Stop reason: HOSPADM

## 2021-01-01 RX ORDER — IPRATROPIUM BROMIDE AND ALBUTEROL SULFATE 2.5; .5 MG/3ML; MG/3ML
3 SOLUTION RESPIRATORY (INHALATION)
Status: ACTIVE | OUTPATIENT
Start: 2021-01-01 | End: 2021-01-01

## 2021-01-01 RX ORDER — HEPARIN SODIUM 5000 [USP'U]/ML
40 INJECTION, SOLUTION INTRAVENOUS; SUBCUTANEOUS AS NEEDED
Status: DISCONTINUED | OUTPATIENT
Start: 2021-01-01 | End: 2021-01-01

## 2021-01-01 RX ORDER — MORPHINE SULFATE 20 MG/ML
5 SOLUTION ORAL
Status: DISCONTINUED | OUTPATIENT
Start: 2021-01-01 | End: 2021-01-01

## 2021-01-01 RX ORDER — ARFORMOTEROL TARTRATE 15 UG/2ML
15 SOLUTION RESPIRATORY (INHALATION)
Status: DISCONTINUED | OUTPATIENT
Start: 2021-01-01 | End: 2021-01-01

## 2021-01-01 RX ORDER — LORAZEPAM 2 MG/ML
0.5 CONCENTRATE ORAL
Status: DISCONTINUED | OUTPATIENT
Start: 2021-01-01 | End: 2021-01-01

## 2021-01-01 RX ORDER — LORAZEPAM 2 MG/ML
0.5 INJECTION INTRAMUSCULAR EVERY 6 HOURS PRN
Status: DISCONTINUED | OUTPATIENT
Start: 2021-01-01 | End: 2021-01-01 | Stop reason: HOSPADM

## 2021-01-01 RX ORDER — METHYLPREDNISOLONE SODIUM SUCCINATE 40 MG/ML
40 INJECTION, POWDER, LYOPHILIZED, FOR SOLUTION INTRAMUSCULAR; INTRAVENOUS EVERY 12 HOURS
Status: DISCONTINUED | OUTPATIENT
Start: 2021-01-01 | End: 2021-01-01

## 2021-01-01 RX ORDER — FORMOTEROL FUMARATE 20 UG/2ML
20 SOLUTION RESPIRATORY (INHALATION)
COMMUNITY
Start: 2021-01-01 | End: 2021-01-01 | Stop reason: HOSPADM

## 2021-01-01 RX ORDER — LABETALOL HYDROCHLORIDE 5 MG/ML
10 INJECTION, SOLUTION INTRAVENOUS EVERY 6 HOURS PRN
Status: DISCONTINUED | OUTPATIENT
Start: 2021-01-01 | End: 2021-01-01

## 2021-01-01 RX ORDER — PROCHLORPERAZINE 25 MG
25 SUPPOSITORY, RECTAL RECTAL EVERY 12 HOURS PRN
Status: DISCONTINUED | OUTPATIENT
Start: 2021-01-01 | End: 2021-01-01 | Stop reason: HOSPADM

## 2021-01-01 RX ORDER — ONDANSETRON 2 MG/ML
4 INJECTION INTRAMUSCULAR; INTRAVENOUS EVERY 6 HOURS PRN
Status: DISCONTINUED | OUTPATIENT
Start: 2021-01-01 | End: 2021-01-01 | Stop reason: HOSPADM

## 2021-01-01 RX ORDER — SERTRALINE HYDROCHLORIDE 100 MG/1
100 TABLET, FILM COATED ORAL DAILY
Qty: 30 TABLET | Refills: 0 | Status: SHIPPED | OUTPATIENT
Start: 2021-01-01 | End: 2021-01-01

## 2021-01-01 RX ORDER — ASPIRIN 325 MG
325 TABLET ORAL ONCE
Status: COMPLETED | OUTPATIENT
Start: 2021-01-01 | End: 2021-01-01

## 2021-01-01 RX ORDER — CARBOXYMETHYLCELLULOSE SODIUM 5 MG/ML
1 SOLUTION/ DROPS OPHTHALMIC
Status: DISCONTINUED | OUTPATIENT
Start: 2021-01-01 | End: 2021-01-01 | Stop reason: HOSPADM

## 2021-01-01 RX ORDER — CARVEDILOL 6.25 MG/1
6.25 TABLET ORAL 2 TIMES DAILY WITH MEALS
Status: DISCONTINUED | OUTPATIENT
Start: 2021-01-01 | End: 2021-01-01

## 2021-01-01 RX ORDER — ACETAMINOPHEN 650 MG/1
650 SUPPOSITORY RECTAL EVERY 4 HOURS PRN
Status: DISCONTINUED | OUTPATIENT
Start: 2021-01-01 | End: 2021-01-01 | Stop reason: HOSPADM

## 2021-01-01 RX ORDER — RANOLAZINE 500 MG/1
500 TABLET, EXTENDED RELEASE ORAL 2 TIMES DAILY
Status: DISCONTINUED | OUTPATIENT
Start: 2021-01-01 | End: 2021-01-01 | Stop reason: HOSPADM

## 2021-01-01 RX ORDER — MONTELUKAST SODIUM 10 MG/1
10 TABLET ORAL NIGHTLY
Status: DISCONTINUED | OUTPATIENT
Start: 2021-01-01 | End: 2021-01-01 | Stop reason: HOSPADM

## 2021-01-01 RX ORDER — PREDNISONE 20 MG/1
40 TABLET ORAL
Status: DISCONTINUED | OUTPATIENT
Start: 2021-01-01 | End: 2021-01-01 | Stop reason: HOSPADM

## 2021-01-01 RX ORDER — DIPHENOXYLATE HYDROCHLORIDE AND ATROPINE SULFATE 2.5; .025 MG/1; MG/1
1 TABLET ORAL
Status: DISCONTINUED | OUTPATIENT
Start: 2021-01-01 | End: 2021-01-01 | Stop reason: HOSPADM

## 2021-01-01 RX ORDER — LOSARTAN POTASSIUM 50 MG/1
50 TABLET ORAL DAILY
Status: DISCONTINUED | OUTPATIENT
Start: 2021-01-01 | End: 2021-01-01

## 2021-01-01 RX ORDER — LORAZEPAM 2 MG/ML
2 CONCENTRATE ORAL
Status: DISCONTINUED | OUTPATIENT
Start: 2021-01-01 | End: 2021-01-01

## 2021-01-01 RX ORDER — PROCHLORPERAZINE EDISYLATE 5 MG/ML
5 INJECTION INTRAMUSCULAR; INTRAVENOUS EVERY 6 HOURS PRN
Status: DISCONTINUED | OUTPATIENT
Start: 2021-01-01 | End: 2021-01-01 | Stop reason: HOSPADM

## 2021-01-01 RX ORDER — PROCHLORPERAZINE MALEATE 5 MG/1
5 TABLET ORAL EVERY 6 HOURS PRN
Status: DISCONTINUED | OUTPATIENT
Start: 2021-01-01 | End: 2021-01-01 | Stop reason: HOSPADM

## 2021-01-01 RX ORDER — IPRATROPIUM BROMIDE AND ALBUTEROL SULFATE 2.5; .5 MG/3ML; MG/3ML
3 SOLUTION RESPIRATORY (INHALATION) AS NEEDED
Status: DISCONTINUED | OUTPATIENT
Start: 2021-01-01 | End: 2021-01-01

## 2021-01-01 RX ORDER — SERTRALINE HYDROCHLORIDE 100 MG/1
100 TABLET, FILM COATED ORAL DAILY
Qty: 90 TABLET | Refills: 3 | Status: SHIPPED | OUTPATIENT
Start: 2021-01-01

## 2021-01-01 RX ORDER — PANTOPRAZOLE SODIUM 40 MG/1
40 TABLET, DELAYED RELEASE ORAL EVERY MORNING
Status: DISCONTINUED | OUTPATIENT
Start: 2021-01-01 | End: 2021-01-01 | Stop reason: HOSPADM

## 2021-01-01 RX ORDER — LORAZEPAM 2 MG/ML
0.5 INJECTION INTRAMUSCULAR EVERY 6 HOURS PRN
Status: DISCONTINUED | OUTPATIENT
Start: 2021-01-01 | End: 2021-01-01

## 2021-01-01 RX ORDER — DEXAMETHASONE SODIUM PHOSPHATE 4 MG/ML
6 INJECTION, SOLUTION INTRA-ARTICULAR; INTRALESIONAL; INTRAMUSCULAR; INTRAVENOUS; SOFT TISSUE ONCE
Status: COMPLETED | OUTPATIENT
Start: 2021-01-01 | End: 2021-01-01

## 2021-01-01 RX ORDER — IPRATROPIUM BROMIDE AND ALBUTEROL SULFATE 2.5; .5 MG/3ML; MG/3ML
3 SOLUTION RESPIRATORY (INHALATION) EVERY 4 HOURS PRN
Status: DISCONTINUED | OUTPATIENT
Start: 2021-01-01 | End: 2021-01-01

## 2021-01-01 RX ORDER — LOSARTAN POTASSIUM 50 MG/1
50 TABLET ORAL DAILY
Qty: 30 TABLET | Refills: 1 | Status: SHIPPED | OUTPATIENT
Start: 2021-01-01 | End: 2021-01-01 | Stop reason: HOSPADM

## 2021-01-01 RX ORDER — ISOSORBIDE MONONITRATE 60 MG/1
60 TABLET, EXTENDED RELEASE ORAL
Status: DISCONTINUED | OUTPATIENT
Start: 2021-01-01 | End: 2021-01-01

## 2021-01-01 RX ORDER — LANOLIN ALCOHOL/MO/W.PET/CERES
3 CREAM (GRAM) TOPICAL NIGHTLY
Status: DISCONTINUED | OUTPATIENT
Start: 2021-01-01 | End: 2021-01-01 | Stop reason: HOSPADM

## 2021-01-01 RX ORDER — HEPARIN SODIUM 10000 [USP'U]/100ML
16 INJECTION, SOLUTION INTRAVENOUS
Status: DISCONTINUED | OUTPATIENT
Start: 2021-01-01 | End: 2021-01-01

## 2021-01-01 RX ORDER — DILTIAZEM HYDROCHLORIDE 60 MG/1
60 TABLET, FILM COATED ORAL EVERY 8 HOURS SCHEDULED
Status: DISCONTINUED | OUTPATIENT
Start: 2021-01-01 | End: 2021-01-01 | Stop reason: HOSPADM

## 2021-01-01 RX ORDER — LORAZEPAM 2 MG/ML
1 CONCENTRATE ORAL
Status: DISCONTINUED | OUTPATIENT
Start: 2021-01-01 | End: 2021-01-01

## 2021-01-01 RX ORDER — ATORVASTATIN CALCIUM 10 MG/1
10 TABLET, FILM COATED ORAL DAILY
Qty: 30 TABLET | Refills: 1 | Status: SHIPPED | OUTPATIENT
Start: 2021-01-01 | End: 2021-01-01

## 2021-01-01 RX ORDER — MORPHINE SULFATE 2 MG/ML
1 INJECTION, SOLUTION INTRAMUSCULAR; INTRAVENOUS EVERY 4 HOURS PRN
Status: DISCONTINUED | OUTPATIENT
Start: 2021-01-01 | End: 2021-01-01

## 2021-01-01 RX ORDER — LOSARTAN POTASSIUM 25 MG/1
25 TABLET ORAL DAILY
Qty: 90 TABLET | Refills: 3 | Status: SHIPPED | OUTPATIENT
Start: 2021-01-01 | End: 2021-01-01 | Stop reason: HOSPADM

## 2021-01-01 RX ORDER — LORAZEPAM 2 MG/1
2 TABLET ORAL
Status: DISCONTINUED | OUTPATIENT
Start: 2021-01-01 | End: 2021-01-01

## 2021-01-01 RX ORDER — MONTELUKAST SODIUM 10 MG/1
10 TABLET ORAL NIGHTLY
Status: DISCONTINUED | OUTPATIENT
Start: 2021-01-01 | End: 2021-01-01

## 2021-01-01 RX ORDER — MONTELUKAST SODIUM 10 MG/1
10 TABLET ORAL NIGHTLY
Qty: 90 TABLET | Refills: 2 | Status: SHIPPED | OUTPATIENT
Start: 2021-01-01

## 2021-01-01 RX ORDER — CYANOCOBALAMIN (VITAMIN B-12) 1000 MCG
1 TABLET ORAL DAILY
COMMUNITY

## 2021-01-01 RX ORDER — BUDESONIDE 0.5 MG/2ML
0.5 INHALANT ORAL
Status: DISCONTINUED | OUTPATIENT
Start: 2021-01-01 | End: 2021-01-01

## 2021-01-01 RX ORDER — CARVEDILOL 12.5 MG/1
12.5 TABLET ORAL 2 TIMES DAILY WITH MEALS
Status: DISCONTINUED | OUTPATIENT
Start: 2021-01-01 | End: 2021-01-01 | Stop reason: HOSPADM

## 2021-01-01 RX ORDER — PREDNISONE 10 MG/1
10 TABLET ORAL DAILY
Qty: 5 TABLET | Refills: 0 | Status: SHIPPED | OUTPATIENT
Start: 2021-01-01 | End: 2021-01-01

## 2021-01-01 RX ORDER — FUROSEMIDE 20 MG/1
20 TABLET ORAL DAILY
COMMUNITY

## 2021-01-01 RX ORDER — HYDROCODONE BITARTRATE AND ACETAMINOPHEN 7.5; 325 MG/1; MG/1
1 TABLET ORAL EVERY 6 HOURS PRN
Status: DISCONTINUED | OUTPATIENT
Start: 2021-01-01 | End: 2021-01-01 | Stop reason: HOSPADM

## 2021-01-01 RX ORDER — LEVOTHYROXINE SODIUM 0.05 MG/1
50 TABLET ORAL
Status: DISCONTINUED | OUTPATIENT
Start: 2021-01-01 | End: 2021-01-01 | Stop reason: HOSPADM

## 2021-01-01 RX ORDER — SODIUM BICARBONATE 650 MG/1
650 TABLET ORAL 2 TIMES DAILY
Status: DISCONTINUED | OUTPATIENT
Start: 2021-01-01 | End: 2021-01-01

## 2021-01-01 RX ORDER — ATORVASTATIN CALCIUM 10 MG/1
10 TABLET, FILM COATED ORAL DAILY
Qty: 30 TABLET | Refills: 0 | Status: SHIPPED | OUTPATIENT
Start: 2021-01-01 | End: 2021-01-01

## 2021-01-01 RX ORDER — HYDRALAZINE HYDROCHLORIDE 20 MG/ML
10 INJECTION INTRAMUSCULAR; INTRAVENOUS EVERY 4 HOURS PRN
Status: DISCONTINUED | OUTPATIENT
Start: 2021-01-01 | End: 2021-01-01

## 2021-01-01 RX ORDER — MORPHINE SULFATE 2 MG/ML
1 INJECTION, SOLUTION INTRAMUSCULAR; INTRAVENOUS
Status: DISCONTINUED | OUTPATIENT
Start: 2021-01-01 | End: 2021-01-01

## 2021-01-01 RX ORDER — PANTOPRAZOLE SODIUM 40 MG/1
40 TABLET, DELAYED RELEASE ORAL EVERY MORNING
Status: DISCONTINUED | OUTPATIENT
Start: 2021-01-01 | End: 2021-01-01

## 2021-01-01 RX ORDER — DEXTROSE AND SODIUM CHLORIDE 5; .9 G/100ML; G/100ML
60 INJECTION, SOLUTION INTRAVENOUS CONTINUOUS
Status: DISCONTINUED | OUTPATIENT
Start: 2021-01-01 | End: 2021-01-01

## 2021-01-01 RX ORDER — ESOMEPRAZOLE MAGNESIUM 40 MG/1
CAPSULE, DELAYED RELEASE ORAL
Qty: 90 CAPSULE | Refills: 2 | Status: SHIPPED | OUTPATIENT
Start: 2021-01-01

## 2021-01-01 RX ORDER — DILTIAZEM HYDROCHLORIDE 60 MG/1
60 TABLET, FILM COATED ORAL EVERY 8 HOURS SCHEDULED
Qty: 90 TABLET | Refills: 2 | Status: SHIPPED | OUTPATIENT
Start: 2021-01-01

## 2021-01-01 RX ORDER — RANOLAZINE 500 MG/1
500 TABLET, EXTENDED RELEASE ORAL 2 TIMES DAILY
Qty: 180 TABLET | Refills: 2 | Status: SHIPPED | OUTPATIENT
Start: 2021-01-01

## 2021-01-01 RX ORDER — METHYLPREDNISOLONE SODIUM SUCCINATE 125 MG/2ML
60 INJECTION, POWDER, LYOPHILIZED, FOR SOLUTION INTRAMUSCULAR; INTRAVENOUS EVERY 8 HOURS
Status: DISCONTINUED | OUTPATIENT
Start: 2021-01-01 | End: 2021-01-01

## 2021-01-01 RX ORDER — LORAZEPAM 2 MG/ML
0.5 INJECTION INTRAMUSCULAR EVERY 4 HOURS PRN
Status: DISCONTINUED | OUTPATIENT
Start: 2021-01-01 | End: 2021-01-01

## 2021-01-01 RX ORDER — LANOLIN ALCOHOL/MO/W.PET/CERES
3 CREAM (GRAM) TOPICAL NIGHTLY
Qty: 30 TABLET | Refills: 1 | Status: SHIPPED | OUTPATIENT
Start: 2021-01-01

## 2021-01-01 RX ORDER — LEVOTHYROXINE SODIUM 0.05 MG/1
50 TABLET ORAL DAILY
Qty: 90 TABLET | Refills: 0 | Status: SHIPPED | OUTPATIENT
Start: 2021-01-01

## 2021-01-01 RX ORDER — IPRATROPIUM BROMIDE AND ALBUTEROL SULFATE 2.5; .5 MG/3ML; MG/3ML
3 SOLUTION RESPIRATORY (INHALATION) ONCE
Status: COMPLETED | OUTPATIENT
Start: 2021-01-01 | End: 2021-01-01

## 2021-01-01 RX ORDER — METHYLPREDNISOLONE SODIUM SUCCINATE 125 MG/2ML
80 INJECTION, POWDER, LYOPHILIZED, FOR SOLUTION INTRAMUSCULAR; INTRAVENOUS EVERY 8 HOURS
Status: DISCONTINUED | OUTPATIENT
Start: 2021-01-01 | End: 2021-01-01

## 2021-01-01 RX ADMIN — METHYLPREDNISOLONE SODIUM SUCCINATE 40 MG: 40 INJECTION, POWDER, FOR SOLUTION INTRAMUSCULAR; INTRAVENOUS at 09:04

## 2021-01-01 RX ADMIN — RANOLAZINE 500 MG: 500 TABLET, FILM COATED, EXTENDED RELEASE ORAL at 22:49

## 2021-01-01 RX ADMIN — SODIUM CHLORIDE, PRESERVATIVE FREE 10 ML: 5 INJECTION INTRAVENOUS at 07:42

## 2021-01-01 RX ADMIN — RANOLAZINE 500 MG: 500 TABLET, FILM COATED, EXTENDED RELEASE ORAL at 09:05

## 2021-01-01 RX ADMIN — DILTIAZEM HYDROCHLORIDE 60 MG: 60 TABLET, FILM COATED ORAL at 06:09

## 2021-01-01 RX ADMIN — MONTELUKAST 10 MG: 10 TABLET, FILM COATED ORAL at 21:06

## 2021-01-01 RX ADMIN — HEPARIN SODIUM 5200 UNITS: 5000 INJECTION INTRAVENOUS; SUBCUTANEOUS at 20:25

## 2021-01-01 RX ADMIN — IPRATROPIUM BROMIDE AND ALBUTEROL SULFATE 3 ML: 2.5; .5 SOLUTION RESPIRATORY (INHALATION) at 20:24

## 2021-01-01 RX ADMIN — SERTRALINE 100 MG: 50 TABLET, FILM COATED ORAL at 09:05

## 2021-01-01 RX ADMIN — IPRATROPIUM BROMIDE AND ALBUTEROL SULFATE 3 ML: 2.5; .5 SOLUTION RESPIRATORY (INHALATION) at 19:34

## 2021-01-01 RX ADMIN — SODIUM CHLORIDE, PRESERVATIVE FREE 10 ML: 5 INJECTION INTRAVENOUS at 09:09

## 2021-01-01 RX ADMIN — PANTOPRAZOLE SODIUM 40 MG: 40 TABLET, DELAYED RELEASE ORAL at 06:08

## 2021-01-01 RX ADMIN — ASPIRIN 81 MG: 81 TABLET, CHEWABLE ORAL at 08:36

## 2021-01-01 RX ADMIN — SERTRALINE 100 MG: 50 TABLET, FILM COATED ORAL at 09:19

## 2021-01-01 RX ADMIN — SODIUM CHLORIDE, PRESERVATIVE FREE 10 ML: 5 INJECTION INTRAVENOUS at 08:42

## 2021-01-01 RX ADMIN — MORPHINE SULFATE 1 MG: 2 INJECTION, SOLUTION INTRAMUSCULAR; INTRAVENOUS at 22:19

## 2021-01-01 RX ADMIN — IPRATROPIUM BROMIDE AND ALBUTEROL SULFATE 3 ML: 2.5; .5 SOLUTION RESPIRATORY (INHALATION) at 14:51

## 2021-01-01 RX ADMIN — FAMOTIDINE 40 MG: 40 TABLET, FILM COATED ORAL at 08:09

## 2021-01-01 RX ADMIN — LEVOTHYROXINE SODIUM 50 MCG: 50 TABLET ORAL at 06:32

## 2021-01-01 RX ADMIN — LORAZEPAM 0.5 MG: 2 INJECTION INTRAMUSCULAR; INTRAVENOUS at 20:22

## 2021-01-01 RX ADMIN — HYDRALAZINE HYDROCHLORIDE 10 MG: 20 INJECTION INTRAMUSCULAR; INTRAVENOUS at 01:22

## 2021-01-01 RX ADMIN — SERTRALINE 100 MG: 50 TABLET, FILM COATED ORAL at 09:58

## 2021-01-01 RX ADMIN — DILTIAZEM HYDROCHLORIDE 60 MG: 60 TABLET, FILM COATED ORAL at 14:59

## 2021-01-01 RX ADMIN — Medication 1 TABLET: at 10:00

## 2021-01-01 RX ADMIN — BUDESONIDE 0.5 MG: 0.5 INHALANT RESPIRATORY (INHALATION) at 08:11

## 2021-01-01 RX ADMIN — IPRATROPIUM BROMIDE AND ALBUTEROL SULFATE 3 ML: 2.5; .5 SOLUTION RESPIRATORY (INHALATION) at 22:58

## 2021-01-01 RX ADMIN — IPRATROPIUM BROMIDE AND ALBUTEROL SULFATE 3 ML: 2.5; .5 SOLUTION RESPIRATORY (INHALATION) at 19:09

## 2021-01-01 RX ADMIN — PANTOPRAZOLE SODIUM 40 MG: 40 TABLET, DELAYED RELEASE ORAL at 06:24

## 2021-01-01 RX ADMIN — RANOLAZINE 500 MG: 500 TABLET, FILM COATED, EXTENDED RELEASE ORAL at 08:48

## 2021-01-01 RX ADMIN — MORPHINE SULFATE 2 MG: 2 INJECTION, SOLUTION INTRAMUSCULAR; INTRAVENOUS at 10:37

## 2021-01-01 RX ADMIN — LORAZEPAM 1 MG: 2 INJECTION INTRAMUSCULAR; INTRAVENOUS at 21:09

## 2021-01-01 RX ADMIN — RANOLAZINE 500 MG: 500 TABLET, FILM COATED, EXTENDED RELEASE ORAL at 08:06

## 2021-01-01 RX ADMIN — RANOLAZINE 500 MG: 500 TABLET, FILM COATED, EXTENDED RELEASE ORAL at 09:19

## 2021-01-01 RX ADMIN — METHYLPREDNISOLONE SODIUM SUCCINATE 80 MG: 125 INJECTION, POWDER, FOR SOLUTION INTRAMUSCULAR; INTRAVENOUS at 00:07

## 2021-01-01 RX ADMIN — FAMOTIDINE 40 MG: 40 TABLET, FILM COATED ORAL at 07:41

## 2021-01-01 RX ADMIN — MELATONIN 3 MG: 3 TAB ORAL at 21:04

## 2021-01-01 RX ADMIN — METHYLPREDNISOLONE SODIUM SUCCINATE 40 MG: 40 INJECTION, POWDER, FOR SOLUTION INTRAMUSCULAR; INTRAVENOUS at 16:49

## 2021-01-01 RX ADMIN — Medication 1 TABLET: at 08:06

## 2021-01-01 RX ADMIN — ISOSORBIDE MONONITRATE 60 MG: 60 TABLET, EXTENDED RELEASE ORAL at 01:21

## 2021-01-01 RX ADMIN — SERTRALINE 100 MG: 50 TABLET, FILM COATED ORAL at 08:09

## 2021-01-01 RX ADMIN — MORPHINE SULFATE 2 MG: 2 INJECTION, SOLUTION INTRAMUSCULAR; INTRAVENOUS at 12:11

## 2021-01-01 RX ADMIN — HYDROCODONE BITARTRATE AND ACETAMINOPHEN 1 TABLET: 7.5; 325 TABLET ORAL at 17:17

## 2021-01-01 RX ADMIN — IPRATROPIUM BROMIDE AND ALBUTEROL SULFATE 3 ML: 2.5; .5 SOLUTION RESPIRATORY (INHALATION) at 06:18

## 2021-01-01 RX ADMIN — SODIUM BICARBONATE 650 MG: 650 TABLET ORAL at 21:06

## 2021-01-01 RX ADMIN — RANOLAZINE 500 MG: 500 TABLET, FILM COATED, EXTENDED RELEASE ORAL at 20:33

## 2021-01-01 RX ADMIN — RANOLAZINE 500 MG: 500 TABLET, FILM COATED, EXTENDED RELEASE ORAL at 07:41

## 2021-01-01 RX ADMIN — IPRATROPIUM BROMIDE AND ALBUTEROL SULFATE 3 ML: 2.5; .5 SOLUTION RESPIRATORY (INHALATION) at 15:33

## 2021-01-01 RX ADMIN — RANOLAZINE 500 MG: 500 TABLET, FILM COATED, EXTENDED RELEASE ORAL at 20:02

## 2021-01-01 RX ADMIN — DILTIAZEM HYDROCHLORIDE 60 MG: 60 TABLET, FILM COATED ORAL at 16:08

## 2021-01-01 RX ADMIN — NICOTINE 1 PATCH: 21 PATCH, EXTENDED RELEASE TRANSDERMAL at 09:05

## 2021-01-01 RX ADMIN — Medication 1 TABLET: at 10:58

## 2021-01-01 RX ADMIN — ENOXAPARIN SODIUM 40 MG: 40 INJECTION SUBCUTANEOUS at 10:58

## 2021-01-01 RX ADMIN — FAMOTIDINE 40 MG: 40 TABLET, FILM COATED ORAL at 09:05

## 2021-01-01 RX ADMIN — METHYLPREDNISOLONE SODIUM SUCCINATE 40 MG: 40 INJECTION, POWDER, FOR SOLUTION INTRAMUSCULAR; INTRAVENOUS at 08:36

## 2021-01-01 RX ADMIN — MORPHINE SULFATE 1 MG: 2 INJECTION, SOLUTION INTRAMUSCULAR; INTRAVENOUS at 14:54

## 2021-01-01 RX ADMIN — HYDROCODONE BITARTRATE AND ACETAMINOPHEN 1 TABLET: 7.5; 325 TABLET ORAL at 17:33

## 2021-01-01 RX ADMIN — DILTIAZEM HYDROCHLORIDE 60 MG: 60 TABLET, FILM COATED ORAL at 21:00

## 2021-01-01 RX ADMIN — IPRATROPIUM BROMIDE AND ALBUTEROL SULFATE 3 ML: 2.5; .5 SOLUTION RESPIRATORY (INHALATION) at 10:30

## 2021-01-01 RX ADMIN — BUDESONIDE 0.5 MG: 0.5 INHALANT RESPIRATORY (INHALATION) at 06:28

## 2021-01-01 RX ADMIN — SERTRALINE 100 MG: 50 TABLET, FILM COATED ORAL at 08:48

## 2021-01-01 RX ADMIN — LORAZEPAM 1 MG: 2 INJECTION INTRAMUSCULAR; INTRAVENOUS at 16:19

## 2021-01-01 RX ADMIN — RANOLAZINE 500 MG: 500 TABLET, FILM COATED, EXTENDED RELEASE ORAL at 08:37

## 2021-01-01 RX ADMIN — RANOLAZINE 500 MG: 500 TABLET, FILM COATED, EXTENDED RELEASE ORAL at 21:09

## 2021-01-01 RX ADMIN — BUDESONIDE 0.5 MG: 0.5 INHALANT RESPIRATORY (INHALATION) at 07:46

## 2021-01-01 RX ADMIN — LEVOTHYROXINE SODIUM 50 MCG: 50 TABLET ORAL at 06:08

## 2021-01-01 RX ADMIN — LEVOTHYROXINE SODIUM 50 MCG: 50 TABLET ORAL at 06:24

## 2021-01-01 RX ADMIN — IPRATROPIUM BROMIDE AND ALBUTEROL SULFATE 3 ML: 2.5; .5 SOLUTION RESPIRATORY (INHALATION) at 14:26

## 2021-01-01 RX ADMIN — CEFTRIAXONE SODIUM 1 G: 1 INJECTION, POWDER, FOR SOLUTION INTRAMUSCULAR; INTRAVENOUS at 02:07

## 2021-01-01 RX ADMIN — FAMOTIDINE 40 MG: 40 TABLET, FILM COATED ORAL at 08:37

## 2021-01-01 RX ADMIN — HYDRALAZINE HYDROCHLORIDE 10 MG: 20 INJECTION INTRAMUSCULAR; INTRAVENOUS at 05:10

## 2021-01-01 RX ADMIN — LORAZEPAM 0.5 MG: 2 INJECTION INTRAMUSCULAR; INTRAVENOUS at 09:09

## 2021-01-01 RX ADMIN — IPRATROPIUM BROMIDE AND ALBUTEROL SULFATE 3 ML: 2.5; .5 SOLUTION RESPIRATORY (INHALATION) at 02:34

## 2021-01-01 RX ADMIN — MORPHINE SULFATE 1 MG: 2 INJECTION, SOLUTION INTRAMUSCULAR; INTRAVENOUS at 14:49

## 2021-01-01 RX ADMIN — IPRATROPIUM BROMIDE AND ALBUTEROL SULFATE 3 ML: 2.5; .5 SOLUTION RESPIRATORY (INHALATION) at 06:53

## 2021-01-01 RX ADMIN — MORPHINE SULFATE 1 MG: 2 INJECTION, SOLUTION INTRAMUSCULAR; INTRAVENOUS at 11:26

## 2021-01-01 RX ADMIN — IPRATROPIUM BROMIDE AND ALBUTEROL SULFATE 3 ML: 2.5; .5 SOLUTION RESPIRATORY (INHALATION) at 11:14

## 2021-01-01 RX ADMIN — IPRATROPIUM BROMIDE AND ALBUTEROL SULFATE 3 ML: 2.5; .5 SOLUTION RESPIRATORY (INHALATION) at 23:19

## 2021-01-01 RX ADMIN — IPRATROPIUM BROMIDE AND ALBUTEROL SULFATE 3 ML: 2.5; .5 SOLUTION RESPIRATORY (INHALATION) at 12:59

## 2021-01-01 RX ADMIN — PANTOPRAZOLE SODIUM 40 MG: 40 TABLET, DELAYED RELEASE ORAL at 09:23

## 2021-01-01 RX ADMIN — DILTIAZEM HYDROCHLORIDE 60 MG: 60 TABLET, FILM COATED ORAL at 05:02

## 2021-01-01 RX ADMIN — ASPIRIN 325 MG: 325 TABLET ORAL at 23:32

## 2021-01-01 RX ADMIN — IPRATROPIUM BROMIDE AND ALBUTEROL SULFATE 3 ML: 2.5; .5 SOLUTION RESPIRATORY (INHALATION) at 19:30

## 2021-01-01 RX ADMIN — LORAZEPAM 0.5 MG: 2 INJECTION INTRAMUSCULAR; INTRAVENOUS at 00:14

## 2021-01-01 RX ADMIN — CARVEDILOL 12.5 MG: 12.5 TABLET, FILM COATED ORAL at 09:58

## 2021-01-01 RX ADMIN — HYDROCODONE BITARTRATE AND ACETAMINOPHEN 1 TABLET: 7.5; 325 TABLET ORAL at 08:09

## 2021-01-01 RX ADMIN — HYDRALAZINE HYDROCHLORIDE 10 MG: 20 INJECTION INTRAMUSCULAR; INTRAVENOUS at 20:19

## 2021-01-01 RX ADMIN — DEXAMETHASONE SODIUM PHOSPHATE 6 MG: 4 INJECTION, SOLUTION INTRAMUSCULAR; INTRAVENOUS at 23:14

## 2021-01-01 RX ADMIN — ACETAMINOPHEN 650 MG: 325 TABLET, FILM COATED ORAL at 22:38

## 2021-01-01 RX ADMIN — FAMOTIDINE 40 MG: 40 TABLET, FILM COATED ORAL at 08:48

## 2021-01-01 RX ADMIN — ASPIRIN 81 MG: 81 TABLET, CHEWABLE ORAL at 09:05

## 2021-01-01 RX ADMIN — BUDESONIDE 0.5 MG: 0.5 INHALANT RESPIRATORY (INHALATION) at 07:10

## 2021-01-01 RX ADMIN — SERTRALINE 100 MG: 50 TABLET, FILM COATED ORAL at 09:00

## 2021-01-01 RX ADMIN — HEPARIN SODIUM 1900 UNITS: 5000 INJECTION, SOLUTION INTRAVENOUS; SUBCUTANEOUS at 03:21

## 2021-01-01 RX ADMIN — MORPHINE SULFATE 4 MG: 4 INJECTION INTRAVENOUS at 14:26

## 2021-01-01 RX ADMIN — MORPHINE SULFATE 1 MG: 2 INJECTION, SOLUTION INTRAMUSCULAR; INTRAVENOUS at 16:34

## 2021-01-01 RX ADMIN — IPRATROPIUM BROMIDE AND ALBUTEROL SULFATE 3 ML: 2.5; .5 SOLUTION RESPIRATORY (INHALATION) at 10:46

## 2021-01-01 RX ADMIN — HYDRALAZINE HYDROCHLORIDE 10 MG: 20 INJECTION INTRAMUSCULAR; INTRAVENOUS at 00:17

## 2021-01-01 RX ADMIN — IPRATROPIUM BROMIDE AND ALBUTEROL SULFATE 3 ML: 2.5; .5 SOLUTION RESPIRATORY (INHALATION) at 03:54

## 2021-01-01 RX ADMIN — METHYLPREDNISOLONE SODIUM SUCCINATE 40 MG: 40 INJECTION, POWDER, FOR SOLUTION INTRAMUSCULAR; INTRAVENOUS at 01:17

## 2021-01-01 RX ADMIN — RANOLAZINE 500 MG: 500 TABLET, FILM COATED, EXTENDED RELEASE ORAL at 20:59

## 2021-01-01 RX ADMIN — IPRATROPIUM BROMIDE AND ALBUTEROL SULFATE 3 ML: 2.5; .5 SOLUTION RESPIRATORY (INHALATION) at 08:42

## 2021-01-01 RX ADMIN — DEXTROSE AND SODIUM CHLORIDE 60 ML/HR: 5; 900 INJECTION, SOLUTION INTRAVENOUS at 20:19

## 2021-01-01 RX ADMIN — IPRATROPIUM BROMIDE AND ALBUTEROL SULFATE 3 ML: 2.5; .5 SOLUTION RESPIRATORY (INHALATION) at 11:24

## 2021-01-01 RX ADMIN — IPRATROPIUM BROMIDE AND ALBUTEROL SULFATE 3 ML: 2.5; .5 SOLUTION RESPIRATORY (INHALATION) at 02:01

## 2021-01-01 RX ADMIN — IPRATROPIUM BROMIDE AND ALBUTEROL SULFATE 3 ML: 2.5; .5 SOLUTION RESPIRATORY (INHALATION) at 00:26

## 2021-01-01 RX ADMIN — METHYLPREDNISOLONE SODIUM SUCCINATE 40 MG: 40 INJECTION, POWDER, FOR SOLUTION INTRAMUSCULAR; INTRAVENOUS at 00:41

## 2021-01-01 RX ADMIN — IPRATROPIUM BROMIDE AND ALBUTEROL SULFATE 3 ML: 2.5; .5 SOLUTION RESPIRATORY (INHALATION) at 16:02

## 2021-01-01 RX ADMIN — MORPHINE SULFATE 1 MG: 2 INJECTION, SOLUTION INTRAMUSCULAR; INTRAVENOUS at 19:06

## 2021-01-01 RX ADMIN — LEVOTHYROXINE SODIUM 50 MCG: 50 TABLET ORAL at 06:09

## 2021-01-01 RX ADMIN — CARVEDILOL 12.5 MG: 12.5 TABLET, FILM COATED ORAL at 08:41

## 2021-01-01 RX ADMIN — RANOLAZINE 500 MG: 500 TABLET, FILM COATED, EXTENDED RELEASE ORAL at 09:02

## 2021-01-01 RX ADMIN — IPRATROPIUM BROMIDE AND ALBUTEROL SULFATE 3 ML: 2.5; .5 SOLUTION RESPIRATORY (INHALATION) at 11:22

## 2021-01-01 RX ADMIN — LOSARTAN POTASSIUM 100 MG: 50 TABLET, FILM COATED ORAL at 01:21

## 2021-01-01 RX ADMIN — RANOLAZINE 500 MG: 500 TABLET, FILM COATED, EXTENDED RELEASE ORAL at 21:07

## 2021-01-01 RX ADMIN — Medication 1 TABLET: at 08:48

## 2021-01-01 RX ADMIN — IPRATROPIUM BROMIDE AND ALBUTEROL SULFATE 3 ML: 2.5; .5 SOLUTION RESPIRATORY (INHALATION) at 06:09

## 2021-01-01 RX ADMIN — METHYLPREDNISOLONE SODIUM SUCCINATE 80 MG: 125 INJECTION, POWDER, FOR SOLUTION INTRAMUSCULAR; INTRAVENOUS at 16:08

## 2021-01-01 RX ADMIN — MONTELUKAST 10 MG: 10 TABLET, FILM COATED ORAL at 21:07

## 2021-01-01 RX ADMIN — ISOSORBIDE MONONITRATE 60 MG: 60 TABLET, EXTENDED RELEASE ORAL at 08:41

## 2021-01-01 RX ADMIN — BUDESONIDE 0.5 MG: 0.5 INHALANT RESPIRATORY (INHALATION) at 07:14

## 2021-01-01 RX ADMIN — CEFTRIAXONE SODIUM 1 G: 1 INJECTION, POWDER, FOR SOLUTION INTRAMUSCULAR; INTRAVENOUS at 02:55

## 2021-01-01 RX ADMIN — LEVOTHYROXINE SODIUM 50 MCG: 50 TABLET ORAL at 05:56

## 2021-01-01 RX ADMIN — IPRATROPIUM BROMIDE AND ALBUTEROL SULFATE 3 ML: 2.5; .5 SOLUTION RESPIRATORY (INHALATION) at 22:50

## 2021-01-01 RX ADMIN — IPRATROPIUM BROMIDE AND ALBUTEROL SULFATE 3 ML: 2.5; .5 SOLUTION RESPIRATORY (INHALATION) at 19:48

## 2021-01-01 RX ADMIN — FAMOTIDINE 40 MG: 40 TABLET, FILM COATED ORAL at 09:58

## 2021-01-01 RX ADMIN — CARVEDILOL 12.5 MG: 12.5 TABLET, FILM COATED ORAL at 17:17

## 2021-01-01 RX ADMIN — IPRATROPIUM BROMIDE AND ALBUTEROL SULFATE 3 ML: 2.5; .5 SOLUTION RESPIRATORY (INHALATION) at 07:10

## 2021-01-01 RX ADMIN — LORAZEPAM 0.5 MG: 2 INJECTION INTRAMUSCULAR; INTRAVENOUS at 12:57

## 2021-01-01 RX ADMIN — DILTIAZEM HYDROCHLORIDE 60 MG: 60 TABLET, FILM COATED ORAL at 13:58

## 2021-01-01 RX ADMIN — IPRATROPIUM BROMIDE AND ALBUTEROL SULFATE 3 ML: 2.5; .5 SOLUTION RESPIRATORY (INHALATION) at 02:18

## 2021-01-01 RX ADMIN — MORPHINE SULFATE 2 MG: 2 INJECTION, SOLUTION INTRAMUSCULAR; INTRAVENOUS at 08:09

## 2021-01-01 RX ADMIN — IPRATROPIUM BROMIDE AND ALBUTEROL SULFATE 3 ML: 2.5; .5 SOLUTION RESPIRATORY (INHALATION) at 14:57

## 2021-01-01 RX ADMIN — IPRATROPIUM BROMIDE AND ALBUTEROL SULFATE 3 ML: 2.5; .5 SOLUTION RESPIRATORY (INHALATION) at 06:47

## 2021-01-01 RX ADMIN — BUDESONIDE 0.5 MG: 0.5 INHALANT RESPIRATORY (INHALATION) at 06:47

## 2021-01-01 RX ADMIN — CARVEDILOL 12.5 MG: 12.5 TABLET, FILM COATED ORAL at 18:04

## 2021-01-01 RX ADMIN — RANOLAZINE 500 MG: 500 TABLET, FILM COATED, EXTENDED RELEASE ORAL at 21:14

## 2021-01-01 RX ADMIN — IPRATROPIUM BROMIDE AND ALBUTEROL SULFATE 3 ML: 2.5; .5 SOLUTION RESPIRATORY (INHALATION) at 11:02

## 2021-01-01 RX ADMIN — FAMOTIDINE 40 MG: 40 TABLET, FILM COATED ORAL at 09:00

## 2021-01-01 RX ADMIN — CARVEDILOL 12.5 MG: 12.5 TABLET, FILM COATED ORAL at 01:21

## 2021-01-01 RX ADMIN — DILTIAZEM HYDROCHLORIDE 60 MG: 60 TABLET, FILM COATED ORAL at 13:46

## 2021-01-01 RX ADMIN — METHYLPREDNISOLONE SODIUM SUCCINATE 40 MG: 40 INJECTION, POWDER, FOR SOLUTION INTRAMUSCULAR; INTRAVENOUS at 00:14

## 2021-01-01 RX ADMIN — SERTRALINE 100 MG: 50 TABLET, FILM COATED ORAL at 09:30

## 2021-01-01 RX ADMIN — SODIUM CHLORIDE, PRESERVATIVE FREE 10 ML: 5 INJECTION INTRAVENOUS at 20:49

## 2021-01-01 RX ADMIN — MONTELUKAST 10 MG: 10 TABLET, FILM COATED ORAL at 20:48

## 2021-01-01 RX ADMIN — DEXTROSE AND SODIUM CHLORIDE 60 ML/HR: 5; 900 INJECTION, SOLUTION INTRAVENOUS at 04:24

## 2021-01-01 RX ADMIN — LORAZEPAM 0.5 MG: 2 INJECTION INTRAMUSCULAR; INTRAVENOUS at 17:05

## 2021-01-01 RX ADMIN — METHYLPREDNISOLONE SODIUM SUCCINATE 60 MG: 125 INJECTION, POWDER, FOR SOLUTION INTRAMUSCULAR; INTRAVENOUS at 07:41

## 2021-01-01 RX ADMIN — MORPHINE SULFATE 2 MG: 2 INJECTION, SOLUTION INTRAMUSCULAR; INTRAVENOUS at 08:50

## 2021-01-01 RX ADMIN — IPRATROPIUM BROMIDE AND ALBUTEROL SULFATE 3 ML: 2.5; .5 SOLUTION RESPIRATORY (INHALATION) at 19:06

## 2021-01-01 RX ADMIN — HYDRALAZINE HYDROCHLORIDE 10 MG: 20 INJECTION INTRAMUSCULAR; INTRAVENOUS at 09:59

## 2021-01-01 RX ADMIN — DILTIAZEM HYDROCHLORIDE 60 MG: 60 TABLET, FILM COATED ORAL at 21:07

## 2021-01-01 RX ADMIN — ISOSORBIDE MONONITRATE 60 MG: 60 TABLET, EXTENDED RELEASE ORAL at 09:04

## 2021-01-01 RX ADMIN — MONTELUKAST 10 MG: 10 TABLET, FILM COATED ORAL at 20:59

## 2021-01-01 RX ADMIN — Medication 1 TABLET: at 09:19

## 2021-01-01 RX ADMIN — LABETALOL HYDROCHLORIDE 10 MG: 5 INJECTION, SOLUTION INTRAVENOUS at 21:24

## 2021-01-01 RX ADMIN — METHYLPREDNISOLONE SODIUM SUCCINATE 40 MG: 40 INJECTION, POWDER, FOR SOLUTION INTRAMUSCULAR; INTRAVENOUS at 23:56

## 2021-01-01 RX ADMIN — IPRATROPIUM BROMIDE AND ALBUTEROL SULFATE 3 ML: 2.5; .5 SOLUTION RESPIRATORY (INHALATION) at 19:10

## 2021-01-01 RX ADMIN — LOSARTAN POTASSIUM 100 MG: 50 TABLET, FILM COATED ORAL at 07:41

## 2021-01-01 RX ADMIN — IPRATROPIUM BROMIDE AND ALBUTEROL SULFATE 3 ML: 2.5; .5 SOLUTION RESPIRATORY (INHALATION) at 16:04

## 2021-01-01 RX ADMIN — MONTELUKAST 10 MG: 10 TABLET, FILM COATED ORAL at 21:09

## 2021-01-01 RX ADMIN — RANOLAZINE 500 MG: 500 TABLET, FILM COATED, EXTENDED RELEASE ORAL at 08:41

## 2021-01-01 RX ADMIN — IPRATROPIUM BROMIDE AND ALBUTEROL SULFATE 3 ML: 2.5; .5 SOLUTION RESPIRATORY (INHALATION) at 15:47

## 2021-01-01 RX ADMIN — MORPHINE SULFATE 2 MG: 2 INJECTION, SOLUTION INTRAMUSCULAR; INTRAVENOUS at 08:29

## 2021-01-01 RX ADMIN — MORPHINE SULFATE 1 MG: 2 INJECTION, SOLUTION INTRAMUSCULAR; INTRAVENOUS at 20:16

## 2021-01-01 RX ADMIN — IPRATROPIUM BROMIDE AND ALBUTEROL SULFATE 3 ML: 2.5; .5 SOLUTION RESPIRATORY (INHALATION) at 18:28

## 2021-01-01 RX ADMIN — MORPHINE SULFATE 2 MG: 2 INJECTION, SOLUTION INTRAMUSCULAR; INTRAVENOUS at 21:04

## 2021-01-01 RX ADMIN — SODIUM CHLORIDE 1000 ML: 9 INJECTION, SOLUTION INTRAVENOUS at 02:46

## 2021-01-01 RX ADMIN — IPRATROPIUM BROMIDE AND ALBUTEROL SULFATE 3 ML: 2.5; .5 SOLUTION RESPIRATORY (INHALATION) at 01:30

## 2021-01-01 RX ADMIN — IPRATROPIUM BROMIDE AND ALBUTEROL SULFATE 3 ML: 2.5; .5 SOLUTION RESPIRATORY (INHALATION) at 15:01

## 2021-01-01 RX ADMIN — RANOLAZINE 500 MG: 500 TABLET, FILM COATED, EXTENDED RELEASE ORAL at 08:09

## 2021-01-01 RX ADMIN — IPRATROPIUM BROMIDE AND ALBUTEROL SULFATE 3 ML: 2.5; .5 SOLUTION RESPIRATORY (INHALATION) at 03:48

## 2021-01-01 RX ADMIN — MORPHINE SULFATE 2 MG: 2 INJECTION, SOLUTION INTRAMUSCULAR; INTRAVENOUS at 05:31

## 2021-01-01 RX ADMIN — DILTIAZEM HYDROCHLORIDE 60 MG: 60 TABLET, FILM COATED ORAL at 06:29

## 2021-01-01 RX ADMIN — IPRATROPIUM BROMIDE AND ALBUTEROL SULFATE 3 ML: 2.5; .5 SOLUTION RESPIRATORY (INHALATION) at 00:05

## 2021-01-01 RX ADMIN — SODIUM BICARBONATE 650 MG: 650 TABLET ORAL at 08:36

## 2021-01-01 RX ADMIN — PANTOPRAZOLE SODIUM 40 MG: 40 TABLET, DELAYED RELEASE ORAL at 06:11

## 2021-01-01 RX ADMIN — BUDESONIDE 0.5 MG: 0.5 INHALANT RESPIRATORY (INHALATION) at 07:06

## 2021-01-01 RX ADMIN — LEVOTHYROXINE SODIUM 50 MCG: 50 TABLET ORAL at 05:10

## 2021-01-01 RX ADMIN — PANTOPRAZOLE SODIUM 40 MG: 40 TABLET, DELAYED RELEASE ORAL at 05:47

## 2021-01-01 RX ADMIN — HYDROCODONE BITARTRATE AND ACETAMINOPHEN 1 TABLET: 7.5; 325 TABLET ORAL at 16:02

## 2021-01-01 RX ADMIN — SODIUM BICARBONATE 650 MG: 650 TABLET ORAL at 08:48

## 2021-01-01 RX ADMIN — CARVEDILOL 12.5 MG: 12.5 TABLET, FILM COATED ORAL at 17:19

## 2021-01-01 RX ADMIN — MORPHINE SULFATE 1 MG: 2 INJECTION, SOLUTION INTRAMUSCULAR; INTRAVENOUS at 07:55

## 2021-01-01 RX ADMIN — PREDNISONE 40 MG: 20 TABLET ORAL at 09:58

## 2021-01-01 RX ADMIN — LORAZEPAM 0.5 MG: 2 INJECTION INTRAMUSCULAR; INTRAVENOUS at 10:18

## 2021-01-01 RX ADMIN — PANTOPRAZOLE SODIUM 40 MG: 40 TABLET, DELAYED RELEASE ORAL at 06:29

## 2021-01-01 RX ADMIN — PANTOPRAZOLE SODIUM 40 MG: 40 TABLET, DELAYED RELEASE ORAL at 05:56

## 2021-01-01 RX ADMIN — LOSARTAN POTASSIUM 100 MG: 50 TABLET, FILM COATED ORAL at 08:41

## 2021-01-01 RX ADMIN — MORPHINE SULFATE 4 MG: 4 INJECTION INTRAVENOUS at 18:04

## 2021-01-01 RX ADMIN — LOSARTAN POTASSIUM 100 MG: 50 TABLET, FILM COATED ORAL at 09:05

## 2021-01-01 RX ADMIN — RANOLAZINE 500 MG: 500 TABLET, FILM COATED, EXTENDED RELEASE ORAL at 09:23

## 2021-01-01 RX ADMIN — SERTRALINE 100 MG: 50 TABLET, FILM COATED ORAL at 08:06

## 2021-01-01 RX ADMIN — MORPHINE SULFATE 1 MG: 2 INJECTION, SOLUTION INTRAMUSCULAR; INTRAVENOUS at 09:38

## 2021-01-01 RX ADMIN — IPRATROPIUM BROMIDE AND ALBUTEROL SULFATE 3 ML: 2.5; .5 SOLUTION RESPIRATORY (INHALATION) at 00:07

## 2021-01-01 RX ADMIN — FAMOTIDINE 40 MG: 40 TABLET, FILM COATED ORAL at 09:02

## 2021-01-01 RX ADMIN — ENOXAPARIN SODIUM 40 MG: 40 INJECTION SUBCUTANEOUS at 06:10

## 2021-01-01 RX ADMIN — MONTELUKAST 10 MG: 10 TABLET, FILM COATED ORAL at 21:14

## 2021-01-01 RX ADMIN — METHYLPREDNISOLONE SODIUM SUCCINATE 40 MG: 40 INJECTION, POWDER, FOR SOLUTION INTRAMUSCULAR; INTRAVENOUS at 09:05

## 2021-01-01 RX ADMIN — LORAZEPAM 0.5 MG: 2 INJECTION INTRAMUSCULAR; INTRAVENOUS at 04:09

## 2021-01-01 RX ADMIN — SODIUM CHLORIDE, PRESERVATIVE FREE 10 ML: 5 INJECTION INTRAVENOUS at 08:39

## 2021-01-01 RX ADMIN — SERTRALINE 100 MG: 50 TABLET, FILM COATED ORAL at 08:36

## 2021-01-01 RX ADMIN — IOPAMIDOL 71 ML: 755 INJECTION, SOLUTION INTRAVENOUS at 18:58

## 2021-01-01 RX ADMIN — MORPHINE SULFATE 1 MG: 2 INJECTION, SOLUTION INTRAMUSCULAR; INTRAVENOUS at 12:31

## 2021-01-01 RX ADMIN — FAMOTIDINE 40 MG: 40 TABLET, FILM COATED ORAL at 08:06

## 2021-01-01 RX ADMIN — RANOLAZINE 500 MG: 500 TABLET, FILM COATED, EXTENDED RELEASE ORAL at 09:58

## 2021-01-01 RX ADMIN — SODIUM BICARBONATE 650 MG: 650 TABLET ORAL at 07:41

## 2021-01-01 RX ADMIN — IPRATROPIUM BROMIDE AND ALBUTEROL SULFATE 3 ML: 2.5; .5 SOLUTION RESPIRATORY (INHALATION) at 23:55

## 2021-01-01 RX ADMIN — SODIUM BICARBONATE 650 MG: 650 TABLET ORAL at 21:48

## 2021-01-01 RX ADMIN — IPRATROPIUM BROMIDE AND ALBUTEROL SULFATE 3 ML: 2.5; .5 SOLUTION RESPIRATORY (INHALATION) at 15:00

## 2021-01-01 RX ADMIN — MORPHINE SULFATE 2 MG: 2 INJECTION, SOLUTION INTRAMUSCULAR; INTRAVENOUS at 13:10

## 2021-01-01 RX ADMIN — LEVOTHYROXINE SODIUM 50 MCG: 50 TABLET ORAL at 05:47

## 2021-01-01 RX ADMIN — DILTIAZEM HYDROCHLORIDE 60 MG: 60 TABLET, FILM COATED ORAL at 21:48

## 2021-01-01 RX ADMIN — ASPIRIN 81 MG: 81 TABLET, CHEWABLE ORAL at 08:48

## 2021-01-01 RX ADMIN — AZITHROMYCIN 500 MG: 500 INJECTION, POWDER, LYOPHILIZED, FOR SOLUTION INTRAVENOUS at 00:09

## 2021-01-01 RX ADMIN — IPRATROPIUM BROMIDE AND ALBUTEROL SULFATE 3 ML: 2.5; .5 SOLUTION RESPIRATORY (INHALATION) at 07:06

## 2021-01-01 RX ADMIN — IPRATROPIUM BROMIDE AND ALBUTEROL SULFATE 3 ML: 2.5; .5 SOLUTION RESPIRATORY (INHALATION) at 19:23

## 2021-01-01 RX ADMIN — LEVOTHYROXINE SODIUM 50 MCG: 50 TABLET ORAL at 05:31

## 2021-01-01 RX ADMIN — PANTOPRAZOLE SODIUM 40 MG: 40 TABLET, DELAYED RELEASE ORAL at 01:21

## 2021-01-01 RX ADMIN — SERTRALINE 100 MG: 50 TABLET, FILM COATED ORAL at 07:41

## 2021-01-01 RX ADMIN — IPRATROPIUM BROMIDE AND ALBUTEROL SULFATE 3 ML: 2.5; .5 SOLUTION RESPIRATORY (INHALATION) at 19:20

## 2021-01-01 RX ADMIN — ASPIRIN 81 MG: 81 TABLET, CHEWABLE ORAL at 07:41

## 2021-01-01 RX ADMIN — SERTRALINE 100 MG: 50 TABLET, FILM COATED ORAL at 09:02

## 2021-01-01 RX ADMIN — Medication 1 TABLET: at 09:02

## 2021-01-01 RX ADMIN — RANOLAZINE 500 MG: 500 TABLET, FILM COATED, EXTENDED RELEASE ORAL at 21:48

## 2021-01-01 RX ADMIN — MORPHINE SULFATE 1 MG: 2 INJECTION, SOLUTION INTRAMUSCULAR; INTRAVENOUS at 21:09

## 2021-01-01 RX ADMIN — MORPHINE SULFATE 1 MG: 2 INJECTION, SOLUTION INTRAMUSCULAR; INTRAVENOUS at 06:47

## 2021-01-01 RX ADMIN — HEPARIN SODIUM 16 UNITS/KG/HR: 10000 INJECTION, SOLUTION INTRAVENOUS at 00:29

## 2021-01-01 RX ADMIN — DILTIAZEM HYDROCHLORIDE 60 MG: 60 TABLET, FILM COATED ORAL at 06:32

## 2021-01-01 RX ADMIN — IPRATROPIUM BROMIDE AND ALBUTEROL SULFATE 3 ML: 2.5; .5 SOLUTION RESPIRATORY (INHALATION) at 19:29

## 2021-01-01 RX ADMIN — RANOLAZINE 500 MG: 500 TABLET, FILM COATED, EXTENDED RELEASE ORAL at 09:00

## 2021-01-01 RX ADMIN — LEVOTHYROXINE SODIUM 50 MCG: 50 TABLET ORAL at 05:02

## 2021-01-01 RX ADMIN — MORPHINE SULFATE 2 MG: 2 INJECTION, SOLUTION INTRAMUSCULAR; INTRAVENOUS at 20:06

## 2021-01-01 RX ADMIN — SODIUM BICARBONATE 650 MG: 650 TABLET ORAL at 09:09

## 2021-01-01 RX ADMIN — HYDROCODONE BITARTRATE AND ACETAMINOPHEN 1 TABLET: 7.5; 325 TABLET ORAL at 09:23

## 2021-01-01 RX ADMIN — CARVEDILOL 12.5 MG: 12.5 TABLET, FILM COATED ORAL at 08:48

## 2021-01-01 RX ADMIN — METHYLPREDNISOLONE SODIUM SUCCINATE 40 MG: 40 INJECTION, POWDER, FOR SOLUTION INTRAMUSCULAR; INTRAVENOUS at 18:04

## 2021-01-01 RX ADMIN — PANTOPRAZOLE SODIUM 40 MG: 40 TABLET, DELAYED RELEASE ORAL at 06:09

## 2021-01-01 RX ADMIN — IPRATROPIUM BROMIDE AND ALBUTEROL SULFATE 3 ML: 2.5; .5 SOLUTION RESPIRATORY (INHALATION) at 06:58

## 2021-01-01 RX ADMIN — CARVEDILOL 12.5 MG: 12.5 TABLET, FILM COATED ORAL at 17:39

## 2021-01-01 RX ADMIN — RANOLAZINE 500 MG: 500 TABLET, FILM COATED, EXTENDED RELEASE ORAL at 20:48

## 2021-01-01 RX ADMIN — PANTOPRAZOLE SODIUM 40 MG: 40 TABLET, DELAYED RELEASE ORAL at 06:05

## 2021-01-01 RX ADMIN — IPRATROPIUM BROMIDE AND ALBUTEROL SULFATE 3 ML: 2.5; .5 SOLUTION RESPIRATORY (INHALATION) at 08:45

## 2021-01-01 RX ADMIN — SODIUM CHLORIDE, PRESERVATIVE FREE 10 ML: 5 INJECTION INTRAVENOUS at 20:07

## 2021-01-01 RX ADMIN — TRAMADOL HYDROCHLORIDE 50 MG: 50 TABLET, FILM COATED ORAL at 00:13

## 2021-01-01 RX ADMIN — MORPHINE SULFATE 1 MG: 2 INJECTION, SOLUTION INTRAMUSCULAR; INTRAVENOUS at 08:06

## 2021-01-01 RX ADMIN — BUDESONIDE 0.5 MG: 0.5 INHALANT RESPIRATORY (INHALATION) at 15:04

## 2021-01-01 RX ADMIN — METHYLPREDNISOLONE SODIUM SUCCINATE 40 MG: 40 INJECTION, POWDER, FOR SOLUTION INTRAMUSCULAR; INTRAVENOUS at 09:02

## 2021-01-01 RX ADMIN — Medication 1 TABLET: at 08:09

## 2021-01-01 RX ADMIN — IPRATROPIUM BROMIDE AND ALBUTEROL SULFATE 3 ML: 2.5; .5 SOLUTION RESPIRATORY (INHALATION) at 07:45

## 2021-01-01 RX ADMIN — LEVOTHYROXINE SODIUM 50 MCG: 50 TABLET ORAL at 08:43

## 2021-01-01 RX ADMIN — BUDESONIDE 0.5 MG: 0.5 INHALANT RESPIRATORY (INHALATION) at 06:53

## 2021-01-01 RX ADMIN — SODIUM BICARBONATE 650 MG: 650 TABLET ORAL at 21:07

## 2021-01-01 RX ADMIN — CARVEDILOL 12.5 MG: 12.5 TABLET, FILM COATED ORAL at 09:05

## 2021-01-01 RX ADMIN — MORPHINE SULFATE 2 MG: 2 INJECTION, SOLUTION INTRAMUSCULAR; INTRAVENOUS at 22:58

## 2021-01-01 RX ADMIN — ISOSORBIDE MONONITRATE 60 MG: 60 TABLET, EXTENDED RELEASE ORAL at 07:41

## 2021-01-01 RX ADMIN — IPRATROPIUM BROMIDE AND ALBUTEROL SULFATE 3 ML: 2.5; .5 SOLUTION RESPIRATORY (INHALATION) at 11:29

## 2021-01-01 RX ADMIN — ISOSORBIDE MONONITRATE 60 MG: 60 TABLET, EXTENDED RELEASE ORAL at 09:05

## 2021-01-01 RX ADMIN — IPRATROPIUM BROMIDE AND ALBUTEROL SULFATE 3 ML: 2.5; .5 SOLUTION RESPIRATORY (INHALATION) at 15:02

## 2021-01-01 RX ADMIN — CARVEDILOL 12.5 MG: 12.5 TABLET, FILM COATED ORAL at 10:58

## 2021-01-01 RX ADMIN — METHYLPREDNISOLONE SODIUM SUCCINATE 40 MG: 40 INJECTION, POWDER, FOR SOLUTION INTRAMUSCULAR; INTRAVENOUS at 12:56

## 2021-01-01 RX ADMIN — IPRATROPIUM BROMIDE AND ALBUTEROL SULFATE 3 ML: 2.5; .5 SOLUTION RESPIRATORY (INHALATION) at 15:04

## 2021-01-01 RX ADMIN — DILTIAZEM HYDROCHLORIDE 60 MG: 60 TABLET, FILM COATED ORAL at 22:49

## 2021-01-01 RX ADMIN — FAMOTIDINE 40 MG: 40 TABLET, FILM COATED ORAL at 09:19

## 2021-01-01 RX ADMIN — AZITHROMYCIN 500 MG: 500 INJECTION, POWDER, LYOPHILIZED, FOR SOLUTION INTRAVENOUS at 23:57

## 2021-01-01 RX ADMIN — LORAZEPAM 0.5 MG: 2 INJECTION INTRAMUSCULAR; INTRAVENOUS at 05:24

## 2021-01-01 RX ADMIN — METHYLPREDNISOLONE SODIUM SUCCINATE 40 MG: 40 INJECTION, POWDER, FOR SOLUTION INTRAMUSCULAR; INTRAVENOUS at 17:03

## 2021-01-01 RX ADMIN — METHYLPREDNISOLONE SODIUM SUCCINATE 80 MG: 125 INJECTION, POWDER, FOR SOLUTION INTRAMUSCULAR; INTRAVENOUS at 08:42

## 2021-01-01 RX ADMIN — METHYLPREDNISOLONE SODIUM SUCCINATE 40 MG: 40 INJECTION, POWDER, FOR SOLUTION INTRAMUSCULAR; INTRAVENOUS at 00:05

## 2021-01-01 RX ADMIN — HYDRALAZINE HYDROCHLORIDE 10 MG: 20 INJECTION INTRAMUSCULAR; INTRAVENOUS at 09:09

## 2021-01-01 RX ADMIN — BUDESONIDE 0.5 MG: 0.5 INHALANT RESPIRATORY (INHALATION) at 06:18

## 2021-01-01 RX ADMIN — MORPHINE SULFATE 2 MG: 2 INJECTION, SOLUTION INTRAMUSCULAR; INTRAVENOUS at 14:11

## 2021-01-01 RX ADMIN — HYDRALAZINE HYDROCHLORIDE 10 MG: 20 INJECTION INTRAMUSCULAR; INTRAVENOUS at 03:21

## 2021-01-01 RX ADMIN — RANOLAZINE 500 MG: 500 TABLET, FILM COATED, EXTENDED RELEASE ORAL at 21:04

## 2021-01-01 RX ADMIN — CARVEDILOL 12.5 MG: 12.5 TABLET, FILM COATED ORAL at 07:41

## 2021-01-01 RX ADMIN — FAMOTIDINE 40 MG: 40 TABLET, FILM COATED ORAL at 08:41

## 2021-01-01 RX ADMIN — IPRATROPIUM BROMIDE AND ALBUTEROL SULFATE 3 ML: 2.5; .5 SOLUTION RESPIRATORY (INHALATION) at 09:12

## 2021-01-01 RX ADMIN — MONTELUKAST 10 MG: 10 TABLET, FILM COATED ORAL at 20:02

## 2021-01-01 RX ADMIN — METHYLPREDNISOLONE SODIUM SUCCINATE 40 MG: 40 INJECTION, POWDER, FOR SOLUTION INTRAMUSCULAR; INTRAVENOUS at 17:33

## 2021-01-01 RX ADMIN — BUDESONIDE 0.5 MG: 0.5 INHALANT RESPIRATORY (INHALATION) at 09:19

## 2021-01-01 RX ADMIN — Medication 1 TABLET: at 09:05

## 2021-01-01 RX ADMIN — MONTELUKAST 10 MG: 10 TABLET, FILM COATED ORAL at 22:49

## 2021-01-01 RX ADMIN — Medication 10 ML: at 15:14

## 2021-01-01 RX ADMIN — Medication 1 TABLET: at 09:23

## 2021-01-01 RX ADMIN — METHYLPREDNISOLONE SODIUM SUCCINATE 40 MG: 40 INJECTION, POWDER, FOR SOLUTION INTRAMUSCULAR; INTRAVENOUS at 09:27

## 2021-01-01 RX ADMIN — PANTOPRAZOLE SODIUM 40 MG: 40 TABLET, DELAYED RELEASE ORAL at 06:32

## 2021-01-01 RX ADMIN — BUDESONIDE 0.5 MG: 0.5 INHALANT RESPIRATORY (INHALATION) at 06:58

## 2021-01-01 RX ADMIN — MONTELUKAST 10 MG: 10 TABLET, FILM COATED ORAL at 20:32

## 2021-01-01 RX ADMIN — LEVOTHYROXINE SODIUM 50 MCG: 50 TABLET ORAL at 06:05

## 2021-01-01 RX ADMIN — CEFTRIAXONE SODIUM 2 G: 2 INJECTION, POWDER, FOR SOLUTION INTRAMUSCULAR; INTRAVENOUS at 23:06

## 2021-01-01 RX ADMIN — SERTRALINE 100 MG: 50 TABLET, FILM COATED ORAL at 08:41

## 2021-01-01 RX ADMIN — MORPHINE SULFATE 2 MG: 2 INJECTION, SOLUTION INTRAMUSCULAR; INTRAVENOUS at 09:45

## 2021-01-01 RX ADMIN — Medication 1 TABLET: at 09:00

## 2021-01-01 RX ADMIN — HEPARIN SODIUM 18 UNITS/KG/HR: 10000 INJECTION, SOLUTION INTRAVENOUS at 20:25

## 2021-01-01 RX ADMIN — CEFTRIAXONE SODIUM 2 G: 2 INJECTION, POWDER, FOR SOLUTION INTRAMUSCULAR; INTRAVENOUS at 23:32

## 2021-01-01 RX ADMIN — IPRATROPIUM BROMIDE AND ALBUTEROL SULFATE 3 ML: 2.5; .5 SOLUTION RESPIRATORY (INHALATION) at 03:18

## 2021-01-01 RX ADMIN — SODIUM CHLORIDE, PRESERVATIVE FREE 10 ML: 5 INJECTION INTRAVENOUS at 08:36

## 2021-01-01 RX ADMIN — FAMOTIDINE 40 MG: 40 TABLET, FILM COATED ORAL at 09:23

## 2021-01-01 RX ADMIN — MONTELUKAST 10 MG: 10 TABLET, FILM COATED ORAL at 21:04

## 2021-01-01 RX ADMIN — MORPHINE SULFATE 2 MG: 2 INJECTION, SOLUTION INTRAMUSCULAR; INTRAVENOUS at 16:02

## 2021-01-01 RX ADMIN — LOSARTAN POTASSIUM 100 MG: 50 TABLET, FILM COATED ORAL at 08:48

## 2021-01-01 RX ADMIN — METHYLPREDNISOLONE SODIUM SUCCINATE 60 MG: 125 INJECTION, POWDER, FOR SOLUTION INTRAMUSCULAR; INTRAVENOUS at 01:23

## 2021-01-01 RX ADMIN — MONTELUKAST 10 MG: 10 TABLET, FILM COATED ORAL at 21:48

## 2021-01-01 RX ADMIN — LEVOTHYROXINE SODIUM 50 MCG: 50 TABLET ORAL at 06:29

## 2021-01-01 RX ADMIN — DILTIAZEM HYDROCHLORIDE 60 MG: 60 TABLET, FILM COATED ORAL at 15:33

## 2021-01-01 RX ADMIN — DILTIAZEM HYDROCHLORIDE 60 MG: 60 TABLET, FILM COATED ORAL at 05:56

## 2021-01-01 RX ADMIN — DILTIAZEM HYDROCHLORIDE 60 MG: 60 TABLET, FILM COATED ORAL at 01:21

## 2021-01-01 RX ADMIN — ASPIRIN 81 MG: 81 TABLET, CHEWABLE ORAL at 08:41

## 2021-01-01 RX ADMIN — MELATONIN 3 MG: 3 TAB ORAL at 22:49

## 2021-01-01 RX ADMIN — IPRATROPIUM BROMIDE AND ALBUTEROL SULFATE 3 ML: 2.5; .5 SOLUTION RESPIRATORY (INHALATION) at 07:14

## 2021-01-01 RX ADMIN — SODIUM BICARBONATE 650 MG: 650 TABLET ORAL at 08:41

## 2021-01-01 RX ADMIN — SODIUM BICARBONATE 650 MG: 650 TABLET ORAL at 20:59

## 2021-01-01 RX ADMIN — DOCUSATE SODIUM 100 MG: 100 CAPSULE, LIQUID FILLED ORAL at 15:28

## 2021-01-01 RX ADMIN — PREDNISONE 40 MG: 20 TABLET ORAL at 14:59

## 2021-01-15 NOTE — TELEPHONE ENCOUNTER
Caller: Kelly Madison ([facility])    Relationship: Emergency Contact    Best call back number: [phone]     Medication needed:   Requested Prescriptions     Pending Prescriptions Disp Refills   • sertraline (ZOLOFT) 100 MG tablet 30 tablet 1     Sig: Take 1 tablet by mouth Daily.   • atorvastatin (Lipitor) 10 MG tablet 30 tablet 1     Sig: Take 1 tablet by mouth Daily.   • losartan (Cozaar) 25 MG tablet 30 tablet 1     Sig: Take 1 tablet by mouth Daily.       When do you need the refill by: ASA[     What details did the patient provide when requesting the medication: PATIENT IS COMPLETELY OUT OF THESE MEDICATIONS     Does the patient have less than a 3 day supply:  [x] Yes  [] No    What is the patient's preferred pharmacy:    [facility], [address] [phone] [facility] [phone] [facility]

## 2021-02-05 NOTE — TELEPHONE ENCOUNTER
Per CRESENCIO Pineda, Ms. Arteaga's family has been called with new recommendations recommendations.  Continue current medications and follow-up as planned or sooner if any problems.        ----- Message from CRESENCIO Hall sent at 8/11/2020  1:21 PM CDT -----  I have sent in a referral to nephrology.  They will call her very quickly to schedule an appointment.  I have also sent in a prescription for a sodium bicarbonate tablet that she will take twice daily.  This will help improve her sodium levels which have been very low.  I want her to increase her water intake to at least 4 to 5 glasses of water per day.  ----- Message -----    From: Sosa Lopez LPN  Sent: 8/11/2020  10:25 AM CDT  To: CRESENCIO Hall    Per CRESENCIO Pineda, Ms. Arteaga's family has been called with recent lab results & recommendations.  Continue current medications and follow-up as planned or sooner if any problems.    She states she has very little urine output.  She states its usually more in the morning and less throughout the day.  She said she urinates maybe 2-3 times per day.   She said she drinks about 2-3 glasses of water a day along with her other fluids, coffee, juice and occasional soft drink  She has never seen a nephrologist.       [phone]

## 2021-02-25 PROBLEM — J44.1 COPD WITH ACUTE EXACERBATION (HCC): Chronic | Status: RESOLVED | Noted: 2019-03-20 | Resolved: 2021-01-01

## 2021-02-25 PROBLEM — J43.9 EMPHYSEMA OF LUNG (HCC): Chronic | Status: RESOLVED | Noted: 2018-01-22 | Resolved: 2021-01-01

## 2021-04-28 NOTE — PROGRESS NOTES
The ABCs of the Annual Wellness Visit  Subsequent Medicare Wellness Visit    Chief Complaint   Patient presents with   • Medicare Wellness-subsequent       Subjective   History of Present Illness:  Val Arteaga is a 88 y.o. female who presents for a Subsequent Medicare Wellness Visit.    HEALTH RISK ASSESSMENT    Recent Hospitalizations:  Recently treated at the following:  [facility]    Current Medical Providers:  Patient Care Team:  Vero Arteaga APRN as PCP - General (Nurse Practitioner)    Smoking Status:  Social History     Tobacco Use   Smoking Status Former Smoker   • Packs/day: 0.50   • Years: 50.00   • Pack years: 25.00   • Types: Cigarettes   • Start date:    • Quit date: 2017   • Years since quittin.2   Smokeless Tobacco Never Used       Alcohol Consumption:  Social History     Substance and Sexual Activity   Alcohol Use No       Depression Screen:   PHQ-2/PHQ-9 Depression Screening 2021   Little interest or pleasure in doing things 0   Feeling down, depressed, or hopeless 1   Trouble falling or staying asleep, or sleeping too much 0   Feeling tired or having little energy 2   Poor appetite or overeating 1   Feeling bad about yourself - or that you are a failure or have let yourself or your family down 0   Trouble concentrating on things, such as reading the newspaper or watching television 0   Moving or speaking so slowly that other people could have noticed. Or the opposite - being so fidgety or restless that you have been moving around a lot more than usual 0   Thoughts that you would be better off dead, or of hurting yourself in some way 0   Total Score 4   If you checked off any problems, how difficult have these problems made it for you to do your work, take care of things at home, or get along with other people? Somewhat difficult       Fall Risk Screen:  STEADI Fall Risk Assessment was completed, and patient is at HIGH risk for falls. Assessment completed  on:4/28/2021    Health Habits and Functional and Cognitive Screening:  Functional & Cognitive Status 4/28/2021   Do you have difficulty preparing food and eating? No   Do you have difficulty bathing yourself, getting dressed or grooming yourself? No   Do you have difficulty using the toilet? No   Do you have difficulty moving around from place to place? No   Do you have trouble with steps or getting out of a bed or a chair? No   Current Diet Well Balanced Diet   Dental Exam Not up to date        Dental Exam Comment -   Eye Exam Not up to date   Exercise (times per week) 0 times per week   Current Exercises Include No Regular Exercise   Current Exercise Activities Include -   Do you need help using the phone?  No   Are you deaf or do you have serious difficulty hearing?  No   Do you need help with transportation? No   Do you need help shopping? Yes   Do you need help preparing meals?  Yes   Do you need help with housework?  Yes   Do you need help with laundry? No   Do you need help taking your medications? No   Do you need help managing money? No   Do you ever drive or ride in a car without wearing a seat belt? No   Have you felt unusual stress, anger or loneliness in the last month? No   Who do you live with? Alone   If you need help, do you have trouble finding someone available to you? No   Have you been bothered in the last four weeks by sexual problems? No   Do you have difficulty concentrating, remembering or making decisions? Yes         Does the patient have evidence of cognitive impairment? No    Asprin use counseling:Start ASA 81 mg daily     Age-appropriate Screening Schedule:  Refer to the list below for future screening recommendations based on patient's age, sex and/or medical conditions. Orders for these recommended tests are listed in the plan section. The patient has been provided with a written plan.    Health Maintenance   Topic Date Due   • DXA SCAN  Never done   • ZOSTER VACCINE (2 of 2)  01/17/2017   • INFLUENZA VACCINE  08/01/2021   • LIPID PANEL  11/07/2021   • TDAP/TD VACCINES (2 - Td) 11/22/2026          The following portions of the patient's history were reviewed and updated as appropriate: allergies, current medications, past family history, past medical history, past social history, past surgical history and problem list.    Outpatient Medications Prior to Visit   Medication Sig Dispense Refill   • acetaminophen (TYLENOL) 325 MG tablet Take 2 tablets by mouth Every 4 (Four) Hours As Needed for Mild Pain . 1 tablet 1   • aspirin 81 MG chewable tablet Chew 1 tablet Daily.     • carvedilol (COREG) 6.25 MG tablet TAKE 1 TABLET BY MOUTH 2 (TWO) TIMES A DAY WITH MEALS. (Patient taking differently: Take 3.125 mg by mouth 2 (Two) Times a Day With Meals.) 180 tablet 1   • dilTIAZem (CARDIZEM) 30 MG tablet Take 30 mg by mouth Daily As Needed.     • docusate sodium (COLACE) 100 MG capsule Take 1 capsule by mouth 2 (Two) Times a Day. (Patient taking differently: Take 100 mg by mouth 2 (Two) Times a Day As Needed for Constipation.) 60 capsule 0   • esomeprazole (nexIUM) 40 MG capsule TAKE 1 CAPSULE BY MOUTH EVERY MORNING 30 capsule 3   • famotidine (PEPCID) 20 MG tablet Take 40 mg by mouth Daily.     • ferrous sulfate 325 (65 FE) MG tablet Take 325 mg by mouth Daily With Breakfast & Dinner.     • folic acid-vit B6-vit B12 (FOLBEE) 2.5-25-1 MG tablet tablet Take 1 tablet by mouth Daily. 30 tablet 5   • furosemide (LASIX) 40 MG tablet Take 40 mg by mouth Daily.     • levothyroxine (SYNTHROID, LEVOTHROID) 50 MCG tablet TAKE 1 TABLET BY MOUTH DAILY. 90 tablet 0   • losartan (COZAAR) 25 MG tablet TAKE 1 TABLET BY MOUTH DAILY. 30 tablet 0   • montelukast (SINGULAIR) 10 MG tablet TAKE 1 TABLET BY MOUTH EVERY NIGHT. 90 tablet 2   • polyethylene glycol (MIRALAX) powder MIX 1 CAPFUL (17G) IN 8 OUNCES OF LIQUID AND DRINK BY MOUTH EVERY DAY FOR CONSTIPATION (Patient taking differently: Take 17 g by mouth Daily As  Needed.) 527 g 2   • ranolazine (RANEXA) 500 MG 12 hr tablet TAKE 1 TABLET BY MOUTH 2 (TWO) TIMES A DAY. 180 tablet 2   • sertraline (ZOLOFT) 100 MG tablet TAKE 1 TABLET BY MOUTH DAILY. 30 tablet 0   • sodium bicarbonate 650 MG tablet Take 1 tablet by mouth 2 (Two) Times a Day. 60 tablet 3   • traMADol (ULTRAM) 50 MG tablet Take 1 tablet by mouth Every 6 (Six) Hours As Needed for Moderate Pain . 120 tablet 2   • vitamin D (ERGOCALCIFEROL) 1.25 MG (50083 UT) capsule capsule TAKE 1 CAPSULE BY MOUTH EVERY 30 (THIRTY) DAYS. 3 capsule 2   • atorvastatin (LIPITOR) 10 MG tablet TAKE 1 TABLET BY MOUTH DAILY. 30 tablet 0   • budesonide (Pulmicort) 0.5 MG/2ML nebulizer solution Take 2 mL by nebulization Daily. 120 mL 3   • ipratropium-albuterol (DUO-NEB) 0.5-2.5 mg/3 ml nebulizer USE 1 BY NEBULIZER EVERY 4 TO 6 HOURS AS NEEDED 360 mL 0   • furosemide (LASIX) 20 MG tablet Take 20 mg by mouth Daily.       No facility-administered medications prior to visit.       Patient Active Problem List   Diagnosis   • Essential hypertension   • CAD (coronary artery disease)   • Acquired hypothyroidism   • Depressive disorder   • Thygeson's superficial punctate keratitis   • Pseudophakia   • Precordial pain   • Displaced fracture of base of neck of right femur, initial encounter for closed fracture (CMS/[facility])   • Hip fracture, right, closed, initial encounter (CMS/[facility])   • Acute on chronic systolic CHF (congestive heart failure) (CMS/[facility])   • Hyponatremia   • BENITO (acute kidney injury) (CMS/[facility])   • Acute blood loss anemia   • Urinary retention   • Closed displaced basicervical fracture of right femur (CMS/[facility])   • Urethral caruncle   • Symptomatic anemia   • Anemia   • Major depressive disorder   • Drug-induced encephalopathy   • Acute left-sided low back pain without sciatica   • Bilateral sacral insufficiency fracture   • Osteoarthritis of multiple joints   • Dyspnea   • CHF (congestive heart failure) (CMS/[facility])   • Hypercholesterolemia   •  "Chest pain   • Acute on chronic respiratory failure with hypoxia (CMS/HCC)   • Acute on chronic diastolic heart failure (CMS/HCC)   • Malignant hypertension       Advanced Care Planning:  ACP discussion was held with the patient during this visit. Patient does not have an advance directive, declines further assistance.    Review of Systems   Constitutional: Negative for chills, diaphoresis, fatigue and fever.   HENT: Negative.    Respiratory: Positive for wheezing.    Cardiovascular: Negative.    Gastrointestinal: Negative.    Genitourinary: Negative.    Skin: Negative.    Psychiatric/Behavioral: Negative for confusion.       Compared to one year ago, the patient feels her physical health is better.  Compared to one year ago, the patient feels her mental health is the same.    Reviewed chart for potential of high risk medication in the elderly: yes  Reviewed chart for potential of harmful drug interactions in the elderly:yes    Objective         Vitals:    04/28/21 1057   BP: 112/72   Weight: 63.9 kg (140 lb 14.4 oz)   Height: 172.7 cm (68\")       Body mass index is 21.42 kg/m².  Discussed the patient's BMI with her. The BMI is in the acceptable range.    Physical Exam  Vitals and nursing note reviewed.   Constitutional:       Appearance: She is well-developed.   HENT:      Head: Normocephalic.      Right Ear: External ear normal.      Left Ear: External ear normal.   Eyes:      Pupils: Pupils are equal, round, and reactive to light.   Cardiovascular:      Rate and Rhythm: Normal rate and regular rhythm.      Heart sounds: Normal heart sounds.   Pulmonary:      Effort: Tachypnea, prolonged expiration and respiratory distress present.      Breath sounds: Examination of the right-middle field reveals decreased breath sounds. Examination of the left-middle field reveals decreased breath sounds. Examination of the right-lower field reveals decreased breath sounds. Examination of the left-lower field reveals decreased " breath sounds. Decreased breath sounds and wheezing present.      Comments: Patient on 02 3L NC.   Abdominal:      General: Bowel sounds are normal.      Palpations: Abdomen is soft.   Musculoskeletal:         General: Normal range of motion.      Cervical back: Normal range of motion and neck supple.   Skin:     General: Skin is warm.      Capillary Refill: Capillary refill takes less than 2 seconds.   Neurological:      Mental Status: She is alert and oriented to person, place, and time.   Psychiatric:         Behavior: Behavior normal.               Assessment/Plan   Medicare Risks and Personalized Health Plan  CMS Preventative Services Quick Reference  Advance Directive Discussion  Breast Cancer/Mammogram Screening  Fall Risk  Immunizations Discussed/Encouraged (specific immunizations; Influenza, Pneumococcal 23, Shingrix and COVID19 )  Osteoporosis Risk    The above risks/problems have been discussed with the patient.  Pertinent information has been shared with the patient in the After Visit Summary.  Follow up plans and orders are seen below in the Assessment/Plan Section.    Diagnoses and all orders for this visit:    1. Medicare annual wellness visit, subsequent (Primary)    2. COPD with acute exacerbation (CMS/[facility])  -     budesonide (Pulmicort) 0.5 MG/2ML nebulizer solution; Take 2 mL by nebulization Daily.  Dispense: 120 mL; Refill: 3  -     ipratropium-albuterol (DUO-NEB) 0.5-2.5 mg/3 ml nebulizer; Take 3 mL by nebulization As Needed for Wheezing.  Dispense: 360 mL; Refill: 1  -     Ambulatory Referral to Pulmonology    3. Post-menopausal  -     DEXA Bone Density Axial        Follow Up:  Return in about 3 months (around 7/28/2021), or if symptoms worsen or fail to improve, for Next scheduled follow up.           This document has been electronically signed by CRESENCIO Hall on April 28, 2021 11:41 CDT '  An After Visit Summary and PPPS were given to the patient.

## 2021-08-06 NOTE — PROGRESS NOTES
Chief Complaint  Hypertension (3 MONTH CHECK UP), COPD, Hypothyroidism, and Hyperlipidemia    Subjective          Val Arteaga presents to [facility]  Hypertension  This is a chronic problem. The current episode started more than 1 year ago. The problem is unchanged. The problem is controlled. Associated symptoms include malaise/fatigue and shortness of breath. Agents associated with hypertension include thyroid hormones. Risk factors for coronary artery disease include dyslipidemia, post-menopausal state and sedentary lifestyle. Current antihypertension treatment includes lifestyle changes. The current treatment provides moderate improvement. There are no compliance problems.    COPD  She complains of shortness of breath. There is no cough. This is a chronic problem. The current episode started more than 1 year ago. The problem occurs constantly. The problem has been unchanged. Associated symptoms include malaise/fatigue. Pertinent negatives include no fever. Her symptoms are aggravated by exertion. Her symptoms are alleviated by rest, beta-agonist and oral steroids. She reports moderate improvement on treatment. There are no known risk factors for lung disease. Her past medical history is significant for COPD.   Hypothyroidism  This is a chronic problem. The current episode started more than 1 year ago. The problem occurs constantly. The problem has been unchanged. Associated symptoms include fatigue and weakness. Pertinent negatives include no chills, coughing, diaphoresis or fever. Nothing aggravates the symptoms. Treatments tried: Synthroid. The treatment provided significant relief.   Hyperlipidemia  This is a chronic problem. The current episode started more than 1 year ago. The problem is controlled. Exacerbating diseases include hypothyroidism. Factors aggravating her hyperlipidemia include thiazides. Associated symptoms include shortness of breath. The current treatment  "provides moderate improvement of lipids. There are no compliance problems.  Risk factors for coronary artery disease include post-menopausal, hypertension, family history and dyslipidemia.       Objective   Vital Signs:   /72   Pulse 73   Ht 172.7 cm (68\")   Wt 63.5 kg (140 lb)   SpO2 98%   BMI 21.29 kg/m²     Physical Exam  Vitals and nursing note reviewed.   Constitutional:       Appearance: Normal appearance. She is well-developed and normal weight.      Comments: Arrives via wheelchair   HENT:      Head: Normocephalic.      Right Ear: External ear normal.      Left Ear: External ear normal.   Eyes:      Pupils: Pupils are equal, round, and reactive to light.   Cardiovascular:      Rate and Rhythm: Normal rate and regular rhythm.      Heart sounds: Normal heart sounds.      Comments: Compression stockings in place to bilateral lower extremities  Pulmonary:      Effort: Tachypnea and prolonged expiration present.      Breath sounds: Examination of the right-upper field reveals decreased breath sounds. Examination of the left-upper field reveals decreased breath sounds. Examination of the right-lower field reveals decreased breath sounds. Examination of the left-lower field reveals decreased breath sounds.   Abdominal:      General: Bowel sounds are normal.      Palpations: Abdomen is soft.   Musculoskeletal:         General: Normal range of motion.      Cervical back: Normal range of motion and neck supple.      Right lower le+ Edema present.      Left lower le+ Edema present.   Skin:     General: Skin is warm.      Capillary Refill: Capillary refill takes less than 2 seconds.   Neurological:      Mental Status: She is alert and oriented to person, place, and time.   Psychiatric:         Behavior: Behavior normal.        Result Review :     Common labs    Common Labsle 11/10/20 11/10/20 11/10/20 11/11/20 11/11/20 3/25/21    0321 0321 1124 0640 0640    Glucose  145 (A) 182 (A)  102 (A) 91   BUN  " 37 (A) 37 (A)  35 (A) 19   Creatinine  0.95 1.01 (A)  0.99 1.02 (A)   eGFR Non African Am  56 (A) 52 (A)  53 (A) 51 (A)   Sodium  134 (A) 133 (A)  134 (A) 140   Potassium  5.7 (A) 5.4 (A)  4.7 4.2   Chloride  100 99  98 101   Calcium  9.2 9.0  8.6 8.7   Albumin  3.80       Total Bilirubin  0.4       Alkaline Phosphatase  66       AST (SGOT)  19       ALT (SGPT)  15       WBC 10.87 (A)   11.99 (A)     Hemoglobin 13.1   13.8     Hematocrit 39.3   42.8     Platelets 276   285     (A) Abnormal value       Comments are available for some flowsheets but are not being displayed.                     Assessment and Plan    Diagnoses and all orders for this visit:    1. Essential hypertension (Primary)    2. Hypercholesterolemia    3. Acquired hypothyroidism    4. Mucopurulent chronic bronchitis (CMS/HCC)    1.  Essential hypertension:  Continue on Coreg, Cardizem, Cozaar as previously prescribed  Continue to adhere to a low-sodium diet of no more than 2000 mg/day  Encouraged to avoid use of table salt or adding extra sodium to food once it has been prepared  Continue cardiology follow-up as scheduled with Dr. Rogel    2.  Hypercholesterolemia:  Continue to adhere to a low-fat diet    3.  Acquired hypothyroidism:  Continue on Synthroid as previously prescribed  Reeducated on possible signs or symptoms of change of thyroid function including not limited to change in skin, hot/cold intolerance and weight changes    4.  Mucopurulent chronic bronchitis:  Continue pulmonology follow-up with Dr. Cabrera as scheduled on October 14, 2021  Continue on Pulmicort as previously prescribed  Continue on DuoNeb nebulizer as previously prescribed  Continue on Perforomist as previously prescribed  Seek emergency medical treatment for any new or worsening shortness of breath, chest pain, chest tightness or difficulty breathing      Follow Up   Return in about 3 months (around 11/6/2021) for Recheck.  Patient was given instructions and  counseling regarding her condition or for health maintenance advice. Please see specific information pulled into the AVS if appropriate.         This document has been electronically signed by CRESENCIO Hall on August 18, 2021 07:17 CDT

## 2021-09-14 NOTE — TELEPHONE ENCOUNTER
The family states she is on O2, 24-7  Family states no fever, she is not coughing up anything, they states this is not any different that she has had in the last several year.    She is fine as long as she is sitting but if she gets up as walks across the room she has trouble getting her breath.    She states Dr DALILA Jay use to call in a script for a z-pack and steroid dose pack when she gets this way.     Family states to their knowledge no one has been around her that has had Covid so they know of no exposure to Covid.     Ms. Arteaga does has a pulse ox at home but they do not know what her readings have been at this time, the family member is not currently with her.     Please send to [facility] if appropriate     Please Advise    HUNTER Swan

## 2021-09-14 NOTE — TELEPHONE ENCOUNTER
Frankie called to see if Vero will call pt in a z-pack and some prednisone for difficulty breathing. (has helped in the past)    Please contact frankie with any questions.    Uses [facility] in [place]

## 2021-11-01 PROBLEM — J44.1 COPD EXACERBATION (HCC): Status: ACTIVE | Noted: 2021-01-01

## 2021-11-02 PROBLEM — R06.89 DYSPNEA AND RESPIRATORY ABNORMALITIES: Status: ACTIVE | Noted: 2021-01-01

## 2021-11-02 PROBLEM — R06.02 SHORTNESS OF BREATH: Status: ACTIVE | Noted: 2021-01-01

## 2021-11-02 PROBLEM — R79.89 ELEVATED TROPONIN: Status: ACTIVE | Noted: 2021-01-01

## 2021-11-02 PROBLEM — R06.00 DYSPNEA AND RESPIRATORY ABNORMALITIES: Status: ACTIVE | Noted: 2021-01-01

## 2021-11-02 PROBLEM — J44.1 COPD WITH ACUTE EXACERBATION (HCC): Status: ACTIVE | Noted: 2021-01-01

## 2021-11-02 PROBLEM — R77.8 ELEVATED TROPONIN: Status: ACTIVE | Noted: 2021-01-01

## 2021-11-02 NOTE — PLAN OF CARE
Goal Outcome Evaluation:   Patient admitted from ER awake alert and oriented. Assisted to bed VS taken elevated B/P noted. Call to hospitalist orders received and carried out. Denies pain at present. NPO at this time. C/O shortness of breath oxygen on at 3L NC tolerating well. RT notified and respiratory treatment received. Condition remains unchanged. Will continue to monitor.

## 2021-11-02 NOTE — H&P
"      [facility] Medicine Admission      Date of Admission: 11/1/2021      Primary Care Physician: Vero Arteaga, CRESENCIO      Chief Complaint:  Elderly frail 88 year old female who is being admitted secondary to shortness of breath  and worsening copd sx despite outpatient therapy with meds still has recurrent sx and being admitted for aggressive iv abx and iv steroids.     HPI: 88 year old female with long standing history of copd and dependent on home oxygen at 2 liters nasal cannula.  She also has a dx of diastolic heart failure.  She is also noted to have elevated uncontrolled hypertension despite being on multiple bp meds.  She went to local urgent care today, with similar sx and was given steroid timoteo and oral abx and she tried this apparently at home and sx still progressed and she is brought into ER for evaluation .      She is being admitted for copd exacerbation with bronchitis and CAP.  She has increasing hypoxemia and increased oxygen requirements.  She also has uncontrolled hypertension with then notation of elevated troponin values.  This may represent demand ischemia leading to \"leak\" of enzymes. She has upon looking back at her records, elevated troponin dating back at least several years.  This value is actually better than her baseline in the past.     She has had increased weakness  Cough   Decreased exercise tolerance   Unable to care for herself   She has failed conservative and out patient therapy that she has tried for the  Last two weeks     Concurrent Medical History:  has a past medical history of Allergic rhinitis, Chronic diastolic heart failure (HCC), COPD (chronic obstructive pulmonary disease) (HCC), Coronary arteriosclerosis, Depression, GERD (gastroesophageal reflux disease), Hypercholesterolemia, Hypertension, Hypothyroidism, LVH (left ventricular hypertrophy), Myocardial infarction (HCC), and Osteoarthritis.    Past Surgical History:  has a past " surgical history that includes Coronary angioplasty; Cardiac catheterization; endoscopy and colonoscopy; Colonoscopy; Breast surgery; Cervical spine surgery; Pacemaker Replacement; Hip Bipolar (Right, 1/23/2018); and Esophagogastroduodenoscopy (N/A, 2/14/2018).    Family History: family history includes Coronary artery disease in her father.     Social History:  reports that she quit smoking about 4 years ago. Her smoking use included cigarettes. She started smoking about 71 years ago. She has a 25.00 pack-year smoking history. She has never used smokeless tobacco. She reports that she does not drink alcohol and does not use drugs.    Allergies:   Allergies   Allergen Reactions   • Ace Inhibitors Dizziness   • Baclofen Delirium     unknown   • Lisinopril Cough     Keeps her awake all night coughing       Medications:   Prior to Admission medications    Medication Sig Start Date End Date Taking? Authorizing Provider   acetaminophen (TYLENOL) 325 MG tablet Take 2 tablets by mouth Every 4 (Four) Hours As Needed for Mild Pain . 2/5/18   Shawn Maldonado MD   aspirin 81 MG chewable tablet Chew 1 tablet Daily. 11/13/20   Devante Hilliard MD   budesonide (Pulmicort) 0.5 MG/2ML nebulizer solution Take 2 mL by nebulization Daily. 4/28/21   Vero Arteaga APRN   carvedilol (COREG) 6.25 MG tablet TAKE 1 TABLET BY MOUTH 2 (TWO) TIMES A DAY WITH MEALS. 5/13/21   Vero Arteaga APRN   dilTIAZem (CARDIZEM) 30 MG tablet Take 30 mg by mouth Daily As Needed. 3/11/21   Orlando Lerma MD   docusate sodium (COLACE) 100 MG capsule Take 1 capsule by mouth 2 (Two) Times a Day.  Patient taking differently: Take 100 mg by mouth 2 (Two) Times a Day As Needed for Constipation. 3/29/19   Lanie Jain MD   esomeprazole (nexIUM) 40 MG capsule TAKE 1 CAPSULE BY MOUTH EVERY MORNING 7/7/21   Vero Arteaga APRN   famotidine (PEPCID) 20 MG tablet Take 40 mg by mouth Daily. 11/17/20   Orlando Lerma MD   folic acid-vit  B6-vit B12 (FOLBEE) 2.5-25-1 MG tablet tablet Take 1 tablet by mouth Daily. 1/22/19   Vero Arteaga APRN   formoterol (PERFOROMIST) 20 MCG/2ML nebulizer solution Inhale 20 mcg. 6/16/21   Orlando Lerma MD   ipratropium-albuterol (DUO-NEB) 0.5-2.5 mg/3 ml nebulizer Take 3 mL by nebulization As Needed for Wheezing. 4/28/21   Vero Arteaga APRN   levothyroxine (SYNTHROID, LEVOTHROID) 50 MCG tablet TAKE 1 TABLET BY MOUTH DAILY. 7/26/21   Vero Arteaga APRN   losartan (COZAAR) 25 MG tablet TAKE 1 TABLET BY MOUTH DAILY. 4/30/21   Vero Arteaga APRN   montelukast (SINGULAIR) 10 MG tablet TAKE 1 TABLET BY MOUTH EVERY NIGHT. 4/5/21   Vero Arteaga APRN   polyethylene glycol (MIRALAX) powder MIX 1 CAPFUL (17G) IN 8 OUNCES OF LIQUID AND DRINK BY MOUTH EVERY DAY FOR CONSTIPATION  Patient taking differently: Take 17 g by mouth Daily As Needed. 6/19/18   Roula Albert MD   ranolazine (RANEXA) 500 MG 12 hr tablet TAKE 1 TABLET BY MOUTH 2 (TWO) TIMES A DAY. 4/26/21   Vero Arteaga APRN   sertraline (ZOLOFT) 100 MG tablet TAKE 1 TABLET BY MOUTH DAILY. 4/30/21   Vero Arteaga APRN   sodium bicarbonate 650 MG tablet Take 1 tablet by mouth 2 (Two) Times a Day. 8/11/20   Vero Arteaga APRN   traMADol (ULTRAM) 50 MG tablet Take 1 tablet by mouth Every 6 (Six) Hours As Needed for Moderate Pain . 6/11/19   Vero Arteaga APRN   azithromycin (Zithromax Z-Alphonse) 250 MG tablet Take 2 tablets the first day, then 1 tablet daily for 4 days. 11/1/21 11/2/21  Jackelyn Lane APRN   ferrous sulfate 325 (65 FE) MG tablet Take 325 mg by mouth Daily With Breakfast & Dinner.  11/2/21  Orlando Lerma MD   furosemide (LASIX) 40 MG tablet Take 40 mg by mouth Daily. 2/11/21 11/2/21  Provider, MD Orlando   nitroglycerin (NITRODUR) 0.2 MG/HR patch Place 1 patch on the skin as directed by provider.  11/2/21  Emergency, Nurse Toney, RN   predniSONE (DELTASONE) 10 MG tablet Take 10 mg by mouth Daily. 10/14/21 11/2/21  Emergency, Nurse  Epic, RN   vitamin D (ERGOCALCIFEROL) 1.25 MG (59649 UT) capsule capsule TAKE 1 CAPSULE BY MOUTH EVERY 30 (THIRTY) DAYS. 1/4/21 11/2/21  Vero Arteaga, APRN       Review of Systems:  Review of Systems   Constitutional: Positive for activity change and fatigue.   HENT: Positive for congestion.    Eyes: Negative.    Respiratory: Positive for cough, shortness of breath and wheezing.    Cardiovascular: Positive for palpitations.   Gastrointestinal: Negative.    Endocrine: Negative.    Genitourinary: Negative.    Musculoskeletal: Positive for arthralgias.   Skin: Negative.    Allergic/Immunologic: Negative.    Neurological: Negative.    Hematological: Negative.    Psychiatric/Behavioral: Negative.    All other systems reviewed and are negative.     Otherwise complete ROS is negative except as mentioned above.    Physical Exam:   Temp:  [97.5 °F (36.4 °C)] 97.5 °F (36.4 °C)  Heart Rate:  [66-90] 71  Resp:  [22-24] 22  BP: (122-215)/() 199/83  Physical Exam  Vitals and nursing note reviewed.   Constitutional:       Appearance: She is ill-appearing.   HENT:      Head: Normocephalic and atraumatic.      Nose: Nose normal.   Eyes:      Conjunctiva/sclera: Conjunctivae normal.      Pupils: Pupils are equal, round, and reactive to light.   Cardiovascular:      Rate and Rhythm: Regular rhythm. Tachycardia present.      Pulses: Normal pulses.      Heart sounds: Normal heart sounds.   Pulmonary:      Effort: Respiratory distress present.      Breath sounds: Wheezing present.   Abdominal:      General: Abdomen is flat. Bowel sounds are normal.   Musculoskeletal:         General: Normal range of motion.      Cervical back: Normal range of motion.   Skin:     General: Skin is warm.   Neurological:      General: No focal deficit present.   Psychiatric:         Mood and Affect: Mood normal.           Results Reviewed:  I have personally reviewed current lab, radiology, and data and agree with results.  Lab Results (last 24 hours)      Procedure Component Value Units Date/Time    [place] Draw [[id number]] Collected: 11/01/21 2235    Specimen: Blood Updated: 11/01/21 2345    Narrative:      The following orders were created for panel order [place] Draw.  Procedure                               Abnormality         Status                     ---------                               -----------         ------                     Green Top (Gel)[[id number]]                                  Final result               Lavender Top[[id number]]                                     Final result               Gold Top - SST[[id number]]                                   Final result               Light Blue Top[[id number]]                                   Final result                 Please view results for these tests on the individual orders.    Gold Top - SST [[id number]] Collected: 11/01/21 2235    Specimen: Blood Updated: 11/01/21 2345     Extra Tube Hold for add-ons.     Comment: Auto resulted.       Green Top (Gel) [[id number]] Collected: 11/01/21 2236    Specimen: Blood Updated: 11/01/21 2345     Extra Tube Hold for add-ons.     Comment: Auto resulted.       Light Blue Top [[id number]] Collected: 11/01/21 2235    Specimen: Blood Updated: 11/01/21 2345     Extra Tube hold for add-on     Comment: Auto resulted       Lavender Top [[id number]] Collected: 11/01/21 2236    Specimen: Blood Updated: 11/01/21 2345     Extra Tube hold for add-on     Comment: Auto resulted       COVID-19 and FLU A/B PCR - Swab, Nasopharynx [[id number]]  (Normal) Collected: 11/01/21 2316    Specimen: Swab from Nasopharynx Updated: 11/01/21 2344     COVID19 Not Detected     Influenza A PCR Not Detected     Influenza B PCR Not Detected    Narrative:      Fact sheet for providers: https://www.fda.gov/media/788446/download    Fact sheet for patients: https://www.fda.gov/media/250595/download    Test performed by PCR.    Troponin [[id number]]  (Abnormal) Collected: 11/01/21 2236    Specimen:  Blood Updated: 11/01/21 2318     Troponin T 0.035 ng/mL     Narrative:      Troponin T Reference Range:  <= 0.03 ng/mL-   Negative for AMI  >0.03 ng/mL-     Abnormal for myocardial necrosis.  Clinicians would have to utilize clinical acumen, EKG, Troponin and serial changes to determine if it is an Acute Myocardial Infarction or myocardial injury due to an underlying chronic condition.       Results may be falsely decreased if patient taking Biotin.      Blood Culture - Blood, Arm, Right [[id number]] Collected: 11/01/21 2302    Specimen: Blood from Arm, Right Updated: 11/01/21 2306    Blood Culture - Blood, Arm, Left [[id number]] Collected: 11/01/21 2302    Specimen: Blood from Arm, Left Updated: 11/01/21 2306    Comprehensive Metabolic Panel [[id number]]  (Abnormal) Collected: 11/01/21 2236    Specimen: Blood Updated: 11/01/21 2300     Glucose 95 mg/dL      BUN 15 mg/dL      Creatinine 1.00 mg/dL      Sodium 135 mmol/L      Potassium 4.1 mmol/L      Comment: Slight hemolysis detected by analyzer. Results may be affected.        Chloride 99 mmol/L      CO2 28.0 mmol/L      Calcium 9.2 mg/dL      Total Protein 6.8 g/dL      Albumin 4.00 g/dL      ALT (SGPT) 14 U/L      AST (SGOT) 19 U/L      Alkaline Phosphatase 91 U/L      Total Bilirubin 0.3 mg/dL      eGFR Non African Amer 52 mL/min/1.73      Globulin 2.8 gm/dL      A/G Ratio 1.4 g/dL      BUN/Creatinine Ratio 15.0     Anion Gap 8.0 mmol/L     Narrative:      GFR Normal >60  Chronic Kidney Disease <60  Kidney Failure <15      BNP [[id number]]  (Normal) Collected: 11/01/21 2236    Specimen: Blood Updated: 11/01/21 2258     proBNP 1,085.0 pg/mL     Narrative:      Among patients with dyspnea, NT-proBNP is highly sensitive for the detection of acute congestive heart failure. In addition NT-proBNP of <300 pg/ml effectively rules out acute congestive heart failure with 99% negative predictive value.    Results may be falsely decreased if patient taking Biotin.       Protime-INR [[id number]]  (Normal) Collected: 11/01/21 2235    Specimen: Blood Updated: 11/01/21 2256     Protime 12.1 Seconds      INR 0.90    Narrative:      Therapeutic range for most indications is 2.0-3.0 INR,  or 2.5-3.5 for mechanical heart valves.    CBC & Differential [[id number]]  (Abnormal) Collected: 11/01/21 2236    Specimen: Blood Updated: 11/01/21 2254    Narrative:      The following orders were created for panel order CBC & Differential.  Procedure                               Abnormality         Status                     ---------                               -----------         ------                     CBC Auto Differential[[id number]]        Abnormal            Final result                 Please view results for these tests on the individual orders.    CBC Auto Differential [[id number]]  (Abnormal) Collected: 11/01/21 2236    Specimen: Blood Updated: 11/01/21 2254     WBC 8.14 10*3/mm3      RBC 3.63 10*6/mm3      Hemoglobin 11.4 g/dL      Hematocrit 35.2 %      MCV 97.0 fL      MCH 31.4 pg      MCHC 32.4 g/dL      RDW 13.2 %      RDW-SD 46.9 fl      MPV 10.3 fL      Platelets 202 10*3/mm3      Neutrophil % 62.1 %      Lymphocyte % 24.3 %      Monocyte % 6.4 %      Eosinophil % 5.8 %      Basophil % 0.7 %      Immature Grans % 0.7 %      Neutrophils, Absolute 5.05 10*3/mm3      Lymphocytes, Absolute 1.98 10*3/mm3      Monocytes, Absolute 0.52 10*3/mm3      Eosinophils, Absolute 0.47 10*3/mm3      Basophils, Absolute 0.06 10*3/mm3      Immature Grans, Absolute 0.06 10*3/mm3      nRBC 0.0 /100 WBC         Imaging Results (Last 24 Hours)     Procedure Component Value Units Date/Time    XR Chest 1 View [[id number]] Collected: 11/01/21 2255     Updated: 11/01/21 2323    Narrative:      EXAMINATION:  XR CHEST 1 VW    CLINICAL INDICATION:  Female, 88 years old. SOA Triage Protocol    COMPARISON:  Previous chest x-ray from about 9 hours earlier    FINDINGS:  Support Devices: Left chest wall, dual-lead  cardiac pacer from a  left subclavian access is unchanged.  A wire across the neck that may be on the patient rather than in  the patient has been removed    Heart: Cardiac silhouette is normal in size.    Mediastinum: The thoracic aorta is tortuous and calcified.    Lungs: Pulmonary vessels are normal in size. Pulmonary  hyperexpansion with flattening of the diaphragm bronchial wall  thickening and mild interstitial prominence is unchanged      Pleura: No pleural effusion is identified. No pneumothorax is  present.    Osseous Structures And Soft Tissue: Shoulder arthritis remains      Impression:      1.  COPD, unchanged.  2.  No evidence of pulmonary edema, consolidation, or pleural  effusion.    Electronically signed by:  Parveen Talamantes MD  11/1/2021 11:22 PM CDT  Workstation: [id number]            Assessment:    Active Hospital Problems    Diagnosis    • **COPD exacerbation (HCC)    • COPD with acute exacerbation (HCC)    • Elevated troponin    • Dyspnea and respiratory abnormalities    • Shortness of breath    • Malignant hypertension    • Acute on chronic respiratory failure with hypoxia ([facility])    • CHF (congestive heart failure) ([facility])    • Hyponatremia    • BENITO (acute kidney injury) ([facility])    • Depressive disorder              Plan:    Patient is being admitted due to worsening sx of dyspnea and shortness of breath and copd exacerbation with acute bronchitis and increased oxygen requirements .     She is being admitted for acute on chronic hypoxemic respiratory failure, with inflammatory response and pneumonitis     1.  Admit and increase supplemental oxygen   2.  Bronchodilators  3.  Steroids   4.  abx therapy   5.  Symptom therapy and respiratory care plan       History of systolic and diastolic heart failure, chronic  There does not appear to be any sx of acute exacerbation   However, she has elevation of blood pressure.   She has had increased bp and bp values that have been frequently hard to control   Will  increase meds and also add in long acting nitrates, ie imdur as see if this helps.     Elevated troponin values noted.   Will trend this and ekg is unremarkable   And she is otherwise stable     If needed we will consult dr. em     dvt and gi prophylaxis.         I confirmed that the patient's Advance Care Plan is present, code status is documented, or surrogate decision maker is listed in the patient's medical record.     I have utilized all available immediate resources to obtain, update, or review the patient's current medications.         Monalisa Mcintosh MD

## 2021-11-02 NOTE — CASE MANAGEMENT/SOCIAL WORK
Discharge Planning Assessment  [facility]     Patient Name: Val Arteaga  MRN: [id number]  Today's Date: 11/2/2021    Admit Date: 11/1/2021     Discharge Needs Assessment     Row Name 11/02/21 1114       Living Environment    Lives With alone; other (see comments)    Unique Family Situation Friend stays the night.    Current Living Arrangements home/apartment/condo    Primary Care Provided by self    Provides Primary Care For no one    Family Caregiver if Needed other relative(s)    Quality of Family Relationships helpful; involved; supportive    Able to Return to Prior Arrangements yes       Resource/Environmental Concerns    Resource/Environmental Concerns none    Transportation Concerns car, none       Transition Planning    Patient/Family Anticipates Transition to home    Patient/Family Anticipated Services at Transition other (see comments)  Has a sitter at night.    Transportation Anticipated family or friend will provide       Discharge Needs Assessment    Readmission Within the Last 30 Days no previous admission in last 30 days    Equipment Currently Used at Home cane, straight; cane, quad; walker, rolling; rollator; oxygen; nebulizer; grab bar  2 1/2-3L    Concerns to be Addressed no discharge needs identified    Anticipated Changes Related to Illness none    Equipment Needed After Discharge none    Patient's Choice of Community Agency(s) [facility]    Current Discharge Risk chronically ill               Discharge Plan     Row Name 11/02/21 1120       Plan    Plan Comments Patient inquired about lunch stating that she has not eaten today.  SW informed TAINA Douglas CL that patient stated she was hungry and had not received anything today.  Per Stella patient was NPO, but she would check on it... SHANNON Vernon    Row Name 11/02/21 1115       Plan    Plan Comments LACE complete.  Pharmacy and coverage confirmed.  Patient has a straight cane, quad cane, rollator, RW, grab bars, nebulizer, and oxygen.  Oxygen  is supplied by [facility] and set at 2 1/2-3L.  No DME needed.  Patient has used [facility] in the past and is open to [facility] again if recommended.  Patient lives alone, but has a sitter at night.  Patient's two nieces provide transportation and help with medications, groceries, meal prep, etc... SHANNON Vernon              Continued Care and Services - Admitted Since 11/1/2021    Coordination has not been started for this encounter.          Demographic Summary     Row Name 11/02/21 1110       General Information    Admission Type observation    Arrived From urgent care    Referral Source high risk screening    Preferred Language English     Used During This Interaction no               Functional Status     Row Name 11/02/21 1112       Functional Status    Usual Activity Tolerance moderate    Current Activity Tolerance fair       Functional Status, IADL    Medications assistive person; assistive equipment    Meal Preparation assistive person    Housekeeping assistive person    Laundry independent    Shopping assistive person       Mental Status    General Appearance WDL WDL       Mental Status Summary    Recent Changes in Mental Status/Cognitive Functioning no changes       Employment/    Employment Status retired               Psychosocial    No documentation.                Abuse/Neglect    No documentation.                Legal    No documentation.                Substance Abuse    No documentation.                Patient Forms     Row Name 11/02/21 112       Patient Forms    Patient Observation Letter Delivered    Delivered to Patient    Method of delivery In person                  SHANNON Vernon

## 2021-11-02 NOTE — PROGRESS NOTES
[facility] Services  INPATIENT PROGRESS NOTE    Length of Stay: 1  Date of Admission: 11/1/2021  Primary Care Physician: Vero Arteaga APRN    Subjective   Chief Complaint: Shortness of Breath   HPI:  Continues with shortness of breath this morning. No substantial improvement since admission. No chest pain, palpitations. Feels shaky this morning. Neb treatment does seem to help with her breathing. Minimal cough, nonproductive.     Review of Systems     All pertinent negatives and positives are as above. All other systems have been reviewed and are negative unless otherwise stated.     Objective    Temp:  [97.4 °F (36.3 °C)-97.6 °F (36.4 °C)] 97.4 °F (36.3 °C)  Heart Rate:  [65-90] 72  Resp:  [16-24] 24  BP: ()/() 118/53    Physical Exam  Vitals reviewed.   Constitutional:       General: She is not in acute distress.  HENT:      Head: Normocephalic.      Mouth/Throat:      Mouth: Mucous membranes are moist.   Eyes:      Pupils: Pupils are equal, round, and reactive to light.   Cardiovascular:      Rate and Rhythm: Normal rate and regular rhythm.      Pulses: Normal pulses.   Pulmonary:      Breath sounds: Wheezing present.      Comments: tachypneic with conversation  Abdominal:      General: Bowel sounds are normal. There is no distension.      Palpations: Abdomen is soft.      Tenderness: There is no abdominal tenderness.   Musculoskeletal:      Right lower leg: No edema.      Left lower leg: No edema.   Skin:     General: Skin is dry.   Neurological:      General: No focal deficit present.      Mental Status: She is alert and oriented to person, place, and time.   Psychiatric:         Mood and Affect: Mood normal.         Behavior: Behavior normal.             Results Review:  I have reviewed the labs, radiology results, and diagnostic studies.    Laboratory Data:   Results from last 7 days   Lab Units 11/01/21  2236   SODIUM mmol/L 135*   POTASSIUM  mmol/L 4.1   CHLORIDE mmol/L 99   CO2 mmol/L 28.0   BUN mg/dL 15   CREATININE mg/dL 1.00   GLUCOSE mg/dL 95   CALCIUM mg/dL 9.2   BILIRUBIN mg/dL 0.3   ALK PHOS U/L 91   ALT (SGPT) U/L 14   AST (SGOT) U/L 19   ANION GAP mmol/L 8.0     Estimated Creatinine Clearance: 38.9 mL/min (by C-G formula based on SCr of 1 mg/dL).          Results from last 7 days   Lab Units 11/01/21  2236   WBC 10*3/mm3 8.14   HEMOGLOBIN g/dL 11.4*   HEMATOCRIT % 35.2   PLATELETS 10*3/mm3 202     Results from last 7 days   Lab Units 11/01/21  2235   INR  0.90       Culture Data:   No results found for: BLOODCX  No results found for: URINECX  No results found for: RESPCX  No results found for: WOUNDCX  No results found for: STOOLCX  No components found for: BODYFLD    Radiology Data:   Imaging Results (Last 24 Hours)     Procedure Component Value Units Date/Time    XR Chest 1 View [[id number]] Collected: 11/01/21 2255     Updated: 11/01/21 2323    Narrative:      EXAMINATION:  XR CHEST 1 VW    CLINICAL INDICATION:  Female, 88 years old. SOA Triage Protocol    COMPARISON:  Previous chest x-ray from about 9 hours earlier    FINDINGS:  Support Devices: Left chest wall, dual-lead cardiac pacer from a  left subclavian access is unchanged.  A wire across the neck that may be on the patient rather than in  the patient has been removed    Heart: Cardiac silhouette is normal in size.    Mediastinum: The thoracic aorta is tortuous and calcified.    Lungs: Pulmonary vessels are normal in size. Pulmonary  hyperexpansion with flattening of the diaphragm bronchial wall  thickening and mild interstitial prominence is unchanged      Pleura: No pleural effusion is identified. No pneumothorax is  present.    Osseous Structures And Soft Tissue: Shoulder arthritis remains      Impression:      1.  COPD, unchanged.  2.  No evidence of pulmonary edema, consolidation, or pleural  effusion.    Electronically signed by:  Parveen Talamantes MD  11/1/2021 11:22 PM  CDT  Workstation: [id number]          I have reviewed the patient's current medications.     Assessment/Plan           CHF (congestive heart failure) (HCC)-Appears clinically euvolemic. Echo obtained Nov 2020 shows EF 46-50% with grade I diastolic dysfunction. Follow daily weight with strict I&O. Continue BB, ARB.     Acute on chronic respiratory failure with hypoxia (HCC)-Secondary to AE COPD. No obvious PNA on imaging. Check PCT. Continue empiric antibiotic coverage with rocephin and azithromycin for now. Continue steroids, bronchodilator therapy, and O2 support. Baseline supplemental oxygen use is 2lt NC 24/7, currently on 3lt.     COPD exacerbation (HCC)-as above.     Malignant hypertension-Improved control this morning.     Elevated troponin-Chronically elevated. Downtrending to normal without associated chest pain or acute EKG changes.             Discharge Planning: I expect patient to be discharged in 2-3 days.     Katty Jay MD     I confirmed that the patient's Advance Care Plan is present, code status is documented, or surrogate decision maker is listed in the patient's medical record.

## 2021-11-02 NOTE — ED PROVIDER NOTES
Subjective   88 years old female with history of chronic respiratory failure on 2.5 L oxygen secondary to COPD, hypertension, hyperlipidemia, hypothyroidism presented in the ER with increasing shortness of breath for the last few weeks with rapid worsening lately.  Patient report having shortness of breath getting worse with minimal activity and she feels worn out.  Denies any chest pain, does have minimal productive cough.  Patient was evaluated in urgent care, was prescribed Zithromax  earlier today but seems like her shortness of breath is worsening and also noticed her blood pressure was more than 200 systolic without any headache, blurry vision, numbness tingling or focal weakness.  On my evaluation patient blood pressure improved to 170 systolic without any medications.      History provided by:  Patient      Review of Systems   Constitutional: Positive for fatigue. Negative for chills and fever.   HENT: Negative for congestion, postnasal drip, rhinorrhea, sinus pressure and sinus pain.    Respiratory: Positive for cough, chest tightness and shortness of breath.    Cardiovascular: Negative for chest pain and palpitations.   Gastrointestinal: Negative for abdominal pain.   Genitourinary: Negative for flank pain.   Musculoskeletal: Positive for arthralgias.   Skin: Negative for color change.   Neurological: Negative for syncope and headaches.   Psychiatric/Behavioral: Negative for agitation.       Past Medical History:   Diagnosis Date   • Allergic rhinitis    • Chronic diastolic heart failure (HCC)    • COPD (chronic obstructive pulmonary disease) (HCC)    • Coronary arteriosclerosis    • Depression    • GERD (gastroesophageal reflux disease)    • Hypercholesterolemia    • Hypertension    • Hypothyroidism    • LVH (left ventricular hypertrophy)    • Myocardial infarction (HCC)    • Osteoarthritis        Allergies   Allergen Reactions   • Ace Inhibitors Dizziness   • Baclofen Delirium     unknown   • Lisinopril  Cough     Keeps her awake all night coughing       Past Surgical History:   Procedure Laterality Date   • BREAST SURGERY     • CARDIAC CATHETERIZATION     • CERVICAL SPINE SURGERY     • COLONOSCOPY     • CORONARY ANGIOPLASTY     • ENDOSCOPY N/A 2018   • ENDOSCOPY AND COLONOSCOPY     • HIP BIPOLAR REPLACEMENT Right 2018   • PACEMAKER REPLACEMENT         Family History   Problem Relation Age of Onset   • Coronary artery disease Father        Social History     Socioeconomic History   • Marital status:    Tobacco Use   • Smoking status: Former Smoker     Packs/day: 0.50     Years: 50.00     Pack years: 25.00     Types: Cigarettes     Start date:      Quit date: 2017     Years since quittin.8   • Smokeless tobacco: Never Used   Substance and Sexual Activity   • Alcohol use: No   • Drug use: No   • Sexual activity: Not Currently           Objective   Physical Exam  Vitals and nursing note reviewed.   Constitutional:       Appearance: She is well-developed.   HENT:      Head: Normocephalic.      Mouth/Throat:      Mouth: Mucous membranes are moist.   Eyes:      Extraocular Movements: Extraocular movements intact.   Cardiovascular:      Rate and Rhythm: Normal rate and regular rhythm.   Pulmonary:      Effort: Accessory muscle usage present.      Breath sounds: Rhonchi present. No wheezing.   Musculoskeletal:         General: Normal range of motion.      Cervical back: Normal range of motion.   Skin:     General: Skin is warm.      Capillary Refill: Capillary refill takes 2 to 3 seconds.   Neurological:      General: No focal deficit present.      Mental Status: She is alert and oriented to person, place, and time.   Psychiatric:         Mood and Affect: Mood normal.         ECG 12 Lead      Date/Time: 2021 11:55 PM  Performed by: Jerome Serrano MD  Authorized by: Jerome Serrano MD   Interpreted by physician  Rhythm: paced  Rate: normal  BPM: 71  QRS axis: left  Clinical impression:  abnormal ECG                 ED Course                                           MDM  Number of Diagnoses or Management Options  COPD exacerbation (HCC)  Elevated troponin  Uncontrolled hypertension  Diagnosis management comments: 88 years old is evaluated for increasing shortness of breath.  Patient has bilateral decreased air movement, increased her oxygen to 4 L and given breathing treatment, steroids.  Chest x-ray negative for any acute cardiopulmonary findings.  Grossly unremarkable labs except for mildly elevated troponin which could be related to respiratory versus cardiac versus uncontrolled hypertension.  EKG showed paced rhythm.  She is given a dose of antibiotics.  Discussed with Dr. Ruvalcaba hospitalist, reviewed history, work-up and agreed with admission.         Amount and/or Complexity of Data Reviewed  Clinical lab tests: ordered and reviewed  Tests in the radiology section of CPT®: ordered and reviewed  Discuss the patient with other providers: yes        Final diagnoses:   COPD exacerbation (HCC)   Elevated troponin   Uncontrolled hypertension       ED Disposition  ED Disposition     ED Disposition Condition Comment    Decision to Admit  Level of Care: Telemetry [5]   Diagnosis: COPD exacerbation (HCC) [919381]   Admitting Physician: CHERELLE VELA [[id number]]   Attending Physician: CHERELLE VELA [[id number]]            No follow-up provider specified.       Medication List      No changes were made to your prescriptions during this visit.          Jerome Serrano MD  11/02/21 0017

## 2021-11-03 PROBLEM — G47.10 UNCONTROLLED HYPERSOMNIA: Chronic | Status: ACTIVE | Noted: 2021-01-01

## 2021-11-03 PROBLEM — J44.1 COPD WITH ACUTE EXACERBATION (HCC): Chronic | Status: ACTIVE | Noted: 2021-01-01

## 2021-11-03 NOTE — THERAPY EVALUATION
Patient Name: Val Arteaga  : 1932    MRN: [id number]                              Today's Date: 11/3/2021       Admit Date: 2021    Visit Dx:     ICD-10-CM ICD-9-CM   1. COPD exacerbation ([facility])  J44.1 491.21   2. Elevated troponin  R77.8 790.6   3. Uncontrolled hypertension  I10 401.9   4. Impaired functional mobility, balance, gait, and endurance  Z74.09 V49.89     Patient Active Problem List   Diagnosis   • Uncontrolled hypertension   • CAD (coronary artery disease)   • Chronic obstructive pulmonary disease ([facility])   • Acquired hypothyroidism   • Depressive disorder   • Thygeson's superficial punctate keratitis   • Pseudophakia   • Precordial pain   • Displaced fracture of base of neck of right femur, initial encounter for closed fracture ([facility])   • Hip fracture, right, closed, initial encounter ([facility])   • Acute on chronic systolic CHF (congestive heart failure) ([facility])   • Hyponatremia   • BENITO (acute kidney injury) ([facility])   • Acute blood loss anemia   • Urinary retention   • Closed displaced basicervical fracture of right femur ([facility])   • Urethral caruncle   • Symptomatic anemia   • Anemia   • Major depressive disorder   • Drug-induced encephalopathy   • Acute left-sided low back pain without sciatica   • Bilateral sacral insufficiency fracture   • Osteoarthritis of multiple joints   • Dyspnea   • CHF (congestive heart failure) ([facility])   • Hypercholesterolemia   • Chest pain   • Acute on chronic respiratory failure with hypoxia ([facility])   • Acute on chronic diastolic heart failure ([facility])   • Malignant hypertension   • COPD exacerbation ([facility])   • COPD with acute exacerbation ([facility])   • Elevated troponin   • Dyspnea and respiratory abnormalities   • Shortness of breath   • Uncontrolled hypersomnia     Past Medical History:   Diagnosis Date   • Allergic rhinitis    • Chronic diastolic heart failure ([facility])    • COPD (chronic obstructive pulmonary disease) ([facility])    • Coronary arteriosclerosis    • Depression    •  GERD (gastroesophageal reflux disease)    • Hypercholesterolemia    • Hypertension    • Hypothyroidism    • LVH (left ventricular hypertrophy)    • Myocardial infarction (HCC)    • Osteoarthritis      Past Surgical History:   Procedure Laterality Date   • BREAST SURGERY     • CARDIAC CATHETERIZATION     • CERVICAL SPINE SURGERY     • COLONOSCOPY     • CORONARY ANGIOPLASTY     • ENDOSCOPY N/A 2/14/2018   • ENDOSCOPY AND COLONOSCOPY     • HIP BIPOLAR REPLACEMENT Right 1/23/2018   • PACEMAKER REPLACEMENT        General Information     [facility] Name 11/03/21 1051          Physical Therapy Time and Intention    Document Type evaluation  -     Mode of Treatment individual therapy; physical therapy  -     Row Name 11/03/21 1051          General Information    Patient Profile Reviewed yes  -     Prior Level of Function independent:; all household mobility; ADL's; gait; transfer  friend brings food often;pt could cook and did laundry; family helped with cleaning; pt used RW;family prefers pt not to drive;  -     Existing Precautions/Restrictions fall; oxygen therapy device and L/min  -[facility] Name 11/03/21 1051          Living Environment    Lives With alone  -[facility] Name 11/03/21 1051          Home Main Entrance    Number of Stairs, Main Entrance two  -     Stair Railings, Main Entrance railings on both sides of stairs  -[facility] Name 11/03/21 1051          Cognition    Orientation Status (Cognition) oriented x 4  -[facility] Name 11/03/21 1051          Safety Issues, Functional Mobility    Impairments Affecting Function (Mobility) endurance/activity tolerance; shortness of breath  -           User Key  (r) = Recorded By, (t) = Taken By, (c) = Cosigned By    Initials Name Provider Type     Malena Mendoza PT Physical Therapist               Mobility     Row Name 11/03/21 1137          Bed Mobility    Bed Mobility supine-sit; sit-supine  -     Supine-Sit Teller (Bed Mobility) standby assist  -      Sit-Supine [place] (Bed Mobility) standby assist  -     Row Name 11/03/21 1137          Sit-Stand Transfer    Sit-Stand [place] (Transfers) contact guard; nonverbal cues (demo/gesture); verbal cues  -     Assistive Device (Sit-Stand Transfers) walker, front-wheeled  -     Row Name 11/03/21 1137          Gait/Stairs (Locomotion)    [place] Level (Gait) contact guard  -     Assistive Device (Gait) walker, front-wheeled  -     Distance in Feet (Gait) 50ft  -           User Key  (r) = Recorded By, (t) = Taken By, (c) = Cosigned By    Initials Name Provider Type    Malena Ma, PT Physical Therapist               Obj/Interventions     Row Name 11/03/21 1137          Range of Motion Comprehensive    Comment, General Range of Motion AROM WFL all extremities  -     Row Name 11/03/21 1137          Strength Comprehensive (MMT)    Comment, General Manual Muscle Testing (MMT) Assessment BLE: BUE grossly 4/5 BLE: grossly 4/5  -     Row Name 11/03/21 1137          Balance    Balance Assessment sitting static balance; sitting dynamic balance; standing static balance; standing dynamic balance  -     Static Sitting Balance WNL; sitting, edge of bed  -     Dynamic Sitting Balance WNL; sitting, edge of bed  -RONAN     Static Standing Balance WFL  -     Dynamic Standing Balance mild impairment  -           User Key  (r) = Recorded By, (t) = Taken By, (c) = Cosigned By    Initials Name Provider Type    Malena Ma, PT Physical Therapist               Goals/Plan     Row Name 11/03/21 1137          Bed Mobility Goal 1 (PT)    Activity/Assistive Device (Bed Mobility Goal 1, PT) sit to supine; supine to sit  -     [place] Level/Cues Needed (Bed Mobility Goal 1, PT) independent  -     Time Frame (Bed Mobility Goal 1, PT) by discharge  -     Progress/Outcomes (Bed Mobility Goal 1, PT) goal not met  -     Row Name 11/03/21 1137          Transfer Goal 1 (PT)    Activity/Assistive  Device (Transfer Goal 1, PT) sit-to-stand/stand-to-sit; bed-to-chair/chair-to-bed; walker, rolling  -RONAN     [place] Level/Cues Needed (Transfer Goal 1, PT) modified independence  -RONAN     Time Frame (Transfer Goal 1, PT) by discharge  -RONAN     Progress/Outcome (Transfer Goal 1, PT) goal not met  -RONAN     Row Name 11/03/21 1137          Gait Training Goal 1 (PT)    Activity/Assistive Device (Gait Training Goal 1, PT) gait (walking locomotion); walker, rolling  -RONAN     [place] Level (Gait Training Goal 1, PT) modified independence  O2 sats will not drop below 92%  -RONAN     Distance (Gait Training Goal 1, PT) 150ft or more  -RONAN     Time Frame (Gait Training Goal 1, PT) by discharge  -RONAN     Progress/Outcome (Gait Training Goal 1, PT) goal not met  -RONAN     Row Name 11/03/21 1137          ROM Goal 1 (PT)    ROM Goal 1 (PT) Pt akilah tolerate LE exercises OOB in chair with VSS  -RONAN     Time Frame (ROM Goal 1, PT) by discharge  -RONAN     Progress/Outcome (ROM Goal 1, PT) goal not met  -RONAN     Row Name 11/03/21 1137          Stairs Goal 1 (PT)    Activity/Assistive Device (Stairs Goal 1, PT) ascending stairs; descending stairs; using handrail, left; using handrail, right  -RONAN     [place] Level/Cues Needed (Stairs Goal 1, PT) modified independence  -RONAN     Number of Stairs (Stairs Goal 1, PT) 2  -RONAN     Time Frame (Stairs Goal 1, PT) by discharge  -RONAN     Progress/Outcome (Stairs Goal 1, PT) goal not met  -RONAN           User Key  (r) = Recorded By, (t) = Taken By, (c) = Cosigned By    Initials Name Provider Type    Malena Ma PT Physical Therapist               Clinical Impression     Row Name 11/03/21 1137          Pain    Additional Documentation Pain Scale: Numbers Pre/Post-Treatment (Group)  -     Row Name 11/03/21 1137          Pain Scale: Numbers Pre/Post-Treatment    Pretreatment Pain Rating 0/10 - no pain  -RONAN     Posttreatment Pain Rating 0/10 - no pain  -RONAN     Row Name 11/03/21 1137          Plan of  Care Review    Plan of Care Reviewed With patient  -RONAN     Outcome Summary PT evaluation completed.  Pt pleasant and agreeable to therapy. Pt SOA as she was just back to bed from bathroom with nursing. Pt transferred supine to sit to supine with SBA and ambulated with RW 50ft with CGA. Pt was SOA with ambulation but denied lightheadedness or dizziness and O2 sats remained in upper 90's on 3lpm. Function limited by decreased strength, balance, and tolerance for functional mobility. Pt will benefit fromPT to regain lost function as pt improves medically. Anticipate home with home health at discharge.  -RONAN     Row Name 11/03/21 1137          Therapy Assessment/Plan (PT)    Rehab Potential (PT) good, to achieve stated therapy goals  -     Criteria for Skilled Interventions Met (PT) yes; meets criteria; skilled treatment is necessary  -     Predicted Duration of Therapy Intervention (PT) until discharge or goals met  -     Row Name 11/03/21 1137          Vital Signs    Pre Systolic BP Rehab 138  -RONAN     Pre Treatment Diastolic BP 61  -RONAN     Pretreatment Heart Rate (beats/min) 66  -RONAN     Posttreatment Heart Rate (beats/min) 62  -RONAN     Pre SpO2 (%) 97  -RONAN     O2 Delivery Pre Treatment supplemental O2  3lpm  -RONAN     Post SpO2 (%) 97  -RONAN     O2 Delivery Post Treatment nasal cannula  -RONAN     Pre Patient Position Supine  -RONAN     Post Patient Position Supine  -RONAN     Row Name 11/03/21 1137          Positioning and Restraints    Pre-Treatment Position in bed  -RONAN     Post Treatment Position bed  -RONAN           User Key  (r) = Recorded By, (t) = Taken By, (c) = Cosigned By    Initials Name Provider Type    Malena Ma, PT Physical Therapist               Outcome Measures     Row Name 11/03/21 1137          How much help from another person do you currently need...    Turning from your back to your side while in flat bed without using bedrails? 4  -RONAN     Moving from lying on back to sitting on the side of a flat  bed without bedrails? 4  -RONAN     Moving to and from a bed to a chair (including a wheelchair)? 3  -RONAN     Standing up from a chair using your arms (e.g., wheelchair, bedside chair)? 3  -RONAN     Climbing 3-5 steps with a railing? 3  -RONAN     To walk in hospital room? 3  -RONAN     AM-PAC 6 Clicks Score (PT) 20  -     Row Name 11/03/21 1137          Functional Assessment    Outcome Measure Options AM-PAC 6 Clicks Basic Mobility (PT)  -           User Key  (r) = Recorded By, (t) = Taken By, (c) = Cosigned By    Initials Name Provider Type    Malena Ma, PT Physical Therapist                             Physical Therapy Education                 Title: PT OT SLP Therapies (In Progress)     Topic: Physical Therapy (In Progress)     Point: Mobility training (Done)     Learning Progress Summary           Patient Acceptance, E, VU,DU by  at 11/3/2021 1437                   Point: Home exercise program (Not Started)     Learner Progress:  Not documented in this visit.          Point: Body mechanics (Done)     Learning Progress Summary           Patient Acceptance, E, VU,DU by  at 11/3/2021 1437                   Point: Precautions (Done)     Learning Progress Summary           Patient Acceptance, E, VU,DU by  at 11/3/2021 1437                               User Key     Initials Effective Dates Name Provider Type Discipline     06/16/21 -  aMlena Mendoza, PT Physical Therapist PT              PT Recommendation and Plan  Planned Therapy Interventions (PT): balance training, bed mobility training, gait training, home exercise program, patient/family education, stair training, strengthening, transfer training  Plan of Care Reviewed With: patient  Outcome Summary: PT evaluation completed.  Pt pleasant and agreeable to therapy. Pt SOA as she was just back to bed from bathroom with nursing. Pt transferred supine to sit to supine with SBA and ambulated with RW 50ft with CGA. Pt was SOA with ambulation but denied  lightheadedness or dizziness and O2 sats remained in upper 90's on 3lpm. Function limited by decreased strength, balance, and tolerance for functional mobility. Pt will benefit fromPT to regain lost function as pt improves medically. Anticipate home with home health at discharge.     Time Calculation:    PT Charges     Row Name 11/03/21 1438             Time Calculation    Start Time 1137  -RONAN      Stop Time 1215  -RONAN      Time Calculation (min) 38 min  -RONAN      PT Received On 11/03/21  -RONAN      PT Goal Re-Cert Due Date 11/16/21  -              Time Calculation- PT    Total Timed Code Minutes- PT 0 minute(s)  -RONAN              Untimed Charges    PT Eval/Re-eval Minutes 38  -RONAN              Total Minutes    Untimed Charges Total Minutes 38  -RONAN       Total Minutes 38  -RONAN            User Key  (r) = Recorded By, (t) = Taken By, (c) = Cosigned By    Initials Name Provider Type    RONAN Malena Mendoza, PT Physical Therapist              Therapy Charges for Today     Code Description Service Date Service Provider Modifiers Qty    [id number] HC PT EVAL MOD COMPLEXITY 3 11/3/2021 Malena Mendoza, PT GP 1          PT G-Codes  Outcome Measure Options: AM-PAC 6 Clicks Basic Mobility (PT)  AM-PAC 6 Clicks Score (PT): 20    Malena Mendoza PT  11/3/2021

## 2021-11-03 NOTE — PROGRESS NOTES
Progress Note  Devante Hilliard MD  Hospitalist    Date of visit: 11/3/2021     LOS: 1 day   Patient Care Team:  Vero Arteaga APRN as PCP - General (Nurse Practitioner)    Chief Complaint: Shortness of breath    Subjective     Interval History:     Patient Complaints: Shortness of breath, improved to some extent.    History taken from: Patient and nursing    Medication Review:   Current Facility-Administered Medications   Medication Dose Route Frequency Provider Last Rate Last Admin   • acetaminophen (TYLENOL) tablet 650 mg  650 mg Oral Q4H PRN Monalisa Mcintosh MD   650 mg at 11/02/21 2238   • aspirin chewable tablet 81 mg  81 mg Oral Daily Monalisa Mcintosh MD   81 mg at 11/03/21 0841   • budesonide (PULMICORT) nebulizer solution 0.5 mg  0.5 mg Nebulization Daily - RT Monalisa Mcintosh MD   0.5 mg at 11/03/21 0653   • carvedilol (COREG) tablet 12.5 mg  12.5 mg Oral BID With Meals Monalisa Mcintosh MD   12.5 mg at 11/03/21 0841   • dilTIAZem (CARDIZEM) tablet 60 mg  60 mg Oral Q8H Monalisa Mcintosh MD   60 mg at 11/03/21 1346   • docusate sodium (COLACE) capsule 100 mg  100 mg Oral BID PRN Monalisa Mcintosh MD       • famotidine (PEPCID) tablet 40 mg  40 mg Oral Daily Monalisa Mcintosh MD   40 mg at 11/03/21 0841   • folic acid-vit B6-vit B12 (FOLBEE) tablet 1 tablet  1 tablet Oral Daily Monalisa Mcintosh MD   1 tablet at 11/02/21 1058   • hydrALAZINE (APRESOLINE) injection 10 mg  10 mg Intravenous Q4H PRN Monalisa Mcintosh MD   10 mg at 11/02/21 0122   • ipratropium-albuterol (DUO-NEB) nebulizer solution 3 mL  3 mL Nebulization PRN Monalisa Mcintosh MD   3 mL at 11/03/21 0130   • ipratropium-albuterol (DUO-NEB) nebulizer solution 3 mL  3 mL Nebulization 4x Daily - RT Katty Jay MD   3 mL at 11/03/21 1426   • isosorbide mononitrate (IMDUR) 24 hr tablet 60 mg  60 mg Oral Q24H Monalisa Mcintosh MD   60 mg at 11/03/21 0841   •  levothyroxine (SYNTHROID, LEVOTHROID) tablet 50 mcg  50 mcg Oral Q AM Monalisa Mcintosh MD   50 mcg at 11/03/21 0502   • losartan (COZAAR) tablet 100 mg  100 mg Oral Daily Monalisa Mcintosh MD   100 mg at 11/03/21 0841   • [START ON 11/4/2021] methylPREDNISolone sodium succinate (SOLU-Medrol) injection 60 mg  60 mg Intravenous Q8H Devante Hilliard MD       • montelukast (SINGULAIR) tablet 10 mg  10 mg Oral Nightly Monalisa Mcintosh MD   10 mg at 11/02/21 2107   • ondansetron (ZOFRAN) injection 4 mg  4 mg Intravenous Q6H PRN Monalisa Mcintosh MD       • pantoprazole (PROTONIX) EC tablet 40 mg  40 mg Oral QAM Monalisa Mcintosh MD   40 mg at 11/03/21 0611   • ranolazine (RANEXA) 12 hr tablet 500 mg  500 mg Oral BID Monalisa Mcintosh MD   500 mg at 11/03/21 0841   • sertraline (ZOLOFT) tablet 100 mg  100 mg Oral Daily Monalisa Mcintosh MD   100 mg at 11/03/21 0841   • sodium bicarbonate tablet 650 mg  650 mg Oral BID Monalisa Mcintosh MD   650 mg at 11/03/21 0841   • sodium chloride 0.9 % flush 10 mL  10 mL Intravenous PRN Monalisa Mcintosh MD   10 mL at 11/03/21 0842   • traMADol (ULTRAM) tablet 50 mg  50 mg Oral Q6H PRN Monalisa Mcintosh MD   50 mg at 11/03/21 0013       Review of Systems:   Review of Systems   Constitutional: Negative for fatigue.   Respiratory: Positive for cough and shortness of breath. Negative for wheezing.    Cardiovascular: Negative for chest pain, palpitations and leg swelling.   Gastrointestinal: Negative for abdominal distention, abdominal pain, anal bleeding, constipation, diarrhea, nausea and vomiting.   Genitourinary: Negative for dysuria, flank pain, frequency, hematuria and urgency.   Musculoskeletal: Positive for arthralgias. Negative for back pain.   Skin: Positive for pallor. Negative for color change and rash.   Neurological: Positive for weakness. Negative for syncope, light-headedness, numbness and headaches.    Psychiatric/Behavioral: Negative for agitation and behavioral problems.   All other systems reviewed and are negative.      Objective     Vital Signs  Temp:  [96.1 °F (35.6 °C)-97.8 °F (36.6 °C)] 97.8 °F (36.6 °C)  Heart Rate:  [60-77] 65  Resp:  [16-24] 16  BP: (112-158)/(57-68) 112/57    Physical Exam:  Physical Exam  Vitals reviewed.   Constitutional:       Appearance: She is ill-appearing.   HENT:      Head: Normocephalic and atraumatic.   Eyes:      General: No scleral icterus.     Extraocular Movements: Extraocular movements intact.      Pupils: Pupils are equal, round, and reactive to light.   Cardiovascular:      Rate and Rhythm: Normal rate and regular rhythm.   Pulmonary:      Effort: No respiratory distress.      Breath sounds: Normal breath sounds. No stridor. No wheezing or rales.      Comments: Emphysematous chest  Chest:      Chest wall: No tenderness.   Abdominal:      General: Bowel sounds are normal. There is no distension.      Palpations: Abdomen is soft. There is no mass.      Tenderness: There is no abdominal tenderness. There is no right CVA tenderness, left CVA tenderness or guarding.      Hernia: No hernia is present.   Musculoskeletal:      Cervical back: Normal range of motion and neck supple. No rigidity or tenderness.   Skin:     General: Skin is warm and dry.      Coloration: Skin is pale. Skin is not jaundiced.      Findings: No bruising.   Neurological:      General: No focal deficit present.      Mental Status: She is alert and oriented to person, place, and time.      Cranial Nerves: No cranial nerve deficit.      Motor: No weakness.   Psychiatric:         Mood and Affect: Mood normal.         Behavior: Behavior normal.          Results Review:    Lab Results (last 24 hours)     Procedure Component Value Units Date/Time    Troponin [[id number]]  (Normal) Collected: 11/03/21 1145    Specimen: Blood Updated: 11/03/21 1212     Troponin T <0.010 ng/mL     Narrative:      Troponin T  "Reference Range:  <= 0.03 ng/mL-   Negative for AMI  >0.03 ng/mL-     Abnormal for myocardial necrosis.  Clinicians would have to utilize clinical acumen, EKG, Troponin and serial changes to determine if it is an Acute Myocardial Infarction or myocardial injury due to an underlying chronic condition.       Results may be falsely decreased if patient taking Biotin.      Procalcitonin [[id number]]  (Normal) Collected: 11/03/21 0526    Specimen: Blood Updated: 11/03/21 0708     Procalcitonin 0.07 ng/mL     Narrative:      As a Marker for Sepsis (Non-Neonates):     1. <0.5 ng/mL represents a low risk of severe sepsis and/or septic shock.  2. >2 ng/mL represents a high risk of severe sepsis and/or septic shock.    As a Marker for Lower Respiratory Tract Infections that require antibiotic therapy:  PCT on Admission     Antibiotic Therapy             6-12 Hrs later  >0.5                          Strongly Recommended            >0.25 - <0.5             Recommended  0.1 - 0.25                  Discouraged                       Remeasure/reassess PCT  <0.1                         Strongly Discouraged         Remeasure/reassess PCT      As 28 day mortality risk marker: \"Change in Procalcitonin Result\" (>80% or <=80%) if Day 0 (or Day 1) and Day 4 values are available. Refer to [email or web address]    Change in PCT <=80 %   A decrease of PCT levels below or equal to 80% defines a positive change in PCT test result representing a higher risk for 28-day all-cause mortality of patients diagnosed with severe sepsis or septic shock.    Change in PCT >80 %   A decrease of PCT levels of more than 80% defines a negative change in PCT result representing a lower risk for 28-day all-cause mortality of patients diagnosed with severe sepsis or septic shock.                Basic Metabolic Panel [[id number]]  (Abnormal) Collected: 11/03/21 0527    Specimen: Blood Updated: 11/03/21 0625     Glucose 144 mg/dL      BUN 24 mg/dL  "     Creatinine 0.95 mg/dL      Sodium 138 mmol/L      Potassium 4.0 mmol/L      Chloride 102 mmol/L      CO2 26.0 mmol/L      Calcium 8.9 mg/dL      eGFR Non African Amer 56 mL/min/1.73      BUN/Creatinine Ratio 25.3     Anion Gap 10.0 mmol/L     Narrative:      GFR Normal >60  Chronic Kidney Disease <60  Kidney Failure <15      Troponin [[id number]]  (Normal) Collected: 11/03/21 0527    Specimen: Blood Updated: 11/03/21 0619     Troponin T <0.010 ng/mL     Narrative:      Troponin T Reference Range:  <= 0.03 ng/mL-   Negative for AMI  >0.03 ng/mL-     Abnormal for myocardial necrosis.  Clinicians would have to utilize clinical acumen, EKG, Troponin and serial changes to determine if it is an Acute Myocardial Infarction or myocardial injury due to an underlying chronic condition.       Results may be falsely decreased if patient taking Biotin.      CBC & Differential [[id number]]  (Abnormal) Collected: 11/03/21 0527    Specimen: Blood Updated: 11/03/21 0555    Narrative:      The following orders were created for panel order CBC & Differential.  Procedure                               Abnormality         Status                     ---------                               -----------         ------                     CBC Auto Differential[[id number]]        Abnormal            Final result                 Please view results for these tests on the individual orders.    CBC Auto Differential [[id number]]  (Abnormal) Collected: 11/03/21 0527    Specimen: Blood Updated: 11/03/21 0555     WBC 7.25 10*3/mm3      RBC 3.31 10*6/mm3      Hemoglobin 10.5 g/dL      Hematocrit 31.4 %      MCV 94.9 fL      MCH 31.7 pg      MCHC 33.4 g/dL      RDW 13.3 %      RDW-SD 46.1 fl      MPV 10.5 fL      Platelets 175 10*3/mm3      Neutrophil % 88.4 %      Lymphocyte % 10.3 %      Monocyte % 0.6 %      Eosinophil % 0.0 %      Basophil % 0.1 %      Immature Grans % 0.6 %      Neutrophils, Absolute 6.41 10*3/mm3      Lymphocytes, Absolute 0.75  10*3/mm3      Monocytes, Absolute 0.04 10*3/mm3      Eosinophils, Absolute 0.00 10*3/mm3      Basophils, Absolute 0.01 10*3/mm3      Immature Grans, Absolute 0.04 10*3/mm3      nRBC 0.0 /100 WBC     Troponin [[id number]]  (Normal) Collected: 11/02/21 2359    Specimen: Blood Updated: 11/03/21 0051     Troponin T <0.010 ng/mL     Narrative:      Troponin T Reference Range:  <= 0.03 ng/mL-   Negative for AMI  >0.03 ng/mL-     Abnormal for myocardial necrosis.  Clinicians would have to utilize clinical acumen, EKG, Troponin and serial changes to determine if it is an Acute Myocardial Infarction or myocardial injury due to an underlying chronic condition.       Results may be falsely decreased if patient taking Biotin.      Blood Culture - Blood, Arm, Right [[id number]]  (Normal) Collected: 11/01/21 2302    Specimen: Blood from Arm, Right Updated: 11/02/21 2315     Blood Culture No growth at 24 hours    Blood Culture - Blood, Arm, Left [[id number]]  (Abnormal) Collected: 11/01/21 2302    Specimen: Blood from Arm, Left Updated: 11/02/21 2241     Blood Culture Abnormal Stain     Gram Stain Aerobic Bottle Gram positive cocci in clusters     Comment: Bottle 1 of 4          Anaerobic Bottle Gram positive cocci in clusters     Comment: Bottle 2 of 4 drawn positive for gram positive cocci in clusters       Blood Culture ID, PCR - Blood, Arm, Left [[id number]]  (Abnormal) Collected: 11/01/21 2302    Specimen: Blood from Arm, Left Updated: 11/02/21 2024     BCID, PCR Staph spp, not aureus or lugdunesis. Identification by BCID2 PCR.    Troponin [[id number]]  (Normal) Collected: 11/02/21 1751    Specimen: Blood Updated: 11/02/21 1835     Troponin T <0.010 ng/mL     Narrative:      Troponin T Reference Range:  <= 0.03 ng/mL-   Negative for AMI  >0.03 ng/mL-     Abnormal for myocardial necrosis.  Clinicians would have to utilize clinical acumen, EKG, Troponin and serial changes to determine if it is an Acute Myocardial Infarction or  myocardial injury due to an underlying chronic condition.       Results may be falsely decreased if patient taking Biotin.            Imaging Results (Last 24 Hours)     Procedure Component Value Units Date/Time    XR Chest 1 View [[id number]] Collected: 11/03/21 0535     Updated: 11/03/21 0812    Narrative:      EXAMINATION:  XR CHEST 1 VW    CLINICAL HISTORY:  88 years Female,respiratory failure, J44.1  Chronic obstructive pulmonary disease with (acute) exacerbation  R77.8 Other specified abnormalities of plasma proteins I10  Essential (primary) hypertension    COMPARISON:  Chest x-ray dated 11/1/2021    FINDINGS:  Hyperinflation/emphysema. No focal airspace  consolidation. No pleural effusion or pneumothorax. Normal heart  size. Left chest wall pacemaker. No significant change relative  to 11/1/2021.      Impression:      Hyperinflation/emphysema, as noted previously,  without evidence for superimposed acute cardiopulmonary process.    Electronically signed by:  Dimitrios Egan MD  11/3/2021 8:10 AM CDT  Workstation: [id number]          Assessment/Plan       Acute on chronic respiratory failure with hypoxia (HCC)    COPD with acute exacerbation (HCC)    Acute on chronic diastolic heart failure (HCC)    Uncontrolled hypertension    Continue with supplemental oxygen, respiratory support, the IV steroids, nebulized treatments.  Stop the IV antibiotics since her white count is within normal limit and the chest x-ray does not show any acute infiltrates.    The staphylococcal species (not aureus) identified in 1/4 blood cultures bottle is most likely a  contaminant.    I confirmed that the patient's Advance Care Plan is present, code status is documented, or surrogate decision maker is listed in the patient's medical record.      Devante Hilliard MD  11/03/21  16:48 CDT

## 2021-11-03 NOTE — PLAN OF CARE
Goal Outcome Evaluation:  Plan of Care Reviewed With: patient        Progress: improving  Outcome Summary: vss; no new complaints this shift; no s/s of distress; worked with PT today; will continue to monitor

## 2021-11-03 NOTE — PLAN OF CARE
Problem: Adult Inpatient Plan of Care  Goal: Plan of Care Review  Recent Flowsheet Documentation  Taken 11/3/2021 1137 by Malena Mendoza PT  Plan of Care Reviewed With: patient  Outcome Summary: PT evaluation completed.  Pt pleasant and agreeable to therapy. Pt SOA as she was just back to bed from bathroom with nursing. Pt transferred supine to sit to supine with SBA and ambulated with RW 50ft with CGA. Pt was SOA with ambulation but denied lightheadedness or dizziness and O2 sats remained in upper 90's on 3lpm. Function limited by decreased strength, balance, and tolerance for functional mobility. Pt will benefit fromPT to regain lost function as pt improves medically. Anticipate home with home health at discharge.   Goal Outcome Evaluation:  Plan of Care Reviewed With: patient           Outcome Summary: PT evaluation completed.  Pt pleasant and agreeable to therapy. Pt SOA as she was just back to bed from bathroom with nursing. Pt transferred supine to sit to supine with SBA and ambulated with RW 50ft with CGA. Pt was SOA with ambulation but denied lightheadedness or dizziness and O2 sats remained in upper 90's on 3lpm. Function limited by decreased strength, balance, and tolerance for functional mobility. Pt will benefit fromPT to regain lost function as pt improves medically. Anticipate home with home health at discharge.

## 2021-11-03 NOTE — NURSING NOTE
Zeny Infante in lab called to report that pt has positive blood cultures. States she will notify MD as well.

## 2021-11-03 NOTE — PLAN OF CARE
Goal Outcome Evaluation:  Plan of Care Reviewed With: patient        Progress: no change       AVSS, a/o x 4, forgetful times. Pleasant and cooperative.     Fall risk: fall precautions in place. Pt able and compliant with using call light for assist OOB to bathroom.    Pain: Had c/o headache and back pain tonight, po meds given with relief noted.    Elimination: voiding well, see flowsheet. No bm this shift.    Breathing: consistent open mouth breathing, ambulation to bathroom and back to bed is a taxing effort, resulting in temporary tachypnea and increased shortness of breath from baseline. Remains on 4L O2 to maintain SpO2 above 92%. Did require prn breathing treatment this shift for increased dyspnes and wheezing following ambulation to bathroom and back to bed. Noted pt gets short of breath with much conversation with staff. Have assisted pt to minimize conversation for anything nonessential.  Pt aware and compliant.

## 2021-11-04 NOTE — NURSING NOTE
Assessed patient during 6am med pass.  Audible wheezing heard.  O2 Sat 97%.  Pt using some accessory muscles.  Notified RT for PRN neb treatment.

## 2021-11-04 NOTE — PROGRESS NOTES
Progress Note  Devante Hilliard MD  Hospitalist    Date of visit: 11/4/2021     LOS: 2 days   Patient Care Team:  Vero Arteaga APRN as PCP - General (Nurse Practitioner)    Chief Complaint: Shortness of breath    Subjective     Interval History:     Patient Complaints: Shortness of breath, improved to some extent.    History taken from: Patient and nursing    Medication Review:   Current Facility-Administered Medications   Medication Dose Route Frequency Provider Last Rate Last Admin   • acetaminophen (TYLENOL) tablet 650 mg  650 mg Oral Q4H PRN Monalisa Mcintosh MD   650 mg at 11/02/21 2238   • aspirin chewable tablet 81 mg  81 mg Oral Daily Monalisa Mcintosh MD   81 mg at 11/04/21 0741   • budesonide (PULMICORT) nebulizer solution 0.5 mg  0.5 mg Nebulization Daily - RT Monalisa Mcintosh MD   0.5 mg at 11/04/21 0919   • carvedilol (COREG) tablet 12.5 mg  12.5 mg Oral BID With Meals Monalisa Mcintosh MD   12.5 mg at 11/04/21 0741   • dilTIAZem (CARDIZEM) tablet 60 mg  60 mg Oral Q8H Monalisa Mcintosh MD   60 mg at 11/04/21 2100   • docusate sodium (COLACE) capsule 100 mg  100 mg Oral BID PRN Monalisa Mcintosh MD       • famotidine (PEPCID) tablet 40 mg  40 mg Oral Daily Monalisa Mcintosh MD   40 mg at 11/04/21 0741   • folic acid-vit B6-vit B12 (FOLBEE) tablet 1 tablet  1 tablet Oral Daily Monalisa Mcintosh MD   1 tablet at 11/02/21 1058   • hydrALAZINE (APRESOLINE) injection 10 mg  10 mg Intravenous Q4H PRN Monalisa Mcintosh MD   10 mg at 11/02/21 0122   • ipratropium-albuterol (DUO-NEB) nebulizer solution 3 mL  3 mL Nebulization PRN Monalisa Mcintosh MD   3 mL at 11/03/21 0130   • ipratropium-albuterol (DUO-NEB) nebulizer solution 3 mL  3 mL Nebulization 4x Daily - RT Katty Jay MD   3 mL at 11/04/21 1920   • isosorbide mononitrate (IMDUR) 24 hr tablet 60 mg  60 mg Oral Q24H Monalisa Mcintosh MD   60 mg at 11/04/21 0741   •  levothyroxine (SYNTHROID, LEVOTHROID) tablet 50 mcg  50 mcg Oral Q AM Monalisa Mcintosh MD   50 mcg at 11/04/21 0629   • losartan (COZAAR) tablet 100 mg  100 mg Oral Daily Monalisa Mcintosh MD   100 mg at 11/04/21 0741   • methylPREDNISolone sodium succinate (SOLU-Medrol) injection 40 mg  40 mg Intravenous Q8H Devante Hilliard MD   40 mg at 11/04/21 1649   • montelukast (SINGULAIR) tablet 10 mg  10 mg Oral Nightly Monalisa Mcintosh MD   10 mg at 11/04/21 2059   • nicotine (NICODERM CQ) 21 MG/24HR patch 1 patch  1 patch Transdermal Q24H Monalisa Mcintosh MD       • ondansetron (ZOFRAN) injection 4 mg  4 mg Intravenous Q6H PRN Monalisa Mcintosh MD       • pantoprazole (PROTONIX) EC tablet 40 mg  40 mg Oral QAM Monalisa Mcintosh MD   40 mg at 11/04/21 0629   • ranolazine (RANEXA) 12 hr tablet 500 mg  500 mg Oral BID Monalisa Mcintosh MD   500 mg at 11/04/21 2059   • sertraline (ZOLOFT) tablet 100 mg  100 mg Oral Daily Monalisa Mcintosh MD   100 mg at 11/04/21 0741   • sodium bicarbonate tablet 650 mg  650 mg Oral BID Monalisa Mcintosh MD   650 mg at 11/04/21 2059   • sodium chloride 0.9 % flush 10 mL  10 mL Intravenous PRN Monalisa Mcintosh MD   10 mL at 11/04/21 0742   • traMADol (ULTRAM) tablet 50 mg  50 mg Oral Q6H PRN Monalisa Mcintosh MD   50 mg at 11/03/21 0013       Review of Systems:   Review of Systems   Constitutional: Negative for fatigue.   Respiratory: Positive for cough and shortness of breath. Negative for wheezing.    Cardiovascular: Negative for chest pain, palpitations and leg swelling.   Gastrointestinal: Negative for abdominal distention, abdominal pain, anal bleeding, constipation, diarrhea, nausea and vomiting.   Genitourinary: Negative for dysuria, flank pain, frequency, hematuria and urgency.   Musculoskeletal: Positive for arthralgias. Negative for back pain.   Skin: Positive for pallor. Negative for color change and  rash.   Neurological: Positive for weakness. Negative for syncope, light-headedness, numbness and headaches.   Psychiatric/Behavioral: Negative for agitation and behavioral problems.   All other systems reviewed and are negative.      Objective     Vital Signs  Temp:  [98 °F (36.7 °C)-98.2 °F (36.8 °C)] 98 °F (36.7 °C)  Heart Rate:  [60-91] 60  Resp:  [16-19] 18  BP: (133-168)/(61-72) 168/70    Physical Exam:  Physical Exam  Vitals reviewed.   Constitutional:       Appearance: She is ill-appearing.   HENT:      Head: Normocephalic and atraumatic.   Eyes:      General: No scleral icterus.     Extraocular Movements: Extraocular movements intact.      Pupils: Pupils are equal, round, and reactive to light.   Cardiovascular:      Rate and Rhythm: Normal rate and regular rhythm.   Pulmonary:      Effort: No respiratory distress.      Breath sounds: Normal breath sounds. No stridor. No wheezing or rales.      Comments: Emphysematous chest  Chest:      Chest wall: No tenderness.   Abdominal:      General: Bowel sounds are normal. There is no distension.      Palpations: Abdomen is soft. There is no mass.      Tenderness: There is no abdominal tenderness. There is no right CVA tenderness, left CVA tenderness or guarding.      Hernia: No hernia is present.   Musculoskeletal:      Cervical back: Normal range of motion and neck supple. No rigidity or tenderness.   Skin:     General: Skin is warm and dry.      Coloration: Skin is pale. Skin is not jaundiced.      Findings: No bruising.   Neurological:      General: No focal deficit present.      Mental Status: She is alert and oriented to person, place, and time.      Cranial Nerves: No cranial nerve deficit.      Motor: No weakness.   Psychiatric:         Mood and Affect: Mood normal.         Behavior: Behavior normal.          Results Review:    Lab Results (last 24 hours)     Procedure Component Value Units Date/Time    Blood Gas, Arterial - [[id number]]  (Abnormal)  Collected: 11/04/21 1303    Specimen: Arterial Blood Updated: 11/04/21 1311     Site Right Radial     Serafin's Test N/A     pH, Arterial 7.362 pH units      pCO2, Arterial 48.5 mm Hg      Comment: 83 Value above reference range        pO2, Arterial 104.0 mm Hg      HCO3, Arterial 27.5 mmol/L      Comment: 83 Value above reference range        Base Excess, Arterial 1.6 mmol/L      O2 Saturation, Arterial 98.5 %      Barometric Pressure for Blood Gas 755 mmHg      Modality Nasal Cannula     Flow Rate 3.0 lpm      Ventilator Mode NA     Collected by GP     Comment: Meter: [id number]     :  [id number]       Blood Culture - Blood, Arm, Left [[id number]]  (Abnormal) Collected: 11/01/21 2302    Specimen: Blood from Arm, Left Updated: 11/04/21 1038     Blood Culture Staphylococcus, coagulase negative     Isolated from Aerobic and Anaerobic Bottles     Gram Stain Aerobic Bottle Gram positive cocci in clusters     Comment: Bottle 1 of 4          Anaerobic Bottle Gram positive cocci in clusters     Comment: Bottle 2 of 4 drawn positive for gram positive cocci in clusters       Narrative:      Probable contaminant requires clinical correlation, susceptibility not performed unless requested by physician.      Basic Metabolic Panel [[id number]]  (Abnormal) Collected: 11/04/21 0528    Specimen: Blood Updated: 11/04/21 0556     Glucose 133 mg/dL      BUN 32 mg/dL      Creatinine 1.00 mg/dL      Sodium 139 mmol/L      Potassium 4.2 mmol/L      Chloride 103 mmol/L      CO2 27.0 mmol/L      Calcium 8.6 mg/dL      eGFR Non African Amer 52 mL/min/1.73      BUN/Creatinine Ratio 32.0     Anion Gap 9.0 mmol/L     Narrative:      GFR Normal >60  Chronic Kidney Disease <60  Kidney Failure <15      CBC & Differential [[id number]]  (Abnormal) Collected: 11/04/21 0528    Specimen: Blood Updated: 11/04/21 0536    Narrative:      The following orders were created for panel order CBC & Differential.  Procedure                                Abnormality         Status                     ---------                               -----------         ------                     CBC Auto Differential[[id number]]        Abnormal            Final result                 Please view results for these tests on the individual orders.    CBC Auto Differential [[id number]]  (Abnormal) Collected: 11/04/21 0528    Specimen: Blood Updated: 11/04/21 0536     WBC 9.58 10*3/mm3      RBC 3.03 10*6/mm3      Hemoglobin 9.5 g/dL      Hematocrit 28.8 %      MCV 95.0 fL      MCH 31.4 pg      MCHC 33.0 g/dL      RDW 13.6 %      RDW-SD 47.0 fl      MPV 10.4 fL      Platelets 182 10*3/mm3      Neutrophil % 92.0 %      Lymphocyte % 6.6 %      Monocyte % 0.9 %      Eosinophil % 0.0 %      Basophil % 0.1 %      Immature Grans % 0.4 %      Neutrophils, Absolute 8.81 10*3/mm3      Lymphocytes, Absolute 0.63 10*3/mm3      Monocytes, Absolute 0.09 10*3/mm3      Eosinophils, Absolute 0.00 10*3/mm3      Basophils, Absolute 0.01 10*3/mm3      Immature Grans, Absolute 0.04 10*3/mm3      nRBC 0.0 /100 WBC     Blood Culture - Blood, Arm, Right [[id number]]  (Normal) Collected: 11/01/21 2302    Specimen: Blood from Arm, Right Updated: 11/03/21 2315     Blood Culture No growth at 2 days          Imaging Results (Last 24 Hours)     Procedure Component Value Units Date/Time    XR Chest 1 View [[id number]] Collected: 11/04/21 1139     Updated: 11/04/21 1203    Narrative:      EXAM DESCRIPTION:     XR CHEST 1 VW    CLINICAL HISTORY:     88 years  Female  increased dyspnea, J44.1 Chronic obstructive  pulmonary disease with (acute) exacerbation R77.8 Other specified  abnormalities of plasma proteins I10 Essential (primary)  hypertension Z74.09 Other reduced mobility    COMPARISON:     November 3, 2021    TECHNIQUE:     One view-AP portable radiograph the chest    FINDINGS:     The lungs are well-expanded. There is minimal subsegmental  atelectasis in the left lung base. The right lung is clear.  The  cardiac silhouette and pulmonary vasculature are within normal  limits. A bipolar pacing device is noted.      Impression:          1. Minimal subsegmental atelectasis in the left lung base.         Electronically signed by:  Claudia Lindsay MD  11/4/2021 12:02 PM  CDT Workstation: [id number]          Assessment/Plan       Acute on chronic respiratory failure with hypoxia (HCC)    COPD with acute exacerbation (HCC)    Acute on chronic diastolic heart failure (HCC)    Uncontrolled hypertension    Continue with supplemental oxygen, respiratory support, the IV steroids, nebulized treatments.  Stop the IV antibiotics since her white count is within normal limit and the chest x-ray does not show any acute infiltrates.    The staphylococcal species (not aureus) identified in 1/4 blood cultures bottle is most likely a ontaminant.    Continue with PT/OT. The patient herself does not want artificial life support (intubation / CPR).    I confirmed that the patient's Advance Care Plan is present, code status is documented, or surrogate decision maker is listed in the patient's medical record.      Devante Hilliard MD  11/04/21  22:30 CDT

## 2021-11-04 NOTE — PLAN OF CARE
Goal Outcome Evaluation:  Plan of Care Reviewed With: patient           Outcome Summary: sup-sit-sup sba with hob up and bedrails;soa but sats >96%;only able to perform ap-reinforced plb with eob/activity

## 2021-11-04 NOTE — THERAPY EVALUATION
Patient Name: Val Arteaga  : 1932    MRN: [id number]                              Today's Date: 2021       Admit Date: 2021    Visit Dx:     ICD-10-CM ICD-9-CM   1. COPD exacerbation ([facility])  J44.1 491.21   2. Elevated troponin  R77.8 790.6   3. Uncontrolled hypertension  I10 401.9   4. Impaired functional mobility, balance, gait, and endurance  Z74.09 V49.89   5. Impaired mobility and ADLs  Z74.09 V49.89    Z78.9      Patient Active Problem List   Diagnosis   • Uncontrolled hypertension   • CAD (coronary artery disease)   • Chronic obstructive pulmonary disease ([facility])   • Acquired hypothyroidism   • Depressive disorder   • Thygeson's superficial punctate keratitis   • Pseudophakia   • Precordial pain   • Displaced fracture of base of neck of right femur, initial encounter for closed fracture ([facility])   • Hip fracture, right, closed, initial encounter ([facility])   • Acute on chronic systolic CHF (congestive heart failure) ([facility])   • Hyponatremia   • BENITO (acute kidney injury) ([facility])   • Acute blood loss anemia   • Urinary retention   • Closed displaced basicervical fracture of right femur ([facility])   • Urethral caruncle   • Symptomatic anemia   • Anemia   • Major depressive disorder   • Drug-induced encephalopathy   • Acute left-sided low back pain without sciatica   • Bilateral sacral insufficiency fracture   • Osteoarthritis of multiple joints   • Dyspnea   • CHF (congestive heart failure) ([facility])   • Hypercholesterolemia   • Chest pain   • Acute on chronic respiratory failure with hypoxia ([facility])   • Acute on chronic diastolic heart failure ([facility])   • Malignant hypertension   • COPD exacerbation ([facility])   • COPD with acute exacerbation ([facility])   • Elevated troponin   • Dyspnea and respiratory abnormalities   • Shortness of breath   • Uncontrolled hypersomnia     Past Medical History:   Diagnosis Date   • Allergic rhinitis    • Chronic diastolic heart failure ([facility])    • COPD (chronic obstructive pulmonary disease)  ([facility])    • Coronary arteriosclerosis    • Depression    • GERD (gastroesophageal reflux disease)    • Hypercholesterolemia    • Hypertension    • Hypothyroidism    • LVH (left ventricular hypertrophy)    • Myocardial infarction ([facility])    • Osteoarthritis      Past Surgical History:   Procedure Laterality Date   • BREAST SURGERY     • CARDIAC CATHETERIZATION     • CERVICAL SPINE SURGERY     • COLONOSCOPY     • CORONARY ANGIOPLASTY     • ENDOSCOPY N/A 2/14/2018   • ENDOSCOPY AND COLONOSCOPY     • HIP BIPOLAR REPLACEMENT Right 1/23/2018   • PACEMAKER REPLACEMENT        General Information     Row Name 11/04/21 1054          OT Time and Intention    Document Type evaluation  -ME     Mode of Treatment individual therapy; occupational therapy  -ME     Row Name 11/04/21 1054          General Information    Patient Profile Reviewed yes  -ME     Prior Level of Function independent:; all household mobility; community mobility; ADL's  -ME     Existing Precautions/Restrictions fall; oxygen therapy device and L/min  -ME     Row Name 11/04/21 1054          Living Environment    Lives With alone  -ME     Row Name 11/04/21 1054          Home Main Entrance    Number of Stairs, Main Entrance two  -ME     Stair Railings, Main Entrance railings on both sides of stairs  -ME     Row Name 11/04/21 1054          Stairs Within Home, Primary    Stairs, Within Home, Primary friend brings food often; cooks and does laundry on her own at home; uses a RW; walk in shower, but does have a lip on the shower that she has to step over; does not have a shower chair but has a built in seat, and has grab bars  -ME     Number of Stairs, Within Home, Primary none  -ME     Row Name 11/04/21 1054          Cognition    Orientation Status (Cognition) oriented x 4  -ME     Row Name 11/04/21 1054          Safety Issues, Functional Mobility    Safety Issues Affecting Function (Mobility) awareness of need for assistance; safety precaution awareness; safety  precautions follow-through/compliance  -ME     Impairments Affecting Function (Mobility) balance; endurance/activity tolerance; strength; shortness of breath  -ME           User Key  (r) = Recorded By, (t) = Taken By, (c) = Cosigned By    Initials Name Provider Type    ME Skip Monte OTR/L Occupational Therapist                 Mobility/ADL's     Row Name 11/04/21 1054          Bed Mobility    Bed Mobility supine-sit; sit-supine  -ME     Supine-Sit [place] (Bed Mobility) standby assist  -ME     Sit-Supine [place] (Bed Mobility) standby assist  -ME     Row Name 11/04/21 1054          Transfers    Transfers sit-stand transfer  -ME     Sit-Stand [place] (Transfers) contact guard  -ME     Row Name 11/04/21 1054          Sit-Stand Transfer    Assistive Device (Sit-Stand Transfers) walker, front-wheeled  -ME     Row Name 11/04/21 1054          Functional Mobility    Functional Mobility- Ind. Level contact guard assist  -ME     Functional Mobility- Device rolling walker  -ME     Row Name 11/04/21 1054          Activities of Daily Living    BADL Assessment/Intervention lower body dressing  -ME     Row Name 11/04/21 1054          Lower Body Dressing Assessment/Training    [place] Level (Lower Body Dressing) don; pants/bottoms; moderate assist (50% patient effort)  -ME     Position (Lower Body Dressing) sitting up in bed  -ME           User Key  (r) = Recorded By, (t) = Taken By, (c) = Cosigned By    Initials Name Provider Type    ME Skip Monte OTR/L Occupational Therapist               Obj/Interventions     Row Name 11/04/21 1054          Sensory Assessment (Somatosensory)    Sensory Assessment (Somatosensory) UE sensation intact  -ME     Row Name 11/04/21 1054          Range of Motion Comprehensive    General Range of Motion no range of motion deficits identified; bilateral upper extremity ROM WFL  -ME     Row Name 11/04/21 1054          Strength Comprehensive (MMT)    General Manual Muscle  Testing (MMT) Assessment other (see comments)  -ME     Comment, General Manual Muscle Testing (MMT) Assessment BUE strength and bilateral  strength are grossly 4-/5; pt is right handed  -ME           User Key  (r) = Recorded By, (t) = Taken By, (c) = Cosigned By    Initials Name Provider Type    ME Skip Monte, OTR/L Occupational Therapist               Goals/Plan     Row Name 11/04/21 1054          Transfer Goal 1 (OT)    Activity/Assistive Device (Transfer Goal 1, OT) toilet  -ME     [place] Level/Cues Needed (Transfer Goal 1, OT) supervision required  -ME     Time Frame (Transfer Goal 1, OT) long term goal (LTG); by discharge  -ME     Progress/Outcome (Transfer Goal 1, OT) goal not met  -[facility] Name 11/04/21 1054          Bathing Goal 1 (OT)    Activity/Device (Bathing Goal 1, OT) lower body bathing  -ME     [place] Level/Cues Needed (Bathing Goal 1, OT) set-up required; supervision required  -ME     Time Frame (Bathing Goal 1, OT) long term goal (LTG); by discharge  -ME     Progress/Outcomes (Bathing Goal 1, OT) goal not met  -[facility] Name 11/04/21 1054          Dressing Goal 1 (OT)    Activity/Device (Dressing Goal 1, OT) lower body dressing  -ME     [place]/Cues Needed (Dressing Goal 1, OT) set-up required; supervision required  -ME     Time Frame (Dressing Goal 1, OT) long term goal (LTG); by discharge  -ME     Progress/Outcome (Dressing Goal 1, OT) goal not met  -ME     Row Name 11/04/21 1054          Therapy Assessment/Plan (OT)    Planned Therapy Interventions (OT) activity tolerance training; adaptive equipment training; BADL retraining; functional balance retraining; IADL retraining; occupation/activity based interventions; patient/caregiver education/training; ROM/therapeutic exercise; transfer/mobility retraining; strengthening exercise  -ME           User Key  (r) = Recorded By, (t) = Taken By, (c) = Cosigned By    Initials Name Provider Type    ME Skip Monte, OTR/L  Occupational Therapist               Clinical Impression     Row Name 11/04/21 1054          Pain Assessment    Additional Documentation Pain Scale: Numbers Pre/Post-Treatment (Group)  -ME     Row Name 11/04/21 1054          Pain Scale: Numbers Pre/Post-Treatment    Pretreatment Pain Rating 0/10 - no pain  -ME     Posttreatment Pain Rating 0/10 - no pain  -ME     Row Name 11/04/21 1054          Plan of Care Review    Plan of Care Reviewed With patient; family  -ME     Row Name 11/04/21 1054          Therapy Assessment/Plan (OT)    Patient/Family Therapy Goal Statement (OT) to return home  -ME     Rehab Potential (OT) good, to achieve stated therapy goals  -ME     Criteria for Skilled Therapeutic Interventions Met (OT) yes; meets criteria; skilled treatment is necessary  -ME     Therapy Frequency (OT) other (see comments)  5-7 days per week  -ME     Predicted Duration of Therapy Intervention (OT) until d/c or all goals met  -ME     Row Name 11/04/21 1054          Therapy Plan Review/Discharge Plan (OT)    Anticipated Discharge Disposition (OT) home; home with assist; home with home health  -ME     Row Name 11/04/21 1054          Vital Signs    Pre Systolic BP Rehab 119  -ME     Pre Treatment Diastolic BP 56  -ME     Pretreatment Heart Rate (beats/min) 61  -ME     Posttreatment Heart Rate (beats/min) 61  -ME     Pre SpO2 (%) 96  -ME     O2 Delivery Pre Treatment nasal cannula  -ME     Post SpO2 (%) 96  -ME     O2 Delivery Post Treatment nasal cannula  -ME     Pre Patient Position Sitting  -ME     Post Patient Position Supine  -ME     Row Name 11/04/21 1054          Positioning and Restraints    Pre-Treatment Position in bed  -ME     Post Treatment Position bed  -ME     In Bed notified nsg; fowlers; call light within reach; encouraged to call for assist; exit alarm on; with family/caregiver  -ME           User Key  (r) = Recorded By, (t) = Taken By, (c) = Cosigned By    Initials Name Provider Type    Skip Arriola  OTR/L Occupational Therapist               Outcome Measures     Row Name 11/04/21 1054          How much help from another is currently needed...    Putting on and taking off regular lower body clothing? 3  -ME     Bathing (including washing, rinsing, and drying) 3  -ME     Toileting (which includes using toilet bed pan or urinal) 3  -ME     Putting on and taking off regular upper body clothing 3  -ME     Taking care of personal grooming (such as brushing teeth) 4  -ME     Eating meals 4  -ME     AM-PAC 6 Clicks Score (OT) 20  -ME     Row Name 11/04/21 1054          Functional Assessment    Outcome Measure Options AM-PAC 6 Clicks Daily Activity (OT)  -ME           User Key  (r) = Recorded By, (t) = Taken By, (c) = Cosigned By    Initials Name Provider Type    ME Skip Monte OTR/L Occupational Therapist                Occupational Therapy Education                 Title: PT OT SLP Therapies (In Progress)     Topic: Occupational Therapy (In Progress)     Point: ADL training (Not Started)     Description:   Instruct learner(s) on proper safety adaptation and remediation techniques during self care or transfers.   Instruct in proper use of assistive devices.              Learner Progress:  Not documented in this visit.          Point: Home exercise program (Not Started)     Description:   Instruct learner(s) on appropriate technique for monitoring, assisting and/or progressing therapeutic exercises/activities.              Learner Progress:  Not documented in this visit.          Point: Precautions (Done)     Description:   Instruct learner(s) on prescribed precautions during self-care and functional transfers.              Learning Progress Summary           Patient Acceptance, E, VU,NR by ME at 11/4/2021 1223    Comment: Educated on OT and POC .Educated tocall for assistance. Educated on safety precautions.   Family Acceptance, E, VU,NR by ME at 11/4/2021 1223    Comment: Educated on OT and POC .Educated tocall  for assistance. Educated on safety precautions.                   Point: Body mechanics (Done)     Description:   Instruct learner(s) on proper positioning and spine alignment during self-care, functional mobility activities and/or exercises.              Learning Progress Summary           Patient Acceptance, E, VU,NR by ME at 11/4/2021 1223    Comment: Educated on OT and POC .Educated tocall for assistance. Educated on safety precautions.   Family Acceptance, E, VU,NR by ME at 11/4/2021 1223    Comment: Educated on OT and POC .Educated tocall for assistance. Educated on safety precautions.                               User Key     Initials Effective Dates Name Provider Type Discipline    ME 06/16/21 -  Skip Monte, OTR/L Occupational Therapist OT              OT Recommendation and Plan  Planned Therapy Interventions (OT): activity tolerance training, adaptive equipment training, BADL retraining, functional balance retraining, IADL retraining, occupation/activity based interventions, patient/caregiver education/training, ROM/therapeutic exercise, transfer/mobility retraining, strengthening exercise  Therapy Frequency (OT): other (see comments) (5-7 days per week)  Plan of Care Review  Plan of Care Reviewed With: patient, family  Outcome Summary: initial OT evaluation complete. pt was pleasant and cooperative throughout. complaining of severe shortness of breath throughout, and appearing to breathe very hard, using accessory muscles as well. pt was CGA for transfers and functional mobility. SBA for bed mobility. min-mod A for donning underwear while sitting up in bed. BUE ROM WFL grossly. BUE strength and bilateral  strength are grossly 4-/5. recommend further skilled OT services to address functional mobility and ADL deficits, as well as balance, strength, and endurance. recommend home with assistance and home health OT at discharge. goals established.     Time Calculation:    Time Calculation- OT     Row  Name 11/04/21 1226             Time Calculation- OT    OT Start Time 1054  -ME      OT Stop Time 1117  -ME      OT Time Calculation (min) 23 min  -ME      OT Received On 11/04/21  -ME      OT Goal Re-Cert Due Date 11/17/21  -ME              Untimed Charges    OT Eval/Re-eval Minutes 23  -ME              Total Minutes    Untimed Charges Total Minutes 23  -ME       Total Minutes 23  -ME            User Key  (r) = Recorded By, (t) = Taken By, (c) = Cosigned By    Initials Name Provider Type    ME Skip Monte OTR/L Occupational Therapist              Therapy Charges for Today     Code Description Service Date Service Provider Modifiers Qty    48145361931 HC OT EVAL MOD COMPLEXITY 2 11/4/2021 Skip Monte OTR/L GO 1               AYAD Sierra/HINA  11/4/2021

## 2021-11-04 NOTE — PLAN OF CARE
Goal Outcome Evaluation:  Plan of Care Reviewed With: patient        Progress: declining  Outcome Summary: vss; more WEBB today; prn treatments effective; no s/s of distress at this time; will continue to monitor

## 2021-11-04 NOTE — PLAN OF CARE
Goal Outcome Evaluation:  Plan of Care Reviewed With: patient, family           Outcome Summary: initial OT evaluation complete. pt was pleasant and cooperative throughout. complaining of severe shortness of breath throughout, and appearing to breathe very hard, using accessory muscles as well. pt was CGA for transfers and functional mobility. SBA for bed mobility. min-mod A for donning underwear while sitting up in bed. BUE ROM WFL grossly. BUE strength and bilateral  strength are grossly 4-/5. recommend further skilled OT services to address functional mobility and ADL deficits, as well as balance, strength, and endurance. recommend home with assistance and home health OT at discharge. goals established.

## 2021-11-04 NOTE — NURSING NOTE
Pt not wearing SCD's this shift.  She is presenting with confusion and impulsivity to getting out of bed posing a potential fall hazard.

## 2021-11-04 NOTE — THERAPY TREATMENT NOTE
Acute Care - Physical Therapy Treatment Note  [facility]     Patient Name: Val Arteaga  : 1932  MRN: [id number]  Today's Date: 2021      Visit Dx:     ICD-10-CM ICD-9-CM   1. COPD exacerbation ([facility])  J44.1 491.21   2. Elevated troponin  R77.8 790.6   3. Uncontrolled hypertension  I10 401.9   4. Impaired functional mobility, balance, gait, and endurance  Z74.09 V49.89   5. Impaired mobility and ADLs  Z74.09 V49.89    Z78.9      Patient Active Problem List   Diagnosis   • Uncontrolled hypertension   • CAD (coronary artery disease)   • Chronic obstructive pulmonary disease ([facility])   • Acquired hypothyroidism   • Depressive disorder   • Thygeson's superficial punctate keratitis   • Pseudophakia   • Precordial pain   • Displaced fracture of base of neck of right femur, initial encounter for closed fracture ([facility])   • Hip fracture, right, closed, initial encounter ([facility])   • Acute on chronic systolic CHF (congestive heart failure) ([facility])   • Hyponatremia   • BENITO (acute kidney injury) ([facility])   • Acute blood loss anemia   • Urinary retention   • Closed displaced basicervical fracture of right femur ([facility])   • Urethral caruncle   • Symptomatic anemia   • Anemia   • Major depressive disorder   • Drug-induced encephalopathy   • Acute left-sided low back pain without sciatica   • Bilateral sacral insufficiency fracture   • Osteoarthritis of multiple joints   • Dyspnea   • CHF (congestive heart failure) ([facility])   • Hypercholesterolemia   • Chest pain   • Acute on chronic respiratory failure with hypoxia ([facility])   • Acute on chronic diastolic heart failure ([facility])   • Malignant hypertension   • COPD exacerbation ([facility])   • COPD with acute exacerbation ([facility])   • Elevated troponin   • Dyspnea and respiratory abnormalities   • Shortness of breath   • Uncontrolled hypersomnia     Past Medical History:   Diagnosis Date   • Allergic rhinitis    • Chronic diastolic heart failure ([facility])    • COPD (chronic obstructive pulmonary  "disease) ([facility])    • Coronary arteriosclerosis    • Depression    • GERD (gastroesophageal reflux disease)    • Hypercholesterolemia    • Hypertension    • Hypothyroidism    • LVH (left ventricular hypertrophy)    • Myocardial infarction ([facility])    • Osteoarthritis      Past Surgical History:   Procedure Laterality Date   • BREAST SURGERY     • CARDIAC CATHETERIZATION     • CERVICAL SPINE SURGERY     • COLONOSCOPY     • CORONARY ANGIOPLASTY     • ENDOSCOPY N/A 2/14/2018   • ENDOSCOPY AND COLONOSCOPY     • HIP BIPOLAR REPLACEMENT Right 1/23/2018   • PACEMAKER REPLACEMENT       PT Assessment (last 12 hours)     PT Evaluation and Treatment     Row Name 11/04/21 0939          Physical Therapy Time and Intention    Subjective Information complains of; fatigue; dyspnea  -LN     Document Type therapy note (daily note)  -LN     Mode of Treatment individual therapy; physical therapy  -LN     Comment pt just finished breathing rx but wanting to \"do a little if I can\"  -LN     Row Name 11/04/21 0939          General Information    Patient Profile Reviewed yes  -LN     Existing Precautions/Restrictions fall; oxygen therapy device and L/min  -LN     Row Name 11/04/21 0939          Cognition    Orientation Status (Cognition) oriented x 4  -LN     Row Name 11/04/21 0939          Pain Scale: Numbers Pre/Post-Treatment    Pretreatment Pain Rating 0/10 - no pain  -LN     Posttreatment Pain Rating 0/10 - no pain  -LN     Row Name 11/04/21 0939          Bed Mobility    Supine-Sit [place] (Bed Mobility) standby assist  -LN     Sit-Supine [place] (Bed Mobility) standby assist  -LN     Assistive Device (Bed Mobility) bed rails; head of bed elevated  -LN     Row Name 11/04/21 0939          Safety Issues, Functional Mobility    Impairments Affecting Function (Mobility) endurance/activity tolerance; shortness of breath  -LN     Row Name 11/04/21 1224          Motor Skills    Therapeutic Exercise hip; knee; ankle  -LN     Row Name " 11/04/21 0939          Plan of Care Review    Plan of Care Reviewed With patient  -LN     Outcome Summary sup-sit-sup sba with hob up and bedrails;soa but sats >96%;only able to perform ap-reinforced plb with eob/activity  -LN     Row Name 11/04/21 0939          Vital Signs    Pre Systolic BP Rehab 94  -LN     Pre Treatment Diastolic BP 50  -LN     Post Systolic BP Rehab 102  -LN     Post Treatment Diastolic BP 60  -LN     Pretreatment Heart Rate (beats/min) 60  -LN     Posttreatment Heart Rate (beats/min) 63  -LN     Pre SpO2 (%) 96  -LN     O2 Delivery Pre Treatment supplemental O2  -LN     Post SpO2 (%) 96  -LN     Pre Patient Position Sitting  -LN     Intra Patient Position Sitting  -LN     Post Patient Position Sitting  -LN     Row Name 11/04/21 0939          Positioning and Restraints    Post Treatment Position bed  -LN     In Bed fowlers; call light within reach; encouraged to call for assist; exit alarm on; with family/caregiver  -LN           User Key  (r) = Recorded By, (t) = Taken By, (c) = Cosigned By    Initials Name Provider Type    Sosa Braswell PTA Physical Therapy Assistant              Physical Therapy Education                 Title: PT OT SLP Therapies (In Progress)     Topic: Physical Therapy (In Progress)     Point: Mobility training (Done)     Learning Progress Summary           Patient Acceptance, E, MONA PICHARDO by RONAN at 11/3/2021 1437                   Point: Home exercise program (Not Started)     Learner Progress:  Not documented in this visit.          Point: Body mechanics (Done)     Learning Progress Summary           Patient Acceptance, E, VU,MONA by RONAN at 11/3/2021 1437                   Point: Precautions (Done)     Learning Progress Summary           Patient Acceptance, E, KYLEE,MONA by RONAN at 11/3/2021 1437                               User Key     Initials Effective Dates Name Provider Type Discipline    RONAN 06/16/21 -  Malena Mendoza PT Physical Therapist PT              PT Recommendation  and Plan     Plan of Care Reviewed With: patient  Outcome Summary: sup-sit-sup sba with hob up and bedrails;soa but sats >96%;only able to perform ap-reinforced plb with eob/activity       Time Calculation:    PT Charges     Row Name 11/04/21 1251             Time Calculation    Start Time 0939  -LN      Stop Time 1005  -LN      Time Calculation (min) 26 min  -LN      PT Received On 11/04/21  -LN              Time Calculation- PT    Total Timed Code Minutes- PT 26 minute(s)  -LN            User Key  (r) = Recorded By, (t) = Taken By, (c) = Cosigned By    Initials Name Provider Type    LN Sosa Kamara PTA Physical Therapy Assistant              Therapy Charges for Today     Code Description Service Date Service Provider Modifiers Qty    54973349964 HC PT THERAPEUTIC ACT EA 15 MIN 11/4/2021 Sosa Kamara PTA GP 1    01585348684 HC PT SELF CARE/MGMT/TRAIN EA 15 MIN 11/4/2021 Sosa Kamara PTA GP 1          PT G-Codes  Outcome Measure Options: AM-PAC 6 Clicks Daily Activity (OT)  AM-PAC 6 Clicks Score (PT): 20  AM-PAC 6 Clicks Score (OT): 20    Sosa Kamara PTA  11/4/2021

## 2021-11-04 NOTE — PLAN OF CARE
Goal Outcome Evaluation:  Plan of Care Reviewed With: patient           Outcome Summary: VSS this shift, no complaints of pain, patient requested to have Nicorette gum stating she used to smoke years ago and felt like she needed the Nicotene gum, received a PRN order. Pt presenting with some confusion this shift. Safety maintained.

## 2021-11-05 NOTE — PLAN OF CARE
Problem: Adult Inpatient Plan of Care  Goal: Plan of Care Review  Outcome: Ongoing, Progressing  Flowsheets  Taken 11/5/2021 1309  Progress: declining  Plan of Care Reviewed With: patient  Taken 11/5/2021 0825  Progress: declining  Outcome Summary: Pt having a hard time catching breath. Pt sitting in bschair agreed to perform grooming with Min A. Pt performing PLB with tx. 02 staying above 94. Educated pt on Home safety. Pt sitting in bschair all needs in reach Niece in room.   Goal Outcome Evaluation:  Plan of Care Reviewed With: patient        Progress: declining  Outcome Summary: Pt having a hard time catching breath. Pt sitting in bschair agreed to perform grooming with Min A. Pt performing PLB with tx. 02 staying above 94. Educated pt on Home safety. Pt sitting in bschair all needs in reach Niece in room.

## 2021-11-05 NOTE — NURSING NOTE
Daily weight not obtained on night shift.  Pt having difficulty breathing, sitting upright in reclining chair.  This nurse did not want to add any extra efforts on patients respiratory status by laying flat in bed for bed weight.  Will request on-coming nurse to obtain weight once up.

## 2021-11-05 NOTE — NURSING NOTE
Pt calling out asking for help.  This nurse went to check on her.  She was sitting up in bed in a tripod position on her bedside table.  Pt voiced she felt like she was smothering.  O2 Sat 97%.  Pt using accessory muscles to breathe.  Wheezing audible.  Called RT and requested PRN breathing treatment.  Sat with patient until RT arrived in room and demonstrated pursed lip breathing.  Pt receptive of education.

## 2021-11-05 NOTE — NURSING NOTE
Pt using accessory muscles to breath with wheezing audible.  O2 Sat 97%- pt requested PRN breathing treatment.  RT notified.

## 2021-11-05 NOTE — PLAN OF CARE
Goal Outcome Evaluation:  Plan of Care Reviewed With: patient           Outcome Summary: sit-stand-sit sba of 1,amb 16',32' with rw and cga of 1-sats >95% but with increased rr and needed encouragement for plb

## 2021-11-05 NOTE — PLAN OF CARE
Goal Outcome Evaluation:  Plan of Care Reviewed With: patient        Progress: declining  Outcome Summary: VSS this shift.  No complaints of pain.  Pt noted to be using accessory muscles to breathe some on this shift.  Requested PRN neb treatment from RT.  Pt slept well most of shift, confusion and disorientation present. Caregiver from home present at bedside at beginning of shift.  Safety maintained.

## 2021-11-05 NOTE — THERAPY TREATMENT NOTE
Patient Name: Val Arteaga  : 1932    MRN: [id number]                              Today's Date: 2021       Admit Date: 2021    Visit Dx:     ICD-10-CM ICD-9-CM   1. COPD exacerbation ([facility])  J44.1 491.21   2. Elevated troponin  R77.8 790.6   3. Uncontrolled hypertension  I10 401.9   4. Impaired functional mobility, balance, gait, and endurance  Z74.09 V49.89   5. Impaired mobility and ADLs  Z74.09 V49.89    Z78.9      Patient Active Problem List   Diagnosis   • Uncontrolled hypertension   • CAD (coronary artery disease)   • Chronic obstructive pulmonary disease ([facility])   • Acquired hypothyroidism   • Depressive disorder   • Thygeson's superficial punctate keratitis   • Pseudophakia   • Precordial pain   • Displaced fracture of base of neck of right femur, initial encounter for closed fracture ([facility])   • Hip fracture, right, closed, initial encounter ([facility])   • Acute on chronic systolic CHF (congestive heart failure) ([facility])   • Hyponatremia   • BENITO (acute kidney injury) ([facility])   • Acute blood loss anemia   • Urinary retention   • Closed displaced basicervical fracture of right femur ([facility])   • Urethral caruncle   • Symptomatic anemia   • Anemia   • Major depressive disorder   • Drug-induced encephalopathy   • Acute left-sided low back pain without sciatica   • Bilateral sacral insufficiency fracture   • Osteoarthritis of multiple joints   • Dyspnea   • CHF (congestive heart failure) ([facility])   • Hypercholesterolemia   • Chest pain   • Acute on chronic respiratory failure with hypoxia ([facility])   • Acute on chronic diastolic heart failure ([facility])   • Malignant hypertension   • COPD exacerbation ([facility])   • COPD with acute exacerbation ([facility])   • Elevated troponin   • Dyspnea and respiratory abnormalities   • Shortness of breath   • Uncontrolled hypersomnia     Past Medical History:   Diagnosis Date   • Allergic rhinitis    • Chronic diastolic heart failure ([facility])    • COPD (chronic obstructive pulmonary disease)  ([facility])    • Coronary arteriosclerosis    • Depression    • GERD (gastroesophageal reflux disease)    • Hypercholesterolemia    • Hypertension    • Hypothyroidism    • LVH (left ventricular hypertrophy)    • Myocardial infarction (H[facility])    • Osteoarthritis      Past Surgical History:   Procedure Laterality Date   • BREAST SURGERY     • CARDIAC CATHETERIZATION     • CERVICAL SPINE SURGERY     • COLONOSCOPY     • CORONARY ANGIOPLASTY     • ENDOSCOPY N/A 2/14/2018   • ENDOSCOPY AND COLONOSCOPY     • HIP BIPOLAR REPLACEMENT Right 1/23/2018   • PACEMAKER REPLACEMENT        General Information     Row Name 11/05/21 0825          OT Time and Intention    Document Type therapy note (daily note)  -LW     Mode of Treatment individual therapy; occupational therapy  -     Row Name 11/05/21 0825          General Information    Patient Profile Reviewed yes  -LW     Existing Precautions/Restrictions fall  -     Row Name 11/05/21 0825          Cognition    Orientation Status (Cognition) oriented x 4  -     Row Name 11/05/21 0825          Safety Issues, Functional Mobility    Impairments Affecting Function (Mobility) endurance/activity tolerance; shortness of breath; strength  -           User Key  (r) = Recorded By, (t) = Taken By, (c) = Cosigned By    Initials Name Provider Type    Aide Medina COTA Occupational Therapy Assistant                 Mobility/ADL's     Row Name 11/05/21 0825          Activities of Daily Living    BADL Assessment/Intervention grooming  -     Row Name 11/05/21 0825          Grooming Assessment/Training    [place]rockett Level (Grooming) hair care, combing/brushing; oral care regimen; wash face, hands; minimum assist (75% patient effort)  -LW     Position (Grooming) supported sitting  -           User Key  (r) = Recorded By, (t) = Taken By, (c) = Cosigned By    Initials Name Provider Type    Aide Medina COTA Occupational Therapy Assistant               Obj/Interventions    No  documentation.                Goals/Plan     Row Name 11/05/21 0825          Transfer Goal 1 (OT)    Activity/Assistive Device (Transfer Goal 1, OT) toilet  -LW     Blair Level/Cues Needed (Transfer Goal 1, OT) supervision required  -LW     Time Frame (Transfer Goal 1, OT) long term goal (LTG); by discharge  -LW     Progress/Outcome (Transfer Goal 1, OT) goal not met  -     Row Name 11/05/21 0825          Bathing Goal 1 (OT)    Activity/Device (Bathing Goal 1, OT) lower body bathing  -LW     Blair Level/Cues Needed (Bathing Goal 1, OT) set-up required; supervision required  -LW     Time Frame (Bathing Goal 1, OT) long term goal (LTG); by discharge  -LW     Progress/Outcomes (Bathing Goal 1, OT) goal not met  -     Row Name 11/05/21 0825          Dressing Goal 1 (OT)    Activity/Device (Dressing Goal 1, OT) lower body dressing  -LW     Blair/Cues Needed (Dressing Goal 1, OT) set-up required; supervision required  -LW     Time Frame (Dressing Goal 1, OT) long term goal (LTG); by discharge  -LW     Progress/Outcome (Dressing Goal 1, OT) goal not met  -           User Key  (r) = Recorded By, (t) = Taken By, (c) = Cosigned By    Initials Name Provider Type    LW Aide Barraza COTA Occupational Therapy Assistant               Clinical Impression     Row Name 11/05/21 0825          Pain Scale: Numbers Pre/Post-Treatment    Pretreatment Pain Rating 0/10 - no pain  -LW     Posttreatment Pain Rating 0/10 - no pain  -     Row Name 11/05/21 0825          Plan of Care Review    Progress declining  -LW     Outcome Summary Pt having a hard time catching breath. Pt sitting in bschair agreed to perform grooming with Min A. Pt performing PLB with tx. 02 staying above 94. Educated pt on Home safety. Pt sitting in bschair all needs in reach Niece in room.  -     Row Name 11/05/21 0825          Positioning and Restraints    Pre-Treatment Position sitting in chair/recliner  -LW     Post Treatment  Position chair  -LW     In Chair notified nsg; sitting; call light within reach; encouraged to call for assist; exit alarm on; with family/caregiver  -LW           User Key  (r) = Recorded By, (t) = Taken By, (c) = Cosigned By    Initials Name Provider Type    Aide Medina COTA Occupational Therapy Assistant               Outcome Measures     Row Name 11/05/21 0825          How much help from another is currently needed...    Putting on and taking off regular lower body clothing? 3  -LW     Bathing (including washing, rinsing, and drying) 3  -LW     Toileting (which includes using toilet bed pan or urinal) 3  -LW     Putting on and taking off regular upper body clothing 3  -LW     Taking care of personal grooming (such as brushing teeth) 4  -LW     Eating meals 4  -LW     AM-PAC 6 Clicks Score (OT) 20  -LW     Row Name 11/05/21 1100          How much help from another person do you currently need...    Turning from your back to your side while in flat bed without using bedrails? 4  -AH     Moving from lying on back to sitting on the side of a flat bed without bedrails? 4  -AH     Moving to and from a bed to a chair (including a wheelchair)? 3  -AH     Standing up from a chair using your arms (e.g., wheelchair, bedside chair)? 3  -AH     Climbing 3-5 steps with a railing? 3  -AH     To walk in hospital room? 3  -AH     AM-PAC 6 Clicks Score (PT) 20  -AH           User Key  (r) = Recorded By, (t) = Taken By, (c) = Cosigned By    Initials Name Provider Type    Aide Medina COTA Occupational Therapy Assistant    Elisabet Reis RN Registered Nurse                Occupational Therapy Education                 Title: PT OT SLP Therapies (In Progress)     Topic: Occupational Therapy (Done)     Point: ADL training (Done)     Description:   Instruct learner(s) on proper safety adaptation and remediation techniques during self care or transfers.   Instruct in proper use of assistive devices.               Learning Progress Summary           Patient Acceptance, E,TB, VU by LW at 11/5/2021 1309                   Point: Home exercise program (Done)     Description:   Instruct learner(s) on appropriate technique for monitoring, assisting and/or progressing therapeutic exercises/activities.              Learning Progress Summary           Patient Acceptance, E,TB, VU by LW at 11/5/2021 1309                   Point: Precautions (Done)     Description:   Instruct learner(s) on prescribed precautions during self-care and functional transfers.              Learning Progress Summary           Patient Acceptance, E,TB, VU by LW at 11/5/2021 1309    Acceptance, E, VU,NR by ME at 11/4/2021 1223    Comment: Educated on OT and POC .Educated tocall for assistance. Educated on safety precautions.   Family Acceptance, E, VU,NR by ME at 11/4/2021 1223    Comment: Educated on OT and POC .Educated tocall for assistance. Educated on safety precautions.                   Point: Body mechanics (Done)     Description:   Instruct learner(s) on proper positioning and spine alignment during self-care, functional mobility activities and/or exercises.              Learning Progress Summary           Patient Acceptance, E,TB, VU by LW at 11/5/2021 1309    Acceptance, E, VU,NR by ME at 11/4/2021 1223    Comment: Educated on OT and POC .Educated tocall for assistance. Educated on safety precautions.   Family Acceptance, E, VU,NR by ME at 11/4/2021 1223    Comment: Educated on OT and POC .Educated tocall for assistance. Educated on safety precautions.                               User Key     Initials Effective Dates Name Provider Type Discipline     06/16/21 -  Aide Barraza COTA Occupational Therapy Assistant OT    ME 06/16/21 -  Skip Monte OTR/L Occupational Therapist OT              OT Recommendation and Plan     Plan of Care Review  Plan of Care Reviewed With: patient  Progress: declining  Outcome Summary: Pt having a hard time  catching breath. Pt sitting in bschair agreed to perform grooming with Min A. Pt performing PLB with tx. 02 staying above 94. Educated pt on Home safety. Pt sitting in bschair all needs in reach Niece in room.     Time Calculation:    Time Calculation- OT     Row Name 11/05/21 0825             Time Calculation- OT    OT Start Time 0825  -LW      OT Stop Time 0905  -LW      OT Time Calculation (min) 40 min  -LW      Total Timed Code Minutes- OT 40 minute(s)  -LW      OT Received On 11/05/21  -LW              Timed Charges    93955 - OT Self Care/Mgmt Minutes 40  -LW              Total Minutes    Timed Charges Total Minutes 40  -LW       Total Minutes 40  -LW            User Key  (r) = Recorded By, (t) = Taken By, (c) = Cosigned By    Initials Name Provider Type    LW Aide Barraza COTA Occupational Therapy Assistant              Therapy Charges for Today     Code Description Service Date Service Provider Modifiers Qty    [id number] HC OT SELF CARE/MGMT/TRAIN EA 15 MIN 11/5/2021 Aide Barraza COTA GO 3               OMAR [place]  11/5/2021

## 2021-11-05 NOTE — THERAPY TREATMENT NOTE
Acute Care - Physical Therapy Treatment Note  [facility]     Patient Name: Val Arteaga  : 1932  MRN: [id number]  Today's Date: 2021      Visit Dx:     ICD-10-CM ICD-9-CM   1. COPD exacerbation ([facility])  J44.1 491.21   2. Elevated troponin  R77.8 790.6   3. Uncontrolled hypertension  I10 401.9   4. Impaired functional mobility, balance, gait, and endurance  Z74.09 V49.89   5. Impaired mobility and ADLs  Z74.09 V49.89    Z78.9      Patient Active Problem List   Diagnosis   • Uncontrolled hypertension   • CAD (coronary artery disease)   • Chronic obstructive pulmonary disease ([facility])   • Acquired hypothyroidism   • Depressive disorder   • Thygeson's superficial punctate keratitis   • Pseudophakia   • Precordial pain   • Displaced fracture of base of neck of right femur, initial encounter for closed fracture ([facility])   • Hip fracture, right, closed, initial encounter ([facility])   • Acute on chronic systolic CHF (congestive heart failure) ([facility])   • Hyponatremia   • BENITO (acute kidney injury) ([facility])   • Acute blood loss anemia   • Urinary retention   • Closed displaced basicervical fracture of right femur ([facility])   • Urethral caruncle   • Symptomatic anemia   • Anemia   • Major depressive disorder   • Drug-induced encephalopathy   • Acute left-sided low back pain without sciatica   • Bilateral sacral insufficiency fracture   • Osteoarthritis of multiple joints   • Dyspnea   • CHF (congestive heart failure) ([facility])   • Hypercholesterolemia   • Chest pain   • Acute on chronic respiratory failure with hypoxia ([facility])   • Acute on chronic diastolic heart failure ([facility])   • Malignant hypertension   • COPD exacerbation ([facility])   • COPD with acute exacerbation ([facility])   • Elevated troponin   • Dyspnea and respiratory abnormalities   • Shortness of breath   • Uncontrolled hypersomnia     Past Medical History:   Diagnosis Date   • Allergic rhinitis    • Chronic diastolic heart failure ([facility])    • COPD (chronic obstructive pulmonary  disease) (HCC)    • Coronary arteriosclerosis    • Depression    • GERD (gastroesophageal reflux disease)    • Hypercholesterolemia    • Hypertension    • Hypothyroidism    • LVH (left ventricular hypertrophy)    • Myocardial infarction (HCC)    • Osteoarthritis      Past Surgical History:   Procedure Laterality Date   • BREAST SURGERY     • CARDIAC CATHETERIZATION     • CERVICAL SPINE SURGERY     • COLONOSCOPY     • CORONARY ANGIOPLASTY     • ENDOSCOPY N/A 2/14/2018   • ENDOSCOPY AND COLONOSCOPY     • HIP BIPOLAR REPLACEMENT Right 1/23/2018   • PACEMAKER REPLACEMENT       PT Assessment (last 12 hours)     PT Evaluation and Treatment     Row Name 11/05/21 1056          Physical Therapy Time and Intention    Subjective Information complains of; weakness  -LN     Document Type therapy note (daily note)  -LN     Mode of Treatment individual therapy; physical therapy  -LN     Comment md entered room during rx and was talking to pt/family about hospice  -LN     Row Name 11/05/21 1056          General Information    Patient Profile Reviewed yes  -LN     Existing Precautions/Restrictions fall; oxygen therapy device and L/min  -LN     Row Name 11/05/21 1056          Cognition    Orientation Status (Cognition) oriented x 4  -LN     Row Name 11/05/21 1056          Pain Scale: Numbers Pre/Post-Treatment    Pretreatment Pain Rating 0/10 - no pain  -LN     Posttreatment Pain Rating 0/10 - no pain  -LN     Row Name 11/05/21 1056          Bed Mobility    Supine-Sit [place] (Bed Mobility) not tested  -LN     Sit-Supine [place] (Bed Mobility) not tested  -LN     Assistive Device (Bed Mobility) bed rails; head of bed elevated  -LN     Row Name 11/05/21 1056          Transfers    Sit-Stand [place] (Transfers) standby assist  -LN     Stand-Sit [place] (Transfers) standby assist  -LN     Row Name 11/05/21 1056          Sit-Stand Transfer    Assistive Device (Sit-Stand Transfers) walker, front-wheeled  -     Row  Name 11/05/21 1056          Stand-Sit Transfer    Assistive Device (Stand-Sit Transfers) walker, front-wheeled  -LN     Row Name 11/05/21 1056          Gait/Stairs (Locomotion)    [place] Level (Gait) contact guard  -LN     Assistive Device (Gait) walker, front-wheeled  -LN     Distance in Feet (Gait) 16,32  -LN     Deviations/Abnormal Patterns (Gait) base of support, narrow; gait speed decreased; stride length decreased  -LN     Comment (Gait/Stairs) pt breathing through mouth but encouraged plb with activity and recovery  -LN     Row Name 11/05/21 1056          Safety Issues, Functional Mobility    Impairments Affecting Function (Mobility) endurance/activity tolerance; shortness of breath  -LN     Row Name 11/05/21 1056          Hip (Therapeutic Exercise)    Hip (Therapeutic Exercise) AROM (active range of motion)  -LN     Hip AROM (Therapeutic Exercise) bilateral; flexion; aBduction; aDduction  -LN     Row Name 11/05/21 1056          Knee (Therapeutic Exercise)    Knee (Therapeutic Exercise) AROM (active range of motion)  -LN     Knee AROM (Therapeutic Exercise) bilateral; LAQ (long arc quad)  -LN     Row Name 11/05/21 1056          Ankle (Therapeutic Exercise)    Ankle (Therapeutic Exercise) AROM (active range of motion)  -LN     Ankle AROM (Therapeutic Exercise) bilateral; dorsiflexion; plantarflexion  -LN     Row Name 11/05/21 1056          Plan of Care Review    Plan of Care Reviewed With patient  -LN     Outcome Summary frequent sitting rest breaks;sit-stand-sit sba of 1,amb 16',32' with rw and cga of 1-sats >95% but with increased rr and needed encouragement for plb  -LN     Row Name 11/05/21 1056          Vital Signs    Post Systolic BP Rehab 126  -LN     Post Treatment Diastolic BP 59  -LN     Pretreatment Heart Rate (beats/min) 80  -LN     Posttreatment Heart Rate (beats/min) 60  -LN     Pre SpO2 (%) 96  -LN     O2 Delivery Pre Treatment supplemental O2  -LN     Intra SpO2 (%) 97  -LN     Post SpO2  (%) 98  -LN     Pre Patient Position Sitting  -LN     Intra Patient Position Standing  -LN     Post Patient Position Sitting  -LN     Row Name 11/05/21 1056          Bed Mobility Goal 1 (PT)    Activity/Assistive Device (Bed Mobility Goal 1, PT) sit to supine; supine to sit  -LN     [place] Level/Cues Needed (Bed Mobility Goal 1, PT) independent  -LN     Time Frame (Bed Mobility Goal 1, PT) by discharge  -LN     Progress/Outcomes (Bed Mobility Goal 1, PT) goal not met  -LN     Row Name 11/05/21 1056          Transfer Goal 1 (PT)    Activity/Assistive Device (Transfer Goal 1, PT) sit-to-stand/stand-to-sit; bed-to-chair/chair-to-bed; walker, rolling  -LN     [place] Level/Cues Needed (Transfer Goal 1, PT) modified independence  -LN     Time Frame (Transfer Goal 1, PT) by discharge  -LN     Progress/Outcome (Transfer Goal 1, PT) goal not met  -LN     Row Name 11/05/21 1056          Gait Training Goal 1 (PT)    Activity/Assistive Device (Gait Training Goal 1, PT) gait (walking locomotion); walker, rolling  -LN     [place] Level (Gait Training Goal 1, PT) modified independence  O2 sats will not drop below 92%  -LN     Distance (Gait Training Goal 1, PT) 150ft or more  -LN     Time Frame (Gait Training Goal 1, PT) by discharge  -LN     Progress/Outcome (Gait Training Goal 1, PT) goal not met  -LN     Row Name 11/05/21 1056          ROM Goal 1 (PT)    ROM Goal 1 (PT) Pt akilah tolerate LE exercises OOB in chair with VSS  -LN     Time Frame (ROM Goal 1, PT) by discharge  -LN     Progress/Outcome (ROM Goal 1, PT) goal met  -LN     Row Name 11/05/21 1056          Stairs Goal 1 (PT)    Activity/Assistive Device (Stairs Goal 1, PT) ascending stairs; descending stairs; using handrail, left; using handrail, right  -LN     [place] Level/Cues Needed (Stairs Goal 1, PT) modified independence  -LN     Number of Stairs (Stairs Goal 1, PT) 2  -LN     Time Frame (Stairs Goal 1, PT) by discharge  -LN     Progress/Outcome  (Stairs Goal 1, PT) goal not met  -LN     Row Name 11/05/21 1056          Positioning and Restraints    In Chair sitting; call light within reach; encouraged to call for assist; exit alarm on; with family/caregiver; notified nsg  -LN           User Key  (r) = Recorded By, (t) = Taken By, (c) = Cosigned By    Initials Name Provider Type    Sosa Braswell PTA Physical Therapy Assistant              Physical Therapy Education                 Title: PT OT SLP Therapies (In Progress)     Topic: Physical Therapy (In Progress)     Point: Mobility training (Done)     Learning Progress Summary           Patient Acceptance, E, VU,DU by  at 11/3/2021 1437                   Point: Home exercise program (Not Started)     Learner Progress:  Not documented in this visit.          Point: Body mechanics (Done)     Learning Progress Summary           Patient Acceptance, E, VU,DU by  at 11/3/2021 1437                   Point: Precautions (Done)     Learning Progress Summary           Patient Acceptance, E, VU,DU by  at 11/3/2021 1437                               User Key     Initials Effective Dates Name Provider Type [facility] 06/16/21 -  Malena Mendoza PT Physical Therapist PT              PT Recommendation and Plan     Plan of Care Reviewed With: patient  Outcome Summary: frequent sitting rest breaks;sit-stand-sit sba of 1,amb 16',32' with rw and cga of 1-sats >95% but with increased rr and needed encouragement for plb       Time Calculation:    PT Charges     Row Name 11/05/21 1252             Time Calculation    Start Time 1056  -LN      Stop Time 1134  -LN      Time Calculation (min) 38 min  -LN      PT Received On 11/05/21  -LN              Time Calculation- PT    Total Timed Code Minutes- PT 38 minute(s)  -LN            User Key  (r) = Recorded By, (t) = Taken By, (c) = Cosigned By    Initials Name Provider Type    LN Sosa Kamara PTA Physical Therapy Assistant              Therapy Charges for Today      Code Description Service Date Service Provider Modifiers Qty    05139298129 HC PT THERAPEUTIC ACT EA 15 MIN 11/4/2021 Sosa Kamara, PTA GP 1    17440038503 HC PT SELF CARE/MGMT/TRAIN EA 15 MIN 11/4/2021 Sosa Kamara, PTA GP 1    66737130630 HC PT THERAPEUTIC ACT EA 15 MIN 11/5/2021 Sosa Kamara, PTA GP 1    82107328832 HC GAIT TRAINING EA 15 MIN 11/5/2021 Sosa Kamara, PTA GP 1    09435137811 HC PT THER PROC EA 15 MIN 11/5/2021 Sosa Kamara, PTA GP 1          PT G-Codes  Outcome Measure Options: AM-PAC 6 Clicks Daily Activity (OT)  AM-PAC 6 Clicks Score (PT): 20  AM-PAC 6 Clicks Score (OT): 20    Sosa Kamara PTA  11/5/2021

## 2021-11-05 NOTE — PROGRESS NOTES
Progress Note  Devante Hilliard MD  Hospitalist    Date of visit: 11/5/2021     LOS: 3 days   Patient Care Team:  Vero Arteaga APRN as PCP - General (Nurse Practitioner)    Chief Complaint: Shortness of breath    Subjective     Interval History:     Patient Complaints: Shortness of breath - worse since yesterday    History taken from: Patient and nursing    Medication Review:   Current Facility-Administered Medications   Medication Dose Route Frequency Provider Last Rate Last Admin   • acetaminophen (TYLENOL) tablet 650 mg  650 mg Oral Q4H PRN Monalisa Mcintosh MD   650 mg at 11/02/21 2238   • aspirin chewable tablet 81 mg  81 mg Oral Daily Monalisa Mcintosh MD   81 mg at 11/05/21 0905   • budesonide (PULMICORT) nebulizer solution 0.5 mg  0.5 mg Nebulization Daily - RT Monalisa Mcintosh MD   0.5 mg at 11/05/21 0618   • carvedilol (COREG) tablet 12.5 mg  12.5 mg Oral BID With Meals Monalisa Mcintosh MD   12.5 mg at 11/05/21 0905   • dilTIAZem (CARDIZEM) tablet 60 mg  60 mg Oral Q8H Monalisa Mcintosh MD   60 mg at 11/05/21 0632   • docusate sodium (COLACE) capsule 100 mg  100 mg Oral BID PRN Monalisa Mcintosh MD       • famotidine (PEPCID) tablet 40 mg  40 mg Oral Daily Monalisa Mcintosh MD   40 mg at 11/05/21 0905   • folic acid-vit B6-vit B12 (FOLBEE) tablet 1 tablet  1 tablet Oral Daily Monalisa Mcintosh MD   1 tablet at 11/02/21 1058   • hydrALAZINE (APRESOLINE) injection 10 mg  10 mg Intravenous Q4H PRN Monalisa Mcintosh MD   10 mg at 11/02/21 0122   • ipratropium-albuterol (DUO-NEB) nebulizer solution 3 mL  3 mL Nebulization PRN Monalisa Mcintosh MD   3 mL at 11/05/21 0842   • ipratropium-albuterol (DUO-NEB) nebulizer solution 3 mL  3 mL Nebulization 4x Daily - RT Katty Jay MD   3 mL at 11/05/21 0618   • isosorbide mononitrate (IMDUR) 24 hr tablet 60 mg  60 mg Oral Q24H Monalisa Mcintosh MD   60 mg at 11/05/21 0905   •  levothyroxine (SYNTHROID, LEVOTHROID) tablet 50 mcg  50 mcg Oral Q AM Monalisa Mcintosh MD   50 mcg at 11/05/21 0632   • losartan (COZAAR) tablet 100 mg  100 mg Oral Daily Monalisa Mcintosh MD   100 mg at 11/05/21 0905   • methylPREDNISolone sodium succinate (SOLU-Medrol) injection 40 mg  40 mg Intravenous Q8H Devante Hilliard MD   40 mg at 11/05/21 0905   • montelukast (SINGULAIR) tablet 10 mg  10 mg Oral Nightly Monalisa Mcintosh MD   10 mg at 11/04/21 2059   • nicotine (NICODERM CQ) 21 MG/24HR patch 1 patch  1 patch Transdermal Q24H Monalisa Mcintosh MD   1 patch at 11/05/21 0905   • ondansetron (ZOFRAN) injection 4 mg  4 mg Intravenous Q6H PRN Monalisa Mcintosh MD       • pantoprazole (PROTONIX) EC tablet 40 mg  40 mg Oral QAM Monalisa Mcintosh MD   40 mg at 11/05/21 0632   • ranolazine (RANEXA) 12 hr tablet 500 mg  500 mg Oral BID Monalisa Mcintosh MD   500 mg at 11/05/21 0905   • sertraline (ZOLOFT) tablet 100 mg  100 mg Oral Daily Monalisa Mcintosh MD   100 mg at 11/05/21 0905   • sodium bicarbonate tablet 650 mg  650 mg Oral BID Monalisa Mcintosh MD   650 mg at 11/05/21 0909   • sodium chloride 0.9 % flush 10 mL  10 mL Intravenous PRN Monalisa Mcintosh MD   10 mL at 11/04/21 0742   • traMADol (ULTRAM) tablet 50 mg  50 mg Oral Q6H PRN Monalisa Mcintosh MD   50 mg at 11/03/21 0013       Review of Systems:   Review of Systems   Constitutional: Negative for fatigue.   Respiratory: Positive for cough and shortness of breath. Negative for wheezing.    Cardiovascular: Negative for chest pain, palpitations and leg swelling.   Gastrointestinal: Negative for abdominal distention, abdominal pain, anal bleeding, constipation, diarrhea, nausea and vomiting.   Genitourinary: Negative for dysuria, flank pain, frequency, hematuria and urgency.   Musculoskeletal: Positive for arthralgias. Negative for back pain.   Skin: Positive for pallor. Negative  for color change and rash.   Neurological: Positive for weakness. Negative for syncope, light-headedness, numbness and headaches.   Psychiatric/Behavioral: Negative for agitation and behavioral problems.   All other systems reviewed and are negative.      Objective     Vital Signs  Temp:  [96.9 °F (36.1 °C)-99.1 °F (37.3 °C)] 97.6 °F (36.4 °C)  Heart Rate:  [60-78] 67  Resp:  [16-20] 18  BP: (133-176)/(61-75) 176/75    Physical Exam:  Physical Exam  Vitals reviewed.   Constitutional:       Appearance: She is ill-appearing.   HENT:      Head: Normocephalic and atraumatic.   Eyes:      General: No scleral icterus.     Extraocular Movements: Extraocular movements intact.      Pupils: Pupils are equal, round, and reactive to light.   Cardiovascular:      Rate and Rhythm: Normal rate and regular rhythm.   Pulmonary:      Effort: No respiratory distress.      Breath sounds: Normal breath sounds. No stridor. No wheezing or rales.      Comments: Emphysematous chest  Chest:      Chest wall: No tenderness.   Abdominal:      General: Bowel sounds are normal. There is no distension.      Palpations: Abdomen is soft. There is no mass.      Tenderness: There is no abdominal tenderness. There is no right CVA tenderness, left CVA tenderness or guarding.      Hernia: No hernia is present.   Musculoskeletal:      Cervical back: Normal range of motion and neck supple. No rigidity or tenderness.   Skin:     General: Skin is warm and dry.      Coloration: Skin is pale. Skin is not jaundiced.      Findings: No bruising.   Neurological:      General: No focal deficit present.      Mental Status: She is alert and oriented to person, place, and time.      Cranial Nerves: No cranial nerve deficit.      Motor: No weakness.   Psychiatric:         Mood and Affect: Mood normal.         Behavior: Behavior normal.          Results Review:    Lab Results (last 24 hours)     Procedure Component Value Units Date/Time    Basic Metabolic Panel  [[id number]]  (Abnormal) Collected: 11/05/21 0535    Specimen: Blood Updated: 11/05/21 0608     Glucose 133 mg/dL      BUN 32 mg/dL      Creatinine 1.03 mg/dL      Sodium 139 mmol/L      Potassium 4.2 mmol/L      Chloride 102 mmol/L      CO2 29.0 mmol/L      Calcium 8.4 mg/dL      eGFR Non African Amer 51 mL/min/1.73      BUN/Creatinine Ratio 31.1     Anion Gap 8.0 mmol/L     Narrative:      GFR Normal >60  Chronic Kidney Disease <60  Kidney Failure <15      CBC & Differential [[id number]]  (Abnormal) Collected: 11/05/21 0535    Specimen: Blood Updated: 11/05/21 0547    Narrative:      The following orders were created for panel order CBC & Differential.  Procedure                               Abnormality         Status                     ---------                               -----------         ------                     CBC Auto Differential[[id number]]        Abnormal            Final result                 Please view results for these tests on the individual orders.    CBC Auto Differential [[id number]]  (Abnormal) Collected: 11/05/21 0535    Specimen: Blood Updated: 11/05/21 0547     WBC 7.81 10*3/mm3      RBC 3.13 10*6/mm3      Hemoglobin 10.0 g/dL      Hematocrit 30.1 %      MCV 96.2 fL      MCH 31.9 pg      MCHC 33.2 g/dL      RDW 13.6 %      RDW-SD 48.2 fl      MPV 10.1 fL      Platelets 179 10*3/mm3      Neutrophil % 92.7 %      Lymphocyte % 5.8 %      Monocyte % 0.9 %      Eosinophil % 0.0 %      Basophil % 0.1 %      Immature Grans % 0.5 %      Neutrophils, Absolute 7.24 10*3/mm3      Lymphocytes, Absolute 0.45 10*3/mm3      Monocytes, Absolute 0.07 10*3/mm3      Eosinophils, Absolute 0.00 10*3/mm3      Basophils, Absolute 0.01 10*3/mm3      Immature Grans, Absolute 0.04 10*3/mm3      nRBC 0.0 /100 WBC     Blood Culture - Blood, Arm, Right [[id number]]  (Normal) Collected: 11/01/21 2302    Specimen: Blood from Arm, Right Updated: 11/04/21 2316     Blood Culture No growth at 3 days    Blood Gas, Arterial  - [[id number]]  (Abnormal) Collected: 11/04/21 1303    Specimen: Arterial Blood Updated: 11/04/21 1311     Site Right Radial     Serafin's Test N/A     pH, Arterial 7.362 pH units      pCO2, Arterial 48.5 mm Hg      Comment: 83 Value above reference range        pO2, Arterial 104.0 mm Hg      HCO3, Arterial 27.5 mmol/L      Comment: 83 Value above reference range        Base Excess, Arterial 1.6 mmol/L      O2 Saturation, Arterial 98.5 %      Barometric Pressure for Blood Gas 755 mmHg      Modality Nasal Cannula     Flow Rate 3.0 lpm      Ventilator Mode NA     Collected by GP     Comment: Meter: [id number]     :  [id number]       Blood Culture - Blood, Arm, Left [[id number]]  (Abnormal) Collected: 11/01/21 2302    Specimen: Blood from Arm, Left Updated: 11/04/21 1038     Blood Culture Staphylococcus, coagulase negative     Isolated from Aerobic and Anaerobic Bottles     Gram Stain Aerobic Bottle Gram positive cocci in clusters     Comment: Bottle 1 of 4          Anaerobic Bottle Gram positive cocci in clusters     Comment: Bottle 2 of 4 drawn positive for gram positive cocci in clusters       Narrative:      Probable contaminant requires clinical correlation, susceptibility not performed unless requested by physician.            Imaging Results (Last 24 Hours)     Procedure Component Value Units Date/Time    XR Chest 1 View [[id number]] Collected: 11/04/21 1139     Updated: 11/04/21 1203    Narrative:      EXAM DESCRIPTION:     XR CHEST 1 VW    CLINICAL HISTORY:     88 years  Female  increased dyspnea, J44.1 Chronic obstructive  pulmonary disease with (acute) exacerbation R77.8 Other specified  abnormalities of plasma proteins I10 Essential (primary)  hypertension Z74.09 Other reduced mobility    COMPARISON:     November 3, 2021    TECHNIQUE:     One view-AP portable radiograph the chest    FINDINGS:     The lungs are well-expanded. There is minimal subsegmental  atelectasis in the left lung base. The right  lung is clear. The  cardiac silhouette and pulmonary vasculature are within normal  limits. A bipolar pacing device is noted.      Impression:          1. Minimal subsegmental atelectasis in the left lung base.         Electronically signed by:  Claudia Lindsay MD  11/4/2021 12:02 PM  CDT Workstation: [id number]          Assessment/Plan       Acute on chronic respiratory failure with hypoxia (HCC)    COPD with acute exacerbation (HCC)    Acute on chronic diastolic heart failure (HCC)    Uncontrolled hypertension    Continue with supplemental oxygen, respiratory support, the IV steroids, nebulized treatments.  Stop the IV antibiotics since her white count is within normal limit and the chest x-ray does not show any acute infiltrates.    The staphylococcal species (not aureus) identified in 1/4 blood cultures bottle is most likely a contaminant.    Continue with PT/OT. The patient herself does not want artificial life support (intubation / CPR) and her son agrees with the possibility of Hospice care.    I confirmed that the patient's Advance Care Plan is present, code status is documented, or surrogate decision maker is listed in the patient's medical record.      Devante Hilliard MD  11/05/21  10:20 CDT

## 2021-11-06 NOTE — PLAN OF CARE
Goal Outcome Evaluation:      Pt was struggling with severe air hunger this morning when I walked in,Dr Garg ordered some morphine for back pain and it slowed her breathing the patient was laughing and enjoying time with he rfamily.vss ill cont to monitor

## 2021-11-06 NOTE — PLAN OF CARE
Goal Outcome Evaluation:  Plan of Care Reviewed With: patient, caregiver        Progress: declining  Outcome Summary: VSS this shift.  Pt O2 Sats stable however patient continues to use accessory muscles when breathing.  PRN neb treatments requested from RT throughout shift.  No complaints of pain this shift.  Pt is pleasant with caregiver at bedside.  Pts confused stated appears decreased this shift.  Safety maintained.

## 2021-11-06 NOTE — PROGRESS NOTES
[facility]  INPATIENT PROGRESS NOTE    Length of Stay: 4  Date of Admission: 11/1/2021  Primary Care Physician: Vero Arteaga APRN    Subjective   Chief Complaint: Shortness of breath  HPI: Having increased work of breathing this morning as well as some confusion.  Niece at bedside.  Per discussion yesterday plan is for patient to go home with hospice.    Review of Systems   Constitutional: Negative for chills and fever.   HENT: Negative for congestion.    Respiratory: Positive for shortness of breath and wheezing.    Cardiovascular: Negative for chest pain and palpitations.   Gastrointestinal: Negative for abdominal pain, constipation, diarrhea, nausea and vomiting.   Genitourinary: Negative.    Musculoskeletal: Negative.    Skin: Negative.    Neurological: Negative.    Psychiatric/Behavioral: Negative.       All pertinent negatives and positives are as above. All other systems have been reviewed and are negative unless otherwise stated.     Objective    Temp:  [96.9 °F (36.1 °C)-97.6 °F (36.4 °C)] 97.6 °F (36.4 °C)  Heart Rate:  [60-90] 67  Resp:  [18-24] 18  BP: (140-181)/(58-74) 181/74    Physical Exam  Constitutional:       Comments: Elderly appearing female currently laying in bed appears to be in some mild distress secondary to her breathing.   HENT:      Head: Normocephalic and atraumatic.      Right Ear: External ear normal.      Left Ear: External ear normal.      Nose: Nose normal.      Mouth/Throat:      Mouth: Mucous membranes are moist.      Pharynx: Oropharynx is clear.   Eyes:      Conjunctiva/sclera: Conjunctivae normal.   Cardiovascular:      Rate and Rhythm: Normal rate and regular rhythm.      Pulses: Normal pulses.      Heart sounds: Normal heart sounds.   Pulmonary:      Comments: Severely diminished breath sounds bilaterally, accessory muscle use noted as well.  Abdominal:      General: Bowel sounds are normal.      Palpations:  Abdomen is soft.      Tenderness: There is no abdominal tenderness.   Musculoskeletal:         General: No swelling.      Cervical back: Neck supple.   Skin:     General: Skin is warm and dry.      Capillary Refill: Capillary refill takes less than 2 seconds.   Neurological:      Comments: Follows commands, moves extremities, speech is fluent but strained currently secondary to breathing.   Psychiatric:         Behavior: Behavior normal.           Results Review:  I have reviewed the labs, radiology results, and diagnostic studies.    Laboratory Data:   Results from last 7 days   Lab Units 11/06/21  0548 11/05/21  0535 11/04/21 0528 11/03/21 0527 11/01/21 2236   SODIUM mmol/L 136 139 139   < > 135*   POTASSIUM mmol/L 3.9 4.2 4.2   < > 4.1   CHLORIDE mmol/L 99 102 103   < > 99   CO2 mmol/L 29.0 29.0 27.0   < > 28.0   BUN mg/dL 32* 32* 32*   < > 15   CREATININE mg/dL 1.00 1.03* 1.00   < > 1.00   GLUCOSE mg/dL 137* 133* 133*   < > 95   CALCIUM mg/dL 8.3* 8.4* 8.6   < > 9.2   BILIRUBIN mg/dL  --   --   --   --  0.3   ALK PHOS U/L  --   --   --   --  91   ALT (SGPT) U/L  --   --   --   --  14   AST (SGOT) U/L  --   --   --   --  19   ANION GAP mmol/L 8.0 8.0 9.0   < > 8.0    < > = values in this interval not displayed.     Estimated Creatinine Clearance: 37.1 mL/min (by C-G formula based on SCr of 1 mg/dL).          Results from last 7 days   Lab Units 11/06/21  0548 11/05/21  0535 11/04/21 0528 11/03/21 0527 11/01/21 2236   WBC 10*3/mm3 6.45 7.81 9.58 7.25 8.14   HEMOGLOBIN g/dL 10.2* 10.0* 9.5* 10.5* 11.4*   HEMATOCRIT % 30.2* 30.1* 28.8* 31.4* 35.2   PLATELETS 10*3/mm3 204 179 182 175 202     Results from last 7 days   Lab Units 11/01/21  2235   INR  0.90       Culture Data:   No results found for: BLOODCX  No results found for: URINECX  No results found for: RESPCX  No results found for: WOUNDCX  No results found for: STOOLCX  No components found for: BODYFLD    Radiology Data:   Imaging Results (Last 24 Hours)      ** No results found for the last 24 hours. **          I have reviewed the patient's current medications.     Assessment/Plan     Principal Problem:    Acute on chronic respiratory failure with hypoxia (HCC)  Active Problems:    Uncontrolled hypertension    Acute on chronic diastolic heart failure (HCC)    COPD with acute exacerbation (HCC)    Plan:  -For now we will continue with current interventions with her steroids as well as bronchodilators  -Continue home medications as appropriate  -I discussed with the patient as well as her niece at bedside and son on the phone about making the transition to comfort care measures and currently there is some hesitancy at this time.  They will discuss this among some cells and let me know.  -Hospice referral has been placed as well to hopefully be able to get this set up for her to go home early next week with hospice  -DVT prophylaxis with SCDs  -CODE STATUS: DNR/DNI    I confirmed that the patient's Advance Care Plan is present, code status is documented, or surrogate decision maker is listed in the patient's medical record.     Discharge Planning: In process    Yo Garg MD

## 2021-11-06 NOTE — SIGNIFICANT NOTE
11/06/21 1350   OTHER   Discipline physical therapy assistant   Rehab Time/Intention   Session Not Performed other (see comments)  (nsg defers tx stating pt has been placed on comfort care)

## 2021-11-06 NOTE — NURSING NOTE
Patient requested to not be weighed this morning but later in day.  Pt presents with SOA, did not want to lay down for bed weight and did not feel like she could stand at this time on the scale.

## 2021-11-06 NOTE — NURSING NOTE
Upon morning med pass patient requesting PRN neb treatment.  Pt using accessory muscles to breathe, anxious, stating she felt like she was smothering.  O2 95%, HR 80.  Patient sitting upright in bed.  PRN neb treatment administered.

## 2021-11-07 NOTE — PLAN OF CARE
Goal Outcome Evaluation:           Progress: no change  Outcome Summary: Vitals stable this shift. Pt O2 sat stable however has required MS x2 for air hunger. No c/o pain. Pt ambulates to bathroom with walker with minimal assist. No confusion throughout night. Son has been present at bedside.   Rotation Flap Text: The defect edges were debeveled with a #15 scalpel blade.  Given the location of the defect, shape of the defect and the proximity to free margins a rotation flap was deemed most appropriate.  Using a sterile surgical marker, an appropriate rotation flap was drawn incorporating the defect and placing the expected incisions within the relaxed skin tension lines where possible.    The area thus outlined was incised deep to adipose tissue with a #15 scalpel blade.  The skin margins were undermined to an appropriate distance in all directions utilizing iris scissors.

## 2021-11-07 NOTE — DISCHARGE PLACEMENT REQUEST
"Val Arteaga (88 y.o. Female)             Date of Birth Social Security Number Address Home Phone MRN    12/02/1932  [id number] [facility] [phone] [id number]    Denominational Marital Status             Anglican        Admission Date Admission Type Admitting Provider Attending Provider Department, Room/Bed    11/1/21 Emergency  Katty Jay MD [address], 354/1    Discharge Date Discharge Disposition Discharge Destination                         Attending Provider: Katty Jay MD    Allergies: Ace Inhibitors, Baclofen, Lisinopril    Isolation: None   Infection: None   Code Status: No CPR   Advance Care Planning Activity    Ht: 172.7 cm (68\")   Wt: 61.7 kg (136 lb 1.6 oz)    Admission Cmt: None   Principal Problem: Acute on chronic respiratory failure with hypoxia (HCC) [J96.21]                 Active Insurance as of 11/1/2021     Primary Coverage     Payor Plan Insurance Group Employer/Plan Group    MEDICARE MEDICARE A & B      Payor Plan Address Payor Plan Phone Number Payor Plan Fax Number Effective Dates    [address] [phone]  12/1/1997 - None Entered    [facility] [id number]       Subscriber Name Subscriber Birth Date Member ID       VAL ARTEAGA 12/2/1932 [id number]           Secondary Coverage     Payor Plan Insurance Group Employer/Plan Group    [facility] [id number]     Payor Plan Address Payor Plan Phone Number Payor Plan Fax Number Effective Dates    [address] [address] [phone]  10/22/2015 - None Entered    [facility] [id number]       Subscriber Name Subscriber Birth Date Member ID       VAL ARTEAGA 12/2/1932 [id number]                 Emergency Contacts      (Rel.) Home Phone Work Phone Mobile Phone    Kelly Madison (Neice) (Relative) [phone]    Clair Odonnell (Neice) (Relative) [phone]    Mitchel Amado (Relative) [phone] -- " [phone]    akash yu (Son) -- -- [phone]

## 2021-11-07 NOTE — PROGRESS NOTES
[facility]  INPATIENT PROGRESS NOTE    Length of Stay: 5  Date of Admission: 11/1/2021  Primary Care Physician: Vero Arteaga APRN    Subjective   Chief Complaint: Shortness of breath  HPI: Breathing easier since transitioning to comfort measures.     Review of Systems   Constitutional: Negative for chills and fever.   HENT: Negative for congestion.    Respiratory: Positive for shortness of breath and wheezing.    Cardiovascular: Negative for chest pain and palpitations.   Gastrointestinal: Negative for abdominal pain, constipation, diarrhea, nausea and vomiting.   Genitourinary: Negative.    Musculoskeletal: Negative.    Skin: Negative.    Neurological: Negative.    Psychiatric/Behavioral: Negative.       All pertinent negatives and positives are as above. All other systems have been reviewed and are negative unless otherwise stated.     Objective    Temp:  [97.8 °F (36.6 °C)-98.2 °F (36.8 °C)] 97.8 °F (36.6 °C)  Heart Rate:  [60-77] 75  Resp:  [19-22] 20  BP: (146-183)/(63-81) 183/81    Physical Exam  Constitutional:       Comments: Elderly appearing female, appears more comfortable today.    HENT:      Head: Normocephalic and atraumatic.      Right Ear: External ear normal.      Left Ear: External ear normal.      Nose: Nose normal.      Mouth/Throat:      Mouth: Mucous membranes are moist.      Pharynx: Oropharynx is clear.   Eyes:      Conjunctiva/sclera: Conjunctivae normal.   Cardiovascular:      Rate and Rhythm: Normal rate and regular rhythm.      Pulses: Normal pulses.      Heart sounds: Normal heart sounds.   Pulmonary:      Comments: Diminished breath sounds bilaterally.   Abdominal:      General: Bowel sounds are normal.      Palpations: Abdomen is soft.      Tenderness: There is no abdominal tenderness.   Musculoskeletal:         General: No swelling.      Cervical back: Neck supple.   Skin:     General: Skin is warm and dry.      Capillary  Refill: Capillary refill takes less than 2 seconds.   Neurological:      Comments: Follows commands, moves extremities, speech is fluent   Psychiatric:         Behavior: Behavior normal.           Results Review:  I have reviewed the labs, radiology results, and diagnostic studies.    Laboratory Data:   Results from last 7 days   Lab Units 11/06/21  0548 11/05/21 0535 11/04/21 0528 11/03/21 0527 11/01/21 2236   SODIUM mmol/L 136 139 139   < > 135*   POTASSIUM mmol/L 3.9 4.2 4.2   < > 4.1   CHLORIDE mmol/L 99 102 103   < > 99   CO2 mmol/L 29.0 29.0 27.0   < > 28.0   BUN mg/dL 32* 32* 32*   < > 15   CREATININE mg/dL 1.00 1.03* 1.00   < > 1.00   GLUCOSE mg/dL 137* 133* 133*   < > 95   CALCIUM mg/dL 8.3* 8.4* 8.6   < > 9.2   BILIRUBIN mg/dL  --   --   --   --  0.3   ALK PHOS U/L  --   --   --   --  91   ALT (SGPT) U/L  --   --   --   --  14   AST (SGOT) U/L  --   --   --   --  19   ANION GAP mmol/L 8.0 8.0 9.0   < > 8.0    < > = values in this interval not displayed.     Estimated Creatinine Clearance: 37.9 mL/min (by C-G formula based on SCr of 1 mg/dL).          Results from last 7 days   Lab Units 11/06/21 0548 11/05/21 0535 11/04/21 0528 11/03/21 0527 11/01/21 2236   WBC 10*3/mm3 6.45 7.81 9.58 7.25 8.14   HEMOGLOBIN g/dL 10.2* 10.0* 9.5* 10.5* 11.4*   HEMATOCRIT % 30.2* 30.1* 28.8* 31.4* 35.2   PLATELETS 10*3/mm3 204 179 182 175 202     Results from last 7 days   Lab Units 11/01/21 2235   INR  0.90       Culture Data:   No results found for: BLOODCX  No results found for: URINECX  No results found for: RESPCX  No results found for: WOUNDCX  No results found for: STOOLCX  No components found for: BODYFLD    Radiology Data:   Imaging Results (Last 24 Hours)     ** No results found for the last 24 hours. **          I have reviewed the patient's current medications.     Assessment/Plan     Principal Problem:    Acute on chronic respiratory failure with hypoxia (HCC)  Active Problems:    Uncontrolled  hypertension    Acute on chronic diastolic heart failure (HCC)    COPD with acute exacerbation (HCC)    Plan:  -Continue current comfort care orders.  Hospice referral placed and case management consulted to help with arrangements.   -CODE STATUS: Comfort care    I confirmed that the patient's Advance Care Plan is present, code status is documented, or surrogate decision maker is listed in the patient's medical record.     Discharge Planning: In process    Yo Garg MD

## 2021-11-08 NOTE — PLAN OF CARE
Goal Outcome Evaluation:  Plan of Care Reviewed With: patient        Progress: no change  Outcome Summary: Patient has been doing ok today, she has had family present at bedside throughout day. She has taken prn morphine x2 today. PT/OT consulted and  consulted for pallative care. Awaiting to be seen.

## 2021-11-08 NOTE — PROGRESS NOTES
[facility] ER Medicine Services  INPATIENT PROGRESS NOTE    Length of Stay: 6  Date of Admission: 11/1/2021  Primary Care Physician: Vero Arteaga APRN    Subjective   Chief Complaint: Shortness of air.    HPI: Patient is seen for follow-up.  She is doing better, less short of air and her appetite is slowly improving.  Patient was to have been transitioned to comfort measures but the son is reluctant to proceed with the plan.  Patient's son is visiting from [place] and was at the bedside.  He is however agreeable to palliative care.    Review of Systems   Constitutional: Positive for activity change and fatigue. Negative for appetite change, chills, diaphoresis and fever.   HENT: Negative for trouble swallowing and voice change.    Eyes: Negative for photophobia and visual disturbance.   Respiratory: Positive for shortness of breath. Negative for cough, choking, chest tightness, wheezing and stridor.    Cardiovascular: Negative for chest pain, palpitations and leg swelling.   Gastrointestinal: Negative for abdominal distention, abdominal pain, blood in stool, constipation, diarrhea, nausea and vomiting.   Endocrine: Negative for cold intolerance, heat intolerance, polydipsia, polyphagia and polyuria.   Genitourinary: Negative for decreased urine volume, difficulty urinating, dysuria, enuresis, flank pain, frequency, hematuria and urgency.   Musculoskeletal: Negative for arthralgias, gait problem, myalgias, neck pain and neck stiffness.   Skin: Negative for pallor, rash and wound.   Neurological: Negative for dizziness, tremors, seizures, syncope, facial asymmetry, speech difficulty, weakness, light-headedness, numbness and headaches.   Hematological: Does not bruise/bleed easily.   Psychiatric/Behavioral: Negative for agitation, behavioral problems and confusion.       Objective    Temp:  [96.9 °F (36.1 °C)-97.8 °F (36.6 °C)] 96.9 °F (36.1 °C)  Heart Rate:  [64-77] 66  Resp:   [19-22] 20  BP: (152-197)/(74-89) 152/74    Physical Exam  Vitals and nursing note reviewed.   Constitutional:       General: She is not in acute distress.     Appearance: She is well-developed. She is ill-appearing. She is not diaphoretic.   HENT:      Head: Normocephalic and atraumatic.      Right Ear: External ear normal.      Left Ear: External ear normal.      Nose: Nose normal.   Eyes:      Extraocular Movements: Extraocular movements intact.      Conjunctiva/sclera: Conjunctivae normal.      Pupils: Pupils are equal, round, and reactive to light.   Neck:      Thyroid: No thyromegaly.      Vascular: No JVD.   Cardiovascular:      Rate and Rhythm: Normal rate and regular rhythm.      Heart sounds: Normal heart sounds. No murmur heard.  No friction rub. No gallop.    Pulmonary:      Effort: Pulmonary effort is normal. No respiratory distress.      Breath sounds: No stridor. Decreased breath sounds, wheezing and rhonchi present. No rales.   Chest:      Chest wall: No tenderness.   Abdominal:      General: Bowel sounds are normal. There is no distension.      Palpations: Abdomen is soft. There is no mass.      Tenderness: There is no abdominal tenderness. There is no right CVA tenderness, left CVA tenderness, guarding or rebound.      Hernia: No hernia is present.   Musculoskeletal:         General: No swelling, tenderness or deformity. Normal range of motion.      Cervical back: Normal range of motion and neck supple.      Right lower leg: No edema.      Left lower leg: No edema.   Skin:     General: Skin is warm and dry.      Coloration: Skin is not jaundiced or pale.      Findings: No bruising, erythema, lesion or rash.   Neurological:      General: No focal deficit present.      Mental Status: She is alert and oriented to person, place, and time.      Cranial Nerves: No cranial nerve deficit.      Sensory: No sensory deficit.      Motor: No weakness.      Coordination: Coordination normal.      Gait: Gait  normal.      Deep Tendon Reflexes: Reflexes are normal and symmetric. Reflexes normal.   Psychiatric:         Behavior: Behavior normal. Behavior is cooperative.         Thought Content: Thought content normal.         Judgment: Judgment normal.           Medication Review:    Current Facility-Administered Medications:   •  acetaminophen (TYLENOL) tablet 650 mg, 650 mg, Oral, Q4H PRN **OR** acetaminophen (TYLENOL) 160 MG/5ML solution 650 mg, 650 mg, Oral, Q4H PRN **OR** acetaminophen (TYLENOL) suppository 650 mg, 650 mg, Rectal, Q4H PRN, Yo Garg MD  •  acetaminophen (TYLENOL) tablet 650 mg, 650 mg, Oral, Q4H PRN, Monalisa Mcintosh MD, 650 mg at 11/02/21 2238  •  budesonide (PULMICORT) nebulizer solution 0.5 mg, 0.5 mg, Nebulization, Daily - RT, Monalisa Mcintosh MD, 0.5 mg at 11/08/21 0746  •  carboxymethylcellulose (REFRESH PLUS) 0.5 % ophthalmic solution 1 drop, 1 drop, Both Eyes, Q30 Min PRN, Yo Garg MD  •  diphenoxylate-atropine (LOMOTIL) 2.5-0.025 MG per tablet 1 tablet, 1 tablet, Oral, Q2H PRN, Yo Garg MD  •  docusate sodium (COLACE) capsule 100 mg, 100 mg, Oral, BID PRN, Monalisa Mcintosh MD  •  famotidine (PEPCID) tablet 40 mg, 40 mg, Oral, Daily, Monalisa Mcintosh MD, 40 mg at 11/08/21 0900  •  folic acid-vit B6-vit B12 (FOLBEE) tablet 1 tablet, 1 tablet, Oral, Daily, Monalisa Mcintosh MD, 1 tablet at 11/08/21 0900  •  ipratropium-albuterol (DUO-NEB) nebulizer solution 3 mL, 3 mL, Nebulization, PRN, Monalisa Mcintosh MD, 3 mL at 11/06/21 0609  •  ipratropium-albuterol (DUO-NEB) nebulizer solution 3 mL, 3 mL, Nebulization, Q4H PRN, Yo Garg MD, 3 mL at 11/08/21 0745  •  levothyroxine (SYNTHROID, LEVOTHROID) tablet 50 mcg, 50 mcg, Oral, Q AM, Monalisa Mcintosh MD, 50 mcg at 11/08/21 0547  •  LORazepam (ATIVAN) tablet 0.5 mg, 0.5 mg, Oral, Q1H PRN **OR** LORazepam (ATIVAN) injection 0.5 mg, 0.5 mg, Intravenous, Q1H PRN **OR**  LORazepam (ATIVAN) injection 0.5 mg, 0.5 mg, Intramuscular, Q1H PRN **OR** LORazepam (ATIVAN) injection 0.5 mg, 0.5 mg, Subcutaneous, Q1H PRN **OR** LORazepam (ATIVAN) 2 MG/ML concentrated solution 0.5 mg, 0.5 mg, Oral, Q1H PRN **OR** LORazepam (ATIVAN) 2 MG/ML concentrated solution 0.5 mg, 0.5 mg, Sublingual, Q1H PRN, Yo Garg MD  •  LORazepam (ATIVAN) tablet 1 mg, 1 mg, Oral, Q1H PRN **OR** LORazepam (ATIVAN) injection 1 mg, 1 mg, Intravenous, Q1H PRN **OR** LORazepam (ATIVAN) injection 1 mg, 1 mg, Intramuscular, Q1H PRN **OR** LORazepam (ATIVAN) injection 1 mg, 1 mg, Subcutaneous, Q1H PRN **OR** LORazepam (ATIVAN) 2 MG/ML concentrated solution 1 mg, 1 mg, Oral, Q1H PRN **OR** LORazepam (ATIVAN) 2 MG/ML concentrated solution 1 mg, 1 mg, Sublingual, Q1H PRN, Yo Garg MD  •  LORazepam (ATIVAN) tablet 2 mg, 2 mg, Oral, Q1H PRN **OR** LORazepam (ATIVAN) injection 2 mg, 2 mg, Intravenous, Q1H PRN **OR** LORazepam (ATIVAN) injection 2 mg, 2 mg, Intramuscular, Q1H PRN **OR** LORazepam (ATIVAN) injection 2 mg, 2 mg, Subcutaneous, Q1H PRN **OR** LORazepam (ATIVAN) 2 MG/ML concentrated solution 2 mg, 2 mg, Oral, Q1H PRN **OR** LORazepam (ATIVAN) 2 MG/ML concentrated solution 2 mg, 2 mg, Sublingual, Q1H PRN, Yo Garg MD  •  montelukast (SINGULAIR) tablet 10 mg, 10 mg, Oral, Nightly, Monalisa Mcintosh MD, 10 mg at 11/07/21 2002  •  morphine injection 1 mg, 1 mg, Intravenous, Q1H PRN, 1 mg at 11/07/21 0647 **OR** morphine concentrated solution 5 mg, 5 mg, Oral, Q1H PRN, Yo Garg MD  •  morphine injection 1 mg, 1 mg, Intravenous, Q4H PRN, Yo Garg MD, 1 mg at 11/08/21 0938  •  nicotine (NICODERM CQ) 21 MG/24HR patch 1 patch, 1 patch, Transdermal, Q24H, Monalisa Mcintosh MD, 1 patch at 11/05/21 0905  •  ondansetron (ZOFRAN) injection 4 mg, 4 mg, Intravenous, Q6H PRN, Monalisa Mcintosh MD  •  pantoprazole (PROTONIX) EC tablet 40 mg, 40 mg, Oral, QAM, Monalisa Mcintosh  MD, 40 mg at 11/08/21 0547  •  prochlorperazine (COMPAZINE) injection 5 mg, 5 mg, Intravenous, Q6H PRN **OR** prochlorperazine (COMPAZINE) tablet 5 mg, 5 mg, Oral, Q6H PRN **OR** prochlorperazine (COMPAZINE) suppository 25 mg, 25 mg, Rectal, Q12H PRN, Yo Garg MD  •  ranolazine (RANEXA) 12 hr tablet 500 mg, 500 mg, Oral, BID, Monalisa Mcintosh MD, 500 mg at 11/08/21 0900  •  sertraline (ZOLOFT) tablet 100 mg, 100 mg, Oral, Daily, Monalisa Mcintosh MD, 100 mg at 11/08/21 0900  •  sodium chloride 0.9 % flush 10 mL, 10 mL, Intravenous, PRN, Monalisa Mcintosh MD, 10 mL at 11/04/21 0742  •  sodium chloride nasal spray 2 spray, 2 spray, Each Nare, PRN, Yo Garg MD  •  traMADol (ULTRAM) tablet 50 mg, 50 mg, Oral, Q6H PRN, Monalisa Mcintosh MD, 50 mg at 11/03/21 0013    Results Review:  I have reviewed the labs, radiology results, and diagnostic studies.    Laboratory Data:   Results from last 7 days   Lab Units 11/06/21  0548 11/05/21  0535 11/04/21  0528 11/03/21  0527 11/01/21  2236   SODIUM mmol/L 136 139 139   < > 135*   POTASSIUM mmol/L 3.9 4.2 4.2   < > 4.1   CHLORIDE mmol/L 99 102 103   < > 99   CO2 mmol/L 29.0 29.0 27.0   < > 28.0   BUN mg/dL 32* 32* 32*   < > 15   CREATININE mg/dL 1.00 1.03* 1.00   < > 1.00   GLUCOSE mg/dL 137* 133* 133*   < > 95   CALCIUM mg/dL 8.3* 8.4* 8.6   < > 9.2   BILIRUBIN mg/dL  --   --   --   --  0.3   ALK PHOS U/L  --   --   --   --  91   ALT (SGPT) U/L  --   --   --   --  14   AST (SGOT) U/L  --   --   --   --  19   ANION GAP mmol/L 8.0 8.0 9.0   < > 8.0    < > = values in this interval not displayed.     Estimated Creatinine Clearance: 38.1 mL/min (by C-G formula based on SCr of 1 mg/dL).          Results from last 7 days   Lab Units 11/06/21  0548 11/05/21  0535 11/04/21  0528 11/03/21  0527 11/01/21  2236   WBC 10*3/mm3 6.45 7.81 9.58 7.25 8.14   HEMOGLOBIN g/dL 10.2* 10.0* 9.5* 10.5* 11.4*   HEMATOCRIT % 30.2* 30.1* 28.8* 31.4* 35.2    PLATELETS 10*3/mm3 204 179 182 175 202     Results from last 7 days   Lab Units 11/01/21  2235   INR  0.90       Culture Data:   No results found for: BLOODCX  No results found for: URINECX  No results found for: RESPCX  No results found for: WOUNDCX  No results found for: STOOLCX  No components found for: BODYFLD    Radiology Data:   Imaging Results (Last 24 Hours)     ** No results found for the last 24 hours. **          I have reviewed the patient's current medications.     Assessment/Plan     Hospital Problem List:  Principal Problem:    Acute on chronic respiratory failure with hypoxia (HCC)  Active Problems:    Uncontrolled hypertension    Acute on chronic diastolic heart failure (HCC)    COPD with acute exacerbation (HCC)    Acute on Chronic respiratory failure with hypoxia: Continue noninvasive respiratory therapy and bronchodilators.  Consult case management for palliative care.    Hypertension: Blood pressure is now well controlled.  Continue current medications.    Hypothyroidism: Continue Synthroid.    Deconditioning: Consult PT and OT.      Discharge Planning: In progress pending pre-CERT.    I confirmed that the patient's Advance Care Plan is present, code status is documented, or surrogate decision maker is listed in the patient's medical record.     I have utilized all available immediate resources to obtain, update, or review the patient's current medications      Alex Shaikh MD   11/08/21   10:07 CST

## 2021-11-08 NOTE — DISCHARGE PLACEMENT REQUEST
"Val Arteaga (88 y.o. Female)             Date of Birth Social Security Number Address Home Phone MRN    12/02/1932  [id number] [facility] [address] [phone] [id number]    Yarsani Marital Status             Religious        Admission Date Admission Type Admitting Provider Attending Provider Department, Room/Bed    11/1/21 Emergency  Katty Jay MD [address], 354/1    Discharge Date Discharge Disposition Discharge Destination                         Attending Provider: Katty Jay MD    Allergies: Ace Inhibitors, Baclofen, Lisinopril    Isolation: None   Infection: None   Code Status: No CPR   Advance Care Planning Activity    Ht: 172.7 cm (68\")   Wt: 62 kg (136 lb 11.2 oz)    Admission Cmt: None   Principal Problem: Acute on chronic respiratory failure with hypoxia (HCC) [J96.21]                 Active Insurance as of 11/1/2021     Primary Coverage     Payor Plan Insurance Group Employer/Plan Group    MEDICARE MEDICARE A & B      Payor Plan Address Payor Plan Phone Number Payor Plan Fax Number Effective Dates    [address] [phone]  12/1/1997 - None Entered    [facility] [id number]       Subscriber Name Subscriber Birth Date Member ID       VAL ARTEAGA 12/2/1932 [id number]           Secondary Coverage     Payor Plan Insurance Group Employer/Plan Group    [facility] [id number]     Payor Plan Address Payor Plan Phone Number Payor Plan Fax Number Effective Dates    [address] [address] [phone]  10/22/2015 - None Entered    [facility] [id number]       Subscriber Name Subscriber Birth Date Member ID       VAL ARTEAGA 12/2/1932 [id number]                 Emergency Contacts      (Rel.) Home Phone Work Phone Mobile Phone    Kelly Madison (Neice) (Relative) [phone]    Clair Odonnell (Neice) (Relative) [phone]    Mitchel Amado (Relative) [phone] -- " [phone]    akash yu (Son) -- -- [phone]            Insurance Information                MEDICARE/MEDICARE A & B Phone: [phone]    Subscriber: Val Yu Subscriber#: [id number]    Group#: -- Precert#: --        [facility] Phone: [phone]    Subscriber: Val Yu Subscriber#: [id number]    Group#: [id number] Precert#: --

## 2021-11-08 NOTE — CONSULTS
Adult Nutrition  Assessment    Patient Name:  Val Arteaga  YOB: 1932  MRN: [id number]  Admit Date:  11/1/2021    Assessment Date:  11/8/2021    Comments:  RD review d/t LOS. Pt admit d/t respiratory failure/copd exacerbation. Pt has transitioned to comfort measures at this time. RD will monitor for changes.      Reason for Assessment     Row Name 11/08/21 1308          Reason for Assessment    Reason For Assessment per organizational policy  LOS                  Anthropometrics     Row Name 11/08/21 0545          Anthropometrics    Weight 62 kg (136 lb 11.2 oz)                               Electronically signed by:  Ludmila Small RD  11/08/21 13:08 CST

## 2021-11-08 NOTE — PLAN OF CARE
"Goal Outcome Evaluation:  Plan of Care Reviewed With: patient, family           Outcome Summary: OT eval completed; co-eval with PT. Pt pleasant and cooperative. Pt reported recently receiving Morphine which made her \"silly/dizzy.\" Pt performed supine<>sit with SPV, sit<>stand with min A using RW, toilet t/f with min A, and amb with min A using RW. Pt completed toileting tasks and LBD with min A. Pt presents with decreased mobility, balance, strength, activity tolerance, and ADL participation. Pt requires vc for PLB and to avoid mouth breathing. Pt would benefit from continued skilled OT services to address deficits and promote return to highest level of functioning. If pt returns home at [facility], rec 24/7 and  OT. If pt does not have this level of support, rec SNF.  "

## 2021-11-08 NOTE — PLAN OF CARE
Goal Outcome Evaluation:  Plan of Care Reviewed With: patient        Progress: no change  Outcome Summary: Vitals stable throughout shift. Denies any pain. Treated for air hunger with MS. Ambulates well with walker with standby assist.

## 2021-11-08 NOTE — PLAN OF CARE
Goal Outcome Evaluation:  Plan of Care Reviewed With: patient           Outcome Summary: Initial PT evaluation complete, co-evaluation with OT.  Patient is alert, very cooperative.  She requires SPV with bed mobility, min Ax1 with transfers and gait, ambulating 5'x2 with FWW, slow kari, unsteady, cues for use of walker. Patient incontinent of urine while seated and when standing.  Patient c/o dizziness while seated and standing, BP stable.  Patient will need 24 hour assistance at home if she is to return home.  Otherwise SNF placement for rehab is appropriate. Goals established, continue skilled PT.

## 2021-11-08 NOTE — THERAPY RE-EVALUATION
Patient Name: Val Arteaga  : 1932    MRN: [id number]                              Today's Date: 2021       Admit Date: 2021    Visit Dx:     ICD-10-CM ICD-9-CM   1. COPD exacerbation ([facility])  J44.1 491.21   2. Elevated troponin  R77.8 790.6   3. Uncontrolled hypertension  I10 401.9   4. Impaired functional mobility, balance, gait, and endurance  Z74.09 V49.89   5. Impaired mobility and ADLs  Z74.09 V49.89    Z78.9    6. Mucopurulent chronic bronchitis ([facility])  J41.1 491.1   7. Acute on chronic respiratory failure with hypoxia ([facility])  J96.21 518.84     799.02     Patient Active Problem List   Diagnosis   • Uncontrolled hypertension   • CAD (coronary artery disease)   • Chronic obstructive pulmonary disease ([facility])   • Acquired hypothyroidism   • Depressive disorder   • Thygeson's superficial punctate keratitis   • Pseudophakia   • Precordial pain   • Displaced fracture of base of neck of right femur, initial encounter for closed fracture ([facility])   • Hip fracture, right, closed, initial encounter ([facility])   • Acute on chronic systolic CHF (congestive heart failure) ([facility])   • Hyponatremia   • BENITO (acute kidney injury) ([facility])   • Acute blood loss anemia   • Urinary retention   • Closed displaced basicervical fracture of right femur ([facility])   • Urethral caruncle   • Symptomatic anemia   • Anemia   • Major depressive disorder   • Drug-induced encephalopathy   • Acute left-sided low back pain without sciatica   • Bilateral sacral insufficiency fracture   • Osteoarthritis of multiple joints   • Dyspnea   • CHF (congestive heart failure) ([facility])   • Hypercholesterolemia   • Chest pain   • Acute on chronic respiratory failure with hypoxia ([facility])   • Acute on chronic diastolic heart failure ([facility])   • Malignant hypertension   • COPD exacerbation ([facility])   • COPD with acute exacerbation ([facility])   • Elevated troponin   • Dyspnea and respiratory abnormalities   • Shortness of breath   • Uncontrolled hypersomnia     Past Medical  History:   Diagnosis Date   • Allergic rhinitis    • Chronic diastolic heart failure (HCC)    • COPD (chronic obstructive pulmonary disease) (HCC)    • Coronary arteriosclerosis    • Depression    • GERD (gastroesophageal reflux disease)    • Hypercholesterolemia    • Hypertension    • Hypothyroidism    • LVH (left ventricular hypertrophy)    • Myocardial infarction (HCC)    • Osteoarthritis      Past Surgical History:   Procedure Laterality Date   • BREAST SURGERY     • CARDIAC CATHETERIZATION     • CERVICAL SPINE SURGERY     • COLONOSCOPY     • CORONARY ANGIOPLASTY     • ENDOSCOPY N/A 2/14/2018   • ENDOSCOPY AND COLONOSCOPY     • HIP BIPOLAR REPLACEMENT Right 1/23/2018   • PACEMAKER REPLACEMENT        General Information     Row Name 11/08/21 1045          Physical Therapy Time and Intention    Document Type re-evaluation  -CZ     Mode of Treatment co-treatment; physical therapy; occupational therapy  -CZ     Row Name 11/08/21 1045          General Information    Patient Profile Reviewed yes  -CZ     Prior Level of Function independent:; all household mobility  -CZ     Existing Precautions/Restrictions fall; oxygen therapy device and L/min  -CZ     Barriers to Rehab medically complex  -CZ     Row Name 11/08/21 1045          Living Environment    Lives With alone  -CZ     Row Name 11/08/21 1045          Home Main Entrance    Number of Stairs, Main Entrance two  -CZ     Stair Railings, Main Entrance railings on both sides of stairs  -CZ     Row Name 11/08/21 1045          Stairs Within Home, Primary    Stairs, Within Home, Primary Ambulates with FWW, walk in shower with built in seat and grab bars.  -CZ     Number of Stairs, Within Home, Primary none  -CZ     Row Name 11/08/21 1045          Cognition    Orientation Status (Cognition) oriented x 4  -CZ     Row Name 11/08/21 1045          Safety Issues, Functional Mobility    Impairments Affecting Function (Mobility) endurance/activity tolerance; shortness of breath;  strength; balance  -CZ           User Key  (r) = Recorded By, (t) = Taken By, (c) = Cosigned By    Initials Name Provider Type    CZ José Antonio Estrada, PT Physical Therapist               Mobility     Row Name 11/08/21 1045          Bed Mobility    Bed Mobility supine-sit; sit-supine  -CZ     Supine-Sit [place] (Bed Mobility) supervision  -CZ     Sit-Supine [place] (Bed Mobility) supervision  -CZ     Assistive Device (Bed Mobility) head of bed elevated; bed rails  -CZ     Row Name 11/08/21 1045          Transfers    Comment (Transfers) [place] seated EOB, BP stable.  -CZ     Row Name 11/08/21 1045          Sit-Stand Transfer    Sit-Stand [place] (Transfers) minimum assist (75% patient effort)  -CZ     Assistive Device (Sit-Stand Transfers) walker, front-wheeled  -CZ     Row Name 11/08/21 1045          Gait/Stairs (Locomotion)    [place] Level (Gait) minimum assist (75% patient effort)  -CZ     Assistive Device (Gait) walker, front-wheeled  -     Distance in Feet (Gait) 5'x2.  -CZ     Comment (Gait/Stairs) Slow kari, unsteady, cues for proper use of walker.  -CZ           User Key  (r) = Recorded By, (t) = Taken By, (c) = Cosigned By    Initials Name Provider Type    José Antonio Boone, PT Physical Therapist               Obj/Interventions     Row Name 11/08/21 1045          Range of Motion Comprehensive    General Range of Motion bilateral lower extremity ROM WFL  -     Row Name 11/08/21 1045          Strength Comprehensive (MMT)    General Manual Muscle Testing (MMT) Assessment other (see comments)  -CZ     Comment, General Manual Muscle Testing (MMT) Assessment BLEs: 3+/5 grossly.  -CZ     Row Name 11/08/21 1045          Sensory Assessment (Somatosensory)    Sensory Assessment (Somatosensory) LE sensation intact  -CZ           User Key  (r) = Recorded By, (t) = Taken By, (c) = Cosigned By    Initials Name Provider Type    José Antonio Boone, PT Physical Therapist               Goals/Plan      Row Name 11/08/21 1045          Bed Mobility Goal 1 (PT)    Activity/Assistive Device (Bed Mobility Goal 1, PT) sit to supine; supine to sit  -CZ     [place] Level/Cues Needed (Bed Mobility Goal 1, PT) modified independence  -CZ     Time Frame (Bed Mobility Goal 1, PT) by discharge  -CZ     Progress/Outcomes (Bed Mobility Goal 1, PT) goal not met  -CZ     Row Name 11/08/21 1045          Transfer Goal 1 (PT)    Activity/Assistive Device (Transfer Goal 1, PT) sit-to-stand/stand-to-sit; bed-to-chair/chair-to-bed; walker, rolling  -CZ     [place] Level/Cues Needed (Transfer Goal 1, PT) modified independence  -CZ     Time Frame (Transfer Goal 1, PT) by discharge  -CZ     Progress/Outcome (Transfer Goal 1, PT) goal not met  -CZ     Row Name 11/08/21 1045          Gait Training Goal 1 (PT)    Activity/Assistive Device (Gait Training Goal 1, PT) walker, rolling  -CZ     [place] Level (Gait Training Goal 1, PT) modified independence  -CZ     Distance (Gait Training Goal 1, PT) 150ft or more  -CZ     Time Frame (Gait Training Goal 1, PT) by discharge  -CZ     Progress/Outcome (Gait Training Goal 1, PT) goal not met  -CZ           User Key  (r) = Recorded By, (t) = Taken By, (c) = Cosigned By    Initials Name Provider Type    José Antonio Boone, PT Physical Therapist               Clinical Impression     Row Name 11/08/21 1045          Pain    Additional Documentation Pain Scale: Numbers Pre/Post-Treatment (Group)  -CZ     Row Name 11/08/21 1045          Pain Scale: Numbers Pre/Post-Treatment    Pretreatment Pain Rating 0/10 - no pain  -CZ     Posttreatment Pain Rating 0/10 - no pain  -CZ     Pain Intervention(s) Medication (See MAR)  -CZ     Row Name 11/08/21 1045          Plan of Care Review    Plan of Care Reviewed With patient  -CZ     Outcome Summary Initial PT evaluation complete, co-evaluation with OT.  Patient is alert, very cooperative.  She requires SPV with bed mobility, min Ax1 with transfers and  gait, ambulating 5'x2 with FWW, slow kari, unsteady, cues for use of walker. Patient incontinent of urine while seated and when standing.  Patient c/o dizziness while seated and standing, BP stable.  Patient will need 24 hour assistance at home if she is to return home.  Otherwise SNF placement for rehab is appropriate. Goals established, continue skilled PT.  -     Row Name 11/08/21 1045          Therapy Assessment/Plan (PT)    Rehab Potential (PT) good, to achieve stated therapy goals  -     Criteria for Skilled Interventions Met (PT) yes; skilled treatment is necessary  -     Row Name 11/08/21 1045          Vital Signs    Pre Systolic BP Rehab 163  -CZ     Pre Treatment Diastolic BP 73  -CZ     Intra Systolic BP Rehab 162  -CZ     Intra Treatment Diastolic BP 71  -CZ     Post Systolic BP Rehab 197  -CZ     Post Treatment Diastolic BP 81  -CZ     Pretreatment Heart Rate (beats/min) 65  -CZ     Posttreatment Heart Rate (beats/min) 70  -CZ     Pre SpO2 (%) 98  -CZ     O2 Delivery Pre Treatment nasal cannula  3 LPM  -CZ     Post SpO2 (%) 95  -CZ     O2 Delivery Post Treatment nasal cannula  -CZ     Pre Patient Position Supine  -CZ     Intra Patient Position Sitting  -CZ     Post Patient Position Supine  -CZ     Row Name 11/08/21 1045          Positioning and Restraints    Pre-Treatment Position in bed  -CZ     Post Treatment Position bed  -CZ     In Bed supine; call light within reach; encouraged to call for assist; exit alarm on  -CZ           User Key  (r) = Recorded By, (t) = Taken By, (c) = Cosigned By    Initials Name Provider Type    CZ José Antonio Estrada, PT Physical Therapist               Outcome Measures     Row Name 11/08/21 1045          How much help from another person do you currently need...    Turning from your back to your side while in flat bed without using bedrails? 3  -CZ     Moving from lying on back to sitting on the side of a flat bed without bedrails? 3  -CZ     Moving to and from a  bed to a chair (including a wheelchair)? 3  -CZ     Standing up from a chair using your arms (e.g., wheelchair, bedside chair)? 3  -CZ     Climbing 3-5 steps with a railing? 3  -CZ     To walk in hospital room? 3  -CZ     AM-PAC 6 Clicks Score (PT) 18  -CZ     Row Name 11/08/21 1045          Functional Assessment    Outcome Measure Options AM-PAC 6 Clicks Basic Mobility (PT)  -           User Key  (r) = Recorded By, (t) = Taken By, (c) = Cosigned By    Initials Name Provider Type    CZ José Antonio Estrada, PT Physical Therapist                             Physical Therapy Education                 Title: PT OT SLP Therapies (In Progress)     Topic: Physical Therapy (In Progress)     Point: Mobility training (Done)     Learning Progress Summary           Patient Acceptance, E, VU,NR by  at 11/8/2021 1219    Comment: Hand placement with transfers, PT POC, goals, use of walker.    Acceptance, E, VU,DU by RONAN at 11/3/2021 1437                   Point: Home exercise program (Not Started)     Learner Progress:  Not documented in this visit.          Point: Body mechanics (Done)     Learning Progress Summary           Patient Acceptance, E, VU,DU by RONAN at 11/3/2021 1437                   Point: Precautions (Done)     Learning Progress Summary           Patient Acceptance, E, VU,DU by RONAN at 11/3/2021 1437                               User Key     Initials Effective Dates Name Provider Type Discipline    RONAN 06/16/21 -  Malena Mendoza, PT Physical Therapist PT     06/16/21 -  José Antonio Estrada, PT Physical Therapist PT              PT Recommendation and Plan  Planned Therapy Interventions (PT): balance training, bed mobility training, gait training, patient/family education, transfer training, strengthening, stretching, ROM (range of motion)  Plan of Care Reviewed With: patient  Outcome Summary: Initial PT evaluation complete, co-evaluation with OT.  Patient is alert, very cooperative.  She requires SPV with bed mobility,  min Ax1 with transfers and gait, ambulating 5'x2 with FWW, slow kari, unsteady, cues for use of walker. Patient incontinent of urine while seated and when standing.  Patient c/o dizziness while seated and standing, BP stable.  Patient will need 24 hour assistance at home if she is to return home.  Otherwise SNF placement for rehab is appropriate. Goals established, continue skilled PT.     Time Calculation:    PT Charges     Row Name 11/08/21 1224             Time Calculation    Start Time 1045  -CZ      Stop Time 1114  -CZ      Time Calculation (min) 29 min  -CZ      PT Received On 11/08/21  -CZ      PT Goal Re-Cert Due Date 11/21/21  -CZ              Untimed Charges    PT Eval/Re-eval Minutes 29  -CZ              Total Minutes    Untimed Charges Total Minutes 29  -CZ       Total Minutes 29  -CZ            User Key  (r) = Recorded By, (t) = Taken By, (c) = Cosigned By    Initials Name Provider Type    CZ José Antonio Estrada, PT Physical Therapist              Therapy Charges for Today     Code Description Service Date Service Provider Modifiers Qty    [id number]  PT RE-EVAL ESTABLISHED PLAN 2 11/8/2021 José Antonio Estrada, PT GP 1          PT G-Codes  Outcome Measure Options: AM-PAC 6 Clicks Basic Mobility (PT)  AM-PAC 6 Clicks Score (PT): 18  AM-PAC 6 Clicks Score (OT): 20    José Antonio Estrada PT  11/8/2021

## 2021-11-08 NOTE — THERAPY RE-EVALUATION
Patient Name: Val Arteaga  : 1932    MRN: [id number]                              Today's Date: 2021       Admit Date: 2021    Visit Dx:     ICD-10-CM ICD-9-CM   1. COPD exacerbation ([facility])  J44.1 491.21   2. Elevated troponin  R77.8 790.6   3. Uncontrolled hypertension  I10 401.9   4. Impaired functional mobility, balance, gait, and endurance  Z74.09 V49.89   5. Impaired mobility and ADLs  Z74.09 V49.89    Z78.9    6. Mucopurulent chronic bronchitis ([facility])  J41.1 491.1   7. Acute on chronic respiratory failure with hypoxia ([facility])  J96.21 518.84     799.02     Patient Active Problem List   Diagnosis   • Uncontrolled hypertension   • CAD (coronary artery disease)   • Chronic obstructive pulmonary disease ([facility])   • Acquired hypothyroidism   • Depressive disorder   • Thygeson's superficial punctate keratitis   • Pseudophakia   • Precordial pain   • Displaced fracture of base of neck of right femur, initial encounter for closed fracture ([facility])   • Hip fracture, right, closed, initial encounter ([facility])   • Acute on chronic systolic CHF (congestive heart failure) ([facility])   • Hyponatremia   • BENITO (acute kidney injury) ([facility])   • Acute blood loss anemia   • Urinary retention   • Closed displaced basicervical fracture of right femur ([facility])   • Urethral caruncle   • Symptomatic anemia   • Anemia   • Major depressive disorder   • Drug-induced encephalopathy   • Acute left-sided low back pain without sciatica   • Bilateral sacral insufficiency fracture   • Osteoarthritis of multiple joints   • Dyspnea   • CHF (congestive heart failure) ([facility])   • Hypercholesterolemia   • Chest pain   • Acute on chronic respiratory failure with hypoxia ([facility])   • Acute on chronic diastolic heart failure ([facility])   • Malignant hypertension   • COPD exacerbation ([facility])   • COPD with acute exacerbation ([facility])   • Elevated troponin   • Dyspnea and respiratory abnormalities   • Shortness of breath   • Uncontrolled hypersomnia     Past Medical  History:   Diagnosis Date   • Allergic rhinitis    • Chronic diastolic heart failure (HCC)    • COPD (chronic obstructive pulmonary disease) (HCC)    • Coronary arteriosclerosis    • Depression    • GERD (gastroesophageal reflux disease)    • Hypercholesterolemia    • Hypertension    • Hypothyroidism    • LVH (left ventricular hypertrophy)    • Myocardial infarction (HCC)    • Osteoarthritis      Past Surgical History:   Procedure Laterality Date   • BREAST SURGERY     • CARDIAC CATHETERIZATION     • CERVICAL SPINE SURGERY     • COLONOSCOPY     • CORONARY ANGIOPLASTY     • ENDOSCOPY N/A 2/14/2018   • ENDOSCOPY AND COLONOSCOPY     • HIP BIPOLAR REPLACEMENT Right 1/23/2018   • PACEMAKER REPLACEMENT        General Information     [facility] Name 11/08/21 1046          OT Time and Intention    Document Type re-evaluation  -AS     Mode of Treatment co-treatment; physical therapy; occupational therapy  -AS     [facility] Name 11/08/21 1046          General Information    Patient Profile Reviewed yes  -AS     Existing Precautions/Restrictions fall; oxygen therapy device and L/min  -AS     Barriers to Rehab medically complex  -AS     Row Name 11/08/21 1046          Living Environment    Lives With alone  -AS     Row Name 11/08/21 1046          Home Main Entrance    Number of Stairs, Main Entrance two  -AS     Stair Railings, Main Entrance railings on both sides of stairs  -AS     Row Name 11/08/21 1046          Stairs Within Home, Primary    Stairs, Within Home, Primary Ambulates with FWW, walk in shower with built in seat and grab bars  -AS     Number of Stairs, Within Home, Primary none  -AS     Row Name 11/08/21 1046          Cognition    Orientation Status (Cognition) oriented x 4  -AS     Row Name 11/08/21 1046          Safety Issues, Functional Mobility    Safety Issues Affecting Function (Mobility) awareness of need for assistance; safety precaution awareness; safety precautions follow-through/compliance  -AS     Impairments  Affecting Function (Mobility) endurance/activity tolerance; shortness of breath; strength; balance  -AS           User Key  (r) = Recorded By, (t) = Taken By, (c) = Cosigned By    Initials Name Provider Type    AS Cailin Wang OT Occupational Therapist                 Mobility/ADL's     Row Name 11/08/21 1046          Bed Mobility    Bed Mobility supine-sit; sit-supine  -AS     Supine-Sit [place] (Bed Mobility) supervision  -AS     Sit-Supine [place] (Bed Mobility) supervision  -AS     Assistive Device (Bed Mobility) head of bed elevated; bed rails  -AS     [facility] Name 11/08/21 1046          Transfers    Transfers sit-stand transfer; toilet transfer  -AS     Comment (Transfers) Dizzy seated EOB, BP stable  -AS     Sit-Stand [place] (Transfers) minimum assist (75% patient effort)  -AS     [place] Level (Toilet Transfer) minimum assist (75% patient effort)  -AS     Row Name 11/08/21 1046          Sit-Stand Transfer    Assistive Device (Sit-Stand Transfers) walker, front-wheeled  -AS     [facility] Name 11/08/21 1046          Toilet Transfer    Type (Toilet Transfer) stand-sit; sit-stand  -AS     [facility] Name 11/08/21 1046          Functional Mobility    Functional Mobility- Ind. Level minimum assist (75% patient effort)  -AS     Functional Mobility- Device rolling walker  -AS     [facility] Name 11/08/21 1046          Activities of Daily Living    BADL Assessment/Intervention toileting; lower body dressing; upper body dressing  -AS     [facility] Name 11/08/21 1046          Lower Body Dressing Assessment/Training    [place] Level (Lower Body Dressing) don; pants/bottoms; minimum assist (75% patient effort)  -AS     Position (Lower Body Dressing) unsupported sitting  -AS     Row Name 11/08/21 1046          Toileting Assessment/Training    [place] Level (Toileting) minimum assist (75% patient effort)  -AS     Position (Toileting) unsupported standing; supported standing  -AS     [facility] Name 11/08/21 1046           Upper Body Dressing Assessment/Training    Position (Upper Body Dressing) edge of bed sitting  -AS     Comment (Upper Body Dressing) s/u to don hospital gown  -AS           User Key  (r) = Recorded By, (t) = Taken By, (c) = Cosigned By    Initials Name Provider Type    AS Cailin Wang OT Occupational Therapist               Obj/Interventions     Row Name 11/08/21 1046          Sensory Assessment (Somatosensory)    Sensory Assessment (Somatosensory) UE sensation intact  -AS     Row Name 11/08/21 1046          Range of Motion Comprehensive    General Range of Motion bilateral upper extremity ROM WFL  -AS     Row Name 11/08/21 1046          Strength Comprehensive (MMT)    Comment, General Manual Muscle Testing (MMT) Assessment BUE grossly 3+/5 throughout  -AS           User Key  (r) = Recorded By, (t) = Taken By, (c) = Cosigned By    Initials Name Provider Type    AS Cailin Wang OT Occupational Therapist               Goals/Plan     Row Name 11/08/21 1046          Transfer Goal 1 (OT)    Activity/Assistive Device (Transfer Goal 1, OT) toilet  -AS     [place] Level/Cues Needed (Transfer Goal 1, OT) supervision required  -AS     Time Frame (Transfer Goal 1, OT) long term goal (LTG); by discharge  -AS     Progress/Outcome (Transfer Goal 1, OT) goal not met  -AS     Row Name 11/08/21 1046          Bathing Goal 1 (OT)    Activity/Device (Bathing Goal 1, OT) lower body bathing  -AS     [place] Level/Cues Needed (Bathing Goal 1, OT) set-up required; supervision required  -AS     Time Frame (Bathing Goal 1, OT) long term goal (LTG); by discharge  -AS     Progress/Outcomes (Bathing Goal 1, OT) goal not met  -AS     Row Name 11/08/21 1046          Dressing Goal 1 (OT)    Activity/Device (Dressing Goal 1, OT) lower body dressing  -AS     [place]/Cues Needed (Dressing Goal 1, OT) set-up required; supervision required  -AS     Time Frame (Dressing Goal 1, OT) long term goal (LTG); by discharge  -AS      Progress/Outcome (Dressing Goal 1, OT) goal not met  -AS     Row Name 11/08/21 1046          Therapy Assessment/Plan (OT)    Planned Therapy Interventions (OT) activity tolerance training; strengthening exercise; transfer/mobility retraining; ROM/therapeutic exercise; BADL retraining; adaptive equipment training; functional balance retraining; patient/caregiver education/training; occupation/activity based interventions  -AS           User Key  (r) = Recorded By, (t) = Taken By, (c) = Cosigned By    Initials Name Provider Type    AS Cailin Wang, OT Occupational Therapist               Clinical Impression     Row Name 11/08/21 1046          Pain Scale: Numbers Pre/Post-Treatment    Pretreatment Pain Rating 0/10 - no pain  -AS     Posttreatment Pain Rating 0/10 - no pain  -AS     Row Name 11/08/21 1046          Plan of Care Review    Plan of Care Reviewed With patient  -AS     Row Name 11/08/21 1046          Therapy Assessment/Plan (OT)    Rehab Potential (OT) good, to achieve stated therapy goals  -AS     Criteria for Skilled Therapeutic Interventions Met (OT) yes; meets criteria; skilled treatment is necessary  -AS     Therapy Frequency (OT) other (see comments)  5-7days/wk  -AS     Predicted Duration of Therapy Intervention (OT) until goals met or d/c from facility  -AS     Row Name 11/08/21 1046          Therapy Plan Review/Discharge Plan (OT)    Anticipated Discharge Disposition (OT) home with assist; home with home health; skilled nursing facility  -AS     Row Name 11/08/21 1046          Vital Signs    Pre Systolic BP Rehab 163  -AS     Pre Treatment Diastolic BP 73  -AS     Intra Systolic BP Rehab 162  -AS     Intra Treatment Diastolic BP 71  -AS     Post Systolic BP Rehab 197  -AS     Post Treatment Diastolic BP 81  -AS     Pretreatment Heart Rate (beats/min) 65  -AS     Posttreatment Heart Rate (beats/min) 70  -AS     Pre SpO2 (%) 98  -AS     O2 Delivery Pre Treatment nasal cannula  -AS     Intra SpO2  (%) 97  -AS     O2 Delivery Intra Treatment nasal cannula  -AS     Post SpO2 (%) 95  -AS     O2 Delivery Post Treatment nasal cannula  -AS     Pre Patient Position Supine  -AS     Intra Patient Position Sitting  -AS     Post Patient Position Supine  -AS     Row Name 11/08/21 1046          Positioning and Restraints    Pre-Treatment Position in bed  -AS     Post Treatment Position bed  -AS     In Bed supine; encouraged to call for assist; exit alarm on; call light within reach  -AS           User Key  (r) = Recorded By, (t) = Taken By, (c) = Cosigned By    Initials Name Provider Type    AS Cailin Wang, OT Occupational Therapist               Outcome Measures     Row Name 11/08/21 1046          How much help from another is currently needed...    Putting on and taking off regular lower body clothing? 3  -AS     Bathing (including washing, rinsing, and drying) 3  -AS     Toileting (which includes using toilet bed pan or urinal) 3  -AS     Putting on and taking off regular upper body clothing 3  -AS     Taking care of personal grooming (such as brushing teeth) 3  -AS     Eating meals 4  -AS     AM-PAC 6 Clicks Score (OT) 19  -AS     Row Name 11/08/21 1045          How much help from another person do you currently need...    Turning from your back to your side while in flat bed without using bedrails? 3  -CZ     Moving from lying on back to sitting on the side of a flat bed without bedrails? 3  -CZ     Moving to and from a bed to a chair (including a wheelchair)? 3  -CZ     Standing up from a chair using your arms (e.g., wheelchair, bedside chair)? 3  -CZ     Climbing 3-5 steps with a railing? 3  -CZ     To walk in hospital room? 3  -CZ     AM-PAC 6 Clicks Score (PT) 18  -CZ     Row Name 11/08/21 1045          Functional Assessment    Outcome Measure Options AM-PAC 6 Clicks Basic Mobility (PT)  -CZ           User Key  (r) = Recorded By, (t) = Taken By, (c) = Cosigned By    Initials Name Provider Type    CZ  José Antonio Estrada, PT Physical Therapist    AS Cailin Wang, OT Occupational Therapist                Occupational Therapy Education                 Title: PT OT SLP Therapies (In Progress)     Topic: Occupational Therapy (Done)     Point: ADL training (Done)     Description:   Instruct learner(s) on proper safety adaptation and remediation techniques during self care or transfers.   Instruct in proper use of assistive devices.              Learning Progress Summary           Patient Acceptance, E, VU by AS at 11/8/2021 1253    Comment: role of OT, OT POC, ADL training, transfer training    Acceptance, E,TB, VU by LW at 11/5/2021 1309                   Point: Home exercise program (Done)     Description:   Instruct learner(s) on appropriate technique for monitoring, assisting and/or progressing therapeutic exercises/activities.              Learning Progress Summary           Patient Acceptance, E,TB, VU by LW at 11/5/2021 1309                   Point: Precautions (Done)     Description:   Instruct learner(s) on prescribed precautions during self-care and functional transfers.              Learning Progress Summary           Patient Acceptance, E, VU by AS at 11/8/2021 1253    Comment: role of OT, OT POC, ADL training, transfer training    Acceptance, E,TB, VU by LW at 11/5/2021 1309    Acceptance, E, VU,NR by ME at 11/4/2021 1223    Comment: Educated on OT and POC .Educated tocall for assistance. Educated on safety precautions.   Family Acceptance, E, VU,NR by ME at 11/4/2021 1223    Comment: Educated on OT and POC .Educated tocall for assistance. Educated on safety precautions.                   Point: Body mechanics (Done)     Description:   Instruct learner(s) on proper positioning and spine alignment during self-care, functional mobility activities and/or exercises.              Learning Progress Summary           Patient Acceptance, E,TB, VU by LW at 11/5/2021 1309    Acceptance, E, VU,NR by ME at 11/4/2021  "1223    Comment: Educated on OT and POC .Educated tocall for assistance. Educated on safety precautions.   Family Acceptance, E, VU,NR by ME at 11/4/2021 1223    Comment: Educated on OT and POC .Educated tocall for assistance. Educated on safety precautions.                               User Key     Initials Effective Dates Name Provider Type Discipline     06/16/21 -  Aide Barraza COTA Occupational Therapy Assistant OT    AS 03/18/21 -  Cailin Wang, OT Occupational Therapist OT    ME 06/16/21 -  Skip Monte OTR/L Occupational Therapist OT              OT Recommendation and Plan  Planned Therapy Interventions (OT): activity tolerance training, strengthening exercise, transfer/mobility retraining, ROM/therapeutic exercise, BADL retraining, adaptive equipment training, functional balance retraining, patient/caregiver education/training, occupation/activity based interventions  Therapy Frequency (OT): other (see comments) (5-7days/wk)  Plan of Care Review  Plan of Care Reviewed With: patient, family  Outcome Summary: OT eval completed; co-eval with PT. Pt pleasant and cooperative. Pt reported recently receiving Morphine which made her \"silly/dizzy.\" Pt performed supine<>sit with SPV, sit<>stand with min A using RW, toilet t/f with min A, and amb with min A using RW. Pt completed toileting tasks and LBD with min A. Pt presents with decreased mobility, balance, strength, activity tolerance, and ADL participation. Pt requires vc for PLB and to avoid mouth breathing. Pt would benefit from continued skilled OT services to address deficits and promote return to highest level of functioning. If pt returns [facility] at [facility], rec 24/7 and  OT. If pt does not have this level of support, rec SNF.     Time Calculation:    Time Calculation- OT     Row Name 11/08/21 6435             Time Calculation- OT    OT Start Time 1046  -AS      OT Stop Time 1115  -AS      OT Time Calculation (min) 29 min  -AS      OT Received On " 11/08/21  -AS      OT Goal Re-Cert Due Date 11/21/21  -AS              Untimed Charges    OT Eval/Re-eval Minutes 29  -AS              Total Minutes    Untimed Charges Total Minutes 29  -AS       Total Minutes 29  -AS            User Key  (r) = Recorded By, (t) = Taken By, (c) = Cosigned By    Initials Name Provider Type    AS Cailin Wang OT Occupational Therapist              Therapy Charges for Today     Code Description Service Date Service Provider Modifiers Qty    [id number]  OT RE-EVAL 2 11/8/2021 Cailin Wang OT GO 1               Cailin Wang OT  11/8/2021

## 2021-11-09 NOTE — PLAN OF CARE
Goal Outcome Evaluation:           Progress: no change  Outcome Summary: pt resting well. no signs of distress. vss. will continue to monitor.

## 2021-11-09 NOTE — PROGRESS NOTES
[facility]  INPATIENT PROGRESS NOTE    Length of Stay: 7  Date of Admission: 11/1/2021  Primary Care Physician: Vero Arteaga APRN    Subjective   Chief Complaint: Shortness of air.    HPI: Patient is seen for follow-up.  She is doing better,  remains short of air and on supplemental oxygen of 3 L nasal prong with saturation in the upper 90s.  Her appetite continues to fluctuate but is slowly improving.    Review of Systems   Constitutional: Positive for activity change and fatigue. Negative for appetite change, chills, diaphoresis and fever.   HENT: Negative for trouble swallowing and voice change.    Eyes: Negative for photophobia and visual disturbance.   Respiratory: Positive for shortness of breath. Negative for cough, choking, chest tightness, wheezing and stridor.    Cardiovascular: Negative for chest pain, palpitations and leg swelling.   Gastrointestinal: Negative for abdominal distention, abdominal pain, blood in stool, constipation, diarrhea, nausea and vomiting.   Endocrine: Negative for cold intolerance, heat intolerance, polydipsia, polyphagia and polyuria.   Genitourinary: Negative for decreased urine volume, difficulty urinating, dysuria, enuresis, flank pain, frequency, hematuria and urgency.   Musculoskeletal: Negative for arthralgias, gait problem, myalgias, neck pain and neck stiffness.   Skin: Negative for pallor, rash and wound.   Neurological: Negative for dizziness, tremors, seizures, syncope, facial asymmetry, speech difficulty, weakness, light-headedness, numbness and headaches.   Hematological: Does not bruise/bleed easily.   Psychiatric/Behavioral: Negative for agitation, behavioral problems and confusion.       Objective    Temp:  [96.7 °F (35.9 °C)-98.2 °F (36.8 °C)] 97.6 °F (36.4 °C)  Heart Rate:  [60-78] 70  Resp:  [16-20] 18  BP: (130-174)/(56-76) 130/60    Physical Exam  Vitals and nursing note reviewed.   Constitutional:        General: She is not in acute distress.     Appearance: She is well-developed. She is ill-appearing. She is not diaphoretic.   HENT:      Head: Normocephalic and atraumatic.      Right Ear: External ear normal.      Left Ear: External ear normal.      Nose: Nose normal.   Eyes:      Extraocular Movements: Extraocular movements intact.      Conjunctiva/sclera: Conjunctivae normal.      Pupils: Pupils are equal, round, and reactive to light.   Neck:      Thyroid: No thyromegaly.      Vascular: No JVD.   Cardiovascular:      Rate and Rhythm: Normal rate and regular rhythm.      Heart sounds: Normal heart sounds. No murmur heard.  No friction rub. No gallop.    Pulmonary:      Effort: Pulmonary effort is normal. No respiratory distress.      Breath sounds: No stridor. Decreased breath sounds, wheezing and rhonchi present. No rales.   Chest:      Chest wall: No tenderness.   Abdominal:      General: Bowel sounds are normal. There is no distension.      Palpations: Abdomen is soft. There is no mass.      Tenderness: There is no abdominal tenderness. There is no right CVA tenderness, left CVA tenderness, guarding or rebound.      Hernia: No hernia is present.   Musculoskeletal:         General: No swelling, tenderness or deformity. Normal range of motion.      Cervical back: Normal range of motion and neck supple.      Right lower leg: No edema.      Left lower leg: No edema.   Skin:     General: Skin is warm and dry.      Coloration: Skin is not jaundiced or pale.      Findings: No bruising, erythema, lesion or rash.   Neurological:      General: No focal deficit present.      Mental Status: She is alert and oriented to person, place, and time.      Cranial Nerves: No cranial nerve deficit.      Sensory: No sensory deficit.      Motor: No weakness.      Coordination: Coordination normal.      Gait: Gait normal.      Deep Tendon Reflexes: Reflexes are normal and symmetric. Reflexes normal.   Psychiatric:          Behavior: Behavior normal. Behavior is cooperative.         Thought Content: Thought content normal.         Judgment: Judgment normal.           Medication Review:    Current Facility-Administered Medications:   •  acetaminophen (TYLENOL) tablet 650 mg, 650 mg, Oral, Q4H PRN **OR** acetaminophen (TYLENOL) 160 MG/5ML solution 650 mg, 650 mg, Oral, Q4H PRN **OR** acetaminophen (TYLENOL) suppository 650 mg, 650 mg, Rectal, Q4H PRN, Yo Garg MD  •  acetaminophen (TYLENOL) tablet 650 mg, 650 mg, Oral, Q4H PRN, Monalisa Mcintosh MD, 650 mg at 11/02/21 2238  •  budesonide (PULMICORT) nebulizer solution 0.5 mg, 0.5 mg, Nebulization, Daily - RT, Monalisa Mcintosh MD, 0.5 mg at 11/09/21 0706  •  carboxymethylcellulose (REFRESH PLUS) 0.5 % ophthalmic solution 1 drop, 1 drop, Both Eyes, Q30 Min PRN, Yo Garg MD  •  diphenoxylate-atropine (LOMOTIL) 2.5-0.025 MG per tablet 1 tablet, 1 tablet, Oral, Q2H PRN, Yo Garg MD  •  docusate sodium (COLACE) capsule 100 mg, 100 mg, Oral, BID PRN, Monalisa Mcintosh MD  •  famotidine (PEPCID) tablet 40 mg, 40 mg, Oral, Daily, Monalisa Mcintosh MD, 40 mg at 11/09/21 0919  •  folic acid-vit B6-vit B12 (FOLBEE) tablet 1 tablet, 1 tablet, Oral, Daily, Monalisa Mcintosh MD, 1 tablet at 11/09/21 0919  •  ipratropium-albuterol (DUO-NEB) nebulizer solution 3 mL, 3 mL, Nebulization, Q4H - RT, Alex Shaikh MD, 3 mL at 11/09/21 0706  •  levothyroxine (SYNTHROID, LEVOTHROID) tablet 50 mcg, 50 mcg, Oral, Q AM, Monalisa Mcintosh MD, 50 mcg at 11/09/21 0605  •  LORazepam (ATIVAN) tablet 0.5 mg, 0.5 mg, Oral, Q1H PRN **OR** LORazepam (ATIVAN) injection 0.5 mg, 0.5 mg, Intravenous, Q1H PRN **OR** LORazepam (ATIVAN) injection 0.5 mg, 0.5 mg, Intramuscular, Q1H PRN **OR** LORazepam (ATIVAN) injection 0.5 mg, 0.5 mg, Subcutaneous, Q1H PRN **OR** LORazepam (ATIVAN) 2 MG/ML concentrated solution 0.5 mg, 0.5 mg, Oral, Q1H PRN **OR** LORazepam  (ATIVAN) 2 MG/ML concentrated solution 0.5 mg, 0.5 mg, Sublingual, Q1H PRN, Yo Garg MD  •  LORazepam (ATIVAN) tablet 1 mg, 1 mg, Oral, Q1H PRN **OR** LORazepam (ATIVAN) injection 1 mg, 1 mg, Intravenous, Q1H PRN, 1 mg at 11/08/21 2109 **OR** LORazepam (ATIVAN) injection 1 mg, 1 mg, Intramuscular, Q1H PRN **OR** LORazepam (ATIVAN) injection 1 mg, 1 mg, Subcutaneous, Q1H PRN **OR** LORazepam (ATIVAN) 2 MG/ML concentrated solution 1 mg, 1 mg, Oral, Q1H PRN **OR** LORazepam (ATIVAN) 2 MG/ML concentrated solution 1 mg, 1 mg, Sublingual, Q1H PRN, Yo Garg MD  •  LORazepam (ATIVAN) tablet 2 mg, 2 mg, Oral, Q1H PRN **OR** LORazepam (ATIVAN) injection 2 mg, 2 mg, Intravenous, Q1H PRN **OR** LORazepam (ATIVAN) injection 2 mg, 2 mg, Intramuscular, Q1H PRN **OR** LORazepam (ATIVAN) injection 2 mg, 2 mg, Subcutaneous, Q1H PRN **OR** LORazepam (ATIVAN) 2 MG/ML concentrated solution 2 mg, 2 mg, Oral, Q1H PRN **OR** LORazepam (ATIVAN) 2 MG/ML concentrated solution 2 mg, 2 mg, Sublingual, Q1H PRN, Yo Garg MD  •  montelukast (SINGULAIR) tablet 10 mg, 10 mg, Oral, Nightly, Monalisa Mcintosh MD, 10 mg at 11/08/21 2048  •  morphine injection 1 mg, 1 mg, Intravenous, Q1H PRN, 1 mg at 11/08/21 1454 **OR** morphine concentrated solution 5 mg, 5 mg, Oral, Q1H PRN, Yo Garg MD  •  morphine injection 1 mg, 1 mg, Intravenous, Q4H PRN, Yo Garg MD, 1 mg at 11/08/21 2109  •  nicotine (NICODERM CQ) 21 MG/24HR patch 1 patch, 1 patch, Transdermal, Q24H, Monalisa Mcintosh MD, 1 patch at 11/05/21 0905  •  ondansetron (ZOFRAN) injection 4 mg, 4 mg, Intravenous, Q6H PRN, Monalisa Mcintosh MD  •  pantoprazole (PROTONIX) EC tablet 40 mg, 40 mg, Oral, QAM, Monalisa Mcintosh MD, 40 mg at 11/09/21 0605  •  prochlorperazine (COMPAZINE) injection 5 mg, 5 mg, Intravenous, Q6H PRN **OR** prochlorperazine (COMPAZINE) tablet 5 mg, 5 mg, Oral, Q6H PRN **OR** prochlorperazine (COMPAZINE) suppository  25 mg, 25 mg, Rectal, Q12H PRN, Yo Garg MD  •  ranolazine (RANEXA) 12 hr tablet 500 mg, 500 mg, Oral, BID, Monalisa Mcintosh MD, 500 mg at 11/09/21 0919  •  sertraline (ZOLOFT) tablet 100 mg, 100 mg, Oral, Daily, Monalisa Mcintosh MD, 100 mg at 11/09/21 0919  •  sodium chloride 0.9 % flush 10 mL, 10 mL, Intravenous, PRN, Monalisa Mcintosh MD, 10 mL at 11/08/21 2049  •  sodium chloride nasal spray 2 spray, 2 spray, Each Nare, PRN, Yo Garg MD  •  traMADol (ULTRAM) tablet 50 mg, 50 mg, Oral, Q6H PRN, Monalisa Mcintosh MD, 50 mg at 11/03/21 0013    Results Review:  I have reviewed the labs, radiology results, and diagnostic studies.    Laboratory Data:   Results from last 7 days   Lab Units 11/06/21 0548 11/05/21 0535 11/04/21 0528   SODIUM mmol/L 136 139 139   POTASSIUM mmol/L 3.9 4.2 4.2   CHLORIDE mmol/L 99 102 103   CO2 mmol/L 29.0 29.0 27.0   BUN mg/dL 32* 32* 32*   CREATININE mg/dL 1.00 1.03* 1.00   GLUCOSE mg/dL 137* 133* 133*   CALCIUM mg/dL 8.3* 8.4* 8.6   ANION GAP mmol/L 8.0 8.0 9.0     Estimated Creatinine Clearance: 38.8 mL/min (by C-G formula based on SCr of 1 mg/dL).          Results from last 7 days   Lab Units 11/06/21 0548 11/05/21 0535 11/04/21 0528 11/03/21 0527   WBC 10*3/mm3 6.45 7.81 9.58 7.25   HEMOGLOBIN g/dL 10.2* 10.0* 9.5* 10.5*   HEMATOCRIT % 30.2* 30.1* 28.8* 31.4*   PLATELETS 10*3/mm3 204 179 182 175           Culture Data:   No results found for: BLOODCX  No results found for: URINECX  No results found for: RESPCX  No results found for: WOUNDCX  No results found for: STOOLCX  No components found for: BODYFLD    Radiology Data:   Imaging Results (Last 24 Hours)     ** No results found for the last 24 hours. **          I have reviewed the patient's current medications.     Assessment/Plan     Hospital Problem List:  Principal Problem:    Acute on chronic respiratory failure with hypoxia (HCC)  Active Problems:    Uncontrolled  hypertension    Acute on chronic diastolic heart failure (HCC)    COPD with acute exacerbation (HCC)    Acute on Chronic respiratory failure with hypoxia: Continue noninvasive respiratory therapy, bronchodilators and add steroids.  Continue palliative care.    Hypertension: Stable. Continue current medications.    Hypothyroidism: Continue Synthroid.    Deconditioning: Continue PT and OT.    Update was given to patient's son who was at the bedside.      Discharge Planning: In progress pending pre-CERT.    I confirmed that the patient's Advance Care Plan is present, code status is documented, or surrogate decision maker is listed in the patient's medical record.     I have utilized all available immediate resources to obtain, update, or review the patient's current medications      Alex Shaikh MD   11/09/21   10:23 CST

## 2021-11-09 NOTE — PLAN OF CARE
Problem: Adult Inpatient Plan of Care  Goal: Plan of Care Review  Recent Flowsheet Documentation  Taken 11/9/2021 0709 by Kenna Escobar COTA  Progress: no change  Plan of Care Reviewed With: patient  Outcome Summary: Pt agrees to grooming tasks (Min A) this morning. Pt is Min A for UB dressing and Mod A for donning socks. Pt required verbal cues and increased time to complete tasks. Continue OT POC   Goal Outcome Evaluation:  Plan of Care Reviewed With: patient        Progress: no change  Outcome Summary: Pt agrees to grooming tasks (Min A) this morning. Pt is Min A for UB dressing and Mod A for donning socks. Pt required verbal cues and increased time to complete tasks. Continue OT POC

## 2021-11-09 NOTE — THERAPY TREATMENT NOTE
Acute Care - Physical Therapy Treatment Note  [facility]     Patient Name: Val Arteaga  : 1932  MRN: [id number]  Today's Date: 2021      Visit Dx:     ICD-10-CM ICD-9-CM   1. COPD exacerbation ([facility])  J44.1 491.21   2. Elevated troponin  R77.8 790.6   3. Uncontrolled hypertension  I10 401.9   4. Impaired functional mobility, balance, gait, and endurance  Z74.09 V49.89   5. Impaired mobility and ADLs  Z74.09 V49.89    Z78.9    6. Mucopurulent chronic bronchitis ([facility])  J41.1 491.1   7. Acute on chronic respiratory failure with hypoxia ([facility])  J96.21 518.84     799.02     Patient Active Problem List   Diagnosis   • Uncontrolled hypertension   • CAD (coronary artery disease)   • Chronic obstructive pulmonary disease ([facility])   • Acquired hypothyroidism   • Depressive disorder   • Thygeson's superficial punctate keratitis   • Pseudophakia   • Precordial pain   • Displaced fracture of base of neck of right femur, initial encounter for closed fracture ([facility])   • Hip fracture, right, closed, initial encounter ([facility])   • Acute on chronic systolic CHF (congestive heart failure) ([facility])   • Hyponatremia   • BENITO (acute kidney injury) ([facility])   • Acute blood loss anemia   • Urinary retention   • Closed displaced basicervical fracture of right femur ([facility])   • Urethral caruncle   • Symptomatic anemia   • Anemia   • Major depressive disorder   • Drug-induced encephalopathy   • Acute left-sided low back pain without sciatica   • Bilateral sacral insufficiency fracture   • Osteoarthritis of multiple joints   • Dyspnea   • CHF (congestive heart failure) ([facility])   • Hypercholesterolemia   • Chest pain   • Acute on chronic respiratory failure with hypoxia ([facility])   • Acute on chronic diastolic heart failure ([facility])   • Malignant hypertension   • COPD exacerbation ([facility])   • COPD with acute exacerbation ([facility])   • Elevated troponin   • Dyspnea and respiratory abnormalities   • Shortness of breath   • Uncontrolled hypersomnia     Past  Medical History:   Diagnosis Date   • Allergic rhinitis    • Chronic diastolic heart failure (HCC)    • COPD (chronic obstructive pulmonary disease) ([facility])    • Coronary arteriosclerosis    • Depression    • GERD (gastroesophageal reflux disease)    • Hypercholesterolemia    • Hypertension    • Hypothyroidism    • LVH (left ventricular hypertrophy)    • Myocardial infarction ([facility])    • Osteoarthritis      Past Surgical History:   Procedure Laterality Date   • BREAST SURGERY     • CARDIAC CATHETERIZATION     • CERVICAL SPINE SURGERY     • COLONOSCOPY     • CORONARY ANGIOPLASTY     • ENDOSCOPY N/A 2/14/2018   • ENDOSCOPY AND COLONOSCOPY     • HIP BIPOLAR REPLACEMENT Right 1/23/2018   • PACEMAKER REPLACEMENT       PT Assessment (last 12 hours)     PT Evaluation and Treatment     Row Name 11/09/21 1342          Physical Therapy Time and Intention    Subjective Information no complaints  -     Document Type therapy note (daily note)  -     Mode of Treatment physical therapy; individual therapy  -     Row Name 11/09/21 1342          General Information    Patient Profile Reviewed yes  -     Existing Precautions/Restrictions fall; oxygen therapy device and L/min  -     Row Name 11/09/21 1342          Cognition    Orientation Status (Cognition) oriented x 4  -     Row Name 11/09/21 1342          Pain Scale: Numbers Pre/Post-Treatment    Pretreatment Pain Rating 0/10 - no pain  -     Posttreatment Pain Rating 0/10 - no pain  -     Row Name 11/09/21 1342          Range of Motion Comprehensive    General Range of Motion bilateral lower extremity ROM WFL  -     Row Name 11/09/21 1342          Bed Mobility    Bed Mobility supine-sit; sit-supine  -     Supine-Sit [place] (Bed Mobility) supervision  -     Sit-Supine [place] (Bed Mobility) supervision  -     Assistive Device (Bed Mobility) head of bed elevated; bed rails  -     Row Name 11/09/21 1342          Transfers    Transfers sit-stand  transfer; stand-sit transfer; toilet transfer  -     Sit-Stand [place] (Transfers) contact guard  -     Stand-Sit [place] (Transfers) contact [facility]  -     [place] Level (Toilet Transfer) contact [facility]  -     Assistive Device (Toilet Transfer) commode, bedside with drop arms; walker, front-wheeled  -     Row Name 11/09/21 1342          Sit-Stand Transfer    Assistive Device (Sit-Stand Transfers) walker, front-wheeled  -     Row Name 11/09/21 1342          Toilet Transfer    Type (Toilet Transfer) sit-stand; stand-sit  -[facility] Name 11/09/21 1342          Gait/Stairs (Locomotion)    [place] Level (Gait) contact guard  -JW     [facility]istive [facility]) [facility]     Distance in Feet (Gait) 6' x 2  -JW     Comment (Gait/Stairs) pt sat EOB~30-35' once returned from toileting  -[facility] Name 11/09/21 1342          Safety Issues, Functional Mobility    Impairments Affecting Function (Mobility) endurance/activity tolerance; shortness of breath; strength; balance  -[facility] Name 11/09/21 1342          Motor Skills    Therapeutic Exercise ankle; knee; hip  -[facility] Name 11/09/21 1342          Hip (Therapeutic Exercise)    Hip (Therapeutic Exercise) isometric exercises  -     Hip AROM (Therapeutic Exercise) bilateral; flexion  -     Hip Isometrics (Therapeutic Exercise) bilateral; aDduction  -[facility] Name 11/09/21 1342          Knee (Therapeutic Exercise)    Knee AROM (Therapeutic Exercise) flexion; LAQ (long arc quad)  -[facility] Name 11/09/21 1342          Ankle (Therapeutic Exercise)    Ankle AROM (Therapeutic Exercise) bilateral; dorsiflexion; plantarflexion  -[facility] Name 11/09/21 1342          Plan of Care Review    Plan of Care Reviewed With patient  -     Outcome Summary pt t/fers sup<>sit w/ SBA, sit<>stand w/ CGA and RW, 6' x 2 w/ CGA and RW for toilet t/byron, pt able to sit EOB ~30-35', pt performed 2 x 10 of B LE seated ther ex  -     Row Name  11/09/21 1342          Vital Signs    Pretreatment Heart Rate (beats/min) 80  -JW     Intratreatment Heart Rate (beats/min) 80  -JW     Posttreatment Heart Rate (beats/min) 78  -JW     Pre SpO2 (%) 97  -JW     O2 Delivery Pre Treatment supplemental O2  -JW     Intra SpO2 (%) 97  -JW     O2 Delivery Intra Treatment supplemental O2  -JW     Post SpO2 (%) 98  -JW     O2 Delivery Post Treatment supplemental O2  -JW     Pre Patient Position Side Lying  -JW     Intra Patient Position Sitting  -JW     Post Patient Position Side Lying  -JW     Row Name 11/09/21 1342          Bed Mobility Goal 1 (PT)    Activity/Assistive Device (Bed Mobility Goal 1, PT) sit to supine; supine to sit  -JW     [place] Level/Cues Needed (Bed Mobility Goal 1, PT) modified independence  -JW     Time Frame (Bed Mobility Goal 1, PT) by discharge  -JW     Progress/Outcomes (Bed Mobility Goal 1, PT) goal not met  -     Row Name 11/09/21 1342          Transfer Goal 1 (PT)    Activity/Assistive Device (Transfer Goal 1, PT) sit-to-stand/stand-to-sit; bed-to-chair/chair-to-bed; walker, rolling  -JW     [place] Level/Cues Needed (Transfer Goal 1, PT) modified independence  -JW     Time Frame (Transfer Goal 1, PT) by discharge  -JW     Progress/Outcome (Transfer Goal 1, PT) goal not met  -     Row Name 11/09/21 1342          Gait Training Goal 1 (PT)    Activity/Assistive Device (Gait Training Goal 1, PT) walker, rolling  -JW     [place] Level (Gait Training Goal 1, PT) modified independence  -JW     Distance (Gait Training Goal 1, PT) 150ft or more  -JW     Time Frame (Gait Training Goal 1, PT) by discharge  -JW     Progress/Outcome (Gait Training Goal 1, PT) goal not met  -     Row Name 11/09/21 1342          Positioning and Restraints    Pre-Treatment Position in bed  -JW     Post Treatment Position bed  -JW     In Bed side lying right; call light within reach; encouraged to call for assist; exit alarm on  -JW     Row Name 11/09/21  1342          Therapy Assessment/Plan (PT)    Rehab Potential (PT) good, to achieve stated therapy goals  -     Criteria for Skilled Interventions Met (PT) yes; skilled treatment is necessary  -           User Key  (r) = Recorded By, (t) = Taken By, (c) = Cosigned By    Initials Name Provider Type    JW Neelima Merritt, PTA Physical Therapy Assistant              Physical Therapy Education                 Title: PT OT SLP Therapies (In Progress)     Topic: Physical Therapy (In Progress)     Point: Mobility training (Done)     Learning Progress Summary           Patient Acceptance, E, VU,NR by  at 11/8/2021 1219    Comment: Hand placement with transfers, PT POC, goals, use of walker.    Acceptance, E, VU,DU by  at 11/3/2021 1437                   Point: Home exercise program (Not Started)     Learner Progress:  Not documented in this visit.          Point: Body mechanics (Done)     Learning Progress Summary           Patient Acceptance, E, VU,DU by  at 11/3/2021 1437                   Point: Precautions (Done)     Learning Progress Summary           Patient Acceptance, E, VU,DU by  at 11/3/2021 1437                               User Key     Initials Effective Dates Name Provider Type Discipline     06/16/21 -  Malena Mendoza, PT Physical Therapist PT     06/16/21 -  José Antonio Estrada, PT Physical Therapist PT              PT Recommendation and Plan  Anticipated Discharge Disposition (PT): skilled nursing facility  Therapy Frequency (PT): other (see comments) (5-7 days/week)  Plan of Care Reviewed With: patient  Outcome Summary: pt t/fers sup<>sit w/ SBA, sit<>stand w/ CGA and RW, 6' x 2 w/ CGA and RW for toilet t/[place], pt able to sit EOB ~30-35', pt performed 2 x 10 of B LE seated ther ex       Time Calculation:    PT Charges     Row Name 11/09/21 1342             Time Calculation    Start Time 1342  -      Stop Time 1427  -      Time Calculation (min) 45 min  -      PT Received On 11/09/21   -PURNIMA              Time Calculation- PT    Total Timed Code Minutes- PT 45 minute(s)  -PURNIMA            User Key  (r) = Recorded By, (t) = Taken By, (c) = Cosigned By    Initials Name Provider Type    Neelima Paige PTA Physical Therapy Assistant              Therapy Charges for Today     Code Description Service Date Service Provider Modifiers Qty    12773760721 HC PT THERAPEUTIC ACT EA 15 MIN 11/9/2021 Neelima Merritt, JEANNETTE GP 2    14938058533 HC PT THER PROC EA 15 MIN 11/9/2021 Neelima Merritt, JEANNETTE GP 1    26834331504 HC PT THERAPEUTIC ACT EA 15 MIN 11/9/2021 Neelima Merritt, JEANNETTE GP 2    52130672924 HC PT THER PROC EA 15 MIN 11/9/2021 Neelima Merritt, JEANNETTE GP 1          PT G-Codes  Outcome Measure Options: AM-PAC 6 Clicks Basic Mobility (PT)  AM-PAC 6 Clicks Score (PT): 18  AM-PAC 6 Clicks Score (OT): 15    Neelima Merritt PTA  11/9/2021

## 2021-11-09 NOTE — THERAPY TREATMENT NOTE
Patient Name: Val Arteaga  : 1932    MRN: [id number]                              Today's Date: 2021       Admit Date: 2021    Visit Dx:     ICD-10-CM ICD-9-CM   1. COPD exacerbation ([facility])  J44.1 491.21   2. Elevated troponin  R77.8 790.6   3. Uncontrolled hypertension  I10 401.9   4. Impaired functional mobility, balance, gait, and endurance  Z74.09 V49.89   5. Impaired mobility and ADLs  Z74.09 V49.89    Z78.9    6. Mucopurulent chronic bronchitis ([facility])  J41.1 491.1   7. Acute on chronic respiratory failure with hypoxia ([facility])  J96.21 518.84     799.02     Patient Active Problem List   Diagnosis   • Uncontrolled hypertension   • CAD (coronary artery disease)   • Chronic obstructive pulmonary disease ([facility])   • Acquired hypothyroidism   • Depressive disorder   • Thygeson's superficial punctate keratitis   • Pseudophakia   • Precordial pain   • Displaced fracture of base of neck of right femur, initial encounter for closed fracture ([facility])   • Hip fracture, right, closed, initial encounter ([facility])   • Acute on chronic systolic CHF (congestive heart failure) ([facility])   • Hyponatremia   • BENITO (acute kidney injury) ([facility])   • Acute blood loss anemia   • Urinary retention   • Closed displaced basicervical fracture of right femur ([facility])   • Urethral caruncle   • Symptomatic anemia   • Anemia   • Major depressive disorder   • Drug-induced encephalopathy   • Acute left-sided low back pain without sciatica   • Bilateral sacral insufficiency fracture   • Osteoarthritis of multiple joints   • Dyspnea   • CHF (congestive heart failure) ([facility])   • Hypercholesterolemia   • Chest pain   • Acute on chronic respiratory failure with hypoxia ([facility])   • Acute on chronic diastolic heart failure ([facility])   • Malignant hypertension   • COPD exacerbation ([facility])   • COPD with acute exacerbation ([facility])   • Elevated troponin   • Dyspnea and respiratory abnormalities   • Shortness of breath   • Uncontrolled hypersomnia     Past Medical  History:   Diagnosis Date   • Allergic rhinitis    • Chronic diastolic heart failure (HCC)    • COPD (chronic obstructive pulmonary disease) (HCC)    • Coronary arteriosclerosis    • Depression    • GERD (gastroesophageal reflux disease)    • Hypercholesterolemia    • Hypertension    • Hypothyroidism    • LVH (left ventricular hypertrophy)    • Myocardial infarction (HCC)    • Osteoarthritis      Past Surgical History:   Procedure Laterality Date   • BREAST SURGERY     • CARDIAC CATHETERIZATION     • CERVICAL SPINE SURGERY     • COLONOSCOPY     • CORONARY ANGIOPLASTY     • ENDOSCOPY N/A 2/14/2018   • ENDOSCOPY AND COLONOSCOPY     • HIP BIPOLAR REPLACEMENT Right 1/23/2018   • PACEMAKER REPLACEMENT        General Information     Row Name 11/09/21 0709          OT Time and Intention    Document Type therapy note (daily note)  -BB     Mode of Treatment occupational therapy; individual therapy  -BB     Row Name 11/09/21 0709          General Information    Patient Profile Reviewed yes  -BB     Existing Precautions/Restrictions fall; oxygen therapy device and L/min  -BB     Row Name 11/09/21 0709          Cognition    Orientation Status (Cognition) oriented x 4  -BB     Row Name 11/09/21 0709          Safety Issues, Functional Mobility    Impairments Affecting Function (Mobility) endurance/activity tolerance; shortness of breath; strength; balance  -BB           User Key  (r) = Recorded By, (t) = Taken By, (c) = Cosigned By    Initials Name Provider Type    BB Kenna Escobar COTA Occupational Therapy Assistant                 Mobility/ADL's     Row Name 11/09/21 0709          Bed Mobility    Bed Mobility supine-sit; sit-supine  -BB     Supine-Sit [place] (Bed Mobility) not tested  -BB     Sit-Supine [place] (Bed Mobility) not tested  -BB     Assistive Device (Bed Mobility) head of bed elevated; bed rails  -BB     Comment (Bed Mobility) Pt states she is tired this morning.  -BB     Row Name 11/09/21 0709           Transfers    Transfers sit-stand transfer; toilet transfer  -     Sit-Stand [place] (Transfers) minimum assist (75% patient effort)  -BB     [place] Level (Toilet Transfer) not tested  -BB     Row Name 11/09/21 0709          Sit-Stand Transfer    Assistive Device (Sit-Stand Transfers) walker, front-wheeled  -BB     Row Name 11/09/21 0709          Toilet Transfer    Type (Toilet Transfer) stand-sit; sit-stand  -     Row Name 11/09/21 0709          Lower Body Dressing Assessment/Training    [place] Level (Lower Body Dressing) don; socks; moderate assist (50% patient effort)  -BB     Position (Lower Body Dressing) long sitting  -BB     Row Name 11/09/21 0709          Grooming Assessment/Training    [place] Level (Grooming) wash face, hands; oral care regimen; hair care, combing/brushing; verbal cues; minimum assist (75% patient effort)  -BB     Position (Grooming) sitting up in bed  -BB     Row Name 11/09/21 0709          Upper Body Dressing Assessment/Training    [place] Level (Upper Body Dressing) don; doff; verbal cues; minimum assist (75% patient effort)  HG  -BB     Position (Upper Body Dressing) sitting up in bed  -BB           User Key  (r) = Recorded By, (t) = Taken By, (c) = Cosigned By    Initials Name Provider Type    Kenna Velázquez COTA Occupational Therapy Assistant               Obj/Interventions     Row Name 11/09/21 0709          Range of Motion Comprehensive    General Range of Motion bilateral upper extremity ROM WFL  -BB     Row Name 11/09/21 0709          Strength Comprehensive (MMT)    General Manual Muscle Testing (MMT) Assessment other (see comments)  -BB           User Key  (r) = Recorded By, (t) = Taken By, (c) = Cosigned By    Initials Name Provider Type    Kenna Velázquez COTA Occupational Therapy Assistant               Goals/Plan     Row Name 11/09/21 0709          Transfer Goal 1 (OT)    Activity/Assistive Device (Transfer Goal 1, OT)  toilet  -BB     [place] Level/Cues Needed (Transfer Goal 1, OT) supervision required  -BB     Time Frame (Transfer Goal 1, OT) long term goal (LTG); by discharge  -BB     Progress/Outcome (Transfer Goal 1, OT) goal not met  -BB     Row Name 11/09/21 0709          Bathing Goal 1 (OT)    Activity/Device (Bathing Goal 1, OT) lower body bathing  -BB     [place] Level/Cues Needed (Bathing Goal 1, OT) set-up required; supervision required  -BB     Time Frame (Bathing Goal 1, OT) long term goal (LTG); by discharge  -BB     Progress/Outcomes (Bathing Goal 1, OT) goal not met  -BB     Row Name 11/09/21 0709          Dressing Goal 1 (OT)    Activity/Device (Dressing Goal 1, OT) lower body dressing  -BB     [place]/Cues Needed (Dressing Goal 1, OT) set-up required; supervision required  -BB     Time Frame (Dressing Goal 1, OT) long term goal (LTG); by discharge  -BB     Progress/Outcome (Dressing Goal 1, OT) goal not met  -BB           User Key  (r) = Recorded By, (t) = Taken By, (c) = Cosigned By    Initials Name Provider Type    BB Kenna Escobar COTA Occupational Therapy Assistant               Clinical Impression     Row Name 11/09/21 0709          Pain Scale: Numbers Pre/Post-Treatment    Pretreatment Pain Rating 0/10 - no pain  -BB     Posttreatment Pain Rating 0/10 - no pain  -BB     Row Name 11/09/21 0709          Plan of Care Review    Plan of Care Reviewed With patient  -BB     Progress no change  -BB     Outcome Summary Pt agrees to grooming tasks (Min A) this morning. Pt is Min A for UB dressing and Mod A for donning socks. Pt required verbal cues and increased time to complete tasks. Continue OT POC  -BB     Row Name 11/09/21 0709          Therapy Assessment/Plan (OT)    Rehab Potential (OT) good, to achieve stated therapy goals  -BB     Criteria for Skilled Therapeutic Interventions Met (OT) yes; meets criteria; skilled treatment is necessary  -BB     Therapy Frequency (OT) other (see  comments)  5-7days/wk  -BB     Row Name 11/09/21 0709          Therapy Plan Review/Discharge Plan (OT)    Anticipated Discharge Disposition (OT) home with assist; home with home health; skilled nursing facility  -BB     Row Name 11/09/21 0709          Vital Signs    Pretreatment Heart Rate (beats/min) 64  -BB     Pre SpO2 (%) 99  -BB     O2 Delivery Pre Treatment supplemental O2  -BB     Pre Patient Position Side Lying  -BB     Post Patient Position Supine  -BB     Row Name 11/09/21 0709          Positioning and Restraints    Pre-Treatment Position in bed  -BB     Post Treatment Position bed  -BB     In Bed call light within reach; side lying right; encouraged to call for assist; exit alarm on  -BB           User Key  (r) = Recorded By, (t) = Taken By, (c) = Cosigned By    Initials Name Provider Type    Kenna Velázquez COTA Occupational Therapy Assistant               Outcome Measures     Row Name 11/09/21 0709          How much help from another is currently needed...    Putting on and taking off regular lower body clothing? 2  -BB     Bathing (including washing, rinsing, and drying) 2  -BB     Toileting (which includes using toilet bed pan or urinal) 3  -BB     Putting on and taking off regular upper body clothing 3  -BB     Taking care of personal grooming (such as brushing teeth) 2  -BB     Eating meals 3  -BB     AM-PAC 6 Clicks Score (OT) 15  -BB           User Key  (r) = Recorded By, (t) = Taken By, (c) = Cosigned By    Initials Name Provider Type    Kenna Velázquez COTA Occupational Therapy Assistant                Occupational Therapy Education                 Title: PT OT SLP Therapies (In Progress)     Topic: Occupational Therapy (In Progress)     Point: ADL training (In Progress)     Description:   Instruct learner(s) on proper safety adaptation and remediation techniques during self care or transfers.   Instruct in proper use of assistive devices.              Learning Progress Summary            Patient Acceptance, E, NR by BB at 11/9/2021 1146    Acceptance, E, VU by AS at 11/8/2021 1253    Comment: role of OT, OT POC, ADL training, transfer training    Acceptance, E,TB, VU by LW at 11/5/2021 1309                   Point: Home exercise program (Done)     Description:   Instruct learner(s) on appropriate technique for monitoring, assisting and/or progressing therapeutic exercises/activities.              Learning Progress Summary           Patient Acceptance, E,TB, VU by LW at 11/5/2021 1309                   Point: Precautions (Done)     Description:   Instruct learner(s) on prescribed precautions during self-care and functional transfers.              Learning Progress Summary           Patient Acceptance, E, VU by AS at 11/8/2021 1253    Comment: role of OT, OT POC, ADL training, transfer training    Acceptance, E,TB, VU by LW at 11/5/2021 1309    Acceptance, E, VU,NR by ME at 11/4/2021 1223    Comment: Educated on OT and POC .Educated tocall for assistance. Educated on safety precautions.   Family Acceptance, E, VU,NR by ME at 11/4/2021 1223    Comment: Educated on OT and POC .Educated tocall for assistance. Educated on safety precautions.                   Point: Body mechanics (Done)     Description:   Instruct learner(s) on proper positioning and spine alignment during self-care, functional mobility activities and/or exercises.              Learning Progress Summary           Patient Acceptance, E,TB, VU by LW at 11/5/2021 1309    Acceptance, E, VU,NR by ME at 11/4/2021 1223    Comment: Educated on OT and POC .Educated tocall for assistance. Educated on safety precautions.   Family Acceptance, E, VU,NR by ME at 11/4/2021 1223    Comment: Educated on OT and POC .Educated tocall for assistance. Educated on safety precautions.                               User Key     Initials Effective Dates Name Provider Type Discipline     06/16/21 -  Kenna Escobar COTA Occupational Therapy  Assistant OT    LW 06/16/21 -  Aide Barraza COTA Occupational Therapy Assistant OT    AS 03/18/21 -  Cailin Wang OT Occupational Therapist OT    ME 06/16/21 -  Skip Monte OTR/L Occupational Therapist OT              OT Recommendation and Plan  Therapy Frequency (OT): other (see comments) (5-7days/wk)  Plan of Care Review  Plan of Care Reviewed With: patient  Progress: no change  Outcome Summary: Pt agrees to grooming tasks (Min A) this morning. Pt is Min A for UB dressing and Mod A for donning socks. Pt required verbal cues and increased time to complete tasks. Continue OT POC     Time Calculation:    Time Calculation- OT     Row Name 11/09/21 1147             Time Calculation- OT    OT Start Time 0710  -BB      OT Stop Time 0749  -BB      OT Time Calculation (min) 39 min  -BB      Total Timed Code Minutes- OT 39 minute(s)  -BB      OT Received On 11/09/21  -BB              Timed Charges    27934 - OT Self Care/Mgmt Minutes 39  -BB              Total Minutes    Timed Charges Total Minutes 39  -BB       Total Minutes 39  -BB            User Key  (r) = Recorded By, (t) = Taken By, (c) = Cosigned By    Initials Name Provider Type    BB Kenna Escobar COTA Occupational Therapy Assistant              Therapy Charges for Today     Code Description Service Date Service Provider Modifiers Qty    [id number] HC OT SELF CARE/MGMT/TRAIN EA 15 MIN 11/9/2021 Knena Escobar COTA GO 3               OMAR Garcia  11/9/2021

## 2021-11-09 NOTE — PLAN OF CARE
Goal Outcome Evaluation:  Plan of Care Reviewed With: patient           Outcome Summary: pt t/fers sup<>sit w/ SBA, sit<>stand w/ CGA and RW, 6' x 2 w/ CGA and RW for toilet t/[place], pt able to sit EOB ~30-35', pt performed 2 x 10 of B LE seated ther ex

## 2021-11-10 NOTE — SIGNIFICANT NOTE
11/10/21 0421   Vital Signs   BP (!) 197/88   Notified Hospitalist of B/P, ordered Hydralazine 10 mg IV push x 1

## 2021-11-10 NOTE — THERAPY TREATMENT NOTE
Patient Name: Val Arteaga  : 1932    MRN: [id number]                              Today's Date: 11/10/2021       Admit Date: 2021    Visit Dx:     ICD-10-CM ICD-9-CM   1. COPD exacerbation ([facility])  J44.1 491.21   2. Elevated troponin  R77.8 790.6   3. Uncontrolled hypertension  I10 401.9   4. Impaired functional mobility, balance, gait, and endurance  Z74.09 V49.89   5. Impaired mobility and ADLs  Z74.09 V49.89    Z78.9    6. Mucopurulent chronic bronchitis ([facility])  J41.1 491.1   7. Acute on chronic respiratory failure with hypoxia ([facility])  J96.21 518.84     799.02     Patient Active Problem List   Diagnosis   • Uncontrolled hypertension   • CAD (coronary artery disease)   • Chronic obstructive pulmonary disease ([facility])   • Acquired hypothyroidism   • Depressive disorder   • Thygeson's superficial punctate keratitis   • Pseudophakia   • Precordial pain   • Displaced fracture of base of neck of right femur, initial encounter for closed fracture ([facility])   • Hip fracture, right, closed, initial encounter ([facility])   • Acute on chronic systolic CHF (congestive heart failure) ([facility])   • Hyponatremia   • BENITO (acute kidney injury) ([facility])   • Acute blood loss anemia   • Urinary retention   • Closed displaced basicervical fracture of right femur ([facility])   • Urethral caruncle   • Symptomatic anemia   • Anemia   • Major depressive disorder   • Drug-induced encephalopathy   • Acute left-sided low back pain without sciatica   • Bilateral sacral insufficiency fracture   • Osteoarthritis of multiple joints   • Dyspnea   • CHF (congestive heart failure) ([facility])   • Hypercholesterolemia   • Chest pain   • Acute on chronic respiratory failure with hypoxia ([facility])   • Acute on chronic diastolic heart failure ([facility])   • Malignant hypertension   • COPD exacerbation ([facility])   • COPD with acute exacerbation ([facility])   • Elevated troponin   • Dyspnea and respiratory abnormalities   • Shortness of breath   • Uncontrolled hypersomnia     Past Medical  History:   Diagnosis Date   • Allergic rhinitis    • Chronic diastolic heart failure (HCC)    • COPD (chronic obstructive pulmonary disease) (HCC)    • Coronary arteriosclerosis    • Depression    • GERD (gastroesophageal reflux disease)    • Hypercholesterolemia    • Hypertension    • Hypothyroidism    • LVH (left ventricular hypertrophy)    • Myocardial infarction (HCC)    • Osteoarthritis      Past Surgical History:   Procedure Laterality Date   • BREAST SURGERY     • CARDIAC CATHETERIZATION     • CERVICAL SPINE SURGERY     • COLONOSCOPY     • CORONARY ANGIOPLASTY     • ENDOSCOPY N/A 2/14/2018   • ENDOSCOPY AND COLONOSCOPY     • HIP BIPOLAR REPLACEMENT Right 1/23/2018   • PACEMAKER REPLACEMENT        General Information     Row Name 11/10/21 0756          OT Time and Intention    Document Type therapy note (daily note)  -BB     Mode of Treatment individual therapy; occupational therapy  -BB     Row Name 11/10/21 0756          General Information    Patient Profile Reviewed yes  -BB     Existing Precautions/Restrictions fall; oxygen therapy device and L/min  -BB     Row Name 11/10/21 0756          Cognition    Orientation Status (Cognition) oriented x 4  -BB     Row Name 11/10/21 0756          Safety Issues, Functional Mobility    Impairments Affecting Function (Mobility) endurance/activity tolerance; shortness of breath; strength; balance  -BB           User Key  (r) = Recorded By, (t) = Taken By, (c) = Cosigned By    Initials Name Provider Type    BB Kenna Escobar COTA Occupational Therapy Assistant                 Mobility/ADL's     Row Name 11/10/21 0756          Bed Mobility    Bed Mobility supine-sit; sit-supine  -BB     Supine-Sit Amador (Bed Mobility) not tested  -BB     Sit-Supine Amador (Bed Mobility) not tested  -BB     Assistive Device (Bed Mobility) head of bed elevated; bed rails  -BB     Row Name 11/10/21 0756          Transfers    Sit-Stand Amador (Transfers) not tested   -BB     Geary Level (Toilet Transfer) not tested  -BB     Row Name 11/10/21 [facility]          Functional Mobility    Functional Mobility- Device rolling walker  -BB     Row Name 11/10/21 [facility]          Grooming Assessment/Training    Geary Level (Grooming) hair care, combing/brushing; oral care regimen; wash face, hands; verbal cues; contact guard assist  -BB     Position (Grooming) sitting up in bed  -BB     Row Name 11/10/21 [facility]          Upper Body Dressing Assessment/Training    Geary Level (Upper Body Dressing) doff; don; verbal cues; minimum assist (75% patient effort)  HG  -BB     Position (Upper Body Dressing) sitting up in bed  -BB           User Key  (r) = Recorded By, (t) = Taken By, (c) = Cosigned By    Initials Name Provider Type    Kenna Velázquez COTA Occupational Therapy Assistant               Obj/Interventions     Row Name 11/10/21 [facility]          Range of Motion Comprehensive    General Range of Motion bilateral lower extremity ROM WFL  -BB     Row Name 11/10/21 [facility]          Strength Comprehensive (MMT)    General Manual Muscle Testing (MMT) Assessment other (see comments)  -BB           User Key  (r) = Recorded By, (t) = Taken By, (c) = Cosigned By    Initials Name Provider Type    Kenna Velázquez COTA Occupational Therapy Assistant               Goals/Plan     Row Name 11/10/21 [facility]          Transfer Goal 1 (OT)    Activity/Assistive Device (Transfer Goal 1, OT) toilet  -BB     Geary Level/Cues Needed (Transfer Goal 1, OT) supervision required  -BB     Time Frame (Transfer Goal 1, OT) long term goal (LTG); by discharge  -BB     Progress/Outcome (Transfer Goal 1, OT) goal not met  -BB     Row Name 11/10/21 [facility]          Bathing Goal 1 (OT)    Activity/Device (Bathing Goal 1, OT) lower body bathing  -BB     Geary Level/Cues Needed (Bathing Goal 1, OT) set-up required; supervision required  -BB     Time Frame (Bathing Goal 1, OT) long term goal (LTG); by  discharge  -BB     Progress/Outcomes (Bathing Goal 1, OT) goal not met  -BB     Row Name 11/10/21 [facility]          Dressing Goal 1 (OT)    Activity/Device (Dressing Goal 1, OT) lower body dressing  -BB     [place]/Cues Needed (Dressing Goal 1, OT) set-up required; supervision required  -BB     Time Frame (Dressing Goal 1, OT) long term goal (LTG); by discharge  -BB     Progress/Outcome (Dressing Goal 1, OT) goal not met  -BB           User Key  (r) = Recorded By, (t) = Taken By, (c) = Cosigned By    Initials Name Provider Type    BB Kenna Escobar COTA Occupational Therapy Assistant               Clinical Impression     Row Name 11/10/21 [facility]6          Pain Scale: Numbers Pre/Post-Treatment    Pretreatment Pain Rating 0/10 - no pain  -BB     Posttreatment Pain Rating 0/10 - no pain  -BB     Row Name 11/10/21 [facility]6          Plan of Care Review    Plan of Care Reviewed With patient  -BB     Progress no change  -BB     Outcome Summary Pt agreeable to OT this am. Pt is CGA for grooming tasks with set up. Pt educated on home safety/fall prevention. Pt with all needs within reach following OT tx. Continue OT POC  -BB     Row Name 11/10/21 [facility]6          Therapy Assessment/Plan (OT)    Rehab Potential (OT) good, to achieve stated therapy goals  -BB     Criteria for Skilled Therapeutic Interventions Met (OT) yes; meets criteria; skilled treatment is necessary  -BB     Therapy Frequency (OT) other (see comments)  5-7days/wk  -BB     Row Name 11/10/21 [facility]6          Therapy Plan Review/Discharge Plan (OT)    Anticipated Discharge Disposition (OT) home with assist; home with home health; skilled nursing facility  -BB     Row Name 11/10/21 [facility]6          Vital Signs    Pretreatment Heart Rate (beats/min) 82  -BB     Pre SpO2 (%) 95  -BB     O2 Delivery Pre Treatment supplemental O2  -BB     Pre Patient Position Supine  -BB     Row Name 11/10/21 [facility]6          Positioning and Restraints    Pre-Treatment Position in bed  -BB      Post Treatment Position bed  -BB     In Bed fowlers; call light within reach; encouraged to call for assist; exit alarm on  -BB           User Key  (r) = Recorded By, (t) = Taken By, (c) = Cosigned By    Initials Name Provider Type    Kenna Velázquez COTA Occupational Therapy Assistant               Outcome Measures     Row Name 11/10/21 0756          How much help from another is currently needed...    Putting on and taking off regular lower body clothing? 2  -BB     Bathing (including washing, rinsing, and drying) 2  -BB     Toileting (which includes using toilet bed pan or urinal) 3  -BB     Putting on and taking off regular upper body clothing 3  -BB     Taking care of personal grooming (such as brushing teeth) 3  -BB     Eating meals 3  -BB     AM-PAC 6 Clicks Score (OT) 16  -BB           User Key  (r) = Recorded By, (t) = Taken By, (c) = Cosigned By    Initials Name Provider Type    Kenna Velázquez COTA Occupational Therapy Assistant                Occupational Therapy Education                 Title: PT OT SLP Therapies (In Progress)     Topic: Occupational Therapy (In Progress)     Point: ADL training (In Progress)     Description:   Instruct learner(s) on proper safety adaptation and remediation techniques during self care or transfers.   Instruct in proper use of assistive devices.              Learning Progress Summary           Patient Acceptance, E, NR by BB at 11/10/2021 1315    Acceptance, E, NR by BB at 11/9/2021 1146    Acceptance, E, VU by AS at 11/8/2021 1253    Comment: role of OT, OT POC, ADL training, transfer training    Acceptance, E,TB, VU by LW at 11/5/2021 1309                   Point: Home exercise program (Done)     Description:   Instruct learner(s) on appropriate technique for monitoring, assisting and/or progressing therapeutic exercises/activities.              Learning Progress Summary           Patient Acceptance, E,TB, VU by LW at 11/5/2021 1309                    Point: Precautions (Done)     Description:   Instruct learner(s) on prescribed precautions during self-care and functional transfers.              Learning Progress Summary           Patient Acceptance, E, VU by AS at 11/8/2021 1253    Comment: role of OT, OT POC, ADL training, transfer training    Acceptance, E,TB, VU by LW at 11/5/2021 1309    Acceptance, E, VU,NR by ME at 11/4/2021 1223    Comment: Educated on OT and POC .Educated tocall for assistance. Educated on safety precautions.   Family Acceptance, E, VU,NR by ME at 11/4/2021 1223    Comment: Educated on OT and POC .Educated tocall for assistance. Educated on safety precautions.                   Point: Body mechanics (Done)     Description:   Instruct learner(s) on proper positioning and spine alignment during self-care, functional mobility activities and/or exercises.              Learning Progress Summary           Patient Acceptance, E,TB, VU by LW at 11/5/2021 1309    Acceptance, E, VU,NR by ME at 11/4/2021 1223    Comment: Educated on OT and POC .Educated tocall for assistance. Educated on safety precautions.   Family Acceptance, E, VU,NR by ME at 11/4/2021 1223    Comment: Educated on OT and POC .Educated tocall for assistance. Educated on safety precautions.                               User Key     Initials Effective Dates Name Provider Type Discipline     06/16/21 -  Kenna Escobar COTA Occupational Therapy Assistant OT     06/16/21 -  Aide Barraza COTA Occupational Therapy Assistant OT    AS 03/18/21 -  Cailin Wang OT Occupational Therapist OT    ME 06/16/21 -  Skip Monte OTR/L Occupational Therapist OT              OT Recommendation and Plan  Therapy Frequency (OT): other (see comments) (5-7days/wk)  Plan of Care Review  Plan of Care Reviewed With: patient  Progress: no change  Outcome Summary: Pt agreeable to OT this am. Pt is CGA for grooming tasks with set up. Pt educated on home safety/fall prevention. Pt with  all needs within reach following OT tx. Continue OT POC     Time Calculation:    Time Calculation- OT     Row Name 11/10/21 1316             Time Calculation- OT    OT Start Time 0756  -BB      OT Stop Time 0827  -BB      OT Time Calculation (min) 31 min  -BB      Total Timed Code Minutes- OT 31 minute(s)  -BB      OT Received On 11/10/21  -BB              Timed Charges    82259 - OT Self Care/Mgmt Minutes 31  -BB              Total Minutes    Timed Charges Total Minutes 31  -BB       Total Minutes 31  -BB            User Key  (r) = Recorded By, (t) = Taken By, (c) = Cosigned By    Initials Name Provider Type    Kenna Velázquez COTA Occupational Therapy Assistant              Therapy Charges for Today     Code Description Service Date Service Provider Modifiers Qty    80801105400 HC OT SELF CARE/MGMT/TRAIN EA 15 MIN 11/9/2021 Kenna Escobar COTA GO 3    67322549606 HC OT SELF CARE/MGMT/TRAIN EA 15 MIN 11/10/2021 Kenna Escobar COTA GO 2               OMAR Garcia  11/10/2021

## 2021-11-10 NOTE — CONSULTS
Adult Nutrition  Assessment    Patient Name:  Val Arteaga  YOB: 1932  MRN: [id number]  Admit Date:  11/1/2021    Assessment Date:  11/10/2021    Comments: Pt has not been made comfort care.   Apparently she was to be transitioned to comfort care but the son was reluctant so she has been made palliative care.  She is alert and talking.  Claims that she is eating as much here as she did at home.  Intake is averging ~44% and she is requesting Boost supplements.  Reports that her wt is stable.  She continues to be managed for A/C resp failure--noninvasive REsp Therapy with pt on Palliative Care; A/C CHF; And COPD with acute exacerbation.  RD reminded pt that snacks are available in the pantry prn.  Also reviewed menu alternatives.  Will order Boost and ice cream to supplement po.      Reason for Assessment     Row Name 11/10/21 1431          Reason for Assessment    Reason For Assessment follow-up protocol                Nutrition/Diet History     Row Name 11/10/21 1431          Nutrition/Diet History    Typical Food/Fluid Intake Pt sitting up and talking.  She said that she is eating as well as she ever does.  She is drinking the Boost. She doesn't really like it but she knows she needs to drink it.  UBW ~140#.  NO eating difficulties                  Labs/Tests/Procedures/Meds     Row Name 11/10/21 1434          Labs/Procedures/Meds    Lab Results Reviewed reviewed, pertinent     Lab Results Comments Na 135; Gluc 133            Diagnostic Tests/Procedures    Diagnostic Test/Procedure Reviewed reviewed, pertinent     Diagnostic Test/Procedures Comments supplemental 02            Medications    Pertinent Medications Reviewed reviewed, pertinent     Pertinent Medications Comments folic acid; Solumedrol;                Physical Findings     Row Name 11/10/21 1436          Physical Findings    Overall Physical Appearance on oxygen therapy                Estimated/Assessed Needs     Row Name 11/10/21  1436          Calculation Measurements    Weight Used For Calculations 64.4 kg (142 lb)            Estimated/Assessed Needs    Additional Documentation Additional Requirements (Group); Fluid Requirements (Group); [place]-St. Jeor Equation (Group); Protein Requirements (Group); Calorie Requirements (Group); KCAL/KG (Group)            Calorie Requirements    Estimated Calorie Need Method [place]-St Jeor            [place]-St. Jeor Equation    RMR ([place]-St. Jeor Equation) 1122.61     [place]-St. Jeor Activity Factors 1.4 - 1.5     Activity Factors ([place]-St. Jeor) 1571.654 - 1683.915            Protein Requirements    Weight Used For Protein Calculations 63.5 kg (140 lb)     Est Protein Requirement Amount (gms/kg) 1.2 gm protein     Estimated Protein Requirements (gms/day) 76.2            Fluid Requirements    Fluid Requirements (mL/day) 1600     Estimated Fluid Requirement Method RDA Method     RDA Method (mL) 1600                Nutrition Prescription Ordered     Row Name 11/10/21 1436          Nutrition Prescription PO    Current PO Diet Regular     Fluid Consistency Thin                Evaluation of Received Nutrient/Fluid Intake     Row Name 11/10/21 1436          PO Evaluation    Number of Meals 12     % PO Intake 44% average                     Electronically signed by:  Norma Resendiz RD  11/10/21 14:42 CST

## 2021-11-10 NOTE — PLAN OF CARE
Goal Outcome Evaluation:  Plan of Care Reviewed With: caregiver, patient        Progress: no change  Outcome Summary: Apparently pt will be Palliative Care.  She is still on supplemental 02 and steroids.  PO intake is ~44% average and she is requesting Boost supplements.  Will continue supplements and monitor.

## 2021-11-10 NOTE — THERAPY TREATMENT NOTE
Acute Care - Physical Therapy Treatment Note  [facility]     Patient Name: Val Arteaga  : 1932  MRN: [id number]  Today's Date: 11/10/2021      Visit Dx:     ICD-10-CM ICD-9-CM   1. COPD exacerbation ([facility])  J44.1 491.21   2. Elevated troponin  R77.8 790.6   3. Uncontrolled hypertension  I10 401.9   4. Impaired functional mobility, balance, gait, and endurance  Z74.09 V49.89   5. Impaired mobility and ADLs  Z74.09 V49.89    Z78.9    6. Mucopurulent chronic bronchitis ([facility])  J41.1 491.1   7. Acute on chronic respiratory failure with hypoxia ([facility])  J96.21 518.84     799.02     Patient Active Problem List   Diagnosis   • Uncontrolled hypertension   • CAD (coronary artery disease)   • Chronic obstructive pulmonary disease ([facility])   • Acquired hypothyroidism   • Depressive disorder   • Thygeson's superficial punctate keratitis   • Pseudophakia   • Precordial pain   • Displaced fracture of base of neck of right femur, initial encounter for closed fracture ([facility])   • Hip fracture, right, closed, initial encounter ([facility])   • Acute on chronic systolic CHF (congestive heart failure) ([facility])   • Hyponatremia   • BENITO (acute kidney injury) ([facility])   • Acute blood loss anemia   • Urinary retention   • Closed displaced basicervical fracture of right femur ([facility])   • Urethral caruncle   • Symptomatic anemia   • Anemia   • Major depressive disorder   • Drug-induced encephalopathy   • Acute left-sided low back pain without sciatica   • Bilateral sacral insufficiency fracture   • Osteoarthritis of multiple joints   • Dyspnea   • CHF (congestive heart failure) ([facility])   • Hypercholesterolemia   • Chest pain   • Acute on chronic respiratory failure with hypoxia ([facility])   • Acute on chronic diastolic heart failure ([facility])   • Malignant hypertension   • COPD exacerbation ([facility])   • COPD with acute exacerbation ([facility])   • Elevated troponin   • Dyspnea and respiratory abnormalities   • Shortness of breath   • Uncontrolled hypersomnia      Past Medical History:   Diagnosis Date   • Allergic rhinitis    • Chronic diastolic heart failure (HCC)    • COPD (chronic obstructive pulmonary disease) (HCC)    • Coronary arteriosclerosis    • Depression    • GERD (gastroesophageal reflux disease)    • Hypercholesterolemia    • Hypertension    • Hypothyroidism    • LVH (left ventricular hypertrophy)    • Myocardial infarction (HCC)    • Osteoarthritis      Past Surgical History:   Procedure Laterality Date   • BREAST SURGERY     • CARDIAC CATHETERIZATION     • CERVICAL SPINE SURGERY     • COLONOSCOPY     • CORONARY ANGIOPLASTY     • ENDOSCOPY N/A 2/14/2018   • ENDOSCOPY AND COLONOSCOPY     • HIP BIPOLAR REPLACEMENT Right 1/23/2018   • PACEMAKER REPLACEMENT       PT Assessment (last 12 hours)     PT Evaluation and Treatment     Row Name 11/10/21 1418          Physical Therapy Time and Intention    Subjective Information no complaints  -CA     Document Type therapy note (daily note)  -CA     Mode of Treatment individual therapy; physical therapy  -CA     Row Name 11/10/21 1418          General Information    Existing Precautions/Restrictions fall; oxygen therapy device and L/min  -CA     Row Name 11/10/21 1418          Cognition    Orientation Status (Cognition) oriented x 4  -CA     Row Name 11/10/21 1418          Pain Scale: Numbers Pre/Post-Treatment    Pretreatment Pain Rating 0/10 - no pain  -CA     Posttreatment Pain Rating 0/10 - no pain  -CA     Row Name 11/10/21 1418          Bed Mobility    Supine-Sit [place] (Bed Mobility) standby assist  -CA     Sit-Supine [place] (Bed Mobility) standby assist  -CA     Row Name 11/10/21 1418          Transfers    Transfers sit-stand transfer; stand-sit transfer  -CA     Sit-Stand [place] (Transfers) contact guard  -CA     Stand-Sit [place] (Transfers) contact guard  -CA     Row Name 11/10/21 1418          Sit-Stand Transfer    Assistive Device (Sit-Stand Transfers) walker, front-wheeled  -CA      Row Name 11/10/21 1418          Stand-Sit Transfer    Assistive Device (Stand-Sit Transfers) walker, front-wheeled  -CA     Row Name 11/10/21 1418          Gait/Stairs (Locomotion)    [place] Level (Gait) contact guard  -CA     Assistive Device (Gait) walker, front-wheeled  -CA     Distance in Feet (Gait) 18' x 2  -CA     Deviations/Abnormal Patterns (Gait) kari decreased; gait speed decreased; stride length decreased  -CA     Row Name 11/10/21 1418          Hip (Therapeutic Exercise)    Hip (Therapeutic Exercise) AROM (active range of motion)  -CA     Hip AROM (Therapeutic Exercise) bilateral; flexion; extension  -CA     Row Name 11/10/21 1418          Knee (Therapeutic Exercise)    Knee (Therapeutic Exercise) AROM (active range of motion)  -CA     Knee AROM (Therapeutic Exercise) bilateral; LAQ (long arc quad)  -CA     Row Name 11/10/21 1418          Vital Signs    Pre Systolic BP Rehab 181  -CA     Pre Treatment Diastolic BP 69  -CA     Pre SpO2 (%) 95  -CA     O2 Delivery Pre Treatment supplemental O2  -CA     Intra SpO2 (%) 96  -CA     O2 Delivery Intra Treatment supplemental O2  -CA     Post SpO2 (%) 95  -CA     O2 Delivery Post Treatment supplemental O2  -CA     Pre Patient Position Supine  -CA     Intra Patient Position Standing  -CA     Post Patient Position Supine  -CA     Row Name 11/10/21 1418          Positioning and Restraints    Pre-Treatment Position in bed  -CA     Post Treatment Position bed  -CA     In Bed supine; call light within reach; encouraged to call for assist; exit alarm on  -CA           User Key  (r) = Recorded By, (t) = Taken By, (c) = Cosigned By    Initials Name Provider Type    Mendez Gamboa, PTA Physical Therapy Assistant              Physical Therapy Education                 Title: PT OT SLP Therapies (In Progress)     Topic: Physical Therapy (In Progress)     Point: Mobility training (Done)     Learning Progress Summary           Patient Acceptance, E, VU,NR by CZ at  11/8/2021 1219    Comment: Hand placement with transfers, PT POC, goals, use of walker.    Acceptance, E, VU,DU by  at 11/3/2021 1437                   Point: Home exercise program (Not Started)     Learner Progress:  Not documented in this visit.          Point: Body mechanics (Done)     Learning Progress Summary           Patient Acceptance, E, VU,DU by RONAN at 11/3/2021 1437                   Point: Precautions (Done)     Learning Progress Summary           Patient Acceptance, E, VU,DU by RONAN at 11/3/2021 1437                               User Key     Initials Effective Dates Name Provider Type Discipline     06/16/21 -  Malena Mendoza, PT Physical Therapist PT    CZ 06/16/21 -  José Antonio Estrada, PT Physical Therapist PT              PT Recommendation and Plan     Plan of Care Reviewed With: patient  Progress: improving  Outcome Summary: pt. was sba for bed mobility and was CGA for sit to stand transfer.  Pt. amb. 18' x 2 with RW and CGA and decreased gait speed and mod. fatigue.  No new goals met this tx.       Time Calculation:    PT Charges     Row Name 11/10/21 1449             Time Calculation    Start Time 1418  -CA      Stop Time 1445  -CA      Time Calculation (min) 27 min  -CA      PT Received On 11/10/21  -CA              Time Calculation- PT    Total Timed Code Minutes- PT 27 minute(s)  -CA            User Key  (r) = Recorded By, (t) = Taken By, (c) = Cosigned By    Initials Name Provider Type    CA Mendez Zaldivar PTA Physical Therapy Assistant              Therapy Charges for Today     Code Description Service Date Service Provider Modifiers Qty    17784069576 HC GAIT TRAINING EA 15 MIN 11/10/2021 Mendez Zaldivar PTA GP 1    19276917979 HC PT THERAPEUTIC ACT EA 15 MIN 11/10/2021 Mendez Zaldivar PTA GP 1          PT G-Codes  Outcome Measure Options: AM-PAC 6 Clicks Basic Mobility (PT)  AM-PAC 6 Clicks Score (PT): 18  AM-PAC 6 Clicks Score (OT): 16    Mendez Zaldivar PTA  11/10/2021

## 2021-11-10 NOTE — PLAN OF CARE
Goal Outcome Evaluation:  Plan of Care Reviewed With: patient        Progress: improving  Outcome Summary: pt. was sba for bed mobility and was CGA for sit to stand transfer.  Pt. amb. 18' x 2 with RW and CGA and decreased gait speed and mod. fatigue.  No new goals met this tx.

## 2021-11-10 NOTE — PROGRESS NOTES
[facility] Medicine Services  INPATIENT PROGRESS NOTE    Length of Stay: 8  Date of Admission: 11/1/2021  Primary Care Physician: Vero Arteaga APRN    Subjective   Chief Complaint: Shortness of air.    HPI: Patient is seen for follow-up.  She is more short of air this morning and appears to be in some form of respiratory distress.  She is now on supplemental oxygen of 6 L nasal prong with saturation of 97 to 98%.      Review of Systems   Constitutional: Positive for activity change and fatigue. Negative for appetite change, chills, diaphoresis and fever.   HENT: Negative for trouble swallowing and voice change.    Eyes: Negative for photophobia and visual disturbance.   Respiratory: Positive for shortness of breath. Negative for cough, choking, chest tightness, wheezing and stridor.    Cardiovascular: Negative for chest pain, palpitations and leg swelling.   Gastrointestinal: Negative for abdominal distention, abdominal pain, blood in stool, constipation, diarrhea, nausea and vomiting.   Endocrine: Negative for cold intolerance, heat intolerance, polydipsia, polyphagia and polyuria.   Genitourinary: Negative for decreased urine volume, difficulty urinating, dysuria, enuresis, flank pain, frequency, hematuria and urgency.   Musculoskeletal: Negative for arthralgias, gait problem, myalgias, neck pain and neck stiffness.   Skin: Negative for pallor, rash and wound.   Neurological: Negative for dizziness, tremors, seizures, syncope, facial asymmetry, speech difficulty, weakness, light-headedness, numbness and headaches.   Hematological: Does not bruise/bleed easily.   Psychiatric/Behavioral: Negative for agitation, behavioral problems and confusion.       Objective    Temp:  [96.3 °F (35.7 °C)-98.1 °F (36.7 °C)] 98.1 °F (36.7 °C)  Heart Rate:  [64-99] 83  Resp:  [16-24] 20  BP: (139-197)/(64-88) 159/76    Physical Exam  Vitals and nursing note reviewed.   Constitutional:        General: She is not in acute distress.     Appearance: She is well-developed. She is ill-appearing. She is not diaphoretic.   HENT:      Head: Normocephalic and atraumatic.      Right Ear: External ear normal.      Left Ear: External ear normal.      Nose: Nose normal.   Eyes:      Extraocular Movements: Extraocular movements intact.      Conjunctiva/sclera: Conjunctivae normal.      Pupils: Pupils are equal, round, and reactive to light.   Neck:      Thyroid: No thyromegaly.      Vascular: No JVD.   Cardiovascular:      Rate and Rhythm: Normal rate and regular rhythm.      Heart sounds: Normal heart sounds. No murmur heard.  No friction rub. No gallop.    Pulmonary:      Effort: Pulmonary effort is normal. No respiratory distress.      Breath sounds: No stridor. Decreased breath sounds, wheezing and rhonchi present. No rales.      Comments: She is mouth breathing and appears to have some air hunger.  Chest:      Chest wall: No tenderness.   Abdominal:      General: Bowel sounds are normal. There is no distension.      Palpations: Abdomen is soft. There is no mass.      Tenderness: There is no abdominal tenderness. There is no right CVA tenderness, left CVA tenderness, guarding or rebound.      Hernia: No hernia is present.   Musculoskeletal:         General: No swelling, tenderness or deformity. Normal range of motion.      Cervical back: Normal range of motion and neck supple.      Right lower leg: No edema.      Left lower leg: No edema.   Skin:     General: Skin is warm and dry.      Coloration: Skin is not jaundiced or pale.      Findings: No bruising, erythema, lesion or rash.   Neurological:      General: No focal deficit present.      Mental Status: She is alert and oriented to person, place, and time.      Cranial Nerves: No cranial nerve deficit.      Sensory: No sensory deficit.      Motor: No weakness.      Coordination: Coordination normal.      Gait: Gait normal.      Deep Tendon Reflexes: Reflexes are  normal and symmetric. Reflexes normal.   Psychiatric:         Behavior: Behavior normal. Behavior is cooperative.         Thought Content: Thought content normal.         Judgment: Judgment normal.           Medication Review:    Current Facility-Administered Medications:   •  acetaminophen (TYLENOL) tablet 650 mg, 650 mg, Oral, Q4H PRN **OR** acetaminophen (TYLENOL) 160 MG/5ML solution 650 mg, 650 mg, Oral, Q4H PRN **OR** acetaminophen (TYLENOL) suppository 650 mg, 650 mg, Rectal, Q4H PRN, Yo Garg MD  •  acetaminophen (TYLENOL) tablet 650 mg, 650 mg, Oral, Q4H PRN, Monalisa Mcintosh MD, 650 mg at 11/02/21 2238  •  budesonide (PULMICORT) nebulizer solution 0.5 mg, 0.5 mg, Nebulization, Daily - RT, Monalisa Mcintosh MD, 0.5 mg at 11/09/21 0706  •  carboxymethylcellulose (REFRESH PLUS) 0.5 % ophthalmic solution 1 drop, 1 drop, Both Eyes, Q30 Min PRN, Yo Garg MD  •  diphenoxylate-atropine (LOMOTIL) 2.5-0.025 MG per tablet 1 tablet, 1 tablet, Oral, Q2H PRN, Yo Garg MD  •  docusate sodium (COLACE) capsule 100 mg, 100 mg, Oral, BID PRN, Monalisa Mcintosh MD  •  famotidine (PEPCID) tablet 40 mg, 40 mg, Oral, Daily, Monalisa Mcintosh MD, 40 mg at 11/10/21 0902  •  folic acid-vit B6-vit B12 (FOLBEE) tablet 1 tablet, 1 tablet, Oral, Daily, Monalisa Mcintosh MD, 1 tablet at 11/10/21 0902  •  HYDROcodone-acetaminophen (NORCO) 7.5-325 MG per tablet 1 tablet, 1 tablet, Oral, Q6H PRN, Alex Shaikh MD  •  ipratropium-albuterol (DUO-NEB) nebulizer solution 3 mL, 3 mL, Nebulization, Q4H - RT, Alex Shaikh MD, 3 mL at 11/10/21 0845  •  levothyroxine (SYNTHROID, LEVOTHROID) tablet 50 mcg, 50 mcg, Oral, Q AM, Monalisa Mcintosh MD, 50 mcg at 11/10/21 0510  •  LORazepam (ATIVAN) injection 0.5 mg, 0.5 mg, Intravenous, Q6H PRN, Alex Shaikh MD  •  methylPREDNISolone sodium succinate (SOLU-Medrol) injection 40 mg, 40 mg, Intravenous, Q8H, Alex Shaikh  MD JENNIFFER, 40 mg at 11/10/21 0902  •  montelukast (SINGULAIR) tablet 10 mg, 10 mg, Oral, Nightly, Monalisa Mcintosh MD, 10 mg at 11/09/21 2109  •  morphine injection 2 mg, 2 mg, Intravenous, Q4H PRN, Alex Shaikh MD, 2 mg at 11/10/21 0850  •  nicotine (NICODERM CQ) 21 MG/24HR patch 1 patch, 1 patch, Transdermal, Q24H, Monalisa Mcintosh MD, 1 patch at 11/05/21 0905  •  ondansetron (ZOFRAN) injection 4 mg, 4 mg, Intravenous, Q6H PRN, Monalisa Mcintosh MD  •  pantoprazole (PROTONIX) EC tablet 40 mg, 40 mg, Oral, QAM, Monalisa Mcintosh MD, 40 mg at 11/10/21 0611  •  prochlorperazine (COMPAZINE) injection 5 mg, 5 mg, Intravenous, Q6H PRN **OR** prochlorperazine (COMPAZINE) tablet 5 mg, 5 mg, Oral, Q6H PRN **OR** prochlorperazine (COMPAZINE) suppository 25 mg, 25 mg, Rectal, Q12H PRN, Yo Garg MD  •  ranolazine (RANEXA) 12 hr tablet 500 mg, 500 mg, Oral, BID, Monalisa Mcintosh MD, 500 mg at 11/10/21 0902  •  sertraline (ZOLOFT) tablet 100 mg, 100 mg, Oral, Daily, Monalisa Mcintosh MD, 100 mg at 11/10/21 0902  •  sodium chloride 0.9 % flush 10 mL, 10 mL, Intravenous, PRN, Monalisa Mcintosh MD, 10 mL at 11/08/21 2049  •  sodium chloride nasal spray 2 spray, 2 spray, Each Nare, PRN, Yo Garg MD  •  traMADol (ULTRAM) tablet 50 mg, 50 mg, Oral, Q6H PRN, Monalisa Mcintosh MD, 50 mg at 11/03/21 0013    Results Review:  I have reviewed the labs, radiology results, and diagnostic studies.    Laboratory Data:   Results from last 7 days   Lab Units 11/10/21  0514 11/06/21  0548 11/05/21  0535   SODIUM mmol/L 135* 136 139   POTASSIUM mmol/L 4.1 3.9 4.2   CHLORIDE mmol/L 97* 99 102   CO2 mmol/L 31.0* 29.0 29.0   BUN mg/dL 18 32* 32*   CREATININE mg/dL 0.75 1.00 1.03*   GLUCOSE mg/dL 133* 137* 133*   CALCIUM mg/dL 8.3* 8.3* 8.4*   ANION GAP mmol/L 7.0 8.0 8.0     Estimated Creatinine Clearance: 49.7 mL/min (by C-G formula based on SCr of 0.75 mg/dL).           Results from last 7 days   Lab Units 11/10/21  0514 11/06/21  0548 11/05/21  0535 11/04/21  0528   WBC 10*3/mm3 7.22 6.45 7.81 9.58   HEMOGLOBIN g/dL 12.1 10.2* 10.0* 9.5*   HEMATOCRIT % 36.5 30.2* 30.1* 28.8*   PLATELETS 10*3/mm3 211 204 179 182           Culture Data:   No results found for: BLOODCX  No results found for: URINECX  No results found for: RESPCX  No results found for: WOUNDCX  No results found for: STOOLCX  No components found for: BODYFLD    Radiology Data:   Imaging Results (Last 24 Hours)     Procedure Component Value Units Date/Time    XR Chest 1 View [[id number]] Collected: 11/09/21 1042     Updated: 11/09/21 1116    Narrative:      AP upright portable chest November 9, 2021 10:37 AM    INDICATION: Shortness of breath    FINDINGS:  Cardiac pacer/defibrillator wires remain in place.  Scattered lung parenchymal scarring/atelectasis bilateral. This  is slightly more pronounced at the right base compared with  previous study dated November 4.  No significant areas of consolidation, acute congestive changes,  pneumothorax or pleural fluid.  Heart and mediastinal contours within normal limits.      Impression:      Increased probable atelectasis right base compared with previous  study.  Otherwise no change.    Electronically signed by:  Kirill Sheikh MD  11/9/2021 11:15 AM  [facility] Workstation: [id number]          I have reviewed the patient's current medications.     Assessment/Plan     Hospital Problem List:  Principal Problem:    Acute on chronic respiratory failure with hypoxia (HCC)  Active Problems:    Uncontrolled hypertension    Acute on chronic diastolic heart failure (HCC)    COPD with acute exacerbation (HCC)    Acute on Chronic respiratory failure with hypoxia: Continue noninvasive respiratory therapy, bronchodilators and steroids.  Continue pain control for palliative care.    Hypertension: Stable. Continue current medications.    Hypothyroidism: Continue Synthroid.    Deconditioning:  Continue PT and OT.    Update was given to patient's son who was at the bedside.    Overall prognosis is guarded.    Discharge Planning: In progress pending pre-CERT.    I confirmed that the patient's Advance Care Plan is present, code status is documented, or surrogate decision maker is listed in the patient's medical record.     I have utilized all available immediate resources to obtain, update, or review the patient's current medications      Alex Shaikh MD   11/10/21   10:50 CST

## 2021-11-10 NOTE — PLAN OF CARE
Problem: Adult Inpatient Plan of Care  Goal: Plan of Care Review  Recent Flowsheet Documentation  Taken 11/10/2021 0756 by Kenna Escobar COTA  Progress: no change  Plan of Care Reviewed With: patient  Outcome Summary: Pt agreeable to OT this am. Pt is CGA for grooming tasks with set up. Pt educated on home safety/fall prevention. Pt with all needs within reach following OT tx. Continue OT POC   Goal Outcome Evaluation:  Plan of Care Reviewed With: patient        Progress: no change  Outcome Summary: Pt agreeable to OT this am. Pt is CGA for grooming tasks with set up. Pt educated on home safety/fall prevention. Pt with all needs within reach following OT tx. Continue OT POC

## 2021-11-11 NOTE — PROGRESS NOTES
[facility] Medicine Services  INPATIENT PROGRESS NOTE    Length of Stay: 9  Date of Admission: 11/1/2021  Primary Care Physician: Vero Arteaga APRN    Subjective   Chief Complaint: Shortness of air.    HPI: Patient is seen for follow-up.  She is doing better this morning, sitting out on the chair, less short of air, tolerating her diet and her oxygen requirement is down to 3 L nasal prongs with saturation in the mid 90s.       Review of Systems   Constitutional: Positive for activity change and fatigue. Negative for appetite change, chills, diaphoresis and fever.   HENT: Negative for trouble swallowing and voice change.    Eyes: Negative for photophobia and visual disturbance.   Respiratory: Positive for shortness of breath. Negative for cough, choking, chest tightness, wheezing and stridor.    Cardiovascular: Negative for chest pain, palpitations and leg swelling.   Gastrointestinal: Negative for abdominal distention, abdominal pain, blood in stool, constipation, diarrhea, nausea and vomiting.   Endocrine: Negative for cold intolerance, heat intolerance, polydipsia, polyphagia and polyuria.   Genitourinary: Negative for decreased urine volume, difficulty urinating, dysuria, enuresis, flank pain, frequency, hematuria and urgency.   Musculoskeletal: Negative for arthralgias, gait problem, myalgias, neck pain and neck stiffness.   Skin: Negative for pallor, rash and wound.   Neurological: Negative for dizziness, tremors, seizures, syncope, facial asymmetry, speech difficulty, weakness, light-headedness, numbness and headaches.   Hematological: Does not bruise/bleed easily.   Psychiatric/Behavioral: Negative for agitation, behavioral problems and confusion.       Objective    Temp:  [96.7 °F (35.9 °C)-98.3 °F (36.8 °C)] 98.3 °F (36.8 °C)  Heart Rate:  [71-84] 76  Resp:  [16-20] 16  BP: (146-195)/(73-86) 164/86    Physical Exam  Vitals and nursing note reviewed.    Constitutional:       General: She is not in acute distress.     Appearance: She is well-developed. She is ill-appearing. She is not diaphoretic.   HENT:      Head: Normocephalic and atraumatic.      Right Ear: External ear normal.      Left Ear: External ear normal.      Nose: Nose normal.   Eyes:      Extraocular Movements: Extraocular movements intact.      Conjunctiva/sclera: Conjunctivae normal.      Pupils: Pupils are equal, round, and reactive to light.   Neck:      Thyroid: No thyromegaly.      Vascular: No JVD.   Cardiovascular:      Rate and Rhythm: Normal rate and regular rhythm.      Heart sounds: Normal heart sounds. No murmur heard.  No friction rub. No gallop.    Pulmonary:      Effort: Pulmonary effort is normal. No respiratory distress.      Breath sounds: No stridor. Decreased breath sounds and rhonchi present. No wheezing or rales.      Comments: She is mouth breathing.  Chest:      Chest wall: No tenderness.   Abdominal:      General: Bowel sounds are normal. There is no distension.      Palpations: Abdomen is soft. There is no mass.      Tenderness: There is no abdominal tenderness. There is no right CVA tenderness, left CVA tenderness, guarding or rebound.      Hernia: No hernia is present.   Musculoskeletal:         General: No swelling, tenderness or deformity. Normal range of motion.      Cervical back: Normal range of motion and neck supple.      Right lower leg: No edema.      Left lower leg: No edema.   Skin:     General: Skin is warm and dry.      Coloration: Skin is not jaundiced or pale.      Findings: No bruising, erythema, lesion or rash.   Neurological:      General: No focal deficit present.      Mental Status: She is alert and oriented to person, place, and time.      Cranial Nerves: No cranial nerve deficit.      Sensory: No sensory deficit.      Motor: No weakness.      Coordination: Coordination normal.      Gait: Gait normal.      Deep Tendon Reflexes: Reflexes are normal  and symmetric. Reflexes normal.   Psychiatric:         Behavior: Behavior normal. Behavior is cooperative.         Thought Content: Thought content normal.         Judgment: Judgment normal.           Medication Review:    Current Facility-Administered Medications:   •  acetaminophen (TYLENOL) tablet 650 mg, 650 mg, Oral, Q4H PRN **OR** acetaminophen (TYLENOL) 160 MG/5ML solution 650 mg, 650 mg, Oral, Q4H PRN **OR** acetaminophen (TYLENOL) suppository 650 mg, 650 mg, Rectal, Q4H PRN, Yo Garg MD  •  acetaminophen (TYLENOL) tablet 650 mg, 650 mg, Oral, Q4H PRN, Monalisa Mcintosh MD, 650 mg at 11/02/21 2238  •  budesonide (PULMICORT) nebulizer solution 0.5 mg, 0.5 mg, Nebulization, Daily - RT, Monalisa Mcintosh MD, 0.5 mg at 11/11/21 0647  •  carboxymethylcellulose (REFRESH PLUS) 0.5 % ophthalmic solution 1 drop, 1 drop, Both Eyes, Q30 Min PRN, Yo Garg MD  •  diphenoxylate-atropine (LOMOTIL) 2.5-0.025 MG per tablet 1 tablet, 1 tablet, Oral, Q2H PRN, Yo Garg MD  •  docusate sodium (COLACE) capsule 100 mg, 100 mg, Oral, BID PRN, Monalisa Mcintosh MD  •  famotidine (PEPCID) tablet 40 mg, 40 mg, Oral, Daily, Monalisa Mcintosh MD, 40 mg at 11/11/21 0923  •  folic acid-vit B6-vit B12 (FOLBEE) tablet 1 tablet, 1 tablet, Oral, Daily, Monalisa Mcintosh MD, 1 tablet at 11/11/21 0923  •  hydrALAZINE (APRESOLINE) injection 10 mg, 10 mg, Intravenous, Q6H PRN, Alex Shaikh MD  •  HYDROcodone-acetaminophen (NORCO) 7.5-325 MG per tablet 1 tablet, 1 tablet, Oral, Q6H PRN, Alex Shaikh MD, 1 tablet at 11/11/21 0923  •  ipratropium-albuterol (DUO-NEB) nebulizer solution 3 mL, 3 mL, Nebulization, Q4H - RT, Alex Shaikh MD, 3 mL at 11/11/21 0647  •  levothyroxine (SYNTHROID, LEVOTHROID) tablet 50 mcg, 50 mcg, Oral, Q AM, Monalisa Micntosh MD, 50 mcg at 11/11/21 0531  •  LORazepam (ATIVAN) injection 0.5 mg, 0.5 mg, Intravenous, Q6H PRN, Alex Shaikh  MD JENNIFFER  •  melatonin tablet 3 mg, 3 mg, Oral, Nightly, Alex Shaikh MD  •  methylPREDNISolone sodium succinate (SOLU-Medrol) injection 40 mg, 40 mg, Intravenous, Q8H, Alex Shaikh MD, 40 mg at 11/11/21 0927  •  montelukast (SINGULAIR) tablet 10 mg, 10 mg, Oral, Nightly, Monalisa Mcintosh MD, 10 mg at 11/10/21 2114  •  morphine injection 2 mg, 2 mg, Intravenous, Q4H PRN, Alex Shaikh MD, 2 mg at 11/11/21 0945  •  nicotine (NICODERM CQ) 21 MG/24HR patch 1 patch, 1 patch, Transdermal, Q24H, Monalisa Mcintosh MD, 1 patch at 11/05/21 0905  •  ondansetron (ZOFRAN) injection 4 mg, 4 mg, Intravenous, Q6H PRN, Monalisa Mcintosh MD  •  pantoprazole (PROTONIX) EC tablet 40 mg, 40 mg, Oral, QAM, Monalisa Mcintosh MD, 40 mg at 11/11/21 0923  •  prochlorperazine (COMPAZINE) injection 5 mg, 5 mg, Intravenous, Q6H PRN **OR** prochlorperazine (COMPAZINE) tablet 5 mg, 5 mg, Oral, Q6H PRN **OR** prochlorperazine (COMPAZINE) suppository 25 mg, 25 mg, Rectal, Q12H PRN, Yo Garg MD  •  ranolazine (RANEXA) 12 hr tablet 500 mg, 500 mg, Oral, BID, Monalisa Mcintosh MD, 500 mg at 11/11/21 0923  •  sertraline (ZOLOFT) tablet 100 mg, 100 mg, Oral, Daily, Monalisa Mcintsoh MD, 100 mg at 11/10/21 0902  •  sodium chloride 0.9 % flush 10 mL, 10 mL, Intravenous, PRN, Monalisa Mcintosh MD, 10 mL at 11/08/21 2049  •  sodium chloride nasal spray 2 spray, 2 spray, Each Nare, PRN, Yo Garg MD  •  traMADol (ULTRAM) tablet 50 mg, 50 mg, Oral, Q6H PRN, Monalisa Mcintosh MD, 50 mg at 11/03/21 0013    Results Review:  I have reviewed the labs, radiology results, and diagnostic studies.    Laboratory Data:   Results from last 7 days   Lab Units 11/10/21  0514 11/06/21  0548 11/05/21  0535   SODIUM mmol/L 135* 136 139   POTASSIUM mmol/L 4.1 3.9 4.2   CHLORIDE mmol/L 97* 99 102   CO2 mmol/L 31.0* 29.0 29.0   BUN mg/dL 18 32* 32*   CREATININE mg/dL 0.75 1.00 1.03*   GLUCOSE  mg/dL 133* 137* 133*   CALCIUM mg/dL 8.3* 8.3* 8.4*   ANION GAP mmol/L 7.0 8.0 8.0     Estimated Creatinine Clearance: 49.7 mL/min (by C-G formula based on SCr of 0.75 mg/dL).          Results from last 7 days   Lab Units 11/10/21  0514 11/06/21  0548 11/05/21  0535   WBC 10*3/mm3 7.22 6.45 7.81   HEMOGLOBIN g/dL 12.1 10.2* 10.0*   HEMATOCRIT % 36.5 30.2* 30.1*   PLATELETS 10*3/mm3 211 204 179           Culture Data:   No results found for: BLOODCX  No results found for: URINECX  No results found for: RESPCX  No results found for: WOUNDCX  No results found for: STOOLCX  No components found for: BODYFLD    Radiology Data:   Imaging Results (Last 24 Hours)     ** No results found for the last 24 hours. **          I have reviewed the patient's current medications.     Assessment/Plan     Hospital Problem List:  Principal Problem:    Acute on chronic respiratory failure with hypoxia (HCC)  Active Problems:    Uncontrolled hypertension    Acute on chronic diastolic heart failure (HCC)    COPD with acute exacerbation (HCC)    Acute on Chronic respiratory failure with hypoxia: Improving.  Continue noninvasive respiratory therapy, bronchodilators and steroids.  Continue pain control for palliative care.    Hypertension: Continue current medications and make adjustments as needed for optimal blood pressure control.    Hypothyroidism: Continue Synthroid.    Deconditioning: Continue PT and OT.    Update was given to patient's son who was at the bedside.    Overall prognosis is guarded.    Discharge Planning: In progress.    I confirmed that the patient's Advance Care Plan is present, code status is documented, or surrogate decision maker is listed in the patient's medical record.     I have utilized all available immediate resources to obtain, update, or review the patient's current medications      Alex Shaikh MD   11/11/21   10:34 CST

## 2021-11-11 NOTE — THERAPY TREATMENT NOTE
Acute Care - Physical Therapy Treatment Note  [facility]     Patient Name: Val Arteaga  : 1932  MRN: [id number]  Today's Date: 2021      Visit Dx:     ICD-10-CM ICD-9-CM   1. COPD exacerbation ([facility])  J44.1 491.21   2. Elevated troponin  R77.8 790.6   3. Uncontrolled hypertension  I10 401.9   4. Impaired functional mobility, balance, gait, and endurance  Z74.09 V49.89   5. Impaired mobility and ADLs  Z74.09 V49.89    Z78.9    6. Mucopurulent chronic bronchitis ([facility])  J41.1 491.1   7. Acute on chronic respiratory failure with hypoxia ([facility])  J96.21 518.84     799.02     Patient Active Problem List   Diagnosis   • Uncontrolled hypertension   • CAD (coronary artery disease)   • Chronic obstructive pulmonary disease ([facility])   • Acquired hypothyroidism   • Depressive disorder   • Thygeson's superficial punctate keratitis   • Pseudophakia   • Precordial pain   • Displaced fracture of base of neck of right femur, initial encounter for closed fracture ([facility])   • Hip fracture, right, closed, initial encounter ([facility])   • Acute on chronic systolic CHF (congestive heart failure) ([facility])   • Hyponatremia   • BENITO (acute kidney injury) ([facility])   • Acute blood loss anemia   • Urinary retention   • Closed displaced basicervical fracture of right femur ([facility])   • Urethral caruncle   • Symptomatic anemia   • Anemia   • Major depressive disorder   • Drug-induced encephalopathy   • Acute left-sided low back pain without sciatica   • Bilateral sacral insufficiency fracture   • Osteoarthritis of multiple joints   • Dyspnea   • CHF (congestive heart failure) ([facility])   • Hypercholesterolemia   • Chest pain   • Acute on chronic respiratory failure with hypoxia ([facility])   • Acute on chronic diastolic heart failure ([facility])   • Malignant hypertension   • COPD exacerbation ([facility])   • COPD with acute exacerbation ([facility])   • Elevated troponin   • Dyspnea and respiratory abnormalities   • Shortness of breath   • Uncontrolled hypersomnia      Past Medical History:   Diagnosis Date   • Allergic rhinitis    • Chronic diastolic heart failure (HCC)    • COPD (chronic obstructive pulmonary disease) (HCC)    • Coronary arteriosclerosis    • Depression    • GERD (gastroesophageal reflux disease)    • Hypercholesterolemia    • Hypertension    • Hypothyroidism    • LVH (left ventricular hypertrophy)    • Myocardial infarction (HCC)    • Osteoarthritis      Past Surgical History:   Procedure Laterality Date   • BREAST SURGERY     • CARDIAC CATHETERIZATION     • CERVICAL SPINE SURGERY     • COLONOSCOPY     • CORONARY ANGIOPLASTY     • ENDOSCOPY N/A 2/14/2018   • ENDOSCOPY AND COLONOSCOPY     • HIP BIPOLAR REPLACEMENT Right 1/23/2018   • PACEMAKER REPLACEMENT       PT Assessment (last 12 hours)     PT Evaluation and Treatment     Row Name 11/11/21 1010          Physical Therapy Time and Intention    Subjective Information no complaints  -     Document Type therapy note (daily note)  -     Mode of Treatment individual therapy; physical therapy  -     Row Name 11/11/21 1010          General Information    Patient Profile Reviewed yes  -     Existing Precautions/Restrictions fall; oxygen therapy device and L/min  -     Row Name 11/11/21 1010          Cognition    Orientation Status (Cognition) oriented x 4  -     Row Name 11/11/21 1010          Pain Scale: Numbers Pre/Post-Treatment    Pretreatment Pain Rating 0/10 - no pain  -     Posttreatment Pain Rating 0/10 - no pain  -     Row Name 11/11/21 1010          Transfers    Transfers sit-stand transfer; stand-sit transfer  -     Sit-Stand [place] (Transfers) contact guard  -     Stand-Sit [place] (Transfers) contact guard  -     [place] Level (Toilet Transfer) contact guard  -     Assistive Device (Toilet Transfer) walker, front-wheeled  -     Row Name 11/11/21 1010          Sit-Stand Transfer    Assistive Device (Sit-Stand Transfers) walker, front-wheeled  -     Row Name  11/11/21 1010          Stand-Sit Transfer    Assistive Device (Stand-Sit Transfers) walker, front-wheeled  -     Row Name 11/11/21 1010          Toilet Transfer    Type (Toilet Transfer) sit-stand; stand-sit  -[facility] Name 11/11/21 1010          Gait/Stairs (Locomotion)    [place] Level (Gait) contact guard  -     Assistive Device (Gait) walker, front-wheeled  -     Distance in Feet (Gait) 18', 48'  -     Deviations/Abnormal Patterns (Gait) kari decreased; gait speed decreased; stride length decreased  -[facility] Name 11/11/21 1010          Hip (Therapeutic Exercise)    Hip AROM (Therapeutic Exercise) bilateral; flexion  -     Hip Isometrics (Therapeutic Exercise) bilateral; aDduction  -[facility] Name 11/11/21 1010          Knee (Therapeutic Exercise)    Knee AROM (Therapeutic Exercise) bilateral; LAQ (long arc quad)  -[facility] Name 11/11/21 1010          Ankle (Therapeutic Exercise)    Ankle AROM (Therapeutic Exercise) bilateral; dorsiflexion; plantarflexion  -[facility] Name 11/11/21 1010          Plan of Care Review    Plan of Care Reviewed With patient  -     Outcome Summary pt up in b/s chair, t/fers sit<>stand w/ CGA w/ RW, ambulates ~18' w/ RW to toilet, ambulates ~48' w/ RW w/ CGA around room, pt performed 2 x 10 B LE seated ther ex  -[facility] Name 11/11/21 1010          Vital Signs    Pretreatment Heart Rate (beats/min) 72  -     Intratreatment Heart Rate (beats/min) 87  -     Posttreatment Heart Rate (beats/min) 84  -     Pre SpO2 (%) 98  -     O2 Delivery Pre Treatment supplemental O2  -JW     Intra SpO2 (%) 93  -     O2 Delivery Intra Treatment supplemental O2  -JW     Post SpO2 (%) 97  -     O2 Delivery Post Treatment supplemental O2  -JW     Pre Patient Position Sitting  -     Intra Patient Position Standing  -     Post Patient Position Sitting  -[facility] Name 11/11/21 1010          Bed Mobility Goal 1 (PT)    Activity/Assistive Device (Bed Mobility Goal  1, PT) sit to supine; supine to sit  -JW     [place] Level/Cues Needed (Bed Mobility Goal 1, PT) modified independence  -JW     Time Frame (Bed Mobility Goal 1, PT) by discharge  -JW     Progress/Outcomes (Bed Mobility Goal 1, PT) goal not met  -     Row Name 11/11/21 1010          Transfer Goal 1 (PT)    Activity/Assistive Device (Transfer Goal 1, PT) sit-to-stand/stand-to-sit; bed-to-chair/chair-to-bed; walker, rolling  -JW     [place] Level/Cues Needed (Transfer Goal 1, PT) modified independence  -JW     Time Frame (Transfer Goal 1, PT) by discharge  -JW     Progress/Outcome (Transfer Goal 1, PT) goal not met  -     Row Name 11/11/21 1010          Gait Training Goal 1 (PT)    Activity/Assistive Device (Gait Training Goal 1, PT) walker, rolling  -JW     [place] Level (Gait Training Goal 1, PT) modified independence  -JW     Distance (Gait Training Goal 1, PT) 150ft or more  -JW     Time Frame (Gait Training Goal 1, PT) by discharge  -JW     Progress/Outcome (Gait Training Goal 1, PT) goal not met  -     Row Name 11/11/21 1010          Positioning and Restraints    Pre-Treatment Position sitting in chair/recliner  -JW     Post Treatment Position chair  -JW     In Chair reclined; call light within reach; encouraged to call for assist; exit alarm on; with family/caregiver  -JW           User Key  (r) = Recorded By, (t) = Taken By, (c) = Cosigned By    Initials Name Provider Type    JW Neelima Merritt, PTA Physical Therapy Assistant              Physical Therapy Education                 Title: PT OT SLP Therapies (In Progress)     Topic: Physical Therapy (In Progress)     Point: Mobility training (Done)     Learning Progress Summary           Patient Acceptance, E, VU,NR by SCOT at 11/8/2021 1219    Comment: Hand placement with transfers, PT POC, goals, use of walker.    Acceptance, E, KYLEE,DU by RONAN at 11/3/2021 1437                   Point: Home exercise program (Not Started)     Learner  Progress:  Not documented in this visit.          Point: Body mechanics (Done)     Learning Progress Summary           Patient Acceptance, E, VU,DU by  at 11/3/2021 1437                   Point: Precautions (Done)     Learning Progress Summary           Patient Acceptance, E, VU,DU by  at 11/3/2021 1437                               User Key     Initials Effective Dates Name Provider Type Discipline     06/16/21 -  Malena Mendoza, PT Physical Therapist PT    SCOT 06/16/21 -  José Antonio Estrada PT Physical Therapist PT              PT Recommendation and Plan  Anticipated Discharge Disposition (PT): skilled nursing facility  Therapy Frequency (PT): other (see comments) (5-7 days/week)  Plan of Care Reviewed With: patient  Outcome Summary: pt up in b/s chair, t/fers sit<>stand w/ CGA w/ RW, ambulates ~18' w/ RW to toilet, ambulates ~48' w/ RW w/ CGA around room, pt performed 2 x 10 B LE seated ther ex       Time Calculation:    PT Charges     Row Name 11/11/21 1010             Time Calculation    Start Time 1010  -      Stop Time 1049  -      Time Calculation (min) 39 min  -      PT Received On 11/11/21  -              Time Calculation- PT    Total Timed Code Minutes- PT 39 minute(s)  -            User Key  (r) = Recorded By, (t) = Taken By, (c) = Cosigned By    Initials Name Provider Type    Neelima Paige PTA Physical Therapy Assistant              Therapy Charges for Today     Code Description Service Date Service Provider Modifiers Qty    92270931437 HC GAIT TRAINING EA 15 MIN 11/11/2021 Neelima Merritt, PTA GP 1    78068532734 HC PT THERAPEUTIC ACT EA 15 MIN 11/11/2021 Neelima Merritt, PTA GP 1    65952936528 HC PT THER PROC EA 15 MIN 11/11/2021 Neelima Merritt, PTA GP 1          PT G-Codes  Outcome Measure Options: AM-PAC 6 Clicks Basic Mobility (PT)  AM-PAC 6 Clicks Score (PT): 18  AM-PAC 6 Clicks Score (OT): 20    Neelima Merritt PTA  11/11/2021

## 2021-11-11 NOTE — PLAN OF CARE
Goal Outcome Evaluation:  Plan of Care Reviewed With: patient           Outcome Summary: pt up in b/s chair, t/fers sit<>stand w/ CGA w/ RW, ambulates ~18' w/ RW to toilet, ambulates ~48' w/ RW w/ CGA around room, pt performed 2 x 10 B LE seated ther ex

## 2021-11-11 NOTE — PLAN OF CARE
Goal Outcome Evaluation:              Outcome Summary: OT tx complete. Supine to sit with modified independence. Sit to stand and toilet transfer with CGA and RW. Toileting with SBA. UE bath with set up, LE bath with SBA. Grooming performed standing at sink with CGA, oral care, hand hygiene, washing face, combing hair. Transfered to recliner with CGA and RW. BP at end of session 169/74, SPO2 98% on 3 L. Patient does present with dyspnea on exertion, but saturates well. No goals met, cont with POC.

## 2021-11-11 NOTE — PLAN OF CARE
Goal Outcome Evaluation:  Plan of Care Reviewed With: patient, caregiver        Progress: no change  Outcome Summary: Pt hypertensive this shift.  On-call hospitalist notified.  Pt seems to be resting well with no complaints of pain.  Pt does not appear to be anxious or in distress.  Family at bedside.  Safety maintained.

## 2021-11-11 NOTE — NURSING NOTE
Did not obtain daily weight on this shift.  Pt. palliative care only.  She becomes winded with SOA on exertion and at this time cannot tolerate being flat to obtain bed weight.

## 2021-11-11 NOTE — THERAPY TREATMENT NOTE
Patient Name: Val Arteaga  : 1932    MRN: [id number]                              Today's Date: 2021       Admit Date: 2021    Visit Dx:     ICD-10-CM ICD-9-CM   1. COPD exacerbation ([facility])  J44.1 491.21   2. Elevated troponin  R77.8 790.6   3. Uncontrolled hypertension  I10 401.9   4. Impaired functional mobility, balance, gait, and endurance  Z74.09 V49.89   5. Impaired mobility and ADLs  Z74.09 V49.89    Z78.9    6. Mucopurulent chronic bronchitis ([facility])  J41.1 491.1   7. Acute on chronic respiratory failure with hypoxia ([facility])  J96.21 518.84     799.02     Patient Active Problem List   Diagnosis   • Uncontrolled hypertension   • CAD (coronary artery disease)   • Chronic obstructive pulmonary disease ([facility])   • Acquired hypothyroidism   • Depressive disorder   • Thygeson's superficial punctate keratitis   • Pseudophakia   • Precordial pain   • Displaced fracture of base of neck of right femur, initial encounter for closed fracture ([facility])   • Hip fracture, right, closed, initial encounter ([facility])   • Acute on chronic systolic CHF (congestive heart failure) ([facility])   • Hyponatremia   • BENITO (acute kidney injury) ([facility])   • Acute blood loss anemia   • Urinary retention   • Closed displaced basicervical fracture of right femur ([facility])   • Urethral caruncle   • Symptomatic anemia   • Anemia   • Major depressive disorder   • Drug-induced encephalopathy   • Acute left-sided low back pain without sciatica   • Bilateral sacral insufficiency fracture   • Osteoarthritis of multiple joints   • Dyspnea   • CHF (congestive heart failure) ([facility])   • Hypercholesterolemia   • Chest pain   • Acute on chronic respiratory failure with hypoxia ([facility])   • Acute on chronic diastolic heart failure ([facility])   • Malignant hypertension   • COPD exacerbation ([facility])   • COPD with acute exacerbation ([facility])   • Elevated troponin   • Dyspnea and respiratory abnormalities   • Shortness of breath   • Uncontrolled hypersomnia     Past Medical  History:   Diagnosis Date   • Allergic rhinitis    • Chronic diastolic heart failure (HCC)    • COPD (chronic obstructive pulmonary disease) (HCC)    • Coronary arteriosclerosis    • Depression    • GERD (gastroesophageal reflux disease)    • Hypercholesterolemia    • Hypertension    • Hypothyroidism    • LVH (left ventricular hypertrophy)    • Myocardial infarction (HCC)    • Osteoarthritis      Past Surgical History:   Procedure Laterality Date   • BREAST SURGERY     • CARDIAC CATHETERIZATION     • CERVICAL SPINE SURGERY     • COLONOSCOPY     • CORONARY ANGIOPLASTY     • ENDOSCOPY N/A 2/14/2018   • ENDOSCOPY AND COLONOSCOPY     • HIP BIPOLAR REPLACEMENT Right 1/23/2018   • PACEMAKER REPLACEMENT        General Information     Row Name 11/11/21 0823 11/11/21 0800       OT Time and Intention    Document Type --  -SJ therapy note (daily note)  -    Mode of Treatment --  -SJ individual therapy; occupational therapy  -    Row Name 11/11/21 0823 11/11/21 0800       General Information    Patient Profile Reviewed --  -SJ yes  -    Existing Precautions/Restrictions --  -SJ fall; oxygen therapy device and L/min  -    Row Name 11/11/21 0823 11/11/21 0800       Cognition    Orientation Status (Cognition) --  -SJ oriented to; person; place; situation; verbal cues/prompts needed for orientation; time; oriented x 4  -    Row Name 11/11/21 0823 11/11/21 0800       Safety Issues, Functional Mobility    Safety Issues Affecting Function (Mobility) --  -SJ --    Impairments Affecting Function (Mobility) --  -SJ endurance/activity tolerance; shortness of breath; strength; balance  -          User Key  (r) = Recorded By, (t) = Taken By, (c) = Cosigned By    Initials Name Provider Type     Seble Rangel, OT Occupational Therapist                 Mobility/ADL's     Row Name 11/11/21 0800          Bed Mobility    Bed Mobility supine-sit  -SJ     Supine-Sit Keith (Bed Mobility) modified independence  -      Assistive Device (Bed Mobility) head of bed elevated; bed rails  -[facility] Name 11/11/21 0800          Transfers    Transfers sit-stand transfer; toilet transfer  -     Sit-Stand Mitchell (Transfers) contact guard  -     Mitchell Level (Toilet Transfer) contact guard  -     Assistive Device (Toilet Transfer) walker, front-wheeled; grab bars/safety frame  commode over toilet  -[facility] 11/11/21 0800          Sit-Stand Transfer    Assistive Device (Sit-Stand Transfers) walker, front-wheeled  -[facility] Name 11/11/21 0800          Toilet Transfer    Type (Toilet Transfer) sit-stand; stand-sit  -[facility] Name 11/11/21 0800          Activities of Daily Living    BADL Assessment/Intervention bathing; upper body dressing; grooming; toileting; feeding  -[facility] 11/11/21 0800          Grooming Assessment/Training    Mitchell Level (Grooming) hair care, combing/brushing; oral care regimen; wash face, hands; verbal cues; contact guard assist  -     Position (Grooming) sink side  -     Comment (Grooming) patient with dyspnea, SPO2 @ 94% on 3 L NC, standing at sink  -[facility] Name 11/11/21 0800          Toileting Assessment/Training    Mitchell Level (Toileting) standby assist  -     Position (Toileting) unsupported standing; supported standing  -[facility] 11/11/21 0800          Upper Body Dressing Assessment/Training    Mitchell Level (Upper Body Dressing) doff; don; set up  -     Position (Upper Body Dressing) unsupported sitting  -     [facility]  -     Row Name 11/11/21 0800          Bathing Assessment/Intervention    Mitchell Level (Bathing) upper body; set up; lower body; standby assist  -     Position (Bathing) supported standing; unsupported sitting  -[facility] Name 11/11/21 0800          Self-Feeding Assessment/Training    Mitchell Level (Feeding) set up  -     Position (Self-Feeding) supine   -           User Key  (r) = Recorded By, (t) = Taken By, (c) = Cosigned By    Initials Name Provider Type     Seble Rangel, OT Occupational Therapist               Obj/Interventions    No documentation.                Goals/Plan    No documentation.                Clinical Impression     Row Name 11/11/21 0800          Pain Assessment    Additional Documentation Pain Scale: Numbers Pre/Post-Treatment (Group)  -[facility] Name 11/11/21 0800          Pain Scale: Numbers Pre/Post-Treatment    Pretreatment Pain Rating 0/10 - no pain  -     Posttreatment Pain Rating 0/10 - no pain  -[facility] Name 11/11/21 0800          Plan of Care Review    Outcome Summary OT tx complete. Supine to sit with modified independence. Sit to stand and toilet transfer with CGA and RW. Toileting with SBA. UE bath with set up, LE bath with SBA. Grooming performed standing at sink with CGA, oral care, hand hygiene, washing face, combing hair. Transfered to recliner with CGA and RW. BP at end of session 169/74, SPO2 98% on 3 L. Patient does present with dyspnea on exertion, but saturates well. No goals met, cont with POC.  -[facility] Name 11/11/21 0800          Vital Signs    Pre Systolic BP Rehab 173  -SJ     Pre Treatment Diastolic BP 71  -SJ     Post Systolic BP Rehab 169  -SJ     Post Treatment Diastolic BP 74  -SJ     Pretreatment Heart Rate (beats/min) 79  -SJ     Posttreatment Heart Rate (beats/min) 83  -SJ     Pre SpO2 (%) 95  -SJ     O2 Delivery Pre Treatment nasal cannula  3 lpm  -SJ     Post SpO2 (%) 98  -SJ     O2 Delivery Post Treatment nasal cannula  -     Pre Patient Position Supine  -     Post Patient Position Sitting  -[facility] Name 11/11/21 0800          Positioning and Restraints    Pre-Treatment Position in bed  -     Post Treatment Position chair  -SJ     In Chair notified nsg; reclined; call light within reach; encouraged to call for assist; exit alarm on; with family/caregiver  -           User Key   (r) = Recorded By, (t) = Taken By, (c) = Cosigned By    Initials Name Provider Type    Seble Resendiz OT Occupational Therapist               Outcome Measures     Row Name 11/11/21 0800          How much help from another is currently needed...    Putting on and taking off regular lower body clothing? 2  -SJ     Bathing (including washing, rinsing, and drying) 3  -SJ     Toileting (which includes using toilet bed pan or urinal) 3  -SJ     Putting on and taking off regular upper body clothing 4  -SJ     Taking care of personal grooming (such as brushing teeth) 4  -SJ     Eating meals 4  -SJ     AM-PAC 6 Clicks Score (OT) 20  -SJ           User Key  (r) = Recorded By, (t) = Taken By, (c) = Cosigned By    Initials Name Provider Type    Seble Resendiz OT Occupational Therapist                Occupational Therapy Education                 Title: PT OT SLP Therapies (In Progress)     Topic: Occupational Therapy (In Progress)     Point: ADL training (In Progress)     Description:   Instruct learner(s) on proper safety adaptation and remediation techniques during self care or transfers.   Instruct in proper use of assistive devices.              Learning Progress Summary           Patient Acceptance, E, NR by BB at 11/10/2021 1315    Acceptance, E, NR by BB at 11/9/2021 1146    Acceptance, E, VU by AS at 11/8/2021 1253    Comment: role of OT, OT POC, ADL training, transfer training    Acceptance, E,TB, VU by LW at 11/5/2021 1309                   Point: Home exercise program (Done)     Description:   Instruct learner(s) on appropriate technique for monitoring, assisting and/or progressing therapeutic exercises/activities.              Learning Progress Summary           Patient Acceptance, E,TB, VU by LW at 11/5/2021 1309                   Point: Precautions (Done)     Description:   Instruct learner(s) on prescribed precautions during self-care and functional transfers.              Learning Progress Summary            Patient Acceptance, E,TB, VU by  at 11/11/2021 0910    Comment: POC, role of OT, transfer training, pursed lipped breathing    Acceptance, E, VU by AS at 11/8/2021 1253    Comment: role of OT, OT POC, ADL training, transfer training    Acceptance, E,TB, VU by LW at 11/5/2021 1309    Acceptance, E, VU,NR by ME at 11/4/2021 1223    Comment: Educated on OT and POC .Educated tocall for assistance. Educated on safety precautions.   Family Acceptance, E, VU,NR by ME at 11/4/2021 1223    Comment: Educated on OT and POC .Educated tocall for assistance. Educated on safety precautions.                   Point: Body mechanics (Done)     Description:   Instruct learner(s) on proper positioning and spine alignment during self-care, functional mobility activities and/or exercises.              Learning Progress Summary           Patient Acceptance, E,TB, VU by  at 11/11/2021 0910    Comment: POC, role of OT, transfer training, pursed lipped breathing    Acceptance, E,TB, VU by LW at 11/5/2021 1309    Acceptance, E, VU,NR by ME at 11/4/2021 1223    Comment: Educated on OT and POC .Educated tocall for assistance. Educated on safety precautions.   Family Acceptance, E, VU,NR by ME at 11/4/2021 1223    Comment: Educated on OT and POC .Educated tocall for assistance. Educated on safety precautions.                               User Key     Initials Effective Dates Name Provider Type Discipline     06/16/21 -  Kenna Escobar COTA Occupational Therapy Assistant OT     06/16/21 -  Aide Barraza COTA Occupational Therapy Assistant OT    AS 03/18/21 -  Cailin Wang OT Occupational Therapist OT    ME 06/16/21 -  Skip Monte OTR/L Occupational Therapist OT     06/14/21 -  Seble Rangel OT Occupational Therapist OT              OT Recommendation and Plan     Plan of Care Review  Outcome Summary: OT tx complete. Supine to sit with modified independence. Sit to stand and toilet transfer with CGA and  RW. Toileting with SBA. UE bath with set up, LE bath with SBA. Grooming performed standing at sink with CGA, oral care, hand hygiene, washing face, combing hair. Transfered to recliner with CGA and RW. BP at end of session 169/74, SPO2 98% on 3 L. Patient does present with dyspnea on exertion, but saturates well. No goals met, cont with POC.     Time Calculation:    Time Calculation- OT     Row Name 11/11/21 0911             Time Calculation- OT    OT Start Time 0800  -      OT Stop Time 0900  -      OT Time Calculation (min) 60 min  -SJ      Total Timed Code Minutes- OT 60 minute(s)  -      OT Received On 11/11/21  -              Timed Charges    62373 - OT Self Care/Mgmt Minutes 60  -SJ              Total Minutes    Timed Charges Total Minutes 60  -SJ       Total Minutes 60  -SJ            User Key  (r) = Recorded By, (t) = Taken By, (c) = Cosigned By    Initials Name Provider Type     Seble Rangel OT Occupational Therapist              Therapy Charges for Today     Code Description Service Date Service Provider Modifiers Qty    [id number] HC OT SELF CARE/MGMT/TRAIN EA 15 MIN 11/11/2021 Seble Rangel OT GO 4               Seble Rangel OT  11/11/2021

## 2021-11-11 NOTE — NURSING NOTE
Manual /79.  On-call hospitalist notified of reading and that a one time dose of Hydralazine was ordered last night for hypertension.  Per physician, she does not want to continue to order Hydralazine for patient.  If she is currently on bp meds she can take an extra dose now, otherwise she recommends physician tomorrow on day shift to address.

## 2021-11-12 PROBLEM — Z51.5 PALLIATIVE CARE PATIENT: Status: ACTIVE | Noted: 2021-01-01

## 2021-11-12 NOTE — PLAN OF CARE
Goal Outcome Evaluation:  Plan of Care Reviewed With: patient        Progress: improving  Outcome Summary: pt responded well to PT w/ increased gait to 30, 60 ft CGA w/ RW. pt participated in sit-stands. pt issued and edu on HEP. pt SOA throughout tx but vitals WFL. no new goals met at this time. pt would continue to benefit from PT services.

## 2021-11-12 NOTE — THERAPY TREATMENT NOTE
Acute Care - Physical Therapy Treatment Note  [facility]     Patient Name: Val Arteaga  : 1932  MRN: [id number]  Today's Date: 2021      Visit Dx:     ICD-10-CM ICD-9-CM   1. COPD exacerbation ([facility])  J44.1 491.21   2. Elevated troponin  R77.8 790.6   3. Uncontrolled hypertension  I10 401.9   4. Impaired functional mobility, balance, gait, and endurance  Z74.09 V49.89   5. Impaired mobility and ADLs  Z74.09 V49.89    Z78.9    6. Mucopurulent chronic bronchitis ([facility])  J41.1 491.1   7. Acute on chronic respiratory failure with hypoxia ([facility])  J96.21 518.84     799.02   8. Palliative care patient  Z51.5 V66.7     Patient Active Problem List   Diagnosis   • Uncontrolled hypertension   • CAD (coronary artery disease)   • Chronic obstructive pulmonary disease ([facility])   • Acquired hypothyroidism   • Depressive disorder   • Thygeson's superficial punctate keratitis   • Pseudophakia   • Precordial pain   • Displaced fracture of base of neck of right femur, initial encounter for closed fracture ([facility])   • Hip fracture, right, closed, initial encounter ([facility])   • Acute on chronic systolic CHF (congestive heart failure) ([facility])   • Hyponatremia   • BENITO (acute kidney injury) ([facility])   • Acute blood loss anemia   • Urinary retention   • Closed displaced basicervical fracture of right femur ([facility])   • Urethral caruncle   • Symptomatic anemia   • Anemia   • Major depressive disorder   • Drug-induced encephalopathy   • Acute left-sided low back pain without sciatica   • Bilateral sacral insufficiency fracture   • Osteoarthritis of multiple joints   • Dyspnea   • CHF (congestive heart failure) ([facility])   • Hypercholesterolemia   • Chest pain   • Acute on chronic respiratory failure with hypoxia ([facility])   • Acute on chronic diastolic heart failure ([facility])   • Malignant hypertension   • COPD exacerbation ([facility])   • COPD with acute exacerbation ([facility])   • Elevated troponin   • Dyspnea and respiratory abnormalities   • Shortness of  breath   • Uncontrolled hypersomnia   • Palliative care patient     Past Medical History:   Diagnosis Date   • Allergic rhinitis    • Chronic diastolic heart failure (HCC)    • COPD (chronic obstructive pulmonary disease) (HCC)    • Coronary arteriosclerosis    • Depression    • GERD (gastroesophageal reflux disease)    • Hypercholesterolemia    • Hypertension    • Hypothyroidism    • LVH (left ventricular hypertrophy)    • Myocardial infarction (HCC)    • Osteoarthritis      Past Surgical History:   Procedure Laterality Date   • BREAST SURGERY     • CARDIAC CATHETERIZATION     • CERVICAL SPINE SURGERY     • COLONOSCOPY     • CORONARY ANGIOPLASTY     • ENDOSCOPY N/A 2/14/2018   • ENDOSCOPY AND COLONOSCOPY     • HIP BIPOLAR REPLACEMENT Right 1/23/2018   • PACEMAKER REPLACEMENT       PT Assessment (last 12 hours)     PT Evaluation and Treatment     Row Name 11/12/21 0851          Physical Therapy Time and Intention    Document Type therapy note (daily note)  -     Mode of Treatment individual therapy; physical therapy  -     Patient Effort excellent  -     Row Name 11/12/21 0851          General Information    Patient Profile Reviewed yes  -     Existing Precautions/Restrictions fall; oxygen therapy device and L/min  -     Row Name 11/12/21 0851          Cognition    Affect/Mental Status (Cognitive) WFL  -     Orientation Status (Cognition) oriented x 4  -     Personal Safety Interventions fall prevention program maintained; gait belt; muscle strengthening facilitated; nonskid shoes/slippers when out of bed; supervised activity  -     Row Name 11/12/21 0851          Pain Scale: Numbers Pre/Post-Treatment    Pretreatment Pain Rating 0/10 - no pain  -     Posttreatment Pain Rating 0/10 - no pain  -     Row Name 11/12/21 0851          Transfers    Transfers sit-stand transfer; stand-sit transfer  -     Comment (Transfers) sit-stands 5x2  -     Sit-Stand [place] (Transfers) contact guard  -      Stand-Sit [place] (Transfers) contact guard  -     Kay Level (Toilet Transfer) contact guard  -     Assistive Device (Toilet Transfer) walker, front-wheeled  -RONAN     Row Name 11/12/21 0851          Sit-Stand Transfer    Assistive Device (Sit-Stand Transfers) walker, front-wheeled  -RONAN     Row Name 11/12/21 0851          Stand-Sit Transfer    Assistive Device (Stand-Sit Transfers) walker, front-wheeled  -RONAN     Row Name 11/12/21 0851          Toilet Transfer    Type (Toilet Transfer) sit-stand; stand-sit  -RONAN     Row Name 11/12/21 0851          Gait/Stairs (Locomotion)    Gait/Stairs Locomotion gait/ambulation independence; gait/ambulation assistive device; distance ambulated; gait pattern; gait deviations; maintains weight-bearing status; stairs negotiation  -     Kay Level (Gait) contact guard  -     Assistive Device (Gait) walker, front-wheeled  -     Distance in Feet (Gait) 30, 60  -RONAN     Deviations/Abnormal Patterns (Gait) kari decreased; gait speed decreased; stride length decreased  -     Row Name 11/12/21 0851          Motor Skills    Therapeutic Exercise --  pt was issued and edu on HEP  -     Row Name 11/12/21 0851          Plan of Care Review    Plan of Care Reviewed With patient  -     Progress improving  -     Outcome Summary pt responded well to PT w/ increased gait to 30, 60 ft CGA w/ RW. pt participated in sit-stands. pt issued and edu on HEP. pt SOA throughout tx but vitals WFL. no new goals met at this time. pt would continue to benefit from PT services.  -     Row Name 11/12/21 0851          Vital Signs    Pre Systolic BP Rehab 134  -RONAN     Pre Treatment Diastolic BP 61  -RONAN     Intra Systolic BP Rehab 142  -RONAN     Intra Treatment Diastolic BP 72  -RONAN     Post Systolic BP Rehab 128  -RONAN     Post Treatment Diastolic BP 63  -RONAN     Pretreatment Heart Rate (beats/min) 93  -RONAN     Intratreatment Heart Rate (beats/min) 102  -RONAN     Posttreatment Heart  Rate (beats/min) 95  -RONAN     Pre SpO2 (%) 95  -RONAN     O2 Delivery Pre Treatment supplemental O2  -RONAN     Intra SpO2 (%) 95  -RONAN     O2 Delivery Intra Treatment supplemental O2  -RONAN     Post SpO2 (%) 97  -RONAN     O2 Delivery Post Treatment supplemental O2  -RONAN     Pre Patient Position Sitting  -RONAN     Post Patient Position Sitting  -RONAN     Row Name 11/12/21 0851          Bed Mobility Goal 1 (PT)    Activity/Assistive Device (Bed Mobility Goal 1, PT) sit to supine; supine to sit  -RONAN     [place] Level/Cues Needed (Bed Mobility Goal 1, PT) modified independence  -RONAN     Time Frame (Bed Mobility Goal 1, PT) by discharge  -RONAN     Progress/Outcomes (Bed Mobility Goal 1, PT) goal not met  -RONAN     Row Name 11/12/21 0851          Transfer Goal 1 (PT)    Activity/Assistive Device (Transfer Goal 1, PT) sit-to-stand/stand-to-sit; bed-to-chair/chair-to-bed; walker, rolling  -RONAN     [place] Level/Cues Needed (Transfer Goal 1, PT) modified independence  -RONAN     Time Frame (Transfer Goal 1, PT) by discharge  -RONAN     Progress/Outcome (Transfer Goal 1, PT) goal not met  -RONAN     Row Name 11/12/21 0851          Gait Training Goal 1 (PT)    Activity/Assistive Device (Gait Training Goal 1, PT) walker, rolling  -RONAN     [place] Level (Gait Training Goal 1, PT) modified independence  -RONAN     Distance (Gait Training Goal 1, PT) 150ft or more  -RONAN     Time Frame (Gait Training Goal 1, PT) by discharge  -RONAN     Progress/Outcome (Gait Training Goal 1, PT) goal not met  -RONAN     Row Name 11/12/21 0851          Positioning and Restraints    Pre-Treatment Position sitting in chair/recliner  -RONAN     Post Treatment Position chair  -RONAN     In Chair reclined; call light within reach; encouraged to call for assist; exit alarm on; with family/caregiver  all needs met  -RONAN           User Key  (r) = Recorded By, (t) = Taken By, (c) = Cosigned By    Initials Name Provider Type    Melquiades Jackson, PTA Physical Therapy Assistant               Physical Therapy Education                 Title: PT OT SLP Therapies (Resolved)     Topic: Physical Therapy (Resolved)     Point: Mobility training (Resolved)     Learning Progress Summary           Patient Acceptance, E, VU,NR by  at 11/8/2021 1219    Comment: Hand placement with transfers, PT POC, goals, use of walker.    Acceptance, E, VU,DU by RONAN at 11/3/2021 1437                   Point: Home exercise program (Resolved)     Learner Progress:  Not documented in this visit.          Point: Body mechanics (Resolved)     Learning Progress Summary           Patient Acceptance, E, VU,DU by RONAN at 11/3/2021 1437                   Point: Precautions (Resolved)     Learning Progress Summary           Patient Acceptance, E, VU,DU by RONAN at 11/3/2021 1437                               User Key     Initials Effective Dates Name Provider Type Discipline     06/16/21 -  Malena Mendoza, PT Physical Therapist PT     06/16/21 -  José Antonio Estrada, PT Physical Therapist PT              PT Recommendation and Plan     Plan of Care Reviewed With: patient  Progress: improving  Outcome Summary: pt responded well to PT w/ increased gait to 30, 60 ft CGA w/ RW. pt participated in sit-stands. pt issued and edu on HEP. pt SOA throughout tx but vitals WFL. no new goals met at this time. pt would continue to benefit from PT services.   Outcome Measures     Row Name 11/12/21 0851             How much help from another person do you currently need...    Turning from your back to your side while in flat bed without using bedrails? 3  -RONAN      Moving from lying on back to sitting on the side of a flat bed without bedrails? 3  -RONAN      Moving to and from a bed to a chair (including a wheelchair)? 3  -RONAN      Standing up from a chair using your arms (e.g., wheelchair, bedside chair)? 3  -RONAN      Climbing 3-5 steps with a railing? 3  -RONAN      To walk in hospital room? 3  -RONAN      AM-PAC 6 Clicks Score (PT) 18  -RONAN              Functional  Assessment    Outcome Measure Options AM-PAC 6 Clicks Basic Mobility (PT)  -RONAN            User Key  (r) = Recorded By, (t) = Taken By, (c) = Cosigned By    Initials Name Provider Type    Melquiades Jackson PTA Physical Therapy Assistant                 Time Calculation:    PT Charges     Row Name 11/12/21 1334             Time Calculation    Start Time 0851  -RONAN      Stop Time 0933  -RONAN      Time Calculation (min) 42 min  -RONAN              Time Calculation- PT    Total Timed Code Minutes- PT 42 minute(s)  -RONAN              Timed Charges    72850 - Gait Training Minutes  15  -RONAN      57235 - PT Therapeutic Activity Minutes 15  -RONAN      46098 - PT Self Care/Mgmt Minutes 12  -RONAN              Total Minutes    Timed Charges Total Minutes 42  -RONAN       Total Minutes 42  -RONAN            User Key  (r) = Recorded By, (t) = Taken By, (c) = Cosigned By    Initials Name Provider Type    Melquiades Jackson PTA Physical Therapy Assistant              Therapy Charges for Today     Code Description Service Date Service Provider Modifiers Qty    89497448579 HC GAIT TRAINING EA 15 MIN 11/12/2021 Melquiades King, PTA GP 1    87710177087 HC PT THERAPEUTIC ACT EA 15 MIN 11/12/2021 Melquiades King PTA GP 1    30559294091 HC PT SELF CARE/MGMT/TRAIN EA 15 MIN 11/12/2021 Melquiades King, JEANNETTE GP 1          PT G-Codes  Outcome Measure Options: AM-PAC 6 Clicks Basic Mobility (PT)  AM-PAC 6 Clicks Score (PT): 18  AM-PAC 6 Clicks Score (OT): 20    Melquiades King PTA  11/12/2021

## 2021-11-12 NOTE — DISCHARGE PLACEMENT REQUEST
"Val Arteaga (88 y.o. Female)             Date of Birth Social Security Number Address Home Phone MRN    12/02/1932  [id number] [facility] [phone] [id number]    Holiness Marital Status             Rastafari        Admission Date Admission Type Admitting Provider Attending Provider Department, Room/Bed    11/1/21 Emergency Alex Shaikh MD Echendu, Anthony W, MD [address], 368/1    Discharge Date Discharge Disposition Discharge Destination           Skilled Nursing Facility (DC - External)              Attending Provider: Alex Shaikh MD    Allergies: Ace Inhibitors, Baclofen, Lisinopril    Isolation: None   Infection: COVID Screen (preop/placement) (11/12/21)   Code Status: No CPR   Advance Care Planning Activity    Ht: 172.7 cm (68\")   Wt: 64.8 kg (142 lb 12.8 oz)    Admission Cmt: None   Principal Problem: Acute on chronic respiratory failure with hypoxia (HCC) [J96.21]                 Active Insurance as of 11/1/2021     Primary Coverage     Payor Plan Insurance Group Employer/Plan Group    MEDICARE MEDICARE A & B      Payor Plan Address Payor Plan Phone Number Payor Plan Fax Number Effective Dates    [address] [phone]  12/1/1997 - None Entered    [facility] [id number]       Subscriber Name Subscriber Birth Date Member ID       VAL ARTEAGA 12/2/1932 [id number]           Secondary Coverage     Payor Plan Insurance Group Employer/Plan Group    [facility] [id number]     Payor Plan Address Payor Plan Phone Number Payor Plan Fax Number Effective Dates    [address] [address] [phone]  10/22/2015 - None Entered    [facility] [id number]       Subscriber Name Subscriber Birth Date Member ID       VAL ARTEAGA 12/2/1932 [id number]                 Emergency Contacts      (Rel.) Home Phone Work Phone Mobile Phone    Kelly Madison (Anita) (Relative) [phone]    P'Clair Cartwright " (Anita) (Relative) [phone]    Mitchel Amado (Relative) [phone] -- [phone]    akash yu (Son) -- -- [phone]

## 2021-11-12 NOTE — PROGRESS NOTES
"Physicians Statement of Medical Necessity for  Ambulance Transportation    GENERAL INFORMATION     Name: Val Arteaga  YOB: 1932  Medicare #:  [id number]   Transport Date: 11/12/2021 (Valid for round trips this date, or for scheduled repetitive trips for 60 days from the date signed below.)  Origin:  [facility]   Destination: [facility]   Is the Patient's stay covered under Medicare Part A (PPS/DRG?)Yes  Closest appropriate facility? Yes  If this a hosp-hosp transfer? No  Is this a hospice patient? No    MEDICAL NECESSITY QUESTIONAIRE    Ambulance Transportation is medically necessary only if other means of transportation are contraindicated or would be potentially harmful to the patient.  To meet this requirement, the patient must be either \"bed confined\" or suffer from a condition such that transport by means other than an ambulance is contraindicated by the patient's condition.  The following questions must be answered by the healthcare professional signing below for this form to be valid:     1) Describe the MEDICAL CONDITION (physical and/or mental) of this patient AT THE TIME OF AMBULANCE TRANSPORT that requires the patient to be transported in an ambulance, and why transport by other means is contraindicated by the patient's condition:     Per Dr. Shaikh EMS required due to saturations.  Patient is on 3L of oxygen.  Patient has dyspnea on exertion due to acute respiratory failure, chronic diastolic heart failure, and COPD with acute exacerbation.  Patient is palliative care and also has uncontrolled hypertension.         2) Is this patient \"bed confined\" as defined below?No    To be \"bed confined\" the patient must satisfy all three of the following criteria:  (1) unable to get up from bed without assistance; AND (2) unable to ambulate;  AND (3) unable to sit in a chair or wheelchair.  3) Can this patient safely be transported by car or wheelchair van (I.e., may " safely sit during transport, without an attendant or monitoring?)No  per MD reccomendations  4. In addition to completing questions 1-3 above, please check any of the following conditions that apply*:          *Note: supporting documentation for any boxes checked must be maintained in the patient's medical records Medical attendant required and Requires oxygen - unable to self administer      SIGNATURE OF PHYSICIAN OR OTHER AUTHORIZED HEALTHCARE PROFESSIONAL    I certify that the above information is true and correct based on my evaluation of this patient, and represent that the patient requires transport by ambulance and that other forms of transport are contraindicated.  I understand that this information will be used by the Centers for Medicare and Medicaid Services (CMS) to support the determiniation of medical necessity for ambulance services, and I represent that I have personal knowledge of the patient's condition at the time of transport.       If this box is checked, I also certify that the patient is physically or mentally incapable of signing the ambulance service's claim form and that the institution with which I am affiliated has furnished care, services or assistance to the patient.  My signature below is made on behalf of the patient pursuant to 42 .36(b)(4). In accordance with 42 .37, the specific reason(s) that the patient is physically or mentally incapable of signing the claim for is as follows:     Signature of Physician or Healthcare Professional   SHANNON Vernon    Date/Time:   11/12/2021   12:51 CST      (For Scheduled repetitive transport, this form is not valid for transports performed more than 60 days after this date).                                                                                                                                            --------------------------------------------------------------------------------------------  Printed Name and  Credentials of Physician or Authorized Healthcare Professional     *Form must be signed by patient's attending physician for scheduled, repetitive transports,.  For non-repetitive ambulance transports, if unable to obtain the signature of the attending physician, any of the following may sign (please select below):     Physician  Clinical Nurse Specialist  Registered Nurse     Physician Assistant  Discharge Planner  Licensed Practical Nurse     Nurse Practitioner   X

## 2021-11-12 NOTE — PLAN OF CARE
Goal Outcome Evaluation:  Plan of Care Reviewed With: patient, caregiver        Progress: no change  Outcome Summary: Pt hypertensive this shift.  PRN Hydralazine administered as ordered.  Pt up sitting in in chair, playing cards with family member at bedside.  Does not appear to be in respiratory distress and no complaints of pain. Safety maintained.

## 2021-11-12 NOTE — THERAPY TREATMENT NOTE
Patient Name: Val Arteaga  : 1932    MRN: [id number]                              Today's Date: 2021       Admit Date: 2021    Visit Dx:     ICD-10-CM ICD-9-CM   1. COPD exacerbation ([facility])  J44.1 491.21   2. Elevated troponin  R77.8 790.6   3. Uncontrolled hypertension  I10 401.9   4. Impaired functional mobility, balance, gait, and endurance  Z74.09 V49.89   5. Impaired mobility and ADLs  Z74.09 V49.89    Z78.9    6. Mucopurulent chronic bronchitis ([facility])  J41.1 491.1   7. Acute on chronic respiratory failure with hypoxia ([facility])  J96.21 518.84     799.02   8. Palliative care patient  Z51.5 V66.7     Patient Active Problem List   Diagnosis   • Uncontrolled hypertension   • CAD (coronary artery disease)   • Chronic obstructive pulmonary disease ([facility])   • Acquired hypothyroidism   • Depressive disorder   • Thygeson's superficial punctate keratitis   • Pseudophakia   • Precordial pain   • Displaced fracture of base of neck of right femur, initial encounter for closed fracture ([facility])   • Hip fracture, right, closed, initial encounter ([facility])   • Acute on chronic systolic CHF (congestive heart failure) ([facility])   • Hyponatremia   • BENITO (acute kidney injury) ([facility])   • Acute blood loss anemia   • Urinary retention   • Closed displaced basicervical fracture of right femur ([facility])   • Urethral caruncle   • Symptomatic anemia   • Anemia   • Major depressive disorder   • Drug-induced encephalopathy   • Acute left-sided low back pain without sciatica   • Bilateral sacral insufficiency fracture   • Osteoarthritis of multiple joints   • Dyspnea   • CHF (congestive heart failure) ([facility])   • Hypercholesterolemia   • Chest pain   • Acute on chronic respiratory failure with hypoxia ([facility])   • Acute on chronic diastolic heart failure ([facility])   • Malignant hypertension   • COPD exacerbation ([facility])   • COPD with acute exacerbation ([facility])   • Elevated troponin   • Dyspnea and respiratory abnormalities   • Shortness of breath   •  Uncontrolled hypersomnia   • Palliative care patient     Past Medical History:   Diagnosis Date   • Allergic rhinitis    • Chronic diastolic heart failure (HCC)    • COPD (chronic obstructive pulmonary disease) ([facility])    • Coronary arteriosclerosis    • Depression    • GERD (gastroesophageal reflux disease)    • Hypercholesterolemia    • Hypertension    • Hypothyroidism    • LVH (left ventricular hypertrophy)    • Myocardial infarction (HCC)    • Osteoarthritis      Past Surgical History:   Procedure Laterality Date   • BREAST SURGERY     • CARDIAC CATHETERIZATION     • CERVICAL SPINE SURGERY     • COLONOSCOPY     • CORONARY ANGIOPLASTY     • ENDOSCOPY N/A 2/14/2018   • ENDOSCOPY AND COLONOSCOPY     • HIP BIPOLAR REPLACEMENT Right 1/23/2018   • PACEMAKER REPLACEMENT        General Information     Row Name 11/12/21 1102          OT Time and Intention    Document Type therapy note (daily note)  -     Mode of Treatment individual therapy; occupational therapy  -     Row Name 11/12/21 1102          General Information    Patient Profile Reviewed yes  -     Existing Precautions/Restrictions fall; oxygen therapy device and L/min  -     Row Name 11/12/21 1102          Cognition    Orientation Status (Cognition) oriented x 4  -     Row Name 11/12/21 1102          Safety Issues, Functional Mobility    Impairments Affecting Function (Mobility) endurance/activity tolerance; shortness of breath; strength; balance  -           User Key  (r) = Recorded By, (t) = Taken By, (c) = Cosigned By    Initials Name Provider Type     Myesha Sepulveda COTA Occupational Therapy Assistant                 Mobility/ADL's     Row Name 11/12/21 1102          Bed Mobility    Comment (Bed Mobility) Pt upin recliner upon entry.  -     Row Name 11/12/21 1102          Bathing Assessment/Intervention    Comment (Bathing) Pt educated on LH sponge to aid w/ LB bathing. Pt verbalized understanding and given sponge.  -           User Key   (r) = Recorded By, (t) = Taken By, (c) = Cosigned By    Initials Name Provider Type    Myesha Foley COTA Occupational Therapy Assistant               Obj/Interventions     Row Name 11/12/21 1103          Therapeutic Exercise    Therapeutic Exercise shoulder; elbow/forearm; wrist; hand  Pt educated and handout given on HEP and Home Safety. Family members present.  -KD           User Key  (r) = Recorded By, (t) = Taken By, (c) = Cosigned By    Initials Name Provider Type    Myesha Foley COTA Occupational Therapy Assistant               Goals/Plan    No documentation.                Clinical Impression     Row Name 11/12/21 1003          Pain Scale: Numbers Pre/Post-Treatment    Pretreatment Pain Rating 0/10 - no pain  -KD     Posttreatment Pain Rating 0/10 - no pain  -KD     Row Name 11/12/21 1003          Plan of Care Review    Plan of Care Reviewed With patient  -     Row Name 11/12/21 1003          Therapy Assessment/Plan (OT)    Therapy Frequency (OT) other (see comments)  5-7 days a week  -     Row Name 11/12/21 1003          Therapy Plan Review/Discharge Plan (OT)    Anticipated Discharge Disposition (OT) home with assist; home with home health; skilled nursing facility  -KD     Row Name 11/12/21 1003          Vital Signs    Pre Systolic BP Rehab 152  -KD     Pre Treatment Diastolic BP 67  -KD     Pretreatment Heart Rate (beats/min) 85  -KD     Pre SpO2 (%) 96  -KD     O2 Delivery Pre Treatment supplemental O2  -KD           User Key  (r) = Recorded By, (t) = Taken By, (c) = Cosigned By    Initials Name Provider Type    Myesha Foley COTA Occupational Therapy Assistant               Outcome Measures     Row Name 11/12/21 1003          How much help from another is currently needed...    Putting on and taking off regular lower body clothing? 2  -KD     Bathing (including washing, rinsing, and drying) 3  -KD     Toileting (which includes using toilet bed pan or urinal) 3  -KD     Putting on  and taking off regular upper body clothing 4  -KD     Taking care of personal grooming (such as brushing teeth) 4  -KD     Eating meals 4  -KD     AM-PAC 6 Clicks Score (OT) 20  -KD     Row Name 11/12/21 0851          How much help from another person do you currently need...    Turning from your back to your side while in flat bed without using bedrails? 3  -RONAN     Moving from lying on back to sitting on the side of a flat bed without bedrails? 3  -RONAN     Moving to and from a bed to a chair (including a wheelchair)? 3  -RONAN     Standing up from a chair using your arms (e.g., wheelchair, bedside chair)? 3  -RONAN     Climbing 3-5 steps with a railing? 3  -RONAN     To walk in hospital room? 3  -RONAN     AM-PAC 6 Clicks Score (PT) 18  -RONAN     Row Name 11/12/21 0851          Functional Assessment    Outcome Measure Options AM-PAC 6 Clicks Basic Mobility (PT)  -RONAN           User Key  (r) = Recorded By, (t) = Taken By, (c) = Cosigned By    Initials Name Provider Type    RONAN Melquiades King, JEANNETTE Physical Therapy Assistant    KD Myesha Sepulveda COTA Occupational Therapy Assistant                Occupational Therapy Education                 Title: PT OT SLP Therapies (Resolved)     Topic: Occupational Therapy (Resolved)     Point: ADL training (Resolved)     Description:   Instruct learner(s) on proper safety adaptation and remediation techniques during self care or transfers.   Instruct in proper use of assistive devices.              Learning Progress Summary           Patient Acceptance, E, NR by BB at 11/10/2021 1315    Acceptance, E, NR by BB at 11/9/2021 1146    Acceptance, E, VU by AS at 11/8/2021 1253    Comment: role of OT, OT POC, ADL training, transfer training    Acceptance, E,TB, VU by LW at 11/5/2021 1309                   Point: Home exercise program (Resolved)     Description:   Instruct learner(s) on appropriate technique for monitoring, assisting and/or progressing therapeutic exercises/activities.               Learning Progress Summary           Patient Acceptance, E,TB, VU by LW at 11/5/2021 1309                   Point: Precautions (Resolved)     Description:   Instruct learner(s) on prescribed precautions during self-care and functional transfers.              Learning Progress Summary           Patient Acceptance, E,TB, VU by  at 11/11/2021 0910    Comment: POC, role of OT, transfer training, pursed lipped breathing    Acceptance, E, VU by AS at 11/8/2021 1253    Comment: role of OT, OT POC, ADL training, transfer training    Acceptance, E,TB, VU by LW at 11/5/2021 1309    Acceptance, E, VU,NR by ME at 11/4/2021 1223    Comment: Educated on OT and POC .Educated tocall for assistance. Educated on safety precautions.   Family Acceptance, E, VU,NR by ME at 11/4/2021 1223    Comment: Educated on OT and POC .Educated tocall for assistance. Educated on safety precautions.                   Point: Body mechanics (Resolved)     Description:   Instruct learner(s) on proper positioning and spine alignment during self-care, functional mobility activities and/or exercises.              Learning Progress Summary           Patient Acceptance, E,TB, VU by  at 11/11/2021 0910    Comment: POC, role of OT, transfer training, pursed lipped breathing    Acceptance, E,TB, VU by LW at 11/5/2021 1309    Acceptance, E, VU,NR by ME at 11/4/2021 1223    Comment: Educated on OT and POC .Educated tocall for assistance. Educated on safety precautions.   Family Acceptance, E, VU,NR by ME at 11/4/2021 1223    Comment: Educated on OT and POC .Educated tocall for assistance. Educated on safety precautions.                               User Key     Initials Effective Dates Name Provider Type Discipline    BB 06/16/21 -  Kenna Escobar COTA Occupational Therapy Assistant OT    LW 06/16/21 -  Aide Barraza COTA Occupational Therapy Assistant OT    AS 03/18/21 -  Cailin Wang, OT Occupational Therapist OT    ME 06/16/21 -  Lavell  Skip OTR/L Occupational Therapist OT    SJ 06/14/21 -  Seble Rangel OT Occupational Therapist OT              OT Recommendation and Plan  Therapy Frequency (OT): other (see comments) (5-7 days a week)  Plan of Care Review  Plan of Care Reviewed With: patient     Time Calculation:    Time Calculation- OT     Row Name 11/12/21 1334 11/12/21 1103          Time Calculation- OT    OT Start Time -- 1103  -KD     OT Stop Time -- 1126  -KD     OT Time Calculation (min) -- 23 min  -KD     Total Timed Code Minutes- OT -- 23 minute(s)  -KD     OT Received On -- 11/12/21  -KD            Timed Charges    61676 - Gait Training Minutes  15  -RONAN --     28682 - OT Self Care/Mgmt Minutes -- 23  -KD            Total Minutes    Timed Charges Total Minutes 15  -RONAN 23  -KD      Total Minutes 15  -RONAN 23  -KD           User Key  (r) = Recorded By, (t) = Taken By, (c) = Cosigned By    Initials Name Provider Type    RONAN Melquiades King, PTA Physical Therapy Assistant    KD Myesha Sepulveda COTA Occupational Therapy Assistant              Therapy Charges for Today     Code Description Service Date Service Provider Modifiers Qty    [id number] HC OT SELF CARE/MGMT/TRAIN EA 15 MIN 11/12/2021 Myesha Sepulveda COTA GO 2               OMAR Yang  11/12/2021

## 2021-11-12 NOTE — DISCHARGE SUMMARY
[facility] Medicine Services  DISCHARGE SUMMARY       Date of Admission: 11/1/2021  Date of Discharge:  11/13/2021  Primary Care Physician: Vero Arteaga APRN    Presenting Problem/History of Present Illness:  Elevated troponin [R77.8]  COPD exacerbation (HCC) [J44.1]  Uncontrolled hypertension [I10]       Final Discharge Diagnoses:  Active Hospital Problems    Diagnosis    • **Acute on chronic respiratory failure with hypoxia (HCC)    • Palliative care patient    • COPD with acute exacerbation (HCC)    • Acute on chronic diastolic heart failure (HCC)    • Uncontrolled hypertension        Consults:   Consults     No orders found from 10/3/2021 to 11/2/2021.          Procedures Performed: None.                Pertinent Test Results:   Lab Results (last 7 days)     Procedure Component Value Units Date/Time    Manual Differential [[id number]]  (Abnormal) Collected: 11/10/21 0514    Specimen: Blood Updated: 11/10/21 0722     Neutrophil % 85.0 %      Lymphocyte % 12.0 %      Monocyte % 1.0 %      Bands %  1.0 %      Atypical Lymphocyte % 1.0 %      Neutrophils Absolute 6.21 10*3/mm3      Lymphocytes Absolute 0.94 10*3/mm3      Monocytes Absolute 0.07 10*3/mm3      RBC Morphology Normal     WBC Morphology Normal     Platelet Estimate Adequate    CBC & Differential [[id number]] Collected: 11/10/21 0514    Specimen: Blood Updated: 11/10/21 0642    Narrative:      The following orders were created for panel order CBC & Differential.  Procedure                               Abnormality         Status                     ---------                               -----------         ------                     CBC Auto Differential[[id number]]        Normal              Final result               Scan Slide[[id number]]                                                                    Please view results for these tests on the individual orders.    CBC Auto Differential [[id number]]  (Normal)  Collected: 11/10/21 0514    Specimen: Blood Updated: 11/10/21 0641     WBC 7.22 10*3/mm3      RBC 3.79 10*6/mm3      Hemoglobin 12.1 g/dL      Hematocrit 36.5 %      MCV 96.3 fL      MCH 31.9 pg      MCHC 33.2 g/dL      RDW 12.9 %      RDW-SD 45.9 fl      MPV 10.4 fL      Platelets 211 10*3/mm3     Basic Metabolic Panel [[id number]]  (Abnormal) Collected: 11/10/21 0514    Specimen: Blood Updated: 11/10/21 0626     Glucose 133 mg/dL      BUN 18 mg/dL      Creatinine 0.75 mg/dL      Sodium 135 mmol/L      Potassium 4.1 mmol/L      Chloride 97 mmol/L      CO2 31.0 mmol/L      Calcium 8.3 mg/dL      eGFR Non African Amer 73 mL/min/1.73      BUN/Creatinine Ratio 24.0     Anion Gap 7.0 mmol/L     Narrative:      GFR Normal >60  Chronic Kidney Disease <60  Kidney Failure <15      COVID PRE-OP / PRE-PROCEDURE SCREENING ORDER (NO ISOLATION) - Swab, Nasopharynx [[id number]]  (Normal) Collected: 11/09/21 1551    Specimen: Swab from Nasopharynx Updated: 11/09/21 1643    Narrative:      The following orders were created for panel order COVID PRE-OP / PRE-PROCEDURE SCREENING ORDER (NO ISOLATION) - Swab, Nasopharynx.  Procedure                               Abnormality         Status                     ---------                               -----------         ------                     COVID-19, [facility] MAD/ONEIL IN-...[[id number]]  Normal              Final result                 Please view results for these tests on the individual orders.    COVID-19, [facility] MAD/ONEIL IN-HOUSE, NP SWAB IN TRANSPORT MEDIA 8-10 HR TAT - Swab, Nasopharynx [[id number]]  (Normal) Collected: 11/09/21 1551    Specimen: Swab from Nasopharynx Updated: 11/09/21 1643     COVID19 Not Detected    Narrative:      Testing performed by Real Time RT-PCR  This test has not been approved by the [facility] but is authorized under the Emergency Use Act (EUA)    Fact sheet for providers: https://www.fda.gov/media/007139/download    Fact sheet for patients:  https://www.fda.gov/media/125549/download        Blood Culture - Blood, Arm, Right [[id number]]  (Abnormal) Collected: 11/01/21 2302    Specimen: Blood from Arm, Right Updated: 11/07/21 0732     Blood Culture Staphylococcus, coagulase negative     Comment: Probable contaminant requires clinical correlation, susceptibility not performed unless requested by physician.          Isolated from Anaerobic Bottle     Gram Stain Anaerobic Bottle Gram positive cocci in clusters     Comment: Bottle 3 of 4 drawn positive for gram positive cocci in clusters       Basic Metabolic Panel [[id number]]  (Abnormal) Collected: 11/06/21 0548    Specimen: Blood Updated: 11/06/21 0648     Glucose 137 mg/dL      BUN 32 mg/dL      Creatinine 1.00 mg/dL      Sodium 136 mmol/L      Potassium 3.9 mmol/L      Comment: Slight hemolysis detected by analyzer. Results may be affected.        Chloride 99 mmol/L      CO2 29.0 mmol/L      Calcium 8.3 mg/dL      eGFR Non African Amer 52 mL/min/1.73      BUN/Creatinine Ratio 32.0     Anion Gap 8.0 mmol/L     Narrative:      GFR Normal >60  Chronic Kidney Disease <60  Kidney Failure <15      CBC & Differential [[id number]]  (Abnormal) Collected: 11/06/21 0548    Specimen: Blood Updated: 11/06/21 0632    Narrative:      The following orders were created for panel order CBC & Differential.  Procedure                               Abnormality         Status                     ---------                               -----------         ------                     CBC Auto Differential[[id number]]        Abnormal            Final result                 Please view results for these tests on the individual orders.    CBC Auto Differential [[id number]]  (Abnormal) Collected: 11/06/21 0548    Specimen: Blood Updated: 11/06/21 0632     WBC 6.45 10*3/mm3      RBC 3.20 10*6/mm3      Hemoglobin 10.2 g/dL      Hematocrit 30.2 %      MCV 94.4 fL      MCH 31.9 pg      MCHC 33.8 g/dL      RDW 13.2 %      RDW-SD 45.8 fl       MPV 10.7 fL      Platelets 204 10*3/mm3      Neutrophil % 91.5 %      Lymphocyte % 6.8 %      Monocyte % 0.8 %      Eosinophil % 0.2 %      Basophil % 0.2 %      Immature Grans % 0.5 %      Neutrophils, Absolute 5.91 10*3/mm3      Lymphocytes, Absolute 0.44 10*3/mm3      Monocytes, Absolute 0.05 10*3/mm3      Eosinophils, Absolute 0.01 10*3/mm3      Basophils, Absolute 0.01 10*3/mm3      Immature Grans, Absolute 0.03 10*3/mm3      nRBC 0.0 /100 WBC         Imaging Results (Last 7 Days)     Procedure Component Value Units Date/Time    XR Chest 1 View [[id number]] Collected: 11/09/21 1042     Updated: 11/09/21 1116    Narrative:      AP upright portable chest November 9, 2021 10:37 AM    INDICATION: Shortness of breath    FINDINGS:  Cardiac pacer/defibrillator wires remain in place.  Scattered lung parenchymal scarring/atelectasis bilateral. This  is slightly more pronounced at the right base compared with  previous study dated November 4.  No significant areas of consolidation, acute congestive changes,  pneumothorax or pleural fluid.  Heart and mediastinal contours within normal limits.      Impression:      Increased probable atelectasis right base compared with previous  study.  Otherwise no change.    Electronically signed by:  Kirill Sheikh MD  11/9/2021 11:15 AM  [facility] Workstation: [id number]            Chief Complaint on Day of Discharge: None.    Hospital Course:  The patient admitted for acute on chronic respiratory failure with hypoxia and started on noninvasive respiratory therapy, bronchodilators and steroids.  She was also started on palliative care and started on pain control.  She did improve, became less symptomatic and was maintaining O2 saturation in the mid to upper 90s on 3 L of supplemental oxygen.   She also had uncontrolled blood pressure on admission.  Blood pressure medications were adjusted and blood pressure was stable prior to discharge.    She also was started on PT and OT secondary to  "deconditioning and was less deconditioned on discharge.    Condition on Discharge: Stable and improved.    Physical Exam on Discharge:  /67 (BP Location: Right arm, Patient Position: Lying)   Pulse 97   Temp 98 °F (36.7 °C) (Temporal)   Resp 16   Ht 172.7 cm (68\")   Wt 64.8 kg (142 lb 12.8 oz)   LMP  (LMP Unknown)   SpO2 96%   BMI 21.71 kg/m²   Physical Exam  Vitals and nursing note reviewed.   Constitutional:       General: She is not in acute distress.     Appearance: She is well-developed. She is not diaphoretic.   HENT:      Head: Normocephalic and atraumatic.      Right Ear: External ear normal.      Left Ear: External ear normal.      Nose: Nose normal.   Eyes:      Extraocular Movements: Extraocular movements intact.      Conjunctiva/sclera: Conjunctivae normal.      Pupils: Pupils are equal, round, and reactive to light.   Neck:      Thyroid: No thyromegaly.      Vascular: No JVD.   Cardiovascular:      Rate and Rhythm: Normal rate and regular rhythm.      Heart sounds: Normal heart sounds. No murmur heard.  No friction rub. No gallop.    Pulmonary:      Effort: Pulmonary effort is normal. No respiratory distress.      Breath sounds: No stridor. Rhonchi present. No wheezing or rales.      Comments: She has distant breath sounds at the bases.  Chest:      Chest wall: No tenderness.   Abdominal:      General: Bowel sounds are normal. There is no distension.      Palpations: Abdomen is soft. There is no mass.      Tenderness: There is no abdominal tenderness. There is no right CVA tenderness, left CVA tenderness, guarding or rebound.      Hernia: No hernia is present.   Musculoskeletal:         General: No swelling, tenderness or deformity. Normal range of motion.      Cervical back: Normal range of motion and neck supple.      Right lower leg: No edema.      Left lower leg: No edema.   Skin:     General: Skin is warm and dry.      Coloration: Skin is not jaundiced or pale.      Findings: No " bruising, erythema, lesion or rash.   Neurological:      General: No focal deficit present.      Mental Status: She is alert and oriented to person, place, and time.      Cranial Nerves: No cranial nerve deficit.      Motor: No weakness.      Gait: Gait normal.      Deep Tendon Reflexes: Reflexes are normal and symmetric.   Psychiatric:         Mood and Affect: Mood normal.         Behavior: Behavior normal. Behavior is cooperative.         Thought Content: Thought content normal.         Judgment: Judgment normal.           Discharge Disposition:  Skilled Nursing Facility (DC - External)    Discharge Medications:     Discharge Medications      New Medications      Instructions Start Date   HYDROcodone-acetaminophen 7.5-325 MG per tablet  Commonly known as: NORCO   1 tablet, Oral, Every 6 Hours PRN      LORazepam 1 mg/mL in Ora-Plus-ora sweet-water   0.05 mg/kg (3.2 mg), Oral, Every 6 Hours PRN      melatonin 3 MG tablet   3 mg, Oral, Nightly      morphine 20 MG/ML concentrated solution   2 mg, Oral, Every 4 Hours PRN      predniSONE 20 MG tablet  Commonly known as: DELTASONE   40 mg, Oral, Daily With Breakfast         Changes to Medications      Instructions Start Date   docusate sodium 100 MG capsule  Commonly known as: Colace  What changed:   · when to take this  · reasons to take this   100 mg, Oral, 2 Times Daily      losartan 50 MG tablet  Commonly known as: COZAAR  What changed:   · medication strength  · how much to take   50 mg, Oral, Daily      polyethylene glycol 17 GM/SCOOP powder  Commonly known as: MIRALAX  What changed: See the new instructions.   MIX 1 CAPFUL (17G) IN 8 OUNCES OF LIQUID AND DRINK BY MOUTH EVERY DAY FOR CONSTIPATION         Continue These Medications      Instructions Start Date   acetaminophen 325 MG tablet  Commonly known as: TYLENOL   650 mg, Oral, Every 4 Hours PRN      aspirin 81 MG chewable tablet   81 mg, Oral, Daily      budesonide 0.5 MG/2ML nebulizer solution  Commonly known  as: Pulmicort   0.5 mg, Nebulization, Daily - RT      carvedilol 6.25 MG tablet  Commonly known as: COREG   6.25 mg, Oral, 2 Times Daily With Meals      dilTIAZem 30 MG tablet  Commonly known as: CARDIZEM   30 mg, Oral, Daily PRN      esomeprazole 40 MG capsule  Commonly known as: nexIUM   TAKE 1 CAPSULE BY MOUTH EVERY MORNING      famotidine 20 MG tablet  Commonly known as: PEPCID   40 mg, Oral, Daily      folic acid-vit B6-vit B12 2.5-25-1 MG tablet tablet  Commonly known as: Folbee   1 tablet, Oral, Daily      ipratropium-albuterol 0.5-2.5 mg/3 ml nebulizer  Commonly known as: DUO-NEB   3 mL, Nebulization, As Needed      levothyroxine 50 MCG tablet  Commonly known as: SYNTHROID, LEVOTHROID   50 mcg, Oral, Daily      montelukast 10 MG tablet  Commonly known as: SINGULAIR   10 mg, Oral, Nightly      ranolazine 500 MG 12 hr tablet  Commonly known as: RANEXA   500 mg, Oral, 2 Times Daily      sertraline 100 MG tablet  Commonly known as: ZOLOFT   100 mg, Oral, Daily      sodium bicarbonate 650 MG tablet   650 mg, Oral, 2 Times Daily      traMADol 50 MG tablet  Commonly known as: ULTRAM   50 mg, Oral, Every 6 Hours PRN         Stop These Medications    formoterol 20 MCG/2ML nebulizer solution  Commonly known as: PERFOROMIST            Discharge Diet: Heart healthy.     Activity at Discharge: As tolerated.    Discharge Care Plan/Instructions: Continue care at skilled nursing facility.    Follow-up Appointments: Follow-up primary care provider as outpatient.    Test Results Pending at Discharge:     Alex Shaikh MD  11/12/21  10:20 CST    Time: 35 minutes.

## 2021-11-12 NOTE — NURSING NOTE
Investigated last BM.  Last bm 11/10/21. Patient recalled movement with urination in bedpan. rwt called by primary nurse and updated. Stool softer given. And. Soft, nontender with active bowel sounds in four quads. Patient denies any discomfort and s/s of constipation. Md notified. Son at bedside.

## 2021-11-12 NOTE — NURSING NOTE
Called Dereck jaramillo report to [facility].she stated they couldn't accept her until she had a BM. We had it charted on 11/2.Charge nurse called staff and found that she had actually had a small edna on 11/15.

## 2021-11-13 NOTE — NURSING NOTE
Dr Anaya paged secondary to family concerns of patient transport this late at night. Dr. Anaya advised to cancel discharge at this time and schedule for first thing in the AM. James mejia Pt and Pt's son notified at this time of plan to transport first thing in the morning.

## 2021-11-13 NOTE — NURSING NOTE
Ambulance here to take [facility] to [facility]. [facility] called to give updated report and notified patient was on her way.

## 2021-11-13 NOTE — PROGRESS NOTES
[facility] Medicine Services  INPATIENT PROGRESS NOTE    Length of Stay: 11  Date of Admission: 11/1/2021  Primary Care Physician: Vero Arteaga APRN    Subjective   Chief Complaint: Shortness of air.    HPI: Patient is seen for follow-up.  She was discharged yesterday but due to nonavailability of ambulance services, patient stayed overnight is awaiting transportation with ambulance to skilled nursing facility.  She is doing better, less short of air, tolerating her diet and maintaining O2 saturation in the upper 90s on 3 L of supplemental oxygen.  Her niece was at the bedside.       Review of Systems   Constitutional: Positive for activity change and fatigue. Negative for appetite change, chills, diaphoresis and fever.   HENT: Negative for trouble swallowing and voice change.    Eyes: Negative for photophobia and visual disturbance.   Respiratory: Positive for shortness of breath. Negative for cough, choking, chest tightness, wheezing and stridor.    Cardiovascular: Negative for chest pain, palpitations and leg swelling.   Gastrointestinal: Negative for abdominal distention, abdominal pain, blood in stool, constipation, diarrhea, nausea and vomiting.   Endocrine: Negative for cold intolerance, heat intolerance, polydipsia, polyphagia and polyuria.   Genitourinary: Negative for decreased urine volume, difficulty urinating, dysuria, enuresis, flank pain, frequency, hematuria and urgency.   Musculoskeletal: Negative for arthralgias, gait problem, myalgias, neck pain and neck stiffness.   Skin: Negative for pallor, rash and wound.   Neurological: Negative for dizziness, tremors, seizures, syncope, facial asymmetry, speech difficulty, weakness, light-headedness, numbness and headaches.   Hematological: Does not bruise/bleed easily.   Psychiatric/Behavioral: Negative for agitation, behavioral problems and confusion.       Objective    Temp:  [97.6 °F (36.4 °C)-98.2 °F (36.8  °C)] 98.2 °F (36.8 °C)  Heart Rate:  [60-85] 72  Resp:  [16-18] 18  BP: (146-184)/(66-79) 184/79    Physical Exam  Vitals and nursing note reviewed.   Constitutional:       General: She is not in acute distress.     Appearance: She is well-developed. She is ill-appearing. She is not diaphoretic.   HENT:      Head: Normocephalic and atraumatic.      Right Ear: External ear normal.      Left Ear: External ear normal.      Nose: Nose normal.   Eyes:      Extraocular Movements: Extraocular movements intact.      Conjunctiva/sclera: Conjunctivae normal.      Pupils: Pupils are equal, round, and reactive to light.   Neck:      Thyroid: No thyromegaly.      Vascular: No JVD.   Cardiovascular:      Rate and Rhythm: Normal rate and regular rhythm.      Heart sounds: Normal heart sounds. No murmur heard.  No friction rub. No gallop.    Pulmonary:      Effort: Pulmonary effort is normal. No respiratory distress.      Breath sounds: No stridor. Decreased breath sounds and rhonchi present. No wheezing or rales.   Chest:      Chest wall: No tenderness.   Abdominal:      General: Bowel sounds are normal. There is no distension.      Palpations: Abdomen is soft. There is no mass.      Tenderness: There is no abdominal tenderness. There is no right CVA tenderness, left CVA tenderness, guarding or rebound.      Hernia: No hernia is present.   Musculoskeletal:         General: No swelling, tenderness or deformity. Normal range of motion.      Cervical back: Normal range of motion and neck supple.      Right lower leg: No edema.      Left lower leg: No edema.   Skin:     General: Skin is warm and dry.      Coloration: Skin is not jaundiced or pale.      Findings: No bruising, erythema, lesion or rash.   Neurological:      General: No focal deficit present.      Mental Status: She is alert and oriented to person, place, and time.      Cranial Nerves: No cranial nerve deficit.      Sensory: No sensory deficit.      Motor: No weakness.       Coordination: Coordination normal.      Gait: Gait normal.      Deep Tendon Reflexes: Reflexes are normal and symmetric. Reflexes normal.   Psychiatric:         Behavior: Behavior normal. Behavior is cooperative.         Thought Content: Thought content normal.         Judgment: Judgment normal.           Medication Review:    Current Facility-Administered Medications:   •  acetaminophen (TYLENOL) tablet 650 mg, 650 mg, Oral, Q4H PRN **OR** acetaminophen (TYLENOL) 160 MG/5ML solution 650 mg, 650 mg, Oral, Q4H PRN **OR** acetaminophen (TYLENOL) suppository 650 mg, 650 mg, Rectal, Q4H PRN, Yo Garg MD  •  acetaminophen (TYLENOL) tablet 650 mg, 650 mg, Oral, Q4H PRN, Monalisa Mcintosh MD, 650 mg at 11/02/21 2238  •  budesonide (PULMICORT) nebulizer solution 0.5 mg, 0.5 mg, Nebulization, Daily - RT, Monalisa Mcintosh MD, 0.5 mg at 11/13/21 0710  •  carboxymethylcellulose (REFRESH PLUS) 0.5 % ophthalmic solution 1 drop, 1 drop, Both Eyes, Q30 Min PRN, Yo Garg MD  •  carvedilol (COREG) tablet 12.5 mg, 12.5 mg, Oral, BID With Meals, Alex Shaikh MD, 12.5 mg at 11/12/21 1717  •  dilTIAZem (CARDIZEM) tablet 60 mg, 60 mg, Oral, Q8H, Alex Shaikh MD, 60 mg at 11/13/21 0609  •  diphenoxylate-atropine (LOMOTIL) 2.5-0.025 MG per tablet 1 tablet, 1 tablet, Oral, Q2H PRN, Yo Garg MD  •  docusate sodium (COLACE) capsule 100 mg, 100 mg, Oral, BID PRN, Monalisa Mcintosh MD, 100 mg at 11/12/21 1528  •  famotidine (PEPCID) tablet 40 mg, 40 mg, Oral, Daily, Monalisa Mcintosh MD, 40 mg at 11/12/21 0809  •  folic acid-vit B6-vit B12 (FOLBEE) tablet 1 tablet, 1 tablet, Oral, Daily, Monalisa Mcintosh MD, 1 tablet at 11/12/21 0809  •  hydrALAZINE (APRESOLINE) injection 10 mg, 10 mg, Intravenous, Q6H PRN, Alex Shaikh MD, 10 mg at 11/12/21 0017  •  HYDROcodone-acetaminophen (NORCO) 7.5-325 MG per tablet 1 tablet, 1 tablet, Oral, Q6H PRN, Alex Shaikh MD, 1  tablet at 11/12/21 1717  •  ipratropium-albuterol (DUO-NEB) nebulizer solution 3 mL, 3 mL, Nebulization, Q4H - RT, Alex Shaikh MD, 3 mL at 11/13/21 0710  •  levothyroxine (SYNTHROID, LEVOTHROID) tablet 50 mcg, 50 mcg, Oral, Q AM, Monalisa Mcintosh MD, 50 mcg at 11/13/21 0609  •  LORazepam (ATIVAN) injection 0.5 mg, 0.5 mg, Intravenous, Q6H PRN, Alex Shaikh MD  •  [START ON 11/14/2021] losartan (COZAAR) tablet 100 mg, 100 mg, Oral, Daily, Alex Shaikh MD  •  melatonin tablet 3 mg, 3 mg, Oral, Nightly, Alex Shaikh MD, 3 mg at 11/12/21 2249  •  montelukast (SINGULAIR) tablet 10 mg, 10 mg, Oral, Nightly, Monalisa Mcintosh MD, 10 mg at 11/12/21 2249  •  morphine injection 2 mg, 2 mg, Intravenous, Q4H PRN, Alex Shaikh MD, 2 mg at 11/13/21 0829  •  nicotine (NICODERM CQ) 21 MG/24HR patch 1 patch, 1 patch, Transdermal, Q24H, Monalisa Mcintosh MD, 1 patch at 11/05/21 0905  •  ondansetron (ZOFRAN) injection 4 mg, 4 mg, Intravenous, Q6H PRN, Monalisa Mcintosh MD  •  pantoprazole (PROTONIX) EC tablet 40 mg, 40 mg, Oral, QAM, Monalisa Mcintosh MD, 40 mg at 11/13/21 0609  •  predniSONE (DELTASONE) tablet 40 mg, 40 mg, Oral, Daily With Breakfast, Alex Shaikh MD, 40 mg at 11/12/21 1459  •  prochlorperazine (COMPAZINE) injection 5 mg, 5 mg, Intravenous, Q6H PRN **OR** prochlorperazine (COMPAZINE) tablet 5 mg, 5 mg, Oral, Q6H PRN **OR** prochlorperazine (COMPAZINE) suppository 25 mg, 25 mg, Rectal, Q12H PRN, Yo Garg MD  •  ranolazine (RANEXA) 12 hr tablet 500 mg, 500 mg, Oral, BID, Monalisa Mcintosh MD, 500 mg at 11/12/21 2249  •  sertraline (ZOLOFT) tablet 100 mg, 100 mg, Oral, Daily, Monalisa Mcintosh MD, 100 mg at 11/12/21 0809  •  sodium chloride 0.9 % flush 10 mL, 10 mL, Intravenous, PRN, Monalisa Mcintosh MD, 10 mL at 11/08/21 2049  •  sodium chloride nasal spray 2 spray, 2 spray, Each Nare, PRN, Yo Garg MD  •   traMADol (ULTRAM) tablet 50 mg, 50 mg, Oral, Q6H PRN, Monalisa Mcintosh MD, 50 mg at 11/03/21 0013    Results Review:  I have reviewed the labs, radiology results, and diagnostic studies.    Laboratory Data:   Results from last 7 days   Lab Units 11/10/21  0514   SODIUM mmol/L 135*   POTASSIUM mmol/L 4.1   CHLORIDE mmol/L 97*   CO2 mmol/L 31.0*   BUN mg/dL 18   CREATININE mg/dL 0.75   GLUCOSE mg/dL 133*   CALCIUM mg/dL 8.3*   ANION GAP mmol/L 7.0     Estimated Creatinine Clearance: 49.7 mL/min (by C-G formula based on SCr of 0.75 mg/dL).          Results from last 7 days   Lab Units 11/10/21  0514   WBC 10*3/mm3 7.22   HEMOGLOBIN g/dL 12.1   HEMATOCRIT % 36.5   PLATELETS 10*3/mm3 211           Culture Data:   No results found for: BLOODCX  No results found for: URINECX  No results found for: RESPCX  No results found for: WOUNDCX  No results found for: STOOLCX  No components found for: BODYFLD    Radiology Data:   Imaging Results (Last 24 Hours)     ** No results found for the last 24 hours. **          I have reviewed the patient's current medications.     Assessment/Plan     Hospital Problem List:  Principal Problem:    Acute on chronic respiratory failure with hypoxia (HCC)  Active Problems:    Uncontrolled hypertension    Acute on chronic diastolic heart failure (HCC)    COPD with acute exacerbation (HCC)    Palliative care patient    Acute on Chronic respiratory failure with hypoxia: Much improved.  Continue noninvasive respiratory therapy, bronchodilators and steroids.  Continue pain control for palliative care.    Hypertension: Continue current medications and make adjustments as needed for optimal blood pressure control.    Hypothyroidism: Continue Synthroid.    Deconditioning: Continue PT and OT.    Update was given to patient's niece who was at the bedside.    Overall prognosis is guarded.    Discharge Planning: Likely discharge to skilled nursing facility today.      I confirmed that the patient's  Advance Care Plan is present, code status is documented, or surrogate decision maker is listed in the patient's medical record.     I have utilized all available immediate resources to obtain, update, or review the patient's current medications      Alex Shaikh MD   11/13/21   09:48 CST

## 2021-12-01 PROBLEM — I26.99 ACUTE PULMONARY EMBOLISM WITHOUT ACUTE COR PULMONALE (HCC): Status: ACTIVE | Noted: 2021-01-01

## 2021-12-01 NOTE — ED NOTES
Per EMS patient O2 97% on 3L NC, has had duoneb and 125mg solumedrol, has had rapid covid test which was negative     Mariam Leon, RN  12/01/21 3861

## 2021-12-01 NOTE — ED PROVIDER NOTES
Subjective   Patient presents to emergency department for acute on chronic shortness of breath starting this morning.  125mg Solumedrol, Duoneb en route by EMS.  Hx of CHF, COPD on 3L home oxygen, CAD, GERD, HTN, HLD.  She is a former tobacco smoker and has a pacemaker.        History provided by:  Patient   used: No        Review of Systems   Constitutional: Negative for chills and fever.   HENT: Negative for sore throat and trouble swallowing.    Eyes: Negative for visual disturbance.   Respiratory: Positive for cough (non productive) and shortness of breath.    Cardiovascular: Negative for chest pain.   Gastrointestinal: Negative for abdominal pain, nausea and vomiting.   Genitourinary: Negative for dysuria and flank pain.   Musculoskeletal: Negative for back pain.   Skin: Negative for color change.   Allergic/Immunologic: Negative for immunocompromised state.   Neurological: Negative for syncope and weakness.   Hematological: Does not bruise/bleed easily.   Psychiatric/Behavioral: Negative for confusion.       Past Medical History:   Diagnosis Date   • Allergic rhinitis    • Chronic diastolic heart failure (HCC)    • COPD (chronic obstructive pulmonary disease) (HCC)    • Coronary arteriosclerosis    • Depression    • GERD (gastroesophageal reflux disease)    • Hypercholesterolemia    • Hypertension    • Hypothyroidism    • LVH (left ventricular hypertrophy)    • Myocardial infarction (HCC)    • Osteoarthritis        Allergies   Allergen Reactions   • Ace Inhibitors Dizziness   • Baclofen Delirium     unknown   • Lisinopril Cough     Keeps her awake all night coughing       Past Surgical History:   Procedure Laterality Date   • BREAST SURGERY     • CARDIAC CATHETERIZATION     • CERVICAL SPINE SURGERY     • COLONOSCOPY     • CORONARY ANGIOPLASTY     • ENDOSCOPY N/A 2/14/2018   • ENDOSCOPY AND COLONOSCOPY     • HIP BIPOLAR REPLACEMENT Right 1/23/2018   • PACEMAKER REPLACEMENT         Family  "History   Problem Relation Age of Onset   • Coronary artery disease Father        Social History     Socioeconomic History   • Marital status:    Tobacco Use   • Smoking status: Former Smoker     Packs/day: 0.50     Years: 50.00     Pack years: 25.00     Types: Cigarettes     Start date:      Quit date: 2017     Years since quittin.8   • Smokeless tobacco: Never Used   Substance and Sexual Activity   • Alcohol use: No   • Drug use: No   • Sexual activity: Not Currently           Objective      BP (!) 189/81   Pulse 88   Temp 98.5 °F (36.9 °C) (Oral)   Resp 25   Ht 172.7 cm (68\")   Wt 64.4 kg (142 lb)   LMP  (LMP Unknown)   SpO2 98%   BMI 21.59 kg/m²     Physical Exam  Vitals and nursing note reviewed.   Constitutional:       General: She is in acute distress.      Appearance: She is well-developed. She is not ill-appearing.   HENT:      Head: Normocephalic and atraumatic.   Eyes:      Conjunctiva/sclera: Conjunctivae normal.   Cardiovascular:      Rate and Rhythm: Normal rate and regular rhythm.      Heart sounds: Normal heart sounds.   Pulmonary:      Effort: Respiratory distress present.      Breath sounds: No stridor. Wheezing and rhonchi present.   Musculoskeletal:      Right lower leg: No edema.      Left lower leg: No edema.   Skin:     General: Skin is warm.   Neurological:      Mental Status: She is alert. Mental status is at baseline.   Psychiatric:         Mood and Affect: Mood normal.         Behavior: Behavior normal.         Thought Content: Thought content normal.         ECG 12 Lead      Date/Time: 2021 8:21 PM  Performed by: Satnam Helton PA-C  Authorized by: Ruddy Johnson MD   Interpreted by physician  Comparison: compared with previous ECG from 2021  Similar to previous ECG  Rhythm: paced  Rate: normal  BPM: 90  Clinical impression: non-specific ECG                 ED Course  ED Course as of 12/01/21 2024   Wed Dec 01, 2021   2013 Patient " declined.  Now GCS of 5.  Verified with family she is a DNR/DNI. [RONAN]      ED Course User Index  [RONAN] Satnam Helton PA-C      Results for orders placed or performed during the hospital encounter of 12/01/21   COVID-19 and FLU A/B PCR - Swab, Nasopharynx    Specimen: Nasopharynx; Swab   Result Value Ref Range    COVID19 Not Detected Not Detected - Ref. Range    Influenza A PCR Not Detected Not Detected    Influenza B PCR Not Detected Not Detected   Comprehensive Metabolic Panel    Specimen: Blood   Result Value Ref Range    Glucose 120 (H) 65 - 99 mg/dL    BUN 15 8 - 23 mg/dL    Creatinine 0.96 0.57 - 1.00 mg/dL    Sodium 133 (L) 136 - 145 mmol/L    Potassium 4.7 3.5 - 5.2 mmol/L    Chloride 97 (L) 98 - 107 mmol/L    CO2 25.0 22.0 - 29.0 mmol/L    Calcium 8.8 8.6 - 10.5 mg/dL    Total Protein 6.8 6.0 - 8.5 g/dL    Albumin 3.60 3.50 - 5.20 g/dL    ALT (SGPT) 12 1 - 33 U/L    AST (SGOT) 25 1 - 32 U/L    Alkaline Phosphatase 76 39 - 117 U/L    Total Bilirubin 0.3 0.0 - 1.2 mg/dL    eGFR Non African Amer 55 (L) >60 mL/min/1.73    Globulin 3.2 gm/dL    A/G Ratio 1.1 g/dL    BUN/Creatinine Ratio 15.6 7.0 - 25.0    Anion Gap 11.0 5.0 - 15.0 mmol/L   BNP    Specimen: Blood   Result Value Ref Range    proBNP 1,640.0 0.0-1,800.0 pg/mL   Troponin    Specimen: Blood   Result Value Ref Range    Troponin T <0.010 0.000 - 0.030 ng/mL   CBC Auto Differential    Specimen: Blood   Result Value Ref Range    WBC 6.13 3.40 - 10.80 10*3/mm3    RBC 2.98 (L) 3.77 - 5.28 10*6/mm3    Hemoglobin 9.6 (L) 12.0 - 15.9 g/dL    Hematocrit 28.7 (L) 34.0 - 46.6 %    MCV 96.3 79.0 - 97.0 fL    MCH 32.2 26.6 - 33.0 pg    MCHC 33.4 31.5 - 35.7 g/dL    RDW 13.8 12.3 - 15.4 %    RDW-SD 48.4 37.0 - 54.0 fl    MPV 9.8 6.0 - 12.0 fL    Platelets 225 140 - 450 10*3/mm3    Neutrophil % 64.6 42.7 - 76.0 %    Lymphocyte % 19.7 19.6 - 45.3 %    Monocyte % 9.0 5.0 - 12.0 %    Eosinophil % 5.7 0.3 - 6.2 %    Basophil % 0.5 0.0 - 1.5 %    Immature Grans %  0.5 0.0 - 0.5 %    Neutrophils, Absolute 3.96 1.70 - 7.00 10*3/mm3    Lymphocytes, Absolute 1.21 0.70 - 3.10 10*3/mm3    Monocytes, Absolute 0.55 0.10 - 0.90 10*3/mm3    Eosinophils, Absolute 0.35 0.00 - 0.40 10*3/mm3    Basophils, Absolute 0.03 0.00 - 0.20 10*3/mm3    Immature Grans, Absolute 0.03 0.00 - 0.05 10*3/mm3    nRBC 0.0 0.0 - 0.2 /100 WBC   D-dimer, Quantitative    Specimen: Blood   Result Value Ref Range    D-Dimer, Quantitative 3,402 (H) 0 - 470 ng/mL (FEU)   Blood Gas, Arterial -    Specimen: Arterial Blood   Result Value Ref Range    Site Right Radial     Serafin's Test N/A     pH, Arterial 7.349 (L) 7.350 - 7.450 pH units    pCO2, Arterial 57.2 (H) 35.0 - 45.0 mm Hg    pO2, Arterial 316.0 (H) 83.0 - 108.0 mm Hg    HCO3, Arterial 31.5 (H) 20.0 - 26.0 mmol/L    Base Excess, Arterial 4.9 (H) 0.0 - 2.0 mmol/L    O2 Saturation, Arterial >100.0 (H) 94.0 - 99.0 %    Barometric Pressure for Blood Gas 746 mmHg    Modality Nasal Cannula     Flow Rate 2.0 lpm    Ventilator Mode NA     Collected by JENNIFFER TARANGO    Green Top (Gel)   Result Value Ref Range    Extra Tube Hold for add-ons.    Lavender Top   Result Value Ref Range    Extra Tube hold for add-on    Gold Top - SST   Result Value Ref Range    Extra Tube Hold for add-ons.    Light Blue Top   Result Value Ref Range    Extra Tube hold for add-on      XR Chest 2 View    Result Date: 12/1/2021  Narrative: PA and lateral chest x-ray December 1, 2021 INDICATION: Shortness of breath FINDINGS: Chronic changes diffusely stable. No acute infiltrates, congestive changes, pneumothorax or pleural fluid seen at this time. Cardiac pacer wires remain in place. Wires appear intact. The heart is not enlarged.     Impression: Chronic changes stable. No significant acute cardiopulmonary process seen at this time. Electronically signed by:  Kirill Sheikh MD  12/1/2021 4:16 PM CST Workstation: [id number]    CT Head Without Contrast    Result Date: 12/1/2021  Narrative: EXAM  DESCRIPTION: CT HEAD WO CONTRAST CLINICAL HISTORY: [facility] TECHNIQUE: 5mm slice thickness axial images through the brain were performed in bone and soft tissue windows in the absence of intravenous contrast.  This exam was performed according to our departmental dose-optimization program which includes use of Automated Exposure Control, adjustment of the mA and/or kV according to patient size and/or use of iterative reconstruction technique. COMPARISON: November 7, 2020 FINDINGS: The brain parenchyma appears unremarkable. There is no intra-axial or extra-axial bleed seen. There is no mass or mass effect. The ventricles are normal in size shape and configuration. The orbital contents appear unremarkable. The visualized paranasal sinuses and mastoid air cells are patent. No fracture is present.     Impression: No acute intracranial abnormality identified. Electronically signed by:  Yasir Hess MD, AP  12/1/2021 8:09 PM CST Workstation: [id number]    XR Chest 1 View    Result Date: 11/9/2021  Narrative: AP upright portable chest November 9, 2021 10:37 AM INDICATION: Shortness of breath FINDINGS: Cardiac pacer/defibrillator wires remain in place. Scattered lung parenchymal scarring/atelectasis bilateral. This is slightly more pronounced at the right base compared with previous study dated November 4. No significant areas of consolidation, acute congestive changes, pneumothorax or pleural fluid. Heart and mediastinal contours within normal limits.     Impression: Increased probable atelectasis right base compared with previous study. Otherwise no change. Electronically signed by:  Kirill Sheikh MD  11/9/2021 11:15 AM CST Workstation: [id number]    XR Chest 1 View    Result Date: 11/4/2021  Narrative: EXAM DESCRIPTION:  XR CHEST 1 VW CLINICAL HISTORY: 88 years  Female  increased dyspnea, J44.1 Chronic obstructive pulmonary disease with (acute) exacerbation R77.8 Other specified abnormalities of plasma proteins I10  Essential (primary) hypertension Z74.09 Other reduced mobility COMPARISON: November 3, 2021 TECHNIQUE: One view-AP portable radiograph the chest FINDINGS: The lungs are well-expanded. There is minimal subsegmental atelectasis in the left lung base. The right lung is clear. The cardiac silhouette and pulmonary vasculature are within normal limits. A bipolar pacing device is noted.     Impression: 1. Minimal subsegmental atelectasis in the left lung base. Electronically signed by:  Claudia Lindsay MD  11/4/2021 12:02 PM CDT Workstation: [id number]    XR Chest 1 View    Result Date: 11/3/2021  Narrative: EXAMINATION:  XR CHEST 1 VW CLINICAL HISTORY:  88 years Female,respiratory failure, J44.1 Chronic obstructive pulmonary disease with (acute) exacerbation R77.8 Other specified abnormalities of plasma proteins I10 Essential (primary) hypertension COMPARISON:  Chest x-ray dated 11/1/2021 FINDINGS:  Hyperinflation/emphysema. No focal airspace consolidation. No pleural effusion or pneumothorax. Normal heart size. Left chest wall pacemaker. No significant change relative to 11/1/2021.     Impression: Hyperinflation/emphysema, as noted previously, without evidence for superimposed acute cardiopulmonary process. Electronically signed by:  Dimitrios Egan MD  11/3/2021 8:10 AM CDT Workstation: [id number]    XR Chest 1 View    Result Date: 11/1/2021  Narrative: EXAMINATION: XR CHEST 1 VW CLINICAL INDICATION: Female, 88 years old. SOA Triage Protocol COMPARISON: Previous chest x-ray from about 9 hours earlier FINDINGS: Support Devices: Left chest wall, dual-lead cardiac pacer from a left subclavian access is unchanged. A wire across the neck that may be on the patient rather than in the patient has been removed Heart: Cardiac silhouette is normal in size. Mediastinum: The thoracic aorta is tortuous and calcified. Lungs: Pulmonary vessels are normal in size. Pulmonary hyperexpansion with flattening of the diaphragm bronchial wall  thickening and mild interstitial prominence is unchanged Pleura: No pleural effusion is identified. No pneumothorax is present. Osseous Structures And Soft Tissue: Shoulder arthritis remains     Impression: 1.  COPD, unchanged. 2.  No evidence of pulmonary edema, consolidation, or pleural effusion. Electronically signed by:  Parveen Talamantes MD  11/1/2021 11:22 PM CDT Workstation: [id number]    CT Angiogram Chest    Result Date: 12/1/2021  Narrative: EXAM: CT CHEST ANGIOGRAPHY WITH IV CONTRAST ORDERING PROVIDER: ROSY JUAREZ CLINICAL HISTORY: Shortness of breath COMPARISON: 1/7/2018 TECHNIQUE: Chest CT was performed using a high resolution pulmonary angiogram protocol with 71ml of Isovue 370 as IV contrast and reformatted in the sagittal and coronal planes. 3-dimensional images also acquired with special processing of the CT scan data with a specialized workstation for evaluation. This examination was performed according to our departmental dose optimization program which includes automated exposure control, adjustment of the MA and kV according to patient size, and/or use of iterative reconstruction technique. FINDINGS: LUNGS AND PLEURA: Nonspecific parenchymal density in the right lower lobe medially on image 32 not present on prior, measuring 1.6 x 1.3 x 2.3 cm, and correlation with PET CT scan is recommended to exclude possible neoplastic process. Diffuse centrilobular emphysematous changes of the lung parenchyma. Hyperinflation due to COPD. No evidence of pleural effusion. No pneumothorax. HEART: Normal size and configuration. No pericardial effusion. MEDIASTINUM AND PRISCILLA: No significant adenopathy. 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 No gross dissection. PULMONARY ARTERIES: Peripheral filling defects in branches supplying the right middle lobe and left upper lobe consistent with pulmonary embolism. UPPER ABDOMEN: Unremarkable. MUSCULOSKELETAL:  Multilevel  degenerative disc disease. Normal vertebral body height and alignment. No lytic or sclerotic lesion. EXTRATHORACIC SOFT TISSUES: No axillary adenopathy. No mass. Unremarkable supraclavicular soft tissues.     Impression: 1.  Peripheral filling defects in branches supplying the right middle lobe and left upper lobe consistent with pulmonary embolism. 2.  Nonspecific parenchymal density in the right lower lobe medially on image 32 measuring 1.6 x 1.3 x 2.3 cm, and correlation with PET CT scan is recommended to exclude possible neoplastic process. 3.  Diffuse centrilobular emphysematous changes of the lung parenchyma. 4.  Hyperinflation due to COPD. 5.  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 Electronically signed by:  Trever Harris MD  12/1/2021 7:18 PM CST Workstation: [id number]                                               [facility]    Final diagnoses:   Acute pulmonary embolism without acute cor pulmonale, unspecified pulmonary embolism type (HCC)   Acute respiratory failure with hypercapnia (HCC)       ED Disposition  ED Disposition     ED Disposition Condition Comment    Decision to Admit  Level of Care: Critical Care [6]   Diagnosis: Acute pulmonary embolism without acute cor pulmonale, unspecified pulmonary embolism type (HCC) [[id number]]   Admitting Physician: BEHROOZI, SAEID [[id number]]   Attending Physician: BEHROOZI, SAEID [[id number]]   Isolate for COVID?: No [0]   Certification: I Certify That Inpatient Hospital Services Are Medically Necessary For Greater Than 2 Midnights            No follow-up provider specified.       Medication List      No changes were made to your prescriptions during this visit.          Satnam Helton PA-C  12/01/21 2024

## 2021-12-02 PROBLEM — J96.22 ACUTE ON CHRONIC RESPIRATORY FAILURE WITH HYPOXIA AND HYPERCAPNIA (HCC): Chronic | Status: ACTIVE | Noted: 2020-09-10

## 2021-12-02 PROBLEM — I82.403 DVT, BILATERAL LOWER LIMBS (HCC): Status: ACTIVE | Noted: 2021-01-01

## 2021-12-02 PROBLEM — J96.22 ACUTE ON CHRONIC RESPIRATORY FAILURE WITH HYPOXIA AND HYPERCAPNIA (HCC): Status: ACTIVE | Noted: 2020-09-10

## 2021-12-02 PROBLEM — J96.21 ACUTE ON CHRONIC RESPIRATORY FAILURE WITH HYPOXIA AND HYPERCAPNIA (HCC): Status: ACTIVE | Noted: 2020-09-10

## 2021-12-02 NOTE — PLAN OF CARE
Patient tried without BIPAP for a short time today and did not tolerate.  Will continue to support at this time.  Problem: Noninvasive Ventilation Acute  Goal: Effective Unassisted Ventilation and Oxygenation  Outcome: Ongoing, Progressing   Goal Outcome Evaluation:

## 2021-12-02 NOTE — NURSING NOTE
Pt arrived to CCU minimally responsive to pain. On 15 L NRB. Dr. Behroozi made aware. PT  Has history of COPD and has been on NRB. Order for stat ABG received. CO2 on ABG high, RT placed pt on bipap.

## 2021-12-02 NOTE — PROGRESS NOTES
Progress Note  Devante Hilliard MD  Hospitalist    Date of visit: 12/2/2021     LOS: 1 day   Patient Care Team:  Vero Arteaga APRN as PCP - General (Nurse Practitioner)    Chief Complaint: Generalized weakness    Subjective     Interval History:     Patient Complaints: Generalized weakness, shortness of breath, lethargy.    History taken from: Nursing    Medication Review:   Current Facility-Administered Medications   Medication Dose Route Frequency Provider Last Rate Last Admin   • aspirin chewable tablet 81 mg  81 mg Oral Daily Behroozi, Saeid, MD       • budesonide (PULMICORT) nebulizer solution 0.5 mg  0.5 mg Nebulization Daily - RT Behroozi, Saeid, MD       • carvedilol (COREG) tablet 6.25 mg  6.25 mg Oral BID With Meals Behroozi, Saeid, MD       • cefTRIAXone (ROCEPHIN) 1 g/100 mL 0.9% NS (MBP)  1 g Intravenous Q24H Behroozi, Saeid, MD   1 g at 12/02/21 0255   • dextrose 5 % and sodium chloride 0.9 % infusion  60 mL/hr Intravenous Continuous Behroozi, Saeid, MD 60 mL/hr at 12/02/21 0424 60 mL/hr at 12/02/21 0424   • folic acid-vit B6-vit B12 (FOLBEE) tablet 1 tablet  1 tablet Oral Daily Behroozi, Saeid, MD       • heparin 97943 units/250 mL (100 units/mL) in 0.45 % NaCl infusion  20 Units/kg/hr Intravenous Titrated Behroozi, Saeid, MD 9.44 mL/hr at 12/02/21 1027 20 Units/kg/hr at 12/02/21 1027    And   • heparin (porcine) 5000 UNIT/ML injection 3,800 Units  80 Units/kg Intravenous PRN Behroozi, Saeid, MD        And   • heparin (porcine) 5000 UNIT/ML injection 1,900 Units  40 Units/kg Intravenous PRN Behroozi, Saeid, MD   1,900 Units at 12/02/21 0321   • hydrALAZINE (APRESOLINE) injection 10 mg  10 mg Intravenous Q6H PRN Behroozi, Saeid, MD   10 mg at 12/02/21 0321   • ipratropium-albuterol (DUO-NEB) nebulizer solution 3 mL  3 mL Nebulization Q4H PRN Behroozi, Saeid, MD       • levothyroxine (SYNTHROID, LEVOTHROID) tablet 50 mcg  50 mcg Oral Q AM Behroozi, Saeid, MD       • LORazepam (ATIVAN) injection 0.5 mg   0.5 mg Intravenous Q6H PRN Behroozi, Saeid, MD   0.5 mg at 12/02/21 1018   • losartan (COZAAR) tablet 100 mg  100 mg Oral Daily Behroozi, Saeid, MD       • melatonin tablet 3 mg  3 mg Oral Nightly Behroozi, Saeid, MD       • montelukast (SINGULAIR) tablet 10 mg  10 mg Oral Nightly Behroozi, Saeid, MD       • pantoprazole (PROTONIX) EC tablet 40 mg  40 mg Oral QAM Behroozi, Saeid, MD       • sertraline (ZOLOFT) tablet 100 mg  100 mg Oral Daily Behroozi, Saeid, MD       • sodium chloride 0.9 % flush 10 mL  10 mL Intravenous PRN Mark Thompson MD   10 mL at 12/01/21 1514   • sodium chloride 0.9 % flush 10 mL  10 mL Intravenous Q12H Behroozi, Saeid, MD       • sodium chloride 0.9 % flush 10 mL  10 mL Intravenous PRN Behroozi, Saeid, MD           Review of Systems:   Review of Systems   Unable to perform ROS: Patient nonverbal   Constitutional: Positive for fatigue. Negative for fever.   Respiratory: Positive for cough and shortness of breath.    Skin: Positive for pallor.   Neurological: Positive for weakness.   Psychiatric/Behavioral: Positive for confusion and decreased concentration.   All other systems reviewed and are negative.      Objective     Vital Signs  Temp:  [95 °F (35 °C)-98.5 °F (36.9 °C)] 97.7 °F (36.5 °C)  Heart Rate:  [61-94] 93  Resp:  [18-37] 20  BP: ()/(54-86) 145/63    Physical Exam:  Physical Exam  Vitals reviewed.   Constitutional:       Appearance: She is ill-appearing.   HENT:      Head: Normocephalic and atraumatic.   Eyes:      General: No scleral icterus.     Extraocular Movements: Extraocular movements intact.   Cardiovascular:      Rate and Rhythm: Normal rate and regular rhythm.   Pulmonary:      Effort: Pulmonary effort is normal. No respiratory distress.      Breath sounds: Normal breath sounds. No wheezing or rales.   Abdominal:      General: Bowel sounds are normal. There is no distension.      Palpations: Abdomen is soft. There is no mass.      Tenderness: There is no  abdominal tenderness. There is no right CVA tenderness or guarding.      Hernia: No hernia is present.   Musculoskeletal:         General: No swelling, tenderness or deformity.      Cervical back: Normal range of motion and neck supple. No rigidity or tenderness.      Right lower leg: No edema.      Left lower leg: No edema.   Skin:     General: Skin is warm and dry.      Coloration: Skin is pale. Skin is not jaundiced.      Findings: No bruising or lesion.   Neurological:      Comments: Lethargic, nonverbal          Results Review:    Lab Results (last 24 hours)     Procedure Component Value Units Date/Time    Extra Tubes [[id number]] Collected: 12/02/21 0946    Specimen: Blood, Venous Line Updated: 12/02/21 1100    Narrative:      The following orders were created for panel order Extra Tubes.  Procedure                               Abnormality         Status                     ---------                               -----------         ------                     Green Top (Gel)[[id number]]                                  Final result                 Please view results for these tests on the individual orders.    Green Top (Gel) [[id number]] Collected: 12/02/21 0946    Specimen: Blood Updated: 12/02/21 1100     Extra Tube Hold for add-ons.     Comment: Auto resulted.       aPTT [[id number]]  (Abnormal) Collected: 12/02/21 0940    Specimen: Blood from Arm, Right Updated: 12/02/21 1021     PTT 95.8 seconds     Narrative:      The recommended Heparin therapeutic range is 68-97 seconds.    Hemoglobin A1c [[id number]]  (Normal) Collected: 12/02/21 0241    Specimen: Blood Updated: 12/02/21 0500     Hemoglobin A1C 5.60 %     Narrative:      Hemoglobin A1C Ranges:    Increased Risk for Diabetes  5.7% to 6.4%  Diabetes                     >= 6.5%  Diabetic Goal                < 7.0%    Blood Gas, Arterial - [[id number]]  (Abnormal) Collected: 12/02/21 0430    Specimen: Arterial Blood Updated: 12/02/21 0439     Site Right  Radial     Serafin's Test N/A     pH, Arterial 7.293 pH units      Comment: 84 Value below reference range        pCO2, Arterial 61.2 mm Hg      Comment: 83 Value above reference range        pO2, Arterial 76.1 mm Hg      Comment: 84 Value below reference range        HCO3, Arterial 29.6 mmol/L      Comment: 83 Value above reference range        Base Excess, Arterial 2.3 mmol/L      Comment: 83 Value above reference range        O2 Saturation, Arterial 96.0 %      Barometric Pressure for Blood Gas 746 mmHg      Modality BiPap     FIO2 35 %      Ventilator Mode AVAP     Set Tidal Volume 500     Set Mech Resp Rate 16.0     PEEP 8.0     Collected by NA     Comment: Meter: [id number]     :  [id number]       Comprehensive Metabolic Panel [[id number]]  (Abnormal) Collected: 12/02/21 0241    Specimen: Blood Updated: 12/02/21 0314     Glucose 170 mg/dL      BUN 23 mg/dL      Creatinine 1.08 mg/dL      Sodium 133 mmol/L      Potassium 4.4 mmol/L      Chloride 96 mmol/L      CO2 30.0 mmol/L      Calcium 8.7 mg/dL      Total Protein 6.5 g/dL      Albumin 3.60 g/dL      ALT (SGPT) 41 U/L      AST (SGOT) 46 U/L      Alkaline Phosphatase 80 U/L      Total Bilirubin 0.2 mg/dL      eGFR Non African Amer 48 mL/min/1.73      Globulin 2.9 gm/dL      A/G Ratio 1.2 g/dL      BUN/Creatinine Ratio 21.3     Anion Gap 7.0 mmol/L     Narrative:      GFR Normal >60  Chronic Kidney Disease <60  Kidney Failure <15      TSH [[id number]]  (Normal) Collected: 12/02/21 0241    Specimen: Blood Updated: 12/02/21 0314     TSH 2.150 uIU/mL     Troponin [[id number]]  (Normal) Collected: 12/02/21 0241    Specimen: Blood Updated: 12/02/21 0314     Troponin T <0.010 ng/mL     Narrative:      Troponin T Reference Range:  <= 0.03 ng/mL-   Negative for AMI  >0.03 ng/mL-     Abnormal for myocardial necrosis.  Clinicians would have to utilize clinical acumen, EKG, Troponin and serial changes to determine if it is an Acute Myocardial Infarction or  myocardial injury due to an underlying chronic condition.       Results may be falsely decreased if patient taking Biotin.      T4, Free [[id number]]  (Normal) Collected: 12/02/21 0241    Specimen: Blood Updated: 12/02/21 0314     Free T4 1.05 ng/dL     Narrative:      Results may be falsely increased if patient taking Biotin.      aPTT [[id number]]  (Abnormal) Collected: 12/02/21 0241    Specimen: Blood from Arm, Right Updated: 12/02/21 0314     PTT 52.4 seconds     Narrative:      The recommended Heparin therapeutic range is 68-97 seconds.    Magnesium [[id number]]  (Normal) Collected: 12/02/21 0241    Specimen: Blood Updated: 12/02/21 0310     Magnesium 2.2 mg/dL     CBC Auto Differential [[id number]]  (Abnormal) Collected: 12/02/21 0225    Specimen: Blood Updated: 12/02/21 0300     WBC 4.64 10*3/mm3      RBC 2.70 10*6/mm3      Hemoglobin 8.6 g/dL      Hematocrit 26.8 %      MCV 99.3 fL      MCH 31.9 pg      MCHC 32.1 g/dL      RDW 13.7 %      RDW-SD 49.6 fl      MPV 9.5 fL      Platelets 193 10*3/mm3      Neutrophil % 89.6 %      Lymphocyte % 6.9 %      Monocyte % 2.4 %      Eosinophil % 0.0 %      Basophil % 0.2 %      Immature Grans % 0.9 %      Neutrophils, Absolute 4.16 10*3/mm3      Lymphocytes, Absolute 0.32 10*3/mm3      Monocytes, Absolute 0.11 10*3/mm3      Eosinophils, Absolute 0.00 10*3/mm3      Basophils, Absolute 0.01 10*3/mm3      Immature Grans, Absolute 0.04 10*3/mm3      nRBC 0.0 /100 WBC     Blood Culture - Blood, Hand, Left [[id number]] Collected: 12/02/21 0241    Specimen: Blood from Hand, Left Updated: 12/02/21 0245    Blood Culture - Blood, Hand, Right [[id number]] Collected: 12/02/21 0225    Specimen: Blood from Hand, Right Updated: 12/02/21 0245    CRE Screen by PCR - Swab, Large Intestine, Rectum [[id number]] Collected: 12/02/21 0024    Specimen: Swab from Large Intestine, Rectum Updated: 12/02/21 0216     CRE SCREEN Not Detected     Comment: Test performed by real-time polymerase chain  reaction (qPCR).        OXA 48 Strain Not Detected     IMP STRAIN Not Detected     VIM STRAIN Not Detected     NDM Strain Not Detected     KPC Strain Not Detected    Urinalysis With Culture If Indicated - Urine, Clean Catch [[id number]]  (Abnormal) Collected: 12/02/21 0010    Specimen: Urine, Clean Catch Updated: 12/02/21 0117     Color, UA Yellow     Appearance, UA Clear     pH, UA 5.5     Specific Gravity, UA 1.020     Glucose,  mg/dL (Trace)     Ketones, UA Negative     Bilirubin, UA Negative     Blood, UA Trace     Protein,  mg/dL (2+)     Leuk Esterase, UA Negative     Nitrite, UA Negative     Urobilinogen, UA 0.2 E.U./dL    Urinalysis, Microscopic Only - Urine, Clean Catch [[id number]]  (Abnormal) Collected: 12/02/21 0010    Specimen: Urine, Clean Catch Updated: 12/02/21 0117     RBC, UA 0-2 /HPF      WBC, UA 21-30 /HPF      Bacteria, UA 4+ /HPF      Squamous Epithelial Cells, UA None Seen /HPF      Hyaline Casts, UA 7-12 /LPF      Methodology Automated Microscopy    Urine Culture - Urine, Urine, Clean Catch [[id number]] Collected: 12/02/21 0010    Specimen: Urine, Clean Catch Updated: 12/02/21 0117    Blood Gas, Arterial - [[id number]]  (Abnormal) Collected: 12/01/21 2225    Specimen: Arterial Blood Updated: 12/01/21 2235     Site Arterial Line     Serafin's Test N/A     pH, Arterial 7.013 pH units      Comment: 85 Value below critical limit        pCO2, Arterial >102.0 mm Hg      Comment: 93 Value above reportable range > 103        pO2, Arterial 440.0 mm Hg      Comment: 83 Value above reference range        HCO3, Arterial 32.2 mmol/L      Comment: 83 Value above reference range        Base Excess, Arterial -1.0 mmol/L      Comment: 84 Value below reference range        O2 Saturation, Arterial 99.8 %      Comment: 83 Value above reference range        Barometric Pressure for Blood Gas 747 mmHg      Modality NRB     Flow Rate 15.0 lpm      Ventilator Mode NA     Set Tidal Volume 100     Collected by NA      Comment: Meter: [id number]     :  [id number]       Extra Tubes [[id number]] Collected: 12/01/21 2011    Specimen: Blood, Venous Line Updated: 12/01/21 2115    Narrative:      The following orders were created for panel order Extra Tubes.  Procedure                               Abnormality         Status                     ---------                               -----------         ------                     Lavender Top[[id number]]                                     Final result               Gold Top - SST[[id number]]                                   Final result               Green Top (Gel)[[id number]]                                  Final result                 Please view results for these tests on the individual orders.    Gold Top - SST [[id number]] Collected: 12/01/21 2011    Specimen: Blood Updated: 12/01/21 2115     Extra Tube Hold for add-ons.     Comment: Auto resulted.       Green Top (Gel) [[id number]] Collected: 12/01/21 2011    Specimen: Blood Updated: 12/01/21 2115     Extra Tube Hold for add-ons.     Comment: Auto resulted.       Lavender Top [[id number]] Collected: 12/01/21 2011    Specimen: Blood Updated: 12/01/21 2115     Extra Tube hold for add-on     Comment: Auto resulted       aPTT [[id number]]  (Normal) Collected: 12/01/21 2011    Specimen: Blood Updated: 12/01/21 2034     PTT 32.7 seconds     Narrative:      The recommended Heparin therapeutic range is 68-97 seconds.    Protime-INR [[id number]]  (Normal) Collected: 12/01/21 2011    Specimen: Blood Updated: 12/01/21 2033     Protime 13.2 Seconds      INR 1.01    Narrative:      Therapeutic range for most indications is 2.0-3.0 INR,  or 2.5-3.5 for mechanical heart valves.    Blood Gas, Arterial - [[id number]]  (Abnormal) Collected: 12/01/21 1636    Specimen: Arterial Blood Updated: 12/01/21 1642     Site Right Radial     Serafin's Test N/A     pH, Arterial 7.349 pH units      Comment: 84 Value below reference range        pCO2,  Arterial 57.2 mm Hg      Comment: 83 Value above reference range        pO2, Arterial 316.0 mm Hg      Comment: 83 Value above reference range        HCO3, Arterial 31.5 mmol/L      Comment: 83 Value above reference range        Base Excess, Arterial 4.9 mmol/L      Comment: 83 Value above reference range        O2 Saturation, Arterial >100.0 %      Comment: 93 Value above reportable range > 100.0        Barometric Pressure for Blood Gas 746 mmHg      Modality Nasal Cannula     Flow Rate 2.0 lpm      Ventilator Mode NA     Collected by JENNIFFER TARANGO     Comment: Meter: [id number]     :  [id number]       Artesian Draw [[id number]] Collected: 12/01/21 1520    Specimen: Blood Updated: 12/01/21 1631    Narrative:      The following orders were created for panel order Artesian Draw.  Procedure                               Abnormality         Status                     ---------                               -----------         ------                     Green Top (Gel)[[id number]]                                  Final result               Lavender Top[[id number]]                                     Final result               Gold Top - SST[[id number]]                                   Final result               Light Blue Top[[id number]]                                   Final result                 Please view results for these tests on the individual orders.    Light Blue Top [[id number]] Collected: 12/01/21 1520    Specimen: Blood Updated: 12/01/21 1631     Extra Tube hold for add-on     Comment: Auto resulted       Green Top (Gel) [[id number]] Collected: 12/01/21 1520    Specimen: Blood Updated: 12/01/21 1631     Extra Tube Hold for add-ons.     Comment: Auto resulted.       Lavender Top [[id number]] Collected: 12/01/21 1520    Specimen: Blood Updated: 12/01/21 1631     Extra Tube hold for add-on     Comment: Auto resulted       Gold Top - SST [[id number]] Collected: 12/01/21 1520    Specimen: Blood Updated: 12/01/21  1631     Extra Tube Hold for add-ons.     Comment: Auto resulted.       COVID-19 and FLU A/B PCR - Swab, Nasopharynx [[id number]]  (Normal) Collected: 12/01/21 1533    Specimen: Swab from Nasopharynx Updated: 12/01/21 1619     COVID19 Not Detected     Influenza A PCR Not Detected     Influenza B PCR Not Detected    Narrative:      Fact sheet for providers: https://www.fda.gov/media/243495/download    Fact sheet for patients: https://www.fda.gov/media/151842/download    Test performed by PCR.    Comprehensive Metabolic Panel [[id number]]  (Abnormal) Collected: 12/01/21 1520    Specimen: Blood Updated: 12/01/21 1601     Glucose 120 mg/dL      BUN 15 mg/dL      Creatinine 0.96 mg/dL      Sodium 133 mmol/L      Potassium 4.7 mmol/L      Comment: Slight hemolysis detected by analyzer. Results may be affected.        Chloride 97 mmol/L      CO2 25.0 mmol/L      Calcium 8.8 mg/dL      Total Protein 6.8 g/dL      Albumin 3.60 g/dL      ALT (SGPT) 12 U/L      AST (SGOT) 25 U/L      Alkaline Phosphatase 76 U/L      Total Bilirubin 0.3 mg/dL      eGFR Non African Amer 55 mL/min/1.73      Globulin 3.2 gm/dL      A/G Ratio 1.1 g/dL      BUN/Creatinine Ratio 15.6     Anion Gap 11.0 mmol/L     Narrative:      GFR Normal >60  Chronic Kidney Disease <60  Kidney Failure <15      D-dimer, Quantitative [[id number]]  (Abnormal) Collected: 12/01/21 1520    Specimen: Blood Updated: 12/01/21 1559     D-Dimer, Quantitative 3,402 ng/mL (FEU)     Narrative:      Dimer values <500 ng/ml FEU are FDA approved as aid in diagnosis of deep venous thrombosis and pulmonary embolism.  This test should not be used in an exclusion strategy with pretest probability alone.    A recent guideline regarding diagnosis for pulmonary thromboembolism recommends an adjusted exclusion criterion of age x 10 ng/ml FEU for patients >50 years of age ([employer] Intern Med 2015; 163: 701-711).      Troponin [[id number]]  (Normal) Collected: 12/01/21 1520    Specimen: Blood  Updated: 12/01/21 1545     Troponin T <0.010 ng/mL     Narrative:      Troponin T Reference Range:  <= 0.03 ng/mL-   Negative for AMI  >0.03 ng/mL-     Abnormal for myocardial necrosis.  Clinicians would have to utilize clinical acumen, EKG, Troponin and serial changes to determine if it is an Acute Myocardial Infarction or myocardial injury due to an underlying chronic condition.       Results may be falsely decreased if patient taking Biotin.      BNP [[id number]]  (Normal) Collected: 12/01/21 1520    Specimen: Blood Updated: 12/01/21 1539     proBNP 1,640.0 pg/mL     Narrative:      Among patients with dyspnea, NT-proBNP is highly sensitive for the detection of acute congestive heart failure. In addition NT-proBNP of <300 pg/ml effectively rules out acute congestive heart failure with 99% negative predictive value.    Results may be falsely decreased if patient taking Biotin.      CBC & Differential [[id number]]  (Abnormal) Collected: 12/01/21 1520    Specimen: Blood Updated: 12/01/21 1522    Narrative:      The following orders were created for panel order CBC & Differential.  Procedure                               Abnormality         Status                     ---------                               -----------         ------                     CBC Auto Differential[[id number]]        Abnormal            Final result                 Please view results for these tests on the individual orders.    CBC Auto Differential [[id number]]  (Abnormal) Collected: 12/01/21 1520    Specimen: Blood Updated: 12/01/21 1522     WBC 6.13 10*3/mm3      RBC 2.98 10*6/mm3      Hemoglobin 9.6 g/dL      Hematocrit 28.7 %      MCV 96.3 fL      MCH 32.2 pg      MCHC 33.4 g/dL      RDW 13.8 %      RDW-SD 48.4 fl      MPV 9.8 fL      Platelets 225 10*3/mm3      Neutrophil % 64.6 %      Lymphocyte % 19.7 %      Monocyte % 9.0 %      Eosinophil % 5.7 %      Basophil % 0.5 %      Immature Grans % 0.5 %      Neutrophils, Absolute 3.96  10*3/mm3      Lymphocytes, Absolute 1.21 10*3/mm3      Monocytes, Absolute 0.55 10*3/mm3      Eosinophils, Absolute 0.35 10*3/mm3      Basophils, Absolute 0.03 10*3/mm3      Immature Grans, Absolute 0.03 10*3/mm3      nRBC 0.0 /100 WBC           Imaging Results (Last 24 Hours)     Procedure Component Value Units Date/Time    US Venous Doppler Lower Extremity Bilateral (duplex) [[id number]] Collected: 12/02/21 0252     Updated: 12/02/21 0456    Addenda:         ADDENDUM   ADDENDUM #1       THIS REPORT CONTAINS FINDINGS THAT MAY BE CRITICAL TO PATIENT  CARE: Notification that the physician was unable to speak on the  phone occurred at 12/2/2021 4:55 AM CST. I discussed the findings  with Nurse  Jeannine Winter   who agreed to take the results on the  phone on behalf of the physician, and acknowledges their critical  nature.    Electronically signed by:  Aliza Capone MD  12/2/2021 4:55 AM  CST Workstation: [id number]    Signed: 12/02/21 0455 by Aliza Capone    Narrative:      EXAM:    US Duplex Bilateral Lower Extremities Veins    CLINICAL HISTORY:    The patient is 89 years old and is Female; Rule out DVT, I26.99  Other pulmonary embolism without acute cor pulmonale J96.02 Acute  respiratory failure with hypercapnia    TECHNIQUE:    Real-time duplex ultrasound scan of the bilateral lower  extremity veins integrating B-mode two-dimensional vascular  structure, Doppler spectral analysis, color flow Doppler imaging  and compression.    COMPARISON:    No relevant prior studies available.    FINDINGS:    RIGHT DEEP VEINS: Occlusive thrombus is present within the  posterior tibial vein.  No DVT in the right common femoral,  femoral, proximal deep femoral or popliteal veins.  The veins  demonstrate normal color flow, are normally compressible, with  normal phasic flow and/or augmentation response.    RIGHT SUPERFICIAL VEINS:  Unremarkable.  No thrombus in the  visualized right great saphenous vein.      LEFT DEEP VEINS:  Occlusive thrombus is present within the  posterior tibial vein.  Nonocclusive thrombus within the mid left  femoral vein is also present. No DVT in the left common femoral,  remainder of the femoral, proximal deep femoral or popliteal  veins.  The veins demonstrate normal color flow, are normally  compressible, with normal phasic flow and/or augmentation  response.    LEFT SUPERFICIAL VEINS:  Unremarkable.  No thrombus in the  visualized left great saphenous vein.      SOFT TISSUES:  No acute findings.  No popliteal cyst.      Impression:      1.  Bilateral occlusive DVT within the posterior tibial veins.  2.  Nonocclusive thrombus within the mid left femoral vein.    Electronically signed by:  Aliza Capone MD  12/2/2021 4:48 AM  CST Workstation: [id number]    CT Head Without Contrast [[id number]] Collected: 12/01/21 1956     Updated: 12/01/21 2010    Narrative:      EXAM DESCRIPTION:     CT HEAD WO CONTRAST    CLINICAL HISTORY:   AMS    TECHNIQUE:     5mm slice thickness axial images through the brain were performed  in bone and soft tissue windows in the absence of intravenous  contrast.  This exam was performed according to our departmental  dose-optimization program which includes use of Automated  Exposure Control, adjustment of the mA and/or kV according to  patient size and/or use of iterative reconstruction technique.     COMPARISON:     November 7, 2020    FINDINGS:     The brain parenchyma appears unremarkable. There is no  intra-axial or extra-axial bleed seen. There is no mass or mass  effect. The ventricles are normal in size shape and  configuration. The orbital contents appear unremarkable.    The visualized paranasal sinuses and mastoid air cells are  patent. No fracture is present.      Impression:        No acute intracranial abnormality identified.    Electronically signed by:  Yasir Hess MD, MBA  12/1/2021  8:09 PM CST Workstation: [id number]    CT Angiogram Chest [[id number]] Collected:  12/01/21 1841     Updated: 12/01/21 1919    Narrative:      EXAM:  CT CHEST ANGIOGRAPHY WITH IV CONTRAST    ORDERING PROVIDER:  ROSY JUAREZ    CLINICAL HISTORY:  Shortness of breath    COMPARISON:  1/7/2018    TECHNIQUE:   Chest CT was performed using a high resolution pulmonary  angiogram protocol with 71ml of Isovue 370 as IV contrast and  reformatted in the sagittal and coronal planes.     3-dimensional images also acquired with special processing of the  CT scan data with a specialized workstation for evaluation.    This examination was performed according to our departmental dose  optimization program which includes automated exposure control,  adjustment of the MA and kV according to patient size, and/or use  of iterative reconstruction technique.     FINDINGS:     LUNGS AND PLEURA: Nonspecific parenchymal density in the right  lower lobe medially on image 32 not present on prior, measuring  1.6 x 1.3 x 2.3 cm, and correlation with PET CT scan is  recommended to exclude possible neoplastic process. Diffuse  centrilobular emphysematous changes of the lung parenchyma.  Hyperinflation due to COPD. No evidence of pleural effusion. No  pneumothorax.    HEART: Normal size and configuration. No pericardial effusion.     MEDIASTINUM AND PRISCILLA: No significant adenopathy.     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 No gross dissection.     PULMONARY ARTERIES: Peripheral filling defects in branches  supplying the right middle lobe and left upper lobe consistent  with pulmonary embolism.     UPPER ABDOMEN: Unremarkable.    MUSCULOSKELETAL:  Multilevel degenerative disc disease. Normal  vertebral body height and alignment. No lytic or sclerotic  lesion.     EXTRATHORACIC SOFT TISSUES: No axillary adenopathy. No mass.  Unremarkable supraclavicular soft tissues.       Impression:      1.  Peripheral filling defects in branches supplying the right  middle lobe and left  upper lobe consistent with pulmonary  embolism.   2.  Nonspecific parenchymal density in the right lower lobe  medially on image 32 measuring 1.6 x 1.3 x 2.3 cm, and  correlation with PET CT scan is recommended to exclude possible  neoplastic process.   3.  Diffuse centrilobular emphysematous changes of the lung  parenchyma.   4.  Hyperinflation due to COPD.   5.  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     Electronically signed by:  Trever Harris MD  12/1/2021 7:18 PM CST  Workstation: [id number]    XR Chest 2 View [[id number]] Collected: 12/01/21 1540     Updated: 12/01/21 1617    Narrative:      PA and lateral chest x-ray December 1, 2021    INDICATION: Shortness of breath    FINDINGS:  Chronic changes diffusely stable.  No acute infiltrates, congestive changes, pneumothorax or pleural  fluid seen at this time.  Cardiac pacer wires remain in place. Wires appear intact.  The heart is not enlarged.      Impression:      Chronic changes stable.  No significant acute cardiopulmonary process seen at this time.    Electronically signed by:  Kirill Sheikh MD  12/1/2021 4:16 PM  CST Workstation: [id number]          Assessment/Plan       Acute pulmonary embolism (HCC)    Acute on chronic respiratory failure with hypoxia and hypercapnia (HCC)    DVT, bilateral lower limbs (HCC)    Chronic obstructive pulmonary disease (HCC)    Uncontrolled hypertension    Continue with the IV Heparin, respiratory support, BiPAP with the help of maintaining an adequate oxygen saturation. Will discuss with her son/POA later on today about possible comfort measures.    I confirmed that the patient's Advance Care Plan is present, code status is documented, or surrogate decision maker is listed in the patient's medical record.      Devante Hilliard MD  12/02/21  14:12 CST

## 2021-12-02 NOTE — PLAN OF CARE
Goal Outcome Evaluation:              Pt was unresponsive upon arrival to CCU. Placed on bipap and eventually became alert and oriented. Currently resting on 2 liters NC. Did have dyspnea and PRN ativan ordered and given. Had to be in and out cathed x 1 through the PM. On warming blanket currently, temp 95 upon arrival to CCU.

## 2021-12-02 NOTE — CONSULTS
Adult Nutrition  Assessment    Patient Name:  Val Arteaga  YOB: 1932  MRN: [id number]  Admit Date:  12/1/2021    Assessment Date:  12/2/2021    Comments:  RD consult r/t MST and BMI noted at 15---per weights recorded, pt normally weighs ~140#. There are two weights recorded on 12/1, one at 142# and one at 104#---I believe the 104 was recorded incorrectly, will monitor. Pt is a nursing home res admitted r/t respiratory distress, PE and DVTs. Currently requiring bipap and being kept NPO at this time. Also requiring warming blanket d/t low body temp. RD will monitor course and make recs accordingly.      Reason for Assessment     Row Name 12/02/21 0926          Reason for Assessment    Reason For Assessment identified at risk by screening criteria     Diagnosis hematological/related complications; pulmonary disease     Identified At Risk by Screening Criteria MST SCORE 2+                Nutrition/Diet History     Row Name 12/02/21 0929          Nutrition/Diet History    Typical Food/Fluid Intake Pt resting on bipap and covered w/warming blanket.                  Labs/Tests/Procedures/Meds     Row Name 12/02/21 0930          Labs/Procedures/Meds    Lab Results Reviewed reviewed, pertinent     Lab Results Comments Na 133, Gl 170, crea 1.08            Medications    Pertinent Medications Reviewed reviewed, pertinent     Pertinent Medications Comments heparin                Physical Findings     Row Name 12/02/21 0930          Physical Findings    Overall Physical Appearance on oxygen therapy                Estimated/Assessed Needs     Row Name 12/02/21 0930          Calculation Measurements    Weight Used For Calculations 63.5 kg (140 lb)            Estimated/Assessed Needs    Additional Documentation Protein Requirements (Group); KCAL/KG (Group); Fluid Requirements (Group)            KCAL/KG    KCAL/KG 25 Kcal/Kg (kcal)     25 Kcal/Kg (kcal) 1587.6            Protein Requirements    Weight Used For  Protein Calculations 63.5 kg (140 lb)     Est Protein Requirement Amount (gms/kg) 0.8 gm protein     Estimated Protein Requirements (gms/day) 50.8            Fluid Requirements    Fluid Requirements (mL/day) 1500     RDA Method (mL) 1500                Nutrition Prescription Ordered     Row Name 12/02/21 0931          Nutrition Prescription PO    Current PO Diet NPO                       Electronically signed by:  Ludmila Small RD  12/02/21 09:34 CST

## 2021-12-02 NOTE — H&P
[facility] Medicine Admission      Date of Admission: 12/1/2021      Primary Care Physician: Vero Arteaga APRN      Chief Complaint: Shortness of breath    HPI:  Patient is a 88-year-old female resident of a skilled nursing facility with known past medical history of chronic diastolic heart failure, myocardial infarction, COPD on 3 L oxygen outpatient, hypertension, hyperlipidemia, hypothyroidism, who was brought to the ER for acute on chronic shortness of breath.  Patient was given 125 mg Solu-Medrol and DuoNeb in route to ED by EMS.  In ED she was diagnosed with pulmonary embolism.  Post CT of the chest.  Patient was increasingly confused per report.  Patient is DNR/DNI per report.  Patient was placed on heparin drip.  Hospitalist service was called for admission of the patient.  Patient was seen and examined in ED room 20.  her niece is at bedside.  Patient does not provide any information.  Per her niece patient has been in nursing home for 2 and half weeks.  Per her niece patient has a son who lives in [place].  Per niece patient has been DNR and DNI, and that has been the patient of the patient for a long time.  Per niece patient brought to the ER for worsening shortness of breath.   no other specific information available.    Concurrent Medical History:  has a past medical history of Allergic rhinitis, Chronic diastolic heart failure (HCC), COPD (chronic obstructive pulmonary disease) (HCC), Coronary arteriosclerosis, Depression, GERD (gastroesophageal reflux disease), Hypercholesterolemia, Hypertension, Hypothyroidism, LVH (left ventricular hypertrophy), Myocardial infarction (HCC), and Osteoarthritis.    Past Surgical History:  has a past surgical history that includes Coronary angioplasty; Cardiac catheterization; endoscopy and colonoscopy; Colonoscopy; Breast surgery; Cervical spine surgery; Pacemaker Replacement; Hip Bipolar (Right, 1/23/2018); and  Esophagogastroduodenoscopy (N/A, 2/14/2018).    Family History: family history includes Coronary artery disease in her father.     Social History:  reports that she quit smoking about 4 years ago. Her smoking use included cigarettes. She started smoking about 71 years ago. She has a 25.00 pack-year smoking history. She has never used smokeless tobacco. She reports that she does not drink alcohol and does not use drugs.    Allergies:   Allergies   Allergen Reactions   • Ace Inhibitors Dizziness   • Baclofen Delirium     unknown   • Lisinopril Cough     Keeps her awake all night coughing       Medications:   Prior to Admission medications    Medication Sig Start Date End Date Taking? Authorizing Provider   acetaminophen (TYLENOL) 325 MG tablet Take 2 tablets by mouth Every 4 (Four) Hours As Needed for Mild Pain . 2/5/18   Shawn Maldonado MD   aspirin 81 MG chewable tablet Chew 1 tablet Daily. 11/13/20   Devante Hilliard MD   budesonide (Pulmicort) 0.5 MG/2ML nebulizer solution Take 2 mL by nebulization Daily. 4/28/21   Vero Arteaga APRN   carvedilol (COREG) 6.25 MG tablet TAKE 1 TABLET BY MOUTH 2 (TWO) TIMES A DAY WITH MEALS. 5/13/21   Vero Arteaga APRN   dilTIAZem (CARDIZEM) 30 MG tablet Take 30 mg by mouth Daily As Needed. 3/11/21   Orlando Lerma MD   dilTIAZem (CARDIZEM) 60 MG tablet Take 1 tablet by mouth Every 8 (Eight) Hours. 11/13/21   Alex Shaikh MD   docusate sodium (COLACE) 100 MG capsule Take 1 capsule by mouth 2 (Two) Times a Day.  Patient taking differently: Take 100 mg by mouth 2 (Two) Times a Day As Needed for Constipation. 3/29/19   Lanie Jain MD   esomeprazole (nexIUM) 40 MG capsule TAKE 1 CAPSULE BY MOUTH EVERY MORNING 7/7/21   Vero Arteaga APRN   famotidine (PEPCID) 20 MG tablet Take 40 mg by mouth Daily. 11/17/20   Orlando Lerma MD   folic acid-vit B6-vit B12 (FOLBEE) 2.5-25-1 MG tablet tablet Take 1 tablet by mouth Daily. 1/22/19   Vero Arteaga,  CRESENCIO   ipratropium-albuterol (DUO-NEB) 0.5-2.5 mg/3 ml nebulizer Take 3 mL by nebulization As Needed for Wheezing. 4/28/21   Vero Arteaga APRN   levothyroxine (SYNTHROID, LEVOTHROID) 50 MCG tablet TAKE 1 TABLET BY MOUTH DAILY. 7/26/21   Vero Arteaga APRN   LORazepam 1 mg/mL in Ora-Plus-ora sweet-water Take 3.2 mL by mouth Every 6 (Six) Hours As Needed (Anxiety/agitation). 11/12/21   Alex Shaikh MD   losartan (COZAAR) 100 MG tablet Take 1 tablet by mouth Daily. 11/14/21   Alex Shaikh MD   melatonin 3 MG tablet Take 1 tablet by mouth Every Night. 11/12/21   Alex Shaikh MD   montelukast (SINGULAIR) 10 MG tablet TAKE 1 TABLET BY MOUTH EVERY NIGHT. 4/5/21   Vero Arteaga APRN   morphine 20 MG/ML concentrated solution Take 0.1 mL by mouth Every 4 (Four) Hours As Needed for Moderate Pain  (dyspena). 11/12/21   Alex Shaikh MD   polyethylene glycol (MIRALAX) powder MIX 1 CAPFUL (17G) IN 8 OUNCES OF LIQUID AND DRINK BY MOUTH EVERY DAY FOR CONSTIPATION  Patient taking differently: Take 17 g by mouth Daily As Needed. 6/19/18   Roula Albert MD   ranolazine (RANEXA) 500 MG 12 hr tablet TAKE 1 TABLET BY MOUTH 2 (TWO) TIMES A DAY. 4/26/21   Vero Arteaga APRN   sertraline (ZOLOFT) 100 MG tablet TAKE 1 TABLET BY MOUTH DAILY. 4/30/21   Vero Arteaga APRN   sodium bicarbonate 650 MG tablet Take 1 tablet by mouth 2 (Two) Times a Day. 8/11/20   Vero Arteaga APRN   traMADol (ULTRAM) 50 MG tablet Take 1 tablet by mouth Every 6 (Six) Hours As Needed for Moderate Pain . 6/11/19   Vero Arteaga APRN       Review of Systems:  Review of Systems   Review of systems not obtainable from the patient.  Patient is on nonrebreather, does not answers questions.    Physical Exam:   Temp:  [98.5 °F (36.9 °C)] 98.5 °F (36.9 °C)  Heart Rate:  [66-94] 84  Resp:  [25-37] 26  BP: (166-203)/(75-84) 181/79  Physical Exam  Constitutional:       General: She is in acute distress.      Appearance: She is normal  weight. She is ill-appearing. She is not toxic-appearing or diaphoretic.      Comments: Elderly, acute on chronic ill-appearing   HENT:      Head: Normocephalic and atraumatic.      Right Ear: External ear normal.      Left Ear: External ear normal.      Nose: Nose normal.      Mouth/Throat:      Mouth: Mucous membranes are moist.      Pharynx: Oropharynx is clear.   Eyes:      Extraocular Movements: Extraocular movements intact.      Conjunctiva/sclera: Conjunctivae normal.      Pupils: Pupils are equal, round, and reactive to light.   Cardiovascular:      Rate and Rhythm: Regular rhythm. Tachycardia present.      Heart sounds: No murmur heard.  No friction rub. No gallop.    Pulmonary:      Effort: No respiratory distress.      Breath sounds: No stridor. No wheezing or rales.      Comments: Decreased breath sounds, tachypneic  Chest:      Chest wall: No tenderness.   Abdominal:      General: Abdomen is flat. There is no distension.      Palpations: Abdomen is soft.      Tenderness: There is no abdominal tenderness. There is no guarding or rebound.   Musculoskeletal:         General: No swelling or tenderness.      Cervical back: No rigidity or tenderness.      Right lower leg: Right lower leg edema: trace.      Left lower leg: Edema (trace) present.   Lymphadenopathy:      Cervical: No cervical adenopathy.   Skin:     General: Skin is warm and dry.      Coloration: Skin is not jaundiced.      Findings: No erythema.   Neurological:      Mental Status: She is alert. She is disoriented.      Sensory: No sensory deficit.      Motor: No weakness.      Coordination: Coordination normal.   Psychiatric:      Comments: Not obtainable           Results Reviewed:  I have personally reviewed current lab, radiology, and data and agree with results.  Lab Results (last 24 hours)       Procedure Component Value Units Date/Time    Extra Tubes [[id number]] Collected: 12/01/21 2011    Specimen: Blood, Venous Line Updated: 12/01/21  2115    Narrative:      The following orders were created for panel order Extra Tubes.  Procedure                               Abnormality         Status                     ---------                               -----------         ------                     Lavender Top[[id number]]                                     Final result               Gold Top - SST[[id number]]                                   Final result               Green Top (Gel)[[id number]]                                  Final result                 Please view results for these tests on the individual orders.    Gold Top - SST [[id number]] Collected: 12/01/21 2011    Specimen: Blood Updated: 12/01/21 2115     Extra Tube Hold for add-ons.     Comment: Auto resulted.       Green Top (Gel) [[id number]] Collected: 12/01/21 2011    Specimen: Blood Updated: 12/01/21 2115     Extra Tube Hold for add-ons.     Comment: Auto resulted.       Lavender Top [[id number]] Collected: 12/01/21 2011    Specimen: Blood Updated: 12/01/21 2115     Extra Tube hold for add-on     Comment: Auto resulted       aPTT [[id number]]  (Normal) Collected: 12/01/21 2011    Specimen: Blood Updated: 12/01/21 2034     PTT 32.7 seconds     Narrative:      The recommended Heparin therapeutic range is 68-97 seconds.    Protime-INR [[id number]]  (Normal) Collected: 12/01/21 2011    Specimen: Blood Updated: 12/01/21 2033     Protime 13.2 Seconds      INR 1.01    Narrative:      Therapeutic range for most indications is 2.0-3.0 INR,  or 2.5-3.5 for mechanical heart valves.    Blood Gas, Arterial - [[id number]]  (Abnormal) Collected: 12/01/21 1636    Specimen: Arterial Blood Updated: 12/01/21 1642     Site Right Radial     Serafin's Test N/A     pH, Arterial 7.349 pH units      Comment: 84 Value below reference range        pCO2, Arterial 57.2 mm Hg      Comment: 83 Value above reference range        pO2, Arterial 316.0 mm Hg      Comment: 83 Value above reference range        HCO3, Arterial 31.5  mmol/L      Comment: 83 Value above reference range        Base Excess, Arterial 4.9 mmol/L      Comment: 83 Value above reference range        O2 Saturation, Arterial >100.0 %      Comment: 93 Value above reportable range > 100.0        Barometric Pressure for Blood Gas 746 mmHg      Modality Nasal Cannula     Flow Rate 2.0 lpm      Ventilator Mode NA     Collected by JENNIFFER TARANGO     Comment: Meter: [id number]     :  [id number]       [facility] Draw [[id number]] Collected: 12/01/21 1520    Specimen: Blood Updated: 12/01/21 1631    Narrative:      The following orders were created for panel order [place] Draw.  Procedure                               Abnormality         Status                     ---------                               -----------         ------                     Green Top (Gel)[[id number]]                                  Final result               Lavender Top[[id number]]                                     Final result               Gold Top - SST[[id number]]                                   Final result               Light Blue Top[[id number]]                                   Final result                 Please view results for these tests on the individual orders.    Light Blue Top [[id number]] Collected: 12/01/21 1520    Specimen: Blood Updated: 12/01/21 1631     Extra Tube hold for add-on     Comment: Auto resulted       Green Top (Gel) [[id number]] Collected: 12/01/21 1520    Specimen: Blood Updated: 12/01/21 1631     Extra Tube Hold for add-ons.     Comment: Auto resulted.       Lavender Top [[id number]] Collected: 12/01/21 1520    Specimen: Blood Updated: 12/01/21 1631     Extra Tube hold for add-on     Comment: Auto resulted       Gold Top - SST [[id number]] Collected: 12/01/21 1520    Specimen: Blood Updated: 12/01/21 1631     Extra Tube Hold for add-ons.     Comment: Auto resulted.       COVID-19 and FLU A/B PCR - Swab, Nasopharynx [[id number]]  (Normal) Collected: 12/01/21 1533     Specimen: Swab from Nasopharynx Updated: 12/01/21 1619     COVID19 Not Detected     Influenza A PCR Not Detected     Influenza B PCR Not Detected    Narrative:      Fact sheet for providers: https://www.fda.gov/media/681762/download    Fact sheet for patients: https://www.fda.gov/media/148209/download    Test performed by PCR.    Comprehensive Metabolic Panel [[id number]]  (Abnormal) Collected: 12/01/21 1520    Specimen: Blood Updated: 12/01/21 1601     Glucose 120 mg/dL      BUN 15 mg/dL      Creatinine 0.96 mg/dL      Sodium 133 mmol/L      Potassium 4.7 mmol/L      Comment: Slight hemolysis detected by analyzer. Results may be affected.        Chloride 97 mmol/L      CO2 25.0 mmol/L      Calcium 8.8 mg/dL      Total Protein 6.8 g/dL      Albumin 3.60 g/dL      ALT (SGPT) 12 U/L      AST (SGOT) 25 U/L      Alkaline Phosphatase 76 U/L      Total Bilirubin 0.3 mg/dL      eGFR Non African Amer 55 mL/min/1.73      Globulin 3.2 gm/dL      A/G Ratio 1.1 g/dL      BUN/Creatinine Ratio 15.6     Anion Gap 11.0 mmol/L     Narrative:      GFR Normal >60  Chronic Kidney Disease <60  Kidney Failure <15      D-dimer, Quantitative [[id number]]  (Abnormal) Collected: 12/01/21 1520    Specimen: Blood Updated: 12/01/21 1559     D-Dimer, Quantitative 3,402 ng/mL (FEU)     Narrative:      Dimer values <500 ng/ml FEU are FDA approved as aid in diagnosis of deep venous thrombosis and pulmonary embolism.  This test should not be used in an exclusion strategy with pretest probability alone.    A recent guideline regarding diagnosis for pulmonary thromboembolism recommends an adjusted exclusion criterion of age x 10 ng/ml FEU for patients >50 years of age ([employer] Intern Med 2015; 163: 701-711).      Troponin [[id number]]  (Normal) Collected: 12/01/21 1520    Specimen: Blood Updated: 12/01/21 1545     Troponin T <0.010 ng/mL     Narrative:      Troponin T Reference Range:  <= 0.03 ng/mL-   Negative for AMI  >0.03 ng/mL-     Abnormal for  myocardial necrosis.  Clinicians would have to utilize clinical acumen, EKG, Troponin and serial changes to determine if it is an Acute Myocardial Infarction or myocardial injury due to an underlying chronic condition.       Results may be falsely decreased if patient taking Biotin.      BNP [[id number]]  (Normal) Collected: 12/01/21 1520    Specimen: Blood Updated: 12/01/21 1539     proBNP 1,640.0 pg/mL     Narrative:      Among patients with dyspnea, NT-proBNP is highly sensitive for the detection of acute congestive heart failure. In addition NT-proBNP of <300 pg/ml effectively rules out acute congestive heart failure with 99% negative predictive value.    Results may be falsely decreased if patient taking Biotin.      CBC & Differential [[id number]]  (Abnormal) Collected: 12/01/21 1520    Specimen: Blood Updated: 12/01/21 1522    Narrative:      The following orders were created for panel order CBC & Differential.  Procedure                               Abnormality         Status                     ---------                               -----------         ------                     CBC Auto Differential[[id number]]        Abnormal            Final result                 Please view results for these tests on the individual orders.    CBC Auto Differential [[id number]]  (Abnormal) Collected: 12/01/21 1520    Specimen: Blood Updated: 12/01/21 1522     WBC 6.13 10*3/mm3      RBC 2.98 10*6/mm3      Hemoglobin 9.6 g/dL      Hematocrit 28.7 %      MCV 96.3 fL      MCH 32.2 pg      MCHC 33.4 g/dL      RDW 13.8 %      RDW-SD 48.4 fl      MPV 9.8 fL      Platelets 225 10*3/mm3      Neutrophil % 64.6 %      Lymphocyte % 19.7 %      Monocyte % 9.0 %      Eosinophil % 5.7 %      Basophil % 0.5 %      Immature Grans % 0.5 %      Neutrophils, Absolute 3.96 10*3/mm3      Lymphocytes, Absolute 1.21 10*3/mm3      Monocytes, Absolute 0.55 10*3/mm3      Eosinophils, Absolute 0.35 10*3/mm3      Basophils, Absolute 0.03 10*3/mm3       Immature Grans, Absolute 0.03 10*3/mm3      nRBC 0.0 /100 WBC           Imaging Results (Last 24 Hours)       Procedure Component Value Units Date/Time    CT Head Without Contrast [[id number]] Collected: 12/01/21 1956     Updated: 12/01/21 2010    Narrative:      EXAM DESCRIPTION:     CT HEAD WO CONTRAST    CLINICAL HISTORY:   AMS    TECHNIQUE:     5mm slice thickness axial images through the brain were performed  in bone and soft tissue windows in the absence of intravenous  contrast.  This exam was performed according to our departmental  dose-optimization program which includes use of Automated  Exposure Control, adjustment of the mA and/or kV according to  patient size and/or use of iterative reconstruction technique.     COMPARISON:     November 7, 2020    FINDINGS:     The brain parenchyma appears unremarkable. There is no  intra-axial or extra-axial bleed seen. There is no mass or mass  effect. The ventricles are normal in size shape and  configuration. The orbital contents appear unremarkable.    The visualized paranasal sinuses and mastoid air cells are  patent. No fracture is present.      Impression:        No acute intracranial abnormality identified.    Electronically signed by:  Yasir Hess MD, AP  12/1/2021  8:09 PM CST Workstation: [id number]    CT Angiogram Chest [[id number]] Collected: 12/01/21 1841     Updated: 12/01/21 1919    Narrative:      EXAM:  CT CHEST ANGIOGRAPHY WITH IV CONTRAST    ORDERING PROVIDER:  ROSY JUAREZ    CLINICAL HISTORY:  Shortness of breath    COMPARISON:  1/7/2018    TECHNIQUE:   Chest CT was performed using a high resolution pulmonary  angiogram protocol with 71ml of Isovue 370 as IV contrast and  reformatted in the sagittal and coronal planes.     3-dimensional images also acquired with special processing of the  CT scan data with a specialized workstation for evaluation.    This examination was performed according to our departmental dose  optimization  program which includes automated exposure control,  adjustment of the MA and kV according to patient size, and/or use  of iterative reconstruction technique.     FINDINGS:     LUNGS AND PLEURA: Nonspecific parenchymal density in the right  lower lobe medially on image 32 not present on prior, measuring  1.6 x 1.3 x 2.3 cm, and correlation with PET CT scan is  recommended to exclude possible neoplastic process. Diffuse  centrilobular emphysematous changes of the lung parenchyma.  Hyperinflation due to COPD. No evidence of pleural effusion. No  pneumothorax.    HEART: Normal size and configuration. No pericardial effusion.     MEDIASTINUM AND PRICSILLA: No significant adenopathy.     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 No gross dissection.     PULMONARY ARTERIES: Peripheral filling defects in branches  supplying the right middle lobe and left upper lobe consistent  with pulmonary embolism.     UPPER ABDOMEN: Unremarkable.    MUSCULOSKELETAL:  Multilevel degenerative disc disease. Normal  vertebral body height and alignment. No lytic or sclerotic  lesion.     EXTRATHORACIC SOFT TISSUES: No axillary adenopathy. No mass.  Unremarkable supraclavicular soft tissues.       Impression:      1.  Peripheral filling defects in branches supplying the right  middle lobe and left upper lobe consistent with pulmonary  embolism.   2.  Nonspecific parenchymal density in the right lower lobe  medially on image 32 measuring 1.6 x 1.3 x 2.3 cm, and  correlation with PET CT scan is recommended to exclude possible  neoplastic process.   3.  Diffuse centrilobular emphysematous changes of the lung  parenchyma.   4.  Hyperinflation due to COPD.   5.  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     Electronically signed by:  Trever Harris MD  12/1/2021 7:18 PM CST  Workstation: [id number]    XR Chest 2 View [[id number]] Collected: 12/01/21 1540     Updated: 12/01/21 1617    Narrative:    "   PA and lateral chest x-ray December 1, 2021    INDICATION: Shortness of breath    FINDINGS:  Chronic changes diffusely stable.  No acute infiltrates, congestive changes, pneumothorax or pleural  fluid seen at this time.  Cardiac pacer wires remain in place. Wires appear intact.  The heart is not enlarged.      Impression:      Chronic changes stable.  No significant acute cardiopulmonary process seen at this time.    Electronically signed by:  Kirill Sheikh MD  12/1/2021 4:16 PM  CST Workstation: [id number]              Assessment:    Active Hospital Problems    Diagnosis    • Acute pulmonary embolism without acute cor pulmonale (HCC)      # Acute on chronic hypoxemic, hypercapnic respiratory failure   # Pulmonary embolism  # Altered mental status possible respiratory etiology  # Elevated D-dimer level  # COPD  # Chronic diastolic heart failure   # Uncontrolled hypertension   # Chronic anemia  # Bifascicular block, right bundle branch block  #History of pacemaker placement  #Hypothyroidism on Synthroid    Addendum:  # Abnormal UA concerning urinary tract infection: Placed on Rocephin  # Respiratory acidosis with severe hypercapnia, hypoxemic respiratory failure: Placed on BiPAP, patient is DNR/DNI     Addendum:  # Bilateral DVT lower extremities  -Doppler ultrasound \"1.  Bilateral occlusive DVT within the posterior tibial veins.2.  Nonocclusive thrombus within the mid left femoral vein.  -Patient is on heparin drip.          Plan:  Placed on telemetry  Obtain further laboratory work-up as needed  Obtain ABG  Doppler ultrasound of lower extremities  Obtain Echo  Continue heparin drip  DuoNeb as needed,  Supplemental O2, NIPPV, BiPAP as needed  Obtain UA and if UA abnormal will place any antibiotic pending culture results  Reconcile home medication continue with essential home medication  Please see orders for comprehensive plan  Prognosis guarded patient at high risk of medical complications    I confirmed that " the patient's Advance Care Plan is present, code status is documented, or surrogate decision maker is listed in the patient's medical record.     I have utilized all available immediate resources to obtain, update, or review the patient's current medications.   DNR/DNI per her niece's report.  Patient unable to contribute to this discussion.    I discussed the patient's findings and my recommendations with: Patient as much as possible, primarily with niece.      Saeid Behroozi, MD   12/01/21   21:31 CST

## 2021-12-03 PROBLEM — N39.0 UTI (URINARY TRACT INFECTION): Status: ACTIVE | Noted: 2021-01-01

## 2021-12-03 NOTE — DISCHARGE SUMMARY
Discharge Summary  Devante Hilliard MD  Hospitalist     Date of Discharge:  12/3/2021    Discharge Diagnosis: Acute pulmonary embolism    Current Conditions:      Acute pulmonary embolism (HCC)    Acute on chronic respiratory failure with hypoxia and hypercapnia (HCC)    DVT, bilateral lower limbs (HCC)    Chronic obstructive pulmonary disease (HCC)    Uncontrolled hypertension    UTI (urinary tract infection)       Hospital Course:    Patient is a 89 y.o. female admitted for severe respiratory distress / acute respiratory failure due to an acute pulmonary embolism. Given her age, severe COPD and poor overall condition her son decided to provide her with comfort care only. She  peacefully at 19.03 on 2021.    Consults:   Consults     No orders found from 2021 to 2021.          Devante Hilliard MD  21  19:26 CST

## 2021-12-03 NOTE — PLAN OF CARE
Goal Outcome Evaluation:  Plan of Care Reviewed With: son           Outcome Summary: PT continues to be on bipap. Son has been at bedside all night. Patient is very resistant to turning off of her right side. PRN ativan for restlessness, VSS, will continue to monitor.

## 2021-12-03 NOTE — PROGRESS NOTES
Progress Note  Devante Hilliard MD  Hospitalist    Date of visit: 12/3/2021     LOS: 2 days   Patient Care Team:  Vero Arteaga APRN as PCP - General (Nurse Practitioner)    Chief Complaint: Generalized weakness    Subjective     Interval History:     Patient Complaints: Generalized weakness, shortness of breath, lethargy.    History taken from: Nursing    Medication Review:   Current Facility-Administered Medications   Medication Dose Route Frequency Provider Last Rate Last Admin   • LORazepam (ATIVAN) injection 1 mg  1 mg Intravenous Q4H PRN Devante Hilliard MD       • morphine injection 4 mg  4 mg Intravenous Q2H PRN Devante Hilliard MD       • sodium chloride 0.9 % flush 10 mL  10 mL Intravenous PRN Mark Thompson MD   10 mL at 12/01/21 1514   • sodium chloride 0.9 % flush 10 mL  10 mL Intravenous Q12H Behroozi, Saeid, MD   10 mL at 12/03/21 0909   • sodium chloride 0.9 % flush 10 mL  10 mL Intravenous PRN Behroozi, Saeid, MD           Review of Systems:   Review of Systems   Unable to perform ROS: Patient nonverbal   Constitutional: Positive for fatigue. Negative for fever.   Respiratory: Positive for cough and shortness of breath.    Skin: Positive for pallor.   Neurological: Positive for weakness.   Psychiatric/Behavioral: Positive for confusion and decreased concentration.   All other systems reviewed and are negative.      Objective     Vital Signs  Temp:  [97 °F (36.1 °C)-98.8 °F (37.1 °C)] 97 °F (36.1 °C)  Heart Rate:  [] 98  Resp:  [17-30] 24  BP: ()/(52-90) 164/76    Physical Exam:  Physical Exam  Vitals reviewed.   Constitutional:       Appearance: She is ill-appearing.   HENT:      Head: Normocephalic and atraumatic.   Eyes:      General: No scleral icterus.     Extraocular Movements: Extraocular movements intact.   Cardiovascular:      Rate and Rhythm: Normal rate and regular rhythm.   Pulmonary:      Effort: Pulmonary effort is normal. No respiratory distress.      Breath sounds: Normal  breath sounds. No wheezing or rales.   Abdominal:      General: Bowel sounds are normal. There is no distension.      Palpations: Abdomen is soft. There is no mass.      Tenderness: There is no abdominal tenderness. There is no right CVA tenderness or guarding.      Hernia: No hernia is present.   Musculoskeletal:         General: No swelling, tenderness or deformity.      Cervical back: Normal range of motion and neck supple. No rigidity or tenderness.      Right lower leg: No edema.      Left lower leg: No edema.   Skin:     General: Skin is warm and dry.      Coloration: Skin is pale. Skin is not jaundiced.      Findings: No bruising or lesion.   Neurological:      Comments: Lethargic, nonverbal          Results Review:    Lab Results (last 24 hours)     Procedure Component Value Units Date/Time    Urine Culture - Urine, Urine, Clean Catch [[id number]]  (Abnormal) Collected: 12/02/21 0010    Specimen: Urine, Clean Catch Updated: 12/03/21 1016     Urine Culture >100,000 CFU/mL Gram Negative Bacilli    CBC & Differential [[id number]]  (Abnormal) Collected: 12/03/21 0530    Specimen: Blood Updated: 12/03/21 0606    Narrative:      The following orders were created for panel order CBC & Differential.  Procedure                               Abnormality         Status                     ---------                               -----------         ------                     CBC Auto Differential[[id number]]        Abnormal            Final result                 Please view results for these tests on the individual orders.    CBC Auto Differential [[id number]]  (Abnormal) Collected: 12/03/21 0530    Specimen: Blood Updated: 12/03/21 0606     WBC 8.11 10*3/mm3      RBC 2.64 10*6/mm3      Hemoglobin 8.4 g/dL      Hematocrit 26.2 %      MCV 99.2 fL      MCH 31.8 pg      MCHC 32.1 g/dL      RDW 13.6 %      RDW-SD 49.1 fl      MPV 10.0 fL      Platelets 237 10*3/mm3      Neutrophil % 84.7 %      Lymphocyte % 7.2 %      Monocyte  % 6.9 %      Eosinophil % 0.0 %      Basophil % 0.2 %      Immature Grans % 1.0 %      Neutrophils, Absolute 6.87 10*3/mm3      Lymphocytes, Absolute 0.58 10*3/mm3      Monocytes, Absolute 0.56 10*3/mm3      Eosinophils, Absolute 0.00 10*3/mm3      Basophils, Absolute 0.02 10*3/mm3      Immature Grans, Absolute 0.08 10*3/mm3      nRBC 0.0 /100 WBC     Basic Metabolic Panel [[id number]]  (Abnormal) Collected: 12/03/21 0529    Specimen: Blood Updated: 12/03/21 0605     Glucose 140 mg/dL      BUN 29 mg/dL      Creatinine 0.91 mg/dL      Sodium 140 mmol/L      Potassium 3.9 mmol/L      Chloride 103 mmol/L      CO2 28.0 mmol/L      Calcium 8.0 mg/dL      eGFR Non African Amer 58 mL/min/1.73      BUN/Creatinine Ratio 31.9     Anion Gap 9.0 mmol/L     Narrative:      GFR Normal >60  Chronic Kidney Disease <60  Kidney Failure <15      aPTT [[id number]]  (Abnormal) Collected: 12/03/21 0524    Specimen: Blood Updated: 12/03/21 0602     PTT 86.5 seconds     Narrative:      The recommended Heparin therapeutic range is 68-97 seconds.    Blood Culture - Blood, Hand, Right [[id number]]  (Normal) Collected: 12/02/21 0225    Specimen: Blood from Hand, Right Updated: 12/03/21 0300     Blood Culture No growth at 24 hours    Blood Culture - Blood, Hand, Left [[id number]]  (Normal) Collected: 12/02/21 0241    Specimen: Blood from Hand, Left Updated: 12/03/21 0300     Blood Culture No growth at 24 hours    aPTT [[id number]]  (Abnormal) Collected: 12/02/21 2205    Specimen: Blood Updated: 12/02/21 2246     .2 seconds     Narrative:      The recommended Heparin therapeutic range is 68-97 seconds.    Extra Tubes [[id number]] Collected: 12/02/21 1600    Specimen: Blood, Venous Line Updated: 12/02/21 1701    Narrative:      The following orders were created for panel order Extra Tubes.  Procedure                               Abnormality         Status                     ---------                               -----------         ------                      Green Top (Gel)[[id number]]                                  Final result                 Please view results for these tests on the individual orders.    Green Top (Gel) [[id number]] Collected: 12/02/21 1600    Specimen: Blood Updated: 12/02/21 1701     Extra Tube Hold for add-ons.     Comment: Auto resulted.       aPTT [[id number]]  (Abnormal) Collected: 12/02/21 1557    Specimen: Blood Updated: 12/02/21 1626     PTT 99.5 seconds     Narrative:      The recommended Heparin therapeutic range is 68-97 seconds.          Imaging Results (Last 24 Hours)     ** No results found for the last 24 hours. **          Assessment/Plan       Acute pulmonary embolism (HCC)    Acute on chronic respiratory failure with hypoxia and hypercapnia (HCC)    DVT, bilateral lower limbs (HCC)    Chronic obstructive pulmonary disease (HCC)    Uncontrolled hypertension    UTI (urinary tract infection)    Discussed with her son/POA today about possible comfort measures - and he is in agreement. We will continue with the IV Morphine, IV Ativan in the supplemental oxygen through the nasal cannula.    I confirmed that the patient's Advance Care Plan is present, code status is documented, or surrogate decision maker is listed in the patient's medical record.      Devante Hilliard MD  12/03/21  13:21 CST

## 2021-12-03 NOTE — PLAN OF CARE
Goal Outcome Evaluation:           Progress: declining  Outcome Summary: Comfort care today. PRN morphine and ativan admin per orders. Planned transfer to room 378. Report called to TAINA Carreno at 1604.

## 2021-12-04 LAB — BACTERIA SPEC AEROBE CULT: ABNORMAL

## 2021-12-07 LAB
BACTERIA SPEC AEROBE CULT: NORMAL
BACTERIA SPEC AEROBE CULT: NORMAL

## 2022-02-22 NOTE — PLAN OF CARE
Problem: Patient Care Overview (Adult)  Goal: Plan of Care Review  Outcome: Ongoing (interventions implemented as appropriate)   02/02/18 1519   Coping/Psychosocial Response Interventions   Plan Of Care Reviewed With patient   Outcome Evaluation   Outcome Summary/Follow up Plan pt aware of dc to snf for cont OT pT when discharged pt demo good perf with tf and adls requiring sba-- min a f   Patient Care Overview   Progress improving       Problem: Inpatient Occupational Therapy  Goal: Bed Mobility Goal STG- OT  Outcome: Ongoing (interventions implemented as appropriate)   01/26/18 1630 02/02/18 1519   Bed Mobility OT STG   Bed Mobility OT STG, Date Established 01/26/18 --    Bed Mobility OT STG, Time to Achieve by discharge --    Bed Mobility OT STG, Activity Type all bed mobility --    Bed Mobility OT STG, Thornton Level conditional independence --    Bed Mobility OT STG, Additional Goal with VSS --    Bed Mobility OT STG, Date Goal Reviewed --  02/02/18   Bed Mobility OT STG, Outcome --  goal ongoing     Goal: Transfer Training Goal 1 LTG- OT  Outcome: Ongoing (interventions implemented as appropriate)   01/26/18 1630 02/02/18 1519   Transfer Training OT LTG   Transfer Training OT LTG, Date Established 01/26/18 --    Transfer Training OT LTG, Time to Achieve by discharge --    Transfer Training OT LTG, Activity Type all transfers --    Transfer Training OT LTG, Thornton Level supervision required --    Transfer Training OT LTG, Assist Device walker, rolling --    Transfer Training OT LTG, Additional Goal with VSS --    Transfer Training OT LTG, Date Goal Reviewed --  02/02/18   Transfer Training OT LTG, Outcome --  goal ongoing     Goal: Patient Education Goal LTG- OT  Outcome: Ongoing (interventions implemented as appropriate)   01/26/18 1630 02/02/18 1519   Patient Education OT LTG   Patient Education OT LTG, Date Established 01/26/18 --    Patient Education OT LTG, Time to Achieve by discharge --     Patient Education OT LTG, Education Type written program;HEP;precautions per surgeon;positioning;posture/body mechanics;home safety;adaptive equipment mgmt;skin care/inspection;energy conservation;work simplification;adaptive breathing --    Patient Education OT LTG, Education Understanding independent;demonstrates adequately;verbalizes understanding --    Patient Education OT LTG, Date Goal Reviewed --  02/02/18   Patient Education OT LTG Outcome --  goal ongoing     Goal: ADL Goal LTG- OT  Outcome: Ongoing (interventions implemented as appropriate)   01/26/18 1630 02/02/18 1519   ADL OT LTG   ADL OT LTG, Date Established 01/26/18 --    ADL OT LTG, Time to Achieve by discharge --    ADL OT LTG, Activity Type ADL skills --    ADL OT LTG, Additional Goal UB bathing SBA with AD, LB bathing with min A with AD, UB dressing with cond I, LB dressing with Robb with AD, grooming with set-up, toileting with CGA  --    ADL OT LTG, Date Goal Reviewed --  02/02/18   ADL OT LTG, Outcome --  goal ongoing          10

## 2022-06-13 NOTE — TELEPHONE ENCOUNTER
PATIENTS SON JANA CALLING IN STATING THAT PATIENT IS GOING TO BE STAYING WITH HIM IN [place] FOR A FEW WEEKS. PATIENT SON IS WANTING TO KNOW IF A Z-PACK CAN BE CALLED IN, SON IS CONCERNED ABOUT PATIENTS COPD.       CALL BACK NUMBER: [phone] OR [phone]    CONFIRMED PHARMACY: [facility], [address] - [phone] [facility] [phone]   [phone]   Was A Bandage Applied: Yes

## 2022-06-23 NOTE — NURSING NOTE
ARU consult follow up. Per chart notes, patient is to receive 2 units PRBCs today. Patient with orthostatic hypotension yesterday. Patient still seems to be a good candidate for ARU once medically stable. Will continue to follow for ARU.   rolling walker

## 2022-11-02 NOTE — ED NOTES
Pt returned from ct scan, was using nonrebreather mask while out of dept, pt returned with increased difficulty breathing , sob, fatigue and inability to move from wc to bed where she was able to perform that task prior, pt returned to bipap mask and jono mustafa to bedside for re-eval of pt     Rosanne Evans, RN  12/01/21 8807     Cibinqo Pregnancy And Lactation Text: It is unknown if this medication will adversely affect pregnancy or breast feeding.  You should not take this medication if you are currently pregnant or planning a pregnancy or while breastfeeding.

## 2023-10-09 NOTE — PLAN OF CARE
Problem: Patient Care Overview (Adult)  Goal: Plan of Care Review  Outcome: Ongoing (interventions implemented as appropriate)  Pt declined Low Sodium diet ed this date but accepted diet copy.   02/14/18 6202   Coping/Psychosocial Response Interventions   Plan Of Care Reviewed With patient   Patient Care Overview   Progress no change   Outcome Evaluation   Outcome Summary/Follow up Plan initial visit          There is another message started regarding this same thing. I documented this on that telephone message.   There are instructions
